# Patient Record
Sex: MALE | Race: WHITE | NOT HISPANIC OR LATINO | Employment: OTHER | URBAN - METROPOLITAN AREA
[De-identification: names, ages, dates, MRNs, and addresses within clinical notes are randomized per-mention and may not be internally consistent; named-entity substitution may affect disease eponyms.]

---

## 2017-02-06 ENCOUNTER — OFFICE VISIT (OUTPATIENT)
Dept: CARDIOLOGY | Facility: CLINIC | Age: 67
End: 2017-02-06

## 2017-02-06 VITALS
WEIGHT: 266 LBS | BODY MASS INDEX: 36.08 KG/M2 | DIASTOLIC BLOOD PRESSURE: 79 MMHG | SYSTOLIC BLOOD PRESSURE: 127 MMHG | HEART RATE: 80 BPM

## 2017-02-06 DIAGNOSIS — E66.09 OBESITY DUE TO EXCESS CALORIES, UNSPECIFIED OBESITY SEVERITY: Chronic | ICD-10-CM

## 2017-02-06 DIAGNOSIS — F41.9 ANXIETY: ICD-10-CM

## 2017-02-06 DIAGNOSIS — E78.49 OTHER HYPERLIPIDEMIA: ICD-10-CM

## 2017-02-06 DIAGNOSIS — I10 ESSENTIAL HYPERTENSION: Primary | ICD-10-CM

## 2017-02-06 DIAGNOSIS — E11.9 NON-INSULIN DEPENDENT TYPE 2 DIABETES MELLITUS (HCC): ICD-10-CM

## 2017-02-06 PROCEDURE — 99213 OFFICE O/P EST LOW 20 MIN: CPT | Performed by: INTERNAL MEDICINE

## 2017-02-06 PROCEDURE — 93000 ELECTROCARDIOGRAM COMPLETE: CPT | Performed by: INTERNAL MEDICINE

## 2017-02-06 NOTE — PROGRESS NOTES
Procedure   [facility]  Cardiology Progress Note    Patient Identification:  Name:Branden Diop  Age:66 y.o.  Sex: male  :  1950  MRN: [id number]           2017    Subjective:    Chief Complaint   Patient presents with   • Atrial Fibrillation       HPI       Mr. Diop is a 66-year-old white male with history of atrial fibrillation/flutter, no new recurrrance status post cardioversions, converted sinus rhythm, sleep apnea, former smoker, hyperlipidemia, who presents for cardiac clearance. He denies chest pain, pressure, tightness, heaviness or squeezing. No shortness of breath. No orthopnea or PND. No palpitations, dizziness or syncope. No edema. He lost 20 lbs since the last office visit.      Cholesterol management is followed by Dr. Lopez, on pravastatin. At some point, the near future he is thinking about stopping his statin medicine because his lipid profile has been good and I advised him not to stop it      Past echocardiogram with Doppler shows normal left ventricular size function.      ECG today in the office shows sinus rhythm. On propafenone,and diltiazem. Is off her Xarelto      He dosen't want to contiume long term anticoagulation and his knee surgeries went well.      He says he is feeling great with no angina or syncope    Review of Systems   Constitution: Negative for chills, fever and malaise/fatigue.   HENT: Negative for headaches.    Eyes: Negative for blurred vision and double vision. Left eye: glasses.   Cardiovascular: Negative for chest pain, irregular heartbeat, leg swelling and palpitations.   Respiratory: Negative for shortness of breath and snoring.    Skin: Positive for skin cancer (was just removed). Negative for color change.   Musculoskeletal: Negative for arthritis and joint pain.   Gastrointestinal: Negative for abdominal pain, nausea and vomiting.   Neurological: Negative for dizziness, light-headedness and numbness.   Psychiatric/Behavioral: Depression:  meds.       Past Medical History   Diagnosis Date   • Anxiety    • Atrial fibrillation    • Colon polyps    • COPD (chronic obstructive pulmonary disease)    • Depression    • Hammertoe    • Hyperlipidemia    • Non-insulin dependent type 2 diabetes mellitus    • Pneumonia 12/2013   • Right wrist pain 11/11/2015     and hand secondary to carpal tunnel and tendinitis   • Sinusitis    • Sleep apnea with use of continuous positive airway pressure (CPAP)    • Streptococcus pneumoniae        Past Surgical History   Procedure Laterality Date   • Foot surgery Left    • Hernia repair     • Neck surgery       growth on salivary gland   • Colonoscopy  2007   • Basal cell carcinoma excision     • Replacement total knee Right 2007     (he is going to have a LTKR next month)   • Finger/thumb arthroplasty     • Replacement total knee Left        Social History     Social History   • Marital status:      Spouse name: N/A   • Number of children: N/A   • Years of education: N/A     Occupational History   • Not on file.     Social History Main Topics   • Smoking status: Former Smoker     Quit date: 1/1/2014   • Smokeless tobacco: Not on file   • Alcohol use Not on file   • Drug use: No   • Sexual activity: Not on file     Other Topics Concern   • Not on file     Social History Narrative       Family History   Problem Relation Age of Onset   • Cancer Other    • Stroke Mother    • Alcohol abuse Father        Scheduled Meds:    Current Outpatient Prescriptions:   •  sertraline (ZOLOFT) 50 MG tablet, TAKE 1 TABLET BY MOUTH EVERY DAY, Disp: 30 tablet, Rfl: 2    Objective:  Visit Vitals   • /79   • Pulse 80   • Wt 266 lb (121 kg)   • BMI 36.08 kg/m2        Physical Exam  Physical Exam:    General: No acute distress.    Skin: Warm and dry, no diaphoresis noted   HEENT: No ptosis; external ear and nose normal; oral mucosa moist   Neck: Supple; no carotid bruits; no JVD, Trachea mid line   Heart: S1S2 regular rate and rhythm; no  murmurs; no gallop or rub appreciated, apex not displaced   Chest: Respirations regular, unlabored at rest, bilateral breath sounds have good air entry; no  wheezes auscultated.     Abdomen: Soft, non-tender, non-distended, positive bowel sounds  No hepatosplenomegaly   Extremities: Bilateral lower extremities have no pre-tibial pitting edema; Radials are palpable   Neurological: Alert and oriented x 3; no new motor deficits,           ECG 12 Lead  Date/Time: 2/6/2017 10:09 AM  Performed by: LAUREL MUSTAFA  Authorized by: LAUREL MUSTAFA   Comparison: compared with previous ECG   Similar to previous ECG  Rhythm: sinus rhythm  Rate: normal  QRS axis: normal             Comparison to previous ECG:  Similar to previous ecg     Assessment:  Problem List Items Addressed This Visit        Cardiovascular and Mediastinum    BP (high blood pressure) - Primary    Hyperlipidemia       Digestive    Obesity (Chronic)       Endocrine    Non-insulin dependent type 2 diabetes mellitus       Other    Anxiety          Plan:    Overall clinically stable with no angina. His BP has been stable. I will continue the current medical regimen. Weight loss is remphasized. I will see him back in 6 months.      02/06/2017  Marcial Mustafa MD, FACC

## 2017-02-15 ENCOUNTER — TELEPHONE (OUTPATIENT)
Dept: FAMILY MEDICINE CLINIC | Facility: CLINIC | Age: 67
End: 2017-02-15

## 2017-02-16 ENCOUNTER — OFFICE VISIT (OUTPATIENT)
Dept: FAMILY MEDICINE CLINIC | Facility: CLINIC | Age: 67
End: 2017-02-16

## 2017-02-16 VITALS
OXYGEN SATURATION: 96 % | HEIGHT: 72 IN | SYSTOLIC BLOOD PRESSURE: 118 MMHG | RESPIRATION RATE: 26 BRPM | TEMPERATURE: 97.7 F | DIASTOLIC BLOOD PRESSURE: 68 MMHG | WEIGHT: 271.2 LBS | BODY MASS INDEX: 36.73 KG/M2 | HEART RATE: 76 BPM

## 2017-02-16 DIAGNOSIS — J06.9 ACUTE URI: Primary | ICD-10-CM

## 2017-02-16 DIAGNOSIS — I48.91 ATRIAL FIBRILLATION, UNSPECIFIED TYPE (HCC): ICD-10-CM

## 2017-02-16 DIAGNOSIS — I10 ESSENTIAL HYPERTENSION: ICD-10-CM

## 2017-02-16 DIAGNOSIS — E78.5 HYPERLIPIDEMIA, UNSPECIFIED HYPERLIPIDEMIA TYPE: ICD-10-CM

## 2017-02-16 PROCEDURE — 99213 OFFICE O/P EST LOW 20 MIN: CPT | Performed by: INTERNAL MEDICINE

## 2017-02-16 RX ORDER — AMOXICILLIN AND CLAVULANATE POTASSIUM 875; 125 MG/1; MG/1
1 TABLET, FILM COATED ORAL 2 TIMES DAILY
Qty: 20 TABLET | Refills: 0 | Status: SHIPPED | OUTPATIENT
Start: 2017-02-16 | End: 2017-03-16

## 2017-02-16 RX ORDER — VALSARTAN AND HYDROCHLOROTHIAZIDE 160; 25 MG/1; MG/1
1 TABLET ORAL
COMMUNITY
End: 2017-04-26

## 2017-02-16 NOTE — PROGRESS NOTES
Subjective   Branden Diop is a 66 y.o. male.     History of Present Illness   Patient was seen for an upper respiratory tract symptoms.  He was given Augmentin and asked to follow-up in 4 days if not better.    Much of this encounter note is an electronic transcription/translation of spoken language to printed text.  The electronic translation of spoken language may permit erroneous, or at times, nonsensical words or phrases to be inadvertently transcribed.  Although I have reviewed the note for such errors, some may still exist.  The following portions of the patient's history were reviewed and updated as appropriate: allergies, current medications, past family history, past medical history, past social history, past surgical history and problem list.    Review of Systems   Constitutional: Negative for fatigue and fever.   HENT: Positive for congestion and sinus pressure. Negative for trouble swallowing.    Eyes: Negative for discharge and visual disturbance.   Respiratory: Negative for choking and shortness of breath.    Cardiovascular: Negative for chest pain and palpitations.   Gastrointestinal: Negative for abdominal pain and blood in stool.   Endocrine: Negative.    Genitourinary: Negative for genital sores and hematuria.   Musculoskeletal: Negative for gait problem and joint swelling.   Skin: Negative for color change, pallor, rash and wound.   Allergic/Immunologic: Positive for environmental allergies. Negative for immunocompromised state.   Neurological: Negative for facial asymmetry and speech difficulty.   Psychiatric/Behavioral: Negative for hallucinations and suicidal ideas.       Objective   Physical Exam   Constitutional: He is oriented to person, place, and time. He appears well-developed and well-nourished.   HENT:   Head: Normocephalic.   Bilateral maxillary tenderness   Eyes: Conjunctivae are normal. Pupils are equal, round, and reactive to light.   Neck: Normal range of motion. Neck supple.    Cardiovascular: Normal rate, regular rhythm and normal heart sounds.    Pulmonary/Chest: Effort normal and breath sounds normal.   Abdominal: Soft. Bowel sounds are normal.   Musculoskeletal: Normal range of motion.   Neurological: He is alert and oriented to person, place, and time.   Skin: Skin is warm and dry.   Psychiatric: He has a normal mood and affect. His behavior is normal. Judgment and thought content normal.   Nursing note and vitals reviewed.      Assessment/Plan   Problems Addressed this Visit        Cardiovascular and Mediastinum    A-fib    BP (high blood pressure)    Relevant Medications    valsartan-hydrochlorothiazide (DIOVAN-HCT) 160-25 MG per tablet    Hyperlipidemia      Other Visit Diagnoses     Acute URI    -  Primary    Relevant Medications    amoxicillin-clavulanate (AUGMENTIN) 875-125 MG per tablet

## 2017-03-16 ENCOUNTER — OFFICE VISIT (OUTPATIENT)
Dept: FAMILY MEDICINE CLINIC | Facility: CLINIC | Age: 67
End: 2017-03-16

## 2017-03-16 VITALS
OXYGEN SATURATION: 95 % | SYSTOLIC BLOOD PRESSURE: 138 MMHG | TEMPERATURE: 97.7 F | HEIGHT: 72 IN | DIASTOLIC BLOOD PRESSURE: 72 MMHG | HEART RATE: 70 BPM

## 2017-03-16 DIAGNOSIS — E78.2 MIXED HYPERLIPIDEMIA: ICD-10-CM

## 2017-03-16 DIAGNOSIS — J44.9 CHRONIC OBSTRUCTIVE PULMONARY DISEASE, UNSPECIFIED COPD TYPE (HCC): ICD-10-CM

## 2017-03-16 DIAGNOSIS — I10 ESSENTIAL HYPERTENSION: Primary | ICD-10-CM

## 2017-03-16 DIAGNOSIS — I48.20 CHRONIC ATRIAL FIBRILLATION (HCC): ICD-10-CM

## 2017-03-16 PROCEDURE — 99214 OFFICE O/P EST MOD 30 MIN: CPT | Performed by: INTERNAL MEDICINE

## 2017-03-16 NOTE — PROGRESS NOTES
Subjective   Branden Diop is a 66 y.o. male.     History of Present Illness   Patient being seen for blood pressure.  Been getting 130s to 120s over 80s at home.  He is being followed by cardiology for his weekly block and atrial flutter fib his COPD is been well-controlled last several months and he will follow-up in our office in 4 months.    Much of this encounter note is an electronic transcription/translation of spoken language to printed text.  The electronic translation of spoken language may permit erroneous, or at times, nonsensical words or phrases to be inadvertently transcribed.  Although I have reviewed the note for such errors, some may still exist.  The following portions of the patient's history were reviewed and updated as appropriate: allergies, current medications, past family history, past medical history, past social history, past surgical history and problem list.    Review of Systems   Constitutional: Negative for fatigue and fever.   HENT: Positive for congestion. Negative for trouble swallowing.    Eyes: Negative for discharge and visual disturbance.   Respiratory: Negative for choking and shortness of breath.    Cardiovascular: Negative for chest pain and palpitations.   Gastrointestinal: Negative for abdominal pain and blood in stool.   Endocrine: Negative.    Genitourinary: Negative for genital sores and hematuria.   Musculoskeletal: Negative for gait problem and joint swelling.   Skin: Negative for color change, pallor, rash and wound.   Allergic/Immunologic: Positive for environmental allergies. Negative for immunocompromised state.   Neurological: Negative for facial asymmetry and speech difficulty.   Psychiatric/Behavioral: Negative for hallucinations and suicidal ideas.       Objective   Physical Exam   Constitutional: He is oriented to person, place, and time. He appears well-developed and well-nourished.   HENT:   Head: Normocephalic.   Eyes: Conjunctivae are normal. Pupils are  equal, round, and reactive to light.   Neck: Normal range of motion. Neck supple.   Cardiovascular: Normal rate, regular rhythm and normal heart sounds.    Pulmonary/Chest: Effort normal and breath sounds normal.   Abdominal: Soft. Bowel sounds are normal.   Musculoskeletal: Normal range of motion.   Neurological: He is alert and oriented to person, place, and time.   Skin: Skin is warm and dry.   Psychiatric: He has a normal mood and affect. His behavior is normal. Judgment and thought content normal.   Nursing note and vitals reviewed.      Assessment/Plan   Problems Addressed this Visit        Cardiovascular and Mediastinum    A-fib    BP (high blood pressure) - Primary    Hyperlipidemia       Respiratory    COPD (chronic obstructive pulmonary disease)

## 2017-04-12 ENCOUNTER — LAB (OUTPATIENT)
Dept: FAMILY MEDICINE CLINIC | Facility: CLINIC | Age: 67
End: 2017-04-12

## 2017-04-12 DIAGNOSIS — E78.5 DYSLIPIDEMIA: ICD-10-CM

## 2017-04-12 DIAGNOSIS — E78.2 MIXED HYPERLIPIDEMIA: Primary | ICD-10-CM

## 2017-04-12 DIAGNOSIS — Z12.5 PROSTATE CANCER SCREENING: ICD-10-CM

## 2017-04-12 DIAGNOSIS — R79.89 LOW VITAMIN D LEVEL: ICD-10-CM

## 2017-04-12 LAB
25(OH)D3 SERPL-MCNC: 14.5 NG/ML (ref 30–100)
ALBUMIN SERPL-MCNC: 4.3 G/DL (ref 3.5–5.2)
ALBUMIN/GLOB SERPL: 1.1 G/DL
ALP SERPL-CCNC: 82 U/L (ref 39–117)
ALT SERPL W P-5'-P-CCNC: 20 U/L (ref 1–41)
ANION GAP SERPL CALCULATED.3IONS-SCNC: 19.9 MMOL/L
AST SERPL-CCNC: 19 U/L (ref 1–40)
BILIRUB SERPL-MCNC: 0.5 MG/DL (ref 0.1–1.2)
BUN BLD-MCNC: 15 MG/DL (ref 8–23)
BUN/CREAT SERPL: 17.9 (ref 7–25)
CALCIUM SPEC-SCNC: 9.8 MG/DL (ref 8.6–10.5)
CHLORIDE SERPL-SCNC: 100 MMOL/L (ref 98–107)
CHOLEST SERPL-MCNC: 285 MG/DL (ref 0–200)
CO2 SERPL-SCNC: 25 MMOL/L (ref 22–29)
CREAT BLD-MCNC: 0.84 MG/DL (ref 0.76–1.27)
ERYTHROCYTE [DISTWIDTH] IN BLOOD BY AUTOMATED COUNT: 14.7 % (ref 4.5–15)
GFR SERPL CREATININE-BSD FRML MDRD: 91 ML/MIN/1.73
GLOBULIN UR ELPH-MCNC: 3.8 GM/DL
GLUCOSE BLD-MCNC: 101 MG/DL (ref 65–99)
HCT VFR BLD AUTO: 45.7 % (ref 35–60)
HDLC SERPL-MCNC: 36 MG/DL (ref 40–60)
HGB BLD-MCNC: 15.6 G/DL (ref 13.5–18)
LDLC SERPL CALC-MCNC: 214 MG/DL (ref 0–100)
LDLC/HDLC SERPL: 5.94 {RATIO}
LYMPHOCYTES # BLD AUTO: 2 10*3/MM3 (ref 1.2–3.4)
LYMPHOCYTES NFR BLD AUTO: 26.2 % (ref 21–51)
MCH RBC QN AUTO: 29.5 PG (ref 26.1–33.1)
MCHC RBC AUTO-ENTMCNC: 34 G/DL (ref 33–37)
MCV RBC AUTO: 86.7 FL (ref 80–99)
MONOCYTES # BLD AUTO: 0.2 10*3/MM3 (ref 0.1–0.6)
MONOCYTES NFR BLD AUTO: 3.2 % (ref 2–9)
NEUTROPHILS # BLD AUTO: 5.4 10*3/MM3 (ref 1.4–6.5)
NEUTROPHILS NFR BLD AUTO: 70.6 % (ref 42–75)
PLATELET # BLD AUTO: 313 10*3/MM3 (ref 150–450)
PMV BLD AUTO: 7.2 FL (ref 7.1–10.5)
POTASSIUM BLD-SCNC: 4.9 MMOL/L (ref 3.5–5.2)
PROT SERPL-MCNC: 8.1 G/DL (ref 6–8.5)
RBC # BLD AUTO: 5.28 10*6/MM3 (ref 4–6)
SODIUM BLD-SCNC: 140 MMOL/L (ref 136–145)
TRIGL SERPL-MCNC: 176 MG/DL (ref 0–150)
VLDLC SERPL-MCNC: 35.2 MG/DL (ref 5–40)
WBC NRBC COR # BLD: 7.7 10*3/MM3 (ref 4.5–10)

## 2017-04-12 PROCEDURE — 82306 VITAMIN D 25 HYDROXY: CPT | Performed by: INTERNAL MEDICINE

## 2017-04-12 PROCEDURE — 85025 COMPLETE CBC W/AUTO DIFF WBC: CPT | Performed by: INTERNAL MEDICINE

## 2017-04-12 PROCEDURE — 80053 COMPREHEN METABOLIC PANEL: CPT | Performed by: INTERNAL MEDICINE

## 2017-04-12 PROCEDURE — 80061 LIPID PANEL: CPT | Performed by: INTERNAL MEDICINE

## 2017-04-12 RX ORDER — ATORVASTATIN CALCIUM 40 MG/1
40 TABLET, FILM COATED ORAL DAILY
Qty: 30 TABLET | Refills: 3 | Status: ON HOLD | OUTPATIENT
Start: 2017-04-12 | End: 2017-10-06

## 2017-04-12 RX ORDER — ERGOCALCIFEROL 1.25 MG/1
50000 CAPSULE ORAL WEEKLY
Qty: 30 CAPSULE | Refills: 3 | Status: SHIPPED | OUTPATIENT
Start: 2017-04-12 | End: 2017-10-17 | Stop reason: DRUGHIGH

## 2017-04-26 ENCOUNTER — OFFICE VISIT (OUTPATIENT)
Dept: FAMILY MEDICINE CLINIC | Facility: CLINIC | Age: 67
End: 2017-04-26

## 2017-04-26 VITALS
OXYGEN SATURATION: 93 % | WEIGHT: 271 LBS | TEMPERATURE: 98.1 F | DIASTOLIC BLOOD PRESSURE: 78 MMHG | SYSTOLIC BLOOD PRESSURE: 134 MMHG | BODY MASS INDEX: 36.7 KG/M2 | HEIGHT: 72 IN | RESPIRATION RATE: 18 BRPM | HEART RATE: 79 BPM

## 2017-04-26 DIAGNOSIS — J01.91 ACUTE RECURRENT SINUSITIS, UNSPECIFIED LOCATION: Primary | ICD-10-CM

## 2017-04-26 PROCEDURE — 99213 OFFICE O/P EST LOW 20 MIN: CPT | Performed by: FAMILY MEDICINE

## 2017-04-26 RX ORDER — AMOXICILLIN 500 MG/1
1000 CAPSULE ORAL 3 TIMES DAILY
Qty: 30 CAPSULE | Refills: 0 | Status: SHIPPED | OUTPATIENT
Start: 2017-04-26 | End: 2017-05-06

## 2017-04-26 NOTE — PROGRESS NOTES
SUBJECTIVE:  The patient is a 66-year-old white male who comes in with a 2-3 day history of sinus congestion pressure and sore throat.  Unaware of any fever.  No fever or chills.  He usually ends up with a sinus infection when the symptoms occur.     PAST MEDICAL HISTORY:  Reviewed.    REVIEW OF SYSTEMS:  Please see above; 14 point ROS otherwise negative.      OBJECTIVE: Vitals signs are reviewed and are stable.    HEENT: PERRLA.  Iams dull.  Throat mildly red.  Neck:  Supple.    Lungs:  Rhonchi are present in the right base  Heart:  Regular rate and rhythm.    Abdomen:   Soft, nontender.    Extremities:  No cyanosis, clubbing or edema.      ASSESSMENT:    Acute sinusitis    PLAN:  Amoxicillin 500 mg 3 times a day.  Tylenol as needed.  Follow-up in a couple days if no better.  Notify sooner if problems.    Much of this encounter note is an electronic transcription/translation of spoken language to printed text.  The electronic translation of spoken language may permit erroneous, or at times, nonsensical words or phrases to be inadvertently transcribed.  Although I have reviewed the note for such errors, some may still exist.

## 2017-07-12 ENCOUNTER — OFFICE VISIT (OUTPATIENT)
Dept: FAMILY MEDICINE CLINIC | Facility: CLINIC | Age: 67
End: 2017-07-12

## 2017-07-12 VITALS
BODY MASS INDEX: 36.68 KG/M2 | HEIGHT: 72 IN | DIASTOLIC BLOOD PRESSURE: 84 MMHG | WEIGHT: 270.8 LBS | HEART RATE: 84 BPM | TEMPERATURE: 97.8 F | SYSTOLIC BLOOD PRESSURE: 130 MMHG | OXYGEN SATURATION: 96 %

## 2017-07-12 DIAGNOSIS — I48.91 ATRIAL FIBRILLATION, UNSPECIFIED TYPE (HCC): ICD-10-CM

## 2017-07-12 DIAGNOSIS — E78.2 MIXED HYPERLIPIDEMIA: ICD-10-CM

## 2017-07-12 DIAGNOSIS — Z00.00 MEDICARE ANNUAL WELLNESS VISIT, INITIAL: Primary | ICD-10-CM

## 2017-07-12 DIAGNOSIS — I10 ESSENTIAL HYPERTENSION: ICD-10-CM

## 2017-07-12 PROCEDURE — G0438 PPPS, INITIAL VISIT: HCPCS | Performed by: INTERNAL MEDICINE

## 2017-07-12 PROCEDURE — 99214 OFFICE O/P EST MOD 30 MIN: CPT | Performed by: INTERNAL MEDICINE

## 2017-07-12 NOTE — PROGRESS NOTES
Subjective   Branden Diop is a 66 y.o. male.     History of Present Illness   Patient was seen for a Medicare wellness exam.  His atrial fibrillation is been very stable last several years.  His blood pressures been running 130s to 120s over 80s.  His lipid status been extremely stable with medication diet and exercise.  Patient will continue monitoring blood pressure return to our clinic in 6 months.    Much of this encounter note is an electronic transcription/translation of spoken language to printed text.  The electronic translation of spoken language may permit erroneous, or at times, nonsensical words or phrases to be inadvertently transcribed.  Although I have reviewed the note for such errors, some may still exist.  The following portions of the patient's history were reviewed and updated as appropriate: allergies, current medications, past family history, past medical history, past social history, past surgical history and problem list.    Review of Systems   Constitutional: Negative for fatigue and fever.   HENT: Positive for congestion. Negative for trouble swallowing.    Eyes: Negative for discharge and visual disturbance.   Respiratory: Negative for choking and shortness of breath.    Cardiovascular: Negative for chest pain and palpitations.   Gastrointestinal: Negative for abdominal pain and blood in stool.   Endocrine: Negative.    Genitourinary: Negative for genital sores and hematuria.   Musculoskeletal: Negative for gait problem and joint swelling.   Skin: Negative for color change, pallor, rash and wound.   Allergic/Immunologic: Positive for environmental allergies. Negative for immunocompromised state.   Neurological: Negative for facial asymmetry and speech difficulty.   Psychiatric/Behavioral: Negative for hallucinations and suicidal ideas.       Objective   Physical Exam   Constitutional: He is oriented to person, place, and time. He appears well-developed and well-nourished.   HENT:   Head:  Normocephalic.   Eyes: Conjunctivae are normal. Pupils are equal, round, and reactive to light.   Neck: Normal range of motion. Neck supple.   Cardiovascular: Normal rate and normal heart sounds.  Exam reveals no gallop and no friction rub.    No murmur heard.  Irregular irregular rhythm   Pulmonary/Chest: Effort normal and breath sounds normal. No respiratory distress. He has no wheezes. He has no rales. He exhibits no tenderness.   Abdominal: Soft. Bowel sounds are normal.   Musculoskeletal: Normal range of motion.   Neurological: He is alert and oriented to person, place, and time.   Skin: Skin is warm and dry.   Psychiatric: He has a normal mood and affect. His behavior is normal. Judgment and thought content normal.   Nursing note and vitals reviewed.      Assessment/Plan   Problems Addressed this Visit        Cardiovascular and Mediastinum    A-fib    BP (high blood pressure)    Hyperlipidemia      Other Visit Diagnoses     Medicare annual wellness visit, initial    -  Primary

## 2017-07-12 NOTE — PROGRESS NOTES
QUICK REFERENCE INFORMATION:  The ABCs of the Annual Wellness Visit    Initial Medicare Wellness Visit    HEALTH RISK ASSESSMENT    1950    Recent Hospitalizations:  No hospitalization(s) within the last year..        Current Medical Providers:  Patient Care Team:  Tino Lopez MD as PCP - General  Tino Lopez MD as PCP - Family Medicine  Tino Lopez MD as PCP - Claims Attributed        Smoking Status:  History   Smoking Status   • Former Smoker   • Types: Cigars   • Quit date: 1/1/2014   Smokeless Tobacco   • Never Used       Alcohol Consumption:  History   Alcohol Use No       Depression Screen:   PHQ-9 Depression Screening 7/12/2017   Little interest or pleasure in doing things 0   Feeling down, depressed, or hopeless 0   Trouble falling or staying asleep, or sleeping too much 0   Feeling tired or having little energy 0   Poor appetite or overeating 0   Feeling bad about yourself - or that you are a failure or have let yourself or your family down 0   Trouble concentrating on things, such as reading the newspaper or watching television 0   Moving or speaking so slowly that other people could have noticed. Or the opposite - being so fidgety or restless that you have been moving around a lot more than usual 0   Thoughts that you would be better off dead, or of hurting yourself in some way 0   PHQ-9 Total Score 0   If you checked off any problems, how difficult have these problems made it for you to do your work, take care of things at home, or get along with other people? Not difficult at all       Health Habits and Functional and Cognitive Screening:  Functional & Cognitive Status 7/12/2017   Do you have difficulty preparing food and eating? No   Do you have difficulty bathing yourself? No   Do you have difficulty getting dressed? No   Do you have difficulty using the toilet? No   Do you have difficulty moving around from place to place? No   In the past year have you fallen or experienced a  near fall? No   Do you need help using the phone?  No   Are you deaf or do you have serious difficulty hearing?  No   Do you need help with transportation? No   Do you need help shopping? No   Do you need help preparing meals?  No   Do you need help with housework?  No   Do you need help with laundry? No   Do you need help taking your medications? No   Do you need help managing money? No   Do you have difficulty concentrating, remembering or making decisions? No       Health Habits  Current Diet: Well Balanced Diet  Dental Exam: Up to date  Eye Exam: Up to date  Exercise (times per week): 6 times per week  Current Exercise Activities Include: Aerobics          Does the patient have evidence of cognitive impairment? Yes    Asiprin use counseling: Start ASA 81 mg daily       Recent Lab Results:    Visual Acuity:  No exam data present    Age-appropriate Screening Schedule:  Refer to the list below for future screening recommendations based on patient's age, sex and/or medical conditions. Orders for these recommended tests are listed in the plan section. The patient has been provided with a written plan.    Health Maintenance   Topic Date Due   • URINE MICROALBUMIN  04/05/2016   • HEMOGLOBIN A1C  04/12/2017   • INFLUENZA VACCINE  08/01/2017   • PROSTATE CANCER SCREENING  10/12/2017   • DIABETIC FOOT EXAM  03/16/2018   • DIABETIC EYE EXAM  03/16/2018   • LIPID PANEL  04/12/2018   • COLONOSCOPY  05/10/2023   • TDAP/TD VACCINES (2 - Td) 03/16/2027   • PNEUMOCOCCAL VACCINES (65+ LOW/MEDIUM RISK)  Completed   • ZOSTER VACCINE  Addressed        Subjective   History of Present Illness    Branden Diop is a 66 y.o. male who presents for an Annual Wellness Visit.    The following portions of the patient's history were reviewed and updated as appropriate: allergies, current medications, past family history, past medical history, past social history, past surgical history and problem list.    Outpatient Medications Prior to Visit  "  Medication Sig Dispense Refill   • atorvastatin (LIPITOR) 40 MG tablet Take 1 tablet by mouth Daily. 30 tablet 3   • sertraline (ZOLOFT) 50 MG tablet Take 1 tablet by mouth Daily. 90 tablet 1   • vitamin D (ERGOCALCIFEROL) 46459 UNITS capsule capsule Take 1 capsule by mouth 1 (One) Time Per Week. 30 capsule 3   • aspirin 81 MG tablet Take 1 tablet by mouth Daily. 30 tablet 3     No facility-administered medications prior to visit.        Patient Active Problem List   Diagnosis   • A-fib   • BP (high blood pressure)   • Atrioventricular block, Mobitz type 1, Wenckebach   • Apnea, sleep   • Obesity   • Streptococcus pneumoniae   • Sleep apnea with use of continuous positive airway pressure (CPAP)   • Sinusitis   • Right wrist pain   • Pneumonia   • Non-insulin dependent type 2 diabetes mellitus   • Hyperlipidemia   • Hammertoe   • Depression   • COPD (chronic obstructive pulmonary disease)   • Colon polyps   • Anxiety   • Pruritus   • Atrial flutter       Advance Care Planning:  has NO advance directive - not interested in additional information    Identification of Risk Factors:  Risk factors include: NA.    Review of Systems    Compared to one year ago, the patient feels his physical health is better.  Compared to one year ago, the patient feels his mental health is better.    Objective     Physical Exam    Vitals:    07/12/17 0818   BP: 130/84   BP Location: Left arm   Patient Position: Sitting   Cuff Size: Large Adult   Pulse: 84   Temp: 97.8 °F (36.6 °C)   TempSrc: Oral   SpO2: 96%   Weight: 270 lb 12.8 oz (123 kg)   Height: 72\" (182.9 cm)   PainSc: 0-No pain       Body mass index is 36.73 kg/(m^2).  Discussed the patient's BMI with him. The BMI is above average; BMI management plan is completed.    Assessment/Plan   Patient Self-Management and Personalized Health Advice  The patient has been provided with information about: diet and preventive services including:   · Advance directive.    Visit Diagnoses:  No " diagnosis found.    No orders of the defined types were placed in this encounter.      Outpatient Encounter Prescriptions as of 7/12/2017   Medication Sig Dispense Refill   • atorvastatin (LIPITOR) 40 MG tablet Take 1 tablet by mouth Daily. 30 tablet 3   • sertraline (ZOLOFT) 50 MG tablet Take 1 tablet by mouth Daily. 90 tablet 1   • vitamin D (ERGOCALCIFEROL) 60156 UNITS capsule capsule Take 1 capsule by mouth 1 (One) Time Per Week. 30 capsule 3   • [DISCONTINUED] aspirin 81 MG tablet Take 1 tablet by mouth Daily. 30 tablet 3     No facility-administered encounter medications on file as of 7/12/2017.        Reviewed use of high risk medication in the elderly: not applicable  Reviewed for potential of harmful drug interactions in the elderly: not applicable    Follow Up:  No Follow-up on file.     An After Visit Summary and PPPS with all of these plans were given to the patient.

## 2017-07-12 NOTE — PATIENT INSTRUCTIONS
Medicare Wellness  Personal Prevention Plan of Service     Date of Office Visit:  2017  Encounter Provider:  Tino Lopez MD  Place of Service:  [facility]  Patient Name: Branden Diop  :  1950    As part of the Medicare Wellness portion of your visit today, we are providing you with this personalized preventive plan of services (PPPS). This plan is based upon recommendations of the [place] Preventive Services Task Force (USPSTF) and the Advisory Committee on Immunization Practices (ACIP).    This lists the preventive care services that should be considered, and provides dates of when you are due. Items listed as completed are up-to-date and do not require any further intervention.    Health Maintenance   Topic Date Due   • AAA SCREEN (ONE-TIME)  2016   • HEPATITIS C SCREENING  2016   • MEDICARE ANNUAL WELLNESS  2016   • URINE MICROALBUMIN  2016   • HEMOGLOBIN A1C  2017   • INFLUENZA VACCINE  2017   • PROSTATE CANCER SCREENING  10/12/2017   • DIABETIC FOOT EXAM  2018   • DIABETIC EYE EXAM  2018   • LIPID PANEL  2018   • COLONOSCOPY  05/10/2023   • TDAP/TD VACCINES (2 - Td) 2027   • PNEUMOCOCCAL VACCINES (65+ LOW/MEDIUM RISK)  Completed   • ZOSTER VACCINE  Addressed       No orders of the defined types were placed in this encounter.      No Follow-up on file.

## 2017-07-21 ENCOUNTER — LAB (OUTPATIENT)
Dept: FAMILY MEDICINE CLINIC | Facility: CLINIC | Age: 67
End: 2017-07-21

## 2017-07-21 DIAGNOSIS — E55.9 VITAMIN D DEFICIENCY: Primary | ICD-10-CM

## 2017-07-21 DIAGNOSIS — E78.2 MIXED HYPERLIPIDEMIA: ICD-10-CM

## 2017-07-21 LAB
25(OH)D3 SERPL-MCNC: 23.8 NG/ML (ref 30–100)
ALBUMIN SERPL-MCNC: 4.3 G/DL (ref 3.5–5.2)
ALBUMIN/GLOB SERPL: 1.3 G/DL
ALP SERPL-CCNC: 102 U/L (ref 39–117)
ALT SERPL W P-5'-P-CCNC: 22 U/L (ref 1–41)
ANION GAP SERPL CALCULATED.3IONS-SCNC: 14.4 MMOL/L
AST SERPL-CCNC: 19 U/L (ref 1–40)
BILIRUB CONJ SERPL-MCNC: <0.2 MG/DL (ref 0–0.3)
BILIRUB SERPL-MCNC: 0.6 MG/DL (ref 0.1–1.2)
BUN BLD-MCNC: 12 MG/DL (ref 8–23)
BUN/CREAT SERPL: 14 (ref 7–25)
CALCIUM SPEC-SCNC: 9.9 MG/DL (ref 8.6–10.5)
CHLORIDE SERPL-SCNC: 100 MMOL/L (ref 98–107)
CHOLEST SERPL-MCNC: 157 MG/DL (ref 0–200)
CO2 SERPL-SCNC: 24.6 MMOL/L (ref 22–29)
CREAT BLD-MCNC: 0.86 MG/DL (ref 0.76–1.27)
GFR SERPL CREATININE-BSD FRML MDRD: 88 ML/MIN/1.73
GLOBULIN UR ELPH-MCNC: 3.3 GM/DL
GLUCOSE BLD-MCNC: 118 MG/DL (ref 65–99)
HDLC SERPL-MCNC: 37 MG/DL (ref 40–60)
LDLC SERPL CALC-MCNC: 99 MG/DL (ref 0–100)
LDLC/HDLC SERPL: 2.67 {RATIO}
POTASSIUM BLD-SCNC: 5.1 MMOL/L (ref 3.5–5.2)
PROT SERPL-MCNC: 7.6 G/DL (ref 6–8.5)
SODIUM BLD-SCNC: 139 MMOL/L (ref 136–145)
TRIGL SERPL-MCNC: 106 MG/DL (ref 0–150)
VLDLC SERPL-MCNC: 21.2 MG/DL (ref 5–40)

## 2017-07-21 PROCEDURE — 80061 LIPID PANEL: CPT | Performed by: INTERNAL MEDICINE

## 2017-07-21 PROCEDURE — 82248 BILIRUBIN DIRECT: CPT | Performed by: INTERNAL MEDICINE

## 2017-07-21 PROCEDURE — 36415 COLL VENOUS BLD VENIPUNCTURE: CPT | Performed by: INTERNAL MEDICINE

## 2017-07-21 PROCEDURE — 82306 VITAMIN D 25 HYDROXY: CPT | Performed by: INTERNAL MEDICINE

## 2017-07-21 PROCEDURE — 80053 COMPREHEN METABOLIC PANEL: CPT | Performed by: INTERNAL MEDICINE

## 2017-08-07 ENCOUNTER — OFFICE VISIT (OUTPATIENT)
Dept: CARDIOLOGY | Facility: CLINIC | Age: 67
End: 2017-08-07

## 2017-08-07 VITALS
DIASTOLIC BLOOD PRESSURE: 88 MMHG | SYSTOLIC BLOOD PRESSURE: 158 MMHG | WEIGHT: 275 LBS | HEART RATE: 80 BPM | BODY MASS INDEX: 37.3 KG/M2

## 2017-08-07 DIAGNOSIS — G47.33 OBSTRUCTIVE SLEEP APNEA SYNDROME: ICD-10-CM

## 2017-08-07 DIAGNOSIS — E11.9 NON-INSULIN DEPENDENT TYPE 2 DIABETES MELLITUS (HCC): ICD-10-CM

## 2017-08-07 DIAGNOSIS — E66.01 MORBID OBESITY DUE TO EXCESS CALORIES (HCC): Chronic | ICD-10-CM

## 2017-08-07 DIAGNOSIS — E78.2 MIXED HYPERLIPIDEMIA: ICD-10-CM

## 2017-08-07 DIAGNOSIS — J43.8 OTHER EMPHYSEMA (HCC): ICD-10-CM

## 2017-08-07 DIAGNOSIS — R94.31 ABNORMAL ELECTROCARDIOGRAM: ICD-10-CM

## 2017-08-07 DIAGNOSIS — I10 ESSENTIAL HYPERTENSION: Primary | ICD-10-CM

## 2017-08-07 PROCEDURE — 99214 OFFICE O/P EST MOD 30 MIN: CPT | Performed by: INTERNAL MEDICINE

## 2017-08-07 PROCEDURE — 93000 ELECTROCARDIOGRAM COMPLETE: CPT | Performed by: INTERNAL MEDICINE

## 2017-08-07 RX ORDER — FLUOROURACIL 50 MG/G
CREAM TOPICAL
Refills: 1 | Status: ON HOLD | COMMUNITY
Start: 2017-05-22 | End: 2017-10-06

## 2017-08-07 RX ORDER — VALSARTAN AND HYDROCHLOROTHIAZIDE 160; 25 MG/1; MG/1
1 TABLET ORAL DAILY
Qty: 30 TABLET | Refills: 3 | Status: SHIPPED | OUTPATIENT
Start: 2017-08-07 | End: 2018-06-22

## 2017-08-07 NOTE — PROGRESS NOTES
Procedure   [facility]  Cardiology Progress Note    Patient Identification:  Name:Branden Diop  Age:66 y.o.  Sex: male  :  1950  MRN: [id number]           2017    Subjective:    Chief Complaint   Patient presents with   • Atrial Fibrillation       HPI     Mr. Diop is a 66-year-old white male with history of atrial fibrillation/flutter when he had pneumona, no new recurrrance status post cardioversions, remains in sinus rhythm, sleep apnea, former smoker, hyperlipidemia. He denies chest pain, pressure, tightness, heaviness or squeezing. No shortness of breath. No orthopnea or PND. No palpitations, dizziness or syncope. No edema.  No heart palpitations      Cholesterol management is followed by Dr. Lopez, on pravastatin. At some point, the near future he is thinking about stopping his statin medicine because his lipid profile has been good and I advised him not to stop it      Past echocardiogram with Doppler shows normal left ventricular size function.      ECG today in the office shows sinus rhythm.  Is off her Xarelto and he chose not to continue long term anticoagulation as the atrialfib did not recur since his cardioversion. The first episode happened durng bad pneumonia. He is on aspirin and CPAP      He dosen't want to contiume long term anticoagulation and his knee surgeries went well.      He says he is feeling great with no angina or syncope    Review of Systems   Constitution: Negative for chills, fever and malaise/fatigue.   HENT: Negative for headaches.    Cardiovascular: Negative for chest pain, irregular heartbeat, leg swelling and palpitations.   Respiratory: Negative for shortness of breath and snoring.    Skin: Negative for color change.   Musculoskeletal: Negative for arthritis and joint pain.   Gastrointestinal: Negative for abdominal pain, nausea and vomiting.   Neurological: Negative for dizziness, light-headedness and numbness.       The following portions of the  patient's history were reviewed and updated as appropriate: allergies, current medications, past family history, past medical history, past social history,and problem list.    Past Medical History:   Diagnosis Date   • Anxiety    • Atrial fibrillation    • Colon polyps    • COPD (chronic obstructive pulmonary disease)    • Depression    • Hammertoe    • Hyperlipidemia    • Non-insulin dependent type 2 diabetes mellitus    • Pneumonia 12/2013   • Right wrist pain 11/11/2015    and hand secondary to carpal tunnel and tendinitis   • Sinusitis    • Sleep apnea with use of continuous positive airway pressure (CPAP)    • Streptococcus pneumoniae        Past Surgical History:   Procedure Laterality Date   • BASAL CELL CARCINOMA EXCISION     • COLONOSCOPY  2007   • FINGER/THUMB ARTHROPLASTY     • FOOT SURGERY Left    • HERNIA REPAIR     • NECK SURGERY      growth on salivary gland   • REPLACEMENT TOTAL KNEE Right 2007    (he is going to have a LTKR next month)   • REPLACEMENT TOTAL KNEE Left        Social History     Social History   • Marital status:      Spouse name: N/A   • Number of children: N/A   • Years of education: N/A     Occupational History   • Not on file.     Social History Main Topics   • Smoking status: Former Smoker     Types: Cigars     Quit date: 1/1/2014   • Smokeless tobacco: Never Used   • Alcohol use No   • Drug use: No   • Sexual activity: Not on file     Other Topics Concern   • Not on file     Social History Narrative       Family History   Problem Relation Age of Onset   • Cancer Other    • Stroke Mother    • Alcohol abuse Father        Scheduled Meds:    Current Outpatient Prescriptions:   •  atorvastatin (LIPITOR) 40 MG tablet, Take 1 tablet by mouth Daily., Disp: 30 tablet, Rfl: 3  •  fluorouracil (EFUDEX) 5 % cream, APPLY TO TOPS OF HANDS AND FOREARMS EVERY NIGHT FOR 30 DAYS. WASH OFF EVERY MORNING AND APPLY VASELINE. IF TOO IRRITATING MAY TAKE DAYS OFF, Disp: , Rfl: 1  •  vitamin D  (ERGOCALCIFEROL) 52795 UNITS capsule capsule, Take 1 capsule by mouth 1 (One) Time Per Week., Disp: 30 capsule, Rfl: 3    Objective:  /88  Pulse 80  Wt 275 lb (125 kg)  BMI 37.3 kg/m2     Physical Exam  Physical Exam:    General: No acute distress. obestiy   Skin: Warm and dry, no diaphoresis noted   HEENT: No ptosis; external ear and nose normal; oral mucosa moist   Neck: Supple; no carotid bruits; no JVD, Trachea mid line   Heart: S1S2 regular rate and rhythm; no murmurs; no gallop or rub appreciated, apex not displaced   Chest: Respirations regular, unlabored at rest, bilateral breath sounds have good air entry; no  wheezes auscultated.     Abdomen: Soft, non-tender, non-distended, positive bowel sounds  No hepatosplenomegaly   Extremities: Bilateral lower extremities have no pre-tibial pitting edema; Radials are palpable   Neurological: Alert and oriented x 3; no new motor deficits,           ECG 12 Lead  Date/Time: 8/7/2017 10:02 AM  Performed by: LAUREL PADRON  Authorized by: LAUREL PADRON   Rhythm: sinus rhythm  Rate: normal  QRS axis: normal             Comparison to previous ECG:  Similar to previous ecg     Assessment:  Problem List Items Addressed This Visit        Cardiovascular and Mediastinum    BP (high blood pressure) - Primary    Hyperlipidemia       Respiratory    COPD (chronic obstructive pulmonary disease)       Digestive    Obesity (Chronic)       Endocrine    RESOLVED: Non-insulin dependent type 2 diabetes mellitus       Other    Apnea, sleep          Plan:    Overall clinically he has been doing good with no angina. His ECG has been stable.  His BP is running mildly high and I will add Valsartan/HCTZ. I gave him a vascular screeening leaflet and I will obtain a plain treadmill. From now on I asked him to follow up with Dr Lopez. Weight loss is reemphasized      I not only counseled the patient today on the risk factor modification of significant factors noted in the  assessment and plan, and I also recommended that the patient reduce salt and saturated animal fat intake in diet, about the advantages of plant based diet, as well as to perform scheduled exercise on a regular basis.    08/07/2017  Marcial Mustafa MD, FACC

## 2017-10-06 ENCOUNTER — HOSPITAL ENCOUNTER (INPATIENT)
Facility: HOSPITAL | Age: 67
LOS: 1 days | Discharge: HOME | End: 2017-10-07
Attending: INTERNAL MEDICINE | Admitting: INTERNAL MEDICINE

## 2017-10-06 DIAGNOSIS — K81.0 ACUTE CHOLECYSTITIS: Primary | ICD-10-CM

## 2017-10-06 PROBLEM — K80.20 CALCULUS OF GALLBLADDER: Status: ACTIVE | Noted: 2017-10-06

## 2017-10-06 PROBLEM — R10.11 RIGHT UPPER QUADRANT PAIN: Status: ACTIVE | Noted: 2017-10-06

## 2017-10-06 LAB
ANION GAP SERPL CALCULATED.3IONS-SCNC: 13.5 MMOL/L
BASOPHILS # BLD AUTO: 0.02 10*3/MM3 (ref 0–0.2)
BASOPHILS NFR BLD AUTO: 0.2 % (ref 0–1.5)
BUN BLD-MCNC: 8 MG/DL (ref 8–23)
BUN/CREAT SERPL: 11.3 (ref 7–25)
CALCIUM SPEC-SCNC: 8.9 MG/DL (ref 8.6–10.5)
CHLORIDE SERPL-SCNC: 101 MMOL/L (ref 98–107)
CO2 SERPL-SCNC: 25.5 MMOL/L (ref 22–29)
CREAT BLD-MCNC: 0.71 MG/DL (ref 0.76–1.27)
D-LACTATE SERPL-SCNC: 1.1 MMOL/L (ref 0.5–2)
DEPRECATED RDW RBC AUTO: 49 FL (ref 37–54)
EOSINOPHIL # BLD AUTO: 0.12 10*3/MM3 (ref 0–0.7)
EOSINOPHIL NFR BLD AUTO: 1.2 % (ref 0.3–6.2)
ERYTHROCYTE [DISTWIDTH] IN BLOOD BY AUTOMATED COUNT: 14.6 % (ref 11.5–14.5)
GFR SERPL CREATININE-BSD FRML MDRD: 110 ML/MIN/1.73
GLUCOSE BLD-MCNC: 102 MG/DL (ref 65–99)
HCT VFR BLD AUTO: 43.8 % (ref 40.4–52.2)
HGB BLD-MCNC: 14.6 G/DL (ref 13.7–17.6)
IMM GRANULOCYTES # BLD: 0.04 10*3/MM3 (ref 0–0.03)
IMM GRANULOCYTES NFR BLD: 0.4 % (ref 0–0.5)
LYMPHOCYTES # BLD AUTO: 1.75 10*3/MM3 (ref 0.9–4.8)
LYMPHOCYTES NFR BLD AUTO: 18.2 % (ref 19.6–45.3)
MCH RBC QN AUTO: 30.5 PG (ref 27–32.7)
MCHC RBC AUTO-ENTMCNC: 33.3 G/DL (ref 32.6–36.4)
MCV RBC AUTO: 91.4 FL (ref 79.8–96.2)
MONOCYTES # BLD AUTO: 0.52 10*3/MM3 (ref 0.2–1.2)
MONOCYTES NFR BLD AUTO: 5.4 % (ref 5–12)
NEUTROPHILS # BLD AUTO: 7.17 10*3/MM3 (ref 1.9–8.1)
NEUTROPHILS NFR BLD AUTO: 74.6 % (ref 42.7–76)
PLATELET # BLD AUTO: 282 10*3/MM3 (ref 140–500)
PMV BLD AUTO: 9.4 FL (ref 6–12)
POTASSIUM BLD-SCNC: 3.8 MMOL/L (ref 3.5–5.2)
RBC # BLD AUTO: 4.79 10*6/MM3 (ref 4.6–6)
SODIUM BLD-SCNC: 140 MMOL/L (ref 136–145)
WBC NRBC COR # BLD: 9.62 10*3/MM3 (ref 4.5–10.7)

## 2017-10-06 PROCEDURE — 84484 ASSAY OF TROPONIN QUANT: CPT | Performed by: INTERNAL MEDICINE

## 2017-10-06 PROCEDURE — 80048 BASIC METABOLIC PNL TOTAL CA: CPT | Performed by: INTERNAL MEDICINE

## 2017-10-06 PROCEDURE — 93005 ELECTROCARDIOGRAM TRACING: CPT | Performed by: INTERNAL MEDICINE

## 2017-10-06 PROCEDURE — 25010000002 INFLUENZA VAC SUBUNIT QUAD 0.5 ML SUSPENSION PREFILLED SYRINGE: Performed by: INTERNAL MEDICINE

## 2017-10-06 PROCEDURE — 85025 COMPLETE CBC W/AUTO DIFF WBC: CPT | Performed by: INTERNAL MEDICINE

## 2017-10-06 PROCEDURE — 90661 CCIIV3 VAC ABX FR 0.5 ML IM: CPT | Performed by: INTERNAL MEDICINE

## 2017-10-06 PROCEDURE — 25010000002 PIPERACILLIN SOD-TAZOBACTAM PER 1 G: Performed by: INTERNAL MEDICINE

## 2017-10-06 PROCEDURE — 87040 BLOOD CULTURE FOR BACTERIA: CPT | Performed by: INTERNAL MEDICINE

## 2017-10-06 PROCEDURE — 83605 ASSAY OF LACTIC ACID: CPT | Performed by: INTERNAL MEDICINE

## 2017-10-06 PROCEDURE — G0008 ADMIN INFLUENZA VIRUS VAC: HCPCS | Performed by: INTERNAL MEDICINE

## 2017-10-06 RX ORDER — IPRATROPIUM BROMIDE AND ALBUTEROL SULFATE 2.5; .5 MG/3ML; MG/3ML
3 SOLUTION RESPIRATORY (INHALATION) EVERY 6 HOURS PRN
Status: DISCONTINUED | OUTPATIENT
Start: 2017-10-06 | End: 2017-10-07 | Stop reason: HOSPADM

## 2017-10-06 RX ORDER — SODIUM CHLORIDE 9 MG/ML
100 INJECTION, SOLUTION INTRAVENOUS CONTINUOUS
Status: DISCONTINUED | OUTPATIENT
Start: 2017-10-06 | End: 2017-10-07 | Stop reason: HOSPADM

## 2017-10-06 RX ORDER — ACETAMINOPHEN 325 MG/1
650 TABLET ORAL EVERY 4 HOURS PRN
Status: DISCONTINUED | OUTPATIENT
Start: 2017-10-06 | End: 2017-10-07 | Stop reason: HOSPADM

## 2017-10-06 RX ORDER — SODIUM CHLORIDE 0.9 % (FLUSH) 0.9 %
1-10 SYRINGE (ML) INJECTION AS NEEDED
Status: DISCONTINUED | OUTPATIENT
Start: 2017-10-06 | End: 2017-10-07 | Stop reason: HOSPADM

## 2017-10-06 RX ORDER — MORPHINE SULFATE 2 MG/ML
1 INJECTION, SOLUTION INTRAMUSCULAR; INTRAVENOUS
Status: DISCONTINUED | OUTPATIENT
Start: 2017-10-06 | End: 2017-10-06

## 2017-10-06 RX ORDER — CALCIUM CARBONATE 200(500)MG
2 TABLET,CHEWABLE ORAL 2 TIMES DAILY PRN
Status: DISCONTINUED | OUTPATIENT
Start: 2017-10-06 | End: 2017-10-07 | Stop reason: HOSPADM

## 2017-10-06 RX ORDER — ONDANSETRON 4 MG/1
4 TABLET, FILM COATED ORAL EVERY 6 HOURS PRN
Status: DISCONTINUED | OUTPATIENT
Start: 2017-10-06 | End: 2017-10-07 | Stop reason: HOSPADM

## 2017-10-06 RX ORDER — MORPHINE SULFATE 2 MG/ML
2 INJECTION, SOLUTION INTRAMUSCULAR; INTRAVENOUS
Status: DISCONTINUED | OUTPATIENT
Start: 2017-10-06 | End: 2017-10-07 | Stop reason: HOSPADM

## 2017-10-06 RX ORDER — ONDANSETRON 4 MG/1
4 TABLET, ORALLY DISINTEGRATING ORAL EVERY 6 HOURS PRN
Status: DISCONTINUED | OUTPATIENT
Start: 2017-10-06 | End: 2017-10-07 | Stop reason: HOSPADM

## 2017-10-06 RX ORDER — ONDANSETRON 2 MG/ML
4 INJECTION INTRAMUSCULAR; INTRAVENOUS EVERY 6 HOURS PRN
Status: DISCONTINUED | OUTPATIENT
Start: 2017-10-06 | End: 2017-10-07 | Stop reason: HOSPADM

## 2017-10-06 RX ORDER — DOCUSATE SODIUM 100 MG/1
100 CAPSULE, LIQUID FILLED ORAL 2 TIMES DAILY
Status: DISCONTINUED | OUTPATIENT
Start: 2017-10-06 | End: 2017-10-07 | Stop reason: HOSPADM

## 2017-10-06 RX ORDER — NALOXONE HCL 0.4 MG/ML
0.4 VIAL (ML) INJECTION
Status: DISCONTINUED | OUTPATIENT
Start: 2017-10-06 | End: 2017-10-06

## 2017-10-06 RX ORDER — OXYCODONE HYDROCHLORIDE AND ACETAMINOPHEN 5; 325 MG/1; MG/1
1 TABLET ORAL EVERY 4 HOURS PRN
Status: DISCONTINUED | OUTPATIENT
Start: 2017-10-06 | End: 2017-10-07 | Stop reason: HOSPADM

## 2017-10-06 RX ORDER — NALOXONE HCL 0.4 MG/ML
0.4 VIAL (ML) INJECTION
Status: DISCONTINUED | OUTPATIENT
Start: 2017-10-06 | End: 2017-10-07 | Stop reason: HOSPADM

## 2017-10-06 RX ADMIN — DOCUSATE SODIUM 100 MG: 100 CAPSULE, LIQUID FILLED ORAL at 21:39

## 2017-10-06 RX ADMIN — ACETAMINOPHEN 650 MG: 325 TABLET ORAL at 21:39

## 2017-10-06 RX ADMIN — TAZOBACTAM SODIUM AND PIPERACILLIN SODIUM 4.5 G: 500; 4 INJECTION, SOLUTION INTRAVENOUS at 21:32

## 2017-10-06 RX ADMIN — A/SINGAPORE/GP1908/2015 IVR-180 (H1N1) (AN A/MICHIGAN/45/2015-LIKE VIRUS), A/SINGAPORE/GP2050/2015 (H3N2) (AN A/HONG KONG/4801/2014 - LIKE VIRUS), B/UTAH/9/2014 (A B/PHUKET/3073/2013-LIKE VIRUS), B/HONG KONG/259/2010 (A B/BRISBANE/60/08-LIKE VIRUS) 0.5 ML: 15; 15; 15; 15 INJECTION, SUSPENSION INTRAMUSCULAR at 21:40

## 2017-10-06 RX ADMIN — SODIUM CHLORIDE 100 ML/HR: 9 INJECTION, SOLUTION INTRAVENOUS at 20:00

## 2017-10-07 ENCOUNTER — ANESTHESIA EVENT (OUTPATIENT)
Dept: PERIOP | Facility: HOSPITAL | Age: 67
End: 2017-10-07

## 2017-10-07 ENCOUNTER — ANESTHESIA (OUTPATIENT)
Dept: PERIOP | Facility: HOSPITAL | Age: 67
End: 2017-10-07

## 2017-10-07 VITALS
HEIGHT: 72 IN | TEMPERATURE: 98 F | BODY MASS INDEX: 37.38 KG/M2 | HEART RATE: 80 BPM | RESPIRATION RATE: 16 BRPM | DIASTOLIC BLOOD PRESSURE: 67 MMHG | OXYGEN SATURATION: 97 % | WEIGHT: 276 LBS | SYSTOLIC BLOOD PRESSURE: 134 MMHG

## 2017-10-07 PROBLEM — K81.0 ACUTE CHOLECYSTITIS: Status: RESOLVED | Noted: 2017-10-06 | Resolved: 2017-10-07

## 2017-10-07 PROBLEM — R10.11 RIGHT UPPER QUADRANT PAIN: Status: RESOLVED | Noted: 2017-10-06 | Resolved: 2017-10-07

## 2017-10-07 PROBLEM — K80.20 CALCULUS OF GALLBLADDER: Status: RESOLVED | Noted: 2017-10-06 | Resolved: 2017-10-07

## 2017-10-07 LAB
ALBUMIN SERPL-MCNC: 3.7 G/DL (ref 3.5–5.2)
ALBUMIN/GLOB SERPL: 1.2 G/DL
ALP SERPL-CCNC: 74 U/L (ref 39–117)
ALT SERPL W P-5'-P-CCNC: 18 U/L (ref 1–41)
ANION GAP SERPL CALCULATED.3IONS-SCNC: 12.9 MMOL/L
AST SERPL-CCNC: 18 U/L (ref 1–40)
BASOPHILS # BLD AUTO: 0.03 10*3/MM3 (ref 0–0.2)
BASOPHILS NFR BLD AUTO: 0.5 % (ref 0–1.5)
BILIRUB SERPL-MCNC: 0.6 MG/DL (ref 0.1–1.2)
BUN BLD-MCNC: 10 MG/DL (ref 8–23)
BUN/CREAT SERPL: 11.8 (ref 7–25)
CALCIUM SPEC-SCNC: 9.1 MG/DL (ref 8.6–10.5)
CHLORIDE SERPL-SCNC: 103 MMOL/L (ref 98–107)
CO2 SERPL-SCNC: 25.1 MMOL/L (ref 22–29)
CREAT BLD-MCNC: 0.85 MG/DL (ref 0.76–1.27)
D-LACTATE SERPL-SCNC: 1.3 MMOL/L (ref 0.5–2)
DEPRECATED RDW RBC AUTO: 50.8 FL (ref 37–54)
EOSINOPHIL # BLD AUTO: 0.15 10*3/MM3 (ref 0–0.7)
EOSINOPHIL NFR BLD AUTO: 2.4 % (ref 0.3–6.2)
ERYTHROCYTE [DISTWIDTH] IN BLOOD BY AUTOMATED COUNT: 15 % (ref 11.5–14.5)
GFR SERPL CREATININE-BSD FRML MDRD: 89 ML/MIN/1.73
GLOBULIN UR ELPH-MCNC: 3.2 GM/DL
GLUCOSE BLD-MCNC: 141 MG/DL (ref 65–99)
GLUCOSE BLDC GLUCOMTR-MCNC: 144 MG/DL (ref 70–130)
HCT VFR BLD AUTO: 41.1 % (ref 40.4–52.2)
HGB BLD-MCNC: 13.4 G/DL (ref 13.7–17.6)
IMM GRANULOCYTES # BLD: 0.03 10*3/MM3 (ref 0–0.03)
IMM GRANULOCYTES NFR BLD: 0.5 % (ref 0–0.5)
LYMPHOCYTES # BLD AUTO: 1.27 10*3/MM3 (ref 0.9–4.8)
LYMPHOCYTES NFR BLD AUTO: 20 % (ref 19.6–45.3)
MCH RBC QN AUTO: 29.8 PG (ref 27–32.7)
MCHC RBC AUTO-ENTMCNC: 32.6 G/DL (ref 32.6–36.4)
MCV RBC AUTO: 91.3 FL (ref 79.8–96.2)
MONOCYTES # BLD AUTO: 0.33 10*3/MM3 (ref 0.2–1.2)
MONOCYTES NFR BLD AUTO: 5.2 % (ref 5–12)
NEUTROPHILS # BLD AUTO: 4.54 10*3/MM3 (ref 1.9–8.1)
NEUTROPHILS NFR BLD AUTO: 71.4 % (ref 42.7–76)
NRBC BLD MANUAL-RTO: 0 /100 WBC (ref 0–0)
OVALOCYTES BLD QL SMEAR: NORMAL
PLAT MORPH BLD: NORMAL
PLATELET # BLD AUTO: 286 10*3/MM3 (ref 140–500)
PMV BLD AUTO: 9.7 FL (ref 6–12)
POTASSIUM BLD-SCNC: 4.7 MMOL/L (ref 3.5–5.2)
PROT SERPL-MCNC: 6.9 G/DL (ref 6–8.5)
RBC # BLD AUTO: 4.5 10*6/MM3 (ref 4.6–6)
SODIUM BLD-SCNC: 141 MMOL/L (ref 136–145)
TROPONIN T SERPL-MCNC: <0.01 NG/ML (ref 0–0.03)
WBC MORPH BLD: NORMAL
WBC NRBC COR # BLD: 6.35 10*3/MM3 (ref 4.5–10.7)

## 2017-10-07 PROCEDURE — 25010000002 PIPERACILLIN SOD-TAZOBACTAM PER 1 G: Performed by: SURGERY

## 2017-10-07 PROCEDURE — 80053 COMPREHEN METABOLIC PANEL: CPT | Performed by: SURGERY

## 2017-10-07 PROCEDURE — 88304 TISSUE EXAM BY PATHOLOGIST: CPT | Performed by: SURGERY

## 2017-10-07 PROCEDURE — 25010000002 ONDANSETRON PER 1 MG: Performed by: NURSE ANESTHETIST, CERTIFIED REGISTERED

## 2017-10-07 PROCEDURE — 25010000002 KETOROLAC TROMETHAMINE PER 15 MG: Performed by: NURSE ANESTHETIST, CERTIFIED REGISTERED

## 2017-10-07 PROCEDURE — 93010 ELECTROCARDIOGRAM REPORT: CPT | Performed by: INTERNAL MEDICINE

## 2017-10-07 PROCEDURE — 25010000002 METOCLOPRAMIDE PER 10 MG: Performed by: SURGERY

## 2017-10-07 PROCEDURE — 25010000002 SUCCINYLCHOLINE PER 20 MG: Performed by: NURSE ANESTHETIST, CERTIFIED REGISTERED

## 2017-10-07 PROCEDURE — 25010000002 PROPOFOL 10 MG/ML EMULSION: Performed by: NURSE ANESTHETIST, CERTIFIED REGISTERED

## 2017-10-07 PROCEDURE — 85007 BL SMEAR W/DIFF WBC COUNT: CPT | Performed by: INTERNAL MEDICINE

## 2017-10-07 PROCEDURE — 85025 COMPLETE CBC W/AUTO DIFF WBC: CPT | Performed by: INTERNAL MEDICINE

## 2017-10-07 PROCEDURE — 82962 GLUCOSE BLOOD TEST: CPT

## 2017-10-07 PROCEDURE — 25010000002 DEXAMETHASONE PER 1 MG: Performed by: NURSE ANESTHETIST, CERTIFIED REGISTERED

## 2017-10-07 PROCEDURE — 25010000002 FENTANYL CITRATE (PF) 100 MCG/2ML SOLUTION: Performed by: NURSE ANESTHETIST, CERTIFIED REGISTERED

## 2017-10-07 PROCEDURE — 25010000002 MIDAZOLAM PER 1 MG: Performed by: ANESTHESIOLOGY

## 2017-10-07 PROCEDURE — 0FT44ZZ RESECTION OF GALLBLADDER, PERCUTANEOUS ENDOSCOPIC APPROACH: ICD-10-PCS | Performed by: SURGERY

## 2017-10-07 PROCEDURE — 25010000002 FENTANYL CITRATE (PF) 100 MCG/2ML SOLUTION: Performed by: ANESTHESIOLOGY

## 2017-10-07 PROCEDURE — 25010000002 FENTANYL CITRATE (PF) 100 MCG/2ML SOLUTION

## 2017-10-07 RX ORDER — SODIUM CHLORIDE 9 MG/ML
INJECTION, SOLUTION INTRAVENOUS AS NEEDED
Status: DISCONTINUED | OUTPATIENT
Start: 2017-10-07 | End: 2017-10-07 | Stop reason: HOSPADM

## 2017-10-07 RX ORDER — FENTANYL CITRATE 50 UG/ML
50 INJECTION, SOLUTION INTRAMUSCULAR; INTRAVENOUS
Status: DISCONTINUED | OUTPATIENT
Start: 2017-10-07 | End: 2017-10-07 | Stop reason: HOSPADM

## 2017-10-07 RX ORDER — OXYCODONE HYDROCHLORIDE AND ACETAMINOPHEN 5; 325 MG/1; MG/1
1 TABLET ORAL EVERY 4 HOURS PRN
Status: DISCONTINUED | OUTPATIENT
Start: 2017-10-07 | End: 2017-10-07 | Stop reason: HOSPADM

## 2017-10-07 RX ORDER — PROMETHAZINE HYDROCHLORIDE 25 MG/ML
12.5 INJECTION, SOLUTION INTRAMUSCULAR; INTRAVENOUS ONCE AS NEEDED
Status: DISCONTINUED | OUTPATIENT
Start: 2017-10-07 | End: 2017-10-07 | Stop reason: HOSPADM

## 2017-10-07 RX ORDER — DIPHENHYDRAMINE HYDROCHLORIDE 50 MG/ML
12.5 INJECTION INTRAMUSCULAR; INTRAVENOUS
Status: DISCONTINUED | OUTPATIENT
Start: 2017-10-07 | End: 2017-10-07 | Stop reason: HOSPADM

## 2017-10-07 RX ORDER — MIDAZOLAM HYDROCHLORIDE 1 MG/ML
2 INJECTION INTRAMUSCULAR; INTRAVENOUS
Status: DISCONTINUED | OUTPATIENT
Start: 2017-10-07 | End: 2017-10-07 | Stop reason: HOSPADM

## 2017-10-07 RX ORDER — OXYCODONE HYDROCHLORIDE AND ACETAMINOPHEN 5; 325 MG/1; MG/1
2 TABLET ORAL EVERY 4 HOURS PRN
Status: DISCONTINUED | OUTPATIENT
Start: 2017-10-07 | End: 2017-10-07 | Stop reason: HOSPADM

## 2017-10-07 RX ORDER — LABETALOL HYDROCHLORIDE 5 MG/ML
5 INJECTION, SOLUTION INTRAVENOUS
Status: DISCONTINUED | OUTPATIENT
Start: 2017-10-07 | End: 2017-10-07 | Stop reason: HOSPADM

## 2017-10-07 RX ORDER — NALOXONE HCL 0.4 MG/ML
0.2 VIAL (ML) INJECTION AS NEEDED
Status: DISCONTINUED | OUTPATIENT
Start: 2017-10-07 | End: 2017-10-07 | Stop reason: HOSPADM

## 2017-10-07 RX ORDER — PROMETHAZINE HYDROCHLORIDE 25 MG/1
25 TABLET ORAL ONCE AS NEEDED
Status: DISCONTINUED | OUTPATIENT
Start: 2017-10-07 | End: 2017-10-07 | Stop reason: HOSPADM

## 2017-10-07 RX ORDER — SUCCINYLCHOLINE CHLORIDE 20 MG/ML
INJECTION INTRAMUSCULAR; INTRAVENOUS AS NEEDED
Status: DISCONTINUED | OUTPATIENT
Start: 2017-10-07 | End: 2017-10-07 | Stop reason: SURG

## 2017-10-07 RX ORDER — FAMOTIDINE 10 MG/ML
20 INJECTION, SOLUTION INTRAVENOUS ONCE
Status: COMPLETED | OUTPATIENT
Start: 2017-10-07 | End: 2017-10-07

## 2017-10-07 RX ORDER — AMOXICILLIN AND CLAVULANATE POTASSIUM 500; 125 MG/1; MG/1
1 TABLET, FILM COATED ORAL 2 TIMES DAILY
Qty: 18 TABLET | Refills: 0 | Status: SHIPPED | OUTPATIENT
Start: 2017-10-07 | End: 2017-10-13

## 2017-10-07 RX ORDER — KETOROLAC TROMETHAMINE 30 MG/ML
INJECTION, SOLUTION INTRAMUSCULAR; INTRAVENOUS AS NEEDED
Status: DISCONTINUED | OUTPATIENT
Start: 2017-10-07 | End: 2017-10-07 | Stop reason: SURG

## 2017-10-07 RX ORDER — OXYCODONE HYDROCHLORIDE AND ACETAMINOPHEN 5; 325 MG/1; MG/1
2 TABLET ORAL ONCE AS NEEDED
Status: DISCONTINUED | OUTPATIENT
Start: 2017-10-07 | End: 2017-10-07 | Stop reason: HOSPADM

## 2017-10-07 RX ORDER — HYDROCODONE BITARTRATE AND ACETAMINOPHEN 7.5; 325 MG/1; MG/1
1 TABLET ORAL ONCE AS NEEDED
Status: DISCONTINUED | OUTPATIENT
Start: 2017-10-07 | End: 2017-10-07 | Stop reason: HOSPADM

## 2017-10-07 RX ORDER — PROMETHAZINE HYDROCHLORIDE 25 MG/1
12.5 TABLET ORAL ONCE AS NEEDED
Status: DISCONTINUED | OUTPATIENT
Start: 2017-10-07 | End: 2017-10-07 | Stop reason: HOSPADM

## 2017-10-07 RX ORDER — EPHEDRINE SULFATE 50 MG/ML
5 INJECTION, SOLUTION INTRAVENOUS ONCE AS NEEDED
Status: DISCONTINUED | OUTPATIENT
Start: 2017-10-07 | End: 2017-10-07 | Stop reason: HOSPADM

## 2017-10-07 RX ORDER — BUPIVACAINE HYDROCHLORIDE AND EPINEPHRINE 2.5; 5 MG/ML; UG/ML
INJECTION, SOLUTION INFILTRATION; PERINEURAL AS NEEDED
Status: DISCONTINUED | OUTPATIENT
Start: 2017-10-07 | End: 2017-10-07 | Stop reason: HOSPADM

## 2017-10-07 RX ORDER — DEXAMETHASONE SODIUM PHOSPHATE 10 MG/ML
INJECTION INTRAMUSCULAR; INTRAVENOUS AS NEEDED
Status: DISCONTINUED | OUTPATIENT
Start: 2017-10-07 | End: 2017-10-07 | Stop reason: SURG

## 2017-10-07 RX ORDER — FENTANYL CITRATE 50 UG/ML
INJECTION, SOLUTION INTRAMUSCULAR; INTRAVENOUS AS NEEDED
Status: DISCONTINUED | OUTPATIENT
Start: 2017-10-07 | End: 2017-10-07 | Stop reason: SURG

## 2017-10-07 RX ORDER — ONDANSETRON 2 MG/ML
4 INJECTION INTRAMUSCULAR; INTRAVENOUS ONCE AS NEEDED
Status: DISCONTINUED | OUTPATIENT
Start: 2017-10-07 | End: 2017-10-07 | Stop reason: HOSPADM

## 2017-10-07 RX ORDER — SODIUM CHLORIDE, SODIUM LACTATE, POTASSIUM CHLORIDE, CALCIUM CHLORIDE 600; 310; 30; 20 MG/100ML; MG/100ML; MG/100ML; MG/100ML
9 INJECTION, SOLUTION INTRAVENOUS CONTINUOUS
Status: DISCONTINUED | OUTPATIENT
Start: 2017-10-07 | End: 2017-10-07 | Stop reason: HOSPADM

## 2017-10-07 RX ORDER — OXYCODONE AND ACETAMINOPHEN 7.5; 325 MG/1; MG/1
1 TABLET ORAL ONCE AS NEEDED
Status: DISCONTINUED | OUTPATIENT
Start: 2017-10-07 | End: 2017-10-07 | Stop reason: HOSPADM

## 2017-10-07 RX ORDER — MIDAZOLAM HYDROCHLORIDE 1 MG/ML
1 INJECTION INTRAMUSCULAR; INTRAVENOUS
Status: DISCONTINUED | OUTPATIENT
Start: 2017-10-07 | End: 2017-10-07 | Stop reason: HOSPADM

## 2017-10-07 RX ORDER — PROPOFOL 10 MG/ML
VIAL (ML) INTRAVENOUS AS NEEDED
Status: DISCONTINUED | OUTPATIENT
Start: 2017-10-07 | End: 2017-10-07 | Stop reason: SURG

## 2017-10-07 RX ORDER — FLUMAZENIL 0.1 MG/ML
0.2 INJECTION INTRAVENOUS AS NEEDED
Status: DISCONTINUED | OUTPATIENT
Start: 2017-10-07 | End: 2017-10-07 | Stop reason: HOSPADM

## 2017-10-07 RX ORDER — HYDRALAZINE HYDROCHLORIDE 20 MG/ML
5 INJECTION INTRAMUSCULAR; INTRAVENOUS
Status: DISCONTINUED | OUTPATIENT
Start: 2017-10-07 | End: 2017-10-07 | Stop reason: HOSPADM

## 2017-10-07 RX ORDER — FENTANYL CITRATE 50 UG/ML
INJECTION, SOLUTION INTRAMUSCULAR; INTRAVENOUS
Status: COMPLETED
Start: 2017-10-07 | End: 2017-10-07

## 2017-10-07 RX ORDER — ROCURONIUM BROMIDE 10 MG/ML
INJECTION, SOLUTION INTRAVENOUS AS NEEDED
Status: DISCONTINUED | OUTPATIENT
Start: 2017-10-07 | End: 2017-10-07 | Stop reason: SURG

## 2017-10-07 RX ORDER — ONDANSETRON 2 MG/ML
INJECTION INTRAMUSCULAR; INTRAVENOUS AS NEEDED
Status: DISCONTINUED | OUTPATIENT
Start: 2017-10-07 | End: 2017-10-07 | Stop reason: SURG

## 2017-10-07 RX ORDER — LIDOCAINE HYDROCHLORIDE 20 MG/ML
INJECTION, SOLUTION INFILTRATION; PERINEURAL AS NEEDED
Status: DISCONTINUED | OUTPATIENT
Start: 2017-10-07 | End: 2017-10-07 | Stop reason: SURG

## 2017-10-07 RX ORDER — PROMETHAZINE HYDROCHLORIDE 25 MG/1
25 SUPPOSITORY RECTAL ONCE AS NEEDED
Status: DISCONTINUED | OUTPATIENT
Start: 2017-10-07 | End: 2017-10-07 | Stop reason: HOSPADM

## 2017-10-07 RX ORDER — SODIUM CHLORIDE 0.9 % (FLUSH) 0.9 %
1-10 SYRINGE (ML) INJECTION AS NEEDED
Status: DISCONTINUED | OUTPATIENT
Start: 2017-10-07 | End: 2017-10-07 | Stop reason: HOSPADM

## 2017-10-07 RX ORDER — HYDROMORPHONE HYDROCHLORIDE 1 MG/ML
0.5 INJECTION, SOLUTION INTRAMUSCULAR; INTRAVENOUS; SUBCUTANEOUS
Status: DISCONTINUED | OUTPATIENT
Start: 2017-10-07 | End: 2017-10-07 | Stop reason: HOSPADM

## 2017-10-07 RX ORDER — OXYCODONE HYDROCHLORIDE AND ACETAMINOPHEN 5; 325 MG/1; MG/1
1-2 TABLET ORAL EVERY 4 HOURS PRN
Qty: 30 TABLET | Refills: 0 | Status: SHIPPED | OUTPATIENT
Start: 2017-10-07 | End: 2017-10-17

## 2017-10-07 RX ORDER — METOCLOPRAMIDE HYDROCHLORIDE 5 MG/ML
10 INJECTION INTRAMUSCULAR; INTRAVENOUS ONCE
Status: COMPLETED | OUTPATIENT
Start: 2017-10-07 | End: 2017-10-07

## 2017-10-07 RX ADMIN — FENTANYL CITRATE 100 MCG: 50 INJECTION INTRAMUSCULAR; INTRAVENOUS at 07:53

## 2017-10-07 RX ADMIN — TAZOBACTAM SODIUM AND PIPERACILLIN SODIUM 3.38 G: 375; 3 INJECTION, SOLUTION INTRAVENOUS at 10:17

## 2017-10-07 RX ADMIN — ONDANSETRON 4 MG: 2 INJECTION INTRAMUSCULAR; INTRAVENOUS at 07:48

## 2017-10-07 RX ADMIN — SUCCINYLCHOLINE CHLORIDE 120 MG: 20 INJECTION, SOLUTION INTRAMUSCULAR; INTRAVENOUS; PARENTERAL at 07:40

## 2017-10-07 RX ADMIN — SODIUM CHLORIDE 100 ML/HR: 9 INJECTION, SOLUTION INTRAVENOUS at 05:47

## 2017-10-07 RX ADMIN — PROPOFOL 200 MG: 10 INJECTION, EMULSION INTRAVENOUS at 07:40

## 2017-10-07 RX ADMIN — SUGAMMADEX 501 MG: 100 INJECTION, SOLUTION INTRAVENOUS at 08:13

## 2017-10-07 RX ADMIN — KETOROLAC TROMETHAMINE 30 MG: 30 INJECTION, SOLUTION INTRAMUSCULAR; INTRAVENOUS at 08:06

## 2017-10-07 RX ADMIN — OXYCODONE HYDROCHLORIDE AND ACETAMINOPHEN 2 TABLET: 5; 325 TABLET ORAL at 10:37

## 2017-10-07 RX ADMIN — ROCURONIUM BROMIDE 20 MG: 10 INJECTION INTRAVENOUS at 08:06

## 2017-10-07 RX ADMIN — TAZOBACTAM SODIUM AND PIPERACILLIN SODIUM 3.38 G: 375; 3 INJECTION, SOLUTION INTRAVENOUS at 01:44

## 2017-10-07 RX ADMIN — METOCLOPRAMIDE 10 MG: 5 INJECTION, SOLUTION INTRAMUSCULAR; INTRAVENOUS at 08:40

## 2017-10-07 RX ADMIN — MIDAZOLAM 2 MG: 1 INJECTION INTRAMUSCULAR; INTRAVENOUS at 07:17

## 2017-10-07 RX ADMIN — ROCURONIUM BROMIDE 20 MG: 10 INJECTION INTRAVENOUS at 07:45

## 2017-10-07 RX ADMIN — SODIUM CHLORIDE, POTASSIUM CHLORIDE, SODIUM LACTATE AND CALCIUM CHLORIDE 9 ML/HR: 600; 310; 30; 20 INJECTION, SOLUTION INTRAVENOUS at 07:17

## 2017-10-07 RX ADMIN — ROCURONIUM BROMIDE 10 MG: 10 INJECTION INTRAVENOUS at 07:40

## 2017-10-07 RX ADMIN — FENTANYL CITRATE 100 MCG: 50 INJECTION INTRAMUSCULAR; INTRAVENOUS at 07:40

## 2017-10-07 RX ADMIN — DEXAMETHASONE SODIUM PHOSPHATE 4 MG: 10 INJECTION INTRAMUSCULAR; INTRAVENOUS at 07:48

## 2017-10-07 RX ADMIN — FAMOTIDINE 20 MG: 10 INJECTION INTRAVENOUS at 07:17

## 2017-10-07 RX ADMIN — FENTANYL CITRATE 50 MCG: 50 INJECTION INTRAMUSCULAR; INTRAVENOUS at 08:44

## 2017-10-07 RX ADMIN — LIDOCAINE HYDROCHLORIDE 100 MG: 20 INJECTION, SOLUTION INFILTRATION; PERINEURAL at 07:40

## 2017-10-07 RX ADMIN — DOCUSATE SODIUM 100 MG: 100 CAPSULE, LIQUID FILLED ORAL at 10:37

## 2017-10-07 NOTE — ANESTHESIA PREPROCEDURE EVALUATION
Anesthesia Evaluation     Patient summary reviewed and Nursing notes reviewed   no history of anesthetic complications:  NPO Solid Status: > 8 hours  NPO Liquid Status: > 2 hours     Airway   Mallampati: II  TM distance: >3 FB  Neck ROM: full  Dental - normal exam     Pulmonary - normal exam   (+) COPD, sleep apnea on CPAP,   Cardiovascular - normal exam    (+) hypertension, dysrhythmias Atrial Fib, hyperlipidemia      Neuro/Psych  (+) psychiatric history,    GI/Hepatic/Renal/Endo    (+) obesity,  diabetes mellitus type 2,     Musculoskeletal (-) negative ROS    Abdominal    Substance History - negative use     OB/GYN          Other - negative ROS                                       Anesthesia Plan    ASA 4     general     Anesthetic plan and risks discussed with patient.

## 2017-10-07 NOTE — ANESTHESIA POSTPROCEDURE EVALUATION
"Patient: Branden Diop    Procedure Summary     Date Anesthesia Start Anesthesia Stop Room / Location    10/07/17 0733 0830  [facility] /  [facility]       Procedure Diagnosis Surgeon Provider    CHOLECYSTECTOMY LAPAROSCOPIC (N/A Abdomen) Acute cholecystitis  (Acute cholecystitis [K81.0]) MD Ke Hall MD          Anesthesia Type: general  Last vitals  BP   133/65 (10/07/17 0900)    Temp   36.7 °C (98 °F) (10/07/17 0910)    Pulse   75 (10/07/17 0900)   Resp   16 (10/07/17 0900)    SpO2   94 % (10/07/17 0900)      Post Anesthesia Care and Evaluation    Patient location during evaluation: bedside  Patient participation: complete - patient participated  Level of consciousness: awake  Pain score: 2  Pain management: adequate  Airway patency: patent  Anesthetic complications: No anesthetic complications  PONV Status: none  Cardiovascular status: acceptable  Respiratory status: acceptable  Hydration status: acceptable    Comments: /65  Pulse 75  Temp 36.7 °C (98 °F) (Oral)   Resp 16  Ht 72\" (182.9 cm)  Wt 276 lb (125 kg)  SpO2 94%  BMI 37.43 kg/m2        "

## 2017-10-07 NOTE — ANESTHESIA PROCEDURE NOTES
Airway  Urgency: elective    Airway not difficult    General Information and Staff    Patient location during procedure: OR  Anesthesiologist: MEETA MCDONNELL  CRNA: STEPHANY BENTLEY    Indications and Patient Condition  Indications for airway management: airway protection    Preoxygenated: yes  Mask difficulty assessment: 2 - vent by mask + OA or adjuvant +/- NMBA    Final Airway Details  Final airway type: endotracheal airway      Successful airway: ETT  Cuffed: yes   Successful intubation technique: direct laryngoscopy  Endotracheal tube insertion site: oral  Blade: [place]  Blade size: #2  ETT size: 7.5 mm  Cormack-Lehane Classification: grade I - full view of glottis  Placement verified by: chest auscultation and capnometry   Cuff volume (mL): 8  Number of attempts at approach: 1    Additional Comments  PreO2 with 100% O2;  FeO2 >85%;  sniff position;RSI;  Intubated with no difficultly after eyes taped; Appears atraumatic;  Lips and teeth intact as preop condition;  Airway secured. Connected to ventilator.

## 2017-10-09 ENCOUNTER — EPISODE CHANGES (OUTPATIENT)
Dept: CASE MANAGEMENT | Facility: OTHER | Age: 67
End: 2017-10-09

## 2017-10-09 PROBLEM — K76.0 FATTY LIVER: Status: ACTIVE | Noted: 2017-10-09

## 2017-10-09 LAB
CYTO UR: NORMAL
LAB AP CASE REPORT: NORMAL
Lab: NORMAL
PATH REPORT.FINAL DX SPEC: NORMAL
PATH REPORT.GROSS SPEC: NORMAL

## 2017-10-10 ENCOUNTER — PATIENT OUTREACH (OUTPATIENT)
Dept: CASE MANAGEMENT | Facility: OTHER | Age: 67
End: 2017-10-10

## 2017-10-11 LAB
BACTERIA SPEC AEROBE CULT: NORMAL
BACTERIA SPEC AEROBE CULT: NORMAL

## 2017-10-17 ENCOUNTER — OFFICE VISIT (OUTPATIENT)
Dept: FAMILY MEDICINE CLINIC | Facility: CLINIC | Age: 67
End: 2017-10-17

## 2017-10-17 VITALS
TEMPERATURE: 97.6 F | SYSTOLIC BLOOD PRESSURE: 100 MMHG | DIASTOLIC BLOOD PRESSURE: 68 MMHG | RESPIRATION RATE: 16 BRPM | HEART RATE: 79 BPM | OXYGEN SATURATION: 97 % | HEIGHT: 72 IN | WEIGHT: 278 LBS | BODY MASS INDEX: 37.65 KG/M2

## 2017-10-17 DIAGNOSIS — J44.9 CHRONIC OBSTRUCTIVE PULMONARY DISEASE, UNSPECIFIED COPD TYPE (HCC): ICD-10-CM

## 2017-10-17 DIAGNOSIS — J20.9 ACUTE BRONCHITIS, UNSPECIFIED ORGANISM: ICD-10-CM

## 2017-10-17 DIAGNOSIS — I10 ESSENTIAL HYPERTENSION: Primary | ICD-10-CM

## 2017-10-17 DIAGNOSIS — E78.2 MIXED HYPERLIPIDEMIA: ICD-10-CM

## 2017-10-17 PROCEDURE — 99214 OFFICE O/P EST MOD 30 MIN: CPT | Performed by: INTERNAL MEDICINE

## 2017-10-17 RX ORDER — DOXYCYCLINE HYCLATE 100 MG
100 TABLET ORAL 2 TIMES DAILY
Qty: 20 TABLET | Refills: 0 | Status: SHIPPED | OUTPATIENT
Start: 2017-10-17 | End: 2017-11-16

## 2017-10-17 RX ORDER — ERGOCALCIFEROL 1.25 MG/1
CAPSULE ORAL
Qty: 30 CAPSULE | Refills: 2 | Status: SHIPPED | OUTPATIENT
Start: 2017-10-17 | End: 2018-03-02 | Stop reason: SDUPTHER

## 2017-10-17 RX ORDER — IPRATROPIUM BROMIDE AND ALBUTEROL SULFATE 2.5; .5 MG/3ML; MG/3ML
3 SOLUTION RESPIRATORY (INHALATION) EVERY 4 HOURS PRN
Qty: 160 ML | Refills: 3
Start: 2017-10-17 | End: 2018-06-22

## 2017-10-17 RX ORDER — NEBULIZER
4 EACH MISCELLANEOUS 4 TIMES DAILY PRN
Qty: 1 EACH | Refills: 1 | Status: SHIPPED | OUTPATIENT
Start: 2017-10-17 | End: 2018-06-22

## 2017-11-09 ENCOUNTER — LAB (OUTPATIENT)
Dept: FAMILY MEDICINE CLINIC | Facility: CLINIC | Age: 67
End: 2017-11-09

## 2017-11-09 DIAGNOSIS — I10 ESSENTIAL HYPERTENSION: Primary | ICD-10-CM

## 2017-11-09 DIAGNOSIS — K76.0 FATTY LIVER: ICD-10-CM

## 2017-11-09 DIAGNOSIS — E55.9 VITAMIN D DEFICIENCY: ICD-10-CM

## 2017-11-09 DIAGNOSIS — E78.2 MIXED HYPERLIPIDEMIA: ICD-10-CM

## 2017-11-09 LAB
25(OH)D3 SERPL-MCNC: 27.4 NG/ML (ref 30–100)
ALBUMIN SERPL-MCNC: 4.3 G/DL (ref 3.5–5.2)
ALBUMIN/GLOB SERPL: 1.2 G/DL
ALP SERPL-CCNC: 95 U/L (ref 39–117)
ALT SERPL W P-5'-P-CCNC: 19 U/L (ref 1–41)
ANION GAP SERPL CALCULATED.3IONS-SCNC: 15 MMOL/L
AST SERPL-CCNC: 17 U/L (ref 1–40)
BILIRUB SERPL-MCNC: 0.6 MG/DL (ref 0.1–1.2)
BUN BLD-MCNC: 13 MG/DL (ref 8–23)
BUN/CREAT SERPL: 17.1 (ref 7–25)
CALCIUM SPEC-SCNC: 9.6 MG/DL (ref 8.6–10.5)
CHLORIDE SERPL-SCNC: 94 MMOL/L (ref 98–107)
CHOLEST SERPL-MCNC: 239 MG/DL (ref 0–200)
CO2 SERPL-SCNC: 24 MMOL/L (ref 22–29)
CREAT BLD-MCNC: 0.76 MG/DL (ref 0.76–1.27)
ERYTHROCYTE [DISTWIDTH] IN BLOOD BY AUTOMATED COUNT: 14.9 % (ref 4.5–15)
GFR SERPL CREATININE-BSD FRML MDRD: 102 ML/MIN/1.73
GLOBULIN UR ELPH-MCNC: 3.6 GM/DL
GLUCOSE BLD-MCNC: 114 MG/DL (ref 65–99)
HCT VFR BLD AUTO: 44.7 % (ref 35–60)
HDLC SERPL-MCNC: 33 MG/DL (ref 40–60)
HGB BLD-MCNC: 15 G/DL (ref 13.5–18)
LDLC SERPL CALC-MCNC: 164 MG/DL (ref 0–100)
LDLC/HDLC SERPL: 4.97 {RATIO}
LYMPHOCYTES # BLD AUTO: 2 10*3/MM3 (ref 1.2–3.4)
LYMPHOCYTES NFR BLD AUTO: 24.3 % (ref 21–51)
MCH RBC QN AUTO: 29.9 PG (ref 26.1–33.1)
MCHC RBC AUTO-ENTMCNC: 33.6 G/DL (ref 33–37)
MCV RBC AUTO: 88.7 FL (ref 80–99)
MONOCYTES # BLD AUTO: 0.2 10*3/MM3 (ref 0.1–0.6)
MONOCYTES NFR BLD AUTO: 2.2 % (ref 2–9)
NEUTROPHILS # BLD AUTO: 6 10*3/MM3 (ref 1.4–6.5)
NEUTROPHILS NFR BLD AUTO: 73.5 % (ref 42–75)
PLATELET # BLD AUTO: 308 10*3/MM3 (ref 150–450)
PMV BLD AUTO: 6.9 FL (ref 7.1–10.5)
POTASSIUM BLD-SCNC: 4.3 MMOL/L (ref 3.5–5.2)
PROT SERPL-MCNC: 7.9 G/DL (ref 6–8.5)
RBC # BLD AUTO: 5.04 10*6/MM3 (ref 4–6)
SODIUM BLD-SCNC: 133 MMOL/L (ref 136–145)
TRIGL SERPL-MCNC: 210 MG/DL (ref 0–150)
VLDLC SERPL-MCNC: 42 MG/DL (ref 5–40)
WBC NRBC COR # BLD: 8.2 10*3/MM3 (ref 4.5–10)

## 2017-11-09 PROCEDURE — 80061 LIPID PANEL: CPT | Performed by: INTERNAL MEDICINE

## 2017-11-09 PROCEDURE — 80053 COMPREHEN METABOLIC PANEL: CPT | Performed by: INTERNAL MEDICINE

## 2017-11-09 PROCEDURE — 36415 COLL VENOUS BLD VENIPUNCTURE: CPT | Performed by: INTERNAL MEDICINE

## 2017-11-09 PROCEDURE — 85025 COMPLETE CBC W/AUTO DIFF WBC: CPT | Performed by: INTERNAL MEDICINE

## 2017-11-09 PROCEDURE — 82306 VITAMIN D 25 HYDROXY: CPT | Performed by: INTERNAL MEDICINE

## 2017-11-13 ENCOUNTER — TELEPHONE (OUTPATIENT)
Dept: FAMILY MEDICINE CLINIC | Facility: CLINIC | Age: 67
End: 2017-11-13

## 2017-11-13 DIAGNOSIS — E78.2 MIXED HYPERLIPIDEMIA: Primary | ICD-10-CM

## 2017-11-13 RX ORDER — ATORVASTATIN CALCIUM 40 MG/1
40 TABLET, FILM COATED ORAL DAILY
Qty: 30 TABLET | Refills: 3 | Status: SHIPPED | OUTPATIENT
Start: 2017-11-13 | End: 2018-03-02 | Stop reason: SDUPTHER

## 2017-11-13 NOTE — TELEPHONE ENCOUNTER
----- Message from Tino Lopez MD sent at 11/13/2017  5:05 PM EST -----  LDLs 164 recommend statin low-cholesterol diet and exercise 30 minutes 3-5 times a week labs one 3 months vitamin D low at 26 recommend 5000 units daily    Pt's wife informed of lab results

## 2017-11-16 ENCOUNTER — OFFICE VISIT (OUTPATIENT)
Dept: FAMILY MEDICINE CLINIC | Facility: CLINIC | Age: 67
End: 2017-11-16

## 2017-11-16 VITALS
HEART RATE: 82 BPM | BODY MASS INDEX: 38.25 KG/M2 | TEMPERATURE: 97.5 F | OXYGEN SATURATION: 95 % | SYSTOLIC BLOOD PRESSURE: 120 MMHG | HEIGHT: 72 IN | DIASTOLIC BLOOD PRESSURE: 72 MMHG | WEIGHT: 282.4 LBS

## 2017-11-16 DIAGNOSIS — J44.9 CHRONIC OBSTRUCTIVE PULMONARY DISEASE, UNSPECIFIED COPD TYPE (HCC): ICD-10-CM

## 2017-11-16 DIAGNOSIS — K76.0 FATTY LIVER: ICD-10-CM

## 2017-11-16 DIAGNOSIS — E78.2 MIXED HYPERLIPIDEMIA: Primary | ICD-10-CM

## 2017-11-16 DIAGNOSIS — E55.9 VITAMIN D DEFICIENCY: ICD-10-CM

## 2017-11-16 PROCEDURE — 99214 OFFICE O/P EST MOD 30 MIN: CPT | Performed by: INTERNAL MEDICINE

## 2017-11-16 RX ORDER — EZETIMIBE 10 MG/1
10 TABLET ORAL DAILY
Qty: 30 TABLET | Refills: 3 | Status: SHIPPED | OUTPATIENT
Start: 2017-11-16 | End: 2018-03-02

## 2017-11-16 NOTE — PROGRESS NOTES
Subjective   Branden Diop is a 67 y.o. male.     History of Present Illness   Patient was in today for hyperlipidemia.  Triglycerides 210, , HDL 33, vitamin D 27, blood sugar 114.  Patient was given zetia and asked to restart his low-cholesterol diet with low impact exercise 30 minutes 3-4 times a week.  She will recheck his labs in 4 months and return to our clinic.  Patient's COPD has been stable over the last several months with his bronchodilators.  His fatty liver is being monitored with labs.    Much of this encounter note is an electronic transcription/translation of spoken language to printed text.  The electronic translation of spoken language may permit erroneous, or at times, nonsensical words or phrases to be inadvertently transcribed.  Although I have reviewed the note for such errors, some may still exist.  The following portions of the patient's history were reviewed and updated as appropriate: allergies, current medications, past family history, past medical history, past social history, past surgical history and problem list.    Review of Systems   Constitutional: Negative for fatigue and fever.   HENT: Positive for congestion. Negative for trouble swallowing.    Eyes: Negative for discharge and visual disturbance.   Respiratory: Negative for choking and shortness of breath.    Cardiovascular: Negative for chest pain and palpitations.   Gastrointestinal: Negative for abdominal pain and blood in stool.   Endocrine: Negative.    Genitourinary: Negative for genital sores and hematuria.   Musculoskeletal: Negative for gait problem and joint swelling.   Skin: Negative for color change, pallor, rash and wound.   Allergic/Immunologic: Positive for environmental allergies. Negative for immunocompromised state.   Neurological: Negative for facial asymmetry and speech difficulty.   Psychiatric/Behavioral: Negative for hallucinations and suicidal ideas.       Objective   Physical Exam   Constitutional: He  is oriented to person, place, and time. He appears well-developed and well-nourished.   HENT:   Head: Normocephalic.   Eyes: Conjunctivae are normal. Pupils are equal, round, and reactive to light.   Neck: Normal range of motion. Neck supple.   Cardiovascular: Normal rate, regular rhythm and normal heart sounds.    Pulmonary/Chest: Effort normal and breath sounds normal.   Abdominal: Soft. Bowel sounds are normal.   Musculoskeletal: Normal range of motion.   Neurological: He is alert and oriented to person, place, and time.   Skin: Skin is warm and dry.   Psychiatric: He has a normal mood and affect. His behavior is normal. Judgment and thought content normal.   Nursing note and vitals reviewed.      Assessment/Plan   Problems Addressed this Visit        Cardiovascular and Mediastinum    Hyperlipidemia - Primary    Relevant Medications    ezetimibe (ZETIA) 10 MG tablet    Other Relevant Orders    CBC & Differential    Comprehensive Metabolic Panel    Lipid Panel    Vitamin D 25 Hydroxy       Respiratory    COPD (chronic obstructive pulmonary disease)    Relevant Orders    CBC & Differential    Comprehensive Metabolic Panel    Lipid Panel    Vitamin D 25 Hydroxy       Digestive    Fatty liver    Relevant Orders    CBC & Differential    Comprehensive Metabolic Panel    Lipid Panel    Vitamin D 25 Hydroxy      Other Visit Diagnoses     Vitamin D deficiency         Relevant Orders    Vitamin D 25 Hydroxy

## 2017-11-16 NOTE — PATIENT INSTRUCTIONS
"DASH Eating Plan  DASH stands for \"Dietary Approaches to Stop Hypertension.\" The DASH eating plan is a healthy eating plan that has been shown to reduce high blood pressure (hypertension). Additional health benefits may include reducing the risk of type 2 diabetes mellitus, heart disease, and stroke. The DASH eating plan may also help with weight loss.  WHAT DO I NEED TO KNOW ABOUT THE DASH EATING PLAN?  For the DASH eating plan, you will follow these general guidelines:  · Choose foods with less than 150 milligrams of sodium per serving (as listed on the food label).  · Use salt-free seasonings or herbs instead of table salt or sea salt.  · Check with your health care provider or pharmacist before using salt substitutes.  · Eat lower-sodium products. These are often labeled as \"low-sodium\" or \"no salt added.\"  · Eat fresh foods. Avoid eating a lot of canned foods.  · Eat more vegetables, fruits, and low-fat dairy products.  · Choose whole grains. Look for the word \"whole\" as the first word in the ingredient list.  · Choose fish and skinless chicken or turkey more often than red meat. Limit fish, poultry, and meat to 6 oz (170 g) each day.  · Limit sweets, desserts, sugars, and sugary drinks.  · Choose heart-healthy fats.  · Eat more home-cooked food and less restaurant, buffet, and fast food.  · Limit fried foods.  · Do not yen foods. Cook foods using methods such as baking, boiling, grilling, and broiling instead.  · When eating at a restaurant, ask that your food be prepared with less salt, or no salt if possible.  WHAT FOODS CAN I EAT?  Seek help from a dietitian for individual calorie needs.  Grains  Whole grain or whole wheat bread. Brown rice. Whole grain or whole wheat pasta. Quinoa, bulgur, and whole grain cereals. Low-sodium cereals. Corn or whole wheat flour tortillas. Whole grain cornbread. Whole grain crackers. Low-sodium crackers.  Vegetables  Fresh or frozen vegetables (raw, steamed, roasted, or " grilled). Low-sodium or reduced-sodium tomato and vegetable juices. Low-sodium or reduced-sodium tomato sauce and paste. Low-sodium or reduced-sodium canned vegetables.   Fruits  All fresh, canned (in natural juice), or frozen fruits.  Meat and Other Protein Products  Ground beef (85% or leaner), grass-fed beef, or beef trimmed of fat. Skinless chicken or turkey. Ground chicken or turkey. Pork trimmed of fat. All fish and seafood. Eggs. Dried beans, peas, or lentils. Unsalted nuts and seeds. Unsalted canned beans.  Dairy  Low-fat dairy products, such as skim or 1% milk, 2% or reduced-fat cheeses, low-fat ricotta or cottage cheese, or plain low-fat yogurt. Low-sodium or reduced-sodium cheeses.  Fats and Oils  Tub margarines without trans fats. Light or reduced-fat mayonnaise and salad dressings (reduced sodium). Avocado. Safflower, olive, or canola oils. Natural peanut or almond butter.  Other  Unsalted popcorn and pretzels.  The items listed above may not be a complete list of recommended foods or beverages. Contact your dietitian for more options.  WHAT FOODS ARE NOT RECOMMENDED?  Grains  White bread. White pasta. White rice. Refined cornbread. Bagels and croissants. Crackers that contain trans fat.  Vegetables  Creamed or fried vegetables. Vegetables in a cheese sauce. Regular canned vegetables. Regular canned tomato sauce and paste. Regular tomato and vegetable juices.  Fruits  Canned fruit in light or heavy syrup. Fruit juice.  Meat and Other Protein Products  Fatty cuts of meat. Ribs, chicken wings, moffett, sausage, bologna, salami, chitterlings, fatback, hot dogs, bratwurst, and packaged luncheon meats. Salted nuts and seeds. Canned beans with salt.  Dairy  Whole or 2% milk, cream, half-and-half, and cream cheese. Whole-fat or sweetened yogurt. Full-fat cheeses or blue cheese. Nondairy creamers and whipped toppings. Processed cheese, cheese spreads, or cheese curds.  Condiments  Onion and garlic salt, seasoned  salt, table salt, and sea salt. Canned and packaged gravies. Worcestershire sauce. Tartar sauce. Barbecue sauce. Teriyaki sauce. Soy sauce, including reduced sodium. Steak sauce. Fish sauce. Oyster sauce. Cocktail sauce. Horseradish. Ketchup and mustard. Meat flavorings and tenderizers. Bouillon cubes. Hot sauce. Tabasco sauce. Marinades. Taco seasonings. Relishes.  Fats and Oils  Butter, stick margarine, lard, shortening, ghee, and moffett fat. Coconut, palm kernel, or palm oils. Regular salad dressings.  Other  Pickles and olives. Salted popcorn and pretzels.  The items listed above may not be a complete list of foods and beverages to avoid. Contact your dietitian for more information.  WHERE CAN I FIND MORE INFORMATION?  [employer], [employer], [employer] [place]: www.nhlbi.nih.gov/health/health-topics/topics/dash/     This information is not intended to replace advice given to you by your health care provider. Make sure you discuss any questions you have with your health care provider.     Document Released: 12/06/2012 Document Revised: 04/10/2017 Document Reviewed: 10/22/2014  Elsevier Interactive Patient Education ©2017 [employer]

## 2018-03-02 ENCOUNTER — OFFICE VISIT (OUTPATIENT)
Dept: FAMILY MEDICINE CLINIC | Facility: CLINIC | Age: 68
End: 2018-03-02

## 2018-03-02 VITALS
DIASTOLIC BLOOD PRESSURE: 76 MMHG | OXYGEN SATURATION: 96 % | HEART RATE: 76 BPM | RESPIRATION RATE: 17 BRPM | SYSTOLIC BLOOD PRESSURE: 110 MMHG | TEMPERATURE: 97.6 F | WEIGHT: 282 LBS | BODY MASS INDEX: 38.19 KG/M2 | HEIGHT: 72 IN

## 2018-03-02 DIAGNOSIS — J44.9 CHRONIC OBSTRUCTIVE PULMONARY DISEASE, UNSPECIFIED COPD TYPE (HCC): ICD-10-CM

## 2018-03-02 DIAGNOSIS — E78.2 MIXED HYPERLIPIDEMIA: Primary | ICD-10-CM

## 2018-03-02 DIAGNOSIS — I48.92 ATRIAL FLUTTER, UNSPECIFIED TYPE (HCC): ICD-10-CM

## 2018-03-02 PROCEDURE — G0009 ADMIN PNEUMOCOCCAL VACCINE: HCPCS | Performed by: INTERNAL MEDICINE

## 2018-03-02 PROCEDURE — 90732 PPSV23 VACC 2 YRS+ SUBQ/IM: CPT | Performed by: INTERNAL MEDICINE

## 2018-03-02 PROCEDURE — 99214 OFFICE O/P EST MOD 30 MIN: CPT | Performed by: INTERNAL MEDICINE

## 2018-03-02 RX ORDER — ERGOCALCIFEROL 1.25 MG/1
CAPSULE ORAL
Qty: 30 CAPSULE | Refills: 2 | Status: SHIPPED | OUTPATIENT
Start: 2018-03-02 | End: 2019-01-28 | Stop reason: SDUPTHER

## 2018-03-02 RX ORDER — ATORVASTATIN CALCIUM 40 MG/1
40 TABLET, FILM COATED ORAL DAILY
Qty: 30 TABLET | Refills: 3 | Status: SHIPPED | OUTPATIENT
Start: 2018-03-02 | End: 2018-07-08 | Stop reason: SDUPTHER

## 2018-03-02 NOTE — PROGRESS NOTES
Subjective   Branden Diop is a 67 y.o. male.     History of Present Illness   Patient was seen for hyperlipidemia.  Patient did have labs ordered and will follow-up 4 days after labs for results.  Patient does have a history of atrial flutter and is being referred to call her allergist for yearly checkup.  His COPD is been well-controlled with bronchodilators.  Patient will return to our clinic in 6 months.    Much of this encounter note is an electronic transcription/translation of spoken language to printed text.  The electronic translation of spoken language may permit erroneous, or at times, nonsensical words or phrases to be inadvertently transcribed.  Although I have reviewed the note for such errors, some may still exist.  The following portions of the patient's history were reviewed and updated as appropriate: allergies, current medications, past family history, past medical history, past social history, past surgical history and problem list.    Review of Systems   Constitutional: Negative for fatigue and fever.   HENT: Positive for congestion. Negative for trouble swallowing.    Eyes: Negative for discharge and visual disturbance.   Respiratory: Negative for choking, shortness of breath and wheezing.    Cardiovascular: Negative for chest pain and palpitations.   Gastrointestinal: Negative for abdominal pain and blood in stool.   Endocrine: Negative.    Genitourinary: Negative for genital sores and hematuria.   Musculoskeletal: Negative for gait problem and joint swelling.   Skin: Negative for color change, pallor, rash and wound.   Allergic/Immunologic: Positive for environmental allergies. Negative for immunocompromised state.   Neurological: Negative for facial asymmetry and speech difficulty.   Psychiatric/Behavioral: Negative for hallucinations and suicidal ideas.       Objective   Physical Exam   Constitutional: He is oriented to person, place, and time. He appears well-developed and well-nourished.    HENT:   Head: Normocephalic.   Eyes: Conjunctivae are normal. Pupils are equal, round, and reactive to light.   Neck: Normal range of motion. Neck supple.   Cardiovascular: Normal rate, regular rhythm and normal heart sounds.  Exam reveals no gallop and no friction rub.    No murmur heard.  Pulmonary/Chest: Effort normal and breath sounds normal. No respiratory distress. He has no wheezes. He has no rales. He exhibits no tenderness.   Abdominal: Soft. Bowel sounds are normal.   Musculoskeletal: Normal range of motion.   Neurological: He is alert and oriented to person, place, and time.   Skin: Skin is warm and dry.   Psychiatric: He has a normal mood and affect. His behavior is normal. Judgment and thought content normal.   Nursing note and vitals reviewed.      Assessment/Plan   Problems Addressed this Visit        Cardiovascular and Mediastinum    Hyperlipidemia - Primary    Relevant Medications    atorvastatin (LIPITOR) 40 MG tablet    Atrial flutter       Respiratory    COPD (chronic obstructive pulmonary disease)

## 2018-03-07 ENCOUNTER — TELEPHONE (OUTPATIENT)
Dept: FAMILY MEDICINE CLINIC | Facility: CLINIC | Age: 68
End: 2018-03-07

## 2018-03-07 NOTE — TELEPHONE ENCOUNTER
CALLING ABOUT HEPATITIS A AND WANTED TO KNOW IF DR LEARY THINKS HE SHOULD COME GET THE SHOTS FOR IT? PLEASE CALL AND ADVISE PATIENT.

## 2018-03-20 ENCOUNTER — EPISODE CHANGES (OUTPATIENT)
Dept: CASE MANAGEMENT | Facility: OTHER | Age: 68
End: 2018-03-20

## 2018-05-24 ENCOUNTER — OFFICE VISIT (OUTPATIENT)
Dept: FAMILY MEDICINE CLINIC | Facility: CLINIC | Age: 68
End: 2018-05-24

## 2018-05-24 ENCOUNTER — TELEPHONE (OUTPATIENT)
Dept: FAMILY MEDICINE CLINIC | Facility: CLINIC | Age: 68
End: 2018-05-24

## 2018-05-24 VITALS
WEIGHT: 281 LBS | HEART RATE: 78 BPM | SYSTOLIC BLOOD PRESSURE: 128 MMHG | DIASTOLIC BLOOD PRESSURE: 80 MMHG | TEMPERATURE: 98.5 F | BODY MASS INDEX: 38.06 KG/M2 | OXYGEN SATURATION: 96 % | HEIGHT: 72 IN | RESPIRATION RATE: 18 BRPM

## 2018-05-24 DIAGNOSIS — K40.20 BILATERAL INGUINAL HERNIA WITHOUT OBSTRUCTION OR GANGRENE, RECURRENCE NOT SPECIFIED: Primary | ICD-10-CM

## 2018-05-24 PROCEDURE — 99213 OFFICE O/P EST LOW 20 MIN: CPT | Performed by: INTERNAL MEDICINE

## 2018-05-24 RX ORDER — NEOMYCIN SULFATE, POLYMYXIN B SULFATE AND HYDROCORTISONE 10; 3.5; 1 MG/ML; MG/ML; [USP'U]/ML
3 SUSPENSION/ DROPS AURICULAR (OTIC) 4 TIMES DAILY
Qty: 10 ML | Refills: 2 | Status: SHIPPED | OUTPATIENT
Start: 2018-05-24 | End: 2018-06-22

## 2018-05-25 ENCOUNTER — TELEPHONE (OUTPATIENT)
Dept: FAMILY MEDICINE CLINIC | Facility: CLINIC | Age: 68
End: 2018-05-25

## 2018-05-25 NOTE — TELEPHONE ENCOUNTER
Changed to an cipro  Opth.0.3% 2 gtts effected ear bid for 7 days  eye gtt that can be used in ear for 2$ other wise

## 2018-05-25 NOTE — PROGRESS NOTES
Subjective   Branden Diop is a 67 y.o. male.     History of Present Illness   Patient was seen for inguinal hernia.  Patient being referred to surgeon for evaluation.    Dictated utilizing Dragon dictation. If there are questions or for further clarification, please contact me.   The following portions of the patient's history were reviewed and updated as appropriate: allergies, current medications, past family history, past medical history, past social history, past surgical history and problem list.    Review of Systems   Constitutional: Negative for fatigue and fever.   HENT: Positive for congestion. Negative for trouble swallowing.    Eyes: Negative for discharge and visual disturbance.   Respiratory: Negative for choking and shortness of breath.    Cardiovascular: Negative for chest pain and palpitations.   Gastrointestinal: Negative for abdominal pain and blood in stool.        Inguinal hernia   Endocrine: Negative.    Genitourinary: Negative for genital sores and hematuria.   Musculoskeletal: Negative for gait problem and joint swelling.   Skin: Negative for color change, pallor, rash and wound.   Allergic/Immunologic: Positive for environmental allergies. Negative for immunocompromised state.   Neurological: Negative for facial asymmetry and speech difficulty.   Psychiatric/Behavioral: Negative for hallucinations and suicidal ideas.       Objective   Physical Exam   Constitutional: He is oriented to person, place, and time. He appears well-developed and well-nourished.   HENT:   Head: Normocephalic.   Eyes: Conjunctivae are normal. Pupils are equal, round, and reactive to light.   Neck: Normal range of motion. Neck supple.   Cardiovascular: Normal rate, regular rhythm and normal heart sounds.    Pulmonary/Chest: Effort normal and breath sounds normal.   Abdominal: Soft. Bowel sounds are normal. A hernia is present.   Musculoskeletal: Normal range of motion.   Neurological: He is alert and oriented to person,  place, and time.   Skin: Skin is warm and dry.   Psychiatric: He has a normal mood and affect. His behavior is normal. Judgment and thought content normal.   Nursing note and vitals reviewed.      Assessment/Plan   Problems Addressed this Visit     None      Visit Diagnoses     Bilateral inguinal hernia without obstruction or gangrene, recurrence not specified    -  Primary    Relevant Orders    Ambulatory Referral to General Surgery

## 2018-05-25 NOTE — TELEPHONE ENCOUNTER
Called pharmacist asked to change to equal dose ([facility]) she did cost 33$ that's all they have FYI

## 2018-05-25 NOTE — TELEPHONE ENCOUNTER
THE DROPS THAT WAS CALLED IN YESTERDAY COST TOO MUCH. IS THERE SOMETHING CHEAPER THAT CAN BE CALLED IN

## 2018-06-11 ENCOUNTER — PREP FOR SURGERY (OUTPATIENT)
Dept: OTHER | Facility: HOSPITAL | Age: 68
End: 2018-06-11

## 2018-06-11 DIAGNOSIS — K40.91 RECURRENT INGUINAL HERNIA OF RIGHT SIDE WITHOUT OBSTRUCTION OR GANGRENE: Primary | ICD-10-CM

## 2018-06-11 RX ORDER — CEFAZOLIN SODIUM 2 G/100ML
2 INJECTION, SOLUTION INTRAVENOUS ONCE
Status: CANCELLED | OUTPATIENT
Start: 2018-06-27 | End: 2018-06-27

## 2018-06-13 PROBLEM — K40.91 RECURRENT INGUINAL HERNIA OF RIGHT SIDE WITHOUT OBSTRUCTION OR GANGRENE: Status: ACTIVE | Noted: 2018-06-13

## 2018-06-22 ENCOUNTER — APPOINTMENT (OUTPATIENT)
Dept: PREADMISSION TESTING | Facility: HOSPITAL | Age: 68
End: 2018-06-22

## 2018-06-22 VITALS
BODY MASS INDEX: 38.24 KG/M2 | TEMPERATURE: 97.9 F | OXYGEN SATURATION: 98 % | WEIGHT: 282.3 LBS | HEART RATE: 95 BPM | SYSTOLIC BLOOD PRESSURE: 174 MMHG | DIASTOLIC BLOOD PRESSURE: 93 MMHG | RESPIRATION RATE: 16 BRPM | HEIGHT: 72 IN

## 2018-06-22 LAB
ANION GAP SERPL CALCULATED.3IONS-SCNC: 14.8 MMOL/L
BUN BLD-MCNC: 10 MG/DL (ref 8–23)
BUN/CREAT SERPL: 12 (ref 7–25)
CALCIUM SPEC-SCNC: 9.6 MG/DL (ref 8.6–10.5)
CHLORIDE SERPL-SCNC: 96 MMOL/L (ref 98–107)
CO2 SERPL-SCNC: 25.2 MMOL/L (ref 22–29)
CREAT BLD-MCNC: 0.83 MG/DL (ref 0.76–1.27)
DEPRECATED RDW RBC AUTO: 49.9 FL (ref 37–54)
ERYTHROCYTE [DISTWIDTH] IN BLOOD BY AUTOMATED COUNT: 14.9 % (ref 11.5–14.5)
GFR SERPL CREATININE-BSD FRML MDRD: 92 ML/MIN/1.73
GLUCOSE BLD-MCNC: 160 MG/DL (ref 65–99)
HCT VFR BLD AUTO: 46.2 % (ref 40.4–52.2)
HGB BLD-MCNC: 15.1 G/DL (ref 13.7–17.6)
MCH RBC QN AUTO: 30.1 PG (ref 27–32.7)
MCHC RBC AUTO-ENTMCNC: 32.7 G/DL (ref 32.6–36.4)
MCV RBC AUTO: 92 FL (ref 79.8–96.2)
PLATELET # BLD AUTO: 274 10*3/MM3 (ref 140–500)
PMV BLD AUTO: 9.8 FL (ref 6–12)
POTASSIUM BLD-SCNC: 4.4 MMOL/L (ref 3.5–5.2)
RBC # BLD AUTO: 5.02 10*6/MM3 (ref 4.6–6)
SODIUM BLD-SCNC: 136 MMOL/L (ref 136–145)
WBC NRBC COR # BLD: 10.41 10*3/MM3 (ref 4.5–10.7)

## 2018-06-22 PROCEDURE — 36415 COLL VENOUS BLD VENIPUNCTURE: CPT

## 2018-06-22 PROCEDURE — 93010 ELECTROCARDIOGRAM REPORT: CPT | Performed by: INTERNAL MEDICINE

## 2018-06-22 PROCEDURE — 85027 COMPLETE CBC AUTOMATED: CPT | Performed by: SURGERY

## 2018-06-22 PROCEDURE — 80048 BASIC METABOLIC PNL TOTAL CA: CPT | Performed by: SURGERY

## 2018-06-22 PROCEDURE — 93005 ELECTROCARDIOGRAM TRACING: CPT

## 2018-06-22 NOTE — DISCHARGE INSTRUCTIONS
ARRIVAL TIME 05:30        General Instructions:  • Do not eat solid food after midnight the night before surgery.  • You may drink clear liquids day of surgery but must stop at least one hour before your hospital arrival time.  • It is beneficial for you to have a clear drink that contains carbohydrates the day of surgery.  We suggest a 12 to 20 ounce bottle of Gatorade or Powerade for non-diabetic patients or a 12 to 20 ounce bottle of G2 or Powerade Zero for diabetic patients. (Pediatric patients, are not advised to drink a 12 to 20 ounce carbohydrate drink)    Clear liquids are liquids you can see through.  Nothing red in color.     Plain water                               Sports drinks  Sodas                                   Gelatin (Jell-O)  Fruit juices without pulp such as white grape juice and apple juice  Popsicles that contain no fruit or yogurt  Tea or coffee (no cream or milk added)  Gatorade / Powerade  G2 / Powerade Zero      • Patients who avoid smoking, chewing tobacco and alcohol for 4 weeks prior to surgery have a reduced risk of post-operative complications.  Quit smoking as many days before surgery as you can.  • Do not smoke, use chewing tobacco or drink alcohol the day of surgery.   • If applicable bring your C-PAP/ BI-PAP machine.  • Bring any papers given to you in the doctor’s office.  • Wear clean comfortable clothes and socks.  • Do not wear contact lenses or make-up.  Bring a case for your glasses.   • Bring crutches or walker if applicable.  • Remove all piercings.  Leave jewelry and any other valuables at home.  • Hair extensions with metal clips must be removed prior to surgery.  • The Pre-Admission Testing nurse will instruct you to bring medications if unable to obtain an accurate list in Pre-Admission Testing.            Preventing a Surgical Site Infection:  • For 2 to 3 days before surgery, avoid shaving with a razor because the razor can irritate skin and make it easier to  develop an infection.    • Any areas of open skin can increase the risk of a post-operative wound infection by allowing bacteria to enter and travel throughout the body.  Notify your surgeon if you have any skin wounds / rashes even if it is not near the expected surgical site.  The area will need assessed to determine if surgery should be delayed until it is healed.  • The night prior to surgery sleep in a clean bed with clean clothing.  Do not allow pets to sleep with you.  • Shower on the morning of surgery using a fresh bar of anti-bacterial soap (such as Dial) and clean washcloth.  Dry with a clean towel and dress in clean clothing.  • Ask your surgeon if you will be receiving antibiotics prior to surgery.  • Make sure you, your family, and all healthcare providers clean their hands with soap and water or an alcohol based hand  before caring for you or your wound.    Day of surgery:  Upon arrival, a Pre-op nurse and Anesthesiologist will review your health history, obtain vital signs, and answer questions you may have.  The only belongings needed at this time will be your home medications and if applicable your C-PAP/BI-PAP machine.  If you are staying overnight your family can leave the rest of your belongings in the car and bring them to your room later.  A Pre-op nurse will start an IV and you may receive medication in preparation for surgery, including something to help you relax.  Your family will be able to see you in the Pre-op area.  While you are in surgery your family should notify the waiting room  if they leave the waiting room area and provide a contact phone number.    Please be aware that surgery does come with discomfort.  We want to make every effort to control your discomfort so please discuss any uncontrolled symptoms with your nurse.   Your doctor will most likely have prescribed pain medications.      If you are going home after surgery you will receive individualized  written care instructions before being discharged.  A responsible adult must drive you to and from the hospital on the day of your surgery and stay with you for 24 hours.    If you are staying overnight following surgery, you will be transported to your hospital room following the recovery period.  [facility] has all private rooms.    If you have any questions please call Pre-Admission Testing at [phone].  Deductibles and co-payments are collected on the day of service. Please be prepared to pay the required co-pay, deductible or deposit on the day of service as defined by your plan.

## 2018-06-26 ENCOUNTER — OFFICE VISIT (OUTPATIENT)
Dept: CARDIOLOGY | Facility: CLINIC | Age: 68
End: 2018-06-26

## 2018-06-26 VITALS
HEART RATE: 81 BPM | BODY MASS INDEX: 37.93 KG/M2 | HEIGHT: 72 IN | DIASTOLIC BLOOD PRESSURE: 100 MMHG | SYSTOLIC BLOOD PRESSURE: 164 MMHG | WEIGHT: 280 LBS

## 2018-06-26 DIAGNOSIS — I10 ESSENTIAL HYPERTENSION: ICD-10-CM

## 2018-06-26 DIAGNOSIS — I48.0 PAROXYSMAL ATRIAL FIBRILLATION (HCC): Primary | ICD-10-CM

## 2018-06-26 DIAGNOSIS — IMO0001 OBESITY, CLASS II, BMI 35.0-39.9, WITH COMORBIDITY (SEE ACTUAL BMI): ICD-10-CM

## 2018-06-26 DIAGNOSIS — G47.33 OBSTRUCTIVE SLEEP APNEA SYNDROME: ICD-10-CM

## 2018-06-26 DIAGNOSIS — E78.2 MIXED HYPERLIPIDEMIA: ICD-10-CM

## 2018-06-26 PROCEDURE — 99214 OFFICE O/P EST MOD 30 MIN: CPT | Performed by: INTERNAL MEDICINE

## 2018-06-26 PROCEDURE — 93000 ELECTROCARDIOGRAM COMPLETE: CPT | Performed by: INTERNAL MEDICINE

## 2018-06-26 NOTE — PROGRESS NOTES
Date of Office Visit: 18  Encounter Provider: Mart Ureña MD  Place of Service: [facility] CARDIOLOGY  Patient Name: Branden Diop  :1950  Referral Provider:Tino Lopez MD      Chief Complaint   Patient presents with   • Atrial Fibrillation     History of Present Illness  Mr Diop 66 yo gentleman with a history of paroxysmal atrial fibrillation, hyperlipidemia, COPD, and obstructive sleep apnea.  In  when he had pneumonia he had an episode of atrial fibrillation echocardiogram then showed normal left ventricular systolic function and no significant valvular disease he ended up having cardioversion back to sinus rhythm and no recurrence of that since then.  He now really comes into establish with cardiology.  He denies chest pain or pressure.  Denies any shortness of breath, orthopnea, or PND.  Denies any palpitations, near-syncope or syncope.  Denies any abrupt loss of vision, paralysis, paresthesia, or dysarthria.  No blood in the stool or black tarry stools.  Unfortunately he's not been taking an aspirin daily.  He's not exercising.  Any is planning on having inguinal hernia repaired laparoscopically tomorrow.          Past Medical History:   Diagnosis Date   • Abrasion     BILATERAL ARMS; INSTRUCTED PT TO INFORM DR NG PRIOR TO SURGERY   • Anxiety    • Arthritis    • Atrial fibrillation    • Colon polyps    • COPD (chronic obstructive pulmonary disease)    • Depression    • Hyperlipidemia    • Inguinal hernia, recurrent     RIGHT   • Sleep apnea with use of continuous positive airway pressure (CPAP)    • Streptococcus pneumoniae          Past Surgical History:   Procedure Laterality Date   • BASAL CELL CARCINOMA EXCISION     • CARDIOVERSION     • CHOLECYSTECTOMY N/A 10/7/2017    Procedure: CHOLECYSTECTOMY LAPAROSCOPIC;  Surgeon: Martir Trevino MD;  Location: [facility];  Service:    • COLONOSCOPY     • FINGER/THUMB ARTHROPLASTY     • FOOT SURGERY  Left    • HERNIA REPAIR     • NECK SURGERY      growth on salivary gland   • REPLACEMENT TOTAL KNEE Right 2007    (he is going to have a LTKR next month)   • REPLACEMENT TOTAL KNEE Left          Current Outpatient Prescriptions on File Prior to Visit   Medication Sig Dispense Refill   • atorvastatin (LIPITOR) 40 MG tablet Take 1 tablet by mouth Daily. 30 tablet 3   • Multiple Vitamin (MULTIVITAMINS PO) Take  by mouth.     • sertraline (ZOLOFT) 50 MG tablet Take 1 tablet by mouth Daily. 90 tablet 1   • vitamin D (ERGOCALCIFEROL) 83668 units capsule capsule 1 po 2x per week (Patient taking differently: 50,000 Units Every 7 (Seven) Days. 1 po 2x per week) 30 capsule 2     No current facility-administered medications on file prior to visit.          Social History     Social History   • Marital status:      Spouse name: N/A   • Number of children: 2   • Years of education: N/A     Occupational History   • retired      Social History Main Topics   • Smoking status: Current Some Day Smoker     Types: Cigars     Last attempt to quit: 1/1/2014   • Smokeless tobacco: Never Used      Comment: 1 OR 2 PER WEEK   • Alcohol use No      Comment: caffeine use   • Drug use: No   • Sexual activity: Not on file     Other Topics Concern   • Not on file     Social History Narrative   • No narrative on file       Family History   Problem Relation Age of Onset   • Cancer Other    • Stroke Mother    • Alcohol abuse Father    • Malig Hyperthermia Neg Hx          Review of Systems   Constitution: Negative for decreased appetite, diaphoresis, fever, weakness, malaise/fatigue, weight gain and weight loss.   HENT: Negative for congestion, hearing loss, nosebleeds and tinnitus.    Eyes: Negative for blurred vision, double vision, vision loss in left eye, vision loss in right eye and visual disturbance.   Cardiovascular:        As noted in HPI   Respiratory:        As noted HPI   Endocrine: Negative for cold intolerance and heat  "intolerance.   Hematologic/Lymphatic: Negative for bleeding problem. Does not bruise/bleed easily.   Skin: Negative for color change, flushing, itching and rash.   Musculoskeletal: Negative for arthritis, back pain, joint pain, joint swelling, muscle weakness and myalgias.   Gastrointestinal: Negative for bloating, abdominal pain, constipation, diarrhea, dysphagia, heartburn, hematemesis, hematochezia, melena, nausea and vomiting.   Genitourinary: Negative for bladder incontinence, dysuria, frequency, nocturia and urgency.   Neurological: Negative for dizziness, focal weakness, headaches, light-headedness, loss of balance, numbness, paresthesias and vertigo.   Psychiatric/Behavioral: Negative for depression, memory loss and substance abuse.       Procedures      ECG 12 Lead  Date/Time: 6/26/2018 9:21 AM  Performed by: ROWENA MAHER  Authorized by: ROWENA MAHER   Comparison: compared with previous ECG   Similar to previous ECG  Rhythm: sinus rhythm  Rate: normal  Conduction: 1st degree  QRS axis: normal                  Objective:    /100 (BP Location: Left arm)   Pulse 81   Ht 182.9 cm (72\")   Wt 127 kg (280 lb)   BMI 37.97 kg/m²        Physical Exam  Physical Exam   Constitutional: He is oriented to person, place, and time. He appears well-developed and well-nourished. No distress.   HENT:   Head: Normocephalic.   Eyes: Conjunctivae are normal. Pupils are equal, round, and reactive to light. No scleral icterus.   Neck: Normal carotid pulses, no hepatojugular reflux and no JVD present. Carotid bruit is not present. No tracheal deviation, no edema and no erythema present. No thyromegaly present.   Cardiovascular: Normal rate, regular rhythm, S1 normal, S2 normal, normal heart sounds and intact distal pulses.   No extrasystoles are present. PMI is not displaced.  Exam reveals no gallop, no distant heart sounds and no friction rub.    No murmur heard.  Pulses:       Carotid pulses are 2+ on the right " side, and 2+ on the left side.       Radial pulses are 2+ on the right side, and 2+ on the left side.        Femoral pulses are 2+ on the right side, and 2+ on the left side.       Dorsalis pedis pulses are 2+ on the right side, and 2+ on the left side.        Posterior tibial pulses are 2+ on the right side, and 2+ on the left side.   Pulmonary/Chest: Effort normal. No respiratory distress. He has no decreased breath sounds. He has no wheezes. He has rhonchi (scattered.). He has no rales. He exhibits no tenderness.   Abdominal: Soft. Bowel sounds are normal. He exhibits no distension and no mass. There is no hepatosplenomegaly. There is no tenderness. There is no rebound and no guarding.   Musculoskeletal: He exhibits no edema, tenderness or deformity.   Neurological: He is alert and oriented to person, place, and time.   Skin: Skin is warm and dry. No rash noted. He is not diaphoretic. No cyanosis or erythema. No pallor. Nails show no clubbing.   Psychiatric: He has a normal mood and affect. His speech is normal and behavior is normal. Judgment and thought content normal.           Assessment:   1.  68 yo gentleman with paroxysmal atrial fibrillation.  Initial episode around the time of pneumonia.  Atrial Fibrillation and Atrial Flutter  Assessment  • The patient has paroxysmal atrial fibrillation  • This is non-valvular in etiology  • The patient's CHADS2-VASc score is 2  • A [id number] score of 2 or more is considered a high risk for a thromboembolic event    Plan  • Attempt to maintain sinus rhythm  • Add aspirin for antithrombotic therapy    Subjective - Objective  • The patient underwent cardioversion       No recurrence he is to start taking 81 mg coated aspirin daily will see us again in follow-up in a year.    2.  Hypertension blood pressure is markedly elevated today he says typically it's not that high.  With asked him to check it twice a day at home for a week and call our office or his PCP with those  results.  3.  Hyperlipidemia despite being on atorvastatin his LDL is still 164.  This obviously not at goal based by old guidelines and not at goal based on the new guidelines would suggest increasing his atorvastatin to 80 mg daily.  4.  Sleep apnea on CPAP.   5.  Obesity.  Biomet Ascent index of 38 does need to diet exercise lose weight.  6.  Cardiovascular risk assessment.  His [employer] risk of stroke or heart attack in the next 10 years is 34% compared to normal age matched average of 12% therefore he is 300% increased risk.  We spent a long time discussing his need to get his blood pressure under control increase his cholesterol medication exercise the recommended 40 minutes 4 days a week of moderate activity and to diet and lose weight.         Plan:

## 2018-06-27 ENCOUNTER — HOSPITAL ENCOUNTER (OUTPATIENT)
Facility: HOSPITAL | Age: 68
Setting detail: HOSPITAL OUTPATIENT SURGERY
Discharge: HOME | End: 2018-06-27
Attending: SURGERY | Admitting: SURGERY

## 2018-06-27 ENCOUNTER — ANESTHESIA EVENT (OUTPATIENT)
Dept: PERIOP | Facility: HOSPITAL | Age: 68
End: 2018-06-27

## 2018-06-27 ENCOUNTER — ANESTHESIA (OUTPATIENT)
Dept: PERIOP | Facility: HOSPITAL | Age: 68
End: 2018-06-27

## 2018-06-27 VITALS
TEMPERATURE: 97.8 F | HEART RATE: 71 BPM | WEIGHT: 280 LBS | SYSTOLIC BLOOD PRESSURE: 140 MMHG | OXYGEN SATURATION: 92 % | RESPIRATION RATE: 20 BRPM | DIASTOLIC BLOOD PRESSURE: 80 MMHG | BODY MASS INDEX: 37.97 KG/M2

## 2018-06-27 DIAGNOSIS — K40.91 RECURRENT INGUINAL HERNIA OF RIGHT SIDE WITHOUT OBSTRUCTION OR GANGRENE: ICD-10-CM

## 2018-06-27 PROCEDURE — 25010000002 MIDAZOLAM PER 1 MG: Performed by: NURSE ANESTHETIST, CERTIFIED REGISTERED

## 2018-06-27 PROCEDURE — 25010000002 PROPOFOL 10 MG/ML EMULSION: Performed by: NURSE ANESTHETIST, CERTIFIED REGISTERED

## 2018-06-27 PROCEDURE — 25010000003 CEFAZOLIN IN DEXTROSE 2-4 GM/100ML-% SOLUTION: Performed by: SURGERY

## 2018-06-27 PROCEDURE — 25010000002 MIDAZOLAM PER 1 MG: Performed by: ANESTHESIOLOGY

## 2018-06-27 PROCEDURE — C1781 MESH (IMPLANTABLE): HCPCS | Performed by: SURGERY

## 2018-06-27 PROCEDURE — 25010000002 FENTANYL CITRATE (PF) 100 MCG/2ML SOLUTION: Performed by: NURSE ANESTHETIST, CERTIFIED REGISTERED

## 2018-06-27 PROCEDURE — 25010000002 ONDANSETRON PER 1 MG: Performed by: NURSE ANESTHETIST, CERTIFIED REGISTERED

## 2018-06-27 DEVICE — BARD MESH LARGE PRE-SHAPED WITH KEYHOLE
Type: IMPLANTABLE DEVICE | Site: GROIN | Status: FUNCTIONAL
Brand: BARD MESH PRE-SHAPED

## 2018-06-27 RX ORDER — MIDAZOLAM HYDROCHLORIDE 1 MG/ML
1 INJECTION INTRAMUSCULAR; INTRAVENOUS
Status: DISCONTINUED | OUTPATIENT
Start: 2018-06-27 | End: 2018-06-27 | Stop reason: HOSPADM

## 2018-06-27 RX ORDER — LABETALOL HYDROCHLORIDE 5 MG/ML
5 INJECTION, SOLUTION INTRAVENOUS
Status: DISCONTINUED | OUTPATIENT
Start: 2018-06-27 | End: 2018-06-27 | Stop reason: HOSPADM

## 2018-06-27 RX ORDER — PROMETHAZINE HYDROCHLORIDE 25 MG/1
12.5 TABLET ORAL ONCE AS NEEDED
Status: DISCONTINUED | OUTPATIENT
Start: 2018-06-27 | End: 2018-06-27 | Stop reason: HOSPADM

## 2018-06-27 RX ORDER — ONDANSETRON 2 MG/ML
INJECTION INTRAMUSCULAR; INTRAVENOUS AS NEEDED
Status: DISCONTINUED | OUTPATIENT
Start: 2018-06-27 | End: 2018-06-27 | Stop reason: SURG

## 2018-06-27 RX ORDER — FAMOTIDINE 10 MG/ML
20 INJECTION, SOLUTION INTRAVENOUS ONCE
Status: COMPLETED | OUTPATIENT
Start: 2018-06-27 | End: 2018-06-27

## 2018-06-27 RX ORDER — PROMETHAZINE HYDROCHLORIDE 25 MG/ML
12.5 INJECTION, SOLUTION INTRAMUSCULAR; INTRAVENOUS ONCE AS NEEDED
Status: DISCONTINUED | OUTPATIENT
Start: 2018-06-27 | End: 2018-06-27 | Stop reason: HOSPADM

## 2018-06-27 RX ORDER — FLUMAZENIL 0.1 MG/ML
0.2 INJECTION INTRAVENOUS AS NEEDED
Status: DISCONTINUED | OUTPATIENT
Start: 2018-06-27 | End: 2018-06-27 | Stop reason: HOSPADM

## 2018-06-27 RX ORDER — HYDROCODONE BITARTRATE AND ACETAMINOPHEN 7.5; 325 MG/1; MG/1
1 TABLET ORAL ONCE AS NEEDED
Status: DISCONTINUED | OUTPATIENT
Start: 2018-06-27 | End: 2018-06-27 | Stop reason: HOSPADM

## 2018-06-27 RX ORDER — PROMETHAZINE HYDROCHLORIDE 25 MG/1
25 TABLET ORAL ONCE AS NEEDED
Status: DISCONTINUED | OUTPATIENT
Start: 2018-06-27 | End: 2018-06-27 | Stop reason: HOSPADM

## 2018-06-27 RX ORDER — MIDAZOLAM HYDROCHLORIDE 1 MG/ML
INJECTION INTRAMUSCULAR; INTRAVENOUS AS NEEDED
Status: DISCONTINUED | OUTPATIENT
Start: 2018-06-27 | End: 2018-06-27 | Stop reason: SURG

## 2018-06-27 RX ORDER — OXYCODONE HYDROCHLORIDE AND ACETAMINOPHEN 5; 325 MG/1; MG/1
1-2 TABLET ORAL EVERY 4 HOURS PRN
Qty: 30 TABLET | Refills: 0 | Status: SHIPPED | OUTPATIENT
Start: 2018-06-27 | End: 2019-03-07

## 2018-06-27 RX ORDER — HYDRALAZINE HYDROCHLORIDE 20 MG/ML
5 INJECTION INTRAMUSCULAR; INTRAVENOUS
Status: DISCONTINUED | OUTPATIENT
Start: 2018-06-27 | End: 2018-06-27 | Stop reason: HOSPADM

## 2018-06-27 RX ORDER — SODIUM CHLORIDE, SODIUM LACTATE, POTASSIUM CHLORIDE, CALCIUM CHLORIDE 600; 310; 30; 20 MG/100ML; MG/100ML; MG/100ML; MG/100ML
9 INJECTION, SOLUTION INTRAVENOUS CONTINUOUS
Status: DISCONTINUED | OUTPATIENT
Start: 2018-06-27 | End: 2018-06-27 | Stop reason: HOSPADM

## 2018-06-27 RX ORDER — OXYCODONE HYDROCHLORIDE AND ACETAMINOPHEN 5; 325 MG/1; MG/1
2 TABLET ORAL ONCE AS NEEDED
Status: DISCONTINUED | OUTPATIENT
Start: 2018-06-27 | End: 2018-06-27 | Stop reason: HOSPADM

## 2018-06-27 RX ORDER — FENTANYL CITRATE 50 UG/ML
50 INJECTION, SOLUTION INTRAMUSCULAR; INTRAVENOUS
Status: DISCONTINUED | OUTPATIENT
Start: 2018-06-27 | End: 2018-06-27 | Stop reason: HOSPADM

## 2018-06-27 RX ORDER — HYDROMORPHONE HYDROCHLORIDE 1 MG/ML
0.5 INJECTION, SOLUTION INTRAMUSCULAR; INTRAVENOUS; SUBCUTANEOUS
Status: DISCONTINUED | OUTPATIENT
Start: 2018-06-27 | End: 2018-06-27 | Stop reason: HOSPADM

## 2018-06-27 RX ORDER — MAGNESIUM HYDROXIDE 1200 MG/15ML
LIQUID ORAL AS NEEDED
Status: DISCONTINUED | OUTPATIENT
Start: 2018-06-27 | End: 2018-06-27 | Stop reason: HOSPADM

## 2018-06-27 RX ORDER — OXYCODONE AND ACETAMINOPHEN 7.5; 325 MG/1; MG/1
1 TABLET ORAL ONCE AS NEEDED
Status: DISCONTINUED | OUTPATIENT
Start: 2018-06-27 | End: 2018-06-27 | Stop reason: HOSPADM

## 2018-06-27 RX ORDER — DIPHENHYDRAMINE HYDROCHLORIDE 50 MG/ML
12.5 INJECTION INTRAMUSCULAR; INTRAVENOUS
Status: DISCONTINUED | OUTPATIENT
Start: 2018-06-27 | End: 2018-06-27 | Stop reason: HOSPADM

## 2018-06-27 RX ORDER — MIDAZOLAM HYDROCHLORIDE 1 MG/ML
2 INJECTION INTRAMUSCULAR; INTRAVENOUS
Status: DISCONTINUED | OUTPATIENT
Start: 2018-06-27 | End: 2018-06-27 | Stop reason: HOSPADM

## 2018-06-27 RX ORDER — ONDANSETRON 2 MG/ML
4 INJECTION INTRAMUSCULAR; INTRAVENOUS ONCE AS NEEDED
Status: DISCONTINUED | OUTPATIENT
Start: 2018-06-27 | End: 2018-06-27 | Stop reason: HOSPADM

## 2018-06-27 RX ORDER — EPHEDRINE SULFATE 50 MG/ML
5 INJECTION, SOLUTION INTRAVENOUS ONCE AS NEEDED
Status: DISCONTINUED | OUTPATIENT
Start: 2018-06-27 | End: 2018-06-27 | Stop reason: HOSPADM

## 2018-06-27 RX ORDER — PROMETHAZINE HYDROCHLORIDE 25 MG/1
25 SUPPOSITORY RECTAL ONCE AS NEEDED
Status: DISCONTINUED | OUTPATIENT
Start: 2018-06-27 | End: 2018-06-27 | Stop reason: HOSPADM

## 2018-06-27 RX ORDER — PROPOFOL 10 MG/ML
VIAL (ML) INTRAVENOUS CONTINUOUS PRN
Status: DISCONTINUED | OUTPATIENT
Start: 2018-06-27 | End: 2018-06-27 | Stop reason: SURG

## 2018-06-27 RX ORDER — SODIUM CHLORIDE 0.9 % (FLUSH) 0.9 %
1-10 SYRINGE (ML) INJECTION AS NEEDED
Status: DISCONTINUED | OUTPATIENT
Start: 2018-06-27 | End: 2018-06-27 | Stop reason: HOSPADM

## 2018-06-27 RX ORDER — FENTANYL CITRATE 50 UG/ML
INJECTION, SOLUTION INTRAMUSCULAR; INTRAVENOUS AS NEEDED
Status: DISCONTINUED | OUTPATIENT
Start: 2018-06-27 | End: 2018-06-27 | Stop reason: SURG

## 2018-06-27 RX ORDER — BUPIVACAINE HYDROCHLORIDE AND EPINEPHRINE 2.5; 5 MG/ML; UG/ML
INJECTION, SOLUTION INFILTRATION; PERINEURAL AS NEEDED
Status: DISCONTINUED | OUTPATIENT
Start: 2018-06-27 | End: 2018-06-27 | Stop reason: HOSPADM

## 2018-06-27 RX ORDER — NALOXONE HCL 0.4 MG/ML
0.2 VIAL (ML) INJECTION AS NEEDED
Status: DISCONTINUED | OUTPATIENT
Start: 2018-06-27 | End: 2018-06-27 | Stop reason: HOSPADM

## 2018-06-27 RX ORDER — CEFAZOLIN SODIUM 2 G/100ML
2 INJECTION, SOLUTION INTRAVENOUS ONCE
Status: COMPLETED | OUTPATIENT
Start: 2018-06-27 | End: 2018-06-27

## 2018-06-27 RX ORDER — LIDOCAINE HYDROCHLORIDE 10 MG/ML
0.5 INJECTION, SOLUTION EPIDURAL; INFILTRATION; INTRACAUDAL; PERINEURAL ONCE AS NEEDED
Status: COMPLETED | OUTPATIENT
Start: 2018-06-27 | End: 2018-06-27

## 2018-06-27 RX ADMIN — CEFAZOLIN SODIUM 2 G: 2 INJECTION, SOLUTION INTRAVENOUS at 07:40

## 2018-06-27 RX ADMIN — OXYCODONE HYDROCHLORIDE AND ACETAMINOPHEN 2 TABLET: 5; 325 TABLET ORAL at 08:37

## 2018-06-27 RX ADMIN — MIDAZOLAM 1 MG: 1 INJECTION INTRAMUSCULAR; INTRAVENOUS at 07:15

## 2018-06-27 RX ADMIN — Medication 10 ML: at 07:15

## 2018-06-27 RX ADMIN — ONDANSETRON 4 MG: 2 INJECTION INTRAMUSCULAR; INTRAVENOUS at 08:04

## 2018-06-27 RX ADMIN — PROPOFOL 100 MCG/KG/MIN: 10 INJECTION, EMULSION INTRAVENOUS at 07:42

## 2018-06-27 RX ADMIN — MIDAZOLAM 1 MG: 1 INJECTION INTRAMUSCULAR; INTRAVENOUS at 07:42

## 2018-06-27 RX ADMIN — LIDOCAINE HYDROCHLORIDE 0.5 ML: 10 INJECTION, SOLUTION EPIDURAL; INFILTRATION; INTRACAUDAL; PERINEURAL at 07:15

## 2018-06-27 RX ADMIN — FAMOTIDINE 20 MG: 10 INJECTION, SOLUTION INTRAVENOUS at 07:15

## 2018-06-27 RX ADMIN — FENTANYL CITRATE 50 MCG: 50 INJECTION INTRAMUSCULAR; INTRAVENOUS at 07:42

## 2018-06-27 RX ADMIN — SODIUM CHLORIDE, POTASSIUM CHLORIDE, SODIUM LACTATE AND CALCIUM CHLORIDE: 600; 310; 30; 20 INJECTION, SOLUTION INTRAVENOUS at 07:42

## 2018-06-27 RX ADMIN — SODIUM CHLORIDE, POTASSIUM CHLORIDE, SODIUM LACTATE AND CALCIUM CHLORIDE 9 ML/HR: 600; 310; 30; 20 INJECTION, SOLUTION INTRAVENOUS at 07:15

## 2018-06-27 NOTE — ANESTHESIA PREPROCEDURE EVALUATION
Anesthesia Evaluation     Patient summary reviewed and Nursing notes reviewed                Airway   Mallampati: III  Dental      Pulmonary    (+) pneumonia , a smoker Former, COPD, sleep apnea on CPAP,   Cardiovascular     ECG reviewed  Rhythm: regular  Rate: normal    (+) hypertension, dysrhythmias Paroxysmal Atrial Fib, hyperlipidemia,       Neuro/Psych  (+) psychiatric history Anxiety and Depression,     GI/Hepatic/Renal/Endo    (+) morbid obesity,  liver disease,     Musculoskeletal     Abdominal    Substance History - negative use     OB/GYN negative ob/gyn ROS         Other   (+) arthritis                     Anesthesia Plan    ASA 3     MAC   (BMI  Multiple medical pathos increasing aureliano op risk  NSR  Class 1 airway by history)  intravenous induction   Anesthetic plan and risks discussed with patient.

## 2018-06-27 NOTE — ANESTHESIA POSTPROCEDURE EVALUATION
Patient: Branden Diop    Procedure Summary     Date:  06/27/18 Room / [facility]:   [facility]    Anesthesia Start:  0739 Anesthesia Stop:  0824    Procedure:  INGUINAL HERNIA REPAIR, OPEN, RECURRENT (Right Abdomen) Diagnosis:       Recurrent inguinal hernia of right side without obstruction or gangrene      (Recurrent inguinal hernia of right side without obstruction or gangrene [K40.91])    Surgeon:  Martir Trevino MD Provider:  Tucker Treviño MD    Anesthesia Type:  MAC ASA Status:  3          Anesthesia Type: MAC  Last vitals  BP   140/80 (06/27/18 0859)   Temp   36.6 °C (97.8 °F) (06/27/18 0859)   Pulse   71 (06/27/18 0859)   Resp   20 (06/27/18 0859)     SpO2   92 % (06/27/18 0859)     Post Anesthesia Care and Evaluation    Patient location during evaluation: bedside  Patient participation: complete - patient participated  Level of consciousness: awake  Pain score: 2  Pain management: adequate  Airway patency: patent  Anesthetic complications: No anesthetic complications    Cardiovascular status: acceptable  Respiratory status: acceptable  Hydration status: acceptable    Comments: /80 (BP Location: Right arm, Patient Position: Lying)   Pulse 71   Temp 36.6 °C (97.8 °F) (Temporal Artery )   Resp 20   Wt 127 kg (280 lb)   SpO2 92%   BMI 37.97 kg/m²

## 2018-07-09 RX ORDER — ATORVASTATIN CALCIUM 40 MG/1
TABLET, FILM COATED ORAL
Qty: 30 TABLET | Refills: 2 | Status: SHIPPED | OUTPATIENT
Start: 2018-07-09 | End: 2018-10-31 | Stop reason: SDUPTHER

## 2018-08-10 ENCOUNTER — TELEPHONE (OUTPATIENT)
Dept: FAMILY MEDICINE CLINIC | Facility: CLINIC | Age: 68
End: 2018-08-10

## 2018-08-10 DIAGNOSIS — M62.3 IMMOBILITY SYNDROME: Primary | ICD-10-CM

## 2018-08-10 NOTE — TELEPHONE ENCOUNTER
PATIENT WAS IN THE ER AND THEY WAS SUPPOSE TO GIVE PATIENT A RX FOR CRUTCHES AND DIDN'T. PATIENT NEEDS A RX FOR CRUTCHES

## 2018-08-15 ENCOUNTER — OFFICE VISIT (OUTPATIENT)
Dept: FAMILY MEDICINE CLINIC | Facility: CLINIC | Age: 68
End: 2018-08-15

## 2018-08-15 VITALS
HEART RATE: 100 BPM | TEMPERATURE: 98.6 F | DIASTOLIC BLOOD PRESSURE: 78 MMHG | SYSTOLIC BLOOD PRESSURE: 138 MMHG | OXYGEN SATURATION: 95 % | WEIGHT: 279 LBS | HEIGHT: 72 IN | BODY MASS INDEX: 37.79 KG/M2

## 2018-08-15 DIAGNOSIS — M54.5 LOW BACK PAIN, UNSPECIFIED BACK PAIN LATERALITY, UNSPECIFIED CHRONICITY, WITH SCIATICA PRESENCE UNSPECIFIED: Primary | ICD-10-CM

## 2018-08-15 DIAGNOSIS — M79.606 PAIN OF LOWER EXTREMITY, UNSPECIFIED LATERALITY: ICD-10-CM

## 2018-08-15 PROCEDURE — 99214 OFFICE O/P EST MOD 30 MIN: CPT | Performed by: INTERNAL MEDICINE

## 2018-08-15 PROCEDURE — 72100 X-RAY EXAM L-S SPINE 2/3 VWS: CPT | Performed by: INTERNAL MEDICINE

## 2018-08-15 RX ORDER — NABUMETONE 500 MG/1
500 TABLET, FILM COATED ORAL 2 TIMES DAILY PRN
Qty: 60 TABLET | Refills: 3 | Status: SHIPPED | OUTPATIENT
Start: 2018-08-15 | End: 2019-03-07

## 2018-08-15 RX ORDER — CYCLOBENZAPRINE HCL 10 MG
10 TABLET ORAL 3 TIMES DAILY PRN
Qty: 30 TABLET | Refills: 3
Start: 2018-08-15 | End: 2019-03-07

## 2018-08-15 RX ORDER — HYDROCODONE BITARTRATE AND ACETAMINOPHEN 5; 325 MG/1; MG/1
TABLET ORAL
COMMUNITY
Start: 2018-08-09 | End: 2018-09-07

## 2018-08-15 NOTE — PROGRESS NOTES
Subjective   Branden Diop is a 67 y.o. male.     History of Present Illness   Patient has severe left leg pain which began approximately 2 weeks ago.  Patient twisted his leg and causes severe pain to radiate from his back down his left leg.  The pain lasts several hours and is partially relieved with over-the-counter NSAIDs.  Patient's lumbar spine indicated severe osteophytes with narrowed disc spaces at multiple levels.  Patient was placed on any inflammatories and muscle relaxers and sent to physical therapy.  Patient will follow-up in 2 weeks.    X-Ray  Interpretation report in house X-rays that I personally viewed    Relevant Clinical Issues/Diagnoses/Indications:  Low back pain L-spine        Clinical Findings:  #1 severe osteophytes at multiple levels #2 narrowed disc spaces at multiple lumbar levels          Comparative Data:  No previous x-ray          Date of Previous X-ray:  Change on current X-ray    Dictated utilizing Dragon dictation. If there are questions or for further clarification, please contact me.     The following portions of the patient's history were reviewed and updated as appropriate: allergies, current medications, past family history, past medical history, past social history, past surgical history and problem list.    Review of Systems   Constitutional: Negative for fatigue and fever.   HENT: Positive for congestion. Negative for trouble swallowing.    Eyes: Negative for discharge and visual disturbance.   Respiratory: Negative for choking and shortness of breath.    Cardiovascular: Negative for chest pain and palpitations.   Gastrointestinal: Negative for abdominal pain and blood in stool.   Endocrine: Negative.    Genitourinary: Negative for genital sores and hematuria.   Musculoskeletal: Positive for back pain. Negative for gait problem and joint swelling.   Skin: Negative for color change, pallor, rash and wound.   Allergic/Immunologic: Positive for environmental allergies. Negative  for immunocompromised state.   Neurological: Negative for facial asymmetry and speech difficulty.   Psychiatric/Behavioral: Negative for hallucinations and suicidal ideas.       Objective   Physical Exam   Constitutional: He is oriented to person, place, and time. He appears well-developed and well-nourished.   HENT:   Head: Normocephalic.   Eyes: Pupils are equal, round, and reactive to light. Conjunctivae are normal.   Neck: Normal range of motion. Neck supple.   Cardiovascular: Normal rate, regular rhythm and normal heart sounds.    Pulmonary/Chest: Effort normal and breath sounds normal.   Abdominal: Soft. Bowel sounds are normal.   Musculoskeletal: He exhibits edema, tenderness and deformity.   Neurological: He is alert and oriented to person, place, and time.   Skin: Skin is warm and dry.   Psychiatric: He has a normal mood and affect. His behavior is normal. Judgment and thought content normal.   Nursing note and vitals reviewed.      Assessment/Plan   Problems Addressed this Visit     None      Visit Diagnoses     Low back pain, unspecified back pain laterality, unspecified chronicity, with sciatica presence unspecified    -  Primary    Relevant Orders    XR Spine Lumbar 2 or 3 View (In Office) (Completed)    Ambulatory Referral to Physical Therapy Evaluate and treat, Ortho (Completed)    Pain of lower extremity, unspecified laterality        Relevant Orders    XR Spine Lumbar 2 or 3 View (In Office) (Completed)

## 2018-08-21 ENCOUNTER — TELEPHONE (OUTPATIENT)
Dept: FAMILY MEDICINE CLINIC | Facility: CLINIC | Age: 68
End: 2018-08-21

## 2018-08-21 DIAGNOSIS — Z12.11 ENCOUNTER FOR SCREENING COLONOSCOPY: Primary | ICD-10-CM

## 2018-08-27 ENCOUNTER — TELEPHONE (OUTPATIENT)
Dept: FAMILY MEDICINE CLINIC | Facility: CLINIC | Age: 68
End: 2018-08-27

## 2018-08-27 NOTE — TELEPHONE ENCOUNTER
PT HAS AN APPOINTMENT FOR THIS Thursday WITH DR. LEARY, BUT IN THE MEAN TIME, CAN HE TAKE PEPTO BISMOL FOR HIS DIARRHEA? IF NOT, IS THERE ANYTHING HE CAN TAKE UNTIL HE IS SEEN FOR THE ISSUE?

## 2018-08-30 ENCOUNTER — LAB (OUTPATIENT)
Dept: FAMILY MEDICINE CLINIC | Facility: CLINIC | Age: 68
End: 2018-08-30

## 2018-08-30 DIAGNOSIS — Z12.5 SPECIAL SCREENING FOR MALIGNANT NEOPLASM OF PROSTATE: ICD-10-CM

## 2018-08-30 DIAGNOSIS — E55.9 VITAMIN D DEFICIENCY: ICD-10-CM

## 2018-08-30 DIAGNOSIS — E78.2 MIXED HYPERLIPIDEMIA: ICD-10-CM

## 2018-08-30 DIAGNOSIS — Z11.59 NEED FOR HEPATITIS C SCREENING TEST: Primary | ICD-10-CM

## 2018-08-30 DIAGNOSIS — J44.9 CHRONIC OBSTRUCTIVE PULMONARY DISEASE, UNSPECIFIED COPD TYPE (HCC): ICD-10-CM

## 2018-08-30 DIAGNOSIS — K76.0 FATTY LIVER: ICD-10-CM

## 2018-08-30 LAB
25(OH)D3 SERPL-MCNC: 29.9 NG/ML (ref 30–100)
ALBUMIN SERPL-MCNC: 4.3 G/DL (ref 3.5–5.2)
ALBUMIN/GLOB SERPL: 1.1 G/DL
ALP SERPL-CCNC: 118 U/L (ref 39–117)
ALT SERPL W P-5'-P-CCNC: 31 U/L (ref 1–41)
ANION GAP SERPL CALCULATED.3IONS-SCNC: 13.6 MMOL/L
AST SERPL-CCNC: 21 U/L (ref 1–40)
BILIRUB CONJ SERPL-MCNC: <0.2 MG/DL (ref 0–0.3)
BILIRUB SERPL-MCNC: 0.8 MG/DL (ref 0.1–1.2)
BUN BLD-MCNC: 11 MG/DL (ref 8–23)
BUN/CREAT SERPL: 16.4 (ref 7–25)
CALCIUM SPEC-SCNC: 9.8 MG/DL (ref 8.6–10.5)
CHLORIDE SERPL-SCNC: 98 MMOL/L (ref 98–107)
CHOLEST SERPL-MCNC: 171 MG/DL (ref 0–200)
CO2 SERPL-SCNC: 26.4 MMOL/L (ref 22–29)
CREAT BLD-MCNC: 0.67 MG/DL (ref 0.76–1.27)
ERYTHROCYTE [DISTWIDTH] IN BLOOD BY AUTOMATED COUNT: 13.8 % (ref 4.5–15)
GFR SERPL CREATININE-BSD FRML MDRD: 118 ML/MIN/1.73
GLOBULIN UR ELPH-MCNC: 3.8 GM/DL
GLUCOSE BLD-MCNC: 232 MG/DL (ref 65–99)
HCT VFR BLD AUTO: 47 % (ref 35–60)
HCV AB SER DONR QL: NORMAL
HDLC SERPL-MCNC: 40 MG/DL (ref 40–60)
HGB BLD-MCNC: 15.9 G/DL (ref 13.5–18)
LDLC SERPL CALC-MCNC: 93 MG/DL (ref 0–100)
LDLC/HDLC SERPL: 2.33 {RATIO}
LYMPHOCYTES # BLD AUTO: 1.7 10*3/MM3 (ref 1.2–3.4)
LYMPHOCYTES NFR BLD AUTO: 16.7 % (ref 21–51)
MCH RBC QN AUTO: 29.9 PG (ref 26.1–33.1)
MCHC RBC AUTO-ENTMCNC: 33.9 G/DL (ref 33–37)
MCV RBC AUTO: 88.3 FL (ref 80–99)
MONOCYTES # BLD AUTO: 0.3 10*3/MM3 (ref 0.1–0.6)
MONOCYTES NFR BLD AUTO: 3.5 % (ref 2–9)
NEUTROPHILS # BLD AUTO: 8 10*3/MM3 (ref 1.4–6.5)
NEUTROPHILS NFR BLD AUTO: 79.8 % (ref 42–75)
PLATELET # BLD AUTO: 326 10*3/MM3 (ref 150–450)
PMV BLD AUTO: 7.4 FL (ref 7.1–10.5)
POTASSIUM BLD-SCNC: 4.4 MMOL/L (ref 3.5–5.2)
PROT SERPL-MCNC: 8.1 G/DL (ref 6–8.5)
PSA SERPL-MCNC: 0.96 NG/ML (ref 0–4)
RBC # BLD AUTO: 5.32 10*6/MM3 (ref 4–6)
SODIUM BLD-SCNC: 138 MMOL/L (ref 136–145)
TRIGL SERPL-MCNC: 190 MG/DL (ref 0–150)
VLDLC SERPL-MCNC: 38 MG/DL (ref 5–40)
WBC NRBC COR # BLD: 10 10*3/MM3 (ref 4.5–10)

## 2018-08-30 PROCEDURE — 82306 VITAMIN D 25 HYDROXY: CPT | Performed by: INTERNAL MEDICINE

## 2018-08-30 PROCEDURE — 80061 LIPID PANEL: CPT | Performed by: INTERNAL MEDICINE

## 2018-08-30 PROCEDURE — 36415 COLL VENOUS BLD VENIPUNCTURE: CPT | Performed by: INTERNAL MEDICINE

## 2018-08-30 PROCEDURE — 86803 HEPATITIS C AB TEST: CPT | Performed by: INTERNAL MEDICINE

## 2018-08-30 PROCEDURE — G0103 PSA SCREENING: HCPCS | Performed by: INTERNAL MEDICINE

## 2018-08-30 PROCEDURE — 80053 COMPREHEN METABOLIC PANEL: CPT | Performed by: INTERNAL MEDICINE

## 2018-08-30 PROCEDURE — 85025 COMPLETE CBC W/AUTO DIFF WBC: CPT | Performed by: INTERNAL MEDICINE

## 2018-08-30 PROCEDURE — 82248 BILIRUBIN DIRECT: CPT | Performed by: INTERNAL MEDICINE

## 2018-09-07 ENCOUNTER — OFFICE VISIT (OUTPATIENT)
Dept: FAMILY MEDICINE CLINIC | Facility: CLINIC | Age: 68
End: 2018-09-07

## 2018-09-07 VITALS
RESPIRATION RATE: 16 BRPM | SYSTOLIC BLOOD PRESSURE: 132 MMHG | TEMPERATURE: 98 F | HEIGHT: 72 IN | WEIGHT: 275 LBS | OXYGEN SATURATION: 95 % | HEART RATE: 94 BPM | BODY MASS INDEX: 37.25 KG/M2 | DIASTOLIC BLOOD PRESSURE: 98 MMHG

## 2018-09-07 DIAGNOSIS — E78.2 MIXED HYPERLIPIDEMIA: ICD-10-CM

## 2018-09-07 DIAGNOSIS — Z00.00 MEDICARE ANNUAL WELLNESS VISIT, SUBSEQUENT: Primary | ICD-10-CM

## 2018-09-07 DIAGNOSIS — I48.20 CHRONIC ATRIAL FIBRILLATION (HCC): ICD-10-CM

## 2018-09-07 DIAGNOSIS — J44.9 CHRONIC OBSTRUCTIVE PULMONARY DISEASE, UNSPECIFIED COPD TYPE (HCC): ICD-10-CM

## 2018-09-07 PROCEDURE — 99214 OFFICE O/P EST MOD 30 MIN: CPT | Performed by: INTERNAL MEDICINE

## 2018-09-07 PROCEDURE — G0439 PPPS, SUBSEQ VISIT: HCPCS | Performed by: INTERNAL MEDICINE

## 2018-09-07 NOTE — PROGRESS NOTES
Subjective   Branden Diop is a 67 y.o. male.     History of Present Illness   She was seen for Medicare wellness exam.  His COPD is been stable over the past 6 months.  Lipid status is been controlled with his statin diet and exercise.  Chronic atrial filled is being followed by his cardiologist and is been stable.    Dictated utilizing Dragon dictation. If there are questions or for further clarification, please contact me.   The following portions of the patient's history were reviewed and updated as appropriate: allergies, current medications, past family history, past medical history, past social history, past surgical history and problem list.    Review of Systems   Constitutional: Negative for fatigue and fever.   HENT: Positive for congestion. Negative for trouble swallowing.    Eyes: Negative for discharge and visual disturbance.   Respiratory: Negative for choking and shortness of breath.    Cardiovascular: Negative for chest pain and palpitations.   Gastrointestinal: Negative for abdominal pain and blood in stool.   Endocrine: Negative.    Genitourinary: Negative for genital sores and hematuria.   Musculoskeletal: Negative for gait problem and joint swelling.   Skin: Negative for color change, pallor, rash and wound.   Allergic/Immunologic: Positive for environmental allergies. Negative for immunocompromised state.   Neurological: Negative for facial asymmetry and speech difficulty.   Psychiatric/Behavioral: Negative for hallucinations and suicidal ideas.       Objective   Physical Exam   Constitutional: He is oriented to person, place, and time. He appears well-developed and well-nourished.   HENT:   Head: Normocephalic.   Eyes: Pupils are equal, round, and reactive to light. Conjunctivae are normal.   Neck: Normal range of motion. Neck supple.   Cardiovascular: Normal rate, regular rhythm and normal heart sounds.    Pulmonary/Chest: Effort normal and breath sounds normal.   Abdominal: Soft. Bowel sounds  are normal.   Musculoskeletal: Normal range of motion.   Neurological: He is alert and oriented to person, place, and time.   Skin: Skin is warm and dry.   Psychiatric: He has a normal mood and affect. His behavior is normal. Judgment and thought content normal.   Nursing note and vitals reviewed.      Assessment/Plan   Problems Addressed this Visit        Cardiovascular and Mediastinum    A-fib (CMS/[facility])    Hyperlipidemia       Respiratory    COPD (chronic obstructive pulmonary disease) (CMS/[facility])      Other Visit Diagnoses     Medicare annual wellness visit, subsequent    -  Primary

## 2018-09-07 NOTE — PATIENT INSTRUCTIONS
Medicare Wellness  Personal Prevention Plan of Service     Date of Office Visit:  2018  Encounter Provider:  Tino Lopez MD  Place of Service:  [facility]  Patient Name: Branden Diop  :  1950    As part of the Medicare Wellness portion of your visit today, we are providing you with this personalized preventive plan of services (PPPS). This plan is based upon recommendations of the [place] Preventive Services Task Force (USPSTF) and the Advisory Committee on Immunization Practices (ACIP).    This lists the preventive care services that should be considered, and provides dates of when you are due. Items listed as completed are up-to-date and do not require any further intervention.    Health Maintenance   Topic Date Due   • URINE MICROALBUMIN  1950   • LUNG CANCER SCREENING  10/07/2003   • AAA SCREEN (ONE-TIME)  2016   • HEMOGLOBIN A1C  2017   • ZOSTER VACCINE (2 of 2) 2017   • DIABETIC FOOT EXAM  2018   • MEDICARE ANNUAL WELLNESS  2018   • INFLUENZA VACCINE  2018   • DIABETIC EYE EXAM  2019   • PROSTATE CANCER SCREENING  2019   • LIPID PANEL  2019   • COLONOSCOPY  05/10/2023   • TDAP/TD VACCINES (2 - Td) 2027   • HEPATITIS C SCREENING  Completed   • PNEUMOCOCCAL VACCINES (65+ LOW/MEDIUM RISK)  Completed       No orders of the defined types were placed in this encounter.      Return in about 6 months (around 3/7/2019), or if symptoms worsen or fail to improve, for Recheck.

## 2018-09-07 NOTE — PROGRESS NOTES
QUICK REFERENCE INFORMATION:  The ABCs of the Annual Wellness Visit    Subsequent Medicare Wellness Visit    HEALTH RISK ASSESSMENT    1950    Recent Hospitalizations:  No hospitalization(s) within the last year..        Current Medical Providers:  Patient Care Team:  Tino Lopez MD as PCP - General (Internal Medicine)  Tino Lopez MD as PCP - Family Medicine  Tino Lopez MD as PCP - Claims Attributed  Isabel Hicks RN as Care Coordinator (Population Health)  Mart Ureña MD as Consulting Physician (Cardiology)  Martir Trevino MD as Surgeon (General Surgery)        Smoking Status:  History   Smoking Status   • Former Smoker   • Packs/day: 1.00   • Years: 30.00   • Types: Cigarettes   • Quit date: 1/1/2014   Smokeless Tobacco   • Never Used     Comment: currently smokes 1-2 cigars a week       Alcohol Consumption:  History   Alcohol Use No     Comment: caffeine use       Depression Screen:   PHQ-2/PHQ-9 Depression Screening 9/7/2018   Little interest or pleasure in doing things 0   Feeling down, depressed, or hopeless 0   Trouble falling or staying asleep, or sleeping too much 0   Feeling tired or having little energy 0   Poor appetite or overeating 0   Feeling bad about yourself - or that you are a failure or have let yourself or your family down 0   Trouble concentrating on things, such as reading the newspaper or watching television 0   Moving or speaking so slowly that other people could have noticed. Or the opposite - being so fidgety or restless that you have been moving around a lot more than usual 0   Thoughts that you would be better off dead, or of hurting yourself in some way 0   Total Score 0   If you checked off any problems, how difficult have these problems made it for you to do your work, take care of things at home, or get along with other people? Not difficult at all       Health Habits and Functional and Cognitive Screening:  Functional & Cognitive Status 9/7/2018    Do you have difficulty preparing food and eating? No   Do you have difficulty bathing yourself, getting dressed or grooming yourself? No   Do you have difficulty using the toilet? No   Do you have difficulty moving around from place to place? No   Do you have trouble with steps or getting out of a bed or a chair? No   In the past year have you fallen or experienced a near fall? No   Current Diet Well Balanced Diet   Dental Exam Up to date   Eye Exam Up to date   Exercise (times per week) 3 times per week   Current Exercise Activities Include Cardiovasular Workout on Exercise Equipment   Do you need help using the phone?  No   Are you deaf or do you have serious difficulty hearing?  No   Do you need help with transportation? No   Do you need help shopping? No   Do you need help preparing meals?  No   Do you need help with housework?  No   Do you need help with laundry? No   Do you need help taking your medications? No   Do you need help managing money? No   Do you ever drive or ride in a car without wearing a seat belt? No   Have you felt unusual stress, anger or loneliness in the last month? No   Who do you live with? Spouse   If you need help, do you have trouble finding someone available to you? No   Have you been bothered in the last four weeks by sexual problems? No   Do you have difficulty concentrating, remembering or making decisions? No           Does the patient have evidence of cognitive impairment? Yes    Aspirin use counseling: Taking ASA appropriately as indicated      Recent Lab Results:  CMP:  Lab Results   Component Value Date    BUN 11 08/30/2018    CREATININE 0.67 (L) 08/30/2018    EGFRIFNONA 118 08/30/2018    BCR 16.4 08/30/2018     08/30/2018    K 4.4 08/30/2018    CO2 26.4 08/30/2018    CALCIUM 9.8 08/30/2018    ALBUMIN 4.30 08/30/2018    BILITOT 0.8 08/30/2018    ALKPHOS 118 (H) 08/30/2018    AST 21 08/30/2018    ALT 31 08/30/2018     Lipid Panel:  Lab Results   Component Value Date     CHOL 171 08/30/2018    TRIG 190 (H) 08/30/2018    HDL 40 08/30/2018    VLDL 38 08/30/2018    LDLHDL 2.33 08/30/2018     HbA1c:  Lab Results   Component Value Date    HGBA1C 5.66 (H) 10/12/2016       Visual Acuity:  No exam data present    Age-appropriate Screening Schedule:  Refer to the list below for future screening recommendations based on patient's age, sex and/or medical conditions. Orders for these recommended tests are listed in the plan section. The patient has been provided with a written plan.    Health Maintenance   Topic Date Due   • URINE MICROALBUMIN  1950   • HEMOGLOBIN A1C  04/12/2017   • ZOSTER VACCINE (2 of 2) 05/11/2017   • DIABETIC FOOT EXAM  03/16/2018   • INFLUENZA VACCINE  08/01/2018   • DIABETIC EYE EXAM  05/24/2019   • PROSTATE CANCER SCREENING  08/30/2019   • LIPID PANEL  08/30/2019   • COLONOSCOPY  05/10/2023   • TDAP/TD VACCINES (2 - Td) 03/16/2027   • PNEUMOCOCCAL VACCINES (65+ LOW/MEDIUM RISK)  Completed        Subjective   History of Present Illness    Branden Diop is a 67 y.o. male who presents for an Subsequent Wellness Visit.    The following portions of the patient's history were reviewed and updated as appropriate: allergies, current medications, past family history, past medical history, past social history, past surgical history and problem list.    Outpatient Medications Prior to Visit   Medication Sig Dispense Refill   • atorvastatin (LIPITOR) 40 MG tablet TAKE ONE TABLET BY MOUTH DAILY 30 tablet 2   • cyclobenzaprine (FLEXERIL) 10 MG tablet Take 1 tablet by mouth 3 (Three) Times a Day As Needed for Muscle Spasms. Do not drive 30 tablet 3   • Multiple Vitamin (MULTIVITAMINS PO) Take  by mouth.     • nabumetone (RELAFEN) 500 MG tablet Take 1 tablet by mouth 2 (Two) Times a Day As Needed for Moderate Pain . 60 tablet 3   • sertraline (ZOLOFT) 50 MG tablet Take 1 tablet by mouth Daily. 90 tablet 1   • vitamin D (ERGOCALCIFEROL) 08779 units capsule capsule 1 po 2x per week  "(Patient taking differently: 50,000 Units Every 7 (Seven) Days. 1 po 2x per week) 30 capsule 2   • oxyCODONE-acetaminophen (PERCOCET) 5-325 MG per tablet Take 1-2 tablets by mouth Every 4 (Four) Hours As Needed (1-2 tabs prn pain, Max 12 tabs/day) for up to 30 doses. 30 tablet 0   • HYDROcodone-acetaminophen (NORCO) 5-325 MG per tablet        No facility-administered medications prior to visit.        Patient Active Problem List   Diagnosis   • A-fib (CMS/[facility])   • BP (high blood pressure)   • Atrioventricular block, Mobitz type 1, Wenckebach   • Apnea, sleep   • Obesity   • Streptococcus pneumoniae   • Sleep apnea with use of continuous positive airway pressure (CPAP)   • Sinusitis   • Right wrist pain   • Pneumonia   • Hyperlipidemia   • Hammertoe   • Depression   • COPD (chronic obstructive pulmonary disease) (CMS/[facility])   • Colon polyps   • Anxiety   • Pruritus   • Atrial flutter (CMS/[facility])   • Cholelithiasis   • Fatty liver       Advance Care Planning:  has NO advance directive - not interested in additional information    Identification of Risk Factors:  Risk factors include: NA.    Review of Systems    Compared to one year ago, the patient feels his physical health is better.  Compared to one year ago, the patient feels his mental health is better.    Objective     Physical Exam    Vitals:    09/07/18 0813   BP: 132/98   BP Location: Left arm   Patient Position: Sitting   Cuff Size: Large Adult   Pulse: 94   Resp: 16   Temp: 98 °F (36.7 °C)   TempSrc: Oral   SpO2: 95%   Weight: 125 kg (275 lb)   Height: 182.9 cm (72.01\")   PainSc: 0-No pain       Patient's Body mass index is 37.29 kg/m². BMI is above normal parameters. Recommendations include: exercise counseling and nutrition counseling.      Assessment/Plan   Patient Self-Management and Personalized Health Advice  The patient has been provided with information about: diet and preventive services including:   · Advance directive.    Visit Diagnoses:    ICD-10-CM " ICD-9-CM   1. Chronic atrial fibrillation (CMS/[facility]) I48.2 427.31   2. Mixed hyperlipidemia E78.2 272.2   3. Chronic obstructive pulmonary disease, unspecified COPD type (CMS/[facility]) J44.9 496       No orders of the defined types were placed in this encounter.      Outpatient Encounter Prescriptions as of 9/7/2018   Medication Sig Dispense Refill   • atorvastatin (LIPITOR) 40 MG tablet TAKE ONE TABLET BY MOUTH DAILY 30 tablet 2   • cyclobenzaprine (FLEXERIL) 10 MG tablet Take 1 tablet by mouth 3 (Three) Times a Day As Needed for Muscle Spasms. Do not drive 30 tablet 3   • Multiple Vitamin (MULTIVITAMINS PO) Take  by mouth.     • nabumetone (RELAFEN) 500 MG tablet Take 1 tablet by mouth 2 (Two) Times a Day As Needed for Moderate Pain . 60 tablet 3   • sertraline (ZOLOFT) 50 MG tablet Take 1 tablet by mouth Daily. 90 tablet 1   • vitamin D (ERGOCALCIFEROL) 32293 units capsule capsule 1 po 2x per week (Patient taking differently: 50,000 Units Every 7 (Seven) Days. 1 po 2x per week) 30 capsule 2   • oxyCODONE-acetaminophen (PERCOCET) 5-325 MG per tablet Take 1-2 tablets by mouth Every 4 (Four) Hours As Needed (1-2 tabs prn pain, Max 12 tabs/day) for up to 30 doses. 30 tablet 0   • [DISCONTINUED] HYDROcodone-acetaminophen (NORCO) 5-325 MG per tablet        No facility-administered encounter medications on file as of 9/7/2018.        Reviewed use of high risk medication in the elderly: not applicable  Reviewed for potential of harmful drug interactions in the elderly: not applicable    Follow Up:  Return in about 6 months (around 3/7/2019), or if symptoms worsen or fail to improve, for Recheck.     An After Visit Summary and PPPS with all of these plans were given to the patient.

## 2018-09-21 ENCOUNTER — TELEPHONE (OUTPATIENT)
Dept: FAMILY MEDICINE CLINIC | Facility: CLINIC | Age: 68
End: 2018-09-21

## 2018-09-21 ENCOUNTER — PREP FOR SURGERY (OUTPATIENT)
Dept: OTHER | Facility: HOSPITAL | Age: 68
End: 2018-09-21

## 2018-09-21 DIAGNOSIS — Z86.010 HISTORY OF COLONIC POLYPS: Primary | ICD-10-CM

## 2018-09-24 ENCOUNTER — TELEPHONE (OUTPATIENT)
Dept: FAMILY MEDICINE CLINIC | Facility: CLINIC | Age: 68
End: 2018-09-24

## 2018-10-09 ENCOUNTER — EPISODE CHANGES (OUTPATIENT)
Dept: CASE MANAGEMENT | Facility: OTHER | Age: 68
End: 2018-10-09

## 2018-10-16 ENCOUNTER — TELEPHONE (OUTPATIENT)
Dept: FAMILY MEDICINE CLINIC | Facility: CLINIC | Age: 68
End: 2018-10-16

## 2018-10-16 DIAGNOSIS — Z12.11 COLON CANCER SCREENING: Primary | ICD-10-CM

## 2018-10-16 NOTE — TELEPHONE ENCOUNTER
PT'S WIFE STATES THAT DR. LEARY WANTED HIM TO HAVE A COLONOSCOPY DONE AND PUT THE REFERRAL IN FOR IT ALMOST TWO MONTHS AGO. SHE STATES THAT DR. BRAXTON'S OFFICE IS IS JUST NOW CALLING THEM AND THEY STILL AREN'T SURE HOW LONG IT WILL BE BEFORE HE HAS THE ACTUAL PROCEDURE DONE.  SHE WANTS TO KNOW IF THERE IS ANY WAY THAT DR. LEARY COULD REFER HIM TO ANOTHER GASTROENTEROLOGIST OFFICE THAT MAY BE ABLE TO GET HIM IN FOR THIS PROCEDURE SOONER?

## 2018-10-31 ENCOUNTER — OUTSIDE FACILITY SERVICE (OUTPATIENT)
Dept: GASTROENTEROLOGY | Facility: CLINIC | Age: 68
End: 2018-10-31

## 2018-10-31 PROCEDURE — 45385 COLONOSCOPY W/LESION REMOVAL: CPT | Performed by: INTERNAL MEDICINE

## 2018-10-31 PROCEDURE — 45381 COLONOSCOPY SUBMUCOUS NJX: CPT | Performed by: INTERNAL MEDICINE

## 2018-11-01 ENCOUNTER — TELEPHONE (OUTPATIENT)
Dept: FAMILY MEDICINE CLINIC | Facility: CLINIC | Age: 68
End: 2018-11-01

## 2018-11-01 RX ORDER — ATORVASTATIN CALCIUM 40 MG/1
TABLET, FILM COATED ORAL
Qty: 30 TABLET | Refills: 1 | Status: SHIPPED | OUTPATIENT
Start: 2018-11-01 | End: 2019-03-07

## 2018-11-05 ENCOUNTER — PREP FOR SURGERY (OUTPATIENT)
Dept: OTHER | Facility: HOSPITAL | Age: 68
End: 2018-11-05

## 2018-11-05 DIAGNOSIS — D12.6 ADENOMATOUS POLYP OF COLON, UNSPECIFIED PART OF COLON: Primary | ICD-10-CM

## 2018-11-09 ENCOUNTER — TELEPHONE (OUTPATIENT)
Dept: GASTROENTEROLOGY | Facility: CLINIC | Age: 68
End: 2018-11-09

## 2018-11-09 NOTE — TELEPHONE ENCOUNTER
Patient called, spoke to his wife Luba. Advised as per Dr. Lopez's note. She verb understanding and will relay message to the patient. She requests an early notification(schedule date notification) so she can close her shop to drive the patient to [place].

## 2018-11-09 NOTE — TELEPHONE ENCOUNTER
----- Message from Cj oLpez MD sent at 11/5/2018  4:53 PM EST -----  Sessile serrated  adenomas, no evidence of cancer, schedule colonoscopy in 6 months at [facility] case request is in

## 2018-12-18 NOTE — TELEPHONE ENCOUNTER
PATIENT IS TAKING atorvastatin (LIPITOR) 40 MG tablet AND IT MAKES HIM FEEL TERRIBLE AND DRAGGY. PATIENT HASN'T TAKEN THE MEDICATION IN 2 DAYS AND PATIENT FEEL GOOD.     SINCE LABS WAS GOOD DOES PATIENT NEED TO STILL TAKE THE MEDICATION AND IF SO CAN SOMETHING ELSE BE CALLED INTO PHARMACY    stable to discharge home

## 2019-01-28 RX ORDER — ERGOCALCIFEROL 1.25 MG/1
CAPSULE ORAL
Qty: 30 CAPSULE | Refills: 1 | Status: SHIPPED | OUTPATIENT
Start: 2019-01-28 | End: 2019-03-07

## 2019-03-07 ENCOUNTER — OFFICE VISIT (OUTPATIENT)
Dept: FAMILY MEDICINE CLINIC | Facility: CLINIC | Age: 69
End: 2019-03-07

## 2019-03-07 VITALS
OXYGEN SATURATION: 93 % | SYSTOLIC BLOOD PRESSURE: 132 MMHG | BODY MASS INDEX: 36.24 KG/M2 | HEIGHT: 72 IN | WEIGHT: 267.6 LBS | HEART RATE: 80 BPM | DIASTOLIC BLOOD PRESSURE: 76 MMHG | TEMPERATURE: 97.9 F

## 2019-03-07 DIAGNOSIS — Z12.5 ENCOUNTER FOR SCREENING FOR MALIGNANT NEOPLASM OF PROSTATE: ICD-10-CM

## 2019-03-07 DIAGNOSIS — J44.9 CHRONIC OBSTRUCTIVE PULMONARY DISEASE, UNSPECIFIED COPD TYPE (HCC): ICD-10-CM

## 2019-03-07 DIAGNOSIS — E78.2 MIXED HYPERLIPIDEMIA: Primary | ICD-10-CM

## 2019-03-07 DIAGNOSIS — E55.9 VITAMIN D DEFICIENCY: ICD-10-CM

## 2019-03-07 DIAGNOSIS — R79.9 ABNORMAL FINDING OF BLOOD CHEMISTRY: ICD-10-CM

## 2019-03-07 LAB
25(OH)D3 SERPL-MCNC: 24.5 NG/ML (ref 30–100)
ALBUMIN SERPL-MCNC: 4.4 G/DL (ref 3.5–5.2)
ALBUMIN/GLOB SERPL: 1.3 G/DL
ALP SERPL-CCNC: 83 U/L (ref 39–117)
ALT SERPL W P-5'-P-CCNC: 22 U/L (ref 1–41)
ANION GAP SERPL CALCULATED.3IONS-SCNC: 10.4 MMOL/L
AST SERPL-CCNC: 19 U/L (ref 1–40)
BILIRUB SERPL-MCNC: 0.5 MG/DL (ref 0.1–1.2)
BUN BLD-MCNC: 12 MG/DL (ref 8–23)
BUN/CREAT SERPL: 16 (ref 7–25)
CALCIUM SPEC-SCNC: 10.1 MG/DL (ref 8.6–10.5)
CHLORIDE SERPL-SCNC: 98 MMOL/L (ref 98–107)
CHOLEST SERPL-MCNC: 264 MG/DL (ref 0–200)
CO2 SERPL-SCNC: 27.6 MMOL/L (ref 22–29)
CREAT BLD-MCNC: 0.75 MG/DL (ref 0.76–1.27)
ERYTHROCYTE [DISTWIDTH] IN BLOOD BY AUTOMATED COUNT: 14.5 % (ref 12.3–15.4)
GFR SERPL CREATININE-BSD FRML MDRD: 103 ML/MIN/1.73
GLOBULIN UR ELPH-MCNC: 3.4 GM/DL
GLUCOSE BLD-MCNC: 178 MG/DL (ref 65–99)
HBA1C MFR BLD: 10.03 % (ref 4.8–5.6)
HCT VFR BLD AUTO: 48.1 % (ref 37.5–51)
HDLC SERPL-MCNC: 40 MG/DL (ref 40–60)
HGB BLD-MCNC: 15.6 G/DL (ref 13–17.7)
LDLC SERPL CALC-MCNC: 178 MG/DL (ref 0–100)
LDLC/HDLC SERPL: 4.46 {RATIO}
LYMPHOCYTES # BLD AUTO: 1.8 10*3/MM3 (ref 0.7–3.1)
LYMPHOCYTES NFR BLD AUTO: 23.7 % (ref 19.6–45.3)
MCH RBC QN AUTO: 28.6 PG (ref 26.6–33)
MCHC RBC AUTO-ENTMCNC: 32.3 G/DL (ref 31.5–35.7)
MCV RBC AUTO: 88.4 FL (ref 79–97)
MONOCYTES # BLD AUTO: 0.2 10*3/MM3 (ref 0.1–0.9)
MONOCYTES NFR BLD AUTO: 2.6 % (ref 5–12)
NEUTROPHILS # BLD AUTO: 5.5 10*3/MM3 (ref 1.4–7)
NEUTROPHILS NFR BLD AUTO: 73.7 % (ref 42.7–76)
PLATELET # BLD AUTO: 330 10*3/MM3 (ref 140–450)
PMV BLD AUTO: 7.4 FL (ref 6–12)
POTASSIUM BLD-SCNC: 5.2 MMOL/L (ref 3.5–5.2)
PROT SERPL-MCNC: 7.8 G/DL (ref 6–8.5)
RBC # BLD AUTO: 5.44 10*6/MM3 (ref 4.14–5.8)
SODIUM BLD-SCNC: 136 MMOL/L (ref 136–145)
TRIGL SERPL-MCNC: 229 MG/DL (ref 0–150)
VLDLC SERPL-MCNC: 45.8 MG/DL (ref 5–40)
WBC NRBC COR # BLD: 7.4 10*3/MM3 (ref 3.4–10.8)

## 2019-03-07 PROCEDURE — 80061 LIPID PANEL: CPT | Performed by: INTERNAL MEDICINE

## 2019-03-07 PROCEDURE — 85025 COMPLETE CBC W/AUTO DIFF WBC: CPT | Performed by: INTERNAL MEDICINE

## 2019-03-07 PROCEDURE — 99214 OFFICE O/P EST MOD 30 MIN: CPT | Performed by: INTERNAL MEDICINE

## 2019-03-07 PROCEDURE — 83036 HEMOGLOBIN GLYCOSYLATED A1C: CPT | Performed by: INTERNAL MEDICINE

## 2019-03-07 PROCEDURE — 82306 VITAMIN D 25 HYDROXY: CPT | Performed by: INTERNAL MEDICINE

## 2019-03-07 PROCEDURE — 80053 COMPREHEN METABOLIC PANEL: CPT | Performed by: INTERNAL MEDICINE

## 2019-03-07 PROCEDURE — 36415 COLL VENOUS BLD VENIPUNCTURE: CPT | Performed by: INTERNAL MEDICINE

## 2019-03-07 NOTE — PROGRESS NOTES
Subjective   Branden Diop is a 68 y.o. male.     History of Present Illness   Patient was seen for COPD.  Patient has been controlled over the past several months.  Patient's vitamin D level will be assessed to lab work.  Lipids have been controlled with diet and exercise we will follow-up on lab work in 4 days.    Dictated utilizing Dragon dictation. If there are questions or for further clarification, please contact me.  The following portions of the patient's history were reviewed and updated as appropriate: allergies, current medications, past family history, past medical history, past social history, past surgical history and problem list.    Review of Systems   Constitutional: Negative for fatigue and fever.   HENT: Positive for congestion. Negative for trouble swallowing.    Eyes: Negative for discharge and visual disturbance.   Respiratory: Negative for choking and shortness of breath.    Cardiovascular: Negative for chest pain and palpitations.   Gastrointestinal: Negative for abdominal pain and blood in stool.   Endocrine: Negative.    Genitourinary: Negative for genital sores and hematuria.   Musculoskeletal: Negative for gait problem and joint swelling.   Skin: Negative for color change, pallor, rash and wound.   Allergic/Immunologic: Positive for environmental allergies. Negative for immunocompromised state.   Neurological: Negative for facial asymmetry and speech difficulty.   Psychiatric/Behavioral: Negative for hallucinations and suicidal ideas.       Objective   Physical Exam   Constitutional: He is oriented to person, place, and time. He appears well-developed and well-nourished.   HENT:   Head: Normocephalic.   Eyes: Conjunctivae are normal. Pupils are equal, round, and reactive to light.   Neck: Normal range of motion. Neck supple.   Cardiovascular: Normal rate, regular rhythm and normal heart sounds.   Pulmonary/Chest: Effort normal and breath sounds normal.   Abdominal: Soft. Bowel sounds are  normal.   Musculoskeletal: Normal range of motion.   Neurological: He is alert and oriented to person, place, and time.   Skin: Skin is warm and dry.   Psychiatric: He has a normal mood and affect. His behavior is normal. Judgment and thought content normal.   Nursing note and vitals reviewed.      Assessment/Plan   Problems Addressed this Visit        Cardiovascular and Mediastinum    Hyperlipidemia - Primary    Relevant Orders    CBC Auto Differential (Completed)       Respiratory    COPD (chronic obstructive pulmonary disease) (CMS/HCC)    Relevant Orders    CBC Auto Differential (Completed)      Other Visit Diagnoses     Abnormal finding of blood chemistry         Relevant Orders    Hemoglobin A1c (Completed)    Encounter for screening for malignant neoplasm of prostate         Vitamin D deficiency         Relevant Orders    Vitamin D 25 Hydroxy (Completed)

## 2019-03-12 ENCOUNTER — LAB (OUTPATIENT)
Dept: FAMILY MEDICINE CLINIC | Facility: CLINIC | Age: 69
End: 2019-03-12

## 2019-03-12 PROCEDURE — 36415 COLL VENOUS BLD VENIPUNCTURE: CPT | Performed by: INTERNAL MEDICINE

## 2019-03-13 LAB — HSV1 IGG SER IA-ACNC: 12.5 INDEX (ref 0–0.9)

## 2019-03-15 RX ORDER — VALACYCLOVIR HYDROCHLORIDE 1 G/1
TABLET, FILM COATED ORAL
Qty: 21 TABLET | Refills: 1 | Status: SHIPPED | OUTPATIENT
Start: 2019-03-15 | End: 2020-03-19 | Stop reason: SDUPTHER

## 2019-04-25 ENCOUNTER — OFFICE VISIT (OUTPATIENT)
Dept: FAMILY MEDICINE CLINIC | Facility: CLINIC | Age: 69
End: 2019-04-25

## 2019-04-25 VITALS
HEIGHT: 72 IN | DIASTOLIC BLOOD PRESSURE: 78 MMHG | BODY MASS INDEX: 37.06 KG/M2 | SYSTOLIC BLOOD PRESSURE: 142 MMHG | OXYGEN SATURATION: 95 % | WEIGHT: 273.6 LBS | TEMPERATURE: 98.2 F | HEART RATE: 82 BPM

## 2019-04-25 DIAGNOSIS — E11.65 UNCONTROLLED TYPE 2 DIABETES MELLITUS WITH HYPERGLYCEMIA (HCC): Primary | ICD-10-CM

## 2019-04-25 DIAGNOSIS — E78.1 PURE HYPERGLYCERIDEMIA: ICD-10-CM

## 2019-04-25 DIAGNOSIS — I48.92 ATRIAL FLUTTER, UNSPECIFIED TYPE (HCC): ICD-10-CM

## 2019-04-25 PROCEDURE — 99214 OFFICE O/P EST MOD 30 MIN: CPT | Performed by: INTERNAL MEDICINE

## 2019-04-25 RX ORDER — ATORVASTATIN CALCIUM 40 MG/1
40 TABLET, FILM COATED ORAL DAILY
Qty: 90 TABLET | Refills: 1 | Status: SHIPPED | OUTPATIENT
Start: 2019-04-25 | End: 2019-11-09 | Stop reason: SDUPTHER

## 2019-04-25 RX ORDER — BLOOD-GLUCOSE METER
1 KIT MISCELLANEOUS AS NEEDED
Qty: 1 EACH | Refills: 1 | Status: SHIPPED | OUTPATIENT
Start: 2019-04-25 | End: 2020-05-29 | Stop reason: SDUPTHER

## 2019-04-25 NOTE — PATIENT INSTRUCTIONS
Diabetes Mellitus and Nutrition, Adult  When you have diabetes (diabetes mellitus), it is very important to have healthy eating habits because your blood sugar (glucose) levels are greatly affected by what you eat and drink. Eating healthy foods in the appropriate amounts, at about the same times every day, can help you:  · Control your blood glucose.  · Lower your risk of heart disease.  · Improve your blood pressure.  · Reach or maintain a healthy weight.    Every person with diabetes is different, and each person has different needs for a meal plan. Your health care provider may recommend that you work with a diet and nutrition specialist (dietitian) to make a meal plan that is best for you. Your meal plan may vary depending on factors such as:  · The calories you need.  · The medicines you take.  · Your weight.  · Your blood glucose, blood pressure, and cholesterol levels.  · Your activity level.  · Other health conditions you have, such as heart or kidney disease.    How do carbohydrates affect me?  Carbohydrates, also called carbs, affect your blood glucose level more than any other type of food. Eating carbs naturally raises the amount of glucose in your blood. Carb counting is a method for keeping track of how many carbs you eat. Counting carbs is important to keep your blood glucose at a healthy level, especially if you use insulin or take certain oral diabetes medicines.  It is important to know how many carbs you can safely have in each meal. This is different for every person. Your dietitian can help you calculate how many carbs you should have at each meal and for each snack.  Foods that contain carbs include:  · Bread, cereal, rice, pasta, and crackers.  · Potatoes and corn.  · Peas, beans, and lentils.  · Milk and yogurt.  · Fruit and juice.  · Desserts, such as cakes, cookies, ice cream, and candy.    How does alcohol affect me?  Alcohol can cause a sudden decrease in blood glucose (hypoglycemia),  "especially if you use insulin or take certain oral diabetes medicines. Hypoglycemia can be a life-threatening condition. Symptoms of hypoglycemia (sleepiness, dizziness, and confusion) are similar to symptoms of having too much alcohol.  If your health care provider says that alcohol is safe for you, follow these guidelines:  · Limit alcohol intake to no more than 1 drink per day for nonpregnant women and 2 drinks per day for men. One drink equals 12 oz of beer, 5 oz of wine, or 1½ oz of hard liquor.  · Do not drink on an empty stomach.  · Keep yourself hydrated with water, diet soda, or unsweetened iced tea.  · Keep in mind that regular soda, juice, and other mixers may contain a lot of sugar and must be counted as carbs.    What are tips for following this plan?  Reading food labels  · Start by checking the serving size on the \"Nutrition Facts\" label of packaged foods and drinks. The amount of calories, carbs, fats, and other nutrients listed on the label is based on one serving of the item. Many items contain more than one serving per package.  · Check the total grams (g) of carbs in one serving. You can calculate the number of servings of carbs in one serving by dividing the total carbs by 15. For example, if a food has 30 g of total carbs, it would be equal to 2 servings of carbs.  · Check the number of grams (g) of saturated and trans fats in one serving. Choose foods that have low or no amount of these fats.  · Check the number of milligrams (mg) of salt (sodium) in one serving. Most people should limit total sodium intake to less than 2,300 mg per day.  · Always check the nutrition information of foods labeled as \"low-fat\" or \"nonfat\". These foods may be higher in added sugar or refined carbs and should be avoided.  · Talk to your dietitian to identify your daily goals for nutrients listed on the label.  Shopping    · Avoid buying canned, premade, or processed foods. These foods tend to be high in fat, " sodium, and added sugar.  · Shop around the outside edge of the grocery store. This includes fresh fruits and vegetables, bulk grains, fresh meats, and fresh dairy.  Cooking  · Use low-heat cooking methods, such as baking, instead of high-heat cooking methods like deep frying.  · Cook using healthy oils, such as olive, canola, or sunflower oil.  · Avoid cooking with butter, cream, or high-fat meats.  Meal planning  · Eat meals and snacks regularly, preferably at the same times every day. Avoid going long periods of time without eating.  · Eat foods high in fiber, such as fresh fruits, vegetables, beans, and whole grains. Talk to your dietitian about how many servings of carbs you can eat at each meal.  · Eat 4-6 ounces (oz) of lean protein each day, such as lean meat, chicken, fish, eggs, or tofu. One oz of lean protein is equal to:  ? 1 oz of meat, chicken, or fish.  ? 1 egg.  ? ¼ cup of tofu.  · Eat some foods each day that contain healthy fats, such as avocado, nuts, seeds, and fish.  Lifestyle  · Check your blood glucose regularly.  · Exercise regularly as told by your health care provider. This may include:  ? 150 minutes of moderate-intensity or vigorous-intensity exercise each week. This could be brisk walking, biking, or water aerobics.  ? Stretching and doing strength exercises, such as yoga or weightlifting, at least 2 times a week.  · Take medicines as told by your health care provider.  · Do not use any products that contain nicotine or tobacco, such as cigarettes and e-cigarettes. If you need help quitting, ask your health care provider.  · Work with a counselor or diabetes educator to identify strategies to manage stress and any emotional and social challenges.  Questions to ask a health care provider  · Do I need to meet with a diabetes educator?  · Do I need to meet with a dietitian?  · What number can I call if I have questions?  · When are the best times to check my blood glucose?  Where to find  more information:  · American Diabetes Association: diabetes.org  · Academy of Nutrition and Dietetics: www.eatright.org  · National [place] of Diabetes and Digestive and Kidney Diseases (NIH): www.niddk.nih.gov  Summary  · A healthy meal plan will help you control your blood glucose and maintain a healthy lifestyle.  · Working with a diet and nutrition specialist (dietitian) can help you make a meal plan that is best for you.  · Keep in mind that carbohydrates (carbs) and alcohol have immediate effects on your blood glucose levels. It is important to count carbs and to use alcohol carefully.  This information is not intended to replace advice given to you by your health care provider. Make sure you discuss any questions you have with your health care provider.  Document Released: 09/14/2006 Document Revised: 07/18/2018 Document Reviewed: 01/22/2018  [employer] Patient Education © 2019 [employer]

## 2019-04-25 NOTE — PROGRESS NOTES
Subjective   Branden Diop is a 68 y.o. male.     History of Present Illness   Patient was seen for diabetes.  A1c was 10% patient was put on diet and exercise and asked to return in 1 month.  He was given a glucometer with Accu strips.  His LDL was also 170 was placed on a statin.  Patient's atrial flutter is being followed by his cardiologist is been stable the past several months.    Dictated utilizing Dragon dictation. If there are questions or for further clarification, please contact me.  The following portions of the patient's history were reviewed and updated as appropriate: allergies, current medications, past family history, past medical history, past social history, past surgical history and problem list.    Review of Systems   Constitutional: Negative for fatigue and fever.   HENT: Positive for congestion. Negative for trouble swallowing.    Eyes: Negative for discharge and visual disturbance.   Respiratory: Negative for choking and shortness of breath.    Cardiovascular: Negative for chest pain and palpitations.   Gastrointestinal: Negative for abdominal pain and blood in stool.   Endocrine: Negative.    Genitourinary: Negative for genital sores and hematuria.   Musculoskeletal: Negative for gait problem and joint swelling.   Skin: Negative for color change, pallor, rash and wound.   Allergic/Immunologic: Positive for environmental allergies. Negative for immunocompromised state.   Neurological: Negative for facial asymmetry and speech difficulty.   Psychiatric/Behavioral: Negative for hallucinations and suicidal ideas.       Objective   Physical Exam   Constitutional: He is oriented to person, place, and time. He appears well-developed and well-nourished.   HENT:   Head: Normocephalic.   Eyes: Conjunctivae are normal. Pupils are equal, round, and reactive to light.   Neck: Normal range of motion. Neck supple.   Cardiovascular: Normal rate and normal heart sounds.   Pulmonary/Chest: Effort normal and  breath sounds normal.   Abdominal: Soft. Bowel sounds are normal.   Musculoskeletal: Normal range of motion.   Neurological: He is alert and oriented to person, place, and time.   Skin: Skin is warm and dry.   Psychiatric: He has a normal mood and affect. His behavior is normal. Judgment and thought content normal.   Nursing note and vitals reviewed.      Assessment/Plan   Problems Addressed this Visit        Cardiovascular and Mediastinum    Hyperlipidemia    Relevant Medications    atorvastatin (LIPITOR) 40 MG tablet    Atrial flutter (CMS/HCC)       Endocrine    Uncontrolled type 2 diabetes mellitus with hyperglycemia (CMS/HCC) - Primary

## 2019-05-24 ENCOUNTER — OFFICE VISIT (OUTPATIENT)
Dept: FAMILY MEDICINE CLINIC | Facility: CLINIC | Age: 69
End: 2019-05-24

## 2019-05-24 VITALS
TEMPERATURE: 98.3 F | HEART RATE: 81 BPM | OXYGEN SATURATION: 96 % | DIASTOLIC BLOOD PRESSURE: 74 MMHG | SYSTOLIC BLOOD PRESSURE: 118 MMHG | HEIGHT: 72 IN | BODY MASS INDEX: 35.62 KG/M2 | WEIGHT: 263 LBS

## 2019-05-24 DIAGNOSIS — I10 ESSENTIAL HYPERTENSION: Primary | ICD-10-CM

## 2019-05-24 DIAGNOSIS — J42 CHRONIC BRONCHITIS, UNSPECIFIED CHRONIC BRONCHITIS TYPE (HCC): ICD-10-CM

## 2019-05-24 DIAGNOSIS — I48.0 PAROXYSMAL ATRIAL FIBRILLATION (HCC): ICD-10-CM

## 2019-05-24 DIAGNOSIS — E11.65 UNCONTROLLED TYPE 2 DIABETES MELLITUS WITH HYPERGLYCEMIA (HCC): ICD-10-CM

## 2019-05-24 PROCEDURE — 99214 OFFICE O/P EST MOD 30 MIN: CPT | Performed by: INTERNAL MEDICINE

## 2019-05-24 NOTE — PROGRESS NOTES
Subjective   Branden Diop is a 68 y.o. male.     History of Present Illness   Patient was seen for hypertension.  Blood pressure been running 130s over 80s.  Patient does have paroxysmal atrial fibrillation and is being seen by cardiology he does have chronic bronchitis controlled with his bronchodilators.  Patient's blood sugars also been running in the 130s.    Dictated utilizing Dragon dictation. If there are questions or for further clarification, please contact me.  The following portions of the patient's history were reviewed and updated as appropriate: allergies, current medications, past family history, past medical history, past social history, past surgical history and problem list.    Review of Systems   Constitutional: Negative for fatigue and fever.   HENT: Positive for congestion. Negative for trouble swallowing.    Eyes: Negative for discharge and visual disturbance.   Respiratory: Negative for choking and shortness of breath.    Cardiovascular: Negative for chest pain and palpitations.   Gastrointestinal: Negative for abdominal pain and blood in stool.   Endocrine: Negative.    Genitourinary: Negative for genital sores and hematuria.   Musculoskeletal: Negative for gait problem and joint swelling.   Skin: Negative for color change, pallor, rash and wound.   Allergic/Immunologic: Positive for environmental allergies. Negative for immunocompromised state.   Neurological: Negative for facial asymmetry and speech difficulty.   Psychiatric/Behavioral: Negative for hallucinations and suicidal ideas.       Objective   Physical Exam   Constitutional: He is oriented to person, place, and time. He appears well-developed and well-nourished.   HENT:   Head: Normocephalic.   Eyes: Conjunctivae are normal. Pupils are equal, round, and reactive to light.   Neck: Normal range of motion. Neck supple.   Cardiovascular: Normal rate, regular rhythm and normal heart sounds.   Pulmonary/Chest: Effort normal and breath  sounds normal.   Abdominal: Soft. Bowel sounds are normal.   Musculoskeletal: Normal range of motion.   Neurological: He is alert and oriented to person, place, and time.   Skin: Skin is warm and dry.   Psychiatric: He has a normal mood and affect. His behavior is normal. Judgment and thought content normal.   Nursing note and vitals reviewed.      Assessment/Plan   Problems Addressed this Visit        Cardiovascular and Mediastinum    A-fib (CMS/[facility])    Essential hypertension - Primary       Respiratory    COPD (chronic obstructive pulmonary disease) (CMS/[facility])       Endocrine    Uncontrolled type 2 diabetes mellitus with hyperglycemia (CMS/[facility])

## 2019-07-08 RX ORDER — ERGOCALCIFEROL 1.25 MG/1
CAPSULE ORAL
Qty: 30 CAPSULE | Refills: 0 | Status: SHIPPED | OUTPATIENT
Start: 2019-07-08 | End: 2019-07-11 | Stop reason: SDUPTHER

## 2019-07-10 ENCOUNTER — TELEPHONE (OUTPATIENT)
Dept: FAMILY MEDICINE CLINIC | Facility: CLINIC | Age: 69
End: 2019-07-10

## 2019-07-10 NOTE — TELEPHONE ENCOUNTER
PATIENT STATED THAT VITAMIN D WAS DENIED TO BE REFILLED AND SOMETHING ELSE WAS GOING TO BE CALLED INTO PHARMACY

## 2019-07-11 RX ORDER — ERGOCALCIFEROL 1.25 MG/1
CAPSULE ORAL
Qty: 30 CAPSULE | Refills: 2 | Status: SHIPPED | OUTPATIENT
Start: 2019-07-11 | End: 2019-11-27 | Stop reason: DRUGHIGH

## 2019-11-11 RX ORDER — ATORVASTATIN CALCIUM 40 MG/1
TABLET, FILM COATED ORAL
Qty: 90 TABLET | Refills: 0 | Status: SHIPPED | OUTPATIENT
Start: 2019-11-11 | End: 2020-02-10

## 2019-11-27 ENCOUNTER — OFFICE VISIT (OUTPATIENT)
Dept: FAMILY MEDICINE CLINIC | Facility: CLINIC | Age: 69
End: 2019-11-27

## 2019-11-27 VITALS
TEMPERATURE: 97.9 F | HEART RATE: 75 BPM | HEIGHT: 72 IN | WEIGHT: 263 LBS | SYSTOLIC BLOOD PRESSURE: 110 MMHG | DIASTOLIC BLOOD PRESSURE: 60 MMHG | RESPIRATION RATE: 15 BRPM | OXYGEN SATURATION: 98 % | BODY MASS INDEX: 35.62 KG/M2

## 2019-11-27 DIAGNOSIS — Z12.5 ENCOUNTER FOR SCREENING FOR MALIGNANT NEOPLASM OF PROSTATE: ICD-10-CM

## 2019-11-27 DIAGNOSIS — Z00.00 MEDICARE ANNUAL WELLNESS VISIT, SUBSEQUENT: Primary | ICD-10-CM

## 2019-11-27 DIAGNOSIS — E78.00 PURE HYPERCHOLESTEROLEMIA: ICD-10-CM

## 2019-11-27 DIAGNOSIS — J42 CHRONIC BRONCHITIS, UNSPECIFIED CHRONIC BRONCHITIS TYPE (HCC): ICD-10-CM

## 2019-11-27 DIAGNOSIS — I48.0 PAROXYSMAL ATRIAL FIBRILLATION (HCC): ICD-10-CM

## 2019-11-27 DIAGNOSIS — I10 ESSENTIAL HYPERTENSION: ICD-10-CM

## 2019-11-27 DIAGNOSIS — E55.9 VITAMIN D DEFICIENCY, UNSPECIFIED: ICD-10-CM

## 2019-11-27 LAB
25(OH)D3 SERPL-MCNC: 50.6 NG/ML (ref 30–100)
ALBUMIN SERPL-MCNC: 4.8 G/DL (ref 3.5–5.2)
ALBUMIN/GLOB SERPL: 1.3 G/DL
ALP SERPL-CCNC: 104 U/L (ref 39–117)
ALT SERPL W P-5'-P-CCNC: 21 U/L (ref 1–41)
ANION GAP SERPL CALCULATED.3IONS-SCNC: 12.2 MMOL/L (ref 5–15)
AST SERPL-CCNC: 23 U/L (ref 1–40)
BILIRUB SERPL-MCNC: 0.8 MG/DL (ref 0.2–1.2)
BUN BLD-MCNC: 15 MG/DL (ref 8–23)
BUN/CREAT SERPL: 19 (ref 7–25)
CALCIUM SPEC-SCNC: 10 MG/DL (ref 8.6–10.5)
CHLORIDE SERPL-SCNC: 98 MMOL/L (ref 98–107)
CHOLEST SERPL-MCNC: 168 MG/DL (ref 0–200)
CO2 SERPL-SCNC: 26.8 MMOL/L (ref 22–29)
CREAT BLD-MCNC: 0.79 MG/DL (ref 0.76–1.27)
DEPRECATED RDW RBC AUTO: 46.2 FL (ref 37–54)
ERYTHROCYTE [DISTWIDTH] IN BLOOD BY AUTOMATED COUNT: 14.5 % (ref 12.3–15.4)
GFR SERPL CREATININE-BSD FRML MDRD: 97 ML/MIN/1.73
GLOBULIN UR ELPH-MCNC: 3.6 GM/DL
GLUCOSE BLD-MCNC: 135 MG/DL (ref 65–99)
HCT VFR BLD AUTO: 45.9 % (ref 37.5–51)
HDLC SERPL-MCNC: 34 MG/DL (ref 40–60)
HGB BLD-MCNC: 15.8 G/DL (ref 13–17.7)
LDLC SERPL CALC-MCNC: 101 MG/DL (ref 0–100)
LDLC/HDLC SERPL: 2.97 {RATIO}
MCH RBC QN AUTO: 30.4 PG (ref 26.6–33)
MCHC RBC AUTO-ENTMCNC: 34.4 G/DL (ref 31.5–35.7)
MCV RBC AUTO: 88.3 FL (ref 79–97)
PLATELET # BLD AUTO: 325 10*3/MM3 (ref 140–450)
PMV BLD AUTO: 9.6 FL (ref 6–12)
POTASSIUM BLD-SCNC: 4.6 MMOL/L (ref 3.5–5.2)
PROT SERPL-MCNC: 8.4 G/DL (ref 6–8.5)
PSA SERPL-MCNC: 1.55 NG/ML (ref 0–4)
RBC # BLD AUTO: 5.2 10*6/MM3 (ref 4.14–5.8)
SODIUM BLD-SCNC: 137 MMOL/L (ref 136–145)
TRIGL SERPL-MCNC: 165 MG/DL (ref 0–150)
VLDLC SERPL-MCNC: 33 MG/DL (ref 5–40)
WBC NRBC COR # BLD: 7.85 10*3/MM3 (ref 3.4–10.8)

## 2019-11-27 PROCEDURE — 80053 COMPREHEN METABOLIC PANEL: CPT | Performed by: INTERNAL MEDICINE

## 2019-11-27 PROCEDURE — 99214 OFFICE O/P EST MOD 30 MIN: CPT | Performed by: INTERNAL MEDICINE

## 2019-11-27 PROCEDURE — G0103 PSA SCREENING: HCPCS | Performed by: INTERNAL MEDICINE

## 2019-11-27 PROCEDURE — 36415 COLL VENOUS BLD VENIPUNCTURE: CPT | Performed by: INTERNAL MEDICINE

## 2019-11-27 PROCEDURE — 85027 COMPLETE CBC AUTOMATED: CPT | Performed by: INTERNAL MEDICINE

## 2019-11-27 PROCEDURE — 80061 LIPID PANEL: CPT | Performed by: INTERNAL MEDICINE

## 2019-11-27 PROCEDURE — 82306 VITAMIN D 25 HYDROXY: CPT | Performed by: INTERNAL MEDICINE

## 2019-11-27 PROCEDURE — G0439 PPPS, SUBSEQ VISIT: HCPCS | Performed by: INTERNAL MEDICINE

## 2019-11-27 RX ORDER — GLUCOSAMINE HCL 500 MG
3000 TABLET ORAL DAILY
Qty: 30 TABLET | Refills: 3 | Status: SHIPPED | OUTPATIENT
Start: 2019-11-27 | End: 2021-03-30

## 2019-11-27 NOTE — PROGRESS NOTES
Subjective   Branden Diop is a 69 y.o. male.     History of Present Illness   Patient seen for a Medicare wellness exam.  Patient was seen for paroxysmal atrial fibrillation.  Patient was taken off anticoagulants because he was thought to be in normal sinus rhythm.  His chronic bronchitis is treated with bronchodilators.  Lipids are controlled with diet and exercise statin.  Patient does have labs today continue to monitor blood pressure and continue his activity with diet.    Dictated utilizing Dragon dictation. If there are questions or for further clarification, please contact me.  The following portions of the patient's history were reviewed and updated as appropriate: allergies, current medications, past family history, past medical history, past social history, past surgical history and problem list.    Review of Systems   Constitutional: Negative for fatigue and fever.   HENT: Positive for congestion. Negative for trouble swallowing.    Eyes: Negative for discharge and visual disturbance.   Respiratory: Negative for choking and shortness of breath.    Cardiovascular: Negative for chest pain and palpitations.   Gastrointestinal: Negative for abdominal pain and blood in stool.   Endocrine: Negative.    Genitourinary: Negative for genital sores and hematuria.   Musculoskeletal: Negative for gait problem and joint swelling.   Skin: Negative for color change, pallor, rash and wound.   Allergic/Immunologic: Positive for environmental allergies. Negative for immunocompromised state.   Neurological: Negative for facial asymmetry and speech difficulty.   Psychiatric/Behavioral: Negative for hallucinations and suicidal ideas.       Objective   Physical Exam   Constitutional: He is oriented to person, place, and time. He appears well-developed and well-nourished.   HENT:   Head: Normocephalic.   Eyes: Conjunctivae are normal. Pupils are equal, round, and reactive to light.   Neck: Normal range of motion. Neck supple.    Cardiovascular: Normal rate, regular rhythm and normal heart sounds.   Pulmonary/Chest: Effort normal and breath sounds normal.   Abdominal: Soft. Bowel sounds are normal.   Musculoskeletal: Normal range of motion.   Neurological: He is alert and oriented to person, place, and time.   Skin: Skin is warm and dry.   Psychiatric: He has a normal mood and affect. His behavior is normal. Judgment and thought content normal.   Nursing note and vitals reviewed.      Assessment/Plan #1 continue present diet and activity level 2 continue to monitor blood pressure #3 labs  Branden was seen today for diabetes, labs only and other.    Diagnoses and all orders for this visit:    Medicare annual wellness visit, subsequent    Paroxysmal atrial fibrillation (CMS/HCC)  -     CBC (No Diff)  -     Comprehensive Metabolic Panel  -     Lipid Panel  -     PSA Screen  -     Vitamin D 25 Hydroxy    Pure hypercholesterolemia  -     CBC (No Diff)  -     Comprehensive Metabolic Panel  -     Lipid Panel  -     PSA Screen  -     Vitamin D 25 Hydroxy    Chronic bronchitis, unspecified chronic bronchitis type (CMS/HCC)  -     CBC (No Diff)  -     Comprehensive Metabolic Panel  -     Lipid Panel  -     PSA Screen  -     Vitamin D 25 Hydroxy    Essential hypertension  -     CBC (No Diff)  -     Comprehensive Metabolic Panel  -     Lipid Panel  -     PSA Screen  -     Vitamin D 25 Hydroxy    Encounter for screening for malignant neoplasm of prostate   -     PSA Screen    Vitamin D deficiency, unspecified   -     Vitamin D 25 Hydroxy    Other orders  -     Cholecalciferol (VITAMIN D3) 75 MCG (3000 UT) tablet; Take 3,000 Units by mouth Daily.

## 2019-11-27 NOTE — PATIENT INSTRUCTIONS
Medicare Wellness  Personal Prevention Plan of Service     Date of Office Visit:  2019  Encounter Provider:  Tino Lopez MD  Place of Service:  [facility]  Patient Name: Branden Diop  :  1950    As part of the Medicare Wellness portion of your visit today, we are providing you with this personalized preventive plan of services (PPPS). This plan is based upon recommendations of the [place] Preventive Services Task Force (USPSTF) and the Advisory Committee on Immunization Practices (ACIP).    This lists the preventive care services that should be considered, and provides dates of when you are due. Items listed as completed are up-to-date and do not require any further intervention.    Health Maintenance   Topic Date Due   • URINE MICROALBUMIN  1950   • LUNG CANCER SCREENING  10/07/2003   • AAA SCREEN (ONE-TIME)  2016   • ZOSTER VACCINE (2 of 2) 2017   • DIABETIC FOOT EXAM  2018   • COLONOSCOPY  2019   • DIABETIC EYE EXAM  2019   • PROSTATE CANCER SCREENING  2019   • HEMOGLOBIN A1C  2019   • LIPID PANEL  2020   • MEDICARE ANNUAL WELLNESS  2020   • TDAP/TD VACCINES (2 - Td) 2027   • HEPATITIS C SCREENING  Completed   • INFLUENZA VACCINE  Completed   • PNEUMOCOCCAL VACCINES (65+ LOW/MEDIUM RISK)  Completed       No orders of the defined types were placed in this encounter.      No Follow-up on file.

## 2019-11-27 NOTE — PROGRESS NOTES
Subsequent Medicare Wellness Visit   The ABC's of the Annual Wellness Visit    Chief Complaint   Patient presents with   • Diabetes   • Labs Only     pt fasting   • Other     Needs referral to dermatology his retired       HPI:  Branden Diop, -1950, is a 69 y.o. male who presents for a Subsequent Medicare Wellness Visit.    Recent Hospitalizations:  No hospitalization(s) within the last year..    Current Medical Providers:  Patient Care Team:  Tino Lopez MD as PCP - General (Internal Medicine)  Tino Lopez MD as PCP - Family Medicine  Tino Lopez MD as PCP - Claims Attributed  Mart Ureña MD as Consulting Physician (Cardiology)  Martir Trevino MD as Surgeon (General Surgery)    Health Habits and Functional and Cognitive Screening and Depression Screening:  Functional & Cognitive Status 2019   Do you have difficulty preparing food and eating? No   Do you have difficulty bathing yourself, getting dressed or grooming yourself? No   Do you have difficulty using the toilet? No   Do you have difficulty moving around from place to place? No   Do you have trouble with steps or getting out of a bed or a chair? No   Current Diet Well Balanced Diet   Dental Exam Up to date   Eye Exam Up to date   Exercise (times per week) 7 times per week   Current Exercise Activities Include Aerobics   Do you need help using the phone?  No   Are you deaf or do you have serious difficulty hearing?  No   Do you need help with transportation? No   Do you need help shopping? No   Do you need help preparing meals?  No   Do you need help with housework?  No   Do you need help with laundry? No   Do you need help taking your medications? No   Do you need help managing money? No   Do you ever drive or ride in a car without wearing a seat belt? No   Have you felt unusual stress, anger or loneliness in the last month? No   Who do you live with? Spouse   If you need help, do you have trouble finding someone  available to you? No   Have you been bothered in the last four weeks by sexual problems? No   Do you have difficulty concentrating, remembering or making decisions? No       Compared to one year ago, the patient feels his physical health is the same and his mental health is the same.    Depression Screen:  PHQ-2/PHQ-9 Depression Screening 11/27/2019   Little interest or pleasure in doing things 0   Feeling down, depressed, or hopeless 0   Trouble falling or staying asleep, or sleeping too much 0   Feeling tired or having little energy 0   Poor appetite or overeating 0   Feeling bad about yourself - or that you are a failure or have let yourself or your family down 0   Trouble concentrating on things, such as reading the newspaper or watching television 0   Moving or speaking so slowly that other people could have noticed. Or the opposite - being so fidgety or restless that you have been moving around a lot more than usual 0   Thoughts that you would be better off dead, or of hurting yourself in some way 0   Total Score 0   If you checked off any problems, how difficult have these problems made it for you to do your work, take care of things at home, or get along with other people? Not difficult at all         Past Medical/Family/Social History:  The following portions of the patient's history were reviewed and updated as appropriate: allergies, current medications, past family history, past medical history, past social history, past surgical history and problem list.    No Known Allergies      Current Outpatient Medications:   •  atorvastatin (LIPITOR) 40 MG tablet, TAKE ONE TABLET BY MOUTH DAILY, Disp: 90 tablet, Rfl: 0  •  glucose blood test strip, Daily e11.9, Disp: 100 each, Rfl: 12  •  glucose monitor monitoring kit, 1 each As Needed (daily). e11.9, Disp: 1 each, Rfl: 1  •  Multiple Vitamin (MULTIVITAMINS PO), Take  by mouth., Disp: , Rfl:   •  sertraline (ZOLOFT) 50 MG tablet, TAKE ONE TABLET BY MOUTH DAILY,  Disp: 90 tablet, Rfl: 1  •  valACYclovir (VALTREX) 1000 MG tablet, 1 p.o. 3 times daily for 1 week, Disp: 21 tablet, Rfl: 1  •  vitamin D (ERGOCALCIFEROL) 37432 units capsule capsule, Take one capsule by mouth two times per week, Disp: 30 capsule, Rfl: 2    Aspirin use counseling: Taking ASA appropriately as indicated    Current medication list contains no high risk medications.  No harmful drug interactions have been identified.     Family History   Problem Relation Age of Onset   • Cancer Other    • Stroke Mother    • Alcohol abuse Father    • Malig Hyperthermia Neg Hx        Social History     Tobacco Use   • Smoking status: Former Smoker     Packs/day: 1.00     Years: 30.00     Pack years: 30.00     Types: Cigars     Last attempt to quit: 2014     Years since quittin.9   • Smokeless tobacco: Never Used   • Tobacco comment: currently smokes 1-2 cigars a week   Substance Use Topics   • Alcohol use: No     Comment: caffeine use       Past Surgical History:   Procedure Laterality Date   • BASAL CELL CARCINOMA EXCISION     • CARDIOVERSION     • CHOLECYSTECTOMY N/A 10/7/2017    Procedure: CHOLECYSTECTOMY LAPAROSCOPIC;  Surgeon: Martir Trevino MD;  Location: [facility] OR;  Service:    • COLONOSCOPY     • FINGER/THUMB ARTHROPLASTY     • FOOT SURGERY Left    • HERNIA REPAIR     • INGUINAL HERNIA REPAIR Right 2018    Procedure: INGUINAL HERNIA REPAIR, OPEN, RECURRENT;  Surgeon: Martir Trevino MD;  Location: [facility];  Service: General   • NECK SURGERY      growth on salivary gland   • REPLACEMENT TOTAL KNEE Right     (he is going to have a LTKR next month)   • REPLACEMENT TOTAL KNEE Left        Patient Active Problem List   Diagnosis   • A-fib (CMS/HCC)   • Atrioventricular block, Mobitz type 1, Wenckebach   • Apnea, sleep   • Obesity   • Streptococcus pneumoniae   • Sleep apnea with use of continuous positive airway pressure (CPAP)   • Sinusitis   • Right wrist pain   • Pneumonia   •  "Hyperlipidemia   • Hammertoe   • Depression   • COPD (chronic obstructive pulmonary disease) (CMS/HCC)   • Colon polyps   • Anxiety   • Pruritus   • Atrial flutter (CMS/HCC)   • Cholelithiasis   • Fatty liver   • Uncontrolled type 2 diabetes mellitus with hyperglycemia (CMS/HCC)   • Essential hypertension       Review of Systems    Objective     Vitals:    11/27/19 0837   BP: 110/60   BP Location: Left arm   Patient Position: Sitting   Cuff Size: Large Adult   Pulse: 75   Resp: 15   Temp: 97.9 °F (36.6 °C)   TempSrc: Oral   SpO2: 98%   Weight: 119 kg (263 lb)   Height: 182.9 cm (72.01\")   PainSc: 0-No pain       Patient's Body mass index is 35.66 kg/m². BMI is within normal parameters. No follow-up required..      No exam data present    The patient has no evidence of cognitve impairment.     Physical Exam    Recent Lab Results:     Lab Results   Component Value Date    CHOL 264 (H) 03/07/2019    TRIG 229 (H) 03/07/2019    HDL 40 03/07/2019    VLDL 45.8 (H) 03/07/2019    LDLHDL 4.46 03/07/2019       Assessment/Plan   Age-appropriate Screening Schedule:  Refer to the list below for future screening recommendations based on patient's age, sex and/or medical conditions.      Health Maintenance   Topic Date Due   • URINE MICROALBUMIN  1950   • ZOSTER VACCINE (2 of 2) 05/11/2017   • DIABETIC FOOT EXAM  03/16/2018   • COLONOSCOPY  04/30/2019   • DIABETIC EYE EXAM  05/24/2019   • PROSTATE CANCER SCREENING  08/30/2019   • HEMOGLOBIN A1C  09/07/2019   • LIPID PANEL  03/07/2020   • TDAP/TD VACCINES (2 - Td) 03/16/2027   • INFLUENZA VACCINE  Completed   • PNEUMOCOCCAL VACCINES (65+ LOW/MEDIUM RISK)  Completed       Medicare Risks and Personalized Health Plan:  Advance Directive Discussion      CMS-Preventive Services Quick Reference  Medicare Preventive Services Addressed:  NA    Advance Care Planning:  Patient does not have an advance directive - information provided to the patient today    Diagnoses and all orders for " this visit:    1. Paroxysmal atrial fibrillation (CMS/HCC) (Primary)    2. Pure hypercholesterolemia    3. Chronic bronchitis, unspecified chronic bronchitis type (CMS/HCC)    4. Essential hypertension        An After Visit Summary and PPPS with all of these plans were given to the patient.      Follow Up:  No Follow-up on file.

## 2019-12-09 ENCOUNTER — TELEPHONE (OUTPATIENT)
Dept: FAMILY MEDICINE CLINIC | Facility: CLINIC | Age: 69
End: 2019-12-09

## 2020-02-10 RX ORDER — ATORVASTATIN CALCIUM 40 MG/1
TABLET, FILM COATED ORAL
Qty: 90 TABLET | Refills: 3 | Status: SHIPPED | OUTPATIENT
Start: 2020-02-10 | End: 2020-08-12

## 2020-03-19 ENCOUNTER — TELEPHONE (OUTPATIENT)
Dept: FAMILY MEDICINE CLINIC | Facility: CLINIC | Age: 70
End: 2020-03-19

## 2020-03-19 RX ORDER — VALACYCLOVIR HYDROCHLORIDE 1 G/1
TABLET, FILM COATED ORAL
Qty: 21 TABLET | Refills: 1 | Status: SHIPPED | OUTPATIENT
Start: 2020-03-19 | End: 2020-12-23

## 2020-03-19 NOTE — TELEPHONE ENCOUNTER
PATIENT'S WIFE CALLD AND PATIENT HAS PLACES IN HIS GUMS, DR. LEARY HAD WROTE A PRESCRIPTION FOR HERPES, IT WAS A PURPLE LOOKING PEILL. HE IS OUT OF THE MEDIICATION, AND HE THREW THE BOTTLE AWAY SO SHE DOESN  T KNOW THE NAME OF IT. BUT THE PATIENT NEEDS A REFILL ON THE MEDICATION.     PATIENT CALLBACK [phone]

## 2020-05-21 ENCOUNTER — OFFICE VISIT (OUTPATIENT)
Dept: FAMILY MEDICINE CLINIC | Facility: CLINIC | Age: 70
End: 2020-05-21

## 2020-05-21 VITALS
DIASTOLIC BLOOD PRESSURE: 80 MMHG | BODY MASS INDEX: 34.27 KG/M2 | HEIGHT: 72 IN | OXYGEN SATURATION: 97 % | WEIGHT: 253 LBS | TEMPERATURE: 97 F | RESPIRATION RATE: 20 BRPM | SYSTOLIC BLOOD PRESSURE: 120 MMHG | HEART RATE: 88 BPM

## 2020-05-21 DIAGNOSIS — E11.65 UNCONTROLLED TYPE 2 DIABETES MELLITUS WITH HYPERGLYCEMIA (HCC): ICD-10-CM

## 2020-05-21 DIAGNOSIS — Z12.5 ENCOUNTER FOR SCREENING FOR MALIGNANT NEOPLASM OF PROSTATE: ICD-10-CM

## 2020-05-21 DIAGNOSIS — E55.9 VITAMIN D DEFICIENCY, UNSPECIFIED: ICD-10-CM

## 2020-05-21 DIAGNOSIS — I10 ESSENTIAL HYPERTENSION: Primary | ICD-10-CM

## 2020-05-21 DIAGNOSIS — E78.00 PURE HYPERCHOLESTEROLEMIA: ICD-10-CM

## 2020-05-21 DIAGNOSIS — I48.0 PAROXYSMAL ATRIAL FIBRILLATION (HCC): ICD-10-CM

## 2020-05-21 PROCEDURE — 99214 OFFICE O/P EST MOD 30 MIN: CPT | Performed by: INTERNAL MEDICINE

## 2020-05-21 RX ORDER — PENICILLIN V POTASSIUM 500 MG/1
500 TABLET ORAL 4 TIMES DAILY
COMMUNITY
End: 2020-12-23 | Stop reason: CLARIF

## 2020-05-21 NOTE — PROGRESS NOTES
Subjective   Branden Diop is a 69 y.o. male.     History of Present Illness   Patient was seen for hypertension.  Patient's blood pressures been running 120s over 80s.  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  Patient's cholesterol treated with diet exercise and a statin.  Triglycerides 165, HDL 34, .  Patient did have labs today and will follow-up in 4 days.  Patient's blood sugar has been running in the 120s at home.  Patient did have a hemoglobin A1c drawn today.  Patient has a history of paroxysmal atrial fibrillation.  Patient's had a recent Holter monitor and after cardiology obtained the   Results his Eliquis was stopped.  Patient will continue monitoring his blood pressure blood sugar at home.  He will continue present diet and activity levels.  Patient will follow-up in 6 months if labs are normal.    The following portions of the patient's history were reviewed and updated as appropriate: allergies, current medications, past family history, past medical history, past social history, past surgical history and problem list.    Review of Systems   Constitutional: Negative for fatigue and fever.   HENT: Positive for congestion. Negative for trouble swallowing.    Eyes: Negative for discharge and visual disturbance.   Respiratory: Negative for choking and shortness of breath.    Cardiovascular: Negative for chest pain and palpitations.   Gastrointestinal: Negative for abdominal pain and blood in stool.   Endocrine: Negative.    Genitourinary: Negative for genital sores and hematuria.   Musculoskeletal: Negative for gait problem and joint swelling.   Skin: Negative for color change, pallor, rash and wound.   Allergic/Immunologic: Positive for environmental allergies. Negative for immunocompromised state.   Neurological: Negative for facial asymmetry and speech difficulty.   Psychiatric/Behavioral: Negative for hallucinations and suicidal ideas.       Objective   Physical  Exam   Constitutional: He is oriented to person, place, and time. He appears well-developed and well-nourished.   HENT:   Head: Normocephalic and atraumatic.   Eyes: Pupils are equal, round, and reactive to light. Conjunctivae and EOM are normal.   Neck: Normal range of motion. Neck supple.   Cardiovascular: Normal rate, regular rhythm and normal heart sounds. Exam reveals no gallop and no friction rub.   No murmur heard.  Pulmonary/Chest: Effort normal and breath sounds normal. No stridor. No respiratory distress. He has no wheezes. He has no rales. He exhibits no tenderness.   Abdominal: Soft. Bowel sounds are normal.   Musculoskeletal: Normal range of motion.   Neurological: He is alert and oriented to person, place, and time.   Skin: Skin is warm and dry.   Psychiatric: He has a normal mood and affect. His behavior is normal. Judgment and thought content normal.   Nursing note and vitals reviewed.      Assessment/Plan #1 labs #2 continue to monitor blood pressure blood sugar at home #3 continue present diet and activity levels  Branden was seen today for diabetes, hypertension and shoulder pain.    Diagnoses and all orders for this visit:    Essential hypertension  -     CBC (No Diff); Future  -     Comprehensive Metabolic Panel  -     Lipid Panel  -     Hemoglobin A1c  -     Vitamin D 25 Hydroxy  -     Cancel: PSA Screen; Future    Paroxysmal atrial fibrillation (CMS/HCC)  -     CBC (No Diff); Future  -     Comprehensive Metabolic Panel  -     Lipid Panel  -     Hemoglobin A1c  -     Vitamin D 25 Hydroxy  -     Cancel: PSA Screen; Future    Pure hypercholesterolemia  -     CBC (No Diff); Future  -     Comprehensive Metabolic Panel  -     Lipid Panel  -     Hemoglobin A1c  -     Vitamin D 25 Hydroxy  -     Cancel: PSA Screen; Future    Vitamin D deficiency, unspecified   -     CBC (No Diff); Future  -     Comprehensive Metabolic Panel  -     Lipid Panel  -     Hemoglobin A1c  -     Vitamin D 25 Hydroxy  -      Cancel: PSA Screen; Future    Uncontrolled type 2 diabetes mellitus with hyperglycemia (CMS/HCC)  -     CBC (No Diff); Future  -     Comprehensive Metabolic Panel  -     Lipid Panel  -     Hemoglobin A1c  -     Vitamin D 25 Hydroxy  -     Cancel: PSA Screen; Future    Encounter for screening for malignant neoplasm of prostate   -     Cancel: PSA Screen; Future

## 2020-05-27 ENCOUNTER — TELEPHONE (OUTPATIENT)
Dept: FAMILY MEDICINE CLINIC | Facility: CLINIC | Age: 70
End: 2020-05-27

## 2020-05-27 NOTE — TELEPHONE ENCOUNTER
PATIENT'S WIFE, EZIO, CALLED AND STATED THAT PATIENT'S [employer]  [employer] BLOOD GLUCOSE MONITORING SYSTEM IS GIVING THEM PROBLEMS AND BELIEVES THAT IT'S GIVING INACCURATE READINGS. SHE WOULD LIKE TO DISCUSS OTHER OPTIONS TO MAKE SURE THAT PATIENT'S SUGAR IS GETTING AN ACCURATE READING. THEY ARE WANTING A SIMPLE MONITOR. PLEASE ADVISE.    PATIENT CALLBACK # [phone]

## 2020-05-28 ENCOUNTER — TELEPHONE (OUTPATIENT)
Dept: FAMILY MEDICINE CLINIC | Facility: CLINIC | Age: 70
End: 2020-05-28

## 2020-05-28 RX ORDER — LANCETS 28 GAUGE
EACH MISCELLANEOUS
Qty: 100 EACH | Refills: 12 | Status: SHIPPED | OUTPATIENT
Start: 2020-05-28

## 2020-05-28 RX ORDER — BLOOD-GLUCOSE METER
KIT MISCELLANEOUS
Qty: 1 EACH | Refills: 1 | Status: SHIPPED | OUTPATIENT
Start: 2020-05-28 | End: 2020-06-09 | Stop reason: SDUPTHER

## 2020-05-28 NOTE — TELEPHONE ENCOUNTER
Got a hold of wife she will check with insurance and call us back with meter covered he only checks daily E11.9 tried [employer] meter didn't work/like

## 2020-05-28 NOTE — TELEPHONE ENCOUNTER
[facility] called on behalf of patient and stated that patient is a medicare patient and the RX for glucose machine and test strips has to specify which brand     Please advise   [phone]    [facility] [place] [id number] - [place], [place] - [address] [place] [address] AT [facility] - [phone]  - [phone] FX

## 2020-05-29 RX ORDER — BLOOD-GLUCOSE METER
1 KIT MISCELLANEOUS 2 TIMES DAILY
Qty: 1 EACH | Refills: 1 | Status: SHIPPED | OUTPATIENT
Start: 2020-05-29 | End: 2022-01-05

## 2020-05-29 NOTE — TELEPHONE ENCOUNTER
Spoke to patient wife states abisai has accu chek meter that's what they want sent over. He checks twice daily    Patient Cancelling at  [facility]

## 2020-06-02 ENCOUNTER — TELEPHONE (OUTPATIENT)
Dept: FAMILY MEDICINE CLINIC | Facility: CLINIC | Age: 70
End: 2020-06-02

## 2020-06-02 RX ORDER — BLOOD-GLUCOSE METER
KIT MISCELLANEOUS
Qty: 1 EACH | Refills: 1 | Status: SHIPPED | OUTPATIENT
Start: 2020-06-02

## 2020-06-02 NOTE — TELEPHONE ENCOUNTER
PHARMACY CALLED AND STATED THAT THEY NEED THE PATIENT PRESCRIPTIONS TO BE RE SENT OVER FOR HER METER AND LANCETS. SHE STATED THAT THEY NEED FOR THE BRAND OF THE METER AND THE STRIPED TO BE SPECIFIED OR THEY CAN NOT FILL IT.     PLEASE ADVISED [facility] ON [address] [phone].

## 2020-06-09 ENCOUNTER — OFFICE VISIT (OUTPATIENT)
Dept: FAMILY MEDICINE CLINIC | Facility: CLINIC | Age: 70
End: 2020-06-09

## 2020-06-09 VITALS
WEIGHT: 251.8 LBS | OXYGEN SATURATION: 97 % | HEIGHT: 72 IN | DIASTOLIC BLOOD PRESSURE: 74 MMHG | BODY MASS INDEX: 34.1 KG/M2 | TEMPERATURE: 98.1 F | SYSTOLIC BLOOD PRESSURE: 138 MMHG | HEART RATE: 83 BPM

## 2020-06-09 DIAGNOSIS — I10 ESSENTIAL HYPERTENSION: ICD-10-CM

## 2020-06-09 DIAGNOSIS — E78.00 PURE HYPERCHOLESTEROLEMIA: ICD-10-CM

## 2020-06-09 DIAGNOSIS — E11.65 UNCONTROLLED TYPE 2 DIABETES MELLITUS WITH HYPERGLYCEMIA (HCC): Primary | ICD-10-CM

## 2020-06-09 PROCEDURE — 99214 OFFICE O/P EST MOD 30 MIN: CPT | Performed by: INTERNAL MEDICINE

## 2020-06-09 NOTE — PROGRESS NOTES
Subjective   Branden Diop is a 69 y.o. male.     History of Present Illness   Patient was seen for diabetes.  Blood sugars at home is been running from 180-130.  Patient is using diet and exercise to lower his blood sugar.  Patient was given Janumet /1000 and asked to take 1 daily.  Patient will monitor his blood sugar return to our clinic in 1 month.  Patient does have hyperlipidemia and is being treated with diet exercise and a statin.  Triglycerides 165, HDL 34, .  Patient will have labs drawn today.  Patient blood pressure is running 130s over 70s.  Patient will monitor his blood sugar and blood pressure return to our clinic in 1 month.  Patient was given a diabetic diet and asked low impact exercise 30 minutes 3 5 times a week.    Dictated utilizing Dragon dictation. If there are questions or for further clarification, please contact me.  The following portions of the patient's history were reviewed and updated as appropriate: allergies, current medications, past family history, past medical history, past social history, past surgical history and problem list.    Review of Systems   Constitutional: Negative for fatigue and fever.   HENT: Positive for congestion. Negative for trouble swallowing.    Eyes: Negative for discharge and visual disturbance.   Respiratory: Negative for choking and shortness of breath.    Cardiovascular: Negative for chest pain and palpitations.   Gastrointestinal: Negative for abdominal pain and blood in stool.   Endocrine: Negative.    Genitourinary: Negative for genital sores and hematuria.   Musculoskeletal: Negative for gait problem and joint swelling.   Skin: Negative for color change, pallor, rash and wound.   Allergic/Immunologic: Positive for environmental allergies. Negative for immunocompromised state.   Neurological: Negative for facial asymmetry and speech difficulty.   Psychiatric/Behavioral: Negative for hallucinations and suicidal ideas.       Objective    Physical Exam   Constitutional: He is oriented to person, place, and time. He appears well-developed and well-nourished.   HENT:   Head: Normocephalic.   Eyes: Pupils are equal, round, and reactive to light. Conjunctivae are normal.   Neck: Normal range of motion. Neck supple.   Cardiovascular: Normal rate, regular rhythm and normal heart sounds. Exam reveals no gallop and no friction rub.   No murmur heard.  Pulmonary/Chest: Effort normal and breath sounds normal. No stridor. No respiratory distress. He has no wheezes. He has no rales. He exhibits no tenderness.   Abdominal: Soft. Bowel sounds are normal.   Musculoskeletal: Normal range of motion.   Neurological: He is alert and oriented to person, place, and time.   Skin: Skin is warm and dry.   Psychiatric: He has a normal mood and affect. His behavior is normal. Judgment and thought content normal.   Nursing note and vitals reviewed.      Assessment/Plan #1 monitor blood pressure blood sugar #2 follow diabetic diet low impact exercise #3 return to clinic 1 month  Branden was seen today for sugar 2 high.    Diagnoses and all orders for this visit:    Uncontrolled type 2 diabetes mellitus with hyperglycemia (CMS/[facility])    Essential hypertension    Pure hypercholesterolemia    Other orders  -     SITagliptin-metFORMIN HCl ER (Janumet XR) 100-1000 MG tablet; Take 1 tablet by mouth Daily.

## 2020-06-16 ENCOUNTER — LAB (OUTPATIENT)
Dept: FAMILY MEDICINE CLINIC | Facility: CLINIC | Age: 70
End: 2020-06-16

## 2020-06-16 DIAGNOSIS — E55.9 VITAMIN D DEFICIENCY, UNSPECIFIED: ICD-10-CM

## 2020-06-16 DIAGNOSIS — E78.00 PURE HYPERCHOLESTEROLEMIA: ICD-10-CM

## 2020-06-16 DIAGNOSIS — I10 ESSENTIAL HYPERTENSION: ICD-10-CM

## 2020-06-16 DIAGNOSIS — I48.0 PAROXYSMAL ATRIAL FIBRILLATION (HCC): ICD-10-CM

## 2020-06-16 DIAGNOSIS — E11.65 UNCONTROLLED TYPE 2 DIABETES MELLITUS WITH HYPERGLYCEMIA (HCC): ICD-10-CM

## 2020-06-16 LAB
25(OH)D3 SERPL-MCNC: 36.4 NG/ML (ref 30–100)
ALBUMIN SERPL-MCNC: 4.7 G/DL (ref 3.5–5.2)
ALBUMIN/GLOB SERPL: 1.4 G/DL
ALP SERPL-CCNC: 91 U/L (ref 39–117)
ALT SERPL W P-5'-P-CCNC: 21 U/L (ref 1–41)
ANION GAP SERPL CALCULATED.3IONS-SCNC: 8.9 MMOL/L (ref 5–15)
AST SERPL-CCNC: 21 U/L (ref 1–40)
BILIRUB SERPL-MCNC: 0.7 MG/DL (ref 0.2–1.2)
BUN BLD-MCNC: 15 MG/DL (ref 8–23)
BUN/CREAT SERPL: 19.7 (ref 7–25)
CALCIUM SPEC-SCNC: 10.2 MG/DL (ref 8.6–10.5)
CHLORIDE SERPL-SCNC: 100 MMOL/L (ref 98–107)
CHOLEST SERPL-MCNC: 177 MG/DL (ref 0–200)
CO2 SERPL-SCNC: 26.1 MMOL/L (ref 22–29)
CREAT BLD-MCNC: 0.76 MG/DL (ref 0.76–1.27)
DEPRECATED RDW RBC AUTO: 43.7 FL (ref 37–54)
ERYTHROCYTE [DISTWIDTH] IN BLOOD BY AUTOMATED COUNT: 13.3 % (ref 12.3–15.4)
GFR SERPL CREATININE-BSD FRML MDRD: 101 ML/MIN/1.73
GLOBULIN UR ELPH-MCNC: 3.3 GM/DL
GLUCOSE BLD-MCNC: 173 MG/DL (ref 65–99)
HBA1C MFR BLD: 11.16 % (ref 4.8–5.6)
HCT VFR BLD AUTO: 44.8 % (ref 37.5–51)
HDLC SERPL-MCNC: 37 MG/DL (ref 40–60)
HGB BLD-MCNC: 14.9 G/DL (ref 13–17.7)
LDLC SERPL CALC-MCNC: 115 MG/DL (ref 0–100)
LDLC/HDLC SERPL: 3.1 {RATIO}
MCH RBC QN AUTO: 30.1 PG (ref 26.6–33)
MCHC RBC AUTO-ENTMCNC: 33.3 G/DL (ref 31.5–35.7)
MCV RBC AUTO: 90.5 FL (ref 79–97)
PLATELET # BLD AUTO: 284 10*3/MM3 (ref 140–450)
PMV BLD AUTO: 9.4 FL (ref 6–12)
POTASSIUM BLD-SCNC: 4.9 MMOL/L (ref 3.5–5.2)
PROT SERPL-MCNC: 8 G/DL (ref 6–8.5)
RBC # BLD AUTO: 4.95 10*6/MM3 (ref 4.14–5.8)
SODIUM BLD-SCNC: 135 MMOL/L (ref 136–145)
TRIGL SERPL-MCNC: 127 MG/DL (ref 0–150)
VLDLC SERPL-MCNC: 25.4 MG/DL (ref 5–40)
WBC NRBC COR # BLD: 7.23 10*3/MM3 (ref 3.4–10.8)

## 2020-06-16 PROCEDURE — 82306 VITAMIN D 25 HYDROXY: CPT | Performed by: INTERNAL MEDICINE

## 2020-06-16 PROCEDURE — 80053 COMPREHEN METABOLIC PANEL: CPT | Performed by: INTERNAL MEDICINE

## 2020-06-16 PROCEDURE — 80061 LIPID PANEL: CPT | Performed by: INTERNAL MEDICINE

## 2020-06-16 PROCEDURE — 83036 HEMOGLOBIN GLYCOSYLATED A1C: CPT | Performed by: INTERNAL MEDICINE

## 2020-06-16 PROCEDURE — 85027 COMPLETE CBC AUTOMATED: CPT | Performed by: INTERNAL MEDICINE

## 2020-06-16 PROCEDURE — 36415 COLL VENOUS BLD VENIPUNCTURE: CPT | Performed by: INTERNAL MEDICINE

## 2020-06-18 DIAGNOSIS — E11.65 UNCONTROLLED TYPE 2 DIABETES MELLITUS WITH HYPERGLYCEMIA (HCC): Primary | ICD-10-CM

## 2020-06-23 ENCOUNTER — OFFICE VISIT (OUTPATIENT)
Dept: FAMILY MEDICINE CLINIC | Facility: CLINIC | Age: 70
End: 2020-06-23

## 2020-06-23 VITALS
OXYGEN SATURATION: 97 % | DIASTOLIC BLOOD PRESSURE: 64 MMHG | BODY MASS INDEX: 34.54 KG/M2 | TEMPERATURE: 97.5 F | HEART RATE: 74 BPM | HEIGHT: 72 IN | RESPIRATION RATE: 18 BRPM | WEIGHT: 255 LBS | SYSTOLIC BLOOD PRESSURE: 112 MMHG

## 2020-06-23 DIAGNOSIS — I10 ESSENTIAL HYPERTENSION: ICD-10-CM

## 2020-06-23 DIAGNOSIS — E78.00 PURE HYPERCHOLESTEROLEMIA: ICD-10-CM

## 2020-06-23 DIAGNOSIS — E11.65 UNCONTROLLED TYPE 2 DIABETES MELLITUS WITH HYPERGLYCEMIA (HCC): Primary | ICD-10-CM

## 2020-06-23 PROCEDURE — 99214 OFFICE O/P EST MOD 30 MIN: CPT | Performed by: INTERNAL MEDICINE

## 2020-06-23 NOTE — PROGRESS NOTES
Subjective   Branden Diop is a 69 y.o. male.     History of Present Illness   Patient was seen for uncontrolled diabetes.  His latest hemoglobin A1c was 11%.  Patient blood sugar at home 0 in the 150s to 130s.  Patient is even changed his meter and getting the same numbers.  Patient was referred to endocrine but is felt he hemoglobin A1c may have been an error.  Patient does follow a diabetic diet and does exercise on a regular basis.  Patient's blood pressures been running 110s over 80s.  Patient will continue to monitor his blood sugar and blood pressure.  Lipids are being treated with diet exercise and statin.  Triglycerides 127, HDL 37, .    Dictated utilizing Dragon dictation. If there are questions or for further clarification, please contact me.  The following portions of the patient's history were reviewed and updated as appropriate: allergies, current medications, past family history, past medical history, past social history, past surgical history and problem list.    Review of Systems   Constitutional: Negative for fatigue and fever.   HENT: Positive for congestion. Negative for trouble swallowing.    Eyes: Negative for discharge and visual disturbance.   Respiratory: Negative for choking and shortness of breath.    Cardiovascular: Negative for chest pain and palpitations.   Gastrointestinal: Negative for abdominal pain and blood in stool.   Endocrine: Negative.    Genitourinary: Negative for genital sores and hematuria.   Musculoskeletal: Negative for gait problem and joint swelling.   Skin: Negative for color change, pallor, rash and wound.   Allergic/Immunologic: Positive for environmental allergies. Negative for immunocompromised state.   Neurological: Negative for facial asymmetry and speech difficulty.   Psychiatric/Behavioral: Negative for hallucinations and suicidal ideas.       Objective   Physical Exam   Constitutional: He is oriented to person, place, and time. He appears well-developed  and well-nourished.   HENT:   Head: Normocephalic and atraumatic.   Eyes: Pupils are equal, round, and reactive to light. Conjunctivae and EOM are normal.   Neck: Normal range of motion. Neck supple.   Cardiovascular: Normal rate, regular rhythm and normal heart sounds.   Pulmonary/Chest: Effort normal and breath sounds normal.   Abdominal: Soft. Bowel sounds are normal.   Musculoskeletal: Normal range of motion.   Neurological: He is alert and oriented to person, place, and time.   Skin: Skin is warm and dry.   Psychiatric: He has a normal mood and affect. His behavior is normal. Judgment and thought content normal.   Nursing note and vitals reviewed.      Assessment/Plan #1 continue present diet and activity levels #2 continue to monitor blood pressure blood sugar at home #3 refer to endocrine to evaluate his blood sugar.  Branden was seen today for diabetes.    Diagnoses and all orders for this visit:    Uncontrolled type 2 diabetes mellitus with hyperglycemia (CMS/[facility])    Pure hypercholesterolemia    Essential hypertension

## 2020-07-23 ENCOUNTER — OFFICE VISIT (OUTPATIENT)
Dept: FAMILY MEDICINE CLINIC | Facility: CLINIC | Age: 70
End: 2020-07-23

## 2020-07-23 VITALS
WEIGHT: 252.4 LBS | SYSTOLIC BLOOD PRESSURE: 128 MMHG | TEMPERATURE: 98.4 F | DIASTOLIC BLOOD PRESSURE: 70 MMHG | BODY MASS INDEX: 34.19 KG/M2 | HEIGHT: 72 IN | HEART RATE: 84 BPM | OXYGEN SATURATION: 97 %

## 2020-07-23 DIAGNOSIS — J44.9 CHRONIC OBSTRUCTIVE PULMONARY DISEASE, UNSPECIFIED COPD TYPE (HCC): ICD-10-CM

## 2020-07-23 DIAGNOSIS — E11.65 UNCONTROLLED TYPE 2 DIABETES MELLITUS WITH HYPERGLYCEMIA (HCC): Primary | ICD-10-CM

## 2020-07-23 DIAGNOSIS — I10 ESSENTIAL HYPERTENSION: ICD-10-CM

## 2020-07-23 PROCEDURE — 99214 OFFICE O/P EST MOD 30 MIN: CPT | Performed by: INTERNAL MEDICINE

## 2020-07-23 NOTE — PROGRESS NOTES
Subjective   Branden Diop is a 69 y.o. male.     History of Present Illness   Patient was seen today for diabetes.  Blood sugars were running in the 130s.  Patient's previous hemoglobin A1c was 11%.  Patient has been referred to endocrine but has not seen his doctor yet.  Patient has been taking stegujan 5/100.  Do not have samples of Janumet.  Patient is doing better on his second medication but will follow-up with endocrine.  Patient blood pressures been running 120 over 70s.  Patient COPD has been well controlled with his bronchodilators.  Patient will continue to monitor blood pressure blood sugar and follow-up with his endocrine as scheduled today.    Dictated utilizing Dragon dictation. If there are questions or for further clarification, please contact me.  The following portions of the patient's history were reviewed and updated as appropriate: allergies, current medications, past family history, past medical history, past social history, past surgical history and problem list.    Review of Systems   Constitutional: Negative for fatigue and fever.   HENT: Positive for congestion. Negative for trouble swallowing.    Eyes: Negative for discharge and visual disturbance.   Respiratory: Negative for choking and shortness of breath.    Cardiovascular: Negative for chest pain and palpitations.   Gastrointestinal: Negative for abdominal pain and blood in stool.   Endocrine: Negative.    Genitourinary: Negative for genital sores and hematuria.   Musculoskeletal: Negative for gait problem and joint swelling.   Skin: Negative for color change, pallor, rash and wound.   Allergic/Immunologic: Positive for environmental allergies. Negative for immunocompromised state.   Neurological: Negative for facial asymmetry and speech difficulty.   Psychiatric/Behavioral: Negative for hallucinations and suicidal ideas.       Objective   Physical Exam   Constitutional: He is oriented to person, place, and time. He appears  well-developed and well-nourished.   HENT:   Head: Normocephalic and atraumatic.   Eyes: Pupils are equal, round, and reactive to light. Conjunctivae and EOM are normal.   Neck: Normal range of motion. Neck supple.   Cardiovascular: Normal rate, regular rhythm and normal heart sounds. Exam reveals no gallop and no friction rub.   No murmur heard.  Pulmonary/Chest: Effort normal and breath sounds normal. No stridor. No respiratory distress. He has no wheezes. He has no rales. He exhibits no tenderness.   Abdominal: Soft. Bowel sounds are normal.   Musculoskeletal: Normal range of motion.   Neurological: He is alert and oriented to person, place, and time.   Skin: Skin is warm and dry.   Psychiatric: He has a normal mood and affect. His behavior is normal. Judgment and thought content normal.   Nursing note and vitals reviewed.      Assessment/Plan #1 continue to monitor blood pressure blood sugar #2 follow-up with endocrine #3 continue Steglugan at present time  Branden was seen today for diabetes.    Diagnoses and all orders for this visit:    Uncontrolled type 2 diabetes mellitus with hyperglycemia (CMS/HCC)    Chronic obstructive pulmonary disease, unspecified COPD type (CMS/[facility])    Essential hypertension

## 2020-08-10 ENCOUNTER — OFFICE VISIT (OUTPATIENT)
Dept: ENDOCRINOLOGY | Age: 70
End: 2020-08-10

## 2020-08-10 ENCOUNTER — TELEPHONE (OUTPATIENT)
Dept: ENDOCRINOLOGY | Age: 70
End: 2020-08-10

## 2020-08-10 VITALS
DIASTOLIC BLOOD PRESSURE: 80 MMHG | WEIGHT: 253.8 LBS | SYSTOLIC BLOOD PRESSURE: 138 MMHG | RESPIRATION RATE: 16 BRPM | HEIGHT: 72 IN | BODY MASS INDEX: 34.38 KG/M2

## 2020-08-10 DIAGNOSIS — N40.0 BENIGN PROSTATIC HYPERPLASIA WITHOUT LOWER URINARY TRACT SYMPTOMS: ICD-10-CM

## 2020-08-10 DIAGNOSIS — E11.65 UNCONTROLLED TYPE 2 DIABETES MELLITUS WITH HYPERGLYCEMIA (HCC): Primary | ICD-10-CM

## 2020-08-10 DIAGNOSIS — E66.09 CLASS 1 OBESITY DUE TO EXCESS CALORIES WITHOUT SERIOUS COMORBIDITY WITH BODY MASS INDEX (BMI) OF 34.0 TO 34.9 IN ADULT: Chronic | ICD-10-CM

## 2020-08-10 DIAGNOSIS — I10 ESSENTIAL HYPERTENSION: ICD-10-CM

## 2020-08-10 DIAGNOSIS — E78.00 PURE HYPERCHOLESTEROLEMIA: ICD-10-CM

## 2020-08-10 DIAGNOSIS — E55.9 VITAMIN D DEFICIENCY: ICD-10-CM

## 2020-08-10 DIAGNOSIS — K76.0 FATTY LIVER: ICD-10-CM

## 2020-08-10 PROCEDURE — 99204 OFFICE O/P NEW MOD 45 MIN: CPT | Performed by: INTERNAL MEDICINE

## 2020-08-10 RX ORDER — DAPAGLIFLOZIN AND METFORMIN HYDROCHLORIDE 5; 1000 MG/1; MG/1
2 TABLET, FILM COATED, EXTENDED RELEASE ORAL DAILY
Qty: 180 TABLET | Refills: 3 | Status: SHIPPED | OUTPATIENT
Start: 2020-08-10 | End: 2020-08-26

## 2020-08-10 RX ORDER — INSULIN GLARGINE AND LIXISENATIDE 100; 33 U/ML; UG/ML
60 INJECTION, SOLUTION SUBCUTANEOUS
Qty: 15 PEN | Refills: 3 | Status: SHIPPED | OUTPATIENT
Start: 2020-08-10 | End: 2021-06-14 | Stop reason: SDUPTHER

## 2020-08-10 NOTE — PROGRESS NOTES
"Subjective   Branden Diop is a 69 y.o. male seen as a new patient for Diabetes  He is checking BG once a day. He states that he has numbness/tingling in his hands but thinks it is related to arthritis. He denies any other problems or concerns.       History of Present Illness this is a 69-year-old gentleman who has been referred by Dr. Lopez for further evaluation and treatment follow-up yearly discovered and diagnosed type 2 diabetes.  He said on 6/16/2020 when he had a blood test he had a hemoglobin A1c done and that it was 11.16.  He has no prior knowledge or history of type 2 diabetes.  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  He has no significant or specific complaints.  His family history is negative for diabetes however he does not know much about these older siblings all of whom are passed on and he is the youngest child of the family.    The following portions of the patient's history were reviewed and updated as appropriate: allergies, current medications, past family history, past medical history, past social history, past surgical history and problem list.      /80   Resp 16   Ht 182.9 cm (72\")   Wt 115 kg (253 lb 12.8 oz)   BMI 34.42 kg/m²      No Known Allergies       Current Outpatient Medications:   •  atorvastatin (LIPITOR) 40 MG tablet, TAKE ONE TABLET BY MOUTH DAILY, Disp: 90 tablet, Rfl: 3  •  Cholecalciferol (VITAMIN D3) 75 MCG (3000 UT) tablet, Take 3,000 Units by mouth Daily., Disp: 30 tablet, Rfl: 3  •  Ertugliflozin-SITagliptin (Steglujan) 5-100 MG tablet, Take  by mouth Daily., Disp: , Rfl:   •  glucose blood test strip, Freestyle strips daily E 11.9, Disp: 100 each, Rfl: 12  •  glucose monitor monitoring kit, 1 each 2 (Two) Times a Day. e11.9, Disp: 1 each, Rfl: 1  •  glucose monitoring kit (FREESTYLE) " monitoring kit, Daily E 11.93 FreeStyle meter, Disp: 1 each, Rfl: 1  •  Lancets (FREESTYLE) lancets, Daily E 11.9, Disp: 100 each, Rfl: 12  •  Multiple Vitamin (MULTIVITAMINS PO), Take  by mouth., Disp: , Rfl:   •  penicillin v potassium (VEETID) 500 MG tablet, Take 500 mg by mouth 4 (Four) Times a Day. Prn dental infections prior to surgery June 1, Disp: , Rfl:   •  sertraline (ZOLOFT) 50 MG tablet, TAKE ONE TABLET BY MOUTH DAILY, Disp: 90 tablet, Rfl: 1  •  SITagliptin-metFORMIN HCl ER (Janumet XR) 100-1000 MG tablet, Take 1 tablet by mouth Daily., Disp: 30 tablet, Rfl: 3  •  valACYclovir (VALTREX) 1000 MG tablet, 1 p.o. 3 times daily for 1 week, Disp: 21 tablet, Rfl: 1     Review of Systems   Constitutional: Negative.    HENT: Negative.    Eyes: Negative.    Respiratory: Negative.    Cardiovascular: Negative.    Gastrointestinal: Negative.    Endocrine: Negative.    Genitourinary: Negative.    Musculoskeletal: Negative.    Skin: Negative.    Allergic/Immunologic: Negative.    Neurological: Negative.    Hematological: Negative.    Psychiatric/Behavioral: Negative.        Objective   Physical Exam   Constitutional: He is oriented to person, place, and time. He appears well-developed and well-nourished. No distress.   HENT:   Head: Normocephalic and atraumatic.   Right Ear: External ear normal.   Left Ear: External ear normal.   Nose: Nose normal.   Mouth/Throat: Oropharynx is clear and moist. No oropharyngeal exudate.   Eyes: Pupils are equal, round, and reactive to light. Conjunctivae and EOM are normal. Right eye exhibits no discharge. Left eye exhibits no discharge. No scleral icterus.   Neck: Normal range of motion. Neck supple. No JVD present. No tracheal deviation present. No thyromegaly present.   Cardiovascular: Normal rate, regular rhythm, normal heart sounds and intact distal pulses. Exam reveals no gallop and no friction rub.   No murmur heard.  Pulmonary/Chest: Effort normal and breath sounds normal. No  stridor. No respiratory distress. He has no wheezes. He has no rales. He exhibits no tenderness.   Abdominal: Soft. Bowel sounds are normal. He exhibits no distension and no mass. There is no tenderness. There is no rebound and no guarding. No hernia.   Musculoskeletal: Normal range of motion. He exhibits no edema, tenderness or deformity.   Lymphadenopathy:     He has no cervical adenopathy.   Neurological: He is alert and oriented to person, place, and time. He displays normal reflexes. No cranial nerve deficit or sensory deficit. He exhibits normal muscle tone. Coordination normal.   Skin: Skin is warm and dry. No rash noted. He is not diaphoretic. No erythema. No pallor.   Psychiatric: He has a normal mood and affect. His behavior is normal. Judgment and thought content normal.   Nursing note and vitals reviewed.           Assessment/Plan   Branden was seen today for diabetes.    Diagnoses and all orders for this visit:    Uncontrolled type 2 diabetes mellitus with hyperglycemia (CMS/[facility])  -     T3, Free  -     T4 & TSH ([facility])  -     T4, Free  -     Uric Acid  -     Vitamin D 25 Hydroxy  -     Comprehensive Metabolic Panel  -     TestT+TestF+SHBG  -     C-Peptide  -     Hemoglobin A1c  -     MicroAlbumin, Urine, Random - Urine, Clean Catch  -     PSA DIAGNOSTIC  -     CBC & Differential  -     NMR LipoProfile  -     T4 & TSH ([facility]); Future  -     TestT+TestF+SHBG; Future  -     Uric Acid; Future  -     Vitamin D 25 Hydroxy; Future  -     Comprehensive Metabolic Panel; Future  -     C-Peptide; Future  -     MicroAlbumin, Urine, Random - Urine, Clean Catch; Future  -     NMR LipoProfile; Future  -     Hemoglobin & Hematocrit, Blood; Future    Pure hypercholesterolemia  -     T3, Free  -     T4 & TSH ([facility])  -     T4, Free  -     Uric Acid  -     Vitamin D 25 Hydroxy  -     Comprehensive Metabolic Panel  -     TestT+TestF+SHBG  -     C-Peptide  -     Hemoglobin A1c  -     MicroAlbumin, Urine, Random - Urine,  Clean Catch  -     PSA DIAGNOSTIC  -     CBC & Differential  -     NMR LipoProfile  -     T4 & TSH ([facility]); Future  -     TestT+TestF+SHBG; Future  -     Uric Acid; Future  -     Vitamin D 25 Hydroxy; Future  -     Comprehensive Metabolic Panel; Future  -     C-Peptide; Future  -     MicroAlbumin, Urine, Random - Urine, Clean Catch; Future  -     NMR LipoProfile; Future  -     Hemoglobin & Hematocrit, Blood; Future    Essential hypertension  -     T3, Free  -     T4 & TSH ([facility])  -     T4, Free  -     Uric Acid  -     Vitamin D 25 Hydroxy  -     Comprehensive Metabolic Panel  -     TestT+TestF+SHBG  -     C-Peptide  -     Hemoglobin A1c  -     MicroAlbumin, Urine, Random - Urine, Clean Catch  -     PSA DIAGNOSTIC  -     CBC & Differential  -     NMR LipoProfile  -     T4 & TSH ([facility]); Future  -     TestT+TestF+SHBG; Future  -     Uric Acid; Future  -     Vitamin D 25 Hydroxy; Future  -     Comprehensive Metabolic Panel; Future  -     C-Peptide; Future  -     MicroAlbumin, Urine, Random - Urine, Clean Catch; Future  -     NMR LipoProfile; Future  -     Hemoglobin & Hematocrit, Blood; Future    Class 1 obesity due to excess calories without serious comorbidity with body mass index (BMI) of 34.0 to 34.9 in adult  -     T3, Free  -     T4 & TSH ([facility])  -     T4, Free  -     Uric Acid  -     Vitamin D 25 Hydroxy  -     Comprehensive Metabolic Panel  -     TestT+TestF+SHBG  -     C-Peptide  -     Hemoglobin A1c  -     MicroAlbumin, Urine, Random - Urine, Clean Catch  -     PSA DIAGNOSTIC  -     CBC & Differential  -     NMR LipoProfile  -     T4 & TSH ([facility]); Future  -     TestT+TestF+SHBG; Future  -     Uric Acid; Future  -     Vitamin D 25 Hydroxy; Future  -     Comprehensive Metabolic Panel; Future  -     C-Peptide; Future  -     MicroAlbumin, Urine, Random - Urine, Clean Catch; Future  -     NMR LipoProfile; Future  -     Hemoglobin & Hematocrit, Blood; Future    Fatty liver  -     T3, Free  -     T4 &  TSH ([facility])  -     T4, Free  -     Uric Acid  -     Vitamin D 25 Hydroxy  -     Comprehensive Metabolic Panel  -     TestT+TestF+SHBG  -     C-Peptide  -     Hemoglobin A1c  -     MicroAlbumin, Urine, Random - Urine, Clean Catch  -     PSA DIAGNOSTIC  -     CBC & Differential  -     NMR LipoProfile  -     T4 & TSH ([facility]); Future  -     TestT+TestF+SHBG; Future  -     Uric Acid; Future  -     Vitamin D 25 Hydroxy; Future  -     Comprehensive Metabolic Panel; Future  -     C-Peptide; Future  -     MicroAlbumin, Urine, Random - Urine, Clean Catch; Future  -     NMR LipoProfile; Future  -     Hemoglobin & Hematocrit, Blood; Future    Vitamin D deficiency  -     T3, Free  -     T4 & TSH ([facility])  -     T4, Free  -     Uric Acid  -     Vitamin D 25 Hydroxy  -     Comprehensive Metabolic Panel  -     TestT+TestF+SHBG  -     C-Peptide  -     Hemoglobin A1c  -     MicroAlbumin, Urine, Random - Urine, Clean Catch  -     PSA DIAGNOSTIC  -     CBC & Differential  -     NMR LipoProfile  -     T4 & TSH ([facility]); Future  -     TestT+TestF+SHBG; Future  -     Uric Acid; Future  -     Vitamin D 25 Hydroxy; Future  -     Comprehensive Metabolic Panel; Future  -     C-Peptide; Future  -     MicroAlbumin, Urine, Random - Urine, Clean Catch; Future  -     NMR LipoProfile; Future  -     Hemoglobin & Hematocrit, Blood; Future    Benign prostatic hyperplasia without lower urinary tract symptoms  -     T3, Free  -     T4 & TSH ([facility])  -     T4, Free  -     Uric Acid  -     Vitamin D 25 Hydroxy  -     Comprehensive Metabolic Panel  -     TestT+TestF+SHBG  -     C-Peptide  -     Hemoglobin A1c  -     MicroAlbumin, Urine, Random - Urine, Clean Catch  -     PSA DIAGNOSTIC  -     CBC & Differential  -     NMR LipoProfile  -     T4 & TSH ([facility]); Future  -     TestT+TestF+SHBG; Future  -     Uric Acid; Future  -     Vitamin D 25 Hydroxy; Future  -     Comprehensive Metabolic Panel; Future  -     C-Peptide; Future  -     MicroAlbumin,  Urine, Random - Urine, Clean Catch; Future  -     NMR LipoProfile; Future  -     Hemoglobin & Hematocrit, Blood; Future    Other orders  -     XIGDUO XR 5-1000 MG tablet; Take 2 tablets by mouth Daily.  -     SOLIQUA 100-33 UNT-MCG/ML solution pen-injector injection; Inject 60 Units under the skin into the appropriate area as directed Daily With Breakfast.          In summary I saw and examined this 69-year-old gentleman for above-mentioned problems.  I reviewed his laboratory evaluations of 11/27/2019 and 6/16/2020 and at this point will go ahead and order additional laboratory evaluation and once the results come back we will go ahead and call for any possible modification or new medications.  At this time also I am starting him on Xigduo XR 5/1000 mg 2 tablets every morning and Soliqua 20 units every morning and instructed him to increase the dose by 2 units every 3 days if fasting blood sugar is greater than 110.  We will see [place] office in 4 months or sooner if needed with laboratory evaluation prior to each office visit.

## 2020-08-10 NOTE — TELEPHONE ENCOUNTER
Patient wife called and left v/m  xigduo xr is going to be 560.00  Can not afford  He will not be getting it

## 2020-08-12 LAB
25(OH)D3+25(OH)D2 SERPL-MCNC: 35 NG/ML (ref 30–100)
ALBUMIN SERPL-MCNC: 4.8 G/DL (ref 3.5–5.2)
ALBUMIN/GLOB SERPL: 2.3 G/DL
ALP SERPL-CCNC: 82 U/L (ref 39–117)
ALT SERPL-CCNC: 20 U/L (ref 1–41)
AST SERPL-CCNC: 20 U/L (ref 1–40)
BASOPHILS # BLD AUTO: 0.06 10*3/MM3 (ref 0–0.2)
BASOPHILS NFR BLD AUTO: 0.6 % (ref 0–1.5)
BILIRUB SERPL-MCNC: 0.6 MG/DL (ref 0–1.2)
BUN SERPL-MCNC: 15 MG/DL (ref 8–23)
BUN/CREAT SERPL: 21.4 (ref 7–25)
C PEPTIDE SERPL-MCNC: 3.5 NG/ML (ref 1.1–4.4)
CALCIUM SERPL-MCNC: 9.4 MG/DL (ref 8.6–10.5)
CHLORIDE SERPL-SCNC: 102 MMOL/L (ref 98–107)
CHOLEST SERPL-MCNC: 206 MG/DL (ref 100–199)
CO2 SERPL-SCNC: 23.9 MMOL/L (ref 22–29)
CREAT SERPL-MCNC: 0.7 MG/DL (ref 0.76–1.27)
EOSINOPHIL # BLD AUTO: 0.32 10*3/MM3 (ref 0–0.4)
EOSINOPHIL NFR BLD AUTO: 3.2 % (ref 0.3–6.2)
ERYTHROCYTE [DISTWIDTH] IN BLOOD BY AUTOMATED COUNT: 14 % (ref 12.3–15.4)
GLOBULIN SER CALC-MCNC: 2.1 GM/DL
GLUCOSE SERPL-MCNC: 80 MG/DL (ref 65–99)
HBA1C MFR BLD: 7.1 % (ref 4.8–5.6)
HCT VFR BLD AUTO: 46.8 % (ref 37.5–51)
HDL SERPL-SCNC: 26.5 UMOL/L
HDLC SERPL-MCNC: 36 MG/DL
HGB BLD-MCNC: 15.2 G/DL (ref 13–17.7)
IMM GRANULOCYTES # BLD AUTO: 0.05 10*3/MM3 (ref 0–0.05)
IMM GRANULOCYTES NFR BLD AUTO: 0.5 % (ref 0–0.5)
LDL SERPL QN: 20.4 NM
LDL SERPL-SCNC: 1481 NMOL/L
LDL SMALL SERPL-SCNC: 862 NMOL/L
LDLC SERPL CALC-MCNC: 119 MG/DL (ref 0–99)
LYMPHOCYTES # BLD AUTO: 2.33 10*3/MM3 (ref 0.7–3.1)
LYMPHOCYTES NFR BLD AUTO: 23.6 % (ref 19.6–45.3)
MCH RBC QN AUTO: 30 PG (ref 26.6–33)
MCHC RBC AUTO-ENTMCNC: 32.5 G/DL (ref 31.5–35.7)
MCV RBC AUTO: 92.5 FL (ref 79–97)
MICROALBUMIN UR-MCNC: 93.7 UG/ML
MONOCYTES # BLD AUTO: 0.61 10*3/MM3 (ref 0.1–0.9)
MONOCYTES NFR BLD AUTO: 6.2 % (ref 5–12)
NEUTROPHILS # BLD AUTO: 6.49 10*3/MM3 (ref 1.7–7)
NEUTROPHILS NFR BLD AUTO: 65.9 % (ref 42.7–76)
NRBC BLD AUTO-RTO: 0 /100 WBC (ref 0–0.2)
PLATELET # BLD AUTO: 314 10*3/MM3 (ref 140–450)
POTASSIUM SERPL-SCNC: 4.7 MMOL/L (ref 3.5–5.2)
PROT SERPL-MCNC: 6.9 G/DL (ref 6–8.5)
PSA SERPL-MCNC: 0.78 NG/ML (ref 0–4)
RBC # BLD AUTO: 5.06 10*6/MM3 (ref 4.14–5.8)
SHBG SERPL-SCNC: 37.3 NMOL/L (ref 19.3–76.4)
SODIUM SERPL-SCNC: 139 MMOL/L (ref 136–145)
T3FREE SERPL-MCNC: 2.8 PG/ML (ref 2–4.4)
T4 FREE SERPL-MCNC: 1.19 NG/DL (ref 0.93–1.7)
T4 SERPL-MCNC: 7.34 MCG/DL (ref 4.5–11.7)
TESTOST FREE SERPL-MCNC: 10.8 PG/ML (ref 6.6–18.1)
TESTOST SERPL-MCNC: 277 NG/DL (ref 264–916)
TRIGL SERPL-MCNC: 255 MG/DL (ref 0–149)
TSH SERPL DL<=0.005 MIU/L-ACNC: 1.27 UIU/ML (ref 0.27–4.2)
URATE SERPL-MCNC: 2.8 MG/DL (ref 3.4–7)
WBC # BLD AUTO: 9.86 10*3/MM3 (ref 3.4–10.8)

## 2020-08-12 RX ORDER — ICOSAPENT ETHYL 1000 MG/1
2 CAPSULE ORAL 2 TIMES DAILY WITH MEALS
Qty: 360 CAPSULE | Refills: 3 | Status: SHIPPED | OUTPATIENT
Start: 2020-08-12 | End: 2021-03-30

## 2020-08-12 RX ORDER — ROSUVASTATIN CALCIUM 40 MG/1
40 TABLET, COATED ORAL DAILY
Qty: 90 TABLET | Refills: 3 | Status: SHIPPED | OUTPATIENT
Start: 2020-08-12 | End: 2021-08-09

## 2020-08-25 ENCOUNTER — TELEPHONE (OUTPATIENT)
Dept: ENDOCRINOLOGY | Age: 70
End: 2020-08-25

## 2020-08-25 NOTE — TELEPHONE ENCOUNTER
PT's wife called in regards to pt's Xigduo. She states it is not affordable for them and is wondering if there is an alternative he can try.

## 2020-08-26 ENCOUNTER — TELEPHONE (OUTPATIENT)
Dept: ENDOCRINOLOGY | Age: 70
End: 2020-08-26

## 2020-08-26 NOTE — TELEPHONE ENCOUNTER
Patient left a voice mail  xigduo will be 600 a month    Can not afford it   Is there an alternative that can be given  Please call patient at [phone]

## 2020-09-08 ENCOUNTER — CLINICAL SUPPORT (OUTPATIENT)
Dept: FAMILY MEDICINE CLINIC | Facility: CLINIC | Age: 70
End: 2020-09-08

## 2020-09-08 PROCEDURE — G0008 ADMIN INFLUENZA VIRUS VAC: HCPCS | Performed by: INTERNAL MEDICINE

## 2020-09-08 PROCEDURE — 90694 VACC AIIV4 NO PRSRV 0.5ML IM: CPT | Performed by: INTERNAL MEDICINE

## 2020-12-01 ENCOUNTER — RESULTS ENCOUNTER (OUTPATIENT)
Dept: ENDOCRINOLOGY | Age: 70
End: 2020-12-01

## 2020-12-01 DIAGNOSIS — I10 ESSENTIAL HYPERTENSION: ICD-10-CM

## 2020-12-01 DIAGNOSIS — E55.9 VITAMIN D DEFICIENCY: ICD-10-CM

## 2020-12-01 DIAGNOSIS — E11.65 UNCONTROLLED TYPE 2 DIABETES MELLITUS WITH HYPERGLYCEMIA (HCC): ICD-10-CM

## 2020-12-01 DIAGNOSIS — K76.0 FATTY LIVER: ICD-10-CM

## 2020-12-01 DIAGNOSIS — E78.00 PURE HYPERCHOLESTEROLEMIA: ICD-10-CM

## 2020-12-01 DIAGNOSIS — N40.0 BENIGN PROSTATIC HYPERPLASIA WITHOUT LOWER URINARY TRACT SYMPTOMS: ICD-10-CM

## 2020-12-01 DIAGNOSIS — E66.09 CLASS 1 OBESITY DUE TO EXCESS CALORIES WITHOUT SERIOUS COMORBIDITY WITH BODY MASS INDEX (BMI) OF 34.0 TO 34.9 IN ADULT: Chronic | ICD-10-CM

## 2020-12-10 ENCOUNTER — TELEPHONE (OUTPATIENT)
Dept: ENDOCRINOLOGY | Age: 70
End: 2020-12-10

## 2020-12-23 ENCOUNTER — OFFICE VISIT (OUTPATIENT)
Dept: FAMILY MEDICINE CLINIC | Facility: CLINIC | Age: 70
End: 2020-12-23

## 2020-12-23 VITALS
DIASTOLIC BLOOD PRESSURE: 72 MMHG | SYSTOLIC BLOOD PRESSURE: 118 MMHG | TEMPERATURE: 97.5 F | HEART RATE: 85 BPM | WEIGHT: 262.4 LBS | BODY MASS INDEX: 35.54 KG/M2 | OXYGEN SATURATION: 98 % | HEIGHT: 72 IN

## 2020-12-23 DIAGNOSIS — Z79.4 TYPE 2 DIABETES MELLITUS WITHOUT COMPLICATION, WITH LONG-TERM CURRENT USE OF INSULIN (HCC): ICD-10-CM

## 2020-12-23 DIAGNOSIS — Z12.5 PROSTATE CANCER SCREENING: ICD-10-CM

## 2020-12-23 DIAGNOSIS — J44.9 CHRONIC OBSTRUCTIVE PULMONARY DISEASE, UNSPECIFIED COPD TYPE (HCC): ICD-10-CM

## 2020-12-23 DIAGNOSIS — I10 ESSENTIAL HYPERTENSION: Primary | ICD-10-CM

## 2020-12-23 DIAGNOSIS — E11.9 TYPE 2 DIABETES MELLITUS WITHOUT COMPLICATION, WITH LONG-TERM CURRENT USE OF INSULIN (HCC): ICD-10-CM

## 2020-12-23 DIAGNOSIS — E78.00 PURE HYPERCHOLESTEROLEMIA: ICD-10-CM

## 2020-12-23 PROBLEM — E11.65 UNCONTROLLED TYPE 2 DIABETES MELLITUS WITH HYPERGLYCEMIA: Status: RESOLVED | Noted: 2019-04-25 | Resolved: 2020-12-23

## 2020-12-23 LAB
25(OH)D3 SERPL-MCNC: 40.9 NG/ML (ref 30–100)
ALBUMIN SERPL-MCNC: 4.7 G/DL (ref 3.5–5.2)
ALBUMIN/GLOB SERPL: 1.7 G/DL
ALP SERPL-CCNC: 81 U/L (ref 39–117)
ALT SERPL W P-5'-P-CCNC: 22 U/L (ref 1–41)
ANION GAP SERPL CALCULATED.3IONS-SCNC: 9.5 MMOL/L (ref 5–15)
AST SERPL-CCNC: 23 U/L (ref 1–40)
BILIRUB SERPL-MCNC: 0.7 MG/DL (ref 0–1.2)
BUN SERPL-MCNC: 10 MG/DL (ref 8–23)
BUN/CREAT SERPL: 14.9 (ref 7–25)
CALCIUM SPEC-SCNC: 9.8 MG/DL (ref 8.6–10.5)
CHLORIDE SERPL-SCNC: 101 MMOL/L (ref 98–107)
CHOLEST SERPL-MCNC: 182 MG/DL (ref 0–200)
CO2 SERPL-SCNC: 28.5 MMOL/L (ref 22–29)
CREAT SERPL-MCNC: 0.67 MG/DL (ref 0.76–1.27)
DEPRECATED RDW RBC AUTO: 47.5 FL (ref 37–54)
ERYTHROCYTE [DISTWIDTH] IN BLOOD BY AUTOMATED COUNT: 14.5 % (ref 12.3–15.4)
GFR SERPL CREATININE-BSD FRML MDRD: 117 ML/MIN/1.73
GLOBULIN UR ELPH-MCNC: 2.8 GM/DL
GLUCOSE SERPL-MCNC: 90 MG/DL (ref 65–99)
HBA1C MFR BLD: 6.63 % (ref 4.8–5.6)
HCT VFR BLD AUTO: 46.4 % (ref 37.5–51)
HDLC SERPL-MCNC: 46 MG/DL (ref 40–60)
HGB BLD-MCNC: 15.2 G/DL (ref 13–17.7)
LDLC SERPL CALC-MCNC: 107 MG/DL (ref 0–100)
LDLC/HDLC SERPL: 2.24 {RATIO}
MCH RBC QN AUTO: 29.6 PG (ref 26.6–33)
MCHC RBC AUTO-ENTMCNC: 32.8 G/DL (ref 31.5–35.7)
MCV RBC AUTO: 90.3 FL (ref 79–97)
PLATELET # BLD AUTO: 285 10*3/MM3 (ref 140–450)
PMV BLD AUTO: 9.5 FL (ref 6–12)
POTASSIUM SERPL-SCNC: 5.3 MMOL/L (ref 3.5–5.2)
PROT SERPL-MCNC: 7.5 G/DL (ref 6–8.5)
PSA SERPL-MCNC: 1.29 NG/ML (ref 0–4)
RBC # BLD AUTO: 5.14 10*6/MM3 (ref 4.14–5.8)
SODIUM SERPL-SCNC: 139 MMOL/L (ref 136–145)
TRIGL SERPL-MCNC: 164 MG/DL (ref 0–150)
VLDLC SERPL-MCNC: 29 MG/DL (ref 5–40)
WBC # BLD AUTO: 8.91 10*3/MM3 (ref 3.4–10.8)

## 2020-12-23 PROCEDURE — 80053 COMPREHEN METABOLIC PANEL: CPT | Performed by: INTERNAL MEDICINE

## 2020-12-23 PROCEDURE — 83036 HEMOGLOBIN GLYCOSYLATED A1C: CPT | Performed by: INTERNAL MEDICINE

## 2020-12-23 PROCEDURE — 85027 COMPLETE CBC AUTOMATED: CPT | Performed by: INTERNAL MEDICINE

## 2020-12-23 PROCEDURE — G0103 PSA SCREENING: HCPCS | Performed by: INTERNAL MEDICINE

## 2020-12-23 PROCEDURE — 80061 LIPID PANEL: CPT | Performed by: INTERNAL MEDICINE

## 2020-12-23 PROCEDURE — 36415 COLL VENOUS BLD VENIPUNCTURE: CPT | Performed by: INTERNAL MEDICINE

## 2020-12-23 PROCEDURE — 99214 OFFICE O/P EST MOD 30 MIN: CPT | Performed by: INTERNAL MEDICINE

## 2020-12-23 PROCEDURE — 82306 VITAMIN D 25 HYDROXY: CPT | Performed by: INTERNAL MEDICINE

## 2020-12-23 RX ORDER — AMOXICILLIN 500 MG/1
CAPSULE ORAL
COMMUNITY
Start: 2020-12-14 | End: 2021-01-18

## 2020-12-23 RX ORDER — SILDENAFIL 100 MG/1
100 TABLET, FILM COATED ORAL DAILY PRN
Qty: 9 TABLET | Refills: 5 | Status: SHIPPED | OUTPATIENT
Start: 2020-12-23 | End: 2021-03-30

## 2020-12-23 NOTE — PROGRESS NOTES
Subjective   Branden Diop is a 70 y.o. male.     Vitals:    12/23/20 0823   BP: 118/72   Pulse: 85   Temp: 97.5 °F (36.4 °C)   SpO2: 98%      Body mass index is 35.59 kg/m².     History of Present Illness   Patient was seen for hypertension. Pressures been running 118's over 70s. Patient will continue his monitoring of blood pressure at home. We will also continue his present diet and activity levels. Patient blood sugars been running in the 110s to 130s. Patient will continue present activities and monitoring blood sugar. Patient's lipids treated with diet and exercise respiratory 40 mg daily. Patient did have labs today and follow-up in 4 days. Patient return to clinic in 6 months.    Dictated utilizing Dragon dictation. If there are questions or for further clarification, please contact me.  The following portions of the patient's history were reviewed and updated as appropriate: allergies, current medications, past family history, past medical history, past social history, past surgical history and problem list.    Review of Systems   Constitutional: Negative for fatigue and fever.   HENT: Positive for congestion. Negative for trouble swallowing.    Eyes: Negative for discharge and visual disturbance.   Respiratory: Negative for choking and shortness of breath.    Cardiovascular: Negative for chest pain and palpitations.   Gastrointestinal: Negative for abdominal pain and blood in stool.   Endocrine: Negative.    Genitourinary: Negative for genital sores and hematuria.   Musculoskeletal: Negative for gait problem and joint swelling.   Skin: Negative for color change, pallor, rash and wound.   Allergic/Immunologic: Positive for environmental allergies. Negative for immunocompromised state.   Neurological: Negative for facial asymmetry and speech difficulty.   Psychiatric/Behavioral: Negative for hallucinations and suicidal ideas.       Objective   Physical Exam  Vitals signs and nursing note reviewed.    Constitutional:       Appearance: Normal appearance. He is well-developed.   HENT:      Head: Normocephalic and atraumatic.      Nose: Nose normal.      Mouth/Throat:      Mouth: Mucous membranes are moist.      Pharynx: Oropharynx is clear.   Eyes:      Extraocular Movements: Extraocular movements intact.      Conjunctiva/sclera: Conjunctivae normal.      Pupils: Pupils are equal, round, and reactive to light.   Neck:      Musculoskeletal: Normal range of motion and neck supple.   Cardiovascular:      Rate and Rhythm: Normal rate and regular rhythm.      Heart sounds: Normal heart sounds. No murmur. No friction rub. No gallop.    Pulmonary:      Effort: Pulmonary effort is normal. No respiratory distress.      Breath sounds: Normal breath sounds. No stridor. No wheezing, rhonchi or rales.   Chest:      Chest wall: No tenderness.   Abdominal:      General: Bowel sounds are normal.      Palpations: Abdomen is soft.   Musculoskeletal: Normal range of motion.   Skin:     General: Skin is warm and dry.   Neurological:      General: No focal deficit present.      Mental Status: He is alert and oriented to person, place, and time. Mental status is at baseline.   Psychiatric:         Mood and Affect: Mood normal.         Behavior: Behavior normal.         Thought Content: Thought content normal.         Judgment: Judgment normal.         Assessment/Plan One monitor blood pressure blood sugar #2 continue present diet and activity levels #3 labs  Problems Addressed this Visit     Cardiovascular and Mediastinum              Hyperlipidemia    Relevant Orders    CBC (No Diff)    Comprehensive Metabolic Panel    Lipid Panel    Vitamin D 25 Hydroxy    PSA Screen    Hemoglobin A1c    Essential hypertension - Primary    Relevant Orders    CBC (No Diff)    Comprehensive Metabolic Panel    Lipid Panel    Vitamin D 25 Hydroxy    PSA Screen    Hemoglobin A1c          Respiratory              COPD (chronic obstructive pulmonary  disease) (CMS/HCC)    Relevant Orders    CBC (No Diff)    Comprehensive Metabolic Panel    Lipid Panel    Vitamin D 25 Hydroxy    PSA Screen    Hemoglobin A1c          Endocrine              Type 2 diabetes mellitus, with long-term current use of insulin (CMS/[facility])            Other Visit Diagnoses     Prostate cancer screening        Relevant Orders    CBC (No Diff)    Comprehensive Metabolic Panel    Lipid Panel    Vitamin D 25 Hydroxy    PSA Screen    Hemoglobin A1c      Diagnoses     Diagnosis Codes Comments    Essential hypertension    -  Primary ICD-10-CM: I10  ICD-9-CM: 401.9     Prostate cancer screening     ICD-10-CM: Z12.5  ICD-9-CM: V76.44     Chronic obstructive pulmonary disease, unspecified COPD type (CMS/[facility])     ICD-10-CM: J44.9  ICD-9-CM: 496     Pure hypercholesterolemia     ICD-10-CM: E78.00  ICD-9-CM: 272.0     Type 2 diabetes mellitus without complication, with long-term current use of insulin (CMS/[facility])     ICD-10-CM: E11.9, Z79.4  ICD-9-CM: 250.00, V58.67

## 2020-12-28 ENCOUNTER — TELEPHONE (OUTPATIENT)
Dept: FAMILY MEDICINE CLINIC | Facility: CLINIC | Age: 70
End: 2020-12-28

## 2020-12-28 NOTE — TELEPHONE ENCOUNTER
PATIENTS WIFE IS CALLING SHE STATES SHE IS WANTING TO KNOW IF THEY CAN DROP OFF AND GET FILLED ASAP ABOUT EXCUSING PATIENT FROM DOING JURY DUTY. HE HAS FOUR DAYS TO GET THIS BACK TO THE COURT.        PLEASE ADVISE    CALLBACK NUMBER IS:  [phone]

## 2021-01-18 ENCOUNTER — OFFICE VISIT (OUTPATIENT)
Dept: ENDOCRINOLOGY | Age: 71
End: 2021-01-18

## 2021-01-18 VITALS
SYSTOLIC BLOOD PRESSURE: 136 MMHG | WEIGHT: 267.6 LBS | HEIGHT: 72 IN | RESPIRATION RATE: 16 BRPM | DIASTOLIC BLOOD PRESSURE: 86 MMHG | BODY MASS INDEX: 36.24 KG/M2

## 2021-01-18 DIAGNOSIS — E11.65 TYPE 2 DIABETES MELLITUS WITH HYPERGLYCEMIA, WITH LONG-TERM CURRENT USE OF INSULIN (HCC): Primary | ICD-10-CM

## 2021-01-18 DIAGNOSIS — Z79.4 TYPE 2 DIABETES MELLITUS WITH HYPERGLYCEMIA, WITH LONG-TERM CURRENT USE OF INSULIN (HCC): Primary | ICD-10-CM

## 2021-01-18 PROCEDURE — 99213 OFFICE O/P EST LOW 20 MIN: CPT | Performed by: NURSE PRACTITIONER

## 2021-01-18 NOTE — PROGRESS NOTES
"Chief Complaint  Diabetes (last eye exam a year ago; checking BG once a day), Vitamin D Deficiency, Hyperlipidemia, and Hypertension    Subjective          Branden Diop presents to [facility] for   History of Present Illness     Type 2 dm - Diagnosed in 2020   Today in clinic pt reports being on Soliqua 20 units  FBG -   Checks BG - daily  Dm retinopathy - denies ,Last eye exam - one year ago  Dm nephropathy - denies  Dm neuropathy - moderate,Dm neuropathy meds -   CAD - denies  CVA -denies  Episodes of hypoglycemia - occasionally in the 60s   Pt is physically active. weight has been stable.   Pt tries to follow DM diet for most part.   On Ace inb.    Getting dental implants currently     Objective   Vital Signs:   /86   Resp 16   Ht 182.9 cm (72\")   Wt 121 kg (267 lb 9.6 oz)   BMI 36.29 kg/m²     Physical Exam  Vitals signs reviewed.   Constitutional:       General: He is not in acute distress.  HENT:      Head: Normocephalic and atraumatic.   Neck:      Musculoskeletal: Normal range of motion and neck supple.   Cardiovascular:      Rate and Rhythm: Normal rate and regular rhythm.   Pulmonary:      Effort: Pulmonary effort is normal. No respiratory distress.   Musculoskeletal: Normal range of motion.         General: No signs of injury.   Feet:      Right foot:      Skin integrity: No blister, skin breakdown, callus or dry skin.      Toenail Condition: Right toenails are normal.      Left foot:      Skin integrity: No blister, skin breakdown, callus or dry skin.      Toenail Condition: Left toenails are normal.   Skin:     General: Skin is warm and dry.   Neurological:      Mental Status: He is alert and oriented to person, place, and time. Mental status is at baseline.   Psychiatric:         Mood and Affect: Mood normal.         Behavior: Behavior normal.         Thought Content: Thought content normal.         Judgment: Judgment normal.        Result Review :   The " following data was reviewed by: BAM Moody on 01/18/2021:  Common labs    Common Labsle 6/16/20 6/16/20 6/16/20 6/16/20 8/10/20 8/10/20 8/10/20 8/10/20 8/10/20 8/10/20 8/10/20 12/23/20 12/23/20 12/23/20 12/23/20 12/23/20    0803 0803 0803 0803 1400 1400 1400 1400 1400 1400 1400 0935 0935 0935 0935 0935   Glucose      80             BUN  15    15       10      Creatinine  0.76    0.70 (A)       0.67 (A)      eGFR Non African Am  101    111       117      eGFR  Am      135             Sodium  135 (A)    139       139      Potassium  4.9    4.7       5.3 (A)      Chloride  100    102       101      Calcium  10.2    9.4       9.8      Total Protein      6.9             Albumin  4.70    4.80       4.70      Total Bilirubin  0.7    0.6       0.7      Alkaline Phosphatase  91    82       81      AST (SGOT)  21    20       23      ALT (SGPT)  21    20       22      WBC 7.23         9.86  8.91       Hemoglobin 14.9         15.2  15.2       Hematocrit 44.8         46.8  46.4       Platelets 284         314  285       Total Cholesterol           206 (A)        Triglycerides   127        255 (A)   164 (A)     HDL Cholesterol   37 (A)           46     LDL Cholesterol    115 (A)           107 (A)     Hemoglobin A1C    11.16 (A)   7.10 (A)        6.63 (A)    Microalbumin, Urine        93.7           PSA         0.776       1.290   Uric Acid     2.8 (A)              (A) Abnormal value       Comments are available for some flowsheets but are not being displayed.                     Assessment and Plan    Problem List Items Addressed This Visit        Other    Type 2 diabetes mellitus, with long-term current use of insulin (CMS/[facility]) - Primary    Relevant Orders    Comprehensive Metabolic Panel    Hemoglobin A1c    Lipid Panel    Microalbumin / Creatinine Urine Ratio - Urine, Clean Catch          Follow Up   Return in about 6 months (around 7/18/2021).   Doing well  a1c near target  Discussed regular meals of 45-60g  of carbs to prevent low blood sugar, no skipping meals.  Off metformin for now.   Continue Soliqua  Healthy diet and exercise    Patient was given instructions and counseling regarding his condition or for health maintenance advice. Please see specific information pulled into the AVS if appropriate.     Barbara Ervin APRN

## 2021-01-25 ENCOUNTER — TELEPHONE (OUTPATIENT)
Dept: FAMILY MEDICINE CLINIC | Facility: CLINIC | Age: 71
End: 2021-01-25

## 2021-01-25 NOTE — TELEPHONE ENCOUNTER
PATIENTS WIFE CALLED IN AND STATED THAT THE PATIENT WENT TO THE APPOINTMENT FOR THE FOOT DOCTOR, THE DOCTOR THERE ONLY WANTED TO INJECT THE PATIENT WITH SHOTS. THE PATIENT IS NOT COMFORTABLE WITH THAT AND WOULD LIKE TO SEE SOMEONE ELSE.     HE ALSO NEEDS TO BE SEEN BY A PODIATRIST FOR HIS TOE NAILS. WOULD LIKE TO BE SEEN BY THEM MONTHLY IF POSSIBLE.     PLEASE ADVISE    CALL BACK [phone]

## 2021-01-26 NOTE — TELEPHONE ENCOUNTER
PT'S WIFE STATED THAT PT WENT TO [facility]OT AND [facility]KLE DOCTOR AT [facility] ON [address], [address] FROM [facility]. SAW A FEMALE DOCTOR., DONT KNOW THE NAME.    CALL BACK [phone]

## 2021-01-27 DIAGNOSIS — Z79.4 TYPE 2 DIABETES MELLITUS WITHOUT COMPLICATION, WITH LONG-TERM CURRENT USE OF INSULIN (HCC): Primary | ICD-10-CM

## 2021-01-27 DIAGNOSIS — E11.9 TYPE 2 DIABETES MELLITUS WITHOUT COMPLICATION, WITH LONG-TERM CURRENT USE OF INSULIN (HCC): Primary | ICD-10-CM

## 2021-01-28 NOTE — TELEPHONE ENCOUNTER
Spouse calling back in, in regards to another referral.    Please call back to advise @ [phone]

## 2021-03-24 ENCOUNTER — TRANSCRIBE ORDERS (OUTPATIENT)
Dept: PREADMISSION TESTING | Facility: HOSPITAL | Age: 71
End: 2021-03-24

## 2021-03-30 ENCOUNTER — APPOINTMENT (OUTPATIENT)
Dept: PREADMISSION TESTING | Facility: HOSPITAL | Age: 71
End: 2021-03-30

## 2021-03-30 ENCOUNTER — TELEPHONE (OUTPATIENT)
Dept: FAMILY MEDICINE CLINIC | Facility: CLINIC | Age: 71
End: 2021-03-30

## 2021-03-30 VITALS
RESPIRATION RATE: 16 BRPM | DIASTOLIC BLOOD PRESSURE: 86 MMHG | BODY MASS INDEX: 37.65 KG/M2 | SYSTOLIC BLOOD PRESSURE: 153 MMHG | HEART RATE: 78 BPM | HEIGHT: 72 IN | WEIGHT: 278 LBS | OXYGEN SATURATION: 94 % | TEMPERATURE: 97.3 F

## 2021-03-30 DIAGNOSIS — R94.31 ABNORMAL EKG: Primary | ICD-10-CM

## 2021-03-30 LAB
ANION GAP SERPL CALCULATED.3IONS-SCNC: 8.8 MMOL/L (ref 5–15)
BUN SERPL-MCNC: 10 MG/DL (ref 8–23)
BUN/CREAT SERPL: 14.5 (ref 7–25)
CALCIUM SPEC-SCNC: 9.5 MG/DL (ref 8.6–10.5)
CHLORIDE SERPL-SCNC: 103 MMOL/L (ref 98–107)
CO2 SERPL-SCNC: 27.2 MMOL/L (ref 22–29)
CREAT SERPL-MCNC: 0.69 MG/DL (ref 0.76–1.27)
DEPRECATED RDW RBC AUTO: 47.1 FL (ref 37–54)
ERYTHROCYTE [DISTWIDTH] IN BLOOD BY AUTOMATED COUNT: 14.3 % (ref 12.3–15.4)
GFR SERPL CREATININE-BSD FRML MDRD: 113 ML/MIN/1.73
GLUCOSE SERPL-MCNC: 79 MG/DL (ref 65–99)
HCT VFR BLD AUTO: 46.7 % (ref 37.5–51)
HGB BLD-MCNC: 15.5 G/DL (ref 13–17.7)
MCH RBC QN AUTO: 30.1 PG (ref 26.6–33)
MCHC RBC AUTO-ENTMCNC: 33.2 G/DL (ref 31.5–35.7)
MCV RBC AUTO: 90.7 FL (ref 79–97)
PLATELET # BLD AUTO: 285 10*3/MM3 (ref 140–450)
PMV BLD AUTO: 9.1 FL (ref 6–12)
POTASSIUM SERPL-SCNC: 4.7 MMOL/L (ref 3.5–5.2)
QT INTERVAL: 391 MS
RBC # BLD AUTO: 5.15 10*6/MM3 (ref 4.14–5.8)
SODIUM SERPL-SCNC: 139 MMOL/L (ref 136–145)
WBC # BLD AUTO: 8.51 10*3/MM3 (ref 3.4–10.8)

## 2021-03-30 PROCEDURE — 93010 ELECTROCARDIOGRAM REPORT: CPT | Performed by: INTERNAL MEDICINE

## 2021-03-30 PROCEDURE — 85027 COMPLETE CBC AUTOMATED: CPT

## 2021-03-30 PROCEDURE — 80048 BASIC METABOLIC PNL TOTAL CA: CPT

## 2021-03-30 PROCEDURE — 36415 COLL VENOUS BLD VENIPUNCTURE: CPT

## 2021-03-30 PROCEDURE — 93005 ELECTROCARDIOGRAM TRACING: CPT

## 2021-03-30 RX ORDER — CETIRIZINE HYDROCHLORIDE 10 MG/1
10 TABLET ORAL DAILY PRN
COMMUNITY
End: 2021-04-12

## 2021-03-30 NOTE — TELEPHONE ENCOUNTER
PATIENT'S WIFE CALLED STATING :       PATIENT IS HAVING SURGERY ON FEET ON THE 4TH       TESTS RUN TODAY-- HAD A LITTLE ELECTRO-CARDIOGRAM A BIT OFF       DOES NOT HAVE CARDIOLOGIST AND WANTED TO SEE IF YOU COULD REFER HIM TO ONE.     PLEASE CALL Luba Arnold () [phone] (H)

## 2021-04-02 ENCOUNTER — TELEPHONE (OUTPATIENT)
Dept: FAMILY MEDICINE CLINIC | Facility: CLINIC | Age: 71
End: 2021-04-02

## 2021-04-02 ENCOUNTER — LAB (OUTPATIENT)
Dept: LAB | Facility: HOSPITAL | Age: 71
End: 2021-04-02

## 2021-04-02 DIAGNOSIS — R94.31 ABNORMAL EKG: Primary | ICD-10-CM

## 2021-04-02 PROCEDURE — U0005 INFEC AGEN DETEC AMPLI PROBE: HCPCS

## 2021-04-02 PROCEDURE — C9803 HOPD COVID-19 SPEC COLLECT: HCPCS

## 2021-04-02 PROCEDURE — U0004 COV-19 TEST NON-CDC HGH THRU: HCPCS

## 2021-04-03 LAB — SARS-COV-2 ORF1AB RESP QL NAA+PROBE: NOT DETECTED

## 2021-04-12 ENCOUNTER — OFFICE VISIT (OUTPATIENT)
Dept: CARDIOLOGY | Facility: CLINIC | Age: 71
End: 2021-04-12

## 2021-04-12 VITALS
SYSTOLIC BLOOD PRESSURE: 130 MMHG | DIASTOLIC BLOOD PRESSURE: 84 MMHG | HEART RATE: 75 BPM | BODY MASS INDEX: 36.57 KG/M2 | HEIGHT: 72 IN | WEIGHT: 270 LBS

## 2021-04-12 DIAGNOSIS — I48.19 ATRIAL FIBRILLATION, PERSISTENT (HCC): ICD-10-CM

## 2021-04-12 DIAGNOSIS — R07.89 CHEST PAIN, ATYPICAL: ICD-10-CM

## 2021-04-12 DIAGNOSIS — I48.0 PAROXYSMAL ATRIAL FIBRILLATION (HCC): Primary | ICD-10-CM

## 2021-04-12 PROCEDURE — 93000 ELECTROCARDIOGRAM COMPLETE: CPT | Performed by: NURSE PRACTITIONER

## 2021-04-12 PROCEDURE — 99214 OFFICE O/P EST MOD 30 MIN: CPT | Performed by: NURSE PRACTITIONER

## 2021-04-12 NOTE — PROGRESS NOTES
"Date of Office Visit: 21  Encounter Provider: BAM Nieves  Place of Service: [facility] CARDIOLOGY  Patient Name: Branden Diop  :1950    Chief Complaint   Patient presents with   • Paroxysmal atrial fibrillation   • Sleep Apnea   • Surgical Clearance   :     HPI: Branden Diop is a 70 y.o. male  with history of hypertension, hyperlipidemia, paroxysmal atrial fibrillation, diabetes mellitus, COPD,obstructive sleep apnea, and obesity.     I will visit with him for the first time today and have reviewed his medical record.    He had an unremarkable echo in 2014.  He apparently had pneumonia at that time.  At that time he had persistent atrial fibrillation and had cardioversion 2014.    He presents in need of surgery clearance.  He was found to have recurrent atrial fibrillation on preop testing so his hammer toe surgery was canceled.  He denies chest pain tightness pressure, palpitation, near syncope or syncope.  He has intermittent shortness of breath with exertion which he relates to his weight.  He has had 2 dizzy spells in the last year.  He had one on Saturday which lasted just a few moments and resolved spontaneously.  He is accompanied by his wife today.  He reports compliance with CPAP.      No Known Allergies        Family and social history reviewed.     ROS  All other systems were reviewed and are negative          Objective:     Vitals:    21 0947   BP: 130/84   BP Location: Left arm   Patient Position: Sitting   Pulse: 75   Weight: 122 kg (270 lb)   Height: 182.9 cm (72\")     Body mass index is 36.62 kg/m².    PHYSICAL EXAM:  Constitutional:       General: Not in acute distress.     Appearance: Well-developed. Not diaphoretic.   HENT:      Head: Normocephalic.   Pulmonary:      Effort: Pulmonary effort is normal. No respiratory distress.      Breath sounds: Examination of the right-lower field reveals decreased breath sounds. Examination of the " left-lower field reveals decreased breath sounds. Decreased breath sounds present. No wheezing. No rhonchi. No rales.   Cardiovascular:      Normal rate. Irregularly irregular rhythm.   Pulses:     Radial: 2+ bilaterally.  Edema:     Peripheral edema absent.   Skin:     General: Skin is warm and dry. There is no cyanosis.      Findings: No rash.   Neurological:      Mental Status: Alert and oriented to person, place, and time.   Psychiatric:         Behavior: Behavior normal.         Thought Content: Thought content normal.         Judgment: Judgment normal.           ECG 12 Lead    Date/Time: 4/12/2021 10:01 AM  Performed by: Yola Davis APRN  Authorized by: Yola Davis APRN   Comparison: compared with previous ECG   Similar to previous ECG  Rhythm: atrial fibrillation  Rate: normal    Clinical impression: abnormal EKG              Current Outpatient Medications   Medication Sig Dispense Refill   • Cholecalciferol (Vitamin D3) 25 MCG (1000 UT) capsule Take 1,000 Units by mouth Daily.     • glucose blood test strip Freestyle strips daily E 11.9 100 each 12   • glucose monitor monitoring kit 1 each 2 (Two) Times a Day. e11.9 1 each 1   • glucose monitoring kit (FREESTYLE) monitoring kit Daily E 11.93 FreeStyle meter 1 each 1   • Lancets (FREESTYLE) lancets Daily E 11.9 100 each 12   • Multiple Vitamin (MULTIVITAMINS PO) Take 1 tablet by mouth Daily.     • rosuvastatin (CRESTOR) 40 MG tablet Take 1 tablet by mouth Daily. 90 tablet 3   • sertraline (ZOLOFT) 50 MG tablet TAKE ONE TABLET BY MOUTH DAILY (Patient taking differently: Take 50 mg by mouth Daily.) 90 tablet 1   • SOLIQUA 100-33 UNT-MCG/ML solution pen-injector injection Inject 60 Units under the skin into the appropriate area as directed Daily With Breakfast. (Patient taking differently: Inject 20 Units under the skin into the appropriate area as directed Daily With Breakfast.) 15 pen 3     No current facility-administered medications for this visit.      Assessment:       Diagnosis Plan   1. Paroxysmal atrial fibrillation (CMS/HCC)  ECG 12 Lead    Stress Test With Myocardial Perfusion One Day   2. Atrial fibrillation, persistent (CMS/HCC)  Adult Transthoracic Echo Complete W/ Cont if Necessary Per Protocol    Holter Monitor - 24 Hour   3. Chest pain, atypical  Stress Test With Myocardial Perfusion One Day        Orders Placed This Encounter   Procedures   • Stress Test With Myocardial Perfusion One Day     Standing Status:   Future     Standing Expiration Date:   4/12/2022     Order Specific Question:   What stress agent will be used?     Answer:   Regadenoson (Lexiscan)     Order Specific Question:   Difficulty walking criteria?     Answer:   AFib/VTach     Order Specific Question:   Reason for exam?     Answer:   Chest Pain     Order Specific Question:   Release to patient     Answer:   Immediate   • Holter Monitor - 24 Hour     Standing Status:   Future     Standing Expiration Date:   4/12/2022     Order Specific Question:   Reason for exam?     Answer:   Palpitations     Order Specific Question:   Reason for exam?     Answer:   AFib     Order Specific Question:   Release to patient     Answer:   Immediate   • ECG 12 Lead     This order was created via procedure documentation     Order Specific Question:   Release to patient     Answer:   Immediate   • Adult Transthoracic Echo Complete W/ Cont if Necessary Per Protocol     Standing Status:   Future     Standing Expiration Date:   4/12/2022     Order Specific Question:   Reason for exam?     Answer:   Dyspnea         Plan:       1.  70-year-old gentleman with history of atrial fibrillation status post cardioversion in 2014.  He is in persistent atrial flutter.  This is atypical.  His EKG from March 30 also showed new atrial fibrillation.  He had not had any EKG since 2018.  Going to start Xarelto 20 mg daily since I have samples of that.  He will return for an echo, perfusion stress test and 24-hour  Holter.  2.  Hyperlipidemia on atorvastatin 40 mg  3.  Diabetes mellitus type 2  4.  Obesity  5.  Hypertension blood pressure appears stable  6.  Obstructive sleep apnea reports compliance with CPAP  7.  COPD  8. First degree AV block  9. Surgery clearance- abnormal ECG-we will postpone clearance at this time for bilateral hammertoe surgery with Dr. Torrey Ge  10.  Need for dental implants-he is going to postpone his appointment later this week.      He will follow up in 4 weeks.          It has been a pleasure to participate in this patient's care.      Thank you,  BAM Nievse      **I used Dragon to dictate this note:**

## 2021-04-26 ENCOUNTER — OFFICE VISIT (OUTPATIENT)
Dept: FAMILY MEDICINE CLINIC | Facility: CLINIC | Age: 71
End: 2021-04-26

## 2021-04-26 VITALS
HEART RATE: 60 BPM | BODY MASS INDEX: 36.44 KG/M2 | WEIGHT: 269 LBS | SYSTOLIC BLOOD PRESSURE: 130 MMHG | TEMPERATURE: 97.3 F | RESPIRATION RATE: 18 BRPM | HEIGHT: 72 IN | DIASTOLIC BLOOD PRESSURE: 62 MMHG | OXYGEN SATURATION: 98 %

## 2021-04-26 DIAGNOSIS — H00.014 HORDEOLUM EXTERNUM OF LEFT UPPER EYELID: Primary | ICD-10-CM

## 2021-04-26 PROCEDURE — 99213 OFFICE O/P EST LOW 20 MIN: CPT | Performed by: NURSE PRACTITIONER

## 2021-04-26 NOTE — PROGRESS NOTES
"Chief Complaint  bump on left eyelid x 3 days    Subjective          Branden Diop presents to [facility]  History of Present Illness  C/o lesion on left eyelid, noticed about 3 days ago, he states he had lasix surgery 3 years ago, he does see eye doctor, [facility], denies pain, states he felt lesion, he denies vision changes, denies eye drainage, denies watery eyes. He did not try anything at home. Denies redness or irritation.         Objective   Vital Signs:   /62 (BP Location: Left arm, Patient Position: Sitting)   Pulse 60   Temp 97.3 °F (36.3 °C) (Infrared)   Resp 18   Ht 182.9 cm (72\")   Wt 122 kg (269 lb)   SpO2 98%   BMI 36.48 kg/m²     Physical Exam  Vitals and nursing note reviewed.   Constitutional:       Appearance: He is well-developed.   HENT:      Head: Normocephalic.   Eyes:      General:         Left eye: Hordeolum present.     Extraocular Movements: Extraocular movements intact.      Conjunctiva/sclera: Conjunctivae normal.      Right eye: Right conjunctiva is not injected.      Left eye: Left conjunctiva is not injected.      Pupils: Pupils are equal, round, and reactive to light.   Cardiovascular:      Rate and Rhythm: Normal rate and regular rhythm.      Heart sounds: Normal heart sounds.   Pulmonary:      Effort: Pulmonary effort is normal.      Breath sounds: Normal breath sounds.   Skin:     General: Skin is warm and dry.   Neurological:      Mental Status: He is alert and oriented to person, place, and time.   Psychiatric:         Behavior: Behavior normal.         Judgment: Judgment normal.        Result Review :                 Assessment and Plan    Diagnoses and all orders for this visit:    1. Hordeolum externum of left upper eyelid (Primary)        Follow Up   No follow-ups on file.  Patient was given instructions and counseling regarding his condition or for health maintenance advice. Please see specific information pulled into the AVS if " appropriate.     Apply warm compress to affected area as needed,   He will monitor area,   If symptoms persist 1-2 weeks call office or he will make f/u with eye doctor as he is established.   Patient agrees with plan of care and understands instructions. Call if worsening symptoms or any problems or concerns.

## 2021-04-26 NOTE — PATIENT INSTRUCTIONS
Apply warm compress to affected area as needed,   He will monitor area,   If symptoms persist 1-2 weeks call office or he will make f/u with eye doctor as he is established.   Patient agrees with plan of care and understands instructions. Call if worsening symptoms or any problems or concerns.

## 2021-04-28 ENCOUNTER — HOSPITAL ENCOUNTER (OUTPATIENT)
Dept: CARDIOLOGY | Facility: HOSPITAL | Age: 71
Discharge: HOME | End: 2021-04-28

## 2021-04-28 ENCOUNTER — TELEPHONE (OUTPATIENT)
Dept: CARDIOLOGY | Facility: CLINIC | Age: 71
End: 2021-04-28

## 2021-04-28 VITALS
HEIGHT: 72 IN | WEIGHT: 269 LBS | BODY MASS INDEX: 36.44 KG/M2 | DIASTOLIC BLOOD PRESSURE: 80 MMHG | SYSTOLIC BLOOD PRESSURE: 154 MMHG

## 2021-04-28 VITALS — BODY MASS INDEX: 37.65 KG/M2 | WEIGHT: 268.96 LBS | HEIGHT: 71 IN

## 2021-04-28 DIAGNOSIS — I48.19 ATRIAL FIBRILLATION, PERSISTENT (HCC): ICD-10-CM

## 2021-04-28 DIAGNOSIS — R07.89 CHEST PAIN, ATYPICAL: ICD-10-CM

## 2021-04-28 DIAGNOSIS — I48.0 PAROXYSMAL ATRIAL FIBRILLATION (HCC): ICD-10-CM

## 2021-04-28 LAB
BH CV NUCLEAR PRIOR STUDY: 3
BH CV REST NUCLEAR ISOTOPE DOSE: 51.5 MCI
BH CV STRESS BP STAGE 1: NORMAL
BH CV STRESS COMMENTS STAGE 1: NORMAL
BH CV STRESS DOSE REGADENOSON STAGE 1: 0.4
BH CV STRESS DURATION MIN STAGE 1: 0
BH CV STRESS DURATION SEC STAGE 1: 10
BH CV STRESS HR STAGE 1: 94
BH CV STRESS NUCLEAR ISOTOPE DOSE: 51.5 MCI
BH CV STRESS PROTOCOL 1: NORMAL
BH CV STRESS RECOVERY BP: NORMAL MMHG
BH CV STRESS RECOVERY HR: 77 BPM
BH CV STRESS STAGE 1: 1
LV EF NUC BP: 70 %
MAXIMAL PREDICTED HEART RATE: 148 BPM
PERCENT MAX PREDICTED HR: 63.51 %
STRESS BASELINE BP: NORMAL MMHG
STRESS BASELINE HR: 64 BPM
STRESS PERCENT HR: 75 %
STRESS POST EXERCISE DUR SEC: 10 SEC
STRESS POST PEAK BP: NORMAL MMHG
STRESS POST PEAK HR: 94 BPM
STRESS TARGET HR: 126 BPM

## 2021-04-28 PROCEDURE — 78492 MYOCRD IMG PET MLT RST&STRS: CPT

## 2021-04-28 PROCEDURE — 25010000002 REGADENOSON 0.4 MG/5ML SOLUTION: Performed by: NURSE PRACTITIONER

## 2021-04-28 PROCEDURE — 0 RUBIDIUM CHLORIDE: Performed by: NURSE PRACTITIONER

## 2021-04-28 PROCEDURE — 93306 TTE W/DOPPLER COMPLETE: CPT

## 2021-04-28 PROCEDURE — 93016 CV STRESS TEST SUPVJ ONLY: CPT | Performed by: INTERNAL MEDICINE

## 2021-04-28 PROCEDURE — 93306 TTE W/DOPPLER COMPLETE: CPT | Performed by: INTERNAL MEDICINE

## 2021-04-28 PROCEDURE — 93018 CV STRESS TEST I&R ONLY: CPT | Performed by: INTERNAL MEDICINE

## 2021-04-28 PROCEDURE — A9555 RB82 RUBIDIUM: HCPCS | Performed by: NURSE PRACTITIONER

## 2021-04-28 PROCEDURE — 78492 MYOCRD IMG PET MLT RST&STRS: CPT | Performed by: INTERNAL MEDICINE

## 2021-04-28 PROCEDURE — 93017 CV STRESS TEST TRACING ONLY: CPT

## 2021-04-28 RX ADMIN — REGADENOSON 0.4 MG: 0.08 INJECTION, SOLUTION INTRAVENOUS at 09:34

## 2021-04-28 NOTE — TELEPHONE ENCOUNTER
Spoke with patient's wife.  Reviewed echocardiogram and stress test results.  Some ischemia is noted but he is not having any chest pain tightness or pressure.  He is to continue statin and Xarelto. He states Xarleto made his feet feel better. He is wearing a 24-hour Holter currently and we will reassess next Friday in clinic.       May benefit from CT angiogram coronary. He is in need for dental implants and at this time not cleared for that.

## 2021-04-29 LAB
ASCENDING AORTA: 3.2 CM
BH CV ECHO MEAS - ACS: 2.4 CM
BH CV ECHO MEAS - AO MAX PG (FULL): 2.1 MMHG
BH CV ECHO MEAS - AO MAX PG: 6.3 MMHG
BH CV ECHO MEAS - AO MEAN PG (FULL): 2 MMHG
BH CV ECHO MEAS - AO MEAN PG: 4 MMHG
BH CV ECHO MEAS - AO ROOT AREA (BSA CORRECTED): 1.7
BH CV ECHO MEAS - AO ROOT AREA: 12.6 CM^2
BH CV ECHO MEAS - AO ROOT DIAM: 4 CM
BH CV ECHO MEAS - AO V2 MAX: 125 CM/SEC
BH CV ECHO MEAS - AO V2 MEAN: 89.3 CM/SEC
BH CV ECHO MEAS - AO V2 VTI: 27.5 CM
BH CV ECHO MEAS - ASC AORTA: 3.2 CM
BH CV ECHO MEAS - AVA(I,A): 2.3 CM^2
BH CV ECHO MEAS - AVA(I,D): 2.3 CM^2
BH CV ECHO MEAS - AVA(V,A): 2.6 CM^2
BH CV ECHO MEAS - AVA(V,D): 2.6 CM^2
BH CV ECHO MEAS - BSA(HAYCOCK): 2.5 M^2
BH CV ECHO MEAS - BSA: 2.4 M^2
BH CV ECHO MEAS - BZI_BMI: 36.5 KILOGRAMS/M^2
BH CV ECHO MEAS - BZI_METRIC_HEIGHT: 182.9 CM
BH CV ECHO MEAS - BZI_METRIC_WEIGHT: 122 KG
BH CV ECHO MEAS - EDV(MOD-SP2): 111 ML
BH CV ECHO MEAS - EDV(MOD-SP4): 77 ML
BH CV ECHO MEAS - EDV(TEICH): 74.2 ML
BH CV ECHO MEAS - EF(CUBED): 60.8 %
BH CV ECHO MEAS - EF(MOD-BP): 65 %
BH CV ECHO MEAS - EF(MOD-SP2): 66.7 %
BH CV ECHO MEAS - EF(MOD-SP4): 61 %
BH CV ECHO MEAS - EF(TEICH): 52.8 %
BH CV ECHO MEAS - ESV(MOD-SP2): 37 ML
BH CV ECHO MEAS - ESV(MOD-SP4): 30 ML
BH CV ECHO MEAS - ESV(TEICH): 35 ML
BH CV ECHO MEAS - FS: 26.8 %
BH CV ECHO MEAS - IVS/LVPW: 0.9
BH CV ECHO MEAS - IVSD: 0.9 CM
BH CV ECHO MEAS - LAT PEAK E' VEL: 13.6 CM/SEC
BH CV ECHO MEAS - LV DIASTOLIC VOL/BSA (35-75): 31.9 ML/M^2
BH CV ECHO MEAS - LV MASS(C)D: 123 GRAMS
BH CV ECHO MEAS - LV MASS(C)DI: 50.9 GRAMS/M^2
BH CV ECHO MEAS - LV MAX PG: 4.2 MMHG
BH CV ECHO MEAS - LV MEAN PG: 2 MMHG
BH CV ECHO MEAS - LV SYSTOLIC VOL/BSA (12-30): 12.4 ML/M^2
BH CV ECHO MEAS - LV V1 MAX: 102 CM/SEC
BH CV ECHO MEAS - LV V1 MEAN: 67 CM/SEC
BH CV ECHO MEAS - LV V1 VTI: 20 CM
BH CV ECHO MEAS - LVIDD: 4.1 CM
BH CV ECHO MEAS - LVIDS: 3 CM
BH CV ECHO MEAS - LVLD AP2: 8.5 CM
BH CV ECHO MEAS - LVLD AP4: 7.2 CM
BH CV ECHO MEAS - LVLS AP2: 7.5 CM
BH CV ECHO MEAS - LVLS AP4: 6.2 CM
BH CV ECHO MEAS - LVOT AREA (M): 3.1 CM^2
BH CV ECHO MEAS - LVOT AREA: 3.1 CM^2
BH CV ECHO MEAS - LVOT DIAM: 2 CM
BH CV ECHO MEAS - LVPWD: 1 CM
BH CV ECHO MEAS - MED PEAK E' VEL: 11 CM/SEC
BH CV ECHO MEAS - MR MAX PG: 43.8 MMHG
BH CV ECHO MEAS - MR MAX VEL: 331 CM/SEC
BH CV ECHO MEAS - MV DEC SLOPE: 602 CM/SEC^2
BH CV ECHO MEAS - MV DEC TIME: 0.12 SEC
BH CV ECHO MEAS - MV E MAX VEL: 110 CM/SEC
BH CV ECHO MEAS - MV MAX PG: 5.9 MMHG
BH CV ECHO MEAS - MV MEAN PG: 2 MMHG
BH CV ECHO MEAS - MV P1/2T MAX VEL: 119 CM/SEC
BH CV ECHO MEAS - MV P1/2T: 57.9 MSEC
BH CV ECHO MEAS - MV V2 MAX: 121 CM/SEC
BH CV ECHO MEAS - MV V2 MEAN: 68 CM/SEC
BH CV ECHO MEAS - MV V2 VTI: 20.3 CM
BH CV ECHO MEAS - MVA P1/2T LCG: 1.8 CM^2
BH CV ECHO MEAS - MVA(P1/2T): 3.8 CM^2
BH CV ECHO MEAS - MVA(VTI): 3.1 CM^2
BH CV ECHO MEAS - PA ACC TIME: 0.11 SEC
BH CV ECHO MEAS - PA MAX PG (FULL): 4.4 MMHG
BH CV ECHO MEAS - PA MAX PG: 5.5 MMHG
BH CV ECHO MEAS - PA PR(ACCEL): 30 MMHG
BH CV ECHO MEAS - PA V2 MAX: 117 CM/SEC
BH CV ECHO MEAS - PULM DIAS VEL: 43 CM/SEC
BH CV ECHO MEAS - PULM S/D: 0.79
BH CV ECHO MEAS - PULM SYS VEL: 34.1 CM/SEC
BH CV ECHO MEAS - PVA(V,A): 2 CM^2
BH CV ECHO MEAS - PVA(V,D): 2 CM^2
BH CV ECHO MEAS - QP/QS: 0.85
BH CV ECHO MEAS - RAP SYSTOLE: 3 MMHG
BH CV ECHO MEAS - RV MAX PG: 1.1 MMHG
BH CV ECHO MEAS - RV MEAN PG: 1 MMHG
BH CV ECHO MEAS - RV V1 MAX: 52.5 CM/SEC
BH CV ECHO MEAS - RV V1 MEAN: 35.7 CM/SEC
BH CV ECHO MEAS - RV V1 VTI: 11.8 CM
BH CV ECHO MEAS - RVOT AREA: 4.5 CM^2
BH CV ECHO MEAS - RVOT DIAM: 2.4 CM
BH CV ECHO MEAS - RVSP: 29.2 MMHG
BH CV ECHO MEAS - SI(AO): 143 ML/M^2
BH CV ECHO MEAS - SI(CUBED): 17.3 ML/M^2
BH CV ECHO MEAS - SI(LVOT): 26 ML/M^2
BH CV ECHO MEAS - SI(MOD-SP2): 30.6 ML/M^2
BH CV ECHO MEAS - SI(MOD-SP4): 19.5 ML/M^2
BH CV ECHO MEAS - SI(TEICH): 16.2 ML/M^2
BH CV ECHO MEAS - SV(AO): 345.6 ML
BH CV ECHO MEAS - SV(CUBED): 41.9 ML
BH CV ECHO MEAS - SV(LVOT): 62.8 ML
BH CV ECHO MEAS - SV(MOD-SP2): 74 ML
BH CV ECHO MEAS - SV(MOD-SP4): 47 ML
BH CV ECHO MEAS - SV(RVOT): 53.4 ML
BH CV ECHO MEAS - SV(TEICH): 39.2 ML
BH CV ECHO MEAS - TAPSE (>1.6): 2.2 CM
BH CV ECHO MEAS - TR MAX VEL: 256 CM/SEC
BH CV ECHO MEASUREMENTS AVERAGE E/E' RATIO: 8.94
BH CV XLRA - RV BASE: 4.2 CM
BH CV XLRA - RV LENGTH: 6.5 CM
BH CV XLRA - RV MID: 3.4 CM
BH CV XLRA - TDI S': 12.5 CM/SEC
LEFT ATRIUM VOLUME INDEX: 50 ML/M2
MAXIMAL PREDICTED HEART RATE: 148 BPM
SINUS: 3.1 CM
STJ: 2.8 CM
STRESS TARGET HR: 126 BPM

## 2021-05-07 ENCOUNTER — OFFICE VISIT (OUTPATIENT)
Dept: CARDIOLOGY | Facility: CLINIC | Age: 71
End: 2021-05-07

## 2021-05-07 VITALS — HEIGHT: 72 IN | BODY MASS INDEX: 36.16 KG/M2 | WEIGHT: 267 LBS

## 2021-05-07 DIAGNOSIS — I73.9 PVD (PERIPHERAL VASCULAR DISEASE) WITH CLAUDICATION (HCC): ICD-10-CM

## 2021-05-07 DIAGNOSIS — I48.19 ATRIAL FIBRILLATION, PERSISTENT (HCC): Primary | ICD-10-CM

## 2021-05-07 PROCEDURE — 99214 OFFICE O/P EST MOD 30 MIN: CPT | Performed by: INTERNAL MEDICINE

## 2021-05-07 NOTE — PROGRESS NOTES
[facility]      Patient Name: Branden Diop  :1950  Age: 70 y.o.  Encounter Provider:  Torrey Qiu Jr, MD    This patient has consented to a telehealth visit via telephone. The visit was scheduled as a telephone visit to comply with patient safety concerns in accordance with CDC recommendations.  All vitals recorded within this visit are reported by the patient.  I spent 30 minutes in total including but not limited to the 20 minutes spent in direct conversation with this patient.       Chief Complaint:   Chief Complaint   Patient presents with   • Atrial Fibrillation   • Follow-up         HPI  Branden Diop is a 70 y.o. male past medical history of paroxysmal atrial fibrillation, hypertension, hyperlipidemia, diabetes mellitus and sleep apnea who presents for follow-up of arrhythmia.  He was seen by BAM Harrington in clinic and noted to be in persistent atrial fibrillation versus atrial flutter.  Review of EKG tracings points towards coarse atrial fibrillation.  Holter monitor was performed showing persistent atrial fibrillation with controlled heart rate.  He had a stress study in anticipation of surgical clearance which showed a questionable area of small mild ischemia.  Echocardiogram showed normal left ventricular ejection fraction with no significant valvular heart disease.  Patient is very active with no limiting symptoms.  He specifically denies chest pain or shortness of air.  No orthopnea, PND or edema.  No palpitations, dizziness or syncope.  He notes that his lower extremity pain is much decreased since starting rivaroxaban.  Be is having no bleeding complications with anticoagulation.  He is an ex-smoker who quit many years ago.  The remainder social and family history reviewed are not pertinent to this clinic visit.      The following portions of the patient's history were reviewed and updated as appropriate: allergies, current medications, past family history, past  "medical history, past social history, past surgical history and problem list.      Review of Systems   Constitutional: Negative for chills and fever.   HENT: Negative for hoarse voice and sore throat.    Eyes: Negative for double vision and photophobia.   Cardiovascular: Negative for chest pain, leg swelling, near-syncope, orthopnea, palpitations, paroxysmal nocturnal dyspnea and syncope.   Respiratory: Negative for cough and wheezing.    Skin: Negative for poor wound healing and rash.   Musculoskeletal: Negative for arthritis and joint swelling.   Gastrointestinal: Negative for bloating, abdominal pain, hematemesis and hematochezia.   Neurological: Negative for dizziness and focal weakness.   Psychiatric/Behavioral: Negative for depression and suicidal ideas.       OBJECTIVE:   Vital Signs  There were no vitals filed for this visit.  Estimated body mass index is 36.21 kg/m² as calculated from the following:    Height as of this encounter: 182.9 cm (72\").    Weight as of this encounter: 121 kg (267 lb).    Vitals reviewed.         Procedures          ASSESSMENT:     Persistent atrial fibrillation  Abnormal stress study  Lower extremity pain  Diabetes  Hypertension  JUAN    PLAN OF CARE:     1. Persistent atrial fibrillation -continue rivaroxaban and add low-dose metoprolol.  Some of the tracings appear consistent with atypical atrial flutter however I feel that this coarse atrial fibrillation.  Will review with electrophysiology.  2. Abnormal stress study -questional area of distal inferior and apical ischemia which is small and mild.  Patient has no symptoms.  Will add beta-blocker.  Continue rosuvastatin.  If patient develops any symptoms we will consider cardiac catheterization versus CT coronary angiogram.  3. Leg pain -sounds like atypical claudication.  Check FRANCO given history of tobacco abuse.  4. JUAN -compliant with CPAP  5. Preoperative risk assessment -low risk for podiatric and dental surgery.  Initiate " beta-blocker.  No indication for further diagnostic testing as the patient remains asymptomatic with ability to perform greater than 4 METS.    Return to clinic 6 months             Discharge Medications          Accurate as of May 7, 2021  1:45 PM. If you have any questions, ask your nurse or doctor.            Changes to Medications      Instructions Start Date   Soliqua 100-33 UNT-MCG/ML solution pen-injector injection  Generic drug: Insulin Glargine-Lixisenatide  What changed: how much to take   60 Units, Subcutaneous, Daily With Breakfast         Continue These Medications      Instructions Start Date   freestyle lancets   Daily E 11.9      glucose blood test strip   Freestyle strips daily E 11.9      glucose monitor monitoring kit   1 each, Does not apply, 2 Times Daily, e11.9      glucose monitoring kit monitoring kit   Daily E 11.93 FreeStyle meter      MULTIVITAMINS PO   1 tablet, Oral, Daily      rivaroxaban 20 MG tablet  Commonly known as: Xarelto   20 mg, Oral, Daily      rosuvastatin 40 MG tablet  Commonly known as: CRESTOR   40 mg, Oral, Daily      sertraline 50 MG tablet  Commonly known as: ZOLOFT   TAKE ONE TABLET BY MOUTH DAILY      Vitamin D3 25 MCG (1000 UT) capsule   1,000 Units, Oral, Daily             Thank you for allowing me to participate in the care of your patient,      Sincerely,   Torrey Qiu MD  [facility]  05/07/21  13:45 EDT

## 2021-05-11 ENCOUNTER — TELEPHONE (OUTPATIENT)
Dept: CARDIOLOGY | Facility: CLINIC | Age: 71
End: 2021-05-11

## 2021-05-13 NOTE — TELEPHONE ENCOUNTER
Metoprolol can cause mood changed however new cardiac diagnosis also causes increased depression. At very low dose metoprolol I prefer we do not make any changes at this time.

## 2021-05-13 NOTE — TELEPHONE ENCOUNTER
Pt wife called wanting to know if the Metoprolol could interfere with her 's Zoloft medication.  She states he is becoming more depressed.  Pt has an echo scheduled this Monday(5/17).  He will also need a refill for his Xarelto.  Thanks/[facility]    # [phone]

## 2021-05-17 ENCOUNTER — HOSPITAL ENCOUNTER (OUTPATIENT)
Dept: CARDIOLOGY | Facility: HOSPITAL | Age: 71
Discharge: HOME | End: 2021-05-17
Admitting: INTERNAL MEDICINE

## 2021-05-17 DIAGNOSIS — I73.9 PVD (PERIPHERAL VASCULAR DISEASE) WITH CLAUDICATION (HCC): ICD-10-CM

## 2021-05-17 PROCEDURE — 93923 UPR/LXTR ART STDY 3+ LVLS: CPT | Performed by: INTERNAL MEDICINE

## 2021-05-17 PROCEDURE — 93923 UPR/LXTR ART STDY 3+ LVLS: CPT

## 2021-05-18 ENCOUNTER — TELEPHONE (OUTPATIENT)
Dept: CARDIOLOGY | Facility: CLINIC | Age: 71
End: 2021-05-18

## 2021-05-18 LAB
BH CV LOWER ARTERIAL LEFT ABI RATIO: 1.3
BH CV LOWER ARTERIAL LEFT CALF RATIO: 1.3
BH CV LOWER ARTERIAL LEFT GREAT TOE SYS MAX: 163 MMHG
BH CV LOWER ARTERIAL LEFT HIGH THIGH SYS MAX: 197 MMHG
BH CV LOWER ARTERIAL LEFT POPLITEAL SYS MAX: 179 MMHG
BH CV LOWER ARTERIAL LEFT POST TIBIAL SYS MAX: 180 MMHG
BH CV LOWER ARTERIAL LEFT TBI RATIO: 1.18
BH CV LOWER ARTERIAL RIGHT ABI RATIO: 1.3
BH CV LOWER ARTERIAL RIGHT CALF RATIO: 1.25
BH CV LOWER ARTERIAL RIGHT GREAT TOE SYS MAX: 164 MMHG
BH CV LOWER ARTERIAL RIGHT HIGH THIGH SYS MAX: 194 MMHG
BH CV LOWER ARTERIAL RIGHT POPLITEAL SYS MAX: 172 MMHG
BH CV LOWER ARTERIAL RIGHT POST TIBIAL SYS MAX: 180 MMHG
BH CV LOWER ARTERIAL RIGHT TBI RATIO: 1.19
MAXIMAL PREDICTED HEART RATE: 148 BPM
STRESS TARGET HR: 126 BPM
UPPER ARTERIAL LEFT ARM BRACHIAL SYS MAX: 138 MMHG
UPPER ARTERIAL RIGHT ARM BRACHIAL SYS MAX: 134 MMHG

## 2021-05-18 NOTE — TELEPHONE ENCOUNTER
S/w wife. FRANCO normal bilateral. Not currently having chest pain. Continue metoprolol. May hold Xarelto 20 mg 48 hours prior to surgery. Need to continue beta blocker during perioperative period including the morning or surgery.     [facility] patient. Needs follow up in 4-6 months.

## 2021-05-19 ENCOUNTER — TRANSCRIBE ORDERS (OUTPATIENT)
Dept: ADMINISTRATIVE | Facility: HOSPITAL | Age: 71
End: 2021-05-19

## 2021-05-19 DIAGNOSIS — J84.112 IPF (IDIOPATHIC PULMONARY FIBROSIS) (HCC): Primary | ICD-10-CM

## 2021-06-07 ENCOUNTER — TELEPHONE (OUTPATIENT)
Dept: CARDIOLOGY | Facility: CLINIC | Age: 71
End: 2021-06-07

## 2021-06-14 ENCOUNTER — HOSPITAL ENCOUNTER (OUTPATIENT)
Dept: CT IMAGING | Facility: HOSPITAL | Age: 71
Discharge: HOME | End: 2021-06-14
Admitting: INTERNAL MEDICINE

## 2021-06-14 DIAGNOSIS — J84.112 IPF (IDIOPATHIC PULMONARY FIBROSIS) (HCC): ICD-10-CM

## 2021-06-14 PROCEDURE — 71250 CT THORAX DX C-: CPT

## 2021-06-14 RX ORDER — INSULIN GLARGINE AND LIXISENATIDE 100; 33 U/ML; UG/ML
20 INJECTION, SOLUTION SUBCUTANEOUS
Qty: 15 PEN | Refills: 2 | Status: SHIPPED | OUTPATIENT
Start: 2021-06-14 | End: 2022-03-28

## 2021-06-14 NOTE — TELEPHONE ENCOUNTER
PHARMACY HAS SENT OVER REQUEST    SOLIQUA     SEND TO [facility] ON [address]     ONLY HAS ONE PEN LEFT

## 2021-06-21 ENCOUNTER — OFFICE VISIT (OUTPATIENT)
Dept: CARDIOLOGY | Facility: CLINIC | Age: 71
End: 2021-06-21

## 2021-06-21 VITALS
HEART RATE: 67 BPM | BODY MASS INDEX: 37.38 KG/M2 | DIASTOLIC BLOOD PRESSURE: 78 MMHG | HEIGHT: 72 IN | SYSTOLIC BLOOD PRESSURE: 150 MMHG | WEIGHT: 276 LBS

## 2021-06-21 DIAGNOSIS — I48.0 PAROXYSMAL ATRIAL FIBRILLATION (HCC): ICD-10-CM

## 2021-06-21 DIAGNOSIS — I48.19 ATRIAL FIBRILLATION, PERSISTENT (HCC): Primary | ICD-10-CM

## 2021-06-21 PROCEDURE — 99214 OFFICE O/P EST MOD 30 MIN: CPT | Performed by: NURSE PRACTITIONER

## 2021-06-21 PROCEDURE — 93000 ELECTROCARDIOGRAM COMPLETE: CPT | Performed by: NURSE PRACTITIONER

## 2021-06-21 NOTE — PROGRESS NOTES
"Date of Office Visit: 21  Encounter Provider: BAM Nieves  Place of Service: [facility]ARD[facility]  Patient Name: Branden Diop  :1950    Chief Complaint   Patient presents with   • Atrial Fibrillation   :     HPI: Branden Diop is a 70 y.o. male  with history of hypertension, hyperlipidemia, paroxysmal atrial fibrillation, diabetes mellitus, COPD,obstructive sleep apnea, and obesity.  He was previously followed by Dr. Mustafa.  He is now followed by Dr. Qiu.  I will visit with him in follow-up today and have reviewed his medical record    He had an unremarkable echo in 2014.  He apparently had pneumonia at that time.  At that time he had persistent atrial fibrillation and had cardioversion 2014.     He presents in need of surgery clearance.  He was found to have recurrent atrial fibrillation on preop testing so his hammer toe surgery was canceled.  He was started on Xarelto and metoprolol.  He had fatigue on metoprolol so it was decreased to 12.5 mg twice daily but he also did not tolerate that.  He now presents for 1 week follow-up since stopping metoprolol.  His blood pressure is being followed at home and his values are anywhere from 130-150/60-70.  His heart rate has been 50-60s and rarely 70.  On metoprolol his heart rate was dipping to 48.  He now reports increased energy off metoprolol.  He was shoveling rocks and has been doing some wood cutting.  He denies chest pain tightness pressure or palpitation.  He reports compliance with his CPAP.  He is accompanied by his wife today.              No Known Allergies        Family and social history reviewed.     ROS  All other systems were reviewed and are negative          Objective:     Vitals:    21 1421   BP: 150/78   BP Location: Left arm   Pulse: 67   Weight: 125 kg (276 lb)   Height: 182.9 cm (72\")     Body mass index is 37.43 kg/m².    PHYSICAL EXAM:  Constitutional:       General: Not in " acute distress.     Appearance: Well-developed. Not diaphoretic.   HENT:      Head: Normocephalic.   Pulmonary:      Effort: Pulmonary effort is normal. No respiratory distress.      Breath sounds: Normal breath sounds. No wheezing. No rhonchi. No rales.   Cardiovascular:      Normal rate. Irregularly irregular rhythm.   Pulses:     Radial: 2+ bilaterally.  Skin:     General: Skin is warm and dry. There is no cyanosis.      Findings: No rash.   Neurological:      Mental Status: Alert and oriented to person, place, and time.   Psychiatric:         Behavior: Behavior normal.         Thought Content: Thought content normal.         Judgment: Judgment normal.           ECG 12 Lead    Date/Time: 6/21/2021 3:09 PM  Performed by: Yola Davis APRN  Authorized by: Yola Davis APRN   Comparison: compared with previous ECG   Similar to previous ECG  Rhythm: atrial fibrillation  Rate: normal    Clinical impression: abnormal EKG              Current Outpatient Medications   Medication Sig Dispense Refill   • Cholecalciferol (Vitamin D3) 25 MCG (1000 UT) capsule Take 1,000 Units by mouth Daily.     • glucose blood test strip Freestyle strips daily E 11.9 100 each 12   • glucose monitor monitoring kit 1 each 2 (Two) Times a Day. e11.9 1 each 1   • glucose monitoring kit (FREESTYLE) monitoring kit Daily E 11.93 FreeStyle meter 1 each 1   • Lancets (FREESTYLE) lancets Daily E 11.9 100 each 12   • Multiple Vitamin (MULTIVITAMINS PO) Take 1 tablet by mouth Daily.     • rivaroxaban (Xarelto) 20 MG tablet Take 1 tablet by mouth Daily. 30 tablet 9   • rosuvastatin (CRESTOR) 40 MG tablet Take 1 tablet by mouth Daily. 90 tablet 3   • sertraline (ZOLOFT) 50 MG tablet Take 1 tablet by mouth Daily. 90 tablet 1   • Soliqua 100-33 UNT-MCG/ML solution pen-injector injection Inject 20 Units under the skin into the appropriate area as directed Daily With Breakfast. 15 pen 2     No current facility-administered medications for this visit.      Assessment:       Diagnosis Plan   1. Atrial fibrillation, persistent (CMS/HCC)     2. Paroxysmal atrial fibrillation (CMS/HCC)          Orders Placed This Encounter   Procedures   • ECG 12 Lead     This order was created via procedure documentation     Order Specific Question:   Release to patient     Answer:   Immediate         Plan:     1.  70-year-old gentleman with history of atrial fibrillation status post cardioversion in 2014.  He is in persistent atrial flutter.  This is atypical.  His EKG from March 2021  Showed recurrent atrial fibrillation.  He is now anticoagulated with Xarelto 20 mg daily.  He did not tolerate metoprolol tartrate 12.5 mg twice daily due to fatigue and bradycardia and he now feels better off of that so we will continue current medication.  His heart rate is currently fine without any AV mackenzie blockade.    2.  Hyperlipidemia on atorvastatin 40 mg  3.  Diabetes mellitus type 2  4.  Obesity  5.  Hypertension blood pressure appears stable  6.  Obstructive sleep apnea reports compliance with CPAP  7.  COPD  8. First degree AV block  9.  Need for hammertoe surgery with Dr. Torrey Ge-he is cleared to proceed and may hold Xarelto 2 to 3 days prior to that      Follow-up in 2 and half months as scheduled            It has been a pleasure to participate in this patient's care.      Thank you,  BAM Nieves      **I used Dragon to dictate this note:**

## 2021-08-09 RX ORDER — ROSUVASTATIN CALCIUM 40 MG/1
TABLET, COATED ORAL
Qty: 30 TABLET | Refills: 1 | Status: SHIPPED | OUTPATIENT
Start: 2021-08-09 | End: 2021-10-11 | Stop reason: SDUPTHER

## 2021-08-10 LAB
ALBUMIN SERPL-MCNC: 4.2 G/DL (ref 3.5–5.2)
ALBUMIN/CREAT UR: 2005 MG/G CREAT (ref 0–29)
ALBUMIN/GLOB SERPL: 1.6 G/DL
ALP SERPL-CCNC: 116 U/L (ref 39–117)
ALT SERPL-CCNC: 23 U/L (ref 1–41)
AST SERPL-CCNC: 24 U/L (ref 1–40)
BILIRUB SERPL-MCNC: 1.2 MG/DL (ref 0–1.2)
BUN SERPL-MCNC: 11 MG/DL (ref 8–23)
BUN/CREAT SERPL: 13.9 (ref 7–25)
CALCIUM SERPL-MCNC: 9.7 MG/DL (ref 8.6–10.5)
CHLORIDE SERPL-SCNC: 103 MMOL/L (ref 98–107)
CHOLEST SERPL-MCNC: 164 MG/DL (ref 0–200)
CO2 SERPL-SCNC: 27.8 MMOL/L (ref 22–29)
CREAT SERPL-MCNC: 0.79 MG/DL (ref 0.76–1.27)
CREAT UR-MCNC: 103.9 MG/DL
GLOBULIN SER CALC-MCNC: 2.7 GM/DL
GLUCOSE SERPL-MCNC: 121 MG/DL (ref 65–99)
HBA1C MFR BLD: 6.9 % (ref 4.8–5.6)
HDLC SERPL-MCNC: 37 MG/DL (ref 40–60)
IMP & REVIEW OF LAB RESULTS: NORMAL
LDLC SERPL CALC-MCNC: 107 MG/DL (ref 0–100)
MICROALBUMIN UR-MCNC: 2083 UG/ML
POTASSIUM SERPL-SCNC: 5.5 MMOL/L (ref 3.5–5.2)
PROT SERPL-MCNC: 6.9 G/DL (ref 6–8.5)
SODIUM SERPL-SCNC: 140 MMOL/L (ref 136–145)
TRIGL SERPL-MCNC: 110 MG/DL (ref 0–150)
VLDLC SERPL CALC-MCNC: 20 MG/DL (ref 5–40)

## 2021-08-20 ENCOUNTER — OFFICE VISIT (OUTPATIENT)
Dept: FAMILY MEDICINE CLINIC | Facility: CLINIC | Age: 71
End: 2021-08-20

## 2021-08-20 VITALS
SYSTOLIC BLOOD PRESSURE: 144 MMHG | WEIGHT: 278.2 LBS | TEMPERATURE: 98 F | OXYGEN SATURATION: 94 % | BODY MASS INDEX: 37.68 KG/M2 | HEIGHT: 72 IN | HEART RATE: 94 BPM | DIASTOLIC BLOOD PRESSURE: 80 MMHG

## 2021-08-20 DIAGNOSIS — Z00.00 MEDICARE ANNUAL WELLNESS VISIT, SUBSEQUENT: Primary | ICD-10-CM

## 2021-08-20 DIAGNOSIS — E11.9 TYPE 2 DIABETES MELLITUS WITHOUT COMPLICATION, WITH LONG-TERM CURRENT USE OF INSULIN (HCC): ICD-10-CM

## 2021-08-20 DIAGNOSIS — E55.9 VITAMIN D DEFICIENCY, UNSPECIFIED: ICD-10-CM

## 2021-08-20 DIAGNOSIS — Z79.4 TYPE 2 DIABETES MELLITUS WITHOUT COMPLICATION, WITH LONG-TERM CURRENT USE OF INSULIN (HCC): ICD-10-CM

## 2021-08-20 DIAGNOSIS — I10 ESSENTIAL HYPERTENSION: ICD-10-CM

## 2021-08-20 DIAGNOSIS — I48.0 PAROXYSMAL ATRIAL FIBRILLATION (HCC): ICD-10-CM

## 2021-08-20 LAB
25(OH)D3 SERPL-MCNC: 33.4 NG/ML (ref 30–100)
ALBUMIN SERPL-MCNC: 4.2 G/DL (ref 3.5–5.2)
ALBUMIN/GLOB SERPL: 1.1 G/DL
ALP SERPL-CCNC: 103 U/L (ref 39–117)
ALT SERPL W P-5'-P-CCNC: 23 U/L (ref 1–41)
ANION GAP SERPL CALCULATED.3IONS-SCNC: 11.6 MMOL/L (ref 5–15)
AST SERPL-CCNC: 25 U/L (ref 1–40)
BILIRUB SERPL-MCNC: 0.8 MG/DL (ref 0–1.2)
BUN SERPL-MCNC: 13 MG/DL (ref 8–23)
BUN/CREAT SERPL: 16.9 (ref 7–25)
CALCIUM SPEC-SCNC: 10.3 MG/DL (ref 8.6–10.5)
CHLORIDE SERPL-SCNC: 101 MMOL/L (ref 98–107)
CO2 SERPL-SCNC: 28.4 MMOL/L (ref 22–29)
CREAT SERPL-MCNC: 0.77 MG/DL (ref 0.76–1.27)
DEPRECATED RDW RBC AUTO: 48.6 FL (ref 37–54)
ERYTHROCYTE [DISTWIDTH] IN BLOOD BY AUTOMATED COUNT: 15 % (ref 12.3–15.4)
GFR SERPL CREATININE-BSD FRML MDRD: 99 ML/MIN/1.73
GLOBULIN UR ELPH-MCNC: 3.9 GM/DL
GLUCOSE SERPL-MCNC: 83 MG/DL (ref 65–99)
HBA1C MFR BLD: 6.4 % (ref 4.8–5.6)
HCT VFR BLD AUTO: 47.8 % (ref 37.5–51)
HGB BLD-MCNC: 15.5 G/DL (ref 13–17.7)
MCH RBC QN AUTO: 28.8 PG (ref 26.6–33)
MCHC RBC AUTO-ENTMCNC: 32.4 G/DL (ref 31.5–35.7)
MCV RBC AUTO: 88.7 FL (ref 79–97)
PLATELET # BLD AUTO: 346 10*3/MM3 (ref 140–450)
PMV BLD AUTO: 9.2 FL (ref 6–12)
POTASSIUM SERPL-SCNC: 5.2 MMOL/L (ref 3.5–5.2)
PROT SERPL-MCNC: 8.1 G/DL (ref 6–8.5)
RBC # BLD AUTO: 5.39 10*6/MM3 (ref 4.14–5.8)
SODIUM SERPL-SCNC: 141 MMOL/L (ref 136–145)
T-UPTAKE NFR SERPL: 1.11 TBI (ref 0.8–1.3)
T4 SERPL-MCNC: 7.76 MCG/DL (ref 4.5–11.7)
TSH SERPL DL<=0.05 MIU/L-ACNC: 1.82 UIU/ML (ref 0.27–4.2)
WBC # BLD AUTO: 7.92 10*3/MM3 (ref 3.4–10.8)

## 2021-08-20 PROCEDURE — 82306 VITAMIN D 25 HYDROXY: CPT | Performed by: INTERNAL MEDICINE

## 2021-08-20 PROCEDURE — 83036 HEMOGLOBIN GLYCOSYLATED A1C: CPT | Performed by: INTERNAL MEDICINE

## 2021-08-20 PROCEDURE — G0439 PPPS, SUBSEQ VISIT: HCPCS | Performed by: INTERNAL MEDICINE

## 2021-08-20 PROCEDURE — 84443 ASSAY THYROID STIM HORMONE: CPT | Performed by: INTERNAL MEDICINE

## 2021-08-20 PROCEDURE — 84479 ASSAY OF THYROID (T3 OR T4): CPT | Performed by: INTERNAL MEDICINE

## 2021-08-20 PROCEDURE — 99214 OFFICE O/P EST MOD 30 MIN: CPT | Performed by: INTERNAL MEDICINE

## 2021-08-20 PROCEDURE — 80053 COMPREHEN METABOLIC PANEL: CPT | Performed by: INTERNAL MEDICINE

## 2021-08-20 PROCEDURE — 84436 ASSAY OF TOTAL THYROXINE: CPT | Performed by: INTERNAL MEDICINE

## 2021-08-20 PROCEDURE — 85027 COMPLETE CBC AUTOMATED: CPT | Performed by: INTERNAL MEDICINE

## 2021-08-20 PROCEDURE — 36415 COLL VENOUS BLD VENIPUNCTURE: CPT | Performed by: INTERNAL MEDICINE

## 2021-08-20 RX ORDER — AMOXICILLIN 500 MG/1
CAPSULE ORAL
COMMUNITY
Start: 2021-08-15 | End: 2021-08-20

## 2021-08-20 RX ORDER — LISINOPRIL 40 MG/1
40 TABLET ORAL DAILY
Qty: 90 TABLET | Refills: 1 | Status: SHIPPED | OUTPATIENT
Start: 2021-08-20 | End: 2022-01-05

## 2021-08-20 NOTE — PROGRESS NOTES
The ABCs of the Annual Wellness Visit  Subsequent Medicare Wellness Visit    Chief Complaint   Patient presents with   • pain ear/neck  but  gone now   • Diabetes   • Medicare Wellness-subsequent   • Weight Loss       Subjective   History of Present Illness:  Branden Diop is a 70 y.o. male who presents for a Subsequent Medicare Wellness Visit.  Patient was seen for a Medicare wellness exam.  Patient was seen for hypertension.  Pressures been running 140-130 over 80s.  Patient will occasionally get systolics in the 160-150.  Patient is also been recently diagnosed with atrial fibrillation.  Patient has a prior history of it but was converted to normal sinus rhythm.  Patient has reverted back to atrial fib.  Patient was placed on metoprolol 25 mg twice daily and lisinopril was increased to 40 mg daily.  Patient had restarted rivaroxaban 20 mg daily.  Patient is following up with his cardiologist and 1 week.  Patient's blood sugar has been running in the 120s.  Patient will continue his present diet and activity levels.  Patient is having labs and will follow up in 1 month with blood pressure and blood sugars.    Dictated utilizing Dragon dictation. If there are questions or for further clarification, please contact me.  HEALTH RISK ASSESSMENT    Recent Hospitalizations:  No hospitalization(s) within the last year.    Current Medical Providers:  Patient Care Team:  Tino Lopez MD as PCP - General (Internal Medicine)  Tino Lopez MD as PCP - Family Medicine  Mart Ureña MD as Consulting Physician (Cardiology)  Martir Trevino MD as Surgeon (General Surgery)    Smoking Status:  Social History     Tobacco Use   Smoking Status Former Smoker   • Packs/day: 1.00   • Years: 30.00   • Pack years: 30.00   • Types: Cigars   • Quit date: 2014   • Years since quittin.6   Smokeless Tobacco Never Used   Tobacco Comment    caffeine use- decaf coffee       Alcohol Consumption:  Social History     Substance  and Sexual Activity   Alcohol Use Never       Depression Screen:   PHQ-2/PHQ-9 Depression Screening 8/20/2021   Little interest or pleasure in doing things 0   Feeling down, depressed, or hopeless 0   Trouble falling or staying asleep, or sleeping too much 0   Feeling tired or having little energy 0   Poor appetite or overeating 0   Feeling bad about yourself - or that you are a failure or have let yourself or your family down 0   Trouble concentrating on things, such as reading the newspaper or watching television 0   Moving or speaking so slowly that other people could have noticed. Or the opposite - being so fidgety or restless that you have been moving around a lot more than usual 0   Thoughts that you would be better off dead, or of hurting yourself in some way 0   Total Score 0   If you checked off any problems, how difficult have these problems made it for you to do your work, take care of things at home, or get along with other people? Not difficult at all       Fall Risk Screen:  DANN Fall Risk Assessment was completed, and patient is at LOW risk for falls.Assessment completed on:4/26/2021    Health Habits and Functional and Cognitive Screening:  Functional & Cognitive Status 8/20/2021   Do you have difficulty preparing food and eating? No   Do you have difficulty bathing yourself, getting dressed or grooming yourself? No   Do you have difficulty using the toilet? No   Do you have difficulty moving around from place to place? No   Do you have trouble with steps or getting out of a bed or a chair? No   Current Diet Unhealthy Diet   Dental Exam Up to date   Eye Exam Up to date   Exercise (times per week) Other   Current Exercise Activities Include -   Do you need help using the phone?  No   Are you deaf or do you have serious difficulty hearing?  No   Do you need help with transportation? No   Do you need help shopping? No   Do you need help preparing meals?  No   Do you need help with housework?  No   Do  you need help with laundry? No   Do you need help taking your medications? No   Do you need help managing money? No   Do you ever drive or ride in a car without wearing a seat belt? No   Have you felt unusual stress, anger or loneliness in the last month? No   Who do you live with? Spouse   If you need help, do you have trouble finding someone available to you? No   Have you been bothered in the last four weeks by sexual problems? No   Do you have difficulty concentrating, remembering or making decisions? No         Does the patient have evidence of cognitive impairment? No    Asprin use counseling:Does not need ASA (and currently is not on it)    Age-appropriate Screening Schedule:  Refer to the list below for future screening recommendations based on patient's age, sex and/or medical conditions. Orders for these recommended tests are listed in the plan section. The patient has been provided with a written plan.    Health Maintenance   Topic Date Due   • ZOSTER VACCINE (2 of 2) 05/11/2017   • DIABETIC EYE EXAM  05/24/2019   • INFLUENZA VACCINE  10/01/2021   • PROSTATE CANCER SCREENING  12/23/2021   • DIABETIC FOOT EXAM  01/18/2022   • HEMOGLOBIN A1C  02/20/2022   • LIPID PANEL  08/09/2022   • URINE MICROALBUMIN  08/09/2022   • TDAP/TD VACCINES (2 - Td or Tdap) 03/16/2027          The following portions of the patient's history were reviewed and updated as appropriate: past family history, past medical history, past social history, past surgical history and problem list.    Outpatient Medications Prior to Visit   Medication Sig Dispense Refill   • Cholecalciferol (Vitamin D3) 25 MCG (1000 UT) capsule Take 1,000 Units by mouth Daily.     • glucose blood test strip Freestyle strips daily E 11.9 100 each 12   • glucose monitor monitoring kit 1 each 2 (Two) Times a Day. e11.9 1 each 1   • glucose monitoring kit (FREESTYLE) monitoring kit Daily E 11.93 FreeStyle meter 1 each 1   • Lancets (FREESTYLE) lancets Daily E 11.9  100 each 12   • Multiple Vitamin (MULTIVITAMINS PO) Take 1 tablet by mouth Daily.     • rivaroxaban (Xarelto) 20 MG tablet Take 1 tablet by mouth Daily. 30 tablet 9   • rosuvastatin (CRESTOR) 40 MG tablet TAKE ONE TABLET BY MOUTH DAILY 30 tablet 1   • sertraline (ZOLOFT) 50 MG tablet Take 1 tablet by mouth Daily. 90 tablet 1   • Soliqua 100-33 UNT-MCG/ML solution pen-injector injection Inject 20 Units under the skin into the appropriate area as directed Daily With Breakfast. 15 pen 2   • amoxicillin (AMOXIL) 500 MG capsule        No facility-administered medications prior to visit.       Patient Active Problem List   Diagnosis   • A-fib (CMS/[facility])   • Atrioventricular block, Mobitz type 1, Wenckebach   • Apnea, sleep   • Obesity   • Streptococcus pneumoniae   • Sleep apnea with use of continuous positive airway pressure (CPAP)   • Sinusitis   • Right wrist pain   • Pneumonia   • Type 2 diabetes mellitus, with long-term current use of insulin (CMS/[facility])   • Hyperlipidemia   • Hammertoe   • Depression   • COPD (chronic obstructive pulmonary disease) (CMS/[facility])   • Colon polyps   • Anxiety   • Pruritus   • Atrial flutter (CMS/[facility])   • Cholelithiasis   • Fatty liver   • Essential hypertension   • Vitamin D deficiency       Advanced Care Planning:  ACP discussion was held with the patient during this visit. Patient does not have an advance directive, declines further assistance.    Review of Systems   Constitutional: Negative for fatigue and fever.   HENT: Positive for congestion. Negative for trouble swallowing.    Eyes: Negative for discharge and visual disturbance.   Respiratory: Negative for choking and shortness of breath.    Cardiovascular: Negative for chest pain and palpitations.   Gastrointestinal: Negative for abdominal pain and blood in stool.   Endocrine: Negative.    Genitourinary: Negative for genital sores and hematuria.   Musculoskeletal: Negative for gait problem and joint swelling.   Skin: Negative for  "color change, pallor, rash and wound.   Allergic/Immunologic: Positive for environmental allergies. Negative for immunocompromised state.   Neurological: Negative for facial asymmetry and speech difficulty.   Psychiatric/Behavioral: Negative for hallucinations and suicidal ideas.       Compared to one year ago, the patient feels his physical health is the same.  Compared to one year ago, the patient feels his mental health is the same.    Reviewed chart for potential of high risk medication in the elderly: yes  Reviewed chart for potential of harmful drug interactions in the elderly:yes    Objective         Vitals:    08/20/21 0927   BP: 144/80   Pulse: 94   Temp: 98 °F (36.7 °C)   SpO2: 94%   Weight: 126 kg (278 lb 3.2 oz)   Height: 182.9 cm (72\")       Body mass index is 37.73 kg/m².  Discussed the patient's BMI with him. The BMI is above average; BMI management plan is completed.    Physical Exam  Vitals and nursing note reviewed.   Constitutional:       Appearance: Normal appearance. He is well-developed.   HENT:      Head: Normocephalic and atraumatic.      Nose: Nose normal.      Mouth/Throat:      Mouth: Mucous membranes are moist.      Pharynx: Oropharynx is clear.   Eyes:      Extraocular Movements: Extraocular movements intact.      Conjunctiva/sclera: Conjunctivae normal.      Pupils: Pupils are equal, round, and reactive to light.   Cardiovascular:      Rate and Rhythm: Normal rate. Rhythm irregular.      Heart sounds: Normal heart sounds. No murmur heard.   No friction rub. No gallop.    Pulmonary:      Effort: Pulmonary effort is normal. No respiratory distress.      Breath sounds: Normal breath sounds. No stridor. No wheezing, rhonchi or rales.   Chest:      Chest wall: No tenderness.   Abdominal:      General: Bowel sounds are normal.      Palpations: Abdomen is soft.   Musculoskeletal:         General: Normal range of motion.      Cervical back: Normal range of motion and neck supple.   Skin:     " General: Skin is warm and dry.   Neurological:      General: No focal deficit present.      Mental Status: He is alert and oriented to person, place, and time. Mental status is at baseline.   Psychiatric:         Mood and Affect: Mood normal.         Behavior: Behavior normal.         Thought Content: Thought content normal.         Judgment: Judgment normal.         Lab Results   Component Value Date     (H) 08/09/2021    CHLPL 164 08/09/2021    TRIG 110 08/09/2021    HDL 37 (L) 08/09/2021     (H) 08/09/2021    VLDL 20 08/09/2021    HGBA1C 6.40 (H) 08/20/2021        Assessment/Plan #1 add metoprolol 25 mg twice daily with lisinopril 40 mg daily #2 monitor blood pressure and blood sugar #3 labs #4 follow-up in 1 month #5 restart rivaroxaban 20 mg daily #6 follow-up cardiologist 1 week  Medicare Risks and Personalized Health Plan  CMS Preventative Services Quick Reference  Advance Directive Discussion  Dementia/Memory   Depression/Dysphoria  Hearing Problem    The above risks/problems have been discussed with the patient.  Pertinent information has been shared with the patient in the After Visit Summary.  Follow up plans and orders are seen below in the Assessment/Plan Section.    Diagnoses and all orders for this visit:    1. Medicare annual wellness visit, subsequent (Primary)    2. Paroxysmal atrial fibrillation (CMS/[facility])  -     CBC (No Diff); Future  -     Comprehensive Metabolic Panel  -     Hemoglobin A1c  -     Vitamin D 25 Hydroxy  -     Thyroid Panel With TSH  -     CBC (No Diff)    3. Essential hypertension  -     CBC (No Diff); Future  -     Comprehensive Metabolic Panel  -     Hemoglobin A1c  -     Vitamin D 25 Hydroxy  -     Thyroid Panel With TSH  -     CBC (No Diff)    4. Type 2 diabetes mellitus without complication, with long-term current use of insulin (CMS/[facility])  -     CBC (No Diff); Future  -     Comprehensive Metabolic Panel  -     Hemoglobin A1c  -     Vitamin D 25 Hydroxy  -      Thyroid Panel With TSH  -     CBC (No Diff)    5. Vitamin D deficiency, unspecified   -     Vitamin D 25 Hydroxy    Other orders  -     lisinopril (PRINIVIL,ZESTRIL) 40 MG tablet; Take 1 tablet by mouth Daily.  Dispense: 90 tablet; Refill: 1  -     metoprolol tartrate (LOPRESSOR) 25 MG tablet; Take 1 tablet by mouth 2 (Two) Times a Day.  Dispense: 60 tablet; Refill: 3      Follow Up:  Return in about 4 weeks (around 9/17/2021), or if symptoms worsen or fail to improve, for Recheck.     An After Visit Summary and PPPS were given to the patient.

## 2021-08-20 NOTE — PATIENT INSTRUCTIONS
Medicare Wellness  Personal Prevention Plan of Service     Date of Office Visit:  2021  Encounter Provider:  Tino Lopez MD  Place of Service:  [facility]  Patient Name: Branden Diop  :  1950    As part of the Medicare Wellness portion of your visit today, we are providing you with this personalized preventive plan of services (PPPS). This plan is based upon recommendations of the [place] Preventive Services Task Force (USPSTF) and the Advisory Committee on Immunization Practices (ACIP).    This lists the preventive care services that should be considered, and provides dates of when you are due. Items listed as completed are up-to-date and do not require any further intervention.    Health Maintenance   Topic Date Due   • ZOSTER VACCINE (2 of 2) 2017   • DIABETIC EYE EXAM  2019   • COLORECTAL CANCER SCREENING  06/10/2020   • INFLUENZA VACCINE  10/01/2021   • PROSTATE CANCER SCREENING  2021   • DIABETIC FOOT EXAM  2022   • HEMOGLOBIN A1C  2022   • LUNG CANCER SCREENING  2022   • LIPID PANEL  2022   • URINE MICROALBUMIN  2022   • ANNUAL WELLNESS VISIT  2022   • TDAP/TD VACCINES (2 - Td or Tdap) 2027   • HEPATITIS C SCREENING  Completed   • COVID-19 Vaccine  Completed   • Pneumococcal Vaccine 65+  Completed   • AAA SCREEN (ONE-TIME)  Completed       No orders of the defined types were placed in this encounter.      No follow-ups on file.

## 2021-08-23 ENCOUNTER — OFFICE VISIT (OUTPATIENT)
Dept: ENDOCRINOLOGY | Age: 71
End: 2021-08-23

## 2021-08-23 ENCOUNTER — TELEPHONE (OUTPATIENT)
Dept: FAMILY MEDICINE CLINIC | Facility: CLINIC | Age: 71
End: 2021-08-23

## 2021-08-23 VITALS
HEART RATE: 64 BPM | DIASTOLIC BLOOD PRESSURE: 78 MMHG | BODY MASS INDEX: 38.75 KG/M2 | OXYGEN SATURATION: 93 % | WEIGHT: 276.8 LBS | HEIGHT: 71 IN | SYSTOLIC BLOOD PRESSURE: 122 MMHG

## 2021-08-23 DIAGNOSIS — Z79.4 TYPE 2 DIABETES MELLITUS WITH HYPERGLYCEMIA, WITH LONG-TERM CURRENT USE OF INSULIN (HCC): Primary | ICD-10-CM

## 2021-08-23 DIAGNOSIS — E11.65 TYPE 2 DIABETES MELLITUS WITH HYPERGLYCEMIA, WITH LONG-TERM CURRENT USE OF INSULIN (HCC): Primary | ICD-10-CM

## 2021-08-23 DIAGNOSIS — E66.09 CLASS 1 OBESITY DUE TO EXCESS CALORIES WITHOUT SERIOUS COMORBIDITY WITH BODY MASS INDEX (BMI) OF 34.0 TO 34.9 IN ADULT: ICD-10-CM

## 2021-08-23 DIAGNOSIS — E78.00 PURE HYPERCHOLESTEROLEMIA: ICD-10-CM

## 2021-08-23 PROCEDURE — 99214 OFFICE O/P EST MOD 30 MIN: CPT | Performed by: INTERNAL MEDICINE

## 2021-08-23 NOTE — TELEPHONE ENCOUNTER
EZIO PATIENT'S WIFE IS CALLING TO REPORT THE PATIENT'S BLOOD PRESSURE READINGS AS ADVISED    AUG 21ST   8:20AM /75  PULSE 63 OXYGEN 96  1:30PM /59  PULSE 61 OXYGEN 94  5:44PM /66  PULSE 57 OXYGEN 94    AUG 22ND  4:43AM 127/76   PULSE 58 OXYGEN 94  10:31AM 133/76 PULSE 64 OXYGEN 95  5:00PM  127/76  PULSE 65 OXYGEN 95    AUG 23RD   6:15AM 114/66 PULSE 69 OXYGEN 94   8:45AM 122/78 PULSE 64 OXYGEN 93

## 2021-08-23 NOTE — PROGRESS NOTES
"Chief Complaint  Chief Complaint   Patient presents with   • Diabetes   FOLLOW UP/ TYPE 2 DM    Subjective          History of Present Illness    Branden Diop 70 y.o. presents with Type 2 dm as a F/u patient.    Type 2 dm - Diagnosed about 2 years ago.   Today in clinic pt reports being on soliqua 20 units in the am.   Avg bg - 100 - 150 mg/dl.   Checks BG - once a day  Sensor - x  Dm retinopathy - x ,Last eye exam - due for exam  Dm nephropathy - x, postive urine microalbuminuria. +ve.  Dm neuropathy - yes ,Dm neuropathy meds - n/a  CAD - no hx, afib   CVA - x  Episodes of hypoglycemia - none in the last few week.   Pt is physically active. weight has been stable.   Pt tries to follow DM diet for most part.   On Ace inb.    Patient used to see Dr. Oquendo in the past, transferred the care to me today.        Reviewed primary care physician's/consulting physician documentation and lab results         I have reviewed the patient's allergies, medicines, past medical hx, family hx and social hx in detail.    Objective   Vital Signs:   /78   Pulse 64   Ht 180.3 cm (71\")   Wt 126 kg (276 lb 12.8 oz)   SpO2 93%   BMI 38.61 kg/m²   Physical Exam   General appearance - no distress  Eyes- anicteric sclera  Ear nose and throat-external ears and nose normal.    Respiratory-normal chest on inspection.  No respiratory distress noted.  Skin-no rashes.  Neuro-alert and oriented x3            Result Review :   The following data was reviewed by: Holly Hoffman MD on 08/23/2021:  Office Visit on 08/20/2021   Component Date Value Ref Range Status   • Glucose 08/20/2021 83  65 - 99 mg/dL Final   • BUN 08/20/2021 13  8 - 23 mg/dL Final   • Creatinine 08/20/2021 0.77  0.76 - 1.27 mg/dL Final   • Sodium 08/20/2021 141  136 - 145 mmol/L Final   • Potassium 08/20/2021 5.2  3.5 - 5.2 mmol/L Final   • Chloride 08/20/2021 101  98 - 107 mmol/L Final   • CO2 08/20/2021 28.4  22.0 - 29.0 mmol/L Final   • Calcium 08/20/2021 10.3  8.6 - " 10.5 mg/dL Final   • Total Protein 08/20/2021 8.1  6.0 - 8.5 g/dL Final   • Albumin 08/20/2021 4.20  3.50 - 5.20 g/dL Final   • ALT (SGPT) 08/20/2021 23  1 - 41 U/L Final   • AST (SGOT) 08/20/2021 25  1 - 40 U/L Final   • Alkaline Phosphatase 08/20/2021 103  39 - 117 U/L Final   • Total Bilirubin 08/20/2021 0.8  0.0 - 1.2 mg/dL Final   • eGFR Non African Amer 08/20/2021 99  >60 mL/min/1.73 Final   • Globulin 08/20/2021 3.9  gm/dL Final   • A/G Ratio 08/20/2021 1.1  g/dL Final   • BUN/Creatinine Ratio 08/20/2021 16.9  7.0 - 25.0 Final   • Anion Gap 08/20/2021 11.6  5.0 - 15.0 mmol/L Final   • Hemoglobin A1C 08/20/2021 6.40* 4.80 - 5.60 % Final   • 25 Hydroxy, Vitamin D 08/20/2021 33.4  30.0 - 100.0 ng/ml Final   • TSH 08/20/2021 1.820  0.270 - 4.200 uIU/mL Final   • T Uptake 08/20/2021 1.11  0.80 - 1.30 TBI Final   • T4, Total 08/20/2021 7.76  4.50 - 11.70 mcg/dL Final    T4 results may be falsely increased if patient taking Biotin.   • WBC 08/20/2021 7.92  3.40 - 10.80 10*3/mm3 Final   • RBC 08/20/2021 5.39  4.14 - 5.80 10*6/mm3 Final   • Hemoglobin 08/20/2021 15.5  13.0 - 17.7 g/dL Final   • Hematocrit 08/20/2021 47.8  37.5 - 51.0 % Final   • MCV 08/20/2021 88.7  79.0 - 97.0 fL Final   • MCH 08/20/2021 28.8  26.6 - 33.0 pg Final   • MCHC 08/20/2021 32.4  31.5 - 35.7 g/dL Final   • RDW 08/20/2021 15.0  12.3 - 15.4 % Final   • RDW-SD 08/20/2021 48.6  37.0 - 54.0 fl Final   • MPV 08/20/2021 9.2  6.0 - 12.0 fL Final   • Platelets 08/20/2021 346  140 - 450 10*3/mm3 Final     Data reviewed: PCP and  documentation       Results Review:    I reviewed the patient's new clinical results.     Assessment and Plan    Problem List Items Addressed This Visit        Other    Obesity (Chronic)    Type 2 diabetes mellitus, with long-term current use of insulin (CMS/[facility]) - Primary    Hyperlipidemia        Type 2 diabetes mellitus-uncontrolled, with hyperglycemia  Continue Soliqua 20 units daily.  Emphasized on diet control  "and exercise regimen.    Hyperlipidemia  Continue Crestor 40 mg oral daily.    Patient was seen by Barbara nurse practitioner prior to me.  All the above problems are new for me  Interpreted the blood work-up/imaging results performed by the primary care/consulting physician -    Refills sent to pharmacy    Follow Up     Patient was given instructions and counseling regarding her condition or for health maintenance advice. Please see specific information pulled into the AVS if appropriate.       Thank you for asking me to see your patient, Branden Diop in consultation.         Holly Hoffman MD  08/23/21      EMR Dragon / transcription disclaimer:     \"Dictated utilizing Dragon dictation\".         "

## 2021-08-24 NOTE — TELEPHONE ENCOUNTER
Continue present treatment and monitoring of blood pressure.  Recommend once or twice a week.  Follow-up in 1 month and bring all medications

## 2021-10-11 RX ORDER — ROSUVASTATIN CALCIUM 40 MG/1
40 TABLET, COATED ORAL DAILY
Qty: 30 TABLET | Refills: 1 | Status: SHIPPED | OUTPATIENT
Start: 2021-10-11 | End: 2021-11-18

## 2021-10-14 ENCOUNTER — OFFICE VISIT (OUTPATIENT)
Dept: FAMILY MEDICINE CLINIC | Facility: CLINIC | Age: 71
End: 2021-10-14

## 2021-10-14 VITALS
DIASTOLIC BLOOD PRESSURE: 80 MMHG | SYSTOLIC BLOOD PRESSURE: 130 MMHG | WEIGHT: 285.6 LBS | TEMPERATURE: 98 F | OXYGEN SATURATION: 94 % | HEIGHT: 71 IN | HEART RATE: 72 BPM | BODY MASS INDEX: 39.98 KG/M2

## 2021-10-14 DIAGNOSIS — I10 ESSENTIAL HYPERTENSION: Primary | ICD-10-CM

## 2021-10-14 DIAGNOSIS — J32.9 SINUSITIS, UNSPECIFIED CHRONICITY, UNSPECIFIED LOCATION: ICD-10-CM

## 2021-10-14 DIAGNOSIS — E78.00 PURE HYPERCHOLESTEROLEMIA: ICD-10-CM

## 2021-10-14 DIAGNOSIS — I48.20 CHRONIC ATRIAL FIBRILLATION (HCC): ICD-10-CM

## 2021-10-14 PROBLEM — J43.9 EMPHYSEMA, UNSPECIFIED: Status: ACTIVE | Noted: 2021-10-14

## 2021-10-14 PROBLEM — J47.1 BRONCHIECTASIS WITH ACUTE EXACERBATION (HCC): Status: ACTIVE | Noted: 2021-10-14

## 2021-10-14 PROCEDURE — 90662 IIV NO PRSV INCREASED AG IM: CPT | Performed by: INTERNAL MEDICINE

## 2021-10-14 PROCEDURE — G0008 ADMIN INFLUENZA VIRUS VAC: HCPCS | Performed by: INTERNAL MEDICINE

## 2021-10-14 PROCEDURE — 99214 OFFICE O/P EST MOD 30 MIN: CPT | Performed by: INTERNAL MEDICINE

## 2021-10-14 RX ORDER — AMOXICILLIN 875 MG/1
875 TABLET, COATED ORAL EVERY 12 HOURS SCHEDULED
Qty: 20 TABLET | Refills: 0 | Status: ON HOLD | OUTPATIENT
Start: 2021-10-14 | End: 2021-11-15

## 2021-10-14 NOTE — PROGRESS NOTES
Subjective   Branden Diop is a 71 y.o. male.     Vitals:    10/14/21 0840   BP: 130/80   Pulse: 72   Temp: 98 °F (36.7 °C)   SpO2: 94%      Body mass index is 39.83 kg/m².     History of Present Illness   Patient was seen for hypertension.  Patient's blood pressures been running 130s over 80s.  Patient will continue lisinopril 40 mg daily, Toprol tartrate 25 mg twice daily.  Patient will continue present treatment and monitoring of blood pressure.  Patient does have chronic atrial fibrillation.  Patient is using metoprolol tartrate for rate control, rivaroxaban 20 mg daily for clots.  Patient does see a cardiologist on a regular basis.  Patient will continue present treatment.  Patient's lipids treated with diet exercise and rosuvastatin 40 mg daily.  Triglycerides 110, HDL 37, .  Patient will continue present diet and activity levels.  Patient came in with signs of acute sinusitis.  Patient was placed on Amoxil and will follow up in 4 days if not better.    Dictated utilizing Dragon dictation. If there are questions or for further clarification, please contact me.  The following portions of the patient's history were reviewed and updated as appropriate: allergies, current medications, past family history, past medical history, past social history, past surgical history and problem list.    Review of Systems   Constitutional: Negative for fatigue and fever.   HENT: Positive for congestion and sinus pressure. Negative for trouble swallowing.    Eyes: Negative for discharge and visual disturbance.   Respiratory: Negative for choking and shortness of breath.    Cardiovascular: Negative for chest pain and palpitations.   Gastrointestinal: Negative for abdominal pain and blood in stool.   Endocrine: Negative.    Genitourinary: Negative for genital sores and hematuria.   Musculoskeletal: Negative for gait problem and joint swelling.   Skin: Negative for color change, pallor, rash and wound.   Allergic/Immunologic:  Positive for environmental allergies. Negative for immunocompromised state.   Neurological: Negative for facial asymmetry and speech difficulty.   Psychiatric/Behavioral: Negative for hallucinations and suicidal ideas.       Objective   Physical Exam  Vitals and nursing note reviewed.   Constitutional:       Appearance: Normal appearance. He is well-developed.   HENT:      Head: Normocephalic and atraumatic.      Comments: Maxillary tenderness     Nose: Nose normal.      Mouth/Throat:      Mouth: Mucous membranes are moist.      Pharynx: Oropharynx is clear.   Eyes:      Extraocular Movements: Extraocular movements intact.      Conjunctiva/sclera: Conjunctivae normal.      Pupils: Pupils are equal, round, and reactive to light.   Cardiovascular:      Rate and Rhythm: Normal rate and regular rhythm.      Heart sounds: Normal heart sounds. No murmur heard.  No friction rub. No gallop.    Pulmonary:      Effort: Pulmonary effort is normal. No respiratory distress.      Breath sounds: Normal breath sounds. No stridor. No wheezing, rhonchi or rales.   Chest:      Chest wall: No tenderness.   Abdominal:      General: Bowel sounds are normal.      Palpations: Abdomen is soft.   Musculoskeletal:         General: Normal range of motion.      Cervical back: Normal range of motion and neck supple.   Skin:     General: Skin is warm and dry.   Neurological:      General: No focal deficit present.      Mental Status: He is alert and oriented to person, place, and time. Mental status is at baseline.   Psychiatric:         Mood and Affect: Mood normal.         Behavior: Behavior normal.         Thought Content: Thought content normal.         Judgment: Judgment normal.         Assessment/Plan #1 continue monitor blood pressure #2 continue treatment for lipids #3 Amoxil 875 p.o. twice daily  Problems Addressed this Visit        Cardiac and Vasculature    A-fib (HCC)    Hyperlipidemia    Essential hypertension - Primary       ENT     Sinusitis      Diagnoses     Diagnosis Codes Comments    Essential hypertension    -  Primary ICD-10-CM: I10  ICD-9-CM: 401.9     Chronic atrial fibrillation (HCC)     ICD-10-CM: I48.20  ICD-9-CM: 427.31     Pure hypercholesterolemia     ICD-10-CM: E78.00  ICD-9-CM: 272.0     Sinusitis, unspecified chronicity, unspecified location     ICD-10-CM: J32.9  ICD-9-CM: 473.9

## 2021-10-15 ENCOUNTER — TRANSCRIBE ORDERS (OUTPATIENT)
Dept: ADMINISTRATIVE | Facility: HOSPITAL | Age: 71
End: 2021-10-15

## 2021-10-15 DIAGNOSIS — J84.112 IDIOPATHIC PULMONARY FIBROSIS (HCC): Primary | ICD-10-CM

## 2021-10-18 ENCOUNTER — TELEPHONE (OUTPATIENT)
Dept: FAMILY MEDICINE CLINIC | Facility: CLINIC | Age: 71
End: 2021-10-18

## 2021-10-18 NOTE — TELEPHONE ENCOUNTER
Caller: Luba Diop    Relationship to patient: Emergency Contact    Best call back number: [phone]    Patient is needing: WIFE CALLED STATING THAT PATIENT WAS GIVEN XARELTO SAMPLES. WIFE WOULD LIKE A CALLBACK TO CONFIRM DOSAGE    PLEASE CALL BACK AND ADVISE

## 2021-10-18 NOTE — TELEPHONE ENCOUNTER
You gave patient samples of 2.5 and told him to take 2 of those then get script for 20 mg a day. Was he supposed to get 2.5 samples and take two?

## 2021-10-23 ENCOUNTER — HOSPITAL ENCOUNTER (OUTPATIENT)
Dept: CT IMAGING | Facility: HOSPITAL | Age: 71
Discharge: HOME | End: 2021-10-23
Admitting: INTERNAL MEDICINE

## 2021-10-23 DIAGNOSIS — J84.112 IDIOPATHIC PULMONARY FIBROSIS (HCC): ICD-10-CM

## 2021-10-23 PROCEDURE — 71250 CT THORAX DX C-: CPT

## 2021-10-25 ENCOUNTER — OFFICE VISIT (OUTPATIENT)
Dept: CARDIOLOGY | Facility: CLINIC | Age: 71
End: 2021-10-25

## 2021-10-25 VITALS
DIASTOLIC BLOOD PRESSURE: 80 MMHG | HEIGHT: 71 IN | SYSTOLIC BLOOD PRESSURE: 120 MMHG | BODY MASS INDEX: 40.18 KG/M2 | HEART RATE: 50 BPM | WEIGHT: 287 LBS

## 2021-10-25 DIAGNOSIS — I48.19 ATRIAL FIBRILLATION, PERSISTENT (HCC): Primary | ICD-10-CM

## 2021-10-25 PROCEDURE — 99214 OFFICE O/P EST MOD 30 MIN: CPT | Performed by: INTERNAL MEDICINE

## 2021-10-25 NOTE — PROGRESS NOTES
[facility]      Patient Name: Branden Diop  :1950  Age: 71 y.o.  Encounter Provider:  Torrey Qiu Jr, MD      Chief Complaint:   Chief Complaint   Patient presents with   • Atrial Fibrillation         Atrial Fibrillation  Symptoms are negative for chest pain, dizziness, palpitations and syncope. Past medical history includes atrial fibrillation.     Branden Diop is a 71 y.o. male past medical history of paroxysmal atrial fibrillation, hypertension, hyperlipidemia, diabetes mellitus and sleep apnea who presents for follow-up of arrhythmia.  He was seen by BAM Harrington in clinic and noted to be in persistent atrial fibrillation versus atrial flutter.  Review of EKG tracings points towards coarse atrial fibrillation.  Holter monitor was performed showing persistent atrial fibrillation with controlled heart rate.  He had a stress study in anticipation of surgical clearance which showed a questionable area of small mild ischemia.  Echocardiogram showed normal left ventricular ejection fraction with no significant valvular heart disease.  Patient is very active with no limiting symptoms.  He specifically denies chest pain or shortness of air.  No orthopnea, PND or edema.  No palpitations, dizziness or syncope.  He notes that his lower extremity pain is much decreased since starting rivaroxaban.  Be is having no bleeding complications with anticoagulation.  He is an ex-smoker who quit many years ago.      He has been experiencing worsening shortness of air with exercise. He is having difficulty doing anything more than mild to moderate activity. He was getting a foul smell with his CPAP machine and had to stop using it. It is one of the machines that are on recall. He was told that it will take 1 to 2 years before he will have a replacement machine. He is going to discuss this with Dr. Camp. He had recent repeat high-resolution CT chest. Images reviewed by me show coronary artery  "calcification. Doxepin well with no bleeding complications. No palpitations, dizziness or syncope. Social and family history reviewed are not pertinent to this clinic visit.    The following portions of the patient's history were reviewed and updated as appropriate: allergies, current medications, past family history, past medical history, past social history, past surgical history and problem list.      Review of Systems   Constitutional: Negative for chills and fever.   HENT: Negative for hoarse voice and sore throat.    Eyes: Negative for double vision and photophobia.   Cardiovascular: Positive for dyspnea on exertion. Negative for chest pain, leg swelling, near-syncope, orthopnea, palpitations, paroxysmal nocturnal dyspnea and syncope.   Respiratory: Negative for cough and wheezing.    Skin: Negative for poor wound healing and rash.   Musculoskeletal: Negative for arthritis and joint swelling.   Gastrointestinal: Negative for bloating, abdominal pain, hematemesis and hematochezia.   Neurological: Negative for dizziness and focal weakness.   Psychiatric/Behavioral: Negative for depression and suicidal ideas.       OBJECTIVE:   Vital Signs  Vitals:    10/25/21 1140   BP: 120/80   Pulse: 50     Estimated body mass index is 40.03 kg/m² as calculated from the following:    Height as of this encounter: 180.3 cm (71\").    Weight as of this encounter: 130 kg (287 lb).    Vitals reviewed.   Constitutional:       Appearance: Healthy appearance. Not in distress.   Neck:      Vascular: No JVR. JVD normal.   Pulmonary:      Effort: Pulmonary effort is normal.      Breath sounds: Normal breath sounds. No wheezing. No rhonchi. No rales.   Chest:      Chest wall: Not tender to palpatation.   Cardiovascular:      PMI at left midclavicular line. Normal rate. Irregularly irregular rhythm. Normal S1. Normal S2.      Murmurs: There is no murmur.      No gallop. No click. No rub.   Pulses:     Intact distal pulses.   Edema:     " Peripheral edema absent.   Abdominal:      General: Bowel sounds are normal.      Palpations: Abdomen is soft.      Tenderness: There is no abdominal tenderness.   Musculoskeletal: Normal range of motion.         General: No tenderness. Skin:     General: Skin is warm and dry.   Neurological:      General: No focal deficit present.      Mental Status: Alert and oriented to person, place and time.         Procedures          ASSESSMENT:     Persistent atrial fibrillation  Abnormal stress study  Lower extremity pain  Diabetes  Hypertension  JUAN    PLAN OF CARE:     1. Persistent atrial fibrillation -continue rivaroxaban and low-dose metoprolol. Rate controlled A. fib. Continue same.  2. Abnormal stress study -questional area of distal inferior and apical ischemia which is small and mild. Worsening dyspnea on exertion. Coronary calcifications noted on CT. If no clear pulmonary cause for symptoms we will consider cardiac catheterization. Will discuss the case with Dr. Camp.  3. Leg pain -normal ABIs.  4. JUAN -defer to pulmonary    Return to clinic 6 months             Discharge Medications          Accurate as of October 25, 2021 11:43 AM. If you have any questions, ask your nurse or doctor.            Changes to Medications      Instructions Start Date   metoprolol tartrate 25 MG tablet  Commonly known as: LOPRESSOR  What changed: additional instructions   25 mg, Oral, 2 Times Daily         Continue These Medications      Instructions Start Date   amoxicillin 875 MG tablet  Commonly known as: AMOXIL   875 mg, Oral, Every 12 Hours Scheduled      freestyle lancets   Daily E 11.9      glucose blood test strip   Freestyle strips daily E 11.9      glucose monitor monitoring kit   1 each, Does not apply, 2 Times Daily, e11.9      glucose monitoring kit monitoring kit   Daily E 11.93 FreeStyle meter      lisinopril 40 MG tablet  Commonly known as: PRINIVIL,ZESTRIL   40 mg, Oral, Daily      MULTIVITAMINS PO   1 tablet, Oral,  Daily      rivaroxaban 20 MG tablet  Commonly known as: Xarelto   20 mg, Oral, Daily      rosuvastatin 40 MG tablet  Commonly known as: CRESTOR   40 mg, Oral, Daily      sertraline 50 MG tablet  Commonly known as: ZOLOFT   50 mg, Oral, Daily      Soliqua 100-33 UNT-MCG/ML solution pen-injector injection  Generic drug: Insulin Glargine-Lixisenatide   20 Units, Subcutaneous, Daily With Breakfast      Vitamin D3 25 MCG (1000 UT) capsule   1,000 Units, Oral, Daily             Thank you for allowing me to participate in the care of your patient,      Sincerely,   Torrey Qiu MD  [facility]  10/25/21  11:43 EDT

## 2021-11-05 ENCOUNTER — TELEPHONE (OUTPATIENT)
Dept: CARDIOLOGY | Facility: CLINIC | Age: 71
End: 2021-11-05

## 2021-11-05 DIAGNOSIS — R07.89 CHEST PAIN, ATYPICAL: Primary | ICD-10-CM

## 2021-11-05 NOTE — TELEPHONE ENCOUNTER
Spoke to patient about discussion I had with Dr. Camp.  No overt pulmonary cause for symptoms.  Given previous abnormal stress study will plan for cardiac catheterization and work-up of atypical anginal equivalent.

## 2021-11-08 ENCOUNTER — TRANSCRIBE ORDERS (OUTPATIENT)
Dept: ADMINISTRATIVE | Facility: HOSPITAL | Age: 71
End: 2021-11-08

## 2021-11-08 DIAGNOSIS — J84.10 PULMONARY FIBROSIS (HCC): Primary | ICD-10-CM

## 2021-11-11 ENCOUNTER — TRANSCRIBE ORDERS (OUTPATIENT)
Dept: CARDIOLOGY | Facility: CLINIC | Age: 71
End: 2021-11-11

## 2021-11-11 DIAGNOSIS — Z13.6 SCREENING FOR CARDIOVASCULAR CONDITION: Primary | ICD-10-CM

## 2021-11-11 DIAGNOSIS — Z01.818 OTHER SPECIFIED PRE-OPERATIVE EXAMINATION: ICD-10-CM

## 2021-11-11 DIAGNOSIS — Z01.810 PREPROCEDURAL CARDIOVASCULAR EXAMINATION: ICD-10-CM

## 2021-11-11 PROBLEM — R07.89 CHEST PAIN, ATYPICAL: Status: ACTIVE | Noted: 2021-11-11

## 2021-11-12 ENCOUNTER — LAB (OUTPATIENT)
Dept: LAB | Facility: HOSPITAL | Age: 71
End: 2021-11-12

## 2021-11-12 DIAGNOSIS — Z01.810 PREPROCEDURAL CARDIOVASCULAR EXAMINATION: ICD-10-CM

## 2021-11-12 DIAGNOSIS — Z01.818 OTHER SPECIFIED PRE-OPERATIVE EXAMINATION: ICD-10-CM

## 2021-11-12 DIAGNOSIS — Z13.6 SCREENING FOR CARDIOVASCULAR CONDITION: ICD-10-CM

## 2021-11-12 LAB
ANION GAP SERPL CALCULATED.3IONS-SCNC: 8 MMOL/L (ref 5–15)
BASOPHILS # BLD AUTO: 0.05 10*3/MM3 (ref 0–0.2)
BASOPHILS NFR BLD AUTO: 0.6 % (ref 0–1.5)
BUN SERPL-MCNC: 13 MG/DL (ref 8–23)
BUN/CREAT SERPL: 15.7 (ref 7–25)
CALCIUM SPEC-SCNC: 9.6 MG/DL (ref 8.6–10.5)
CHLORIDE SERPL-SCNC: 104 MMOL/L (ref 98–107)
CO2 SERPL-SCNC: 28 MMOL/L (ref 22–29)
CREAT SERPL-MCNC: 0.83 MG/DL (ref 0.76–1.27)
DEPRECATED RDW RBC AUTO: 48.9 FL (ref 37–54)
EOSINOPHIL # BLD AUTO: 0.26 10*3/MM3 (ref 0–0.4)
EOSINOPHIL NFR BLD AUTO: 3.3 % (ref 0.3–6.2)
ERYTHROCYTE [DISTWIDTH] IN BLOOD BY AUTOMATED COUNT: 14.9 % (ref 12.3–15.4)
GFR SERPL CREATININE-BSD FRML MDRD: 91 ML/MIN/1.73
GLUCOSE SERPL-MCNC: 131 MG/DL (ref 65–99)
HCT VFR BLD AUTO: 47.8 % (ref 37.5–51)
HGB BLD-MCNC: 15.4 G/DL (ref 13–17.7)
IMM GRANULOCYTES # BLD AUTO: 0.03 10*3/MM3 (ref 0–0.05)
IMM GRANULOCYTES NFR BLD AUTO: 0.4 % (ref 0–0.5)
LYMPHOCYTES # BLD AUTO: 2.02 10*3/MM3 (ref 0.7–3.1)
LYMPHOCYTES NFR BLD AUTO: 25.6 % (ref 19.6–45.3)
MCH RBC QN AUTO: 29.4 PG (ref 26.6–33)
MCHC RBC AUTO-ENTMCNC: 32.2 G/DL (ref 31.5–35.7)
MCV RBC AUTO: 91.4 FL (ref 79–97)
MONOCYTES # BLD AUTO: 0.51 10*3/MM3 (ref 0.1–0.9)
MONOCYTES NFR BLD AUTO: 6.5 % (ref 5–12)
NEUTROPHILS NFR BLD AUTO: 5.02 10*3/MM3 (ref 1.7–7)
NEUTROPHILS NFR BLD AUTO: 63.6 % (ref 42.7–76)
NRBC BLD AUTO-RTO: 0 /100 WBC (ref 0–0.2)
PLATELET # BLD AUTO: 297 10*3/MM3 (ref 140–450)
PMV BLD AUTO: 8.9 FL (ref 6–12)
POTASSIUM SERPL-SCNC: 5 MMOL/L (ref 3.5–5.2)
RBC # BLD AUTO: 5.23 10*6/MM3 (ref 4.14–5.8)
SARS-COV-2 ORF1AB RESP QL NAA+PROBE: NOT DETECTED
SODIUM SERPL-SCNC: 140 MMOL/L (ref 136–145)
WBC # BLD AUTO: 7.89 10*3/MM3 (ref 3.4–10.8)

## 2021-11-12 PROCEDURE — U0005 INFEC AGEN DETEC AMPLI PROBE: HCPCS

## 2021-11-12 PROCEDURE — 80048 BASIC METABOLIC PNL TOTAL CA: CPT

## 2021-11-12 PROCEDURE — U0004 COV-19 TEST NON-CDC HGH THRU: HCPCS

## 2021-11-12 PROCEDURE — 36415 COLL VENOUS BLD VENIPUNCTURE: CPT

## 2021-11-12 PROCEDURE — C9803 HOPD COVID-19 SPEC COLLECT: HCPCS

## 2021-11-12 PROCEDURE — 85025 COMPLETE CBC W/AUTO DIFF WBC: CPT

## 2021-11-15 ENCOUNTER — HOSPITAL ENCOUNTER (OUTPATIENT)
Facility: HOSPITAL | Age: 71
Setting detail: HOSPITAL OUTPATIENT SURGERY
Discharge: HOME | End: 2021-11-15
Attending: INTERNAL MEDICINE | Admitting: INTERNAL MEDICINE

## 2021-11-15 VITALS
SYSTOLIC BLOOD PRESSURE: 133 MMHG | HEIGHT: 71 IN | OXYGEN SATURATION: 95 % | DIASTOLIC BLOOD PRESSURE: 82 MMHG | TEMPERATURE: 96.8 F | RESPIRATION RATE: 18 BRPM | WEIGHT: 284 LBS | BODY MASS INDEX: 39.76 KG/M2 | HEART RATE: 65 BPM

## 2021-11-15 DIAGNOSIS — R07.89 CHEST PAIN, ATYPICAL: ICD-10-CM

## 2021-11-15 LAB
GLUCOSE BLDC GLUCOMTR-MCNC: 130 MG/DL (ref 70–130)
GLUCOSE BLDC GLUCOMTR-MCNC: 130 MG/DL (ref 70–130)

## 2021-11-15 PROCEDURE — 25010000002 HEPARIN (PORCINE) PER 1000 UNITS: Performed by: INTERNAL MEDICINE

## 2021-11-15 PROCEDURE — 93458 L HRT ARTERY/VENTRICLE ANGIO: CPT | Performed by: INTERNAL MEDICINE

## 2021-11-15 PROCEDURE — C1894 INTRO/SHEATH, NON-LASER: HCPCS | Performed by: INTERNAL MEDICINE

## 2021-11-15 PROCEDURE — 25010000002 FENTANYL CITRATE (PF) 50 MCG/ML SOLUTION: Performed by: INTERNAL MEDICINE

## 2021-11-15 PROCEDURE — 25010000002 MIDAZOLAM PER 1 MG: Performed by: INTERNAL MEDICINE

## 2021-11-15 PROCEDURE — 0 IOPAMIDOL PER 1 ML: Performed by: INTERNAL MEDICINE

## 2021-11-15 PROCEDURE — C1769 GUIDE WIRE: HCPCS | Performed by: INTERNAL MEDICINE

## 2021-11-15 PROCEDURE — 82962 GLUCOSE BLOOD TEST: CPT

## 2021-11-15 RX ORDER — MIDAZOLAM HYDROCHLORIDE 1 MG/ML
INJECTION INTRAMUSCULAR; INTRAVENOUS AS NEEDED
Status: DISCONTINUED | OUTPATIENT
Start: 2021-11-15 | End: 2021-11-15 | Stop reason: HOSPADM

## 2021-11-15 RX ORDER — SODIUM CHLORIDE 0.9 % (FLUSH) 0.9 %
10 SYRINGE (ML) INJECTION AS NEEDED
Status: DISCONTINUED | OUTPATIENT
Start: 2021-11-15 | End: 2021-11-15 | Stop reason: HOSPADM

## 2021-11-15 RX ORDER — SODIUM CHLORIDE 9 MG/ML
75 INJECTION, SOLUTION INTRAVENOUS CONTINUOUS
Status: DISCONTINUED | OUTPATIENT
Start: 2021-11-15 | End: 2021-11-15 | Stop reason: HOSPADM

## 2021-11-15 RX ORDER — ACETAMINOPHEN 325 MG/1
650 TABLET ORAL EVERY 4 HOURS PRN
Status: DISCONTINUED | OUTPATIENT
Start: 2021-11-15 | End: 2021-11-15 | Stop reason: HOSPADM

## 2021-11-15 RX ORDER — LIDOCAINE HYDROCHLORIDE 20 MG/ML
INJECTION, SOLUTION INFILTRATION; PERINEURAL AS NEEDED
Status: DISCONTINUED | OUTPATIENT
Start: 2021-11-15 | End: 2021-11-15 | Stop reason: HOSPADM

## 2021-11-15 RX ORDER — SODIUM CHLORIDE 0.9 % (FLUSH) 0.9 %
10 SYRINGE (ML) INJECTION EVERY 12 HOURS SCHEDULED
Status: DISCONTINUED | OUTPATIENT
Start: 2021-11-15 | End: 2021-11-15 | Stop reason: HOSPADM

## 2021-11-15 RX ORDER — FENTANYL CITRATE 50 UG/ML
INJECTION, SOLUTION INTRAMUSCULAR; INTRAVENOUS AS NEEDED
Status: DISCONTINUED | OUTPATIENT
Start: 2021-11-15 | End: 2021-11-15 | Stop reason: HOSPADM

## 2021-11-15 RX ORDER — SODIUM CHLORIDE 0.9 % (FLUSH) 0.9 %
3 SYRINGE (ML) INJECTION EVERY 12 HOURS SCHEDULED
Status: DISCONTINUED | OUTPATIENT
Start: 2021-11-15 | End: 2021-11-15 | Stop reason: HOSPADM

## 2021-11-15 RX ADMIN — SODIUM CHLORIDE 75 ML/HR: 9 INJECTION, SOLUTION INTRAVENOUS at 06:56

## 2021-11-15 NOTE — DISCHARGE INSTRUCTIONS
[facility]  [address] [place] [address]    Coronary Angiogram (Radial/Ulnar Approach) After Care    Refer to this sheet in the next few weeks. These instructions provide you with information on caring for yourself after your procedure. Your caregiver may also give you more specific instructions. Your treatment has been planned according to current medical practices, but problems sometimes occur. Call your caregiver if you have any problems or questions after your procedure.    Home Care Instructions:  · You may shower the day after the procedure. Remove the bandage (dressing) and gently wash the site with plain soap and water. Gently pat the site dry. You may apply a band aid daily for 2 days if desired.    · Do not apply powder or lotion to the site.  · Do not submerge the affected site in water for 3 to 5 days or until the site is completely healed.   · Do not lift, push or pull anything over 5 pounds for 5 days after your procedure or as directed by your physician.  As a reference, a gallon of milk weighs 8 pounds.   · Inspect the site at least twice daily. You may notice some bruising at the site and it may be tender for 1 to 2 weeks.     · Increase your fluid intake for the next 2 days.    · Keep arm elevated for 24 hours. For the remainder of the day, keep your arm in “Pledge of Allegiance” position when up and about.     · You may drive 24 hours after the procedure unless otherwise instructed by your caregiver.  · Do not operate machinery or power tools for 24 hours.  · A responsible adult should be with you for the first 24 hours after you arrive home. Do not make any important legal decisions or sign legal papers for 24 hours.  Do not drink alcohol for 24 hours.    · Metformin or any medications containing Metformin should not be taken for 48 hours after your procedure.      Call Your Doctor if:   · You have unusual pain at the radial/ulnar (wrist) site.  · You have redness, warmth,  swelling, or pain at the radial/ulnar (wrist) site.  · You have drainage (other than a small amount of blood on the dressing).  · `You have chills or a fever > 101.  · Your arm becomes pale or dark, cool, tingly, or numb.  · You develop chest pain, shortness of breath, feel faint or pass out.    · You have heavy bleeding from the site, hold pressure on the site for 20 minutes.  If the bleeding stops, apply a fresh bandage and call your cardiologist.  However, if you        continue to have bleeding, call 911 and continue to apply pressure to the site.   · You have any symptoms of a stroke.  Remember BE FAST  · B-balance. Sudden trouble walking or loss of balance.  · E-eyes.  Sudden changes in how you see or a sudden onset of a very bad headache.   · F-face. Sudden weakness or loss of feeling of the face or facial droop on one side.   · A-arms Sudden weakness or numbness in one arm.  One arm drifts down if they are both held out in front of you. This happens suddenly and usually on one side of the body.   · S-speech.  Sudden trouble speaking, slurred speech or trouble understanding what are saying.   · T-time  Time to call emergency services.  Write down the symptoms and the time they started.

## 2021-11-16 ENCOUNTER — TELEPHONE (OUTPATIENT)
Dept: CARDIOLOGY | Facility: CLINIC | Age: 71
End: 2021-11-16

## 2021-11-16 ENCOUNTER — TELEPHONE (OUTPATIENT)
Dept: FAMILY MEDICINE CLINIC | Facility: CLINIC | Age: 71
End: 2021-11-16

## 2021-11-16 NOTE — TELEPHONE ENCOUNTER
Caller: Luba Diop    Relationship to patient: Emergency Contact    Best call back number: [phone]      Patient is needing: PTS WIFE IS CALLING OFFICE TO ASK AND SEE IF PT SHOULD STILL BE TAKING BLOOD PRESSURE MEDICATION DUE TO PT CURRENTLY NOT TAKING ANY BUT THERE BEING A BP MEDICATION ON HIS MED LIST

## 2021-11-18 RX ORDER — ROSUVASTATIN CALCIUM 40 MG/1
TABLET, COATED ORAL
Qty: 90 TABLET | Refills: 0 | Status: SHIPPED | OUTPATIENT
Start: 2021-11-18 | End: 2021-11-19

## 2021-11-19 RX ORDER — ROSUVASTATIN CALCIUM 40 MG/1
TABLET, COATED ORAL
Qty: 90 TABLET | Refills: 0 | Status: SHIPPED | OUTPATIENT
Start: 2021-11-19 | End: 2022-05-17

## 2021-11-22 ENCOUNTER — TELEPHONE (OUTPATIENT)
Dept: FAMILY MEDICINE CLINIC | Facility: CLINIC | Age: 71
End: 2021-11-22

## 2021-11-22 NOTE — TELEPHONE ENCOUNTER
Caller: EZIO WONG    Relationship to patient: WIFE    Best call back number: [phone]     Patient is needing: PATIENT'S WIFE IS CALLING TO REQUEST SAMPLES OF THE FOLLOWING MEDICATIONS.     rivaroxaban (Xarelto) 20 MG tablet        Soliqua 100-33 UNT-MCG/ML solution pen-injector injection         PLEASE ADVISE.

## 2021-12-08 ENCOUNTER — TELEPHONE (OUTPATIENT)
Dept: FAMILY MEDICINE CLINIC | Facility: CLINIC | Age: 71
End: 2021-12-08

## 2021-12-08 NOTE — TELEPHONE ENCOUNTER
Caller: Luba Diop    Relationship to patient: Emergency Contact    Best call back number:[phone] (H)    Patient is needing: PATIENT WIFE CALLING IN REGARDS TO WANTING TO KNOW IF DR LEARY WOULD WRITE A PRESCRIPTION FOR A WHEELCHAIR FOR PATIENT DUE TO WEIGHT    WIFE IS ALSO REQUESTING PATIENT BE CHECKED FOR HIS THYROID DUE TO WEIGHT    PLEASE ADVISE

## 2021-12-10 DIAGNOSIS — Z79.4 TYPE 2 DIABETES MELLITUS WITH HYPERGLYCEMIA, WITH LONG-TERM CURRENT USE OF INSULIN (HCC): Primary | ICD-10-CM

## 2021-12-10 DIAGNOSIS — E11.65 TYPE 2 DIABETES MELLITUS WITH HYPERGLYCEMIA, WITH LONG-TERM CURRENT USE OF INSULIN (HCC): Primary | ICD-10-CM

## 2021-12-14 ENCOUNTER — OFFICE VISIT (OUTPATIENT)
Dept: FAMILY MEDICINE CLINIC | Facility: CLINIC | Age: 71
End: 2021-12-14

## 2021-12-14 VITALS
WEIGHT: 294.2 LBS | RESPIRATION RATE: 16 BRPM | DIASTOLIC BLOOD PRESSURE: 80 MMHG | SYSTOLIC BLOOD PRESSURE: 120 MMHG | HEIGHT: 71 IN | TEMPERATURE: 97.8 F | BODY MASS INDEX: 41.19 KG/M2 | OXYGEN SATURATION: 96 % | HEART RATE: 61 BPM

## 2021-12-14 DIAGNOSIS — Z79.4 TYPE 2 DIABETES MELLITUS WITH HYPEROSMOLARITY WITHOUT COMA, WITH LONG-TERM CURRENT USE OF INSULIN (HCC): ICD-10-CM

## 2021-12-14 DIAGNOSIS — I48.0 PAROXYSMAL ATRIAL FIBRILLATION (HCC): ICD-10-CM

## 2021-12-14 DIAGNOSIS — I10 ESSENTIAL HYPERTENSION: ICD-10-CM

## 2021-12-14 DIAGNOSIS — E11.00 TYPE 2 DIABETES MELLITUS WITH HYPEROSMOLARITY WITHOUT COMA, WITH LONG-TERM CURRENT USE OF INSULIN (HCC): ICD-10-CM

## 2021-12-14 DIAGNOSIS — J44.9 CHRONIC OBSTRUCTIVE PULMONARY DISEASE, UNSPECIFIED COPD TYPE (HCC): ICD-10-CM

## 2021-12-14 DIAGNOSIS — R06.02 SOB (SHORTNESS OF BREATH): Primary | ICD-10-CM

## 2021-12-14 LAB
ANION GAP SERPL CALCULATED.3IONS-SCNC: 9.8 MMOL/L (ref 5–15)
BUN SERPL-MCNC: 12 MG/DL (ref 8–23)
BUN/CREAT SERPL: 14.6 (ref 7–25)
CALCIUM SPEC-SCNC: 9.9 MG/DL (ref 8.6–10.5)
CHLORIDE SERPL-SCNC: 105 MMOL/L (ref 98–107)
CO2 SERPL-SCNC: 28.2 MMOL/L (ref 22–29)
CREAT SERPL-MCNC: 0.82 MG/DL (ref 0.76–1.27)
GFR SERPL CREATININE-BSD FRML MDRD: 92 ML/MIN/1.73
GLUCOSE SERPL-MCNC: 102 MG/DL (ref 65–99)
NT-PROBNP SERPL-MCNC: 994.1 PG/ML (ref 0–900)
POTASSIUM SERPL-SCNC: 5.4 MMOL/L (ref 3.5–5.2)
SODIUM SERPL-SCNC: 143 MMOL/L (ref 136–145)

## 2021-12-14 PROCEDURE — 99214 OFFICE O/P EST MOD 30 MIN: CPT | Performed by: INTERNAL MEDICINE

## 2021-12-14 PROCEDURE — 80048 BASIC METABOLIC PNL TOTAL CA: CPT | Performed by: INTERNAL MEDICINE

## 2021-12-14 PROCEDURE — 36415 COLL VENOUS BLD VENIPUNCTURE: CPT | Performed by: INTERNAL MEDICINE

## 2021-12-14 PROCEDURE — 83880 ASSAY OF NATRIURETIC PEPTIDE: CPT | Performed by: INTERNAL MEDICINE

## 2021-12-14 RX ORDER — FUROSEMIDE 20 MG/1
20 TABLET ORAL DAILY
Qty: 30 TABLET | Refills: 1 | Status: SHIPPED | OUTPATIENT
Start: 2021-12-14 | End: 2021-12-17

## 2021-12-14 NOTE — PROGRESS NOTES
Subjective   Branden Diop is a 71 y.o. male.     Vitals:    12/14/21 1412   BP: 120/80   Pulse: 61   Resp: 16   Temp: 97.8 °F (36.6 °C)   SpO2: 96%      Body mass index is 41.05 kg/m².     History of Present Illness   Patient was seen for shortness of breath.  Patient is short of breath after walking several yards.  Patient is seeing his pulmonary and cardiologist both of them said his lung and heart was working normally at the present time.  Patient does have atrial fibrillation and severe emphysema.  Patient is using metoprolol tartrate 25 mg twice daily to control rate rivaroxaban 20 mg for clot control.  Patient will have a BMP and BNP today.  Patient has gained 10 pounds in 1 month.  Patient was placed on Lasix 20 mg daily and will follow up in 4 days.  Patient's blood sugar has been normal and is seeing a endocrinologist.  Patient's blood pressures been running 120s over 80s.  Patient will continue with Toprol tartrate 25 mg twice daily, lisinopril 40 mg daily.    Dictated utilizing Dragon dictation. If there are questions or for further clarification, please contact me.  The following portions of the patient's history were reviewed and updated as appropriate: allergies, current medications, past family history, past medical history, past social history, past surgical history and problem list.    Review of Systems   Constitutional: Negative for fatigue and fever.   HENT: Positive for congestion. Negative for trouble swallowing.    Eyes: Negative for discharge and visual disturbance.   Respiratory: Positive for shortness of breath. Negative for choking.    Cardiovascular: Negative for chest pain and palpitations.   Gastrointestinal: Negative for abdominal pain and blood in stool.   Endocrine: Negative.    Genitourinary: Negative for genital sores and hematuria.   Musculoskeletal: Negative for gait problem and joint swelling.   Skin: Negative for color change, pallor, rash and wound.   Allergic/Immunologic:  Positive for environmental allergies. Negative for immunocompromised state.   Neurological: Negative for facial asymmetry and speech difficulty.   Psychiatric/Behavioral: Negative for hallucinations and suicidal ideas.       Objective   Physical Exam  Vitals and nursing note reviewed.   Constitutional:       Appearance: Normal appearance. He is well-developed.   HENT:      Head: Normocephalic and atraumatic.      Nose: Nose normal.      Mouth/Throat:      Mouth: Mucous membranes are moist.      Pharynx: Oropharynx is clear.   Eyes:      Extraocular Movements: Extraocular movements intact.      Conjunctiva/sclera: Conjunctivae normal.      Pupils: Pupils are equal, round, and reactive to light.   Cardiovascular:      Rate and Rhythm: Normal rate and regular rhythm.      Heart sounds: Normal heart sounds. No murmur heard.  No friction rub. No gallop.    Pulmonary:      Effort: Pulmonary effort is normal. No respiratory distress.      Breath sounds: Normal breath sounds. No stridor. No wheezing, rhonchi or rales.   Chest:      Chest wall: No tenderness.   Abdominal:      General: Bowel sounds are normal.      Palpations: Abdomen is soft.   Musculoskeletal:         General: Normal range of motion.      Cervical back: Normal range of motion and neck supple.   Skin:     General: Skin is warm and dry.   Neurological:      General: No focal deficit present.      Mental Status: He is alert and oriented to person, place, and time. Mental status is at baseline.   Psychiatric:         Mood and Affect: Mood normal.         Behavior: Behavior normal.         Thought Content: Thought content normal.         Judgment: Judgment normal.         Assessment/Plan #1 Lasix 20 mg a day #2 weight and blood pressure check daily #3 follow-up in 4 days #4 BMP with BNP  Problems Addressed this Visit        Cardiac and Vasculature    A-fib (HCC)    Essential hypertension    Relevant Medications    furosemide (Lasix) 20 MG tablet    Other  Relevant Orders    Basic Metabolic Panel    proBNP       Endocrine and Metabolic    Type 2 diabetes mellitus, with long-term current use of insulin (HCC)    Relevant Orders    Basic Metabolic Panel    proBNP    Ambulatory Referral to Nutrition Services       Pulmonary and Pneumonias    COPD (chronic obstructive pulmonary disease) (HCC)    Relevant Orders    Basic Metabolic Panel    proBNP      Other Visit Diagnoses     SOB (shortness of breath)    -  Primary    Relevant Orders    Basic Metabolic Panel    proBNP      Diagnoses     Diagnosis Codes Comments    SOB (shortness of breath)    -  Primary ICD-10-CM: R06.02  ICD-9-CM: 786.05     Chronic obstructive pulmonary disease, unspecified COPD type (HCC)     ICD-10-CM: J44.9  ICD-9-CM: 496     Essential hypertension     ICD-10-CM: I10  ICD-9-CM: 401.9     Type 2 diabetes mellitus with hyperosmolarity without coma, with long-term current use of insulin (HCC)     ICD-10-CM: E11.00, Z79.4  ICD-9-CM: 250.20, V58.67     Paroxysmal atrial fibrillation (HCC)     ICD-10-CM: I48.0  ICD-9-CM: 427.31

## 2021-12-17 ENCOUNTER — TELEPHONE (OUTPATIENT)
Dept: FAMILY MEDICINE CLINIC | Facility: CLINIC | Age: 71
End: 2021-12-17

## 2021-12-17 ENCOUNTER — OFFICE VISIT (OUTPATIENT)
Dept: FAMILY MEDICINE CLINIC | Facility: CLINIC | Age: 71
End: 2021-12-17

## 2021-12-17 VITALS
HEART RATE: 54 BPM | HEIGHT: 71 IN | TEMPERATURE: 97.8 F | SYSTOLIC BLOOD PRESSURE: 120 MMHG | DIASTOLIC BLOOD PRESSURE: 74 MMHG | OXYGEN SATURATION: 97 % | BODY MASS INDEX: 40.65 KG/M2 | WEIGHT: 290.4 LBS

## 2021-12-17 DIAGNOSIS — E78.00 PURE HYPERCHOLESTEROLEMIA: ICD-10-CM

## 2021-12-17 DIAGNOSIS — J44.9 CHRONIC OBSTRUCTIVE PULMONARY DISEASE, UNSPECIFIED COPD TYPE (HCC): ICD-10-CM

## 2021-12-17 DIAGNOSIS — Z79.4 TYPE 2 DIABETES MELLITUS WITH DIABETIC NEPHROPATHY, WITH LONG-TERM CURRENT USE OF INSULIN (HCC): ICD-10-CM

## 2021-12-17 DIAGNOSIS — E11.21 TYPE 2 DIABETES MELLITUS WITH DIABETIC NEPHROPATHY, WITH LONG-TERM CURRENT USE OF INSULIN (HCC): ICD-10-CM

## 2021-12-17 DIAGNOSIS — R06.02 SHORT OF BREATH ON EXERTION: Primary | ICD-10-CM

## 2021-12-17 PROCEDURE — 99213 OFFICE O/P EST LOW 20 MIN: CPT | Performed by: INTERNAL MEDICINE

## 2021-12-17 RX ORDER — POTASSIUM CHLORIDE 750 MG/1
TABLET, FILM COATED, EXTENDED RELEASE ORAL
Qty: 30 TABLET | Refills: 3 | Status: SHIPPED | OUTPATIENT
Start: 2021-12-17 | End: 2022-03-18

## 2021-12-17 NOTE — PROGRESS NOTES
Subjective   Branden Diop is a 71 y.o. male.     Vitals:    12/17/21 0824   BP: 120/74   Pulse: 54   Temp: 97.8 °F (36.6 °C)   SpO2: 97%      Body mass index is 40.5 kg/m².     History of Present Illness   Patient was seen for shortness of breath. Patient was given Lasix 20 mg and lost 4 pounds over 3 days. Patient states he is not breathing any better. Patient does believe it is from inactivity and deconditioning. Patient was taken off Lasix and asked him to do low-impact exercise. Patient does have COPD and uses bronchodilators as needed. Patient's lipids have been treated with diet exercise and rosuvastatin 40 mg daily. Patient's blood sugar has been running 130s at home. Patient will monitor weights blood pressure and blood sugar follow-up in 3 months. Patient will use Lasix a rapid weight gain of 1 to 2 pounds over 1 to 2 days. Patient will also take potassium with his Lasix.    Dictated utilizing Dragon dictation. If there are questions or for further clarification, please contact me.  The following portions of the patient's history were reviewed and updated as appropriate: allergies, current medications, past family history, past medical history, past social history, past surgical history and problem list.    Review of Systems   Constitutional: Negative for fatigue and fever.   HENT: Positive for congestion. Negative for trouble swallowing.    Eyes: Negative for discharge and visual disturbance.   Respiratory: Negative for choking and shortness of breath.    Cardiovascular: Negative for chest pain and palpitations.   Gastrointestinal: Negative for abdominal pain and blood in stool.   Endocrine: Negative.    Genitourinary: Negative for genital sores and hematuria.   Musculoskeletal: Negative for gait problem and joint swelling.   Skin: Negative for color change, pallor, rash and wound.   Allergic/Immunologic: Positive for environmental allergies. Negative for immunocompromised state.   Neurological: Negative for  facial asymmetry and speech difficulty.   Psychiatric/Behavioral: Negative for hallucinations and suicidal ideas.       Objective   Physical Exam  Vitals and nursing note reviewed.   Constitutional:       Appearance: Normal appearance. He is well-developed.   HENT:      Head: Normocephalic and atraumatic.      Nose: Nose normal.      Mouth/Throat:      Mouth: Mucous membranes are moist.      Pharynx: Oropharynx is clear.   Eyes:      Extraocular Movements: Extraocular movements intact.      Conjunctiva/sclera: Conjunctivae normal.      Pupils: Pupils are equal, round, and reactive to light.   Cardiovascular:      Rate and Rhythm: Normal rate and regular rhythm.      Heart sounds: Normal heart sounds. No murmur heard.  No friction rub. No gallop.    Pulmonary:      Effort: Pulmonary effort is normal. No respiratory distress.      Breath sounds: Normal breath sounds. No stridor. No wheezing, rhonchi or rales.   Chest:      Chest wall: No tenderness.   Abdominal:      General: Bowel sounds are normal.      Palpations: Abdomen is soft.   Musculoskeletal:         General: Normal range of motion.      Cervical back: Normal range of motion and neck supple.   Skin:     General: Skin is warm and dry.   Neurological:      General: No focal deficit present.      Mental Status: He is alert and oriented to person, place, and time. Mental status is at baseline.   Psychiatric:         Mood and Affect: Mood normal.         Behavior: Behavior normal.         Thought Content: Thought content normal.         Judgment: Judgment normal.         Assessment/Plan #1 Lasix 20 mg p.o. daily with 10 mEq potassium for weight gain of 1 to 2 pounds over 1 to 2 days. #2 monitor blood pressure weight and blood sugar #3 follow-up in 3 months  Problems Addressed this Visit        Cardiac and Vasculature    Hyperlipidemia       Endocrine and Metabolic    Type 2 diabetes mellitus, with long-term current use of insulin (HCC)       Pulmonary and  Pneumonias    COPD (chronic obstructive pulmonary disease) ([facility])      Other Visit Diagnoses     Short of breath on exertion    -  Primary      Diagnoses     Diagnosis Codes Comments    Short of breath on exertion    -  Primary ICD-10-CM: R06.02  ICD-9-CM: 786.05     Chronic obstructive pulmonary disease, unspecified COPD type ([facility])     ICD-10-CM: J44.9  ICD-9-CM: 496     Pure hypercholesterolemia     ICD-10-CM: E78.00  ICD-9-CM: 272.0     Type 2 diabetes mellitus with diabetic nephropathy, with long-term current use of insulin ([facility])     ICD-10-CM: E11.21, Z79.4  ICD-9-CM: 250.40, 583.81, V58.67

## 2021-12-17 NOTE — TELEPHONE ENCOUNTER
Caller: Luba Diop    Relationship: Emergency Contact    Best call back number:[phone]    Who are you requesting to speak with (clinical staff, provider,  specific staff member): DR. LEARY     What was the call regarding: PATIENT HAS A PROCEDURE ON Monday AND THEY NEED TO KNOW IF Patient STOPS TAKING HIS XARELTO ON Saturday IF THAT WOULD BE ENOUGH TIME FOR THE PROCEDURE TO BE DONE.     Do you require a callback: YES

## 2021-12-20 ENCOUNTER — TELEPHONE (OUTPATIENT)
Dept: CARDIOLOGY | Facility: CLINIC | Age: 71
End: 2021-12-20

## 2022-01-04 ENCOUNTER — TELEPHONE (OUTPATIENT)
Dept: CARDIOLOGY | Facility: CLINIC | Age: 72
End: 2022-01-04

## 2022-01-05 ENCOUNTER — OFFICE VISIT (OUTPATIENT)
Dept: FAMILY MEDICINE CLINIC | Facility: CLINIC | Age: 72
End: 2022-01-05

## 2022-01-05 VITALS
OXYGEN SATURATION: 93 % | DIASTOLIC BLOOD PRESSURE: 78 MMHG | SYSTOLIC BLOOD PRESSURE: 124 MMHG | TEMPERATURE: 98.2 F | HEART RATE: 56 BPM | BODY MASS INDEX: 40.26 KG/M2 | WEIGHT: 287.6 LBS | HEIGHT: 71 IN

## 2022-01-05 DIAGNOSIS — Z79.4 TYPE 2 DIABETES MELLITUS WITH DIABETIC NEPHROPATHY, WITH LONG-TERM CURRENT USE OF INSULIN: ICD-10-CM

## 2022-01-05 DIAGNOSIS — I10 ESSENTIAL HYPERTENSION: Primary | ICD-10-CM

## 2022-01-05 DIAGNOSIS — E78.00 PURE HYPERCHOLESTEROLEMIA: ICD-10-CM

## 2022-01-05 DIAGNOSIS — E11.21 TYPE 2 DIABETES MELLITUS WITH DIABETIC NEPHROPATHY, WITH LONG-TERM CURRENT USE OF INSULIN: ICD-10-CM

## 2022-01-05 PROCEDURE — 99213 OFFICE O/P EST LOW 20 MIN: CPT | Performed by: INTERNAL MEDICINE

## 2022-01-05 RX ORDER — CLOTRIMAZOLE AND BETAMETHASONE DIPROPIONATE 10; .64 MG/G; MG/G
1 CREAM TOPICAL 2 TIMES DAILY
Qty: 15 G | Refills: 3 | Status: SHIPPED | OUTPATIENT
Start: 2022-01-05 | End: 2022-08-22

## 2022-01-05 NOTE — PROGRESS NOTES
Subjective   Branden Diop is a 71 y.o. male.     Vitals:    01/05/22 1623   BP: 124/78   Pulse: 56   Temp: 98.2 °F (36.8 °C)   SpO2: 93%      Body mass index is 40.11 kg/m².     History of Present Illness   Patient was seen for hypertension.  Blood pressures been running 120s over 70s.  Patient will continue with Toprol tartrate 25 mg twice daily.  Patient's blood sugar has been running 130s.  Patient will continue present diet and activity levels.  Patient's lipids treated with diet exercise and rosuvastatin 40 mg daily.  Patient will continue present treatment and follow-up in 4 months.    Dictated utilizing Dragon dictation. If there are questions or for further clarification, please contact me.  The following portions of the patient's history were reviewed and updated as appropriate: allergies, current medications, past family history, past medical history, past social history, past surgical history and problem list.    Review of Systems   Constitutional: Negative for fatigue and fever.   HENT: Positive for congestion. Negative for trouble swallowing.    Eyes: Negative for discharge and visual disturbance.   Respiratory: Negative for choking and shortness of breath.    Cardiovascular: Negative for chest pain and palpitations.   Gastrointestinal: Negative for abdominal pain and blood in stool.   Endocrine: Negative.    Genitourinary: Negative for genital sores and hematuria.   Musculoskeletal: Negative for gait problem and joint swelling.   Skin: Negative for color change, pallor, rash and wound.   Allergic/Immunologic: Positive for environmental allergies. Negative for immunocompromised state.   Neurological: Negative for facial asymmetry and speech difficulty.   Psychiatric/Behavioral: Negative for hallucinations and suicidal ideas.       Objective   Physical Exam  Vitals and nursing note reviewed.   Constitutional:       Appearance: Normal appearance. He is well-developed.   HENT:      Head: Normocephalic and  atraumatic.      Nose: Nose normal.      Mouth/Throat:      Mouth: Mucous membranes are moist.      Pharynx: Oropharynx is clear.   Eyes:      Extraocular Movements: Extraocular movements intact.      Conjunctiva/sclera: Conjunctivae normal.      Pupils: Pupils are equal, round, and reactive to light.   Cardiovascular:      Rate and Rhythm: Normal rate and regular rhythm.      Heart sounds: Normal heart sounds. No murmur heard.  No friction rub. No gallop.    Pulmonary:      Effort: Pulmonary effort is normal. No respiratory distress.      Breath sounds: Normal breath sounds. No stridor. No wheezing, rhonchi or rales.   Chest:      Chest wall: No tenderness.   Abdominal:      General: Bowel sounds are normal.      Palpations: Abdomen is soft.   Musculoskeletal:         General: Normal range of motion.      Cervical back: Normal range of motion and neck supple.   Skin:     General: Skin is warm and dry.   Neurological:      General: No focal deficit present.      Mental Status: He is alert and oriented to person, place, and time. Mental status is at baseline.   Psychiatric:         Mood and Affect: Mood normal.         Behavior: Behavior normal.         Thought Content: Thought content normal.         Judgment: Judgment normal.         Assessment/Plan #1 continue monitor blood pressure 2 continue present diet and activity levels  Problems Addressed this Visit        Cardiac and Vasculature    Hyperlipidemia    Essential hypertension - Primary       Endocrine and Metabolic    Type 2 diabetes mellitus, with long-term current use of insulin (HCC)      Diagnoses     Diagnosis Codes Comments    Essential hypertension    -  Primary ICD-10-CM: I10  ICD-9-CM: 401.9     Type 2 diabetes mellitus with diabetic nephropathy, with long-term current use of insulin (HCC)     ICD-10-CM: E11.21, Z79.4  ICD-9-CM: 250.40, 583.81, V58.67     Pure hypercholesterolemia     ICD-10-CM: E78.00  ICD-9-CM: 272.0

## 2022-02-09 RX ORDER — AMOXICILLIN 875 MG/1
TABLET, COATED ORAL
Qty: 20 TABLET | Refills: 0 | Status: SHIPPED | OUTPATIENT
Start: 2022-02-09 | End: 2022-02-14

## 2022-02-11 ENCOUNTER — DOCUMENTATION (OUTPATIENT)
Dept: ENDOCRINOLOGY | Age: 72
End: 2022-02-11

## 2022-02-11 NOTE — PROGRESS NOTES
Benefits Investigation Summary    Prescription: Renewal     Dispensing pharmacy: [facility]    Copay amount: SOLIQUA 2/21    PLAN: [facility]  BIN: [id number]  PCN: [id number]  RX GROUP: [id number]    Prior Auth and Med Assistance notes:

## 2022-02-14 ENCOUNTER — SPECIALTY PHARMACY (OUTPATIENT)
Dept: ENDOCRINOLOGY | Age: 72
End: 2022-02-14

## 2022-02-14 ENCOUNTER — OFFICE VISIT (OUTPATIENT)
Dept: ENDOCRINOLOGY | Age: 72
End: 2022-02-14

## 2022-02-14 VITALS
WEIGHT: 295.2 LBS | SYSTOLIC BLOOD PRESSURE: 124 MMHG | BODY MASS INDEX: 41.33 KG/M2 | HEIGHT: 71 IN | DIASTOLIC BLOOD PRESSURE: 70 MMHG | OXYGEN SATURATION: 97 % | HEART RATE: 53 BPM

## 2022-02-14 DIAGNOSIS — R80.9 PROTEINURIA, UNSPECIFIED TYPE: ICD-10-CM

## 2022-02-14 DIAGNOSIS — Z79.4 TYPE 2 DIABETES MELLITUS WITH HYPERGLYCEMIA, WITH LONG-TERM CURRENT USE OF INSULIN: Primary | ICD-10-CM

## 2022-02-14 DIAGNOSIS — E11.65 TYPE 2 DIABETES MELLITUS WITH HYPERGLYCEMIA, WITH LONG-TERM CURRENT USE OF INSULIN: Primary | ICD-10-CM

## 2022-02-14 PROCEDURE — 99214 OFFICE O/P EST MOD 30 MIN: CPT | Performed by: NURSE PRACTITIONER

## 2022-02-14 NOTE — PROGRESS NOTES
Specialty Pharmacy Patient Management Program  Endocrinology Introduction to Services Outreach     Branden Diop is a 71 y.o. male with Type 2 Diabetes seen by an Endocrinology provider. This was an initial visit to introduce Endocrinology Patient Management Program and Specialty Pharmacy services offered by [facility], as the patient is taking a specialty medication.  Allergies, PMH, and medications were reviewed during this visit.      Relevant Past Medical History and Comorbidities  Past Medical History:   Diagnosis Date   • Anxiety    • Arthritis    • Bunion of right foot    • Colon polyps    • COPD (chronic obstructive pulmonary disease) (HCC)     MILD. NO MEDS FOR   • Depression    • History of atrial fibrillation     WITH CARDIO VERSION. NO PROBLEMS   • History of skin cancer     BCC REMOVED FROM BACK   • Hyperlipidemia    • Inguinal hernia, recurrent     RIGHT   • Left foot pain    • Sleep apnea with use of continuous positive airway pressure (CPAP)     CPAP   • Streptococcus pneumoniae     ABOUT 6-7 YR AGO.    • Type 2 diabetes mellitus (HCC)      Social History     Socioeconomic History   • Marital status:    • Number of children: 2   Tobacco Use   • Smoking status: Former Smoker     Packs/day: 1.00     Years: 30.00     Pack years: 30.00     Types: Cigars     Quit date: 2014     Years since quittin.1   • Smokeless tobacco: Never Used   Substance and Sexual Activity   • Alcohol use: Never     Comment: caffeine use- decaf coffee   • Drug use: Never   • Sexual activity: Defer            Allergies  Patient has no known allergies.         Current Medication List    •  Cholecalciferol (Vitamin D3) 25 MCG (1000 UT) capsule, 2 po qday, Disp: 60 capsule, Rfl: 4  •  clotrimazole-betamethasone (Lotrisone) 1-0.05 % cream, Apply 1 application topically to the appropriate area as directed 2 (Two) Times a Day., Disp: 15 g, Rfl: 3  •  glucose blood test strip, Freestyle strips daily E 11.9,  Disp: 100 each, Rfl: 12  •  glucose monitoring kit (FREESTYLE) monitoring kit, Daily E 11.93 FreeStyle meter, Disp: 1 each, Rfl: 1  •  Lancets (FREESTYLE) lancets, Daily E 11.9, Disp: 100 each, Rfl: 12  •  metoprolol tartrate (LOPRESSOR) 25 MG tablet, TAKE ONE TABLET BY MOUTH TWICE A DAY, Disp: 180 tablet, Rfl: 1  •  Multiple Vitamin (MULTIVITAMINS PO), Take 1 tablet by mouth Daily., Disp: , Rfl:   •  potassium chloride 10 MEQ CR tablet, 1 po qday, Disp: 30 tablet, Rfl: 3  •  rivaroxaban (Xarelto) 20 MG tablet, Take 1 tablet by mouth Daily., Disp: 30 tablet, Rfl: 9  •  rosuvastatin (CRESTOR) 40 MG tablet, TAKE ONE TABLET BY MOUTH DAILY, Disp: 90 tablet, Rfl: 0  •  sertraline (ZOLOFT) 50 MG tablet, TAKE ONE TABLET BY MOUTH DAILY, Disp: 90 tablet, Rfl: 1  •  Soliqua 100-33 UNT-MCG/ML solution pen-injector injection, Inject 20 Units under the skin into the appropriate area as directed Daily With Breakfast., Disp: 15 pen, Rfl: 2       Recommended Medications Assessment  • Aspirin - Contraindicated (on rivaroxaban for AF)  • Statin - Currently Taking  • ACEi/ARB - Indicated, not currently taking    Initial Education Provided for Specialty Medication  The patient has been provided with the following education and any applicable administration techniques (i.e. self-injection) have been demonstrated for the therapies indicated. All questions and concerns have been addressed prior to the patient receiving the medication, and the patient has verbalized understanding of the education and any materials provided.  Additional patient education shall be provided and documented upon request by the patient, provider or payer.      SOLIQUA® (Insulin Glargine & Lixisenatide)  Medication Expectations   Why am I taking this medication? You are taking Soliqua, along with diet and exercise, to lower blood sugar because you have type 2 diabetes. Diabetes is not curable but with proper medication and treatment, we can keep your blood sugar  within your personalized target range. This medication may also help you lose some weight, and it helps reduce the risk of death from heart attack, and stroke in adults with type 2 diabetes and known heart disease.   What should I expect while on this medication? You should expect to see your blood sugar and A1c decrease over time and you may also lose some weight.   How does the medication work? Soliqua combines 2 diabetes medicines, insulin glargine (long-acting insulin) and lixisenatide (non-insulin), that work together in one injection.  Soliqua works with your body's own ability to lower blood sugar and A1c and helps your body release more insulin in response to your blood sugar rising.  This medication can also slow down food from leaving your stomach, making you feel lim for longer.   How long will I be on this medication for? The amount of time you will be on this medication will be determined by your doctor based on blood sugar and A1c control. You will most likely be on this medication or another diabetes medication throughout your lifetime. Do not abruptly stop this medication without talking to your doctor first.    How do I take this medication? Take as directed on your prescription label. Soliqua is injected under the skin (subcutaneously) of your stomach, thigh, or upper arm. This medication should be taken once daily within one hour before your first meal of the day. Use a different injection site in the same body region each day.     Do not take less than 15 units or more than 60 units of Soliqua each day.    Injection directions:  • For each new pen, take it out of the refrigerator at least 1 hour before you inject  • Pull off the pen cap and wipe the rubber seal with alcohol swab  • Attach a new needle for each injection; do not reuse needles (needles are sold separately)  • Prime each new needle before injection with 2 units of medication by turning the dose selector to the 2-oliva and pressing  the button to inject the liquid into the air  • After priming the needle, turn the dose selector to your prescribed dose  • Push the needle into your chosen injection site and press the injection button in and hold until the dial has returned to 0 and for an additional 10 seconds  • Gently remove the pen from your skin and replace the outer needle cap, remove the needle from the pen and discard the needle (detailed below in “Medication Storage / Handling”)   What are some possible side effects? You may notice you don't feel as hungry, especially when you first start using Soliqua.  The most common side effects are low blood sugar (hypoglycemia), nausea, diarrhea, headache, congested/runny nose, upper respiratory infection, or injection-site itching/redness.      What happens if I miss a dose? If you miss a dose of Soliqua, skip the missed dose and take your prescribed dose the next day.  Never administer 2 doses of the same day or increase the dose to make up for a missed dose.     Medication Safety   What are things I should warn my doctor immediately about? Tell your doctor if you have or have had problems with your kidneys, liver, gallbladder, or pancreas.  • Stop using Soliqua and get medical help right away if you have severe pain in your stomach area that will not go away as this could be a sign of pancreatitis (inflammation of your pancreas).    Talk to your doctor if you have problem digesting food or slowed emptying of your stomach (gastroparesis).      Notify your doctor if you experience hypoglycemia which may include feeling dizzy, drowsy, weak, sweaty, hungry, jittery, confused, anxious, hungry, etc.    Taking pioglitazone (Actos) with Soliqua may cause heart failure in some people.  If you experience shortness of breath, swelling in your ankles/feet, or weight gain, let your doctor know.     Talk to your doctor if you are pregnant, planning to become pregnant, or breastfeeding.     Get medical help  right away if you notice any signs/symptoms of an allergic reaction (rash, hives, difficulty breathing, etc.).   What are things that I should be cautious of? Be cautious of any side effects from this medication. Talk to your doctor if any new ones develop or aren't getting better.   What are some medications that can interact with this one? Taking Soliqua with other medications that also lower your blood sugar such as insulin and glipizide/glimepiride/glyburide may increase the risk of low blood sugar.  • Your doctor may reduce the dose of these medications when you start Soliqua to minimize low blood sugars.     It should not be taken with other long-acting insulin or medicines called GLP-1 receptor agonists, because these work the same way as Soliqua.      Some medications (beta-clockers (metoprolol, carvedilol, etc.) and clonidine) may minimize the signs and symptoms of hypoglycemia; it is important to check your blood sugar before administering Soliqua.    Because Soliqua slows stomach emptying, it can affect the way some medicines work.    Always tell your doctor or pharmacist immediately if you start taking any new medications, including over-the-counter medications, vitamins, and herbal supplements.      Medication Storage/Handling   How should I handle this medication? Keep this medication out of reach of pets/children and keep the pen capped when not in use.  Do not share your medicine pens with others.   How does this medication need to be stored? Prior to use, store Soliqua in the refrigerator [2°C to 8°C (36°F to 46°F)] in its original packaging; do not freeze and do not use if Soliqua has been frozen.      After you use your pen, do not put it back in the refrigerator.  Keep it at room temperature [below 25°C (77°F)].  Do not store with the needle attached and protect the pen from excessive heat and sunlight.     How should I dispose of this medication? Used needles should discarded after each use (for  single use only). Place your used needles in an approved sharps container after use.  If you do not have a sharps container, you may use a household container made of heavy-duty plastic with a tight-fitting lid that is leak resistant (e.g., heavy-duty plastic laundry detergent bottle).      Throw the Soliqua pen away 28 days after the first use.    If your doctor decides to stop this medication, take to your local police station for proper disposal. Some pharmacies also have take-back bins for medication drop-off.     Resources/Support   How can I remind myself to take this medication? You can download reminder apps to help you manage your refills. You may also set an alarm on your phone to remind you.    Is financial support available?  [employer] can provide co-pay cards if you have commercial insurance or patient assistance if you have Medicare or no insurance.    Which vaccines are recommended for me? Talk to your doctor about these vaccines:  • Flu   • Coronavirus (COVID-19)   • Pneumococcal (pneumonia)   • Tdap   • Hepatitis B   • Zoster (shingles)        Reassessment Plan & Follow-Up  1. Medication Therapy Changes: None for endocrine (pending labs), but PCP recently stopped lisinopril 40 mg (unsure why?); patient was started on 3 days of furosemide for fluid overload, but still taking KCl?  2. Additional Plans, Therapy Recommendations, or Therapy Problems to Be Addressed: patient is going to follow-up with Dr. Lopez regarding KCl now that he isn't taking furosemide (K = 5.4 on 12/14/21); patient will notify office if Soliqua is more than $35 / month   3. Patient declined filling their specialty medication(s) at [facility] and/or enrollment in the Endocrine Disorders Patient Management Program at this time.  His wife and daughter help manage his medications as well.      Sweta Alvarenga, Estelle, BCCP, BCPS   2/14/2022  08:55 EST

## 2022-02-14 NOTE — PROGRESS NOTES
"Chief Complaint  Diabetes    Subjective          Branden Diop presents to [facility] ENDOCRINOLOGY  History of Present Illness     I have reviewed PMH, allergies and medications UTD at this visit     Type 2 dm   Diagnosed about 2 years ago.   Today in clinic pt reports being on soliqua 20 units in the am.   Avg bg - <130  Checks BG - once a day  Dm retinopathy - x, Last eye exam - going next week  Dm nephropathy - + proteinuria   Dm neuropathy - yes, see podiatry, Dm neuropathy meds - n/a  CAD - no, +afib   CVA - no   Episodes of hypoglycemia - no  Pt is not very physically active. Gaining weight with inability to exercise r/t afib  Pt tries to follow DM diet for most part but eating more whole foods   On  statin     Objective   Vital Signs:   /70   Pulse 53   Ht 180.3 cm (70.98\")   Wt 134 kg (295 lb 3.2 oz)   SpO2 97%   BMI 41.19 kg/m²        Physical Exam  Vitals reviewed.   Constitutional:       General: He is not in acute distress.  HENT:      Head: Normocephalic and atraumatic.   Cardiovascular:      Rate and Rhythm: Normal rate and regular rhythm.   Pulmonary:      Effort: Pulmonary effort is normal. No respiratory distress.   Musculoskeletal:         General: No signs of injury. Normal range of motion.      Cervical back: Normal range of motion and neck supple.   Skin:     General: Skin is warm and dry.   Neurological:      Mental Status: He is alert and oriented to person, place, and time. Mental status is at baseline.   Psychiatric:         Mood and Affect: Mood normal.         Behavior: Behavior normal.         Thought Content: Thought content normal.         Judgment: Judgment normal.            Result Review :   The following data was reviewed by: BAM Moody on 02/14/2022:  Common labs    Common Labsle 8/20/21 8/20/21 8/20/21 11/12/21 11/12/21 12/14/21    1035 1035 1035 0732 0732    Glucose   83  131 (A) 102 (A)   BUN   13  13 12   Creatinine   0.77  0.83 0.82   eGFR " Non  Am   99  91 92   Sodium   141  140 143   Potassium   5.2  5.0 5.4 (A)   Chloride   101  104 105   Calcium   10.3  9.6 9.9   Albumin   4.20      Total Bilirubin   0.8      Alkaline Phosphatase   103      AST (SGOT)   25      ALT (SGPT)   23      WBC 7.92   7.89     Hemoglobin 15.5   15.4     Hematocrit 47.8   47.8     Platelets 346   297     Hemoglobin A1C  6.40 (A)       (A) Abnormal value                      Assessment and Plan    Diagnoses and all orders for this visit:    1. Type 2 diabetes mellitus with hyperglycemia, with long-term current use of insulin (HCC) (Primary)  -     Hemoglobin A1c  -     Comprehensive Metabolic Panel  -     Lipid Panel  -     Microalbumin / Creatinine Urine Ratio - Urine, Clean Catch    2. Proteinuria, unspecified type  -     Microalbumin / Creatinine Urine Ratio - Urine, Clean Catch        Follow Up   No follow-ups on file.     Repeat labs today  Continue Soliqua and will adjust dosing to goal A1c less than 7%  Will consider nephrology if proteinuria is persistent, not currently on ACE or ARB  Continue statin  Upcoming diabetic eye exam    Patient was given instructions and counseling regarding his condition or for health maintenance advice. Please see specific information pulled into the AVS if appropriate.     Barbara Ervin, APRN

## 2022-02-15 NOTE — PROGRESS NOTES
Please place referral for nephrology re: proteinuria    Please call patient, A1c worse  Increase Soliqua to 24 units daily

## 2022-02-16 DIAGNOSIS — R80.9 PROTEINURIA, UNSPECIFIED TYPE: Primary | ICD-10-CM

## 2022-02-21 LAB
ALBUMIN SERPL-MCNC: 4.4 G/DL (ref 3.7–4.7)
ALBUMIN/CREAT UR: 3192 MG/G CREAT (ref 0–29)
ALBUMIN/GLOB SERPL: 1.6 {RATIO} (ref 1.2–2.2)
ALP SERPL-CCNC: 106 IU/L (ref 44–121)
ALT SERPL-CCNC: 23 IU/L (ref 0–44)
AST SERPL-CCNC: 27 IU/L (ref 0–40)
BILIRUB SERPL-MCNC: 1.1 MG/DL (ref 0–1.2)
BUN SERPL-MCNC: 9 MG/DL (ref 8–27)
BUN/CREAT SERPL: 9 (ref 10–24)
CALCIUM SERPL-MCNC: 9.6 MG/DL (ref 8.6–10.2)
CHLORIDE SERPL-SCNC: 101 MMOL/L (ref 96–106)
CHOLEST SERPL-MCNC: 168 MG/DL (ref 100–199)
CO2 SERPL-SCNC: 24 MMOL/L (ref 20–29)
CREAT SERPL-MCNC: 0.95 MG/DL (ref 0.76–1.27)
CREAT UR-MCNC: 46.5 MG/DL
GLOBULIN SER CALC-MCNC: 2.8 G/DL (ref 1.5–4.5)
GLUCOSE SERPL-MCNC: 156 MG/DL (ref 65–99)
HBA1C MFR BLD: 7.7 % (ref 4.8–5.6)
HDLC SERPL-MCNC: 36 MG/DL
IMP & REVIEW OF LAB RESULTS: NORMAL
LDLC SERPL CALC-MCNC: 107 MG/DL (ref 0–99)
MICROALBUMIN UR-MCNC: 1484.3 UG/ML
POTASSIUM SERPL-SCNC: 5.1 MMOL/L (ref 3.5–5.2)
PROT SERPL-MCNC: 7.2 G/DL (ref 6–8.5)
SODIUM SERPL-SCNC: 142 MMOL/L (ref 134–144)
TRIGL SERPL-MCNC: 137 MG/DL (ref 0–149)
VLDLC SERPL CALC-MCNC: 25 MG/DL (ref 5–40)

## 2022-02-23 ENCOUNTER — TELEPHONE (OUTPATIENT)
Dept: FAMILY MEDICINE CLINIC | Facility: CLINIC | Age: 72
End: 2022-02-23

## 2022-02-23 NOTE — TELEPHONE ENCOUNTER
Caller: Luba Diop    Relationship to patient: Emergency Contact    Best call back number: [phone]    Patient is needing: PATIENT HAS ONE OF THE C-PAP MACHINES THAT WAS RECALLED. PATIENT CONTACTED A  CONCERNING THE MACHINE. THE  SENT A LETTER TO DR LEARY REQUESTING MEDICAL RECORDS BUT HAS NOT RECEIVED ANYTHING BACK.   [facility] TIME LINE TO FILE CLAIMS EXPIRES MARCH 1, 2022.    PLEASE CALL WIFE ASAP

## 2022-03-18 ENCOUNTER — OFFICE VISIT (OUTPATIENT)
Dept: FAMILY MEDICINE CLINIC | Facility: CLINIC | Age: 72
End: 2022-03-18

## 2022-03-18 VITALS
HEIGHT: 71 IN | SYSTOLIC BLOOD PRESSURE: 118 MMHG | TEMPERATURE: 97.7 F | WEIGHT: 286.8 LBS | HEART RATE: 61 BPM | BODY MASS INDEX: 40.15 KG/M2 | DIASTOLIC BLOOD PRESSURE: 74 MMHG | OXYGEN SATURATION: 95 %

## 2022-03-18 DIAGNOSIS — Z79.4 TYPE 2 DIABETES MELLITUS WITH DIABETIC NEPHROPATHY, WITH LONG-TERM CURRENT USE OF INSULIN: ICD-10-CM

## 2022-03-18 DIAGNOSIS — I10 ESSENTIAL HYPERTENSION: ICD-10-CM

## 2022-03-18 DIAGNOSIS — E55.9 VITAMIN D DEFICIENCY, UNSPECIFIED: ICD-10-CM

## 2022-03-18 DIAGNOSIS — Z12.5 PROSTATE CANCER SCREENING: ICD-10-CM

## 2022-03-18 DIAGNOSIS — E78.00 PURE HYPERCHOLESTEROLEMIA: ICD-10-CM

## 2022-03-18 DIAGNOSIS — E11.21 TYPE 2 DIABETES MELLITUS WITH DIABETIC NEPHROPATHY, WITH LONG-TERM CURRENT USE OF INSULIN: ICD-10-CM

## 2022-03-18 DIAGNOSIS — I48.0 PAROXYSMAL ATRIAL FIBRILLATION: Primary | ICD-10-CM

## 2022-03-18 DIAGNOSIS — Z12.11 COLON CANCER SCREENING: ICD-10-CM

## 2022-03-18 LAB
25(OH)D3 SERPL-MCNC: 32.1 NG/ML (ref 30–100)
ALBUMIN SERPL-MCNC: 4.9 G/DL (ref 3.5–5.2)
ALBUMIN/GLOB SERPL: 2 G/DL
ALP SERPL-CCNC: 114 U/L (ref 39–117)
ALT SERPL W P-5'-P-CCNC: 31 U/L (ref 1–41)
ANION GAP SERPL CALCULATED.3IONS-SCNC: 11 MMOL/L (ref 5–15)
AST SERPL-CCNC: 28 U/L (ref 1–40)
BILIRUB SERPL-MCNC: 1.2 MG/DL (ref 0–1.2)
BUN SERPL-MCNC: 12 MG/DL (ref 8–23)
BUN/CREAT SERPL: 13.5 (ref 7–25)
CALCIUM SPEC-SCNC: 9.4 MG/DL (ref 8.6–10.5)
CHLORIDE SERPL-SCNC: 102 MMOL/L (ref 98–107)
CHOLEST SERPL-MCNC: 141 MG/DL (ref 0–200)
CO2 SERPL-SCNC: 28 MMOL/L (ref 22–29)
CREAT SERPL-MCNC: 0.89 MG/DL (ref 0.76–1.27)
DEPRECATED RDW RBC AUTO: 47.1 FL (ref 37–54)
EGFRCR SERPLBLD CKD-EPI 2021: 90.5 ML/MIN/1.73
ERYTHROCYTE [DISTWIDTH] IN BLOOD BY AUTOMATED COUNT: 14.7 % (ref 12.3–15.4)
GLOBULIN UR ELPH-MCNC: 2.4 GM/DL
GLUCOSE SERPL-MCNC: 101 MG/DL (ref 65–99)
HBA1C MFR BLD: 7.7 % (ref 4.8–5.6)
HCT VFR BLD AUTO: 45.2 % (ref 37.5–51)
HDLC SERPL-MCNC: 34 MG/DL (ref 40–60)
HGB BLD-MCNC: 14.8 G/DL (ref 13–17.7)
LDLC SERPL CALC-MCNC: 85 MG/DL (ref 0–100)
LDLC/HDLC SERPL: 2.44 {RATIO}
MCH RBC QN AUTO: 28.7 PG (ref 26.6–33)
MCHC RBC AUTO-ENTMCNC: 32.7 G/DL (ref 31.5–35.7)
MCV RBC AUTO: 87.8 FL (ref 79–97)
PLATELET # BLD AUTO: 320 10*3/MM3 (ref 140–450)
PMV BLD AUTO: 9.8 FL (ref 6–12)
POTASSIUM SERPL-SCNC: 4.7 MMOL/L (ref 3.5–5.2)
PROT SERPL-MCNC: 7.3 G/DL (ref 6–8.5)
PSA SERPL-MCNC: 0.62 NG/ML (ref 0–4)
RBC # BLD AUTO: 5.15 10*6/MM3 (ref 4.14–5.8)
SODIUM SERPL-SCNC: 141 MMOL/L (ref 136–145)
TRIGL SERPL-MCNC: 121 MG/DL (ref 0–150)
VLDLC SERPL-MCNC: 22 MG/DL (ref 5–40)
WBC NRBC COR # BLD: 8.48 10*3/MM3 (ref 3.4–10.8)

## 2022-03-18 PROCEDURE — 82306 VITAMIN D 25 HYDROXY: CPT | Performed by: INTERNAL MEDICINE

## 2022-03-18 PROCEDURE — G0103 PSA SCREENING: HCPCS | Performed by: INTERNAL MEDICINE

## 2022-03-18 PROCEDURE — 80053 COMPREHEN METABOLIC PANEL: CPT | Performed by: INTERNAL MEDICINE

## 2022-03-18 PROCEDURE — 80061 LIPID PANEL: CPT | Performed by: INTERNAL MEDICINE

## 2022-03-18 PROCEDURE — 85027 COMPLETE CBC AUTOMATED: CPT | Performed by: INTERNAL MEDICINE

## 2022-03-18 PROCEDURE — 36415 COLL VENOUS BLD VENIPUNCTURE: CPT | Performed by: INTERNAL MEDICINE

## 2022-03-18 PROCEDURE — 99214 OFFICE O/P EST MOD 30 MIN: CPT | Performed by: INTERNAL MEDICINE

## 2022-03-18 PROCEDURE — 83036 HEMOGLOBIN GLYCOSYLATED A1C: CPT | Performed by: INTERNAL MEDICINE

## 2022-03-18 RX ORDER — FUROSEMIDE 40 MG/1
40 TABLET ORAL 2 TIMES DAILY
COMMUNITY
Start: 2022-03-01 | End: 2023-01-23

## 2022-03-18 NOTE — PROGRESS NOTES
Subjective   Branden Diop is a 71 y.o. male.     Vitals:    03/18/22 0901   BP: 118/74   Pulse: 61   Temp: 97.7 °F (36.5 °C)   SpO2: 95%      Body mass index is 40 kg/m².     History of Present Illness   Patient was seen for atrial fibrillation.  Patient has atrial fibrillation has been taking Xarelto 20 mg daily.  Clot prevention.  Patient also use metoprolol tartrate 25 mg twice daily for rate control.  Patient's blood pressures been running 118 over 70s.  Patient is continue monitoring at home.  Patient's lipids are being treated with diet exercise and rosuvastatin 40 mg daily.  Triglycerides 137, HDL 36, .  Patient will continue present treatment.  Patient does have a vitamin D3 deficiency and is supplement with over-the-counter D3.  Patient is using 2000 units daily.  Vit D level was 33 7 months ago.  Patient is having labs today.  Patient will follow up.  Patient was scheduled for colonoscopy.    Dictated utilizing Dragon dictation. If there are questions or for further clarification, please contact me.  The following portions of the patient's history were reviewed and updated as appropriate: allergies, current medications, past family history, past medical history, past social history, past surgical history and problem list.    Review of Systems   Constitutional: Negative for fatigue and fever.   HENT: Positive for congestion. Negative for trouble swallowing.    Eyes: Negative for discharge and visual disturbance.   Respiratory: Negative for choking and shortness of breath.    Cardiovascular: Negative for chest pain and palpitations.   Gastrointestinal: Negative for abdominal pain and blood in stool.   Endocrine: Negative.    Genitourinary: Negative for genital sores and hematuria.   Musculoskeletal: Negative for gait problem and joint swelling.   Skin: Negative for color change, pallor, rash and wound.   Allergic/Immunologic: Positive for environmental allergies. Negative for immunocompromised state.    Neurological: Negative for facial asymmetry and speech difficulty.   Psychiatric/Behavioral: Negative for hallucinations and suicidal ideas.       Objective   Physical Exam  Vitals and nursing note reviewed.   Constitutional:       Appearance: Normal appearance. He is well-developed.   HENT:      Head: Normocephalic and atraumatic.      Nose: Nose normal.      Mouth/Throat:      Mouth: Mucous membranes are moist.      Pharynx: Oropharynx is clear.   Eyes:      Extraocular Movements: Extraocular movements intact.      Conjunctiva/sclera: Conjunctivae normal.      Pupils: Pupils are equal, round, and reactive to light.   Cardiovascular:      Rate and Rhythm: Normal rate and regular rhythm.      Heart sounds: Normal heart sounds. No murmur heard.    No friction rub. No gallop.   Pulmonary:      Effort: Pulmonary effort is normal. No respiratory distress.      Breath sounds: Normal breath sounds. No stridor. No wheezing, rhonchi or rales.   Chest:      Chest wall: No tenderness.   Abdominal:      General: Bowel sounds are normal.      Palpations: Abdomen is soft.   Musculoskeletal:         General: Normal range of motion.      Cervical back: Normal range of motion and neck supple.   Skin:     General: Skin is warm and dry.   Neurological:      General: No focal deficit present.      Mental Status: He is alert and oriented to person, place, and time. Mental status is at baseline.   Psychiatric:         Mood and Affect: Mood normal.         Behavior: Behavior normal.         Thought Content: Thought content normal.         Judgment: Judgment normal.         Assessment/Plan #1 continue monitoring blood pressure blood sugar #2 continue present diet and activity level 3 labs  Problems Addressed this Visit        Cardiac and Vasculature    A-fib (HCC) - Primary    Relevant Orders    CBC (No Diff)    Comprehensive Metabolic Panel    Lipid Panel    Hemoglobin A1c    Vitamin D 25 Hydroxy    PSA Screen    Hyperlipidemia     Relevant Orders    CBC (No Diff)    Comprehensive Metabolic Panel    Lipid Panel    Hemoglobin A1c    Vitamin D 25 Hydroxy    PSA Screen    Essential hypertension    Relevant Medications    furosemide (LASIX) 40 MG tablet    Other Relevant Orders    CBC (No Diff)    Comprehensive Metabolic Panel    Lipid Panel    Hemoglobin A1c    Vitamin D 25 Hydroxy    PSA Screen       Endocrine and Metabolic    Type 2 diabetes mellitus, with long-term current use of insulin (HCC)    Relevant Orders    CBC (No Diff)    Comprehensive Metabolic Panel    Lipid Panel    Hemoglobin A1c    Vitamin D 25 Hydroxy    PSA Screen      Other Visit Diagnoses     Prostate cancer screening        Relevant Orders    CBC (No Diff)    Comprehensive Metabolic Panel    Lipid Panel    Hemoglobin A1c    Vitamin D 25 Hydroxy    PSA Screen    Vitamin D deficiency, unspecified         Relevant Orders    Vitamin D 25 Hydroxy    Colon cancer screening        Relevant Orders    Ambulatory Referral to Gastroenterology      Diagnoses     Diagnosis Codes Comments    Paroxysmal atrial fibrillation (HCC)    -  Primary ICD-10-CM: I48.0  ICD-9-CM: 427.31     Essential hypertension     ICD-10-CM: I10  ICD-9-CM: 401.9     Pure hypercholesterolemia     ICD-10-CM: E78.00  ICD-9-CM: 272.0     Type 2 diabetes mellitus with diabetic nephropathy, with long-term current use of insulin (HCC)     ICD-10-CM: E11.21, Z79.4  ICD-9-CM: 250.40, 583.81, V58.67     Prostate cancer screening     ICD-10-CM: Z12.5  ICD-9-CM: V76.44     Vitamin D deficiency, unspecified      ICD-10-CM: E55.9  ICD-9-CM: 268.9     Colon cancer screening     ICD-10-CM: Z12.11  ICD-9-CM: V76.51

## 2022-03-23 ENCOUNTER — PRE-PROCEDURE SCREENING (OUTPATIENT)
Dept: GASTROENTEROLOGY | Facility: CLINIC | Age: 72
End: 2022-03-23

## 2022-03-24 ENCOUNTER — TELEPHONE (OUTPATIENT)
Dept: FAMILY MEDICINE CLINIC | Facility: CLINIC | Age: 72
End: 2022-03-24

## 2022-03-24 NOTE — TELEPHONE ENCOUNTER
Caller: Luba Diop    Relationship: Emergency Contact    Best call back number: [phone]       What test was performed: LABS    When was the test performed: 03/18/22    Where was the test performed: OFFICE

## 2022-03-28 RX ORDER — INSULIN GLARGINE AND LIXISENATIDE 100; 33 U/ML; UG/ML
INJECTION, SOLUTION SUBCUTANEOUS
Qty: 60 ML | Refills: 1 | Status: SHIPPED | OUTPATIENT
Start: 2022-03-28

## 2022-04-07 ENCOUNTER — TELEPHONE (OUTPATIENT)
Dept: FAMILY MEDICINE CLINIC | Facility: CLINIC | Age: 72
End: 2022-04-07

## 2022-04-07 NOTE — TELEPHONE ENCOUNTER
Yes it is the same you can get in the office.  If you have Medicare you may want to get it at the pharmacy though.

## 2022-04-07 NOTE — TELEPHONE ENCOUNTER
PATIENT'S WIFE IS CALLING TO SEE IF DR LEARY WOULD RECOMMEND THE 2ND BOOSTER. AND IF SO WHERE CAN THEY GO TO GET THIS. PLEASE ADVISE: [phone]   Postcard sent to schedule annual exam

## 2022-04-11 ENCOUNTER — IMMUNIZATION (OUTPATIENT)
Dept: FAMILY MEDICINE CLINIC | Facility: CLINIC | Age: 72
End: 2022-04-11

## 2022-04-11 PROCEDURE — 91305 COVID-19 (PFIZER) 12+ YRS: CPT | Performed by: INTERNAL MEDICINE

## 2022-04-11 PROCEDURE — 0054A COVID-19 (PFIZER) 12+ YRS: CPT | Performed by: INTERNAL MEDICINE

## 2022-04-26 RX ORDER — RIVAROXABAN 20 MG/1
TABLET, FILM COATED ORAL
Qty: 30 TABLET | Refills: 5 | Status: SHIPPED | OUTPATIENT
Start: 2022-04-26 | End: 2022-10-21

## 2022-04-27 ENCOUNTER — TELEPHONE (OUTPATIENT)
Dept: FAMILY MEDICINE CLINIC | Facility: CLINIC | Age: 72
End: 2022-04-27

## 2022-04-27 NOTE — TELEPHONE ENCOUNTER
.  Caller: Branden Diop    Relationship: Self    Best call back number: [phone]    What is the best time to reach you: ANY    Who are you requesting to speak with (clinical staff, provider,  specific staff member): DR. LEARY    What was the call regarding: PATIENT CALLING IN BECAUSE HIS PRESCRIPTION FOR Xarelto 20 MG tablet WAS $140 FOR A 30 DAY SUPPLY AND WAS WONDERING IF THERE WERE ANY SAMPLES IN THE OFFICE TO HOLD HIM OVER UNTIL HE FINDS A CHEAPER OPTION.     PATIENT HAS 3 LEFT OF HIS CURRENT PRESCRIPTION.     PATIENT STATED HE WOULD STOP TAKING THEM BEFORE HE PAID THAT MUCH FOR THEM SINCE THEY USED TO COST $40.     Do you require a callback: YES

## 2022-04-28 NOTE — TELEPHONE ENCOUNTER
Pharmacist said something about being a generic for Xarelto 20 mg they want to know if its as good and if so can they try it?

## 2022-04-28 NOTE — TELEPHONE ENCOUNTER
There can be a 20% shift in the dosage of the 20 mg tablet.  If it is cheaper and he wants that it is okay.

## 2022-05-03 ENCOUNTER — PREP FOR SURGERY (OUTPATIENT)
Dept: OTHER | Facility: HOSPITAL | Age: 72
End: 2022-05-03

## 2022-05-03 DIAGNOSIS — K63.5 COLON POLYPS: Primary | ICD-10-CM

## 2022-05-06 ENCOUNTER — TELEPHONE (OUTPATIENT)
Dept: GASTROENTEROLOGY | Facility: CLINIC | Age: 72
End: 2022-05-06

## 2022-05-06 NOTE — TELEPHONE ENCOUNTER
ROSA hardy for colonoscopy on 08/30/2022  arrive at  8am  . Mailed Prep instructions to Mailing address on-file. ----[facility]      Advised PT  that  will call with final arrival time  24 hrs before procedure. If they do not get a phone call, arrival time will stay the same as given on instructions

## 2022-05-12 ENCOUNTER — TELEPHONE (OUTPATIENT)
Dept: FAMILY MEDICINE CLINIC | Facility: CLINIC | Age: 72
End: 2022-05-12

## 2022-05-17 RX ORDER — ROSUVASTATIN CALCIUM 40 MG/1
TABLET, COATED ORAL
Qty: 90 TABLET | Refills: 0 | Status: SHIPPED | OUTPATIENT
Start: 2022-05-17 | End: 2022-08-12

## 2022-05-20 ENCOUNTER — TELEPHONE (OUTPATIENT)
Dept: GASTROENTEROLOGY | Facility: CLINIC | Age: 72
End: 2022-05-20

## 2022-05-20 NOTE — TELEPHONE ENCOUNTER
----- Message from Kendal Waite CMA sent at 5/3/2022 12:40 PM EDT -----  Regarding: FW: OA colon    ----- Message -----  From: Cj Lopez MD  Sent: 5/3/2022  12:36 PM EDT  To: Kendal Waite CMA, Renee Satish, #  Subject: OA colon                                         OA colon ok, split dose  suprep or miralax prep  Hold Xarelto day of procedure he can take it the morning of day before procedure

## 2022-05-20 NOTE — TELEPHONE ENCOUNTER
This is approved. Thanks.         BAM Nieves  [facility]   [address]  [place] [address]  Ph: [phone]  Fax: [phone]

## 2022-06-01 ENCOUNTER — TELEPHONE (OUTPATIENT)
Dept: FAMILY MEDICINE CLINIC | Facility: CLINIC | Age: 72
End: 2022-06-01

## 2022-06-01 NOTE — TELEPHONE ENCOUNTER
Caller: Ezio Diop    Relationship: Emergency Contact    Best call back number: [phone]*    What orders are you requesting (i.e. lab or imaging): ORDER FOR RECLINER TO SLEEP IN.    In what timeframe would the patient need to come in: ASAP    Where will you receive your services: [facility]    Additional notes: MADELYN'S WIFE STATES THAT THE PATIENT CANNOT LAY FLAT IN A BED WHILE USING THE CPAP. EZIO STATES THEY HAVE TRIED PILLOWS AND A WEDGE AND STILL DIDN'T WORK, AND THE PATIENT HAS BEEN SLEEPING IN A RECLINER AT HOME THAT DOES NOT RECLINE. EZIO STATES THAT SHE WENT TO [facility] AND THEY HAVE A RECLINER THAT IS FOR PATIENT'S TO SLEEP WHILE USING A CPAP. JESSICAS TOLD EZIO THAT THEY WOULD NEED AN ORDER FROM DR. LEARY FOR THE RECLINER STATING THAT THE PATIENT CANNOT LAY IN A BED WHILE USING A CPAP MACHINE. EZIO STATES SHE IS GOING TO CONTACT MEDICARE AND SEE IF THEY WILL COVER SOME OF THE COST OF THE RECLINER.    EZIO IS REQUESTING THE ORDER TO BE SENT TO [facility].     [facility] TELEPHONE NUMBER: [phone]    EZIO IS REQUESTING A COURTESY CALL WHEN THE ORDER HAS BEEN SENT TO [facility].

## 2022-06-02 DIAGNOSIS — R06.2 WHEEZING: Primary | ICD-10-CM

## 2022-07-01 ENCOUNTER — TELEPHONE (OUTPATIENT)
Dept: FAMILY MEDICINE CLINIC | Facility: CLINIC | Age: 72
End: 2022-07-01

## 2022-07-01 NOTE — TELEPHONE ENCOUNTER
Caller: Luba Diop    Relationship: Emergency Contact    Best call back number: [phone]      What is the best time to reach you: PATIENT    Who are you requesting to speak with (clinical staff, provider,  specific staff member): CLINICAL STAFF       What was the call regarding: THE PATIENT IS HAVING A COLONOSCOPY ON 8/30/22. PATIENT WAS TOLD HE NEEDS TO BE OFF OF THE MEDICATION Xarelto 20 MG tablet FOR 5 DAYS PRIOR TO THE COLONOSCOPY. PATIENT HAS BEEN TOLD HE CAN NOT STOP TAKING THIS MEDICATION SO THE PATIENT IS CONFUSED ON WHAT HE NEEDS TO DO AND WOULD LIKE TO TALK TO SOMEONE ABOUT THIS.     Do you require a callback: YES

## 2022-07-05 ENCOUNTER — TELEPHONE (OUTPATIENT)
Dept: GASTROENTEROLOGY | Facility: CLINIC | Age: 72
End: 2022-07-05

## 2022-07-05 ENCOUNTER — TELEPHONE (OUTPATIENT)
Dept: OTHER | Facility: OTHER | Age: 72
End: 2022-07-05

## 2022-07-05 NOTE — TELEPHONE ENCOUNTER
5/20/22: per Dr. Lopez: Hold Xarelto day of procedure he can take it the morning of day before procedure

## 2022-07-05 NOTE — TELEPHONE ENCOUNTER
----- Message from Alejandra Schwartz sent at 7/1/2022  3:39 PM EDT -----  Contact: [phone]  PATIENTS WIFE CALLED AND DOES NOT WANT HIM TO BE OFF PLAVIX AND IS CONCERNED SHE WANTS YOU TO CALL HER BACK. SHE IS GONG TO CALL THE CARDIOLOGIST.

## 2022-07-05 NOTE — TELEPHONE ENCOUNTER
Patient's spouse called. Advised on 5/20/22 Dr. Lopez wrote a note to have the patient hold his Xarelto on the day of the procedure.   She verb understanding.

## 2022-07-21 NOTE — TELEPHONE ENCOUNTER
----- Message from Tino Lopez MD sent at 2/15/2017 10:10 AM EST -----  Contact: [phone]  Work in tomorrow  ----- Message -----     From: Tegan Toledo MA     Sent: 2/15/2017   9:35 AM       To: Tino Lopez MD        ----- Message -----     From: Luci Rodrigues     Sent: 2/15/2017   9:32 AM       To: Katherine Salazar MA    PT IS WANTING TO SEE IF HE CAN BE WORKED IN HE FEELING REALLY BAD COUGH,FEVER     Given 945 thurs told ok get here early  
No

## 2022-08-12 RX ORDER — ROSUVASTATIN CALCIUM 40 MG/1
TABLET, COATED ORAL
Qty: 90 TABLET | Refills: 0 | Status: SHIPPED | OUTPATIENT
Start: 2022-08-12 | End: 2022-11-11

## 2022-08-22 ENCOUNTER — OFFICE VISIT (OUTPATIENT)
Dept: ENDOCRINOLOGY | Age: 72
End: 2022-08-22

## 2022-08-22 ENCOUNTER — TELEPHONE (OUTPATIENT)
Dept: GASTROENTEROLOGY | Facility: CLINIC | Age: 72
End: 2022-08-22

## 2022-08-22 VITALS
WEIGHT: 279.4 LBS | HEART RATE: 52 BPM | OXYGEN SATURATION: 95 % | SYSTOLIC BLOOD PRESSURE: 124 MMHG | BODY MASS INDEX: 39.11 KG/M2 | DIASTOLIC BLOOD PRESSURE: 78 MMHG | TEMPERATURE: 97.1 F | HEIGHT: 71 IN

## 2022-08-22 DIAGNOSIS — E78.5 HYPERLIPIDEMIA ASSOCIATED WITH TYPE 2 DIABETES MELLITUS: ICD-10-CM

## 2022-08-22 DIAGNOSIS — E66.09 CLASS 1 OBESITY DUE TO EXCESS CALORIES WITHOUT SERIOUS COMORBIDITY WITH BODY MASS INDEX (BMI) OF 34.0 TO 34.9 IN ADULT: ICD-10-CM

## 2022-08-22 DIAGNOSIS — E11.69 HYPERLIPIDEMIA ASSOCIATED WITH TYPE 2 DIABETES MELLITUS: ICD-10-CM

## 2022-08-22 DIAGNOSIS — Z79.4 TYPE 2 DIABETES MELLITUS WITH HYPERGLYCEMIA, WITH LONG-TERM CURRENT USE OF INSULIN: Primary | ICD-10-CM

## 2022-08-22 DIAGNOSIS — E11.65 TYPE 2 DIABETES MELLITUS WITH HYPERGLYCEMIA, WITH LONG-TERM CURRENT USE OF INSULIN: Primary | ICD-10-CM

## 2022-08-22 PROCEDURE — 99214 OFFICE O/P EST MOD 30 MIN: CPT | Performed by: INTERNAL MEDICINE

## 2022-08-22 NOTE — PROGRESS NOTES
"Chief Complaint  Chief Complaint   Patient presents with   • Diabetes       Subjective          History of Present Illness    Branden Diop 71 y.o. presents with Type 2 dm as a F/u patient.      Type 2 dm - Diagnosed about 2 years ago.   Today in clinic pt reports being on soliqua 24 units in the morning.   FBG - 90 - 130  Pre meals - x  Checks BG - once a day  Sensor - x  Dm retinopathy - x ,Last eye exam - uptodate with exam.   Dm nephropathy - positive for protein in urine.   Dm neuropathy - yes , sees podiatry ,Dm neuropathy meds - x  CAD -x  CVA -x  Episodes of hypoglycemia - x  Pt is physically active. weight has been stable.   Pt tries to follow DM diet for most part.   On Ace inb.      Reviewed primary care physician's/consulting physician documentation and lab results         I have reviewed the patient's allergies, medicines, past medical hx, family hx and social hx in detail.    Objective   Vital Signs:   /78   Pulse 52   Temp 97.1 °F (36.2 °C)   Ht 180.3 cm (70.98\")   Wt 127 kg (279 lb 6.4 oz)   SpO2 95%   BMI 38.99 kg/m²   Physical Exam   General appearance - no distress  Eyes- anicteric sclera  Ear nose and throat-external ears and nose normal.    Respiratory-normal chest on inspection.  No respiratory distress noted.  Skin-no rashes.  Neuro-alert and oriented x3            Result Review :   The following data was reviewed by: Holly Hoffman MD on 08/22/2022:  Office Visit on 03/18/2022   Component Date Value Ref Range Status   • Glucose 03/18/2022 101 (A) 65 - 99 mg/dL Final   • BUN 03/18/2022 12  8 - 23 mg/dL Final   • Creatinine 03/18/2022 0.89  0.76 - 1.27 mg/dL Final   • Sodium 03/18/2022 141  136 - 145 mmol/L Final   • Potassium 03/18/2022 4.7  3.5 - 5.2 mmol/L Final   • Chloride 03/18/2022 102  98 - 107 mmol/L Final   • CO2 03/18/2022 28.0  22.0 - 29.0 mmol/L Final   • Calcium 03/18/2022 9.4  8.6 - 10.5 mg/dL Final   • Total Protein 03/18/2022 7.3  6.0 - 8.5 g/dL Final   • Albumin " 03/18/2022 4.90  3.50 - 5.20 g/dL Final   • ALT (SGPT) 03/18/2022 31  1 - 41 U/L Final   • AST (SGOT) 03/18/2022 28  1 - 40 U/L Final   • Alkaline Phosphatase 03/18/2022 114  39 - 117 U/L Final   • Total Bilirubin 03/18/2022 1.2  0.0 - 1.2 mg/dL Final   • Globulin 03/18/2022 2.4  gm/dL Final   • A/G Ratio 03/18/2022 2.0  g/dL Final   • BUN/Creatinine Ratio 03/18/2022 13.5  7.0 - 25.0 Final   • Anion Gap 03/18/2022 11.0  5.0 - 15.0 mmol/L Final   • eGFR 03/18/2022 90.5  >60.0 mL/min/1.73 Final    National Kidney Foundation and American Society of Nephrology (ASN) Task Force recommended calculation based on the Chronic Kidney Disease Epidemiology Collaboration (CKD-EPI) equation refit without adjustment for race.   • Total Cholesterol 03/18/2022 141  0 - 200 mg/dL Final   • Triglycerides 03/18/2022 121  0 - 150 mg/dL Final   • HDL Cholesterol 03/18/2022 34 (A) 40 - 60 mg/dL Final   • LDL Cholesterol  03/18/2022 85  0 - 100 mg/dL Final   • VLDL Cholesterol 03/18/2022 22  5 - 40 mg/dL Final   • LDL/HDL Ratio 03/18/2022 2.44   Final   • Hemoglobin A1C 03/18/2022 7.70 (A) 4.80 - 5.60 % Final   • 25 Hydroxy, Vitamin D 03/18/2022 32.1  30.0 - 100.0 ng/ml Final   • WBC 03/18/2022 8.48  3.40 - 10.80 10*3/mm3 Final   • RBC 03/18/2022 5.15  4.14 - 5.80 10*6/mm3 Final   • Hemoglobin 03/18/2022 14.8  13.0 - 17.7 g/dL Final   • Hematocrit 03/18/2022 45.2  37.5 - 51.0 % Final   • MCV 03/18/2022 87.8  79.0 - 97.0 fL Final   • MCH 03/18/2022 28.7  26.6 - 33.0 pg Final   • MCHC 03/18/2022 32.7  31.5 - 35.7 g/dL Final   • RDW 03/18/2022 14.7  12.3 - 15.4 % Final   • RDW-SD 03/18/2022 47.1  37.0 - 54.0 fl Final   • MPV 03/18/2022 9.8  6.0 - 12.0 fL Final   • Platelets 03/18/2022 320  140 - 450 10*3/mm3 Final   • PSA 03/18/2022 0.617  0.000 - 4.000 ng/mL Final     Data reviewed: PCP notes       Results Review:    I reviewed the patient's new clinical results.     Assessment and Plan    Problem List Items Addressed This Visit        Other  "   Obesity (Chronic)    Relevant Orders    Basic Metabolic Panel    Hemoglobin A1c    Lipid Panel    TSH    T4, Free    Type 2 diabetes mellitus, with long-term current use of insulin (HCC) - Primary    Relevant Orders    Basic Metabolic Panel    Hemoglobin A1c    Lipid Panel    TSH    T4, Free      Other Visit Diagnoses     Hyperlipidemia associated with type 2 diabetes mellitus (HCC)        Relevant Orders    Basic Metabolic Panel    Hemoglobin A1c    Lipid Panel    TSH    T4, Free        Type 2 dm - uncontrolled with hyperglycemia  Check Hab1c   Adjust the dose of soliqua based on the BW results.     Hyperlipidemia -   Continue crestor.       Interpreted the blood work-up/imaging results performed by the primary care/consulting physician -    Refills sent to pharmacy    Follow Up     Patient was given instructions and counseling regarding her condition or for health maintenance advice. Please see specific information pulled into the AVS if appropriate.       Thank you for asking me to see your patient, Branden Diop in consultation.         Holly Hoffman MD  08/22/22      EMR Dragon / transcription disclaimer:     \"Dictated utilizing Dragon dictation\".         "

## 2022-08-23 LAB
BUN SERPL-MCNC: 25 MG/DL (ref 8–27)
BUN/CREAT SERPL: 25 (ref 10–24)
CALCIUM SERPL-MCNC: 9.5 MG/DL (ref 8.6–10.2)
CHLORIDE SERPL-SCNC: 102 MMOL/L (ref 96–106)
CHOLEST SERPL-MCNC: 124 MG/DL (ref 100–199)
CO2 SERPL-SCNC: 25 MMOL/L (ref 20–29)
CREAT SERPL-MCNC: 0.99 MG/DL (ref 0.76–1.27)
EGFRCR-CYS SERPLBLD CKD-EPI 2021: 80 ML/MIN/1.73
GLUCOSE SERPL-MCNC: 119 MG/DL (ref 65–99)
HBA1C MFR BLD: 7.5 % (ref 4.8–5.6)
HDLC SERPL-MCNC: 27 MG/DL
IMP & REVIEW OF LAB RESULTS: NORMAL
LDLC SERPL CALC-MCNC: 69 MG/DL (ref 0–99)
POTASSIUM SERPL-SCNC: 4.3 MMOL/L (ref 3.5–5.2)
SODIUM SERPL-SCNC: 144 MMOL/L (ref 134–144)
T4 FREE SERPL-MCNC: 1.02 NG/DL (ref 0.82–1.77)
TRIGL SERPL-MCNC: 161 MG/DL (ref 0–149)
TSH SERPL DL<=0.005 MIU/L-ACNC: 2.84 UIU/ML (ref 0.45–4.5)
VLDLC SERPL CALC-MCNC: 28 MG/DL (ref 5–40)

## 2022-08-30 ENCOUNTER — ANESTHESIA (OUTPATIENT)
Dept: GASTROENTEROLOGY | Facility: HOSPITAL | Age: 72
End: 2022-08-30

## 2022-08-30 ENCOUNTER — HOSPITAL ENCOUNTER (OUTPATIENT)
Facility: HOSPITAL | Age: 72
Setting detail: HOSPITAL OUTPATIENT SURGERY
Discharge: HOME | End: 2022-08-30
Attending: INTERNAL MEDICINE | Admitting: INTERNAL MEDICINE

## 2022-08-30 ENCOUNTER — ANESTHESIA EVENT (OUTPATIENT)
Dept: GASTROENTEROLOGY | Facility: HOSPITAL | Age: 72
End: 2022-08-30

## 2022-08-30 VITALS
TEMPERATURE: 97.2 F | BODY MASS INDEX: 37.37 KG/M2 | HEIGHT: 73 IN | WEIGHT: 282 LBS | SYSTOLIC BLOOD PRESSURE: 112 MMHG | OXYGEN SATURATION: 93 % | RESPIRATION RATE: 16 BRPM | HEART RATE: 53 BPM | DIASTOLIC BLOOD PRESSURE: 53 MMHG

## 2022-08-30 DIAGNOSIS — K63.5 COLON POLYPS: ICD-10-CM

## 2022-08-30 PROBLEM — Z83.71 FHX: COLONIC POLYPS: Status: ACTIVE | Noted: 2022-08-30

## 2022-08-30 PROBLEM — Z83.719 FHX: COLONIC POLYPS: Status: ACTIVE | Noted: 2022-08-30

## 2022-08-30 PROBLEM — K57.90 DIVERTICULOSIS: Status: ACTIVE | Noted: 2022-08-30

## 2022-08-30 LAB — GLUCOSE BLDC GLUCOMTR-MCNC: 116 MG/DL (ref 70–130)

## 2022-08-30 PROCEDURE — 82962 GLUCOSE BLOOD TEST: CPT

## 2022-08-30 PROCEDURE — 88305 TISSUE EXAM BY PATHOLOGIST: CPT | Performed by: INTERNAL MEDICINE

## 2022-08-30 PROCEDURE — S0260 H&P FOR SURGERY: HCPCS | Performed by: INTERNAL MEDICINE

## 2022-08-30 PROCEDURE — 25010000002 PROPOFOL 10 MG/ML EMULSION: Performed by: NURSE ANESTHETIST, CERTIFIED REGISTERED

## 2022-08-30 PROCEDURE — 25010000002 PHENYLEPHRINE 10 MG/ML SOLUTION: Performed by: NURSE ANESTHETIST, CERTIFIED REGISTERED

## 2022-08-30 PROCEDURE — 45385 COLONOSCOPY W/LESION REMOVAL: CPT | Performed by: INTERNAL MEDICINE

## 2022-08-30 RX ORDER — PROPOFOL 10 MG/ML
VIAL (ML) INTRAVENOUS AS NEEDED
Status: DISCONTINUED | OUTPATIENT
Start: 2022-08-30 | End: 2022-08-30 | Stop reason: SURG

## 2022-08-30 RX ORDER — PROPOFOL 10 MG/ML
VIAL (ML) INTRAVENOUS CONTINUOUS PRN
Status: DISCONTINUED | OUTPATIENT
Start: 2022-08-30 | End: 2022-08-30 | Stop reason: SURG

## 2022-08-30 RX ORDER — SODIUM CHLORIDE, SODIUM LACTATE, POTASSIUM CHLORIDE, CALCIUM CHLORIDE 600; 310; 30; 20 MG/100ML; MG/100ML; MG/100ML; MG/100ML
30 INJECTION, SOLUTION INTRAVENOUS CONTINUOUS PRN
Status: DISCONTINUED | OUTPATIENT
Start: 2022-08-30 | End: 2022-08-30 | Stop reason: HOSPADM

## 2022-08-30 RX ORDER — PHENYLEPHRINE HYDROCHLORIDE 10 MG/ML
INJECTION INTRAVENOUS AS NEEDED
Status: DISCONTINUED | OUTPATIENT
Start: 2022-08-30 | End: 2022-08-30 | Stop reason: SURG

## 2022-08-30 RX ORDER — LIDOCAINE HYDROCHLORIDE 20 MG/ML
INJECTION, SOLUTION INFILTRATION; PERINEURAL AS NEEDED
Status: DISCONTINUED | OUTPATIENT
Start: 2022-08-30 | End: 2022-08-30 | Stop reason: SURG

## 2022-08-30 RX ORDER — GLYCOPYRROLATE 0.2 MG/ML
INJECTION INTRAMUSCULAR; INTRAVENOUS AS NEEDED
Status: DISCONTINUED | OUTPATIENT
Start: 2022-08-30 | End: 2022-08-30 | Stop reason: SURG

## 2022-08-30 RX ADMIN — Medication 200 MCG/KG/MIN: at 09:13

## 2022-08-30 RX ADMIN — GLYCOPYRROLATE 0.2 MG: 0.2 INJECTION INTRAMUSCULAR; INTRAVENOUS at 09:08

## 2022-08-30 RX ADMIN — PROPOFOL 100 MG: 10 INJECTION, EMULSION INTRAVENOUS at 09:13

## 2022-08-30 RX ADMIN — LIDOCAINE HYDROCHLORIDE 60 MG: 20 INJECTION, SOLUTION INFILTRATION; PERINEURAL at 09:13

## 2022-08-30 RX ADMIN — SODIUM CHLORIDE, POTASSIUM CHLORIDE, SODIUM LACTATE AND CALCIUM CHLORIDE 30 ML/HR: 600; 310; 30; 20 INJECTION, SOLUTION INTRAVENOUS at 08:08

## 2022-08-30 RX ADMIN — PHENYLEPHRINE HYDROCHLORIDE 100 MCG: 10 INJECTION INTRAVENOUS at 09:26

## 2022-08-30 NOTE — ANESTHESIA PREPROCEDURE EVALUATION
Anesthesia Evaluation     Patient summary reviewed and Nursing notes reviewed                Airway   Mallampati: I  TM distance: >3 FB  Neck ROM: full  No difficulty expected  Dental - normal exam     Pulmonary - normal exam   (+) pneumonia , a smoker Former, COPD, sleep apnea,   Cardiovascular - normal exam    (+) hypertension, dysrhythmias Atrial Fib, hyperlipidemia,       Neuro/Psych  (+) psychiatric history,    GI/Hepatic/Renal/Endo    (+) obesity, morbid obesity,  liver disease, diabetes mellitus,     Musculoskeletal     Abdominal  - normal exam    Bowel sounds: normal.   Substance History - negative use     OB/GYN negative ob/gyn ROS         Other   arthritis,                      Anesthesia Plan    ASA 3     MAC       Anesthetic plan, risks, benefits, and alternatives have been provided, discussed and informed consent has been obtained with: patient.        CODE STATUS:

## 2022-08-30 NOTE — ANESTHESIA POSTPROCEDURE EVALUATION
Patient: Branden Diop    Procedure Summary     Date: 08/30/22 Room / Location:  [facility]    Anesthesia Start: 0907 Anesthesia Stop: 0940    Procedure: COLONOSCOPY TO CECUM AND TI AND COLD SNARE POLYPECTOMY (N/A ) Diagnosis:       Colon polyps      (Colon polyps [K63.5])    Surgeons: Cj Lopez MD Provider: Samson Baptiste MD    Anesthesia Type: MAC ASA Status: 3          Anesthesia Type: MAC    Vitals  Vitals Value Taken Time   /53 08/30/22 0956   Temp     Pulse 53 08/30/22 0956   Resp 16 08/30/22 0956   SpO2 93 % 08/30/22 0956           Post Anesthesia Care and Evaluation    Patient location during evaluation: bedside  Patient participation: complete - patient participated  Level of consciousness: awake  Pain management: adequate    Airway patency: patent  Anesthetic complications: No anesthetic complications  PONV Status: none  Cardiovascular status: acceptable  Respiratory status: acceptable  Hydration status: acceptable  Post Neuraxial Block status: Motor and sensory function returned to baseline

## 2022-08-31 LAB
LAB AP CASE REPORT: NORMAL
PATH REPORT.FINAL DX SPEC: NORMAL
PATH REPORT.GROSS SPEC: NORMAL

## 2022-09-09 ENCOUNTER — OFFICE VISIT (OUTPATIENT)
Dept: FAMILY MEDICINE CLINIC | Facility: CLINIC | Age: 72
End: 2022-09-09

## 2022-09-09 ENCOUNTER — TELEPHONE (OUTPATIENT)
Dept: FAMILY MEDICINE CLINIC | Facility: CLINIC | Age: 72
End: 2022-09-09

## 2022-09-09 VITALS
DIASTOLIC BLOOD PRESSURE: 60 MMHG | BODY MASS INDEX: 37.37 KG/M2 | HEART RATE: 60 BPM | WEIGHT: 282 LBS | HEIGHT: 73 IN | TEMPERATURE: 97.5 F | OXYGEN SATURATION: 90 % | SYSTOLIC BLOOD PRESSURE: 116 MMHG | RESPIRATION RATE: 20 BRPM

## 2022-09-09 DIAGNOSIS — L03.115 CELLULITIS OF RIGHT LOWER EXTREMITY: Primary | ICD-10-CM

## 2022-09-09 DIAGNOSIS — I10 ESSENTIAL HYPERTENSION: ICD-10-CM

## 2022-09-09 PROCEDURE — 99213 OFFICE O/P EST LOW 20 MIN: CPT | Performed by: INTERNAL MEDICINE

## 2022-09-09 RX ORDER — AMOXICILLIN 875 MG/1
875 TABLET, COATED ORAL 2 TIMES DAILY
Qty: 20 TABLET | Refills: 0 | Status: SHIPPED | OUTPATIENT
Start: 2022-09-09 | End: 2022-11-02

## 2022-09-09 NOTE — TELEPHONE ENCOUNTER
PT'S WIFE STATES PT WAS SUPPOSE TO COME BACK ON 9/21 TO GET MORE LABS DONE, SAID THE LABS ARE MORE THAN WHAT HE USUALLY HAS DONE BUT SHE DOESN'T KNOW WHAT LABS THEY ARE.    PT WAS SEEN TODAY BUT NO LAB ORDERS WERE PLACED AND NO OTHER [facility] PROVIDER HAS ANY ACTIVE LAB ORDERS FOR HIM EITHER.    PT REQUESTING CALL BACK TO DISCUSS WHAT PT IS SUPPOSE TO DO

## 2022-09-09 NOTE — PROGRESS NOTES
"Chief Complaint  right leg swollen/red (Hit leg 1 week or so ago)    Subjective        Brandne Diop presents to [facility]  History of Present Illness patient was seen for cellulitis.  Patient has some abrasions on his leg and had erythema and some swelling.  Patient was placed on Amoxil 875 twice daily with Bactroban ointment.  Patient will apply it twice daily and follow-up if not better.  Patient's blood pressures been running 110s over 80s.  Patient will continue metoprolol tartrate 25 mg twice daily.  Patient will continue monitoring blood pressure at home.    Dictated utilizing Dragon dictation. If there are questions or for further clarification, please contact me.    Objective   Vital Signs:  Blood Pressure 116/60 (BP Location: Left arm, Patient Position: Sitting)   Pulse 60   Temperature 97.5 °F (36.4 °C) (Infrared)   Respiration 20   Height 185.4 cm (73\")   Weight 128 kg (282 lb)   Oxygen Saturation 90%   Body Mass Index 37.21 kg/m²   Estimated body mass index is 37.21 kg/m² as calculated from the following:    Height as of this encounter: 185.4 cm (73\").    Weight as of this encounter: 128 kg (282 lb).          Physical Exam  Vitals and nursing note reviewed.   Constitutional:       Appearance: Normal appearance. He is well-developed.   HENT:      Head: Normocephalic and atraumatic.      Nose: Nose normal.      Mouth/Throat:      Mouth: Mucous membranes are moist.      Pharynx: Oropharynx is clear.   Eyes:      Extraocular Movements: Extraocular movements intact.      Conjunctiva/sclera: Conjunctivae normal.      Pupils: Pupils are equal, round, and reactive to light.   Cardiovascular:      Rate and Rhythm: Normal rate and regular rhythm.      Heart sounds: Normal heart sounds. No murmur heard.    No friction rub. No gallop.   Pulmonary:      Effort: Pulmonary effort is normal. No respiratory distress.      Breath sounds: Normal breath sounds. No stridor. No wheezing, " rhonchi or rales.   Chest:      Chest wall: No tenderness.   Abdominal:      General: Bowel sounds are normal.      Palpations: Abdomen is soft.   Musculoskeletal:         General: Normal range of motion.      Cervical back: Normal range of motion and neck supple.      Comments: Abrasion right lower leg   Skin:     General: Skin is warm and dry.   Neurological:      General: No focal deficit present.      Mental Status: He is alert and oriented to person, place, and time. Mental status is at baseline.   Psychiatric:         Mood and Affect: Mood normal.         Behavior: Behavior normal.         Thought Content: Thought content normal.         Judgment: Judgment normal.        Result Review :                Assessment and Plan  #1 Amoxil 875 p.o. twice daily, Bactroban ointment twice daily #2 continue monitoring blood pressure  Diagnoses and all orders for this visit:    1. Cellulitis of right lower extremity (Primary)    2. Essential hypertension    Other orders  -     amoxicillin (AMOXIL) 875 MG tablet; Take 1 tablet by mouth 2 (Two) Times a Day.  Dispense: 20 tablet; Refill: 0  -     mupirocin (BACTROBAN) 2 % ointment; Apply 1 application topically to the appropriate area as directed 3 (Three) Times a Day.  Dispense: 22 g; Refill: 3             Follow Up   Return in about 6 months (around 3/9/2023), or if symptoms worsen or fail to improve, for Recheck.  Patient was given instructions and counseling regarding his condition or for health maintenance advice. Please see specific information pulled into the AVS if appropriate.

## 2022-09-12 ENCOUNTER — TELEPHONE (OUTPATIENT)
Dept: GASTROENTEROLOGY | Facility: CLINIC | Age: 72
End: 2022-09-12

## 2022-09-12 ENCOUNTER — TELEPHONE (OUTPATIENT)
Dept: ENDOCRINOLOGY | Age: 72
End: 2022-09-12

## 2022-09-12 NOTE — TELEPHONE ENCOUNTER
Caller: Luba Diop    Relationship: Emergency Contact    Best call back number: [phone]     Caller requesting test results: YES     What test was performed: BIOPSY COLONOSCOPY     When was the test performed: 08/30/22     Where was the test performed: [facility]     Additional notes: PATIENT CALLING TO HAVE TEST RESULTS PROVIDED.

## 2022-09-12 NOTE — TELEPHONE ENCOUNTER
Called pts number and wife's cell phone #.     Called pt and left vm to call back.      Colonoscopy placed in recall for 8/30/2025.

## 2022-09-12 NOTE — TELEPHONE ENCOUNTER
Called pt and spoke with wife, (ok per PRICILA) and advised of Dr. Lopez's note.  She  verbalized understanding and will relay msg to pt.

## 2022-09-12 NOTE — TELEPHONE ENCOUNTER
----- Message from Cj Lopez MD sent at 9/1/2022  2:00 PM EDT -----  Polyp benign  Colonoscopy recall 3 years

## 2022-09-27 ENCOUNTER — CLINICAL SUPPORT (OUTPATIENT)
Dept: FAMILY MEDICINE CLINIC | Facility: CLINIC | Age: 72
End: 2022-09-27

## 2022-09-27 PROCEDURE — 0124A PR ADM SARSCOV2 30MCG/0.3ML BST: CPT | Performed by: INTERNAL MEDICINE

## 2022-09-27 PROCEDURE — 91312 COVID-19 (PFIZER) BIVALENT BOOSTER 12+YRS: CPT | Performed by: INTERNAL MEDICINE

## 2022-10-21 RX ORDER — RIVAROXABAN 20 MG/1
TABLET, FILM COATED ORAL
Qty: 30 TABLET | Refills: 5 | Status: SHIPPED | OUTPATIENT
Start: 2022-10-21

## 2022-10-24 ENCOUNTER — HOSPITAL ENCOUNTER (OUTPATIENT)
Dept: CT IMAGING | Facility: HOSPITAL | Age: 72
Discharge: HOME | End: 2022-10-24
Admitting: INTERNAL MEDICINE

## 2022-10-24 DIAGNOSIS — J84.10 PULMONARY FIBROSIS: ICD-10-CM

## 2022-10-24 PROCEDURE — 71250 CT THORAX DX C-: CPT

## 2022-10-26 ENCOUNTER — TELEPHONE (OUTPATIENT)
Dept: FAMILY MEDICINE CLINIC | Facility: CLINIC | Age: 72
End: 2022-10-26

## 2022-10-26 DIAGNOSIS — R19.7 DIARRHEA, UNSPECIFIED TYPE: Primary | ICD-10-CM

## 2022-10-26 RX ORDER — DIPHENOXYLATE HYDROCHLORIDE AND ATROPINE SULFATE 2.5; .025 MG/1; MG/1
1 TABLET ORAL 4 TIMES DAILY PRN
Qty: 10 TABLET | Refills: 0 | Status: SHIPPED | OUTPATIENT
Start: 2022-10-26 | End: 2023-01-23

## 2022-10-26 NOTE — TELEPHONE ENCOUNTER
Caller: Luba Diop    Relationship: Emergency Contact    Best call back number: [phone]    What medication are you requesting: N\A    What are your current symptoms: DIARRHEA     How long have you been experiencing symptoms: SINCE 10/24/22    Have you had these symptoms before:    [] Yes  [x] No    Have you been treated for these symptoms before:   [] Yes  [x] No    If a prescription is needed, what is your preferred pharmacy and phone number: [facility] [id number] - [address] - [phone] [facility] [phone]      Additional notes:    PATIENT HAS BEEN HAVING DIARRHEA SINCE 10/24/22 AND WOULD LIKE TO KNOW IF THERE IS A MEDICATION THAT COULD BE CALLED IN. THE PATIENT HAS TRIED IMODIUM BUT IT HAS NOT HELPED.    PLEASE CALL AND ADVISE

## 2022-10-27 NOTE — TELEPHONE ENCOUNTER
PATIENTS WIFE IS CALLING IN SHE IS JUST WANTING TO LET DOCTOR KNOW THAT HE HAS NOT HAD A BOWEL MOVEMENT YET TODAY SO SHE WILL BRING IN AS SOON AS HE DOES.

## 2022-10-31 ENCOUNTER — TELEPHONE (OUTPATIENT)
Dept: FAMILY MEDICINE CLINIC | Facility: CLINIC | Age: 72
End: 2022-10-31

## 2022-10-31 ENCOUNTER — TRANSCRIBE ORDERS (OUTPATIENT)
Dept: ADMINISTRATIVE | Facility: HOSPITAL | Age: 72
End: 2022-10-31

## 2022-10-31 DIAGNOSIS — J84.9 ILD (INTERSTITIAL LUNG DISEASE): Primary | ICD-10-CM

## 2022-11-02 ENCOUNTER — OFFICE VISIT (OUTPATIENT)
Dept: FAMILY MEDICINE CLINIC | Facility: CLINIC | Age: 72
End: 2022-11-02

## 2022-11-02 VITALS
HEIGHT: 73 IN | SYSTOLIC BLOOD PRESSURE: 112 MMHG | TEMPERATURE: 98.2 F | BODY MASS INDEX: 37.43 KG/M2 | OXYGEN SATURATION: 97 % | WEIGHT: 282.4 LBS | HEART RATE: 50 BPM | DIASTOLIC BLOOD PRESSURE: 80 MMHG

## 2022-11-02 DIAGNOSIS — Z00.00 MEDICARE ANNUAL WELLNESS VISIT, SUBSEQUENT: Primary | ICD-10-CM

## 2022-11-02 DIAGNOSIS — H65.05 RECURRENT ACUTE SEROUS OTITIS MEDIA OF LEFT EAR: ICD-10-CM

## 2022-11-02 PROCEDURE — G0439 PPPS, SUBSEQ VISIT: HCPCS | Performed by: INTERNAL MEDICINE

## 2022-11-02 PROCEDURE — 1159F MED LIST DOCD IN RCRD: CPT | Performed by: INTERNAL MEDICINE

## 2022-11-02 PROCEDURE — 1126F AMNT PAIN NOTED NONE PRSNT: CPT | Performed by: INTERNAL MEDICINE

## 2022-11-02 PROCEDURE — 99213 OFFICE O/P EST LOW 20 MIN: CPT | Performed by: INTERNAL MEDICINE

## 2022-11-02 PROCEDURE — 1170F FXNL STATUS ASSESSED: CPT | Performed by: INTERNAL MEDICINE

## 2022-11-02 RX ORDER — AMOXICILLIN 875 MG/1
875 TABLET, COATED ORAL EVERY 12 HOURS SCHEDULED
Qty: 20 TABLET | Refills: 0 | Status: SHIPPED | OUTPATIENT
Start: 2022-11-02 | End: 2023-01-23

## 2022-11-02 NOTE — PROGRESS NOTES
QUICK REFERENCE INFORMATION:  The ABCs of the Annual Wellness Visit    Subsequent Medicare Wellness Visit patient was seen for Medicare wellness exam.  Patient did have an otitis media and was put on penicillin.    HEALTH RISK ASSESSMENT    1950    Recent Hospitalizations:  No hospitalization(s) within the last year..        Current Medical Providers:  Patient Care Team:  Tino Lopez MD as PCP - General (Internal Medicine)  Tino Lopez MD as PCP - Family Medicine  Mart Ureña MD as Consulting Physician (Cardiology)  Martir Trevino MD as Surgeon (General Surgery)        Smoking Status:  Social History     Tobacco Use   Smoking Status Former   • Packs/day: 1.00   • Years: 30.00   • Pack years: 30.00   • Types: Cigars, Cigarettes   • Quit date: 2014   • Years since quittin.8   Smokeless Tobacco Never       Alcohol Consumption:  Social History     Substance and Sexual Activity   Alcohol Use Never    Comment: caffeine use- decaf coffee       Depression Screen:   PHQ-2/PHQ-9 Depression Screening 2022   Retired PHQ-9 Total Score -   Retired Total Score -   Little Interest or Pleasure in Doing Things 0-->not at all   Feeling Down, Depressed or Hopeless 0-->not at all   Trouble Falling or Staying Asleep, or Sleeping Too Much -   Feeling Tired or Having Little Energy -   Poor Appetite or Overeating -   Feeling Bad about Yourself - or that You are a Failure or Have Let Yourself or Your Family Down -   Trouble Concentrating on Things, Such as Reading the Newspaper or Watching Television -   Moving or Speaking So Slowly that Other People Could Have Noticed? Or the Opposite - Being So Fidgety -   Thoughts that You Would be Better Off Dead or of Hurting Yourself in Some Way -   PHQ-9: Brief Depression Severity Measure Score 0   If You Checked Off Any Problems, How Difficult Have These Problems Made It For You to Do Your Work, Take Care of Things at Home, or Get Along with Other People? -        Health Habits and Functional and Cognitive Screening:  Functional & Cognitive Status 11/2/2022   Do you have difficulty preparing food and eating? No   Do you have difficulty bathing yourself, getting dressed or grooming yourself? No   Do you have difficulty using the toilet? No   Do you have difficulty moving around from place to place? No   Do you have trouble with steps or getting out of a bed or a chair? No   Current Diet Well Balanced Diet   Dental Exam Up to date   Eye Exam Up to date   Exercise (times per week) 4 times per week   Current Exercises Include Aerobics   Current Exercise Activities Include -   Do you need help using the phone?  No   Are you deaf or do you have serious difficulty hearing?  No   Do you need help with transportation? No   Do you need help shopping? No   Do you need help preparing meals?  No   Do you need help with housework?  No   Do you need help with laundry? No   Do you need help taking your medications? No   Do you need help managing money? No   Do you ever drive or ride in a car without wearing a seat belt? No   Have you felt unusual stress, anger or loneliness in the last month? No   Who do you live with? Spouse   If you need help, do you have trouble finding someone available to you? No   Have you been bothered in the last four weeks by sexual problems? No   Do you have difficulty concentrating, remembering or making decisions? No           Does the patient have evidence of cognitive impairment? No    Aspirin use counseling: Does not need ASA (and currently is not on it)      Recent Lab Results:  CMP:  Lab Results   Component Value Date    BUN 25 08/22/2022    CREATININE 0.99 08/22/2022    EGFRIFNONA 79 02/14/2022    EGFRIFAFRI 91 02/14/2022    BCR 25 (H) 08/22/2022     08/22/2022    K 4.3 08/22/2022    CO2 25 08/22/2022    CALCIUM 9.5 08/22/2022    PROTENTOTREF 7.2 02/14/2022    ALBUMIN 4.90 03/18/2022    LABGLOBREF 2.8 02/14/2022    LABIL2 1.6 02/14/2022     BILITOT 1.2 03/18/2022    ALKPHOS 114 03/18/2022    AST 28 03/18/2022    ALT 31 03/18/2022     Lipid Panel:  Lab Results   Component Value Date    CHOL 141 03/18/2022    TRIG 161 (H) 08/22/2022    HDL 27 (L) 08/22/2022    VLDL 28 08/22/2022    LDLHDL 2.44 03/18/2022     HbA1c:  Lab Results   Component Value Date    HGBA1C 7.5 (H) 08/22/2022       Visual Acuity:  No results found.    Age-appropriate Screening Schedule:  Refer to the list below for future screening recommendations based on patient's age, sex and/or medical conditions. Orders for these recommended tests are listed in the plan section. The patient has been provided with a written plan.    Health Maintenance   Topic Date Due   • ZOSTER VACCINE (2 of 2) 05/11/2017   • DIABETIC EYE EXAM  05/24/2019   • URINE MICROALBUMIN  02/14/2023   • HEMOGLOBIN A1C  02/22/2023   • PROSTATE CANCER SCREENING  03/18/2023   • DIABETIC FOOT EXAM  06/14/2023   • LIPID PANEL  08/22/2023   • TDAP/TD VACCINES (2 - Td or Tdap) 03/16/2027   • INFLUENZA VACCINE  Completed        Subjective   History of Present Illness    Branden Diop is a 72 y.o. male who presents for an Subsequent Wellness Visit.    The following portions of the patient's history were reviewed and updated as appropriate: allergies, current medications, past family history, past medical history, past social history, past surgical history and problem list.    Outpatient Medications Prior to Visit   Medication Sig Dispense Refill   • diphenoxylate-atropine (Lomotil) 2.5-0.025 MG per tablet Take 1 tablet by mouth 4 (Four) Times a Day As Needed for Diarrhea. 10 tablet 0   • furosemide (LASIX) 40 MG tablet Take 40 mg by mouth 2 (Two) Times a Day.     • glucose blood test strip Freestyle strips daily E 11.9 100 each 12   • glucose monitoring kit (FREESTYLE) monitoring kit Daily E 11.93 FreeStyle meter 1 each 1   • Lancets (FREESTYLE) lancets Daily E 11.9 100 each 12   • metoprolol tartrate (LOPRESSOR) 25 MG tablet TAKE ONE  TABLET BY MOUTH TWICE A  tablet 1   • Multiple Vitamin (MULTIVITAMINS PO) Take 1 tablet by mouth Daily.     • rosuvastatin (CRESTOR) 40 MG tablet TAKE ONE TABLET BY MOUTH DAILY 90 tablet 0   • sertraline (ZOLOFT) 50 MG tablet TAKE ONE TABLET BY MOUTH DAILY 90 tablet 1   • Soliqua 100-33 UNT-MCG/ML solution pen-injector injection INJECT 20 UNITS UNDER THE SKIN DAILY WITH BREAKFAST. EVERY 3 DAYS IF FASTING BLOOD GLUCOSE IS GREATER THAN 110, INCREASE BY 2 UNITS ALL THE WAY UP TO 60 UNITS EVERY MORNING. (Patient taking differently: 24 units) 60 mL 1   • Xarelto 20 MG tablet TAKE ONE TABLET BY MOUTH DAILY 30 tablet 5   • amoxicillin (AMOXIL) 875 MG tablet Take 1 tablet by mouth 2 (Two) Times a Day. 20 tablet 0   • mupirocin (BACTROBAN) 2 % ointment Apply 1 application topically to the appropriate area as directed 3 (Three) Times a Day. 22 g 3     No facility-administered medications prior to visit.       Patient Active Problem List   Diagnosis   • A-fib ([facility])   • Atrioventricular block, Mobitz type 1, Wenckebach   • Apnea, sleep   • Obesity   • Streptococcus pneumoniae   • Sleep apnea with use of continuous positive airway pressure (CPAP)   • Sinusitis   • Right wrist pain   • Pneumonia   • Type 2 diabetes mellitus, with long-term current use of insulin ([facility])   • Hyperlipidemia   • Hammertoe   • Depression   • COPD (chronic obstructive pulmonary disease) ([facility])   • Colon polyps   • Anxiety   • Pruritus   • Cholelithiasis   • Fatty liver   • Essential hypertension   • Vitamin D deficiency   • Emphysema, unspecified ([facility])   • Bronchiectasis with acute exacerbation ([facility])   • Chest pain, atypical   • FHx: colonic polyps   • Diverticulosis       Advance Care Planning:  ACP discussion was held with the patient during this visit. Patient does not have an advance directive, declines further assistance.    Identification of Risk Factors:  Risk factors include: Advance Directive Discussion.    Review of Systems    Constitutional: Negative for fatigue and fever.   HENT: Positive for congestion and ear pain. Negative for trouble swallowing.    Eyes: Negative for discharge and visual disturbance.   Respiratory: Negative for choking and shortness of breath.    Cardiovascular: Negative for chest pain and palpitations.   Gastrointestinal: Negative for abdominal pain and blood in stool.   Endocrine: Negative.    Genitourinary: Negative for genital sores and hematuria.   Musculoskeletal: Negative for gait problem and joint swelling.   Skin: Negative for color change, pallor, rash and wound.   Allergic/Immunologic: Positive for environmental allergies. Negative for immunocompromised state.   Neurological: Negative for facial asymmetry and speech difficulty.   Psychiatric/Behavioral: Negative for hallucinations and suicidal ideas.       Compared to one year ago, the patient feels his physical health is better.  Compared to one year ago, the patient feels his mental health is the same.    Objective     Physical Exam  Vitals and nursing note reviewed.   Constitutional:       Appearance: Normal appearance. He is well-developed.   HENT:      Head: Normocephalic and atraumatic.      Ears:      Comments: Erythematous left TM     Nose: Nose normal.      Mouth/Throat:      Mouth: Mucous membranes are moist.      Pharynx: Oropharynx is clear.   Eyes:      Extraocular Movements: Extraocular movements intact.      Conjunctiva/sclera: Conjunctivae normal.      Pupils: Pupils are equal, round, and reactive to light.   Cardiovascular:      Rate and Rhythm: Normal rate and regular rhythm.      Heart sounds: Normal heart sounds. No murmur heard.    No friction rub. No gallop.   Pulmonary:      Effort: Pulmonary effort is normal. No respiratory distress.      Breath sounds: Normal breath sounds. No stridor. No wheezing, rhonchi or rales.   Chest:      Chest wall: No tenderness.   Abdominal:      General: Bowel sounds are normal.      Palpations:  "Abdomen is soft.   Musculoskeletal:         General: Normal range of motion.      Cervical back: Normal range of motion and neck supple.   Skin:     General: Skin is warm and dry.   Neurological:      General: No focal deficit present.      Mental Status: He is alert and oriented to person, place, and time. Mental status is at baseline.   Psychiatric:         Mood and Affect: Mood normal.         Behavior: Behavior normal.         Thought Content: Thought content normal.         Judgment: Judgment normal.         Vitals:    11/02/22 0846   BP: 112/80   BP Location: Left arm   Patient Position: Sitting   Cuff Size: Large Adult   Pulse: 50   Temp: 98.2 °F (36.8 °C)   TempSrc: Infrared   SpO2: 97%   Weight: 128 kg (282 lb 6.4 oz)   Height: 185.4 cm (73\")   PainSc: 0-No pain       Class 2 Severe Obesity (BMI >=35 and <=39.9). Obesity-related health conditions include the following: obstructive sleep apnea, hypertension, diabetes mellitus and dyslipidemias. Obesity is improving with lifestyle modifications. BMI is is above average; BMI management plan is completed. We discussed portion control and increasing exercise.      Assessment & Plan Wellness exam per 2 Amoxil 875 p.o. twice daily  Patient Self-Management and Personalized Health Advice  The patient has been provided with information about: NA and preventive services including:   · NA.    Visit Diagnoses:    ICD-10-CM ICD-9-CM   1. Medicare annual wellness visit, subsequent  Z00.00 V70.0   2. Recurrent acute serous otitis media of left ear  H65.05 381.01       No orders of the defined types were placed in this encounter.      Outpatient Encounter Medications as of 11/2/2022   Medication Sig Dispense Refill   • diphenoxylate-atropine (Lomotil) 2.5-0.025 MG per tablet Take 1 tablet by mouth 4 (Four) Times a Day As Needed for Diarrhea. 10 tablet 0   • furosemide (LASIX) 40 MG tablet Take 40 mg by mouth 2 (Two) Times a Day.     • glucose blood test strip Freestyle " strips daily E 11.9 100 each 12   • glucose monitoring kit (FREESTYLE) monitoring kit Daily E 11.93 FreeStyle meter 1 each 1   • Lancets (FREESTYLE) lancets Daily E 11.9 100 each 12   • metoprolol tartrate (LOPRESSOR) 25 MG tablet TAKE ONE TABLET BY MOUTH TWICE A  tablet 1   • Multiple Vitamin (MULTIVITAMINS PO) Take 1 tablet by mouth Daily.     • rosuvastatin (CRESTOR) 40 MG tablet TAKE ONE TABLET BY MOUTH DAILY 90 tablet 0   • sertraline (ZOLOFT) 50 MG tablet TAKE ONE TABLET BY MOUTH DAILY 90 tablet 1   • Soliqua 100-33 UNT-MCG/ML solution pen-injector injection INJECT 20 UNITS UNDER THE SKIN DAILY WITH BREAKFAST. EVERY 3 DAYS IF FASTING BLOOD GLUCOSE IS GREATER THAN 110, INCREASE BY 2 UNITS ALL THE WAY UP TO 60 UNITS EVERY MORNING. (Patient taking differently: 24 units) 60 mL 1   • Xarelto 20 MG tablet TAKE ONE TABLET BY MOUTH DAILY 30 tablet 5   • amoxicillin (AMOXIL) 875 MG tablet Take 1 tablet by mouth Every 12 (Twelve) Hours. 20 tablet 0   • [DISCONTINUED] amoxicillin (AMOXIL) 875 MG tablet Take 1 tablet by mouth 2 (Two) Times a Day. 20 tablet 0   • [DISCONTINUED] mupirocin (BACTROBAN) 2 % ointment Apply 1 application topically to the appropriate area as directed 3 (Three) Times a Day. 22 g 3     No facility-administered encounter medications on file as of 11/2/2022.       Reviewed use of high risk medication in the elderly: not applicable  Reviewed for potential of harmful drug interactions in the elderly: not applicable    Follow Up:  Return in about 6 months (around 5/2/2023), or if symptoms worsen or fail to improve, for Recheck.     An After Visit Summary and PPPS with all of these plans were given to the patient.

## 2022-11-11 RX ORDER — ROSUVASTATIN CALCIUM 40 MG/1
TABLET, COATED ORAL
Qty: 90 TABLET | Refills: 0 | Status: SHIPPED | OUTPATIENT
Start: 2022-11-11 | End: 2023-02-03

## 2022-11-11 NOTE — TELEPHONE ENCOUNTER
Rx Refill Note  Requested Prescriptions     Pending Prescriptions Disp Refills    rosuvastatin (CRESTOR) 40 MG tablet [Pharmacy Med Name: ROSUVASTATIN CALCIUM 40 MG TAB] 90 tablet 0     Sig: TAKE ONE TABLET BY MOUTH DAILY      Last office visit with prescribing clinician: 8/22/2022      Next office visit with prescribing clinician: 4/3/2023            Hetal Porter  11/11/22, 13:52 EST

## 2022-11-27 RX ORDER — BENZONATATE 100 MG/1
CAPSULE ORAL
Qty: 30 CAPSULE | Refills: 3 | Status: SHIPPED | OUTPATIENT
Start: 2022-11-27 | End: 2023-01-23

## 2022-11-27 NOTE — H&P
11/27/2022 1:56 PM    Patient's wife called and said he had a temperature of 100.8.  Patient was coughing and did not have any cough medication.  Patient was advised to use Advil or Tylenol for temperature control and take Tessalon Perles 100 mg 2 p.o. twice daily as needed cough.  Patient was also strongly advised to get COVID and flu testing.

## 2022-12-19 RX ORDER — VALACYCLOVIR HYDROCHLORIDE 1 G/1
TABLET, FILM COATED ORAL
Qty: 21 TABLET | Refills: 4 | Status: SHIPPED | OUTPATIENT
Start: 2022-12-19 | End: 2023-01-23

## 2023-01-23 ENCOUNTER — OFFICE VISIT (OUTPATIENT)
Dept: ENDOCRINOLOGY | Age: 73
End: 2023-01-23
Payer: MEDICARE

## 2023-01-23 VITALS
DIASTOLIC BLOOD PRESSURE: 60 MMHG | HEIGHT: 71 IN | SYSTOLIC BLOOD PRESSURE: 110 MMHG | BODY MASS INDEX: 39.73 KG/M2 | WEIGHT: 283.8 LBS | TEMPERATURE: 97.3 F | HEART RATE: 66 BPM | OXYGEN SATURATION: 94 %

## 2023-01-23 DIAGNOSIS — E78.5 HYPERLIPIDEMIA ASSOCIATED WITH TYPE 2 DIABETES MELLITUS: ICD-10-CM

## 2023-01-23 DIAGNOSIS — Z79.4 TYPE 2 DIABETES MELLITUS WITH HYPERGLYCEMIA, WITH LONG-TERM CURRENT USE OF INSULIN: Primary | ICD-10-CM

## 2023-01-23 DIAGNOSIS — E11.65 TYPE 2 DIABETES MELLITUS WITH HYPERGLYCEMIA, WITH LONG-TERM CURRENT USE OF INSULIN: Primary | ICD-10-CM

## 2023-01-23 DIAGNOSIS — E11.69 HYPERLIPIDEMIA ASSOCIATED WITH TYPE 2 DIABETES MELLITUS: ICD-10-CM

## 2023-01-23 PROCEDURE — 99214 OFFICE O/P EST MOD 30 MIN: CPT | Performed by: NURSE PRACTITIONER

## 2023-01-23 RX ORDER — PREDNISOLONE ACETATE 10 MG/ML
SUSPENSION/ DROPS OPHTHALMIC
COMMUNITY
Start: 2023-01-05 | End: 2023-03-03

## 2023-01-23 NOTE — PROGRESS NOTES
"Chief Complaint  Diabetes (Type 2: Pt doesn't have meter, checks bs once a week, is up to date on eye exam, no hx of retinopathy, moderate neuropathy in feet. )    Subjective        Branden Diop presents to [facility]  History of Present Illness     Lab Results   Component Value Date    HGBA1C 7.5 (H) 08/22/2022     Type 2 dm    Diagnosed about 2-3 years ago.   Today in clinic pt reports being on soliqua 24 units in the morning.   Last eye exam - Jan 2023  Dm nephropathy - (+) proteinuria, no ace-I/ arb at this time   Dm neuropathy - yes, following with harp[facility] and [facility], wears diabetic shoes    CAD - follows with cardiology and pulmonology   CVA - denies   Episodes of hypoglycemia -  denies   Joining a new gym  On statin     Objective   Vital Signs:  /60   Pulse 66   Temp 97.3 °F (36.3 °C) (Temporal)   Ht 180.3 cm (70.98\")   Wt 129 kg (283 lb 12.8 oz)   SpO2 94%   BMI 39.60 kg/m²   Estimated body mass index is 39.6 kg/m² as calculated from the following:    Height as of this encounter: 180.3 cm (70.98\").    Weight as of this encounter: 129 kg (283 lb 12.8 oz).             Physical Exam  Vitals reviewed.   Constitutional:       General: He is not in acute distress.  HENT:      Head: Normocephalic and atraumatic.   Cardiovascular:      Rate and Rhythm: Normal rate. Rhythm irregular.      Comments: Known irregularity   Pulmonary:      Effort: Pulmonary effort is normal. No respiratory distress.   Musculoskeletal:         General: No signs of injury. Normal range of motion.      Cervical back: Normal range of motion and neck supple.      Right lower leg: No edema.      Left lower leg: No edema.   Skin:     General: Skin is warm and dry.   Neurological:      Mental Status: He is alert and oriented to person, place, and time. Mental status is at baseline.   Psychiatric:         Mood and Affect: Mood normal.         Behavior: Behavior normal.         Thought Content: Thought " content normal.         Judgment: Judgment normal.        Result Review :  The following data was reviewed by: BAM Moody on 01/23/2023:  Common labs    Common Labs 2/14/22 2/14/22 2/14/22 2/14/22 3/18/22 3/18/22 3/18/22 3/18/22 3/18/22 8/22/22 8/22/22 8/22/22    0907 0907 0907 0907 1012 1012 1012 1012 1012 0915 0915 0915   Glucose  156 (A)    101 (A)    119 (A)     BUN  9    12    25     Creatinine  0.95    0.89    0.99     eGFR Non African Am  79 (C)             eGFR  Am  91 (C)             Sodium  142    141    144     Potassium  5.1    4.7    4.3     Chloride  101    102    102     Calcium  9.6    9.4    9.5     Total Protein  7.2             Albumin  4.4    4.90         Total Bilirubin  1.1    1.2         Alkaline Phosphatase  106    114         AST (SGOT)  27    28         ALT (SGPT)  23    31         WBC     8.48          Hemoglobin     14.8          Hematocrit     45.2          Platelets     320          Total Cholesterol       141        Total Cholesterol   168         124   Triglycerides   137    121     161 (A)   HDL Cholesterol   36 (A) (C)    34 (A)     27 (A)   LDL Cholesterol    107 (A) (C)    85     69   Hemoglobin A1C 7.7 (A)        7.70 (A)  7.5 (A)    Microalbumin, Urine    1,484.3           PSA        0.617       (A) Abnormal value (C) Corrected value       Comments are available for some flowsheets but are not being displayed.                        Assessment and Plan   Diagnoses and all orders for this visit:    1. Type 2 diabetes mellitus with hyperglycemia, with long-term current use of insulin (HCC) (Primary)  -     Hemoglobin A1c  -     Basic Metabolic Panel  -     Microalbumin / Creatinine Urine Ratio - Urine, Clean Catch    2. Hyperlipidemia associated with type 2 diabetes mellitus (HCC)             Follow Up   No follow-ups on file.     Consider nephrology eval   Labs today  Goal a1c near 7%  Continue statin     Patient was given instructions and counseling regarding  his condition or for health maintenance advice. Please see specific information pulled into the AVS if appropriate.     BAM Moody

## 2023-01-24 LAB
ALBUMIN/CREAT UR: 59 MG/G CREAT (ref 0–29)
BUN SERPL-MCNC: 26 MG/DL (ref 8–23)
BUN/CREAT SERPL: 25.5 (ref 7–25)
CALCIUM SERPL-MCNC: 9.5 MG/DL (ref 8.6–10.5)
CHLORIDE SERPL-SCNC: 102 MMOL/L (ref 98–107)
CO2 SERPL-SCNC: 28.9 MMOL/L (ref 22–29)
CREAT SERPL-MCNC: 1.02 MG/DL (ref 0.76–1.27)
CREAT UR-MCNC: 108.7 MG/DL
EGFRCR SERPLBLD CKD-EPI 2021: 77.1 ML/MIN/1.73
GLUCOSE SERPL-MCNC: 148 MG/DL (ref 65–99)
HBA1C MFR BLD: 8.4 % (ref 4.8–5.6)
MICROALBUMIN UR-MCNC: 64 UG/ML
POTASSIUM SERPL-SCNC: 4.8 MMOL/L (ref 3.5–5.2)
SODIUM SERPL-SCNC: 144 MMOL/L (ref 136–145)

## 2023-02-03 RX ORDER — ROSUVASTATIN CALCIUM 40 MG/1
TABLET, COATED ORAL
Qty: 90 TABLET | Refills: 1 | Status: SHIPPED | OUTPATIENT
Start: 2023-02-03

## 2023-03-03 ENCOUNTER — OFFICE VISIT (OUTPATIENT)
Dept: FAMILY MEDICINE CLINIC | Facility: CLINIC | Age: 73
End: 2023-03-03
Payer: MEDICARE

## 2023-03-03 VITALS
BODY MASS INDEX: 40.01 KG/M2 | HEIGHT: 71 IN | DIASTOLIC BLOOD PRESSURE: 72 MMHG | WEIGHT: 285.8 LBS | SYSTOLIC BLOOD PRESSURE: 110 MMHG | TEMPERATURE: 97.1 F | OXYGEN SATURATION: 98 % | HEART RATE: 78 BPM

## 2023-03-03 DIAGNOSIS — E78.00 PURE HYPERCHOLESTEROLEMIA: ICD-10-CM

## 2023-03-03 DIAGNOSIS — K57.90 DIVERTICULOSIS: ICD-10-CM

## 2023-03-03 DIAGNOSIS — I10 ESSENTIAL HYPERTENSION: Primary | ICD-10-CM

## 2023-03-03 PROCEDURE — 99214 OFFICE O/P EST MOD 30 MIN: CPT | Performed by: INTERNAL MEDICINE

## 2023-03-03 RX ORDER — CLOTRIMAZOLE AND BETAMETHASONE DIPROPIONATE 10; .64 MG/G; MG/G
1 CREAM TOPICAL 2 TIMES DAILY
Qty: 15 G | Refills: 1 | Status: SHIPPED | OUTPATIENT
Start: 2023-03-03

## 2023-03-03 NOTE — PROGRESS NOTES
"Chief Complaint  lt foot swelling f/u podiatrist    Subjective        Branden Diop presents to [facility]  History of Present Illness patient's blood pressures been running 110s over 70s.  We will continue metoprolol tartrate 25 mg twice daily.  Patient has seen a podiatrist and is working on his feet.  Patient will continue seeing them.  Patient does have a history of diverticulosis and has had no GI bleeding abdominal pain over the past several months.  Patient's lipids treated with diet exercise and rosuvastatin 40 mg daily.  Triglycerides 161, HDL 27, LDL 69.  Patient will continue present treatment.    Dictated utilizing Dragon dictation. If there are questions or for further clarification, please contact me.    Objective   Vital Signs:  Blood Pressure 110/72   Pulse 78   Temperature 97.1 °F (36.2 °C)   Height 180.3 cm (71\")   Weight 130 kg (285 lb 12.8 oz)   Oxygen Saturation 98%   Body Mass Index 39.86 kg/m²   Estimated body mass index is 39.86 kg/m² as calculated from the following:    Height as of this encounter: 180.3 cm (71\").    Weight as of this encounter: 130 kg (285 lb 12.8 oz).             Physical Exam  Vitals and nursing note reviewed.   Constitutional:       Appearance: Normal appearance. He is well-developed.   HENT:      Head: Normocephalic and atraumatic.      Nose: Nose normal.      Mouth/Throat:      Mouth: Mucous membranes are moist.      Pharynx: Oropharynx is clear.   Eyes:      Extraocular Movements: Extraocular movements intact.      Conjunctiva/sclera: Conjunctivae normal.      Pupils: Pupils are equal, round, and reactive to light.   Cardiovascular:      Rate and Rhythm: Normal rate and regular rhythm.      Heart sounds: Normal heart sounds. No murmur heard.    No friction rub. No gallop.   Pulmonary:      Effort: Pulmonary effort is normal. No respiratory distress.      Breath sounds: Normal breath sounds. No stridor. No wheezing, rhonchi or rales. "   Chest:      Chest wall: No tenderness.   Abdominal:      General: Bowel sounds are normal.      Palpations: Abdomen is soft.   Musculoskeletal:         General: Normal range of motion.      Cervical back: Normal range of motion and neck supple.   Skin:     General: Skin is warm and dry.   Neurological:      General: No focal deficit present.      Mental Status: He is alert and oriented to person, place, and time. Mental status is at baseline.   Psychiatric:         Mood and Affect: Mood normal.         Behavior: Behavior normal.         Thought Content: Thought content normal.         Judgment: Judgment normal.        Result Review :                   Assessment and Plan  1 continue monitoring blood sugar blood pressure 2 continue with podiatrist #3 follow-up in 6-month  Diagnoses and all orders for this visit:    1. Essential hypertension (Primary)    2. Diverticulosis    3. Pure hypercholesterolemia    Other orders  -     clotrimazole-betamethasone (Lotrisone) 1-0.05 % cream; Apply 1 application topically to the appropriate area as directed 2 (Two) Times a Day. Do exceed 1 week  Dispense: 15 g; Refill: 1             Follow Up   Return in about 6 months (around 9/3/2023), or if symptoms worsen or fail to improve, for Recheck.  Patient was given instructions and counseling regarding his condition or for health maintenance advice. Please see specific information pulled into the AVS if appropriate.

## 2023-03-09 RX ORDER — AMOXICILLIN 875 MG/1
TABLET, COATED ORAL
Qty: 20 TABLET | Refills: 0 | Status: SHIPPED | OUTPATIENT
Start: 2023-03-09

## 2023-04-07 RX ORDER — INSULIN GLARGINE AND LIXISENATIDE 100; 33 U/ML; UG/ML
24 INJECTION, SOLUTION SUBCUTANEOUS EVERY MORNING
Qty: 21.6 ML | Refills: 1 | Status: SHIPPED | OUTPATIENT
Start: 2023-04-07 | End: 2023-07-06

## 2023-04-10 ENCOUNTER — HOSPITAL ENCOUNTER (OUTPATIENT)
Dept: CT IMAGING | Facility: HOSPITAL | Age: 73
Discharge: HOME | End: 2023-04-10
Admitting: INTERNAL MEDICINE
Payer: MEDICARE

## 2023-04-10 DIAGNOSIS — J84.9 ILD (INTERSTITIAL LUNG DISEASE): ICD-10-CM

## 2023-04-10 PROCEDURE — 71250 CT THORAX DX C-: CPT

## 2023-04-17 RX ORDER — RIVAROXABAN 20 MG/1
TABLET, FILM COATED ORAL
Qty: 30 TABLET | Refills: 9 | Status: SHIPPED | OUTPATIENT
Start: 2023-04-17

## 2023-05-26 ENCOUNTER — TELEPHONE (OUTPATIENT)
Dept: GASTROENTEROLOGY | Facility: CLINIC | Age: 73
End: 2023-05-26

## 2023-05-26 NOTE — TELEPHONE ENCOUNTER
Caller: Luba Diop    Relationship to patient: Emergency Contact    Best call back number: [phone]    Patient is needing: PATIENT'S WIFE STATES THAT PAPERWORK FOR A COLONOSCOPY WAS SENT IN THE MAIL FOR THE PATIENT TO FILL OUT. PATIENT HAD A COLONOSCOPY 8/30/22 AND PER RECALL NEXT ONE IS NOT DUE UNTIL 8/30/25, PLEASE CALL BACK TO ADVISE.

## 2023-07-20 ENCOUNTER — TELEPHONE (OUTPATIENT)
Dept: ENDOCRINOLOGY | Age: 73
End: 2023-07-20

## 2023-07-20 NOTE — TELEPHONE ENCOUNTER
Caller: Luba Diop    Relationship to patient: Emergency Contact    Best call back number: [phone]    Patient is needing: PATIENT WENT TO KIDNEY DOCTOR TUESDAY 7/18 AND  WANTS TO PUT HIM ON  MOUNJARO . PATIENT ALSO WENT TO PCP   WHO WANTED TO TAKE PATIENT OFF OF FOLIQUA  BECAUSE [employer] CANNOT LOSE WEIGHT . DR LEARY NOW HAS HIM ON FARXIGA 10 MGS WNATED TO ADVISE OF THESE CHANGES. WOULD LIKE A CALL BACK ASAP .  CAN LEAVE VOICEMAIL

## 2023-08-25 ENCOUNTER — TRANSCRIBE ORDERS (OUTPATIENT)
Dept: ADMINISTRATIVE | Facility: HOSPITAL | Age: 73
End: 2023-08-25
Payer: MEDICARE

## 2023-08-25 DIAGNOSIS — J84.9 ILD (INTERSTITIAL LUNG DISEASE): Primary | ICD-10-CM

## 2024-02-06 NOTE — TELEPHONE ENCOUNTER
Caller: DiopLuba    Relationship: Emergency Contact    Best call back number: [phone]    Who are you requesting to speak with (clinical staff, provider,  specific staff member): CLINICAL STAFF    What was the call regarding: PTS WIFE STATES SHE HAS ASKED FOR PREP INSTRUCTIONS AND HAS NOT REC'D YET. PLEASE FORWARD    Do you require a callback: YES        
Called pt and spoke with wife, advised will leave a copy of the bowel prep at the  for her to .  She states she will  today.   
Ambulatory

## 2024-02-08 RX ORDER — RIVAROXABAN 20 MG/1
TABLET, FILM COATED ORAL
Qty: 30 TABLET | Refills: 9 | Status: SHIPPED | OUTPATIENT
Start: 2024-02-08

## 2024-04-02 ENCOUNTER — HOSPITAL ENCOUNTER (OUTPATIENT)
Dept: CT IMAGING | Facility: HOSPITAL | Age: 74
Discharge: HOME | End: 2024-04-02
Admitting: INTERNAL MEDICINE
Payer: MEDICARE

## 2024-04-02 DIAGNOSIS — J84.9 ILD (INTERSTITIAL LUNG DISEASE): ICD-10-CM

## 2024-04-02 PROCEDURE — 71250 CT THORAX DX C-: CPT

## 2024-04-09 ENCOUNTER — TRANSCRIBE ORDERS (OUTPATIENT)
Dept: ADMINISTRATIVE | Facility: HOSPITAL | Age: 74
End: 2024-04-09
Payer: MEDICARE

## 2024-04-09 DIAGNOSIS — R91.1 LUNG NODULE: Primary | ICD-10-CM

## 2024-04-22 ENCOUNTER — HOSPITAL ENCOUNTER (OUTPATIENT)
Dept: PET IMAGING | Facility: HOSPITAL | Age: 74
Discharge: HOME | End: 2024-04-22
Payer: MEDICARE

## 2024-04-22 DIAGNOSIS — R91.1 LUNG NODULE: ICD-10-CM

## 2024-04-22 LAB — GLUCOSE BLDC GLUCOMTR-MCNC: 123 MG/DL (ref 70–130)

## 2024-04-22 PROCEDURE — 0 FLUDEOXYGLUCOSE F18 SOLUTION: Performed by: INTERNAL MEDICINE

## 2024-04-22 PROCEDURE — 78815 PET IMAGE W/CT SKULL-THIGH: CPT

## 2024-04-22 PROCEDURE — A9552 F18 FDG: HCPCS | Performed by: INTERNAL MEDICINE

## 2024-04-22 PROCEDURE — 82948 REAGENT STRIP/BLOOD GLUCOSE: CPT

## 2024-04-22 RX ADMIN — FLUDEOXYGLUCOSE F 18 1 DOSE: 200 INJECTION, SOLUTION INTRAVENOUS at 06:50

## 2024-04-30 ENCOUNTER — TRANSCRIBE ORDERS (OUTPATIENT)
Dept: ADMINISTRATIVE | Facility: HOSPITAL | Age: 74
End: 2024-04-30
Payer: MEDICARE

## 2024-04-30 DIAGNOSIS — R91.1 PULMONARY NODULE: Primary | ICD-10-CM

## 2024-05-03 ENCOUNTER — HOSPITAL ENCOUNTER (OUTPATIENT)
Dept: CT IMAGING | Facility: HOSPITAL | Age: 74
Discharge: HOME | End: 2024-05-03
Payer: MEDICARE

## 2024-05-03 DIAGNOSIS — R91.1 PULMONARY NODULE: ICD-10-CM

## 2024-05-03 PROCEDURE — 71250 CT THORAX DX C-: CPT

## 2024-05-06 ENCOUNTER — ANESTHESIA (OUTPATIENT)
Dept: GASTROENTEROLOGY | Facility: HOSPITAL | Age: 74
End: 2024-05-06
Payer: MEDICARE

## 2024-05-06 ENCOUNTER — HOSPITAL ENCOUNTER (OUTPATIENT)
Facility: HOSPITAL | Age: 74
Setting detail: HOSPITAL OUTPATIENT SURGERY
Discharge: HOME | End: 2024-05-06
Attending: HOSPITALIST | Admitting: HOSPITALIST
Payer: MEDICARE

## 2024-05-06 ENCOUNTER — APPOINTMENT (OUTPATIENT)
Dept: GENERAL RADIOLOGY | Facility: HOSPITAL | Age: 74
End: 2024-05-06
Payer: MEDICARE

## 2024-05-06 ENCOUNTER — ANESTHESIA EVENT (OUTPATIENT)
Dept: GASTROENTEROLOGY | Facility: HOSPITAL | Age: 74
End: 2024-05-06
Payer: MEDICARE

## 2024-05-06 VITALS
OXYGEN SATURATION: 94 % | BODY MASS INDEX: 38.5 KG/M2 | HEART RATE: 57 BPM | HEIGHT: 71 IN | RESPIRATION RATE: 16 BRPM | SYSTOLIC BLOOD PRESSURE: 126 MMHG | WEIGHT: 275 LBS | DIASTOLIC BLOOD PRESSURE: 73 MMHG

## 2024-05-06 DIAGNOSIS — R91.8 PULMONARY NODULES: ICD-10-CM

## 2024-05-06 LAB — GLUCOSE BLDC GLUCOMTR-MCNC: 146 MG/DL (ref 70–130)

## 2024-05-06 PROCEDURE — 25810000003 LACTATED RINGERS PER 1000 ML: Performed by: HOSPITALIST

## 2024-05-06 PROCEDURE — 25010000002 SUCCINYLCHOLINE PER 20 MG: Performed by: NURSE ANESTHETIST, CERTIFIED REGISTERED

## 2024-05-06 PROCEDURE — 87206 SMEAR FLUORESCENT/ACID STAI: CPT | Performed by: HOSPITALIST

## 2024-05-06 PROCEDURE — 25010000002 SUGAMMADEX 200 MG/2ML SOLUTION: Performed by: NURSE ANESTHETIST, CERTIFIED REGISTERED

## 2024-05-06 PROCEDURE — 25010000002 PROPOFOL 1000 MG/100ML EMULSION: Performed by: NURSE ANESTHETIST, CERTIFIED REGISTERED

## 2024-05-06 PROCEDURE — 87071 CULTURE AEROBIC QUANT OTHER: CPT | Performed by: HOSPITALIST

## 2024-05-06 PROCEDURE — 88342 IMHCHEM/IMCYTCHM 1ST ANTB: CPT | Performed by: HOSPITALIST

## 2024-05-06 PROCEDURE — 25010000002 GLYCOPYRROLATE 1 MG/5ML SOLUTION: Performed by: NURSE ANESTHETIST, CERTIFIED REGISTERED

## 2024-05-06 PROCEDURE — 25010000002 DEXAMETHASONE SODIUM PHOSPHATE 20 MG/5ML SOLUTION: Performed by: NURSE ANESTHETIST, CERTIFIED REGISTERED

## 2024-05-06 PROCEDURE — 88341 IMHCHEM/IMCYTCHM EA ADD ANTB: CPT | Performed by: HOSPITALIST

## 2024-05-06 PROCEDURE — 25010000002 PHENYLEPHRINE 10 MG/ML SOLUTION: Performed by: NURSE ANESTHETIST, CERTIFIED REGISTERED

## 2024-05-06 PROCEDURE — 88305 TISSUE EXAM BY PATHOLOGIST: CPT | Performed by: HOSPITALIST

## 2024-05-06 PROCEDURE — 87116 MYCOBACTERIA CULTURE: CPT | Performed by: HOSPITALIST

## 2024-05-06 PROCEDURE — 88333 PATH CONSLTJ SURG CYTO XM 1: CPT | Performed by: HOSPITALIST

## 2024-05-06 PROCEDURE — 87205 SMEAR GRAM STAIN: CPT | Performed by: HOSPITALIST

## 2024-05-06 PROCEDURE — 71045 X-RAY EXAM CHEST 1 VIEW: CPT

## 2024-05-06 PROCEDURE — 25010000002 ONDANSETRON PER 1 MG: Performed by: NURSE ANESTHETIST, CERTIFIED REGISTERED

## 2024-05-06 PROCEDURE — 87070 CULTURE OTHR SPECIMN AEROBIC: CPT | Performed by: HOSPITALIST

## 2024-05-06 PROCEDURE — 87102 FUNGUS ISOLATION CULTURE: CPT | Performed by: HOSPITALIST

## 2024-05-06 PROCEDURE — 88173 CYTOPATH EVAL FNA REPORT: CPT | Performed by: HOSPITALIST

## 2024-05-06 PROCEDURE — 82948 REAGENT STRIP/BLOOD GLUCOSE: CPT

## 2024-05-06 PROCEDURE — 76000 FLUOROSCOPY <1 HR PHYS/QHP: CPT

## 2024-05-06 PROCEDURE — 25010000002 FENTANYL CITRATE (PF) 50 MCG/ML SOLUTION: Performed by: NURSE ANESTHETIST, CERTIFIED REGISTERED

## 2024-05-06 RX ORDER — IPRATROPIUM BROMIDE AND ALBUTEROL SULFATE 2.5; .5 MG/3ML; MG/3ML
3 SOLUTION RESPIRATORY (INHALATION) ONCE AS NEEDED
Status: DISCONTINUED | OUTPATIENT
Start: 2024-05-06 | End: 2024-05-06 | Stop reason: HOSPADM

## 2024-05-06 RX ORDER — PROPOFOL 10 MG/ML
INJECTION, EMULSION INTRAVENOUS AS NEEDED
Status: DISCONTINUED | OUTPATIENT
Start: 2024-05-06 | End: 2024-05-06 | Stop reason: SURG

## 2024-05-06 RX ORDER — LABETALOL HYDROCHLORIDE 5 MG/ML
5 INJECTION, SOLUTION INTRAVENOUS
Status: DISCONTINUED | OUTPATIENT
Start: 2024-05-06 | End: 2024-05-06 | Stop reason: HOSPADM

## 2024-05-06 RX ORDER — DROPERIDOL 2.5 MG/ML
0.62 INJECTION, SOLUTION INTRAMUSCULAR; INTRAVENOUS
Status: DISCONTINUED | OUTPATIENT
Start: 2024-05-06 | End: 2024-05-06 | Stop reason: HOSPADM

## 2024-05-06 RX ORDER — HYDROMORPHONE HYDROCHLORIDE 2 MG/ML
0.5 INJECTION, SOLUTION INTRAMUSCULAR; INTRAVENOUS; SUBCUTANEOUS
Status: DISCONTINUED | OUTPATIENT
Start: 2024-05-06 | End: 2024-05-06 | Stop reason: HOSPADM

## 2024-05-06 RX ORDER — TORSEMIDE 20 MG/1
20 TABLET ORAL DAILY
COMMUNITY

## 2024-05-06 RX ORDER — ROCURONIUM BROMIDE 10 MG/ML
INJECTION, SOLUTION INTRAVENOUS AS NEEDED
Status: DISCONTINUED | OUTPATIENT
Start: 2024-05-06 | End: 2024-05-06 | Stop reason: SURG

## 2024-05-06 RX ORDER — SODIUM CHLORIDE, SODIUM LACTATE, POTASSIUM CHLORIDE, CALCIUM CHLORIDE 600; 310; 30; 20 MG/100ML; MG/100ML; MG/100ML; MG/100ML
1000 INJECTION, SOLUTION INTRAVENOUS CONTINUOUS
Status: DISCONTINUED | OUTPATIENT
Start: 2024-05-06 | End: 2024-05-06 | Stop reason: HOSPADM

## 2024-05-06 RX ORDER — HYDRALAZINE HYDROCHLORIDE 20 MG/ML
5 INJECTION INTRAMUSCULAR; INTRAVENOUS
Status: DISCONTINUED | OUTPATIENT
Start: 2024-05-06 | End: 2024-05-06 | Stop reason: HOSPADM

## 2024-05-06 RX ORDER — PROMETHAZINE HYDROCHLORIDE 25 MG/1
25 SUPPOSITORY RECTAL ONCE AS NEEDED
Status: DISCONTINUED | OUTPATIENT
Start: 2024-05-06 | End: 2024-05-06 | Stop reason: HOSPADM

## 2024-05-06 RX ORDER — DIPHENHYDRAMINE HYDROCHLORIDE 50 MG/ML
12.5 INJECTION INTRAMUSCULAR; INTRAVENOUS
Status: DISCONTINUED | OUTPATIENT
Start: 2024-05-06 | End: 2024-05-06 | Stop reason: HOSPADM

## 2024-05-06 RX ORDER — FENTANYL CITRATE 50 UG/ML
50 INJECTION, SOLUTION INTRAMUSCULAR; INTRAVENOUS
Status: DISCONTINUED | OUTPATIENT
Start: 2024-05-06 | End: 2024-05-06 | Stop reason: HOSPADM

## 2024-05-06 RX ORDER — INSULIN GLARGINE 300 U/ML
20 INJECTION, SOLUTION SUBCUTANEOUS DAILY
COMMUNITY

## 2024-05-06 RX ORDER — FLUMAZENIL 0.1 MG/ML
0.2 INJECTION INTRAVENOUS AS NEEDED
Status: DISCONTINUED | OUTPATIENT
Start: 2024-05-06 | End: 2024-05-06 | Stop reason: HOSPADM

## 2024-05-06 RX ORDER — GLYCOPYRROLATE 0.2 MG/ML
INJECTION INTRAMUSCULAR; INTRAVENOUS AS NEEDED
Status: DISCONTINUED | OUTPATIENT
Start: 2024-05-06 | End: 2024-05-06 | Stop reason: SURG

## 2024-05-06 RX ORDER — PHENYLEPHRINE HYDROCHLORIDE 10 MG/ML
INJECTION INTRAVENOUS AS NEEDED
Status: DISCONTINUED | OUTPATIENT
Start: 2024-05-06 | End: 2024-05-06 | Stop reason: SURG

## 2024-05-06 RX ORDER — SUCCINYLCHOLINE CHLORIDE 20 MG/ML
INJECTION INTRAMUSCULAR; INTRAVENOUS AS NEEDED
Status: DISCONTINUED | OUTPATIENT
Start: 2024-05-06 | End: 2024-05-06 | Stop reason: SURG

## 2024-05-06 RX ORDER — DEXAMETHASONE SODIUM PHOSPHATE 4 MG/ML
INJECTION, SOLUTION INTRA-ARTICULAR; INTRALESIONAL; INTRAMUSCULAR; INTRAVENOUS; SOFT TISSUE AS NEEDED
Status: DISCONTINUED | OUTPATIENT
Start: 2024-05-06 | End: 2024-05-06 | Stop reason: SURG

## 2024-05-06 RX ORDER — FENTANYL CITRATE 50 UG/ML
INJECTION, SOLUTION INTRAMUSCULAR; INTRAVENOUS AS NEEDED
Status: DISCONTINUED | OUTPATIENT
Start: 2024-05-06 | End: 2024-05-06 | Stop reason: SURG

## 2024-05-06 RX ORDER — PROMETHAZINE HYDROCHLORIDE 25 MG/1
25 TABLET ORAL ONCE AS NEEDED
Status: DISCONTINUED | OUTPATIENT
Start: 2024-05-06 | End: 2024-05-06 | Stop reason: HOSPADM

## 2024-05-06 RX ORDER — EPHEDRINE SULFATE 50 MG/ML
5 INJECTION, SOLUTION INTRAVENOUS ONCE AS NEEDED
Status: DISCONTINUED | OUTPATIENT
Start: 2024-05-06 | End: 2024-05-06 | Stop reason: HOSPADM

## 2024-05-06 RX ORDER — OXYCODONE AND ACETAMINOPHEN 7.5; 325 MG/1; MG/1
1 TABLET ORAL EVERY 4 HOURS PRN
Status: DISCONTINUED | OUTPATIENT
Start: 2024-05-06 | End: 2024-05-06 | Stop reason: HOSPADM

## 2024-05-06 RX ORDER — NALOXONE HCL 0.4 MG/ML
0.2 VIAL (ML) INJECTION AS NEEDED
Status: DISCONTINUED | OUTPATIENT
Start: 2024-05-06 | End: 2024-05-06 | Stop reason: HOSPADM

## 2024-05-06 RX ORDER — EPHEDRINE SULFATE 50 MG/ML
INJECTION, SOLUTION INTRAVENOUS AS NEEDED
Status: DISCONTINUED | OUTPATIENT
Start: 2024-05-06 | End: 2024-05-06 | Stop reason: SURG

## 2024-05-06 RX ORDER — ONDANSETRON 2 MG/ML
4 INJECTION INTRAMUSCULAR; INTRAVENOUS ONCE AS NEEDED
Status: COMPLETED | OUTPATIENT
Start: 2024-05-06 | End: 2024-05-06

## 2024-05-06 RX ORDER — HYDROCODONE BITARTRATE AND ACETAMINOPHEN 5; 325 MG/1; MG/1
1 TABLET ORAL ONCE AS NEEDED
Status: DISCONTINUED | OUTPATIENT
Start: 2024-05-06 | End: 2024-05-06 | Stop reason: HOSPADM

## 2024-05-06 RX ADMIN — DEXAMETHASONE SODIUM PHOSPHATE 4 MG: 4 INJECTION, SOLUTION INTRAMUSCULAR; INTRAVENOUS at 10:15

## 2024-05-06 RX ADMIN — SUCCINYLCHOLINE CHLORIDE 140 MG: 20 INJECTION, SOLUTION INTRAMUSCULAR; INTRAVENOUS; PARENTERAL at 10:15

## 2024-05-06 RX ADMIN — PROPOFOL 150 MCG/KG/MIN: 10 INJECTION, EMULSION INTRAVENOUS at 10:17

## 2024-05-06 RX ADMIN — FENTANYL CITRATE 50 MCG: 50 INJECTION, SOLUTION INTRAMUSCULAR; INTRAVENOUS at 10:16

## 2024-05-06 RX ADMIN — EPHEDRINE SULFATE 5 MG: 50 INJECTION INTRAVENOUS at 11:40

## 2024-05-06 RX ADMIN — SODIUM CHLORIDE, POTASSIUM CHLORIDE, SODIUM LACTATE AND CALCIUM CHLORIDE: 600; 310; 30; 20 INJECTION, SOLUTION INTRAVENOUS at 10:54

## 2024-05-06 RX ADMIN — ROCURONIUM BROMIDE 40 MG: 10 INJECTION, SOLUTION INTRAVENOUS at 10:25

## 2024-05-06 RX ADMIN — PROPOFOL 250 MG: 10 INJECTION, EMULSION INTRAVENOUS at 10:15

## 2024-05-06 RX ADMIN — ONDANSETRON 4 MG: 2 INJECTION INTRAMUSCULAR; INTRAVENOUS at 11:46

## 2024-05-06 RX ADMIN — PHENYLEPHRINE HYDROCHLORIDE 100 MCG: 10 INJECTION INTRAVENOUS at 10:58

## 2024-05-06 RX ADMIN — ROCURONIUM BROMIDE 10 MG: 10 INJECTION, SOLUTION INTRAVENOUS at 10:15

## 2024-05-06 RX ADMIN — EPHEDRINE SULFATE 10 MG: 50 INJECTION INTRAVENOUS at 11:07

## 2024-05-06 RX ADMIN — SUGAMMADEX 400 MG: 100 INJECTION, SOLUTION INTRAVENOUS at 11:46

## 2024-05-06 RX ADMIN — PHENYLEPHRINE HYDROCHLORIDE 100 MCG: 10 INJECTION INTRAVENOUS at 11:40

## 2024-05-06 RX ADMIN — GLYCOPYRROLATE 0.1 MG: 0.2 INJECTION, SOLUTION INTRAMUSCULAR; INTRAVENOUS at 10:21

## 2024-05-06 RX ADMIN — PHENYLEPHRINE HYDROCHLORIDE 100 MCG: 10 INJECTION INTRAVENOUS at 10:34

## 2024-05-06 RX ADMIN — ROCURONIUM BROMIDE 10 MG: 10 INJECTION, SOLUTION INTRAVENOUS at 11:04

## 2024-05-06 RX ADMIN — SODIUM CHLORIDE, POTASSIUM CHLORIDE, SODIUM LACTATE AND CALCIUM CHLORIDE 1000 ML: 600; 310; 30; 20 INJECTION, SOLUTION INTRAVENOUS at 09:25

## 2024-05-07 LAB
CYTO UR: NORMAL
DX PRELIMINARY: NORMAL
LAB AP CASE REPORT: NORMAL
LAB AP DIAGNOSIS COMMENT: NORMAL
LAB AP NON-GYN SPECIMEN ADEQUACY: NORMAL
PATH REPORT.FINAL DX SPEC: NORMAL
PATH REPORT.GROSS SPEC: NORMAL

## 2024-05-08 LAB
BACTERIA SPEC AEROBE CULT: NO GROWTH
BACTERIA SPEC AEROBE CULT: NO GROWTH
BACTERIA SPEC RESP CULT: NORMAL
GRAM STN SPEC: NORMAL

## 2024-05-13 LAB
LAB AP CASE REPORT: NORMAL
LAB AP DIAGNOSIS COMMENT: NORMAL
LAB AP SPECIAL STAINS: NORMAL
PATH REPORT.ADDENDUM SPEC: NORMAL
PATH REPORT.FINAL DX SPEC: NORMAL
PATH REPORT.GROSS SPEC: NORMAL

## 2024-05-14 LAB — FUNGUS WND CULT: ABNORMAL

## 2024-05-16 ENCOUNTER — TELEPHONE (OUTPATIENT)
Dept: SURGERY | Facility: CLINIC | Age: 74
End: 2024-05-16
Payer: MEDICARE

## 2024-05-16 ENCOUNTER — PATIENT OUTREACH (OUTPATIENT)
Dept: OTHER | Facility: HOSPITAL | Age: 74
End: 2024-05-16
Payer: MEDICARE

## 2024-05-16 ENCOUNTER — OFFICE VISIT (OUTPATIENT)
Dept: SURGERY | Facility: CLINIC | Age: 74
End: 2024-05-16
Payer: MEDICARE

## 2024-05-16 VITALS
SYSTOLIC BLOOD PRESSURE: 170 MMHG | OXYGEN SATURATION: 97 % | DIASTOLIC BLOOD PRESSURE: 60 MMHG | HEART RATE: 68 BPM | BODY MASS INDEX: 37.41 KG/M2 | HEIGHT: 71 IN | WEIGHT: 267.2 LBS

## 2024-05-16 DIAGNOSIS — I48.0 PAROXYSMAL ATRIAL FIBRILLATION: ICD-10-CM

## 2024-05-16 DIAGNOSIS — C34.2 MALIGNANT NEOPLASM OF MIDDLE LOBE OF RIGHT LUNG: ICD-10-CM

## 2024-05-16 DIAGNOSIS — C34.2 MALIGNANT NEOPLASM OF MIDDLE LOBE OF RIGHT LUNG: Primary | ICD-10-CM

## 2024-05-16 DIAGNOSIS — I48.0 PAROXYSMAL ATRIAL FIBRILLATION: Primary | ICD-10-CM

## 2024-05-16 DIAGNOSIS — C34.90 PRIMARY MALIGNANT NEOPLASM OF LUNG WITH METASTASIS TO BRAIN: ICD-10-CM

## 2024-05-16 DIAGNOSIS — C79.31 PRIMARY MALIGNANT NEOPLASM OF LUNG WITH METASTASIS TO BRAIN: ICD-10-CM

## 2024-05-16 NOTE — PROGRESS NOTES
Referral received from Dr. Figueroa.  I accompanied patient to Dr. Figueroa's office visit today. Introduced myself and explained navigational services.      The patient was referred to  for further discussion of his biopsy proven RML squamous cell carcinoma (performed by  5/6). An additional RML lesion was biopsy was suspicious of squamous cell carcinoma. His 4/22/24 PET scan revealed these two areas of concern as well as hypermetabolic right hilar and subcarinal lymphadenopathy.  Dr. Figueroa discussed treatment options including surgery, radiation and chemotherapy (if deemed appropriate).  The patient reported he had recent PFTs at [facility].  's office will request these.  Dr. Figueroa ordered an echocardiogram to assess the patient's cardiac function as well as a MRI of the brain to exclude the possibility of cancer metastasized to the brain.  Dr. Figueroa will contact the patient after these tests are completed to discuss next steps.    The patient has a good understanding of his pathology and potential treatment options.  He was able to teach back the next steps in his care.    The patient is alert and oriented. He ambulates without assistive devices, denies falls and is independent with ADLs. The patient lives with his wife in [place], [place]. He is a retired .    The patient is a former 1 pack a day smoker since high school.  He quit about 8 years ago.     The patient has Medicare and AARP.   He denies any current BHE claim issues. We discussed financial navigation, cost estimates and possible financial assistance if needed in the future.     The patient denies transportation, financial or other resource needs at this time.     The patient has been eating well and denies weight loss.  He is taking Mounjaro.     The patient does not have an ACP on file; he declined additional ACP information.    The patient states he is coping well with his cancer diagnosis.He reports an adequate support system.  We  discussed OSW and Behavioral oncology services.  Patient expressed gratitude for my support and denied any additional needs at this time. We also discussed [employer] and [facility]. The patient will let me know if he would like referrals sent to either organization in the future.    We discussed integrative therapies and other services at [facility].  He received a navigation folder with the following information at his appointment today: [facility], Cancer and Restorative Exercise - CARE, [facility] exercise program, Living with a Diagnosis of Lung Cancer, Lung Cancer [facility], [facility], [facility], Reiki Therapy, [employer] [facility], Cancer Nutrition, Smoking Cessation and Survivorship Clinic.      I will call in a few weeks; provided my name and number and encouraged patient to call if any needs arise.

## 2024-05-16 NOTE — PROGRESS NOTES
THORACIC SURGERY CLINIC CONSULT NOTE    REASON FOR CONSULT: Right middle lobe squamous cell carcinoma    REFERRING PROVIDER: Tino Lopez MD     Subjective   HISTORY OF PRESENTING ILLNESS:   Branden Diop is a 73 y.o. male has significant medical problems as mentioned below.     He had shortness of breath and high-resolution CT scan was performed on 6/14/2021.  He has small noncalcified lung nodules measuring up to 7 mm.  Repeat CT scan of the chest on 4/10/2023 reported stable pulmonary nodule and presence of emphysema.  CT scan of the chest in 12 months was recommended which was performed on 4/2/2024 and reported 1.8 cm and 1.1 cm right middle lobe solid nodules.  PET/CT on 4/23/2024 reported mildly hypermetabolic right middle lobe 1.9 cm and 1.1 cm solid nodule. The findings were concerning for malignant disease.  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  He had ION robotic navigational bronchoscopy of the right middle lobe nodule.  One of the nodules had fragment of squamous cell carcinoma and the other nodule had highly dysplastic squamous epithelium suggesting at least squamous cell carcinoma in situ.  Level 11 L, 7, 4R, 11 R were negative for metastatic disease.  MRI of the brain was negative for metastatic disease.  He was referred to thoracic surgery for further evaluation.    He can occasionally walk 1 block without dyspnea. He denies any history of myocardial infarction, cerebrovascular accident, hepatic or renal issues. He had pneumonia in 2017 resulting in a 17-day intensive care unit stay. His respiratory function has not returned to its previous state. A recent pulmonary function test was conducted on 05/10/2024. He is under the care of a cardiologist and is currently on anticoagulant therapy. He reports occasional pedal edema and has been informed that his arteries are harder on one side.    He used to smoke 1 pack a day; however, he quit about 8 years  ago. He started smoking when he was in high school.    Past Medical History:   Diagnosis Date    Anxiety     Arthritis     Atrial fibrillation     CURRENTLY    Bunion of right foot     Colon polyps     COPD (chronic obstructive pulmonary disease)     MILD. NO MEDS FOR    Depression     History of atrial fibrillation     WITH CARDIO VERSION. NO PROBLEMS    History of skin cancer     BCC REMOVED FROM BACK    [place] (hard of hearing)     Hyperlipidemia     Inguinal hernia, recurrent     RIGHT    Left foot pain     Lung nodules     Rash     IN GROIN TREATING FOR YEAST INFECTION BY PCP    Redness of skin     LOWER LEGS BILATERAL    Scab     BILATERAL LEGS HEALING    Sleep apnea with use of continuous positive airway pressure (CPAP)     CPAP    Streptococcus pneumoniae     ABOUT 6-7 YR AGO.     Type 2 diabetes mellitus        Past Surgical History:   Procedure Laterality Date    BASAL CELL CARCINOMA EXCISION      BRONCHOSCOPY WITH ION ROBOTIC ASSIST N/A 5/6/2024    Procedure: BRONCHOSCOPY WITH ION ROBOT WITH FNA, BIOPSIES, BAL AND ENDOBRONCHIAL ULTRASOUND WITH FNA;  Surgeon: Yony Coles MD;  Location: [facility] ENDOSCOPY;  Service: Robotics - Pulmonary;  Laterality: N/A;  PRE: PULMONARY NODULES  POST: SAME    CARDIAC CATHETERIZATION N/A 11/15/2021    Procedure: Coronary angiography;  Surgeon: Alfredo Jennings MD;  Location: [facility] INVASIVE LOCATION;  Service: Cardiovascular;  Laterality: N/A;    CARDIAC CATHETERIZATION N/A 11/15/2021    Procedure: Left heart cath;  Surgeon: Alfredo Jennings MD;  Location: [facility] CATH INVASIVE LOCATION;  Service: Cardiovascular;  Laterality: N/A;    CARDIAC CATHETERIZATION N/A 11/15/2021    Procedure: Left ventriculography;  Surgeon: Alfredo Jennings MD;  Location: [facility] INVASIVE LOCATION;  Service: Cardiovascular;  Laterality: N/A;    CARDIAC CATHETERIZATION N/A 11/15/2021    Procedure: Aortic root aortogram;  Surgeon: Alfredo Jennings MD;  Location: [facility]  CATH INVASIVE LOCATION;  Service: Cardiovascular;  Laterality: N/A;    CARDIOVERSION      CHOLECYSTECTOMY N/A 10/07/2017    Procedure: CHOLECYSTECTOMY LAPAROSCOPIC;  Surgeon: Martir Trevino MD;  Location: [facility] MAIN OR;  Service:     COLONOSCOPY  2007    COLONOSCOPY N/A 8/30/2022    Procedure: COLONOSCOPY TO CECUM AND TI AND COLD SNARE POLYPECTOMY;  Surgeon: Cj Lopez MD;  Location: [facility] ENDOSCOPY;  Service: Gastroenterology;  Laterality: N/A;  Pre: History of colon polyps  Post: POLYP, PREVIOUS TATTOO    FOOT SURGERY Left     TOES ARE PINNED. ALL BUT GREAT TOE    HAND SURGERY      THUMB    HERNIA REPAIR      INGUINAL HERNIA REPAIR Right 06/27/2018    Procedure: INGUINAL HERNIA REPAIR, OPEN, RECURRENT;  Surgeon: Martir Trevino MD;  Location: [facility] OR [facility];  Service: General    LASIK Bilateral     NECK SURGERY      growth on salivary gland    REPLACEMENT TOTAL KNEE Right 2007    (he is going to have a LTKR next month)    REPLACEMENT TOTAL KNEE Left        Family History   Problem Relation Age of Onset    Cancer Other     Stroke Mother     Alcohol abuse Father     Malig Hyperthermia Neg Hx        Social History     Socioeconomic History    Marital status:     Number of children: 2   Tobacco Use    Smoking status: Former     Current packs/day: 0.00     Average packs/day: 1 pack/day for 30.0 years (30.0 ttl pk-yrs)     Types: Cigars, Cigarettes     Start date: 1/1/1984     Quit date: 1/1/2014     Years since quitting: 10.4    Smokeless tobacco: Never   Vaping Use    Vaping status: Never Used   Substance and Sexual Activity    Alcohol use: Never     Comment: caffeine use- decaf coffee    Drug use: Never    Sexual activity: Defer         Current Outpatient Medications:     amoxicillin (AMOXIL) 875 MG tablet, TAKE ONE TABLET BY MOUTH EVERY 12 HOURS UNTIL ALL ARE TAKEN (Patient taking differently: PT TOOK LAST DOSE 6/4/2024), Disp: 20 tablet, Rfl: 0    clotrimazole-betamethasone (Lotrisone) 1-0.05 %  "cream, Apply 1 application topically to the appropriate area as directed 2 (Two) Times a Day. Do exceed 1 week, Disp: 15 g, Rfl: 1    glucose blood test strip, Freestyle strips daily E 11.9, Disp: 100 each, Rfl: 12    glucose monitoring kit (FREESTYLE) monitoring kit, Daily E 11.93 FreeStyle meter, Disp: 1 each, Rfl: 1    Insulin Glargine, 1 Unit Dial, (Toujeo SoloStar) 300 UNIT/ML solution pen-injector injection, Inject 22 Units under the skin into the appropriate area as directed Daily., Disp: , Rfl:     Lancets (FREESTYLE) lancets, Daily E 11.9, Disp: 100 each, Rfl: 12    Multiple Vitamin (MULTIVITAMINS PO), Take 1 tablet by mouth Daily. HOLD PRIOR TO SURG, Disp: , Rfl:     rosuvastatin (CRESTOR) 40 MG tablet, TAKE ONE TABLET BY MOUTH DAILY, Disp: 90 tablet, Rfl: 1    sertraline (ZOLOFT) 50 MG tablet, TAKE ONE TABLET BY MOUTH DAILY (Patient taking differently: Take 1 tablet by mouth Daily.), Disp: 90 tablet, Rfl: 1    Tirzepatide (Mounjaro) 2.5 MG/0.5ML solution pen-injector pen, Inject 0.5 mL under the skin into the appropriate area as directed 1 (One) Time Per Week. LAST DOSE 6/3/2024 INSTRUCTED PT TO HOLD FOR SURGERY, Disp: , Rfl:     torsemide (DEMADEX) 20 MG tablet, Take 1 tablet by mouth Daily., Disp: , Rfl:     Xarelto 20 MG tablet, TAKE ONE TABLET BY MOUTH DAILY (Patient taking differently: PT IS TAKING LAST DOSE 6/8/2024 PER CARDIOLOGY), Disp: 30 tablet, Rfl: 9    fexofenadine (ALLEGRA) 180 MG tablet, Take 1 tablet by mouth Daily., Disp: , Rfl:      No Known Allergies          Objective    OBJECTIVE:     VITAL SIGNS:  /60 (BP Location: Left arm, Patient Position: Sitting, Cuff Size: Adult)   Pulse 68   Ht 180.3 cm (70.98\")   Wt 121 kg (267 lb 3.2 oz)   SpO2 97%   BMI 37.28 kg/m²     PHYSICAL EXAM:  Normal appearance.   Head is normocephalic.   Nose appears normal.   No obvious deformity of the mouth and throat.  Conjunctivae normal.   Heart rate and rhythm is normal.  Pulmonary effort is " normal.   Moving all 4 extremities.  Extremities warm.  No focal deficit present.   He is alert and oriented to person, place, and time.     LAB RESULTS:  I have reviewed all the available laboratory results in the chart.    RESULTS REVIEW:  I have reviewed the patient's all relevant laboratory and imaging findings.           ASSESSMENT & PLAN:  Branden Diop is a 73 y.o. male with significant medical conditions as mentioned above.    The patient's PET-CT scan does not definitively indicate the presence of malignancy; however, it is plausible that the patient's cancer has metastasized to the lung or lymph nodes. An echocardiogram will be performed to assess the patient's cardiac function. An MRI of the brain will be ordered to exclude the possibility of cancer metastasized to the brain. The report of the pulmonary function test from Dr. Camp's office will be obtained. Should the patient's lymph nodes return positive for cancer, chemotherapy will be discussed. If no cancer is detected in the lymph nodes, surgical intervention will be deemed necessary. If the patient's cardiac or pulmonary functions are suboptimal, radiation therapy will be considered.    I discussed the patients findings and my recommendations with the patient. The patient was given adequate time to ask questions and all questions were answered to patient satisfaction. Thank you for this consult and allowing us to participate in the care of your patient.      David Figueroa MD   Thoracic Surgeon  [facility] [place]        Dictated utilizing Dragon dictation    I spent 60 minutes caring for Branden on this date of service. This time includes time spent by me in the following activities:reviewing tests, obtaining and/or reviewing a separately obtained history, performing a medically appropriate examination and/or evaluation , counseling and educating the patient/family/caregiver, ordering medications, tests, or procedures, referring and  communicating with other health care professionals , documenting information in the medical record, independently interpreting results and communicating that information with the patient/family/caregiver, and care coordination and more than half the time was spent in direct face to face evaluation and decision making.     Transcribed from ambient dictation for David Figueroa MD by Jenn Crisostomo.  05/16/24   11:52 EDT    Patient or patient representative verbalized consent to the visit recording.  I have personally performed the services described in this document as transcribed by the above individual, and it is both accurate and complete.

## 2024-05-16 NOTE — TELEPHONE ENCOUNTER
I spoke with patient wife today regarding a referral from Dr Coles that we have not received yet. I also reached out to [facility] today to speak with someone regarding referral and was unable to speak with someone. Upon further investigation I noticed pt has already had recent Ion Bronch with biopsies. I then sent information directly to dr Figueroa for review. Pt wife was agreeable and satisfied     8:47  5/16/2024

## 2024-05-17 ENCOUNTER — TELEPHONE (OUTPATIENT)
Dept: SURGERY | Facility: CLINIC | Age: 74
End: 2024-05-17
Payer: MEDICARE

## 2024-05-23 ENCOUNTER — HOSPITAL ENCOUNTER (OUTPATIENT)
Dept: RESPIRATORY THERAPY | Facility: HOSPITAL | Age: 74
Discharge: HOME | End: 2024-05-23
Payer: MEDICARE

## 2024-05-23 DIAGNOSIS — C34.2 MALIGNANT NEOPLASM OF MIDDLE LOBE OF RIGHT LUNG: ICD-10-CM

## 2024-05-23 LAB
BDY SITE: NORMAL
HGB BLDA-MCNC: 14.4 G/DL (ref 12–18)
Lab: NORMAL

## 2024-05-23 PROCEDURE — 94729 DIFFUSING CAPACITY: CPT

## 2024-05-23 PROCEDURE — 94060 EVALUATION OF WHEEZING: CPT

## 2024-05-23 PROCEDURE — 82820 HEMOGLOBIN-OXYGEN AFFINITY: CPT | Performed by: SURGERY

## 2024-05-23 PROCEDURE — 94726 PLETHYSMOGRAPHY LUNG VOLUMES: CPT

## 2024-05-23 PROCEDURE — 94640 AIRWAY INHALATION TREATMENT: CPT

## 2024-05-23 RX ORDER — ALBUTEROL SULFATE 2.5 MG/3ML
2.5 SOLUTION RESPIRATORY (INHALATION) ONCE
Status: COMPLETED | OUTPATIENT
Start: 2024-05-23 | End: 2024-05-23

## 2024-05-23 RX ADMIN — ALBUTEROL SULFATE 2.5 MG: 2.5 SOLUTION RESPIRATORY (INHALATION) at 15:24

## 2024-05-26 ENCOUNTER — HOSPITAL ENCOUNTER (OUTPATIENT)
Dept: MRI IMAGING | Facility: HOSPITAL | Age: 74
Discharge: HOME | End: 2024-05-26
Admitting: SURGERY
Payer: MEDICARE

## 2024-05-26 DIAGNOSIS — C34.2 MALIGNANT NEOPLASM OF MIDDLE LOBE OF RIGHT LUNG: ICD-10-CM

## 2024-05-26 PROCEDURE — 0 GADOBENATE DIMEGLUMINE 529 MG/ML SOLUTION: Performed by: SURGERY

## 2024-05-26 PROCEDURE — 70553 MRI BRAIN STEM W/O & W/DYE: CPT

## 2024-05-26 PROCEDURE — A9577 INJ MULTIHANCE: HCPCS | Performed by: SURGERY

## 2024-05-26 RX ADMIN — GADOBENATE DIMEGLUMINE 20 ML: 529 INJECTION, SOLUTION INTRAVENOUS at 15:50

## 2024-05-28 ENCOUNTER — HOSPITAL ENCOUNTER (OUTPATIENT)
Dept: CARDIOLOGY | Facility: HOSPITAL | Age: 74
Discharge: HOME | End: 2024-05-28
Admitting: SURGERY
Payer: MEDICARE

## 2024-05-28 VITALS
WEIGHT: 267 LBS | BODY MASS INDEX: 38.22 KG/M2 | SYSTOLIC BLOOD PRESSURE: 151 MMHG | HEIGHT: 70 IN | DIASTOLIC BLOOD PRESSURE: 80 MMHG

## 2024-05-28 DIAGNOSIS — I48.0 PAROXYSMAL ATRIAL FIBRILLATION: ICD-10-CM

## 2024-05-28 LAB
BH CV ECHO MEAS - ACS: 2 CM
BH CV ECHO MEAS - AO MAX PG: 6.5 MMHG
BH CV ECHO MEAS - AO MEAN PG: 3.6 MMHG
BH CV ECHO MEAS - AO ROOT DIAM: 3.7 CM
BH CV ECHO MEAS - AO V2 MAX: 127 CM/SEC
BH CV ECHO MEAS - AO V2 VTI: 28.6 CM
BH CV ECHO MEAS - AVA(I,D): 2.06 CM2
BH CV ECHO MEAS - EDV(CUBED): 75.1 ML
BH CV ECHO MEAS - EDV(MOD-SP2): 100 ML
BH CV ECHO MEAS - EDV(MOD-SP4): 98 ML
BH CV ECHO MEAS - EF(MOD-BP): 62.6 %
BH CV ECHO MEAS - EF(MOD-SP2): 63 %
BH CV ECHO MEAS - EF(MOD-SP4): 63.3 %
BH CV ECHO MEAS - ESV(CUBED): 22.8 ML
BH CV ECHO MEAS - ESV(MOD-SP2): 37 ML
BH CV ECHO MEAS - ESV(MOD-SP4): 36 ML
BH CV ECHO MEAS - FS: 32.7 %
BH CV ECHO MEAS - IVS/LVPW: 0.87 CM
BH CV ECHO MEAS - IVSD: 0.8 CM
BH CV ECHO MEAS - LAT PEAK E' VEL: 16.3 CM/SEC
BH CV ECHO MEAS - LV DIASTOLIC VOL/BSA (35-75): 41.5 CM2
BH CV ECHO MEAS - LV MASS(C)D: 112.8 GRAMS
BH CV ECHO MEAS - LV MAX PG: 3.2 MMHG
BH CV ECHO MEAS - LV MEAN PG: 1.63 MMHG
BH CV ECHO MEAS - LV SYSTOLIC VOL/BSA (12-30): 15.3 CM2
BH CV ECHO MEAS - LV V1 MAX: 89.3 CM/SEC
BH CV ECHO MEAS - LV V1 VTI: 18.7 CM
BH CV ECHO MEAS - LVIDD: 4.2 CM
BH CV ECHO MEAS - LVIDS: 2.8 CM
BH CV ECHO MEAS - LVOT AREA: 3.2 CM2
BH CV ECHO MEAS - LVOT DIAM: 2.01 CM
BH CV ECHO MEAS - LVPWD: 0.92 CM
BH CV ECHO MEAS - MED PEAK E' VEL: 10.3 CM/SEC
BH CV ECHO MEAS - MV DEC SLOPE: 463.7 CM/SEC2
BH CV ECHO MEAS - MV DEC TIME: 0.18 SEC
BH CV ECHO MEAS - MV E MAX VEL: 104 CM/SEC
BH CV ECHO MEAS - MV MAX PG: 6 MMHG
BH CV ECHO MEAS - MV MEAN PG: 1.14 MMHG
BH CV ECHO MEAS - MV P1/2T: 74.6 MSEC
BH CV ECHO MEAS - MV V2 VTI: 33.1 CM
BH CV ECHO MEAS - MVA(P1/2T): 2.9 CM2
BH CV ECHO MEAS - MVA(VTI): 1.78 CM2
BH CV ECHO MEAS - PA ACC TIME: 0.17 SEC
BH CV ECHO MEAS - PA V2 MAX: 195 CM/SEC
BH CV ECHO MEAS - RAP SYSTOLE: 3 MMHG
BH CV ECHO MEAS - RV MAX PG: 3.2 MMHG
BH CV ECHO MEAS - RV V1 MAX: 88.8 CM/SEC
BH CV ECHO MEAS - RV V1 VTI: 20.8 CM
BH CV ECHO MEAS - RVSP: 39 MMHG
BH CV ECHO MEAS - SV(LVOT): 59 ML
BH CV ECHO MEAS - SV(MOD-SP2): 63 ML
BH CV ECHO MEAS - SV(MOD-SP4): 62 ML
BH CV ECHO MEAS - SVI(LVOT): 25 ML/M2
BH CV ECHO MEAS - SVI(MOD-SP2): 26.7 ML/M2
BH CV ECHO MEAS - SVI(MOD-SP4): 26.3 ML/M2
BH CV ECHO MEAS - TAPSE (>1.6): 2.24 CM
BH CV ECHO MEAS - TR MAX PG: 36 MMHG
BH CV ECHO MEAS - TR MAX VEL: 300.1 CM/SEC
BH CV ECHO MEASUREMENTS AVERAGE E/E' RATIO: 7.82
BH CV XLRA - RV BASE: 4.4 CM
BH CV XLRA - RV LENGTH: 7.7 CM
BH CV XLRA - RV MID: 3.8 CM
BH CV XLRA - TDI S': 10.9 CM/SEC
FUNGUS WND CULT: ABNORMAL
LEFT ATRIUM VOLUME INDEX: 39.2 ML/M2
SINUS: 3.3 CM
STJ: 3.4 CM

## 2024-05-28 PROCEDURE — 93306 TTE W/DOPPLER COMPLETE: CPT | Performed by: INTERNAL MEDICINE

## 2024-05-28 PROCEDURE — 93306 TTE W/DOPPLER COMPLETE: CPT

## 2024-05-29 ENCOUNTER — TELEPHONE (OUTPATIENT)
Dept: SURGERY | Facility: CLINIC | Age: 74
End: 2024-05-29
Payer: MEDICARE

## 2024-05-29 NOTE — TELEPHONE ENCOUNTER
I left message for pt to confirm appointment on 5/30/2024 and imaging complet. I left name, medical group, and call [facility] number to confirm, cancel or reschedul appointment.    Db 5/29/2024

## 2024-05-30 ENCOUNTER — PREP FOR SURGERY (OUTPATIENT)
Dept: OTHER | Facility: HOSPITAL | Age: 74
End: 2024-05-30
Payer: MEDICARE

## 2024-05-30 ENCOUNTER — OFFICE VISIT (OUTPATIENT)
Dept: SURGERY | Facility: CLINIC | Age: 74
End: 2024-05-30
Payer: MEDICARE

## 2024-05-30 ENCOUNTER — PATIENT OUTREACH (OUTPATIENT)
Dept: OTHER | Facility: HOSPITAL | Age: 74
End: 2024-05-30
Payer: MEDICARE

## 2024-05-30 VITALS
OXYGEN SATURATION: 97 % | DIASTOLIC BLOOD PRESSURE: 70 MMHG | BODY MASS INDEX: 39.77 KG/M2 | SYSTOLIC BLOOD PRESSURE: 120 MMHG | WEIGHT: 277.8 LBS | HEIGHT: 70 IN | HEART RATE: 68 BPM

## 2024-05-30 DIAGNOSIS — C34.2 MALIGNANT NEOPLASM OF MIDDLE LOBE OF RIGHT LUNG: Primary | ICD-10-CM

## 2024-05-30 DIAGNOSIS — R94.5 ABNORMAL RESULTS OF LIVER FUNCTION STUDIES: ICD-10-CM

## 2024-05-30 RX ORDER — SODIUM CHLORIDE 0.9 % (FLUSH) 0.9 %
10 SYRINGE (ML) INJECTION EVERY 12 HOURS SCHEDULED
OUTPATIENT
Start: 2024-05-30

## 2024-05-30 RX ORDER — SODIUM CHLORIDE 0.9 % (FLUSH) 0.9 %
10 SYRINGE (ML) INJECTION AS NEEDED
OUTPATIENT
Start: 2024-05-30

## 2024-05-30 RX ORDER — SODIUM CHLORIDE 9 MG/ML
40 INJECTION, SOLUTION INTRAVENOUS AS NEEDED
OUTPATIENT
Start: 2024-05-30

## 2024-05-30 NOTE — H&P (VIEW-ONLY)
THORACIC SURGERY CLINIC FOLLOW UP NOTE    REASON FOR CONSULT: Right middle lobe squamous cell carcinoma    REFERRING PROVIDER: Tino Lopez MD     Subjective   HISTORY OF PRESENTING ILLNESS:   Branden Diop is a 73 y.o. male has significant medical problems as mentioned below.     He had shortness of breath and high-resolution CT scan was performed on 6/14/2021.  He has small noncalcified lung nodules measuring up to 7 mm.  Repeat CT scan of the chest on 4/10/2023 reported stable pulmonary nodule and presence of emphysema.  CT scan of the chest in 12 months was recommended which was performed on 4/2/2024 and reported 1.8 cm and 1.1 cm right middle lobe solid nodules.  PET/CT on 4/23/2024 reported mildly hypermetabolic right middle lobe 1.9 cm and 1.1 cm solid nodule. The findings were concerning for malignant disease.  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  He had ION robotic navigational bronchoscopy of the right middle lobe nodule.  One of the nodules had fragment of squamous cell carcinoma and the other nodule had highly dysplastic squamous epithelium suggesting at least squamous cell carcinoma in situ.  Level 11 L, 7, 4R, 11 R were negative for metastatic disease.  MRI of the brain was negative for metastatic disease.  He was referred to thoracic surgery for further evaluation.    He can occasionally walk 1 block without dyspnea. He denies any history of myocardial infarction, cerebrovascular accident, hepatic or renal issues. He had pneumonia in 2017 resulting in a 17-day intensive care unit stay. His respiratory function has not returned to its previous state. A recent pulmonary function test was conducted on 05/10/2024. He is under the care of a cardiologist and is currently on anticoagulant therapy. He reports occasional pedal edema and has been informed that his arteries are harder on one side.      Past Medical History:   Diagnosis Date    Anxiety      Arthritis     Atrial fibrillation     CURRENTLY    Bunion of right foot     Colon polyps     COPD (chronic obstructive pulmonary disease)     MILD. NO MEDS FOR    Depression     History of atrial fibrillation     WITH CARDIO VERSION. NO PROBLEMS    History of skin cancer     BCC REMOVED FROM BACK    [place] (hard of hearing)     Hyperlipidemia     Inguinal hernia, recurrent     RIGHT    Left foot pain     Lung nodules     Rash     IN GROIN TREATING FOR YEAST INFECTION BY PCP    Redness of skin     LOWER LEGS BILATERAL    Scab     BILATERAL LEGS HEALING    Sleep apnea with use of continuous positive airway pressure (CPAP)     CPAP    Streptococcus pneumoniae     ABOUT 6-7 YR AGO.     Type 2 diabetes mellitus        Past Surgical History:   Procedure Laterality Date    BASAL CELL CARCINOMA EXCISION      BRONCHOSCOPY WITH ION ROBOTIC ASSIST N/A 5/6/2024    Procedure: BRONCHOSCOPY WITH ION ROBOT WITH FNA, BIOPSIES, BAL AND ENDOBRONCHIAL ULTRASOUND WITH FNA;  Surgeon: Yony Coles MD;  Location: [facility] ENDOSCOPY;  Service: Robotics - Pulmonary;  Laterality: N/A;  PRE: PULMONARY NODULES  POST: SAME    CARDIAC CATHETERIZATION N/A 11/15/2021    Procedure: Coronary angiography;  Surgeon: Alfredo Jennings MD;  Location: [facility] CATH INVASIVE LOCATION;  Service: Cardiovascular;  Laterality: N/A;    CARDIAC CATHETERIZATION N/A 11/15/2021    Procedure: Left heart cath;  Surgeon: Alfredo Jennings MD;  [facility];  Service: Cardiovascular;  Laterality: N/A;    CARDIAC CATHETERIZATION N/A 11/15/2021    Procedure: Left ventriculography;  Surgeon: Alfredo Jennings MD;  [facility];  Service: Cardiovascular;  Laterality: N/A;    CARDIAC CATHETERIZATION N/A 11/15/2021    Procedure: Aortic root aortogram;  Surgeon: Alfredo Jennings MD;  Location: [facility] CATH INVASIVE LOCATION;  Service: Cardiovascular;  Laterality: N/A;    CARDIOVERSION      CHOLECYSTECTOMY N/A  10/07/2017    Procedure: CHOLECYSTECTOMY LAPAROSCOPIC;  Surgeon: Martir Trevino MD;  Location: [facility] MAIN OR;  Service:     COLONOSCOPY  2007    COLONOSCOPY N/A 8/30/2022    Procedure: COLONOSCOPY TO CECUM AND TI AND COLD SNARE POLYPECTOMY;  Surgeon: Cj Lopez MD;  Location: [facility] ENDOSCOPY;  Service: Gastroenterology;  Laterality: N/A;  Pre: History of colon polyps  Post: POLYP, PREVIOUS TATTOO    FOOT SURGERY Left     TOES ARE PINNED. ALL BUT GREAT TOE    HAND SURGERY      THUMB    HERNIA REPAIR      INGUINAL HERNIA REPAIR Right 06/27/2018    Procedure: INGUINAL HERNIA REPAIR, OPEN, RECURRENT;  Surgeon: Martir Trevino MD;  Location: [facility] OR OSC;  Service: General    LASIK Bilateral     NECK SURGERY      growth on salivary gland    REPLACEMENT TOTAL KNEE Right 2007    (he is going to have a LTKR next month)    REPLACEMENT TOTAL KNEE Left        Family History   Problem Relation Age of Onset    Cancer Other     Stroke Mother     Alcohol abuse Father     Malig Hyperthermia Neg Hx        Social History     Socioeconomic History    Marital status:     Number of children: 2   Tobacco Use    Smoking status: Former     Current packs/day: 0.00     Average packs/day: 1 pack/day for 30.0 years (30.0 ttl pk-yrs)     Types: Cigars, Cigarettes     Start date: 1/1/1984     Quit date: 1/1/2014     Years since quitting: 10.4    Smokeless tobacco: Never   Vaping Use    Vaping status: Never Used   Substance and Sexual Activity    Alcohol use: Never     Comment: caffeine use- decaf coffee    Drug use: Never    Sexual activity: Defer       No current facility-administered medications for this visit.  No current outpatient medications on file.    Facility-Administered Medications Ordered in Other Visits:     ceFAZolin 3000 mg IVPB in 100 mL NS (MBP), 3,000 mg, Intravenous, Once, David Figueroa MD    fentaNYL citrate (PF) (SUBLIMAZE) injection 50 mcg, 50 mcg, Intravenous, Once PRN, Nery Vazquez MD    lactated  "ringers infusion, 9 mL/hr, Intravenous, Continuous, Nery Vazquez MD, Last Rate: 9 mL/hr at 06/12/24 0925, 9 mL/hr at 06/12/24 0925    lactated ringers infusion, 9 mL/hr, Intravenous, Continuous, Nery Vazquez MD, Last Rate: 9 mL/hr at 06/12/24 1049, 9 mL/hr at 06/12/24 1049    lidocaine (XYLOCAINE) 1 % injection 0.5 mL, 0.5 mL, Intradermal, Once PRN, Nery Vazquez MD    midazolam (VERSED) injection 0.5 mg, 0.5 mg, Intravenous, Q5 Min PRN, Nery Vazquez MD    sodium chloride 0.9 % flush 10 mL, 10 mL, Intravenous, Q12H, David Figueroa MD    sodium chloride 0.9 % flush 10 mL, 10 mL, Intravenous, PRN, David Figueroa MD    sodium chloride 0.9 % flush 3 mL, 3 mL, Intravenous, Q12H, Nery Vazquez MD    sodium chloride 0.9 % flush 3-10 mL, 3-10 mL, Intravenous, PRN, Nery Vazquez MD    sodium chloride 0.9 % infusion 40 mL, 40 mL, Intravenous, Henry SALAZAR Nabeel, MD     No Known Allergies          Objective    OBJECTIVE:     VITAL SIGNS:  /70 (BP Location: Left arm, Patient Position: Sitting, Cuff Size: Adult)   Pulse 68   Ht 177.8 cm (70\")   Wt 126 kg (277 lb 12.8 oz)   SpO2 97%   BMI 39.86 kg/m²     PHYSICAL EXAM:  Normal appearance.   Head is normocephalic.   Nose appears normal.   No obvious deformity of the mouth and throat.  Conjunctivae normal.   Heart rate and rhythm is normal.  Pulmonary effort is normal.   Moving all 4 extremities.  Extremities warm.  No focal deficit present.   He is alert and oriented to person, place, and time.     LAB RESULTS:  I have reviewed all the available laboratory results in the chart.    RESULTS REVIEW:  I have reviewed the patient's all relevant laboratory and imaging findings.           ASSESSMENT & PLAN:  Branden Diop is a 73 y.o. male with significant medical conditions as mentioned above.    Diagnosis: Right middle lobe squamous cell carcinoma  Staging: Undetermined    His echocardiogram and lung tests yielded normal " results. However, there are two distinct spots on his right lung, both in the middle lobe. These spots were biopsied and found to be cancerous. The potential risks and benefits of the surgery were thoroughly discussed with him. The final pathology results will guide the decision on whether further treatment is required.    I discussed the patients findings and my recommendations with the patient. The patient was given adequate time to ask questions and all questions were answered to patient satisfaction.      Jenn Crisostomo  Thoracic Surgeon  [facility] [place]        Dictated utilizing Dragon dictation    I spent 40 minutes caring for Branden on this date of service. This time includes time spent by me in the following activities: preparing for the visit, reviewing tests, obtaining and/or reviewing a separately obtained history, performing a medically appropriate examination and/or evaluation , counseling and educating the patient/family/caregiver, ordering medications, tests, or procedures, referring and communicating with other health care professionals , documenting information in the medical record, independently interpreting results and communicating that information with the patient/family/caregiver, and care coordination and more than half the time was spent in direct face to face evaluation and decision making.      Transcribed from ambient dictation for David Figueroa MD by Sandra Vasquez.  05/30/24   12:05 EDT    Patient or patient representative verbalized consent to the visit recording.  I have personally performed the services described in this document as transcribed by the above individual, and it is both accurate and complete.

## 2024-05-30 NOTE — PROGRESS NOTES
Reviewed chart.  PFTs 5/24, MRI brain 5/26, echo 5/28.    I accompanied patient to Dr. Figueroa's follow up appointment today.  Dr. Figueroa reviewed the patient's recent test results and discussed treatment options including RML lobectomy.  The patient was instructed that he needed to be off his Xarelto for at least 72 hours prior to surgery.  The patient/wife will consult the patient's cardiologist regarding this and contact Dr. Figueroa with any questions/concerns.  Dr. Figueroa answered all questions. The patient agreed to proceed with surgery; Dr. Figueroa's office will call to schedule surgery.    We discussed what to expect post surgery.  The patient has a good understanding of his treatment plan and was able to teach back his plan of care.    I will attempt to attend the patient's post-op appt in dr. Figueroa's office or call within a few days of this appt.  Encouraged patient/wife to call as needed.

## 2024-05-30 NOTE — PROGRESS NOTES
THORACIC SURGERY CLINIC FOLLOW UP NOTE    REASON FOR CONSULT: Right middle lobe squamous cell carcinoma    REFERRING PROVIDER: Tino Lopez MD     Subjective   HISTORY OF PRESENTING ILLNESS:   Branden Diop is a 73 y.o. male has significant medical problems as mentioned below.     He had shortness of breath and high-resolution CT scan was performed on 6/14/2021.  He has small noncalcified lung nodules measuring up to 7 mm.  Repeat CT scan of the chest on 4/10/2023 reported stable pulmonary nodule and presence of emphysema.  CT scan of the chest in 12 months was recommended which was performed on 4/2/2024 and reported 1.8 cm and 1.1 cm right middle lobe solid nodules.  PET/CT on 4/23/2024 reported mildly hypermetabolic right middle lobe 1.9 cm and 1.1 cm solid nodule. The findings were concerning for malignant disease.  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  He had ION robotic navigational bronchoscopy of the right middle lobe nodule.  One of the nodules had fragment of squamous cell carcinoma and the other nodule had highly dysplastic squamous epithelium suggesting at least squamous cell carcinoma in situ.  Level 11 L, 7, 4R, 11 R were negative for metastatic disease.  MRI of the brain was negative for metastatic disease.  He was referred to thoracic surgery for further evaluation.    He can occasionally walk 1 block without dyspnea. He denies any history of myocardial infarction, cerebrovascular accident, hepatic or renal issues. He had pneumonia in 2017 resulting in a 17-day intensive care unit stay. His respiratory function has not returned to its previous state. A recent pulmonary function test was conducted on 05/10/2024. He is under the care of a cardiologist and is currently on anticoagulant therapy. He reports occasional pedal edema and has been informed that his arteries are harder on one side.      Past Medical History:   Diagnosis Date    Anxiety      Arthritis     Atrial fibrillation     CURRENTLY    Bunion of right foot     Colon polyps     COPD (chronic obstructive pulmonary disease)     MILD. NO MEDS FOR    Depression     History of atrial fibrillation     WITH CARDIO VERSION. NO PROBLEMS    History of skin cancer     BCC REMOVED FROM BACK    [place] (hard of hearing)     Hyperlipidemia     Inguinal hernia, recurrent     RIGHT    Left foot pain     Lung nodules     Rash     IN GROIN TREATING FOR YEAST INFECTION BY PCP    Redness of skin     LOWER LEGS BILATERAL    Scab     BILATERAL LEGS HEALING    Sleep apnea with use of continuous positive airway pressure (CPAP)     CPAP    Streptococcus pneumoniae     ABOUT 6-7 YR AGO.     Type 2 diabetes mellitus        Past Surgical History:   Procedure Laterality Date    BASAL CELL CARCINOMA EXCISION      BRONCHOSCOPY WITH ION ROBOTIC ASSIST N/A 5/6/2024    Procedure: BRONCHOSCOPY WITH ION ROBOT WITH FNA, BIOPSIES, BAL AND ENDOBRONCHIAL ULTRASOUND WITH FNA;  Surgeon: Yony Coles MD;  Location: [facility] ENDOSCOPY;  Service: Robotics - Pulmonary;  Laterality: N/A;  PRE: PULMONARY NODULES  POST: SAME    CARDIAC CATHETERIZATION N/A 11/15/2021    Procedure: Coronary angiography;  Surgeon: Alfredo Jennings MD;  Location: [facility] CATH INVASIVE LOCATION;  Service: Cardiovascular;  Laterality: N/A;    CARDIAC CATHETERIZATION N/A 11/15/2021    Procedure: Left heart cath;  Surgeon: Alfredo Jennings MD;  [facility];  Service: Cardiovascular;  Laterality: N/A;    CARDIAC CATHETERIZATION N/A 11/15/2021    Procedure: Left ventriculography;  Surgeon: Alfredo Jennings MD;  [facility];  Service: Cardiovascular;  Laterality: N/A;    CARDIAC CATHETERIZATION N/A 11/15/2021    Procedure: Aortic root aortogram;  Surgeon: Alfredo Jennings MD;  Location: [facility] CATH INVASIVE LOCATION;  Service: Cardiovascular;  Laterality: N/A;    CARDIOVERSION      CHOLECYSTECTOMY N/A  10/07/2017    Procedure: CHOLECYSTECTOMY LAPAROSCOPIC;  Surgeon: Martir Trevino MD;  Location: [facility] MAIN OR;  Service:     COLONOSCOPY  2007    COLONOSCOPY N/A 8/30/2022    Procedure: COLONOSCOPY TO CECUM AND TI AND COLD SNARE POLYPECTOMY;  Surgeon: Cj Lopez MD;  Location: [facility] ENDOSCOPY;  Service: Gastroenterology;  Laterality: N/A;  Pre: History of colon polyps  Post: POLYP, PREVIOUS TATTOO    FOOT SURGERY Left     TOES ARE PINNED. ALL BUT GREAT TOE    HAND SURGERY      THUMB    HERNIA REPAIR      INGUINAL HERNIA REPAIR Right 06/27/2018    Procedure: INGUINAL HERNIA REPAIR, OPEN, RECURRENT;  Surgeon: Martir Trevino MD;  Location: [facility] OR OSC;  Service: General    LASIK Bilateral     NECK SURGERY      growth on salivary gland    REPLACEMENT TOTAL KNEE Right 2007    (he is going to have a LTKR next month)    REPLACEMENT TOTAL KNEE Left        Family History   Problem Relation Age of Onset    Cancer Other     Stroke Mother     Alcohol abuse Father     Malig Hyperthermia Neg Hx        Social History     Socioeconomic History    Marital status:     Number of children: 2   Tobacco Use    Smoking status: Former     Current packs/day: 0.00     Average packs/day: 1 pack/day for 30.0 years (30.0 ttl pk-yrs)     Types: Cigars, Cigarettes     Start date: 1/1/1984     Quit date: 1/1/2014     Years since quitting: 10.4    Smokeless tobacco: Never   Vaping Use    Vaping status: Never Used   Substance and Sexual Activity    Alcohol use: Never     Comment: caffeine use- decaf coffee    Drug use: Never    Sexual activity: Defer       No current facility-administered medications for this visit.  No current outpatient medications on file.    Facility-Administered Medications Ordered in Other Visits:     ceFAZolin 3000 mg IVPB in 100 mL NS (MBP), 3,000 mg, Intravenous, Once, David Figueroa MD    fentaNYL citrate (PF) (SUBLIMAZE) injection 50 mcg, 50 mcg, Intravenous, Once PRN, Nery Vazquez MD    lactated  "ringers infusion, 9 mL/hr, Intravenous, Continuous, Nery Vazquez MD, Last Rate: 9 mL/hr at 06/12/24 0925, 9 mL/hr at 06/12/24 0925    lactated ringers infusion, 9 mL/hr, Intravenous, Continuous, Nery Vazquez MD, Last Rate: 9 mL/hr at 06/12/24 1049, 9 mL/hr at 06/12/24 1049    lidocaine (XYLOCAINE) 1 % injection 0.5 mL, 0.5 mL, Intradermal, Once PRN, Nery Vazquez MD    midazolam (VERSED) injection 0.5 mg, 0.5 mg, Intravenous, Q5 Min PRN, Nery Vazquez MD    sodium chloride 0.9 % flush 10 mL, 10 mL, Intravenous, Q12H, David Figueroa MD    sodium chloride 0.9 % flush 10 mL, 10 mL, Intravenous, PRN, David Figueroa MD    sodium chloride 0.9 % flush 3 mL, 3 mL, Intravenous, Q12H, Nery Vazquez MD    sodium chloride 0.9 % flush 3-10 mL, 3-10 mL, Intravenous, PRN, Nery Vazquez MD    sodium chloride 0.9 % infusion 40 mL, 40 mL, Intravenous, Henry SALAZAR Nabeel, MD     No Known Allergies          Objective    OBJECTIVE:     VITAL SIGNS:  /70 (BP Location: Left arm, Patient Position: Sitting, Cuff Size: Adult)   Pulse 68   Ht 177.8 cm (70\")   Wt 126 kg (277 lb 12.8 oz)   SpO2 97%   BMI 39.86 kg/m²     PHYSICAL EXAM:  Normal appearance.   Head is normocephalic.   Nose appears normal.   No obvious deformity of the mouth and throat.  Conjunctivae normal.   Heart rate and rhythm is normal.  Pulmonary effort is normal.   Moving all 4 extremities.  Extremities warm.  No focal deficit present.   He is alert and oriented to person, place, and time.     LAB RESULTS:  I have reviewed all the available laboratory results in the chart.    RESULTS REVIEW:  I have reviewed the patient's all relevant laboratory and imaging findings.           ASSESSMENT & PLAN:  Branden Diop is a 73 y.o. male with significant medical conditions as mentioned above.    Diagnosis: Right middle lobe squamous cell carcinoma  Staging: Undetermined    His echocardiogram and lung tests yielded normal " results. However, there are two distinct spots on his right lung, both in the middle lobe. These spots were biopsied and found to be cancerous. The potential risks and benefits of the surgery were thoroughly discussed with him. The final pathology results will guide the decision on whether further treatment is required.    I discussed the patients findings and my recommendations with the patient. The patient was given adequate time to ask questions and all questions were answered to patient satisfaction.      Jenn Crisostomo  Thoracic Surgeon  [facility] [place]        Dictated utilizing Dragon dictation    I spent 40 minutes caring for Branden on this date of service. This time includes time spent by me in the following activities: preparing for the visit, reviewing tests, obtaining and/or reviewing a separately obtained history, performing a medically appropriate examination and/or evaluation , counseling and educating the patient/family/caregiver, ordering medications, tests, or procedures, referring and communicating with other health care professionals , documenting information in the medical record, independently interpreting results and communicating that information with the patient/family/caregiver, and care coordination and more than half the time was spent in direct face to face evaluation and decision making.      Transcribed from ambient dictation for David Figueroa MD by Sandra Vasquez.  05/30/24   12:05 EDT    Patient or patient representative verbalized consent to the visit recording.  I have personally performed the services described in this document as transcribed by the above individual, and it is both accurate and complete.

## 2024-05-31 PROBLEM — C34.2 MALIGNANT NEOPLASM OF MIDDLE LOBE OF RIGHT LUNG: Status: ACTIVE | Noted: 2024-05-30

## 2024-06-03 LAB
FUNGUS WND CULT: NORMAL
FUNGUS WND CULT: NORMAL

## 2024-06-05 ENCOUNTER — TELEPHONE (OUTPATIENT)
Dept: SURGERY | Facility: CLINIC | Age: 74
End: 2024-06-05
Payer: MEDICARE

## 2024-06-05 ENCOUNTER — PRE-ADMISSION TESTING (OUTPATIENT)
Dept: PREADMISSION TESTING | Facility: HOSPITAL | Age: 74
End: 2024-06-05
Payer: MEDICARE

## 2024-06-05 VITALS
HEIGHT: 70 IN | DIASTOLIC BLOOD PRESSURE: 73 MMHG | BODY MASS INDEX: 39.78 KG/M2 | WEIGHT: 277.9 LBS | RESPIRATION RATE: 20 BRPM | TEMPERATURE: 97.8 F | SYSTOLIC BLOOD PRESSURE: 128 MMHG | HEART RATE: 61 BPM | OXYGEN SATURATION: 94 %

## 2024-06-05 DIAGNOSIS — R94.5 ABNORMAL RESULTS OF LIVER FUNCTION STUDIES: ICD-10-CM

## 2024-06-05 DIAGNOSIS — C34.2 MALIGNANT NEOPLASM OF MIDDLE LOBE OF RIGHT LUNG: ICD-10-CM

## 2024-06-05 LAB
ABO GROUP BLD: NORMAL
ALBUMIN SERPL-MCNC: 4.1 G/DL (ref 3.5–5.2)
ALBUMIN/GLOB SERPL: 1.3 G/DL
ALP SERPL-CCNC: 110 U/L (ref 39–117)
ALT SERPL W P-5'-P-CCNC: 20 U/L (ref 1–41)
ANION GAP SERPL CALCULATED.3IONS-SCNC: 11.8 MMOL/L (ref 5–15)
AST SERPL-CCNC: 27 U/L (ref 1–40)
BILIRUB SERPL-MCNC: 1.4 MG/DL (ref 0–1.2)
BLD GP AB SCN SERPL QL: NEGATIVE
BUN SERPL-MCNC: 14 MG/DL (ref 8–23)
BUN/CREAT SERPL: 17.1 (ref 7–25)
CALCIUM SPEC-SCNC: 9.2 MG/DL (ref 8.6–10.5)
CHLORIDE SERPL-SCNC: 101 MMOL/L (ref 98–107)
CO2 SERPL-SCNC: 28.2 MMOL/L (ref 22–29)
CREAT SERPL-MCNC: 0.82 MG/DL (ref 0.76–1.27)
DEPRECATED RDW RBC AUTO: 47.3 FL (ref 37–54)
EGFRCR SERPLBLD CKD-EPI 2021: 91.6 ML/MIN/1.73
ERYTHROCYTE [DISTWIDTH] IN BLOOD BY AUTOMATED COUNT: 14.4 % (ref 12.3–15.4)
GLOBULIN UR ELPH-MCNC: 3.2 GM/DL
GLUCOSE SERPL-MCNC: 155 MG/DL (ref 65–99)
HCT VFR BLD AUTO: 42.5 % (ref 37.5–51)
HGB BLD-MCNC: 14 G/DL (ref 13–17.7)
INR PPP: 2.31 (ref 0.9–1.1)
MCH RBC QN AUTO: 30 PG (ref 26.6–33)
MCHC RBC AUTO-ENTMCNC: 32.9 G/DL (ref 31.5–35.7)
MCV RBC AUTO: 91.2 FL (ref 79–97)
PLATELET # BLD AUTO: 233 10*3/MM3 (ref 140–450)
PMV BLD AUTO: 9.3 FL (ref 6–12)
POTASSIUM SERPL-SCNC: 4.2 MMOL/L (ref 3.5–5.2)
PROT SERPL-MCNC: 7.3 G/DL (ref 6–8.5)
PROTHROMBIN TIME: 25.6 SECONDS (ref 11.7–14.2)
QT INTERVAL: 424 MS
QTC INTERVAL: 413 MS
RBC # BLD AUTO: 4.66 10*6/MM3 (ref 4.14–5.8)
RH BLD: NEGATIVE
SODIUM SERPL-SCNC: 141 MMOL/L (ref 136–145)
T&S EXPIRATION DATE: NORMAL
WBC NRBC COR # BLD AUTO: 6.52 10*3/MM3 (ref 3.4–10.8)

## 2024-06-05 PROCEDURE — 86920 COMPATIBILITY TEST SPIN: CPT

## 2024-06-05 PROCEDURE — 85027 COMPLETE CBC AUTOMATED: CPT

## 2024-06-05 PROCEDURE — 93005 ELECTROCARDIOGRAM TRACING: CPT

## 2024-06-05 PROCEDURE — 86901 BLOOD TYPING SEROLOGIC RH(D): CPT

## 2024-06-05 PROCEDURE — 86900 BLOOD TYPING SEROLOGIC ABO: CPT

## 2024-06-05 PROCEDURE — 86850 RBC ANTIBODY SCREEN: CPT

## 2024-06-05 PROCEDURE — 85610 PROTHROMBIN TIME: CPT

## 2024-06-05 PROCEDURE — 80053 COMPREHEN METABOLIC PANEL: CPT

## 2024-06-05 PROCEDURE — 93010 ELECTROCARDIOGRAM REPORT: CPT | Performed by: INTERNAL MEDICINE

## 2024-06-05 PROCEDURE — 36415 COLL VENOUS BLD VENIPUNCTURE: CPT

## 2024-06-05 RX ORDER — FEXOFENADINE HCL 180 MG/1
180 TABLET ORAL DAILY
COMMUNITY

## 2024-06-05 NOTE — TELEPHONE ENCOUNTER
I responded to patient concern by giving her a call and notifying her that it is okay for her to take the antibiotics previously prescribed to pt by the dentist. Pt was agreeable and understood.    DB 12:33  6/5/2024    Caller: DiopLuba ochoa    Relationship: Emergency Contact    Best call back number: [phone]    What is the best time to reach you: ANY    Who are you requesting to speak with (clinical staff, provider,  specific staff member): CLINICAL    Do you know the name of the person who called: PTS WIFE    What was the call regarding: PTS WIFE CALLED AND STATES THAT THE PT HAS A SPOT ON HIS GUMS THAT MIGHT BE INFECTION AND THEY ARE WANTING TO KNOW IF WE CAN PRESCRIBE HIM SOMETHING FOR IT AS HE IS SCHEDULED TO HAVE SURGERY 6.12.24. WIFE STATES HE DOES HAVE AN ANTIBIOTIC AT HOME LEFT OVER FROM ANOTHER PRESCRIPTION. PLEASE GIVE THEM A CALL AND DISCUSS THIS FURTHER. THANK YOU    Is it okay if the provider responds through MyChart: NO

## 2024-06-05 NOTE — TELEPHONE ENCOUNTER
Caller: Luba Diop    Relationship: Emergency Contact    Best call back number: [phone]    What is the best time to reach you: ANY    Who are you requesting to speak with (clinical staff, provider,  specific staff member): CLINICAL    Do you know the name of the person who called: PTS WIFE    What was the call regarding: PTS WIFE CALLED AND STATES THAT THE PT HAS A SPOT ON HIS GUMS THAT MIGHT BE INFECTION AND THEY ARE WANTING TO KNOW IF WE CAN PRESCRIBE HIM SOMETHING FOR IT AS HE IS SCHEDULED TO HAVE SURGERY 6.12.24. WIFE STATES HE DOES HAVE AN ANTIBIOTIC AT HOME LEFT OVER FROM ANOTHER PRESCRIPTION. PLEASE GIVE THEM A CALL AND DISCUSS THIS FURTHER. THANK YOU    Is it okay if the provider responds through [email or web address]: NO

## 2024-06-05 NOTE — DISCHARGE INSTRUCTIONS
Take the following medications the morning of surgery:      NONE    If you are on prescription narcotic pain medication to control your pain you may also take that medication the morning of surgery.    General Instructions:  Do not eat solid food after midnight the night before surgery.  You may drink clear liquids day of surgery but must stop at least one hour before your hospital arrival time.  It is beneficial for you to have a clear drink that contains carbohydrates the day of surgery.  We suggest a 12 to 20 ounce bottle of Gatorade or Powerade for non-diabetic patients or a 12 to 20 ounce bottle of G2 or Powerade Zero for diabetic patients. (Pediatric patients, are not advised to drink a 12 to 20 ounce carbohydrate drink)    Clear liquids are liquids you can see through.  Nothing red in color.     Plain water                               Sports drinks  Sodas                                   Gelatin (Jell-O)  Fruit juices without pulp such as white grape juice and apple juice  Popsicles that contain no fruit or yogurt  Tea or coffee (no cream or milk added)  Gatorade / Powerade  G2 / Powerade Zero    Infants may have breast milk up to four hours before surgery.  Infants drinking formula may drink formula up to six hours before surgery.   Patients who avoid smoking, chewing tobacco and alcohol for 4 weeks prior to surgery have a reduced risk of post-operative complications.  Quit smoking as many days before surgery as you can.  Do not smoke, use chewing tobacco or drink alcohol the day of surgery.   If applicable bring your C-PAP/ BI-PAP machine in with you to preop day of surgery.  Bring any papers given to you in the doctor’s office.  Wear clean comfortable clothes.  Do not wear contact lenses, false eyelashes or make-up.  Bring a case for your glasses.   Bring crutches or walker if applicable.  Remove all piercings.  Leave jewelry and any other valuables at home.  Hair extensions with metal clips must be  removed prior to surgery.  The Pre-Admission Testing nurse will instruct you to bring medications if unable to obtain an accurate list in Pre-Admission Testing.        If you were given a blood bank ID arm band remember to bring it with you the day of surgery.    Preventing a Surgical Site Infection:  For 2 to 3 days before surgery, avoid shaving with a razor because the razor can irritate skin and make it easier to develop an infection.    Any areas of open skin can increase the risk of a post-operative wound infection by allowing bacteria to enter and travel throughout the body.  Notify your surgeon if you have any skin wounds / rashes even if it is not near the expected surgical site.  The area will need assessed to determine if surgery should be delayed until it is healed.  The night prior to surgery shower using a fresh bar of anti-bacterial soap (such as Dial) and clean washcloth.  Sleep in a clean bed with clean clothing.  Do not allow pets to sleep with you.  Shower on the morning of surgery using a fresh bar of anti-bacterial soap (such as Dial) and clean washcloth.  Dry with a clean towel and dress in clean clothing.  Ask your surgeon if you will be receiving antibiotics prior to surgery.  Make sure you, your family, and all healthcare providers clean their hands with soap and water or an alcohol based hand  before caring for you or your wound.    Day of surgery:  Your arrival time is approximately two hours before your scheduled surgery time.  Upon arrival, a Pre-op nurse and Anesthesiologist will review your health history, obtain vital signs, and answer questions you may have.  The only belongings needed at this time will be a list of your home medications and if applicable your C-PAP/BI-PAP machine.  A Pre-op nurse will start an IV and you may receive medication in preparation for surgery, including something to help you relax.     Please be aware that surgery does come with discomfort.  We  want to make every effort to control your discomfort so please discuss any uncontrolled symptoms with your nurse.   Your doctor will most likely have prescribed pain medications.      If you are going home after surgery you will receive individualized written care instructions before being discharged.  A responsible adult must drive you to and from the hospital on the day of your surgery and ideally stay with you through the night.   .  Discharge prescriptions can be filled by the hospital pharmacy during regular pharmacy hours.  If you are having surgery late in the day/evening your prescription may be e-prescribed to your pharmacy.  Please verify your pharmacy hours or chose a 24 hour pharmacy to avoid not having access to your prescription because your pharmacy has closed for the day.    If you are staying overnight following surgery, you will be transported to your hospital room following the recovery period.  [facility] has all private rooms.    If you have any questions please call Pre-Admission Testing at [phone].  Deductibles and co-payments are collected on the day of service. Please be prepared to pay the required co-pay, deductible or deposit on the day of service as defined by your plan.    Call your surgeon immediately if you experience any of the following symptoms:  Sore Throat  Shortness of Breath or difficulty breathing  Cough  Chills  Body soreness or muscle pain  Headache  Fever  New loss of taste or smell  Do not arrive for your surgery ill.  Your procedure will need to be rescheduled to another time.  You will need to call your physician before the day of surgery to avoid any unnecessary exposure to hospital staff as well as other patients.      CHLORHEXIDINE CLOTH INSTRUCTIONS  The morning of surgery follow these instructions using the Chlorhexidine cloths you've been given.  These steps reduce bacteria on the body.  Do not use the cloths near your eyes, ears mouth,  genitalia or on open wounds.  Throw the cloths away after use but do not try to flush them down a toilet.      Open and remove one cloth at a time from the package.    Leave the cloth unfolded and begin the bathing.  Massage the skin with the cloths using gentle pressure to remove bacteria.  Do not scrub harshly.   Follow the steps below with one 2% CHG cloth per area (6 total cloths).  One cloth for neck, shoulders and chest.  One cloth for both arms, hands, fingers and underarms (do underarms last).  One cloth for the abdomen followed by groin.  One cloth for right leg and foot including between the toes.  One cloth for left leg and foot including between the toes.  The last cloth is to be used for the back of the neck, back and buttocks.    Allow the CHG to air dry 3 minutes on the skin which will give it time to work and decrease the chance of irritation.  The skin may feel sticky until it is dry.  Do not rinse with water or any other liquid or you will lose the beneficial effects of the CHG.  If mild skin irritation occurs, do rinse the skin to remove the CHG.  Report this to the nurse at time of admission.  Do not apply lotions, creams, ointments, deodorants or perfumes after using the clothes. Dress in clean clothes before coming to the hospital.

## 2024-06-10 LAB — FUNGUS WND CULT: ABNORMAL

## 2024-06-11 ENCOUNTER — TELEPHONE (OUTPATIENT)
Dept: SURGERY | Facility: CLINIC | Age: 74
End: 2024-06-11
Payer: MEDICARE

## 2024-06-11 NOTE — TELEPHONE ENCOUNTER
АННА FROM [facility] CALLED AND CHANGED THE ARRIVAL TIME TO 9:30 FOR SURGERY FOR TOMORROW, 6/12/24.  I CALLED THE PATIENT AND GAVE THEM THE NEW ARRIVAL TIME.

## 2024-06-12 ENCOUNTER — ANESTHESIA (OUTPATIENT)
Dept: PERIOP | Facility: HOSPITAL | Age: 74
End: 2024-06-12
Payer: MEDICARE

## 2024-06-12 ENCOUNTER — ANESTHESIA EVENT (OUTPATIENT)
Dept: PERIOP | Facility: HOSPITAL | Age: 74
End: 2024-06-12
Payer: MEDICARE

## 2024-06-12 ENCOUNTER — APPOINTMENT (OUTPATIENT)
Dept: GENERAL RADIOLOGY | Facility: HOSPITAL | Age: 74
DRG: 165 | End: 2024-06-12
Payer: MEDICARE

## 2024-06-12 ENCOUNTER — HOSPITAL ENCOUNTER (INPATIENT)
Facility: HOSPITAL | Age: 74
LOS: 4 days | Discharge: HOME | DRG: 165 | End: 2024-06-16
Attending: SURGERY | Admitting: SURGERY
Payer: MEDICARE

## 2024-06-12 DIAGNOSIS — C34.2 MALIGNANT NEOPLASM OF MIDDLE LOBE OF RIGHT LUNG: ICD-10-CM

## 2024-06-12 DIAGNOSIS — C34.2 SQUAMOUS CELL CARCINOMA OF BRONCHUS IN RIGHT MIDDLE LOBE: Primary | ICD-10-CM

## 2024-06-12 LAB
ANION GAP SERPL CALCULATED.3IONS-SCNC: 12 MMOL/L (ref 5–15)
BH BB BLOOD EXPIRATION DATE: NORMAL
BH BB BLOOD EXPIRATION DATE: NORMAL
BH BB BLOOD TYPE BARCODE: 5100
BH BB BLOOD TYPE BARCODE: 5100
BH BB DISPENSE STATUS: NORMAL
BH BB DISPENSE STATUS: NORMAL
BH BB PRODUCT CODE: NORMAL
BH BB PRODUCT CODE: NORMAL
BH BB UNIT NUMBER: NORMAL
BH BB UNIT NUMBER: NORMAL
BUN SERPL-MCNC: 15 MG/DL (ref 8–23)
BUN/CREAT SERPL: 19.2 (ref 7–25)
CALCIUM SPEC-SCNC: 8.5 MG/DL (ref 8.6–10.5)
CHLORIDE SERPL-SCNC: 105 MMOL/L (ref 98–107)
CO2 SERPL-SCNC: 23 MMOL/L (ref 22–29)
CREAT SERPL-MCNC: 0.78 MG/DL (ref 0.76–1.27)
CROSSMATCH INTERPRETATION: NORMAL
CROSSMATCH INTERPRETATION: NORMAL
DEPRECATED RDW RBC AUTO: 49.4 FL (ref 37–54)
EGFRCR SERPLBLD CKD-EPI 2021: 93 ML/MIN/1.73
ERYTHROCYTE [DISTWIDTH] IN BLOOD BY AUTOMATED COUNT: 14.7 % (ref 12.3–15.4)
GLUCOSE BLDC GLUCOMTR-MCNC: 172 MG/DL (ref 70–130)
GLUCOSE BLDC GLUCOMTR-MCNC: 223 MG/DL (ref 70–130)
GLUCOSE BLDC GLUCOMTR-MCNC: 271 MG/DL (ref 70–130)
GLUCOSE SERPL-MCNC: 227 MG/DL (ref 65–99)
HCT VFR BLD AUTO: 43.3 % (ref 37.5–51)
HGB BLD-MCNC: 14.3 G/DL (ref 13–17.7)
MAGNESIUM SERPL-MCNC: 2 MG/DL (ref 1.6–2.4)
MCH RBC QN AUTO: 30.6 PG (ref 26.6–33)
MCHC RBC AUTO-ENTMCNC: 33 G/DL (ref 31.5–35.7)
MCV RBC AUTO: 92.7 FL (ref 79–97)
PLATELET # BLD AUTO: 257 10*3/MM3 (ref 140–450)
PMV BLD AUTO: 8.6 FL (ref 6–12)
POTASSIUM SERPL-SCNC: 3.9 MMOL/L (ref 3.5–5.2)
RBC # BLD AUTO: 4.67 10*6/MM3 (ref 4.14–5.8)
SODIUM SERPL-SCNC: 140 MMOL/L (ref 136–145)
UNIT  ABO: NORMAL
UNIT  ABO: NORMAL
UNIT  RH: NORMAL
UNIT  RH: NORMAL
WBC NRBC COR # BLD AUTO: 12.34 10*3/MM3 (ref 3.4–10.8)

## 2024-06-12 PROCEDURE — 25810000003 LACTATED RINGERS PER 1000 ML: Performed by: STUDENT IN AN ORGANIZED HEALTH CARE EDUCATION/TRAINING PROGRAM

## 2024-06-12 PROCEDURE — 25010000002 HYDROMORPHONE PER 4 MG: Performed by: NURSE ANESTHETIST, CERTIFIED REGISTERED

## 2024-06-12 PROCEDURE — 25010000002 MIDAZOLAM PER 1 MG: Performed by: STUDENT IN AN ORGANIZED HEALTH CARE EDUCATION/TRAINING PROGRAM

## 2024-06-12 PROCEDURE — 94640 AIRWAY INHALATION TREATMENT: CPT

## 2024-06-12 PROCEDURE — 25010000002 BUPIVACAINE (PF) 0.25 % SOLUTION 10 ML VIAL: Performed by: SURGERY

## 2024-06-12 PROCEDURE — 85027 COMPLETE CBC AUTOMATED: CPT | Performed by: SURGERY

## 2024-06-12 PROCEDURE — 25010000002 BUPIVACAINE 0.25 % SOLUTION: Performed by: SURGERY

## 2024-06-12 PROCEDURE — 94799 UNLISTED PULMONARY SVC/PX: CPT

## 2024-06-12 PROCEDURE — 25010000002 FENTANYL CITRATE (PF) 50 MCG/ML SOLUTION: Performed by: NURSE ANESTHETIST, CERTIFIED REGISTERED

## 2024-06-12 PROCEDURE — 32663 THORACOSCOPY W/LOBECTOMY: CPT | Performed by: SURGERY

## 2024-06-12 PROCEDURE — 25010000002 DEXAMETHASONE SODIUM PHOSPHATE 20 MG/5ML SOLUTION: Performed by: NURSE ANESTHETIST, CERTIFIED REGISTERED

## 2024-06-12 PROCEDURE — 86901 BLOOD TYPING SEROLOGIC RH(D): CPT

## 2024-06-12 PROCEDURE — 88309 TISSUE EXAM BY PATHOLOGIST: CPT | Performed by: SURGERY

## 2024-06-12 PROCEDURE — C1729 CATH, DRAINAGE: HCPCS | Performed by: SURGERY

## 2024-06-12 PROCEDURE — 88305 TISSUE EXAM BY PATHOLOGIST: CPT | Performed by: SURGERY

## 2024-06-12 PROCEDURE — 83735 ASSAY OF MAGNESIUM: CPT | Performed by: SURGERY

## 2024-06-12 PROCEDURE — 25010000002 HYDROMORPHONE PER 4 MG: Performed by: SURGERY

## 2024-06-12 PROCEDURE — 25010000002 MAGNESIUM SULFATE PER 500 MG OF MAGNESIUM: Performed by: NURSE ANESTHETIST, CERTIFIED REGISTERED

## 2024-06-12 PROCEDURE — 25010000002 CEFAZOLIN PER 500 MG: Performed by: NURSE ANESTHETIST, CERTIFIED REGISTERED

## 2024-06-12 PROCEDURE — 80048 BASIC METABOLIC PNL TOTAL CA: CPT | Performed by: SURGERY

## 2024-06-12 PROCEDURE — C9290 INJ, BUPIVACAINE LIPOSOME: HCPCS | Performed by: STUDENT IN AN ORGANIZED HEALTH CARE EDUCATION/TRAINING PROGRAM

## 2024-06-12 PROCEDURE — 25010000002 ALBUMIN HUMAN 5% PER 50 ML: Performed by: NURSE ANESTHETIST, CERTIFIED REGISTERED

## 2024-06-12 PROCEDURE — 25010000002 GLYCOPYRROLATE 0.2 MG/ML SOLUTION: Performed by: NURSE ANESTHETIST, CERTIFIED REGISTERED

## 2024-06-12 PROCEDURE — 25810000003 LACTATED RINGERS PER 1000 ML: Performed by: SURGERY

## 2024-06-12 PROCEDURE — 94664 DEMO&/EVAL PT USE INHALER: CPT

## 2024-06-12 PROCEDURE — 8E0W4CZ ROBOTIC ASSISTED PROCEDURE OF TRUNK REGION, PERCUTANEOUS ENDOSCOPIC APPROACH: ICD-10-PCS | Performed by: SURGERY

## 2024-06-12 PROCEDURE — 25010000002 ONDANSETRON PER 1 MG: Performed by: NURSE ANESTHETIST, CERTIFIED REGISTERED

## 2024-06-12 PROCEDURE — 25810000003 SODIUM CHLORIDE 0.9 % SOLUTION 250 ML FLEX CONT: Performed by: NURSE ANESTHETIST, CERTIFIED REGISTERED

## 2024-06-12 PROCEDURE — 0 BUPIVACAINE LIPOSOME 1.3 % SUSPENSION: Performed by: STUDENT IN AN ORGANIZED HEALTH CARE EDUCATION/TRAINING PROGRAM

## 2024-06-12 PROCEDURE — 88313 SPECIAL STAINS GROUP 2: CPT | Performed by: SURGERY

## 2024-06-12 PROCEDURE — 63710000001 INSULIN LISPRO (HUMAN) PER 5 UNITS: Performed by: SURGERY

## 2024-06-12 PROCEDURE — 82948 REAGENT STRIP/BLOOD GLUCOSE: CPT

## 2024-06-12 PROCEDURE — 25010000002 INDOCYANINE GREEN 25 MG RECONSTITUTED SOLUTION: Performed by: NURSE ANESTHETIST, CERTIFIED REGISTERED

## 2024-06-12 PROCEDURE — 25010000002 CEFAZOLIN 3 G RECONSTITUTED SOLUTION 1 EACH VIAL: Performed by: SURGERY

## 2024-06-12 PROCEDURE — 07B74ZZ EXCISION OF THORAX LYMPHATIC, PERCUTANEOUS ENDOSCOPIC APPROACH: ICD-10-PCS | Performed by: SURGERY

## 2024-06-12 PROCEDURE — 0BJ08ZZ INSPECTION OF TRACHEOBRONCHIAL TREE, VIA NATURAL OR ARTIFICIAL OPENING ENDOSCOPIC: ICD-10-PCS | Performed by: SURGERY

## 2024-06-12 PROCEDURE — 25010000002 BUPIVACAINE (PF) 0.5 % SOLUTION: Performed by: STUDENT IN AN ORGANIZED HEALTH CARE EDUCATION/TRAINING PROGRAM

## 2024-06-12 PROCEDURE — 88341 IMHCHEM/IMCYTCHM EA ADD ANTB: CPT | Performed by: SURGERY

## 2024-06-12 PROCEDURE — 0BTD4ZZ RESECTION OF RIGHT MIDDLE LUNG LOBE, PERCUTANEOUS ENDOSCOPIC APPROACH: ICD-10-PCS | Performed by: SURGERY

## 2024-06-12 PROCEDURE — 25010000002 PHENYLEPHRINE 10 MG/ML SOLUTION 5 ML VIAL: Performed by: NURSE ANESTHETIST, CERTIFIED REGISTERED

## 2024-06-12 PROCEDURE — C9290 INJ, BUPIVACAINE LIPOSOME: HCPCS | Performed by: SURGERY

## 2024-06-12 PROCEDURE — 0 BUPIVACAINE LIPOSOME 1.3 % SUSPENSION 10 ML VIAL: Performed by: SURGERY

## 2024-06-12 PROCEDURE — 25010000002 PROPOFOL 10 MG/ML EMULSION: Performed by: NURSE ANESTHETIST, CERTIFIED REGISTERED

## 2024-06-12 PROCEDURE — 71045 X-RAY EXAM CHEST 1 VIEW: CPT

## 2024-06-12 PROCEDURE — 86900 BLOOD TYPING SEROLOGIC ABO: CPT

## 2024-06-12 PROCEDURE — 25810000003 SODIUM CHLORIDE PER 500 ML: Performed by: SURGERY

## 2024-06-12 PROCEDURE — 88342 IMHCHEM/IMCYTCHM 1ST ANTB: CPT | Performed by: SURGERY

## 2024-06-12 PROCEDURE — P9041 ALBUMIN (HUMAN),5%, 50ML: HCPCS | Performed by: NURSE ANESTHETIST, CERTIFIED REGISTERED

## 2024-06-12 DEVICE — ABSORBABLE HEMOSTAT (OXIDIZED REGENERATED CELLULOSE)
Type: IMPLANTABLE DEVICE | Site: THORACIC | Status: FUNCTIONAL
Brand: SURGICEL

## 2024-06-12 DEVICE — ARISTA AH ABSORBABLE HEMOSTATIC PARTICLES
Type: IMPLANTABLE DEVICE | Site: CHEST | Status: FUNCTIONAL
Brand: ARISTA™ AH

## 2024-06-12 DEVICE — SUREFORM 45 RELOAD WHITE
Type: IMPLANTABLE DEVICE | Site: LUNG | Status: FUNCTIONAL
Brand: SUREFORM

## 2024-06-12 DEVICE — SUREFORM 45 RELOAD BLUE
Type: IMPLANTABLE DEVICE | Site: LUNG | Status: FUNCTIONAL
Brand: SUREFORM

## 2024-06-12 DEVICE — SUREFORM 45 RELOAD GREEN
Type: IMPLANTABLE DEVICE | Site: LUNG | Status: FUNCTIONAL
Brand: SUREFORM

## 2024-06-12 RX ORDER — ONDANSETRON 4 MG/1
4 TABLET, ORALLY DISINTEGRATING ORAL EVERY 6 HOURS PRN
Status: DISCONTINUED | OUTPATIENT
Start: 2024-06-12 | End: 2024-06-16 | Stop reason: HOSPADM

## 2024-06-12 RX ORDER — FAMOTIDINE 10 MG/ML
20 INJECTION, SOLUTION INTRAVENOUS ONCE
Status: COMPLETED | OUTPATIENT
Start: 2024-06-12 | End: 2024-06-12

## 2024-06-12 RX ORDER — TORSEMIDE 20 MG/1
20 TABLET ORAL DAILY
Status: DISCONTINUED | OUTPATIENT
Start: 2024-06-13 | End: 2024-06-16 | Stop reason: HOSPADM

## 2024-06-12 RX ORDER — GABAPENTIN 300 MG/1
300 CAPSULE ORAL EVERY 8 HOURS SCHEDULED
Status: DISCONTINUED | OUTPATIENT
Start: 2024-06-12 | End: 2024-06-16 | Stop reason: HOSPADM

## 2024-06-12 RX ORDER — PHENYLEPHRINE HCL IN 0.9% NACL 1 MG/10 ML
SYRINGE (ML) INTRAVENOUS AS NEEDED
Status: DISCONTINUED | OUTPATIENT
Start: 2024-06-12 | End: 2024-06-12 | Stop reason: SURG

## 2024-06-12 RX ORDER — IPRATROPIUM BROMIDE AND ALBUTEROL SULFATE 2.5; .5 MG/3ML; MG/3ML
3 SOLUTION RESPIRATORY (INHALATION) ONCE AS NEEDED
Status: COMPLETED | OUTPATIENT
Start: 2024-06-12 | End: 2024-06-12

## 2024-06-12 RX ORDER — MAGNESIUM SULFATE HEPTAHYDRATE 500 MG/ML
INJECTION, SOLUTION INTRAMUSCULAR; INTRAVENOUS AS NEEDED
Status: DISCONTINUED | OUTPATIENT
Start: 2024-06-12 | End: 2024-06-12 | Stop reason: SURG

## 2024-06-12 RX ORDER — IBUPROFEN 600 MG/1
1 TABLET ORAL
Status: DISCONTINUED | OUTPATIENT
Start: 2024-06-12 | End: 2024-06-16 | Stop reason: HOSPADM

## 2024-06-12 RX ORDER — DEXAMETHASONE SODIUM PHOSPHATE 4 MG/ML
INJECTION, SOLUTION INTRA-ARTICULAR; INTRALESIONAL; INTRAMUSCULAR; INTRAVENOUS; SOFT TISSUE AS NEEDED
Status: DISCONTINUED | OUTPATIENT
Start: 2024-06-12 | End: 2024-06-12 | Stop reason: SURG

## 2024-06-12 RX ORDER — DIPHENHYDRAMINE HYDROCHLORIDE 50 MG/ML
25 INJECTION INTRAMUSCULAR; INTRAVENOUS EVERY 4 HOURS PRN
Status: DISCONTINUED | OUTPATIENT
Start: 2024-06-12 | End: 2024-06-12 | Stop reason: HOSPADM

## 2024-06-12 RX ORDER — ACETYLCYSTEINE 200 MG/ML
3 SOLUTION ORAL; RESPIRATORY (INHALATION)
Status: COMPLETED | OUTPATIENT
Start: 2024-06-12 | End: 2024-06-15

## 2024-06-12 RX ORDER — HYDROMORPHONE HYDROCHLORIDE 1 MG/ML
0.25 INJECTION, SOLUTION INTRAMUSCULAR; INTRAVENOUS; SUBCUTANEOUS
Status: DISCONTINUED | OUTPATIENT
Start: 2024-06-12 | End: 2024-06-12 | Stop reason: HOSPADM

## 2024-06-12 RX ORDER — INSULIN LISPRO 100 [IU]/ML
3-14 INJECTION, SOLUTION INTRAVENOUS; SUBCUTANEOUS
Status: DISCONTINUED | OUTPATIENT
Start: 2024-06-12 | End: 2024-06-16 | Stop reason: HOSPADM

## 2024-06-12 RX ORDER — FLUMAZENIL 0.1 MG/ML
0.2 INJECTION INTRAVENOUS AS NEEDED
Status: DISCONTINUED | OUTPATIENT
Start: 2024-06-12 | End: 2024-06-12 | Stop reason: HOSPADM

## 2024-06-12 RX ORDER — ONDANSETRON 2 MG/ML
INJECTION INTRAMUSCULAR; INTRAVENOUS AS NEEDED
Status: DISCONTINUED | OUTPATIENT
Start: 2024-06-12 | End: 2024-06-12 | Stop reason: SURG

## 2024-06-12 RX ORDER — NALOXONE HCL 0.4 MG/ML
0.4 VIAL (ML) INJECTION AS NEEDED
Status: DISCONTINUED | OUTPATIENT
Start: 2024-06-12 | End: 2024-06-12 | Stop reason: HOSPADM

## 2024-06-12 RX ORDER — SODIUM CHLORIDE 9 MG/ML
INJECTION, SOLUTION INTRAVENOUS AS NEEDED
Status: DISCONTINUED | OUTPATIENT
Start: 2024-06-12 | End: 2024-06-12 | Stop reason: HOSPADM

## 2024-06-12 RX ORDER — METOCLOPRAMIDE HYDROCHLORIDE 5 MG/ML
10 INJECTION INTRAMUSCULAR; INTRAVENOUS ONCE AS NEEDED
Status: DISCONTINUED | OUTPATIENT
Start: 2024-06-12 | End: 2024-06-12 | Stop reason: HOSPADM

## 2024-06-12 RX ORDER — MIDAZOLAM HYDROCHLORIDE 1 MG/ML
0.5 INJECTION INTRAMUSCULAR; INTRAVENOUS
Status: DISCONTINUED | OUTPATIENT
Start: 2024-06-12 | End: 2024-06-12 | Stop reason: HOSPADM

## 2024-06-12 RX ORDER — OXYCODONE HYDROCHLORIDE 5 MG/1
5 TABLET ORAL ONCE AS NEEDED
Status: COMPLETED | OUTPATIENT
Start: 2024-06-12 | End: 2024-06-12

## 2024-06-12 RX ORDER — HYDROMORPHONE HYDROCHLORIDE 1 MG/ML
0.5 INJECTION, SOLUTION INTRAMUSCULAR; INTRAVENOUS; SUBCUTANEOUS ONCE
Status: DISCONTINUED | OUTPATIENT
Start: 2024-06-12 | End: 2024-06-12 | Stop reason: HOSPADM

## 2024-06-12 RX ORDER — DOCUSATE SODIUM 100 MG/1
100 CAPSULE, LIQUID FILLED ORAL 2 TIMES DAILY
Status: DISCONTINUED | OUTPATIENT
Start: 2024-06-12 | End: 2024-06-16 | Stop reason: HOSPADM

## 2024-06-12 RX ORDER — KETAMINE HCL IN NACL, ISO-OSM 100MG/10ML
SYRINGE (ML) INJECTION AS NEEDED
Status: DISCONTINUED | OUTPATIENT
Start: 2024-06-12 | End: 2024-06-12 | Stop reason: SURG

## 2024-06-12 RX ORDER — CETIRIZINE HYDROCHLORIDE 10 MG/1
10 TABLET ORAL DAILY
Status: DISCONTINUED | OUTPATIENT
Start: 2024-06-13 | End: 2024-06-16 | Stop reason: HOSPADM

## 2024-06-12 RX ORDER — FENTANYL CITRATE 50 UG/ML
50 INJECTION, SOLUTION INTRAMUSCULAR; INTRAVENOUS ONCE AS NEEDED
Status: DISCONTINUED | OUTPATIENT
Start: 2024-06-12 | End: 2024-06-12 | Stop reason: HOSPADM

## 2024-06-12 RX ORDER — FENTANYL CITRATE 50 UG/ML
INJECTION, SOLUTION INTRAMUSCULAR; INTRAVENOUS AS NEEDED
Status: DISCONTINUED | OUTPATIENT
Start: 2024-06-12 | End: 2024-06-12 | Stop reason: SURG

## 2024-06-12 RX ORDER — PROPOFOL 10 MG/ML
VIAL (ML) INTRAVENOUS AS NEEDED
Status: DISCONTINUED | OUTPATIENT
Start: 2024-06-12 | End: 2024-06-12 | Stop reason: SURG

## 2024-06-12 RX ORDER — NALOXONE HCL 0.4 MG/ML
0.4 VIAL (ML) INJECTION
Status: DISCONTINUED | OUTPATIENT
Start: 2024-06-12 | End: 2024-06-12 | Stop reason: HOSPADM

## 2024-06-12 RX ORDER — LABETALOL HYDROCHLORIDE 5 MG/ML
5 INJECTION, SOLUTION INTRAVENOUS
Status: DISCONTINUED | OUTPATIENT
Start: 2024-06-12 | End: 2024-06-12 | Stop reason: HOSPADM

## 2024-06-12 RX ORDER — DEXTROSE MONOHYDRATE 25 G/50ML
25 INJECTION, SOLUTION INTRAVENOUS
Status: DISCONTINUED | OUTPATIENT
Start: 2024-06-12 | End: 2024-06-16 | Stop reason: HOSPADM

## 2024-06-12 RX ORDER — HYDRALAZINE HYDROCHLORIDE 20 MG/ML
5 INJECTION INTRAMUSCULAR; INTRAVENOUS
Status: DISCONTINUED | OUTPATIENT
Start: 2024-06-12 | End: 2024-06-12 | Stop reason: HOSPADM

## 2024-06-12 RX ORDER — SODIUM CHLORIDE, SODIUM LACTATE, POTASSIUM CHLORIDE, CALCIUM CHLORIDE 600; 310; 30; 20 MG/100ML; MG/100ML; MG/100ML; MG/100ML
40 INJECTION, SOLUTION INTRAVENOUS CONTINUOUS
Status: DISCONTINUED | OUTPATIENT
Start: 2024-06-12 | End: 2024-06-13

## 2024-06-12 RX ORDER — MIDAZOLAM HYDROCHLORIDE 1 MG/ML
INJECTION INTRAMUSCULAR; INTRAVENOUS
Status: COMPLETED | OUTPATIENT
Start: 2024-06-12 | End: 2024-06-12

## 2024-06-12 RX ORDER — SODIUM CHLORIDE, SODIUM LACTATE, POTASSIUM CHLORIDE, CALCIUM CHLORIDE 600; 310; 30; 20 MG/100ML; MG/100ML; MG/100ML; MG/100ML
9 INJECTION, SOLUTION INTRAVENOUS CONTINUOUS
Status: DISCONTINUED | OUTPATIENT
Start: 2024-06-12 | End: 2024-06-12

## 2024-06-12 RX ORDER — BUPIVACAINE HYDROCHLORIDE 5 MG/ML
INJECTION, SOLUTION EPIDURAL; INTRACAUDAL
Status: COMPLETED | OUTPATIENT
Start: 2024-06-12 | End: 2024-06-12

## 2024-06-12 RX ORDER — KETAMINE HCL IN NACL, ISO-OSM 100MG/10ML
10 SYRINGE (ML) INJECTION
Status: DISCONTINUED | OUTPATIENT
Start: 2024-06-12 | End: 2024-06-12 | Stop reason: HOSPADM

## 2024-06-12 RX ORDER — ALBUTEROL SULFATE 2.5 MG/3ML
2.5 SOLUTION RESPIRATORY (INHALATION)
Status: COMPLETED | OUTPATIENT
Start: 2024-06-12 | End: 2024-06-15

## 2024-06-12 RX ORDER — ENOXAPARIN SODIUM 100 MG/ML
40 INJECTION SUBCUTANEOUS DAILY
Status: DISCONTINUED | OUTPATIENT
Start: 2024-06-13 | End: 2024-06-16 | Stop reason: HOSPADM

## 2024-06-12 RX ORDER — HYDROMORPHONE HYDROCHLORIDE 1 MG/ML
0.5 INJECTION, SOLUTION INTRAMUSCULAR; INTRAVENOUS; SUBCUTANEOUS
Status: DISCONTINUED | OUTPATIENT
Start: 2024-06-12 | End: 2024-06-16 | Stop reason: HOSPADM

## 2024-06-12 RX ORDER — ROCURONIUM BROMIDE 10 MG/ML
INJECTION, SOLUTION INTRAVENOUS AS NEEDED
Status: DISCONTINUED | OUTPATIENT
Start: 2024-06-12 | End: 2024-06-12 | Stop reason: SURG

## 2024-06-12 RX ORDER — DROPERIDOL 2.5 MG/ML
0.62 INJECTION, SOLUTION INTRAMUSCULAR; INTRAVENOUS
Status: DISCONTINUED | OUTPATIENT
Start: 2024-06-12 | End: 2024-06-12 | Stop reason: HOSPADM

## 2024-06-12 RX ORDER — CEFAZOLIN SODIUM 500 MG/2.2ML
INJECTION, POWDER, FOR SOLUTION INTRAMUSCULAR; INTRAVENOUS AS NEEDED
Status: DISCONTINUED | OUTPATIENT
Start: 2024-06-12 | End: 2024-06-12 | Stop reason: SURG

## 2024-06-12 RX ORDER — MAGNESIUM HYDROXIDE 1200 MG/15ML
LIQUID ORAL AS NEEDED
Status: DISCONTINUED | OUTPATIENT
Start: 2024-06-12 | End: 2024-06-12 | Stop reason: HOSPADM

## 2024-06-12 RX ORDER — INDOCYANINE GREEN AND WATER 25 MG
KIT INJECTION AS NEEDED
Status: DISCONTINUED | OUTPATIENT
Start: 2024-06-12 | End: 2024-06-12 | Stop reason: SURG

## 2024-06-12 RX ORDER — BUPIVACAINE HYDROCHLORIDE 2.5 MG/ML
INJECTION, SOLUTION INFILTRATION; PERINEURAL AS NEEDED
Status: DISCONTINUED | OUTPATIENT
Start: 2024-06-12 | End: 2024-06-12 | Stop reason: HOSPADM

## 2024-06-12 RX ORDER — ACETAMINOPHEN 500 MG
1000 TABLET ORAL 3 TIMES DAILY
Status: DISCONTINUED | OUTPATIENT
Start: 2024-06-12 | End: 2024-06-16 | Stop reason: HOSPADM

## 2024-06-12 RX ORDER — GLYCOPYRROLATE 0.2 MG/ML
INJECTION INTRAMUSCULAR; INTRAVENOUS AS NEEDED
Status: DISCONTINUED | OUTPATIENT
Start: 2024-06-12 | End: 2024-06-12 | Stop reason: SURG

## 2024-06-12 RX ORDER — NITROGLYCERIN 0.4 MG/1
0.4 TABLET SUBLINGUAL
Status: DISCONTINUED | OUTPATIENT
Start: 2024-06-12 | End: 2024-06-16 | Stop reason: HOSPADM

## 2024-06-12 RX ORDER — SODIUM CHLORIDE 0.9 % (FLUSH) 0.9 %
10 SYRINGE (ML) INJECTION EVERY 12 HOURS SCHEDULED
Status: DISCONTINUED | OUTPATIENT
Start: 2024-06-12 | End: 2024-06-12 | Stop reason: HOSPADM

## 2024-06-12 RX ORDER — SODIUM CHLORIDE 0.9 % (FLUSH) 0.9 %
3 SYRINGE (ML) INJECTION EVERY 12 HOURS SCHEDULED
Status: DISCONTINUED | OUTPATIENT
Start: 2024-06-12 | End: 2024-06-12 | Stop reason: HOSPADM

## 2024-06-12 RX ORDER — ONDANSETRON 2 MG/ML
4 INJECTION INTRAMUSCULAR; INTRAVENOUS ONCE AS NEEDED
Status: COMPLETED | OUTPATIENT
Start: 2024-06-12 | End: 2024-06-12

## 2024-06-12 RX ORDER — OXYCODONE HYDROCHLORIDE 5 MG/1
5 TABLET ORAL EVERY 4 HOURS PRN
Status: DISCONTINUED | OUTPATIENT
Start: 2024-06-12 | End: 2024-06-16 | Stop reason: HOSPADM

## 2024-06-12 RX ORDER — LIDOCAINE HYDROCHLORIDE 10 MG/ML
0.5 INJECTION, SOLUTION INFILTRATION; PERINEURAL ONCE AS NEEDED
Status: DISCONTINUED | OUTPATIENT
Start: 2024-06-12 | End: 2024-06-12 | Stop reason: HOSPADM

## 2024-06-12 RX ORDER — EPHEDRINE SULFATE 50 MG/ML
INJECTION, SOLUTION INTRAVENOUS AS NEEDED
Status: DISCONTINUED | OUTPATIENT
Start: 2024-06-12 | End: 2024-06-12 | Stop reason: SURG

## 2024-06-12 RX ORDER — SODIUM CHLORIDE 0.9 % (FLUSH) 0.9 %
3-10 SYRINGE (ML) INJECTION AS NEEDED
Status: DISCONTINUED | OUTPATIENT
Start: 2024-06-12 | End: 2024-06-12 | Stop reason: HOSPADM

## 2024-06-12 RX ORDER — SODIUM CHLORIDE 9 MG/ML
40 INJECTION, SOLUTION INTRAVENOUS AS NEEDED
Status: DISCONTINUED | OUTPATIENT
Start: 2024-06-12 | End: 2024-06-12 | Stop reason: HOSPADM

## 2024-06-12 RX ORDER — NICOTINE POLACRILEX 4 MG
15 LOZENGE BUCCAL
Status: DISCONTINUED | OUTPATIENT
Start: 2024-06-12 | End: 2024-06-16 | Stop reason: HOSPADM

## 2024-06-12 RX ORDER — ALBUMIN, HUMAN INJ 5% 5 %
SOLUTION INTRAVENOUS CONTINUOUS PRN
Status: DISCONTINUED | OUTPATIENT
Start: 2024-06-12 | End: 2024-06-12 | Stop reason: SURG

## 2024-06-12 RX ORDER — DIAZEPAM 2 MG/1
2 TABLET ORAL EVERY 6 HOURS PRN
Status: DISCONTINUED | OUTPATIENT
Start: 2024-06-12 | End: 2024-06-16 | Stop reason: HOSPADM

## 2024-06-12 RX ORDER — POLYETHYLENE GLYCOL 3350 17 G/17G
17 POWDER, FOR SOLUTION ORAL DAILY
Status: DISCONTINUED | OUTPATIENT
Start: 2024-06-13 | End: 2024-06-16 | Stop reason: HOSPADM

## 2024-06-12 RX ORDER — KETOROLAC TROMETHAMINE 30 MG/ML
15 INJECTION, SOLUTION INTRAMUSCULAR; INTRAVENOUS EVERY 8 HOURS
Status: DISCONTINUED | OUTPATIENT
Start: 2024-06-12 | End: 2024-06-12

## 2024-06-12 RX ORDER — NITROGLYCERIN 0.4 MG/1
0.4 TABLET SUBLINGUAL
Status: DISCONTINUED | OUTPATIENT
Start: 2024-06-12 | End: 2024-06-12 | Stop reason: SDUPTHER

## 2024-06-12 RX ORDER — ONDANSETRON 2 MG/ML
4 INJECTION INTRAMUSCULAR; INTRAVENOUS EVERY 6 HOURS PRN
Status: DISCONTINUED | OUTPATIENT
Start: 2024-06-12 | End: 2024-06-16 | Stop reason: HOSPADM

## 2024-06-12 RX ORDER — SODIUM CHLORIDE 0.9 % (FLUSH) 0.9 %
10 SYRINGE (ML) INJECTION AS NEEDED
Status: DISCONTINUED | OUTPATIENT
Start: 2024-06-12 | End: 2024-06-12 | Stop reason: HOSPADM

## 2024-06-12 RX ORDER — DIPHENHYDRAMINE HCL 25 MG
25 CAPSULE ORAL EVERY 4 HOURS PRN
Status: DISCONTINUED | OUTPATIENT
Start: 2024-06-12 | End: 2024-06-12 | Stop reason: HOSPADM

## 2024-06-12 RX ORDER — LIDOCAINE HYDROCHLORIDE 20 MG/ML
INJECTION, SOLUTION INFILTRATION; PERINEURAL AS NEEDED
Status: DISCONTINUED | OUTPATIENT
Start: 2024-06-12 | End: 2024-06-12 | Stop reason: SURG

## 2024-06-12 RX ADMIN — BUPIVACAINE 10 ML: 13.3 INJECTION, SUSPENSION, LIPOSOMAL INFILTRATION at 10:20

## 2024-06-12 RX ADMIN — EPHEDRINE SULFATE 10 MG: 50 INJECTION INTRAVENOUS at 15:00

## 2024-06-12 RX ADMIN — Medication 10 MG: at 13:45

## 2024-06-12 RX ADMIN — IPRATROPIUM BROMIDE AND ALBUTEROL SULFATE 3 ML: .5; 3 SOLUTION RESPIRATORY (INHALATION) at 18:53

## 2024-06-12 RX ADMIN — CEFAZOLIN 3 G: 225 INJECTION, POWDER, FOR SOLUTION INTRAMUSCULAR; INTRAVENOUS at 16:06

## 2024-06-12 RX ADMIN — ACETYLCYSTEINE 3 ML: 200 SOLUTION ORAL; RESPIRATORY (INHALATION) at 22:00

## 2024-06-12 RX ADMIN — ROCURONIUM BROMIDE 20 MG: 10 INJECTION, SOLUTION INTRAVENOUS at 13:10

## 2024-06-12 RX ADMIN — ALBUMIN (HUMAN): 12.5 INJECTION, SOLUTION INTRAVENOUS at 13:53

## 2024-06-12 RX ADMIN — ACETAMINOPHEN 1000 MG: 500 TABLET ORAL at 21:04

## 2024-06-12 RX ADMIN — SODIUM CHLORIDE, POTASSIUM CHLORIDE, SODIUM LACTATE AND CALCIUM CHLORIDE: 600; 310; 30; 20 INJECTION, SOLUTION INTRAVENOUS at 15:45

## 2024-06-12 RX ADMIN — ROCURONIUM BROMIDE 20 MG: 10 INJECTION, SOLUTION INTRAVENOUS at 14:31

## 2024-06-12 RX ADMIN — GLYCOPYRROLATE 0.2 MG: 0.2 INJECTION INTRAMUSCULAR; INTRAVENOUS at 13:19

## 2024-06-12 RX ADMIN — ROCURONIUM BROMIDE 20 MG: 10 INJECTION, SOLUTION INTRAVENOUS at 15:15

## 2024-06-12 RX ADMIN — HYDROMORPHONE HYDROCHLORIDE 0.25 MG: 1 INJECTION, SOLUTION INTRAMUSCULAR; INTRAVENOUS; SUBCUTANEOUS at 18:30

## 2024-06-12 RX ADMIN — EPHEDRINE SULFATE 10 MG: 50 INJECTION INTRAVENOUS at 12:42

## 2024-06-12 RX ADMIN — ROCURONIUM BROMIDE 20 MG: 10 INJECTION, SOLUTION INTRAVENOUS at 16:45

## 2024-06-12 RX ADMIN — Medication 10 MG: at 17:59

## 2024-06-12 RX ADMIN — OXYCODONE HYDROCHLORIDE 5 MG: 5 TABLET ORAL at 18:47

## 2024-06-12 RX ADMIN — SODIUM CHLORIDE, POTASSIUM CHLORIDE, SODIUM LACTATE AND CALCIUM CHLORIDE 9 ML/HR: 600; 310; 30; 20 INJECTION, SOLUTION INTRAVENOUS at 10:49

## 2024-06-12 RX ADMIN — Medication 100 MCG: at 16:27

## 2024-06-12 RX ADMIN — Medication 100 MCG: at 14:48

## 2024-06-12 RX ADMIN — ONDANSETRON 4 MG: 2 INJECTION INTRAMUSCULAR; INTRAVENOUS at 18:50

## 2024-06-12 RX ADMIN — SODIUM CHLORIDE, POTASSIUM CHLORIDE, SODIUM LACTATE AND CALCIUM CHLORIDE 9 ML/HR: 600; 310; 30; 20 INJECTION, SOLUTION INTRAVENOUS at 09:20

## 2024-06-12 RX ADMIN — INSULIN LISPRO 8 UNITS: 100 INJECTION, SOLUTION INTRAVENOUS; SUBCUTANEOUS at 22:47

## 2024-06-12 RX ADMIN — DOCUSATE SODIUM 100 MG: 100 CAPSULE, LIQUID FILLED ORAL at 21:04

## 2024-06-12 RX ADMIN — BUPIVACAINE HYDROCHLORIDE 20 ML: 5 INJECTION, SOLUTION EPIDURAL; INTRACAUDAL; PERINEURAL at 10:20

## 2024-06-12 RX ADMIN — Medication 10 MG: at 14:45

## 2024-06-12 RX ADMIN — MAGNESIUM SULFATE HEPTAHYDRATE 1 G: 500 INJECTION, SOLUTION INTRAMUSCULAR; INTRAVENOUS at 12:50

## 2024-06-12 RX ADMIN — FENTANYL CITRATE 50 MCG: 50 INJECTION, SOLUTION INTRAMUSCULAR; INTRAVENOUS at 15:44

## 2024-06-12 RX ADMIN — ROCURONIUM BROMIDE 50 MG: 10 INJECTION, SOLUTION INTRAVENOUS at 12:29

## 2024-06-12 RX ADMIN — ROCURONIUM BROMIDE 10 MG: 10 INJECTION, SOLUTION INTRAVENOUS at 13:50

## 2024-06-12 RX ADMIN — INDOCYANINE GREEN AND WATER 12.5 MG: KIT at 16:26

## 2024-06-12 RX ADMIN — Medication 100 MCG: at 12:42

## 2024-06-12 RX ADMIN — MIDAZOLAM 1 MG: 1 INJECTION INTRAMUSCULAR; INTRAVENOUS at 10:02

## 2024-06-12 RX ADMIN — Medication 30 MG: at 12:50

## 2024-06-12 RX ADMIN — INDOCYANINE GREEN AND WATER 12.5 MG: KIT at 16:49

## 2024-06-12 RX ADMIN — SODIUM CHLORIDE 3000 MG: 900 INJECTION INTRAVENOUS at 12:10

## 2024-06-12 RX ADMIN — HYDROMORPHONE HYDROCHLORIDE 0.5 MG: 1 INJECTION, SOLUTION INTRAMUSCULAR; INTRAVENOUS; SUBCUTANEOUS at 21:04

## 2024-06-12 RX ADMIN — Medication 10 MG: at 18:35

## 2024-06-12 RX ADMIN — ALBUTEROL SULFATE 2.5 MG: 2.5 SOLUTION RESPIRATORY (INHALATION) at 22:00

## 2024-06-12 RX ADMIN — SODIUM CHLORIDE, POTASSIUM CHLORIDE, SODIUM LACTATE AND CALCIUM CHLORIDE 9 ML/HR: 600; 310; 30; 20 INJECTION, SOLUTION INTRAVENOUS at 09:25

## 2024-06-12 RX ADMIN — ROCURONIUM BROMIDE 10 MG: 10 INJECTION, SOLUTION INTRAVENOUS at 16:05

## 2024-06-12 RX ADMIN — EPHEDRINE SULFATE 10 MG: 50 INJECTION INTRAVENOUS at 13:19

## 2024-06-12 RX ADMIN — ONDANSETRON 4 MG: 2 INJECTION INTRAMUSCULAR; INTRAVENOUS at 17:00

## 2024-06-12 RX ADMIN — GABAPENTIN 300 MG: 300 CAPSULE ORAL at 21:04

## 2024-06-12 RX ADMIN — Medication 10 MG: at 18:12

## 2024-06-12 RX ADMIN — MAGNESIUM SULFATE HEPTAHYDRATE 1 G: 500 INJECTION, SOLUTION INTRAMUSCULAR; INTRAVENOUS at 13:13

## 2024-06-12 RX ADMIN — FENTANYL CITRATE 50 MCG: 50 INJECTION, SOLUTION INTRAMUSCULAR; INTRAVENOUS at 13:10

## 2024-06-12 RX ADMIN — PROPOFOL 50 MG: 10 INJECTION, EMULSION INTRAVENOUS at 12:45

## 2024-06-12 RX ADMIN — SODIUM CHLORIDE, POTASSIUM CHLORIDE, SODIUM LACTATE AND CALCIUM CHLORIDE 40 ML/HR: 600; 310; 30; 20 INJECTION, SOLUTION INTRAVENOUS at 21:03

## 2024-06-12 RX ADMIN — PROPOFOL 150 MG: 10 INJECTION, EMULSION INTRAVENOUS at 12:29

## 2024-06-12 RX ADMIN — DEXAMETHASONE SODIUM PHOSPHATE 6 MG: 4 INJECTION, SOLUTION INTRAMUSCULAR; INTRAVENOUS at 12:45

## 2024-06-12 RX ADMIN — PROPOFOL 10 MG: 10 INJECTION, EMULSION INTRAVENOUS at 13:45

## 2024-06-12 RX ADMIN — Medication 100 MCG: at 13:34

## 2024-06-12 RX ADMIN — PHENYLEPHRINE HYDROCHLORIDE 0.5 MCG/KG/MIN: 10 INJECTION, SOLUTION INTRAVENOUS at 13:53

## 2024-06-12 RX ADMIN — PROPOFOL 10 MG: 10 INJECTION, EMULSION INTRAVENOUS at 14:45

## 2024-06-12 RX ADMIN — HYDROMORPHONE HYDROCHLORIDE 0.25 MG: 1 INJECTION, SOLUTION INTRAMUSCULAR; INTRAVENOUS; SUBCUTANEOUS at 17:59

## 2024-06-12 RX ADMIN — FAMOTIDINE 20 MG: 10 INJECTION INTRAVENOUS at 10:41

## 2024-06-12 RX ADMIN — LIDOCAINE HYDROCHLORIDE 100 MG: 20 INJECTION, SOLUTION INFILTRATION; PERINEURAL at 12:29

## 2024-06-12 RX ADMIN — Medication 100 MCG: at 13:53

## 2024-06-12 NOTE — ANESTHESIA PROCEDURE NOTES
Airway  Urgency: elective    Date/Time: 6/12/2024 12:30 PM  Airway not difficult    General Information and Staff    Patient location during procedure: OR  Anesthesiologist: Nery Vazquez MD  CRNA/CAA: Martir Martinez CRNA    Indications and Patient Condition  Indications for airway management: airway protection    Preoxygenated: yes  MILS maintained throughout  Mask difficulty assessment: 2 - vent by mask + OA or adjuvant +/- NMBA    Final Airway Details  Final airway type: endotracheal airway      Successful airway: EBT - double lumen left  Cuffed: yes   Successful intubation technique: direct laryngoscopy  Endotracheal tube insertion site: oral  Blade: Valery  Blade size: 4  EBT DL size (fr): 39  Cormack-Lehane Classification: grade I - full view of glottis  Placement verified by: chest auscultation, bronchoscopy and capnometry   Measured from: gums  ETT/EBT to gums (cm): 29  Number of attempts at approach: 1  Assessment: lips, teeth, and gum same as pre-op and atraumatic intubation    Additional Comments  Bronchoscopy verification of placement ROBERT

## 2024-06-12 NOTE — ANESTHESIA PREPROCEDURE EVALUATION
Anesthesia Evaluation     Patient summary reviewed and Nursing notes reviewed   no history of anesthetic complications:   NPO Solid Status: > 8 hours  NPO Liquid Status: > 2 hours           Airway   Mallampati: II  TM distance: >3 FB  Neck ROM: full  Comment: Prior Gr 2a view with nash #3  Dental    (+) implants        Pulmonary - normal exam   (+) a smoker Former, lung cancer, COPD (Brochiectasis),sleep apnea on CPAP  Cardiovascular     ECG reviewed  Rhythm: regular    (+) hypertension, valvular problems/murmurs (PFO suspected on Echo), dysrhythmias (s/p CV, Atrioventricular block, Mobitz type 1, Wenckebach) Paroxysmal Atrial Fib, hyperlipidemia    ROS comment: Echo 05/2024: ·  Left ventricular systolic function is normal. Calculated left ventricular EF = 62.6%  ·  Left ventricular diastolic function was indeterminate in the setting of possible A-fib.  ·  RV borderline dilated with normal function.  ·  No significant valvular abnormalities.  ·  Estimated right ventricular systolic pressure from tricuspid regurgitation is mildly elevated (35-45 mmHg). Calculated right ventricular systolic pressure from tricuspid regurgitation is 39 mmHg.  ·  Mild-moderate left to right interatrial shunt noted on color Doppler, PFO suspected.  Bubble study not performed.  ·  Biatrial enlargement, normal IVC size.      Neuro/Psych  (+) psychiatric history Anxiety and Depression    ROS Comment: [place]  GI/Hepatic/Renal/Endo    (+) morbid obesity, liver disease fatty liver disease, diabetes mellitus type 2 using insulin    Musculoskeletal     Abdominal    Substance History - negative use     OB/GYN negative ob/gyn ROS         Other   arthritis,                   Anesthesia Plan    ASA 3     general and [facility]     (Plan for PNB for post-op pain management    I have reviewed the patient's history and chart with the patient, including all pertinent laboratory results and imaging. I have explained the risks of anesthesia including but not  "limited to dental or airway injury, nausea, and cardio-pulmonary complications, such as aspiration, MI, stroke, or death. Patient has agreed to proceed.     Risks of peripheral nerve block were discussed with patient including but not limited to: inadequate block, bleeding, infection, nerve injury, PTX.    VITALS:  /89 (BP Location: Left arm, Patient Position: Lying)   Pulse 55   Temp 36.6 °C (97.9 °F) (Oral)   Resp 18   Ht 180.3 cm (71\")   SpO2 98%   BMI 38.76 kg/m²   )  intravenous induction     Anesthetic plan, risks, benefits, and alternatives have been provided, discussed and informed consent has been obtained with: patient.  Pre-procedure education provided    CODE STATUS:         "

## 2024-06-12 NOTE — ANESTHESIA PROCEDURE NOTES
Peripheral Block      Patient reassessed immediately prior to procedure    Patient location during procedure: pre-op  Start time: 6/12/2024 10:10 AM  Stop time: 6/12/2024 10:20 AM  Reason for block: at surgeon's request and post-op pain management  Performed by  Anesthesiologist: Nery Vazquez MD  Preanesthetic Checklist  Completed: patient identified, IV checked, site marked, risks and benefits discussed, surgical consent, monitors and equipment checked, pre-op evaluation and timeout performed  Prep:  Pt Position: sitting  Sterile barriers:gloves, gown and washed/disinfected hands  Prep: ChloraPrep  Patient monitoring: blood pressure monitoring, continuous pulse oximetry and EKG  Procedure    Sedation: yes  Performed under: local infiltration  Guidance:ultrasound guided    ULTRASOUND INTERPRETATION.  Using ultrasound guidance a 21 G gauge needle was placed in close proximity to the nerve, at which point, under ultrasound guidance anesthetic was injected in the area of the nerve and spread of the anesthesia was seen on ultrasound in close proximity thereto.  There were no abnormalities seen on ultrasound; a digital image was taken; and the patient tolerated the procedure with no complications. Images:still images obtained, printed/placed on chart    Laterality:right  Block Type:erector spinae block  Injection Technique:single-shot  Needle Type:echogenic      Medications Used: bupivacaine liposome (EXPAREL) 1.3 % injection - Infiltration   10 mL - 6/12/2024 10:20:00 AM  bupivacaine (PF) (MARCAINE) 0.5 % injection - Injection   20 mL - 6/12/2024 10:20:00 AM      Post Assessment  Injection Assessment: negative aspiration for heme and incremental injection  Complications:no  Additional Notes  Ultrasound guidance used for safe guidance of needle placement and incremental injection of local anesthetic.    Diagnosis: Acute post operative pain of the chest wall.   Performed by: Nery Vazquez MD

## 2024-06-12 NOTE — ANESTHESIA PROCEDURE NOTES
Arterial Line      Patient reassessed immediately prior to procedure    Patient location during procedure: pre-op  Start time: 6/12/2024 10:02 AM  Stop Time:6/12/2024 10:08 AM       Line placed for hemodynamic monitoring and ABGs/Labs/ISTAT.  Performed By   Anesthesiologist: Nery Vazquez MD   Preanesthetic Checklist  Completed: patient identified, IV checked, site marked, risks and benefits discussed, surgical consent, monitors and equipment checked, pre-op evaluation and timeout performed  Arterial Line Prep    Sterile Tech: cap and mask  Prep: ChloraPrep  Patient monitoring: EKG, continuous pulse oximetry and blood pressure monitoring  Arterial Line Procedure   Laterality:left  Location:  radial artery  Catheter size: 20 G   Guidance: ultrasound guided  PROCEDURE NOTE/ULTRASOUND INTERPRETATION.  Using ultrasound guidance the potential vascular sites for insertion of the catheter were visualized to determine the patency of the vessel to be used for vascular access.  After selecting the appropriate site for insertion, the needle was visualized under ultrasound being inserted into the radial artery, followed by ultrasound confirmation of wire and catheter placement. There were no abnormalities seen on ultrasound; an image was taken; and the patient tolerated the procedure with no complications.   Number of attempts: 1  Successful placement: yes Images: still images not obtained  Post Assessment   Dressing Type: wrist guard applied, secured with tape and occlusive dressing applied.   Complications no  Circ/Move/Sens Assessment: normal and unchanged.   Patient Tolerance: patient tolerated the procedure well with no apparent complications

## 2024-06-13 ENCOUNTER — APPOINTMENT (OUTPATIENT)
Dept: GENERAL RADIOLOGY | Facility: HOSPITAL | Age: 74
DRG: 165 | End: 2024-06-13
Payer: MEDICARE

## 2024-06-13 LAB
ANION GAP SERPL CALCULATED.3IONS-SCNC: 10 MMOL/L (ref 5–15)
BASOPHILS # BLD AUTO: 0.01 10*3/MM3 (ref 0–0.2)
BASOPHILS NFR BLD AUTO: 0.1 % (ref 0–1.5)
BUN SERPL-MCNC: 21 MG/DL (ref 8–23)
BUN/CREAT SERPL: 19.4 (ref 7–25)
CALCIUM SPEC-SCNC: 8.1 MG/DL (ref 8.6–10.5)
CHLORIDE SERPL-SCNC: 101 MMOL/L (ref 98–107)
CO2 SERPL-SCNC: 24 MMOL/L (ref 22–29)
CREAT SERPL-MCNC: 1.08 MG/DL (ref 0.76–1.27)
DEPRECATED RDW RBC AUTO: 48.5 FL (ref 37–54)
EGFRCR SERPLBLD CKD-EPI 2021: 71.6 ML/MIN/1.73
EOSINOPHIL # BLD AUTO: 0 10*3/MM3 (ref 0–0.4)
EOSINOPHIL NFR BLD AUTO: 0 % (ref 0.3–6.2)
ERYTHROCYTE [DISTWIDTH] IN BLOOD BY AUTOMATED COUNT: 13.9 % (ref 12.3–15.4)
GLUCOSE BLDC GLUCOMTR-MCNC: 202 MG/DL (ref 70–130)
GLUCOSE BLDC GLUCOMTR-MCNC: 241 MG/DL (ref 70–130)
GLUCOSE BLDC GLUCOMTR-MCNC: 254 MG/DL (ref 70–130)
GLUCOSE BLDC GLUCOMTR-MCNC: 260 MG/DL (ref 70–130)
GLUCOSE BLDC GLUCOMTR-MCNC: 277 MG/DL (ref 70–130)
GLUCOSE SERPL-MCNC: 284 MG/DL (ref 65–99)
HCT VFR BLD AUTO: 40.8 % (ref 37.5–51)
HGB BLD-MCNC: 13.1 G/DL (ref 13–17.7)
IMM GRANULOCYTES # BLD AUTO: 0.05 10*3/MM3 (ref 0–0.05)
IMM GRANULOCYTES NFR BLD AUTO: 0.5 % (ref 0–0.5)
LYMPHOCYTES # BLD AUTO: 0.65 10*3/MM3 (ref 0.7–3.1)
LYMPHOCYTES NFR BLD AUTO: 6.7 % (ref 19.6–45.3)
MCH RBC QN AUTO: 30.2 PG (ref 26.6–33)
MCHC RBC AUTO-ENTMCNC: 32.1 G/DL (ref 31.5–35.7)
MCV RBC AUTO: 94 FL (ref 79–97)
MONOCYTES # BLD AUTO: 0.63 10*3/MM3 (ref 0.1–0.9)
MONOCYTES NFR BLD AUTO: 6.5 % (ref 5–12)
NEUTROPHILS NFR BLD AUTO: 8.39 10*3/MM3 (ref 1.7–7)
NEUTROPHILS NFR BLD AUTO: 86.2 % (ref 42.7–76)
NRBC BLD AUTO-RTO: 0 /100 WBC (ref 0–0.2)
PLATELET # BLD AUTO: 233 10*3/MM3 (ref 140–450)
PMV BLD AUTO: 9.2 FL (ref 6–12)
POTASSIUM SERPL-SCNC: 4.6 MMOL/L (ref 3.5–5.2)
RBC # BLD AUTO: 4.34 10*6/MM3 (ref 4.14–5.8)
SODIUM SERPL-SCNC: 135 MMOL/L (ref 136–145)
WBC NRBC COR # BLD AUTO: 9.73 10*3/MM3 (ref 3.4–10.8)

## 2024-06-13 PROCEDURE — 63710000001 INSULIN LISPRO (HUMAN) PER 5 UNITS: Performed by: SURGERY

## 2024-06-13 PROCEDURE — 80048 BASIC METABOLIC PNL TOTAL CA: CPT | Performed by: SURGERY

## 2024-06-13 PROCEDURE — 99024 POSTOP FOLLOW-UP VISIT: CPT

## 2024-06-13 PROCEDURE — 25010000002 ENOXAPARIN PER 10 MG: Performed by: SURGERY

## 2024-06-13 PROCEDURE — 94799 UNLISTED PULMONARY SVC/PX: CPT

## 2024-06-13 PROCEDURE — 97166 OT EVAL MOD COMPLEX 45 MIN: CPT

## 2024-06-13 PROCEDURE — 71045 X-RAY EXAM CHEST 1 VIEW: CPT

## 2024-06-13 PROCEDURE — 94664 DEMO&/EVAL PT USE INHALER: CPT

## 2024-06-13 PROCEDURE — 32674 THORACOSCOPY LYMPH NODE EXC: CPT | Performed by: SURGERY

## 2024-06-13 PROCEDURE — 82948 REAGENT STRIP/BLOOD GLUCOSE: CPT

## 2024-06-13 PROCEDURE — 97535 SELF CARE MNGMENT TRAINING: CPT

## 2024-06-13 PROCEDURE — 85025 COMPLETE CBC W/AUTO DIFF WBC: CPT | Performed by: SURGERY

## 2024-06-13 PROCEDURE — 94761 N-INVAS EAR/PLS OXIMETRY MLT: CPT

## 2024-06-13 PROCEDURE — 97530 THERAPEUTIC ACTIVITIES: CPT

## 2024-06-13 PROCEDURE — 97162 PT EVAL MOD COMPLEX 30 MIN: CPT

## 2024-06-13 RX ADMIN — ALBUTEROL SULFATE 2.5 MG: 2.5 SOLUTION RESPIRATORY (INHALATION) at 23:31

## 2024-06-13 RX ADMIN — OXYCODONE HYDROCHLORIDE 5 MG: 5 TABLET ORAL at 21:31

## 2024-06-13 RX ADMIN — DOCUSATE SODIUM 100 MG: 100 CAPSULE, LIQUID FILLED ORAL at 08:23

## 2024-06-13 RX ADMIN — ACETYLCYSTEINE 3 ML: 200 SOLUTION ORAL; RESPIRATORY (INHALATION) at 15:27

## 2024-06-13 RX ADMIN — INSULIN LISPRO 8 UNITS: 100 INJECTION, SOLUTION INTRAVENOUS; SUBCUTANEOUS at 11:29

## 2024-06-13 RX ADMIN — ACETYLCYSTEINE 3 ML: 200 SOLUTION ORAL; RESPIRATORY (INHALATION) at 07:00

## 2024-06-13 RX ADMIN — ACETYLCYSTEINE 3 ML: 200 SOLUTION ORAL; RESPIRATORY (INHALATION) at 23:31

## 2024-06-13 RX ADMIN — INSULIN LISPRO 8 UNITS: 100 INJECTION, SOLUTION INTRAVENOUS; SUBCUTANEOUS at 21:16

## 2024-06-13 RX ADMIN — OXYCODONE HYDROCHLORIDE 5 MG: 5 TABLET ORAL at 05:48

## 2024-06-13 RX ADMIN — POLYETHYLENE GLYCOL 3350 17 G: 17 POWDER, FOR SOLUTION ORAL at 08:22

## 2024-06-13 RX ADMIN — ACETAMINOPHEN 1000 MG: 500 TABLET ORAL at 08:23

## 2024-06-13 RX ADMIN — INSULIN LISPRO 8 UNITS: 100 INJECTION, SOLUTION INTRAVENOUS; SUBCUTANEOUS at 08:23

## 2024-06-13 RX ADMIN — ACETAMINOPHEN 1000 MG: 500 TABLET ORAL at 16:25

## 2024-06-13 RX ADMIN — DOCUSATE SODIUM 100 MG: 100 CAPSULE, LIQUID FILLED ORAL at 21:16

## 2024-06-13 RX ADMIN — ALBUTEROL SULFATE 2.5 MG: 2.5 SOLUTION RESPIRATORY (INHALATION) at 07:00

## 2024-06-13 RX ADMIN — ENOXAPARIN SODIUM 40 MG: 100 INJECTION SUBCUTANEOUS at 08:23

## 2024-06-13 RX ADMIN — CETIRIZINE HYDROCHLORIDE 10 MG: 10 TABLET, FILM COATED ORAL at 08:23

## 2024-06-13 RX ADMIN — SERTRALINE 50 MG: 50 TABLET, FILM COATED ORAL at 08:23

## 2024-06-13 RX ADMIN — ALBUTEROL SULFATE 2.5 MG: 2.5 SOLUTION RESPIRATORY (INHALATION) at 15:25

## 2024-06-13 RX ADMIN — OXYCODONE HYDROCHLORIDE 5 MG: 5 TABLET ORAL at 10:19

## 2024-06-13 RX ADMIN — GABAPENTIN 300 MG: 300 CAPSULE ORAL at 05:48

## 2024-06-13 RX ADMIN — ACETAMINOPHEN 1000 MG: 500 TABLET ORAL at 21:16

## 2024-06-13 RX ADMIN — INSULIN LISPRO 5 UNITS: 100 INJECTION, SOLUTION INTRAVENOUS; SUBCUTANEOUS at 17:02

## 2024-06-13 RX ADMIN — OXYCODONE HYDROCHLORIDE 5 MG: 5 TABLET ORAL at 17:02

## 2024-06-13 RX ADMIN — GABAPENTIN 300 MG: 300 CAPSULE ORAL at 14:06

## 2024-06-13 RX ADMIN — GABAPENTIN 300 MG: 300 CAPSULE ORAL at 21:16

## 2024-06-13 NOTE — PROGRESS NOTES
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  No shortness of breath, cough or chest pain.    Diet:  Adequate intake.  No nausea or vomiting.    Activity level: Returning to normal.    Pain:  He complains of pain that is mild.  Pain is well controlled.        Objective:  General Appearance:  Comfortable and in no acute distress.    Vital signs: (most recent): Blood pressure 134/56, pulse 62, temperature 98 °F (36.7 °C), temperature source Oral, resp. rate 20, height 180.3 cm (71\"), SpO2 96%.    Lungs:  Normal effort and normal respiratory rate.  He is not in respiratory distress.    Heart: Normal rate.  Regular rhythm.    Chest: Symmetric chest wall expansion. Chest wall tenderness (Incisional, right chest tube.) present.    Abdomen: Abdomen is soft and non-distended.    Neurological: Patient is alert and oriented to person, place and time.    Skin:  Warm and dry.                Chest tube:   Site: Right, Clean, Dry, Intact, and Securement device intact  Suction: waterseal  Air Leak: negative  24 Hour Total: 370ml     Results Review:     I reviewed the patient's new clinical results.  I reviewed the patient's new imaging results and agree with the interpretation.  Discussed with patient, nurse, and surgeon    Assessment & Plan   Continues to do well post-operatively.  A.m. chest x-ray reviewed which demonstrates right-sided chest tube in good position.  Lungs well-expanded bilaterally.  His pain is well-controlled.  Continue analgesic medications.  Continue chest tube to waterseal and trend output.  Repeat a.m. chest x-ray.  Encourage pulmonary hygiene.  Increase activity as able.  Final surgical pathology remains pending.    BAM Rodgers  [facility]  06/13/24  17:28 EDT    Patient was seen and assessed while wearing personal protective equipment including facemask, " protective eyewear and gloves.  Hand hygiene performed prior to entering the room and upon exiting with doffing of gloves.

## 2024-06-13 NOTE — DISCHARGE PLACEMENT REQUEST
"Branden Wong (73 y.o. Male)       Date of Birth   1950    Social Security Number       Address   [address] [place] [address]    Home Phone   [phone]    MRN   [id number]       [facility]   Patient Refused    Marital Status                               Admission Date   6/12/24    Admission Type   Elective    Admitting Provider   David Figueroa MD    Attending Provider   David Figueroa MD    Department, Room/Bed   [address], E560/1       Discharge Date       Discharge Disposition       Discharge Destination                                 Attending Provider: David Figueroa MD    Allergies: No Known Allergies    Isolation: None   Infection: None   Code Status: CPR    Ht: 180.3 cm (71\")   Wt: 126 kg (277 lb 14.4 oz)    Admission Cmt: None   Principal Problem: Malignant neoplasm of middle lobe of right lung [C34.2]                   Active Insurance as of 6/12/2024       Primary Coverage       Payor Plan Insurance Group Employer/Plan Group    MEDICARE MEDICARE A & B        Payor Plan Address Payor Plan Phone Number Payor Plan Fax Number Effective Dates    [address] [phone]  10/1/2013 - None Entered    [facility] [id number]         Subscriber Name Subscriber Birth Date Member ID       BRANDEN WONG 1950 [id number]               Secondary Coverage       Payor Plan Insurance Group Employer/Plan Group    [employer]P AAR HEALTH CARE OPTIONS        Payor Plan Address Payor Plan Phone Number Payor Plan Fax Number Effective Dates    [facility] [phone]  1/1/2016 - None Entered    [address]       [facility] [address]         Subscriber Name Subscriber Birth Date Member ID       BRANDEN WONG 1950 [id number]                     Emergency Contacts        (Rel.) Home Phone Work Phone Mobile Phone    Luba Wong (Spouse) -- -- [phone]              Emergency Contact Information       Name Relation Home Work Mobile    Luba Wong Spouse   " [phone]

## 2024-06-13 NOTE — CASE MANAGEMENT/SOCIAL WORK
Discharge Planning Assessment  [facility]     Patient Name: Branden Diop  MRN: [id number]  Today's Date: 6/13/2024    Admit Date: 6/12/2024    Plan: Home   Discharge Needs Assessment       Row Name 06/13/24 1540       Living Environment    People in Home spouse    Current Living Arrangements home    Potentially Unsafe Housing Conditions none    Primary Care Provided by self    Provides Primary Care For no one    Family Caregiver if Needed spouse    Family Caregiver Names Luba [phone]    Quality of Family Relationships supportive    Able to Return to Prior Arrangements yes       Resource/Environmental Concerns    Resource/Environmental Concerns none    Transportation Concerns none       Transition Planning    Patient/Family Anticipates Transition to home with family    Patient/Family Anticipated Services at Transition durable medical equipment    Transportation Anticipated family or friend will provide       Discharge Needs Assessment    Equipment Currently Used at Home cane, straight;cpap;walker, standard;bp cuff;glucometer    Concerns to be Addressed no discharge needs identified    Anticipated Changes Related to Illness none    Equipment Needed After Discharge oxygen                   Discharge Plan       Row Name 06/13/24 0527       Plan    Plan Home    Patient/Family in Agreement with Plan yes    Plan Comments Met with pt and wife in room. Introduced self, explained  role, verified face sheet. Plan is for him to return home with his wife. He is currently on oxygen and unable to wean. Pt stated that he would like to use [place] if he needs to go home on oxygen. He will need a 6 minute walk at discharge. He has a walker, cane, CPAP, BP cuff, and glucometer at home. He denies any needs at this time. His wife will transport him home. CCP to follow. AMOS Ramos RN                  Continued Care and Services - Admitted Since 6/12/2024    No active coordination exists for this encounter.       Selected  Continued Care - Episodes Includes continued care and service providers with selected services from the active episodes listed below      [facility] Episode start date: 2/11/2022   There are no active outsourced providers for this episode.                    Demographic Summary       Row Name 06/13/24 1548       General Information    Admission Type inpatient    Arrived From home    Referral Source admission list    Reason for Consult discharge planning    Preferred Language English                   Functional Status    No documentation.                  Psychosocial    No documentation.                  Abuse/Neglect    No documentation.                  Legal    No documentation.                  Substance Abuse    No documentation.                  Patient Forms    No documentation.                     Lyle Campbell RN

## 2024-06-13 NOTE — PLAN OF CARE
Problem: Adult Inpatient Plan of Care  Goal: Plan of Care Review  Outcome: Ongoing, Progressing  Flowsheets (Taken 6/13/2024 0233)  Progress: no change  Plan of Care Reviewed With: patient  Outcome Evaluation: pt from PACU, vs stable, adq urine output, medicated for co pain see mar, chest tube -20 no airleak noted. wife at bedside,   Goal Outcome Evaluation:  Plan of Care Reviewed With: patient        Progress: no change  Outcome Evaluation: pt from PACU, vs stable, adq urine output, medicated for co pain see mar, chest tube -20 no airleak noted. wife at bedside,

## 2024-06-13 NOTE — PLAN OF CARE
Goal Outcome Evaluation:  Plan of Care Reviewed With: patient        Progress: improving  Outcome Evaluation: Stable with chronic afib, 4L NC 96% at rest drops to 83% upon ambulation, Assist x 1, AOx4, CT set to waterseal, PRN medication is managing pain, call light within reach, bed alarm set

## 2024-06-13 NOTE — THERAPY EVALUATION
Patient Name: Branden Diop  : 1950    MRN: [id number]                              Today's Date: 2024       Admit Date: 2024    Visit Dx:     ICD-10-CM ICD-9-CM   1. Malignant neoplasm of middle lobe of right lung  C34.2 162.4     Patient Active Problem List   Diagnosis    A-fib    Atrioventricular block, Mobitz type 1, Wenckebach    Apnea, sleep    Obesity    Streptococcus pneumoniae    Sleep apnea with use of continuous positive airway pressure (CPAP)    Sinusitis    Right wrist pain    Pneumonia    Type 2 diabetes mellitus, with long-term current use of insulin    Hyperlipidemia    Hammertoe    Depression    COPD (chronic obstructive pulmonary disease)    Colon polyps    Anxiety    Pruritus    Cholelithiasis    Fatty liver    Essential hypertension    Vitamin D deficiency    Emphysema, unspecified    Bronchiectasis with acute exacerbation    Chest pain, atypical    FHx: colonic polyps    Diverticulosis    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 NO MEDS FOR    Depression     History of atrial fibrillation     WITH CARDIO VERSION. NO PROBLEMS    History of skin cancer     BCC REMOVED FROM BACK    [place] (hard of hearing)     Hyperlipidemia     Inguinal hernia, recurrent     RIGHT    Left foot pain     Lung nodules     Rash     IN GROIN TREATING FOR YEAST INFECTION BY PCP    Redness of skin     LOWER LEGS BILATERAL    Scab     BILATERAL LEGS HEALING    Sleep apnea with use of continuous positive airway pressure (CPAP)     CPAP    Streptococcus pneumoniae     ABOUT 6-7 YR AGO.     Type 2 diabetes mellitus      Past Surgical History:   Procedure Laterality Date    BASAL CELL CARCINOMA EXCISION      BRONCHOSCOPY WITH ION ROBOTIC ASSIST  N/A 5/6/2024    Procedure: BRONCHOSCOPY WITH ION ROBOT WITH FNA, BIOPSIES, BAL AND ENDOBRONCHIAL ULTRASOUND WITH FNA;  Surgeon: Yony Coles MD;  Location: [facility] ENDOSCOPY;  Service: Robotics - Pulmonary;  Laterality: N/A;  PRE: PULMONARY NODULES  POST: SAME    CARDIAC CATHETERIZATION N/A 11/15/2021    Procedure: Coronary angiography;  Surgeon: Alfredo Jennings MD;  Location: [facility] CATH INVASIVE LOCATION;  Service: Cardiovascular;  Laterality: N/A;    CARDIAC CATHETERIZATION N/A 11/15/2021    Procedure: Left heart cath;  Surgeon: Alfredo Jennings MD;  Location: [facility] CATH INVASIVE LOCATION;  Service: Cardiovascular;  Laterality: N/A;    CARDIAC CATHETERIZATION N/A 11/15/2021    Procedure: Left ventriculography;  Surgeon: Alfredo Jennings MD;  Location: [facility] CATH INVASIVE LOCATION;  Service: Cardiovascular;  Laterality: N/A;    CARDIAC CATHETERIZATION N/A 11/15/2021    Procedure: Aortic root aortogram;  Surgeon: Alfredo Jennings MD;  Location: [facility] CATH INVASIVE LOCATION;  Service: Cardiovascular;  Laterality: N/A;    CARDIOVERSION      CHOLECYSTECTOMY N/A 10/07/2017    Procedure: CHOLECYSTECTOMY LAPAROSCOPIC;  Surgeon: Martir Trevino MD;  Location: [facility] MAIN OR;  Service:     COLONOSCOPY  2007    COLONOSCOPY N/A 8/30/2022    Procedure: COLONOSCOPY TO CECUM AND TI AND COLD SNARE POLYPECTOMY;  Surgeon: Cj Lopez MD;  Location: [facility] ENDOSCOPY;  Service: Gastroenterology;  Laterality: N/A;  Pre: History of colon polyps  Post: POLYP, PREVIOUS TATTOO    FOOT SURGERY Left     TOES ARE PINNED. ALL BUT GREAT TOE    HAND SURGERY      THUMB    HERNIA REPAIR      INGUINAL HERNIA REPAIR Right 06/27/2018    Procedure: INGUINAL HERNIA REPAIR, OPEN, RECURRENT;  Surgeon: Martir Trevino MD;  Location: [facility] OR [facility];  Service: General    LASIK Bilateral     NECK SURGERY      growth on salivary gland    REPLACEMENT TOTAL KNEE Right 2007    (he is going to have a LTKR next month)    REPLACEMENT  TOTAL KNEE Left       General Information       Row Name 06/13/24 1120          Physical Therapy Time and Intention    Document Type evaluation  Pt. is s/p Right V.A.T. with Middle Lobectomy  -MS     Mode of Treatment physical therapy;individual therapy  -MS       Row Name 06/13/24 1120          General Information    Patient Profile Reviewed yes  -MS     Prior Level of Function independent:  -MS     Existing Precautions/Restrictions fall   Exit alarm; Chest tube x 1 (to waterseal)  -MS     Barriers to Rehab none identified  -MS       Row Name 06/13/24 1120          Cognition    Orientation Status (Cognition) oriented x 3  -MS       Row Name 06/13/24 1120          Safety Issues, Functional Mobility    Comment, Safety Issues/Impairments (Mobility) Gait belt used for safety.  -MS               User Key  (r) = Recorded By, (t) = Taken By, (c) = Cosigned By      Initials Name Provider Type    Kartik Mendiola, PT Physical Therapist                   Mobility       Row Name 06/13/24 1120          Bed Mobility    Bed Mobility supine-sit;sit-supine  -MS     Supine-Sit [place] (Bed Mobility) contact guard  -MS       Row Name 06/13/24 1120          Sit-Stand Transfer    Sit-Stand [place] (Transfers) contact guard;2 person assist  -MS       Row Name 06/13/24 1120          Gait/Stairs (Locomotion)    [place] Level (Gait) contact guard;2 person assist  -MS     Distance in Feet (Gait) 20  -MS     Deviations/Abnormal Patterns (Gait) base of support, wide  -MS     Comment, (Gait/Stairs) Limited in gait distance due to c/o dizziness with upright mobility.  -MS               User Key  (r) = Recorded By, (t) = Taken By, (c) = Cosigned By      Initials Name Provider Type    Kartik Mendiola, PT Physical Therapist                   Obj/Interventions       Row Name 06/13/24 1121          Range of Motion Comprehensive    Comment, General Range of Motion BUE/LE (WFL's)  -MS       Row Name 06/13/24 1121           Strength Comprehensive (MMT)    Comment, General Manual Muscle Testing (MMT) Assessment BUE/LE (3+/5)  -MS       Row Name 06/13/24 1121          Motor Skills    Therapeutic Exercise --  BLE ther. ex. program x 5 reps completed (LAQ's, Hip Flexion)  -MS               User Key  (r) = Recorded By, (t) = Taken By, (c) = Cosigned By      Initials Name Provider Type    Kartik Mendiola, PT Physical Therapist                   Goals/Plan       Row Name 06/13/24 1123          Bed Mobility Goal 1 (PT)    Activity/Assistive Device (Bed Mobility Goal 1, PT) bed mobility activities, all  -MS     [place] Level/Cues Needed (Bed Mobility Goal 1, PT) standby assist  -MS     Time Frame (Bed Mobility Goal 1, PT) long term goal (LTG);1 week  -MS       Row Name 06/13/24 1123          Transfer Goal 1 (PT)    Activity/Assistive Device (Transfer Goal 1, PT) transfers, all  -MS     [place] Level/Cues Needed (Transfer Goal 1, PT) standby assist  -MS     Time Frame (Transfer Goal 1, PT) 1 week;long term goal (LTG)  -MS       Row Name 06/13/24 1123          Gait Training Goal 1 (PT)    Activity/Assistive Device (Gait Training Goal 1, PT) gait (walking locomotion)  -MS     [place] Level (Gait Training Goal 1, PT) standby assist  -MS     Distance (Gait Training Goal 1, PT) 150 feet  -MS     Time Frame (Gait Training Goal 1, PT) long term goal (LTG);1 week  -MS       Row Name 06/13/24 1123          Therapy Assessment/Plan (PT)    Planned Therapy Interventions (PT) balance training;bed mobility training;gait training;home exercise program;patient/family education;postural re-education;transfer training;strengthening  -MS               User Key  (r) = Recorded By, (t) = Taken By, (c) = Cosigned By      Initials Name Provider Type    Kartik Mendiola, PT Physical Therapist                   Clinical Impression       Row Name 06/13/24 1122          Pain    Pretreatment Pain Rating 3/10  -MS     Posttreatment Pain Rating 3/10   -MS     Pain Location - Side/Orientation Right  -MS     Pain Location - chest  -MS       Row Name 06/13/24 1122          Plan of Care Review    Plan of Care Reviewed With patient;family  -MS       Row Name 06/13/24 1122          Therapy Assessment/Plan (PT)    Rehab Potential (PT) good, to achieve stated therapy goals  -MS     Criteria for Skilled Interventions Met (PT) skilled treatment is necessary  -MS     Therapy Frequency (PT) 6 times/wk  -MS       Row Name 06/13/24 1122          Positioning and Restraints    Pre-Treatment Position in bed  -MS     Post Treatment Position chair  -MS     In Chair notified nsg;reclined;sitting;call light within reach;encouraged to call for assist;exit alarm on;with family/caregiver  All lines/tubes intact.  -MS               User Key  (r) = Recorded By, (t) = Taken By, (c) = Cosigned By      Initials Name Provider Type    MS Mcneil Kartik SOL, PT Physical Therapist                   Outcome Measures       Row Name 06/13/24 1124 06/13/24 0823       How much help from another person do you currently need...    Turning from your back to your side while in flat bed without using bedrails? 3  -MS 3  -GG    Moving from lying on back to sitting on the side of a flat bed without bedrails? 3  -MS 2  -GG    Moving to and from a bed to a chair (including a wheelchair)? 2  -MS 2  -GG    Standing up from a chair using your arms (e.g., wheelchair, bedside chair)? 2  -MS 2  -GG    Climbing 3-5 steps with a railing? 2  -MS 2  -GG    To walk in hospital room? 2  -MS 2  -GG    AM-PAC 6 Clicks Score (PT) 14  -MS 13  -GG    Highest Level of Mobility Goal 4 --> Transfer to chair/commode  -MS 4 --> Transfer to chair/commode  -GG      Row Name 06/13/24 0400          How much help from another person do you currently need...    Turning from your back to your side while in flat bed without using bedrails? 3  -MERRY     Moving from lying on back to sitting on the side of a flat bed without bedrails? 2  -MERRY      Moving to and from a bed to a chair (including a wheelchair)? 2  -MERRY     Standing up from a chair using your arms (e.g., wheelchair, bedside chair)? 2  -MERRY     Climbing 3-5 steps with a railing? 2  -MERRY     To walk in hospital room? 2  -MERRY     AM-PAC 6 Clicks Score (PT) 13  -MERRY     Highest Level of Mobility Goal 4 --> Transfer to chair/commode  -MERRY       Row Name 06/13/24 1124          Functional Assessment    Outcome Measure Options AM-PAC 6 Clicks Basic Mobility (PT)  -MS               User Key  (r) = Recorded By, (t) = Taken By, (c) = Cosigned By      Initials Name Provider Type    MERRY Laura Beyer, RN Registered Nurse    Kartik Mendiola, PT Physical Therapist    Winsome Singh RN Registered Nurse                                 Physical Therapy Education       Title: PT OT SLP Therapies (Done)       Topic: Physical Therapy (Done)       Point: Mobility training (Done)       Learning Progress Summary             Patient Acceptance, E,D, VU,NR by MS at 6/13/2024 1124                         Point: Home exercise program (Done)       Learning Progress Summary             Patient Acceptance, E,D, VU,NR by MS at 6/13/2024 1124                         Point: Body mechanics (Done)       Learning Progress Summary             Patient Acceptance, E,D, VU,NR by MS at 6/13/2024 1124                         Point: Precautions (Done)       Learning Progress Summary             Patient Acceptance, E,D, VU,NR by MS at 6/13/2024 1124                                         User Key       Initials Effective Dates Name Provider Type Discipline    MS 06/16/21 -  Kartik Mcneil, PT Physical Therapist PT                  PT Recommendation and Plan  Planned Therapy Interventions (PT): balance training, bed mobility training, gait training, home exercise program, patient/family education, postural re-education, transfer training, strengthening  Plan of Care Reviewed With: patient  Outcome Evaluation: Pt. is a 73 year  old Male who is s/p Right VAT with Middle Lobectomy.  Pt. reports that prior to admission he was independent with functional mobility and used no A.D. during ambulation.  Pt. currently presents with decreased strength, decreased balance, and decreased tolerance to functional activity. This AM, pt. able to ambulate 20 feet, CGA x 2, with no use of A.D.  Pt. requires CGA x 1 for bed mobility and CGA x 2 for sit <-> stand transfers. BLE ther. ex. program x 5 reps completed for general strengthening.  Limited in upright mobility this date due to c/o dizziness.  Pt. will benefit from skilled inpt. P.T. to address his functional deficits and to assist pt. in regaining his maximum level of independence with functional mobility.     Time Calculation:         PT Charges       Row Name 06/13/24 1130             Time Calculation    Start Time 0935  -MS      Stop Time 0953  -MS      Time Calculation (min) 18 min  -MS      PT Received On 06/13/24  -MS      PT - Next Appointment 06/14/24  -MS      PT Goal Re-Cert Due Date 06/20/24  -MS         Time Calculation- PT    Total Timed Code Minutes- PT 17 minute(s)  -MS                User Key  (r) = Recorded By, (t) = Taken By, (c) = Cosigned By      Initials Name Provider Type    Kartik Mendiola, PT Physical Therapist                  Therapy Charges for Today       Code Description Service Date Service Provider Modifiers Qty    67920222504  PT EVAL MOD COMPLEXITY 2 6/13/2024 Kartik Mcneil, PT GP 1    [id number]  PT THERAPEUTIC ACT EA 15 MIN 6/13/2024 Kartik Mcneil, PT GP 1    [id number]  PT THER SUPP EA 15 MIN 6/13/2024 Kartik Mcneil, PT GP 1            PT G-Codes  Outcome Measure Options: AM-PAC 6 Clicks Basic Mobility (PT)  AM-PAC 6 Clicks Score (PT): 14  PT Discharge Summary  Anticipated Discharge Disposition (PT): home with assist, home with home health, skilled nursing facility (Pending pt. progress)    Kartik Mcneil, SUMAYA  6/13/2024

## 2024-06-13 NOTE — PLAN OF CARE
Goal Outcome Evaluation:  Plan of Care Reviewed With: patient           Outcome Evaluation: Pt. is a 73 year old Male who is s/p Right VAT with Middle Lobectomy.  Pt. reports that prior to admission he was independent with functional mobility and used no A.D. during ambulation.  Pt. currently presents with decreased strength, decreased balance, and decreased tolerance to functional activity. This AM, pt. able to ambulate 20 feet, CGA x 2, with no use of A.D.  Pt. requires CGA x 1 for bed mobility and CGA x 2 for sit <-> stand transfers. BLE ther. ex. program x 5 reps completed for general strengthening.  Limited in upright mobility this date due to c/o dizziness.  Pt. will benefit from skilled inpt. P.T. to address his functional deficits and to assist pt. in regaining his maximum level of independence with functional mobility.      Anticipated Discharge Disposition (PT): home with assist, home with home health, skilled nursing facility (Pending pt. progress)

## 2024-06-13 NOTE — PLAN OF CARE
Goal Outcome Evaluation:  Plan of Care Reviewed With: patient, spouse        Progress: no change  Outcome Evaluation: 73 year old Male presented to [facility] and is s/p Right VAT with Middle Lobectomy. Prior to admission he was independent with ADLS and mobility no AD use. Pt. currently presents with decreased strength, balance, and tolerance to functional activity. Pt. was able to perform STS CGA and func mob in room w CGA no AD use. Pt. was 97% with 4L NC, 83% post mobility and 93% after rest break. Pt. will benefit from skilled OT services to address deficits mentioned above and improve functional independence. OT rec dc home with       Anticipated Discharge Disposition (OT): home with home health

## 2024-06-13 NOTE — THERAPY EVALUATION
Patient Name: Branden Diop  : 1950    MRN: [id number]                              Today's Date: 2024       Admit Date: 2024    Visit Dx:     ICD-10-CM ICD-9-CM   1. Malignant neoplasm of middle lobe of right lung  C34.2 162.4     Patient Active Problem List   Diagnosis    A-fib    Atrioventricular block, Mobitz type 1, Wenckebach    Apnea, sleep    Obesity    Streptococcus pneumoniae    Sleep apnea with use of continuous positive airway pressure (CPAP)    Sinusitis    Right wrist pain    Pneumonia    Type 2 diabetes mellitus, with long-term current use of insulin    Hyperlipidemia    [place]    Depression    COPD (chronic obstructive pulmonary disease)    Colon polyps    Anxiety    Pruritus    Cholelithiasis    Fatty liver    Essential hypertension    Vitamin D deficiency    Emphysema, unspecified    Bronchiectasis with acute exacerbation    Chest pain, atypical    FHx: colonic polyps    Diverticulosis    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 NO MEDS FOR    Depression     History of atrial fibrillation     WITH CARDIO VERSION. NO PROBLEMS    History of skin cancer     BCC REMOVED FROM BACK    [place] (hard of hearing)     Hyperlipidemia     Inguinal hernia, recurrent     RIGHT    Left foot pain     Lung nodules     Rash     IN GROIN TREATING FOR YEAST INFECTION BY PCP    Redness of skin     LOWER LEGS BILATERAL    Scab     BILATERAL LEGS HEALING    Sleep apnea with use of continuous positive airway pressure (CPAP)     CPAP    Streptococcus pneumoniae     ABOUT 6-7 YR AGO.     Type 2 diabetes mellitus      Past Surgical History:   Procedure Laterality Date    BASAL CELL CARCINOMA EXCISION      BRONCHOSCOPY WITH ION ROBOTIC ASSIST  N/A 5/6/2024    Procedure: BRONCHOSCOPY WITH ION ROBOT WITH FNA, BIOPSIES, BAL AND ENDOBRONCHIAL ULTRASOUND WITH FNA;  Surgeon: Yony Coles MD;  Location: [facility] ENDOSCOPY;  Service: Robotics - Pulmonary;  Laterality: N/A;  PRE: PULMONARY NODULES  POST: SAME    CARDIAC CATHETERIZATION N/A 11/15/2021    Procedure: Coronary angiography;  Surgeon: Alfredo Jennings MD;  Location: [facility] CATH INVASIVE LOCATION;  Service: Cardiovascular;  Laterality: N/A;    CARDIAC CATHETERIZATION N/A 11/15/2021    Procedure: Left heart cath;  Surgeon: Alfredo Jennings MD;  Location: [facility] CATH INVASIVE LOCATION;  Service: Cardiovascular;  Laterality: N/A;    CARDIAC CATHETERIZATION N/A 11/15/2021    Procedure: Left ventriculography;  Surgeon: Alfredo Jennings MD;  Location: [facility] CATH INVASIVE LOCATION;  Service: Cardiovascular;  Laterality: N/A;    CARDIAC CATHETERIZATION N/A 11/15/2021    Procedure: Aortic root aortogram;  Surgeon: Alfredo Jennings MD;  Location: [facility] CATH INVASIVE LOCATION;  Service: Cardiovascular;  Laterality: N/A;    CARDIOVERSION      CHOLECYSTECTOMY N/A 10/07/2017    Procedure: CHOLECYSTECTOMY LAPAROSCOPIC;  Surgeon: Martir Trevino MD;  Location: [facility] MAIN OR;  Service:     COLONOSCOPY  2007    COLONOSCOPY N/A 8/30/2022    Procedure: COLONOSCOPY TO CECUM AND TI AND COLD SNARE POLYPECTOMY;  Surgeon: Cj Lopez MD;  Location: [facility] ENDOSCOPY;  Service: Gastroenterology;  Laterality: N/A;  Pre: History of colon polyps  Post: POLYP, PREVIOUS TATTOO    FOOT SURGERY Left     TOES ARE PINNED. ALL BUT GREAT TOE    HAND SURGERY      THUMB    HERNIA REPAIR      INGUINAL HERNIA REPAIR Right 06/27/2018    Procedure: INGUINAL HERNIA REPAIR, OPEN, RECURRENT;  Surgeon: Martir Trevino MD;  Location: [facility] OR OSC;  Service: General    LASIK Bilateral     NECK SURGERY      growth on salivary gland    REPLACEMENT TOTAL KNEE Right 2007    (he is going to have a LTKR next month)    REPLACEMENT  TOTAL KNEE Left       General Information       Row Name 06/13/24 1212          OT Time and Intention    Document Type evaluation  -AG     Mode of Treatment individual therapy;occupational therapy  -AG       Row Name 06/13/24 1212          General Information    Patient Profile Reviewed yes  -AG     Prior Level of Function independent:;ADL's;driving  -AG     Existing Precautions/Restrictions fall  Chest tube x 1 (to waterseal)  -AG     Barriers to Rehab none identified  -AG       Row Name 06/13/24 1212          Living Environment    People in Home spouse  -AG       Row Name 06/13/24 1212          Home Main Entrance    Number of Stairs, Main Entrance none  -AG       Row Name 06/13/24 1212          Cognition    Orientation Status (Cognition) oriented x 3  -AG       Row Name 06/13/24 1212          Safety Issues, Functional Mobility    Safety Issues Affecting Function (Mobility) safety precaution awareness;insight into deficits/self-awareness  -AG     Impairments Affecting Function (Mobility) strength;pain;endurance/activity tolerance;balance  -AG               User Key  (r) = Recorded By, (t) = Taken By, (c) = Cosigned By      Initials Name Provider Type    AG Nick Hardy, SHEILA Occupational Therapist                     Mobility/ADL's       Row Name 06/13/24 1214          Bed Mobility    Comment, (Bed Mobility) UIC at start and end of session  -AG       [facility] Name 06/13/24 1214          Transfers    Transfers sit-stand transfer  -AG       [facility] Name 06/13/24 1214          Sit-Stand Transfer    Sit-Stand [place] (Transfers) contact guard;1 person assist  -AG     Comment, (Sit-Stand Transfer) no AD  -AG       [facility] Name 06/13/24 1214          Functional Mobility    Functional Mobility- Comment short distance in room w no AD  -AG       [facility] Name 06/13/24 1214          Activities of Daily Living    BADL Assessment/Intervention lower body dressing;grooming;toileting;upper body dressing  -AG       Row Name 06/13/24 1214           Lower Body Dressing Assessment/Training    [place] Level (Lower Body Dressing) lower body dressing skills;don;socks;minimum assist (75% patient effort)  -AG       [facility] Name 06/13/24 1214          Grooming Assessment/Training    [place] Level (Grooming) grooming skills;hair care, combing/brushing;wash face, hands;set up  -AG     Position (Grooming) supported sitting  -AG       Row Name 06/13/24 1214          Toileting Assessment/Training    [place] Level (Toileting) minimum assist (75% patient effort)  -AG     Comment, (Toileting) urinal use - encouraged to use toilet w staff assist  -AG       Row Name 06/13/24 1214          Upper Body Dressing Assessment/Training    [place] Level (Upper Body Dressing) upper body dressing skills;don;front opening garment;contact guard assist  -AG     Position (Upper Body Dressing) supported sitting  -AG               User Key  (r) = Recorded By, (t) = Taken By, (c) = Cosigned By      Initials Name Provider Type    AG Nick Hardy OT Occupational Therapist                   Obj/Interventions       Row Name 06/13/24 1215          Sensory Assessment (Somatosensory)    Sensory Assessment (Somatosensory) sensation intact  -AG       Row Name 06/13/24 1215          Vision Assessment/Intervention    Visual Impairment/Limitations WFL  -AG       Row Name 06/13/24 1215          Range of Motion Comprehensive    General Range of Motion no range of motion deficits identified  -AG       Row Name 06/13/24 1215          Strength Comprehensive (MMT)    Comment, General Manual Muscle Testing (MMT) Assessment generalized weakness  -AG       Row Name 06/13/24 1215          Motor Skills    Motor Skills functional endurance  -AG     Functional Endurance fair -  -AG       Row Name 06/13/24 1215          Balance    Balance Assessment sitting static balance;sitting dynamic balance;sit to stand dynamic balance;standing static balance;standing dynamic balance  -AG     Static  Sitting Balance standby assist  -AG     Dynamic Sitting Balance standby assist  -AG     Position, Sitting Balance sitting in chair  -AG     Static Standing Balance contact guard  -AG     Dynamic Standing Balance contact guard  -AG     Position/Device Used, Standing Balance unsupported  -AG     Balance Interventions sitting;standing;occupation based/functional task  -AG     Comment, Balance no LOB  -AG               User Key  (r) = Recorded By, (t) = Taken By, (c) = Cosigned By      Initials Name Provider Type    AG Nick Hardy, OT Occupational Therapist                   Goals/Plan       Row Name 06/13/24 1219          Bed Mobility Goal 1 (OT)    Activity/Assistive Device (Bed Mobility Goal 1, OT) bed mobility activities, all  -AG     [place] Level/Cues Needed (Bed Mobility Goal 1, OT) modified independence  -AG     Time Frame (Bed Mobility Goal 1, OT) short term goal (STG);2 weeks  -AG     Progress/Outcomes (Bed Mobility Goal 1, OT) goal ongoing  -AG       [facility] Name 06/13/24 1219          Transfer Goal 1 (OT)    Activity/Assistive Device (Transfer Goal 1, OT) transfers, all  -AG     [place] Level/Cues Needed (Transfer Goal 1, OT) modified independence  -AG     Time Frame (Transfer Goal 1, OT) short term goal (STG);2 weeks  -AG       [facility] Name 06/13/24 1219          Dressing Goal 1 (OT)    Activity/Device (Dressing Goal 1, OT) dressing skills, all  -AG     [place]/Cues Needed (Dressing Goal 1, OT) modified independence  -AG     Time Frame (Dressing Goal 1, OT) short term goal (STG);2 weeks  -AG     Progress/Outcome (Dressing Goal 1, OT) goal ongoing  -AG       Row Name 06/13/24 1219          Toileting Goal 1 (OT)    Activity/Device (Toileting Goal 1, OT) toileting skills, all  -AG     [place] Level/Cues Needed (Toileting Goal 1, OT) modified independence  -AG     Time Frame (Toileting Goal 1, OT) short term goal (STG);2 weeks  -AG     Progress/Outcome (Toileting Goal 1, OT) goal ongoing  -AG        Row Name 06/13/24 1219          Grooming Goal 1 (OT)    Activity/Device (Grooming Goal 1, OT) grooming skills, all  -AG     [place] (Grooming Goal 1, OT) modified independence  -AG     Time Frame (Grooming Goal 1, OT) short term goal (STG);2 weeks  -AG     Progress/Outcome (Grooming Goal 1, OT) goal ongoing  -AG       Row Name 06/13/24 1219          Therapy Assessment/Plan (OT)    Planned Therapy Interventions (OT) activity tolerance training;functional balance retraining;occupation/activity based interventions;ROM/therapeutic exercise;IADL retraining;adaptive equipment training;BADL retraining;neuromuscular control/coordination retraining;patient/caregiver education/training;transfer/mobility retraining;strengthening exercise  -AG               User Key  (r) = Recorded By, (t) = Taken By, (c) = Cosigned By      Initials Name Provider Type    AG Nick Hardy, SHEILA Occupational Therapist                   Clinical Impression       Row Name 06/13/24 1216          Pain Assessment    Pretreatment Pain Rating 3/10  -AG     Posttreatment Pain Rating 3/10  -AG     Pain Location - Side/Orientation Right  -AG     Pain Location - chest  -AG     Pain Intervention(s) Repositioned;Rest  -AG       Row Name 06/13/24 1216          Plan of Care Review    Plan of Care Reviewed With patient;spouse  -AG     Progress no change  -AG     Outcome Evaluation 73 year old Male presented to [facility] and is s/p Right VAT with Middle Lobectomy. Prior to admission he was independent with ADLS and mobility no AD use. Pt. currently presents with decreased strength, balance, and tolerance to functional activity. Pt. was able to perform STS CGA and func mob in room w CGA no AD use. Pt. was 97% with 4L NC, 83% post mobility and 93% after rest break. Pt. will benefit from skilled OT services to address deficits mentioned above and improve functional independence. OT rec dc home with   -AG       Row Name 06/13/24 1216          Therapy  Assessment/Plan (OT)    Rehab Potential (OT) good, to achieve stated therapy goals  -AG     Criteria for Skilled Therapeutic Interventions Met (OT) yes;skilled treatment is necessary;meets criteria  -AG     Therapy Frequency (OT) 5 times/wk  -AG       Row Name 06/13/24 1216          Therapy Plan Review/Discharge Plan (OT)    Anticipated Discharge Disposition (OT) home with home health  -AG       Row Name 06/13/24 1216          Positioning and Restraints    Pre-Treatment Position sitting in chair/recliner  -AG     Post Treatment Position chair  -AG     In Chair notified nsg;reclined;call light within reach;encouraged to call for assist;exit alarm on;with family/caregiver  -AG               User Key  (r) = Recorded By, (t) = Taken By, (c) = Cosigned By      Initials Name Provider Type    AG Nick Hardy, SHEILA Occupational Therapist                   Outcome Measures       Row Name 06/13/24 1220          How much help from another is currently needed...    Putting on and taking off regular lower body clothing? 2  -AG     Bathing (including washing, rinsing, and drying) 2  -AG     Toileting (which includes using toilet bed pan or urinal) 2  -AG     Putting on and taking off regular upper body clothing 3  -AG     Taking care of personal grooming (such as brushing teeth) 3  -AG     Eating meals 4  -AG     AM-PAC 6 Clicks Score (OT) 16  -AG       Row Name 06/13/24 1124 06/13/24 0823       How much help from another person do you currently need...    Turning from your back to your side while in flat bed without using bedrails? 3  -MS 3  -GG    Moving from lying on back to sitting on the side of a flat bed without bedrails? 3  -MS 2  -GG    Moving to and from a bed to a chair (including a wheelchair)? 2  -MS 2  -GG    Standing up from a chair using your arms (e.g., wheelchair, bedside chair)? 2  -MS 2  -GG    Climbing 3-5 steps with a railing? 2  -MS 2  -GG    To walk in hospital room? 2  -MS 2  -GG    AM-PAC 6 Clicks  Score (PT) 14  -MS 13  -GG    Highest Level of Mobility Goal 4 --> Transfer to chair/commode  -MS 4 --> Transfer to chair/commode  -GG      Row Name 06/13/24 0400          How much help from another person do you currently need...    Turning from your back to your side while in flat bed without using bedrails? 3  -MERRY     Moving from lying on back to sitting on the side of a flat bed without bedrails? 2  -MERRY     Moving to and from a bed to a chair (including a wheelchair)? 2  -MERRY     Standing up from a chair using your arms (e.g., wheelchair, bedside chair)? 2  -MERRY     Climbing 3-5 steps with a railing? 2  -MERRY     To walk in hospital room? 2  -MERRY     AM-PAC 6 Clicks Score (PT) 13  -MERRY     Highest Level of Mobility Goal 4 --> Transfer to chair/commode  -MERRY       Row Name 06/13/24 1220 06/13/24 1124       Functional Assessment    Outcome Measure Options AM-PAC 6 Clicks Daily Activity (OT)  -AG AM-PAC 6 Clicks Basic Mobility (PT)  -MS              User Key  (r) = Recorded By, (t) = Taken By, (c) = Cosigned By      Initials Name Provider Type    Laura Harding RN Registered Nurse    Kartik Mendiola, PT Physical Therapist    Winsome Singh RN Registered Nurse    Nick Beth OT Occupational Therapist                    Occupational Therapy Education       Title: PT OT SLP Therapies (Done)       Topic: Occupational Therapy (Done)       Point: ADL training (Done)       Description:   Instruct learner(s) on proper safety adaptation and remediation techniques during self care or transfers.   Instruct in proper use of assistive devices.                  Learning Progress Summary             Patient Acceptance, E,TB, VU by  at 6/13/2024 1220   Significant Other Acceptance, E,TB, VU by  at 6/13/2024 1220                         Point: Home exercise program (Done)       Description:   Instruct learner(s) on appropriate technique for monitoring, assisting and/or progressing therapeutic  exercises/activities.                  Learning Progress Summary             Patient Acceptance, E,TB, VU by AG at 6/13/2024 1220   Significant Other Acceptance, E,TB, VU by AG at 6/13/2024 1220                         Point: Precautions (Done)       Description:   Instruct learner(s) on prescribed precautions during self-care and functional transfers.                  Learning Progress Summary             Patient Acceptance, E,TB, VU by AG at 6/13/2024 1220   Significant Other Acceptance, E,TB, VU by  at 6/13/2024 1220                         Point: Body mechanics (Done)       Description:   Instruct learner(s) on proper positioning and spine alignment during self-care, functional mobility activities and/or exercises.                  Learning Progress Summary             Patient Acceptance, E,TB, VU by  at 6/13/2024 1220   Significant Other Acceptance, E,TB, VU by  at 6/13/2024 1220                                         User Key       Initials Effective Dates Name Provider Type Discipline     01/23/24 -  Nick Hardy OT Occupational Therapist OT                  OT Recommendation and Plan  Planned Therapy Interventions (OT): activity tolerance training, functional balance retraining, occupation/activity based interventions, ROM/therapeutic exercise, IADL retraining, adaptive equipment training, BADL retraining, neuromuscular control/coordination retraining, patient/caregiver education/training, transfer/mobility retraining, strengthening exercise  Therapy Frequency (OT): 5 times/wk  Plan of Care Review  Plan of Care Reviewed With: patient, spouse  Progress: no change  Outcome Evaluation: 73 year old Male presented to [facility] and is s/p Right VAT with Middle Lobectomy. Prior to admission he was independent with ADLS and mobility no AD use. Pt. currently presents with decreased strength, balance, and tolerance to functional activity. Pt. was able to perform STS CGA and func mob in room w CGA no AD use.  Pt. was 97% with 4L NC, 83% post mobility and 93% after rest break. Pt. will benefit from skilled OT services to address deficits mentioned above and improve functional independence. OT rec dc home with      Time Calculation:   Evaluation Complexity (OT)  Review Occupational Profile/Medical/Therapy History Complexity: expanded/moderate complexity  Assessment, Occupational Performance/Identification of Deficit Complexity: 3-5 performance deficits  Clinical Decision Making Complexity (OT): detailed assessment/moderate complexity  Overall Complexity of Evaluation (OT): moderate complexity     Time Calculation- OT       Row Name 06/13/24 1220             Time Calculation- OT    OT Start Time 0950  -AG      OT Stop Time 1010  -AG      OT Time Calculation (min) 20 min  -AG      Total Timed Code Minutes- OT 15 minute(s)  -AG      OT Received On 06/13/24  -AG      OT - Next Appointment 06/14/24  -AG      OT Goal Re-Cert Due Date 06/27/24  -AG         Timed Charges    27240 - OT Self Care/Mgmt Minutes 15  -AG         Untimed Charges    OT Eval/Re-eval Minutes 5  -AG         Total Minutes    Timed Charges Total Minutes 15  -AG      Untimed Charges Total Minutes 5  -AG       Total Minutes 20  -AG                User Key  (r) = Recorded By, (t) = Taken By, (c) = Cosigned By      Initials Name Provider Type    AG Nick Hardy OT Occupational Therapist                  Therapy Charges for Today       Code Description Service Date Service Provider Modifiers Qty    [id number]  OT SELF CARE/MGMT/TRAIN EA 15 MIN 6/13/2024 Nick Hardy OT GO 1    [id number] HC OT EVAL MOD COMPLEXITY 2 6/13/2024 Nick Hardy OT GO 1                 Nick Hardy OT  6/13/2024

## 2024-06-13 NOTE — ANESTHESIA POSTPROCEDURE EVALUATION
Patient: Branden Diop    Procedure Summary       Date: 06/12/24 Room / Location: [facility] OR [address] [place] [address] MAIN OR    Anesthesia Start: 1217 Anesthesia Stop: 1757    Procedure: BRONCHOSCOPY, RIGHT VIDEO ASSISTED THORACOSCOPY WITH RIGHT MIDDLE LOBECTOMY WITH DAVINCI ROBOT, MEDIASTINAL LYMPH NODE DISSECTION, INTERCOSTAL NERVE BLOCK (Right: Chest) Diagnosis:       Malignant neoplasm of middle lobe of right lung      (Malignant neoplasm of middle lobe of right lung [C34.2])    Surgeons: David Figueroa MD Provider: Nery Vazquez MD    Anesthesia Type: general, [place] ASA Status: 3            Anesthesia Type: general, [place]    Vitals  Vitals Value Taken Time   BP 95/61 06/12/24 1932   Temp 36.9 °C (98.4 °F) 06/12/24 1805   Pulse 85 06/12/24 1942   Resp 22 06/12/24 1853   SpO2 95 % 06/12/24 1942   Vitals shown include unfiled device data.        Post Anesthesia Care and Evaluation    Patient location during evaluation: bedside  Patient participation: complete - patient participated  Level of consciousness: awake  Pain management: adequate    Airway patency: patent  Anesthetic complications: No anesthetic complications    Cardiovascular status: acceptable  Respiratory status: acceptable  Hydration status: acceptable

## 2024-06-14 ENCOUNTER — APPOINTMENT (OUTPATIENT)
Dept: GENERAL RADIOLOGY | Facility: HOSPITAL | Age: 74
DRG: 165 | End: 2024-06-14
Payer: MEDICARE

## 2024-06-14 LAB
GLUCOSE BLDC GLUCOMTR-MCNC: 242 MG/DL (ref 70–130)
GLUCOSE BLDC GLUCOMTR-MCNC: 254 MG/DL (ref 70–130)
GLUCOSE BLDC GLUCOMTR-MCNC: 274 MG/DL (ref 70–130)
GLUCOSE BLDC GLUCOMTR-MCNC: 291 MG/DL (ref 70–130)

## 2024-06-14 PROCEDURE — 94799 UNLISTED PULMONARY SVC/PX: CPT

## 2024-06-14 PROCEDURE — 94762 N-INVAS EAR/PLS OXIMTRY CONT: CPT

## 2024-06-14 PROCEDURE — 82948 REAGENT STRIP/BLOOD GLUCOSE: CPT

## 2024-06-14 PROCEDURE — 94761 N-INVAS EAR/PLS OXIMETRY MLT: CPT

## 2024-06-14 PROCEDURE — 71045 X-RAY EXAM CHEST 1 VIEW: CPT

## 2024-06-14 PROCEDURE — 63710000001 INSULIN LISPRO (HUMAN) PER 5 UNITS: Performed by: SURGERY

## 2024-06-14 PROCEDURE — 25010000002 ENOXAPARIN PER 10 MG: Performed by: SURGERY

## 2024-06-14 PROCEDURE — 25010000002 FUROSEMIDE PER 20 MG: Performed by: NURSE PRACTITIONER

## 2024-06-14 PROCEDURE — 63710000001 INSULIN GLARGINE PER 5 UNITS

## 2024-06-14 PROCEDURE — 94760 N-INVAS EAR/PLS OXIMETRY 1: CPT

## 2024-06-14 PROCEDURE — 94618 PULMONARY STRESS TESTING: CPT

## 2024-06-14 PROCEDURE — 97530 THERAPEUTIC ACTIVITIES: CPT

## 2024-06-14 PROCEDURE — 94664 DEMO&/EVAL PT USE INHALER: CPT

## 2024-06-14 PROCEDURE — 99024 POSTOP FOLLOW-UP VISIT: CPT

## 2024-06-14 RX ORDER — GUAIFENESIN 600 MG/1
1200 TABLET, EXTENDED RELEASE ORAL EVERY 12 HOURS SCHEDULED
Qty: 56 TABLET | Refills: 0 | Status: SHIPPED | OUTPATIENT
Start: 2024-06-14 | End: 2024-06-28

## 2024-06-14 RX ORDER — GABAPENTIN 300 MG/1
300 CAPSULE ORAL EVERY 8 HOURS SCHEDULED
Qty: 90 CAPSULE | Refills: 0 | Status: SHIPPED | OUTPATIENT
Start: 2024-06-14 | End: 2024-07-16

## 2024-06-14 RX ORDER — FUROSEMIDE 10 MG/ML
20 INJECTION INTRAMUSCULAR; INTRAVENOUS ONCE
Status: COMPLETED | OUTPATIENT
Start: 2024-06-14 | End: 2024-06-14

## 2024-06-14 RX ORDER — ACETAMINOPHEN 500 MG
1000 TABLET ORAL 3 TIMES DAILY
Qty: 84 TABLET | Refills: 0 | Status: SHIPPED | OUTPATIENT
Start: 2024-06-14 | End: 2024-06-28

## 2024-06-14 RX ORDER — GUAIFENESIN 600 MG/1
1200 TABLET, EXTENDED RELEASE ORAL EVERY 12 HOURS SCHEDULED
Status: DISCONTINUED | OUTPATIENT
Start: 2024-06-14 | End: 2024-06-16 | Stop reason: HOSPADM

## 2024-06-14 RX ORDER — OXYCODONE HYDROCHLORIDE 5 MG/1
5 TABLET ORAL EVERY 4 HOURS PRN
Qty: 18 TABLET | Refills: 0 | Status: ON HOLD | OUTPATIENT
Start: 2024-06-14 | End: 2024-06-21

## 2024-06-14 RX ADMIN — ACETYLCYSTEINE 3 ML: 200 SOLUTION ORAL; RESPIRATORY (INHALATION) at 07:12

## 2024-06-14 RX ADMIN — GABAPENTIN 300 MG: 300 CAPSULE ORAL at 14:25

## 2024-06-14 RX ADMIN — INSULIN LISPRO 5 UNITS: 100 INJECTION, SOLUTION INTRAVENOUS; SUBCUTANEOUS at 12:35

## 2024-06-14 RX ADMIN — GUAIFENESIN 1200 MG: 600 TABLET, EXTENDED RELEASE ORAL at 10:02

## 2024-06-14 RX ADMIN — CETIRIZINE HYDROCHLORIDE 10 MG: 10 TABLET, FILM COATED ORAL at 09:46

## 2024-06-14 RX ADMIN — ACETAMINOPHEN 1000 MG: 500 TABLET ORAL at 17:05

## 2024-06-14 RX ADMIN — GABAPENTIN 300 MG: 300 CAPSULE ORAL at 21:01

## 2024-06-14 RX ADMIN — DOCUSATE SODIUM 100 MG: 100 CAPSULE, LIQUID FILLED ORAL at 21:01

## 2024-06-14 RX ADMIN — ACETYLCYSTEINE 3 ML: 200 SOLUTION ORAL; RESPIRATORY (INHALATION) at 14:55

## 2024-06-14 RX ADMIN — ALBUTEROL SULFATE 2.5 MG: 2.5 SOLUTION RESPIRATORY (INHALATION) at 21:29

## 2024-06-14 RX ADMIN — ENOXAPARIN SODIUM 40 MG: 100 INJECTION SUBCUTANEOUS at 08:01

## 2024-06-14 RX ADMIN — POLYETHYLENE GLYCOL 3350 17 G: 17 POWDER, FOR SOLUTION ORAL at 08:01

## 2024-06-14 RX ADMIN — OXYCODONE HYDROCHLORIDE 5 MG: 5 TABLET ORAL at 03:50

## 2024-06-14 RX ADMIN — INSULIN LISPRO 8 UNITS: 100 INJECTION, SOLUTION INTRAVENOUS; SUBCUTANEOUS at 08:01

## 2024-06-14 RX ADMIN — OXYCODONE HYDROCHLORIDE 5 MG: 5 TABLET ORAL at 14:25

## 2024-06-14 RX ADMIN — GABAPENTIN 300 MG: 300 CAPSULE ORAL at 05:42

## 2024-06-14 RX ADMIN — OXYCODONE HYDROCHLORIDE 5 MG: 5 TABLET ORAL at 08:01

## 2024-06-14 RX ADMIN — TORSEMIDE 20 MG: 20 TABLET ORAL at 08:01

## 2024-06-14 RX ADMIN — ACETAMINOPHEN 1000 MG: 500 TABLET ORAL at 08:01

## 2024-06-14 RX ADMIN — FUROSEMIDE 20 MG: 10 INJECTION, SOLUTION INTRAMUSCULAR; INTRAVENOUS at 10:01

## 2024-06-14 RX ADMIN — DOCUSATE SODIUM 100 MG: 100 CAPSULE, LIQUID FILLED ORAL at 08:01

## 2024-06-14 RX ADMIN — ACETYLCYSTEINE 3 ML: 200 SOLUTION ORAL; RESPIRATORY (INHALATION) at 21:30

## 2024-06-14 RX ADMIN — ACETAMINOPHEN 1000 MG: 500 TABLET ORAL at 21:01

## 2024-06-14 RX ADMIN — INSULIN LISPRO 8 UNITS: 100 INJECTION, SOLUTION INTRAVENOUS; SUBCUTANEOUS at 17:05

## 2024-06-14 RX ADMIN — GUAIFENESIN 1200 MG: 600 TABLET, EXTENDED RELEASE ORAL at 21:01

## 2024-06-14 RX ADMIN — ALBUTEROL SULFATE 2.5 MG: 2.5 SOLUTION RESPIRATORY (INHALATION) at 14:53

## 2024-06-14 RX ADMIN — OXYCODONE HYDROCHLORIDE 5 MG: 5 TABLET ORAL at 21:01

## 2024-06-14 RX ADMIN — ALBUTEROL SULFATE 2.5 MG: 2.5 SOLUTION RESPIRATORY (INHALATION) at 07:10

## 2024-06-14 RX ADMIN — SERTRALINE 50 MG: 50 TABLET, FILM COATED ORAL at 08:01

## 2024-06-14 RX ADMIN — INSULIN GLARGINE 15 UNITS: 100 INJECTION, SOLUTION SUBCUTANEOUS at 17:05

## 2024-06-14 RX ADMIN — INSULIN LISPRO 8 UNITS: 100 INJECTION, SOLUTION INTRAVENOUS; SUBCUTANEOUS at 21:01

## 2024-06-14 NOTE — PLAN OF CARE
Problem: Adult Inpatient Plan of Care  Goal: Plan of Care Review  Outcome: Ongoing, Progressing  Flowsheets (Taken 6/14/2024 1452)  Progress: no change  Plan of Care Reviewed With: patient  Outcome Evaluation: pt remains aaox4. x1 dose of lasix given this shift. chest tube to water seal, intermittent air leak. ambulation and pulmonary hygiene encouraged. prn medication provided for c/o pain   Goal Outcome Evaluation:  Plan of Care Reviewed With: patient        Progress: no change  Outcome Evaluation: pt remains aaox4. x1 dose of lasix given this shift. chest tube to water seal, intermittent air leak. ambulation and pulmonary hygiene encouraged. prn medication provided for c/o pain       Chest tube dressing changed x3 this shift d/t drainage

## 2024-06-14 NOTE — DISCHARGE INSTRUCTIONS
Post-op VAT / Thoracotomy Discharge Instructions  !!! PLEASE GO TO THE [facility] FOR A CHEST X-RAY PRIOR TO YOUR APPOINTMENT!!!!    1. Activity:  · Climb stairs as tolerated  · May drive car after 2 weeks or as directed by surgeon  · Walk at least 3-4 times a day  · Limit lifting for first 6 weeks. No lifting, pushing, or pulling greater than 20 pounds for at least 2 weeks  · Continue to use your incentive spirometer 10x/hour    2. Nutrition:  · Resume previous diet  · Eat well balanced meals  · Avoid constipation by eating fresh fruits, vegetables, whole grain foods and increasing fluid intake.    3. Incision (wound) Care:  · Remove dressing after 48 hours, then leave open to air  · If continued drainage, change dressing every 24 hours and as needed with dry gauze  · May shower after discharge.  · Wash around incision area with soap and water daily. May also cleanse with hydrogen peroxide and/or betadine  · No lotions or creams on incision area. It is normal to have some drainage from your chest tube site. Please keep the area clean and dry.    4. When to call for Medical Advice: [phone]  · Fever greater than 101 degrees  · Unusual or severe pain  · Difficulty breathing  · Incision changes (redness, swelling, or increased purulent drainage)  · Any questions or problems    5. Medication Instructions:  · Take Pain medications as directed to stay comfortable     -Typically Tylenol, Gabapentin, Celebrex (anti-inflamatory), oxycodone as needed. See discharge teaching sheet  · No driving or drinking alcohol when taking prescribed pain meds  · Laxative of choice if needed for constipation.    6. Follow- up appointment:  · We will arrange a follow up appointment in about 2 weeks   Please go to the [facility] for a chest x-ray prior to your appointment

## 2024-06-14 NOTE — PROGRESS NOTES
"  POST-OPERATIVE NOTE     Chief Complaint: Right middle lobe squamous cell carcinoma  S/P: Right middle lobectomy  POD # 2    Subjective:  Symptoms:  Improved.  No shortness of breath, cough or chest pain.    Diet:  Adequate intake.  No nausea or vomiting.    Activity level: Returning to normal.    Pain:  He complains of pain that is mild.  Pain is well controlled.        Objective:  General Appearance:  Comfortable and in no acute distress.    Vital signs: (most recent): Blood pressure 144/73, pulse 67, temperature 97.9 °F (36.6 °C), temperature source Oral, resp. rate 18, height 180.3 cm (71\"), SpO2 92%.    Lungs:  Normal effort and normal respiratory rate.  He is not in respiratory distress.    Heart: Normal rate.  Regular rhythm.    Chest: Symmetric chest wall expansion. Chest wall tenderness (Incisional, right chest tube.) present.    Abdomen: Abdomen is soft and non-distended.    Neurological: Patient is alert and oriented to person, place and time.    Skin:  Warm and dry.                Chest tube:   Site: Right, Clean, Dry, Intact, and Securement device intact  Suction: waterseal  Air Leak: negative  24 Hour Total: 370ml     Results Review:     I reviewed the patient's new clinical results.  I reviewed the patient's new imaging results and agree with the interpretation.  Discussed with patient, nurse, and surgeon    Assessment & Plan   He continues to do well postoperatively.  A.m. chest x-ray reviewed which demonstrates stability.  Chest tube remained to waterseal overnight and has drained a small to moderate amount of thin serosanguineous fluid.  Will continue chest tube to waterseal.  Give one-time dose of IV Lasix.  He is continue to require supplemental oxygen and will likely be discharged on oxygen.  Obtain walking oximetry along with overnight oximetry.  Encourage pulmonary hygiene, I-S.  Increase activity including scheduled walks around the nurses station.  Final surgical pathology remains " pending.    BAM Rodgers  [facility]  06/14/24  13:54 EDT    Patient was seen and assessed while wearing personal protective equipment including facemask, protective eyewear and gloves.  Hand hygiene performed prior to entering the room and upon exiting with doffing of gloves.

## 2024-06-14 NOTE — PLAN OF CARE
Problem: Adult Inpatient Plan of Care  Goal: Plan of Care Review  Outcome: Ongoing, Progressing  Flowsheets  Taken 6/14/2024 0428 by Laura Beyer, RN  Plan of Care Reviewed With: patient  Outcome Evaluation: medicated for co pain per mar, conts w o2 at 4l nc, chest tube remains to waterseal no airleak noted. resting on and off w eyes closed.  Taken 6/13/2024 1743 by Winsome Hua RN  Progress: improving   Goal Outcome Evaluation:  Plan of Care Reviewed With: patient        Progress: no change  Outcome Evaluation: medicated for co pain per mar, conts w o2 at 4l nc, chest tube remains to waterseal no airleak noted. resting on and off w eyes closed.

## 2024-06-14 NOTE — THERAPY TREATMENT NOTE
Patient Name: Branden Diop  : 1950    MRN: [id number]                              Today's Date: 2024       Admit Date: 2024    Visit Dx:     ICD-10-CM ICD-9-CM   1. 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  C34.2 162.4   2. Malignant neoplasm of middle lobe of right lung  C34.2 162.4     Patient Active Problem List   Diagnosis    A-fib    Atrioventricular block, Mobitz type 1, Wenckebach    Apnea, sleep    Obesity    Streptococcus pneumoniae    Sleep apnea with use of continuous positive airway pressure (CPAP)    Sinusitis    Right wrist pain    Pneumonia    Type 2 diabetes mellitus, with long-term current use of insulin    Hyperlipidemia    Hammertoe    Depression    COPD (chronic obstructive pulmonary disease)    Colon polyps    Anxiety    Pruritus    Cholelithiasis    Fatty liver    Essential hypertension    Vitamin D deficiency    Emphysema, unspecified    Bronchiectasis with acute exacerbation    Chest pain, atypical    FHx: colonic polyps    Diverticulosis    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 NO MEDS FOR    Depression     History of atrial fibrillation     WITH CARDIO VERSION. NO PROBLEMS    History of skin cancer     BCC REMOVED FROM BACK    [place] (hard of hearing)     Hyperlipidemia     Inguinal hernia, recurrent     RIGHT    Left foot pain     Lung nodules     Rash     IN GROIN TREATING FOR YEAST INFECTION BY PCP    Redness of skin     LOWER LEGS BILATERAL    Scab     BILATERAL LEGS HEALING    Sleep apnea with use of continuous positive airway pressure (CPAP)     CPAP    Streptococcus pneumoniae     ABOUT 6-7 YR AGO.     Type 2 diabetes mellitus      Past Surgical History:   Procedure Laterality Date     BASAL CELL CARCINOMA EXCISION      BRONCHOSCOPY WITH ION ROBOTIC ASSIST N/A 5/6/2024    Procedure: BRONCHOSCOPY WITH ION ROBOT WITH FNA, BIOPSIES, BAL AND ENDOBRONCHIAL ULTRASOUND WITH FNA;  Surgeon: Yony Coles MD;  Location: [facility] ENDOSCOPY;  Service: Robotics - Pulmonary;  Laterality: N/A;  PRE: PULMONARY NODULES  POST: SAME    CARDIAC CATHETERIZATION N/A 11/15/2021    Procedure: Coronary angiography;  Surgeon: Alfredo Jennings MD;  Location: [facility] CATH INVASIVE LOCATION;  Service: Cardiovascular;  Laterality: N/A;    CARDIAC CATHETERIZATION N/A 11/15/2021    Procedure: Left heart cath;  Surgeon: Alfredo Jennings MD;  Location: [facility] CATH INVASIVE LOCATION;  Service: Cardiovascular;  Laterality: N/A;    CARDIAC CATHETERIZATION N/A 11/15/2021    Procedure: Left ventriculography;  Surgeon: Alfredo Jennings MD;  Location: [facility] CATH INVASIVE LOCATION;  Service: Cardiovascular;  Laterality: N/A;    CARDIAC CATHETERIZATION N/A 11/15/2021    Procedure: Aortic root aortogram;  Surgeon: Alfredo Jennings MD;  Location: [facility] CATH INVASIVE LOCATION;  Service: Cardiovascular;  Laterality: N/A;    CARDIOVERSION      CHOLECYSTECTOMY N/A 10/07/2017    Procedure: CHOLECYSTECTOMY LAPAROSCOPIC;  Surgeon: Martir Trevino MD;  Location: [facility] MAIN OR;  Service:     COLONOSCOPY  2007    COLONOSCOPY N/A 8/30/2022    Procedure: COLONOSCOPY TO CECUM AND TI AND COLD SNARE POLYPECTOMY;  Surgeon: Cj Lopez MD;  Location: [facility] ENDOSCOPY;  Service: Gastroenterology;  Laterality: N/A;  Pre: History of colon polyps  Post: POLYP, PREVIOUS TATTOO    FOOT SURGERY Left     TOES ARE PINNED. ALL BUT GREAT TOE    HAND SURGERY      THUMB    HERNIA REPAIR      INGUINAL HERNIA REPAIR Right 06/27/2018    Procedure: INGUINAL HERNIA REPAIR, OPEN, RECURRENT;  Surgeon: Martir Trevino MD;  Location: [facility] OR OSC;  Service: General    LASIK Bilateral     LOBECTOMY Right 6/12/2024    Procedure: BRONCHOSCOPY, RIGHT  VIDEO ASSISTED THORACOSCOPY WITH RIGHT MIDDLE LOBECTOMY WITH [employer] ROBOT, MEDIASTINAL LYMPH NODE DISSECTION, INTERCOSTAL NERVE BLOCK;  Surgeon: David Figueroa MD;  Location: [facility];  Service: Robotics - [employer];  Laterality: Right;    NECK SURGERY      growth on salivary gland    REPLACEMENT TOTAL KNEE Right 2007    (he is going to have a LTKR next month)    REPLACEMENT TOTAL KNEE Left       General Information       Row Name 06/14/24 1445          Physical Therapy Time and Intention    Document Type therapy note (daily note)  -MS     Mode of Treatment physical therapy;individual therapy  -MS       Row Name 06/14/24 1445          General Information    Patient Profile Reviewed yes  -MS     Existing Precautions/Restrictions oxygen therapy device and L/min;fall   Exit alarm  -MS     Barriers to Rehab none identified  -MS       Row Name 06/14/24 1445          Cognition    Orientation Status (Cognition) oriented x 3  -MS       Row Name 06/14/24 1445          Safety Issues, Functional Mobility    Comment, Safety Issues/Impairments (Mobility) Gait belt used for safety.  -MS               User Key  (r) = Recorded By, (t) = Taken By, (c) = Cosigned By      Initials Name Provider Type    Kartik Mendiola, PT Physical Therapist                   Mobility       Row Name 06/14/24 1445          Bed Mobility    Comment, (Bed Mobility) Up in chair this PM.  -MS       Row Name 06/14/24 1445          Sit-Stand Transfer    Sit-Stand [place] (Transfers) contact guard  -MS       Row Name 06/14/24 1445          Gait/Stairs (Locomotion)    [place] Level (Gait) contact guard  -MS     Distance in Feet (Gait) 300  -MS     Deviations/Abnormal Patterns (Gait) gus decreased  -MS     Comment, (Gait/Stairs) Mild unsteadiness with ambulation but no overt losses of balance noted.  -MS               User Key  (r) = Recorded By, (t) = Taken By, (c) = Cosigned By      Initials Name Provider Type    Kartik Mendiola, PT  Physical Therapist                   Obj/Interventions    No documentation.                  Goals/Plan    No documentation.                  Clinical Impression       Row Name 06/14/24 1446          Pain    Pretreatment Pain Rating 2/10  -MS     Posttreatment Pain Rating 2/10  -MS     Pain Location - Side/Orientation Left  -MS     Pre/Posttreatment Pain Comment Left Sided chest tube insertion site  -MS       Row Name 06/14/24 1446          Positioning and Restraints    Pre-Treatment Position sitting in chair/recliner  -MS     Post Treatment Position chair  -MS     In Chair notified nsg;reclined;sitting;call light within reach;encouraged to call for assist;exit alarm on;with family/caregiver  All lines/tubes intact.  -MS               User Key  (r) = Recorded By, (t) = Taken By, (c) = Cosigned By      Initials Name Provider Type    Kartik Mendiola, PT Physical Therapist                   Outcome Measures       Row Name 06/14/24 1446 06/14/24 0800       How much help from another person do you currently need...    Turning from your back to your side while in flat bed without using bedrails? 3  -MS 3  -HP    Moving from lying on back to sitting on the side of a flat bed without bedrails? 3  -MS 3  -HP    Moving to and from a bed to a chair (including a wheelchair)? 3  -MS 3  -HP    Standing up from a chair using your arms (e.g., wheelchair, bedside chair)? 3  -MS 3  -HP    Climbing 3-5 steps with a railing? 3  -MS 3  -HP    To walk in hospital room? 3  -MS 3  -HP    AM-PAC 6 Clicks Score (PT) 18  -MS 18  -HP    Highest Level of Mobility Goal 6 --> Walk 10 steps or more  -MS 6 --> Walk 10 steps or more  -HP      Row Name 06/14/24 0400          How much help from another person do you currently need...    Turning from your back to your side while in flat bed without using bedrails? 3  -MERRY     Moving from lying on back to sitting on the side of a flat bed without bedrails? 3  -MERRY     Moving to and from a bed to a  chair (including a wheelchair)? 2  -MERRY     Standing up from a chair using your arms (e.g., wheelchair, bedside chair)? 2  -MERRY     Climbing 3-5 steps with a railing? 2  -MERRY     To walk in hospital room? 2  -MERRY     AM-PAC 6 Clicks Score (PT) 14  -MERRY     Highest Level of Mobility Goal 4 --> Transfer to chair/commode  -MERRY       Row Name 06/14/24 1446          Functional Assessment    Outcome Measure Options AM-PAC 6 Clicks Basic Mobility (PT)  -MS               User Key  (r) = Recorded By, (t) = Taken By, (c) = Cosigned By      Initials Name Provider Type    MERRY Laura Beyer RN Registered Nurse    Kartik Mendiola, PT Physical Therapist    Edith Reyes RN Registered Nurse                                 Physical Therapy Education       Title: PT OT SLP Therapies (Done)       Topic: Physical Therapy (Done)       Point: Mobility training (Done)       Learning Progress Summary             Patient Acceptance, E,D, VU,NR by MS at 6/14/2024 1447    Acceptance, E,D, VU,NR by MS at 6/13/2024 1124                         Point: Home exercise program (Done)       Learning Progress Summary             Patient Acceptance, E,D, VU,NR by MS at 6/13/2024 1124                         Point: Body mechanics (Done)       Learning Progress Summary             Patient Acceptance, E,D, VU,NR by MS at 6/14/2024 1447    Acceptance, E,D, VU,NR by MS at 6/13/2024 1124                         Point: Precautions (Done)       Learning Progress Summary             Patient Acceptance, E,D, VU,NR by MS at 6/14/2024 1447    Acceptance, E,D, VU,NR by MS at 6/13/2024 1124                                         User Key       Initials Effective Dates Name Provider Type Discipline    MS 06/16/21 -  Kartik Mcneil, PT Physical Therapist PT                  PT Recommendation and Plan  Planned Therapy Interventions (PT): balance training, bed mobility training, gait training, home exercise program, patient/family education, postural  re-education, transfer training, strengthening  Plan of Care Reviewed With: patient  Outcome Evaluation: Upon entering room, pt. sitting up in chair, awake/alert, and agreeable to work with P.T. this date.  This PM, pt. able to ambulate 300 feet, CGA x 1, with no use of A.D. Pt. requires CGA x 1 for sit <-> stand transfers.  Pt. presents with mild unsteadiness during ambulation but exhibits no overt losses of balance.  Encouraged pt. to continue ambulation with nursing staff throughout the day as well. Will continue to progress functional mobility as tolerated.     Time Calculation:         PT Charges       Row Name 06/14/24 1450             Time Calculation    Start Time 1340  -MS      Stop Time 1355  -MS      Time Calculation (min) 15 min  -MS      PT Received On 06/14/24  -MS      PT - Next Appointment 06/15/24  -MS         Time Calculation- PT    Total Timed Code Minutes- PT 14 minute(s)  -MS                User Key  (r) = Recorded By, (t) = Taken By, (c) = Cosigned By      Initials Name Provider Type    Kartik Mendiola, PT Physical Therapist                  Therapy Charges for Today       Code Description Service Date Service Provider Modifiers Qty    42968765676 HC PT EVAL MOD COMPLEXITY 2 6/13/2024 Kartik Mcneil, PT GP 1    40955262831 HC PT THERAPEUTIC ACT EA 15 MIN 6/13/2024 Kartik Mcneil, PT GP 1    44058397081 HC PT THER SUPP EA 15 MIN 6/13/2024 Kartik Mcneil, PT GP 1    29391047141 HC PT THERAPEUTIC ACT EA 15 MIN 6/14/2024 Kartik Mcneil, PT GP 1            PT G-Codes  Outcome Measure Options: AM-PAC 6 Clicks Basic Mobility (PT)  AM-PAC 6 Clicks Score (PT): 18  AM-PAC 6 Clicks Score (OT): 16  PT Discharge Summary  Anticipated Discharge Disposition (PT): home with assist, home with home health, skilled nursing facility (Pending pt. progress)    Kartik Mcneil, SUMAYA  6/14/2024

## 2024-06-14 NOTE — PROGRESS NOTES
Exercise Oximetry    Patient Name:Branden Diop   MRN: [id number]   Date: 06/14/24             ROOM AIR BASELINE   SpO2% 85   Heart Rate 71   Blood Pressure      EXERCISE ON ROOM AIR SpO2% EXERCISE ON O2 @ 1-8 LPM SpO2%   1 MINUTE 85 1 MINUTE 1LPM 86   2 MINUTES  2 MINUTES 2LPM 86   3 MINUTES  3 MINUTES 3LPM 85   4 MINUTES  4 MINUTES 6LPM 84   5 MINUTES  5 MINUTES 8LPM 92   6 MINUTES  6 MINUTES 8LPM 95              Distance Walked  2 laps around nurses station Distance Walked: 2 laps around nurses station   Dyspnea (Brook Scale)  2 Dyspnea (Brook Scale) 2   Fatigue (Brook Scale)  2 Fatigue (Brook Scale) 2   SpO2% Post Exercise  95 SpO2% Post Exercise 95   HR Post Exercise  88 HR Post Exercise 88   Time to Recovery  6 minutes Time to Recovery 6 minutes      Comments: Entered patient room, he was on 3L. Removed oxygen to check his room air on baseline. Patient oxygen saturation dropped to 85%. 1L of oxygen was applied. At 1L patient oxygen saturation was 86-87%. Oxygen was titrated to 2L, at 2L his oxygen saturation was still 86-87%. Oxygen was then increased to 3L. At 3L patient oxygen improved to 89-91%. We started walking on 3L, while walking on 3L patient oxygen dropped to 85%. We took a break in the [place] and increased oxygen to 6L. After patient oxygen recovered we continued walking. While walking on 6L his oxygen was 92% but dropped to 84%. Oxygen was then increased to 8L where patient maintained saturations between 92-95%. After two laps patient went back into room and sat in chair. Patient recovered and was placed back on 3L. Although his oxygen had to be increased patient stated he did not feel short of breath.

## 2024-06-14 NOTE — PLAN OF CARE
Goal Outcome Evaluation:  Plan of Care Reviewed With: patient           Outcome Evaluation: Upon entering room, pt. sitting up in chair, awake/alert, and agreeable to work with P.T. this date.  This PM, pt. able to ambulate 300 feet, CGA x 1, with no use of A.D. Pt. requires CGA x 1 for sit <-> stand transfers.  Pt. presents with mild unsteadiness during ambulation but exhibits no overt losses of balance.  Encouraged pt. to continue ambulation with nursing staff throughout the day as well. Will continue to progress functional mobility as tolerated.      Anticipated Discharge Disposition (PT): home with assist, home with home health, skilled nursing facility (Pending pt. progress)

## 2024-06-15 ENCOUNTER — APPOINTMENT (OUTPATIENT)
Dept: GENERAL RADIOLOGY | Facility: HOSPITAL | Age: 74
DRG: 165 | End: 2024-06-15
Payer: MEDICARE

## 2024-06-15 LAB
GLUCOSE BLDC GLUCOMTR-MCNC: 186 MG/DL (ref 70–130)
GLUCOSE BLDC GLUCOMTR-MCNC: 204 MG/DL (ref 70–130)
GLUCOSE BLDC GLUCOMTR-MCNC: 241 MG/DL (ref 70–130)
GLUCOSE BLDC GLUCOMTR-MCNC: 301 MG/DL (ref 70–130)

## 2024-06-15 PROCEDURE — 94799 UNLISTED PULMONARY SVC/PX: CPT

## 2024-06-15 PROCEDURE — 63710000001 INSULIN LISPRO (HUMAN) PER 5 UNITS: Performed by: SURGERY

## 2024-06-15 PROCEDURE — 71045 X-RAY EXAM CHEST 1 VIEW: CPT

## 2024-06-15 PROCEDURE — 82948 REAGENT STRIP/BLOOD GLUCOSE: CPT

## 2024-06-15 PROCEDURE — 25010000002 ENOXAPARIN PER 10 MG: Performed by: SURGERY

## 2024-06-15 PROCEDURE — 99024 POSTOP FOLLOW-UP VISIT: CPT | Performed by: THORACIC SURGERY (CARDIOTHORACIC VASCULAR SURGERY)

## 2024-06-15 PROCEDURE — 94760 N-INVAS EAR/PLS OXIMETRY 1: CPT

## 2024-06-15 PROCEDURE — 94761 N-INVAS EAR/PLS OXIMETRY MLT: CPT

## 2024-06-15 PROCEDURE — 63710000001 INSULIN GLARGINE PER 5 UNITS

## 2024-06-15 RX ORDER — AMOXICILLIN 250 MG
2 CAPSULE ORAL 2 TIMES DAILY PRN
Status: DISCONTINUED | OUTPATIENT
Start: 2024-06-15 | End: 2024-06-16 | Stop reason: HOSPADM

## 2024-06-15 RX ORDER — POLYETHYLENE GLYCOL 3350 17 G/17G
17 POWDER, FOR SOLUTION ORAL DAILY PRN
Status: DISCONTINUED | OUTPATIENT
Start: 2024-06-15 | End: 2024-06-16 | Stop reason: HOSPADM

## 2024-06-15 RX ORDER — BISACODYL 5 MG/1
5 TABLET, DELAYED RELEASE ORAL DAILY PRN
Status: DISCONTINUED | OUTPATIENT
Start: 2024-06-15 | End: 2024-06-16 | Stop reason: HOSPADM

## 2024-06-15 RX ORDER — BISACODYL 10 MG
10 SUPPOSITORY, RECTAL RECTAL DAILY PRN
Status: DISCONTINUED | OUTPATIENT
Start: 2024-06-15 | End: 2024-06-16 | Stop reason: HOSPADM

## 2024-06-15 RX ADMIN — INSULIN LISPRO 5 UNITS: 100 INJECTION, SOLUTION INTRAVENOUS; SUBCUTANEOUS at 11:45

## 2024-06-15 RX ADMIN — ACETAMINOPHEN 1000 MG: 500 TABLET ORAL at 15:49

## 2024-06-15 RX ADMIN — INSULIN GLARGINE 15 UNITS: 100 INJECTION, SOLUTION SUBCUTANEOUS at 08:38

## 2024-06-15 RX ADMIN — ENOXAPARIN SODIUM 40 MG: 100 INJECTION SUBCUTANEOUS at 08:38

## 2024-06-15 RX ADMIN — ACETAMINOPHEN 1000 MG: 500 TABLET ORAL at 21:14

## 2024-06-15 RX ADMIN — BISACODYL 5 MG: 5 TABLET, COATED ORAL at 12:48

## 2024-06-15 RX ADMIN — GUAIFENESIN 1200 MG: 600 TABLET, EXTENDED RELEASE ORAL at 08:38

## 2024-06-15 RX ADMIN — SERTRALINE 50 MG: 50 TABLET, FILM COATED ORAL at 08:38

## 2024-06-15 RX ADMIN — GABAPENTIN 300 MG: 300 CAPSULE ORAL at 21:14

## 2024-06-15 RX ADMIN — ALBUTEROL SULFATE 2.5 MG: 2.5 SOLUTION RESPIRATORY (INHALATION) at 06:52

## 2024-06-15 RX ADMIN — CETIRIZINE HYDROCHLORIDE 10 MG: 10 TABLET, FILM COATED ORAL at 08:38

## 2024-06-15 RX ADMIN — TORSEMIDE 20 MG: 20 TABLET ORAL at 08:38

## 2024-06-15 RX ADMIN — GABAPENTIN 300 MG: 300 CAPSULE ORAL at 13:12

## 2024-06-15 RX ADMIN — GABAPENTIN 300 MG: 300 CAPSULE ORAL at 05:48

## 2024-06-15 RX ADMIN — INSULIN LISPRO 3 UNITS: 100 INJECTION, SOLUTION INTRAVENOUS; SUBCUTANEOUS at 17:40

## 2024-06-15 RX ADMIN — SENNOSIDES AND DOCUSATE SODIUM 2 TABLET: 50; 8.6 TABLET ORAL at 12:48

## 2024-06-15 RX ADMIN — INSULIN LISPRO 10 UNITS: 100 INJECTION, SOLUTION INTRAVENOUS; SUBCUTANEOUS at 08:37

## 2024-06-15 RX ADMIN — ACETYLCYSTEINE 3 ML: 200 SOLUTION ORAL; RESPIRATORY (INHALATION) at 06:49

## 2024-06-15 RX ADMIN — ACETAMINOPHEN 1000 MG: 500 TABLET ORAL at 08:37

## 2024-06-15 RX ADMIN — DOCUSATE SODIUM 100 MG: 100 CAPSULE, LIQUID FILLED ORAL at 08:38

## 2024-06-15 RX ADMIN — POLYETHYLENE GLYCOL 3350 17 G: 17 POWDER, FOR SOLUTION ORAL at 08:37

## 2024-06-15 RX ADMIN — GUAIFENESIN 1200 MG: 600 TABLET, EXTENDED RELEASE ORAL at 21:14

## 2024-06-15 RX ADMIN — INSULIN LISPRO 5 UNITS: 100 INJECTION, SOLUTION INTRAVENOUS; SUBCUTANEOUS at 21:14

## 2024-06-15 RX ADMIN — DOCUSATE SODIUM 100 MG: 100 CAPSULE, LIQUID FILLED ORAL at 21:14

## 2024-06-15 NOTE — PLAN OF CARE
Goal Outcome Evaluation:   VSS throughout shift. Chest tube removed by MD. Plan to discharge tomorrow. Tolerating PO intake, no c/o pain or nausea. Call light in reach,  wife at bedside.

## 2024-06-15 NOTE — PROGRESS NOTES
"  POST-OPERATIVE NOTE     Chief Complaint: Right middle lobe squamous cell carcinoma  S/P: Right middle lobectomy  POD # 3    Subjective:  Symptoms:  Improved.  No shortness of breath, cough or chest pain.    Diet:  Adequate intake.  No nausea or vomiting.    Activity level: Returning to normal.    Pain:  He complains of pain that is mild.  Pain is well controlled.        Objective:  General Appearance:  Comfortable and in no acute distress.    Vital signs: (most recent): Blood pressure 169/86, pulse 69, temperature 97.8 °F (36.6 °C), temperature source Oral, resp. rate 18, height 180.3 cm (71\"), SpO2 92%.    Lungs:  Normal effort and normal respiratory rate.  He is not in respiratory distress.    Heart: Normal rate.  Regular rhythm.    Chest: Symmetric chest wall expansion. Chest wall tenderness (Incisional, right chest tube.) present.    Abdomen: Abdomen is soft and non-distended.    Neurological: Patient is alert and oriented to person, place and time.    Skin:  Warm and dry.                Chest tube:   Site: Right, Clean, Dry, Intact, and Securement device intact  Suction: waterseal  Air Leak: negative  24 Hour Total: 170ml     Results Review:     I reviewed the patient's new clinical results.  I reviewed the patient's new imaging results and agree with the interpretation.  Discussed with patient, nurse, and surgeon    Assessment & Plan   Continues to do well post-operatively.  A.m. chest x-ray reviewed which demonstrates right-sided chest tube in good position.  Lungs well-expanded bilaterally.  His pain is well-controlled.  Continue analgesic medications.  His output is decreased and we will plan to discontinue his chest tube today at bedside.  Repeat a.m. chest x-ray.  Encourage pulmonary hygiene.  Increase activity as able.  Final surgical pathology remains pending.  Likely home tomorrow.    Nini Felipe MD  [facility]  06/15/24  12:47 EDT    Patient was seen and assessed while wearing " personal protective equipment including facemask, protective eyewear and gloves.  Hand hygiene performed prior to entering the room and upon exiting with doffing of gloves.

## 2024-06-15 NOTE — PLAN OF CARE
Goal Outcome Evaluation:      VSS. A/o x4. Chest tube to water seal no air leak witnessed, fluctuation present. Decreased chest tube output. Drainage from chest tube site. Failed overnight oximetry study, on 3L nc. Wife at bedside. Possible d/c home today.     Progress: improving

## 2024-06-16 ENCOUNTER — READMISSION MANAGEMENT (OUTPATIENT)
Dept: CALL CENTER | Facility: HOSPITAL | Age: 74
End: 2024-06-16
Payer: MEDICARE

## 2024-06-16 ENCOUNTER — APPOINTMENT (OUTPATIENT)
Dept: GENERAL RADIOLOGY | Facility: HOSPITAL | Age: 74
DRG: 165 | End: 2024-06-16
Payer: MEDICARE

## 2024-06-16 VITALS
HEIGHT: 71 IN | OXYGEN SATURATION: 95 % | SYSTOLIC BLOOD PRESSURE: 138 MMHG | DIASTOLIC BLOOD PRESSURE: 70 MMHG | TEMPERATURE: 97.9 F | RESPIRATION RATE: 17 BRPM | BODY MASS INDEX: 38.76 KG/M2 | HEART RATE: 70 BPM

## 2024-06-16 LAB
GLUCOSE BLDC GLUCOMTR-MCNC: 192 MG/DL (ref 70–130)
GLUCOSE BLDC GLUCOMTR-MCNC: 197 MG/DL (ref 70–130)

## 2024-06-16 PROCEDURE — 82948 REAGENT STRIP/BLOOD GLUCOSE: CPT

## 2024-06-16 PROCEDURE — 63710000001 INSULIN LISPRO (HUMAN) PER 5 UNITS: Performed by: SURGERY

## 2024-06-16 PROCEDURE — 25010000002 ENOXAPARIN PER 10 MG: Performed by: SURGERY

## 2024-06-16 PROCEDURE — 71045 X-RAY EXAM CHEST 1 VIEW: CPT

## 2024-06-16 PROCEDURE — 63710000001 INSULIN GLARGINE PER 5 UNITS

## 2024-06-16 PROCEDURE — 94618 PULMONARY STRESS TESTING: CPT

## 2024-06-16 RX ADMIN — INSULIN GLARGINE 15 UNITS: 100 INJECTION, SOLUTION SUBCUTANEOUS at 08:00

## 2024-06-16 RX ADMIN — SERTRALINE 50 MG: 50 TABLET, FILM COATED ORAL at 10:15

## 2024-06-16 RX ADMIN — ACETAMINOPHEN 1000 MG: 500 TABLET ORAL at 08:01

## 2024-06-16 RX ADMIN — BISACODYL 5 MG: 5 TABLET, COATED ORAL at 07:59

## 2024-06-16 RX ADMIN — CETIRIZINE HYDROCHLORIDE 10 MG: 10 TABLET, FILM COATED ORAL at 08:01

## 2024-06-16 RX ADMIN — INSULIN LISPRO 3 UNITS: 100 INJECTION, SOLUTION INTRAVENOUS; SUBCUTANEOUS at 07:58

## 2024-06-16 RX ADMIN — GUAIFENESIN 1200 MG: 600 TABLET, EXTENDED RELEASE ORAL at 08:01

## 2024-06-16 RX ADMIN — TORSEMIDE 20 MG: 20 TABLET ORAL at 08:01

## 2024-06-16 RX ADMIN — DOCUSATE SODIUM 100 MG: 100 CAPSULE, LIQUID FILLED ORAL at 08:01

## 2024-06-16 RX ADMIN — GABAPENTIN 300 MG: 300 CAPSULE ORAL at 05:58

## 2024-06-16 RX ADMIN — INSULIN LISPRO 3 UNITS: 100 INJECTION, SOLUTION INTRAVENOUS; SUBCUTANEOUS at 11:37

## 2024-06-16 RX ADMIN — POLYETHYLENE GLYCOL 3350 17 G: 17 POWDER, FOR SOLUTION ORAL at 08:01

## 2024-06-16 RX ADMIN — ENOXAPARIN SODIUM 40 MG: 100 INJECTION SUBCUTANEOUS at 08:01

## 2024-06-16 RX ADMIN — GABAPENTIN 300 MG: 300 CAPSULE ORAL at 14:13

## 2024-06-16 RX ADMIN — SENNOSIDES AND DOCUSATE SODIUM 2 TABLET: 50; 8.6 TABLET ORAL at 07:59

## 2024-06-16 NOTE — OUTREACH NOTE
Prep Survey      Flowsheet Row Responses   [facility] patient discharged from? [facility]   Is LACE score < 7 ? No   Eligibility Readm Mgmt   Discharge diagnosis BRONCHOSCOPY, RIGHT VIDEO ASSISTED THORACOSCOPY WITH RIGHT MIDDLE LOBECTOMY WITH [employer] ROBOT, MEDIASTINAL LYMPH NODE DISSECTION, INTERCOSTAL NERVE BLOCK   Does the patient have one of the following disease processes/diagnoses(primary or secondary)? Cardiothoracic surgery   Does the patient have Home health ordered? No   Is there a DME ordered? Yes   What DME was ordered? home oxygen from [facility]   Prep survey completed? Yes            Connie MORALES - Registered Nurse

## 2024-06-16 NOTE — PROGRESS NOTES
Exercise Oximetry    Patient Name:Branden Diop   MRN: [id number]   Date: 06/16/24             ROOM AIR BASELINE   SpO2% 87   Heart Rate 70   Blood Pressure      EXERCISE ON ROOM AIR SpO2% EXERCISE ON O2 @ 1-3 LPM SpO2%   1 MINUTE 87 1 MINUTE                       1 LPM 85   2 MINUTES  2 MINUTES                     2 LPM 88   3 MINUTES  3 MINUTES                     2 LPM 87   4 MINUTES  4 MINUTES                     3 LPM 90   5 MINUTES  5 MINUTES                     3 LPM 91   6 MINUTES  6 MINUTES                     3 LPM 94              Distance Walked  2 laps around nurses station Distance Walked 2 laps around nurses station   Dyspnea (Brook Scale)  2 Dyspnea (Brook Scale)   Fatigue (Brook Scale)  2 Fatigue (Brook Scale)   SpO2% Post Exercise  95 SpO2% Post Exercise   HR Post Exercise  87 HR Post Exercise   Time to Recovery  5 minutes Time to Recovery     Comments: Upon arrival patient was sitting in recliner on 3 LPM; oxygen saturation 95%. Oxygen was removed to check oxygen level on room air, on room air patient oxygen level dropped to 87%. Placed patient on 1L of oxygen and waited for patient to recover before starting our walk. While walking on 1L patient oxygen dropped to 87%. Oxygen was titrated to 2L, while walking on 2L patient oxygen level fluctuated between 86-89%. Oxygen was then titrated to 3L, while walking on 3L patient oxygen level ranged between 90-94%. 2 laps was walked around nurses station, patient stated he felt fine and did not feel short of breath. Patient returned to recliner and was placed on 3L where his oxygen was 95%.

## 2024-06-16 NOTE — PLAN OF CARE
Goal Outcome Evaluation:   Patient being discharged home with all belongings. Home O2 has been set up and [facility] delivering tank for transport home. Scripts filled and delivered by  retail pharmacy. Discharge instructions and medication list printed and provided to patient and wife.

## 2024-06-16 NOTE — CASE MANAGEMENT/SOCIAL WORK
Continued Stay Note  [facility]     Patient Name: Branden Diop  MRN: [id number]  Today's Date: 6/16/2024    Admit Date: 6/12/2024    Plan: Home w/ home oxygen from [facility]   Discharge Plan       Row Name 06/16/24 1514       Plan    Plan Home w/ home oxygen from [facility]    Plan Comments CCP notified that pt is discharging and needs home oxygen set up. [facility] notified and oxygen delivered to pts room and pt instructed to call [facility] when they get home for inhome oxygen set up.                   Discharge Codes    No documentation.                 Expected Discharge Date and Time       Expected Discharge Date Expected Discharge Time    Jun 16, 2024               Catherine Brewer RN

## 2024-06-16 NOTE — DISCHARGE SUMMARY
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 male who presented with non-small cell lung cancer of the right middle lobe    Hospital Course  Mr. Diop was taken to the OR on 6/12/2024 for a right middle lobectomy.  He tolerated the procedure well.  He did well postoperatively.  His chest tube was removed on postoperative day #3 and he was discharged home on postoperative day #4.  His walking oximetry demonstrated desaturation to 85% and he will require 3 L of continuous oxygen upon discharge.    Procedures Performed  Procedure(s):  BRONCHOSCOPY, RIGHT VIDEO ASSISTED THORACOSCOPY WITH RIGHT MIDDLE LOBECTOMY WITH DAVINCI ROBOT, MEDIASTINAL LYMPH NODE DISSECTION, INTERCOSTAL NERVE BLOCK       Consults:   Consults       No orders found from 5/14/2024 to 6/13/2024.            Pertinent Test Results:     Imaging Results (Last 24 Hours)       Procedure Component Value Units Date/Time    XR Chest 1 View [[id number]] Collected: 06/16/24 0748     Updated: 06/16/24 0755    Narrative:      XR CHEST 1 VW-     HISTORY: Male who is 73 years-old, postoperative evaluation     TECHNIQUE: Frontal view of the chest     COMPARISON: 6/15/2024     FINDINGS: Previous right chest tube is no longer seen. The heart size is  borderline. Aorta appears tortuous, calcified. Prominence of vascular  and interstitial markings is apparent. Bilateral pulmonary opacities  appear similar to prior exam.  No  large pleural effusion. No pneumothorax. Otherwise stable.       Impression:      Chest tube removed. Otherwise, no significant change.     This report was finalized on 6/16/2024 7:52 AM by Dr. Arsh Jorgensen M.D on Workstation: [id number]               Lab Results (last 24 hours)       Procedure Component Value Units Date/Time    POC Glucose Once [[id number]]  (Abnormal) Collected: 06/16/24 1131    Specimen: Blood Updated: 06/16/24 1133     Glucose 197 mg/dL     POC Glucose Once [[id number]]  (Abnormal) Collected: 06/16/24 0601    Specimen: Blood Updated: 06/16/24 0603     Glucose 192 mg/dL     POC Glucose Once [[id number]]  (Abnormal) Collected: 06/15/24 2031    Specimen: Blood Updated: 06/15/24 2035     Glucose 204 mg/dL     POC Glucose Once [[id number]]  (Abnormal) Collected: 06/15/24 1607    Specimen: Blood Updated: 06/15/24 1609     Glucose 186 mg/dL               Condition on Discharge: Stable    Vital Signs  Temp:  [97.5 °F (36.4 °C)-97.9 °F (36.6 °C)] 97.9 °F (36.6 °C)  Heart Rate:  [61-72] 70  Resp:  [17-18] 17  BP: (138-147)/(70-81) 138/70    Physical Exam:    General Appearance:  Alert, cooperative, in no acute distress   Head:  Normocephalic, without obvious abnormality, atraumatic   Eyes:  Lids and lashes normal, conjunctivae and sclerae normal, no icterus, no pallor, corneas clear, PERRLA   Ears:  Ears appear intact with no abnormalities noted   Throat:  No oral lesions, no thrush, oral mucosa moist   Neck:  No adenopathy, supple, trachea midline, no thyromegaly, no carotid bruit, no JVD   Back:  No kyphosis present, no scoliosis present, no skin lesions, erythema or scars, no tenderness to percussion or palpation, range of motion normal   Lungs:  Clear to auscultation, respirations regular, even and unlabored    Heart:  Regular rhythm and normal rate, normal S1 and S2, no murmur, no gallop, no rub, no click   Chest Wall:  No abnormalities observed   Abdomen:  Normal bowel sounds, no masses, no  organomegaly, soft non-tender, non-distended, no guarding, no rebound tenderness   Rectal:  Deferred   Extremities:  Moves all extremities well, no edema, no cyanosis, no redness   Pulses:  Pulses palpable and equal bilaterally   Skin:  No bleeding, bruising or rash   Lymph nodes:  No palpable adenopathy   Neurologic:  Cranial nerves 2 - 12 grossly intact, sensation intact, DTR present and equal bilaterally                                                                               Discharge Disposition  Home today    Discharge Medications     Discharge Medications        New Medications        Instructions Start Date   acetaminophen 500 MG tablet  Commonly known as: TYLENOL   1,000 mg, Oral, 3 Times Daily      gabapentin 300 MG capsule  Commonly known as: NEURONTIN   300 mg, Oral, Every 8 Hours Scheduled      guaiFENesin 600 MG 12 hr tablet  Commonly known as: MUCINEX   1,200 mg, Oral, Every 12 Hours Scheduled      oxyCODONE 5 MG immediate release tablet  Commonly known as: ROXICODONE   5 mg, Oral, Every 4 Hours PRN             Changes to Medications        Instructions Start Date   amoxicillin 875 MG tablet  Commonly known as: AMOXIL  What changed: See the new instructions.   TAKE ONE TABLET BY MOUTH EVERY 12 HOURS UNTIL ALL ARE TAKEN      Xarelto 20 MG tablet  Generic drug: rivaroxaban  What changed:   how much to take  how to take this  when to take this  additional instructions   TAKE ONE TABLET BY MOUTH DAILY             Continue These Medications        Instructions Start Date   clotrimazole-betamethasone 1-0.05 % cream  Commonly known as: Lotrisone   1 application , Topical, 2 Times Daily, Do exceed 1 week      fexofenadine 180 MG tablet  Commonly known as: ALLEGRA   180 mg, Oral, Daily      freestyle lancets   Daily E 11.9      glucose blood test strip   Freestyle strips daily E 11.9      glucose monitoring kit monitoring kit   Daily E 11.93 FreeStyle meter      Mounjaro 2.5 MG/0.5ML solution pen-injector  pen  Generic drug: Tirzepatide   2.5 mg, Subcutaneous, Weekly, LAST DOSE 6/3/2024 INSTRUCTED PT TO HOLD FOR SURGERY      MULTIVITAMINS PO   1 tablet, Oral, Daily, HOLD PRIOR TO SURG      rosuvastatin 40 MG tablet  Commonly known as: CRESTOR   TAKE ONE TABLET BY MOUTH DAILY      sertraline 50 MG tablet  Commonly known as: ZOLOFT   TAKE ONE TABLET BY MOUTH DAILY      torsemide 20 MG tablet  Commonly known as: DEMADEX   20 mg, Oral, Daily      Toujeo SoloStar 300 UNIT/ML solution pen-injector injection  Generic drug: Insulin Glargine (1 Unit Dial)   22 Units, Subcutaneous, Daily               Discharge Instructions:  No heavy lifting, pushing, pulling greater than 10 pounds.  No driving up until 2 weeks after surgery and no longer taking narcotics.  Resume home diet as tolerated.  Continue incentive spirometer at least 4 times per day.  Remove dressing from post chest tube site after 48 hours, may shower and clean surgical sites with antibacterial soap or hydrogen peroxide, and apply gauze dressing or band-aid as needed for any drainage.  No dressing needed once no longer draining.          Follow-up Appointments  Future Appointments   Date Time Provider Department Center   6/27/2024 11:30 AM David Figueroa MD [facility]     Additional Instructions for the Follow-ups that You Need to Schedule       XR Chest 2 View    Jun 28, 2024 (Approximate)      Exam reason: POST-OP   Release to patient: Routine Release                Test Results Pending at Discharge  Pending Labs       Order Current Status    Tissue Pathology Exam In process            For any questions regarding patient's stay, please refer to patient's chart.    Nini Felipe MD  [facility]  06/16/24  15:33 EDT

## 2024-06-16 NOTE — PLAN OF CARE
Goal Outcome Evaluation:      VSS. A/o x4. R site leaking from old chest tube site, dressing changed prn. Wife at bedside. Plan for d/c home today.      Progress: improving

## 2024-06-17 LAB
LAB AP CASE REPORT: NORMAL
LAB AP DIAGNOSIS COMMENT: NORMAL
LAB AP SYNOPTIC CHECKLIST: NORMAL
MYCOBACTERIUM SPEC CULT: NORMAL
NIGHT BLUE STAIN TISS: NORMAL
PATH REPORT.FINAL DX SPEC: NORMAL
PATH REPORT.GROSS SPEC: NORMAL

## 2024-06-17 NOTE — OP NOTE
OPERATIVE NOTE     Date of procedure: 6/12/2024     Patient name: Branden Diop  MRN: [id number]    Pre OP diagnosis:  Patient Active Problem List   Diagnosis    A-fib    Atrioventricular block, Mobitz type 1, Wenckebach    Apnea, sleep    Obesity    Streptococcus pneumoniae    Sleep apnea with use of continuous positive airway pressure (CPAP)    Sinusitis    Right wrist pain    Pneumonia    Type 2 diabetes mellitus, with long-term current use of insulin    Hyperlipidemia    Hammertoe    Depression    COPD (chronic obstructive pulmonary disease)    Colon polyps    Anxiety    Pruritus    Cholelithiasis    Fatty liver    Essential hypertension    Vitamin D deficiency    Emphysema, unspecified    Bronchiectasis with acute exacerbation    Chest pain, atypical    FHx: colonic polyps    Diverticulosis    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    Malignant neoplasm of middle lobe of right lung       Post OP diagnosis:  Patient Active Problem List   Diagnosis    A-fib    Atrioventricular block, Mobitz type 1, Wenckebach    Apnea, sleep    Obesity    Streptococcus pneumoniae    Sleep apnea with use of continuous positive airway pressure (CPAP)    Sinusitis    Right wrist pain    Pneumonia    Type 2 diabetes mellitus, with long-term current use of insulin    Hyperlipidemia    Hammertoe    Depression    COPD (chronic obstructive pulmonary disease)    Colon polyps    Anxiety    Pruritus    Cholelithiasis    Fatty liver    Essential hypertension    Vitamin D deficiency    Emphysema, unspecified    Bronchiectasis with acute exacerbation    Chest pain, atypical    FHx: colonic polyps    Diverticulosis    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   Robot assisted Thoracoscopic Right Middle Lobectomy.   Systematic Mediastinal Lymph node dissection.   Intercostal Nerve Block.     Synoptic  portion:  Intent: curative  Surgical procedure performed:  Resection of at least one lobe or bilobectomy - Lobectomy WITH mediastinal lymph node dissection  Mediastinal lymph node stations resected:  2R Upper Paratracheal (right), 4R Lower Paratracheal (right), 7 Subcarinal, 8 Paraesophageal (below cherie), and 9 Pulmonary ligament  Hilar lymph node stations resected:  10 Hilar, 11 Interlobar, and 12 Lobar    Indications:   Branden Diop is a 73-year-old male has significant medical problems as mentioned below.      He had shortness of breath and high-resolution CT scan was performed on 6/14/2021.  He has small noncalcified lung nodules measuring up to 7 mm.  Repeat CT scan of the chest on 4/10/2023 reported stable pulmonary nodule and presence of emphysema.  CT scan of the chest in 12 months was recommended which was performed on 4/2/2024 and reported 1.8 cm and 1.1 cm right middle lobe solid nodules.  PET/CT on 4/23/2024 reported mildly hypermetabolic right middle lobe 1.9 cm and 1.1 cm solid nodule. The findings were concerning for malignant disease.  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  He had ION robotic navigational bronchoscopy of the right middle lobe nodule.  One of the nodules had fragment of squamous cell carcinoma and the other nodule had highly dysplastic squamous epithelium suggesting at least squamous cell carcinoma in situ.  Level 11 L, 7, 4R, 11 R were negative for metastatic disease.  MRI of the brain was negative for metastatic disease.  He was referred to thoracic surgery for further evaluation.     At the time of initial consultation, he could occasionally walk 1 block without dyspnea. He denied any history of myocardial infarction, cerebrovascular accident, hepatic or renal issues. He had pneumonia in 2017 resulting in a 17-day intensive care unit stay. His respiratory function has not returned to its previous state. A recent pulmonary function test  was conducted on 05/10/2024. He is under the care of a cardiologist and is currently on anticoagulant therapy. He reported occasional pedal edema.    His echocardiogram and lung tests yielded normal results. However, there are two distinct spots on his right lung, both in the middle lobe. These spots were biopsied and found to be cancerous. The potential risks and benefits of the surgery were thoroughly discussed with him. The final pathology results will guide the decision on whether further treatment is required.     After discussing the risks and benefits of the procedure, the patient provided informed consent for a Flexible Bronchoscopy, Robotic assisted Right Upper Lobectomy, and mediastinal lymph node dissection.    Findings:  No evidence of pleural implants or effusion.   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  There were 2 additional right middle lobe arterial branches noted.  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  There was small air leak at the end of procedure.    Surgeon: David Figueroa MD     Assistants: Colleen Dolan RNFA was responsible for performing the following activities: Retraction, Suction, Irrigation, Suturing, Closing, Placing Dressing, and Held/Positioned Camera and their skilled assistance was necessary for the success of this case.    Anesthesia: General endotracheal - Double lumen tube administered by Anesthesiologist: Merna Teran MD; Nery Vazquez MD  CRNA: Martir Martinez CRNA; Luis Angel Arnold CRNA    ASA Class: 3    Procedure Details   On 6/17/2024, the patient was brought to the operating room and placed supine on the operating table.  Following an uneventful induction of general anesthesia, she was intubated with a double-lumen endotracheal tube without incident.  Appropriate placement was confirmed with the pediatric bronchoscope.  A radial arterial line and additional peripheral IVs were placed by the Anesthesia team. Gonzalez catheter  was inserted.  Pneumatic compression devices placed to the lower extremities.  Patient was repositioned in the left lateral decubitus position.  The table was jackknifed. All bony prominences were well padded.  The placement of endotracheal tube was confirmed again after positioning with the pediatric bronchoscope. The patient received intravenous antibiotic prophylaxis. The right chest was prepped and draped in usual sterile fashion.  Prior to beginning the operation, a time-out was conducted with all members of the surgical team present.      Following left lung isolation, I began by making an 8 mm transverse incision in the seventh intercostal space in the right mid chest.  The pleural space was entered without incident using blunt tonsil dissection.  A trocar was inserted. After confirming safe pleural entry, carbon dioxide insufflation to 8 mm Hg was utilized.  Next, I placed my additional robotic ports.  After infiltrating the pleura and skin with local anesthetic, I placed an 8 mm port 4 cm anterior to the spinous process, approximately in the sixth and seventh intercostal space.  A 12 mm port was placed midway between the arm 2 and arm 4 ports.  The 12 mm port was placed anteriorly in the confluence of the diaphragm at the sixth intercostal space.  A 12 mm assistant Air Seal® trocar was placed at the confluence of the diaphragm triangulated between the arm 1 and arm 2 ports.  The robot was docked in a standard fashion.  All instruments were inserted under direct vision. The chest was examined.  There was no pleural effusion or obvious pleural disease.      I began by taking down the inferior pulmonary ligament. Level 9R lymph node was identified and sent for histopathology.  Level 8R lymph node was undefined and sent for histopathology.  I retracted the lung anteriorly to expose the posterior hilum.  I continued the pleural dissection posteriorly up to the azygous vein.  I encountered 10 R lymph node which I  sent for histopathology.  The level 7 lymph nodes were removed and sent for histopathology.  11 R superior sump lymph node was removed and sent for histopathology.  I retract the lung inferiorly and continued the pleural dissection superiorly.  An 11 R lymph node between the right upper lobe bronchus and pulmonary artery branches to the upper lobe was removed.  I dissected all the fat pad in the triangular area between SVC, azygos vein and trachea.  This fat pad contained level 2R and 4R multiple lymph nodes and were sent for histopathology.  I then retracted lung posteriorly and continued the anterior hilar dissection by taking down the pleura.  I identified the superior pulmonary vein with branches to right upper lobe and middle lobe.  The right middle lobe branches of superior pulmonary vein was dissected circumferentially. I identified the right middle lobe arterial branches, dissected circumferentially and transected using vascular load stapler.  The right middle lobe venous tributaries were dissected circumferentially and transected using vascular load stapler.  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  They were dissected circumferentially and transected using vascular load stapler.  ICG dye was injected and viability of the right upper lobe was confirmed.  The right middle lobe bronchus was dissected circumferentially and transected using blue load stapler.  Additional 11 R and 12 R lymph nodes were sent for histopathology.    Intercostal nerve block from 4th-10th ribs was performed using 0.25% Marcaine. Using an Endo Catch bag that was introduced through the anteriormost port, I retrieved the specimen.  The specimen was inspected the back table.  The tumor was palpable in the right middle lobe and close of the from resection margin.  The robot was undocked and I irrigated the chest cavity with 0.9% normal  saline and suction till it was clear. Hemostasis was achieved. A 24 Fr chest tube was introduced through the anterior and inferior most port and directed posterior to the hilum and towards the apex of the pleural space. The remaining lung was inflated under direct visualization. The middle lobe inflated without torsion and no plication was indicated. The chest tube was secured to the skin using # 2 Ethibond suture. The port sites were closed in layers.  The fascia of the 12 mm port sites was closed using 0-Vicryl suture.  The subcutaneous tissues were closed using 2-0 Vicryl suture.  Skin incisions were closed using 4-0 Monocryl subcuticular sutures and skin glue were applied.      The sponge, needle, and instrument counts were correct at the end of the operation.  The patient was awakened from anesthesia, was extubated without incident, and was transported to the Post Anesthesia Care Unit in stable condition.    Estimated Blood Loss:  200ml           Drains: 24 Fr chest tube on - 20 suction                 Specimens:   ID Type Source Tests Collected by Time   A (Not marked as sent) : LEVEL 8R LYMPH NODE Tissue Lymph Node TISSUE PATHOLOGY EXAM David Figueroa MD 6/12/2024 1309   B (Not marked as sent) : LEVEL 9R LYMPH NODE Tissue Lymph Node TISSUE PATHOLOGY EXAM David Figueroa MD 6/12/2024 1309   C (Not marked as sent) : LEVEL 11R #1 LYMPH NODE Tissue Lymph Node TISSUE PATHOLOGY EXAM David Figueroa MD 6/12/2024 1319   D (Not marked as sent) : LEVEL 7 LYMPH NODE Tissue Lymph Node TISSUE PATHOLOGY EXAM David Figueroa MD 6/12/2024 1320   E (Not marked as sent) : LEVEL 10R #1 LYMPH NODE Tissue Lymph Node TISSUE PATHOLOGY EXAM David Figueroa MD 6/12/2024 1320   F (Not marked as sent) : LEVEL 10R #2 LYMPH NODE Tissue Lymph Node TISSUE PATHOLOGY EXAM David Figueroa MD 6/12/2024 1325   G (Not marked as sent) : LEVEL 2R/4R PACKET LYMPH NODES Tissue Lymph Node TISSUE PATHOLOGY EXAM David Figueroa MD 6/12/2024 1333   H (Not marked as sent) :  LEVEL 10R #3 LYMPH NODE Tissue Lymph Node TISSUE PATHOLOGY EXAM David Figueroa MD 6/12/2024 1405   I (Not marked as sent) : LEVEL 11R #2 LYMPH NODE Tissue Lymph Node TISSUE PATHOLOGY EXAM David Figueroa MD 6/12/2024 1409   J (Not marked as sent) : LEVEL 12R #1 LYMPH NODE Tissue Lymph Node TISSUE PATHOLOGY EXAM David Figueroa MD 6/12/2024 1659   K (Not marked as sent) : RIGHT MIDDLE LOBECTOMY Tissue Lung, Right Middle Lobe TISSUE PATHOLOGY EXAM David Figueroa MD 6/12/2024 1714              Implants:   Implant Name Type Inv. Item Serial No.  Lot No. LRB No. Used Action   HEMOST ABS SURGICEL ORIG 2X14IN STRL - [id number] Implant HEMOST ABS SURGICEL ORIG 2X14IN STRL  [employer]  [employer] J [employer] J [id number] Right 2 Implanted   RELOAD STPLR SUREFORM 45 DAVINCI/X/XI 6ROW 2.5 WHT 1P/U - [id number] Implant RELOAD STPLR SUREFORM 45 DAVINCI/X/XI 6ROW 2.5 WHT 1P/U  INTUITIVE SURGICAL [id number] Right 5 Implanted   RELOAD STPLR SUREFORM 45 DAVINCI/X/XI 6ROW 3.5 TALIA 1P/U - [id number] Implant RELOAD STPLR SUREFORM 45 DAVINCI/X/XI 6ROW 3.5 TALIA 1P/U  INTUITIVE SURGICAL [id number] Right 3 Implanted   RELOAD STPLR SUREFORM 45 DAVINCI/X/XI 6ROW 3.5 TALIA 1P/U - [id number] Implant RELOAD STPLR SUREFORM 45 DAVINCI/X/XI 6ROW 3.5 TALIA 1P/U  INTUITIVE SURGICAL [id number] Right 7 Implanted   RELOAD STPLR SUREFORM 45 DAVINCI/X/XI 6ROW 3.5 TALIA 1P/U - [id number] Implant RELOAD STPLR SUREFORM 45 DAVINCI/X/XI 6ROW 3.5 TALIA 1P/U  INTUITIVE SURGICAL [id number] Right 8 Implanted   RELOAD STPLR SUREFORM 45 DAVINCI/X/XI 6ROW 4.3 GRN 1P/U - [id number] Implant RELOAD STPLR SUREFORM 45 DAVINCI/X/XI 6ROW 4.3 GRN 1P/U  INTUITIVE SURGICAL [id number] Right 1 Implanted   SEAL HEMO SURG KEL/AH ABS/PWDR 3GM - [id number] Implant SEAL HEMO SURG KEL/AH ABS/PWDR 3GM  [employer] Connected [id number] Right 1 Implanted              Complications: None           Disposition: PACU - hemodynamically stable.           Condition: Stable     David Figueroa,  MD   Thoracic Surgeon  [facility]

## 2024-06-19 ENCOUNTER — NURSE TRIAGE (OUTPATIENT)
Dept: CALL CENTER | Facility: HOSPITAL | Age: 74
End: 2024-06-19
Payer: MEDICARE

## 2024-06-19 NOTE — TELEPHONE ENCOUNTER
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 A call was placed to the surgeon for the wife.,he will return the call.   Reason for Disposition   Dressing soaked with blood or body fluid (e.g., drainage)    Additional Information   Negative: [1] Major abdominal surgical incision AND [2] wound gaping open AND [3] visible internal organs   Negative: Sounds like a life-threatening emergency to the triager   Negative: [1] Bleeding from incision AND [2] won't stop after 10 minutes of direct pressure   Negative: [1] Widespread rash AND [2] bright red, sunburn-like   Negative: Severe pain in the incision   Negative: [1] Incision gaping open AND [2] < 48 hours since wound re-opened   Negative: [1] Incision gaping open AND [2] length of opening > 2 inches (5 cm)   Negative: Patient sounds very sick or weak to the triager   Negative: Sounds like a serious complication to the triager   Negative: Fever > 100.4 F (38.0 C)   Negative: [1] Incision looks infected (spreading redness, pain) AND [2] fever > 99.5 F (37.5 C)   Negative: [1] Incision looks infected (spreading redness, pain) AND [2] large red area (> 2 in. or 5 cm)   Negative: [1] Incision looks infected (spreading redness, pain) AND [2] face wound   Negative: [1] Red streak runs from the incision AND [2] longer than 1 inch (2.5 cm)   Negative: [1] Pus or bad-smelling fluid draining from incision AND [2] no fever   Negative: [1] Post-op pain AND [2] not controlled with pain medications    Answer Assessment - Initial Assessment Questions  1. SYMPTOM: \"What's the main symptom you're concerned about?\" (e.g., redness, pain, drainage)      Clear drainage from Chest tube site.   2. ONSET: \"When did start?\"      Yesterday   3. SURGERY: \"What surgery was performed?\"      Malignant neoplasm of right lobe   4. DATE of SURGERY: \"When was surgery " "performed?\"       06/12/24  5. INCISION SITE: \"Where is the incision located?\"       Right chest by lung   6. REDNESS: \"Is there any redness at the incision site?\" If yes, ask: \"How wide across is the redness?\" (Inches, centimeters)       none  7. PAIN: \"Is there any pain?\" If so, ask: \"How bad is it?\"  (Scale 1-10; or mild, moderate, severe)      Some   8. BLEEDING: \"Is there any bleeding?\" If so, ask: \"How much?\" and \"Where?\"      none  9. DRAINAGE: \"Is there any drainage from the incision site?\" If yes, ask: \"What color and how much?\" (e.g., red, cloudy, pus; drops, teaspoon)      Clear quite a lot.   10. FEVER: \"Do you have a fever?\" If so, ask: \"What is your temperature, how was it measured, and when did it start?\"        no  11. OTHER SYMPTOMS: \"Do you have any other symptoms?\" (e.g., shaking chills, weakness, rash elsewhere on body)        no    Protocols used: Post-Op Incision Symptoms-ADULT-AH    "

## 2024-06-20 ENCOUNTER — HOSPITAL ENCOUNTER (OUTPATIENT)
Facility: HOSPITAL | Age: 74
Setting detail: OBSERVATION
LOS: 1 days | Discharge: HOME | End: 2024-06-21
Attending: STUDENT IN AN ORGANIZED HEALTH CARE EDUCATION/TRAINING PROGRAM | Admitting: HOSPITALIST
Payer: MEDICARE

## 2024-06-20 ENCOUNTER — READMISSION MANAGEMENT (OUTPATIENT)
Dept: CALL CENTER | Facility: HOSPITAL | Age: 74
End: 2024-06-20
Payer: MEDICARE

## 2024-06-20 ENCOUNTER — TELEPHONE (OUTPATIENT)
Dept: SURGERY | Facility: CLINIC | Age: 74
End: 2024-06-20
Payer: MEDICARE

## 2024-06-20 ENCOUNTER — APPOINTMENT (OUTPATIENT)
Dept: CT IMAGING | Facility: HOSPITAL | Age: 74
End: 2024-06-20
Payer: MEDICARE

## 2024-06-20 DIAGNOSIS — C34.2 SQUAMOUS CELL CARCINOMA OF BRONCHUS IN RIGHT MIDDLE LOBE: ICD-10-CM

## 2024-06-20 PROBLEM — T88.8XXA FLUID COLLECTION AT SURGICAL SITE, INITIAL ENCOUNTER: Status: ACTIVE | Noted: 2024-06-20

## 2024-06-20 LAB
GLUCOSE BLDC GLUCOMTR-MCNC: 156 MG/DL (ref 70–130)
GLUCOSE BLDC GLUCOMTR-MCNC: 246 MG/DL (ref 70–130)
GLUCOSE BLDC GLUCOMTR-MCNC: 290 MG/DL (ref 70–130)

## 2024-06-20 PROCEDURE — 82948 REAGENT STRIP/BLOOD GLUCOSE: CPT

## 2024-06-20 PROCEDURE — 63710000001 INSULIN GLARGINE PER 5 UNITS: Performed by: STUDENT IN AN ORGANIZED HEALTH CARE EDUCATION/TRAINING PROGRAM

## 2024-06-20 PROCEDURE — 63710000001 INSULIN LISPRO (HUMAN) PER 5 UNITS

## 2024-06-20 PROCEDURE — 71250 CT THORAX DX C-: CPT

## 2024-06-20 RX ORDER — SODIUM CHLORIDE 9 MG/ML
40 INJECTION, SOLUTION INTRAVENOUS AS NEEDED
Status: DISCONTINUED | OUTPATIENT
Start: 2024-06-20 | End: 2024-06-21 | Stop reason: HOSPADM

## 2024-06-20 RX ORDER — AMOXICILLIN 250 MG
2 CAPSULE ORAL 2 TIMES DAILY PRN
Status: DISCONTINUED | OUTPATIENT
Start: 2024-06-20 | End: 2024-06-21 | Stop reason: HOSPADM

## 2024-06-20 RX ORDER — SODIUM CHLORIDE 0.9 % (FLUSH) 0.9 %
10 SYRINGE (ML) INJECTION AS NEEDED
Status: DISCONTINUED | OUTPATIENT
Start: 2024-06-20 | End: 2024-06-21 | Stop reason: HOSPADM

## 2024-06-20 RX ORDER — INSULIN LISPRO 100 [IU]/ML
2-7 INJECTION, SOLUTION INTRAVENOUS; SUBCUTANEOUS
Status: DISCONTINUED | OUTPATIENT
Start: 2024-06-21 | End: 2024-06-20

## 2024-06-20 RX ORDER — POLYETHYLENE GLYCOL 3350 17 G/17G
17 POWDER, FOR SOLUTION ORAL DAILY PRN
Status: DISCONTINUED | OUTPATIENT
Start: 2024-06-20 | End: 2024-06-21 | Stop reason: HOSPADM

## 2024-06-20 RX ORDER — OXYCODONE HYDROCHLORIDE 5 MG/1
5 TABLET ORAL EVERY 4 HOURS PRN
Status: DISCONTINUED | OUTPATIENT
Start: 2024-06-20 | End: 2024-06-21 | Stop reason: HOSPADM

## 2024-06-20 RX ORDER — ROSUVASTATIN CALCIUM 40 MG/1
40 TABLET, COATED ORAL NIGHTLY
Status: DISCONTINUED | OUTPATIENT
Start: 2024-06-20 | End: 2024-06-21 | Stop reason: HOSPADM

## 2024-06-20 RX ORDER — INSULIN LISPRO 100 [IU]/ML
2-7 INJECTION, SOLUTION INTRAVENOUS; SUBCUTANEOUS
Status: DISCONTINUED | OUTPATIENT
Start: 2024-06-20 | End: 2024-06-21 | Stop reason: HOSPADM

## 2024-06-20 RX ORDER — GABAPENTIN 300 MG/1
300 CAPSULE ORAL EVERY 8 HOURS SCHEDULED
Status: DISCONTINUED | OUTPATIENT
Start: 2024-06-20 | End: 2024-06-21 | Stop reason: HOSPADM

## 2024-06-20 RX ORDER — DEXTROSE MONOHYDRATE 25 G/50ML
25 INJECTION, SOLUTION INTRAVENOUS
Status: DISCONTINUED | OUTPATIENT
Start: 2024-06-20 | End: 2024-06-21 | Stop reason: HOSPADM

## 2024-06-20 RX ORDER — NICOTINE POLACRILEX 4 MG
15 LOZENGE BUCCAL
Status: DISCONTINUED | OUTPATIENT
Start: 2024-06-20 | End: 2024-06-21 | Stop reason: HOSPADM

## 2024-06-20 RX ORDER — TORSEMIDE 20 MG/1
20 TABLET ORAL DAILY
Status: DISCONTINUED | OUTPATIENT
Start: 2024-06-20 | End: 2024-06-21 | Stop reason: HOSPADM

## 2024-06-20 RX ORDER — BISACODYL 5 MG/1
5 TABLET, DELAYED RELEASE ORAL DAILY PRN
Status: DISCONTINUED | OUTPATIENT
Start: 2024-06-20 | End: 2024-06-21 | Stop reason: HOSPADM

## 2024-06-20 RX ORDER — BISACODYL 10 MG
10 SUPPOSITORY, RECTAL RECTAL DAILY PRN
Status: DISCONTINUED | OUTPATIENT
Start: 2024-06-20 | End: 2024-06-21 | Stop reason: HOSPADM

## 2024-06-20 RX ORDER — IBUPROFEN 600 MG/1
1 TABLET ORAL
Status: DISCONTINUED | OUTPATIENT
Start: 2024-06-20 | End: 2024-06-21 | Stop reason: HOSPADM

## 2024-06-20 RX ORDER — SODIUM CHLORIDE 0.9 % (FLUSH) 0.9 %
10 SYRINGE (ML) INJECTION EVERY 12 HOURS SCHEDULED
Status: DISCONTINUED | OUTPATIENT
Start: 2024-06-20 | End: 2024-06-21 | Stop reason: HOSPADM

## 2024-06-20 RX ADMIN — GABAPENTIN 300 MG: 300 CAPSULE ORAL at 21:30

## 2024-06-20 RX ADMIN — INSULIN GLARGINE 20 UNITS: 100 INJECTION, SOLUTION SUBCUTANEOUS at 18:32

## 2024-06-20 RX ADMIN — Medication 10 ML: at 20:00

## 2024-06-20 RX ADMIN — ROSUVASTATIN CALCIUM 40 MG: 40 TABLET, FILM COATED ORAL at 21:30

## 2024-06-20 RX ADMIN — INSULIN LISPRO 4 UNITS: 100 INJECTION, SOLUTION INTRAVENOUS; SUBCUTANEOUS at 22:42

## 2024-06-20 RX ADMIN — OXYCODONE HYDROCHLORIDE 5 MG: 5 TABLET ORAL at 21:30

## 2024-06-20 NOTE — TELEPHONE ENCOUNTER
I addressed pt concerns by returning telephone call. Pt wife stated that Dr Figueroa is aware of ED encounter at [facility] and was contacted by their provider. Pt wife stated that Dr Figueroa is supposed to be working towards direct admit procedures for pt. I will concur with Dr Figueroa and request assistance from NP with direct admit the patient wife was agreeable and satisfied.    DB 11:12  6/20/2024    Caller: Ezio Wong    Relationship: Emergency Contact    Best call back number: [phone]     What is the best time to reach you: ANY     Who are you requesting to speak with (clinical staff, provider,  specific staff member): CLINICAL     What was the call regarding: PATIENTS WIFE EZIO WONG TOOK PATIENT TO [facility] ER ON 6/20/24 DUE DRAINAGE WHERE THE TUBE WAS PLACED.   EZIO STATES THAT THEY ARE WAITING ON CT AND BLOOD WORK RESULTS.       Is it okay if the provider responds through MyChart:  PATIENTS WIFE EZIO WOULD LIKE A CALL BACK.

## 2024-06-20 NOTE — H&P
Patient Name:  Branden Diop  YOB: 1950  MRN:  [id number]  Admit Date:  6/20/2024  Patient Care Team:  Tino Lopez MD as PCP - General (Internal Medicine)  Tino Lopez MD as PCP - Family Medicine  Mart Ureña MD as Consulting Physician (Cardiology)  Martir Trevino MD as Surgeon (General Surgery)  Dione Carter RN as Nurse Navigator      Subjective   History Present Illness     No chief complaint on file.      This is a 73-year-old male with a history of non-small cell lung cancer of the right middle lobe who presented to [facility] for further management.  He underwent a right middle lobectomy on 6/12/2024 tolerated the procedure well.  He had a chest tube in place after the procedure that was removed on postoperative day 3 and he was discharged home the following day.  He presents once again to the hospital with increased yellow drainage. He has been afebrile.   He went to an outlying facility where he underwent a CT chest that revealed a small residual pneumothorax, stranding in the right anterior lateral chest wall without an organized fluid collection, and masslike area in the subcarinal and right perihilar region with mediastinal/hilar lymph nodes, which were noted to be increased in size and number.      Personal History     Past Medical History:   Diagnosis Date    Anxiety     Arthritis     Atrial fibrillation     CURRENTLY    Bunion of right foot     Colon polyps     COPD (chronic obstructive pulmonary disease)     MILD. NO MEDS FOR    Depression     History of atrial fibrillation     WITH CARDIO VERSION. NO PROBLEMS    History of skin cancer     BCC REMOVED FROM [place]    [place])     Hyperlipidemia     Inguinal hernia, recurrent     RIGHT    Left foot pain     Lung nodules     Rash     IN GROIN TREATING FOR YEAST INFECTION BY PCP    Redness of skin     LOWER LEGS BILATERAL    Scab     BILATERAL LEGS HEALING    Sleep apnea with use of continuous  positive airway pressure (CPAP)     CPAP    Streptococcus pneumoniae     ABOUT 6-7 YR AGO.     Type 2 diabetes mellitus      Past Surgical History:   Procedure Laterality Date    BASAL CELL CARCINOMA EXCISION      BRONCHOSCOPY WITH ION ROBOTIC ASSIST N/A 5/6/2024    Procedure: BRONCHOSCOPY WITH ION ROBOT WITH FNA, BIOPSIES, BAL AND ENDOBRONCHIAL ULTRASOUND WITH FNA;  Surgeon: Yony Coles MD;  Location: [facility] ENDOSCOPY;  Service: Robotics - Pulmonary;  Laterality: N/A;  PRE: PULMONARY NODULES  POST: SAME    CARDIAC CATHETERIZATION N/A 11/15/2021    Procedure: Coronary angiography;  Surgeon: Alfredo Jennings MD;  Location: [facility] CATH INVASIVE LOCATION;  Service: Cardiovascular;  Laterality: N/A;    CARDIAC CATHETERIZATION N/A 11/15/2021    Procedure: Left heart cath;  Surgeon: Alfredo Jennings MD;  Location: [facility] CATH INVASIVE LOCATION;  Service: Cardiovascular;  Laterality: N/A;    CARDIAC CATHETERIZATION N/A 11/15/2021    Procedure: Left ventriculography;  Surgeon: Alfredo Jennings MD;  Location: [facility] CATH INVASIVE LOCATION;  Service: Cardiovascular;  Laterality: N/A;    CARDIAC CATHETERIZATION N/A 11/15/2021    Procedure: Aortic root aortogram;  Surgeon: Alfredo Jennings MD;  Location: [facility] CATH INVASIVE LOCATION;  Service: Cardiovascular;  Laterality: N/A;    CARDIOVERSION      CHOLECYSTECTOMY N/A 10/07/2017    Procedure: CHOLECYSTECTOMY LAPAROSCOPIC;  Surgeon: Martir Trevino MD;  Location: [facility] MAIN OR;  Service:     COLONOSCOPY  2007    COLONOSCOPY N/A 8/30/2022    Procedure: COLONOSCOPY TO CECUM AND TI AND COLD SNARE POLYPECTOMY;  Surgeon: Cj Lopez MD;  Location: [facility] ENDOSCOPY;  Service: Gastroenterology;  Laterality: N/A;  Pre: History of colon polyps  Post: POLYP, PREVIOUS TATTOO    FOOT SURGERY Left     TOES ARE PINNED. ALL BUT GREAT TOE    HAND SURGERY      THUMB    HERNIA REPAIR      INGUINAL HERNIA REPAIR Right 06/27/2018    Procedure: INGUINAL HERNIA REPAIR,  OPEN, RECURRENT;  Surgeon: Martir Trevino MD;  Location:  [facility];  Service: General    LASIK Bilateral     LOBECTOMY Right 6/12/2024    Procedure: BRONCHOSCOPY, RIGHT VIDEO ASSISTED THORACOSCOPY WITH RIGHT MIDDLE LOBECTOMY WITH DAVINCI ROBOT, MEDIASTINAL LYMPH NODE DISSECTION, INTERCOSTAL NERVE BLOCK;  Surgeon: David Figueroa MD;  Location: [facility] MAIN OR;  Service: Robotics - DaVinci;  Laterality: Right;    NECK SURGERY      growth on salivary gland    REPLACEMENT TOTAL KNEE Right 2007    (he is going to have a LTKR next month)    REPLACEMENT TOTAL KNEE Left      Family History   Problem Relation Age of Onset    Cancer Other     Stroke Mother     Alcohol abuse Father     Malig Hyperthermia Neg Hx      Social History     Tobacco Use    Smoking status: Former     Current packs/day: 0.00     Average packs/day: 1 pack/day for 30.0 years (30.0 ttl pk-yrs)     Types: Cigars, Cigarettes     Start date: 1/1/1984     Quit date: 1/1/2014     Years since quitting: 10.4    Smokeless tobacco: Never   Vaping Use    Vaping status: Never Used   Substance Use Topics    Alcohol use: Never     Comment: caffeine use- decaf coffee    Drug use: Never     No current facility-administered medications on file prior to encounter.     Current Outpatient Medications on File Prior to Encounter   Medication Sig Dispense Refill    acetaminophen (TYLENOL) 500 MG tablet Take 2 tablets by mouth 3 (Three) Times a Day for 14 days. 84 tablet 0    amoxicillin (AMOXIL) 875 MG tablet TAKE ONE TABLET BY MOUTH EVERY 12 HOURS UNTIL ALL ARE TAKEN (Patient taking differently: PT TOOK LAST DOSE 6/4/2024) 20 tablet 0    clotrimazole-betamethasone (Lotrisone) 1-0.05 % cream Apply 1 application topically to the appropriate area as directed 2 (Two) Times a Day. Do exceed 1 week 15 g 1    fexofenadine (ALLEGRA) 180 MG tablet Take 1 tablet by mouth Daily.      gabapentin (NEURONTIN) 300 MG capsule Take 1 capsule by mouth Every 8 (Eight) Hours for 30 days. 90  capsule 0    glucose blood test strip Freestyle strips daily E 11.9 100 each 12    glucose monitoring kit (FREESTYLE) monitoring kit Daily E 11.93 FreeStyle meter 1 each 1    guaiFENesin (MUCINEX) 600 MG 12 hr tablet Take 2 tablets by mouth Every 12 (Twelve) Hours for 14 days. 56 tablet 0    Insulin Glargine, 1 Unit Dial, (Toujeo SoloStar) 300 UNIT/ML solution pen-injector injection Inject 22 Units under the skin into the appropriate area as directed Daily.      Lancets (FREESTYLE) lancets Daily E 11.9 100 each 12    Multiple Vitamin (MULTIVITAMINS PO) Take 1 tablet by mouth Daily. HOLD PRIOR TO SURG      [] oxyCODONE (ROXICODONE) 5 MG immediate release tablet Take 1 tablet by mouth Every 4 (Four) Hours As Needed for Moderate Pain for up to 3 days. 18 tablet 0    rosuvastatin (CRESTOR) 40 MG tablet TAKE ONE TABLET BY MOUTH DAILY 90 tablet 1    sertraline (ZOLOFT) 50 MG tablet TAKE ONE TABLET BY MOUTH DAILY (Patient taking differently: Take 1 tablet by mouth Daily.) 90 tablet 1    Tirzepatide (Mounjaro) 2.5 MG/0.5ML solution pen-injector pen Inject 0.5 mL under the skin into the appropriate area as directed 1 (One) Time Per Week. LAST DOSE 6/3/2024 INSTRUCTED PT TO HOLD FOR SURGERY      torsemide (DEMADEX) 20 MG tablet Take 1 tablet by mouth Daily.      Xarelto 20 MG tablet TAKE ONE TABLET BY MOUTH DAILY (Patient taking differently: PT IS TAKING LAST DOSE 2024 PER CARDIOLOGY) 30 tablet 9     No Known Allergies    Objective    Objective     Vital Signs  Temp:  [97.6 °F (36.4 °C)] 97.6 °F (36.4 °C)  Heart Rate:  [57-64] 60  BP: (117-133)/(55-66) 117/55  SpO2:  [94 %-95 %] 94 %  on  Flow (L/min):  [3] 3;   Device (Oxygen Therapy): room air  There is no height or weight on file to calculate BMI.    Physical Exam  Constitutional:       Appearance: Normal appearance.   HENT:      Head: Normocephalic and atraumatic.   Cardiovascular:      Rate and Rhythm: Normal rate and regular rhythm.   Pulmonary:      Effort:  Pulmonary effort is normal.      Breath sounds: Normal breath sounds.   Abdominal:      General: There is no distension.      Palpations: Abdomen is soft.      Tenderness: There is no abdominal tenderness.   Musculoskeletal:      Right lower leg: No edema.      Left lower leg: No edema.      Comments: Right lateral chest wall has a bandage that is soaked through.   Neurological:      General: No focal deficit present.      Mental Status: He is alert and oriented to person, place, and time.         Results Review:  I reviewed the patient's new clinical results.  I reviewed the patient's new imaging results and agree with the interpretation.  I reviewed the patient's other test results and agree with the interpretation  I personally viewed and interpreted the patient's EKG/Telemetry data  Discussed with ED provider.    Lab Results (last 24 hours)       Procedure Component Value Units Date/Time    LACTIC ACID, PLASMA [[id number]] Collected: 06/20/24 0815     Updated: 06/20/24 1400    COMPREHENSIVE METABOLIC PANEL [[id number]]  (Abnormal) Collected: 06/20/24 0815     Updated: 06/20/24 1400    AUTODIFF [[id number]]  (Abnormal) Collected: 06/20/24 0815    Specimen: Blood Updated: 06/20/24 1400     Neutrophil % 77.8 %      Lymphocyte % 12.7 %      Monocyte % 4.9 %      Eosinophil % 3.5 %      Basophil % 1.1 %      Neutrophils Absolute 6.4 x10(3)/ul      Lymphocytes Absolute 1.0 x10(3)/ul      Monocytes Absolute 0.4 x10(3)/ul      Eosinophils Absolute 0.3 x10(3)/ul      Basophils Absolute 0.1 x10(3)/ul     Narrative:       Ordered by Discern Expert.    CBC AND DIFFERENTIAL [[id number]]  (Abnormal) Collected: 06/20/24 0815     Updated: 06/20/24 1400    POC Glucose Once [[id number]]  (Abnormal) Collected: 06/20/24 1412    Specimen: Blood Updated: 06/20/24 1413     Glucose 156 mg/dL             Results for orders placed or performed during the hospital encounter of 10/06/17   Blood Culture - Blood,    Specimen: Arm, Right; Blood    Result Value Ref Range    Blood Culture No growth at 5 days        Imaging Results (Last 24 Hours)       ** No results found for the last 24 hours. **            Results for orders placed during the hospital encounter of 05/28/24    Adult Transthoracic Echo Complete w/ Color, Spectral and Contrast if necessary per protocol    Interpretation Summary    Left ventricular systolic function is normal. Calculated left ventricular EF = 62.6%    Left ventricular diastolic function was indeterminate in the setting of possible A-fib.    RV borderline dilated with normal function.    No significant valvular abnormalities.    Estimated right ventricular systolic pressure from tricuspid regurgitation is mildly elevated (35-45 mmHg). Calculated right ventricular systolic pressure from tricuspid regurgitation is 39 mmHg.    Mild-moderate left to right interatrial shunt noted on color Doppler, PFO suspected.  Bubble study not performed.    Biatrial enlargement, normal IVC size.      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       Squamous cell carcinoma of the bronchus in the right middle lobe  Status post right middle lobectomy  Now presents with increasing drainage from the right lateral chest wall  ED chest at outlying facility revealed localized edema versus inflammatory changes.  Consult thoracic surgery    T2DM  Insulin     Atrial fibrillation  Hold off on xarelto for now       I discussed the patient's findings  and my recommendations with patient.    VTE Prophylaxis - SCDs.  Code Status - Full code.       Juancho Diallo MD  [facility]  06/20/24  16:13 EDT

## 2024-06-20 NOTE — TELEPHONE ENCOUNTER
Caller: Ezio Wong    Relationship: Emergency Contact    Best call back number: [phone]     What is the best time to reach you: ANY     Who are you requesting to speak with (clinical staff, provider,  specific staff member): CLINICAL     What was the call regarding: PATIENTS WIFE EZIO WONG [facility] DUE DRAINAGE WHERE THE TUBE WAS PLACED.   EZIO STATES THAT THEY ARE WAITING ON CT AND BLOOD WORK RESULTS.       Is it okay if the provider responds through MyChart:  PATIENTS WIFE EZIO WOULD LIKE A CALL BACK.

## 2024-06-20 NOTE — PROGRESS NOTES
Consult received & chart reviewed. Patient has been transferred from [facility] and is known to our service after undergoing right middle lobectomy on 06/12/24 by Dr. Figueroa. Patient with drainage from incision.  Plan for CT chest this evening. Continue supportive care. Formal consult to follow.     BAM Rodgers

## 2024-06-20 NOTE — PLAN OF CARE
Goal Outcome Evaluation: Pt remains in CCU in telemetry status on 3L nasal canula. Pt is A&O X4. CT surgery consulted; CT chest (ordered and completed this shift

## 2024-06-21 ENCOUNTER — READMISSION MANAGEMENT (OUTPATIENT)
Dept: CALL CENTER | Facility: HOSPITAL | Age: 74
End: 2024-06-21
Payer: MEDICARE

## 2024-06-21 VITALS
RESPIRATION RATE: 18 BRPM | WEIGHT: 276.24 LBS | HEART RATE: 68 BPM | BODY MASS INDEX: 38.67 KG/M2 | TEMPERATURE: 97.4 F | HEIGHT: 71 IN | OXYGEN SATURATION: 95 % | DIASTOLIC BLOOD PRESSURE: 71 MMHG | SYSTOLIC BLOOD PRESSURE: 105 MMHG

## 2024-06-21 LAB
ALBUMIN SERPL-MCNC: 3.5 G/DL (ref 3.5–5.2)
ALBUMIN/GLOB SERPL: 1.2 G/DL
ALP SERPL-CCNC: 147 U/L (ref 39–117)
ALT SERPL W P-5'-P-CCNC: 34 U/L (ref 1–41)
ANION GAP SERPL CALCULATED.3IONS-SCNC: 6 MMOL/L (ref 5–15)
AST SERPL-CCNC: 34 U/L (ref 1–40)
BILIRUB SERPL-MCNC: 0.8 MG/DL (ref 0–1.2)
BUN SERPL-MCNC: 12 MG/DL (ref 8–23)
BUN/CREAT SERPL: 16.7 (ref 7–25)
CALCIUM SPEC-SCNC: 8.7 MG/DL (ref 8.6–10.5)
CHLORIDE SERPL-SCNC: 101 MMOL/L (ref 98–107)
CO2 SERPL-SCNC: 29 MMOL/L (ref 22–29)
CREAT SERPL-MCNC: 0.72 MG/DL (ref 0.76–1.27)
DEPRECATED RDW RBC AUTO: 46.9 FL (ref 37–54)
EGFRCR SERPLBLD CKD-EPI 2021: 95.3 ML/MIN/1.73
ERYTHROCYTE [DISTWIDTH] IN BLOOD BY AUTOMATED COUNT: 14 % (ref 12.3–15.4)
GLOBULIN UR ELPH-MCNC: 3 GM/DL
GLUCOSE BLDC GLUCOMTR-MCNC: 193 MG/DL (ref 70–130)
GLUCOSE BLDC GLUCOMTR-MCNC: 201 MG/DL (ref 70–130)
GLUCOSE BLDC GLUCOMTR-MCNC: 300 MG/DL (ref 70–130)
GLUCOSE SERPL-MCNC: 182 MG/DL (ref 65–99)
HBA1C MFR BLD: 8 % (ref 4.8–5.6)
HCT VFR BLD AUTO: 37.5 % (ref 37.5–51)
HGB BLD-MCNC: 12.4 G/DL (ref 13–17.7)
MAGNESIUM SERPL-MCNC: 1.9 MG/DL (ref 1.6–2.4)
MCH RBC QN AUTO: 30.3 PG (ref 26.6–33)
MCHC RBC AUTO-ENTMCNC: 33.1 G/DL (ref 31.5–35.7)
MCV RBC AUTO: 91.7 FL (ref 79–97)
PHOSPHATE SERPL-MCNC: 3.1 MG/DL (ref 2.5–4.5)
PLATELET # BLD AUTO: 300 10*3/MM3 (ref 140–450)
PMV BLD AUTO: 8.9 FL (ref 6–12)
POTASSIUM SERPL-SCNC: 4.3 MMOL/L (ref 3.5–5.2)
PROT SERPL-MCNC: 6.5 G/DL (ref 6–8.5)
RBC # BLD AUTO: 4.09 10*6/MM3 (ref 4.14–5.8)
SODIUM SERPL-SCNC: 136 MMOL/L (ref 136–145)
WBC NRBC COR # BLD AUTO: 7.17 10*3/MM3 (ref 3.4–10.8)

## 2024-06-21 PROCEDURE — 63710000001 INSULIN LISPRO (HUMAN) PER 5 UNITS

## 2024-06-21 PROCEDURE — A9270 NON-COVERED ITEM OR SERVICE: HCPCS | Performed by: STUDENT IN AN ORGANIZED HEALTH CARE EDUCATION/TRAINING PROGRAM

## 2024-06-21 PROCEDURE — 63710000001 GUAIFENESIN 600 MG TABLET SUSTAINED-RELEASE 12 HOUR: Performed by: STUDENT IN AN ORGANIZED HEALTH CARE EDUCATION/TRAINING PROGRAM

## 2024-06-21 PROCEDURE — 82948 REAGENT STRIP/BLOOD GLUCOSE: CPT

## 2024-06-21 PROCEDURE — 84100 ASSAY OF PHOSPHORUS: CPT | Performed by: STUDENT IN AN ORGANIZED HEALTH CARE EDUCATION/TRAINING PROGRAM

## 2024-06-21 PROCEDURE — 83735 ASSAY OF MAGNESIUM: CPT | Performed by: STUDENT IN AN ORGANIZED HEALTH CARE EDUCATION/TRAINING PROGRAM

## 2024-06-21 PROCEDURE — 83036 HEMOGLOBIN GLYCOSYLATED A1C: CPT

## 2024-06-21 PROCEDURE — G0378 HOSPITAL OBSERVATION PER HR: HCPCS

## 2024-06-21 PROCEDURE — 80053 COMPREHEN METABOLIC PANEL: CPT | Performed by: STUDENT IN AN ORGANIZED HEALTH CARE EDUCATION/TRAINING PROGRAM

## 2024-06-21 PROCEDURE — 85027 COMPLETE CBC AUTOMATED: CPT | Performed by: STUDENT IN AN ORGANIZED HEALTH CARE EDUCATION/TRAINING PROGRAM

## 2024-06-21 PROCEDURE — A9270 NON-COVERED ITEM OR SERVICE: HCPCS

## 2024-06-21 PROCEDURE — 99024 POSTOP FOLLOW-UP VISIT: CPT

## 2024-06-21 PROCEDURE — 63710000001 INSULIN GLARGINE PER 5 UNITS: Performed by: STUDENT IN AN ORGANIZED HEALTH CARE EDUCATION/TRAINING PROGRAM

## 2024-06-21 RX ORDER — GUAIFENESIN 600 MG/1
600 TABLET, EXTENDED RELEASE ORAL EVERY 12 HOURS SCHEDULED
Status: DISCONTINUED | OUTPATIENT
Start: 2024-06-21 | End: 2024-06-21 | Stop reason: HOSPADM

## 2024-06-21 RX ORDER — OXYCODONE HYDROCHLORIDE 5 MG/1
5 TABLET ORAL EVERY 4 HOURS PRN
Qty: 10 TABLET | Refills: 0 | Status: SHIPPED | OUTPATIENT
Start: 2024-06-21 | End: 2024-06-24

## 2024-06-21 RX ORDER — CYCLOBENZAPRINE HCL 10 MG
5 TABLET ORAL ONCE
Status: DISCONTINUED | OUTPATIENT
Start: 2024-06-21 | End: 2024-06-21 | Stop reason: HOSPADM

## 2024-06-21 RX ORDER — LIDOCAINE 4 G/G
1 PATCH TOPICAL
Status: DISCONTINUED | OUTPATIENT
Start: 2024-06-21 | End: 2024-06-21 | Stop reason: HOSPADM

## 2024-06-21 RX ORDER — IPRATROPIUM BROMIDE AND ALBUTEROL SULFATE 2.5; .5 MG/3ML; MG/3ML
3 SOLUTION RESPIRATORY (INHALATION) EVERY 6 HOURS PRN
Status: DISCONTINUED | OUTPATIENT
Start: 2024-06-21 | End: 2024-06-21 | Stop reason: HOSPADM

## 2024-06-21 RX ADMIN — SERTRALINE 50 MG: 50 TABLET, FILM COATED ORAL at 08:16

## 2024-06-21 RX ADMIN — GABAPENTIN 300 MG: 300 CAPSULE ORAL at 08:13

## 2024-06-21 RX ADMIN — GUAIFENESIN 600 MG: 600 TABLET, EXTENDED RELEASE ORAL at 11:03

## 2024-06-21 RX ADMIN — INSULIN LISPRO 5 UNITS: 100 INJECTION, SOLUTION INTRAVENOUS; SUBCUTANEOUS at 11:04

## 2024-06-21 RX ADMIN — INSULIN LISPRO 3 UNITS: 100 INJECTION, SOLUTION INTRAVENOUS; SUBCUTANEOUS at 08:14

## 2024-06-21 RX ADMIN — Medication 10 ML: at 08:16

## 2024-06-21 RX ADMIN — TORSEMIDE 20 MG: 20 TABLET ORAL at 08:16

## 2024-06-21 RX ADMIN — INSULIN GLARGINE 20 UNITS: 100 INJECTION, SOLUTION SUBCUTANEOUS at 08:13

## 2024-06-21 NOTE — DISCHARGE SUMMARY
Patient Name: Branden Diop  : 1950  MRN: [id number]    Date of Admission: 2024  Date of Discharge:  2024  Primary Care Physician: Tino Lopez MD      Chief Complaint:   No chief complaint on file.      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        Hospital Course       This is a 73-year-old male with a history of non-small cell lung cancer of the right middle lobe who presented to [facility] for further management.  He underwent a right middle lobectomy on 2024 tolerated the procedure well.  He had a chest tube in place after the procedure that was removed on postoperative day 3 and he was discharged home the following day.  He presents once again to the hospital with increased yellow drainage. He has been afebrile.   He went to an outlying facility where he underwent a CT chest that revealed a small residual pneumothorax, stranding in the right anterior lateral chest wall without an organized fluid collection, and masslike area in the subcarinal and right perihilar region with mediastinal/hilar lymph nodes, which were noted to be increased in size and number.  He was seen by cardiothoracic surgery and was deemed to be stable for discharge home.  He will follow-up with cardiothoracic surgery on 2024 at which time a follow-up chest x-ray will be performed.    Physical Exam:  Temp:  [97.4 °F (36.3 °C)-98.7 °F (37.1 °C)] 97.4 °F  (36.3 °C)  Heart Rate:  [57-73] 68  Resp:  [16-21] 18  BP: (104-133)/() 105/71  Body mass index is 38.53 kg/m².  Physical Exam  Constitutional:       General: He is not in acute distress.  HENT:      Head: Normocephalic and atraumatic.   Cardiovascular:      Rate and Rhythm: Normal rate and regular rhythm.   Pulmonary:      Effort: Pulmonary effort is normal. No respiratory distress.   Abdominal:      General: There is no distension.      Palpations: Abdomen is soft.      Tenderness: There is no abdominal tenderness.   Skin:     General: Skin is warm and dry.   Neurological:      General: No focal deficit present.      Mental Status: He is alert and oriented to person, place, and time.         Consultants     Consult Orders (all) (From admission, onward)       Start     Ordered    06/20/24 1618  Inpatient Thoracic Surgery Consult  Once        Specialty:  Thoracic Surgery  Provider:  Nini Felipe MD    06/20/24 1617                  Procedures     Imaging Results (All)       Procedure Component Value Units Date/Time    CT Chest Without Contrast Diagnostic [[id number]] Collected: 06/20/24 1916     Updated: 06/20/24 1929    Narrative:      CT CHEST WO CONTRAST DIAGNOSTIC-     Radiation dose reduction techniques were utilized, including automated  exposure control and exposure modulation based on body size.     Clinical: Postop lung surgery, right middle lobectomy for squamous cell  carcinoma     COMPARISON examination 5/3/2024     FINDINGS: Cardiac size within normal limits, there is coronary artery  calcification. Atherosclerotic calcification of a normal diameter aorta.  The esophagus is satisfactory in course and caliber. Some of the  mediastinal lymph nodes remain stable, others have demonstrated interval  enlargement. Reference right paratracheal lymph node is currently 11 mm  short axis, previously 5 mm.     There has been no interval change in the appearance of the left lung.  Trace amount of pleural  "fluid on the right. Small pneumothorax. Patchy  interstitial infiltrate is demonstrated within the right upper lobe.  There is been no interval change in the appearance of the right lower  lobe. There are areas of bronchial wall thickening seen within the right  upper lobe and right lower lobe as before. Linear area of atelectasis  along the suture line. No suspicious pulmonary lesion is demonstrated.     Limited images through the upper abdomen are unremarkable. No osseous  abnormality seen. The remainder is unremarkable.           This report was finalized on 6/20/2024 7:26 PM by Dr. Jamar Segovia M.D  on Workstation: [id number]               Pertinent Labs     Results from last 7 days   Lab Units 06/21/24  0719   WBC 10*3/mm3 7.17   HEMOGLOBIN g/dL 12.4*   PLATELETS 10*3/mm3 300     Results from last 7 days   Lab Units 06/21/24  0719   SODIUM mmol/L 136   POTASSIUM mmol/L 4.3   CHLORIDE mmol/L 101   CO2 mmol/L 29.0   BUN mg/dL 12   CREATININE mg/dL 0.72*   GLUCOSE mg/dL 182*   Estimated Creatinine Clearance: 119.4 mL/min (A) (by C-G formula based on SCr of 0.72 mg/dL (L)).  Results from last 7 days   Lab Units 06/21/24  0719   ALBUMIN g/dL 3.5   BILIRUBIN mg/dL 0.8   ALK PHOS U/L 147*   AST (SGOT) U/L 34   ALT (SGPT) U/L 34     Results from last 7 days   Lab Units 06/21/24  0719   CALCIUM mg/dL 8.7   ALBUMIN g/dL 3.5   MAGNESIUM mg/dL 1.9   PHOSPHORUS mg/dL 3.1               Invalid input(s): \"LDLCALC\"        Test Results Pending at Discharge       Discharge Details        Discharge Medications        Changes to Medications        Instructions Start Date   Xarelto 20 MG tablet  Generic drug: rivaroxaban  What changed:   how much to take  how to take this  when to take this  additional instructions   TAKE ONE TABLET BY MOUTH DAILY             Continue These Medications        Instructions Start Date   acetaminophen 500 MG tablet  Commonly known as: TYLENOL   1,000 mg, Oral, 3 Times Daily    "   clotrimazole-betamethasone 1-0.05 % cream  Commonly known as: Lotrisone   1 application , Topical, 2 Times Daily, Do exceed 1 week      fexofenadine 180 MG tablet  Commonly known as: ALLEGRA   180 mg, Oral, Daily      freestyle lancets   Daily E 11.9      gabapentin 300 MG capsule  Commonly known as: NEURONTIN   300 mg, Oral, Every 8 Hours Scheduled      glucose blood test strip   Freestyle strips daily E 11.9      glucose monitoring kit monitoring kit   Daily E 11.93 FreeStyle meter      guaiFENesin 600 MG 12 hr tablet  Commonly known as: MUCINEX   1,200 mg, Oral, Every 12 Hours Scheduled      Mounjaro 2.5 MG/0.5ML solution pen-injector pen  Generic drug: Tirzepatide   2.5 mg, Subcutaneous, Weekly, LAST DOSE 6/3/2024 INSTRUCTED PT TO HOLD FOR SURGERY      MULTIVITAMINS PO   1 tablet, Oral, Daily, HOLD PRIOR TO SURG      oxyCODONE 5 MG immediate release tablet  Commonly known as: ROXICODONE   5 mg, Oral, Every 4 Hours PRN      rosuvastatin 40 MG tablet  Commonly known as: CRESTOR   TAKE ONE TABLET BY MOUTH DAILY      sertraline 50 MG tablet  Commonly known as: ZOLOFT   TAKE ONE TABLET BY MOUTH DAILY      torsemide 20 MG tablet  Commonly known as: DEMADEX   20 mg, Oral, Daily      Toujeo SoloStar 300 UNIT/ML solution pen-injector injection  Generic drug: Insulin Glargine (1 Unit Dial)   22 Units, Subcutaneous, Daily               No Known Allergies      Discharge Disposition:  Home or Self Care    Discharge Diet:  Diet Order   Procedures    Diet: Regular/House; Fluid Consistency: Thin (IDDSI 0)       Discharge Activity:       CODE STATUS:    Code Status and Medical Interventions:   Ordered at: 06/20/24 2133     Level Of Support Discussed With:    Patient     Code Status (Patient has no pulse and is not breathing):    CPR (Attempt to Resuscitate)     Medical Interventions (Patient has pulse or is breathing):    Full Support       Future Appointments   Date Time Provider Department Center   6/27/2024 11:30 AM Henry  MD David [facility]      Follow-up Information       Tino Lopez MD .    Specialty: Internal Medicine  Contact information:  [address] [place] [address]  [phone]                             Time Spent on Discharge:  Greater than 30 minutes      Juancho Diallo MD  [facility]  06/21/24  10:44 EDT

## 2024-06-21 NOTE — PLAN OF CARE
Problem: Adult Inpatient Plan of Care  Goal: Plan of Care Review  Outcome: Ongoing, Progressing  Flowsheets (Taken 6/21/2024 0477)  Progress: improving  Plan of Care Reviewed With: patient   Goal Outcome Evaluation:  Plan of Care Reviewed With: patient        Progress: improving

## 2024-06-21 NOTE — OUTREACH NOTE
CT Surgery Week 1 Survey      Flowsheet Row Responses   [facility] patient discharged from? [place]   Does the patient have one of the following disease processes/diagnoses(primary or secondary)? Cardiothoracic surgery   Week 1 attempt successful? No   Unsuccessful attempts Attempt 1   Revoke Readmitted              EZIO MARTIN - Registered Nurse

## 2024-06-21 NOTE — CONSULTS
Inpatient Thoracic Surgery Consult  Consult performed by: Hansel James APRN  Consult ordered by: Juancho Diallo MD          Patient Care Team:  Tino Lopez MD as PCP - General (Internal Medicine)  Tino Lopez MD as PCP - Family Medicine  Mart Ureña MD as Consulting Physician (Cardiology)  Martir Trevino MD as Surgeon (General Surgery)  Dione Carter, RN as Nurse Navigator    No chief complaint on file.    Subjective     History of Present Illness  Mr. Diop is a very pleasant 73-year-old gentleman with a history of non-small cell lung cancer of the right middle lobe.  He underwent right middle lobectomy on 6/12/2024 by Dr. David Figueroa.  Patient was subsequently discharged from his hospitalization on 6/16/2024.  He has been transferred from [facility] where he presented with complaints of drainage from his prior chest tube site.  The wife is at bedside who reports a moderate amount of clear yellow drainage.  He has since been transferred to [facility] for further evaluation.     Patient denies any significant pain.  He reports a moderate amount of clear yellow fluid draining from his prior chest tube site.  The drainage has improved with only a scant amount.  He denies any dyspnea.  He denies any fevers, chills, night sweats.  Overall he is without complaints and reports to be doing well following his surgery.    Review of Systems   Constitutional:  Negative for activity change, appetite change, chills and fatigue.   HENT:  Negative for congestion and sore throat.    Respiratory:  Negative for shortness of breath and wheezing.    Cardiovascular:  Negative for chest pain and leg swelling.   Gastrointestinal:  Negative for abdominal distention, nausea and vomiting.   Genitourinary:  Negative for dysuria.   Musculoskeletal:  Negative for back pain.   Skin:  Negative for color change and rash.   Neurological:  Negative for seizures and speech  difficulty.   Psychiatric/Behavioral:  Negative for confusion.         Past Medical History:   Diagnosis Date    Anxiety     Arthritis     Atrial fibrillation     CURRENTLY    Bunion of right foot     Colon polyps     COPD (chronic obstructive pulmonary disease)     MILD. NO MEDS FOR    Depression     History of atrial fibrillation     WITH CARDIO VERSION. NO PROBLEMS    History of skin cancer     BCC REMOVED FROM BACK    [place] ([place] of [place])     Hyperlipidemia     Inguinal hernia, recurrent     RIGHT    Left foot pain     Lung nodules     Rash     IN GROIN TREATING FOR YEAST INFECTION BY PCP    Redness of skin     LOWER LEGS BILATERAL    Scab     BILATERAL LEGS HEALING    Sleep apnea with use of continuous positive airway pressure (CPAP)     CPAP    Streptococcus pneumoniae     ABOUT 6-7 YR AGO.     Type 2 diabetes mellitus      Past Surgical History:   Procedure Laterality Date    BASAL CELL CARCINOMA EXCISION      BRONCHOSCOPY WITH ION ROBOTIC ASSIST N/A 5/6/2024    Procedure: BRONCHOSCOPY WITH ION ROBOT WITH FNA, BIOPSIES, BAL AND ENDOBRONCHIAL ULTRASOUND WITH FNA;  Surgeon: Yony Coles MD;  Location: [facility] ENDOSCOPY;  Service: Robotics - Pulmonary;  Laterality: N/A;  PRE: PULMONARY NODULES  POST: SAME    CARDIAC CATHETERIZATION N/A 11/15/2021    Procedure: Coronary angiography;  Surgeon: Alfredo Jennings MD;  Location: [facility] CATH INVASIVE LOCATION;  Service: Cardiovascular;  Laterality: N/A;    CARDIAC CATHETERIZATION N/A 11/15/2021    Procedure: Left heart cath;  Surgeon: Alfredo Jennings MD;  Location: [facility] CATH INVASIVE LOCATION;  Service: Cardiovascular;  Laterality: N/A;    CARDIAC CATHETERIZATION N/A 11/15/2021    Procedure: Left ventriculography;  Surgeon: Alfredo Jennings MD;  Location: [facility] CATH INVASIVE LOCATION;  Service: Cardiovascular;  Laterality: N/A;    CARDIAC CATHETERIZATION N/A 11/15/2021    Procedure: Aortic root aortogram;  Surgeon: Alfredo Jennings MD;   Location: [facility] CATH INVASIVE LOCATION;  Service: Cardiovascular;  Laterality: N/A;    CARDIOVERSION      CHOLECYSTECTOMY N/A 10/07/2017    Procedure: CHOLECYSTECTOMY LAPAROSCOPIC;  Surgeon: Martir Trevino MD;  Location: [facility] MAIN OR;  Service:     COLONOSCOPY  2007    COLONOSCOPY N/A 8/30/2022    Procedure: COLONOSCOPY TO CECUM AND TI AND COLD SNARE POLYPECTOMY;  Surgeon: Cj Lopez MD;  Location: [facility] ENDOSCOPY;  Service: Gastroenterology;  Laterality: N/A;  Pre: History of colon polyps  Post: POLYP, PREVIOUS TATTOO    FOOT SURGERY Left     TOES ARE PINNED. ALL BUT GREAT TOE    HAND SURGERY      THUMB    HERNIA REPAIR      INGUINAL HERNIA REPAIR Right 06/27/2018    Procedure: INGUINAL HERNIA REPAIR, OPEN, RECURRENT;  Surgeon: Martir Trevino MD;  Location: [facility] OR OSC;  Service: General    LASIK Bilateral     LOBECTOMY Right 6/12/2024    Procedure: BRONCHOSCOPY, RIGHT VIDEO ASSISTED THORACOSCOPY WITH RIGHT MIDDLE LOBECTOMY WITH DAVINCI ROBOT, MEDIASTINAL LYMPH NODE DISSECTION, INTERCOSTAL NERVE BLOCK;  Surgeon: David Figueroa MD;  Location: [facility] MAIN OR;  Service: Robotics - DaVinci;  Laterality: Right;    NECK SURGERY      growth on salivary gland    REPLACEMENT TOTAL KNEE Right 2007    (he is going to have a LTKR next month)    REPLACEMENT TOTAL KNEE Left      Family History   Problem Relation Age of Onset    Cancer Other     Stroke Mother     Alcohol abuse Father     Malig Hyperthermia Neg Hx      Social History     Socioeconomic History    Marital status:     Number of children: 2   Tobacco Use    Smoking status: Former     Current packs/day: 0.00     Average packs/day: 1 pack/day for 30.0 years (30.0 ttl pk-yrs)     Types: Cigars, Cigarettes     Start date: 1/1/1984     Quit date: 1/1/2014     Years since quitting: 10.4    Smokeless tobacco: Never   Vaping Use    Vaping status: Never Used   Substance and Sexual Activity    Alcohol use: Never     Comment: caffeine use- decaf coffee    Drug  use: Never    Sexual activity: Defer     No medications prior to admission.     No Known Allergies    Objective      Vital Signs  Temp:  [97.4 °F (36.3 °C)-98.7 °F (37.1 °C)] 97.4 °F (36.3 °C)  Heart Rate:  [57-73] 68  Resp:  [16-21] 18  BP: (104-133)/() 105/71    Intake & Output (last day)         06/20 0701  06/21 0700 06/21 0701  06/22 0700    P.O. 600     I.V. (mL/kg) 20 (0.2)     Total Intake(mL/kg) 620 (5)     Urine (mL/kg/hr) 625     Emesis/NG output 0     Stool 0     Total Output 625     Net -5           Urine Unmeasured Occurrence 0 x     Stool Unmeasured Occurrence 0 x     Emesis Unmeasured Occurrence 0 x             Physical Exam  Constitutional:       General: He is not in acute distress.     Appearance: He is not ill-appearing or toxic-appearing.   HENT:      Head: Normocephalic.      Mouth/Throat:      Mouth: Mucous membranes are moist.      Pharynx: Oropharynx is clear.   Eyes:      Pupils: Pupils are equal, round, and reactive to light.   Cardiovascular:      Rate and Rhythm: Normal rate and regular rhythm.   Pulmonary:      Effort: No respiratory distress.   Chest:      Chest wall: No tenderness.   Abdominal:      General: There is no distension.      Palpations: Abdomen is soft.      Tenderness: There is no abdominal tenderness.   Musculoskeletal:         General: No swelling or tenderness.   Skin:     General: Skin is warm and dry.      Capillary Refill: Capillary refill takes less than 2 seconds.      Comments: Surgical incisions clean, dry, intact and well-approximated.  Prior chest tube insertion site clean, dry, and intact a scant amount of serous drainage.  Scattered abrasions throughout.  No redness, purulence, or significant erythema.  Incisions scabbed.    Neurological:      General: No focal deficit present.      Mental Status: He is alert and oriented to person, place, and time.   Psychiatric:         Mood and Affect: Mood normal.         Results Review:    I reviewed the patient's  new clinical results.  I reviewed the patient's new imaging results and agree with the interpretation.  Discussed with Patient, nurse, and Dr. Figueroa     Imaging Results (Last 24 Hours)       Procedure Component Value Units Date/Time    CT Chest Without Contrast Diagnostic [[id number]] Collected: 06/20/24 1916     Updated: 06/20/24 1929    Narrative:      CT CHEST WO CONTRAST DIAGNOSTIC-     Radiation dose reduction techniques were utilized, including automated  exposure control and exposure modulation based on body size.     Clinical: Postop lung surgery, right middle lobectomy for squamous cell  carcinoma     COMPARISON examination 5/3/2024     FINDINGS: Cardiac size within normal limits, there is coronary artery  calcification. Atherosclerotic calcification of a normal diameter aorta.  The esophagus is satisfactory in course and caliber. Some of the  mediastinal lymph nodes remain stable, others have demonstrated interval  enlargement. Reference right paratracheal lymph node is currently 11 mm  short axis, previously 5 mm.     There has been no interval change in the appearance of the left lung.  Trace amount of pleural fluid on the right. Small pneumothorax. Patchy  interstitial infiltrate is demonstrated within the right upper lobe.  There is been no interval change in the appearance of the right lower  lobe. There are areas of bronchial wall thickening seen within the right  upper lobe and right lower lobe as before. Linear area of atelectasis  along the suture line. No suspicious pulmonary lesion is demonstrated.     Limited images through the upper abdomen are unremarkable. No osseous  abnormality seen. The remainder is unremarkable.           This report was finalized on 6/20/2024 7:26 PM by Dr. Jamar Segovia M.D  on Workstation: [id number]               Lab Results:  Lab Results (last 24 hours)       Procedure Component Value Units Date/Time    POC Glucose Once [[id number]]  (Abnormal) Collected: 06/21/24  1038    Specimen: Blood Updated: 06/21/24 1054     Glucose 300 mg/dL     Comprehensive Metabolic Panel [[id number]]  (Abnormal) Collected: 06/21/24 0719    Specimen: Blood Updated: 06/21/24 0801     Glucose 182 mg/dL      BUN 12 mg/dL      Creatinine 0.72 mg/dL      Sodium 136 mmol/L      Potassium 4.3 mmol/L      Chloride 101 mmol/L      CO2 29.0 mmol/L      Calcium 8.7 mg/dL      Total Protein 6.5 g/dL      Albumin 3.5 g/dL      ALT (SGPT) 34 U/L      AST (SGOT) 34 U/L      Alkaline Phosphatase 147 U/L      Total Bilirubin 0.8 mg/dL      Globulin 3.0 gm/dL      A/G Ratio 1.2 g/dL      BUN/Creatinine Ratio 16.7     Anion Gap 6.0 mmol/L      eGFR 95.3 mL/min/1.73     Narrative:      GFR Normal >60  Chronic Kidney Disease <60  Kidney Failure <15    The GFR formula is only valid for adults with stable renal function between ages 18 and 70.    Magnesium [[id number]]  (Normal) Collected: 06/21/24 0719    Specimen: Blood Updated: 06/21/24 0801     Magnesium 1.9 mg/dL     Phosphorus [[id number]]  (Normal) Collected: 06/21/24 0719    Specimen: Blood Updated: 06/21/24 0801     Phosphorus 3.1 mg/dL     Hemoglobin A1c [[id number]]  (Abnormal) Collected: 06/21/24 0719    Specimen: Blood Updated: 06/21/24 0756     Hemoglobin A1C 8.00 %     Narrative:      Hemoglobin A1C Ranges:    Increased Risk for Diabetes  5.7% to 6.4%  Diabetes                     >= 6.5%  Diabetic Goal                < 7.0%    CBC (No Diff) [[id number]]  (Abnormal) Collected: 06/21/24 0719    Specimen: Blood Updated: 06/21/24 0745     WBC 7.17 10*3/mm3      RBC 4.09 10*6/mm3      Hemoglobin 12.4 g/dL      Hematocrit 37.5 %      MCV 91.7 fL      MCH 30.3 pg      MCHC 33.1 g/dL      RDW 14.0 %      RDW-SD 46.9 fl      MPV 8.9 fL      Platelets 300 10*3/mm3     POC Glucose Once [[id number]]  (Abnormal) Collected: 06/21/24 0647    Specimen: Blood Updated: 06/21/24 0707     Glucose 201 mg/dL     POC Glucose Once [[id number]]  (Abnormal) Collected: 06/21/24 0614     Specimen: Blood Updated: 06/21/24 0616     Glucose 193 mg/dL     POC Glucose Once [[id number]]  (Abnormal) Collected: 06/20/24 2230    Specimen: Blood Updated: 06/20/24 2232     Glucose 290 mg/dL     POC Glucose Once [[id number]]  (Abnormal) Collected: 06/20/24 2035    Specimen: Blood Updated: 06/20/24 2037     Glucose 246 mg/dL     POC Glucose Once [[id number]]  (Abnormal) Collected: 06/20/24 1412    Specimen: Blood Updated: 06/20/24 1413     Glucose 156 mg/dL                 Assessment & Plan       Fluid collection at surgical site, initial encounter    A-fib    Type 2 diabetes mellitus, with long-term current use of insulin    Hyperlipidemia    COPD (chronic obstructive pulmonary disease)    Essential hypertension    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  It no significant volume effusion.  Very small pneumothorax.  Lungs well-expanded bilaterally.  Unremarkable imaged upper abdomen.  He appears to be healing well following his surgery.  His incisions appear to be healing nicely.  Serosanguineous to serous drainage is not uncommon following chest surgery.  This was discussed at length with the patient and his wife at bedside.  We discussed incision care and ensuring his sites are cleansed with hydrogen peroxide and to clean drainage with gauze.  May apply a small piece of gauze and tape sparingly to absorb any drainage.      No objections for discharge from thoracic surgery standpoint.  He is scheduled to follow-up with us in the office on 6/27/2024.  Follow-up chest x-ray to be performed at this time. Final surgical pathology T3 N1 invasive squamous cell carcinoma.     I discussed the patients findings and our recommendations with patient, family, nursing staff, and consulting  provider    Thank you for this consult and allowing us to participate in the care of your patient.  We will follow along with you during this hospitalization.     BAM Rodgers  [facility]  06/21/24  13:44 EDT

## 2024-06-21 NOTE — CASE MANAGEMENT/SOCIAL WORK
Discharge Planning Assessment  [facility]     Patient Name: Branden Diop  MRN: [id number]  Today's Date: 6/21/2024    Admit Date: 6/20/2024    Plan: Home  Pt denies needs   Discharge Needs Assessment       Row Name 06/21/24 1225       Living Environment    People in Home spouse    Current Living Arrangements home    Potentially Unsafe Housing Conditions none    In the past 12 months has the electric, gas, oil, or water company threatened to shut off services in your home? No    Primary Care Provided by self    Provides Primary Care For no one    Family Caregiver if Needed spouse    Quality of Family Relationships supportive    Able to Return to Prior Arrangements yes       Resource/Environmental Concerns    Transportation Concerns none       Transportation Needs    In the past 12 months, has lack of transportation kept you from medical appointments or from getting medications? no    In the past 12 months, has lack of transportation kept you from meetings, work, or from getting things needed for daily living? No       Food Insecurity    Within the past 12 months, you worried that your food would run out before you got the money to buy more. Never true    Within the past 12 months, the food you bought just didn't last and you didn't have money to get more. Never true       Transition Planning    Patient/Family Anticipates Transition to home    Transportation Anticipated family or friend will provide       Discharge Needs Assessment    Equipment Currently Used at Home cpap;oxygen    Concerns to be Addressed discharge planning    Anticipated Changes Related to Illness none    Equipment Needed After Discharge none                   Discharge Plan       Row Name 06/21/24 1226       Plan    Plan Home  Pt denies needs    Plan Comments CCP spoke to patient at bedside to discuss discharge plan.  Face sheet verified.  Pt uses [facility] on [facility] to obtain his medications.  Pt does not use any assistive equipment  to ambulate  He has new oxygen from [employer].  He is IADL's.  He lives in a two story house with his wife  He does not use the stairs.  He has no hhistory of HH or rehab stays  Plan is home  No needs                  Continued Care and Services - Admitted Since 6/20/2024    No active coordination exists for this encounter.       Selected Continued Care - Episodes Includes continued care and service providers with selected services from the active episodes listed below      [facility] Episode start date: 2/11/2022   There are no active outsourced providers for this episode.                 Expected Discharge Date and Time       Expected Discharge Date Expected Discharge Time    Jun 21, 2024            Demographic Summary    No documentation.                  Functional Status    No documentation.                  Psychosocial    No documentation.                  Abuse/Neglect    No documentation.                  Legal    No documentation.                  Substance Abuse    No documentation.                  Patient Forms    No documentation.                     Lily Cooper RN

## 2024-06-22 NOTE — CASE MANAGEMENT/SOCIAL WORK
Case Management Discharge Note      Final Note: home         Selected Continued Care - Discharged on 6/21/2024 Admission date: 6/20/2024 - Discharge disposition: Home or Self Care      Destination    No services have been selected for the patient.                Durable Medical Equipment    No services have been selected for the patient.                Dialysis/Infusion    No services have been selected for the patient.                Home Medical Care    No services have been selected for the patient.                Therapy    No services have been selected for the patient.                Community Resources    No services have been selected for the patient.                Community & DME    No services have been selected for the patient.                    Selected Continued Care - Episodes Includes continued care and service providers with selected services from the active episodes listed below      [facility] Episode start date: 2/11/2022   There are no active outsourced providers for this episode.                      Final Discharge Disposition Code: 01 - home or self-care

## 2024-06-22 NOTE — OUTREACH NOTE
Prep Survey      Flowsheet Row Responses   [facility] patient discharged from? [facility]   Is LACE score < 7 ? No   Eligibility Readm Mgmt   Discharge diagnosis Fluid collection oat surgical site   Does the patient have one of the following disease processes/diagnoses(primary or secondary)? Other   Does the patient have Home health ordered? No   Is there a DME ordered? No   Prep survey completed? Yes            DELGADO MARTINEZ - Registered Nurse

## 2024-06-25 ENCOUNTER — READMISSION MANAGEMENT (OUTPATIENT)
Dept: CALL CENTER | Facility: HOSPITAL | Age: 74
End: 2024-06-25
Payer: MEDICARE

## 2024-06-25 NOTE — OUTREACH NOTE
Medical Week 1 Survey      Flowsheet Row Responses   [facility] patient discharged from? [facility]   Does the patient have one of the following disease processes/diagnoses(primary or secondary)? Other   Week 1 attempt successful? Yes   Call start time 1410   Call end time 1413   Discharge diagnosis Fluid collection oat surgical site   Meds reviewed with patient/caregiver? Yes   Is the patient taking all medications as directed (includes completed medication regime)? Yes   Does the patient have a primary care provider?  Yes   Does the patient have an appointment with their PCP within 7 days of discharge? Greater than 7 days   Has the patient kept scheduled appointments due by today? N/A   Has home health visited the patient within 72 hours of discharge? N/A   What DME was ordered? home oxygen from [facility]   Has all DME been delivered? Yes   Psychosocial issues? No   Did the patient receive a copy of their discharge instructions? Yes   Nursing interventions Reviewed instructions with patient   What is the patient's perception of their health status since discharge? Improving   Is the patient/caregiver able to teach back signs and symptoms related to disease process for when to call PCP? Yes   Is the patient/caregiver able to teach back signs and symptoms related to disease process for when to call 911? Yes   Is the patient/caregiver able to teach back the hierarchy of who to call/visit for symptoms/problems? PCP, Specialist, Home health nurse, Urgent Care, ED, 911 Yes   If the patient is a current smoker, are they able to teach back resources for cessation? Not a smoker   Week 1 call completed? Yes   Call end time 1413            Africa GARRIDO - Felisa Nurse

## 2024-06-27 ENCOUNTER — OFFICE VISIT (OUTPATIENT)
Dept: SURGERY | Facility: CLINIC | Age: 74
End: 2024-06-27
Payer: MEDICARE

## 2024-06-27 ENCOUNTER — PATIENT OUTREACH (OUTPATIENT)
Dept: OTHER | Facility: HOSPITAL | Age: 74
End: 2024-06-27
Payer: MEDICARE

## 2024-06-27 ENCOUNTER — HOSPITAL ENCOUNTER (OUTPATIENT)
Dept: GENERAL RADIOLOGY | Facility: HOSPITAL | Age: 74
Discharge: HOME | End: 2024-06-27
Admitting: NURSE PRACTITIONER
Payer: MEDICARE

## 2024-06-27 VITALS
HEIGHT: 71 IN | BODY MASS INDEX: 38.54 KG/M2 | HEART RATE: 67 BPM | DIASTOLIC BLOOD PRESSURE: 74 MMHG | SYSTOLIC BLOOD PRESSURE: 112 MMHG | OXYGEN SATURATION: 92 %

## 2024-06-27 DIAGNOSIS — C34.2 SQUAMOUS CELL CARCINOMA OF BRONCHUS IN RIGHT MIDDLE LOBE: Primary | ICD-10-CM

## 2024-06-27 DIAGNOSIS — C34.2 MALIGNANT NEOPLASM OF MIDDLE LOBE OF RIGHT LUNG: Primary | ICD-10-CM

## 2024-06-27 DIAGNOSIS — C34.2 SQUAMOUS CELL CARCINOMA OF BRONCHUS IN RIGHT MIDDLE LOBE: ICD-10-CM

## 2024-06-27 PROCEDURE — 71046 X-RAY EXAM CHEST 2 VIEWS: CPT

## 2024-06-27 NOTE — PROGRESS NOTES
"Reviewed chart. Patient IP 6/2-6/2/24, s/p RM lobectomy. Dc'd home on 3L O2. IP 6/20-6/21 for drainage at his previous chest tube site.    I accompanied patient to his post-op appt with Dr. Figueroa today. Dr. 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 Frequent lymphovascular space invasion and focal perineural invasion was identified. Level 10R, 11R and 12R were all positive for metastatic carcinoma. Patient is being referred to medical oncology. The patient continued to have drainage from his previous chest tube site; Dr. Figueroa placed two sutures in office today.  The patient will be scheduled for a port placement.  Dr. Figueroa's office will call to schedule.    The patient continues to use supplemental oxygen as prescribed. He using a wheelchair today.  The patient reports adequate pain control with oral medication. He has lower extremity edema.  The patient states his \"water pill\" dosage was recently increased. Dr. Figueroa encouraged him to contact his PCP regarding this.    The patient denies any questions/concerns or ongoing resource needs.  I will continue to follow; encouraged patient/wife to call as needed.    Referral placed to medical oncology. Messaged Jayesh @ [facility] office regarding referral.   "

## 2024-06-28 ENCOUNTER — TELEPHONE (OUTPATIENT)
Dept: SURGERY | Facility: CLINIC | Age: 74
End: 2024-06-28
Payer: MEDICARE

## 2024-06-28 ENCOUNTER — TELEPHONE (OUTPATIENT)
Dept: SURGERY | Facility: CLINIC | Age: 74
End: 2024-06-28

## 2024-06-28 DIAGNOSIS — C34.2 MALIGNANT NEOPLASM OF MIDDLE LOBE OF RIGHT LUNG: Primary | ICD-10-CM

## 2024-06-28 RX ORDER — OXYCODONE HYDROCHLORIDE 5 MG/1
5 TABLET ORAL EVERY 8 HOURS PRN
Qty: 15 TABLET | Refills: 0 | Status: SHIPPED | OUTPATIENT
Start: 2024-06-28 | End: 2024-07-03

## 2024-06-28 NOTE — TELEPHONE ENCOUNTER
Caller: Luba Diop    Relationship: Emergency Contact    Best call back number: [phone]     Requested Prescriptions: OXYCODONE 5MG    Last office visit with prescribing clinician: 6/27/2024   Last telemedicine visit with prescribing clinician: Visit date not found   Next office visit with prescribing clinician: 9/26/2024     Additional details provided by patient: PATIENT STATES THAT HE SPOKE WITH DR. HARMON ABOUT REFILLING THIS PRESCRIPTION. PATIENT HAS ONE DAY OF PRESCRIPTION LEFT.     Does the patient have less than a 3 day supply:  [x] Yes  [] No    Would you like a call back once the refill request has been completed: [x] Yes [] No    If the office needs to give you a call back, can they leave a voicemail: [x] Yes [] No    Alejandra Benjamin Rep   06/28/24 10:31 EDT

## 2024-06-28 NOTE — TELEPHONE ENCOUNTER
PT'S WIFE CALLED TO REFILL RX FOR OXYCODONE, INFORMED PT WE DID NOT PRESCRIBE IT AND GAVE HER THE NUMBER OF THE PRESCRIBER, PT STATED UNDERSTANDING AND WAS AGREEABLE

## 2024-06-28 NOTE — TELEPHONE ENCOUNTER
Called and spoke to Luba about patients refill. Informed her I would send a message to Dr Figueroa. Message sent. Asked if they would like a call back on this request. Will call back.

## 2024-07-01 ENCOUNTER — PREP FOR SURGERY (OUTPATIENT)
Dept: OTHER | Facility: HOSPITAL | Age: 74
End: 2024-07-01
Payer: MEDICARE

## 2024-07-01 DIAGNOSIS — C34.2 MALIGNANT NEOPLASM OF MIDDLE LOBE OF RIGHT LUNG: Primary | ICD-10-CM

## 2024-07-03 ENCOUNTER — READMISSION MANAGEMENT (OUTPATIENT)
Dept: CALL CENTER | Facility: HOSPITAL | Age: 74
End: 2024-07-03
Payer: MEDICARE

## 2024-07-03 NOTE — OUTREACH NOTE
Medical Week 2 Survey      Flowsheet Row Responses   [facility] patient discharged from? [facility]   Does the patient have one of the following disease processes/diagnoses(primary or secondary)? Other   Week 2 attempt successful? Yes   Call start time 1710   Discharge diagnosis Fluid collection oat surgical site   Call end time 1717   Is patient permission given to speak with other caregiver? Yes   List who call center can speak with wife   Person spoke with today (if not patient) and relationship wife   Meds reviewed with patient/caregiver? Yes   Does the patient have all medications ordered at discharge? N/A   Has the patient kept scheduled appointments due by today? Yes   DME comments Pt continues to wear home O2 @ 3L   Comments Per pt's wife the pt is doing well, he is using the IS and flutter valve frequently during the day. Pt is active and tolerating po intake. Pt has no s/sx of infection at incision sites per wife. Pt's wife denies pt having fever or SOA at this time.   What is the patient's perception of their health status since discharge? Improving   Is the patient/caregiver able to teach back signs and symptoms related to disease process for when to call PCP? Yes   Is the patient/caregiver able to teach back signs and symptoms related to disease process for when to call 911? Yes   Week 2 Call Completed? Yes   Revoked No further contact(revokes)-requires comment   Call end time 1717            Lis TINEO - Registered Nurse

## 2024-07-05 ENCOUNTER — ANESTHESIA (OUTPATIENT)
Dept: PERIOP | Facility: HOSPITAL | Age: 74
End: 2024-07-05
Payer: MEDICARE

## 2024-07-05 ENCOUNTER — TELEPHONE (OUTPATIENT)
Dept: SURGERY | Facility: CLINIC | Age: 74
End: 2024-07-05

## 2024-07-05 ENCOUNTER — HOSPITAL ENCOUNTER (OUTPATIENT)
Facility: HOSPITAL | Age: 74
Setting detail: HOSPITAL OUTPATIENT SURGERY
Discharge: HOME | End: 2024-07-05
Attending: SURGERY | Admitting: SURGERY
Payer: MEDICARE

## 2024-07-05 ENCOUNTER — ANESTHESIA EVENT (OUTPATIENT)
Dept: PERIOP | Facility: HOSPITAL | Age: 74
End: 2024-07-05
Payer: MEDICARE

## 2024-07-05 ENCOUNTER — APPOINTMENT (OUTPATIENT)
Dept: GENERAL RADIOLOGY | Facility: HOSPITAL | Age: 74
End: 2024-07-05
Payer: MEDICARE

## 2024-07-05 VITALS
HEART RATE: 56 BPM | RESPIRATION RATE: 20 BRPM | DIASTOLIC BLOOD PRESSURE: 60 MMHG | OXYGEN SATURATION: 96 % | TEMPERATURE: 97.7 F | SYSTOLIC BLOOD PRESSURE: 130 MMHG

## 2024-07-05 DIAGNOSIS — C34.2 MALIGNANT NEOPLASM OF MIDDLE LOBE OF RIGHT LUNG: ICD-10-CM

## 2024-07-05 DIAGNOSIS — C34.2 MALIGNANT NEOPLASM OF MIDDLE LOBE OF RIGHT LUNG: Primary | ICD-10-CM

## 2024-07-05 LAB
GLUCOSE BLDC GLUCOMTR-MCNC: 149 MG/DL (ref 70–130)
GLUCOSE BLDC GLUCOMTR-MCNC: 150 MG/DL (ref 70–130)

## 2024-07-05 PROCEDURE — 77001 FLUOROGUIDE FOR VEIN DEVICE: CPT | Performed by: SURGERY

## 2024-07-05 PROCEDURE — 76937 US GUIDE VASCULAR ACCESS: CPT | Performed by: SURGERY

## 2024-07-05 PROCEDURE — C1788 PORT, INDWELLING, IMP: HCPCS | Performed by: SURGERY

## 2024-07-05 PROCEDURE — 36561 INSERT TUNNELED CV CATH: CPT | Performed by: SURGERY

## 2024-07-05 PROCEDURE — 25010000002 PROPOFOL 200 MG/20ML EMULSION: Performed by: STUDENT IN AN ORGANIZED HEALTH CARE EDUCATION/TRAINING PROGRAM

## 2024-07-05 PROCEDURE — 25010000002 BUPIVACAINE (PF) 0.25 % SOLUTION 30 ML VIAL: Performed by: SURGERY

## 2024-07-05 PROCEDURE — 25010000002 CEFAZOLIN 3 G RECONSTITUTED SOLUTION 1 EACH VIAL: Performed by: SURGERY

## 2024-07-05 PROCEDURE — 76000 FLUOROSCOPY <1 HR PHYS/QHP: CPT

## 2024-07-05 PROCEDURE — 82948 REAGENT STRIP/BLOOD GLUCOSE: CPT

## 2024-07-05 PROCEDURE — 25010000002 HEPARIN (PORCINE) PER 1000 UNITS: Performed by: SURGERY

## 2024-07-05 PROCEDURE — 25810000003 LACTATED RINGERS PER 1000 ML: Performed by: ANESTHESIOLOGY

## 2024-07-05 DEVICE — POWERPORT CLEARVUE ISP IMPLANTABLE PORT WITH ATTACHABLE 8F POLYURETHANE OPEN-ENDED SINGLE-LUMEN VENOUS CATHETER PROCEDURAL KIT
Type: IMPLANTABLE DEVICE | Site: CHEST | Status: FUNCTIONAL
Brand: POWERPORT CLEARVUE

## 2024-07-05 RX ORDER — FLUMAZENIL 0.1 MG/ML
0.2 INJECTION INTRAVENOUS AS NEEDED
Status: DISCONTINUED | OUTPATIENT
Start: 2024-07-05 | End: 2024-07-05 | Stop reason: HOSPADM

## 2024-07-05 RX ORDER — OXYCODONE HYDROCHLORIDE 5 MG/1
5 TABLET ORAL EVERY 8 HOURS PRN
Qty: 21 TABLET | Refills: 0 | Status: SHIPPED | OUTPATIENT
Start: 2024-07-05 | End: 2024-07-12

## 2024-07-05 RX ORDER — EPHEDRINE SULFATE 50 MG/ML
5 INJECTION, SOLUTION INTRAVENOUS ONCE AS NEEDED
Status: DISCONTINUED | OUTPATIENT
Start: 2024-07-05 | End: 2024-07-05 | Stop reason: HOSPADM

## 2024-07-05 RX ORDER — PROMETHAZINE HYDROCHLORIDE 25 MG/1
25 SUPPOSITORY RECTAL ONCE AS NEEDED
Status: DISCONTINUED | OUTPATIENT
Start: 2024-07-05 | End: 2024-07-05 | Stop reason: HOSPADM

## 2024-07-05 RX ORDER — SUCCINYLCHOLINE/SOD CL,ISO/PF 200MG/10ML
SYRINGE (ML) INTRAVENOUS AS NEEDED
Status: DISCONTINUED | OUTPATIENT
Start: 2024-07-05 | End: 2024-07-05 | Stop reason: SURG

## 2024-07-05 RX ORDER — FENTANYL CITRATE 50 UG/ML
50 INJECTION, SOLUTION INTRAMUSCULAR; INTRAVENOUS ONCE AS NEEDED
Status: DISCONTINUED | OUTPATIENT
Start: 2024-07-05 | End: 2024-07-05 | Stop reason: HOSPADM

## 2024-07-05 RX ORDER — NALOXONE HCL 0.4 MG/ML
0.2 VIAL (ML) INJECTION AS NEEDED
Status: DISCONTINUED | OUTPATIENT
Start: 2024-07-05 | End: 2024-07-05 | Stop reason: HOSPADM

## 2024-07-05 RX ORDER — MIDAZOLAM HYDROCHLORIDE 1 MG/ML
0.5 INJECTION INTRAMUSCULAR; INTRAVENOUS
Status: DISCONTINUED | OUTPATIENT
Start: 2024-07-05 | End: 2024-07-05 | Stop reason: HOSPADM

## 2024-07-05 RX ORDER — FAMOTIDINE 10 MG/ML
20 INJECTION, SOLUTION INTRAVENOUS ONCE
Status: COMPLETED | OUTPATIENT
Start: 2024-07-05 | End: 2024-07-05

## 2024-07-05 RX ORDER — DROPERIDOL 2.5 MG/ML
0.62 INJECTION, SOLUTION INTRAMUSCULAR; INTRAVENOUS
Status: DISCONTINUED | OUTPATIENT
Start: 2024-07-05 | End: 2024-07-05 | Stop reason: HOSPADM

## 2024-07-05 RX ORDER — FENTANYL CITRATE 50 UG/ML
25 INJECTION, SOLUTION INTRAMUSCULAR; INTRAVENOUS
Status: DISCONTINUED | OUTPATIENT
Start: 2024-07-05 | End: 2024-07-05 | Stop reason: HOSPADM

## 2024-07-05 RX ORDER — DIPHENHYDRAMINE HYDROCHLORIDE 50 MG/ML
12.5 INJECTION INTRAMUSCULAR; INTRAVENOUS
Status: DISCONTINUED | OUTPATIENT
Start: 2024-07-05 | End: 2024-07-05 | Stop reason: HOSPADM

## 2024-07-05 RX ORDER — LIDOCAINE HYDROCHLORIDE 20 MG/ML
INJECTION, SOLUTION INFILTRATION; PERINEURAL AS NEEDED
Status: DISCONTINUED | OUTPATIENT
Start: 2024-07-05 | End: 2024-07-05 | Stop reason: SURG

## 2024-07-05 RX ORDER — IPRATROPIUM BROMIDE AND ALBUTEROL SULFATE 2.5; .5 MG/3ML; MG/3ML
3 SOLUTION RESPIRATORY (INHALATION) ONCE AS NEEDED
Status: DISCONTINUED | OUTPATIENT
Start: 2024-07-05 | End: 2024-07-05 | Stop reason: HOSPADM

## 2024-07-05 RX ORDER — SODIUM CHLORIDE 0.9 % (FLUSH) 0.9 %
3-10 SYRINGE (ML) INJECTION AS NEEDED
Status: DISCONTINUED | OUTPATIENT
Start: 2024-07-05 | End: 2024-07-05 | Stop reason: HOSPADM

## 2024-07-05 RX ORDER — LABETALOL HYDROCHLORIDE 5 MG/ML
5 INJECTION, SOLUTION INTRAVENOUS
Status: DISCONTINUED | OUTPATIENT
Start: 2024-07-05 | End: 2024-07-05 | Stop reason: HOSPADM

## 2024-07-05 RX ORDER — LIDOCAINE HYDROCHLORIDE 10 MG/ML
0.5 INJECTION, SOLUTION INFILTRATION; PERINEURAL ONCE AS NEEDED
Status: DISCONTINUED | OUTPATIENT
Start: 2024-07-05 | End: 2024-07-05 | Stop reason: HOSPADM

## 2024-07-05 RX ORDER — PROPOFOL 10 MG/ML
INJECTION, EMULSION INTRAVENOUS AS NEEDED
Status: DISCONTINUED | OUTPATIENT
Start: 2024-07-05 | End: 2024-07-05 | Stop reason: SURG

## 2024-07-05 RX ORDER — HYDRALAZINE HYDROCHLORIDE 20 MG/ML
5 INJECTION INTRAMUSCULAR; INTRAVENOUS
Status: DISCONTINUED | OUTPATIENT
Start: 2024-07-05 | End: 2024-07-05 | Stop reason: HOSPADM

## 2024-07-05 RX ORDER — HYDROMORPHONE HYDROCHLORIDE 1 MG/ML
0.25 INJECTION, SOLUTION INTRAMUSCULAR; INTRAVENOUS; SUBCUTANEOUS
Status: DISCONTINUED | OUTPATIENT
Start: 2024-07-05 | End: 2024-07-05 | Stop reason: HOSPADM

## 2024-07-05 RX ORDER — ONDANSETRON 2 MG/ML
4 INJECTION INTRAMUSCULAR; INTRAVENOUS ONCE AS NEEDED
Status: DISCONTINUED | OUTPATIENT
Start: 2024-07-05 | End: 2024-07-05 | Stop reason: HOSPADM

## 2024-07-05 RX ORDER — PROMETHAZINE HYDROCHLORIDE 25 MG/1
25 TABLET ORAL ONCE AS NEEDED
Status: DISCONTINUED | OUTPATIENT
Start: 2024-07-05 | End: 2024-07-05 | Stop reason: HOSPADM

## 2024-07-05 RX ORDER — SODIUM CHLORIDE 0.9 % (FLUSH) 0.9 %
3 SYRINGE (ML) INJECTION EVERY 12 HOURS SCHEDULED
Status: DISCONTINUED | OUTPATIENT
Start: 2024-07-05 | End: 2024-07-05 | Stop reason: HOSPADM

## 2024-07-05 RX ORDER — HYDROCODONE BITARTRATE AND ACETAMINOPHEN 7.5; 325 MG/1; MG/1
1 TABLET ORAL EVERY 4 HOURS PRN
Status: DISCONTINUED | OUTPATIENT
Start: 2024-07-05 | End: 2024-07-05 | Stop reason: HOSPADM

## 2024-07-05 RX ORDER — SODIUM CHLORIDE, SODIUM LACTATE, POTASSIUM CHLORIDE, CALCIUM CHLORIDE 600; 310; 30; 20 MG/100ML; MG/100ML; MG/100ML; MG/100ML
9 INJECTION, SOLUTION INTRAVENOUS CONTINUOUS
Status: DISCONTINUED | OUTPATIENT
Start: 2024-07-05 | End: 2024-07-05 | Stop reason: HOSPADM

## 2024-07-05 RX ORDER — MAGNESIUM HYDROXIDE 1200 MG/15ML
LIQUID ORAL AS NEEDED
Status: DISCONTINUED | OUTPATIENT
Start: 2024-07-05 | End: 2024-07-05 | Stop reason: HOSPADM

## 2024-07-05 RX ORDER — HYDROCODONE BITARTRATE AND ACETAMINOPHEN 5; 325 MG/1; MG/1
1 TABLET ORAL ONCE AS NEEDED
Status: DISCONTINUED | OUTPATIENT
Start: 2024-07-05 | End: 2024-07-05 | Stop reason: HOSPADM

## 2024-07-05 RX ADMIN — LIDOCAINE HYDROCHLORIDE 100 MG: 20 INJECTION, SOLUTION INFILTRATION; PERINEURAL at 07:34

## 2024-07-05 RX ADMIN — HYDROCODONE BITARTRATE AND ACETAMINOPHEN 1 TABLET: 7.5; 325 TABLET ORAL at 08:46

## 2024-07-05 RX ADMIN — LIDOCAINE HYDROCHLORIDE 100 MG: 20 INJECTION, SOLUTION INFILTRATION; PERINEURAL at 08:01

## 2024-07-05 RX ADMIN — PROPOFOL 180 MG: 10 INJECTION, EMULSION INTRAVENOUS at 07:34

## 2024-07-05 RX ADMIN — SODIUM CHLORIDE, POTASSIUM CHLORIDE, SODIUM LACTATE AND CALCIUM CHLORIDE 9 ML/HR: 600; 310; 30; 20 INJECTION, SOLUTION INTRAVENOUS at 06:48

## 2024-07-05 RX ADMIN — Medication 200 MG: at 07:34

## 2024-07-05 RX ADMIN — SODIUM CHLORIDE 3000 MG: 900 INJECTION INTRAVENOUS at 07:22

## 2024-07-05 RX ADMIN — FAMOTIDINE 20 MG: 10 INJECTION INTRAVENOUS at 06:48

## 2024-07-05 NOTE — TELEPHONE ENCOUNTER
I responded to pt concerns by sending a message to Dr Figueroa regarding this refill that pt stated she should call the office for.    DB 3:13  7/5/2024    Caller: JadonLuba    Relationship: Emergency Contact    Best call back number:     [phone]       Requested Prescriptions:   Requested Prescriptions      No prescriptions requested or ordered in this encounter      OXYCODONE 5MG SLOW REALEASE TAB    Pharmacy where request should be sent:      [facility] [id number] [address] - [phone]  - [phone]  [phone]     Last office visit with prescribing clinician: 6/27/2024   Last telemedicine visit with prescribing clinician: Visit date not found   Next office visit with prescribing clinician: 9/26/2024     Additional details provided by patient:     Does the patient have less than a 3 day supply:  [x] Yes  [] No    Would you like a call back once the refill request has been completed: [x] Yes [] No    If the office needs to give you a call back, can they leave a voicemail: [x] Yes [] No    Priscilla Mead MA   07/05/24 15:12 EDT

## 2024-07-05 NOTE — ADDENDUM NOTE
Addendum  created 07/05/24 1503 by Merna Teran MD    Attestation recorded in Intraprocedure, Intraprocedure Attestations filed

## 2024-07-05 NOTE — ANESTHESIA POSTPROCEDURE EVALUATION
Patient: Branden Diop    Procedure Summary       Date: 07/05/24 Room / Location: [facility] OR [address] [place] [address] MAIN OR    Anesthesia Start: 0727 Anesthesia Stop: 0817    Procedure: INSERTION VENOUS ACCESS DEVICE (Right) Diagnosis:       Malignant neoplasm of middle lobe of right lung      (Malignant neoplasm of middle lobe of right lung [C34.2])    Surgeons: David Figueroa MD Provider: Merna Teran MD    Anesthesia Type: general ASA Status: 3            Anesthesia Type: general    Vitals  Vitals Value Taken Time   /74 07/05/24 0845   Temp 36.5 °C (97.7 °F) 07/05/24 0813   Pulse 58 07/05/24 0853   Resp 20 07/05/24 0845   SpO2 99 % 07/05/24 0853   Vitals shown include unfiled device data.        Post Anesthesia Care and Evaluation    Patient location during evaluation: bedside  Patient participation: complete - patient participated  Level of consciousness: awake and alert  Pain management: adequate    Airway patency: patent  Anesthetic complications: No anesthetic complications  PONV Status: controlled  Cardiovascular status: acceptable and hemodynamically stable  Respiratory status: acceptable, spontaneous ventilation and nonlabored ventilation  Hydration status: acceptable    Comments: /60 (BP Location: Left arm, Patient Position: Lying)   Pulse 56   Temp 36.5 °C (97.7 °F) (Oral)   Resp 20   SpO2 96%

## 2024-07-05 NOTE — H&P (VIEW-ONLY)
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      He had shortness of breath and high-resolution CT scan was performed on 6/14/2021.  He has small noncalcified lung nodules measuring up to 7 mm.  Repeat CT scan of the chest on 4/10/2023 reported stable pulmonary nodule and presence of emphysema.  CT scan of the chest in 12 months was recommended which was performed on 4/2/2024 and reported 1.8 cm and 1.1 cm right middle lobe solid nodules.  PET/CT on 4/23/2024 reported mildly hypermetabolic right middle lobe 1.9 cm and 1.1 cm solid nodule. The findings were concerning for malignant disease.  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  He had ION robotic navigational bronchoscopy of the right middle lobe nodule.  One of the nodules had fragment of squamous cell carcinoma and the other nodule had highly dysplastic squamous epithelium suggesting at least squamous cell carcinoma in situ.  Level 11 L, 7, 4R, 11 R were negative for metastatic disease.  MRI of the brain was negative for metastatic disease.  He was referred to thoracic surgery for further evaluation.     At the time of initial consultation, he could occasionally walk 1 block without dyspnea. He denied any history of myocardial infarction, cerebrovascular accident, hepatic or renal issues. He had pneumonia in 2017 resulting in a 17-day intensive care unit stay. His respiratory function has not returned to its previous state. A recent pulmonary function test was conducted on 05/10/2024. He is under the care of a cardiologist and is currently on anticoagulant therapy. He reported occasional pedal edema. His echocardiogram and lung tests  yielded normal results.     On 6/12/2024, he underwent robot-assisted thoracoscopic right middle lobectomy, systematic mediastinal lymph node dissection.  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  There were 2 additional branches to the right middle lobe noted.  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  He tolerated the procedure well.  There were no intraoperative or immediate postoperative complications.  He was discharged home in a stable condition on supplemental oxygen.  The final pathology revealed poorly differentiated, invasive squamous cell carcinoma, nonkeratinizing.  He had separate metastases in the same lobe.  All resection margins were negative for carcinoma.  I was able to retrieve 9 lymph nodes and the level 10 R, 11 R and 12 are were positive for malignancy.    He came to clinic for follow-up visit.  He was in good spirits.  His pain was well-controlled.  He reported occasional shortness of breath.  He is still requiring intermittent supplemental oxygen.    Past Medical History:   Diagnosis Date    Anxiety     Arthritis     Atrial fibrillation     CURRENTLY    Bunion of right foot     Colon polyps     COPD (chronic obstructive pulmonary disease)     MILD. NO MEDS FOR    Depression     History of atrial fibrillation     WITH CARDIO VERSION. NO PROBLEMS    History of skin cancer     BCC REMOVED FROM BACK    [place] (hard of hearing)     Hyperlipidemia     Inguinal hernia, recurrent     RIGHT    Left foot pain     Lung nodules     Rash     IN GROIN TREATING FOR YEAST INFECTION BY PCP    Redness of skin     LOWER LEGS BILATERAL    Scab     BILATERAL LEGS HEALING    Sleep apnea with use of continuous positive airway pressure (CPAP)     CPAP    Streptococcus pneumoniae     ABOUT 6-7 YR AGO.     Type 2 diabetes mellitus        Past Surgical History:   Procedure Laterality Date    BASAL CELL CARCINOMA EXCISION      BRONCHOSCOPY WITH ION ROBOTIC ASSIST N/A 5/6/2024     Procedure: BRONCHOSCOPY WITH ION ROBOT WITH FNA, BIOPSIES, BAL AND ENDOBRONCHIAL ULTRASOUND WITH FNA;  Surgeon: Yony Coles MD;  Location: [facility] ENDOSCOPY;  Service: Robotics - Pulmonary;  Laterality: N/A;  PRE: PULMONARY NODULES  POST: SAME    CARDIAC CATHETERIZATION N/A 11/15/2021    Procedure: Coronary angiography;  Surgeon: Alfredo Jennings MD;  Location: [facility] CATH INVASIVE LOCATION;  Service: Cardiovascular;  Laterality: N/A;    CARDIAC CATHETERIZATION N/A 11/15/2021    Procedure: Left heart cath;  Surgeon: Alfredo Jennings MD;  Location: [facility] CATH INVASIVE LOCATION;  Service: Cardiovascular;  Laterality: N/A;    CARDIAC CATHETERIZATION N/A 11/15/2021    Procedure: Left ventriculography;  Surgeon: Alfredo Jennings MD;  Location: [facility] CATH INVASIVE LOCATION;  Service: Cardiovascular;  Laterality: N/A;    CARDIAC CATHETERIZATION N/A 11/15/2021    Procedure: Aortic root aortogram;  Surgeon: Alfredo Jennings MD;  Location: [facility] CATH INVASIVE LOCATION;  Service: Cardiovascular;  Laterality: N/A;    CARDIOVERSION      CHOLECYSTECTOMY N/A 10/07/2017    Procedure: CHOLECYSTECTOMY LAPAROSCOPIC;  Surgeon: Martir Trevino MD;  Location: [facility] MAIN OR;  Service:     COLONOSCOPY  2007    COLONOSCOPY N/A 8/30/2022    Procedure: COLONOSCOPY TO CECUM AND TI AND COLD SNARE POLYPECTOMY;  Surgeon: Cj Lopez MD;  Location: [facility] ENDOSCOPY;  Service: Gastroenterology;  Laterality: N/A;  Pre: History of colon polyps  Post: POLYP, PREVIOUS TATTOO    FOOT SURGERY Left     TOES ARE PINNED. ALL BUT GREAT TOE    HAND SURGERY      THUMB    HERNIA REPAIR      INGUINAL HERNIA REPAIR Right 06/27/2018    Procedure: INGUINAL HERNIA REPAIR, OPEN, RECURRENT;  Surgeon: Matrir Trevino MD;  Location: [facility] OR OSC;  Service: General    LASIK Bilateral     LOBECTOMY Right 6/12/2024    Procedure: BRONCHOSCOPY, RIGHT VIDEO ASSISTED THORACOSCOPY WITH RIGHT MIDDLE LOBECTOMY WITH DAVINCI ROBOT, MEDIASTINAL LYMPH  NODE DISSECTION, INTERCOSTAL NERVE BLOCK;  Surgeon: David Figueroa MD;  Location: [facility] MAIN OR;  Service: Robotics - DaVinci;  Laterality: Right;    NECK SURGERY      growth on salivary gland    REPLACEMENT TOTAL KNEE Right 2007    (he is going to have a LTKR next month)    REPLACEMENT TOTAL KNEE Left        Family History   Problem Relation Age of Onset    Cancer Other     Stroke Mother     Alcohol abuse Father     Malig Hyperthermia Neg Hx        Social History     Socioeconomic History    Marital status:     Number of children: 2   Tobacco Use    Smoking status: Former     Current packs/day: 0.00     Average packs/day: 1 pack/day for 30.0 years (30.0 ttl pk-yrs)     Types: Cigars, Cigarettes     Start date: 1/1/1984     Quit date: 1/1/2014     Years since quitting: 10.5    Smokeless tobacco: Never   Vaping Use    Vaping status: Never Used   Substance and Sexual Activity    Alcohol use: Never     Comment: caffeine use- decaf coffee    Drug use: Never    Sexual activity: Defer         Current Outpatient Medications:     clotrimazole-betamethasone (Lotrisone) 1-0.05 % cream, Apply 1 application topically to the appropriate area as directed 2 (Two) Times a Day. Do exceed 1 week, Disp: 15 g, Rfl: 1    fexofenadine (ALLEGRA) 180 MG tablet, Take 1 tablet by mouth Daily., Disp: , Rfl:     gabapentin (NEURONTIN) 300 MG capsule, Take 1 capsule by mouth Every 8 (Eight) Hours for 30 days., Disp: 90 capsule, Rfl: 0    glucose blood test strip, Freestyle strips daily E 11.9, Disp: 100 each, Rfl: 12    glucose monitoring kit (FREESTYLE) monitoring kit, Daily E 11.93 FreeStyle meter, Disp: 1 each, Rfl: 1    Insulin Glargine, 1 Unit Dial, (Toujeo SoloStar) 300 UNIT/ML solution pen-injector injection, Inject 22 Units under the skin into the appropriate area as directed Daily., Disp: , Rfl:     Lancets (FREESTYLE) lancets, Daily E 11.9, Disp: 100 each, Rfl: 12    Multiple Vitamin (MULTIVITAMINS PO), Take 1 tablet by mouth  "Daily. HOLD PRIOR TO SURG, Disp: , Rfl:     rosuvastatin (CRESTOR) 40 MG tablet, TAKE ONE TABLET BY MOUTH DAILY, Disp: 90 tablet, Rfl: 1    sertraline (ZOLOFT) 50 MG tablet, TAKE ONE TABLET BY MOUTH DAILY (Patient taking differently: Take 1 tablet by mouth Daily.), Disp: 90 tablet, Rfl: 1    Tirzepatide (Mounjaro) 2.5 MG/0.5ML solution pen-injector pen, Inject 0.5 mL under the skin into the appropriate area as directed 1 (One) Time Per Week. LAST DOSE 6/3/2024 INSTRUCTED PT TO HOLD FOR SURGERY, Disp: , Rfl:     torsemide (DEMADEX) 20 MG tablet, Take 1 tablet by mouth Daily., Disp: , Rfl:     Xarelto 20 MG tablet, TAKE ONE TABLET BY MOUTH DAILY (Patient taking differently: PT IS TAKING LAST DOSE 6/8/2024 PER CARDIOLOGY), Disp: 30 tablet, Rfl: 9     No Known Allergies          Objective    OBJECTIVE:     VITAL SIGNS:  /74 (BP Location: Left arm, Patient Position: Sitting, Cuff Size: Adult)   Pulse 67   Ht 180.3 cm (70.98\")   SpO2 92%   BMI 38.54 kg/m²     PHYSICAL EXAM:  Normal appearance.   Head is normocephalic.   Nose appears normal.   No obvious deformity of the mouth and throat.  Conjunctivae normal.   Heart rate and rhythm is normal.  Pulmonary effort is normal.   Moving all 4 extremities.  Extremities warm.  No focal deficit present.   He is alert and oriented to person, place, and time.     LAB RESULTS:  I have reviewed all the available laboratory results in the chart.    RESULTS REVIEW:  I have reviewed the patient's all relevant laboratory and imaging findings.           ASSESSMENT & PLAN:  Branden Diop is a 73 y.o. male with significant medical conditions as mentioned above.    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  He has recovered well from surgery.  He will require adjuvant chemotherapy.  He will need Mediport for systemic treatment.    I " discussed the patients findings and my recommendations with the patient. The patient was given adequate time to ask questions and all questions were answered to patient satisfaction.      David Figueroa MD  Thoracic Surgeon  [facility] [place]        Dictated utilizing Dragon dictation    I spent 40 minutes caring for Branden on this date of service. This time includes time spent by me in the following activities: preparing for the visit, reviewing tests, obtaining and/or reviewing a separately obtained history, performing a medically appropriate examination and/or evaluation , counseling and educating the patient/family/caregiver, ordering medications, tests, or procedures, referring and communicating with other health care professionals , documenting information in the medical record, independently interpreting results and communicating that information with the patient/family/caregiver, and care coordination and more than half the time was spent in direct face to face evaluation and decision making.

## 2024-07-05 NOTE — DISCHARGE INSTRUCTIONS
Discharge Instructions    1. Activity:  Climb stairs as tolerated  May drive car tomorrow.  Walk at least 3-4 times a day    2. Nutrition:  Resume previous diet  Eat well balanced meals    3. Incision (wound) Care:  May shower after discharge.  Wash around incision area with soap and water daily.  No lotions or creams on incision area.  Take temperature daily for the first week after discharge.     4. When to call for Medical Advise:  Fever greater than 101 degrees  Unusual or severe pain  Difficulty breathing  Incision changes (redness, swelling, or increased drainage)  Any questions or problems    5. Medication Instructions:  Take Pain medications as directed to stay comfortable.   Laxative of choice if needed for constipation.    6. Medication and dosages:  See discharge medication instruction form   Resume DENISSE on 7/7/2024

## 2024-07-05 NOTE — OP NOTE
OPERATIVE NOTE     Date of procedure: 7/5/2024     Patient name: Branden Diop  MRN: [id number]    Pre OP diagnosis:  Patient Active Problem List   Diagnosis    A-fib    Atrioventricular block, Mobitz type 1, Wenckebach    Apnea, sleep    Obesity    Streptococcus pneumoniae    Sleep apnea with use of continuous positive airway pressure (CPAP)    Sinusitis    Right wrist pain    Pneumonia    Type 2 diabetes mellitus, with long-term current use of insulin    Hyperlipidemia    Hammertoe    Depression    COPD (chronic obstructive pulmonary disease)    Colon polyps    Anxiety    Pruritus    Cholelithiasis    Fatty liver    Essential hypertension    Vitamin D deficiency    Emphysema, unspecified    Bronchiectasis with acute exacerbation    Chest pain, atypical    FHx: colonic polyps    Diverticulosis    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    Malignant neoplasm of middle lobe of right lung    Fluid collection at surgical site, initial encounter       Post OP diagnosis:  Patient Active Problem List   Diagnosis    A-fib    Atrioventricular block, Mobitz type 1, Wenckebach    Apnea, sleep    Obesity    Streptococcus pneumoniae    Sleep apnea with use of continuous positive airway pressure (CPAP)    Sinusitis    Right wrist pain    Pneumonia    Type 2 diabetes mellitus, with long-term current use of insulin    Hyperlipidemia    Hammertoe    Depression    COPD (chronic obstructive pulmonary disease)    Colon polyps    Anxiety    Pruritus    Cholelithiasis    Fatty liver    Essential hypertension    Vitamin D deficiency    Emphysema, unspecified    Bronchiectasis with acute exacerbation    Chest pain, atypical    FHx: colonic polyps    Diverticulosis    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    Malignant neoplasm of middle lobe of right lung    Fluid collection at surgical site, initial encounter       Procedure performed:   Ultrasound-guided cannulation of Right internal jugular vein.  8 Fr  implantable vascular access device placement.  Interpretation of fluoroscopy    Indications:   Branden Diop is a 73-year-old male has significant medical problems as mentioned below.      He had shortness of breath and high-resolution CT scan was performed on 6/14/2021.  He has small noncalcified lung nodules measuring up to 7 mm.  Repeat CT scan of the chest on 4/10/2023 reported stable pulmonary nodule and presence of emphysema.  CT scan of the chest in 12 months was recommended which was performed on 4/2/2024 and reported 1.8 cm and 1.1 cm right middle lobe solid nodules.  PET/CT on 4/23/2024 reported mildly hypermetabolic right middle lobe 1.9 cm and 1.1 cm solid nodule. The findings were concerning for malignant disease.  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  He had ION robotic navigational bronchoscopy of the right middle lobe nodule.  One of the nodules had fragment of squamous cell carcinoma and the other nodule had highly dysplastic squamous epithelium suggesting at least squamous cell carcinoma in situ.  Level 11 L, 7, 4R, 11 R were negative for metastatic disease.  MRI of the brain was negative for metastatic disease.  He was referred to thoracic surgery for further evaluation.     At the time of initial consultation, he could occasionally walk 1 block without dyspnea. He denied any history of myocardial infarction, cerebrovascular accident, hepatic or renal issues. He had pneumonia in 2017 resulting in a 17-day intensive care unit stay. His respiratory function has not returned to its previous state. A recent pulmonary function test was conducted on 05/10/2024. He is under the care of a cardiologist and is currently on anticoagulant therapy. He reported occasional pedal edema. His echocardiogram and lung tests yielded normal results.      On 6/12/2024, he underwent robot-assisted thoracoscopic right middle lobectomy, systematic mediastinal lymph node  dissection.  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  There were 2 additional branches to the right middle lobe noted.  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  He tolerated the procedure well.  There were no intraoperative or immediate postoperative complications.  He was discharged home in a stable condition on supplemental oxygen.  The final pathology revealed poorly differentiated, invasive squamous cell carcinoma, nonkeratinizing.  He had separate metastases in the same lobe.  All resection margins were negative for carcinoma.  I was able to retrieve 9 lymph nodes and the level 10 R, 11 R and 12 are were positive for malignancy.    He had ipsilateral lobar metastases and lymph node involvement. He recovered well from surgery. He will require adjuvant chemotherapy. He needed Mediport for systemic treatment.     I explained to the patient the risks involved with Mediport placement. The overall complication rate has been reported up to 7 to 12% in literature. There is a risk of infection related to the port venous system (1.6 to 27%), pneumothorax (1.5 to 6%), catheter related thrombosis (5 to 18%), mechanical complication related to catheter (4%) such as malpositioning of the catheter, catheter migration and fracture, hematoma of the chest wall (8%) and upper extremity venous thrombosis (1.8%).  I also explained to the patient that there is less than 1% risk of death related to any invasive procedure that may require general anesthesia. The patient understood the risk and signed the consent for the above procedure.     Surgeon: David Figueroa MD     Assistants: No qualified assistant available for this procedure.    Anesthesia: General endotracheal anesthesia    ASA Class: 3    Procedure Details   On 7/5/2024, the patient was brought to the operating room and placed in the supine position on the operating room table.  The procedure was performed under monitored  anesthesia care with sedation managed by anesthesiologist. Pneumatic compression devices were placed on the lower extremities. The patient received intravenous antibiotic prophylaxis prior to incision.  The anterior chest and neck were prepped and draped in the usual sterile fashion. Prior to beginning the operation, a time-out was conducted with all members of surgical team present. The patient was identified as Branden Diop, the procedure and the correct site were verified.      Using ultrasound guidance, the right internal jugular vein was identified and cannulated with an 18-gauge needle. Guidewire was passed.  0.25% Marcaine was injected into the incision site.  An approximately 3 cm incision was made 2 fingerbreadths below the clavicle.  The dissection was carried down to the fascia and a subcutaneous pocket was constructed using blunt dissection to big enough for the PowerPort. The catheter and port were connected and flushed.  The port was secured to the underlying fascia with three 2-0 silk sutures. A small puncture was made at the internal jugular vein puncture site. A tunneler was used to connect the infraclavicular port site to the internal jugular puncture site.  The placement of the guidewire was confirmed with fluoroscopy. The introducer trocar with the dilator was inserted over the wire via Seldinger technique.  The catheter tip was cut at the level of the carinal bifurcation with fluoroscopy guidance. The catheter was then placed into the trocar and the outer sheath of the trocar was removed.  The positioning was confirmed with fluoroscopy and the catheter tip was approximately at the SVC/ right atrial junction. I was able to draw venous blood and flush without any resistance. The port was flushed with heparinized saline and the incisions were closed in multiple layers with Vicryl and Monocryl in a standard fashion.  Dermabond was applied as dressing.     The sponge, needle, and instrument counts were  correct at the end of the operation.  The patient was awakened from anesthesia and was transported to the Post Anesthesia Care Unit in stable condition.    Findings:  Normal anatomic finding.  The tip of the catheter parked at the junction of the superior vena cava and right atrium.    Estimated Blood Loss:  minimal           Drains: None                 Specimens: None           Implants: 8 Fr implantable vascular access device placement.           Complications: None           Disposition: PACU - hemodynamically stable.           Condition: Stable     David Figueroa MD   Thoracic Surgeon  [facility]

## 2024-07-05 NOTE — ANESTHESIA PROCEDURE NOTES
Airway  Urgency: elective    Date/Time: 7/5/2024 7:35 AM  Airway not difficult    General Information and Staff    Patient location during procedure: OR  Anesthesiologist: Merna Teran MD  CRNA/CAA: Luis Angel Arnold CRNA    Indications and Patient Condition  Indications for airway management: airway protection    Preoxygenated: yes  MILS not maintained throughout  Mask difficulty assessment: 0 - not attempted (RSI)    Final Airway Details  Final airway type: endotracheal airway      Successful airway: ETT  Cuffed: yes   Successful intubation technique: direct laryngoscopy  Facilitating devices/methods: cricoid pressure  Endotracheal tube insertion site: oral  Blade: CMAC  Blade size: D  ETT size (mm): 7.5  Cormack-Lehane Classification: grade IIa - partial view of glottis  Placement verified by: capnometry   Cuff volume (mL): 10  Measured from: lips  ETT/EBT  to lips (cm): 22  Number of attempts at approach: 1  Assessment: lips, teeth, and gum same as pre-op and atraumatic intubation

## 2024-07-05 NOTE — TELEPHONE ENCOUNTER
Caller: Luba Diop    Relationship: Emergency Contact    Best call back number:     [phone]       Requested Prescriptions:   Requested Prescriptions      No prescriptions requested or ordered in this encounter      OXYCODONE 5MG SLOW REALEASE TAB    Pharmacy where request should be sent:      [facility] [id number] [address] - [phone]  - [phone]     Last office visit with prescribing clinician: 6/27/2024   Last telemedicine visit with prescribing clinician: Visit date not found   Next office visit with prescribing clinician: 9/26/2024     Additional details provided by patient:     Does the patient have less than a 3 day supply:  [x] Yes  [] No    Would you like a call back once the refill request has been completed: [x] Yes [] No    If the office needs to give you a call back, can they leave a voicemail: [x] Yes [] No    Alejandra Vieyra   07/05/24 12:31 EDT

## 2024-07-05 NOTE — PROGRESS NOTES
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      He had shortness of breath and high-resolution CT scan was performed on 6/14/2021.  He has small noncalcified lung nodules measuring up to 7 mm.  Repeat CT scan of the chest on 4/10/2023 reported stable pulmonary nodule and presence of emphysema.  CT scan of the chest in 12 months was recommended which was performed on 4/2/2024 and reported 1.8 cm and 1.1 cm right middle lobe solid nodules.  PET/CT on 4/23/2024 reported mildly hypermetabolic right middle lobe 1.9 cm and 1.1 cm solid nodule. The findings were concerning for malignant disease.  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  He had ION robotic navigational bronchoscopy of the right middle lobe nodule.  One of the nodules had fragment of squamous cell carcinoma and the other nodule had highly dysplastic squamous epithelium suggesting at least squamous cell carcinoma in situ.  Level 11 L, 7, 4R, 11 R were negative for metastatic disease.  MRI of the brain was negative for metastatic disease.  He was referred to thoracic surgery for further evaluation.     At the time of initial consultation, he could occasionally walk 1 block without dyspnea. He denied any history of myocardial infarction, cerebrovascular accident, hepatic or renal issues. He had pneumonia in 2017 resulting in a 17-day intensive care unit stay. His respiratory function has not returned to its previous state. A recent pulmonary function test was conducted on 05/10/2024. He is under the care of a cardiologist and is currently on anticoagulant therapy. He reported occasional pedal edema. His echocardiogram and lung tests  yielded normal results.     On 6/12/2024, he underwent robot-assisted thoracoscopic right middle lobectomy, systematic mediastinal lymph node dissection.  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  There were 2 additional branches to the right middle lobe noted.  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  He tolerated the procedure well.  There were no intraoperative or immediate postoperative complications.  He was discharged home in a stable condition on supplemental oxygen.  The final pathology revealed poorly differentiated, invasive squamous cell carcinoma, nonkeratinizing.  He had separate metastases in the same lobe.  All resection margins were negative for carcinoma.  I was able to retrieve 9 lymph nodes and the level 10 R, 11 R and 12 are were positive for malignancy.    He came to clinic for follow-up visit.  He was in good spirits.  His pain was well-controlled.  He reported occasional shortness of breath.  He is still requiring intermittent supplemental oxygen.    Past Medical History:   Diagnosis Date    Anxiety     Arthritis     Atrial fibrillation     CURRENTLY    Bunion of right foot     Colon polyps     COPD (chronic obstructive pulmonary disease)     MILD. NO MEDS FOR    Depression     History of atrial fibrillation     WITH CARDIO VERSION. NO PROBLEMS    History of skin cancer     BCC REMOVED FROM BACK    [place] ([place])     Hyperlipidemia     Inguinal hernia, recurrent     RIGHT    Left foot pain     Lung nodules     Rash     IN GROIN TREATING FOR YEAST INFECTION BY PCP    Redness of skin     LOWER LEGS BILATERAL    Scab     BILATERAL LEGS HEALING    Sleep apnea with use of continuous positive airway pressure (CPAP)     CPAP    Streptococcus pneumoniae     ABOUT 6-7 YR AGO.     Type 2 diabetes mellitus        Past Surgical History:   Procedure Laterality Date    BASAL CELL CARCINOMA EXCISION      BRONCHOSCOPY WITH ION ROBOTIC ASSIST N/A 5/6/2024     Procedure: BRONCHOSCOPY WITH ION ROBOT WITH FNA, BIOPSIES, BAL AND ENDOBRONCHIAL ULTRASOUND WITH FNA;  Surgeon: Yony Coles MD;  Location: [facility] ENDOSCOPY;  Service: Robotics - Pulmonary;  Laterality: N/A;  PRE: PULMONARY NODULES  POST: SAME    CARDIAC CATHETERIZATION N/A 11/15/2021    Procedure: Coronary angiography;  Surgeon: Alfredo Jennings MD;  Location: [facility] CATH INVASIVE LOCATION;  Service: Cardiovascular;  Laterality: N/A;    CARDIAC CATHETERIZATION N/A 11/15/2021    Procedure: Left heart cath;  Surgeon: Alfredo Jennings MD;  Location: [facility] CATH INVASIVE LOCATION;  Service: Cardiovascular;  Laterality: N/A;    CARDIAC CATHETERIZATION N/A 11/15/2021    Procedure: Left ventriculography;  Surgeon: Alfredo Jennings MD;  Location: [facility] CATH INVASIVE LOCATION;  Service: Cardiovascular;  Laterality: N/A;    CARDIAC CATHETERIZATION N/A 11/15/2021    Procedure: Aortic root aortogram;  Surgeon: Alfredo Jennings MD;  Location: [facility] CATH INVASIVE LOCATION;  Service: Cardiovascular;  Laterality: N/A;    CARDIOVERSION      CHOLECYSTECTOMY N/A 10/07/2017    Procedure: CHOLECYSTECTOMY LAPAROSCOPIC;  Surgeon: Martir Trevino MD;  Location: [facility] MAIN OR;  Service:     COLONOSCOPY  2007    COLONOSCOPY N/A 8/30/2022    Procedure: COLONOSCOPY TO CECUM AND TI AND COLD SNARE POLYPECTOMY;  Surgeon: Cj Lopez MD;  Location: [facility] ENDOSCOPY;  Service: Gastroenterology;  Laterality: N/A;  Pre: History of colon polyps  Post: POLYP, PREVIOUS TATTOO    FOOT SURGERY Left     TOES ARE PINNED. ALL BUT GREAT TOE    HAND SURGERY      THUMB    HERNIA REPAIR      INGUINAL HERNIA REPAIR Right 06/27/2018    Procedure: INGUINAL HERNIA REPAIR, OPEN, RECURRENT;  Surgeon: Martir Trevino MD;  Location: [facility] OR [facility]SC;  Service: General    LASIK Bilateral     LOBECTOMY Right 6/12/2024    Procedure: BRONCHOSCOPY, RIGHT VIDEO ASSISTED THORACOSCOPY WITH RIGHT MIDDLE LOBECTOMY WITH DAVINCI ROBOT, MEDIASTINAL LYMPH  NODE DISSECTION, INTERCOSTAL NERVE BLOCK;  Surgeon: David Figueroa MD;  Location: [facility] MAIN OR;  Service: Robotics - DaVinci;  Laterality: Right;    NECK SURGERY      growth on salivary gland    REPLACEMENT TOTAL KNEE Right 2007    (he is going to have a LTKR next month)    REPLACEMENT TOTAL KNEE Left        Family History   Problem Relation Age of Onset    Cancer Other     Stroke Mother     Alcohol abuse Father     Malig Hyperthermia Neg Hx        Social History     Socioeconomic History    Marital status:     Number of children: 2   Tobacco Use    Smoking status: Former     Current packs/day: 0.00     Average packs/day: 1 pack/day for 30.0 years (30.0 ttl pk-yrs)     Types: Cigars, Cigarettes     Start date: 1/1/1984     Quit date: 1/1/2014     Years since quitting: 10.5    Smokeless tobacco: Never   Vaping Use    Vaping status: Never Used   Substance and Sexual Activity    Alcohol use: Never     Comment: caffeine use- decaf coffee    Drug use: Never    Sexual activity: Defer         Current Outpatient Medications:     clotrimazole-betamethasone (Lotrisone) 1-0.05 % cream, Apply 1 application topically to the appropriate area as directed 2 (Two) Times a Day. Do exceed 1 week, Disp: 15 g, Rfl: 1    fexofenadine (ALLEGRA) 180 MG tablet, Take 1 tablet by mouth Daily., Disp: , Rfl:     gabapentin (NEURONTIN) 300 MG capsule, Take 1 capsule by mouth Every 8 (Eight) Hours for 30 days., Disp: 90 capsule, Rfl: 0    glucose blood test strip, Freestyle strips daily E 11.9, Disp: 100 each, Rfl: 12    glucose monitoring kit (FREESTYLE) monitoring kit, Daily E 11.93 FreeStyle meter, Disp: 1 each, Rfl: 1    Insulin Glargine, 1 Unit Dial, (Toujeo SoloStar) 300 UNIT/ML solution pen-injector injection, Inject 22 Units under the skin into the appropriate area as directed Daily., Disp: , Rfl:     Lancets (FREESTYLE) lancets, Daily E 11.9, Disp: 100 each, Rfl: 12    Multiple Vitamin (MULTIVITAMINS PO), Take 1 tablet by mouth  "Daily. HOLD PRIOR TO SURG, Disp: , Rfl:     rosuvastatin (CRESTOR) 40 MG tablet, TAKE ONE TABLET BY MOUTH DAILY, Disp: 90 tablet, Rfl: 1    sertraline (ZOLOFT) 50 MG tablet, TAKE ONE TABLET BY MOUTH DAILY (Patient taking differently: Take 1 tablet by mouth Daily.), Disp: 90 tablet, Rfl: 1    Tirzepatide (Mounjaro) 2.5 MG/0.5ML solution pen-injector pen, Inject 0.5 mL under the skin into the appropriate area as directed 1 (One) Time Per Week. LAST DOSE 6/3/2024 INSTRUCTED PT TO HOLD FOR SURGERY, Disp: , Rfl:     torsemide (DEMADEX) 20 MG tablet, Take 1 tablet by mouth Daily., Disp: , Rfl:     Xarelto 20 MG tablet, TAKE ONE TABLET BY MOUTH DAILY (Patient taking differently: PT IS TAKING LAST DOSE 6/8/2024 PER CARDIOLOGY), Disp: 30 tablet, Rfl: 9     No Known Allergies          Objective    OBJECTIVE:     VITAL SIGNS:  /74 (BP Location: Left arm, Patient Position: Sitting, Cuff Size: Adult)   Pulse 67   Ht 180.3 cm (70.98\")   SpO2 92%   BMI 38.54 kg/m²     PHYSICAL EXAM:  Normal appearance.   Head is normocephalic.   Nose appears normal.   No obvious deformity of the mouth and throat.  Conjunctivae normal.   Heart rate and rhythm is normal.  Pulmonary effort is normal.   Moving all 4 extremities.  Extremities warm.  No focal deficit present.   He is alert and oriented to person, place, and time.     LAB RESULTS:  I have reviewed all the available laboratory results in the chart.    RESULTS REVIEW:  I have reviewed the patient's all relevant laboratory and imaging findings.           ASSESSMENT & PLAN:  Branden Diop is a 73 y.o. male with significant medical conditions as mentioned above.    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  He has recovered well from surgery.  He will require adjuvant chemotherapy.  He will need Mediport for systemic treatment.    I " discussed the patients findings and my recommendations with the patient. The patient was given adequate time to ask questions and all questions were answered to patient satisfaction.      David Figueroa MD  Thoracic Surgeon  [facility] [place]        Dictated utilizing Dragon dictation    I spent 40 minutes caring for Branden on this date of service. This time includes time spent by me in the following activities: preparing for the visit, reviewing tests, obtaining and/or reviewing a separately obtained history, performing a medically appropriate examination and/or evaluation , counseling and educating the patient/family/caregiver, ordering medications, tests, or procedures, referring and communicating with other health care professionals , documenting information in the medical record, independently interpreting results and communicating that information with the patient/family/caregiver, and care coordination and more than half the time was spent in direct face to face evaluation and decision making.

## 2024-07-05 NOTE — ANESTHESIA PREPROCEDURE EVALUATION
Anesthesia Evaluation     NPO Solid Status: > 8 hours  NPO Liquid Status: > 2 hours           Airway   Mallampati: III  TM distance: >3 FB  Neck ROM: full  No difficulty expected  Dental      Comment: Upper and lower bridges on implants    Pulmonary    (+) pneumonia , lung cancer, COPD,sleep apnea  Cardiovascular     (+) hypertension, hyperlipidemia      Neuro/Psych  (+) psychiatric history Depression  GI/Hepatic/Renal/Endo    (+) obesity, morbid obesity, liver disease fatty liver disease, diabetes mellitus    Musculoskeletal     Abdominal    Substance History      OB/GYN          Other   arthritis,   history of cancer                Anesthesia Plan    ASA 3     general   Rapid sequence  (Brendanro 4 days ago according to wife)  intravenous induction     Anesthetic plan, risks, benefits, and alternatives have been provided, discussed and informed consent has been obtained with: patient and spouse/significant other.    CODE STATUS:

## 2024-07-08 ENCOUNTER — TELEPHONE (OUTPATIENT)
Dept: SURGERY | Facility: CLINIC | Age: 74
End: 2024-07-08
Payer: MEDICARE

## 2024-07-10 ENCOUNTER — TELEPHONE (OUTPATIENT)
Dept: ONCOLOGY | Facility: CLINIC | Age: 74
End: 2024-07-10

## 2024-07-10 NOTE — TELEPHONE ENCOUNTER
Caller: DiopLuba    Relationship to patient: Emergency Contact    Best call back number: [phone]    Chief complaint: NEEDING TO RESCHEDULE CANCELLED APPOINTMENT     Type of visit: LAB AND NEW PT ONCOLOGY DR FLOR     Requested date: ASAP      If rescheduling, when is the original appointment: 07/05     ADDITIONAL NOTE: HUB UNABLE TO RESCHEDULE IN TIME FRAME.

## 2024-07-11 ENCOUNTER — TELEPHONE (OUTPATIENT)
Dept: SURGERY | Facility: CLINIC | Age: 74
End: 2024-07-11
Payer: MEDICARE

## 2024-07-11 NOTE — TELEPHONE ENCOUNTER
I spoke with Mrs Diop today regarding a [employer] message for the nurse. Through assessment questions over the phone I was able to determine that the redness surrounding the site was normal for a chest tube removal. Mrs Diop also stated the site was not warm to touch, and the patient has no fever. Mrs Diop has stated that she will continue to cleanse area with peroxide, use anti-biotic ointment and monitor the incision site for changes.    DB 8:01  7/11/2024   Patient is asking for new referral for Hematology to go to Dr. Rex Mowat. Fax number [phone]. Patient said this also has to go through his insurance through the [employer] portal like the last one. I advised patient I would send this to Luana DIAMOND so she can get this approved and then fax referral.

## 2024-07-12 ENCOUNTER — CONSULT (OUTPATIENT)
Dept: ONCOLOGY | Facility: CLINIC | Age: 74
End: 2024-07-12
Payer: MEDICARE

## 2024-07-12 ENCOUNTER — LAB (OUTPATIENT)
Dept: LAB | Facility: HOSPITAL | Age: 74
End: 2024-07-12
Payer: MEDICARE

## 2024-07-12 VITALS
BODY MASS INDEX: 39.06 KG/M2 | WEIGHT: 279 LBS | OXYGEN SATURATION: 94 % | RESPIRATION RATE: 20 BRPM | HEART RATE: 65 BPM | DIASTOLIC BLOOD PRESSURE: 80 MMHG | SYSTOLIC BLOOD PRESSURE: 148 MMHG | TEMPERATURE: 98.3 F | HEIGHT: 71 IN

## 2024-07-12 DIAGNOSIS — C34.2 SQUAMOUS CELL CARCINOMA OF BRONCHUS IN RIGHT MIDDLE LOBE: Primary | ICD-10-CM

## 2024-07-12 DIAGNOSIS — C34.2 MALIGNANT NEOPLASM OF MIDDLE LOBE OF RIGHT LUNG: ICD-10-CM

## 2024-07-12 DIAGNOSIS — Z79.899 ENCOUNTER FOR LONG-TERM (CURRENT) USE OF HIGH-RISK MEDICATION: ICD-10-CM

## 2024-07-12 DIAGNOSIS — R79.89 ABNORMAL CBC: Primary | ICD-10-CM

## 2024-07-12 PROBLEM — Z45.2 FITTING AND ADJUSTMENT OF VASCULAR CATHETER: Status: ACTIVE | Noted: 2024-07-12

## 2024-07-12 LAB
BASOPHILS # BLD AUTO: 0.05 10*3/MM3 (ref 0–0.2)
BASOPHILS NFR BLD AUTO: 0.6 % (ref 0–1.5)
DEPRECATED RDW RBC AUTO: 47.5 FL (ref 37–54)
EOSINOPHIL # BLD AUTO: 0.58 10*3/MM3 (ref 0–0.4)
EOSINOPHIL NFR BLD AUTO: 6.6 % (ref 0.3–6.2)
ERYTHROCYTE [DISTWIDTH] IN BLOOD BY AUTOMATED COUNT: 14.4 % (ref 12.3–15.4)
HCT VFR BLD AUTO: 38.8 % (ref 37.5–51)
HGB BLD-MCNC: 13 G/DL (ref 13–17.7)
IMM GRANULOCYTES # BLD AUTO: 0.09 10*3/MM3 (ref 0–0.05)
IMM GRANULOCYTES NFR BLD AUTO: 1 % (ref 0–0.5)
LYMPHOCYTES # BLD AUTO: 1.7 10*3/MM3 (ref 0.7–3.1)
LYMPHOCYTES NFR BLD AUTO: 19.4 % (ref 19.6–45.3)
MCH RBC QN AUTO: 30.3 PG (ref 26.6–33)
MCHC RBC AUTO-ENTMCNC: 33.5 G/DL (ref 31.5–35.7)
MCV RBC AUTO: 90.4 FL (ref 79–97)
MONOCYTES # BLD AUTO: 0.71 10*3/MM3 (ref 0.1–0.9)
MONOCYTES NFR BLD AUTO: 8.1 % (ref 5–12)
NEUTROPHILS NFR BLD AUTO: 5.65 10*3/MM3 (ref 1.7–7)
NEUTROPHILS NFR BLD AUTO: 64.3 % (ref 42.7–76)
NRBC BLD AUTO-RTO: 0.2 /100 WBC (ref 0–0.2)
PLATELET # BLD AUTO: 204 10*3/MM3 (ref 140–450)
PMV BLD AUTO: 9.6 FL (ref 6–12)
RBC # BLD AUTO: 4.29 10*6/MM3 (ref 4.14–5.8)
WBC NRBC COR # BLD AUTO: 8.78 10*3/MM3 (ref 3.4–10.8)

## 2024-07-12 PROCEDURE — 36415 COLL VENOUS BLD VENIPUNCTURE: CPT

## 2024-07-12 PROCEDURE — 85025 COMPLETE CBC W/AUTO DIFF WBC: CPT

## 2024-07-12 RX ORDER — HEPARIN SODIUM (PORCINE) LOCK FLUSH IV SOLN 100 UNIT/ML 100 UNIT/ML
500 SOLUTION INTRAVENOUS AS NEEDED
OUTPATIENT
Start: 2024-07-24

## 2024-07-12 RX ORDER — SODIUM CHLORIDE 0.9 % (FLUSH) 0.9 %
10 SYRINGE (ML) INJECTION AS NEEDED
OUTPATIENT
Start: 2024-07-24

## 2024-07-12 NOTE — PROGRESS NOTES
.     REASON FOR CONSULTATION:     Provide an opinion on any further workup or treatment of stage IIIa lung cancer.                             REQUESTING PHYSICIAN: David Figueroa MD    RECORDS OBTAINED:  Records of the patient's history including those obtained from the referring provider were reviewed and summarized in detail.    HISTORY OF PRESENT ILLNESS:  The patient is a 73 y.o. year old male who is here for initial evaluation with the above-mentioned history.  History of Present Illness  The patient presents for oncology follow-up. He is accompanied by his wife.    He underwent right middle lobe lobectomy for squamous cell lung cancer by Dr. Figueroa on 2024 and is recovering well from the surgery. However, there is an open wound at the site of chest tube on the right side of the chest which now has a few stitches, which is still leaking. He was advised to apply Band-Aids.  He reports no fever sweating or chills.      Patient has been on oxygen supplementation since surgery, currently 2 L/min.  Patient says occasionally at home he does not need oxygen.  Dr. Figueroa has suggested gradually reducing his oxygen use.     His appetite remains normal.    Patient reports some family health issues.  His son-in-law recently  of HLH just last week.  His brother-in-law has stage IV liver cancer.       This patient has a history of emphysema, followed by pulmonologist Dr. Carpio and sought.  His high-resolution chest CT scan on 2021 reported 7 mm nodule in the right middle lobe.  There is also index subcarinal lymph node 1.1 cm.     Patient subsequently had serial CT scan examination.    On 4/10/2023 chest high-resolution CT scan reported stable examination with the pulmonary nodules measuring up to 9-10 mm in the right middle lobe and a sub-6 mm in the right upper lobe.  The largest subcarinal lymph node measures  2.1 x  1.2 cm.     However chest high resolution CT scan 2024 reported disease progression, with  right middle lobe pulm nodule 1.8 cm from margin at 9 mm.  There is also a new right middle lobe 1.1 cm nodule.  Stable right upper lobe 7 mm nodule.  Also a new right hilar lymphadenopathy up to 2 cm.  The 1.5 cm subcarinal lymph node is stable.  No pleural effusion.    Patient subsequently had PET scan examination on 4/22/2024 requested by Dr. Camp.  This reported SUV 3.1 corresponding to the 19 mm right middle lobe nodule.  The 1.1 cm right middle nodule was photopenic.  The right hilar lymph node with SUV 5.6.  The 1.5 cm subcarinal lymph node with maximum SUV 7.    Patient subsequently seen by Dr. Figueroa who performed ION bronchoscopic biopsy on 5/7/2024 with pathology evaluation reporting squamous cell carcinoma involving one of the right middle lobe lesion, and the other pulmonary nodule is at least squamous cell carcinoma in situ.     Dr. Figueroa performed robot-assisted thoracoscopic right middle lobe lobectomy and mediastinal lymph node dissection.  Pathology evaluation reported poorly differentiated squamous cell carcinoma, clear margin, however with lymph nodes involved 3 lymph nodes in the right 10 R, 11 R and 12 R lymph node station.  There was also frequent lymphovascular invasion and focal perineural invasion.    Patient subsequently had a portacatheter placement on 7/5/2024.      Past Medical History:   Diagnosis Date    Anxiety     Arthritis     Atrial fibrillation     CURRENTLY    Bunion of right foot     Colon polyps     COPD (chronic obstructive pulmonary disease)     MILD. NO MEDS FOR    Depression     History of atrial fibrillation     WITH CARDIOVERSION. NO PROBLEMS    History of skin cancer     BCC REMOVED FROM BACK    [place] (hard of hearing)     Hyperlipidemia     Inguinal hernia, recurrent     RIGHT    Left foot pain     Lung nodules     Rash     IN GROIN TREATING FOR YEAST INFECTION BY PCP    Redness of skin     LOWER LEGS BILATERAL    Scab     BILATERAL LEGS HEALING    Sleep apnea with use of  continuous positive airway pressure (CPAP)     CPAP    Streptococcus pneumoniae     ABOUT 6-7 YR AGO.     Type 2 diabetes mellitus      Past Surgical History:   Procedure Laterality Date    BASAL CELL CARCINOMA EXCISION      BRONCHOSCOPY WITH ION ROBOTIC ASSIST N/A 5/6/2024    Procedure: BRONCHOSCOPY WITH ION ROBOT WITH FNA, BIOPSIES, BAL AND ENDOBRONCHIAL ULTRASOUND WITH FNA;  Surgeon: Yony Coles MD;  Location: [facility] ENDOSCOPY;  Service: Robotics - Pulmonary;  Laterality: N/A;  PRE: PULMONARY NODULES  POST: SAME    CARDIAC CATHETERIZATION N/A 11/15/2021    Procedure: Coronary angiography;  Surgeon: Alfredo Jennings MD;  Location: [facility] CATH INVASIVE LOCATION;  Service: Cardiovascular;  Laterality: N/A;    CARDIAC CATHETERIZATION N/A 11/15/2021    Procedure: Left heart cath;  Surgeon: Alfredo Jennings MD;  Location: [facility] CATH INVASIVE LOCATION;  Service: Cardiovascular;  Laterality: N/A;    CARDIAC CATHETERIZATION N/A 11/15/2021    Procedure: Left ventriculography;  Surgeon: Alfredo Jennings MD;  Location: [facility] CATH INVASIVE LOCATION;  Service: Cardiovascular;  Laterality: N/A;    CARDIAC CATHETERIZATION N/A 11/15/2021    Procedure: Aortic root aortogram;  Surgeon: Alfredo Jennings MD;  Location: [facility] CATH INVASIVE LOCATION;  Service: Cardiovascular;  Laterality: N/A;    CARDIOVERSION      CHOLECYSTECTOMY N/A 10/07/2017    Procedure: CHOLECYSTECTOMY LAPAROSCOPIC;  Surgeon: Martir Trevino MD;  Location: [facility] MAIN OR;  Service:     COLONOSCOPY  2007    COLONOSCOPY N/A 8/30/2022    Procedure: COLONOSCOPY TO CECUM AND TI AND COLD SNARE POLYPECTOMY;  Surgeon: Cj Lopez MD;  Location: [facility] ENDOSCOPY;  Service: Gastroenterology;  Laterality: N/A;  Pre: History of colon polyps  Post: POLYP, PREVIOUS TATTOO    FOOT SURGERY Left     TOES ARE PINNED. ALL BUT GREAT TOE    HAND SURGERY      THUMB    HERNIA REPAIR      INGUINAL HERNIA REPAIR Right 06/27/2018    Procedure: INGUINAL  HERNIA REPAIR, OPEN, RECURRENT;  Surgeon: Martir Trevino MD;  Location:  [facility];  Service: General    LASIK Bilateral     LOBECTOMY Right 6/12/2024    Procedure: BRONCHOSCOPY, RIGHT VIDEO ASSISTED THORACOSCOPY WITH RIGHT MIDDLE LOBECTOMY WITH DAVINCI ROBOT, MEDIASTINAL LYMPH NODE DISSECTION, INTERCOSTAL NERVE BLOCK;  Surgeon: David Figueroa MD;  Location: [facility] MAIN OR;  Service: Robotics - DaVinci;  Laterality: Right;    NECK SURGERY      growth on salivary gland    REPLACEMENT TOTAL KNEE Right 2007    (he is going to have a LTKR next month)    REPLACEMENT TOTAL KNEE Left     VENOUS ACCESS DEVICE (PORT) INSERTION Right 7/5/2024    Procedure: INSERTION VENOUS ACCESS DEVICE;  Surgeon: David Figueroa MD;  Location: [facility] MAIN OR;  Service: Thoracic;  Laterality: Right;       MEDICATIONS    Current Outpatient Medications:     clotrimazole-betamethasone (Lotrisone) 1-0.05 % cream, Apply 1 application topically to the appropriate area as directed 2 (Two) Times a Day. Do exceed 1 week, Disp: 15 g, Rfl: 1    fexofenadine (ALLEGRA) 180 MG tablet, Take 1 tablet by mouth Daily., Disp: , Rfl:     gabapentin (NEURONTIN) 300 MG capsule, Take 1 capsule by mouth Every 8 (Eight) Hours for 30 days., Disp: 90 capsule, Rfl: 0    glucose blood test strip, Freestyle strips daily E 11.9, Disp: 100 each, Rfl: 12    glucose monitoring kit (FREESTYLE) monitoring kit, Daily E 11.93 FreeStyle meter, Disp: 1 each, Rfl: 1    Insulin Glargine, 1 Unit Dial, (Toujeo SoloStar) 300 UNIT/ML solution pen-injector injection, Inject 22 Units under the skin into the appropriate area as directed Daily., Disp: , Rfl:     Lancets (FREESTYLE) lancets, Daily E 11.9, Disp: 100 each, Rfl: 12    Multiple Vitamin (MULTIVITAMINS PO), Take 1 tablet by mouth Daily. HOLD PRIOR TO SURG, Disp: , Rfl:     oxyCODONE (Roxicodone) 5 MG immediate release tablet, Take 1 tablet by mouth Every 8 (Eight) Hours As Needed for Severe Pain for up to 7 days., Disp: 21 tablet,  Rfl: 0    rosuvastatin (CRESTOR) 40 MG tablet, TAKE ONE TABLET BY MOUTH DAILY, Disp: 90 tablet, Rfl: 1    sertraline (ZOLOFT) 50 MG tablet, TAKE ONE TABLET BY MOUTH DAILY (Patient taking differently: Take 1 tablet by mouth Daily.), Disp: 90 tablet, Rfl: 1    Tirzepatide (Mounjaro) 2.5 MG/0.5ML solution pen-injector pen, Inject 0.5 mL under the skin into the appropriate area as directed 1 (One) Time Per Week. LAST DOSE 6/3/2024 INSTRUCTED PT TO HOLD FOR SURGERY, Disp: , Rfl:     torsemide (DEMADEX) 20 MG tablet, Take 1 tablet by mouth Daily., Disp: , Rfl:     Xarelto 20 MG tablet, TAKE ONE TABLET BY MOUTH DAILY (Patient taking differently: PT IS TAKING LAST DOSE 6/8/2024 PER CARDIOLOGY), Disp: 30 tablet, Rfl: 9    ALLERGIES:   No Known Allergies    SOCIAL HISTORY:       Social History     Socioeconomic History    Marital status:      Spouse name: Luba    Number of children: 2   Tobacco Use    Smoking status: Former     Current packs/day: 0.00     Average packs/day: 1 pack/day for 30.0 years (30.0 ttl pk-yrs)     Types: Cigars, Cigarettes     Start date: 1/1/1984     Quit date: 1/1/2014     Years since quitting: 10.5    Smokeless tobacco: Never   Vaping Use    Vaping status: Never Used   Substance and Sexual Activity    Alcohol use: Never     Comment: caffeine use- decaf coffee    Drug use: Never    Sexual activity: Defer         FAMILY HISTORY:  Family History   Problem Relation Age of Onset    Cancer Other     Stroke Mother     Alcohol abuse Father     Malig Hyperthermia Neg Hx        REVIEW OF SYSTEMS:  Review of Systems   Constitutional:  Positive for activity change and fatigue. Negative for appetite change, chills, diaphoresis and fever.   HENT:  Negative for nosebleeds and trouble swallowing.    Eyes:  Negative for visual disturbance.   Respiratory:  Positive for shortness of breath. Negative for cough and wheezing.    Cardiovascular:  Negative for chest pain and leg swelling.   Gastrointestinal:   "Negative for abdominal pain and blood in stool.   Genitourinary:  Negative for frequency and hematuria.   Musculoskeletal:  Negative for joint swelling.   Skin:  Positive for wound.   Allergic/Immunologic: Negative for immunocompromised state.   Neurological:  Negative for weakness and numbness.   Hematological:  Negative for adenopathy. Does not bruise/bleed easily.   Psychiatric/Behavioral:  Negative for confusion.               Vitals:    07/12/24 1517   BP: 148/80   Pulse: 65   Resp: 20   Temp: 98.3 °F (36.8 °C)   TempSrc: Temporal   SpO2: 94%   Weight: 127 kg (279 lb)   Height: 180.3 cm (70.98\")         7/12/2024     2:54 PM   Current Status   ECOG score 0      PHYSICAL EXAM:      CONSTITUTIONAL:  Vital signs reviewed.  Well-developed well-nourished male.  Patient uses oxygen by nasal cannula.  No distress, looks comfortable.  EYES:  Conjunctivae and lids unremarkable.   EARS,NOSE,MOUTH,THROAT:  Ears and nose appear unremarkable.  Lips appear unremarkable.  RESPIRATORY:  Normal respiratory effort.  Lungs clear to auscultation bilaterally.  CARDIOVASCULAR:  Normal S1, S2.  No murmurs rubs or gallops.  No significant lower extremity edema.  GASTROINTESTINAL: Abdomen appears unremarkable.  Nontender.  No hepatomegaly.  No splenomegaly.  LYMPHATIC:  No cervical, supraclavicular lymphadenopathy.  MUSCULOSKELETAL: No cyanosis or clubbing.  SKIN:  Warm.  No rashes.  Right side chest has dressing in place.   PSYCHIATRIC:  Normal judgment and insight.  Normal mood and affect.      RECENT LABS:  Lab Results   Component Value Date    WBC 8.78 07/12/2024    HGB 13.0 07/12/2024    HCT 38.8 07/12/2024    MCV 90.4 07/12/2024     07/12/2024     Lab Results   Component Value Date    NEUTROABS 5.65 07/12/2024     Lab Results   Component Value Date    GLUCOSE 182 (H) 06/21/2024    BUN 12 06/21/2024    CREATININE 0.72 (L) 06/21/2024    EGFRRESULT 77.1 01/23/2023    EGFR 95.3 06/21/2024    BCR 16.7 06/21/2024    K 4.3 " 06/21/2024    CO2 29.0 06/21/2024    CALCIUM 8.7 06/21/2024    PROTENTOTREF 7.2 02/14/2022    ALBUMIN 3.5 06/21/2024    BILITOT 0.8 06/21/2024    AST 34 06/21/2024    ALT 34 06/21/2024       IMAGING:  Narrative & Impression   F-18 FDG PET SKULL BASE TO MID THIGH WITH PET CT FUSION.     HISTORY: New and enlarging right middle lobe pulmonary nodules. Initial  treatment strategy.     TECHNIQUE: Radiation dose reduction techniques were utilized, including  automated exposure control and exposure modulation based on body size.   Blood glucose level at time of injection was 123 mg/dL. 6.7 mCi of F-18  FDG were injected and PET was performed from skull base to mid thigh. CT  was obtained for localization and attenuation correction. Time at  injection 650. PET start time 829.     Comparison: CT chest 4/2/2024 and 4/10/2023.     FINDINGS:     Head/neck: No suspicious uptake.     Chest: Respiratory motion partially limits evaluation of the lungs.     Right middle lobe 1.9 cm solid nodule with misregistered max SUV of 3.1  (series 4/image 159). Immediately proximal right middle lobe 1.1 cm  solid nodule has uptake indistinguishable from background lung.     Subcentimeter right upper lobe solid nodule too small for accurate PET  characterization without overt hypermetabolism.     Right hilar and subcarinal lymphadenopathy is hypermetabolic. Reference  right hilar lymph node with max SUV of 5.6 (series 4/image 152).  Reference 1.5 cm subcarinal lymph node with max SUV of 7 (series 4/image  144). No hypermetabolic left hilar lymph nodes.     Abdomen/pelvis: No suspicious uptake.     Bones: No suspicious uptake.        IMPRESSION:  1. Mildly hypermetabolic right middle lobe 1.9 cm solid nodule and  immediately proximal right middle lobe 1.1 cm solid nodule with uptake  indistinguishable from background lung remain concerning for primary  pulmonary malignancy.  2. Hypermetabolic right hilar and subcarinal lymphadenopathy  remain  concerning for mackenzie metastatic disease.  3. No FDG PET/CT evidence of more distant metastatic disease.         Assessment & Plan   Assessment & Plan  1. Poorly differentiated squamous cell lung cancer with intralobular metastatic disease, stage IIIa ([id number]).  Tumor was poorly differentiated. This patient has stage 3a disease.   I discussed with the patient today on 7/12/2024 that he will need adjuvant chemotherapy and concurrent adjuvant radiation therapy, and likely will need consolidative immunotherapy. I recommended using weekly carboplatin plus Taxol, in conjunction with radiotherapy for 6.5 weeks treatment. In reality, he probably will only receive 5 or 6 weekly chemotherapy instead of the 7 doses due to tolerance issues.   Nevertheless, I will present his case at the next chest conference to finalize the treatment plan.     2.  Emphysema.    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  He was instructed by his surgeon Dr. Figueroa to taper off gradually.      PLAN:   I will refer patient to radiation oncology service for evaluation of concurrent chemoradiation therapy.   I will present his case at the multimodality conference this coming Thursday, 07/18/2024.   I will also arrange the patient to have chemotherapy teaching.   The patient already has a port-a-catheter placed by Dr. Figueroa.   I planned to start the patient on concurrent chemotherapy on 07/24/2024, however, that could be adjusted pending his evaluation and the readiness of radiation oncology service.   I will also prescribe antiemetics to his pharmacy once chemotherapy regimen is finalized.   Patient will see APRN on day of week #1 chemotherapy.  Labs per protocol.  I will see patient on day of week #2 chemotherapy.      I spent 75 minutes caring for Branden on this date of service. This time includes time spent by me in the following activities: preparing for the visit, reviewing tests, obtaining and/or  reviewing a separately obtained history, performing a medically appropriate examination and/or evaluation, counseling and educating the patient/family/caregiver, ordering medications, tests, or procedures, referring and communicating with other health care professionals, documenting information in the medical record, independently interpreting results and communicating that information with the patient/family/caregiver and care coordination     Discussed with the patient and his wife about management plan.  They voiced understanding and agreeable.      JEREMY GATES M.D., Ph.D.        CC:  MD John Paul Govea MD Sasser, Robert L, MD

## 2024-07-16 ENCOUNTER — OFFICE VISIT (OUTPATIENT)
Dept: ONCOLOGY | Facility: CLINIC | Age: 74
End: 2024-07-16
Payer: MEDICARE

## 2024-07-16 ENCOUNTER — PATIENT OUTREACH (OUTPATIENT)
Dept: OTHER | Facility: HOSPITAL | Age: 74
End: 2024-07-16
Payer: MEDICARE

## 2024-07-16 VITALS
BODY MASS INDEX: 39.07 KG/M2 | HEIGHT: 71 IN | TEMPERATURE: 97.8 F | HEART RATE: 70 BPM | WEIGHT: 279.1 LBS | SYSTOLIC BLOOD PRESSURE: 153 MMHG | RESPIRATION RATE: 18 BRPM | DIASTOLIC BLOOD PRESSURE: 75 MMHG | OXYGEN SATURATION: 94 %

## 2024-07-16 DIAGNOSIS — C34.2 MALIGNANT NEOPLASM OF MIDDLE LOBE OF RIGHT LUNG: ICD-10-CM

## 2024-07-16 DIAGNOSIS — C34.2 SQUAMOUS CELL CARCINOMA OF BRONCHUS IN RIGHT MIDDLE LOBE: Primary | ICD-10-CM

## 2024-07-16 DIAGNOSIS — Z79.899 ENCOUNTER FOR LONG-TERM (CURRENT) USE OF HIGH-RISK MEDICATION: ICD-10-CM

## 2024-07-16 PROCEDURE — 1125F AMNT PAIN NOTED PAIN PRSNT: CPT | Performed by: NURSE PRACTITIONER

## 2024-07-16 PROCEDURE — 1159F MED LIST DOCD IN RCRD: CPT | Performed by: NURSE PRACTITIONER

## 2024-07-16 PROCEDURE — 1160F RVW MEDS BY RX/DR IN RCRD: CPT | Performed by: NURSE PRACTITIONER

## 2024-07-16 PROCEDURE — 3078F DIAST BP <80 MM HG: CPT | Performed by: NURSE PRACTITIONER

## 2024-07-16 PROCEDURE — 3077F SYST BP >= 140 MM HG: CPT | Performed by: NURSE PRACTITIONER

## 2024-07-16 PROCEDURE — 99215 OFFICE O/P EST HI 40 MIN: CPT | Performed by: NURSE PRACTITIONER

## 2024-07-16 RX ORDER — ONDANSETRON HYDROCHLORIDE 8 MG/1
8 TABLET, FILM COATED ORAL EVERY 8 HOURS PRN
Qty: 30 TABLET | Refills: 5 | Status: SHIPPED | OUTPATIENT
Start: 2024-07-16

## 2024-07-16 RX ORDER — DEXAMETHASONE 4 MG/1
TABLET ORAL
Qty: 4 TABLET | Refills: 0 | Status: SHIPPED | OUTPATIENT
Start: 2024-07-16

## 2024-07-16 NOTE — PROGRESS NOTES
TREATMENT  PREPARATION    Branden Diop  [id number]  1950    Chief Complaint: Treatment preparation and needs assessment    History of present illness:  Branden Diop is a 73 y.o. year old male who is here today for treatment preparation and needs assessment.  The patient has been diagnosed with   Encounter Diagnoses   Name Primary?    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     Past Medical History, Past Surgical History, Social History, Family History have been reviewed and are without significant changes except as mentioned.    Physical Exam:    Vitals:    07/16/24 1327   BP: 153/75   Pulse: 70   Resp: 18   Temp: 97.8 °F (36.6 °C)   SpO2: 94%     Vitals:    07/16/24 1327   PainSc:   2        ECOG score: 0             Physical Exam  HENT:      Head: Normocephalic and atraumatic.   Eyes:      Extraocular Movements: Extraocular movements intact.      Conjunctiva/sclera: Conjunctivae normal.   Pulmonary:      Effort: Pulmonary effort is normal. No respiratory distress.   Chest:      Comments: Right lateral chest suture in place mild serosanguineous drainage  Neurological:      General: No focal deficit present.      Mental Status: He is alert and oriented  to person, place, and time.   Psychiatric:         Mood and Affect: Mood normal.         Behavior: Behavior normal.           NEEDS ASSESSMENTS    Genetics  The patient's new diagnosis and family history have been reviewed for genetic counseling needs. The patient will not be referred..     Psychosocial and Barriers to care  The patient has completed a PHQ-9 Depression Screening and the Distress Thermometer (DT) today.  PHQ-9 results show PHQ-2 Total Score: 0 PHQ-9 Total Score: PHQ-9 Total Score: 0     The patient scored their distress today as Distress Level: 0-No distress on a scale of 0-10 with 0 being no distress and 10 being extreme distress. Problems marked by the patient as being an issue for them within the last week include Physical concerns  Pain: Yes .      Results were reviewed along with psychosocial resources offered by our cancer center.  Our Supportive Oncology team will be flagged for a score of 4 or above, and a same day call will be made for a score of 9 or 10.  A mental health referral is offered at that time. Patients who score less than 4 have been educated on our support services and can be referred to our  upon request.  The patient will not be referred to our .       Nutrition  The patient has completed the malnutrition screening today. They scored Malnutrition Screening Tool  Have you recently lost weight without trying?  If yes, how much weight have you lost?: 0--> No  Have you been eating poorly because of a decreased appetite?: 0--> No  MST score: 0   with a score of 0-1 meaning not at risk in a score of 2 or greater meaning at risk.  Patients with a score of 3 or higher will be referred to our oncology dietitian for support. Patients beginning at risk treatment regimens or who have dietary concerns will also be referred to our oncology dietitian. The patient will not be referred.    Functional Assessment  Persons who are age 70 or greater will be screened for  qualification of a comprehensive geriatric assessment by our survivorship nurse practitioner.  Older adults with cancer face unique challenges. These may include an increased risk of drug reactions, financial burdens, and caregiver stress. The patient scored G8 Questionnaire  Has food intake declined over the past 3 months due to loss of appetite, digestive problems, chewing or swallowing difficulties?: No decrease in food intake  Weight loss during the last 3 months: No weight loss  Mobility: Goes out  Neuropsychological Problems: No psychological problems  Body Mass Index (BMI (weight in kg) / (height in m2)): BMI 23 and > 23  Takes more than 3 medications a day: Yes, takes more than 3 prescription drugs per day  In comparison with other people of the same age, how does the patient consider his/her health status?: Better  Age: < 80  Total Score: 16 . Patients scoring 14 or lower will referred for an older adult functional assessment with the survivorship advanced practice registered nurse to ensure all needed support is provided as patients plan for their treatments. The patient will not be referred.    Intravenous Access Assessment  The patient and I discussed planned intravenous chemo/biotherapy as well as other IV treatments that are often needed throughout the course of treatment. These may include, but are not limited to blood transfusions, antibiotics, and IV hydration. Discussed that depending on selected treatment and vein assessment, patient may require venous access device (VAD) which could include but not limited to a Mediport or PICC line. Risks and benefits of VADs reviewed. The patient will be treated via Port.    Reproductive/Sexual Activity   People should avoid becoming pregnant and should not get a partner pregnant while undergoing chemo/biotherapy.  People of childbearing age should use effective contraception during active therapy. The best recommendation for all people is to use a barrier  "method for a minimum of 1 week after the last infusion of chemo/biotherapy to prevent your partner being exposed to byproducts from treatment medications in bodily fluids. Effective contraception should be discussed with your oncology team to make sure it is safe to take based on your diagnosis. Possible options include oral contraceptives, barrier methods. Chemo/biotherapy can change your ability to reproduce children in the future.  There are options for fertility preservation. NOT APPLICABLE    Advanced Care Planning  Advance Care Planning   The patient and I discussed advanced care planning, \"Conversations that Matter\".   This service is offered for development of advance directives with a certified ACP facilitator.  The patient does have an up-to-date advanced directive. This document is not on file with our office. The patient is not interested in an appointment with one of our facilitators to create or update their advanced directives.    Have you reviewed your Advance Directive and is it valid for this stay?: Not applicable          Smoking cessation  Tobacco Use: Medium Risk (7/16/2024)    Patient History     Smoking Tobacco Use: Former     Smokeless Tobacco Use: Never     Passive Exposure: Not on file       Patient and I discussed their tobacco use history. Referral will not be made for smoking cessation.      Palliative Care  When appropriate, the patient and I discussed the availability palliative care services and when appropriate Hospice care. Palliative care is not the same as Hospice care which was explained to the patient.NOT APPLICABLE.    Survivorship   When appropriate, we discussed that we will refer the patient to survivorship clinic to discuss next steps following completion of planned treatment.  Reviewed this visit will include assessment of your physical, psychological, functional, and spiritual needs as a survivor and the need at attend this visit when scheduled.    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  The patient was given explanation of treatment premed side effects including office policy that prohibits patients to drive if sedating medications are administered, MD explanation given regarding benefits, side effects, toxicities and goals of treatment.  The patient received a Chemotherapy/Biotherapy Plan Summary including diagnosis and explanation of specific treatment plan.    SIDE EFFECTS:  Common side effects were discussed with the patient and/or significant other.  Discussion included where applicable hair loss/discoloration, anemia/fatigue, infection/chills/fever, appetite, bleeding risk/precautions, constipation, diarrhea, mouth sores, taste alteration, loss of appetite, nausea/vomiting, peripheral neuropathy, skin/nail changes, rash, muscle aches/weakness, photosensitivity, weight gain/loss, hearing loss, dizziness, menopausal symptoms, menstrual irregularity, sterility, high blood pressure, heart damage, liver damage, lung damage, kidney damage, DVT/PE risk, fluid retention, pleural/pericardial effusion, somnolence, electrolyte/LFT imbalance, vein exercises and/or the possible need for vascular access/port placement.  The patient was advised that although uncommon, leakage of an infused medication from the vein or venous access device may lead to skin breakdown and/or other tissue damage.  The patient was advised that he/she may have pain, bleeding, and/or bruising from the insertion of a needle in  their vein or venous access device (port).  The patient was further advised that, in spite of proper technique, infection with redness and irritation may rarely occur at the site where the needle was inserted.  The patient was advised that if complications occur, additional medical treatment is available.  Finally, where applicable we have reviewed rare but potential immune mediated side effects including shortness of breath, cough, chest pain (pneumonitis), abdominal pain, diarrhea (colitis), thyroiditis (hypothyroid or hyperthyroid), hepatitis and liver dysfunction, nephritis and renal dysfunction.    Discussion also included side effects specific to drugs in the treatment plan, specifically:    Treatment Plans       Name Type Plan Dates Plan Provider         Active    OP LUNG PACLitaxel / CARBOplatin AUC=2 (Weekly) + XRT  ONCOLOGY TREATMENT  7/23/2024 - Present Duy Villasenor MD PhD                      Questions answered and additional information discussed on topics including:  Anemia, Thrombocytopenia, Neutropenia, Nutrition and appetite changes, Diarrhea, Nausea & vomiting, Mouth sores, Alopecia, Nervous system changes, Pain, Skin & nail changes, Organ toxicities, and Hand/Foot Cooling       Assessment and Plan:    Diagnoses and all orders for this visit:    1. 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  Dispense: 30 tablet; Refill: 5  -     dexAMETHasone (DECADRON) 4 MG tablet; Take 2 tablets in the morning and 2 tablets at night the day before chemotherapy. Take with food.  Dispense: 4 tablet; Refill: 0    2. Encounter for long-term (current) use of high-risk medication  -     ondansetron (ZOFRAN) 8 MG tablet; Take 1 tablet by mouth Every 8 (Eight) Hours As Needed for Nausea or Vomiting.  Dispense: 30 tablet; Refill: 5  -     dexAMETHasone (DECADRON) 4 MG tablet;  Take 2 tablets in the morning and 2 tablets at night the day before chemotherapy. Take with food.  Dispense: 4 tablet; Refill: 0    3. Malignant neoplasm of middle lobe of right lung  -     ondansetron (ZOFRAN) 8 MG tablet; Take 1 tablet by mouth Every 8 (Eight) Hours As Needed for Nausea or Vomiting.  Dispense: 30 tablet; Refill: 5  -     dexAMETHasone (DECADRON) 4 MG tablet; Take 2 tablets in the morning and 2 tablets at night the day before chemotherapy. Take with food.  Dispense: 4 tablet; Refill: 0      No orders of the defined types were placed in this encounter.        The patient and I have reviewed their diagnosis and scheduled treatment plan. Needs assessment was completed where applicable including genetics, psychosocial needs, barriers to care, VAD evaluation, advanced care planning, survivorship, and palliative care services where indicated. Referrals have been ordered as appropriate based upon evaluation today and patient desires.   Chemo/biotherapy teaching was completed today and consent obtained. See separate documentation for further details.  Adequate time was given to answer questions.  Patient made aware of their care team members and contact information if they have questions or problems throughout the treatment course.  Discussion held and written information provided describing frequency of office visits and ongoing monitoring throughout the treatment plan.     Reviewed with patient any prescribed medication sent to pharmacy.  Education provided regarding proper storage, safe handling, and proper disposal of unused medication.  Proper handling of body fluids and waste discussed and written information provided.  If appropriate, patient had pretreatment labs drawn today.    Learning assessment completed at initial patient encounter. See separate flowsheet. Chemo/biotherapy education comprehension assessed at today's visit.    I spent 58 minutes caring for Branden on this date of service. This time  includes time spent by me in the following activities: preparing for the visit, reviewing tests, obtaining and/or reviewing a separately obtained history, performing a medically appropriate examination and/or evaluation, counseling and educating the patient/family/caregiver, ordering medications, tests, or procedures, referring and communicating with other health care professionals, and documenting information in the medical record.     Lupe Sanchez, APRN   07/16/24

## 2024-07-16 NOTE — PROGRESS NOTES
Reviewed chart. 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 IP 6/2-6/2/24, s/p RM lobectomy. Dc'd home on 3L O2. IP 6/20-6/21 for drainage at his previous chest tube site. Met with Dr. Villasenor 7/12. Plan adjuvant chemotherapy and concurrent adjuvant radiation therapy, and likely will need consolidative immunotherapy. I recommended using weekly carboplatin plus Taxol, in conjunction with radiotherapy for 6.5 weeks treatment. Meets with Dr. Mendiola tomorrow; chemo education later today.     Called patient's wife. We discussed the patient's visit with Dr. Villasenor and what to expect at his chemo education appt today and [facility] consult tomorrow.  The patient sees nephrologist next week and has additional dental work at [facility] 7/29.  Encouraged wife to let oncology know about his dental work since he will be starting chemotherapy soon.    The patient's wife inquired about f/u visit with Dr. Figueroa to remove stitch that he placed at his last appt at his previous chest tube site. His wife sttaes the site is no longer draining. I will message Dr. Figueroa about this.     I will continue to follow; encouraged patient/wife to call as needed    Message to Dr. Figueroa

## 2024-07-17 ENCOUNTER — CONSULT (OUTPATIENT)
Dept: RADIATION ONCOLOGY | Facility: HOSPITAL | Age: 74
End: 2024-07-17
Payer: MEDICARE

## 2024-07-17 ENCOUNTER — TELEPHONE (OUTPATIENT)
Dept: SURGERY | Facility: CLINIC | Age: 74
End: 2024-07-17
Payer: MEDICARE

## 2024-07-17 VITALS
WEIGHT: 279 LBS | BODY MASS INDEX: 38.93 KG/M2 | SYSTOLIC BLOOD PRESSURE: 146 MMHG | HEART RATE: 83 BPM | DIASTOLIC BLOOD PRESSURE: 84 MMHG | OXYGEN SATURATION: 98 %

## 2024-07-17 DIAGNOSIS — C34.2 SQUAMOUS CELL CARCINOMA OF BRONCHUS IN RIGHT MIDDLE LOBE: Primary | ICD-10-CM

## 2024-07-17 DIAGNOSIS — C34.2 MALIGNANT NEOPLASM OF MIDDLE LOBE OF RIGHT LUNG: ICD-10-CM

## 2024-07-17 PROCEDURE — G0463 HOSPITAL OUTPT CLINIC VISIT: HCPCS | Performed by: RADIOLOGY

## 2024-07-17 NOTE — PROGRESS NOTES
Cancer Staging     CC: pT3N1 squamous cell carcinoma of the right lung                             Dear Duy Villasenor MD PhD    I had the pleasure of seeing Branden Diop  [facility] .   The patient is a 73 y.o.  male with newly diagnosed non small cell lung cancer.     He had a CT scan 4/2/2024 which reported disease progression, with right middle lobe pulm nodule 1.8 cm from margin at 9 mm. There is also a new right middle lobe 1.1 cm nodule. Stable right upper lobe 7 mm nodule. Also a new right hilar lymphadenopathy up to 2 cm. The 1.5 cm subcarinal lymph node is stable. No pleural effusion.    He had a PET scan on 4/22/24 which showed mildly hypermetabolic right middle lobe 1.9 cm solid nodule and immediately proximal right middle lobe 1.1 cm solid nodule with uptake indistinguishable from background lung remain concerning for primary pulmonary malignancy.  Hypermetabolic right hilar and subcarinal lymphadenopathy remainconcerning for mackenzie metastatic disease    He underwent an ION bronchoscopic biopsy with Dr. Figueroa on 5/7/2024 with pathology evaluation reporting squamous cell carcinoma involving one of the right middle lobe lesion, and the other pulmonary nodule is at least squamous cell carcinoma in situ.     He underwent right middle lobe lobectomy for squamous cell lung cancer by Dr. Figueroa on 6/12/2024 with pathology revealing poorly differentiated squamous cell carcinoma with intralobar metastases, primary mass 3cm, intrapulmonary mets 1cm, frequent lvsi and focal pni.  Thesubpleural tumor nodule does not extend to involve the visceral pleural surface.  Parenchyma, bronchial and vascular margins negative for malignancy.   Lymph node states 10R 11R and 12 R were positive for metastatic carcinoma.  His pathologic stage was pT3N1.      Patient has been on oxygen supplementation since surgery, currently 2 L/min.   He is referred today for evaluation for evaluation for radiation.       Review of Systems   Constitutional:  Positive for activity change, appetite change and fatigue.   HENT: Negative.     Respiratory:  Positive for cough and shortness of breath.    Cardiovascular: Negative.    Gastrointestinal: Negative.    Musculoskeletal:  Positive for arthralgias.   Psychiatric/Behavioral: Negative.         Past Medical History:   Diagnosis Date    Anxiety     Arthritis     Atrial fibrillation     CURRENTLY    Bunion of right foot     Colon polyps     COPD (chronic obstructive pulmonary disease)     MILD. NO MEDS FOR    Depression     History of atrial fibrillation     WITH CARDIOVERSION. NO PROBLEMS    History of skin cancer     BCC REMOVED FROM BACK    [place] (hard of hearing)     Hyperlipidemia     Inguinal hernia, recurrent     RIGHT    Left foot pain     Lung nodules     Rash     IN GROIN TREATING FOR YEAST INFECTION BY PCP    Redness of skin     LOWER LEGS BILATERAL    Scab     BILATERAL LEGS HEALING    Sleep apnea with use of continuous positive airway pressure (CPAP)     CPAP    Streptococcus pneumoniae     ABOUT 6-7 YR AGO.     Type 2 diabetes mellitus          Past Surgical History:   Procedure Laterality Date    BASAL CELL CARCINOMA EXCISION      BRONCHOSCOPY WITH ION ROBOTIC ASSIST N/A 5/6/2024    Procedure: BRONCHOSCOPY WITH ION ROBOT WITH FNA, BIOPSIES, BAL AND ENDOBRONCHIAL ULTRASOUND WITH FNA;  Surgeon: Yony Coles MD;  Location: [facility] ENDOSCOPY;  Service: Robotics - Pulmonary;  Laterality: N/A;  PRE: PULMONARY NODULES  POST: SAME    CARDIAC CATHETERIZATION N/A 11/15/2021    Procedure: Coronary angiography;  Surgeon: Alfredo Jennings MD;  Location: [facility] CATH INVASIVE LOCATION;  Service: Cardiovascular;  Laterality: N/A;    CARDIAC CATHETERIZATION N/A 11/15/2021    Procedure: Left heart cath;  Surgeon: Alfredo Jennings MD;  Location: [facility] CATH INVASIVE LOCATION;  Service: Cardiovascular;  Laterality: N/A;    CARDIAC CATHETERIZATION N/A 11/15/2021    Procedure:  Left ventriculography;  Surgeon: Alfredo Jennings MD;  Location: [facility] CATH INVASIVE LOCATION;  Service: Cardiovascular;  Laterality: N/A;    CARDIAC CATHETERIZATION N/A 11/15/2021    Procedure: Aortic root aortogram;  Surgeon: Alfredo Jennings MD;  Location: [facility] CATH INVASIVE LOCATION;  Service: Cardiovascular;  Laterality: N/A;    CARDIOVERSION      CHOLECYSTECTOMY N/A 10/07/2017    Procedure: CHOLECYSTECTOMY LAPAROSCOPIC;  Surgeon: Martir Trevino MD;  Location: [facility] MAIN OR;  Service:     COLONOSCOPY  2007    COLONOSCOPY N/A 8/30/2022    Procedure: COLONOSCOPY TO CECUM AND TI AND COLD SNARE POLYPECTOMY;  Surgeon: Cj Lopez MD;  Location: [facility] ENDOSCOPY;  Service: Gastroenterology;  Laterality: N/A;  Pre: History of colon polyps  Post: POLYP, PREVIOUS TATTOO    FOOT SURGERY Left     TOES ARE PINNED. ALL BUT GREAT TOE    HAND SURGERY      THUMB    HERNIA REPAIR      INGUINAL HERNIA REPAIR Right 06/27/2018    Procedure: INGUINAL HERNIA REPAIR, OPEN, RECURRENT;  Surgeon: Martir Trevino MD;  Location: [facility] OR [facility];  Service: General    LASIK Bilateral     LOBECTOMY Right 6/12/2024    Procedure: BRONCHOSCOPY, RIGHT VIDEO ASSISTED THORACOSCOPY WITH RIGHT MIDDLE LOBECTOMY WITH DAVINCI ROBOT, MEDIASTINAL LYMPH NODE DISSECTION, INTERCOSTAL NERVE BLOCK;  Surgeon: David Figueroa MD;  Location: [facility] MAIN OR;  Service: Robotics - DaVinci;  Laterality: Right;    NECK SURGERY      growth on salivary gland    REPLACEMENT TOTAL KNEE Right 2007    (he is going to have a LTKR next month)    REPLACEMENT TOTAL KNEE Left     VENOUS ACCESS DEVICE (PORT) INSERTION Right 7/5/2024    Procedure: INSERTION VENOUS ACCESS DEVICE;  Surgeon: David Figueroa MD;  Location: [facility] MAIN OR;  Service: Thoracic;  Laterality: Right;         Social History     Socioeconomic History    Marital status:      Spouse name: Luba    Number of children: 2   Tobacco Use    Smoking status: Former     Current packs/day: 0.00      Average packs/day: 1 pack/day for 30.0 years (30.0 ttl pk-yrs)     Types: Cigars, Cigarettes     Start date: 1/1/1984     Quit date: 1/1/2014     Years since quitting: 10.5    Smokeless tobacco: Never   Vaping Use    Vaping status: Never Used   Substance and Sexual Activity    Alcohol use: Never     Comment: caffeine use- decaf coffee    Drug use: Never    Sexual activity: Defer         Family History   Problem Relation Age of Onset    Cancer Other     Stroke Mother     Alcohol abuse Father     Malig Hyperthermia Neg Hx           Objective    Physical Exam  Constitutional:       Appearance: He is obese.   Eyes:      Extraocular Movements: Extraocular movements intact.   Pulmonary:      Breath sounds: Wheezing present.   Abdominal:      General: There is distension.   Musculoskeletal:      Cervical back: Normal range of motion.   Neurological:      General: No focal deficit present.      Mental Status: He is alert.   Psychiatric:         Mood and Affect: Mood normal.         Current Outpatient Medications on File Prior to Visit   Medication Sig Dispense Refill    clotrimazole-betamethasone (Lotrisone) 1-0.05 % cream Apply 1 application topically to the appropriate area as directed 2 (Two) Times a Day. Do exceed 1 week 15 g 1    dexAMETHasone (DECADRON) 4 MG tablet Take 2 tablets in the morning and 2 tablets at night the day before chemotherapy. Take with food. 4 tablet 0    fexofenadine (ALLEGRA) 180 MG tablet Take 1 tablet by mouth Daily.      gabapentin (NEURONTIN) 300 MG capsule Take 1 capsule by mouth Every 8 (Eight) Hours for 30 days. 90 capsule 0    glucose blood test strip Freestyle strips daily E 11.9 100 each 12    glucose monitoring kit (FREESTYLE) monitoring kit Daily E 11.93 FreeStyle meter 1 each 1    Insulin Glargine, 1 Unit Dial, (Toujeo SoloStar) 300 UNIT/ML solution pen-injector injection Inject 22 Units under the skin into the appropriate area as directed Daily.      Lancets (FREESTYLE) lancets  Daily E 11.9 100 each 12    Multiple Vitamin (MULTIVITAMINS PO) Take 1 tablet by mouth Daily. HOLD PRIOR TO SURG      ondansetron (ZOFRAN) 8 MG tablet Take 1 tablet by mouth Every 8 (Eight) Hours As Needed for Nausea or Vomiting. 30 tablet 5    rosuvastatin (CRESTOR) 40 MG tablet TAKE ONE TABLET BY MOUTH DAILY 90 tablet 1    sertraline (ZOLOFT) 50 MG tablet TAKE ONE TABLET BY MOUTH DAILY (Patient taking differently: Take 1 tablet by mouth Daily.) 90 tablet 1    Tirzepatide (Mounjaro) 2.5 MG/0.5ML solution pen-injector pen Inject 0.5 mL under the skin into the appropriate area as directed 1 (One) Time Per Week. LAST DOSE 6/3/2024 INSTRUCTED PT TO HOLD FOR SURGERY      torsemide (DEMADEX) 20 MG tablet Take 1 tablet by mouth Daily.      Xarelto 20 MG tablet TAKE ONE TABLET BY MOUTH DAILY (Patient taking differently: PT IS TAKING LAST DOSE 6/8/2024 PER CARDIOLOGY) 30 tablet 9     No current facility-administered medications on file prior to visit.       ALLERGIES:  No Known Allergies    /84   Pulse 83   Wt 127 kg (279 lb)   SpO2 98%   BMI 38.93 kg/m²          7/16/2024     1:29 PM   Current Status   ECOG score 0         Assessment & Plan     Branden Diop is a 73 y.o. male with [id number] squamous cell carcinoma of the right lung s/p right lobectomy and lymph node dissection with metastatic disease in lymph node station 10R 11R 12 R.   I am not convinced he needs adjuvant radiation for his N1 disease and negative margins.  I am very concerned about his pulmonary function.  He is still on 2-3 L oxygen and has audible wheezing.  I would like to discuss his case at the multidisciplinary lung conference on July 18 and review his previous PET and get a consensus recommendation.      I with him today the risks, benefits and rationale of radiation therapy to the lung.  I discussed both the acute and long term side effects to include but not limited to the following:    Acute:  skin erythema, fatigue, lowered blood counts,  pneumonitis resulting in shortness of breath, cough or pain wtih inspiration, esophagitiis resulting in pain or difficulty with swallowing    Late:  permanent skin changes, late pneumonitis or pulmonary fibrosis resulting in permanent shortness of breath, chronic cough or oxygen dependency, esophageal stricture, late cardiac damage and the remote risk of second malignancies.    Branden Diop  voiced understanding.  I have scheduled him to return next week after I present his case at the lung conference on July 18.    I personally spent greater than 60 minutes today assessing, managing, discussing and documenting my visit with the patient. That time includes review of records, imaging and pathology reports, obtaining my own history, performing a medically appropriate evaluation, counseling and educating the patient, discussing goals, logistics, alternatives and risks of my recommendations, surveillance options and potential outcomes. It also includes the time documenting the clinical information in the EMR and communicating my recommendations to the other involved physicians.               Thank you very much for allowing me to participate in the care of this very pleasant patient.    Sincerely,      Yashira Mendiola MD

## 2024-07-17 NOTE — TELEPHONE ENCOUNTER
I left  message with pt wfe stating that if Dr Asher was unable to assist with bleeding and pt cough t give us a call back at [phone].    DB 2:22  7/17/2024

## 2024-07-17 NOTE — TELEPHONE ENCOUNTER
OK FOR HUB TO RELAY:    Spoke with Alyce (Medical Assistant) if patient's wife calls back to Dr. Figueroa's office in regards to patient's coughing and fearful of infection. They are advised to go to ER if Dr. Mendiola was unable to address concern for further evaluation.

## 2024-07-18 ENCOUNTER — PATIENT OUTREACH (OUTPATIENT)
Dept: OTHER | Facility: HOSPITAL | Age: 74
End: 2024-07-18
Payer: MEDICARE

## 2024-07-18 DIAGNOSIS — C34.2 MALIGNANT NEOPLASM OF MIDDLE LOBE, BRONCHUS OR LUNG: Primary | ICD-10-CM

## 2024-07-18 DIAGNOSIS — C34.2 MALIGNANT NEOPLASM OF MIDDLE LOBE OF RIGHT LUNG: ICD-10-CM

## 2024-07-18 DIAGNOSIS — C34.2 SQUAMOUS CELL CARCINOMA OF BRONCHUS IN RIGHT MIDDLE LOBE: Primary | ICD-10-CM

## 2024-07-18 NOTE — PROGRESS NOTES
Spoke with Dr. Figueroa. We will schedule patient in lung clinic for reassessment and stitch removal     Called patient's wife, preferred contact. They can't come in today; scheduled appt in lung clinic next Thursday 7/25 @ 145.  Patient/wife verbalized understanding

## 2024-07-22 ENCOUNTER — OFFICE VISIT (OUTPATIENT)
Dept: RADIATION ONCOLOGY | Facility: HOSPITAL | Age: 74
End: 2024-07-22
Payer: MEDICARE

## 2024-07-22 VITALS — DIASTOLIC BLOOD PRESSURE: 75 MMHG | OXYGEN SATURATION: 90 % | HEART RATE: 75 BPM | SYSTOLIC BLOOD PRESSURE: 128 MMHG

## 2024-07-22 DIAGNOSIS — C34.2 MALIGNANT NEOPLASM OF MIDDLE LOBE OF RIGHT LUNG: Primary | ICD-10-CM

## 2024-07-22 LAB
LAB AP CASE REPORT: NORMAL
LAB AP DIAGNOSIS COMMENT: NORMAL
LAB AP SYNOPTIC CHECKLIST: NORMAL
PATH REPORT.ADDENDUM SPEC: NORMAL
PATH REPORT.FINAL DX SPEC: NORMAL
PATH REPORT.GROSS SPEC: NORMAL

## 2024-07-22 PROCEDURE — G0463 HOSPITAL OUTPT CLINIC VISIT: HCPCS | Performed by: RADIOLOGY

## 2024-07-23 ENCOUNTER — HOSPITAL ENCOUNTER (EMERGENCY)
Facility: HOSPITAL | Age: 74
Discharge: HOME | End: 2024-07-23
Attending: EMERGENCY MEDICINE
Payer: MEDICARE

## 2024-07-23 ENCOUNTER — INFUSION (OUTPATIENT)
Dept: ONCOLOGY | Facility: HOSPITAL | Age: 74
End: 2024-07-23
Payer: MEDICARE

## 2024-07-23 ENCOUNTER — TELEPHONE (OUTPATIENT)
Dept: SURGERY | Facility: CLINIC | Age: 74
End: 2024-07-23
Payer: MEDICARE

## 2024-07-23 VITALS
HEIGHT: 71 IN | HEART RATE: 59 BPM | BODY MASS INDEX: 38.36 KG/M2 | SYSTOLIC BLOOD PRESSURE: 116 MMHG | TEMPERATURE: 97.8 F | RESPIRATION RATE: 20 BRPM | WEIGHT: 274 LBS | DIASTOLIC BLOOD PRESSURE: 64 MMHG | OXYGEN SATURATION: 98 %

## 2024-07-23 DIAGNOSIS — C34.2 MALIGNANT NEOPLASM OF MIDDLE LOBE OF RIGHT LUNG: ICD-10-CM

## 2024-07-23 DIAGNOSIS — Z79.01 ANTICOAGULATED: ICD-10-CM

## 2024-07-23 DIAGNOSIS — S00.412A EAR CANAL ABRASION, LEFT, INITIAL ENCOUNTER: Primary | ICD-10-CM

## 2024-07-23 DIAGNOSIS — C34.2 SQUAMOUS CELL CARCINOMA OF BRONCHUS IN RIGHT MIDDLE LOBE: ICD-10-CM

## 2024-07-23 DIAGNOSIS — Z45.2 FITTING AND ADJUSTMENT OF VASCULAR CATHETER: Primary | ICD-10-CM

## 2024-07-23 PROCEDURE — 96523 IRRIG DRUG DELIVERY DEVICE: CPT

## 2024-07-23 PROCEDURE — 99283 EMERGENCY DEPT VISIT LOW MDM: CPT

## 2024-07-23 PROCEDURE — 36591 DRAW BLOOD OFF VENOUS DEVICE: CPT

## 2024-07-23 PROCEDURE — 25010000002 HEPARIN LOCK FLUSH PER 10 UNITS: Performed by: INTERNAL MEDICINE

## 2024-07-23 RX ORDER — HEPARIN SODIUM (PORCINE) LOCK FLUSH IV SOLN 100 UNIT/ML 100 UNIT/ML
500 SOLUTION INTRAVENOUS AS NEEDED
Status: DISCONTINUED | OUTPATIENT
Start: 2024-07-23 | End: 2024-07-23 | Stop reason: HOSPADM

## 2024-07-23 RX ORDER — CIPROFLOXACIN AND DEXAMETHASONE 3; 1 MG/ML; MG/ML
4 SUSPENSION/ DROPS AURICULAR (OTIC) 2 TIMES DAILY
Qty: 7.5 ML | Refills: 0 | Status: SHIPPED | OUTPATIENT
Start: 2024-07-23 | End: 2024-07-30

## 2024-07-23 RX ORDER — HEPARIN SODIUM (PORCINE) LOCK FLUSH IV SOLN 100 UNIT/ML 100 UNIT/ML
500 SOLUTION INTRAVENOUS AS NEEDED
OUTPATIENT
Start: 2024-07-23

## 2024-07-23 RX ORDER — SODIUM CHLORIDE 0.9 % (FLUSH) 0.9 %
10 SYRINGE (ML) INJECTION AS NEEDED
OUTPATIENT
Start: 2024-07-23

## 2024-07-23 RX ORDER — SODIUM CHLORIDE 0.9 % (FLUSH) 0.9 %
10 SYRINGE (ML) INJECTION AS NEEDED
Status: DISCONTINUED | OUTPATIENT
Start: 2024-07-23 | End: 2024-07-23 | Stop reason: HOSPADM

## 2024-07-23 RX ADMIN — Medication 10 ML: at 07:57

## 2024-07-23 RX ADMIN — Medication 500 UNITS: at 07:59

## 2024-07-23 NOTE — ED PROVIDER NOTES
EMERGENCY DEPARTMENT ENCOUNTER    Room Number:  09/09  PCP: Tino Lopez MD  Historian: Patient, spouse    I initially evaluated the patient at 8:40 AM    HPI:  Chief Complaint: Bleeding from left ear  A complete HPI/ROS/PMH/PSH/SH/FH are unobtainable due to: Nothing  Context: Branden Diop is a 73 y.o. male with a medical history of atrial fibrillation who presents to the ED c/o acute bleeding from his left ear since yesterday evening.  Patient was scratching the inside of his ear with his left finger when he noticed some blood coming from his left ear.  Bleeding has been on and off since then.  He denies ear pain, hearing loss, or fever.  He is on Xarelto for A-fib.            PAST MEDICAL HISTORY  Active Ambulatory Problems     Diagnosis Date Noted    A-fib 01/29/2016    Atrioventricular block, Mobitz type 1, Wenckebach 01/29/2016    Apnea, sleep 01/29/2016    Obesity 01/29/2016    Streptococcus pneumoniae     Sleep apnea with use of continuous positive airway pressure (CPAP)     Sinusitis     Right wrist pain 11/11/2015    Pneumonia 12/01/2013    Type 2 diabetes mellitus, with long-term current use of insulin     Hyperlipidemia     Hammertoe     Depression     COPD (chronic obstructive pulmonary disease)     Colon polyps     Anxiety     Pruritus 04/05/2016    Cholelithiasis 10/06/2017    Fatty liver 10/09/2017    Essential hypertension 05/24/2019    Vitamin D deficiency 08/10/2020    Emphysema, unspecified 10/14/2021    Bronchiectasis with acute exacerbation 10/14/2021    Chest pain, atypical 11/11/2021    FHx: colonic polyps 08/30/2022    Diverticulosis 08/30/2022    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 05/30/2024    Malignant neoplasm of middle lobe of right lung 05/30/2024    Fluid collection at surgical site, initial encounter 06/20/2024    Encounter for long-term (current) use of high-risk medication 07/12/2024    Fitting and adjustment of vascular catheter 07/12/2024     Resolved  Ambulatory Problems     Diagnosis Date Noted    BP (high blood pressure) 01/29/2016    Atrial fibrillation     Atrial flutter 08/25/2016    Acute cholecystitis 10/06/2017    Right upper quadrant pain 10/06/2017    Recurrent inguinal hernia of right side without obstruction or gangrene 06/13/2018    Uncontrolled type 2 diabetes mellitus with hyperglycemia 04/25/2019     Past Medical History:   Diagnosis Date    Arthritis     Bunion of right foot     History of atrial fibrillation     History of skin cancer     [place] (hard of hearing)     Inguinal hernia, recurrent     Left foot pain     Lung nodules     Rash     Redness of skin     Scab     Type 2 diabetes mellitus          PAST SURGICAL HISTORY  Past Surgical History:   Procedure Laterality Date    BASAL CELL CARCINOMA EXCISION      BRONCHOSCOPY WITH ION ROBOTIC ASSIST N/A 5/6/2024    Procedure: BRONCHOSCOPY WITH ION ROBOT WITH FNA, BIOPSIES, BAL AND ENDOBRONCHIAL ULTRASOUND WITH FNA;  Surgeon: Yony Coles MD;  Location: [facility] ENDOSCOPY;  Service: Robotics - Pulmonary;  Laterality: N/A;  PRE: PULMONARY NODULES  POST: SAME    CARDIAC CATHETERIZATION N/A 11/15/2021    Procedure: Coronary angiography;  Surgeon: Alfredo Jennings MD;  Location: [facility] CATH INVASIVE LOCATION;  Service: Cardiovascular;  Laterality: N/A;    CARDIAC CATHETERIZATION N/A 11/15/2021    Procedure: Left heart cath;  Surgeon: Alfredo Jennings MD;  Location: [facility] CATH INVASIVE LOCATION;  Service: Cardiovascular;  Laterality: N/A;    CARDIAC CATHETERIZATION N/A 11/15/2021    Procedure: Left ventriculography;  Surgeon: Alfredo Jennings MD;  Location: [facility] CATH INVASIVE LOCATION;  Service: Cardiovascular;  Laterality: N/A;    CARDIAC CATHETERIZATION N/A 11/15/2021    Procedure: Aortic root aortogram;  Surgeon: Alfredo Jennings MD;  Location: [facility] CATH INVASIVE LOCATION;  Service: Cardiovascular;  Laterality: N/A;    CARDIOVERSION      CHOLECYSTECTOMY N/A 10/07/2017     Procedure: CHOLECYSTECTOMY LAPAROSCOPIC;  Surgeon: Martir Trevino MD;  Location: [facility] MAIN OR;  Service:     COLONOSCOPY  2007    COLONOSCOPY N/A 8/30/2022    Procedure: COLONOSCOPY TO CECUM AND TI AND COLD SNARE POLYPECTOMY;  Surgeon: Cj Lopez MD;  Location: [facility] ENDOSCOPY;  Service: Gastroenterology;  Laterality: N/A;  Pre: History of colon polyps  Post: POLYP, PREVIOUS TATTOO    FOOT SURGERY Left     TOES ARE PINNED. ALL BUT GREAT TOE    HAND SURGERY      THUMB    HERNIA REPAIR      INGUINAL HERNIA REPAIR Right 06/27/2018    Procedure: INGUINAL HERNIA REPAIR, OPEN, RECURRENT;  Surgeon: Martir Trevino MD;  Location: [facility] OR OSC;  Service: General    LASIK Bilateral     LOBECTOMY Right 6/12/2024    Procedure: BRONCHOSCOPY, RIGHT VIDEO ASSISTED THORACOSCOPY WITH RIGHT MIDDLE LOBECTOMY WITH DAVINCI ROBOT, MEDIASTINAL LYMPH NODE DISSECTION, INTERCOSTAL NERVE BLOCK;  Surgeon: David Figueroa MD;  Location: [facility] MAIN OR;  Service: Robotics - DaVinci;  Laterality: Right;    NECK SURGERY      growth on salivary gland    REPLACEMENT TOTAL KNEE Right 2007    (he is going to have a LTKR next month)    REPLACEMENT TOTAL KNEE Left     VENOUS ACCESS DEVICE (PORT) INSERTION Right 7/5/2024    Procedure: INSERTION VENOUS ACCESS DEVICE;  Surgeon: David Figueroa MD;  Location: [facility] MAIN OR;  Service: Thoracic;  Laterality: Right;         FAMILY HISTORY  Family History   Problem Relation Age of Onset    Cancer Other     Stroke Mother     Alcohol abuse Father     Malig Hyperthermia Neg Hx          SOCIAL HISTORY  Social History     Socioeconomic History    Marital status:      Spouse name: Luba    Number of children: 2   Tobacco Use    Smoking status: Former     Current packs/day: 0.00     Average packs/day: 1 pack/day for 30.0 years (30.0 ttl pk-yrs)     Types: Cigars, Cigarettes     Start date: 1/1/1984     Quit date: 1/1/2014     Years since quitting: 10.5    Smokeless tobacco: Never   Vaping Use    Vaping  status: Never Used   Substance and Sexual Activity    Alcohol use: Never     Comment: caffeine use- decaf coffee    Drug use: Never    Sexual activity: Defer         ALLERGIES  Patient has no known allergies.    REVIEW OF SYSTEMS  Review of Systems  Included in HPI  All systems reviewed and negative except for those discussed in HPI.      PHYSICAL EXAM  ED Triage Vitals   Temp Heart Rate Resp BP SpO2   07/23/24 0817 07/23/24 0811 07/23/24 0811 07/23/24 0817 07/23/24 0811   97.8 °F (36.6 °C) 93 20 134/78 92 %      Temp src Heart Rate Source Patient Position BP Location FiO2 (%)   07/23/24 0817 07/23/24 0811 07/23/24 0817 07/23/24 0817 --   Oral Monitor Lying Left arm        Physical Exam      GENERAL: Awake, alert, oriented x 3.  Well-developed, nourished male.  Resting comfortably in no acute distress  HENT: NCAT, nares patent, there is some dried blood in the left external auditory canal.  Left TM is normal  EYES: no scleral icterus  CV: regular rhythm, normal rate  RESPIRATORY: normal effort, clear to auscultation bilaterally  ABDOMEN: soft  MUSCULOSKELETAL: Extremities are nontender   NEURO: Speech is normal.  No facial droop.  PSYCH:  calm, cooperative  SKIN: warm, dry    Vital signs and nursing notes reviewed.          LAB RESULTS  No results found for this or any previous visit (from the past 24 hour(s)).    Ordered the above labs and reviewed the results.        RADIOLOGY  No Radiology Exams Resulted Within Past 24 Hours    Ordered the above noted radiological studies. Reviewed by me in PACS.            PROCEDURES  Procedures        OUTPATIENT MEDICATION MANAGEMENT:  No current Epic-ordered facility-administered medications on file.     Current Outpatient Medications Ordered in Epic   Medication Sig Dispense Refill    Xarelto 20 MG tablet TAKE ONE TABLET BY MOUTH DAILY (Patient taking differently: PT IS TAKING LAST DOSE 6/8/2024 PER CARDIOLOGY) 30 tablet 9    ciprofloxacin-dexAMETHasone (CIPRODEX) 0.3-0.1 %  otic suspension Administer 4 drops into the left ear 2 (Two) Times a Day for 7 days. 7.5 mL 0    clotrimazole-betamethasone (Lotrisone) 1-0.05 % cream Apply 1 application topically to the appropriate area as directed 2 (Two) Times a Day. Do exceed 1 week 15 g 1    dexAMETHasone (DECADRON) 4 MG tablet Take 2 tablets in the morning and 2 tablets at night the day before chemotherapy. Take with food. 4 tablet 0    fexofenadine (ALLEGRA) 180 MG tablet Take 1 tablet by mouth Daily.      gabapentin (NEURONTIN) 300 MG capsule Take 1 capsule by mouth Every 8 (Eight) Hours for 30 days. 90 capsule 0    glucose blood test strip Freestyle strips daily E 11.9 100 each 12    glucose monitoring kit (FREESTYLE) monitoring kit Daily E 11.93 FreeStyle meter 1 each 1    Insulin Glargine, 1 Unit Dial, (Toujeo SoloStar) 300 UNIT/ML solution pen-injector injection Inject 22 Units under the skin into the appropriate area as directed Daily.      Lancets (FREESTYLE) lancets Daily E 11.9 100 each 12    Multiple Vitamin (MULTIVITAMINS PO) Take 1 tablet by mouth Daily. HOLD PRIOR TO SURG      ondansetron (ZOFRAN) 8 MG tablet Take 1 tablet by mouth Every 8 (Eight) Hours As Needed for Nausea or Vomiting. 30 tablet 5    rosuvastatin (CRESTOR) 40 MG tablet TAKE ONE TABLET BY MOUTH DAILY 90 tablet 1    sertraline (ZOLOFT) 50 MG tablet TAKE ONE TABLET BY MOUTH DAILY (Patient taking differently: Take 1 tablet by mouth Daily.) 90 tablet 1    Tirzepatide (Mounjaro) 2.5 MG/0.5ML solution pen-injector pen Inject 0.5 mL under the skin into the appropriate area as directed 1 (One) Time Per Week. LAST DOSE 6/3/2024 INSTRUCTED PT TO HOLD FOR SURGERY      torsemide (DEMADEX) 20 MG tablet Take 1 tablet by mouth Daily.             MEDICATIONS GIVEN IN ER  Medications - No data to display                MEDICAL DECISION MAKING, PROGRESS, and CONSULTS    All labs have been independently reviewed by me.  All radiology studies have been reviewed by me and I have  also reviewed the radiology report.   EKG's independently viewed and interpreted by me.  Discussion below represents my analysis of pertinent findings related to patient's condition, differential diagnosis, treatment plan and final disposition.      Additional sources:    - Discussed/ obtained information from independent historians: Wife at bedside    - External (non-ED) record review:     -Prescription drug monitoring program review:     N/A    - Chronic or social conditions impacting patient care (Social Determinants of Health): None          Orders placed during this visit:  No orders of the defined types were placed in this encounter.        Additional orders considered but not ordered:          Differential diagnosis includes, but is not limited to:    Ear wound/abrasion/laceration      Independent interpretation of labs, radiology studies, and discussions with consultants:  ED Course as of 07/23/24 1026   Tue Jul 23, 2024   0848 Dried blood was gently removed with a 4 x 4.  There appears to be a small abrasion on the posterior aspect of the left EAC.  There is no active bleeding.  Patient will be discharged with prescription for eardrops. [WH]      ED Course User Index  [WH] Antwan Sadler MD         COMPLEXITY OF CARE        DIAGNOSIS  Final diagnoses:   Ear canal abrasion, left, initial encounter   Anticoagulated         DISPOSITION  DISCHARGE    Patient discharged in stable condition.    Reviewed implications of results, diagnosis, meds, responsibility to follow up, warning signs and symptoms of possible worsening, potential complications and reasons to return to ER, including persistent bleeding or other concerning.    Patient/Family voiced understanding of above instructions.    Discussed plan for discharge, as there is no emergent indication for admission. Patient referred to primary care provider for BP management due to today's BP. Pt/family is agreeable and understands need for follow up and  repeat testing.  Pt is aware that discharge does not mean that nothing is wrong but it indicates no emergency is present that requires admission and they must continue care with follow-up as given below or physician of their choice.     FOLLOW-UP  Philip Marino MD  [address]  [place] [id number] [phone]    Schedule an appointment as soon as possible for a visit   If symptoms persist         Medication List        New Prescriptions      ciprofloxacin-dexAMETHasone 0.3-0.1 % otic suspension  Commonly known as: CIPRODEX  Administer 4 drops into the left ear 2 (Two) Times a Day for 7 days.            Changed      Xarelto 20 MG tablet  Generic drug: rivaroxaban  TAKE ONE TABLET BY MOUTH DAILY  What changed:   how much to take  how to take this  when to take this  additional instructions               Where to Get Your Medications        These medications were sent to [id number] - [place], [place] - [address] [place] [address] AT [facility] - [phone]  - [phone] [place] [id number]      Phone: [phone]   ciprofloxacin-dexAMETHasone 0.3-0.1 % otic suspension                   Latest Documented Vital Signs:  AS OF 10:26 EDT VITALS:    BP - 116/64  HR - 59  TEMP - 97.8 °F (36.6 °C) (Oral)  O2 SATS - 98%            --    Please note that portions of this were completed with a voice recognition program.       Note Disclaimer: At [facility], we believe that sharing information builds trust and better relationships. You are receiving this note because you are receiving care at [facility] or recently visited. It is possible you will see health information before a provider has talked with you about it. This kind of information can be easy to misunderstand. To help you fully understand what it means for your health, we urge you to discuss this note with your provider.             Antwan Sadler MD  07/23/24 [id number]

## 2024-07-23 NOTE — DISCHARGE INSTRUCTIONS
Use ear drops as prescribed.  You may gently flush your left ear with warm water once daily.  Return to the emergency department for persistent bleeding or other concern.  Follow-up with Dr. Marino (ear nose and throat specialist) for worsening symptoms.

## 2024-07-23 NOTE — ED NOTES
Pt stated last night he believes he scratched/cut the inside of his L ear with his fingernail because shortly after pt noted drainage (blood) from the L ear. Pt stated small amounts of blood draining from the ear since last night. Pt denies hear changes, denies ringing in the ear, denies balance issues. Pt stated tender to touch inside the ear. Pt is on blood thinner due to a-fib.     Pt has no other complaints at this time. Wife at bedside.

## 2024-07-25 ENCOUNTER — OFFICE VISIT (OUTPATIENT)
Dept: OTHER | Facility: HOSPITAL | Age: 74
End: 2024-07-25
Payer: MEDICARE

## 2024-07-25 ENCOUNTER — PATIENT OUTREACH (OUTPATIENT)
Dept: OTHER | Facility: HOSPITAL | Age: 74
End: 2024-07-25
Payer: MEDICARE

## 2024-07-25 VITALS
BODY MASS INDEX: 38.82 KG/M2 | SYSTOLIC BLOOD PRESSURE: 122 MMHG | WEIGHT: 277.3 LBS | HEIGHT: 71 IN | DIASTOLIC BLOOD PRESSURE: 65 MMHG | HEART RATE: 56 BPM | OXYGEN SATURATION: 97 %

## 2024-07-25 DIAGNOSIS — C34.2 SQUAMOUS CELL CARCINOMA OF BRONCHUS IN RIGHT MIDDLE LOBE: Primary | ICD-10-CM

## 2024-07-25 PROCEDURE — 99024 POSTOP FOLLOW-UP VISIT: CPT | Performed by: SURGERY

## 2024-07-25 PROCEDURE — G0463 HOSPITAL OUTPT CLINIC VISIT: HCPCS | Performed by: SURGERY

## 2024-07-25 PROCEDURE — 3074F SYST BP LT 130 MM HG: CPT | Performed by: SURGERY

## 2024-07-25 PROCEDURE — 3078F DIAST BP <80 MM HG: CPT | Performed by: SURGERY

## 2024-07-25 RX ORDER — OXYCODONE HYDROCHLORIDE 5 MG/1
5 TABLET ORAL EVERY 12 HOURS PRN
Qty: 10 TABLET | Refills: 0 | Status: SHIPPED | OUTPATIENT
Start: 2024-07-25 | End: 2024-07-30

## 2024-07-25 NOTE — PROGRESS NOTES
I accompanied patient and wife to his lung clinic appt with Dr. Figueroa. Dr. Figueroa removed the patient's suture.  His port site looked good.    The patient had chemotherapy teaching. He is scheduled to start treatment next Wednesday.      The patient denies any questions/concerns or ongoing resource needs. I will continue to follow; encouraged patient to call as needed.

## 2024-07-30 LAB
DNA RANGE(S) EXAMINED NAR: NORMAL
GENE DIS ANL INTERP-IMP: NORMAL
GENE DIS ASSESSED: NORMAL
REASON FOR STUDY: NORMAL
TEMPUS BLOOD TUMOR MUTATIONAL BURDEN NOTE: NORMAL
TEMPUS GENOMIC NOTE: NORMAL
TEMPUS LCA: NORMAL
TEMPUS MSI NOTE: NORMAL
TEMPUS PORTAL: NORMAL
TEMPUS TREATMENT IMPLICATIONS NOTE: NORMAL
TEMPUS VUS NOTE: NORMAL

## 2024-07-31 ENCOUNTER — NUTRITION (OUTPATIENT)
Dept: OTHER | Facility: HOSPITAL | Age: 74
End: 2024-07-31
Payer: MEDICARE

## 2024-07-31 ENCOUNTER — INFUSION (OUTPATIENT)
Dept: ONCOLOGY | Facility: HOSPITAL | Age: 74
End: 2024-07-31
Payer: MEDICARE

## 2024-07-31 ENCOUNTER — OFFICE VISIT (OUTPATIENT)
Dept: ONCOLOGY | Facility: CLINIC | Age: 74
End: 2024-07-31
Payer: MEDICARE

## 2024-07-31 VITALS — DIASTOLIC BLOOD PRESSURE: 66 MMHG | SYSTOLIC BLOOD PRESSURE: 132 MMHG | HEART RATE: 63 BPM

## 2024-07-31 VITALS
SYSTOLIC BLOOD PRESSURE: 130 MMHG | WEIGHT: 275 LBS | DIASTOLIC BLOOD PRESSURE: 80 MMHG | HEART RATE: 54 BPM | RESPIRATION RATE: 18 BRPM | HEIGHT: 71 IN | OXYGEN SATURATION: 98 % | TEMPERATURE: 98.3 F | BODY MASS INDEX: 38.5 KG/M2

## 2024-07-31 DIAGNOSIS — C34.2 MALIGNANT NEOPLASM OF MIDDLE LOBE OF RIGHT LUNG: ICD-10-CM

## 2024-07-31 DIAGNOSIS — C34.2 SQUAMOUS CELL CARCINOMA OF BRONCHUS IN RIGHT MIDDLE LOBE: ICD-10-CM

## 2024-07-31 DIAGNOSIS — Z79.899 ENCOUNTER FOR LONG-TERM (CURRENT) USE OF HIGH-RISK MEDICATION: Primary | ICD-10-CM

## 2024-07-31 DIAGNOSIS — Z45.2 FITTING AND ADJUSTMENT OF VASCULAR CATHETER: ICD-10-CM

## 2024-07-31 DIAGNOSIS — Z79.899 ENCOUNTER FOR LONG-TERM (CURRENT) USE OF HIGH-RISK MEDICATION: ICD-10-CM

## 2024-07-31 LAB
ALBUMIN SERPL-MCNC: 4.1 G/DL (ref 3.5–5.2)
ALBUMIN/GLOB SERPL: 1.3 G/DL
ALP SERPL-CCNC: 120 U/L (ref 39–117)
ALT SERPL W P-5'-P-CCNC: 18 U/L (ref 1–41)
ANION GAP SERPL CALCULATED.3IONS-SCNC: 12 MMOL/L (ref 5–15)
AST SERPL-CCNC: 25 U/L (ref 1–40)
BASOPHILS # BLD AUTO: 0.01 10*3/MM3 (ref 0–0.2)
BASOPHILS NFR BLD AUTO: 0.1 % (ref 0–1.5)
BILIRUB SERPL-MCNC: 0.8 MG/DL (ref 0–1.2)
BUN SERPL-MCNC: 20 MG/DL (ref 8–23)
BUN/CREAT SERPL: 29 (ref 7–25)
CALCIUM SPEC-SCNC: 9.6 MG/DL (ref 8.6–10.5)
CHLORIDE SERPL-SCNC: 100 MMOL/L (ref 98–107)
CO2 SERPL-SCNC: 27 MMOL/L (ref 22–29)
CREAT SERPL-MCNC: 0.69 MG/DL (ref 0.76–1.27)
DEPRECATED RDW RBC AUTO: 47 FL (ref 37–54)
DNA RANGE(S) EXAMINED NAR: NORMAL
EGFRCR SERPLBLD CKD-EPI 2021: 96.5 ML/MIN/1.73
EOSINOPHIL # BLD AUTO: 0 10*3/MM3 (ref 0–0.4)
EOSINOPHIL NFR BLD AUTO: 0 % (ref 0.3–6.2)
ERYTHROCYTE [DISTWIDTH] IN BLOOD BY AUTOMATED COUNT: 14.3 % (ref 12.3–15.4)
GENE DIS ANL INTERP-IMP: POSITIVE
GENE DIS ASSESSED: NORMAL
GENE MUT TESTED BLD/T: 6.3 M/MB
GLOBULIN UR ELPH-MCNC: 3.2 GM/DL
GLUCOSE SERPL-MCNC: 293 MG/DL (ref 65–99)
HCT VFR BLD AUTO: 37.6 % (ref 37.5–51)
HGB BLD-MCNC: 12.5 G/DL (ref 13–17.7)
IMM GRANULOCYTES # BLD AUTO: 0.04 10*3/MM3 (ref 0–0.05)
IMM GRANULOCYTES NFR BLD AUTO: 0.4 % (ref 0–0.5)
LYMPHOCYTES # BLD AUTO: 0.95 10*3/MM3 (ref 0.7–3.1)
LYMPHOCYTES NFR BLD AUTO: 8.9 % (ref 19.6–45.3)
MCH RBC QN AUTO: 30 PG (ref 26.6–33)
MCHC RBC AUTO-ENTMCNC: 33.2 G/DL (ref 31.5–35.7)
MCV RBC AUTO: 90.2 FL (ref 79–97)
MONOCYTES # BLD AUTO: 0.22 10*3/MM3 (ref 0.1–0.9)
MONOCYTES NFR BLD AUTO: 2.1 % (ref 5–12)
MSI CA SPEC-IMP: NORMAL
NEUTROPHILS NFR BLD AUTO: 88.5 % (ref 42.7–76)
NEUTROPHILS NFR BLD AUTO: 9.48 10*3/MM3 (ref 1.7–7)
NRBC BLD AUTO-RTO: 0 /100 WBC (ref 0–0.2)
PLATELET # BLD AUTO: 283 10*3/MM3 (ref 140–450)
PMV BLD AUTO: 8.8 FL (ref 6–12)
POTASSIUM SERPL-SCNC: 4.1 MMOL/L (ref 3.5–5.2)
PROT SERPL-MCNC: 7.3 G/DL (ref 6–8.5)
RBC # BLD AUTO: 4.17 10*6/MM3 (ref 4.14–5.8)
REASON FOR STUDY: NORMAL
SODIUM SERPL-SCNC: 139 MMOL/L (ref 136–145)
TEMPUS GERMLINE NOTE: NORMAL
TEMPUS PERTINENTNEGATIVES: NORMAL
TEMPUS PORTAL: NORMAL
TEMPUS THERAPY1: NORMAL
TEMPUS THERAPYCOUNT: 1
TEMPUS TRIALCOUNT: 3
TEMPUS TRIALMATCHES1: NORMAL
TEMPUS TRIALMATCHES2: NORMAL
TEMPUS TRIALMATCHES3: NORMAL
TEMPUS XR RESULT 1: NORMAL
WBC NRBC COR # BLD AUTO: 10.7 10*3/MM3 (ref 3.4–10.8)

## 2024-07-31 PROCEDURE — 96413 CHEMO IV INFUSION 1 HR: CPT

## 2024-07-31 PROCEDURE — 96375 TX/PRO/DX INJ NEW DRUG ADDON: CPT

## 2024-07-31 PROCEDURE — 25010000002 PACLITAXEL PER 1 MG: Performed by: INTERNAL MEDICINE

## 2024-07-31 PROCEDURE — 80053 COMPREHEN METABOLIC PANEL: CPT

## 2024-07-31 PROCEDURE — 25810000003 SODIUM CHLORIDE 0.9 % SOLUTION: Performed by: INTERNAL MEDICINE

## 2024-07-31 PROCEDURE — 3075F SYST BP GE 130 - 139MM HG: CPT | Performed by: INTERNAL MEDICINE

## 2024-07-31 PROCEDURE — 25010000002 PALONOSETRON PER 25 MCG: Performed by: INTERNAL MEDICINE

## 2024-07-31 PROCEDURE — 1125F AMNT PAIN NOTED PAIN PRSNT: CPT | Performed by: INTERNAL MEDICINE

## 2024-07-31 PROCEDURE — 25810000003 SODIUM CHLORIDE 0.9 % SOLUTION 250 ML FLEX CONT: Performed by: INTERNAL MEDICINE

## 2024-07-31 PROCEDURE — 25010000002 CARBOPLATIN PER 50 MG: Performed by: INTERNAL MEDICINE

## 2024-07-31 PROCEDURE — 25010000002 HEPARIN LOCK FLUSH PER 10 UNITS: Performed by: INTERNAL MEDICINE

## 2024-07-31 PROCEDURE — 99215 OFFICE O/P EST HI 40 MIN: CPT | Performed by: INTERNAL MEDICINE

## 2024-07-31 PROCEDURE — A9270 NON-COVERED ITEM OR SERVICE: HCPCS | Performed by: INTERNAL MEDICINE

## 2024-07-31 PROCEDURE — 96367 TX/PROPH/DG ADDL SEQ IV INF: CPT

## 2024-07-31 PROCEDURE — 25010000002 DIPHENHYDRAMINE PER 50 MG: Performed by: INTERNAL MEDICINE

## 2024-07-31 PROCEDURE — 85025 COMPLETE CBC W/AUTO DIFF WBC: CPT

## 2024-07-31 PROCEDURE — 25810000003 SODIUM CHLORIDE 0.9 % SOLUTION 500 ML FLEX CONT: Performed by: INTERNAL MEDICINE

## 2024-07-31 PROCEDURE — 3079F DIAST BP 80-89 MM HG: CPT | Performed by: INTERNAL MEDICINE

## 2024-07-31 PROCEDURE — 96417 CHEMO IV INFUS EACH ADDL SEQ: CPT

## 2024-07-31 PROCEDURE — 96415 CHEMO IV INFUSION ADDL HR: CPT

## 2024-07-31 PROCEDURE — 25010000002 FOSAPREPITANT PER 1 MG: Performed by: INTERNAL MEDICINE

## 2024-07-31 PROCEDURE — 25010000002 DEXAMETHASONE SODIUM PHOSPHATE 100 MG/10ML SOLUTION: Performed by: INTERNAL MEDICINE

## 2024-07-31 PROCEDURE — 63710000001 INSULIN REGULAR HUMAN PER 5 UNITS: Performed by: INTERNAL MEDICINE

## 2024-07-31 RX ORDER — FAMOTIDINE 10 MG/ML
20 INJECTION, SOLUTION INTRAVENOUS ONCE
Status: CANCELLED | OUTPATIENT
Start: 2024-07-31

## 2024-07-31 RX ORDER — HEPARIN SODIUM (PORCINE) LOCK FLUSH IV SOLN 100 UNIT/ML 100 UNIT/ML
500 SOLUTION INTRAVENOUS AS NEEDED
OUTPATIENT
Start: 2024-07-31

## 2024-07-31 RX ORDER — FAMOTIDINE 10 MG/ML
20 INJECTION, SOLUTION INTRAVENOUS AS NEEDED
Status: CANCELLED | OUTPATIENT
Start: 2024-07-31

## 2024-07-31 RX ORDER — FAMOTIDINE 10 MG/ML
20 INJECTION, SOLUTION INTRAVENOUS ONCE
Status: COMPLETED | OUTPATIENT
Start: 2024-07-31 | End: 2024-07-31

## 2024-07-31 RX ORDER — SODIUM CHLORIDE 0.9 % (FLUSH) 0.9 %
10 SYRINGE (ML) INJECTION AS NEEDED
OUTPATIENT
Start: 2024-07-31

## 2024-07-31 RX ORDER — SODIUM CHLORIDE 9 MG/ML
20 INJECTION, SOLUTION INTRAVENOUS ONCE
Status: COMPLETED | OUTPATIENT
Start: 2024-07-31 | End: 2024-07-31

## 2024-07-31 RX ORDER — PALONOSETRON 0.05 MG/ML
0.25 INJECTION, SOLUTION INTRAVENOUS ONCE
Status: COMPLETED | OUTPATIENT
Start: 2024-07-31 | End: 2024-07-31

## 2024-07-31 RX ORDER — HEPARIN SODIUM (PORCINE) LOCK FLUSH IV SOLN 100 UNIT/ML 100 UNIT/ML
500 SOLUTION INTRAVENOUS AS NEEDED
Status: DISCONTINUED | OUTPATIENT
Start: 2024-07-31 | End: 2024-07-31 | Stop reason: HOSPADM

## 2024-07-31 RX ORDER — SODIUM CHLORIDE 0.9 % (FLUSH) 0.9 %
10 SYRINGE (ML) INJECTION AS NEEDED
Status: DISCONTINUED | OUTPATIENT
Start: 2024-07-31 | End: 2024-07-31 | Stop reason: HOSPADM

## 2024-07-31 RX ORDER — PALONOSETRON 0.05 MG/ML
0.25 INJECTION, SOLUTION INTRAVENOUS ONCE
Status: CANCELLED | OUTPATIENT
Start: 2024-07-31

## 2024-07-31 RX ORDER — DIPHENHYDRAMINE HYDROCHLORIDE 50 MG/ML
50 INJECTION INTRAMUSCULAR; INTRAVENOUS AS NEEDED
Status: CANCELLED | OUTPATIENT
Start: 2024-07-31

## 2024-07-31 RX ORDER — SODIUM CHLORIDE 9 MG/ML
20 INJECTION, SOLUTION INTRAVENOUS ONCE
Status: CANCELLED | OUTPATIENT
Start: 2024-07-31

## 2024-07-31 RX ADMIN — FAMOTIDINE 20 MG: 10 INJECTION INTRAVENOUS at 10:37

## 2024-07-31 RX ADMIN — SODIUM CHLORIDE 20 ML/HR: 9 INJECTION, SOLUTION INTRAVENOUS at 09:58

## 2024-07-31 RX ADMIN — PALONOSETRON HYDROCHLORIDE 0.25 MG: 0.25 INJECTION INTRAVENOUS at 11:04

## 2024-07-31 RX ADMIN — DEXAMETHASONE SODIUM PHOSPHATE 20 MG: 10 INJECTION, SOLUTION INTRAMUSCULAR; INTRAVENOUS at 11:08

## 2024-07-31 RX ADMIN — HUMAN INSULIN 8 UNITS: 100 INJECTION, SOLUTION SUBCUTANEOUS at 10:43

## 2024-07-31 RX ADMIN — Medication 500 UNITS: at 15:38

## 2024-07-31 RX ADMIN — Medication 10 ML: at 15:38

## 2024-07-31 RX ADMIN — CARBOPLATIN 750 MG: 10 INJECTION INTRAVENOUS at 15:00

## 2024-07-31 RX ADMIN — DIPHENHYDRAMINE HYDROCHLORIDE 50 MG: 50 INJECTION, SOLUTION INTRAMUSCULAR; INTRAVENOUS at 10:40

## 2024-07-31 RX ADMIN — FOSAPREPITANT 100 ML: 150 INJECTION, POWDER, LYOPHILIZED, FOR SOLUTION INTRAVENOUS at 09:58

## 2024-07-31 RX ADMIN — PACLITAXEL 485 MG: 6 INJECTION, SOLUTION INTRAVENOUS at 11:49

## 2024-07-31 NOTE — PROGRESS NOTES
"OUTPATIENT ONCOLOGY NUTRITION ASSESSMENT    Patient Name: Branden Diop  YOB: 1950  MRN: [id number]  Assessment Date: 7/31/2024    COMMENTS: Met patient and his wife today in the infusion area. Begins Carbotaxol today. No issue with appetite.   Explained RD role and the importance of adequate nutrition and hydration during treatment.    Provided the [employer] booklet \"Nutrition during Cancer Treatment\" as a resource as well as RD contact info for any questions. Will follow throughout treatment course and be available as needed.       Reason for Assessment New assessment, Education      Diagnosis/Problem   Stage IIIa lung cancer   Treatment Plan Chemotherapy Carboplatin Taxol   Frequency    Goal of cancer treatment Curative   Comments:        Encounter Information        Nutrition/Diet History:  No issues   Oral Nutrition Supplements:    Factors/Symptoms Affecting Intake: No factors at this time   Comments:      Medical/Surgical History Past Medical History:   Diagnosis Date    Anxiety     Arthritis     Atrial fibrillation     CURRENTLY    Bunion of right foot     Colon polyps     COPD (chronic obstructive pulmonary disease)     MILD. NO MEDS FOR    Depression     History of atrial fibrillation     WITH CARDIOVERSION. NO PROBLEMS    History of skin cancer     BCC REMOVED FROM BACK    [place] (hard of hearing)     Hyperlipidemia     Inguinal hernia, recurrent     RIGHT    Left foot pain     Lung nodules     Rash     IN GROIN TREATING FOR YEAST INFECTION BY PCP    Redness of skin     LOWER LEGS BILATERAL    Scab     BILATERAL LEGS HEALING    Sleep apnea with use of continuous positive airway pressure (CPAP)     CPAP    Streptococcus pneumoniae     ABOUT 6-7 YR AGO.     Type 2 diabetes mellitus        Past Surgical History:   Procedure Laterality Date    BASAL CELL CARCINOMA EXCISION      BRONCHOSCOPY WITH ION ROBOTIC ASSIST N/A 5/6/2024    Procedure: BRONCHOSCOPY WITH ION ROBOT WITH FNA, BIOPSIES, " BAL AND ENDOBRONCHIAL ULTRASOUND WITH FNA;  Surgeon: Yony Coles MD;  Location: [facility] ENDOSCOPY;  Service: Robotics - Pulmonary;  Laterality: N/A;  PRE: PULMONARY NODULES  POST: SAME    CARDIAC CATHETERIZATION N/A 11/15/2021    Procedure: Coronary angiography;  Surgeon: Alfredo Jennings MD;  Location: [facility] CATH INVASIVE LOCATION;  Service: Cardiovascular;  Laterality: N/A;    CARDIAC CATHETERIZATION N/A 11/15/2021    Procedure: Left heart cath;  Surgeon: Alfredo Jennings MD;  Location: [facility] CATH INVASIVE LOCATION;  Service: Cardiovascular;  Laterality: N/A;    CARDIAC CATHETERIZATION N/A 11/15/2021    Procedure: Left ventriculography;  Surgeon: Alfredo Jennings MD;  Location: [facility] CATH INVASIVE LOCATION;  Service: Cardiovascular;  Laterality: N/A;    CARDIAC CATHETERIZATION N/A 11/15/2021    Procedure: Aortic root aortogram;  Surgeon: Alfredo Jennings MD;  Location: [facility] CATH INVASIVE LOCATION;  Service: Cardiovascular;  Laterality: N/A;    CARDIOVERSION      CHOLECYSTECTOMY N/A 10/07/2017    Procedure: CHOLECYSTECTOMY LAPAROSCOPIC;  Surgeon: Martir Trevino MD;  Location: [facility] MAIN OR;  Service:     COLONOSCOPY  2007    COLONOSCOPY N/A 8/30/2022    Procedure: COLONOSCOPY TO CECUM AND TI AND COLD SNARE POLYPECTOMY;  Surgeon: Cj Lopez MD;  Location: [facility] ENDOSCOPY;  Service: Gastroenterology;  Laterality: N/A;  Pre: History of colon polyps  Post: POLYP, PREVIOUS TATTOO    FOOT SURGERY Left     TOES ARE PINNED. ALL BUT GREAT TOE    HAND SURGERY      THUMB    HERNIA REPAIR      INGUINAL HERNIA REPAIR Right 06/27/2018    Procedure: INGUINAL HERNIA REPAIR, OPEN, RECURRENT;  Surgeon: Martir Trevino MD;  Location: [facility] OR [facility];  Service: General    LASIK Bilateral     LOBECTOMY Right 6/12/2024    Procedure: BRONCHOSCOPY, RIGHT VIDEO ASSISTED THORACOSCOPY WITH RIGHT MIDDLE LOBECTOMY WITH DAVINCI ROBOT, MEDIASTINAL LYMPH NODE DISSECTION, INTERCOSTAL NERVE BLOCK;  Surgeon: Henry  "MD David;  Location: [facility] OR;  Service: Robotics - DaVinci;  Laterality: Right;    NECK SURGERY      growth on salivary gland    REPLACEMENT TOTAL KNEE Right 2007    (he is going to have a LTKR next month)    REPLACEMENT TOTAL KNEE Left     VENOUS ACCESS DEVICE (PORT) INSERTION Right 7/5/2024    Procedure: INSERTION VENOUS ACCESS DEVICE;  Surgeon: David Figueroa MD;  Location: [facility] OR;  Service: Thoracic;  Laterality: Right;            Anthropometrics        Current Height Ht Readings from Last 1 Encounters:   07/31/24 180.3 cm (70.98\")      Current Weight Wt Readings from Last 1 Encounters:   07/31/24 125 kg (275 lb)      BMI  38.4   Weight History Wt Readings from Last 30 Encounters:   07/31/24 0832 125 kg (275 lb)   07/25/24 1328 126 kg (277 lb 4.8 oz)   07/23/24 0821 124 kg (274 lb)   07/17/24 1432 127 kg (279 lb)   07/16/24 1327 127 kg (279 lb 1.6 oz)   07/12/24 1517 127 kg (279 lb)   06/21/24 0500 125 kg (276 lb 3.8 oz)   06/20/24 2100 124 kg (274 lb)   06/05/24 0744 126 kg (277 lb 14.4 oz)   05/30/24 1107 126 kg (277 lb 12.8 oz)   05/28/24 0711 121 kg (267 lb)   05/16/24 1049 121 kg (267 lb 3.2 oz)   05/06/24 0931 125 kg (275 lb)   03/03/23 0857 130 kg (285 lb 12.8 oz)   01/23/23 0754 129 kg (283 lb 12.8 oz)   11/28/22 0821 122 kg (270 lb)   11/02/22 0846 128 kg (282 lb 6.4 oz)   09/09/22 1450 128 kg (282 lb)   08/30/22 0745 128 kg (282 lb)   08/22/22 0840 127 kg (279 lb 6.4 oz)   03/18/22 0901 130 kg (286 lb 12.8 oz)   02/14/22 0817 134 kg (295 lb 3.2 oz)   01/05/22 1623 130 kg (287 lb 9.6 oz)   12/17/21 0824 132 kg (290 lb 6.4 oz)   12/14/21 1412 133 kg (294 lb 3.2 oz)   11/15/21 0649 129 kg (284 lb)   10/25/21 1140 130 kg (287 lb)   10/14/21 0840 130 kg (285 lb 9.6 oz)   08/23/21 0837 126 kg (276 lb 12.8 oz)   08/20/21 0927 126 kg (278 lb 3.2 oz)   06/21/21 1421 125 kg (276 lb)          Medications           Current medications: Insulin Glargine (1 Unit Dial), Multiple Vitamin, Tirzepatide, " clotrimazole-betamethasone, dexAMETHasone, fexofenadine, freestyle, gabapentin, glucose blood, glucose monitoring kit, ondansetron, rivaroxaban, rosuvastatin, sertraline, and torsemide                 Tests/Procedures        Tests/Procedures Chemotherapy     Labs       Pertinent Labs    Results from last 7 days   Lab Units 07/31/24  0816   SODIUM mmol/L 139   POTASSIUM mmol/L 4.1   CHLORIDE mmol/L 100   CO2 mmol/L 27.0   BUN mg/dL 20   CREATININE mg/dL 0.69*   CALCIUM mg/dL 9.6   BILIRUBIN mg/dL 0.8   ALK PHOS U/L 120*   ALT (SGPT) U/L 18   AST (SGOT) U/L 25   GLUCOSE mg/dL 293*     Results from last 7 days   Lab Units 07/31/24  0816   HEMOGLOBIN g/dL 12.5*   HEMATOCRIT % 37.6   WBC 10*3/mm3 10.70   ALBUMIN g/dL 4.1     Results from last 7 days   Lab Units 07/31/24  0816   PLATELETS 10*3/mm3 283     SARS-CoV-2, MAL   Date Value Ref Range Status   12/05/2022 Detected (A) Not Detected Final     Comment:     Patients who have a positive COVID-19 test result may now have  treatment options. Treatment options are available for patients  with mild to moderate symptoms and for hospitalized patients.  Visit our website at [email or web address] for  resources and information.  This nucleic acid amplification test was developed and its performance  characteristics determined by [employer]. Nucleic acid  amplification tests include RT-PCR and TMA. This test has not been  FDA cleared or approved. This test has been authorized by FDA under  an Emergency Use Authorization (EUA). This test is only authorized  for the duration of time the declaration that circumstances exist  justifying the authorization of the emergency use of in vitro  diagnostic tests for detection of SARS-CoV-2 virus and/or diagnosis  of COVID-19 infection under section 564(b)(1) of the Act, 21 U.S.C.  360bbb-3(b) (1), unless the authorization is terminated or revoked  sooner.  When diagnostic testing is negative, the possibility of a  false  negative result should be considered in the context of a patient's  recent exposures and the presence of clinical signs and symptoms  consistent with COVID-19. An individual without symptoms of COVID-19  and who is not shedding SARS-CoV-2 virus would expect to have a  negative (not detected) result in this assay.       Lab Results   Component Value Date    HGBA1C 8.00 (H) 06/21/2024          Physical Findings        Physical Appearance alert     Edema  not assessed   Gastrointestinal None   Tubes/Drains implantable port   Oral/Mouth Cavity WNL   Skin        Estimated/Assessed Needs        Energy Requirements    Height for Calculation      Weight for Calculation 75 kg IBW   Method for Estimation  25 kcal/kg   EST Needs (kcal/day) 1875 kcals/d       Protein Requirements    Weight for Calculation 125 kg   EST Protein Needs (g/kg) 0.8 gm/kg   EST Daily Needs (g/day) 100 g/d       Fluid Requirements     Method for Estimation 1 mL/kcal    Estimated Needs (mL/day) 1875 ml/d           PES STATEMENT / NUTRITION DIAGNOSIS      Nutrition Dx Problem Problem:    NutritionDiagnosisProblem: Nutrition Appropriate for Condition at this Time and Knowledge Deficit    Etiology:  Medical diagnosis: Lung cancer  Factors affecting nutrition: Reported/Observed By    Signs/Symptoms:  Information Deficit    Comment:      NUTRITION INTERVENTION / PLAN OF CARE      Intervention Goal(s) Maintain nutrition status and Provide information         RD Intervention/Action Follow Tx progress         Recommendations:       PO Diet Continue same      Supplements       Snacks       Other    --      Monitor/Evaluation Follow up as needed   Education Education provided   --    Electronically signed by:  Brandi Reynolds RD, LD  07/31/24 14:39 EDT

## 2024-07-31 NOTE — PROGRESS NOTES
.     REASON FOR FOLLOW-UP:     *. Poorly differentiated squamous cell lung cancer with intralobular metastatic disease, stage IIIa ([id number]).       HISTORY OF PRESENT ILLNESS:  The patient is a 73 y.o. year old male who is here for initial evaluation with the above-mentioned history.  History of Present Illness  The patient presents today on 7/31/2024 for re-evaluation to start chemotherapy.    His case was presented at the multimodality conference 7/18/2024.  Because of negative margin and N1 disease, as well as marginal pulmonary function, the consensus was for patient to have adjuvant chemotherapy alone without radiation.    On 7/18/2024 peripheral blood was sent for Tempus xF liquid biopsy with inconclusive results, no reportable treatment options found.    His lung cancer sample from 6/12/2024 was sent for Tempus xT DNA and RNA study.  It reported stable MSI.  Tumor mutation burden 6.3 m/MB.  Negative for EGFR, KRAS, BRAF, ALK, ROS1, RET, MET, HER2.    The patient recently consulted her nephrologist, during which blood work was conducted. The results indicated a slight presence of protein in urine. He denies experiencing any dyspnea.    Today patient reports no nausea no vomiting.  No chest pain.  Has chronic fatigue.      Results  Laboratory Studies 7/31/2024  Hemoglobin 12.5, platelets 283,000 WBC 10,700 including ANC 9480.  Creatinine 0.69, glucose 293, alk phosphatase 120 otherwise normal liver function panel, normal electrolytes.    Lab study on 7/18/2024 at her nephrologist office Positive serum monoclonal IgA kappa. IgA was slightly elevated at 597 with normal serum IgG 830 and IgM 95. Kappa chain is 50.4 and lambda 23.3 with a kappa lambda ratio of 2.16.    Past Medical History:   Diagnosis Date    Anxiety     Arthritis     Atrial fibrillation     CURRENTLY    Bunion of right foot     Colon polyps     COPD (chronic obstructive pulmonary disease)     MILD. NO MEDS FOR    Depression     History of  atrial fibrillation     WITH CARDIOVERSION. NO PROBLEMS    History of skin cancer     BCC REMOVED FROM BACK    [place] (hard of hearing)     Hyperlipidemia     Inguinal hernia, recurrent     RIGHT    Left foot pain     Lung nodules     Rash     IN GROIN TREATING FOR YEAST INFECTION BY PCP    Redness of skin     LOWER LEGS BILATERAL    Scab     BILATERAL LEGS HEALING    Sleep apnea with use of continuous positive airway pressure (CPAP)     CPAP    Streptococcus pneumoniae     ABOUT 6-7 YR AGO.     Type 2 diabetes mellitus      Past Surgical History:   Procedure Laterality Date    BASAL CELL CARCINOMA EXCISION      BRONCHOSCOPY WITH ION ROBOTIC ASSIST N/A 5/6/2024    Procedure: BRONCHOSCOPY WITH ION ROBOT WITH FNA, BIOPSIES, BAL AND ENDOBRONCHIAL ULTRASOUND WITH FNA;  Surgeon: Yony Coles MD;  Location: [facility] ENDOSCOPY;  Service: Robotics - Pulmonary;  Laterality: N/A;  PRE: PULMONARY NODULES  POST: SAME    CARDIAC CATHETERIZATION N/A 11/15/2021    Procedure: Coronary angiography;  Surgeon: Alfredo Jennings MD;  Location: [facility] CATH INVASIVE LOCATION;  Service: Cardiovascular;  Laterality: N/A;    CARDIAC CATHETERIZATION N/A 11/15/2021    Procedure: Left heart cath;  Surgeon: Alfredo Jennings MD;  Location: [facility] CATH INVASIVE LOCATION;  Service: Cardiovascular;  Laterality: N/A;    CARDIAC CATHETERIZATION N/A 11/15/2021    Procedure: Left ventriculography;  Surgeon: Alfredo Jennings MD;  Location: [facility] CATH INVASIVE LOCATION;  Service: Cardiovascular;  Laterality: N/A;    CARDIAC CATHETERIZATION N/A 11/15/2021    Procedure: Aortic root aortogram;  Surgeon: Alfredo Jennings MD;  Location: [facility] CATH INVASIVE LOCATION;  Service: Cardiovascular;  Laterality: N/A;    CARDIOVERSION      CHOLECYSTECTOMY N/A 10/07/2017    Procedure: CHOLECYSTECTOMY LAPAROSCOPIC;  Surgeon: Martir Trevino MD;  Location: [facility] MAIN OR;  Service:     COLONOSCOPY  2007    COLONOSCOPY N/A 8/30/2022    Procedure:  COLONOSCOPY TO CECUM AND TI AND COLD SNARE POLYPECTOMY;  Surgeon: Cj Lopez MD;  Location: [facility] ENDOSCOPY;  Service: Gastroenterology;  Laterality: N/A;  Pre: History of colon polyps  Post: POLYP, PREVIOUS TATTOO    FOOT SURGERY Left     TOES ARE PINNED. ALL BUT GREAT TOE    HAND SURGERY      THUMB    HERNIA REPAIR      INGUINAL HERNIA REPAIR Right 06/27/2018    Procedure: INGUINAL HERNIA REPAIR, OPEN, RECURRENT;  Surgeon: Martir Trevino MD;  Location: [facility] OR OSC;  Service: General    LASIK Bilateral     LOBECTOMY Right 6/12/2024    Procedure: BRONCHOSCOPY, RIGHT VIDEO ASSISTED THORACOSCOPY WITH RIGHT MIDDLE LOBECTOMY WITH DAVINCI ROBOT, MEDIASTINAL LYMPH NODE DISSECTION, INTERCOSTAL NERVE BLOCK;  Surgeon: David Figueroa MD;  Location: [facility] OR;  Service: Robotics - DaVinci;  Laterality: Right;    NECK SURGERY      growth on salivary gland    REPLACEMENT TOTAL KNEE Right 2007    (he is going to have a LTKR next month)    REPLACEMENT TOTAL KNEE Left     VENOUS ACCESS DEVICE (PORT) INSERTION Right 7/5/2024    Procedure: INSERTION VENOUS ACCESS DEVICE;  Surgeon: David Figueroa MD;  Location: [facility] OR;  Service: Thoracic;  Laterality: Right;       ONCOLOGY HISTORY:  The patient is a 73 years old male, who presents for oncology evaluation 7/12/2024. He is accompanied by his wife.    He underwent right middle lobe lobectomy for squamous cell lung cancer by Dr. Figueroa on 6/12/2024 and is recovering well from the surgery. However, there is an open wound at the site of chest tube on the right side of the chest which now has a few stitches, which is still leaking. He was advised to apply Band-Aids.  He reports no fever sweating or chills.      Patient has been on oxygen supplementation since surgery, currently 2 L/min.  Patient says occasionally at home he does not need oxygen.  Dr. Figueroa has suggested gradually reducing his oxygen use.     His appetite remains normal.    Patient reports some family health  issues.  His son-in-law recently  of HLH just last week.  His brother-in-law has stage IV liver cancer.     This patient has a history of emphysema, followed by pulmonologist Dr. Camp.  His high-resolution chest CT scan on 2021 reported 7 mm nodule in the right middle lobe.  There is also index subcarinal lymph node 1.1 cm.     Patient subsequently had serial CT scan examination.    On 4/10/2023 chest high-resolution CT scan reported stable examination with the pulmonary nodules measuring up to 9-10 mm in the right middle lobe and a sub-6 mm in the right upper lobe.  The largest subcarinal lymph node measures  2.1 x  1.2 cm.     However chest high resolution CT scan 2024 reported disease progression, with right middle lobe pulm nodule 1.8 cm from margin at 9 mm.  There is also a new right middle lobe 1.1 cm nodule.  Stable right upper lobe 7 mm nodule.  Also a new right hilar lymphadenopathy up to 2 cm.  The 1.5 cm subcarinal lymph node is stable.  No pleural effusion.    Patient subsequently had PET scan examination on 2024 requested by Dr. Camp.  This reported SUV 3.1 corresponding to the 19 mm right middle lobe nodule.  The 1.1 cm right middle nodule was photopenic.  The right hilar lymph node with SUV 5.6.  The 1.5 cm subcarinal lymph node with maximum SUV 7.    Patient subsequently seen by Dr. Figueroa who performed ION bronchoscopic biopsy on 2024 with pathology evaluation reporting squamous cell carcinoma involving one of the right middle lobe lesion, and the other pulmonary nodule is at least squamous cell carcinoma in situ.  PD-L1 study TPS was 0%.       Dr. Figueroa performed robot-assisted thoracoscopic right middle lobe lobectomy and mediastinal lymph node dissection on 2024.  Pathology evaluation reported poorly differentiated squamous cell carcinoma, clear margin, however with lymph nodes involved 3 lymph nodes in the right 10 R, 11 R and 12 R lymph node station.  There was also  frequent lymphovascular invasion and focal perineural invasion.    Patient subsequently had a portacatheter placement on 7/5/2024.            MEDICATIONS    Current Outpatient Medications:     dexAMETHasone (DECADRON) 4 MG tablet, Take 2 tablets in the morning and 2 tablets at night the day before chemotherapy. Take with food., Disp: 4 tablet, Rfl: 0    fexofenadine (ALLEGRA) 180 MG tablet, Take 1 tablet by mouth Daily., Disp: , Rfl:     glucose blood test strip, Freestyle strips daily E 11.9, Disp: 100 each, Rfl: 12    glucose monitoring kit (FREESTYLE) monitoring kit, Daily E 11.93 FreeStyle meter, Disp: 1 each, Rfl: 1    Insulin Glargine, 1 Unit Dial, (Toujeo SoloStar) 300 UNIT/ML solution pen-injector injection, Inject 22 Units under the skin into the appropriate area as directed Daily., Disp: , Rfl:     Lancets (FREESTYLE) lancets, Daily E 11.9, Disp: 100 each, Rfl: 12    Multiple Vitamin (MULTIVITAMINS PO), Take 1 tablet by mouth Daily. HOLD PRIOR TO SURG, Disp: , Rfl:     ondansetron (ZOFRAN) 8 MG tablet, Take 1 tablet by mouth Every 8 (Eight) Hours As Needed for Nausea or Vomiting., Disp: 30 tablet, Rfl: 5    rosuvastatin (CRESTOR) 40 MG tablet, TAKE ONE TABLET BY MOUTH DAILY, Disp: 90 tablet, Rfl: 1    sertraline (ZOLOFT) 50 MG tablet, TAKE ONE TABLET BY MOUTH DAILY (Patient taking differently: Take 1 tablet by mouth Daily.), Disp: 90 tablet, Rfl: 1    Tirzepatide (Mounjaro) 2.5 MG/0.5ML solution pen-injector pen, Inject 0.5 mL under the skin into the appropriate area as directed 1 (One) Time Per Week. LAST DOSE 6/3/2024 INSTRUCTED PT TO HOLD FOR SURGERY, Disp: , Rfl:     torsemide (DEMADEX) 20 MG tablet, Take 1 tablet by mouth Daily., Disp: , Rfl:     clotrimazole-betamethasone (Lotrisone) 1-0.05 % cream, Apply 1 application topically to the appropriate area as directed 2 (Two) Times a Day. Do exceed 1 week (Patient not taking: Reported on 7/31/2024), Disp: 15 g, Rfl: 1    gabapentin (NEURONTIN) 300 MG  capsule, Take 1 capsule by mouth Every 8 (Eight) Hours for 30 days., Disp: 90 capsule, Rfl: 0    Xarelto 20 MG tablet, TAKE ONE TABLET BY MOUTH DAILY (Patient not taking: Reported on 7/31/2024), Disp: 30 tablet, Rfl: 9    ALLERGIES:   No Known Allergies    SOCIAL HISTORY:       Social History     Socioeconomic History    Marital status:      Spouse name: Luba    Number of children: 2   Tobacco Use    Smoking status: Former     Current packs/day: 0.00     Average packs/day: 1 pack/day for 30.0 years (30.0 ttl pk-yrs)     Types: Cigars, Cigarettes     Start date: 1/1/1984     Quit date: 1/1/2014     Years since quitting: 10.5    Smokeless tobacco: Never   Vaping Use    Vaping status: Never Used   Substance and Sexual Activity    Alcohol use: Never     Comment: caffeine use- decaf coffee    Drug use: Never    Sexual activity: Defer         FAMILY HISTORY:  Family History   Problem Relation Age of Onset    Cancer Other     Stroke Mother     Alcohol abuse Father     Malig Hyperthermia Neg Hx        REVIEW OF SYSTEMS:  Review of Systems   Constitutional:  Positive for activity change and fatigue. Negative for appetite change, chills, diaphoresis and fever.   HENT:  Negative for nosebleeds and trouble swallowing.    Eyes:  Negative for visual disturbance.   Respiratory:  Positive for shortness of breath. Negative for cough and wheezing.    Cardiovascular:  Negative for chest pain and leg swelling.   Gastrointestinal:  Negative for abdominal pain and blood in stool.   Genitourinary:  Negative for frequency and hematuria.   Musculoskeletal:  Negative for joint swelling.   Skin:  Positive for wound.   Allergic/Immunologic: Negative for immunocompromised state.   Neurological:  Negative for weakness and numbness.   Hematological:  Negative for adenopathy. Does not bruise/bleed easily.   Psychiatric/Behavioral:  Negative for confusion.               Vitals:    07/31/24 0832   BP: 130/80   Pulse: 54   Resp: 18   Temp:  "98.3 °F (36.8 °C)   TempSrc: Oral   SpO2: 98%   Weight: 125 kg (275 lb)   Height: 180.3 cm (70.98\")   PainSc: 0-No pain         7/31/2024     8:33 AM   Current Status   ECOG score 0     Physical Exam    GENERAL:  Well-developed, well-nourished male in no acute distress.  Patient uses oxygen by nasal cannula.  SKIN:  Warm, dry without rashes, purpura or petechiae.  Portacatheter benign no evidence for infection.  HEENT:  Normocephalic.   LYMPHATICS:  No cervical, supraclavicular or axillary adenopathy.  CHEST: Normal respiratory effort.  Decreased breathing sounds bilaterally no wheezing or rails.    CARDIAC:  Regular rate and rhythm. Normal S1,S2.  ABDOMEN:  Soft, no tender.  Bowel sounds normal.  EXTREMITIES:  No lower extremity edema.        RECENT LABS:  Lab Results   Component Value Date    WBC 10.70 07/31/2024    HGB 12.5 (L) 07/31/2024    HCT 37.6 07/31/2024    MCV 90.2 07/31/2024     07/31/2024     Lab Results   Component Value Date    NEUTROABS 9.48 (H) 07/31/2024     Lab Results   Component Value Date    GLUCOSE 293 (H) 07/31/2024    BUN 20 07/31/2024    CREATININE 0.69 (L) 07/31/2024    EGFRRESULT 77.1 01/23/2023    EGFR 96.5 07/31/2024    BCR 29.0 (H) 07/31/2024    K 4.1 07/31/2024    CO2 27.0 07/31/2024    CALCIUM 9.6 07/31/2024    PROTENTOTREF 7.2 02/14/2022    ALBUMIN 4.1 07/31/2024    BILITOT 0.8 07/31/2024    AST 25 07/31/2024    ALT 18 07/31/2024       Assessment & Plan   Assessment & Plan  1. Poorly differentiated squamous cell lung cancer with intralobular metastatic disease, stage IIIa ([id number]).  Tumor was poorly differentiated. This patient has stage 3a disease.   Patient had a iron bronchoscopic biopsy 5/6/2024.  Right middle lobe reported fragments of squamous cell carcinoma.  PD-L1 study reported TPS 0%.  I discussed with the patient on 7/12/2024 that he will need adjuvant chemotherapy and concurrent adjuvant radiation therapy, and likely will need consolidative immunotherapy. I " recommended using weekly carboplatin plus Taxol, in conjunction with radiotherapy for 6.5 weeks treatment. In reality, he probably will only receive 5 or 6 weekly chemotherapy instead of the 7 doses due to tolerance issues.   We will present his case at the chest conference on 7/18/2024, due to poor pulmonary function, negative surgical margins and N1 disease, the consensus is for patient to have adjuvant chemotherapy without concurrent radiation therapy.  Also recommended genetic study.   On 7/18/2024 peripheral blood was sent for Tempus xF liquid biopsy with inconclusive results, no reportable treatment options found.   His lung cancer sample from 6/12/2024 was sent for Tempus xT DNA and RNA study.  It reported stable MSI.  Tumor mutation burden 6.3 m/MB.  Negative for EGFR, KRAS, BRAF, ALK, ROS1, RET, MET, HER2.  Tumor sample was positive for BRIP1 mutation, TP53 mutation and also ARID1B mutation, all of those LOF.   Today on 7/31/2024 will start the patient on adjuvant chemotherapy.  Because his overall health condition, performance status ECOG 2, his age, he would not tolerate cisplatin based chemotherapy.  So we recommended using carboplatin in conjunction with Taxol every 3 weeks chemotherapy for 4 cycles.  Unfortunately was discharged, patient would not be a candidate for immunotherapy as part of adjuvant therapy.    2.  Emphysema.    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  He was instructed by his surgeon Dr. Figueroa to taper off gradually.    3.  Monoclonal gammopathy of unknown significance.  IgA kappa.  This was discovered at the his nephrologist office.  Laboratory study on 4/13/2022 reported serum monoclonal IgA kappa with elevated IgA 604 mg/dL, normal IgG 861 and IgM 84.  Free kappa chain was 38.4, lambda 21.3, kappa/lambda ratio 1.8.  Lab study on 7/19/2024 at her nephrologist office Positive serum monoclonal IgA kappa. IgA was slightly elevated at 597 with normal  serum IgG 830 and IgM 95. Kappa chain is 50.4 and lambda 23.3 with a kappa lambda ratio of 2.16.  Continue to observe.          PLAN:   Proceed ahead with cycle #1 adjuvant chemotherapy with carboplatin AUC 5 and Taxol 200 mg/m².  This will be repeated every 3 weeks.  There is no actionable mutation for using TKI as part of adjuvant chemotherapy.   Also not candidate for immunotherapy in the adjuvant setting based on the initial ION biopsy sample.  Nevertheless we will test that his lobectomy tumor sample from 6/12/2024.  Patient will see APRN in 1 week for toxicity check, will check CBC BMP magnesium level.  Monitor labs CBC BMP magnesium in 2 weeks.  I will see patient in 3 weeks for reassessment with laboratory studies CBC CMP magnesium prior to cycle #2 adjuvant chemotherapy.        I spent 40 minutes caring for Branden on this date of service. This time includes time spent by me in the following activities: preparing for the visit, reviewing tests, obtaining and/or reviewing a separately obtained history, performing a medically appropriate examination and/or evaluation, counseling and educating the patient/family/caregiver, ordering medications, tests, or procedures, referring and communicating with other health care professionals, documenting information in the medical record, independently interpreting results and communicating that information with the patient/family/caregiver and care coordination     Discussed with the patient and his wife about management plan.  They voiced understanding and agreeable.      JEREMY GATES M.D., Ph.D.        CC:  MD John Paul Govea MD Sasser, Robert L, MD

## 2024-08-01 NOTE — PROGRESS NOTES
Subjective     David Figueroa MD     Cancer Staging   CC: pT3N1 squamous cell carcinoma of the right lung                             Dear Duy Villasenor MD PhD     I had the pleasure of seeing Branden [facility]op  [facility] .   The patient is a 73 y.o.  male with newly diagnosed non small cell lung cancer.      He had a CT scan 4/2/2024 which reported disease progression, with right middle lobe pulm nodule 1.8 cm from margin at 9 mm. There is also a new right middle lobe 1.1 cm nodule. Stable right upper lobe 7 mm nodule. Also a new right hilar lymphadenopathy up to 2 cm. The 1.5 cm subcarinal lymph node is stable. No pleural effusion.     He had a PET scan on 4/22/24 which showed mildly hypermetabolic right middle lobe 1.9 cm solid nodule and immediately proximal right middle lobe 1.1 cm solid nodule with uptake indistinguishable from background lung remain concerning for primary pulmonary malignancy.  Hypermetabolic right hilar and subcarinal lymphadenopathy remainconcerning for mackenzie metastatic disease     He underwent an ION bronchoscopic biopsy with Dr. Figueroa on 5/7/2024 with pathology evaluation reporting squamous cell carcinoma involving one of the right middle lobe lesion, and the other pulmonary nodule is at least squamous cell carcinoma in situ.      He underwent right middle lobe lobectomy for squamous cell lung cancer by Dr. Figueroa on 6/12/2024 with pathology revealing poorly differentiated squamous cell carcinoma with intralobar metastases, primary mass 3cm, intrapulmonary mets 1cm, frequent lvsi and focal pni.  Thesubpleural tumor nodule does not extend to involve the visceral pleural surface.  Parenchyma, bronchial and vascular margins negative for malignancy.   Lymph node states 10R 11R and 12 R were positive for metastatic carcinoma.  His pathologic stage was pT3N1.      Patient has been on oxygen supplementation since surgery, currently 2 L/min.    Interval hx 7/22/24   He returns today for  re-evaluation.  We discussed his case at the multidiscplinary lung conference on July 18 and he is here today to finalize the treatment plan based on those recommendations.  He is still on 2-3 L oxygen.      Review of Systems   Constitutional: Negative.    Respiratory:  Positive for cough, shortness of breath and wheezing.    Cardiovascular: Negative.    Musculoskeletal:  Positive for arthralgias.   Psychiatric/Behavioral: Negative.         Past Medical History:   Diagnosis Date   • Anxiety    • Arthritis    • Atrial fibrillation     CURRENTLY   • Bunion of right foot    • Colon polyps    • COPD (chronic obstructive pulmonary disease)     MILD. NO MEDS FOR   • Depression    • History of atrial fibrillation     WITH CARDIOVERSION. NO PROBLEMS   • History of skin cancer     BCC REMOVED FROM BACK   • [place] (hard of hearing)    • Hyperlipidemia    • Inguinal hernia, recurrent     RIGHT   • Left foot pain    • Lung nodules    • Rash     IN GROIN TREATING FOR YEAST INFECTION BY PCP   • Redness of skin     LOWER LEGS BILATERAL   • Scab     BILATERAL LEGS HEALING   • Sleep apnea with use of continuous positive airway pressure (CPAP)     CPAP   • Streptococcus pneumoniae     ABOUT 6-7 YR AGO.    • Type 2 diabetes mellitus          Past Surgical History:   Procedure Laterality Date   • BASAL CELL CARCINOMA EXCISION     • BRONCHOSCOPY WITH ION ROBOTIC ASSIST N/A 5/6/2024    Procedure: BRONCHOSCOPY WITH ION ROBOT WITH FNA, BIOPSIES, BAL AND ENDOBRONCHIAL ULTRASOUND WITH FNA;  Surgeon: Yony Coles MD;  Location: [facility] ENDOSCOPY;  Service: Robotics - Pulmonary;  Laterality: N/A;  PRE: PULMONARY NODULES  POST: SAME   • CARDIAC CATHETERIZATION N/A 11/15/2021    Procedure: Coronary angiography;  Surgeon: Alfredo Jennings MD;  Location: [facility] CATH INVASIVE LOCATION;  Service: Cardiovascular;  Laterality: N/A;   • CARDIAC CATHETERIZATION N/A 11/15/2021    Procedure: Left heart cath;  Surgeon: Alfredo Jennings MD;   Location: [facility] CATH INVASIVE LOCATION;  Service: Cardiovascular;  Laterality: N/A;   • CARDIAC CATHETERIZATION N/A 11/15/2021    Procedure: Left ventriculography;  Surgeon: Alfredo Jennings MD;  Location: [facility] CATH INVASIVE LOCATION;  Service: Cardiovascular;  Laterality: N/A;   • CARDIAC CATHETERIZATION N/A 11/15/2021    Procedure: Aortic root aortogram;  Surgeon: Alfredo Jennings MD;  Location: [facility] CATH INVASIVE LOCATION;  Service: Cardiovascular;  Laterality: N/A;   • CARDIOVERSION     • CHOLECYSTECTOMY N/A 10/07/2017    Procedure: CHOLECYSTECTOMY LAPAROSCOPIC;  Surgeon: Martir Trevino MD;  Location: [facility] MAIN OR;  Service:    • COLONOSCOPY  2007   • COLONOSCOPY N/A 8/30/2022    Procedure: COLONOSCOPY TO CECUM AND TI AND COLD SNARE POLYPECTOMY;  Surgeon: Cj Lopez MD;  Location: [facility] ENDOSCOPY;  Service: Gastroenterology;  Laterality: N/A;  Pre: History of colon polyps  Post: POLYP, PREVIOUS TATTOO   • FOOT SURGERY Left     TOES ARE PINNED. ALL BUT GREAT TOE   • HAND SURGERY      THUMB   • HERNIA REPAIR     • INGUINAL HERNIA REPAIR Right 06/27/2018    Procedure: INGUINAL HERNIA REPAIR, OPEN, RECURRENT;  Surgeon: Martir Trevino MD;  Location: [facility] OR OSC;  Service: General   • LASIK Bilateral    • LOBECTOMY Right 6/12/2024    Procedure: BRONCHOSCOPY, RIGHT VIDEO ASSISTED THORACOSCOPY WITH RIGHT MIDDLE LOBECTOMY WITH DAVINCI ROBOT, MEDIASTINAL LYMPH NODE DISSECTION, INTERCOSTAL NERVE BLOCK;  Surgeon: David Figueroa MD;  Location: [facility] MAIN OR;  Service: Robotics - DaVinci;  Laterality: Right;   • NECK SURGERY      growth on salivary gland   • REPLACEMENT TOTAL KNEE Right 2007    (he is going to have a LTKR next month)   • REPLACEMENT TOTAL KNEE Left    • VENOUS ACCESS DEVICE (PORT) INSERTION Right 7/5/2024    Procedure: INSERTION VENOUS ACCESS DEVICE;  Surgeon: David Figueroa MD;  Location: [facility] MAIN OR;  Service: Thoracic;  Laterality: Right;         Social History     Socioeconomic  History   • Marital status:      Spouse name: Luba   • Number of children: 2   Tobacco Use   • Smoking status: Former     Current packs/day: 0.00     Average packs/day: 1 pack/day for 30.0 years (30.0 ttl pk-yrs)     Types: Cigars, Cigarettes     Start date: 1/1/1984     Quit date: 1/1/2014     Years since quitting: 10.5   • Smokeless tobacco: Never   Vaping Use   • Vaping status: Never Used   Substance and Sexual Activity   • Alcohol use: Never     Comment: caffeine use- decaf coffee   • Drug use: Never   • Sexual activity: Defer         Family History   Problem Relation Age of Onset   • Cancer Other    • Stroke Mother    • Alcohol abuse Father    • Malig Hyperthermia Neg Hx           Objective    Physical Exam  Constitutional:       Appearance: Normal appearance.   Pulmonary:      Effort: Pulmonary effort is normal.      Breath sounds: Wheezing present.   Neurological:      Mental Status: He is alert.   Psychiatric:         Mood and Affect: Mood normal.         Current Outpatient Medications on File Prior to Visit   Medication Sig Dispense Refill   • clotrimazole-betamethasone (Lotrisone) 1-0.05 % cream Apply 1 application topically to the appropriate area as directed 2 (Two) Times a Day. Do exceed 1 week (Patient not taking: Reported on 7/31/2024) 15 g 1   • dexAMETHasone (DECADRON) 4 MG tablet Take 2 tablets in the morning and 2 tablets at night the day before chemotherapy. Take with food. 4 tablet 0   • fexofenadine (ALLEGRA) 180 MG tablet Take 1 tablet by mouth Daily.     • gabapentin (NEURONTIN) 300 MG capsule Take 1 capsule by mouth Every 8 (Eight) Hours for 30 days. 90 capsule 0   • glucose blood test strip Freestyle strips daily E 11.9 100 each 12   • glucose monitoring kit (FREESTYLE) monitoring kit Daily E 11.93 FreeStyle meter 1 each 1   • Insulin Glargine, 1 Unit Dial, (Toujeo SoloStar) 300 UNIT/ML solution pen-injector injection Inject 22 Units under the skin into the appropriate area as  directed Daily.     • Lancets (FREESTYLE) lancets Daily E 11.9 100 each 12   • Multiple Vitamin (MULTIVITAMINS PO) Take 1 tablet by mouth Daily. HOLD PRIOR TO SURG     • ondansetron (ZOFRAN) 8 MG tablet Take 1 tablet by mouth Every 8 (Eight) Hours As Needed for Nausea or Vomiting. 30 tablet 5   • rosuvastatin (CRESTOR) 40 MG tablet TAKE ONE TABLET BY MOUTH DAILY 90 tablet 1   • sertraline (ZOLOFT) 50 MG tablet TAKE ONE TABLET BY MOUTH DAILY (Patient taking differently: Take 1 tablet by mouth Daily.) 90 tablet 1   • Tirzepatide (Mounjaro) 2.5 MG/0.5ML solution pen-injector pen Inject 0.5 mL under the skin into the appropriate area as directed 1 (One) Time Per Week. LAST DOSE 6/3/2024 INSTRUCTED PT TO HOLD FOR SURGERY     • torsemide (DEMADEX) 20 MG tablet Take 1 tablet by mouth Daily.     • Xarelto 20 MG tablet TAKE ONE TABLET BY MOUTH DAILY (Patient not taking: Reported on 7/31/2024) 30 tablet 9     No current facility-administered medications on file prior to visit.       ALLERGIES:  No Known Allergies    /75   Pulse 75   SpO2 90%          7/31/2024     8:33 AM   Current Status   ECOG score 0         Assessment & Plan   Branden Diop is a 73 y.o. male with [id number] squamous cell carcinoma of the right lung s/p right lobectomy and lymph node dissection with metastatic disease in lymph node station 10R 11R 12 R.   I discussed his case at the multidisciplinary lung conference on July 18.  Given the fact that he has negative margins and N1 disease as well as marginal pulmonary function, the consensus recommendation was to proceed with adjuvant chemotherapy alone and no radiation.  He and his wife voiced understanding and were given an opportunity to ask questions which were answered to their satisfaction.  I have not scheduled a follow up with me but I asked him to call with any questions or concerns in the future.    I personally spent greater than 25 minutes today assessing, managing, discussing and documenting  my visit with the patient. That time includes review of records, imaging and pathology reports, obtaining my own history, performing a medically appropriate evaluation, counseling and educating the patient, discussing goals, logistics, alternatives and risks of my recommendations, surveillance options and potential outcomes. It also includes the time documenting the clinical information in the EMR and communicating my recommendations to the other involved physicians.                 Thank you very much for allowing me to participate in the care of this very pleasant patient.    Sincerely,      Yashira Mendiola MD

## 2024-08-05 ENCOUNTER — TELEPHONE (OUTPATIENT)
Dept: ONCOLOGY | Facility: CLINIC | Age: 74
End: 2024-08-05
Payer: MEDICARE

## 2024-08-05 NOTE — TELEPHONE ENCOUNTER
Called patient, said that he is having aches and pains in arms and legs, rates them 5 out 10. Wanted to know if this is normal after last carbo/taxol on Wednesday 7/31. Per NP Laura, these aches can be normal and wants patient to continue taking tylenol to help with the pain and that she would assess him at his F/U tomorrow. Pt v/u

## 2024-08-05 NOTE — TELEPHONE ENCOUNTER
Provider: Hamilton  Caller: patient  Relationship to Patient: self  Call Back Phone Number: [phone]  Reason for Call: Pt having pain all over body after chemo.

## 2024-08-06 ENCOUNTER — OFFICE VISIT (OUTPATIENT)
Dept: ONCOLOGY | Facility: CLINIC | Age: 74
End: 2024-08-06
Payer: MEDICARE

## 2024-08-06 ENCOUNTER — LAB (OUTPATIENT)
Dept: LAB | Facility: HOSPITAL | Age: 74
End: 2024-08-06
Payer: MEDICARE

## 2024-08-06 ENCOUNTER — TELEPHONE (OUTPATIENT)
Dept: ONCOLOGY | Facility: CLINIC | Age: 74
End: 2024-08-06

## 2024-08-06 VITALS
OXYGEN SATURATION: 92 % | SYSTOLIC BLOOD PRESSURE: 128 MMHG | TEMPERATURE: 97.6 F | DIASTOLIC BLOOD PRESSURE: 73 MMHG | BODY MASS INDEX: 38.47 KG/M2 | WEIGHT: 274.8 LBS | HEART RATE: 61 BPM | RESPIRATION RATE: 16 BRPM | HEIGHT: 71 IN

## 2024-08-06 DIAGNOSIS — Z79.899 ENCOUNTER FOR LONG-TERM (CURRENT) USE OF HIGH-RISK MEDICATION: ICD-10-CM

## 2024-08-06 DIAGNOSIS — C34.2 SQUAMOUS CELL CARCINOMA OF BRONCHUS IN RIGHT MIDDLE LOBE: Primary | ICD-10-CM

## 2024-08-06 DIAGNOSIS — C34.2 SQUAMOUS CELL CARCINOMA OF BRONCHUS IN RIGHT MIDDLE LOBE: ICD-10-CM

## 2024-08-06 DIAGNOSIS — C34.2 MALIGNANT NEOPLASM OF MIDDLE LOBE OF RIGHT LUNG: ICD-10-CM

## 2024-08-06 LAB
ANION GAP SERPL CALCULATED.3IONS-SCNC: 8.1 MMOL/L (ref 5–15)
BASOPHILS # BLD AUTO: 0.02 10*3/MM3 (ref 0–0.2)
BASOPHILS NFR BLD AUTO: 0.6 % (ref 0–1.5)
BUN SERPL-MCNC: 17 MG/DL (ref 8–23)
BUN/CREAT SERPL: 25 (ref 7–25)
CALCIUM SPEC-SCNC: 9.6 MG/DL (ref 8.6–10.5)
CHLORIDE SERPL-SCNC: 100 MMOL/L (ref 98–107)
CO2 SERPL-SCNC: 32.9 MMOL/L (ref 22–29)
CREAT SERPL-MCNC: 0.68 MG/DL (ref 0.76–1.27)
DEPRECATED RDW RBC AUTO: 47.8 FL (ref 37–54)
EGFRCR SERPLBLD CKD-EPI 2021: 96.9 ML/MIN/1.73
EOSINOPHIL # BLD AUTO: 0.28 10*3/MM3 (ref 0–0.4)
EOSINOPHIL NFR BLD AUTO: 8.7 % (ref 0.3–6.2)
ERYTHROCYTE [DISTWIDTH] IN BLOOD BY AUTOMATED COUNT: 14.1 % (ref 12.3–15.4)
GLUCOSE SERPL-MCNC: 179 MG/DL (ref 65–99)
HCT VFR BLD AUTO: 37.2 % (ref 37.5–51)
HGB BLD-MCNC: 12 G/DL (ref 13–17.7)
IMM GRANULOCYTES # BLD AUTO: 0.02 10*3/MM3 (ref 0–0.05)
IMM GRANULOCYTES NFR BLD AUTO: 0.6 % (ref 0–0.5)
LYMPHOCYTES # BLD AUTO: 1.01 10*3/MM3 (ref 0.7–3.1)
LYMPHOCYTES NFR BLD AUTO: 31.3 % (ref 19.6–45.3)
MAGNESIUM SERPL-MCNC: 1.8 MG/DL (ref 1.6–2.4)
MCH RBC QN AUTO: 29.6 PG (ref 26.6–33)
MCHC RBC AUTO-ENTMCNC: 32.3 G/DL (ref 31.5–35.7)
MCV RBC AUTO: 91.6 FL (ref 79–97)
MONOCYTES # BLD AUTO: 0.07 10*3/MM3 (ref 0.1–0.9)
MONOCYTES NFR BLD AUTO: 2.2 % (ref 5–12)
NEUTROPHILS NFR BLD AUTO: 1.83 10*3/MM3 (ref 1.7–7)
NEUTROPHILS NFR BLD AUTO: 56.6 % (ref 42.7–76)
NRBC BLD AUTO-RTO: 0 /100 WBC (ref 0–0.2)
PLATELET # BLD AUTO: 173 10*3/MM3 (ref 140–450)
PMV BLD AUTO: 9.3 FL (ref 6–12)
POTASSIUM SERPL-SCNC: 4.5 MMOL/L (ref 3.5–5.2)
RBC # BLD AUTO: 4.06 10*6/MM3 (ref 4.14–5.8)
SODIUM SERPL-SCNC: 141 MMOL/L (ref 136–145)
WBC NRBC COR # BLD AUTO: 3.23 10*3/MM3 (ref 3.4–10.8)

## 2024-08-06 PROCEDURE — 3078F DIAST BP <80 MM HG: CPT | Performed by: NURSE PRACTITIONER

## 2024-08-06 PROCEDURE — 85025 COMPLETE CBC W/AUTO DIFF WBC: CPT

## 2024-08-06 PROCEDURE — 80048 BASIC METABOLIC PNL TOTAL CA: CPT

## 2024-08-06 PROCEDURE — 1125F AMNT PAIN NOTED PAIN PRSNT: CPT | Performed by: NURSE PRACTITIONER

## 2024-08-06 PROCEDURE — 99214 OFFICE O/P EST MOD 30 MIN: CPT | Performed by: NURSE PRACTITIONER

## 2024-08-06 PROCEDURE — 83735 ASSAY OF MAGNESIUM: CPT

## 2024-08-06 PROCEDURE — 36415 COLL VENOUS BLD VENIPUNCTURE: CPT

## 2024-08-06 PROCEDURE — 3074F SYST BP LT 130 MM HG: CPT | Performed by: NURSE PRACTITIONER

## 2024-08-06 RX ORDER — GABAPENTIN 300 MG/1
300 CAPSULE ORAL EVERY 8 HOURS SCHEDULED
Qty: 90 CAPSULE | Refills: 0 | Status: SHIPPED | OUTPATIENT
Start: 2024-08-06

## 2024-08-06 NOTE — PROGRESS NOTES
.     REASON FOR CONSULTATION:     Provide an opinion on any further workup or treatment of stage IIIa lung cancer.                             REQUESTING PHYSICIAN: No ref. provider found    RECORDS OBTAINED:  Records of the patient's history including those obtained from the referring provider were reviewed and summarized in detail.    HISTORY OF PRESENT ILLNESS:  The patient is a 73 y.o. year old male with the above-mentioned history who is seen today for toxicity check.  He received his first cycle of carboplatin/Taxol on 7/31/2024.  He comes in today reporting that he has had some generalized pain.  He describes it as sharp shooting pain in his lower extremities, especially his legs and ankles, as well as his upper extremities and at times in his back.      He is also describing just some generalized arthralgias.  He has been taking Tylenol to help with this which does provide him with some relief.  He denies issues with vomiting or diarrhea.  He has had some looser bowel movements, but this was typically dietary related.  He had a good appetite.  Patient did go to the ER because of issues with bleeding from his left ear as well as redness of his left eye.  Patient states in the ER they felt that he had scratched his ear.  Patient plans to follow-up with his primary care provider, Dr. Lopez, and will likely be referred to ENT.    He has had some mouth irritation, but he believes that is due to recent dental work.  He reports he is breathing well.  Denies issues with increased shortness of breath or cough.    Patient does have a prescription for gabapentin.  He takes anywhere from 0-2 times daily.    The area on his right lateral chest from previous chest tube placement has healed up, and has had no further drainage.    Past Medical History:   Diagnosis Date    Anxiety     Arthritis     Atrial fibrillation     CURRENTLY    Bunion of right foot     Colon polyps     COPD (chronic obstructive pulmonary  disease)     MILD. NO MEDS FOR    Depression     History of atrial fibrillation     WITH CARDIOVERSION. NO PROBLEMS    History of skin cancer     BCC REMOVED FROM BACK    [place] (hard of hearing)     Hyperlipidemia     Inguinal hernia, recurrent     RIGHT    Left foot pain     Lung nodules     Rash     IN GROIN TREATING FOR YEAST INFECTION BY PCP    Redness of skin     LOWER LEGS BILATERAL    Scab     BILATERAL LEGS HEALING    Sleep apnea with use of continuous positive airway pressure (CPAP)     CPAP    Streptococcus pneumoniae     ABOUT 6-7 YR AGO.     Type 2 diabetes mellitus      Past Surgical History:   Procedure Laterality Date    BASAL CELL CARCINOMA EXCISION      BRONCHOSCOPY WITH ION ROBOTIC ASSIST N/A 5/6/2024    Procedure: BRONCHOSCOPY WITH ION ROBOT WITH FNA, BIOPSIES, BAL AND ENDOBRONCHIAL ULTRASOUND WITH FNA;  Surgeon: Yoyn Coles MD;  Location: [facility] ENDOSCOPY;  Service: Robotics - Pulmonary;  Laterality: N/A;  PRE: PULMONARY NODULES  POST: SAME    CARDIAC CATHETERIZATION N/A 11/15/2021    Procedure: Coronary angiography;  Surgeon: Alfredo Jennings MD;  Location: [facility] CATH INVASIVE LOCATION;  Service: Cardiovascular;  Laterality: N/A;    CARDIAC CATHETERIZATION N/A 11/15/2021    Procedure: Left heart cath;  Surgeon: Alfredo Jennings MD;  Location: [facility] CATH INVASIVE LOCATION;  Service: Cardiovascular;  Laterality: N/A;    CARDIAC CATHETERIZATION N/A 11/15/2021    Procedure: Left ventriculography;  Surgeon: Alfredo Jennings MD;  Location: [facility] CATH INVASIVE LOCATION;  Service: Cardiovascular;  Laterality: N/A;    CARDIAC CATHETERIZATION N/A 11/15/2021    Procedure: Aortic root aortogram;  Surgeon: Alfredo Jennings MD;  Location: [facility] CATH INVASIVE LOCATION;  Service: Cardiovascular;  Laterality: N/A;    CARDIOVERSION      CHOLECYSTECTOMY N/A 10/07/2017    Procedure: CHOLECYSTECTOMY LAPAROSCOPIC;  Surgeon: Martir Trevino MD;  Location: [facility] OR;  Service:      COLONOSCOPY  2007    COLONOSCOPY N/A 8/30/2022    Procedure: COLONOSCOPY TO CECUM AND TI AND COLD SNARE POLYPECTOMY;  Surgeon: Cj Lopez MD;  Location: [facility] ENDOSCOPY;  Service: Gastroenterology;  Laterality: N/A;  Pre: History of colon polyps  Post: POLYP, PREVIOUS TATTOO    FOOT SURGERY Left     TOES ARE PINNED. ALL BUT GREAT TOE    HAND SURGERY      THUMB    HERNIA REPAIR      INGUINAL HERNIA REPAIR Right 06/27/2018    Procedure: INGUINAL HERNIA REPAIR, OPEN, RECURRENT;  Surgeon: Martir Trevino MD;  Location: [facility] OR OSC;  Service: General    LASIK Bilateral     LOBECTOMY Right 6/12/2024    Procedure: BRONCHOSCOPY, RIGHT VIDEO ASSISTED THORACOSCOPY WITH RIGHT MIDDLE LOBECTOMY WITH DAVINCI ROBOT, MEDIASTINAL LYMPH NODE DISSECTION, INTERCOSTAL NERVE BLOCK;  Surgeon: David Figueroa MD;  Location: [facility] MAIN OR;  Service: Robotics - DaVinci;  Laterality: Right;    NECK SURGERY      growth on salivary gland    REPLACEMENT TOTAL KNEE Right 2007    (he is going to have a LTKR next month)    REPLACEMENT TOTAL KNEE Left     VENOUS ACCESS DEVICE (PORT) INSERTION Right 7/5/2024    Procedure: INSERTION VENOUS ACCESS DEVICE;  Surgeon: David Figueroa MD;  Location: [facility] MAIN OR;  Service: Thoracic;  Laterality: Right;       MEDICATIONS    Current Outpatient Medications:     dexAMETHasone (DECADRON) 4 MG tablet, Take 2 tablets in the morning and 2 tablets at night the day before chemotherapy. Take with food., Disp: 4 tablet, Rfl: 0    fexofenadine (ALLEGRA) 180 MG tablet, Take 1 tablet by mouth Daily., Disp: , Rfl:     glucose blood test strip, Freestyle strips daily E 11.9, Disp: 100 each, Rfl: 12    glucose monitoring kit (FREESTYLE) monitoring kit, Daily E 11.93 FreeStyle meter, Disp: 1 each, Rfl: 1    Insulin Glargine, 1 Unit Dial, (Toujeo SoloStar) 300 UNIT/ML solution pen-injector injection, Inject 22 Units under the skin into the appropriate area as directed Daily., Disp: , Rfl:     Lancets (FREESTYLE)  lancets, Daily E 11.9, Disp: 100 each, Rfl: 12    Multiple Vitamin (MULTIVITAMINS PO), Take 1 tablet by mouth Daily. HOLD PRIOR TO SURG, Disp: , Rfl:     ondansetron (ZOFRAN) 8 MG tablet, Take 1 tablet by mouth Every 8 (Eight) Hours As Needed for Nausea or Vomiting., Disp: 30 tablet, Rfl: 5    rosuvastatin (CRESTOR) 40 MG tablet, TAKE ONE TABLET BY MOUTH DAILY, Disp: 90 tablet, Rfl: 1    sertraline (ZOLOFT) 50 MG tablet, TAKE ONE TABLET BY MOUTH DAILY (Patient taking differently: Take 1 tablet by mouth Daily.), Disp: 90 tablet, Rfl: 1    Tirzepatide (Mounjaro) 2.5 MG/0.5ML solution pen-injector pen, Inject 0.5 mL under the skin into the appropriate area as directed 1 (One) Time Per Week. LAST DOSE 6/3/2024 INSTRUCTED PT TO HOLD FOR SURGERY, Disp: , Rfl:     torsemide (DEMADEX) 20 MG tablet, Take 1 tablet by mouth Daily., Disp: , Rfl:     Xarelto 20 MG tablet, TAKE ONE TABLET BY MOUTH DAILY, Disp: 30 tablet, Rfl: 9    clotrimazole-betamethasone (Lotrisone) 1-0.05 % cream, Apply 1 application topically to the appropriate area as directed 2 (Two) Times a Day. Do exceed 1 week (Patient not taking: Reported on 7/31/2024), Disp: 15 g, Rfl: 1    gabapentin (NEURONTIN) 300 MG capsule, Take 1 capsule by mouth Every 8 (Eight) Hours for 30 days., Disp: 90 capsule, Rfl: 0    ALLERGIES:   No Known Allergies    SOCIAL HISTORY:       Social History     Socioeconomic History    Marital status:      Spouse name: Luba    Number of children: 2   Tobacco Use    Smoking status: Former     Current packs/day: 0.00     Average packs/day: 1 pack/day for 30.0 years (30.0 ttl pk-yrs)     Types: Cigars, Cigarettes     Start date: 1/1/1984     Quit date: 1/1/2014     Years since quitting: 10.6    Smokeless tobacco: Never   Vaping Use    Vaping status: Never Used   Substance and Sexual Activity    Alcohol use: Never     Comment: caffeine use- decaf coffee    Drug use: Never    Sexual activity: Defer         FAMILY HISTORY:  Family History  "  Problem Relation Age of Onset    Cancer Other     Stroke Mother     Alcohol abuse Father     Malig Hyperthermia Neg Hx        REVIEW OF SYSTEMS:  Review of Systems   Constitutional:  Positive for activity change and fatigue. Negative for appetite change, chills, diaphoresis and fever.   HENT:  Negative for nosebleeds and trouble swallowing.    Eyes:  Negative for visual disturbance.   Respiratory:  Positive for shortness of breath. Negative for cough and wheezing.    Cardiovascular:  Negative for chest pain and leg swelling.   Gastrointestinal:  Negative for abdominal pain and blood in stool.   Genitourinary:  Negative for frequency and hematuria.   Musculoskeletal:  Negative for joint swelling.   Skin:  Positive for wound.   Allergic/Immunologic: Negative for immunocompromised state.   Neurological:  Negative for weakness and numbness.   Hematological:  Negative for adenopathy. Does not bruise/bleed easily.   Psychiatric/Behavioral:  Negative for confusion.    ROS as per HPI           Vitals:    08/06/24 1110   BP: 128/73   Pulse: 61   Resp: 16   Temp: 97.6 °F (36.4 °C)   TempSrc: Oral   SpO2: 92%   Weight: 125 kg (274 lb 12.8 oz)   Height: 180.3 cm (70.98\")   PainSc:   5   PainLoc: Foot  Comment: Lower legs and feet         8/6/2024    11:10 AM   Current Status   ECOG score 0      PHYSICAL EXAM:      CONSTITUTIONAL:  Vital signs reviewed.  Well-developed well-nourished male.  Patient uses oxygen by nasal cannula.  No distress, looks comfortable.  EYES:  Conjunctivae and lids unremarkable.   EARS,NOSE,MOUTH,THROAT:  Ears and nose appear unremarkable.  Lips appear unremarkable.  RESPIRATORY:  Normal respiratory effort.  Lungs clear to auscultation bilaterally.  Right lateral chest wall incisions are well-healed.  No drainage.  CARDIOVASCULAR:  Normal S1, S2.  No murmurs rubs or gallops.  No significant lower extremity edema.  GASTROINTESTINAL: Abdomen appears unremarkable.  Nontender.  No hepatomegaly.  No " splenomegaly.  LYMPHATIC:  No cervical, supraclavicular lymphadenopathy.  MUSCULOSKELETAL: No cyanosis or clubbing.  SKIN:  Warm.  No rashes.    PSYCHIATRIC:  Normal judgment and insight.  Normal mood and affect.      RECENT LABS:  Lab Results   Component Value Date    WBC 3.23 (L) 08/06/2024    HGB 12.0 (L) 08/06/2024    HCT 37.2 (L) 08/06/2024    MCV 91.6 08/06/2024     08/06/2024     Lab Results   Component Value Date    NEUTROABS 1.83 08/06/2024     Lab Results   Component Value Date    GLUCOSE 179 (H) 08/06/2024    BUN 17 08/06/2024    CREATININE 0.68 (L) 08/06/2024    EGFRRESULT 77.1 01/23/2023    EGFR 96.9 08/06/2024    BCR 25.0 08/06/2024    K 4.5 08/06/2024    CO2 32.9 (H) 08/06/2024    CALCIUM 9.6 08/06/2024    PROTENTOTREF 7.2 02/14/2022    ALBUMIN 4.1 07/31/2024    BILITOT 0.8 07/31/2024    AST 25 07/31/2024    ALT 18 07/31/2024       IMAGING:  Narrative & Impression   F-18 FDG PET SKULL BASE TO MID THIGH WITH PET CT FUSION.     HISTORY: New and enlarging right middle lobe pulmonary nodules. Initial  treatment strategy.     TECHNIQUE: Radiation dose reduction techniques were utilized, including  automated exposure control and exposure modulation based on body size.   Blood glucose level at time of injection was 123 mg/dL. 6.7 mCi of F-18  FDG were injected and PET was performed from skull base to mid thigh. CT  was obtained for localization and attenuation correction. Time at  injection 650. PET start time 829.     Comparison: CT chest 4/2/2024 and 4/10/2023.     FINDINGS:     Head/neck: No suspicious uptake.     Chest: Respiratory motion partially limits evaluation of the lungs.     Right middle lobe 1.9 cm solid nodule with misregistered max SUV of 3.1  (series 4/image 159). Immediately proximal right middle lobe 1.1 cm  solid nodule has uptake indistinguishable from background lung.     Subcentimeter right upper lobe solid nodule too small for accurate PET  characterization without overt  hypermetabolism.     Right hilar and subcarinal lymphadenopathy is hypermetabolic. Reference  right hilar lymph node with max SUV of 5.6 (series 4/image 152).  Reference 1.5 cm subcarinal lymph node with max SUV of 7 (series 4/image  144). No hypermetabolic left hilar lymph nodes.     Abdomen/pelvis: No suspicious uptake.     Bones: No suspicious uptake.        IMPRESSION:  1. Mildly hypermetabolic right middle lobe 1.9 cm solid nodule and  immediately proximal right middle lobe 1.1 cm solid nodule with uptake  indistinguishable from background lung remain concerning for primary  pulmonary malignancy.  2. Hypermetabolic right hilar and subcarinal lymphadenopathy remain  concerning for mackenzie metastatic disease.  3. No FDG PET/CT evidence of more distant metastatic disease.         Assessment & Plan   Assessment & Plan  1. Poorly differentiated squamous cell lung cancer with intralobular metastatic disease, stage IIIa ([id number]).  Tumor was poorly differentiated. This patient has stage 3a disease.   This patient has a history of emphysema, followed by pulmonologist Dr. Camp.  His high-resolution chest CT scan on 6/14/2021 reported 7 mm nodule in the right middle lobe.  There is also index subcarinal lymph node 1.1 cm.  Patient subsequently had serial CT scan examination.  4/10/2023 chest high-resolution CT scan reported stable examination with the pulmonary nodules measuring up to 9-10 mm in the right middle lobe and a sub-6 mm in the right upper lobe.  The largest subcarinal lymph node measures  2.1 x  1.2 cm.   However chest high resolution CT scan 4/2/2024 reported disease progression, with right middle lobe pulm nodule 1.8 cm from margin at 9 mm.  There is also a new right middle lobe 1.1 cm nodule.  Stable right upper lobe 7 mm nodule.  Also a new right hilar lymphadenopathy up to 2 cm.  The 1.5 cm subcarinal lymph node is stable.  No pleural effusion.  Patient subsequently had PET scan examination on 4/22/2024  requested by Dr. Camp.  This reported SUV 3.1 corresponding to the 19 mm right middle lobe nodule.  The 1.1 cm right middle nodule was photopenic.  The right hilar lymph node with SUV 5.6.  The 1.5 cm subcarinal lymph node with maximum SUV 7.  Dr. Figueroa who performed ION bronchoscopic biopsy on 5/7/2024 with pathology evaluation reporting squamous cell carcinoma involving one of the right middle lobe lesion, and the other pulmonary nodule is at least squamous cell carcinoma in situ.  Dr. Figueroa performed robot-assisted thoracoscopic right middle lobe lobectomy and mediastinal lymph node dissection.  Pathology evaluation reported poorly differentiated squamous cell carcinoma, clear margin, however with lymph nodes involved 3 lymph nodes in the right 10 R, 11 R and 12 R lymph node station.  There was also frequent lymphovascular invasion and focal perineural invasion.  Patient subsequently had a portacatheter placement on 7/5/2024.    7/12/2024 that he will need adjuvant chemotherapy and concurrent adjuvant radiation therapy, and likely will need consolidative immunotherapy. I recommended using weekly carboplatin plus Taxol, in conjunction with radiotherapy for 6.5 weeks treatment. In reality, he probably will only receive 5 or 6 weekly chemotherapy instead of the 7 doses due to tolerance issues.   Nevertheless, I will present his case at the next chest conference to finalize the treatment plan.   Case discussed at the multidisciplinary lung conference on July 18.  Given the fact that the patient had negative margins and N1 disease as well as marginal pulmonary function, consensus recommendation was to proceed with adjuvant chemotherapy alone, and no radiation.  Patient proceeded with cycle 1 carboplatin/Taxol on 7/31/2024, plans to repeat every 3 weeks    2.  Emphysema.    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  He was instructed by his surgeon Dr. Figueroa to taper off gradually.    3.   Pain:  Branden Diop reports a pain score of 5.  Given his pain assessment as noted, treatment options were discussed and the following options were decided upon as a follow-up plan to address the patient's pain: prescription for non-opiod analgesics.  Patient reporting after his first cycle of carbo/taxol  sharp shooting pains especially in his lower extremities.  He had been prescribed gabapentin postoperatively and is only taking this sporadically.  Discussed with Dr. Villasenor, recommend patient take gabapentin 300 mg 3 times daily.  I discussed with his life that he can initially take it twice a day and then add a third dose if needed.  A new prescription will be sent in today.      PLAN:   Continue gabapentin 300 mg 3 times daily.  Return in 1 week for CBC with RN review.  Return in 2 weeks for follow-up with Dr. Villasenor with repeat labs reassessment and consideration of cycle 2 carbo/Taxol.    Call/ return sooner should the patient develop any new concerns or problems.  Patient continues on home oxygen.        Patient is on a high risk medication requiring close monitoring for toxicity.        CC:  MD John Paul Govea MD Sasser, Robert L, MD

## 2024-08-06 NOTE — TELEPHONE ENCOUNTER
Caller: Ezio Diop    Relationship: Emergency Contact    Best call back number: [phone]    What is the best time to reach you: ASAP    Who are you requesting to speak with (clinical staff, provider,  specific staff member): CLINICAL     Do you know the name of the person who called: EZIO    What was the call regarding: PATIENT HAD A MISSED CALL FROM THE CBC OFFICE.     CALL TO ADVISE

## 2024-08-08 ENCOUNTER — TELEPHONE (OUTPATIENT)
Dept: ONCOLOGY | Facility: CLINIC | Age: 74
End: 2024-08-08
Payer: MEDICARE

## 2024-08-08 DIAGNOSIS — C34.2 SQUAMOUS CELL CARCINOMA OF BRONCHUS IN RIGHT MIDDLE LOBE: ICD-10-CM

## 2024-08-08 DIAGNOSIS — Z79.899 ENCOUNTER FOR LONG-TERM (CURRENT) USE OF HIGH-RISK MEDICATION: ICD-10-CM

## 2024-08-08 DIAGNOSIS — C34.2 MALIGNANT NEOPLASM OF MIDDLE LOBE OF RIGHT LUNG: ICD-10-CM

## 2024-08-08 RX ORDER — DEXAMETHASONE 4 MG/1
TABLET ORAL
Qty: 4 TABLET | Refills: 0 | Status: SHIPPED | OUTPATIENT
Start: 2024-08-08

## 2024-08-08 NOTE — TELEPHONE ENCOUNTER
Provider: Hamilton  Caller: patient  Relationship to Patient: self  Call Back Phone Number: [phone]  Reason for Call: Should pt continue to take certain medication before chemo ? Did not know the name of it.

## 2024-08-08 NOTE — TELEPHONE ENCOUNTER
Call patient, they wanted to know if they needed to take dexamethasone before this cycle of chemo or was it for just the first cycle.  Per Dr. Villasenor's nurse Queta, patient is to take 2 dexamethasone the night before and the morning of chemotherapy.  Told patient to let us know if they need a refill.  Pt v/u

## 2024-08-13 NOTE — PROGRESS NOTES
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      He had shortness of breath and high-resolution CT scan was performed on 6/14/2021.  He has small noncalcified lung nodules measuring up to 7 mm.  Repeat CT scan of the chest on 4/10/2023 reported stable pulmonary nodule and presence of emphysema.  CT scan of the chest in 12 months was recommended which was performed on 4/2/2024 and reported 1.8 cm and 1.1 cm right middle lobe solid nodules.  PET/CT on 4/23/2024 reported mildly hypermetabolic right middle lobe 1.9 cm and 1.1 cm solid nodule. The findings were concerning for malignant disease.  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  He had ION robotic navigational bronchoscopy of the right middle lobe nodule.  One of the nodules had fragment of squamous cell carcinoma and the other nodule had highly dysplastic squamous epithelium suggesting at least squamous cell carcinoma in situ.  Level 11 L, 7, 4R, 11 R were negative for metastatic disease.  MRI of the brain was negative for metastatic disease.  He was referred to thoracic surgery for further evaluation.     At the time of initial consultation, he could occasionally walk 1 block without dyspnea. He denied any history of myocardial infarction, cerebrovascular accident, hepatic or renal issues. He had pneumonia in 2017 resulting in a 17-day intensive care unit stay. His respiratory function has not returned to its previous state. A recent pulmonary function test was conducted on 05/10/2024. He is under the care of a cardiologist and is currently on anticoagulant therapy. He reported occasional pedal edema. His echocardiogram and lung tests  yielded normal results.      On 6/12/2024, he underwent robot-assisted thoracoscopic right middle lobectomy, systematic mediastinal lymph node dissection.  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  There were 2 additional branches to the right middle lobe noted.  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  He tolerated the procedure well.  There were no intraoperative or immediate postoperative complications.  He was discharged home in a stable condition on supplemental oxygen.  The final pathology revealed poorly differentiated, invasive squamous cell carcinoma, nonkeratinizing.  He had separate metastases in the same lobe.  All resection margins were negative for carcinoma.  I was able to retrieve 9 lymph nodes and the level 10 R, 11 R and 12 are were positive for malignancy.     He had ipsilateral lobar metastases and lymph node involvement. He recovered well from surgery. He will require adjuvant chemotherapy. He needed Mediport for systemic treatment which was placed on 7/5/2024.    Past Medical History:   Diagnosis Date    Anxiety     Arthritis     Atrial fibrillation     CURRENTLY    Bunion of right foot     Colon polyps     COPD (chronic obstructive pulmonary disease)     MILD. NO MEDS FOR    Depression     History of atrial fibrillation     WITH CARDIOVERSION. NO PROBLEMS    History of skin cancer     BCC REMOVED FROM BACK    [place] (hard of hearing)     Hyperlipidemia     Inguinal hernia, recurrent     RIGHT    Left foot pain     Lung nodules     Rash     IN GROIN TREATING FOR YEAST INFECTION BY PCP    Redness of skin     LOWER LEGS BILATERAL    Scab     BILATERAL LEGS HEALING    Sleep apnea with use of continuous positive airway pressure (CPAP)     CPAP    Streptococcus pneumoniae     ABOUT 6-7 YR AGO.     Type 2 diabetes mellitus        Past Surgical History:   Procedure Laterality Date    BASAL CELL CARCINOMA EXCISION      BRONCHOSCOPY WITH ION ROBOTIC ASSIST N/A  5/6/2024    Procedure: BRONCHOSCOPY WITH ION ROBOT WITH FNA, BIOPSIES, BAL AND ENDOBRONCHIAL ULTRASOUND WITH FNA;  Surgeon: Yony Coles MD;  Location: [facility] ENDOSCOPY;  Service: Robotics - Pulmonary;  Laterality: N/A;  PRE: PULMONARY NODULES  POST: SAME    CARDIAC CATHETERIZATION N/A 11/15/2021    Procedure: Coronary angiography;  Surgeon: Alfredo Jennings MD;  Location: [facility] CATH INVASIVE LOCATION;  Service: Cardiovascular;  Laterality: N/A;    CARDIAC CATHETERIZATION N/A 11/15/2021    Procedure: Left heart cath;  Surgeon: Alfredo Jennings MD;  Location: [facility] CATH INVASIVE LOCATION;  Service: Cardiovascular;  Laterality: N/A;    CARDIAC CATHETERIZATION N/A 11/15/2021    Procedure: Left ventriculography;  Surgeon: Alfredo Jennings MD;  Location: [facility] CATH INVASIVE LOCATION;  Service: Cardiovascular;  Laterality: N/A;    CARDIAC CATHETERIZATION N/A 11/15/2021    Procedure: Aortic root aortogram;  Surgeon: Alfredo Jennings MD;  Location: [facility] CATH INVASIVE LOCATION;  Service: Cardiovascular;  Laterality: N/A;    CARDIOVERSION      CHOLECYSTECTOMY N/A 10/07/2017    Procedure: CHOLECYSTECTOMY LAPAROSCOPIC;  Surgeon: Martir Trevino MD;  Location: [facility] MAIN OR;  Service:     COLONOSCOPY  2007    COLONOSCOPY N/A 8/30/2022    Procedure: COLONOSCOPY TO CECUM AND TI AND COLD SNARE POLYPECTOMY;  Surgeon: Cj Lopez MD;  Location: [facility] ENDOSCOPY;  Service: Gastroenterology;  Laterality: N/A;  Pre: History of colon polyps  Post: POLYP, PREVIOUS TATTOO    FOOT SURGERY Left     TOES ARE PINNED. ALL BUT GREAT TOE    HAND SURGERY      THUMB    HERNIA REPAIR      INGUINAL HERNIA REPAIR Right 06/27/2018    Procedure: INGUINAL HERNIA REPAIR, OPEN, RECURRENT;  Surgeon: Martir Trevino MD;  Location: [facility] OR OSC;  Service: General    LASIK Bilateral     LOBECTOMY Right 6/12/2024    Procedure: BRONCHOSCOPY, RIGHT VIDEO ASSISTED THORACOSCOPY WITH RIGHT MIDDLE LOBECTOMY WITH DAVINCI ROBOT,  MEDIASTINAL LYMPH NODE DISSECTION, INTERCOSTAL NERVE BLOCK;  Surgeon: David Figueroa MD;  Location: [facility] MAIN OR;  Service: Robotics - DaVinci;  Laterality: Right;    NECK SURGERY      growth on salivary gland    REPLACEMENT TOTAL KNEE Right 2007    (he is going to have a LTKR next month)    REPLACEMENT TOTAL KNEE Left     VENOUS ACCESS DEVICE (PORT) INSERTION Right 7/5/2024    Procedure: INSERTION VENOUS ACCESS DEVICE;  Surgeon: David Figeuroa MD;  Location: [facility] MAIN OR;  Service: Thoracic;  Laterality: Right;       Family History   Problem Relation Age of Onset    Cancer Other     Stroke Mother     Alcohol abuse Father     Malig Hyperthermia Neg Hx        Social History     Socioeconomic History    Marital status:      Spouse name: Luba    Number of children: 2   Tobacco Use    Smoking status: Former     Current packs/day: 0.00     Average packs/day: 1 pack/day for 30.0 years (30.0 ttl pk-yrs)     Types: Cigars, Cigarettes     Start date: 1/1/1984     Quit date: 1/1/2014     Years since quitting: 10.6    Smokeless tobacco: Never   Vaping Use    Vaping status: Never Used   Substance and Sexual Activity    Alcohol use: Never     Comment: caffeine use- decaf coffee    Drug use: Never    Sexual activity: Defer         Current Outpatient Medications:     clotrimazole-betamethasone (Lotrisone) 1-0.05 % cream, Apply 1 application topically to the appropriate area as directed 2 (Two) Times a Day. Do exceed 1 week (Patient not taking: Reported on 7/31/2024), Disp: 15 g, Rfl: 1    fexofenadine (ALLEGRA) 180 MG tablet, Take 1 tablet by mouth Daily., Disp: , Rfl:     glucose blood test strip, Freestyle strips daily E 11.9, Disp: 100 each, Rfl: 12    glucose monitoring kit (FREESTYLE) monitoring kit, Daily E 11.93 FreeStyle meter, Disp: 1 each, Rfl: 1    Insulin Glargine, 1 Unit Dial, (Toujeo SoloStar) 300 UNIT/ML solution pen-injector injection, Inject 22 Units under the skin into the appropriate area as directed  "Daily., Disp: , Rfl:     Lancets (FREESTYLE) lancets, Daily E 11.9, Disp: 100 each, Rfl: 12    Multiple Vitamin (MULTIVITAMINS PO), Take 1 tablet by mouth Daily. HOLD PRIOR TO SURG, Disp: , Rfl:     ondansetron (ZOFRAN) 8 MG tablet, Take 1 tablet by mouth Every 8 (Eight) Hours As Needed for Nausea or Vomiting., Disp: 30 tablet, Rfl: 5    rosuvastatin (CRESTOR) 40 MG tablet, TAKE ONE TABLET BY MOUTH DAILY, Disp: 90 tablet, Rfl: 1    sertraline (ZOLOFT) 50 MG tablet, TAKE ONE TABLET BY MOUTH DAILY (Patient taking differently: Take 1 tablet by mouth Daily.), Disp: 90 tablet, Rfl: 1    Tirzepatide (Mounjaro) 2.5 MG/0.5ML solution pen-injector pen, Inject 0.5 mL under the skin into the appropriate area as directed 1 (One) Time Per Week. LAST DOSE 6/3/2024 INSTRUCTED PT TO HOLD FOR SURGERY, Disp: , Rfl:     torsemide (DEMADEX) 20 MG tablet, Take 1 tablet by mouth Daily., Disp: , Rfl:     Xarelto 20 MG tablet, TAKE ONE TABLET BY MOUTH DAILY, Disp: 30 tablet, Rfl: 9    dexAMETHasone (DECADRON) 4 MG tablet, TAKE 2 TABLETS BY MOUTH EVERY MORNING AND TAKE 2 TABLETS BY MOUTH AT NIGHT THE DAY BEFORE CHEMOTHERAPY. TAKE WITH FOOD., Disp: 4 tablet, Rfl: 0    gabapentin (NEURONTIN) 300 MG capsule, Take 1 capsule by mouth Every 8 (Eight) Hours., Disp: 90 capsule, Rfl: 0     No Known Allergies          Objective    OBJECTIVE:     VITAL SIGNS:  /65   Pulse 56   Ht 180.3 cm (70.98\")   Wt 126 kg (277 lb 4.8 oz)   SpO2 97%   BMI 38.69 kg/m²     PHYSICAL EXAM:  Normal appearance.   Head is normocephalic.   Nose appears normal.   No obvious deformity of the mouth and throat.  Conjunctivae normal.   Heart rate and rhythm is normal.  Pulmonary effort is normal.   Moving all 4 extremities.  Extremities warm.  No focal deficit present.   He is alert and oriented to person, place, and time.     LAB RESULTS:  I have reviewed all the available laboratory results in the chart.    RESULTS REVIEW:  I have reviewed the patient's all " relevant laboratory and imaging findings.           ASSESSMENT & PLAN:  Branden Diop is a 73 y.o. male with significant medical conditions as mentioned above.    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  He had stitch at the chest tube insertion site which was removed.  He was given a refill for oxycodone for incisional pain.    I discussed the patients findings and my recommendations with the patient. The patient was given adequate time to ask questions and all questions were answered to patient satisfaction.      David Figueroa MD  Thoracic Surgeon  [facility] [place]        Dictated utilizing Dragon dictation    I spent 25 minutes caring for Branden on this date of service. This time includes time spent by me in the following activities: preparing for the visit, reviewing tests, obtaining and/or reviewing a separately obtained history, performing a medically appropriate examination and/or evaluation , counseling and educating the patient/family/caregiver, ordering medications, tests, or procedures, referring and communicating with other health care professionals , documenting information in the medical record, independently interpreting results and communicating that information with the patient/family/caregiver, and care coordination and more than half the time was spent in direct face to face evaluation and decision making.

## 2024-08-14 ENCOUNTER — LAB (OUTPATIENT)
Dept: LAB | Facility: HOSPITAL | Age: 74
End: 2024-08-14
Payer: MEDICARE

## 2024-08-14 ENCOUNTER — CLINICAL SUPPORT (OUTPATIENT)
Dept: ONCOLOGY | Facility: HOSPITAL | Age: 74
End: 2024-08-14
Payer: MEDICARE

## 2024-08-14 DIAGNOSIS — C34.2 SQUAMOUS CELL CARCINOMA OF BRONCHUS IN RIGHT MIDDLE LOBE: ICD-10-CM

## 2024-08-14 DIAGNOSIS — Z79.899 ENCOUNTER FOR LONG-TERM (CURRENT) USE OF HIGH-RISK MEDICATION: ICD-10-CM

## 2024-08-14 DIAGNOSIS — C34.2 MALIGNANT NEOPLASM OF MIDDLE LOBE OF RIGHT LUNG: ICD-10-CM

## 2024-08-14 LAB
ANION GAP SERPL CALCULATED.3IONS-SCNC: 9.6 MMOL/L (ref 5–15)
BASOPHILS # BLD AUTO: 0.04 10*3/MM3 (ref 0–0.2)
BASOPHILS NFR BLD AUTO: 1.2 % (ref 0–1.5)
BUN SERPL-MCNC: 12 MG/DL (ref 8–23)
BUN/CREAT SERPL: 16.9 (ref 7–25)
CALCIUM SPEC-SCNC: 9.1 MG/DL (ref 8.6–10.5)
CHLORIDE SERPL-SCNC: 102 MMOL/L (ref 98–107)
CO2 SERPL-SCNC: 29.4 MMOL/L (ref 22–29)
CREAT SERPL-MCNC: 0.71 MG/DL (ref 0.76–1.27)
DEPRECATED RDW RBC AUTO: 49.3 FL (ref 37–54)
EGFRCR SERPLBLD CKD-EPI 2021: 95.7 ML/MIN/1.73
EOSINOPHIL # BLD AUTO: 0.18 10*3/MM3 (ref 0–0.4)
EOSINOPHIL NFR BLD AUTO: 5.3 % (ref 0.3–6.2)
ERYTHROCYTE [DISTWIDTH] IN BLOOD BY AUTOMATED COUNT: 14.6 % (ref 12.3–15.4)
GLUCOSE SERPL-MCNC: 199 MG/DL (ref 65–99)
HCT VFR BLD AUTO: 34.8 % (ref 37.5–51)
HGB BLD-MCNC: 10.9 G/DL (ref 13–17.7)
IMM GRANULOCYTES # BLD AUTO: 0.06 10*3/MM3 (ref 0–0.05)
IMM GRANULOCYTES NFR BLD AUTO: 1.8 % (ref 0–0.5)
LYMPHOCYTES # BLD AUTO: 1.19 10*3/MM3 (ref 0.7–3.1)
LYMPHOCYTES NFR BLD AUTO: 34.9 % (ref 19.6–45.3)
MAGNESIUM SERPL-MCNC: 1.7 MG/DL (ref 1.6–2.4)
MCH RBC QN AUTO: 29.1 PG (ref 26.6–33)
MCHC RBC AUTO-ENTMCNC: 31.3 G/DL (ref 31.5–35.7)
MCV RBC AUTO: 92.8 FL (ref 79–97)
MONOCYTES # BLD AUTO: 0.48 10*3/MM3 (ref 0.1–0.9)
MONOCYTES NFR BLD AUTO: 14.1 % (ref 5–12)
NEUTROPHILS NFR BLD AUTO: 1.46 10*3/MM3 (ref 1.7–7)
NEUTROPHILS NFR BLD AUTO: 42.7 % (ref 42.7–76)
NRBC BLD AUTO-RTO: 0 /100 WBC (ref 0–0.2)
PLATELET # BLD AUTO: 198 10*3/MM3 (ref 140–450)
PMV BLD AUTO: 9.1 FL (ref 6–12)
POTASSIUM SERPL-SCNC: 4.1 MMOL/L (ref 3.5–5.2)
RBC # BLD AUTO: 3.75 10*6/MM3 (ref 4.14–5.8)
SODIUM SERPL-SCNC: 141 MMOL/L (ref 136–145)
WBC NRBC COR # BLD AUTO: 3.41 10*3/MM3 (ref 3.4–10.8)

## 2024-08-14 PROCEDURE — 36415 COLL VENOUS BLD VENIPUNCTURE: CPT

## 2024-08-14 PROCEDURE — 80048 BASIC METABOLIC PNL TOTAL CA: CPT

## 2024-08-14 PROCEDURE — 83735 ASSAY OF MAGNESIUM: CPT

## 2024-08-14 PROCEDURE — 85025 COMPLETE CBC W/AUTO DIFF WBC: CPT

## 2024-08-14 NOTE — PROGRESS NOTES
Patient is here for lab review with RN.  CBC BMP and Mg+ reviewed with Dr Villasenor, all counts are stable for this patient at this time. Patient has no complaints. Copy of labs given to patient and follow up appointment reviewed. Patient is instructed to call the office with any concerns or new symptoms prior to next visit. Patient verbalized understanding and discharged in stable condition.     Lab Results   Component Value Date    WBC 3.41 08/14/2024    HGB 10.9 (L) 08/14/2024    HCT 34.8 (L) 08/14/2024    MCV 92.8 08/14/2024     08/14/2024      Lab Results   Component Value Date    GLUCOSE 199 (H) 08/14/2024    CALCIUM 9.1 08/14/2024     08/14/2024    K 4.1 08/14/2024    CO2 29.4 (H) 08/14/2024     08/14/2024    BUN 12 08/14/2024    CREATININE 0.71 (L) 08/14/2024    EGFRRESULT 77.1 01/23/2023    EGFR 95.7 08/14/2024    BCR 16.9 08/14/2024    ANIONGAP 9.6 08/14/2024        Mg+                                                              1.7                                    08/14/2024

## 2024-08-19 ENCOUNTER — TELEPHONE (OUTPATIENT)
Dept: ONCOLOGY | Facility: CLINIC | Age: 74
End: 2024-08-19
Payer: MEDICARE

## 2024-08-19 NOTE — TELEPHONE ENCOUNTER
Caller: Branden Diop    Relationship: Self    Best call back number: [phone]     What is the best time to reach you: ANYTIME    Who are you requesting to speak with (clinical staff, provider,  specific staff member): CLINICAL    What was the call regarding: PT WAS ADVISED TO CALL WITH ANY CHANGES. THE PT HAS STARTED LOSING HIS HAIR, STARTING LAST WEEK. THE PT'S HAIR STARTED COMING OUT IN BIG CHUNKS. PT DID HAVE HIS HAIR SHAVED ON THURSDAY TO GET RID OF THE REST.    ALSO WITH THE HUMIDICTY THE PT HAS BEEN HAVING SOME ISSUES WITH BREATHING. THE PT HAS BEEN ON IS OXYGEN THOSE DAYS AND HIS OXYGETN HAS STAYED AROUND [address]. THE PT IS DOING WELL TODAY SINCE IT IS NOT SO HUMID.    PT GOES TO DR KRISTA CARRILLO ON HIS PRIVATES DUE TO NOT HAVING A CIRCUMCISION AS A CHILD AND IS GOING TO HAVE IT REMOVED ON 09/03/24. THEY ARE WANTING TO KNOW IF THIS IS GOING TO CAUSE ANY ISSUES WITH CHEMO? DR CARRILLO DID NOT THINK IT WOULD CAUSE ANY ISSUES, BUT THEY WOULD LIKE TO MAKE SURE WITH DR GATES.     Is it okay if the provider responds through [employer]: YES, BUT WOULD PREFER A CALL BACK.

## 2024-08-21 ENCOUNTER — INFUSION (OUTPATIENT)
Dept: ONCOLOGY | Facility: HOSPITAL | Age: 74
End: 2024-08-21
Payer: MEDICARE

## 2024-08-21 ENCOUNTER — OFFICE VISIT (OUTPATIENT)
Dept: ONCOLOGY | Facility: CLINIC | Age: 74
End: 2024-08-21
Payer: MEDICARE

## 2024-08-21 VITALS
WEIGHT: 279.7 LBS | SYSTOLIC BLOOD PRESSURE: 131 MMHG | TEMPERATURE: 97.8 F | HEIGHT: 71 IN | RESPIRATION RATE: 18 BRPM | DIASTOLIC BLOOD PRESSURE: 65 MMHG | BODY MASS INDEX: 39.16 KG/M2 | HEART RATE: 52 BPM | OXYGEN SATURATION: 93 %

## 2024-08-21 DIAGNOSIS — C34.2 MALIGNANT NEOPLASM OF MIDDLE LOBE OF RIGHT LUNG: ICD-10-CM

## 2024-08-21 DIAGNOSIS — C34.2 SQUAMOUS CELL CARCINOMA OF BRONCHUS IN RIGHT MIDDLE LOBE: ICD-10-CM

## 2024-08-21 DIAGNOSIS — Z79.899 ENCOUNTER FOR LONG-TERM (CURRENT) USE OF HIGH-RISK MEDICATION: Primary | ICD-10-CM

## 2024-08-21 DIAGNOSIS — Z79.899 ENCOUNTER FOR LONG-TERM (CURRENT) USE OF HIGH-RISK MEDICATION: ICD-10-CM

## 2024-08-21 DIAGNOSIS — C34.2 SQUAMOUS CELL CARCINOMA OF BRONCHUS IN RIGHT MIDDLE LOBE: Primary | ICD-10-CM

## 2024-08-21 LAB
ALBUMIN SERPL-MCNC: 3.9 G/DL (ref 3.5–5.2)
ALBUMIN/GLOB SERPL: 1.1 G/DL
ALP SERPL-CCNC: 136 U/L (ref 39–117)
ALT SERPL W P-5'-P-CCNC: 21 U/L (ref 1–41)
ANION GAP SERPL CALCULATED.3IONS-SCNC: 14.4 MMOL/L (ref 5–15)
AST SERPL-CCNC: 22 U/L (ref 1–40)
BASOPHILS # BLD AUTO: 0.01 10*3/MM3 (ref 0–0.2)
BASOPHILS NFR BLD AUTO: 0.1 % (ref 0–1.5)
BILIRUB SERPL-MCNC: 0.6 MG/DL (ref 0–1.2)
BUN SERPL-MCNC: 20 MG/DL (ref 8–23)
BUN/CREAT SERPL: 30.8 (ref 7–25)
CALCIUM SPEC-SCNC: 9.5 MG/DL (ref 8.6–10.5)
CHLORIDE SERPL-SCNC: 96 MMOL/L (ref 98–107)
CO2 SERPL-SCNC: 24.6 MMOL/L (ref 22–29)
CREAT SERPL-MCNC: 0.65 MG/DL (ref 0.76–1.27)
DEPRECATED RDW RBC AUTO: 48 FL (ref 37–54)
EGFRCR SERPLBLD CKD-EPI 2021: 98.3 ML/MIN/1.73
EOSINOPHIL # BLD AUTO: 0 10*3/MM3 (ref 0–0.4)
EOSINOPHIL NFR BLD AUTO: 0 % (ref 0.3–6.2)
ERYTHROCYTE [DISTWIDTH] IN BLOOD BY AUTOMATED COUNT: 15 % (ref 12.3–15.4)
GLOBULIN UR ELPH-MCNC: 3.5 GM/DL
GLUCOSE SERPL-MCNC: 461 MG/DL (ref 65–99)
HCT VFR BLD AUTO: 35.2 % (ref 37.5–51)
HGB BLD-MCNC: 11.6 G/DL (ref 13–17.7)
IMM GRANULOCYTES # BLD AUTO: 0.12 10*3/MM3 (ref 0–0.05)
IMM GRANULOCYTES NFR BLD AUTO: 1 % (ref 0–0.5)
LYMPHOCYTES # BLD AUTO: 0.97 10*3/MM3 (ref 0.7–3.1)
LYMPHOCYTES NFR BLD AUTO: 8.5 % (ref 19.6–45.3)
MAGNESIUM SERPL-MCNC: 1.9 MG/DL (ref 1.6–2.4)
MCH RBC QN AUTO: 30 PG (ref 26.6–33)
MCHC RBC AUTO-ENTMCNC: 33 G/DL (ref 31.5–35.7)
MCV RBC AUTO: 91 FL (ref 79–97)
MONOCYTES # BLD AUTO: 0.23 10*3/MM3 (ref 0.1–0.9)
MONOCYTES NFR BLD AUTO: 2 % (ref 5–12)
NEUTROPHILS NFR BLD AUTO: 10.12 10*3/MM3 (ref 1.7–7)
NEUTROPHILS NFR BLD AUTO: 88.4 % (ref 42.7–76)
NRBC BLD AUTO-RTO: 0 /100 WBC (ref 0–0.2)
PLATELET # BLD AUTO: 323 10*3/MM3 (ref 140–450)
PMV BLD AUTO: 8.7 FL (ref 6–12)
POTASSIUM SERPL-SCNC: 4.6 MMOL/L (ref 3.5–5.2)
PROT SERPL-MCNC: 7.4 G/DL (ref 6–8.5)
RBC # BLD AUTO: 3.87 10*6/MM3 (ref 4.14–5.8)
SODIUM SERPL-SCNC: 135 MMOL/L (ref 136–145)
WBC NRBC COR # BLD AUTO: 11.45 10*3/MM3 (ref 3.4–10.8)

## 2024-08-21 PROCEDURE — 96417 CHEMO IV INFUS EACH ADDL SEQ: CPT

## 2024-08-21 PROCEDURE — 96367 TX/PROPH/DG ADDL SEQ IV INF: CPT

## 2024-08-21 PROCEDURE — 25810000003 SODIUM CHLORIDE 0.9 % SOLUTION 250 ML FLEX CONT: Performed by: INTERNAL MEDICINE

## 2024-08-21 PROCEDURE — 3075F SYST BP GE 130 - 139MM HG: CPT | Performed by: INTERNAL MEDICINE

## 2024-08-21 PROCEDURE — 25010000002 DIPHENHYDRAMINE PER 50 MG: Performed by: INTERNAL MEDICINE

## 2024-08-21 PROCEDURE — 85025 COMPLETE CBC W/AUTO DIFF WBC: CPT

## 2024-08-21 PROCEDURE — 83735 ASSAY OF MAGNESIUM: CPT

## 2024-08-21 PROCEDURE — 63710000001 INSULIN REGULAR HUMAN PER 5 UNITS: Performed by: INTERNAL MEDICINE

## 2024-08-21 PROCEDURE — 25010000002 PALONOSETRON PER 25 MCG: Performed by: INTERNAL MEDICINE

## 2024-08-21 PROCEDURE — 99215 OFFICE O/P EST HI 40 MIN: CPT | Performed by: INTERNAL MEDICINE

## 2024-08-21 PROCEDURE — 25010000002 PACLITAXEL PER 1 MG: Performed by: INTERNAL MEDICINE

## 2024-08-21 PROCEDURE — A9270 NON-COVERED ITEM OR SERVICE: HCPCS | Performed by: INTERNAL MEDICINE

## 2024-08-21 PROCEDURE — 25010000002 CARBOPLATIN PER 50 MG: Performed by: INTERNAL MEDICINE

## 2024-08-21 PROCEDURE — 96372 THER/PROPH/DIAG INJ SC/IM: CPT

## 2024-08-21 PROCEDURE — 80053 COMPREHEN METABOLIC PANEL: CPT

## 2024-08-21 PROCEDURE — 25810000003 SODIUM CHLORIDE 0.9 % SOLUTION 500 ML FLEX CONT: Performed by: INTERNAL MEDICINE

## 2024-08-21 PROCEDURE — 1126F AMNT PAIN NOTED NONE PRSNT: CPT | Performed by: INTERNAL MEDICINE

## 2024-08-21 PROCEDURE — 96415 CHEMO IV INFUSION ADDL HR: CPT

## 2024-08-21 PROCEDURE — 3078F DIAST BP <80 MM HG: CPT | Performed by: INTERNAL MEDICINE

## 2024-08-21 PROCEDURE — 25010000002 DEXAMETHASONE SODIUM PHOSPHATE 100 MG/10ML SOLUTION 10 ML VIAL: Performed by: INTERNAL MEDICINE

## 2024-08-21 PROCEDURE — 96413 CHEMO IV INFUSION 1 HR: CPT

## 2024-08-21 PROCEDURE — 96375 TX/PRO/DX INJ NEW DRUG ADDON: CPT

## 2024-08-21 PROCEDURE — 25010000002 FOSAPREPITANT PER 1 MG: Performed by: INTERNAL MEDICINE

## 2024-08-21 PROCEDURE — 25810000003 SODIUM CHLORIDE 0.9 % SOLUTION: Performed by: INTERNAL MEDICINE

## 2024-08-21 RX ORDER — FAMOTIDINE 10 MG/ML
20 INJECTION, SOLUTION INTRAVENOUS ONCE
Status: COMPLETED | OUTPATIENT
Start: 2024-08-21 | End: 2024-08-21

## 2024-08-21 RX ORDER — DIPHENHYDRAMINE HYDROCHLORIDE 50 MG/ML
50 INJECTION INTRAMUSCULAR; INTRAVENOUS AS NEEDED
Status: CANCELLED | OUTPATIENT
Start: 2024-08-21

## 2024-08-21 RX ORDER — SODIUM CHLORIDE 9 MG/ML
20 INJECTION, SOLUTION INTRAVENOUS ONCE
Status: COMPLETED | OUTPATIENT
Start: 2024-08-21 | End: 2024-08-21

## 2024-08-21 RX ORDER — PALONOSETRON 0.05 MG/ML
0.25 INJECTION, SOLUTION INTRAVENOUS ONCE
Status: CANCELLED | OUTPATIENT
Start: 2024-08-21

## 2024-08-21 RX ORDER — FAMOTIDINE 10 MG/ML
20 INJECTION, SOLUTION INTRAVENOUS ONCE
Status: CANCELLED | OUTPATIENT
Start: 2024-08-21

## 2024-08-21 RX ORDER — SODIUM CHLORIDE 9 MG/ML
20 INJECTION, SOLUTION INTRAVENOUS ONCE
Status: CANCELLED | OUTPATIENT
Start: 2024-08-21

## 2024-08-21 RX ORDER — PALONOSETRON 0.05 MG/ML
0.25 INJECTION, SOLUTION INTRAVENOUS ONCE
Status: COMPLETED | OUTPATIENT
Start: 2024-08-21 | End: 2024-08-21

## 2024-08-21 RX ORDER — FAMOTIDINE 10 MG/ML
20 INJECTION, SOLUTION INTRAVENOUS AS NEEDED
Status: CANCELLED | OUTPATIENT
Start: 2024-08-21

## 2024-08-21 RX ADMIN — PALONOSETRON HYDROCHLORIDE 0.25 MG: 0.25 INJECTION INTRAVENOUS at 11:22

## 2024-08-21 RX ADMIN — DIPHENHYDRAMINE HYDROCHLORIDE 50 MG: 50 INJECTION, SOLUTION INTRAMUSCULAR; INTRAVENOUS at 11:41

## 2024-08-21 RX ADMIN — SODIUM CHLORIDE 20 ML/HR: 9 INJECTION, SOLUTION INTRAVENOUS at 11:21

## 2024-08-21 RX ADMIN — FAMOTIDINE 20 MG: 10 INJECTION INTRAVENOUS at 11:21

## 2024-08-21 RX ADMIN — CARBOPLATIN 750 MG: 10 INJECTION INTRAVENOUS at 15:36

## 2024-08-21 RX ADMIN — PACLITAXEL 485 MG: 6 INJECTION, SOLUTION INTRAVENOUS at 12:31

## 2024-08-21 RX ADMIN — HUMAN INSULIN 12 UNITS: 100 INJECTION, SOLUTION SUBCUTANEOUS at 11:23

## 2024-08-21 RX ADMIN — DEXAMETHASONE SODIUM PHOSPHATE 20 MG: 10 INJECTION, SOLUTION INTRAMUSCULAR; INTRAVENOUS at 11:23

## 2024-08-21 RX ADMIN — FOSAPREPITANT 100 ML: 150 INJECTION, POWDER, LYOPHILIZED, FOR SOLUTION INTRAVENOUS at 11:58

## 2024-08-21 NOTE — NURSING NOTE
Lab Results   Component Value Date    GLUCOSE 461 (C) 08/21/2024    BUN 20 08/21/2024    CREATININE 0.65 (L) 08/21/2024     (L) 08/21/2024    K 4.6 08/21/2024    CL 96 (L) 08/21/2024    CALCIUM 9.5 08/21/2024    PROTEINTOT 7.4 08/21/2024    ALBUMIN 3.9 08/21/2024    ALT 21 08/21/2024    AST 22 08/21/2024    ALKPHOS 136 (H) 08/21/2024    BILITOT 0.6 08/21/2024    GLOB 3.5 08/21/2024    AGRATIO 1.1 08/21/2024    BCR 30.8 (H) 08/21/2024    ANIONGAP 14.4 08/21/2024    EGFR 98.3 08/21/2024    Blood glucose lab reviewed with Dr. Villasenor. Per MD, give 12 units Regular Insulin. Okay to treat with Taxol/Carboplatin today per MD. Pt monitors blood sugars at home with glucometer. Blood sugars have been elevated with steroids. Pt to call PCP to have them manage insulin per MD. Pt v/u.

## 2024-08-23 ENCOUNTER — PATIENT OUTREACH (OUTPATIENT)
Dept: OTHER | Facility: HOSPITAL | Age: 74
End: 2024-08-23
Payer: MEDICARE

## 2024-08-23 NOTE — PROGRESS NOTES
Reviewed chart. Patient with poorly differentiated squamous cell lung cancer with intralobular metastatic disease, stage IIIa ([id number]). Currently receiving adjuvant chemotherapy with carboplatin AUC 5 and Taxol     Called patient, s/w his wife. He has had 2 cycles of chemotherapy so far (most recent on Wednesday); they plan 2 more cycles (9/11, 10/2) then plan immunotherapy. The patient is taking Gabapentin three times a day and denies N/V/D/C.  The patient's blood sugar has been elevated; he has been on insulin. His wife is recording his blood sugars; the patient meets with his PCP next week.  He has been eating well and denies weight loss.     The patient is scheduled for circumcision 9/3.  He has pre-registration today.    The patient/wife denies any questions/concerns or ongoing resource needs. I will continue to follow; encouraged patient/wife to call as needed.

## 2024-08-26 DIAGNOSIS — C34.2 SQUAMOUS CELL CARCINOMA OF BRONCHUS IN RIGHT MIDDLE LOBE: ICD-10-CM

## 2024-08-26 DIAGNOSIS — Z79.899 ENCOUNTER FOR LONG-TERM (CURRENT) USE OF HIGH-RISK MEDICATION: ICD-10-CM

## 2024-08-26 DIAGNOSIS — C34.2 MALIGNANT NEOPLASM OF MIDDLE LOBE OF RIGHT LUNG: ICD-10-CM

## 2024-08-26 RX ORDER — DEXAMETHASONE 4 MG/1
TABLET ORAL
Qty: 4 TABLET | Refills: 1 | Status: SHIPPED | OUTPATIENT
Start: 2024-08-26

## 2024-08-26 NOTE — TELEPHONE ENCOUNTER
Caller: Luba Diop    Relationship: Emergency Contact    Best call back number: [phone]     Requested Prescriptions:   Requested Prescriptions     Pending Prescriptions Disp Refills    dexAMETHasone (DECADRON) 4 MG tablet 4 tablet 0     Sig: TAKE 2 TABLETS BY MOUTH EVERY MORNING AND TAKE 2 TABLETS BY MOUTH AT NIGHT THE DAY BEFORE CHEMOTHERAPY. TAKE WITH FOOD.        Pharmacy where request should be sent: [facility] [id number] [address] [phone] [facility] [phone] FX     Last office visit with prescribing clinician: 8/21/2024   Last telemedicine visit with prescribing clinician: Visit date not found   Next office visit with prescribing clinician: 10/2/2024     Additional details provided by patient: PT NEEDS A REFILL TO ACCOMMODATE HIS LAST 2 CHEMO TREATMENTS.     Does the patient have less than a 3 day supply:  [x] Yes  [] No    Would you like a call back once the refill request has been completed: [] Yes [x] No    If the office needs to give you a call back, can they leave a voicemail: [] Yes [x] No

## 2024-08-27 LAB
LAB AP CASE REPORT: NORMAL
LAB AP DIAGNOSIS COMMENT: NORMAL
LAB AP SYNOPTIC CHECKLIST: NORMAL
Lab: NORMAL
PATH REPORT.ADDENDUM SPEC: NORMAL
PATH REPORT.FINAL DX SPEC: NORMAL
PATH REPORT.GROSS SPEC: NORMAL

## 2024-09-11 ENCOUNTER — INFUSION (OUTPATIENT)
Dept: ONCOLOGY | Facility: HOSPITAL | Age: 74
End: 2024-09-11
Payer: MEDICARE

## 2024-09-11 ENCOUNTER — OFFICE VISIT (OUTPATIENT)
Dept: ONCOLOGY | Facility: CLINIC | Age: 74
End: 2024-09-11
Payer: MEDICARE

## 2024-09-11 VITALS
HEART RATE: 63 BPM | OXYGEN SATURATION: 94 % | HEIGHT: 71 IN | DIASTOLIC BLOOD PRESSURE: 74 MMHG | RESPIRATION RATE: 18 BRPM | BODY MASS INDEX: 38.78 KG/M2 | WEIGHT: 277 LBS | SYSTOLIC BLOOD PRESSURE: 138 MMHG

## 2024-09-11 DIAGNOSIS — E11.9 TYPE 2 DIABETES MELLITUS WITHOUT COMPLICATION, WITH LONG-TERM CURRENT USE OF INSULIN: ICD-10-CM

## 2024-09-11 DIAGNOSIS — C34.2 MALIGNANT NEOPLASM OF MIDDLE LOBE OF RIGHT LUNG: Primary | ICD-10-CM

## 2024-09-11 DIAGNOSIS — Z79.4 TYPE 2 DIABETES MELLITUS WITHOUT COMPLICATION, WITH LONG-TERM CURRENT USE OF INSULIN: ICD-10-CM

## 2024-09-11 DIAGNOSIS — Z79.899 ENCOUNTER FOR LONG-TERM (CURRENT) USE OF HIGH-RISK MEDICATION: Primary | ICD-10-CM

## 2024-09-11 DIAGNOSIS — C34.2 SQUAMOUS CELL CARCINOMA OF BRONCHUS IN RIGHT MIDDLE LOBE: ICD-10-CM

## 2024-09-11 DIAGNOSIS — Z45.2 FITTING AND ADJUSTMENT OF VASCULAR CATHETER: ICD-10-CM

## 2024-09-11 DIAGNOSIS — Z79.899 ENCOUNTER FOR LONG-TERM (CURRENT) USE OF HIGH-RISK MEDICATION: ICD-10-CM

## 2024-09-11 DIAGNOSIS — Z79.899 HIGH RISK MEDICATION USE: ICD-10-CM

## 2024-09-11 DIAGNOSIS — C34.2 MALIGNANT NEOPLASM OF MIDDLE LOBE OF RIGHT LUNG: ICD-10-CM

## 2024-09-11 LAB
ALBUMIN SERPL-MCNC: 4 G/DL (ref 3.5–5.2)
ALBUMIN/GLOB SERPL: 1.2 G/DL
ALP SERPL-CCNC: 127 U/L (ref 39–117)
ALT SERPL W P-5'-P-CCNC: 17 U/L (ref 1–41)
ANION GAP SERPL CALCULATED.3IONS-SCNC: 13.1 MMOL/L (ref 5–15)
AST SERPL-CCNC: 22 U/L (ref 1–40)
BASOPHILS # BLD AUTO: 0.02 10*3/MM3 (ref 0–0.2)
BASOPHILS NFR BLD AUTO: 0.2 % (ref 0–1.5)
BILIRUB SERPL-MCNC: 0.7 MG/DL (ref 0–1.2)
BUN SERPL-MCNC: 17 MG/DL (ref 8–23)
BUN/CREAT SERPL: 27.4 (ref 7–25)
CALCIUM SPEC-SCNC: 9.6 MG/DL (ref 8.6–10.5)
CHLORIDE SERPL-SCNC: 100 MMOL/L (ref 98–107)
CO2 SERPL-SCNC: 25.9 MMOL/L (ref 22–29)
CREAT SERPL-MCNC: 0.62 MG/DL (ref 0.76–1.27)
DEPRECATED RDW RBC AUTO: 53.7 FL (ref 37–54)
EGFRCR SERPLBLD CKD-EPI 2021: 99.7 ML/MIN/1.73
EOSINOPHIL # BLD AUTO: 0 10*3/MM3 (ref 0–0.4)
EOSINOPHIL NFR BLD AUTO: 0 % (ref 0.3–6.2)
ERYTHROCYTE [DISTWIDTH] IN BLOOD BY AUTOMATED COUNT: 16.8 % (ref 12.3–15.4)
GLOBULIN UR ELPH-MCNC: 3.3 GM/DL
GLUCOSE SERPL-MCNC: 371 MG/DL (ref 65–99)
HCT VFR BLD AUTO: 34.8 % (ref 37.5–51)
HGB BLD-MCNC: 11.6 G/DL (ref 13–17.7)
IMM GRANULOCYTES # BLD AUTO: 0.09 10*3/MM3 (ref 0–0.05)
IMM GRANULOCYTES NFR BLD AUTO: 0.9 % (ref 0–0.5)
LYMPHOCYTES # BLD AUTO: 0.92 10*3/MM3 (ref 0.7–3.1)
LYMPHOCYTES NFR BLD AUTO: 9.6 % (ref 19.6–45.3)
MAGNESIUM SERPL-MCNC: 1.7 MG/DL (ref 1.6–2.4)
MCH RBC QN AUTO: 30 PG (ref 26.6–33)
MCHC RBC AUTO-ENTMCNC: 33.3 G/DL (ref 31.5–35.7)
MCV RBC AUTO: 89.9 FL (ref 79–97)
MONOCYTES # BLD AUTO: 0.39 10*3/MM3 (ref 0.1–0.9)
MONOCYTES NFR BLD AUTO: 4.1 % (ref 5–12)
NEUTROPHILS NFR BLD AUTO: 8.19 10*3/MM3 (ref 1.7–7)
NEUTROPHILS NFR BLD AUTO: 85.2 % (ref 42.7–76)
NRBC BLD AUTO-RTO: 0 /100 WBC (ref 0–0.2)
PLATELET # BLD AUTO: 293 10*3/MM3 (ref 140–450)
PMV BLD AUTO: 9.1 FL (ref 6–12)
POTASSIUM SERPL-SCNC: 4.3 MMOL/L (ref 3.5–5.2)
PROT SERPL-MCNC: 7.3 G/DL (ref 6–8.5)
RBC # BLD AUTO: 3.87 10*6/MM3 (ref 4.14–5.8)
SODIUM SERPL-SCNC: 139 MMOL/L (ref 136–145)
WBC NRBC COR # BLD AUTO: 9.61 10*3/MM3 (ref 3.4–10.8)

## 2024-09-11 PROCEDURE — 99214 OFFICE O/P EST MOD 30 MIN: CPT | Performed by: NURSE PRACTITIONER

## 2024-09-11 PROCEDURE — 3075F SYST BP GE 130 - 139MM HG: CPT | Performed by: NURSE PRACTITIONER

## 2024-09-11 PROCEDURE — 83735 ASSAY OF MAGNESIUM: CPT

## 2024-09-11 PROCEDURE — 25010000002 FOSAPREPITANT PER 1 MG: Performed by: NURSE PRACTITIONER

## 2024-09-11 PROCEDURE — 25810000003 SODIUM CHLORIDE 0.9 % SOLUTION 250 ML FLEX CONT: Performed by: NURSE PRACTITIONER

## 2024-09-11 PROCEDURE — 25010000002 PALONOSETRON PER 25 MCG: Performed by: NURSE PRACTITIONER

## 2024-09-11 PROCEDURE — 25010000002 PACLITAXEL PER 1 MG: Performed by: NURSE PRACTITIONER

## 2024-09-11 PROCEDURE — 3078F DIAST BP <80 MM HG: CPT | Performed by: NURSE PRACTITIONER

## 2024-09-11 PROCEDURE — 25810000003 SODIUM CHLORIDE 0.9 % SOLUTION: Performed by: NURSE PRACTITIONER

## 2024-09-11 PROCEDURE — 25010000002 HEPARIN LOCK FLUSH PER 10 UNITS: Performed by: INTERNAL MEDICINE

## 2024-09-11 PROCEDURE — 96367 TX/PROPH/DG ADDL SEQ IV INF: CPT

## 2024-09-11 PROCEDURE — 25810000003 SODIUM CHLORIDE 0.9 % SOLUTION 500 ML FLEX CONT: Performed by: NURSE PRACTITIONER

## 2024-09-11 PROCEDURE — 96375 TX/PRO/DX INJ NEW DRUG ADDON: CPT

## 2024-09-11 PROCEDURE — 96413 CHEMO IV INFUSION 1 HR: CPT

## 2024-09-11 PROCEDURE — 25010000002 DIPHENHYDRAMINE PER 50 MG: Performed by: NURSE PRACTITIONER

## 2024-09-11 PROCEDURE — 96415 CHEMO IV INFUSION ADDL HR: CPT

## 2024-09-11 PROCEDURE — 1126F AMNT PAIN NOTED NONE PRSNT: CPT | Performed by: NURSE PRACTITIONER

## 2024-09-11 PROCEDURE — 25010000002 CARBOPLATIN PER 50 MG: Performed by: NURSE PRACTITIONER

## 2024-09-11 PROCEDURE — 85025 COMPLETE CBC W/AUTO DIFF WBC: CPT

## 2024-09-11 PROCEDURE — 80053 COMPREHEN METABOLIC PANEL: CPT

## 2024-09-11 PROCEDURE — 25010000002 DEXAMETHASONE SODIUM PHOSPHATE 100 MG/10ML SOLUTION: Performed by: NURSE PRACTITIONER

## 2024-09-11 PROCEDURE — 96417 CHEMO IV INFUS EACH ADDL SEQ: CPT

## 2024-09-11 RX ORDER — HEPARIN SODIUM (PORCINE) LOCK FLUSH IV SOLN 100 UNIT/ML 100 UNIT/ML
500 SOLUTION INTRAVENOUS AS NEEDED
Status: DISCONTINUED | OUTPATIENT
Start: 2024-09-11 | End: 2024-09-11 | Stop reason: HOSPADM

## 2024-09-11 RX ORDER — SODIUM CHLORIDE 0.9 % (FLUSH) 0.9 %
10 SYRINGE (ML) INJECTION AS NEEDED
Status: DISCONTINUED | OUTPATIENT
Start: 2024-09-11 | End: 2024-09-11 | Stop reason: HOSPADM

## 2024-09-11 RX ORDER — HEPARIN SODIUM (PORCINE) LOCK FLUSH IV SOLN 100 UNIT/ML 100 UNIT/ML
500 SOLUTION INTRAVENOUS AS NEEDED
OUTPATIENT
Start: 2024-09-11

## 2024-09-11 RX ORDER — FAMOTIDINE 10 MG/ML
20 INJECTION, SOLUTION INTRAVENOUS AS NEEDED
Status: CANCELLED | OUTPATIENT
Start: 2024-09-11

## 2024-09-11 RX ORDER — SODIUM CHLORIDE 9 MG/ML
20 INJECTION, SOLUTION INTRAVENOUS ONCE
Status: CANCELLED | OUTPATIENT
Start: 2024-09-11

## 2024-09-11 RX ORDER — SODIUM CHLORIDE 0.9 % (FLUSH) 0.9 %
10 SYRINGE (ML) INJECTION AS NEEDED
OUTPATIENT
Start: 2024-09-11

## 2024-09-11 RX ORDER — PALONOSETRON 0.05 MG/ML
0.25 INJECTION, SOLUTION INTRAVENOUS ONCE
Status: CANCELLED | OUTPATIENT
Start: 2024-09-11

## 2024-09-11 RX ORDER — PALONOSETRON 0.05 MG/ML
0.25 INJECTION, SOLUTION INTRAVENOUS ONCE
Status: COMPLETED | OUTPATIENT
Start: 2024-09-11 | End: 2024-09-11

## 2024-09-11 RX ORDER — FAMOTIDINE 10 MG/ML
20 INJECTION, SOLUTION INTRAVENOUS ONCE
Status: CANCELLED | OUTPATIENT
Start: 2024-09-11

## 2024-09-11 RX ORDER — FAMOTIDINE 10 MG/ML
20 INJECTION, SOLUTION INTRAVENOUS ONCE
Status: COMPLETED | OUTPATIENT
Start: 2024-09-11 | End: 2024-09-11

## 2024-09-11 RX ORDER — SODIUM CHLORIDE 9 MG/ML
20 INJECTION, SOLUTION INTRAVENOUS ONCE
Status: COMPLETED | OUTPATIENT
Start: 2024-09-11 | End: 2024-09-11

## 2024-09-11 RX ORDER — DIPHENHYDRAMINE HYDROCHLORIDE 50 MG/ML
50 INJECTION INTRAMUSCULAR; INTRAVENOUS AS NEEDED
Status: CANCELLED | OUTPATIENT
Start: 2024-09-11

## 2024-09-11 RX ADMIN — FAMOTIDINE 20 MG: 10 INJECTION INTRAVENOUS at 11:01

## 2024-09-11 RX ADMIN — PACLITAXEL 485 MG: 6 INJECTION, SOLUTION INTRAVENOUS at 12:08

## 2024-09-11 RX ADMIN — Medication 10 ML: at 15:46

## 2024-09-11 RX ADMIN — CARBOPLATIN 750 MG: 10 INJECTION INTRAVENOUS at 15:12

## 2024-09-11 RX ADMIN — DEXAMETHASONE SODIUM PHOSPHATE 12 MG: 10 INJECTION, SOLUTION INTRAMUSCULAR; INTRAVENOUS at 11:04

## 2024-09-11 RX ADMIN — PALONOSETRON HYDROCHLORIDE 0.25 MG: 0.25 INJECTION INTRAVENOUS at 11:01

## 2024-09-11 RX ADMIN — Medication 500 UNITS: at 15:46

## 2024-09-11 RX ADMIN — DIPHENHYDRAMINE HYDROCHLORIDE 50 MG: 50 INJECTION, SOLUTION INTRAMUSCULAR; INTRAVENOUS at 11:18

## 2024-09-11 RX ADMIN — FOSAPREPITANT 100 ML: 150 INJECTION, POWDER, LYOPHILIZED, FOR SOLUTION INTRAVENOUS at 11:34

## 2024-09-11 RX ADMIN — SODIUM CHLORIDE 20 ML/HR: 9 INJECTION, SOLUTION INTRAVENOUS at 11:01

## 2024-09-11 NOTE — PROGRESS NOTES
REASON FOR FOLLOW-UP:     *. Poorly differentiated squamous cell lung cancer with intralobular metastatic disease, stage IIIa ([id number]).   Patient was started on carboplatin AUC 5, and the Taxol 200 mg/m² on 7/31/2024.      HISTORY OF PRESENT ILLNESS:  The patient is a 73 y.o. year old male who is here for initial evaluation with the above-mentioned history.    Patient is seen back today in follow-up due for cycle 3 carboplatin/Taxol.  He is tolerating therapy extremely well.  He does have underlying neuropathy that thankfully is stable on current treatment.  He continues on gabapentin    It is noted that his blood sugar is too high, felt to be exacerbated by steroids.  Patient has actually been taking dexamethasone the day prior to and day of Taxol with each cycle even though typically this is just done with cycle 1.  We discussed discontinuing dexamethasone going forward.  We will also adjust care plan dexamethasone premedication and see how he does.  He does continue on sliding scale insulin prior to meals and bedtime.    He and his wife deny other concerns at this time    Past Medical History:   Diagnosis Date    Anxiety     Arthritis     Atrial fibrillation     CURRENTLY    Bunion of right foot     Colon polyps     COPD (chronic obstructive pulmonary disease)     MILD. NO MEDS FOR    Depression     History of atrial fibrillation     WITH CARDIOVERSION. NO PROBLEMS    History of skin cancer     BCC REMOVED FROM BACK    [place] (hard of hearing)     Hyperlipidemia     Inguinal hernia, recurrent     RIGHT    Left foot pain     Lung nodules     Rash     IN GROIN TREATING FOR YEAST INFECTION BY PCP    Redness of skin     LOWER LEGS BILATERAL    Scab     BILATERAL LEGS HEALING    Sleep apnea with use of continuous positive airway pressure (CPAP)     CPAP    Streptococcus pneumoniae     ABOUT 6-7 YR AGO.     Type 2 diabetes mellitus      Past Surgical History:   Procedure Laterality Date    BASAL CELL CARCINOMA  EXCISION      BRONCHOSCOPY WITH ION ROBOTIC ASSIST N/A 5/6/2024    Procedure: BRONCHOSCOPY WITH ION ROBOT WITH FNA, BIOPSIES, BAL AND ENDOBRONCHIAL ULTRASOUND WITH FNA;  Surgeon: Yony Coles MD;  Location: [facility] ENDOSCOPY;  Service: Robotics - Pulmonary;  Laterality: N/A;  PRE: PULMONARY NODULES  POST: SAME    CARDIAC CATHETERIZATION N/A 11/15/2021    Procedure: Coronary angiography;  Surgeon: Alfredo Jennings MD;  Location: [facility] CATH INVASIVE LOCATION;  Service: Cardiovascular;  Laterality: N/A;    CARDIAC CATHETERIZATION N/A 11/15/2021    Procedure: Left heart cath;  Surgeon: Alfredo Jennings MD;  Location: [facility] CATH INVASIVE LOCATION;  Service: Cardiovascular;  Laterality: N/A;    CARDIAC CATHETERIZATION N/A 11/15/2021    Procedure: Left ventriculography;  Surgeon: Alfredo Jennings MD;  Location: [facility] CATH INVASIVE LOCATION;  Service: Cardiovascular;  Laterality: N/A;    CARDIAC CATHETERIZATION N/A 11/15/2021    Procedure: Aortic root aortogram;  Surgeon: Alfredo Jennings MD;  Location: [facility] CATH INVASIVE LOCATION;  Service: Cardiovascular;  Laterality: N/A;    CARDIOVERSION      CHOLECYSTECTOMY N/A 10/07/2017    Procedure: CHOLECYSTECTOMY LAPAROSCOPIC;  Surgeon: Martir Trevino MD;  Location: [facility] MAIN OR;  Service:     COLONOSCOPY  2007    COLONOSCOPY N/A 8/30/2022    Procedure: COLONOSCOPY TO CECUM AND TI AND COLD SNARE POLYPECTOMY;  Surgeon: Cj Lopez MD;  Location: [facility] ENDOSCOPY;  Service: Gastroenterology;  Laterality: N/A;  Pre: History of colon polyps  Post: POLYP, PREVIOUS TATTOO    FOOT SURGERY Left     TOES ARE PINNED. ALL BUT GREAT TOE    HAND SURGERY      THUMB    HERNIA REPAIR      INGUINAL HERNIA REPAIR Right 06/27/2018    Procedure: INGUINAL HERNIA REPAIR, OPEN, RECURRENT;  Surgeon: Martir Trevino MD;  Location: [facility] OR [facility];  Service: General    LASIK Bilateral     LOBECTOMY Right 6/12/2024    Procedure: BRONCHOSCOPY, RIGHT VIDEO ASSISTED THORACOSCOPY  WITH RIGHT MIDDLE LOBECTOMY WITH DAVINCI ROBOT, MEDIASTINAL LYMPH NODE DISSECTION, INTERCOSTAL NERVE BLOCK;  Surgeon: David Figueroa MD;  Location: [facility];  Service: Robotics - DaVinci;  Laterality: Right;    NECK SURGERY      growth on salivary gland    REPLACEMENT TOTAL KNEE Right     (he is going to have a LTKR next month)    REPLACEMENT TOTAL KNEE Left     VENOUS ACCESS DEVICE (PORT) INSERTION Right 2024    Procedure: INSERTION VENOUS ACCESS DEVICE;  Surgeon: David Figueroa MD;  Location: [facility] OR;  Service: Thoracic;  Laterality: Right;       ONCOLOGY HISTORY:  The patient is a 73 years old male, who presents for oncology evaluation 2024. He is accompanied by his wife.    He underwent right middle lobe lobectomy for squamous cell lung cancer by Dr. Figueroa on 2024 and is recovering well from the surgery. However, there is an open wound at the site of chest tube on the right side of the chest which now has a few stitches, which is still leaking. He was advised to apply Band-Aids.  He reports no fever sweating or chills.      Patient has been on oxygen supplementation since surgery, currently 2 L/min.  Patient says occasionally at home he does not need oxygen.  Dr. Figueroa has suggested gradually reducing his oxygen use.     His appetite remains normal.    Patient reports some family health issues.  His son-in-law recently  of HLH just last week.  His brother-in-law has stage IV liver cancer.     This patient has a history of emphysema, followed by pulmonologist Dr. Camp.  His high-resolution chest CT scan on 2021 reported 7 mm nodule in the right middle lobe.  There is also index subcarinal lymph node 1.1 cm.     Patient subsequently had serial CT scan examination.    On 4/10/2023 chest high-resolution CT scan reported stable examination with the pulmonary nodules measuring up to 9-10 mm in the right middle lobe and a sub-6 mm in the right upper lobe.  The largest subcarinal lymph  node measures  2.1 x  1.2 cm.     However chest high resolution CT scan 4/2/2024 reported disease progression, with right middle lobe pulm nodule 1.8 cm from margin at 9 mm.  There is also a new right middle lobe 1.1 cm nodule.  Stable right upper lobe 7 mm nodule.  Also a new right hilar lymphadenopathy up to 2 cm.  The 1.5 cm subcarinal lymph node is stable.  No pleural effusion.    Patient subsequently had PET scan examination on 4/22/2024 requested by Dr. Camp.  This reported SUV 3.1 corresponding to the 19 mm right middle lobe nodule.  The 1.1 cm right middle nodule was photopenic.  The right hilar lymph node with SUV 5.6.  The 1.5 cm subcarinal lymph node with maximum SUV 7.    Patient subsequently seen by Dr. Figueroa who performed ION bronchoscopic biopsy on 5/7/2024 with pathology evaluation reporting squamous cell carcinoma involving one of the right middle lobe lesion, and the other pulmonary nodule is at least squamous cell carcinoma in situ.  PD-L1 study TPS was 0%.       Dr. Figueroa performed robot-assisted thoracoscopic right middle lobe lobectomy and mediastinal lymph node dissection on 6/12/2024.  Pathology evaluation reported poorly differentiated squamous cell carcinoma, clear margin, however with lymph nodes involved 3 lymph nodes in the right 10 R, 11 R and 12 R lymph node station.  There was also frequent lymphovascular invasion and focal perineural invasion.    Patient subsequently had a portacatheter placement on 7/5/2024.      The patient presents today on 7/31/2024 for re-evaluation to start chemotherapy.    His case was presented at the multimodality conference 7/18/2024.  Because of negative margin and N1 disease, as well as marginal pulmonary function, the consensus was for patient to have adjuvant chemotherapy alone without radiation.    On 7/18/2024 peripheral blood was sent for [employer] xF liquid biopsy with inconclusive results, no reportable treatment options found.    His lung cancer sample  from 6/12/2024 was sent for [employer] xT DNA and RNA study.  It reported stable MSI.  Tumor mutation burden 6.3 m/MB.  Negative for EGFR, KRAS, BRAF, ALK, ROS1, RET, MET, HER2.    The patient recently consulted her nephrologist, during which blood work was conducted. The results indicated a slight presence of protein in urine. He denies experiencing any dyspnea.    Today patient reports no nausea no vomiting.  No chest pain.  Has chronic fatigue.          MEDICATIONS    Current Outpatient Medications:     amoxicillin-clavulanate (AUGMENTIN) 875-125 MG per tablet, Take 1 tablet by mouth 2 (Two) Times a Day., Disp: , Rfl:     clotrimazole-betamethasone (Lotrisone) 1-0.05 % cream, Apply 1 application topically to the appropriate area as directed 2 (Two) Times a Day. Do exceed 1 week, Disp: 15 g, Rfl: 1    fexofenadine (ALLEGRA) 180 MG tablet, Take 1 tablet by mouth Daily., Disp: , Rfl:     gabapentin (NEURONTIN) 300 MG capsule, Take 1 capsule by mouth Every 8 (Eight) Hours., Disp: 90 capsule, Rfl: 0    glucose blood test strip, Freestyle strips daily E 11.9, Disp: 100 each, Rfl: 12    glucose monitoring kit (FREESTYLE) monitoring kit, Daily E 11.93 FreeStyle meter, Disp: 1 each, Rfl: 1    Insulin Glargine, 1 Unit Dial, (Toujeo SoloStar) 300 UNIT/ML solution pen-injector injection, Inject 22 Units under the skin into the appropriate area as directed Daily., Disp: , Rfl:     Lancets (FREESTYLE) lancets, Daily E 11.9, Disp: 100 each, Rfl: 12    Multiple Vitamin (MULTIVITAMINS PO), Take 1 tablet by mouth Daily. HOLD PRIOR TO SURG, Disp: , Rfl:     rosuvastatin (CRESTOR) 40 MG tablet, TAKE ONE TABLET BY MOUTH DAILY, Disp: 90 tablet, Rfl: 1    sertraline (ZOLOFT) 50 MG tablet, TAKE ONE TABLET BY MOUTH DAILY (Patient taking differently: Take 1 tablet by mouth Daily.), Disp: 90 tablet, Rfl: 1    Tirzepatide (Mounjaro) 2.5 MG/0.5ML solution pen-injector pen, Inject 0.5 mL under the skin into the appropriate area as directed 1  (One) Time Per Week. LAST DOSE 6/3/2024 INSTRUCTED PT TO HOLD FOR SURGERY, Disp: , Rfl:     torsemide (DEMADEX) 20 MG tablet, Take 1 tablet by mouth Daily., Disp: , Rfl:     Xarelto 20 MG tablet, TAKE ONE TABLET BY MOUTH DAILY, Disp: 30 tablet, Rfl: 9    ondansetron (ZOFRAN) 8 MG tablet, Take 1 tablet by mouth Every 8 (Eight) Hours As Needed for Nausea or Vomiting. (Patient not taking: Reported on 9/11/2024), Disp: 30 tablet, Rfl: 5  No current facility-administered medications for this visit.    Facility-Administered Medications Ordered in Other Visits:     CARBOplatin (PARAPLATIN) 750 mg in sodium chloride 0.9 % 325 mL chemo IVPB, 750 mg, Intravenous, Once, Rosita Galvan APRN    PACLitaxel (TAXOL) 485 mg in sodium chloride 0.9 % 580.8 mL chemo IVPB, 200 mg/m2 (Treatment Plan Recorded), Intravenous, Once, Rosita Galvan APRN, Last Rate: 200 mL/hr at 09/11/24 1208, 485 mg at 09/11/24 1208    ALLERGIES:   No Known Allergies    SOCIAL HISTORY:       Social History     Socioeconomic History    Marital status:      Spouse name: Luba    Number of children: 2   Tobacco Use    Smoking status: Former     Current packs/day: 0.00     Average packs/day: 1 pack/day for 30.0 years (30.0 ttl pk-yrs)     Types: Cigars, Cigarettes     Start date: 1/1/1984     Quit date: 1/1/2014     Years since quitting: 10.7    Smokeless tobacco: Never   Vaping Use    Vaping status: Never Used   Substance and Sexual Activity    Alcohol use: Never     Comment: caffeine use- decaf coffee    Drug use: Never    Sexual activity: Defer         FAMILY HISTORY:  Family History   Problem Relation Age of Onset    Cancer Other     Stroke Mother     Alcohol abuse Father     Malig Hyperthermia Neg Hx        REVIEW OF SYSTEMS:  Review of Systems   Constitutional:  Positive for activity change and fatigue. Negative for appetite change, chills, diaphoresis and fever.   HENT:  Negative for nosebleeds and trouble swallowing.    Eyes:   "Negative for visual disturbance.   Respiratory:  Positive for shortness of breath. Negative for cough and wheezing.    Cardiovascular:  Negative for chest pain and leg swelling.   Gastrointestinal:  Negative for abdominal pain and blood in stool.   Genitourinary:  Negative for frequency and hematuria.   Musculoskeletal:  Negative for joint swelling.   Allergic/Immunologic: Negative for immunocompromised state.   Neurological:  Negative for weakness and numbness.   Hematological:  Negative for adenopathy. Does not bruise/bleed easily.   Psychiatric/Behavioral:  Negative for confusion.               Vitals:    09/11/24 0940   BP: 138/74   Pulse: 63   Resp: 18   SpO2: 94%   Weight: 126 kg (277 lb)   Height: 180.3 cm (70.98\")   PainSc: 0-No pain         9/11/2024     9:41 AM   Current Status   ECOG score 3     GENERAL:  Well-developed, well-nourished male in no acute distress.  Patient uses oxygen by nasal cannula.  SKIN:  Warm, dry without rashes, purpura or petechiae.  Portacatheter benign no evidence for infection.  HEENT:  Normocephalic.   LYMPHATICS:  No cervical, supraclavicular or axillary adenopathy.  CHEST: Normal respiratory effort.  Decreased breathing sounds bilaterally no wheezing or rails.    CARDIAC:  Regular rate and rhythm. Normal S1,S2.  ABDOMEN:  Soft, no tender.  Bowel sounds normal.  EXTREMITIES:  No lower extremity edema.        RECENT LABS:  Results from last 7 days   Lab Units 09/11/24  0918   WBC 10*3/mm3 9.61   NEUTROS ABS 10*3/mm3 8.19*   HEMOGLOBIN g/dL 11.6*   HEMATOCRIT % 34.8*   PLATELETS 10*3/mm3 293     Results from last 7 days   Lab Units 09/11/24  0918   SODIUM mmol/L 139   POTASSIUM mmol/L 4.3   CHLORIDE mmol/L 100   CO2 mmol/L 25.9   BUN mg/dL 17   CREATININE mg/dL 0.62*   CALCIUM mg/dL 9.6   ALBUMIN g/dL 4.0   BILIRUBIN mg/dL 0.7   ALK PHOS U/L 127*   ALT (SGPT) U/L 17   AST (SGOT) U/L 22   GLUCOSE mg/dL 371*   MAGNESIUM mg/dL 1.7               Assessment & Plan     1. Poorly " differentiated squamous cell lung cancer with intralobular metastatic disease, stage IIIa ([id number]).  Tumor was poorly differentiated. This patient has stage 3a disease.   Patient had Ion bronchoscopic biopsy 5/6/2024.  Right middle lobe reported fragments of squamous cell carcinoma.  PD-L1 study reported TPS 0%.  I discussed with the patient on 7/12/2024 that he will need adjuvant chemotherapy and concurrent adjuvant radiation therapy, and likely will need consolidative immunotherapy. I recommended using weekly carboplatin plus Taxol, in conjunction with radiotherapy for 6.5 weeks treatment. In reality, he probably will only receive 5 or 6 weekly chemotherapy instead of the 7 doses due to tolerance issues.   We will present his case at the chest conference on 7/18/2024, due to poor pulmonary function, negative surgical margins and N1 disease, the consensus is for patient to have adjuvant chemotherapy without concurrent radiation therapy.  Also recommended genetic study.   On 7/18/2024 peripheral blood was sent for [employer] xF liquid biopsy with inconclusive results, no reportable treatment options found.   His lung cancer sample from 6/12/2024 was sent for [employer] xT DNA and RNA study.  It reported stable MSI.  Tumor mutation burden 6.3 m/MB.  Negative for EGFR, KRAS, BRAF, ALK, ROS1, RET, MET, HER2.  Tumor sample was positive for BRIP1 mutation, TP53 mutation and also ARID1B mutation, all of those LOF.   On 7/31/2024 will start the patient on adjuvant chemotherapy.  Because his overall health condition, performance status ECOG 2, his age, he would not tolerate cisplatin based chemotherapy.  So we recommended using carboplatin in conjunction with Taxol every 3 weeks chemotherapy for 4 cycles.  Unfortunately was discharged, patient would not be a candidate for immunotherapy as part of adjuvant therapy.  8/21/2024 patient presents for evaluation prior to cycle #2 adjuvant chemotherapy.  He is currently undergoing  chemotherapy with a total of four cycles planned; this is his second cycle. The biopsy from his lung revealed a PD-L1 negative result. Given his overall health condition, he is not physically capable of tolerating the most toxic chemotherapy, cisplatin, and is therefore being treated with carboplatin. A request has been made for a larger tissue sample from his surgery to be retested for PD-L1. If the result is positive, immunotherapy could be considered for prolonged treatment, which has shown better results in preventing cancer recurrence. This decision will be made after the completion of his chemotherapy, to determin whether maintenance or consolidative immunotherapy is necessary. He continues on oxygen supplementation at 3 L/min.  9/11/2024 due today for cycle 3 carboplatin/Taxol.  He is overall tolerating this well with stable neuropathy.  We will reduce his steroids as further detailed below due to exacerbation of his underlying DM type II.    2.  Emphysema.    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  He was instructed by his surgeon Dr. Figueroa to taper off gradually.    3.  Monoclonal gammopathy of unknown significance.  IgA kappa.  This was discovered at the his nephrologist office.  Laboratory study on 4/13/2022 reported serum monoclonal IgA kappa with elevated IgA 604 mg/dL, normal IgG 861 and IgM 84.  Free kappa chain was 38.4, lambda 21.3, kappa/lambda ratio 1.8.  Lab study on 7/19/2024 at her nephrologist office Positive serum monoclonal IgA kappa. IgA was slightly elevated at 597 with normal serum IgG 830 and IgM 95. Kappa chain is 50.4 and lambda 23.3 with a kappa lambda ratio of 2.16.  Continue to observe.      4.  DM type II  Patient is on sliding scale insulin prior to meals and bedtime along with weekly Mounjaro  Blood sugars are being exacerbated by oral dexamethasone.  He is also receiving IV dexamethasone.  At this point he is doing well with Taxol we will  discontinue oral dexamethasone and adjust IV premedication.      PLAN:   Continue oral dexamethasone which patient was taking prior to and the day of Taxol (discussed this is typically only done with cycle 1 and not necessary going forward).  Proceed with cycle #3 adjuvant chemotherapy with carboplatin AUC 5 and Taxol 200 mg/m².    Reduce dexamethasone premedication to 12 mg IV.  Monitor blood sugars and continued tolerance to chemotherapy.  Could consider further reduction of dexamethasone in the future.  There are no actionable mutations for using TKI as part of adjuvant chemotherapy.   Also not candidate for immunotherapy in the adjuvant setting based on the initial ION biopsy sample.  Nevertheless we did request testing from his lobectomy tumor sample from 6/12/2024, pending.  Management of diabetes with the primary care physician.  Patient continues on insulin 4 times daily and Mounjaro weekly.  Continue anticoagulation with Xarelto 20 mg daily.  Continue gabapentin 300 mg every 8 hours which is controlling his neuropathy with neuropathy notably stable on chemotherapy.  Return in 3 weeks for follow-up with Dr. Villasenor and cycle 4 carboplatin/Taxol.    This patient is on high risk drug therapy requiring intensive monitoring for toxicity.        Rosita Galvan, APRN  09/11/24      CC:  MD John Paul Gvoea MD Sasser, Tino HORTON MD

## 2024-09-20 DIAGNOSIS — C34.2 SQUAMOUS CELL CARCINOMA OF BRONCHUS IN RIGHT MIDDLE LOBE: ICD-10-CM

## 2024-09-20 RX ORDER — GABAPENTIN 300 MG/1
300 CAPSULE ORAL EVERY 8 HOURS SCHEDULED
Qty: 90 CAPSULE | Refills: 0 | Status: SHIPPED | OUTPATIENT
Start: 2024-09-20

## 2024-09-26 ENCOUNTER — OFFICE VISIT (OUTPATIENT)
Dept: SURGERY | Facility: CLINIC | Age: 74
End: 2024-09-26
Payer: MEDICARE

## 2024-09-26 ENCOUNTER — TELEPHONE (OUTPATIENT)
Dept: ONCOLOGY | Facility: CLINIC | Age: 74
End: 2024-09-26
Payer: MEDICARE

## 2024-09-26 VITALS
OXYGEN SATURATION: 97 % | HEART RATE: 69 BPM | BODY MASS INDEX: 38.79 KG/M2 | WEIGHT: 278 LBS | DIASTOLIC BLOOD PRESSURE: 48 MMHG | SYSTOLIC BLOOD PRESSURE: 114 MMHG

## 2024-09-26 DIAGNOSIS — C34.2 MALIGNANT NEOPLASM OF MIDDLE LOBE OF RIGHT LUNG: Primary | ICD-10-CM

## 2024-09-26 RX ORDER — GUAIFENESIN AND DEXTROMETHORPHAN HYDROBROMIDE 1200; 60 MG/1; MG/1
1200 TABLET, EXTENDED RELEASE ORAL
COMMUNITY

## 2024-09-26 RX ORDER — TIZANIDINE HYDROCHLORIDE 4 MG/1
4 CAPSULE, GELATIN COATED ORAL 3 TIMES DAILY
COMMUNITY
Start: 2024-08-27

## 2024-10-01 ENCOUNTER — TELEPHONE (OUTPATIENT)
Dept: SURGERY | Facility: CLINIC | Age: 74
End: 2024-10-01
Payer: MEDICARE

## 2024-10-01 NOTE — TELEPHONE ENCOUNTER
Caller: DiopLuba ochoa    Relationship: Emergency Contact    Best call back number: [phone]    What orders are you requesting (i.e. lab or imaging): CT CHEST    In what timeframe would the patient need to come in: ASAP    Where will you receive your lab/imaging services: [facility]

## 2024-10-02 ENCOUNTER — OFFICE VISIT (OUTPATIENT)
Dept: ONCOLOGY | Facility: CLINIC | Age: 74
End: 2024-10-02
Payer: MEDICARE

## 2024-10-02 ENCOUNTER — INFUSION (OUTPATIENT)
Dept: ONCOLOGY | Facility: HOSPITAL | Age: 74
End: 2024-10-02
Payer: MEDICARE

## 2024-10-02 ENCOUNTER — PATIENT OUTREACH (OUTPATIENT)
Dept: OTHER | Facility: HOSPITAL | Age: 74
End: 2024-10-02
Payer: MEDICARE

## 2024-10-02 VITALS
OXYGEN SATURATION: 91 % | HEIGHT: 71 IN | BODY MASS INDEX: 38.22 KG/M2 | HEART RATE: 60 BPM | DIASTOLIC BLOOD PRESSURE: 72 MMHG | SYSTOLIC BLOOD PRESSURE: 127 MMHG | TEMPERATURE: 97.7 F | WEIGHT: 273 LBS

## 2024-10-02 DIAGNOSIS — D64.81 ANEMIA ASSOCIATED WITH CHEMOTHERAPY: ICD-10-CM

## 2024-10-02 DIAGNOSIS — Z79.4 TYPE 2 DIABETES MELLITUS WITH DIABETIC NEPHROPATHY, WITH LONG-TERM CURRENT USE OF INSULIN: ICD-10-CM

## 2024-10-02 DIAGNOSIS — Z79.899 ENCOUNTER FOR LONG-TERM (CURRENT) USE OF HIGH-RISK MEDICATION: ICD-10-CM

## 2024-10-02 DIAGNOSIS — C34.2 MALIGNANT NEOPLASM OF MIDDLE LOBE OF RIGHT LUNG: ICD-10-CM

## 2024-10-02 DIAGNOSIS — C34.2 SQUAMOUS CELL CARCINOMA OF BRONCHUS IN RIGHT MIDDLE LOBE: ICD-10-CM

## 2024-10-02 DIAGNOSIS — C34.2 MALIGNANT NEOPLASM OF MIDDLE LOBE OF RIGHT LUNG: Primary | ICD-10-CM

## 2024-10-02 DIAGNOSIS — E11.21 TYPE 2 DIABETES MELLITUS WITH DIABETIC NEPHROPATHY, WITH LONG-TERM CURRENT USE OF INSULIN: ICD-10-CM

## 2024-10-02 DIAGNOSIS — C34.2 SQUAMOUS CELL CARCINOMA OF BRONCHUS IN RIGHT MIDDLE LOBE: Primary | ICD-10-CM

## 2024-10-02 DIAGNOSIS — T45.1X5A ANEMIA ASSOCIATED WITH CHEMOTHERAPY: ICD-10-CM

## 2024-10-02 DIAGNOSIS — J44.9 CHRONIC OBSTRUCTIVE PULMONARY DISEASE, UNSPECIFIED COPD TYPE: ICD-10-CM

## 2024-10-02 DIAGNOSIS — Z79.899 ENCOUNTER FOR LONG-TERM (CURRENT) USE OF HIGH-RISK MEDICATION: Primary | ICD-10-CM

## 2024-10-02 LAB
ALBUMIN SERPL-MCNC: 4 G/DL (ref 3.5–5.2)
ALBUMIN/GLOB SERPL: 1.3 G/DL
ALP SERPL-CCNC: 133 U/L (ref 39–117)
ALT SERPL W P-5'-P-CCNC: 23 U/L (ref 1–41)
ANION GAP SERPL CALCULATED.3IONS-SCNC: 13.1 MMOL/L (ref 5–15)
AST SERPL-CCNC: 34 U/L (ref 1–40)
BASOPHILS # BLD AUTO: 0.06 10*3/MM3 (ref 0–0.2)
BASOPHILS NFR BLD AUTO: 1.1 % (ref 0–1.5)
BILIRUB SERPL-MCNC: 0.8 MG/DL (ref 0–1.2)
BUN SERPL-MCNC: 13 MG/DL (ref 8–23)
BUN/CREAT SERPL: 18.8 (ref 7–25)
CALCIUM SPEC-SCNC: 8.9 MG/DL (ref 8.6–10.5)
CHLORIDE SERPL-SCNC: 100 MMOL/L (ref 98–107)
CO2 SERPL-SCNC: 28.9 MMOL/L (ref 22–29)
CREAT SERPL-MCNC: 0.69 MG/DL (ref 0.76–1.27)
DEPRECATED RDW RBC AUTO: 58.9 FL (ref 37–54)
EGFRCR SERPLBLD CKD-EPI 2021: 96.5 ML/MIN/1.73
EOSINOPHIL # BLD AUTO: 0.15 10*3/MM3 (ref 0–0.4)
EOSINOPHIL NFR BLD AUTO: 2.6 % (ref 0.3–6.2)
ERYTHROCYTE [DISTWIDTH] IN BLOOD BY AUTOMATED COUNT: 17.6 % (ref 12.3–15.4)
GLOBULIN UR ELPH-MCNC: 3.1 GM/DL
GLUCOSE SERPL-MCNC: 179 MG/DL (ref 65–99)
HCT VFR BLD AUTO: 36 % (ref 37.5–51)
HGB BLD-MCNC: 11.7 G/DL (ref 13–17.7)
IMM GRANULOCYTES # BLD AUTO: 0.09 10*3/MM3 (ref 0–0.05)
IMM GRANULOCYTES NFR BLD AUTO: 1.6 % (ref 0–0.5)
LYMPHOCYTES # BLD AUTO: 1.81 10*3/MM3 (ref 0.7–3.1)
LYMPHOCYTES NFR BLD AUTO: 31.8 % (ref 19.6–45.3)
MAGNESIUM SERPL-MCNC: 1.5 MG/DL (ref 1.6–2.4)
MCH RBC QN AUTO: 29.8 PG (ref 26.6–33)
MCHC RBC AUTO-ENTMCNC: 32.5 G/DL (ref 31.5–35.7)
MCV RBC AUTO: 91.8 FL (ref 79–97)
MONOCYTES # BLD AUTO: 0.58 10*3/MM3 (ref 0.1–0.9)
MONOCYTES NFR BLD AUTO: 10.2 % (ref 5–12)
NEUTROPHILS NFR BLD AUTO: 3.01 10*3/MM3 (ref 1.7–7)
NEUTROPHILS NFR BLD AUTO: 52.7 % (ref 42.7–76)
NRBC BLD AUTO-RTO: 0 /100 WBC (ref 0–0.2)
PLATELET # BLD AUTO: 240 10*3/MM3 (ref 140–450)
PMV BLD AUTO: 9.1 FL (ref 6–12)
POTASSIUM SERPL-SCNC: 3.6 MMOL/L (ref 3.5–5.2)
PROT SERPL-MCNC: 7.1 G/DL (ref 6–8.5)
RBC # BLD AUTO: 3.92 10*6/MM3 (ref 4.14–5.8)
SODIUM SERPL-SCNC: 142 MMOL/L (ref 136–145)
WBC NRBC COR # BLD AUTO: 5.7 10*3/MM3 (ref 3.4–10.8)

## 2024-10-02 PROCEDURE — 25810000003 SODIUM CHLORIDE 0.9 % SOLUTION 500 ML FLEX CONT: Performed by: INTERNAL MEDICINE

## 2024-10-02 PROCEDURE — 25810000003 SODIUM CHLORIDE 0.9 % SOLUTION: Performed by: INTERNAL MEDICINE

## 2024-10-02 PROCEDURE — 96375 TX/PRO/DX INJ NEW DRUG ADDON: CPT

## 2024-10-02 PROCEDURE — 96413 CHEMO IV INFUSION 1 HR: CPT

## 2024-10-02 PROCEDURE — 96367 TX/PROPH/DG ADDL SEQ IV INF: CPT

## 2024-10-02 PROCEDURE — 96415 CHEMO IV INFUSION ADDL HR: CPT

## 2024-10-02 PROCEDURE — 96417 CHEMO IV INFUS EACH ADDL SEQ: CPT

## 2024-10-02 PROCEDURE — 25010000002 PALONOSETRON PER 25 MCG: Performed by: INTERNAL MEDICINE

## 2024-10-02 PROCEDURE — 25010000002 DIPHENHYDRAMINE PER 50 MG: Performed by: INTERNAL MEDICINE

## 2024-10-02 PROCEDURE — 85025 COMPLETE CBC W/AUTO DIFF WBC: CPT

## 2024-10-02 PROCEDURE — 25010000002 FOSAPREPITANT PER 1 MG: Performed by: INTERNAL MEDICINE

## 2024-10-02 PROCEDURE — 25010000002 MAGNESIUM SULFATE 2 GM/50ML SOLUTION: Performed by: INTERNAL MEDICINE

## 2024-10-02 PROCEDURE — 25010000002 PACLITAXEL PER 1 MG: Performed by: INTERNAL MEDICINE

## 2024-10-02 PROCEDURE — 25010000002 DEXAMETHASONE SODIUM PHOSPHATE 100 MG/10ML SOLUTION: Performed by: INTERNAL MEDICINE

## 2024-10-02 PROCEDURE — 80053 COMPREHEN METABOLIC PANEL: CPT

## 2024-10-02 PROCEDURE — 83735 ASSAY OF MAGNESIUM: CPT

## 2024-10-02 PROCEDURE — 25810000003 SODIUM CHLORIDE 0.9 % SOLUTION 250 ML FLEX CONT: Performed by: INTERNAL MEDICINE

## 2024-10-02 PROCEDURE — 25010000002 CARBOPLATIN PER 50 MG: Performed by: INTERNAL MEDICINE

## 2024-10-02 RX ORDER — FAMOTIDINE 10 MG/ML
20 INJECTION, SOLUTION INTRAVENOUS AS NEEDED
Status: CANCELLED | OUTPATIENT
Start: 2024-10-02

## 2024-10-02 RX ORDER — SODIUM CHLORIDE 9 MG/ML
20 INJECTION, SOLUTION INTRAVENOUS ONCE
Status: COMPLETED | OUTPATIENT
Start: 2024-10-02 | End: 2024-10-02

## 2024-10-02 RX ORDER — FAMOTIDINE 10 MG/ML
20 INJECTION, SOLUTION INTRAVENOUS ONCE
Status: COMPLETED | OUTPATIENT
Start: 2024-10-02 | End: 2024-10-02

## 2024-10-02 RX ORDER — SODIUM CHLORIDE 9 MG/ML
20 INJECTION, SOLUTION INTRAVENOUS ONCE
Status: CANCELLED | OUTPATIENT
Start: 2024-10-02

## 2024-10-02 RX ORDER — DIPHENHYDRAMINE HYDROCHLORIDE 50 MG/ML
50 INJECTION INTRAMUSCULAR; INTRAVENOUS AS NEEDED
Status: CANCELLED | OUTPATIENT
Start: 2024-10-02

## 2024-10-02 RX ORDER — FAMOTIDINE 10 MG/ML
20 INJECTION, SOLUTION INTRAVENOUS ONCE
Status: CANCELLED | OUTPATIENT
Start: 2024-10-02

## 2024-10-02 RX ORDER — MAGNESIUM SULFATE HEPTAHYDRATE 40 MG/ML
2 INJECTION, SOLUTION INTRAVENOUS ONCE
Status: COMPLETED | OUTPATIENT
Start: 2024-10-02 | End: 2024-10-02

## 2024-10-02 RX ORDER — PALONOSETRON 0.05 MG/ML
0.25 INJECTION, SOLUTION INTRAVENOUS ONCE
Status: COMPLETED | OUTPATIENT
Start: 2024-10-02 | End: 2024-10-02

## 2024-10-02 RX ORDER — PALONOSETRON 0.05 MG/ML
0.25 INJECTION, SOLUTION INTRAVENOUS ONCE
Status: CANCELLED | OUTPATIENT
Start: 2024-10-02

## 2024-10-02 RX ORDER — MAGNESIUM OXIDE 400 MG/1
400 TABLET ORAL 2 TIMES DAILY
Qty: 60 TABLET | Refills: 1 | Status: SHIPPED | OUTPATIENT
Start: 2024-10-02

## 2024-10-02 RX ADMIN — PALONOSETRON HYDROCHLORIDE 0.25 MG: 0.25 INJECTION INTRAVENOUS at 11:35

## 2024-10-02 RX ADMIN — FAMOTIDINE 20 MG: 10 INJECTION INTRAVENOUS at 11:35

## 2024-10-02 RX ADMIN — CARBOPLATIN 750 MG: 10 INJECTION INTRAVENOUS at 15:51

## 2024-10-02 RX ADMIN — DEXAMETHASONE SODIUM PHOSPHATE 12 MG: 10 INJECTION, SOLUTION INTRAMUSCULAR; INTRAVENOUS at 11:34

## 2024-10-02 RX ADMIN — DIPHENHYDRAMINE HYDROCHLORIDE 50 MG: 50 INJECTION, SOLUTION INTRAMUSCULAR; INTRAVENOUS at 11:52

## 2024-10-02 RX ADMIN — SODIUM CHLORIDE 20 ML/HR: 9 INJECTION, SOLUTION INTRAVENOUS at 11:35

## 2024-10-02 RX ADMIN — FOSAPREPITANT 100 ML: 150 INJECTION, POWDER, LYOPHILIZED, FOR SOLUTION INTRAVENOUS at 12:07

## 2024-10-02 RX ADMIN — PACLITAXEL 365 MG: 6 INJECTION, SOLUTION INTRAVENOUS at 12:40

## 2024-10-02 RX ADMIN — MAGNESIUM SULFATE HEPTAHYDRATE 2 G: 40 INJECTION, SOLUTION INTRAVENOUS at 10:20

## 2024-10-02 NOTE — PROGRESS NOTES
Reviewed chart.    Met patient and wife in infusion today. He is receiving his 4th/final dose of carbo/Taxol. The patient reports peripheral neuropathy (taxol dose was reduced), fair loss and intermittent constipation, which is managed with oral medications. The patient reports taste alterations although he continues to eat and denies weight loss.     Dr. Villasenor met patient in infusion and stated that he has no targeted mutations. He will present his case at tumor board next week and possibly proceed with immunotherapy.    We discussed upcoming appts.     The patient denies any questions/concerns or ongoing resource needs. I will continue to follow; encouraged patient to call as needed.

## 2024-10-02 NOTE — PROGRESS NOTES
REASON FOR FOLLOW-UP:     *. Poorly differentiated squamous cell lung cancer with intralobular metastatic disease, stage IIIa ([id number]).  Status post right middle lobectomy on 6/12/2024.  Patient was started on adjuvant chemotherapy carboplatin AUC 5, and Taxol 200 mg/m² on 7/31/2024.      HISTORY OF PRESENT ILLNESS:  The patient is a 73 y.o. year old male who is here for evaluation with the above-mentioned history.    History of Present Illness  The patient is here today, 10/02/2024, for an evaluation before his fourth cycle of adjuvant chemotherapy with carboplatin and Taxol. He is joined by his wife..    He reports no new health issues. His oxygen supply is set at 3 liters, but he does not experience shortness of breath and can manage without it.    He had a follow-up with Dr. Figueroa last week and is scheduled for a follow-up on 10/31/2024, with a CT scan of the chest will be performed.    He experiences tingling in his hands and feet, which has affected his ability to write checks. This symptom started after the start of his chemotherapy. He is currently taking gabapentin, which seems to alleviate the tingling sensation.    Results  Laboratory Studies  WBC 5700, neutrophils 3010, hemoglobin 11.7, platelets 240,000. Baseline creatinine 0.69, magnesium 1.5, potassium low normal 3.6, calcium low normal 8.9, sodium normal 142, chloride normal, alkaline phosphatase 133, otherwise unremarkable liver function panel.      Past Medical History:   Diagnosis Date    Anxiety     Arthritis     Atrial fibrillation     CURRENTLY    Bunion of right foot     Colon polyps     COPD (chronic obstructive pulmonary disease)     MILD. NO MEDS FOR    Depression     History of atrial fibrillation     WITH CARDIOVERSION. NO PROBLEMS    History of skin cancer     BCC REMOVED FROM BACK    [place] (hard of hearing)     Hyperlipidemia     Inguinal hernia, recurrent     RIGHT    Left foot pain     Lung nodules     Rash     IN GROIN  TREATING FOR YEAST INFECTION BY PCP    Redness of skin     LOWER LEGS BILATERAL    Scab     BILATERAL LEGS HEALING    Sleep apnea with use of continuous positive airway pressure (CPAP)     CPAP    Streptococcus pneumoniae     ABOUT 6-7 YR AGO.     Type 2 diabetes mellitus      Past Surgical History:   Procedure Laterality Date    BASAL CELL CARCINOMA EXCISION      BRONCHOSCOPY WITH ION ROBOTIC ASSIST N/A 5/6/2024    Procedure: BRONCHOSCOPY WITH ION ROBOT WITH FNA, BIOPSIES, BAL AND ENDOBRONCHIAL ULTRASOUND WITH FNA;  Surgeon: Yony Coles MD;  Location: [facility] ENDOSCOPY;  Service: Robotics - Pulmonary;  Laterality: N/A;  PRE: PULMONARY NODULES  POST: SAME    CARDIAC CATHETERIZATION N/A 11/15/2021    Procedure: Coronary angiography;  Surgeon: Alfredo Jennings MD;  Location: [facility] CATH INVASIVE LOCATION;  Service: Cardiovascular;  Laterality: N/A;    CARDIAC CATHETERIZATION N/A 11/15/2021    Procedure: Left heart cath;  Surgeon: Alfredo Jennings MD;  Location: [facility] CATH INVASIVE LOCATION;  Service: Cardiovascular;  Laterality: N/A;    CARDIAC CATHETERIZATION N/A 11/15/2021    Procedure: Left ventriculography;  Surgeon: Alfredo Jennings MD;  Location: [facility] CATH INVASIVE LOCATION;  Service: Cardiovascular;  Laterality: N/A;    CARDIAC CATHETERIZATION N/A 11/15/2021    Procedure: Aortic root aortogram;  Surgeon: Alfredo Jennings MD;  Location: [facility] CATH INVASIVE LOCATION;  Service: Cardiovascular;  Laterality: N/A;    CARDIOVERSION      CHOLECYSTECTOMY N/A 10/07/2017    Procedure: CHOLECYSTECTOMY LAPAROSCOPIC;  Surgeon: Martir Trevino MD;  Location: [facility] MAIN OR;  Service:     COLONOSCOPY  2007    COLONOSCOPY N/A 8/30/2022    Procedure: COLONOSCOPY TO CECUM AND TI AND COLD SNARE POLYPECTOMY;  Surgeon: Cj Lopez MD;  Location: [facility] ENDOSCOPY;  Service: Gastroenterology;  Laterality: N/A;  Pre: History of colon polyps  Post: POLYP, PREVIOUS TATTOO    FOOT SURGERY Left     TOES ARE  PINNED. ALL BUT GREAT TOE    HAND SURGERY      THUMB    HERNIA REPAIR      INGUINAL HERNIA REPAIR Right 2018    Procedure: INGUINAL HERNIA REPAIR, OPEN, RECURRENT;  Surgeon: Martir Trevino MD;  Location: [facility];  Service: General    LASIK Bilateral     LOBECTOMY Right 2024    Procedure: BRONCHOSCOPY, RIGHT VIDEO ASSISTED THORACOSCOPY WITH RIGHT MIDDLE LOBECTOMY WITH DAVINCI ROBOT, MEDIASTINAL LYMPH NODE DISSECTION, INTERCOSTAL NERVE BLOCK;  Surgeon: David Figueroa MD;  Location: [facility];  Service: Robotics - DaVinci;  Laterality: Right;    NECK SURGERY      growth on salivary gland    REPLACEMENT TOTAL KNEE Right     (he is going to have a LTKR next month)    REPLACEMENT TOTAL KNEE Left     VENOUS ACCESS DEVICE (PORT) INSERTION Right 2024    Procedure: INSERTION VENOUS ACCESS DEVICE;  Surgeon: David Figueroa MD;  Location: [facility];  Service: Thoracic;  Laterality: Right;       ONCOLOGY HISTORY:  The patient is a 73 years old male, who presents for oncology evaluation 2024. He is accompanied by his wife.    He underwent right middle lobe lobectomy for squamous cell lung cancer by Dr. Figueroa on 2024 and is recovering well from the surgery. However, there is an open wound at the site of chest tube on the right side of the chest which now has a few stitches, which is still leaking. He was advised to apply Band-Aids.  He reports no fever sweating or chills.      Patient has been on oxygen supplementation since surgery, currently 2 L/min.  Patient says occasionally at home he does not need oxygen.  Dr. Figueroa has suggested gradually reducing his oxygen use.     His appetite remains normal.    Patient reports some family health issues.  His son-in-law recently  of HLH just last week.  His brother-in-law has stage IV liver cancer.     This patient has a history of emphysema, followed by pulmonologist Dr. Camp.  His high-resolution chest CT scan on 2021 reported 7 mm nodule  in the right middle lobe.  There is also index subcarinal lymph node 1.1 cm.     Patient subsequently had serial CT scan examination.    On 4/10/2023 chest high-resolution CT scan reported stable examination with the pulmonary nodules measuring up to 9-10 mm in the right middle lobe and a sub-6 mm in the right upper lobe.  The largest subcarinal lymph node measures  2.1 x  1.2 cm.     However chest high resolution CT scan 4/2/2024 reported disease progression, with right middle lobe pulm nodule 1.8 cm from margin at 9 mm.  There is also a new right middle lobe 1.1 cm nodule.  Stable right upper lobe 7 mm nodule.  Also a new right hilar lymphadenopathy up to 2 cm.  The 1.5 cm subcarinal lymph node is stable.  No pleural effusion.    Patient subsequently had PET scan examination on 4/22/2024 requested by Dr. Camp.  This reported SUV 3.1 corresponding to the 19 mm right middle lobe nodule.  The 1.1 cm right middle nodule was photopenic.  The right hilar lymph node with SUV 5.6.  The 1.5 cm subcarinal lymph node with maximum SUV 7.    Patient subsequently seen by Dr. Figueroa who performed ION bronchoscopic biopsy on 5/7/2024 with pathology evaluation reporting squamous cell carcinoma involving one of the right middle lobe lesion, and the other pulmonary nodule is at least squamous cell carcinoma in situ.  PD-L1 study TPS was 0%.       Dr. Figueroa performed robot-assisted thoracoscopic right middle lobe lobectomy and mediastinal lymph node dissection on 6/12/2024.  Pathology evaluation reported poorly differentiated squamous cell carcinoma, clear margin, however with lymph nodes involved 3 lymph nodes in the right 10 R, 11 R and 12 R lymph node station.  There was also frequent lymphovascular invasion and focal perineural invasion.    Patient subsequently had a portacatheter placement on 7/5/2024.      The patient presents today on 7/31/2024 for re-evaluation to start chemotherapy.    His case was presented at the multimodality  conference 7/18/2024.  Because of negative margin and N1 disease, as well as marginal pulmonary function, the consensus was for patient to have adjuvant chemotherapy alone without radiation.    On 7/18/2024 peripheral blood was sent for [employer] xF liquid biopsy with inconclusive results, no reportable treatment options found.    His lung cancer sample from 6/12/2024 was sent for [employer] xT DNA and RNA study.  It reported stable MSI.  Tumor mutation burden 6.3 m/MB.  Negative for EGFR, KRAS, BRAF, ALK, ROS1, RET, MET, HER2.    The patient recently consulted her nephrologist, during which blood work was conducted. The results indicated a slight presence of protein in urine. He denies experiencing any dyspnea.    Today patient reports no nausea no vomiting.  No chest pain.  Has chronic fatigue.          MEDICATIONS    Current Outpatient Medications:     amoxicillin-clavulanate (AUGMENTIN) 875-125 MG per tablet, Take 1 tablet by mouth 2 (Two) Times a Day., Disp: , Rfl:     Dextromethorphan-guaiFENesin (Mucinex DM Maximum Strength)  MG tablet sustained-release 12 hour, Take 1,200 mg by mouth., Disp: , Rfl:     fexofenadine (ALLEGRA) 180 MG tablet, Take 1 tablet by mouth Daily., Disp: , Rfl:     gabapentin (NEURONTIN) 300 MG capsule, Take 1 capsule by mouth Every 8 (Eight) Hours., Disp: 90 capsule, Rfl: 0    glucose blood test strip, Freestyle strips daily E 11.9, Disp: 100 each, Rfl: 12    glucose monitoring kit (FREESTYLE) monitoring kit, Daily E 11.93 FreeStyle meter, Disp: 1 each, Rfl: 1    Insulin Glargine, 1 Unit Dial, (Toujeo SoloStar) 300 UNIT/ML solution pen-injector injection, Inject 22 Units under the skin into the appropriate area as directed Daily., Disp: , Rfl:     Lancets (FREESTYLE) lancets, Daily E 11.9, Disp: 100 each, Rfl: 12    Multiple Vitamin (MULTIVITAMINS PO), Take 1 tablet by mouth Daily. HOLD PRIOR TO SURG, Disp: , Rfl:     ondansetron (ZOFRAN) 8 MG tablet, Take 1 tablet by mouth Every 8  (Eight) Hours As Needed for Nausea or Vomiting., Disp: 30 tablet, Rfl: 5    rosuvastatin (CRESTOR) 40 MG tablet, TAKE ONE TABLET BY MOUTH DAILY, Disp: 90 tablet, Rfl: 1    sertraline (ZOLOFT) 50 MG tablet, TAKE ONE TABLET BY MOUTH DAILY (Patient taking differently: Take 1 tablet by mouth Daily.), Disp: 90 tablet, Rfl: 1    Tirzepatide (Mounjaro) 2.5 MG/0.5ML solution pen-injector pen, Inject 0.5 mL under the skin into the appropriate area as directed 1 (One) Time Per Week. LAST DOSE 6/3/2024 INSTRUCTED PT TO HOLD FOR SURGERY, Disp: , Rfl:     TiZANidine (ZANAFLEX) 4 MG capsule, Take 1 capsule by mouth 3 (Three) Times a Day., Disp: , Rfl:     torsemide (DEMADEX) 20 MG tablet, Take 1 tablet by mouth Daily., Disp: , Rfl:     Xarelto 20 MG tablet, TAKE ONE TABLET BY MOUTH DAILY, Disp: 30 tablet, Rfl: 9    clotrimazole-betamethasone (Lotrisone) 1-0.05 % cream, Apply 1 application topically to the appropriate area as directed 2 (Two) Times a Day. Do exceed 1 week (Patient not taking: Reported on 9/26/2024), Disp: 15 g, Rfl: 1    ALLERGIES:   No Known Allergies    SOCIAL HISTORY:       Social History     Socioeconomic History    Marital status:      Spouse name: Luba    Number of children: 2   Tobacco Use    Smoking status: Former     Current packs/day: 0.00     Average packs/day: 1 pack/day for 30.0 years (30.0 ttl pk-yrs)     Types: Cigars, Cigarettes     Start date: 1/1/1984     Quit date: 1/1/2014     Years since quitting: 10.7    Smokeless tobacco: Never   Vaping Use    Vaping status: Never Used   Substance and Sexual Activity    Alcohol use: Never     Comment: caffeine use- decaf coffee    Drug use: Never    Sexual activity: Defer         FAMILY HISTORY:  Family History   Problem Relation Age of Onset    Cancer Other     Stroke Mother     Alcohol abuse Father     Malig Hyperthermia Neg Hx        REVIEW OF SYSTEMS:  Review of Systems   Constitutional:  Positive for activity change and fatigue. Negative for  "appetite change, chills, diaphoresis and fever.   HENT:  Negative for nosebleeds and trouble swallowing.    Eyes:  Negative for visual disturbance.   Respiratory:  Positive for shortness of breath. Negative for cough and wheezing.    Cardiovascular:  Negative for chest pain and leg swelling.   Gastrointestinal:  Negative for abdominal pain and blood in stool.   Genitourinary:  Negative for frequency and hematuria.   Musculoskeletal:  Negative for joint swelling.   Allergic/Immunologic: Negative for immunocompromised state.   Neurological:  Negative for weakness and numbness.   Hematological:  Negative for adenopathy. Does not bruise/bleed easily.   Psychiatric/Behavioral:  Negative for confusion.               Vitals:    10/02/24 0921   BP: 127/72   Pulse: 60   Temp: 97.7 °F (36.5 °C)   TempSrc: Oral   SpO2: 91%   Weight: 124 kg (273 lb)   Height: 180.3 cm (70.98\")   PainSc: 0-No pain         10/2/2024     9:24 AM   Current Status   ECOG score 0     Physical Exam  GENERAL:  Well-developed, well-nourished gentleman in no acute distress.    SKIN:  Warm, dry without rashes, purpura or petechiae.  HEENT:  Normocephalic.   LYMPHATICS:  No cervical, supraclavicular or axillary adenopathy.  CHEST: Normal respiratory effort.  Lungs clear to auscultation. Good airflow.  CARDIAC:  Regular rate and rhythm. Normal S1,S2.  ABDOMEN:  Soft, no tender.  Bowel sounds normal.  EXTREMITIES:  No lower extremity edema.          RECENT LABS:  Results from last 7 days   Lab Units 10/02/24  0839   WBC 10*3/mm3 5.70   NEUTROS ABS 10*3/mm3 3.01   HEMOGLOBIN g/dL 11.7*   HEMATOCRIT % 36.0*   PLATELETS 10*3/mm3 240     Results from last 7 days   Lab Units 10/02/24  0839   SODIUM mmol/L 142   POTASSIUM mmol/L 3.6   CHLORIDE mmol/L 100   CO2 mmol/L 28.9   BUN mg/dL 13   CREATININE mg/dL 0.69*   CALCIUM mg/dL 8.9   ALBUMIN g/dL 4.0   BILIRUBIN mg/dL 0.8   ALK PHOS U/L 133*   ALT (SGPT) U/L 23   AST (SGOT) U/L 34   GLUCOSE mg/dL 179*   MAGNESIUM " mg/dL 1.5*             Assessment & Plan     Assessment & Plan      1. Poorly differentiated squamous cell lung cancer with intralobular metastatic disease, stage IIIa ([id number]).  Tumor was poorly differentiated. This patient has stage 3a disease.   Patient had Ion bronchoscopic biopsy 5/6/2024.  Right middle lobe reported fragments of squamous cell carcinoma.  PD-L1 study reported TPS 0%.  I discussed with the patient on 7/12/2024 that he will need adjuvant chemotherapy and concurrent adjuvant radiation therapy, and likely will need consolidative immunotherapy. I recommended using weekly carboplatin plus Taxol, in conjunction with radiotherapy for 6.5 weeks treatment. In reality, he probably will only receive 5 or 6 weekly chemotherapy instead of the 7 doses due to tolerance issues.   We will present his case at the chest conference on 7/18/2024, due to poor pulmonary function, negative surgical margins and N1 disease, the consensus is for patient to have adjuvant chemotherapy without concurrent radiation therapy.  Also recommended genetic study.   On 7/18/2024 peripheral blood was sent for [employer] liquid biopsy with inconclusive results, no reportable treatment options found.   His lung cancer sample from 6/12/2024 was sent for [employer] xT DNA and RNA study.  It reported stable MSI.  Tumor mutation burden 6.3 m/MB.  Negative for EGFR, KRAS, BRAF, ALK, ROS1, RET, MET, HER2.  Tumor sample was positive for BRIP1 mutation, TP53 mutation and also ARID1B mutation, all of those LOF.   On 7/31/2024 will start the patient on adjuvant chemotherapy.  Because his overall health condition, performance status ECOG 2, his age, he would not tolerate cisplatin based chemotherapy.  So we recommended using carboplatin in conjunction with Taxol every 3 weeks chemotherapy for 4 cycles.  Unfortunately patient would not be a candidate for immunotherapy as part of adjuvant therapy.  8/21/2024 patient presents for evaluation prior to  cycle #2 adjuvant chemotherapy.  He is currently undergoing chemotherapy with a total of four cycles planned; this is his second cycle. The biopsy from his lung revealed a PD-L1 negative result. Given his overall health condition, he is not physically capable of tolerating the most toxic chemotherapy, cisplatin, and is therefore being treated with carboplatin. A request has been made for a larger tissue sample from his surgery to be retested for PD-L1. If the result is positive, immunotherapy could be considered for prolonged treatment, which has shown better results in preventing cancer recurrence. This decision will be made after the completion of his chemotherapy, to determin whether maintenance or consolidative immunotherapy is necessary. He continues on oxygen supplementation at 3 L/min.  PD-L1 study from the lobectomy sample reported positive for PD-L1 with a TPS tumor proportion score 5%.  9/11/2024 due today for cycle 3 carboplatin/Taxol.  He is overall tolerating this well with stable neuropathy.  We will reduce his steroids as further detailed below due to exacerbation of his underlying DM type II.  Today 10/2/2024 he presented for evaluation prior to his cycle #4 adjuvant chemotherapy with carboplatin plus Taxol. Laboratory studies today reported normal WBC 5700, neutrophils 3010, hemoglobin 11.7, and platelets 240,000. Chemistry lab reported baseline creatinine 0.69, normal electrolytes except magnesium 1.5, and marginally elevated alkaline phosphatase 133. Given the presence of neuropathy, there is a concern that this could develop into a chronic issue. A reduction in the dosage of Taxol by 25 percent is recommended.    2.  Emphysema.    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  He was instructed by his surgeon Dr. Figueroa to taper off gradually.    3.  Monoclonal gammopathy of unknown significance.  IgA kappa.  This was discovered at the his nephrologist office.   Laboratory study on 4/13/2022 reported serum monoclonal IgA kappa with elevated IgA 604 mg/dL, normal IgG 861 and IgM 84.  Free kappa chain was 38.4, lambda 21.3, kappa/lambda ratio 1.8.  Lab study on 7/19/2024 at her nephrologist office Positive serum monoclonal IgA kappa. IgA was slightly elevated at 597 with normal serum IgG 830 and IgM 95. Kappa chain is 50.4 and lambda 23.3 with a kappa lambda ratio of 2.16.  Continue to observe.      4.  DM type II  Patient is on sliding scale insulin prior to meals and bedtime along with weekly Mounjaro  Blood sugars are being exacerbated by oral dexamethasone.  He is also receiving IV dexamethasone.  At this point he is doing well with Taxol we will discontinue oral dexamethasone and adjust IV premedication.      5.  Peripheral neuropathy due to chemotherapy.   He reports tingling in the hands and feet, which interferes with functionality, such as writing checks. This neuropathy started since beginning chemotherapy. He is currently taking gabapentin, which he reports is helping. The neuropathy is likely caused by Taxol.    6. Shortness of Breath.  He is currently using 3 L of oxygen. He reports no increased shortness of breath and can go without the oxygen at times. He will continue with the current oxygen therapy.      7.  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  Continue to monitor.    PLAN:   Proceed with cycle #4 adjuvant chemotherapy with carboplatin AUC 5 and a 25% dose reduction of Taxol at 150 mg/m².   Reduce dexamethasone premedication to 12 mg IV.  Monitor blood sugars and continued tolerance to chemotherapy.    There are no actionable mutations for using TKI as part of adjuvant chemotherapy.   His lobectomy sample was tested positive for PD-L1 with a TPS 3%.  I will present his case at the multimodality lung conference for further evaluation, he will be a candidate for adjuvant immunotherapy with checkpoint inhibitor.  I will see patient in 5 weeks for reevaluation, we  will check CBC CMP and magnesium.  And to started Keytruda and the possible IV magnesium.  Management of diabetes with the primary care physician.  Patient continues on insulin 4 times daily and Mounjaro weekly.  Continue anticoagulation with Xarelto 20 mg daily.  Continue gabapentin 300 mg every 8 hours which is controlling his neuropathy with neuropathy notably stable on chemotherapy.    I spent 40 minutes caring for  on this date of service. This time includes time spent by me in the following activities: preparing for the visit, reviewing tests, obtaining and/or reviewing a separately obtained history, performing a medically appropriate examination and/or evaluation, counseling and educating the patient/family/caregiver, ordering medications, tests, or procedures, referring and communicating with other health care professionals, documenting information in the medical record, independently interpreting results and communicating that information with the patient/family/caregiver and care coordination         This patient is on high risk drug therapy requiring intensive monitoring for toxicity.        Duy Villasenor MD PhD  10/02/24      CC:  MD John Paul Govea MD Sasser, Robert L, MD

## 2024-10-03 NOTE — PROGRESS NOTES
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 male who has significant medical problems as mentioned in the medical chart.     History of Present Illness  He had shortness of breath and high-resolution CT scan was performed on 6/14/2021.  He has small noncalcified lung nodules measuring up to 7 mm.  Repeat CT scan of the chest on 4/10/2023 reported stable pulmonary nodule and presence of emphysema.  CT scan of the chest in 12 months was recommended which was performed on 4/2/2024 and reported 1.8 cm and 1.1 cm right middle lobe solid nodules.  PET/CT on 4/23/2024 reported mildly hypermetabolic right middle lobe 1.9 cm and 1.1 cm solid nodule. The findings were concerning for malignant disease.  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  He had ION robotic navigational bronchoscopy of the right middle lobe nodule.  One of the nodules had fragment of squamous cell carcinoma and the other nodule had highly dysplastic squamous epithelium suggesting at least squamous cell carcinoma in situ.  Level 11 L, 7, 4R, 11 R were negative for metastatic disease.  MRI of the brain was negative for metastatic disease.  He was referred to thoracic surgery for further evaluation.     At the time of initial consultation, he could occasionally walk 1 block without dyspnea. He denied any history of myocardial infarction, cerebrovascular accident, hepatic or renal issues. He had pneumonia in 2017 resulting in a 17-day intensive care unit stay. His respiratory function has not returned to its previous state. A recent pulmonary function test was conducted on 05/10/2024. He is under the care of a cardiologist and is currently on anticoagulant therapy. He reported occasional pedal  edema. His echocardiogram and lung tests yielded normal results.      On 6/12/2024, he underwent robot-assisted thoracoscopic right middle lobectomy, systematic mediastinal lymph node dissection.  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  There were 2 additional branches to the right middle lobe noted.  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  He tolerated the procedure well.  There were no intraoperative or immediate postoperative complications.  He was discharged home in a stable condition on supplemental oxygen.  The final pathology revealed poorly differentiated, invasive squamous cell carcinoma, nonkeratinizing.  He had separate metastases in the same lobe.  All resection margins were negative for carcinoma.  I was able to retrieve 9 lymph nodes and the level 10 R, 11 R and 12 are were positive for malignancy.     He had ipsilateral lobar metastases and lymph node involvement. He recovered well from surgery. He will require adjuvant chemotherapy. He needed Mediport for systemic treatment which was placed on 7/5/2024.    He came to clinic for follow-up visit.  He reported experiencing mild shortness of breath during physical activities.  He reported undergoing chemotherapy, with one more session remaining, and will be receiving immunotherapy.  He reported using 3 liters of oxygen at home, but could manage without it for 6 to 7 hours when he is active.  He reported no weight loss and maintains a good appetite.    Past Medical History:   Diagnosis Date    Anxiety     Arthritis     Atrial fibrillation     CURRENTLY    Bunion of right foot     Colon polyps     COPD (chronic obstructive pulmonary disease)     MILD. NO MEDS FOR    Depression     History of atrial fibrillation     WITH CARDIOVERSION. NO PROBLEMS    History of skin cancer     BCC REMOVED FROM BACK    [place] (hard of hearing)     Hyperlipidemia     Inguinal hernia, recurrent     RIGHT    Left foot pain     Lung  nodules     Rash     IN GROIN TREATING FOR YEAST INFECTION BY PCP    Redness of skin     LOWER LEGS BILATERAL    Scab     BILATERAL LEGS HEALING    Sleep apnea with use of continuous positive airway pressure (CPAP)     CPAP    Streptococcus pneumoniae     ABOUT 6-7 YR AGO.     Type 2 diabetes mellitus        Past Surgical History:   Procedure Laterality Date    BASAL CELL CARCINOMA EXCISION      BRONCHOSCOPY WITH ION ROBOTIC ASSIST N/A 5/6/2024    Procedure: BRONCHOSCOPY WITH ION ROBOT WITH FNA, BIOPSIES, BAL AND ENDOBRONCHIAL ULTRASOUND WITH FNA;  Surgeon: Yony Coles MD;  Location: [facility] ENDOSCOPY;  Service: Robotics - Pulmonary;  Laterality: N/A;  PRE: PULMONARY NODULES  POST: SAME    CARDIAC CATHETERIZATION N/A 11/15/2021    Procedure: Coronary angiography;  Surgeon: Alfredo Jennings MD;  Location: [facility] CATH INVASIVE LOCATION;  Service: Cardiovascular;  Laterality: N/A;    CARDIAC CATHETERIZATION N/A 11/15/2021    Procedure: Left heart cath;  Surgeon: Alfredo Jennings MD;  Location: [facility] CATH INVASIVE LOCATION;  Service: Cardiovascular;  Laterality: N/A;    CARDIAC CATHETERIZATION N/A 11/15/2021    Procedure: Left ventriculography;  Surgeon: Alfredo Jennings MD;  Location: [facility] CATH INVASIVE LOCATION;  Service: Cardiovascular;  Laterality: N/A;    CARDIAC CATHETERIZATION N/A 11/15/2021    Procedure: Aortic root aortogram;  Surgeon: Alfredo Jennings MD;  Location: [facility] CATH INVASIVE LOCATION;  Service: Cardiovascular;  Laterality: N/A;    CARDIOVERSION      CHOLECYSTECTOMY N/A 10/07/2017    Procedure: CHOLECYSTECTOMY LAPAROSCOPIC;  Surgeon: Martir Trevino MD;  Location: [facility] MAIN OR;  Service:     COLONOSCOPY  2007    COLONOSCOPY N/A 8/30/2022    Procedure: COLONOSCOPY TO CECUM AND TI AND COLD SNARE POLYPECTOMY;  Surgeon: Cj Lopez MD;  Location: [facility] ENDOSCOPY;  Service: Gastroenterology;  Laterality: N/A;  Pre: History of colon polyps  Post: POLYP, PREVIOUS TATTOO     FOOT SURGERY Left     TOES ARE PINNED. ALL BUT GREAT TOE    HAND SURGERY      THUMB    HERNIA REPAIR      INGUINAL HERNIA REPAIR Right 06/27/2018    Procedure: INGUINAL HERNIA REPAIR, OPEN, RECURRENT;  Surgeon: Martir Trevino MD;  Location: [facility];  Service: General    LASIK Bilateral     LOBECTOMY Right 6/12/2024    Procedure: BRONCHOSCOPY, RIGHT VIDEO ASSISTED THORACOSCOPY WITH RIGHT MIDDLE LOBECTOMY WITH DAVINCI ROBOT, MEDIASTINAL LYMPH NODE DISSECTION, INTERCOSTAL NERVE BLOCK;  Surgeon: David Figueroa MD;  Location: [facility] OR;  Service: Robotics - DaVinci;  Laterality: Right;    NECK SURGERY      growth on salivary gland    REPLACEMENT TOTAL KNEE Right 2007    (he is going to have a LTKR next month)    REPLACEMENT TOTAL KNEE Left     VENOUS ACCESS DEVICE (PORT) INSERTION Right 7/5/2024    Procedure: INSERTION VENOUS ACCESS DEVICE;  Surgeon: David Figueroa MD;  Location: [facility] MAIN OR;  Service: Thoracic;  Laterality: Right;       Family History   Problem Relation Age of Onset    Cancer Other     Stroke Mother     Alcohol abuse Father     Malig Hyperthermia Neg Hx        Social History     Socioeconomic History    Marital status:      Spouse name: Luba    Number of children: 2   Tobacco Use    Smoking status: Former     Current packs/day: 0.00     Average packs/day: 1 pack/day for 30.0 years (30.0 ttl pk-yrs)     Types: Cigars, Cigarettes     Start date: 1/1/1984     Quit date: 1/1/2014     Years since quitting: 10.7    Smokeless tobacco: Never   Vaping Use    Vaping status: Never Used   Substance and Sexual Activity    Alcohol use: Never     Comment: caffeine use- decaf coffee    Drug use: Never    Sexual activity: Defer         Current Outpatient Medications:     amoxicillin-clavulanate (AUGMENTIN) 875-125 MG per tablet, Take 1 tablet by mouth 2 (Two) Times a Day., Disp: , Rfl:     fexofenadine (ALLEGRA) 180 MG tablet, Take 1 tablet by mouth Daily., Disp: , Rfl:     gabapentin (NEURONTIN) 300  MG capsule, Take 1 capsule by mouth Every 8 (Eight) Hours., Disp: 90 capsule, Rfl: 0    glucose blood test strip, Freestyle strips daily E 11.9, Disp: 100 each, Rfl: 12    glucose monitoring kit (FREESTYLE) monitoring kit, Daily E 11.93 FreeStyle meter, Disp: 1 each, Rfl: 1    Insulin Glargine, 1 Unit Dial, (Toujeo SoloStar) 300 UNIT/ML solution pen-injector injection, Inject 22 Units under the skin into the appropriate area as directed Daily., Disp: , Rfl:     Lancets (FREESTYLE) lancets, Daily E 11.9, Disp: 100 each, Rfl: 12    Multiple Vitamin (MULTIVITAMINS PO), Take 1 tablet by mouth Daily. HOLD PRIOR TO SURG, Disp: , Rfl:     ondansetron (ZOFRAN) 8 MG tablet, Take 1 tablet by mouth Every 8 (Eight) Hours As Needed for Nausea or Vomiting., Disp: 30 tablet, Rfl: 5    rosuvastatin (CRESTOR) 40 MG tablet, TAKE ONE TABLET BY MOUTH DAILY, Disp: 90 tablet, Rfl: 1    sertraline (ZOLOFT) 50 MG tablet, TAKE ONE TABLET BY MOUTH DAILY (Patient taking differently: Take 1 tablet by mouth Daily.), Disp: 90 tablet, Rfl: 1    Tirzepatide (Mounjaro) 2.5 MG/0.5ML solution pen-injector pen, Inject 0.5 mL under the skin into the appropriate area as directed 1 (One) Time Per Week. LAST DOSE 6/3/2024 INSTRUCTED PT TO HOLD FOR SURGERY, Disp: , Rfl:     TiZANidine (ZANAFLEX) 4 MG capsule, Take 1 capsule by mouth 3 (Three) Times a Day., Disp: , Rfl:     torsemide (DEMADEX) 20 MG tablet, Take 1 tablet by mouth Daily., Disp: , Rfl:     Xarelto 20 MG tablet, TAKE ONE TABLET BY MOUTH DAILY, Disp: 30 tablet, Rfl: 9    clotrimazole-betamethasone (Lotrisone) 1-0.05 % cream, Apply 1 application topically to the appropriate area as directed 2 (Two) Times a Day. Do exceed 1 week (Patient not taking: Reported on 9/26/2024), Disp: 15 g, Rfl: 1    Dextromethorphan-guaiFENesin (Mucinex DM Maximum Strength)  MG tablet sustained-release 12 hour, Take 1,200 mg by mouth., Disp: , Rfl:     magnesium oxide (MAG-OX) 400 MG tablet, Take 1 tablet by  mouth 2 (Two) Times a Day., Disp: 60 tablet, Rfl: 1  No current facility-administered medications for this visit.     No Known Allergies          Objective    OBJECTIVE:     VITAL SIGNS:  /48 (BP Location: Left arm, Patient Position: Sitting, Cuff Size: Adult)   Pulse 69   Wt 126 kg (278 lb)   SpO2 97%   BMI 38.79 kg/m²     PHYSICAL EXAM:  Normal appearance.   Head is normocephalic.   Nose appears normal.   No obvious deformity of the mouth and throat.  Conjunctivae normal.   Heart rate and rhythm is normal.  Pulmonary effort is normal.   Moving all 4 extremities.  Extremities warm.  No focal deficit present.   He is alert and oriented to person, place, and time.     LAB RESULTS:  I have reviewed all the available laboratory results in the chart.    RESULTS REVIEW:  I have reviewed the patient's all relevant laboratory and imaging findings.     Results      ASSESSMENT & PLAN:  Branden Diop is a 73 y.o. male with significant medical conditions as mentioned above presented to my clinic.    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 Given his weight, shortness of breath is likely to persist for the next few months. A CT scan of the chest will be scheduled in a month to assess the situation.  He is currently on 3 L of oxygen at home and will need it for at least the next few months. The incision site on his back was bleeding, likely due to skin irritation or scratching, and it did not look infected.    Follow-up  Return in 1 month for follow up.    I discussed the patients findings and my recommendations with the patient. The patient was given adequate time to ask questions and all questions were answered to patient satisfaction. Thank you for this consult and allowing us to participate in the care of your patient.      David Figueroa MD  Thoracic  Surgeon  [facility] [place]        Dictated utilizing Dragon dictation    I spent 40 minutes caring for Branden on this date of service. This time includes time spent by me in the following activities:preparing for the visit, reviewing tests, obtaining and/or reviewing a separately obtained history, performing a medically appropriate examination and/or evaluation , counseling and educating the patient/family/caregiver, ordering medications, tests, or procedures, referring and communicating with other health care professionals , documenting information in the medical record, independently interpreting results and communicating that information with the patient/family/caregiver, and care coordination and more than half the time was spent in direct face to face evaluation and decision making.     Patient or patient representative verbalized consent for the use of Ambient Listening during the visit with  David Figueroa MD for chart documentation. 10/3/2024  07:53 EDT

## 2024-10-10 ENCOUNTER — TELEPHONE (OUTPATIENT)
Dept: ONCOLOGY | Facility: CLINIC | Age: 74
End: 2024-10-10
Payer: MEDICARE

## 2024-10-10 PROBLEM — T45.1X5A ANEMIA ASSOCIATED WITH CHEMOTHERAPY: Status: ACTIVE | Noted: 2024-10-10

## 2024-10-10 PROBLEM — D64.81 ANEMIA ASSOCIATED WITH CHEMOTHERAPY: Status: ACTIVE | Noted: 2024-10-10

## 2024-10-25 ENCOUNTER — HOSPITAL ENCOUNTER (OUTPATIENT)
Dept: CT IMAGING | Facility: HOSPITAL | Age: 74
Discharge: HOME | End: 2024-10-25
Payer: MEDICARE

## 2024-10-25 DIAGNOSIS — C34.2 MALIGNANT NEOPLASM OF MIDDLE LOBE OF RIGHT LUNG: ICD-10-CM

## 2024-10-25 PROCEDURE — 71250 CT THORAX DX C-: CPT

## 2024-10-28 ENCOUNTER — OFFICE VISIT (OUTPATIENT)
Dept: ONCOLOGY | Facility: CLINIC | Age: 74
End: 2024-10-28
Payer: MEDICARE

## 2024-10-28 VITALS
RESPIRATION RATE: 20 BRPM | TEMPERATURE: 98 F | DIASTOLIC BLOOD PRESSURE: 79 MMHG | OXYGEN SATURATION: 95 % | WEIGHT: 277.3 LBS | SYSTOLIC BLOOD PRESSURE: 129 MMHG | BODY MASS INDEX: 38.82 KG/M2 | HEART RATE: 69 BPM | HEIGHT: 71 IN

## 2024-10-28 DIAGNOSIS — C34.2 SQUAMOUS CELL CARCINOMA OF BRONCHUS IN RIGHT MIDDLE LOBE: ICD-10-CM

## 2024-10-28 DIAGNOSIS — C34.2 MALIGNANT NEOPLASM OF MIDDLE LOBE OF RIGHT LUNG: Primary | ICD-10-CM

## 2024-10-28 PROCEDURE — 3074F SYST BP LT 130 MM HG: CPT | Performed by: NURSE PRACTITIONER

## 2024-10-28 PROCEDURE — 99215 OFFICE O/P EST HI 40 MIN: CPT | Performed by: NURSE PRACTITIONER

## 2024-10-28 PROCEDURE — 3078F DIAST BP <80 MM HG: CPT | Performed by: NURSE PRACTITIONER

## 2024-10-28 PROCEDURE — 1125F AMNT PAIN NOTED PAIN PRSNT: CPT | Performed by: NURSE PRACTITIONER

## 2024-10-28 RX ORDER — GABAPENTIN 300 MG/1
300 CAPSULE ORAL EVERY 8 HOURS SCHEDULED
Qty: 90 CAPSULE | Refills: 0 | Status: SHIPPED | OUTPATIENT
Start: 2024-10-28

## 2024-10-28 NOTE — PROGRESS NOTES
TREATMENT  PREPARATION    Branden Diop  [id number]  1950    Chief Complaint: Treatment preparation and needs assessment    History of present illness:  Branden Diop is a 74 y.o. year old male who is here today for treatment preparation and needs assessment.  The patient has been diagnosed with   Encounter Diagnosis   Name Primary?    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     7/12/2024 Cancer Staged    Staging form: Lung, AJCC 8th Edition  - Clinical stage from 7/12/2024: Stage IIIA (cT3, cN1, cM0) - Signed by Duy Villasenor MD PhD on 8/19/2024 7/31/2024 - 7/31/2024 Chemotherapy    OP LUNG PACLitaxel / CARBOplatin AUC=2 () + XRT     7/31/2024 - 10/2/2024 Chemotherapy    OP LUNG PACLitaxel / CARBOplatin AUC=6 (Q21D x 2 cycles)      11/6/2024 -  Chemotherapy    OP LUNG Pembrolizumab 200 mg     Malignant neoplasm of middle lobe of right lung   5/30/2024 Initial Diagnosis    Malignant neoplasm of middle lobe of right lung     7/31/2024 - 7/31/2024 Chemotherapy    OP LUNG PACLitaxel / CARBOplatin AUC=2 () + XRT     7/31/2024 - 10/2/2024 Chemotherapy    OP LUNG PACLitaxel / CARBOplatin AUC=6 (Q21D x 2 cycles)      11/6/2024 -  Chemotherapy    OP LUNG Pembrolizumab 200 mg         The current medication list and allergy list were reviewed and reconciled.     Past Medical History, Past Surgical History, Social History, Family History have been reviewed and are without significant changes except as mentioned.    Physical Exam:    Vitals:    10/28/24 1455   BP: 129/79   Pulse: 69   Resp: 20   Temp: 98 °F (36.7 °C)   SpO2: 95%     Vitals:    10/28/24 1455   PainSc:   5   PainLoc: Hand        ECOG score: 0             Physical Exam  HENT:      Head: Normocephalic and  atraumatic.   Eyes:      Extraocular Movements: Extraocular movements intact.      Conjunctiva/sclera: Conjunctivae normal.   Pulmonary:      Effort: Pulmonary effort is normal. No respiratory distress.      Comments: Wearing nasal cannual  Neurological:      General: No focal deficit present.      Mental Status: He is alert and oriented to person, place, and time.   Psychiatric:         Mood and Affect: Mood normal.         Behavior: Behavior normal.           NEEDS ASSESSMENTS    Genetics  The patient's new diagnosis and family history have been reviewed for genetic counseling needs. The patient will not be referred..     Psychosocial and Barriers to care  The patient has completed a PHQ-9 Depression Screening and the Distress Thermometer (DT) today.  PHQ-9 results show PHQ-2 Total Score:   PHQ-9 Total Score:        The patient scored their distress today as   on a scale of 0-10 with 0 being no distress and 10 being extreme distress. Problems marked by the patient as being an issue for them within the last week include   .      Results were reviewed along with psychosocial resources offered by our cancer center.  Our Supportive Oncology team will be flagged for a score of 4 or above, and a same day call will be made for a score of 9 or 10.  A mental health referral is offered at that time. Patients who score less than 4 have been educated on our support services and can be referred to our  upon request.  The patient will not be referred to our .       Nutrition  The patient has completed the malnutrition screening today. They scored Malnutrition Screening Tool  Have you recently lost weight without trying?  If yes, how much weight have you lost?: 0--> No   with a score of 0-1 meaning not at risk in a score of 2 or greater meaning at risk.  Patients with a score of 3 or higher will be referred to our oncology dietitian for support. Patients beginning at risk treatment regimens or who have  dietary concerns will also be referred to our oncology dietitian. The patient will not be referred.    Functional Assessment  Persons who are age 70 or greater will be screened for qualification of a comprehensive geriatric assessment by our survivorship nurse practitioner.  Older adults with cancer face unique challenges. These may include an increased risk of drug reactions, financial burdens, and caregiver stress. The patient scored   . Patients scoring 14 or lower will referred for an older adult functional assessment with the survivorship advanced practice registered nurse to ensure all needed support is provided as patients plan for their treatments. The patient will not be referred.    Intravenous Access Assessment  The patient and I discussed planned intravenous chemo/biotherapy as well as other IV treatments that are often needed throughout the course of treatment. These may include, but are not limited to blood transfusions, antibiotics, and IV hydration. Discussed that depending on selected treatment and vein assessment, patient may require venous access device (VAD) which could include but not limited to a Mediport or PICC line. Risks and benefits of VADs reviewed. The patient will be treated via Port.    Reproductive/Sexual Activity   People should avoid becoming pregnant and should not get a partner pregnant while undergoing chemo/biotherapy.  People of childbearing age should use effective contraception during active therapy. The best recommendation for all people is to use a barrier method for a minimum of 1 week after the last infusion of chemo/biotherapy to prevent your partner being exposed to byproducts from treatment medications in bodily fluids. Effective contraception should be discussed with your oncology team to make sure it is safe to take based on your diagnosis. Possible options include oral contraceptives, barrier methods. Chemo/biotherapy can change your ability to reproduce children in  "the future.  There are options for fertility preservation. NOT APPLICABLE    Advanced Care Planning  Advance Care Planning   The patient and I discussed advanced care planning, \"Conversations that Matter\".   This service is offered for development of advance directives with a certified ACP facilitator.  The patient does have an up-to-date advanced directive. This document is on file with our office. The patient is not interested in an appointment with one of our facilitators to create or update their advanced directives.               Smoking cessation  Tobacco Use: Medium Risk (10/28/2024)    Patient History     Smoking Tobacco Use: Former     Smokeless Tobacco Use: Never     Passive Exposure: Not on file       Patient and I discussed their tobacco use history. Referral will not be made for smoking cessation.      Palliative Care  When appropriate, the patient and I discussed the availability palliative care services and when appropriate Hospice care. Palliative care is not the same as Hospice care which was explained to the patient.NOT APPLICABLE.    Survivorship   When appropriate, we discussed that we will refer the patient to survivorship clinic to discuss next steps following completion of planned treatment.  Reviewed this visit will include assessment of your physical, psychological, functional, and spiritual needs as a survivor and the need at attend this visit when scheduled.    TREATMENT EDUCATION    Today I met with the patient to discuss the chemo/biotherapy regimen recommended for treatment of Malignant neoplasm of middle lobe of right lung  .  The patient was given explanation of treatment premed side effects including office policy that prohibits patients to drive if sedating medications are administered, MD explanation given regarding benefits, side effects, toxicities and goals of treatment.  The patient received a Chemotherapy/Biotherapy Plan Summary including diagnosis and explanation of specific " treatment plan.    SIDE EFFECTS:  Common side effects were discussed with the patient and/or significant other.  Discussion included where applicable hair loss/discoloration, anemia/fatigue, infection/chills/fever, appetite, bleeding risk/precautions, constipation, diarrhea, mouth sores, taste alteration, loss of appetite, nausea/vomiting, peripheral neuropathy, skin/nail changes, rash, muscle aches/weakness, photosensitivity, weight gain/loss, hearing loss, dizziness, menopausal symptoms, menstrual irregularity, sterility, high blood pressure, heart damage, liver damage, lung damage, kidney damage, DVT/PE risk, fluid retention, pleural/pericardial effusion, somnolence, electrolyte/LFT imbalance, vein exercises and/or the possible need for vascular access/port placement.  The patient was advised that although uncommon, leakage of an infused medication from the vein or venous access device may lead to skin breakdown and/or other tissue damage.  The patient was advised that he/she may have pain, bleeding, and/or bruising from the insertion of a needle in their vein or venous access device (port).  The patient was further advised that, in spite of proper technique, infection with redness and irritation may rarely occur at the site where the needle was inserted.  The patient was advised that if complications occur, additional medical treatment is available.  Finally, where applicable we have reviewed rare but potential immune mediated side effects including shortness of breath, cough, chest pain (pneumonitis), abdominal pain, diarrhea (colitis), thyroiditis (hypothyroid or hyperthyroid), hepatitis and liver dysfunction, nephritis and renal dysfunction.    Discussion also included side effects specific to drugs in the treatment plan, specifically:    Treatment Plans       Name Type Plan Dates Plan Provider         Active    OP LUNG Pembrolizumab 200 mg ONCOLOGY TREATMENT 11/5/2024 - Present Duy Villasenor MD PhD                       Questions answered and additional information discussed on topics including:  Anemia, Thrombocytopenia, Neutropenia, Nutrition and appetite changes, Constipation, Diarrhea, Nausea & vomiting, Alopecia, Intimate activity, Nervous system changes, Pain, Skin & nail changes, and Organ toxicities       Assessment and Plan:    Diagnoses and all orders for this visit:    1. Malignant neoplasm of middle lobe of right lung (Primary)      No orders of the defined types were placed in this encounter.        The patient and I have reviewed their diagnosis and scheduled treatment plan. Needs assessment was completed where applicable including genetics, psychosocial needs, barriers to care, VAD evaluation, advanced care planning, survivorship, and palliative care services where indicated. Referrals have been ordered as appropriate based upon evaluation today and patient desires.   Chemo/biotherapy teaching was completed today and consent obtained. See separate documentation for further details.  Adequate time was given to answer questions.  Patient made aware of their care team members and contact information if they have questions or problems throughout the treatment course.  Discussion held and written information provided describing frequency of office visits and ongoing monitoring throughout the treatment plan.     Reviewed with patient any prescribed medication sent to pharmacy.  Education provided regarding proper storage, safe handling, and proper disposal of unused medication.  Proper handling of body fluids and waste discussed and written information provided.  If appropriate, patient had pretreatment labs drawn today.    Learning assessment completed at initial patient encounter. See separate flowsheet. Chemo/biotherapy education comprehension assessed at today's visit.    I spent 40 minutes caring for Branden on this date of service. This time includes time spent by me in the following activities: preparing  for the visit, reviewing tests, obtaining and/or reviewing a separately obtained history, performing a medically appropriate examination and/or evaluation, counseling and educating the patient/family/caregiver, ordering medications, tests, or procedures, documenting information in the medical record, and care coordination.     Dione Shaikh, APRN   10/28/24

## 2024-10-28 NOTE — PROGRESS NOTES
[facility] Hematology/Oncology Treatment Plan Summary    Name: Branden Diop  [facility]# [id number]  MD: Dr. Villasenor    Diagnosis:     ICD-10-CM ICD-9-CM   1. Malignant neoplasm of middle lobe of right lung  C34.2 162.4     Stage: IIIa      Goal of treatment: adjuvant    Treatment Medication(s):   Pembrolizumab (Keytruda)    Frequency: Every 3 weeks    Number of cycles: To be determined.    Starting on: 11/6/2024 8:45am    Items for home use: Imodium AD (for diarrhea), Tylenol (for fever and/or pain), and Thermometer      Completing Provider: BAM Salgado             Date/time: 10/28/2024      Please note: You will be seen by a provider frequently with your treatment plan. This plan may change depending on many factors, if so, this will be discussed with you by your physician.  Last update 03/2022.     never used

## 2024-10-29 DIAGNOSIS — C34.2 SQUAMOUS CELL CARCINOMA OF BRONCHUS IN RIGHT MIDDLE LOBE: ICD-10-CM

## 2024-10-29 RX ORDER — GABAPENTIN 300 MG/1
300 CAPSULE ORAL EVERY 8 HOURS
Qty: 90 CAPSULE | OUTPATIENT
Start: 2024-10-29

## 2024-10-31 ENCOUNTER — OFFICE VISIT (OUTPATIENT)
Dept: SURGERY | Facility: CLINIC | Age: 74
End: 2024-10-31
Payer: MEDICARE

## 2024-10-31 VITALS
BODY MASS INDEX: 38.93 KG/M2 | OXYGEN SATURATION: 94 % | WEIGHT: 279 LBS | DIASTOLIC BLOOD PRESSURE: 70 MMHG | HEART RATE: 72 BPM | RESPIRATION RATE: 16 BRPM | SYSTOLIC BLOOD PRESSURE: 123 MMHG

## 2024-10-31 DIAGNOSIS — C34.2 MALIGNANT NEOPLASM OF MIDDLE LOBE OF RIGHT LUNG: Primary | ICD-10-CM

## 2024-10-31 PROCEDURE — 99214 OFFICE O/P EST MOD 30 MIN: CPT | Performed by: SURGERY

## 2024-10-31 PROCEDURE — 3074F SYST BP LT 130 MM HG: CPT | Performed by: SURGERY

## 2024-10-31 PROCEDURE — 3078F DIAST BP <80 MM HG: CPT | Performed by: SURGERY

## 2024-11-06 ENCOUNTER — INFUSION (OUTPATIENT)
Dept: ONCOLOGY | Facility: HOSPITAL | Age: 74
End: 2024-11-06
Payer: MEDICARE

## 2024-11-06 ENCOUNTER — OFFICE VISIT (OUTPATIENT)
Dept: ONCOLOGY | Facility: CLINIC | Age: 74
End: 2024-11-06
Payer: MEDICARE

## 2024-11-06 VITALS
RESPIRATION RATE: 16 BRPM | SYSTOLIC BLOOD PRESSURE: 113 MMHG | WEIGHT: 282.4 LBS | TEMPERATURE: 98.1 F | DIASTOLIC BLOOD PRESSURE: 66 MMHG | OXYGEN SATURATION: 95 % | BODY MASS INDEX: 39.53 KG/M2 | HEIGHT: 71 IN | HEART RATE: 61 BPM

## 2024-11-06 DIAGNOSIS — C34.2 SQUAMOUS CELL CARCINOMA OF BRONCHUS IN RIGHT MIDDLE LOBE: ICD-10-CM

## 2024-11-06 DIAGNOSIS — Z79.899 ENCOUNTER FOR LONG-TERM (CURRENT) USE OF HIGH-RISK MEDICATION: ICD-10-CM

## 2024-11-06 DIAGNOSIS — C34.2 MALIGNANT NEOPLASM OF MIDDLE LOBE OF RIGHT LUNG: ICD-10-CM

## 2024-11-06 DIAGNOSIS — C34.2 MALIGNANT NEOPLASM OF MIDDLE LOBE OF RIGHT LUNG: Primary | ICD-10-CM

## 2024-11-06 DIAGNOSIS — T45.1X5A ANEMIA ASSOCIATED WITH CHEMOTHERAPY: ICD-10-CM

## 2024-11-06 DIAGNOSIS — Z45.2 FITTING AND ADJUSTMENT OF VASCULAR CATHETER: ICD-10-CM

## 2024-11-06 DIAGNOSIS — C34.2 SQUAMOUS CELL CARCINOMA OF BRONCHUS IN RIGHT MIDDLE LOBE: Primary | ICD-10-CM

## 2024-11-06 DIAGNOSIS — T45.1X5A PERIPHERAL NEUROPATHY DUE TO CHEMOTHERAPY: ICD-10-CM

## 2024-11-06 DIAGNOSIS — G62.0 PERIPHERAL NEUROPATHY DUE TO CHEMOTHERAPY: ICD-10-CM

## 2024-11-06 DIAGNOSIS — D64.81 ANEMIA ASSOCIATED WITH CHEMOTHERAPY: ICD-10-CM

## 2024-11-06 LAB
ALBUMIN SERPL-MCNC: 3.8 G/DL (ref 3.5–5.2)
ALBUMIN/GLOB SERPL: 1.1 G/DL
ALP SERPL-CCNC: 112 U/L (ref 39–117)
ALT SERPL W P-5'-P-CCNC: 12 U/L (ref 1–41)
ANION GAP SERPL CALCULATED.3IONS-SCNC: 14.3 MMOL/L (ref 5–15)
AST SERPL-CCNC: 25 U/L (ref 1–40)
BASOPHILS # BLD AUTO: 0.04 10*3/MM3 (ref 0–0.2)
BASOPHILS NFR BLD AUTO: 0.7 % (ref 0–1.5)
BILIRUB SERPL-MCNC: 0.7 MG/DL (ref 0–1.2)
BUN SERPL-MCNC: 12 MG/DL (ref 8–23)
BUN/CREAT SERPL: 18.5 (ref 7–25)
CALCIUM SPEC-SCNC: 9 MG/DL (ref 8.6–10.5)
CHLORIDE SERPL-SCNC: 99 MMOL/L (ref 98–107)
CO2 SERPL-SCNC: 25.7 MMOL/L (ref 22–29)
CREAT SERPL-MCNC: 0.65 MG/DL (ref 0.76–1.27)
DEPRECATED RDW RBC AUTO: 58.7 FL (ref 37–54)
EGFRCR SERPLBLD CKD-EPI 2021: 97.7 ML/MIN/1.73
EOSINOPHIL # BLD AUTO: 0.29 10*3/MM3 (ref 0–0.4)
EOSINOPHIL NFR BLD AUTO: 5.1 % (ref 0.3–6.2)
ERYTHROCYTE [DISTWIDTH] IN BLOOD BY AUTOMATED COUNT: 17 % (ref 12.3–15.4)
FERRITIN SERPL-MCNC: 135 NG/ML (ref 30–400)
FOLATE SERPL-MCNC: 13.9 NG/ML (ref 4.78–24.2)
GLOBULIN UR ELPH-MCNC: 3.5 GM/DL
GLUCOSE SERPL-MCNC: 181 MG/DL (ref 65–99)
HCT VFR BLD AUTO: 34.6 % (ref 37.5–51)
HGB BLD-MCNC: 11 G/DL (ref 13–17.7)
IMM GRANULOCYTES # BLD AUTO: 0.03 10*3/MM3 (ref 0–0.05)
IMM GRANULOCYTES NFR BLD AUTO: 0.5 % (ref 0–0.5)
IRON 24H UR-MRATE: 68 MCG/DL (ref 59–158)
IRON SATN MFR SERPL: 16 % (ref 20–50)
LYMPHOCYTES # BLD AUTO: 1.39 10*3/MM3 (ref 0.7–3.1)
LYMPHOCYTES NFR BLD AUTO: 24.2 % (ref 19.6–45.3)
MAGNESIUM SERPL-MCNC: 1.7 MG/DL (ref 1.6–2.4)
MCH RBC QN AUTO: 30.1 PG (ref 26.6–33)
MCHC RBC AUTO-ENTMCNC: 31.8 G/DL (ref 31.5–35.7)
MCV RBC AUTO: 94.8 FL (ref 79–97)
MONOCYTES # BLD AUTO: 0.5 10*3/MM3 (ref 0.1–0.9)
MONOCYTES NFR BLD AUTO: 8.7 % (ref 5–12)
NEUTROPHILS NFR BLD AUTO: 3.49 10*3/MM3 (ref 1.7–7)
NEUTROPHILS NFR BLD AUTO: 60.8 % (ref 42.7–76)
NRBC BLD AUTO-RTO: 0 /100 WBC (ref 0–0.2)
PLATELET # BLD AUTO: 215 10*3/MM3 (ref 140–450)
PMV BLD AUTO: 9.2 FL (ref 6–12)
POTASSIUM SERPL-SCNC: 4.1 MMOL/L (ref 3.5–5.2)
PROT SERPL-MCNC: 7.3 G/DL (ref 6–8.5)
RBC # BLD AUTO: 3.65 10*6/MM3 (ref 4.14–5.8)
SODIUM SERPL-SCNC: 139 MMOL/L (ref 136–145)
T4 FREE SERPL-MCNC: 1.06 NG/DL (ref 0.92–1.68)
TIBC SERPL-MCNC: 428 MCG/DL (ref 298–536)
TRANSFERRIN SERPL-MCNC: 287 MG/DL (ref 200–360)
TSH SERPL DL<=0.05 MIU/L-ACNC: 1.84 UIU/ML (ref 0.27–4.2)
VIT B12 BLD-MCNC: 525 PG/ML (ref 211–946)
WBC NRBC COR # BLD AUTO: 5.74 10*3/MM3 (ref 3.4–10.8)

## 2024-11-06 PROCEDURE — 83735 ASSAY OF MAGNESIUM: CPT

## 2024-11-06 PROCEDURE — 82728 ASSAY OF FERRITIN: CPT | Performed by: INTERNAL MEDICINE

## 2024-11-06 PROCEDURE — 25010000002 HEPARIN LOCK FLUSH PER 10 UNITS: Performed by: INTERNAL MEDICINE

## 2024-11-06 PROCEDURE — 25010000002 PEMBROLIZUMAB 100 MG/4ML SOLUTION 4 ML VIAL: Performed by: INTERNAL MEDICINE

## 2024-11-06 PROCEDURE — 83540 ASSAY OF IRON: CPT | Performed by: INTERNAL MEDICINE

## 2024-11-06 PROCEDURE — 80053 COMPREHEN METABOLIC PANEL: CPT

## 2024-11-06 PROCEDURE — 82607 VITAMIN B-12: CPT | Performed by: INTERNAL MEDICINE

## 2024-11-06 PROCEDURE — 85025 COMPLETE CBC W/AUTO DIFF WBC: CPT

## 2024-11-06 PROCEDURE — 84439 ASSAY OF FREE THYROXINE: CPT | Performed by: INTERNAL MEDICINE

## 2024-11-06 PROCEDURE — 96413 CHEMO IV INFUSION 1 HR: CPT

## 2024-11-06 PROCEDURE — 36415 COLL VENOUS BLD VENIPUNCTURE: CPT | Performed by: INTERNAL MEDICINE

## 2024-11-06 PROCEDURE — 84466 ASSAY OF TRANSFERRIN: CPT | Performed by: INTERNAL MEDICINE

## 2024-11-06 PROCEDURE — 84443 ASSAY THYROID STIM HORMONE: CPT | Performed by: INTERNAL MEDICINE

## 2024-11-06 PROCEDURE — 82746 ASSAY OF FOLIC ACID SERUM: CPT | Performed by: INTERNAL MEDICINE

## 2024-11-06 RX ORDER — SODIUM CHLORIDE 9 MG/ML
20 INJECTION, SOLUTION INTRAVENOUS ONCE
Status: CANCELLED | OUTPATIENT
Start: 2024-11-06

## 2024-11-06 RX ORDER — FOLIC ACID 1 MG/1
1 TABLET ORAL DAILY
Qty: 90 TABLET | Refills: 3 | Status: SHIPPED | OUTPATIENT
Start: 2024-11-06

## 2024-11-06 RX ORDER — HEPARIN SODIUM (PORCINE) LOCK FLUSH IV SOLN 100 UNIT/ML 100 UNIT/ML
500 SOLUTION INTRAVENOUS AS NEEDED
Status: DISCONTINUED | OUTPATIENT
Start: 2024-11-06 | End: 2024-11-06 | Stop reason: HOSPADM

## 2024-11-06 RX ORDER — LANOLIN ALCOHOL/MO/W.PET/CERES
50 CREAM (GRAM) TOPICAL DAILY
Qty: 90 TABLET | Refills: 3 | Status: SHIPPED | OUTPATIENT
Start: 2024-11-06

## 2024-11-06 RX ORDER — HEPARIN SODIUM (PORCINE) LOCK FLUSH IV SOLN 100 UNIT/ML 100 UNIT/ML
500 SOLUTION INTRAVENOUS AS NEEDED
OUTPATIENT
Start: 2024-11-06

## 2024-11-06 RX ORDER — SODIUM CHLORIDE 0.9 % (FLUSH) 0.9 %
10 SYRINGE (ML) INJECTION AS NEEDED
Status: DISCONTINUED | OUTPATIENT
Start: 2024-11-06 | End: 2024-11-06 | Stop reason: HOSPADM

## 2024-11-06 RX ORDER — SODIUM CHLORIDE 0.9 % (FLUSH) 0.9 %
10 SYRINGE (ML) INJECTION AS NEEDED
OUTPATIENT
Start: 2024-11-06

## 2024-11-06 RX ORDER — LANOLIN ALCOHOL/MO/W.PET/CERES
1000 CREAM (GRAM) TOPICAL DAILY
Qty: 90 TABLET | Refills: 3 | Status: SHIPPED | OUTPATIENT
Start: 2024-11-06

## 2024-11-06 RX ADMIN — Medication 500 UNITS: at 10:31

## 2024-11-06 RX ADMIN — SODIUM CHLORIDE 200 MG: 900 INJECTION, SOLUTION INTRAVENOUS at 10:04

## 2024-11-06 RX ADMIN — Medication 10 ML: at 10:31

## 2024-11-06 NOTE — PROGRESS NOTES
REASON FOR FOLLOW-UP:     *. Poorly differentiated squamous cell lung cancer with intralobular metastatic disease, stage IIIa ([id number]).  Status post right middle lobectomy on 6/12/2024.  Patient was started on adjuvant chemotherapy carboplatin AUC 5, and Taxol 200 mg/m² on 7/31/2024.      HISTORY OF PRESENT ILLNESS:  The patient is a 74 y.o. year old male who is here for evaluation with the above-mentioned history.    History of Present Illness  The patient presented today on 11/06/2024 for evaluation prior to starting adjuvant immunotherapy with pembrolizumab.  Patient is accompanied by his wife.    Patient reports at baseline condition.  Denies fever sweating chills.  No chest pain no dyspnea no cough hemoptysis.  Denies bleeding or bruising.  No lightheadedness or fainting.    Results  Laboratory Studies 11/6/2024  Creatinine 0.65. Glucose 181 otherwise unremarkable CMP. Hemoglobin 11.0. Platelets 215,000. WBC 5740. Neutrophils 3490.      Past Medical History:   Diagnosis Date    Anxiety     Arthritis     Atrial fibrillation     CURRENTLY    Bunion of right foot     Colon polyps     COPD (chronic obstructive pulmonary disease)     MILD. NO MEDS FOR    Depression     History of atrial fibrillation     WITH CARDIOVERSION. NO PROBLEMS    History of skin cancer     BCC REMOVED FROM BACK    [place] (hard of hearing)     Hyperlipidemia     Inguinal hernia, recurrent     RIGHT    Left foot pain     Lung nodules     Rash     IN GROIN TREATING FOR YEAST INFECTION BY PCP    Redness of skin     LOWER LEGS BILATERAL    Scab     BILATERAL LEGS HEALING    Sleep apnea with use of continuous positive airway pressure (CPAP)     CPAP    Streptococcus pneumoniae     ABOUT 6-7 YR AGO.     Type 2 diabetes mellitus      Past Surgical History:   Procedure Laterality Date    BASAL CELL CARCINOMA EXCISION      BRONCHOSCOPY WITH ION ROBOTIC ASSIST N/A 5/6/2024    Procedure: BRONCHOSCOPY WITH ION ROBOT WITH FNA, BIOPSIES, BAL AND  ENDOBRONCHIAL ULTRASOUND WITH FNA;  Surgeon: Yony Coles MD;  Location: [facility] ENDOSCOPY;  Service: Robotics - Pulmonary;  Laterality: N/A;  PRE: PULMONARY NODULES  POST: SAME    CARDIAC CATHETERIZATION N/A 11/15/2021    Procedure: Coronary angiography;  Surgeon: Alfredo Jennings MD;  Location: [facility] CATH INVASIVE LOCATION;  Service: Cardiovascular;  Laterality: N/A;    CARDIAC CATHETERIZATION N/A 11/15/2021    Procedure: Left heart cath;  Surgeon: Alfredo Jennings MD;  Location: [facility] CATH INVASIVE LOCATION;  Service: Cardiovascular;  Laterality: N/A;    CARDIAC CATHETERIZATION N/A 11/15/2021    Procedure: Left ventriculography;  Surgeon: Alfredo Jennings MD;  Location: [facility] CATH INVASIVE LOCATION;  Service: Cardiovascular;  Laterality: N/A;    CARDIAC CATHETERIZATION N/A 11/15/2021    Procedure: Aortic root aortogram;  Surgeon: Alfredo Jennings MD;  Location: [facility] CATH INVASIVE LOCATION;  Service: Cardiovascular;  Laterality: N/A;    CARDIOVERSION      CHOLECYSTECTOMY N/A 10/07/2017    Procedure: CHOLECYSTECTOMY LAPAROSCOPIC;  Surgeon: Martir Trevino MD;  Location: [facility] MAIN OR;  Service:     COLONOSCOPY  2007    COLONOSCOPY N/A 8/30/2022    Procedure: COLONOSCOPY TO CECUM AND TI AND COLD SNARE POLYPECTOMY;  Surgeon: Cj Lopez MD;  Location: [facility] ENDOSCOPY;  Service: Gastroenterology;  Laterality: N/A;  Pre: History of colon polyps  Post: POLYP, PREVIOUS TATTOO    FOOT SURGERY Left     TOES ARE PINNED. ALL BUT GREAT TOE    HAND SURGERY      THUMB    HERNIA REPAIR      INGUINAL HERNIA REPAIR Right 06/27/2018    Procedure: INGUINAL HERNIA REPAIR, OPEN, RECURRENT;  Surgeon: Martir Trevino MD;  Location: [facility] OR OSC;  Service: General    LASIK Bilateral     LOBECTOMY Right 6/12/2024    Procedure: BRONCHOSCOPY, RIGHT VIDEO ASSISTED THORACOSCOPY WITH RIGHT MIDDLE LOBECTOMY WITH DAVINCI ROBOT, MEDIASTINAL LYMPH NODE DISSECTION, INTERCOSTAL NERVE BLOCK;  Surgeon: David Figueroa,  MD;  Location: [facility] OR;  Service: Robotics - DaVinci;  Laterality: Right;    NECK SURGERY      growth on salivary gland    REPLACEMENT TOTAL KNEE Right     (he is going to have a LTKR next month)    REPLACEMENT TOTAL KNEE Left     VENOUS ACCESS DEVICE (PORT) INSERTION Right 2024    Procedure: INSERTION VENOUS ACCESS DEVICE;  Surgeon: David Figueroa MD;  Location: [facility];  Service: Thoracic;  Laterality: Right;       ONCOLOGY HISTORY:  The patient is a 73 years old male, who presents for oncology evaluation 2024. He is accompanied by his wife.    He underwent right middle lobe lobectomy for squamous cell lung cancer by Dr. Figueroa on 2024 and is recovering well from the surgery. However, there is an open wound at the site of chest tube on the right side of the chest which now has a few stitches, which is still leaking. He was advised to apply Band-Aids.  He reports no fever sweating or chills.      Patient has been on oxygen supplementation since surgery, currently 2 L/min.  Patient says occasionally at home he does not need oxygen.  Dr. Figueroa has suggested gradually reducing his oxygen use.     His appetite remains normal.    Patient reports some family health issues.  His son-in-law recently  of HLH just last week.  His brother-in-law has stage IV liver cancer.     This patient has a history of emphysema, followed by pulmonologist Dr. Camp.  His high-resolution chest CT scan on 2021 reported 7 mm nodule in the right middle lobe.  There is also index subcarinal lymph node 1.1 cm.     Patient subsequently had serial CT scan examination.    On 4/10/2023 chest high-resolution CT scan reported stable examination with the pulmonary nodules measuring up to 9-10 mm in the right middle lobe and a sub-6 mm in the right upper lobe.  The largest subcarinal lymph node measures  2.1 x  1.2 cm.     However chest high resolution CT scan 2024 reported disease progression, with right middle lobe  pulm nodule 1.8 cm from margin at 9 mm.  There is also a new right middle lobe 1.1 cm nodule.  Stable right upper lobe 7 mm nodule.  Also a new right hilar lymphadenopathy up to 2 cm.  The 1.5 cm subcarinal lymph node is stable.  No pleural effusion.    Patient subsequently had PET scan examination on 4/22/2024 requested by Dr. Camp.  This reported SUV 3.1 corresponding to the 19 mm right middle lobe nodule.  The 1.1 cm right middle nodule was photopenic.  The right hilar lymph node with SUV 5.6.  The 1.5 cm subcarinal lymph node with maximum SUV 7.    Patient subsequently seen by Dr. Figueroa who performed ION bronchoscopic biopsy on 5/7/2024 with pathology evaluation reporting squamous cell carcinoma involving one of the right middle lobe lesion, and the other pulmonary nodule is at least squamous cell carcinoma in situ.  PD-L1 study TPS was 0%.       Dr. Figueroa performed robot-assisted thoracoscopic right middle lobe lobectomy and mediastinal lymph node dissection on 6/12/2024.  Pathology evaluation reported poorly differentiated squamous cell carcinoma, clear margin, however with lymph nodes involved 3 lymph nodes in the right 10 R, 11 R and 12 R lymph node station.  There was also frequent lymphovascular invasion and focal perineural invasion.    Patient subsequently had a portacatheter placement on 7/5/2024.      The patient presents today on 7/31/2024 for re-evaluation to start chemotherapy.    His case was presented at the multimodality conference 7/18/2024.  Because of negative margin and N1 disease, as well as marginal pulmonary function, the consensus was for patient to have adjuvant chemotherapy alone without radiation.    On 7/18/2024 peripheral blood was sent for Tempus xF liquid biopsy with inconclusive results, no reportable treatment options found.    His lung cancer sample from 6/12/2024 was sent for Tempus xT DNA and RNA study.  It reported stable MSI.  Tumor mutation burden 6.3 m/MB.  Negative for EGFR,  KRAS, BRAF, ALK, ROS1, RET, MET, HER2.    The patient recently consulted her nephrologist, during which blood work was conducted. The results indicated a slight presence of protein in urine. He denies experiencing any dyspnea.        MEDICATIONS    Current Outpatient Medications:     clotrimazole-betamethasone (Lotrisone) 1-0.05 % cream, Apply 1 application topically to the appropriate area as directed 2 (Two) Times a Day. Do exceed 1 week, Disp: 15 g, Rfl: 1    Dextromethorphan-guaiFENesin (Mucinex DM Maximum Strength)  MG tablet sustained-release 12 hour, Take 1,200 mg by mouth., Disp: , Rfl:     fexofenadine (ALLEGRA) 180 MG tablet, Take 1 tablet by mouth Daily., Disp: , Rfl:     gabapentin (NEURONTIN) 300 MG capsule, Take 1 capsule by mouth Every 8 (Eight) Hours., Disp: 90 capsule, Rfl: 0    glucose blood test strip, Freestyle strips daily E 11.9, Disp: 100 each, Rfl: 12    glucose monitoring kit (FREESTYLE) monitoring kit, Daily E 11.93 FreeStyle meter, Disp: 1 each, Rfl: 1    Insulin Glargine, 1 Unit Dial, (Toujeo SoloStar) 300 UNIT/ML solution pen-injector injection, Inject 22 Units under the skin into the appropriate area as directed Daily., Disp: , Rfl:     Lancets (FREESTYLE) lancets, Daily E 11.9, Disp: 100 each, Rfl: 12    magnesium oxide (MAG-OX) 400 MG tablet, Take 1 tablet by mouth 2 (Two) Times a Day., Disp: 60 tablet, Rfl: 1    Multiple Vitamin (MULTIVITAMINS PO), Take 1 tablet by mouth Daily. HOLD PRIOR TO SURG, Disp: , Rfl:     ondansetron (ZOFRAN) 8 MG tablet, Take 1 tablet by mouth Every 8 (Eight) Hours As Needed for Nausea or Vomiting., Disp: 30 tablet, Rfl: 5    rosuvastatin (CRESTOR) 40 MG tablet, TAKE ONE TABLET BY MOUTH DAILY, Disp: 90 tablet, Rfl: 1    sertraline (ZOLOFT) 50 MG tablet, TAKE ONE TABLET BY MOUTH DAILY (Patient taking differently: Take 1 tablet by mouth Daily.), Disp: 90 tablet, Rfl: 1    Tirzepatide (Mounjaro) 2.5 MG/0.5ML solution pen-injector pen, Inject 0.5 mL  under the skin into the appropriate area as directed 1 (One) Time Per Week. LAST DOSE 6/3/2024 INSTRUCTED PT TO HOLD FOR SURGERY, Disp: , Rfl:     TiZANidine (ZANAFLEX) 4 MG capsule, Take 1 capsule by mouth 3 (Three) Times a Day., Disp: , Rfl:     torsemide (DEMADEX) 20 MG tablet, Take 1 tablet by mouth Daily., Disp: , Rfl:     Xarelto 20 MG tablet, TAKE ONE TABLET BY MOUTH DAILY, Disp: 30 tablet, Rfl: 9    amoxicillin-clavulanate (AUGMENTIN) 875-125 MG per tablet, Take 1 tablet by mouth 2 (Two) Times a Day. (Patient not taking: Reported on 11/6/2024), Disp: , Rfl:     ALLERGIES:   No Known Allergies    SOCIAL HISTORY:       Social History     Socioeconomic History    Marital status:      Spouse name: Luba    Number of children: 2   Tobacco Use    Smoking status: Former     Current packs/day: 0.00     Average packs/day: 1 pack/day for 30.0 years (30.0 ttl pk-yrs)     Types: Cigars, Cigarettes     Start date: 1/1/1984     Quit date: 1/1/2014     Years since quitting: 10.8    Smokeless tobacco: Never   Vaping Use    Vaping status: Never Used   Substance and Sexual Activity    Alcohol use: Never     Comment: caffeine use- decaf coffee    Drug use: Never    Sexual activity: Defer         FAMILY HISTORY:  Family History   Problem Relation Age of Onset    Cancer Other     Stroke Mother     Alcohol abuse Father     Malig Hyperthermia Neg Hx        REVIEW OF SYSTEMS:  Review of Systems   Constitutional:  Positive for activity change and fatigue. Negative for appetite change, chills, diaphoresis and fever.   HENT:  Negative for nosebleeds and trouble swallowing.    Eyes:  Negative for visual disturbance.   Respiratory:  Positive for shortness of breath. Negative for cough and wheezing.    Cardiovascular:  Negative for chest pain and leg swelling.   Gastrointestinal:  Negative for abdominal pain and blood in stool.   Genitourinary:  Negative for frequency and hematuria.   Musculoskeletal:  Negative for joint swelling.  "  Allergic/Immunologic: Negative for immunocompromised state.   Neurological:  Negative for weakness and numbness.   Hematological:  Negative for adenopathy. Does not bruise/bleed easily.   Psychiatric/Behavioral:  Negative for confusion.               Vitals:    11/06/24 0911   BP: 113/66   Pulse: 61   Resp: 16   Temp: 98.1 °F (36.7 °C)   TempSrc: Oral   SpO2: 95%   Weight: 128 kg (282 lb 6.4 oz)   Height: 180.3 cm (70.98\")   PainSc:   6   PainLoc: Hand  Comment: Hands and feet         11/6/2024     9:12 AM   Current Status   ECOG score 0     Physical Exam    GENERAL:  Well-developed, well-nourished elderly male BMI 39.4 in no acute distress.    SKIN:  Warm, dry without rashes, purpura or petechiae.  HEENT:  Normocephalic.   LYMPHATICS:  No cervical, supraclavicular or axillary adenopathy.  CHEST: Normal respiratory effort.  Lungs clear to auscultation. Good airflow.  CARDIAC:  Regular rate and rhythm. Normal S1,S2.  ABDOMEN:  Soft, no tender.  Bowel sounds normal.  EXTREMITIES:  No lower extremity edema.        RECENT LABS:  Results from last 7 days   Lab Units 11/06/24  0812   WBC 10*3/mm3 5.74   NEUTROS ABS 10*3/mm3 3.49   HEMOGLOBIN g/dL 11.0*   HEMATOCRIT % 34.6*   PLATELETS 10*3/mm3 215     Results from last 7 days   Lab Units 11/06/24  0812   SODIUM mmol/L 139   POTASSIUM mmol/L 4.1   CHLORIDE mmol/L 99   CO2 mmol/L 25.7   BUN mg/dL 12   CREATININE mg/dL 0.65*   CALCIUM mg/dL 9.0   ALBUMIN g/dL 3.8   BILIRUBIN mg/dL 0.7   ALK PHOS U/L 112   ALT (SGPT) U/L 12   AST (SGOT) U/L 25   GLUCOSE mg/dL 181*   MAGNESIUM mg/dL 1.7         No results found for: \"IRON\", \"LABIRON\", \"TRANSFERRIN\", \"TIBC\", \"FERRITIN\"  No results found for: \"FOLATE\"  No results found for: \"ANLQZSJG17\"      Assessment & Plan     Assessment & Plan      1. Poorly differentiated squamous cell lung cancer with intralobular metastatic disease, stage IIIa ([id number]).  Tumor was poorly differentiated. This patient has stage 3a disease.   Patient " had Ion bronchoscopic biopsy 5/6/2024.  Right middle lobe reported fragments of squamous cell carcinoma.  PD-L1 study reported TPS 0%.  I discussed with the patient on 7/12/2024 that he will need adjuvant chemotherapy and concurrent adjuvant radiation therapy, and likely will need consolidative immunotherapy. I recommended using weekly carboplatin plus Taxol, in conjunction with radiotherapy for 6.5 weeks treatment. In reality, he probably will only receive 5 or 6 weekly chemotherapy instead of the 7 doses due to tolerance issues.   We will present his case at the chest conference on 7/18/2024, due to poor pulmonary function, negative surgical margins and N1 disease, the consensus is for patient to have adjuvant chemotherapy without concurrent radiation therapy.  Also recommended genetic study.   On 7/18/2024 peripheral blood was sent for Tempus xF liquid biopsy with inconclusive results, no reportable treatment options found.   His lung cancer sample from 6/12/2024 was sent for Tempus xT DNA and RNA study.  It reported stable MSI.  Tumor mutation burden 6.3 m/MB.  Negative for EGFR, KRAS, BRAF, ALK, ROS1, RET, MET, HER2.  Tumor sample was positive for BRIP1 mutation, TP53 mutation and also ARID1B mutation, all of those LOF.   On 7/31/2024 will start the patient on adjuvant chemotherapy.  Because his overall health condition, performance status ECOG 2, his age, he would not tolerate cisplatin based chemotherapy.  So we recommended using carboplatin in conjunction with Taxol every 3 weeks chemotherapy for 4 cycles.  Unfortunately patient would not be a candidate for immunotherapy as part of adjuvant therapy.  8/21/2024 patient presents for evaluation prior to cycle #2 adjuvant chemotherapy.  He is currently undergoing chemotherapy with a total of four cycles planned; this is his second cycle. The biopsy from his lung revealed a PD-L1 negative result. Given his overall health condition, he is not physically capable of  tolerating the most toxic chemotherapy, cisplatin, and is therefore being treated with carboplatin. A request has been made for a larger tissue sample from his surgery to be retested for PD-L1. If the result is positive, immunotherapy could be considered for prolonged treatment, which has shown better results in preventing cancer recurrence. This decision will be made after the completion of his chemotherapy, to determin whether maintenance or consolidative immunotherapy is necessary. He continues on oxygen supplementation at 3 L/min.  PD-L1 study from the lobectomy sample reported positive for PD-L1 with a TPS tumor proportion score 5%.  9/11/2024 due today for cycle 3 carboplatin/Taxol.  He is overall tolerating this well with stable neuropathy.  We will reduce his steroids as further detailed below due to exacerbation of his underlying DM type II.  Today 10/2/2024 he presented for evaluation prior to his cycle #4 adjuvant chemotherapy with carboplatin plus Taxol. Laboratory studies today reported normal WBC 5700, neutrophils 3010, hemoglobin 11.7, and platelets 240,000. Chemistry lab reported baseline creatinine 0.69, normal electrolytes except magnesium 1.5, and marginally elevated alkaline phosphatase 133. Given the presence of neuropathy, there is a concern that this could develop into a chronic issue. A reduction in the dosage of Taxol by 25% is recommended.   Today on 11/6/2024 Cycle 1 adjuvant immunotherapy with pembrolizumab will be initiated and repeated every 3 weeks.  This will be total for 12 months.    2.  Emphysema.    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  He was instructed by his surgeon Dr. Figueroa to taper off gradually.  Today on 11/6/2024 patient reports that he is currently on oxygen supplementation at 3 L/min and reports no cough or hemoptysis.    3.  Monoclonal gammopathy of unknown significance.  IgA kappa.  This was discovered at the his nephrologist  office.  Laboratory study on 4/13/2022 reported serum monoclonal IgA kappa with elevated IgA 604 mg/dL, normal IgG 861 and IgM 84.  Free kappa chain was 38.4, lambda 21.3, kappa/lambda ratio 1.8.  Lab study on 7/19/2024 at her nephrologist office Positive serum monoclonal IgA kappa. IgA was slightly elevated at 597 with normal serum IgG 830 and IgM 95. Kappa chain is 50.4 and lambda 23.3 with a kappa lambda ratio of 2.16.  Continue to observe.      4.  DM type II  Patient is on sliding scale insulin prior to meals and bedtime along with weekly Mounjaro  Blood sugars are being exacerbated by oral dexamethasone.  He is also receiving IV dexamethasone.  At this point he is doing well with Taxol we will discontinue oral dexamethasone and adjust IV premedication.  11/6/2024 laboratory study conducted today reported a glucose level of 181. Diabetes management will be continued.    5.  Peripheral neuropathy due to chemotherapy.   He reports tingling in the hands and feet, which interferes with functionality, such as writing checks. This neuropathy started since beginning chemotherapy. He is currently taking gabapentin, which he reports is helping. The neuropathy is likely caused by Taxol.  Today on 11/6/2024 patient reports that he continues to experience peripheral neuropathy, particularly in his feet, characterized by numbness and tingling. Despite being on gabapentin 300 mg three times a day, he reports no apparent effect. Oral vitamin B12 at 1000 mcg daily, folic acid 1 mg daily, and vitamin B6 at 50 mg daily have been recommended to alleviate peripheral neuropathy symptoms.    6. 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  Last chemotherapy finished on 10/2/2024.  Hemoglobin was 11.7.  Laboratory studies conducted today on 11/6/2024 reported persistent anemia with hemoglobin level of 11.0 and MCV of 94.8. An iron study with ferritin, iron profile, along with B12 and folate, has been recommended for further  assessment.      PLAN:   Proceed with cycle #1 adjuvant immunotherapy with pembrolizumab.  This to be repeated every 3 weeks.  Obtain laboratory study today for further evaluation of anemia, ferritin iron profile B12 and folate level.  Oral vitamin B12 at 1000 mcg daily, folic acid 1 mg daily, and vitamin B6 at 50 mg daily have been recommended to alleviate peripheral neuropathy symptoms.  I E scribed prescriptions to his pharmacy.  I will see patient in 3 weeks for reevaluation assess toxicity from immunotherapy, with laboratory studies CBC CMP and do for cycle 2 pembrolizumab.  Management of diabetes with the primary care physician.  Patient continues on insulin 4 times daily and Mounjaro weekly.  Continue anticoagulation with Xarelto 20 mg daily.  Continue gabapentin 300 mg every 8 hours for peripheral neuropathy caused by chemotherapy.    I spent 44 minutes caring for Branden on this date of service. This time includes time spent by me in the following activities: preparing for the visit, reviewing tests, obtaining and/or reviewing a separately obtained history, performing a medically appropriate examination and/or evaluation, counseling and educating the patient/family/caregiver, ordering medications, tests, or procedures, referring and communicating with other health care professionals, documenting information in the medical record, independently interpreting results and communicating that information with the patient/family/caregiver and care coordination       This patient is on high risk drug therapy requiring intensive monitoring for toxicity.        Duy Villasenor MD PhD  11/06/24      CC:  MD John Paul Govea MD Sasser, Robert L, MD

## 2024-11-11 ENCOUNTER — PATIENT OUTREACH (OUTPATIENT)
Dept: OTHER | Facility: HOSPITAL | Age: 74
End: 2024-11-11
Payer: MEDICARE

## 2024-11-11 NOTE — PROGRESS NOTES
Reviewed chart. Patient with poorly differentiated squamous cell lung cancer with intralobular metastatic disease, stage IIIa ([id number]).  Status post right middle lobectomy on 6/12/2024. Patient was started on adjuvant chemotherapy carboplatin AUC 5, and Taxol 200 mg/m² on 7/31/2024, rec'd 4th/final cycle 10/2. Rec'd cycle #1 adjuvant immunotherapy with Pembrolizumab 11/6. This to be repeated every 3 weeks.     Called patient. Left message that I was just touching base to see how he was doing with treatment. Wanted to know if he had any questions/concerns or resource/supportive care needs; requested cb

## 2024-11-12 ENCOUNTER — PATIENT OUTREACH (OUTPATIENT)
Dept: OTHER | Facility: HOSPITAL | Age: 74
End: 2024-11-12
Payer: MEDICARE

## 2024-11-12 ENCOUNTER — TELEPHONE (OUTPATIENT)
Dept: ONCOLOGY | Facility: CLINIC | Age: 74
End: 2024-11-12
Payer: MEDICARE

## 2024-11-12 NOTE — TELEPHONE ENCOUNTER
Provider: Dr. Villasenor  Caller: Luba Diop  Relationship to Patient: Spouse  Call Back Phone Number: [phone]  Reason for Call: Pt needs advice on medication. Spouse stated pt will not take medication with water. Pt has a rash and is  constipated.

## 2024-11-12 NOTE — PROGRESS NOTES
Call from patient's wife. She stated that the patient is constipated. He takes Dulcolax, which helps him have a BM then he develops constipation again. We discussed increasing his fiber and water in his diet if tolerated. She bought Metamucil although he hasn't started it. The patient's wife stated he doesn't like to drink water.  The patient's wife also stated that he had a rash prior to receiving his first immunotherapy, which is now worse.  The patient's wife will contact Dr. Villasenor or his nurse Queta regarding these issues.      We discussed his upcoming appts including a nephrology visit today.    I will continue to follow

## 2024-11-12 NOTE — TELEPHONE ENCOUNTER
"Patients wife called me back. She stated the patient was constipated. The patient has not had a BM since yesterday morning and he feels constipated. I asked if he had been taking otc stool softeners and he had been. I asked him if he was taking 2 tabs BID and he stated he was only taking 2 every morning. I asked if they had miralax and they did. I explained he could add miralax in juice and drink that to also help. She then mentioned a rash that was only where he had been using cerve which was newly added by them for \"rough skin.\" I explained that if the rash was only in these areas that we would believe that it was r/t the cerve. I told her to try aquaphor for thr rough skin and that he could try benadryl 25mg tonight at bedtime for the inflammation. She wrote this down and read it back and v/u.   "

## 2024-11-13 ENCOUNTER — TELEPHONE (OUTPATIENT)
Dept: ONCOLOGY | Facility: CLINIC | Age: 74
End: 2024-11-13
Payer: MEDICARE

## 2024-11-13 DIAGNOSIS — Z79.899 ENCOUNTER FOR LONG-TERM (CURRENT) USE OF HIGH-RISK MEDICATION: Primary | ICD-10-CM

## 2024-11-13 DIAGNOSIS — C34.2 SQUAMOUS CELL CARCINOMA OF BRONCHUS IN RIGHT MIDDLE LOBE: ICD-10-CM

## 2024-11-13 DIAGNOSIS — C34.2 MALIGNANT NEOPLASM OF MIDDLE LOBE OF RIGHT LUNG: ICD-10-CM

## 2024-11-13 NOTE — TELEPHONE ENCOUNTER
"Patient's wife states patient has redness and soreness in his abdominal folds. Patient's wife states he is a large man and has in her words \"fat fold\".  Advised patient to clean and dry areas daily, use a antifungal powder or cream and if necessary can fold a wash cloth to place in the area to keep dry.    Patient's wife v/u  "

## 2024-11-13 NOTE — TELEPHONE ENCOUNTER
Provider: Dr. Villasenor  Caller: Luba Diop  Relationship to Patient: Spouse  Call Back Phone Number: [phone]  Reason for Call: Pt 's spouse called. She stated pt has a spot under his belly. Not sure if it's infected. Please call to advise

## 2024-11-14 ENCOUNTER — TELEPHONE (OUTPATIENT)
Dept: ONCOLOGY | Facility: CLINIC | Age: 74
End: 2024-11-14
Payer: MEDICARE

## 2024-11-14 NOTE — TELEPHONE ENCOUNTER
Provider: [place]  Caller: Luba  Relationship to Patient: wife  Call Back Phone Number: [phone]  Reason for Call: patient has still not gone to the bathroom

## 2024-11-15 ENCOUNTER — TELEPHONE (OUTPATIENT)
Dept: ONCOLOGY | Facility: CLINIC | Age: 74
End: 2024-11-15
Payer: MEDICARE

## 2024-11-15 ENCOUNTER — TRANSCRIBE ORDERS (OUTPATIENT)
Dept: ADMINISTRATIVE | Facility: HOSPITAL | Age: 74
End: 2024-11-15
Payer: MEDICARE

## 2024-11-15 DIAGNOSIS — R60.9 EDEMA, UNSPECIFIED TYPE: ICD-10-CM

## 2024-11-15 DIAGNOSIS — E78.5 DYSLIPIDEMIA: ICD-10-CM

## 2024-11-15 DIAGNOSIS — R80.9 PROTEINURIA, UNSPECIFIED TYPE: Primary | ICD-10-CM

## 2024-11-15 DIAGNOSIS — E55.9 VITAMIN D DEFICIENCY, UNSPECIFIED: ICD-10-CM

## 2024-11-15 DIAGNOSIS — I10 BENIGN HYPERTENSION: ICD-10-CM

## 2024-11-15 NOTE — TELEPHONE ENCOUNTER
Caller: Luba Diop - WIFE    Relationship: Emergency Contact    Best call back number: [phone]    What is the best time to reach you: ASAP    Who are you requesting to speak with (clinical staff, provider,  specific staff member): CLINICAL    What was the call regarding: PT WENT TO [facility] THIS MORNING, THEY DID AN X-RAY & STARTED PT HAS A BONE TUMOR ON HIS LEFT WRIST.  [facility] WANT MORE TESTING?    Is it okay if the provider responds through MyChart: NO

## 2024-11-16 NOTE — PROGRESS NOTES
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 male who has significant medical problems as mentioned in the medical chart.     History of Present Illness  He had shortness of breath and high-resolution CT scan was performed on 6/14/2021.  He has small noncalcified lung nodules measuring up to 7 mm.  Repeat CT scan of the chest on 4/10/2023 reported stable pulmonary nodule and presence of emphysema.  CT scan of the chest in 12 months was recommended which was performed on 4/2/2024 and reported 1.8 cm and 1.1 cm right middle lobe solid nodules.  PET/CT on 4/23/2024 reported mildly hypermetabolic right middle lobe 1.9 cm and 1.1 cm solid nodule. The findings were concerning for malignant disease.  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  He had ION robotic navigational bronchoscopy of the right middle lobe nodule.  One of the nodules had fragment of squamous cell carcinoma and the other nodule had highly dysplastic squamous epithelium suggesting at least squamous cell carcinoma in situ.  Level 11 L, 7, 4R, 11 R were negative for metastatic disease.  MRI of the brain was negative for metastatic disease.  He was referred to thoracic surgery for further evaluation.     At the time of initial consultation, he could occasionally walk 1 block without dyspnea. He denied any history of myocardial infarction, cerebrovascular accident, hepatic or renal issues. He had pneumonia in 2017 resulting in a 17-day intensive care unit stay. His respiratory function has not returned to its previous state. A recent pulmonary function test was conducted on 05/10/2024. He is under the care of a cardiologist and is currently on anticoagulant therapy. He reported occasional pedal  edema. His echocardiogram and lung tests yielded normal results.      On 6/12/2024, he underwent robot-assisted thoracoscopic right middle lobectomy, systematic mediastinal lymph node dissection.  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  There were 2 additional branches to the right middle lobe noted.  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  He tolerated the procedure well.  There were no intraoperative or immediate postoperative complications.  He was discharged home in a stable condition on supplemental oxygen.  The final pathology revealed poorly differentiated, invasive squamous cell carcinoma, nonkeratinizing.  He had separate metastases in the same lobe.  All resection margins were negative for carcinoma.  I was able to retrieve 9 lymph nodes and the level 10 R, 11 R and 12 are were positive for malignancy.     He had ipsilateral lobar metastases and lymph node involvement. He recovered well from surgery. He required adjuvant chemotherapy. He needed Mediport for systemic treatment which was placed on 7/5/2024.     He came to clinic for follow-up visit.  He reported experiencing mild shortness of breath during physical activities.  He reported undergoing chemotherapy, with one more session remaining, and will be receiving immunotherapy.  He reported using 3 liters of oxygen at home, but could manage without it for 6 to 7 hours when he is active.  He reported no weight loss and maintains a good appetite.     CT scan of the chest on 10/25/2024 reported new nodular infiltrate in the left lower lobe favoring infectious versus inflammatory condition.  There is small right pleural effusion which is slightly increased in size.    He reports that his oxygen therapy is beneficial, particularly when walking long distances or climbing stairs. He has a healthy appetite and recently underwent a CT scan. His last chemotherapy session was completed, and he is scheduled to start  immunotherapy on 11/06/2024. He also mentions a sore spot on his back that was previously draining but is now dry.    He experiences numbness in his feet, which he considers his most significant issue. Despite being prescribed magnesium, he has not noticed any improvement in his symptoms. He has an upcoming appointment with Dr. Villasenor on 11/06/2024.    Past Medical History:   Diagnosis Date    Anxiety     Arthritis     Atrial fibrillation     CURRENTLY    Bunion of right foot     Colon polyps     COPD (chronic obstructive pulmonary disease)     MILD. NO MEDS FOR    Depression     History of atrial fibrillation     WITH CARDIOVERSION. NO PROBLEMS    History of skin cancer     BCC REMOVED FROM BACK    [place] (hard of hearing)     Hyperlipidemia     Inguinal hernia, recurrent     RIGHT    Left foot pain     Lung nodules     Rash     IN GROIN TREATING FOR YEAST INFECTION BY PCP    Redness of skin     LOWER LEGS BILATERAL    Scab     BILATERAL LEGS HEALING    Sleep apnea with use of continuous positive airway pressure (CPAP)     CPAP    Streptococcus pneumoniae     ABOUT 6-7 YR AGO.     Type 2 diabetes mellitus        Past Surgical History:   Procedure Laterality Date    BASAL CELL CARCINOMA EXCISION      BRONCHOSCOPY WITH ION ROBOTIC ASSIST N/A 5/6/2024    Procedure: BRONCHOSCOPY WITH ION ROBOT WITH FNA, BIOPSIES, BAL AND ENDOBRONCHIAL ULTRASOUND WITH FNA;  Surgeon: Yony Coles MD;  Location: [facility] ENDOSCOPY;  Service: Robotics - Pulmonary;  Laterality: N/A;  PRE: PULMONARY NODULES  POST: SAME    CARDIAC CATHETERIZATION N/A 11/15/2021    Procedure: Coronary angiography;  Surgeon: Alfredo Jennings MD;  Location: [facility] CATH INVASIVE LOCATION;  Service: Cardiovascular;  Laterality: N/A;    CARDIAC CATHETERIZATION N/A 11/15/2021    Procedure: Left heart cath;  Surgeon: Alfredo Jennings MD;  Location: [facility] CATH INVASIVE LOCATION;  Service: Cardiovascular;  Laterality: N/A;    CARDIAC CATHETERIZATION N/A  11/15/2021    Procedure: Left ventriculography;  Surgeon: Alfredo Jennings MD;  Location: [facility] CATH INVASIVE LOCATION;  Service: Cardiovascular;  Laterality: N/A;    CARDIAC CATHETERIZATION N/A 11/15/2021    Procedure: Aortic root aortogram;  Surgeon: Alfredo Jennings MD;  [facility]ASIVE [facility];  Service: Cardiovascular;  Laterality: N/A;    CARDIOVERSION      CHOLECYSTECTOMY N/A 10/07/2017    Procedure: CHOLECYSTECTOMY LAPAROSCOPIC;  Surgeon: Martir Trevino MD;  Location: [facility] MAIN OR;  Service:     COLONOSCOPY  2007    COLONOSCOPY N/A 8/30/2022    Procedure: COLONOSCOPY TO CECUM AND TI AND COLD SNARE POLYPECTOMY;  Surgeon: Cj Lopez MD;  Location: [facility] ENDOSCOPY;  Service: Gastroenterology;  Laterality: N/A;  Pre: History of colon polyps  Post: POLYP, PREVIOUS TATTOO    FOOT SURGERY Left     TOES ARE PINNED. ALL BUT GREAT TOE    HAND SURGERY      THUMB    HERNIA REPAIR      INGUINAL HERNIA REPAIR Right 06/27/2018    Procedure: INGUINAL HERNIA REPAIR, OPEN, RECURRENT;  Surgeon: Martir Trevino MD;  Location: [facility] OR OSC;  Service: General    LASIK Bilateral     LOBECTOMY Right 6/12/2024    Procedure: BRONCHOSCOPY, RIGHT VIDEO ASSISTED THORACOSCOPY WITH RIGHT MIDDLE LOBECTOMY WITH DAVINCI ROBOT, MEDIASTINAL LYMPH NODE DISSECTION, INTERCOSTAL NERVE BLOCK;  Surgeon: David Figueroa MD;  Location: [facility] MAIN OR;  Service: Robotics - DaVinci;  Laterality: Right;    NECK SURGERY      growth on salivary gland    REPLACEMENT TOTAL KNEE Right 2007    (he is going to have a LTKR next month)    REPLACEMENT TOTAL KNEE Left     VENOUS ACCESS DEVICE (PORT) INSERTION Right 7/5/2024    Procedure: INSERTION VENOUS ACCESS DEVICE;  Surgeon: David Figueroa MD;  Location: [facility] MAIN OR;  Service: Thoracic;  Laterality: Right;       Family History   Problem Relation Age of Onset    Cancer Other     Stroke Mother     Alcohol abuse Father     Malig Hyperthermia Neg Hx        Social History      Socioeconomic History    Marital status:      Spouse name: Luba    Number of children: 2   Tobacco Use    Smoking status: Former     Current packs/day: 0.00     Average packs/day: 1 pack/day for 30.0 years (30.0 ttl pk-yrs)     Types: Cigars, Cigarettes     Start date: 1/1/1984     Quit date: 1/1/2014     Years since quitting: 10.8    Smokeless tobacco: Never   Vaping Use    Vaping status: Never Used   Substance and Sexual Activity    Alcohol use: Never     Comment: caffeine use- decaf coffee    Drug use: Never    Sexual activity: Defer         Current Outpatient Medications:     clotrimazole-betamethasone (Lotrisone) 1-0.05 % cream, Apply 1 application topically to the appropriate area as directed 2 (Two) Times a Day. Do exceed 1 week, Disp: 15 g, Rfl: 1    Dextromethorphan-guaiFENesin (Mucinex DM Maximum Strength)  MG tablet sustained-release 12 hour, Take 1,200 mg by mouth., Disp: , Rfl:     fexofenadine (ALLEGRA) 180 MG tablet, Take 1 tablet by mouth Daily., Disp: , Rfl:     gabapentin (NEURONTIN) 300 MG capsule, Take 1 capsule by mouth Every 8 (Eight) Hours., Disp: 90 capsule, Rfl: 0    glucose blood test strip, Freestyle strips daily E 11.9, Disp: 100 each, Rfl: 12    glucose monitoring kit (FREESTYLE) monitoring kit, Daily E 11.93 FreeStyle meter, Disp: 1 each, Rfl: 1    Insulin Glargine, 1 Unit Dial, (Toujeo SoloStar) 300 UNIT/ML solution pen-injector injection, Inject 22 Units under the skin into the appropriate area as directed Daily., Disp: , Rfl:     Lancets (FREESTYLE) lancets, Daily E 11.9, Disp: 100 each, Rfl: 12    magnesium oxide (MAG-OX) 400 MG tablet, Take 1 tablet by mouth 2 (Two) Times a Day., Disp: 60 tablet, Rfl: 1    Multiple Vitamin (MULTIVITAMINS PO), Take 1 tablet by mouth Daily. HOLD PRIOR TO SURG, Disp: , Rfl:     ondansetron (ZOFRAN) 8 MG tablet, Take 1 tablet by mouth Every 8 (Eight) Hours As Needed for Nausea or Vomiting., Disp: 30 tablet, Rfl: 5    rosuvastatin  (CRESTOR) 40 MG tablet, TAKE ONE TABLET BY MOUTH DAILY, Disp: 90 tablet, Rfl: 1    sertraline (ZOLOFT) 50 MG tablet, TAKE ONE TABLET BY MOUTH DAILY (Patient taking differently: Take 1 tablet by mouth Daily.), Disp: 90 tablet, Rfl: 1    Tirzepatide (Mounjaro) 2.5 MG/0.5ML solution pen-injector pen, Inject 0.5 mL under the skin into the appropriate area as directed 1 (One) Time Per Week. LAST DOSE 6/3/2024 INSTRUCTED PT TO HOLD FOR SURGERY, Disp: , Rfl:     TiZANidine (ZANAFLEX) 4 MG capsule, Take 1 capsule by mouth 3 (Three) Times a Day., Disp: , Rfl:     torsemide (DEMADEX) 20 MG tablet, Take 1 tablet by mouth Daily., Disp: , Rfl:     Xarelto 20 MG tablet, TAKE ONE TABLET BY MOUTH DAILY, Disp: 30 tablet, Rfl: 9    folic acid (FOLVITE) 1 MG tablet, Take 1 tablet by mouth Daily., Disp: 90 tablet, Rfl: 3    vitamin B-12 (CYANOCOBALAMIN) 1000 MCG tablet, Take 1 tablet by mouth Daily., Disp: 90 tablet, Rfl: 3    vitamin B-6 (PYRIDOXINE) 50 MG tablet, Take 1 tablet by mouth Daily., Disp: 90 tablet, Rfl: 3     No Known Allergies          Objective    OBJECTIVE:     VITAL SIGNS:  /70 (BP Location: Left arm, Patient Position: Sitting, Cuff Size: Adult)   Pulse 72   Resp 16   Wt 127 kg (279 lb)   SpO2 94%   BMI 38.93 kg/m²     PHYSICAL EXAM:  Normal appearance.   Head is normocephalic.   Nose appears normal.   No obvious deformity of the mouth and throat.  Conjunctivae normal.   Heart rate and rhythm is normal.  Pulmonary effort is normal.   Moving all 4 extremities.  Extremities warm.  No focal deficit present.   Alert and oriented to person, place, and time.     RESULTS REVIEW:  I have reviewed the patient's all relevant laboratory and imaging findings.     Assessment & Plan    ASSESSMENT & PLAN:  Branden Diop is a 74 y.o. male with significant medical conditions as mentioned above presented to my clinic.    Assessment & Plan  1.  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 A small area was noted, likely due to infection or inflammation. The tenderness on his back appears to be a small area of healing tissue with some fluid underneath, but it does not seem to be infected. He was advised to cover the incision with gauze if it opens up. Should the incision become red and warm, an antibiotic may be necessary. A follow-up CT scan will be scheduled in 3 months.    2. Neuropathy.  He was advised to discuss his neuropathy with Dr. Villasenor during his upcoming appointment on the sixth. He is currently on magnesium, but he reports no noticeable difference. Gabapentin was mentioned as a potential treatment option for neuropathic pain.    Follow-up  Return in 3 months for follow up.    I discussed the patients findings and my recommendations with the patient/family/caregiver. The patient/family/caregiver was given adequate time to ask questions and all questions were answered to patient satisfaction. Thank you for this consult and allowing us to participate in the care of your patient.      David Figueroa MD  Thoracic Surgeon  [facility]        Dictated utilizing Dragon dictation    I spent 30 minutes caring for Branden on this date of service. This time includes time spent by me in the following activities:preparing for the visit, reviewing tests, obtaining and/or reviewing a separately obtained history, performing a medically appropriate examination and/or evaluation, counseling and educating the patient/family/caregiver, ordering medications, tests, or procedures, referring and communicating with other health care professionals , documenting information in the medical record, independently interpreting results and communicating that information with the patient/family/caregiver, and care coordination and more than half the time was spent in direct face to  face evaluation and decision making.     Patient or patient representative verbalized consent for the use of Ambient Listening during the visit with  David Figueroa MD for chart documentation. 11/16/2024  08:57 EST

## 2024-11-18 ENCOUNTER — TELEPHONE (OUTPATIENT)
Dept: ONCOLOGY | Facility: CLINIC | Age: 74
End: 2024-11-18
Payer: MEDICARE

## 2024-11-18 ENCOUNTER — TRANSCRIBE ORDERS (OUTPATIENT)
Dept: ADMINISTRATIVE | Facility: HOSPITAL | Age: 74
End: 2024-11-18
Payer: MEDICARE

## 2024-11-18 ENCOUNTER — HOSPITAL ENCOUNTER (OUTPATIENT)
Dept: MRI IMAGING | Facility: HOSPITAL | Age: 74
Discharge: HOME | End: 2024-11-18
Admitting: INTERNAL MEDICINE
Payer: MEDICARE

## 2024-11-18 DIAGNOSIS — M25.432 PAIN AND SWELLING OF LEFT WRIST: ICD-10-CM

## 2024-11-18 DIAGNOSIS — R93.89 ABNORMAL FINDING ON IMAGING: ICD-10-CM

## 2024-11-18 DIAGNOSIS — C34.2 MALIGNANT NEOPLASM OF MIDDLE LOBE OF RIGHT LUNG: ICD-10-CM

## 2024-11-18 DIAGNOSIS — M25.532 PAIN AND SWELLING OF LEFT WRIST: ICD-10-CM

## 2024-11-18 DIAGNOSIS — C34.2 SQUAMOUS CELL CARCINOMA OF BRONCHUS IN RIGHT MIDDLE LOBE: ICD-10-CM

## 2024-11-18 DIAGNOSIS — C34.2 SQUAMOUS CELL CARCINOMA OF BRONCHUS IN RIGHT MIDDLE LOBE: Primary | ICD-10-CM

## 2024-11-18 DIAGNOSIS — Z45.2 FITTING AND ADJUSTMENT OF VASCULAR CATHETER: ICD-10-CM

## 2024-11-18 DIAGNOSIS — R80.9 PROTEINURIA, UNSPECIFIED TYPE: Primary | ICD-10-CM

## 2024-11-18 PROCEDURE — A9577 INJ MULTIHANCE: HCPCS | Performed by: INTERNAL MEDICINE

## 2024-11-18 PROCEDURE — 25510000002 GADOBENATE DIMEGLUMINE 529 MG/ML SOLUTION: Performed by: INTERNAL MEDICINE

## 2024-11-18 PROCEDURE — 73220 MRI UPPR EXTREMITY W/O&W/DYE: CPT

## 2024-11-18 RX ADMIN — GADOBENATE DIMEGLUMINE 20 ML: 529 INJECTION, SOLUTION INTRAVENOUS at 12:27

## 2024-11-18 NOTE — TELEPHONE ENCOUNTER
Patient's wife called stating patient went to  Immediate care on Friday for knee and Left hand pain.   Per X-ray imaging done at : IMPRESSION:  Osteoarthritis of the left wrist.  Intramedullary central expansile tumor involving the proximal phalanx of the left thumb.  Differential includes enchondroma, giant cell tumor, fibrous dysplasia, myeloma, or metastatic disease.  Correlate clinically     Per Dr Villasenor ordered placed for a MRI Left Hand to evaluate hand.  Message sent to appointment desk to scheduling.    Patient's wife v/u

## 2024-11-19 ENCOUNTER — TELEPHONE (OUTPATIENT)
Dept: ONCOLOGY | Facility: CLINIC | Age: 74
End: 2024-11-19
Payer: MEDICARE

## 2024-11-19 DIAGNOSIS — M25.532 PAIN AND SWELLING OF LEFT WRIST: ICD-10-CM

## 2024-11-19 DIAGNOSIS — C34.2 SQUAMOUS CELL CARCINOMA OF BRONCHUS IN RIGHT MIDDLE LOBE: Primary | ICD-10-CM

## 2024-11-19 DIAGNOSIS — R93.89 ABNORMAL FINDING ON IMAGING: ICD-10-CM

## 2024-11-19 DIAGNOSIS — M25.432 PAIN AND SWELLING OF LEFT WRIST: ICD-10-CM

## 2024-11-19 NOTE — TELEPHONE ENCOUNTER
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 X-ray at the [facility] showed a abnormal area of concern. Dr Villasenor ordered a MRI Left hand and the results showed Large expansile bone lesion involving the majority of the thumb proximal phalanx with thin peripheral and septal enhancement and linear internal regions of low signal that could represent calcification. The mass is nonspecific but could represent an enchondroma or low-grade chondrosarcoma.    Dr Villasenor spoke with Radiology and they agreed patient needs to be referred to a Hand Surgeon. Patient will be referred to [facility] at the [address] [place] [address] location.    Patient called and informed of the referral. In speaking with patient he stated that he had a accident with a saw approximately 10 years ago in which the left thumb was detached and [facility] reattached the thumb and he has had a \"knot\" in the area of the reattachment since then.     Patient v/u  "

## 2024-11-21 ENCOUNTER — TELEPHONE (OUTPATIENT)
Dept: ONCOLOGY | Facility: CLINIC | Age: 74
End: 2024-11-21
Payer: MEDICARE

## 2024-11-21 DIAGNOSIS — C34.2 SQUAMOUS CELL CARCINOMA OF BRONCHUS IN RIGHT MIDDLE LOBE: ICD-10-CM

## 2024-11-21 RX ORDER — GABAPENTIN 300 MG/1
600 CAPSULE ORAL EVERY 8 HOURS SCHEDULED
Qty: 180 CAPSULE | Refills: 2 | Status: SHIPPED | OUTPATIENT
Start: 2024-11-21 | End: 2024-11-21 | Stop reason: SDUPTHER

## 2024-11-21 RX ORDER — GABAPENTIN 300 MG/1
600 CAPSULE ORAL EVERY 8 HOURS SCHEDULED
Qty: 180 CAPSULE | Refills: 2 | Status: SHIPPED | OUTPATIENT
Start: 2024-11-21

## 2024-11-21 RX ORDER — GABAPENTIN 300 MG/1
600 CAPSULE ORAL EVERY 8 HOURS SCHEDULED
Qty: 180 CAPSULE | Refills: 2 | Status: SHIPPED | OUTPATIENT
Start: 2024-11-21 | End: 2024-11-21

## 2024-11-21 NOTE — TELEPHONE ENCOUNTER
Caller: Luba Diop    Relationship: Emergency Contact    Best call back number: [phone]    What is the best time to reach you: ANYTIME    Who are you requesting to speak with (clinical staff, provider,  specific staff member):   DR GATES / CLINICAL    What was the call regarding: PT SEEN HIS FOOT DOCTOR ON 11[place]19 [place] [facility] [place] [facility] [place] AND THEY HAVE ADVISED PT TO CONTACT DR GATES TO SEE IF HIS GABAPENTIN CAN BE INCREASED DUE TO HIS PAIN    [facility] [place] [facility] [place] [phone]

## 2024-11-21 NOTE — TELEPHONE ENCOUNTER
Per call from patient's wife Patient SEEN HIS FOOT DOCTOR ON 11-19 AT [facility] AND THEY HAVE ADVISED PT TO CONTACT DR GATES TO SEE IF HIS GABAPENTIN CAN BE INCREASED DUE TO HIS PAIN       Per Dr Gates patient to increase Gabapentin from 300 mg three times a day to 600 mg three times a day. New prescription sent to [facility].    Patient's wife V/U

## 2024-11-22 RX ORDER — HYDROCORTISONE 25 MG/G
1 CREAM TOPICAL 2 TIMES DAILY
Qty: 453.6 G | Refills: 2 | Status: SHIPPED | OUTPATIENT
Start: 2024-11-22

## 2024-11-23 NOTE — PROGRESS NOTES
After-hours call: Patient reporting worsening generalized skin rash involving trunk and extremities.  He just completed steroid taper yesterday by his report.  He has been using Aquaphor with no improvement.  Question immunotherapy induced skin rash.  Prescription sent for hydrocortisone 2.5% topical twice daily.  Patient instructed to call back if rash worsens further and we will need to consider systemic steroids.

## 2024-11-24 ENCOUNTER — HOSPITAL ENCOUNTER (EMERGENCY)
Facility: HOSPITAL | Age: 74
Discharge: HOME | End: 2024-11-24
Attending: EMERGENCY MEDICINE | Admitting: EMERGENCY MEDICINE
Payer: MEDICARE

## 2024-11-24 ENCOUNTER — APPOINTMENT (OUTPATIENT)
Dept: GENERAL RADIOLOGY | Facility: HOSPITAL | Age: 74
End: 2024-11-24
Payer: MEDICARE

## 2024-11-24 VITALS
TEMPERATURE: 97.1 F | DIASTOLIC BLOOD PRESSURE: 75 MMHG | OXYGEN SATURATION: 93 % | SYSTOLIC BLOOD PRESSURE: 135 MMHG | WEIGHT: 282.19 LBS | RESPIRATION RATE: 22 BRPM | HEIGHT: 71 IN | HEART RATE: 67 BPM | BODY MASS INDEX: 39.51 KG/M2

## 2024-11-24 DIAGNOSIS — R21 GENERALIZED RASH: ICD-10-CM

## 2024-11-24 DIAGNOSIS — M25.511 ACUTE PAIN OF RIGHT SHOULDER: ICD-10-CM

## 2024-11-24 DIAGNOSIS — M25.532 ACUTE PAIN OF LEFT WRIST: Primary | ICD-10-CM

## 2024-11-24 LAB
ANION GAP SERPL CALCULATED.3IONS-SCNC: 13.8 MMOL/L (ref 5–15)
BASOPHILS # BLD AUTO: 0.03 10*3/MM3 (ref 0–0.2)
BASOPHILS NFR BLD AUTO: 0.3 % (ref 0–1.5)
BUN SERPL-MCNC: 19 MG/DL (ref 8–23)
BUN/CREAT SERPL: 19.8 (ref 7–25)
CALCIUM SPEC-SCNC: 9.2 MG/DL (ref 8.6–10.5)
CHLORIDE SERPL-SCNC: 95 MMOL/L (ref 98–107)
CO2 SERPL-SCNC: 27.2 MMOL/L (ref 22–29)
CREAT SERPL-MCNC: 0.96 MG/DL (ref 0.76–1.27)
CRP SERPL-MCNC: 11.62 MG/DL (ref 0–0.5)
DEPRECATED RDW RBC AUTO: 57.3 FL (ref 37–54)
EGFRCR SERPLBLD CKD-EPI 2021: 81.9 ML/MIN/1.73
EOSINOPHIL # BLD AUTO: 0.39 10*3/MM3 (ref 0–0.4)
EOSINOPHIL NFR BLD AUTO: 4.3 % (ref 0.3–6.2)
ERYTHROCYTE [DISTWIDTH] IN BLOOD BY AUTOMATED COUNT: 16.2 % (ref 12.3–15.4)
ERYTHROCYTE [SEDIMENTATION RATE] IN BLOOD: 58 MM/HR (ref 0–20)
GLUCOSE SERPL-MCNC: 205 MG/DL (ref 65–99)
HCT VFR BLD AUTO: 38.3 % (ref 37.5–51)
HGB BLD-MCNC: 11.9 G/DL (ref 13–17.7)
IMM GRANULOCYTES # BLD AUTO: 0.05 10*3/MM3 (ref 0–0.05)
IMM GRANULOCYTES NFR BLD AUTO: 0.6 % (ref 0–0.5)
LYMPHOCYTES # BLD AUTO: 1.07 10*3/MM3 (ref 0.7–3.1)
LYMPHOCYTES NFR BLD AUTO: 11.9 % (ref 19.6–45.3)
MCH RBC QN AUTO: 29.5 PG (ref 26.6–33)
MCHC RBC AUTO-ENTMCNC: 31.1 G/DL (ref 31.5–35.7)
MCV RBC AUTO: 94.8 FL (ref 79–97)
MONOCYTES # BLD AUTO: 0.62 10*3/MM3 (ref 0.1–0.9)
MONOCYTES NFR BLD AUTO: 6.9 % (ref 5–12)
NEUTROPHILS NFR BLD AUTO: 6.82 10*3/MM3 (ref 1.7–7)
NEUTROPHILS NFR BLD AUTO: 76 % (ref 42.7–76)
NRBC BLD AUTO-RTO: 0 /100 WBC (ref 0–0.2)
PLATELET # BLD AUTO: 244 10*3/MM3 (ref 140–450)
PMV BLD AUTO: 8.9 FL (ref 6–12)
POTASSIUM SERPL-SCNC: 3.6 MMOL/L (ref 3.5–5.2)
RBC # BLD AUTO: 4.04 10*6/MM3 (ref 4.14–5.8)
SODIUM SERPL-SCNC: 136 MMOL/L (ref 136–145)
WBC NRBC COR # BLD AUTO: 8.98 10*3/MM3 (ref 3.4–10.8)

## 2024-11-24 PROCEDURE — 99283 EMERGENCY DEPT VISIT LOW MDM: CPT

## 2024-11-24 PROCEDURE — 85652 RBC SED RATE AUTOMATED: CPT | Performed by: EMERGENCY MEDICINE

## 2024-11-24 PROCEDURE — 86140 C-REACTIVE PROTEIN: CPT | Performed by: EMERGENCY MEDICINE

## 2024-11-24 PROCEDURE — 73110 X-RAY EXAM OF WRIST: CPT

## 2024-11-24 PROCEDURE — 73030 X-RAY EXAM OF SHOULDER: CPT

## 2024-11-24 PROCEDURE — 80048 BASIC METABOLIC PNL TOTAL CA: CPT | Performed by: EMERGENCY MEDICINE

## 2024-11-24 PROCEDURE — 85025 COMPLETE CBC W/AUTO DIFF WBC: CPT | Performed by: EMERGENCY MEDICINE

## 2024-11-24 RX ORDER — HYDROCODONE BITARTRATE AND ACETAMINOPHEN 5; 325 MG/1; MG/1
1 TABLET ORAL EVERY 6 HOURS PRN
Qty: 12 TABLET | Refills: 0 | Status: SHIPPED | OUTPATIENT
Start: 2024-11-24

## 2024-11-24 RX ORDER — HYDROCODONE BITARTRATE AND ACETAMINOPHEN 7.5; 325 MG/1; MG/1
1 TABLET ORAL ONCE
Status: COMPLETED | OUTPATIENT
Start: 2024-11-24 | End: 2024-11-24

## 2024-11-24 RX ADMIN — HYDROCODONE BITARTRATE AND ACETAMINOPHEN 1 TABLET: 7.5; 325 TABLET ORAL at 13:03

## 2024-11-24 NOTE — ED PROVIDER NOTES
EMERGENCY DEPARTMENT ENCOUNTER    Room Number:  29/29  PCP: Tino Lopez MD  Historian: Patient, Spouse    I initially evaluated the patient at 10:26 AM    HPI:  Chief Complaint: Left wrist pain, right shoulder pain, rash  A complete HPI/ROS/PMH/PSH/SH/FH are unobtainable due to: Patient is a poor historian  Context: Branden Diop is a 74 y.o. male with a medical history of hyperlipidemia, arthritis, atrial fibrillation, type 2 diabetes, chronic respiratory failure, COPD, lung cancer who presents to the ED c/o acute left wrist pain, right shoulder pain, and generalized rash.  Symptoms began about 1.5 weeks ago.  Patient denies any recent injury.  He complains of pain in his left wrist and right shoulder.  Pain is worse with movement.  He also complains of a generalized rash on his torso, arms, and legs.  Rash is mildly pruritic.  He was recently on Augmentin but is unsure if the rash began before or after he started taking this.  Denies any other new recent exposures.  Denies fever, chills, sore throat, trouble swallowing, increased shortness of breath, chest pain, or abdominal pain.  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  Most recent treatment was 11/6.  He is on 3 L of oxygen at all times.  He takes Xarelto.            PAST MEDICAL HISTORY  Active Ambulatory Problems     Diagnosis Date Noted    A-fib 01/29/2016    Atrioventricular block, Mobitz type 1, Wenckebach 01/29/2016    Apnea, sleep 01/29/2016    Obesity 01/29/2016    Streptococcus pneumoniae     Sleep apnea with use of continuous positive airway pressure (CPAP)     Sinusitis     Right wrist pain 11/11/2015    Pneumonia 12/01/2013    Type 2 diabetes mellitus, with long-term current use of insulin     Hyperlipidemia     Hammertoe     Depression     COPD (chronic obstructive pulmonary disease)     Colon polyps     Anxiety     Pruritus 04/05/2016    Cholelithiasis 10/06/2017     Fatty liver 10/09/2017    Essential hypertension 05/24/2019    Vitamin D deficiency 08/10/2020    Emphysema, unspecified 10/14/2021    Bronchiectasis with acute exacerbation 10/14/2021    Chest pain, atypical 11/11/2021    FHx: colonic polyps 08/30/2022    Diverticulosis 08/30/2022    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 05/30/2024    Malignant neoplasm of middle lobe of right lung 05/30/2024    Fluid collection at surgical site, initial encounter 06/20/2024    Encounter for long-term (current) use of high-risk medication 07/12/2024    Fitting and adjustment of vascular catheter 07/12/2024    Anemia associated with chemotherapy 10/10/2024    Peripheral neuropathy due to chemotherapy 11/06/2024     Resolved Ambulatory Problems     Diagnosis Date Noted    BP (high blood pressure) 01/29/2016    Atrial fibrillation     Atrial flutter 08/25/2016    Acute cholecystitis 10/06/2017    Right upper quadrant pain 10/06/2017    Recurrent inguinal hernia of right side without obstruction or gangrene 06/13/2018    Uncontrolled type 2 diabetes mellitus with hyperglycemia 04/25/2019     Past Medical History:   Diagnosis Date    Arthritis     Bunion of right foot     History of atrial fibrillation     History of skin cancer     [place] (hard of hearing)     Inguinal hernia, recurrent     Left foot pain     Lung nodules     Rash     Redness of skin     Scab     Type 2 diabetes mellitus          PAST SURGICAL HISTORY  Past Surgical History:   Procedure Laterality Date    BASAL CELL CARCINOMA EXCISION      BRONCHOSCOPY WITH ION ROBOTIC ASSIST N/A 5/6/2024    Procedure: BRONCHOSCOPY WITH ION ROBOT WITH FNA, BIOPSIES, BAL AND ENDOBRONCHIAL ULTRASOUND WITH FNA;  Surgeon: Yony Coles MD;  Location: [facility] ENDOSCOPY;  Service: Robotics - Pulmonary;  Laterality: N/A;  PRE: PULMONARY NODULES  POST: SAME    CARDIAC CATHETERIZATION N/A 11/15/2021    Procedure: Coronary angiography;  Surgeon: Alfredo Jennings MD;   Location: [facility] CATH INVASIVE LOCATION;  Service: Cardiovascular;  Laterality: N/A;    CARDIAC CATHETERIZATION N/A 11/15/2021    Procedure: Left heart cath;  Surgeon: Alfredo Jennings MD;  Location: [facility] CATH INVASIVE LOCATION;  Service: Cardiovascular;  Laterality: N/A;    CARDIAC CATHETERIZATION N/A 11/15/2021    Procedure: Left ventriculography;  Surgeon: Alfredo Jennings MD;  Location: [facility] CATH INVASIVE LOCATION;  Service: Cardiovascular;  Laterality: N/A;    CARDIAC CATHETERIZATION N/A 11/15/2021    Procedure: Aortic root aortogram;  Surgeon: Alfredo Jennings MD;  Location: [facility] CATH INVASIVE LOCATION;  Service: Cardiovascular;  Laterality: N/A;    CARDIOVERSION      CHOLECYSTECTOMY N/A 10/07/2017    Procedure: CHOLECYSTECTOMY LAPAROSCOPIC;  Surgeon: Martir Trevino MD;  Location: [facility] MAIN OR;  Service:     COLONOSCOPY  2007    COLONOSCOPY N/A 8/30/2022    Procedure: COLONOSCOPY TO CECUM AND TI AND COLD SNARE POLYPECTOMY;  Surgeon: Cj Lopez MD;  Location: [facility] ENDOSCOPY;  Service: Gastroenterology;  Laterality: N/A;  Pre: History of colon polyps  Post: POLYP, PREVIOUS TATTOO    FOOT SURGERY Left     TOES ARE PINNED. ALL BUT GREAT TOE    HAND SURGERY      THUMB    HERNIA REPAIR      INGUINAL HERNIA REPAIR Right 06/27/2018    Procedure: INGUINAL HERNIA REPAIR, OPEN, RECURRENT;  Surgeon: Martir Trevino MD;  Location: [facility] OR OSC;  Service: General    LASIK Bilateral     LOBECTOMY Right 6/12/2024    Procedure: BRONCHOSCOPY, RIGHT VIDEO ASSISTED THORACOSCOPY WITH RIGHT MIDDLE LOBECTOMY WITH DAVINCI ROBOT, MEDIASTINAL LYMPH NODE DISSECTION, INTERCOSTAL NERVE BLOCK;  Surgeon: David Figueroa MD;  Location: [facility] MAIN OR;  Service: Robotics - DaVinci;  Laterality: Right;    NECK SURGERY      growth on salivary gland    REPLACEMENT TOTAL KNEE Right 2007    (he is going to have a LTKR next month)    REPLACEMENT TOTAL KNEE Left     VENOUS ACCESS DEVICE (PORT) INSERTION Right 7/5/2024     Procedure: INSERTION VENOUS ACCESS DEVICE;  Surgeon: David Figueroa MD;  Location: [facility] OR;  Service: Thoracic;  Laterality: Right;         FAMILY HISTORY  Family History   Problem Relation Age of Onset    Cancer Other     Stroke Mother     Alcohol abuse Father     Malig Hyperthermia Neg Hx          SOCIAL HISTORY  Social History     Socioeconomic History    Marital status:      Spouse name: Luba    Number of children: 2   Tobacco Use    Smoking status: Former     Current packs/day: 0.00     Average packs/day: 1 pack/day for 30.0 years (30.0 ttl pk-yrs)     Types: Cigars, Cigarettes     Start date: 1/1/1984     Quit date: 1/1/2014     Years since quitting: 10.9    Smokeless tobacco: Never   Vaping Use    Vaping status: Never Used   Substance and Sexual Activity    Alcohol use: Never     Comment: caffeine use- decaf coffee    Drug use: Never    Sexual activity: Defer         ALLERGIES  Patient has no known allergies.    REVIEW OF SYSTEMS  Review of Systems  Included in HPI  All systems reviewed and negative except for those discussed in HPI.      PHYSICAL EXAM  ED Triage Vitals   Temp Heart Rate Resp BP SpO2   11/24/24 1011 11/24/24 1011 11/24/24 1023 11/24/24 1020 11/24/24 1011   97.1 °F (36.2 °C) 75 22 97/74 91 %      Temp src Heart Rate Source Patient Position BP Location FiO2 (%)   -- -- -- -- --              Physical Exam      GENERAL: Awake, alert, oriented x 3.  Chronically ill but nontoxic-appearing elderly male.  No acute distress  HENT: NCAT, nares patent, no facial swelling, swallowing secretions without difficulty  EYES: no scleral icterus  CV: regular rhythm, normal rate  RESPIRATORY: normal effort, clear to auscultation bilaterally  ABDOMEN: soft, obese, nontender  MUSCULOSKELETAL: Tenderness over the dorsal aspect of the left wrist.  There is minimal left wrist swelling.  There is no overlying erythema/warmth.  There is diffuse tenderness of the right shoulder.  There is painful range of  motion of the right shoulder.  Remainder of his extremities are nontender  NEURO: Speech is normal.  No facial droop.  PSYCH:  calm, cooperative  SKIN: There is a diffuse maculopapular rash on the torso and extremities    Vital signs and nursing notes reviewed.          LAB RESULTS  Recent Results (from the past 24 hours)   Basic Metabolic Panel    Collection Time: 11/24/24 10:45 AM    Specimen: Blood   Result Value Ref Range    Glucose 205 (H) 65 - 99 mg/dL    BUN 19 8 - 23 mg/dL    Creatinine 0.96 0.76 - 1.27 mg/dL    Sodium 136 136 - 145 mmol/L    Potassium 3.6 3.5 - 5.2 mmol/L    Chloride 95 (L) 98 - 107 mmol/L    CO2 27.2 22.0 - 29.0 mmol/L    Calcium 9.2 8.6 - 10.5 mg/dL    BUN/Creatinine Ratio 19.8 7.0 - 25.0    Anion Gap 13.8 5.0 - 15.0 mmol/L    eGFR 81.9 >60.0 mL/min/1.73   Sedimentation Rate    Collection Time: 11/24/24 10:45 AM    Specimen: Blood   Result Value Ref Range    Sed Rate 58 (H) 0 - 20 mm/hr   C-reactive Protein    Collection Time: 11/24/24 10:45 AM    Specimen: Blood   Result Value Ref Range    C-Reactive Protein 11.62 (H) 0.00 - 0.50 mg/dL   CBC Auto Differential    Collection Time: 11/24/24 10:45 AM    Specimen: Blood   Result Value Ref Range    WBC 8.98 3.40 - 10.80 10*3/mm3    RBC 4.04 (L) 4.14 - 5.80 10*6/mm3    Hemoglobin 11.9 (L) 13.0 - 17.7 g/dL    Hematocrit 38.3 37.5 - 51.0 %    MCV 94.8 79.0 - 97.0 fL    MCH 29.5 26.6 - 33.0 pg    MCHC 31.1 (L) 31.5 - 35.7 g/dL    RDW 16.2 (H) 12.3 - 15.4 %    RDW-SD 57.3 (H) 37.0 - 54.0 fl    MPV 8.9 6.0 - 12.0 fL    Platelets 244 140 - 450 10*3/mm3    Neutrophil % 76.0 42.7 - 76.0 %    Lymphocyte % 11.9 (L) 19.6 - 45.3 %    Monocyte % 6.9 5.0 - 12.0 %    Eosinophil % 4.3 0.3 - 6.2 %    Basophil % 0.3 0.0 - 1.5 %    Immature Grans % 0.6 (H) 0.0 - 0.5 %    Neutrophils, Absolute 6.82 1.70 - 7.00 10*3/mm3    Lymphocytes, Absolute 1.07 0.70 - 3.10 10*3/mm3    Monocytes, Absolute 0.62 0.10 - 0.90 10*3/mm3    Eosinophils, Absolute 0.39 0.00 - 0.40  10*3/mm3    Basophils, Absolute 0.03 0.00 - 0.20 10*3/mm3    Immature Grans, Absolute 0.05 0.00 - 0.05 10*3/mm3    nRBC 0.0 0.0 - 0.2 /100 WBC       Ordered the above labs and reviewed the results.        RADIOLOGY  XR Wrist 3+ View Left    Result Date: 11/24/2024  XR WRIST 3+ VW LEFT-11/24/2024  HISTORY: Left wrist pain.  There is degenerative arthritis of the first carpometacarpal joint and also in the lateral intercarpal joint. There is some chondrocalcinosis of the triangular fibrocartilage complex.  No fractures or dislocations are seen.  There is partial visualization of expansile lesion involving the proximal phalanx of the left thumb. This is better seen on the outside radiographs from 11/15/2024 and could possibly represent enchondroma. Please see additional dictation for the outside radiographs from 11/15/2024.      1. Degenerative changes of the left wrist. 2. No acute bony abnormality is seen. 3. Partial visualization of an expansile lesion involving the proximal phalanx of the left thumb. Please see additional dictation for the outside radiographs from 11/15/2024 which included this lesion in the field-of-view.       XR Shoulder 2+ View Right    Result Date: 11/24/2024  XR SHOULDER 2+ VW RIGHT-11/24/2024  HISTORY: Right shoulder pain.  There is minimal right AC joint arthropathy. No fractures or other acute bony abnormalities are seen. Mediport catheter is seen in the right hemithorax.      1. Minimal right AC joint arthropathy. 2. No acute bony abnormality is seen.   This report was finalized on 11/24/2024 12:20 PM by Dr. David Ruiz M.D on Workstation: [id number]       Ordered the above noted radiological studies. Reviewed by me in PACS.            PROCEDURES  Procedures        OUTPATIENT MEDICATION MANAGEMENT:  No current Epic-ordered facility-administered medications on file.     Current Outpatient Medications Ordered in Epic   Medication Sig Dispense Refill    clotrimazole-betamethasone  (Lotrisone) 1-0.05 % cream Apply 1 application topically to the appropriate area as directed 2 (Two) Times a Day. Do exceed 1 week 15 g 1    Dextromethorphan-guaiFENesin (Mucinex DM Maximum Strength)  MG tablet sustained-release 12 hour Take 1,200 mg by mouth.      fexofenadine (ALLEGRA) 180 MG tablet Take 1 tablet by mouth Daily.      folic acid (FOLVITE) 1 MG tablet Take 1 tablet by mouth Daily. 90 tablet 3    gabapentin (NEURONTIN) 300 MG capsule Take 2 capsules by mouth Every 8 (Eight) Hours. 180 capsule 2    glucose blood test strip Freestyle strips daily E 11.9 100 each 12    glucose monitoring kit (FREESTYLE) monitoring kit Daily E 11.93 FreeStyle meter 1 each 1    HYDROcodone-acetaminophen (NORCO) 5-325 MG per tablet Take 1 tablet by mouth Every 6 (Six) Hours As Needed for Moderate Pain. 12 tablet 0    hydrocortisone 2.5 % cream Apply 1 Application topically to the appropriate area as directed 2 (Two) Times a Day. 453.6 g 2    Insulin Glargine, 1 Unit Dial, (Toujeo SoloStar) 300 UNIT/ML solution pen-injector injection Inject 22 Units under the skin into the appropriate area as directed Daily.      Lancets (FREESTYLE) lancets Daily E 11.9 100 each 12    magnesium oxide (MAG-OX) 400 MG tablet Take 1 tablet by mouth 2 (Two) Times a Day. 60 tablet 1    Multiple Vitamin (MULTIVITAMINS PO) Take 1 tablet by mouth Daily. HOLD PRIOR TO SURG      ondansetron (ZOFRAN) 8 MG tablet Take 1 tablet by mouth Every 8 (Eight) Hours As Needed for Nausea or Vomiting. 30 tablet 5    rosuvastatin (CRESTOR) 40 MG tablet TAKE ONE TABLET BY MOUTH DAILY 90 tablet 1    sertraline (ZOLOFT) 50 MG tablet TAKE ONE TABLET BY MOUTH DAILY (Patient taking differently: Take 1 tablet by mouth Daily.) 90 tablet 1    Tirzepatide (Mounjaro) 2.5 MG/0.5ML solution pen-injector pen Inject 0.5 mL under the skin into the appropriate area as directed 1 (One) Time Per Week. LAST DOSE 6/3/2024 INSTRUCTED PT TO HOLD FOR SURGERY      TiZANidine  (ZANAFLEX) 4 MG capsule Take 1 capsule by mouth 3 (Three) Times a Day.      torsemide (DEMADEX) 20 MG tablet Take 1 tablet by mouth Daily.      vitamin B-12 (CYANOCOBALAMIN) 1000 MCG tablet Take 1 tablet by mouth Daily. 90 tablet 3    vitamin B-6 (PYRIDOXINE) 50 MG tablet Take 1 tablet by mouth Daily. 90 tablet 3    Xarelto 20 MG tablet TAKE ONE TABLET BY MOUTH DAILY 30 tablet 9           MEDICATIONS GIVEN IN ER  Medications   HYDROcodone-acetaminophen (NORCO) 7.5-325 MG per tablet 1 tablet (1 tablet Oral Given 11/24/24 1303)                   MEDICAL DECISION MAKING, PROGRESS, and CONSULTS    All labs have been independently reviewed by me.  All radiology studies have been reviewed by me and I have also reviewed the radiology report.   EKG's independently viewed and interpreted by me.  Discussion below represents my analysis of pertinent findings related to patient's condition, differential diagnosis, treatment plan and final disposition.      Additional sources:    - Discussed/ obtained information from independent historians: Spouse at bedside    - External (non-ED) record review: Patient was seen in urgent care on 11/15/2024 for right knee pain and left wrist pain.  Right knee x-rays showed a total knee arthroplasty without acute findings.  Left wrist x-rays showed some joint space narrowing and degenerative changes.  There was an intramedullary expansile lesion in the proximal phalanx of the left thumb.  Prescribed a Medrol Dosepak.  He was referred to orthopedic surgery.    -Prescription drug monitoring program review: CHACHO reviewed by Antwan Sadler MD KASPER query complete and reviewed. Treatment plan to include limited course of prescribed controlled substance. Risks including addiction, benefits, and alternatives presented to patient.    - Chronic or social conditions impacting patient care (Social Determinants of Health): None          Orders placed during this visit:  Orders Placed This  Encounter   Procedures    XR Wrist 3+ View Left    XR Shoulder 2+ View Right    Basic Metabolic Panel    Sedimentation Rate    C-reactive Protein    CBC Auto Differential    CBC & Differential         Additional orders considered but not ordered:          Differential diagnosis includes, but is not limited to:    Arthritis, septic joint, fracture      Independent interpretation of labs, radiology studies, and discussions with consultants:  ED Course as of 11/24/24 1523   Sun Nov 24, 2024   1025 BP: 97/74 [WH]   1025 Temp: 97.1 °F (36.2 °C) [WH]   1025 Heart Rate: 75 [WH]   1025 Resp: 22 [WH]   1025 SpO2: 91 % [WH]   1025 Flow (L/min) (Oxygen Therapy): 3 [WH]   1117 C-Reactive Protein(!): 11.62 [WH]   1117 Sed Rate(!): 58 [WH]   1117 WBC: 8.98 [WH]   1117 Hemoglobin(!): 11.9 [WH]   1117 Glucose(!): 205 [WH]   1117 BUN: 19 [WH]   1117 Creatinine: 0.96 [WH]   1225 Wrist x-rays personally interpreted by me.  My personal interpretation is: There are degenerative changes.  No fracture.    Per the radiologist, left wrist x-rays show degenerative changes but no acute bony abnormalities.  Right shoulder x-rays show minimal right AC joint arthropathy but no acute findings. [WH]   1258 Test results and diagnosis discussed with the patient and his wife.  He has been taking tramadol without relief.  I will give him a prescription for short course of Percocet.  I also recommended he take Benadryl regularly for his rash.  He has an appointment with his oncologist later this week.  I will also give him follow-up information for Dr. Davis (he has an appointment with [facility] but not until January) [WH]   1313 MDM: Patient presented to the ED complaining of left wrist pain, right shoulder pain, and generalized rash 1.5 weeks.  He was recently started on Augmentin but is unsure if the rash started before or after he started taking it.  He has a history of lung cancer and is currently being treated with immunotherapy.  Last  treatment was about 2.5 weeks ago.  Sed rate and CRP are elevated.  White blood cell count is normal.  X-ray showed minimal degenerative changes.  On exam, there is no overlying warmth or erythema.  I have low suspicion of septic joint.  He had a generalized rash but did not have any facial swelling or trouble breathing.  Rash could be due to recent antibiotics or possibly his immunotherapy.  Has appointment with his oncologist this week.  He will be discharged with a short course of pain medication.  I recommended he take Benadryl regularly.  He will be referred to orthopedics for follow-up as well. [WH]      ED Course User Index  [WH] Antwan Sadler MD         COMPLEXITY OF CARE        DIAGNOSIS  Final diagnoses:   Acute pain of left wrist   Acute pain of right shoulder   Generalized rash         DISPOSITION  DISCHARGE    Patient discharged in stable condition.    Reviewed implications of results, diagnosis, meds, responsibility to follow up, warning signs and symptoms of possible worsening, potential complications and reasons to return to ER, including worsening/persistent symptoms, fever, shortness of breath, or other concern.    Patient/Family voiced understanding of above instructions.    Discussed plan for discharge, as there is no emergent indication for admission. Patient referred to primary care provider for BP management due to today's BP. Pt/family is agreeable and understands need for follow up and repeat testing.  Pt is aware that discharge does not mean that nothing is wrong but it indicates no emergency is present that requires admission and they must continue care with follow-up as given below or physician of their choice.     FOLLOW-UP  Tino Lopez MD  [address] [facility] [facility] [address]  [facility] [address] [phone]    Schedule an appointment as soon as possible for a visit       Duy Villasenor MD PhD  [address] [facility] [address]  [facility] [id number] [phone]    Go in 1 week  As  scheduled    Tushar Davis II, MD  [address]  [address]  [facility] [id number] [phone]    Schedule an appointment as soon as possible for a visit   Left wrist and right shoulder pain         Medication List        New Prescriptions      HYDROcodone-acetaminophen 5-325 MG per tablet  Commonly known as: NORCO  Take 1 tablet by mouth Every 6 (Six) Hours As Needed for Moderate Pain.               Where to Get Your Medications        These medications were sent to [facility] [id number] - [place], [place] - [address] [place] [address] AT [facility] - [phone]  - [phone] [place] [id number]      Phone: [phone]   HYDROcodone-acetaminophen 5-325 MG per tablet                   Latest Documented Vital Signs:  AS OF 15:23 EST VITALS:    BP - 135/75  HR - 67  TEMP - 97.1 °F (36.2 °C)  O2 SATS - 93%    CHACHO reviewed by Antwan Sadler MD       --    Please note that portions of this were completed with a voice recognition program.       Note Disclaimer: At [facility], we believe that sharing information builds trust and better relationships. You are receiving this note because you are receiving care at [facility] or recently visited. It is possible you will see health information before a provider has talked with you about it. This kind of information can be easy to misunderstand. To help you fully understand what it means for your health, we urge you to discuss this note with your provider.             Antwan Sadler MD  11/24/24 1758

## 2024-11-24 NOTE — ED NOTES
Patient arrives via pv from home for complaints of a rash all over x1 week. Patient has been taking methylprednisolone and hydrocortisone cream. Patient states he has swelling in the left hand and joint pain. Patient is on immunotherapy. Alert and oriented x4.     Patient wears 3L NC.

## 2024-11-24 NOTE — DISCHARGE INSTRUCTIONS
Take pain medication as prescribed.  Do not take tramadol/Ultram while taking Norco/hydrocodone.  Take Benadryl or Claritin regularly for your rash.  Follow-up with Dr. Villasenor later this week as scheduled.  Return to the emergency department for worsening symptoms, shortness of breath, fever, or other concern.

## 2024-11-25 ENCOUNTER — TELEPHONE (OUTPATIENT)
Dept: ONCOLOGY | Facility: CLINIC | Age: 74
End: 2024-11-25
Payer: MEDICARE

## 2024-11-25 NOTE — TELEPHONE ENCOUNTER
Provider: [place]  Caller: Luba  Relationship to Patient: wife  Call Back Phone Number: [phone]  Reason for Call: patient still has a terrible rash

## 2024-11-25 NOTE — TELEPHONE ENCOUNTER
Called Luba, said that his rash that he had a few weeks ago had spread and that she took him to the hospital and that they prescribed him 2.5 % hydrocortisone cream and wanted to make sure that it was ok to take while on Keytruda. I Told them it was fine, that Dr. Reid prescribed the medication for him and would have reviewed the patient's chart.

## 2024-11-27 ENCOUNTER — INFUSION (OUTPATIENT)
Dept: ONCOLOGY | Facility: HOSPITAL | Age: 74
End: 2024-11-27
Payer: MEDICARE

## 2024-11-27 ENCOUNTER — TELEPHONE (OUTPATIENT)
Dept: ONCOLOGY | Facility: CLINIC | Age: 74
End: 2024-11-27

## 2024-11-27 ENCOUNTER — PRIOR AUTHORIZATION (OUTPATIENT)
Dept: ONCOLOGY | Facility: HOSPITAL | Age: 74
End: 2024-11-27
Payer: MEDICARE

## 2024-11-27 ENCOUNTER — OFFICE VISIT (OUTPATIENT)
Dept: ONCOLOGY | Facility: CLINIC | Age: 74
End: 2024-11-27
Payer: MEDICARE

## 2024-11-27 VITALS
SYSTOLIC BLOOD PRESSURE: 132 MMHG | HEART RATE: 93 BPM | TEMPERATURE: 98.1 F | HEIGHT: 71 IN | RESPIRATION RATE: 16 BRPM | WEIGHT: 277.5 LBS | BODY MASS INDEX: 38.85 KG/M2 | DIASTOLIC BLOOD PRESSURE: 73 MMHG | OXYGEN SATURATION: 90 %

## 2024-11-27 DIAGNOSIS — Z45.2 FITTING AND ADJUSTMENT OF VASCULAR CATHETER: Primary | ICD-10-CM

## 2024-11-27 DIAGNOSIS — T45.1X5A ANEMIA ASSOCIATED WITH CHEMOTHERAPY: ICD-10-CM

## 2024-11-27 DIAGNOSIS — C34.2 MALIGNANT NEOPLASM OF MIDDLE LOBE OF RIGHT LUNG: ICD-10-CM

## 2024-11-27 DIAGNOSIS — D64.81 ANEMIA ASSOCIATED WITH CHEMOTHERAPY: ICD-10-CM

## 2024-11-27 DIAGNOSIS — C34.2 SQUAMOUS CELL CARCINOMA OF BRONCHUS IN RIGHT MIDDLE LOBE: ICD-10-CM

## 2024-11-27 DIAGNOSIS — M89.8X9 BONE MASS: ICD-10-CM

## 2024-11-27 DIAGNOSIS — R21 RASH IN ADULT: ICD-10-CM

## 2024-11-27 DIAGNOSIS — Z79.899 ENCOUNTER FOR LONG-TERM (CURRENT) USE OF HIGH-RISK MEDICATION: ICD-10-CM

## 2024-11-27 DIAGNOSIS — Z79.899 ENCOUNTER FOR LONG-TERM (CURRENT) USE OF HIGH-RISK MEDICATION: Primary | ICD-10-CM

## 2024-11-27 LAB
ALBUMIN SERPL-MCNC: 3.7 G/DL (ref 3.5–5.2)
ALBUMIN/GLOB SERPL: 1 G/DL
ALP SERPL-CCNC: 155 U/L (ref 39–117)
ALT SERPL W P-5'-P-CCNC: 26 U/L (ref 1–41)
ANION GAP SERPL CALCULATED.3IONS-SCNC: 12.6 MMOL/L (ref 5–15)
AST SERPL-CCNC: 34 U/L (ref 1–40)
BASOPHILS # BLD AUTO: 0.04 10*3/MM3 (ref 0–0.2)
BASOPHILS NFR BLD AUTO: 0.4 % (ref 0–1.5)
BILIRUB SERPL-MCNC: 1.3 MG/DL (ref 0–1.2)
BUN SERPL-MCNC: 13 MG/DL (ref 8–23)
BUN/CREAT SERPL: 16.9 (ref 7–25)
CALCIUM SPEC-SCNC: 9 MG/DL (ref 8.6–10.5)
CHLORIDE SERPL-SCNC: 92 MMOL/L (ref 98–107)
CO2 SERPL-SCNC: 30.4 MMOL/L (ref 22–29)
CREAT SERPL-MCNC: 0.77 MG/DL (ref 0.76–1.27)
DEPRECATED RDW RBC AUTO: 53.1 FL (ref 37–54)
EGFRCR SERPLBLD CKD-EPI 2021: 92.8 ML/MIN/1.73
EOSINOPHIL # BLD AUTO: 0.33 10*3/MM3 (ref 0–0.4)
EOSINOPHIL NFR BLD AUTO: 3.3 % (ref 0.3–6.2)
ERYTHROCYTE [DISTWIDTH] IN BLOOD BY AUTOMATED COUNT: 15.8 % (ref 12.3–15.4)
GLOBULIN UR ELPH-MCNC: 3.6 GM/DL
GLUCOSE SERPL-MCNC: 241 MG/DL (ref 65–99)
HCT VFR BLD AUTO: 33.5 % (ref 37.5–51)
HGB BLD-MCNC: 10.8 G/DL (ref 13–17.7)
IMM GRANULOCYTES # BLD AUTO: 0.05 10*3/MM3 (ref 0–0.05)
IMM GRANULOCYTES NFR BLD AUTO: 0.5 % (ref 0–0.5)
LYMPHOCYTES # BLD AUTO: 1.18 10*3/MM3 (ref 0.7–3.1)
LYMPHOCYTES NFR BLD AUTO: 11.8 % (ref 19.6–45.3)
MCH RBC QN AUTO: 29.6 PG (ref 26.6–33)
MCHC RBC AUTO-ENTMCNC: 32.2 G/DL (ref 31.5–35.7)
MCV RBC AUTO: 91.8 FL (ref 79–97)
MONOCYTES # BLD AUTO: 0.67 10*3/MM3 (ref 0.1–0.9)
MONOCYTES NFR BLD AUTO: 6.7 % (ref 5–12)
NEUTROPHILS NFR BLD AUTO: 7.75 10*3/MM3 (ref 1.7–7)
NEUTROPHILS NFR BLD AUTO: 77.3 % (ref 42.7–76)
NRBC BLD AUTO-RTO: 0 /100 WBC (ref 0–0.2)
PLATELET # BLD AUTO: 250 10*3/MM3 (ref 140–450)
PMV BLD AUTO: 8.9 FL (ref 6–12)
POTASSIUM SERPL-SCNC: 3.6 MMOL/L (ref 3.5–5.2)
PROT SERPL-MCNC: 7.3 G/DL (ref 6–8.5)
RBC # BLD AUTO: 3.65 10*6/MM3 (ref 4.14–5.8)
SODIUM SERPL-SCNC: 135 MMOL/L (ref 136–145)
WBC NRBC COR # BLD AUTO: 10.02 10*3/MM3 (ref 3.4–10.8)

## 2024-11-27 PROCEDURE — 80053 COMPREHEN METABOLIC PANEL: CPT

## 2024-11-27 PROCEDURE — 36591 DRAW BLOOD OFF VENOUS DEVICE: CPT

## 2024-11-27 PROCEDURE — 25010000002 HEPARIN LOCK FLUSH PER 10 UNITS: Performed by: INTERNAL MEDICINE

## 2024-11-27 PROCEDURE — 85025 COMPLETE CBC W/AUTO DIFF WBC: CPT

## 2024-11-27 RX ORDER — HYDROXYZINE HYDROCHLORIDE 25 MG/1
25 TABLET, FILM COATED ORAL 3 TIMES DAILY PRN
Qty: 90 TABLET | Refills: 0 | Status: SHIPPED | OUTPATIENT
Start: 2024-11-27 | End: 2024-11-29

## 2024-11-27 RX ORDER — HEPARIN SODIUM (PORCINE) LOCK FLUSH IV SOLN 100 UNIT/ML 100 UNIT/ML
500 SOLUTION INTRAVENOUS AS NEEDED
Status: DISCONTINUED | OUTPATIENT
Start: 2024-11-27 | End: 2024-11-27 | Stop reason: HOSPADM

## 2024-11-27 RX ORDER — HEPARIN SODIUM (PORCINE) LOCK FLUSH IV SOLN 100 UNIT/ML 100 UNIT/ML
500 SOLUTION INTRAVENOUS AS NEEDED
OUTPATIENT
Start: 2024-11-27

## 2024-11-27 RX ORDER — PREDNISONE 20 MG/1
20 TABLET ORAL DAILY
Qty: 30 TABLET | Refills: 0 | Status: SHIPPED | OUTPATIENT
Start: 2024-11-27 | End: 2024-12-01 | Stop reason: HOSPADM

## 2024-11-27 RX ORDER — SODIUM CHLORIDE 0.9 % (FLUSH) 0.9 %
10 SYRINGE (ML) INJECTION AS NEEDED
OUTPATIENT
Start: 2024-11-27

## 2024-11-27 RX ORDER — HYDROCORTISONE 25 MG/G
1 CREAM TOPICAL 4 TIMES DAILY
Qty: 453.6 G | Refills: 0 | Status: SHIPPED | OUTPATIENT
Start: 2024-11-27 | End: 2024-11-29

## 2024-11-27 RX ORDER — MICONAZOLE NITRATE 20 MG/G
1 CREAM TOPICAL 2 TIMES DAILY
Qty: 113 G | Refills: 2 | Status: SHIPPED | OUTPATIENT
Start: 2024-11-27 | End: 2024-11-29

## 2024-11-27 RX ADMIN — Medication 500 UNITS: at 09:25

## 2024-11-27 NOTE — TELEPHONE ENCOUNTER
Caller: Luba Diop    Relationship: Emergency Contact    Best call back number: [phone]    What is the best time to reach you: ANYTIME TODAY     Who are you requesting to speak with (clinical staff, provider,  specific staff member): CLINICAL       What was the call regarding:     DR GATES HAD CALLED IN  NEW SCRIPTS TODAY FOR A STEROID , A CREAM AND A THIRD SCRIPT     AND HAD GOTTEN A TEXT FROM [facility] AND THEY ARE NEEDING THEM TO CALL OR FAX , ARE NEEDING MORE INFORMATION FROM DR GATES BEFORE THEY ARE ABLE TO APPROVE  THE SCRIPTS     [facility] STATED THEY HAD ALSO SENT SOMETHING PERTAINING TO THIS AS WELL WANTED TO MAKE SURE DR GATES GOT THIS

## 2024-11-27 NOTE — PROGRESS NOTES
REASON FOR FOLLOW-UP:     *. Poorly differentiated squamous cell lung cancer with intralobular metastatic disease, stage IIIa ([id number]).  Status post right middle lobectomy on 6/12/2024.  Patient was started on adjuvant chemotherapy carboplatin AUC 5, and Taxol 200 mg/m² on 7/31/2024.      HISTORY OF PRESENT ILLNESS:  The patient is a 74 y.o. year old male who is here for evaluation with the above-mentioned history.    History of Present Illness  The patient is here for an evaluation prior to his second cycle adjuvant immunotherapy, pembrolizumab. He is accompanied by his wife.    He recently had an abnormal x-ray examination of his left hand at the [facility] which is suspicious for a tumor.  Patient informed us about the abnormalities.  So we arranged an an MRI of his left hand to be conducted on 11/18/2024, which revealed a large expansile bone lesion involving the entirety of the thumb proximal phalanx, measuring 2.5 x 2.0 cm and 3.5 cm in length. There was no associated pathologic fracture. He was referred to hand surgeon at the [facility]. He has an appointment with a hand surgeon in 01/2025.     The patient reports that he has had issues with his left thumb since an accident in the 1960s where he cut off his finger with chainsaw while cutting down tree.  This was attached back by surgeon from the [facility].  About 10 years ago, he injured his thumb while cutting plywood. He has noticed a growth on his thumb since then, which has remained the same size. He does not experience any pain in his thumb. Dr. Espinal informed him that he had a bone tumor and recommended surgery, but he declined.    On 11/24/2024, he visited the ER due to diffuse pruritic skin rashes. He developed a skin rash on his arms 5 days ago after using a new bottle of lotion. The rash also appeared 5 days after his first dose of immunotherapy. He also has rashes on his legs and back, but  not on his face as he did not apply the lotion there. He has been using hydrocortisone cream 2.5 percent twice daily, prescribed by Dr. Reid, but it has not been effective. He experiences itching only on his arms. He has been feeling slightly short of breath. He was given a 6-day course of steroids in the ER, which helped with his wrist, knee, and shoulder pain. However, the rash returned after he finished the steroids.    He has diabetes and his blood sugar levels have been elevated since starting the steroids, ranging from 195 to 240. His usual blood sugar level is around 130 to 140. He uses two types of insulin at home, one of which is short-acting.    Since increasing his gabapentin dosage to 600 mg every 8 hours, his peripheral neuropathy has improved significantly. However, his wife reports that there have been issues with the pharmacy refilling his medication on time.    Results    Imaging  MRI of the left hand reported a large expansile bone lesion involving the entirety of the thumb proximal phalanx measuring 2.5 x 2.0 cm and 3.5 cm in length. No associated pathologic fracture.      Past Medical History:   Diagnosis Date    Anxiety     Arthritis     Atrial fibrillation     CURRENTLY    Bunion of right foot     Colon polyps     COPD (chronic obstructive pulmonary disease)     MILD. NO MEDS FOR    Depression     History of atrial fibrillation     WITH CARDIOVERSION. NO PROBLEMS    History of skin cancer     BCC REMOVED FROM BACK    [place] (hard of hearing)     Hyperlipidemia     Inguinal hernia, recurrent     RIGHT    Left foot pain     Lung nodules     Rash     IN GROIN TREATING FOR YEAST INFECTION BY PCP    Redness of skin     LOWER LEGS BILATERAL    Scab     BILATERAL LEGS HEALING    Sleep apnea with use of continuous positive airway pressure (CPAP)     CPAP    Streptococcus pneumoniae     ABOUT 6-7 YR AGO.     Type 2 diabetes mellitus      Past Surgical History:   Procedure Laterality Date    BASAL CELL  CARCINOMA EXCISION      BRONCHOSCOPY WITH ION ROBOTIC ASSIST N/A 5/6/2024    Procedure: BRONCHOSCOPY WITH ION ROBOT WITH FNA, BIOPSIES, BAL AND ENDOBRONCHIAL ULTRASOUND WITH FNA;  Surgeon: Yony Coles MD;  Location: [facility] ENDOSCOPY;  Service: Robotics - Pulmonary;  Laterality: N/A;  PRE: PULMONARY NODULES  POST: SAME    CARDIAC CATHETERIZATION N/A 11/15/2021    Procedure: Coronary angiography;  Surgeon: Aflredo Jennings MD;  Location: [facility] CATH INVASIVE LOCATION;  Service: Cardiovascular;  Laterality: N/A;    CARDIAC CATHETERIZATION N/A 11/15/2021    Procedure: Left heart cath;  Surgeon: Alfredo Jennings MD;  Location: [facility] CATH INVASIVE LOCATION;  Service: Cardiovascular;  Laterality: N/A;    CARDIAC CATHETERIZATION N/A 11/15/2021    Procedure: Left ventriculography;  Surgeon: Alfredo Jennings MD;  Location: [facility] CATH INVASIVE LOCATION;  Service: Cardiovascular;  Laterality: N/A;    CARDIAC CATHETERIZATION N/A 11/15/2021    Procedure: Aortic root aortogram;  Surgeon: Alfredo Jennings MD;  Location: [facility] CATH INVASIVE LOCATION;  Service: Cardiovascular;  Laterality: N/A;    CARDIOVERSION      CHOLECYSTECTOMY N/A 10/07/2017    Procedure: CHOLECYSTECTOMY LAPAROSCOPIC;  Surgeon: Martir Trevino MD;  Location: [facility] MAIN OR;  Service:     COLONOSCOPY  2007    COLONOSCOPY N/A 8/30/2022    Procedure: COLONOSCOPY TO CECUM AND TI AND COLD SNARE POLYPECTOMY;  Surgeon: Cj Lopez MD;  Location: [facility] ENDOSCOPY;  Service: Gastroenterology;  Laterality: N/A;  Pre: History of colon polyps  Post: POLYP, PREVIOUS TATTOO    FOOT SURGERY Left     TOES ARE PINNED. ALL BUT GREAT TOE    HAND SURGERY      THUMB    HERNIA REPAIR      INGUINAL HERNIA REPAIR Right 06/27/2018    Procedure: INGUINAL HERNIA REPAIR, OPEN, RECURRENT;  Surgeon: Martir Trevino MD;  Location: [facility] OR OSC;  Service: General    LASIK Bilateral     LOBECTOMY Right 6/12/2024    Procedure: BRONCHOSCOPY, RIGHT VIDEO ASSISTED  THORACOSCOPY WITH RIGHT MIDDLE LOBECTOMY WITH DAVINCI ROBOT, MEDIASTINAL LYMPH NODE DISSECTION, INTERCOSTAL NERVE BLOCK;  Surgeon: David Figueroa MD;  Location: [facility];  Service: Robotics - DaVinci;  Laterality: Right;    NECK SURGERY      growth on salivary gland    REPLACEMENT TOTAL KNEE Right     (he is going to have a LTKR next month)    REPLACEMENT TOTAL KNEE Left     VENOUS ACCESS DEVICE (PORT) INSERTION Right 2024    Procedure: INSERTION VENOUS ACCESS DEVICE;  Surgeon: David Figueroa MD;  Location: [facility];  Service: Thoracic;  Laterality: Right;       ONCOLOGY HISTORY:  The patient is a 73 years old male, who presents for oncology evaluation 2024. He is accompanied by his wife.    He underwent right middle lobe lobectomy for squamous cell lung cancer by Dr. Figueroa on 2024 and is recovering well from the surgery. However, there is an open wound at the site of chest tube on the right side of the chest which now has a few stitches, which is still leaking. He was advised to apply Band-Aids.  He reports no fever sweating or chills.      Patient has been on oxygen supplementation since surgery, currently 2 L/min.  Patient says occasionally at home he does not need oxygen.  Dr. Figueroa has suggested gradually reducing his oxygen use.     His appetite remains normal.    Patient reports some family health issues.  His son-in-law recently  of HLH just last week.  His brother-in-law has stage IV liver cancer.     This patient has a history of emphysema, followed by pulmonologist Dr. Camp.  His high-resolution chest CT scan on 2021 reported 7 mm nodule in the right middle lobe.  There is also index subcarinal lymph node 1.1 cm.     Patient subsequently had serial CT scan examination.    On 4/10/2023 chest high-resolution CT scan reported stable examination with the pulmonary nodules measuring up to 9-10 mm in the right middle lobe and a sub-6 mm in the right upper lobe.  The largest  subcarinal lymph node measures  2.1 x  1.2 cm.     However chest high resolution CT scan 4/2/2024 reported disease progression, with right middle lobe pulm nodule 1.8 cm from margin at 9 mm.  There is also a new right middle lobe 1.1 cm nodule.  Stable right upper lobe 7 mm nodule.  Also a new right hilar lymphadenopathy up to 2 cm.  The 1.5 cm subcarinal lymph node is stable.  No pleural effusion.    Patient subsequently had PET scan examination on 4/22/2024 requested by Dr. Camp.  This reported SUV 3.1 corresponding to the 19 mm right middle lobe nodule.  The 1.1 cm right middle nodule was photopenic.  The right hilar lymph node with SUV 5.6.  The 1.5 cm subcarinal lymph node with maximum SUV 7.    Patient subsequently seen by Dr. Figueroa who performed ION bronchoscopic biopsy on 5/7/2024 with pathology evaluation reporting squamous cell carcinoma involving one of the right middle lobe lesion, and the other pulmonary nodule is at least squamous cell carcinoma in situ.  PD-L1 study TPS was 0%.       Dr. Figueroa performed robot-assisted thoracoscopic right middle lobe lobectomy and mediastinal lymph node dissection on 6/12/2024.  Pathology evaluation reported poorly differentiated squamous cell carcinoma, clear margin, however with lymph nodes involved 3 lymph nodes in the right 10 R, 11 R and 12 R lymph node station.  There was also frequent lymphovascular invasion and focal perineural invasion.    Patient subsequently had a portacatheter placement on 7/5/2024.      The patient presents today on 7/31/2024 for re-evaluation to start chemotherapy.    His case was presented at the multimodality conference 7/18/2024.  Because of negative margin and N1 disease, as well as marginal pulmonary function, the consensus was for patient to have adjuvant chemotherapy alone without radiation.    On 7/18/2024 peripheral blood was sent for Tempus xF liquid biopsy with inconclusive results, no reportable treatment options found.    His  lung cancer sample from 6/12/2024 was sent for [employer] DNA and RNA study.  It reported stable MSI.  Tumor mutation burden 6.3 m/MB.  Negative for EGFR, KRAS, BRAF, ALK, ROS1, RET, MET, HER2.    The patient recently consulted her nephrologist, during which blood work was conducted. The results indicated a slight presence of protein in urine. He denies experiencing any dyspnea.        MEDICATIONS    Current Outpatient Medications:     clotrimazole-betamethasone (Lotrisone) 1-0.05 % cream, Apply 1 application topically to the appropriate area as directed 2 (Two) Times a Day. Do exceed 1 week, Disp: 15 g, Rfl: 1    fexofenadine (ALLEGRA) 180 MG tablet, Take 1 tablet by mouth Daily., Disp: , Rfl:     folic acid (FOLVITE) 1 MG tablet, Take 1 tablet by mouth Daily., Disp: 90 tablet, Rfl: 3    gabapentin (NEURONTIN) 300 MG capsule, Take 2 capsules by mouth Every 8 (Eight) Hours., Disp: 180 capsule, Rfl: 2    glucose blood test strip, Freestyle strips daily E 11.9, Disp: 100 each, Rfl: 12    glucose monitoring kit (FREESTYLE) monitoring kit, Daily E 11.93 FreeStyle meter, Disp: 1 each, Rfl: 1    HYDROcodone-acetaminophen (NORCO) 5-325 MG per tablet, Take 1 tablet by mouth Every 6 (Six) Hours As Needed for Moderate Pain., Disp: 12 tablet, Rfl: 0    hydrocortisone 2.5 % cream, Apply 1 Application topically to the appropriate area as directed 2 (Two) Times a Day., Disp: 453.6 g, Rfl: 2    Insulin Glargine, 1 Unit Dial, (Toujeo SoloStar) 300 UNIT/ML solution pen-injector injection, Inject 22 Units under the skin into the appropriate area as directed Daily., Disp: , Rfl:     Lancets (FREESTYLE) lancets, Daily E 11.9, Disp: 100 each, Rfl: 12    magnesium oxide (MAG-OX) 400 MG tablet, Take 1 tablet by mouth 2 (Two) Times a Day., Disp: 60 tablet, Rfl: 1    Multiple Vitamin (MULTIVITAMINS PO), Take 1 tablet by mouth Daily. HOLD PRIOR TO SURG, Disp: , Rfl:     ondansetron (ZOFRAN) 8 MG tablet, Take 1 tablet by mouth Every 8 (Eight)  Hours As Needed for Nausea or Vomiting., Disp: 30 tablet, Rfl: 5    rosuvastatin (CRESTOR) 40 MG tablet, TAKE ONE TABLET BY MOUTH DAILY, Disp: 90 tablet, Rfl: 1    sertraline (ZOLOFT) 50 MG tablet, TAKE ONE TABLET BY MOUTH DAILY (Patient taking differently: Take 1 tablet by mouth Daily.), Disp: 90 tablet, Rfl: 1    Tirzepatide (Mounjaro) 2.5 MG/0.5ML solution pen-injector pen, Inject 0.5 mL under the skin into the appropriate area as directed 1 (One) Time Per Week. LAST DOSE 6/3/2024 INSTRUCTED PT TO HOLD FOR SURGERY, Disp: , Rfl:     TiZANidine (ZANAFLEX) 4 MG capsule, Take 1 capsule by mouth 3 (Three) Times a Day., Disp: , Rfl:     torsemide (DEMADEX) 20 MG tablet, Take 1 tablet by mouth Daily., Disp: , Rfl:     vitamin B-12 (CYANOCOBALAMIN) 1000 MCG tablet, Take 1 tablet by mouth Daily., Disp: 90 tablet, Rfl: 3    vitamin B-6 (PYRIDOXINE) 50 MG tablet, Take 1 tablet by mouth Daily., Disp: 90 tablet, Rfl: 3    Xarelto 20 MG tablet, TAKE ONE TABLET BY MOUTH DAILY, Disp: 30 tablet, Rfl: 9    Dextromethorphan-guaiFENesin (Mucinex DM Maximum Strength)  MG tablet sustained-release 12 hour, Take 1,200 mg by mouth., Disp: , Rfl:     ALLERGIES:   No Known Allergies    SOCIAL HISTORY:       Social History     Socioeconomic History    Marital status:      Spouse name: Luba    Number of children: 2   Tobacco Use    Smoking status: Former     Current packs/day: 0.00     Average packs/day: 1 pack/day for 30.0 years (30.0 ttl pk-yrs)     Types: Cigars, Cigarettes     Start date: 1/1/1984     Quit date: 1/1/2014     Years since quitting: 10.9    Smokeless tobacco: Never   Vaping Use    Vaping status: Never Used   Substance and Sexual Activity    Alcohol use: Never     Comment: caffeine use- decaf coffee    Drug use: Never    Sexual activity: Defer         FAMILY HISTORY:  Family History   Problem Relation Age of Onset    Cancer Other     Stroke Mother     Alcohol abuse Father     Malig Hyperthermia Neg Hx   "      REVIEW OF SYSTEMS:  Review of Systems   Constitutional:  Positive for activity change and fatigue. Negative for appetite change, chills, diaphoresis and fever.   HENT:  Negative for nosebleeds and trouble swallowing.    Eyes:  Negative for visual disturbance.   Respiratory:  Positive for shortness of breath. Negative for cough and wheezing.    Gastrointestinal:  Negative for abdominal pain and blood in stool.   Genitourinary:  Negative for frequency and hematuria.   Musculoskeletal:  Negative for joint swelling.   Skin:  Positive for rash.   Allergic/Immunologic: Negative for immunocompromised state.   Neurological:  Negative for weakness and numbness.   Hematological:  Negative for adenopathy. Does not bruise/bleed easily.   Psychiatric/Behavioral:  Negative for confusion.               Vitals:    11/27/24 0821   BP: 132/73   Pulse: 93   Resp: 16   Temp: 98.1 °F (36.7 °C)   TempSrc: Oral   SpO2: 90%   Weight: 126 kg (277 lb 8 oz)   Height: 180.3 cm (70.98\")   PainSc: 0-No pain         11/27/2024     8:23 AM   Current Status   ECOG score 0     Physical Exam    GENERAL:  Well-developed, well-nourished elderly male no acute distress.    SKIN:  Warm, dry without rashes, purpura or petechiae.  HEENT:  Normocephalic.   LYMPHATICS:  No cervical, supraclavicular or axillary adenopathy.  CHEST: Normal respiratory effort.  Lungs clear to auscultation. Good airflow.  CARDIAC:  Regular rate and rhythm. Normal S1,S2.  ABDOMEN:  Soft, no tender.  Bowel sounds normal.  EXTREMITIES:  No lower extremity edema.        RECENT LABS:  Results from last 7 days   Lab Units 11/27/24  0806 11/24/24  1045   WBC 10*3/mm3 10.02 8.98   NEUTROS ABS 10*3/mm3 7.75* 6.82   HEMOGLOBIN g/dL 10.8* 11.9*   HEMATOCRIT % 33.5* 38.3   PLATELETS 10*3/mm3 250 244     Results from last 7 days   Lab Units 11/27/24  0807 11/24/24  1045   SODIUM mmol/L 135* 136   POTASSIUM mmol/L 3.6 3.6   CHLORIDE mmol/L 92* 95*   CO2 mmol/L 30.4* 27.2   BUN mg/dL 13 19 "   CREATININE mg/dL 0.77 0.96   CALCIUM mg/dL 9.0 9.2   ALBUMIN g/dL 3.7  --    BILIRUBIN mg/dL 1.3*  --    ALK PHOS U/L 155*  --    ALT (SGPT) U/L 26  --    AST (SGOT) U/L 34  --    GLUCOSE mg/dL 241* 205*         Lab Results   Component Value Date    IRON 68 11/06/2024    LABIRON 16 (L) 11/06/2024    TRANSFERRIN 287 11/06/2024    TIBC 428 11/06/2024    FERRITIN 135.00 11/06/2024     Lab Results   Component Value Date    FOLATE 13.90 11/06/2024     Lab Results   Component Value Date    [id number] 525 11/06/2024         Assessment & Plan     Assessment & Plan  1. Lung cancer.  He developed diffuse pruritic skin rashes on his trunk, upper extremities, and lower extremities about 5 days after starting immunotherapy. He was given a Medrol dose pack in the ER, which improved his symptoms, but the pruritic skin rashes recurred when the steroids were tapered off. Dr. Reid prescribed 2.5% hydrocortisone cream, but due to the large body area involved, he uses the cream quickly, and the pharmacy will not refill it. Oral prednisone 20 mg daily for 7 days, then tapering to 10 mg daily, has been recommended. A larger quantity of hydrocortisone cream has been prescribed. Hydrochlorothiazide 25 mg every 8 hours as needed for pruritus has also been prescribed. Immunotherapy with pembrolizumab is canceled today. He will start prednisone 20 mg daily for 7 days and then taper to 10 mg daily. Hydrocortisone 25 mg every 8 hours as needed for pruritic skin lesions has been prescribed. He will continue using hydrocortisone cream 2%, with a 453 g jar prescribed. Miconazole cream 2% has been prescribed. Gabapentin 600 mg three times a day will be continued. Oral vitamin B12, folic acid, and B6 for peripheral neuropathy will be continued. He will return in 2 weeks for reevaluation to assess his response to the treatment and discuss whether to resume pembrolizumab at that time. Xarelto 20 mg daily for anticoagulation will be continued. He is  scheduled to see a hand surgeon on 02/08/2025 for evaluation of the expansile lesion in the left thumb.          1. Poorly differentiated squamous cell lung cancer with intralobular metastatic disease, stage IIIa ([id number]).  Tumor was poorly differentiated. This patient has stage 3a disease.   Patient had Ion bronchoscopic biopsy 5/6/2024.  Right middle lobe reported fragments of squamous cell carcinoma.  PD-L1 study reported TPS 0%.  I discussed with the patient on 7/12/2024 that he will need adjuvant chemotherapy and concurrent adjuvant radiation therapy, and likely will need consolidative immunotherapy. I recommended using weekly carboplatin plus Taxol, in conjunction with radiotherapy for 6.5 weeks treatment. In reality, he probably will only receive 5 or 6 weekly chemotherapy instead of the 7 doses due to tolerance issues.   We will present his case at the chest conference on 7/18/2024, due to poor pulmonary function, negative surgical margins and N1 disease, the consensus is for patient to have adjuvant chemotherapy without concurrent radiation therapy.  Also recommended genetic study.   On 7/18/2024 peripheral blood was sent for [employer] xF liquid biopsy with inconclusive results, no reportable treatment options found.   His lung cancer sample from 6/12/2024 was sent for [employer] xT DNA and RNA study.  It reported stable MSI.  Tumor mutation burden 6.3 m/MB.  Negative for EGFR, KRAS, BRAF, ALK, ROS1, RET, MET, HER2.  Tumor sample was positive for BRIP1 mutation, TP53 mutation and also ARID1B mutation, all of those LOF.   On 7/31/2024 will start the patient on adjuvant chemotherapy.  Because his overall health condition, performance status ECOG 2, his age, he would not tolerate cisplatin based chemotherapy.  So we recommended using carboplatin in conjunction with Taxol every 3 weeks chemotherapy for 4 cycles.  Unfortunately patient would not be a candidate for immunotherapy as part of adjuvant  therapy.  8/21/2024 patient presents for evaluation prior to cycle #2 adjuvant chemotherapy.  He is currently undergoing chemotherapy with a total of four cycles planned; this is his second cycle. The biopsy from his lung revealed a PD-L1 negative result. Given his overall health condition, he is not physically capable of tolerating the most toxic chemotherapy, cisplatin, and is therefore being treated with carboplatin. A request has been made for a larger tissue sample from his surgery to be retested for PD-L1. If the result is positive, immunotherapy could be considered for prolonged treatment, which has shown better results in preventing cancer recurrence. This decision will be made after the completion of his chemotherapy, to determin whether maintenance or consolidative immunotherapy is necessary. He continues on oxygen supplementation at 3 L/min.  PD-L1 study from the lobectomy sample reported positive for PD-L1 with a TPS tumor proportion score 5%.  9/11/2024 due today for cycle 3 carboplatin/Taxol.  He is overall tolerating this well with stable neuropathy.  We will reduce his steroids as further detailed below due to exacerbation of his underlying DM type II.  Today 10/2/2024 he presented for evaluation prior to his cycle #4 adjuvant chemotherapy with carboplatin plus Taxol. Laboratory studies today reported normal WBC 5700, neutrophils 3010, hemoglobin 11.7, and platelets 240,000. Chemistry lab reported baseline creatinine 0.69, normal electrolytes except magnesium 1.5, and marginally elevated alkaline phosphatase 133. Given the presence of neuropathy, there is a concern that this could develop into a chronic issue. A reduction in the dosage of Taxol by 25% is recommended.   Today on 11/6/2024 Cycle 1 adjuvant immunotherapy with pembrolizumab will be initiated and repeated every 3 weeks.  This will be total for 12 months.  Today on 11/27/2024 patient developed diffuse pruritic skin rashes on his trunk,  upper extremities, and lower extremities about 5 days after starting immunotherapy. He was given a Medrol dose pack in the ER, which improved his symptoms, but the pruritic skin rashes recurred when the steroids were tapered off. Dr. Reid prescribed 2.5% hydrocortisone cream, but due to the large body area involved, he uses the cream quickly, and the pharmacy will not refill it.  I recommended to start oral prednisone 20 mg daily for 7 days, then tapering to 10 mg daily, has been recommended. A larger quantity of hydrocortisone cream has been prescribed. Hydrochlorothiazide 25 mg every 8 hours as needed for pruritus has also been prescribed. Immunotherapy with pembrolizumab is canceled today. He will start prednisone 20 mg daily for 7 days and then taper to 10 mg daily.  Hydroxyzine is not 25 mg every 8 hours as needed for pruritic skin lesions has been prescribed. He will continue using hydrocortisone cream 2%, with a 453 g jar prescribed. Miconazole cream 2% has been prescribed. Gabapentin 600 mg three times a day will be continued. Oral vitamin B12, folic acid, and B6 for peripheral neuropathy will be continued. He will return in 2 weeks for reevaluation to assess his response to the treatment and discuss whether to resume pembrolizumab at that time. Xarelto 20 mg daily for anticoagulation will be continued. He is scheduled to see a hand surgeon on 02/08/2025 for evaluation of the expansile lesion in the left thumb.      2.  Emphysema.    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  He was instructed by his surgeon Dr. Figueroa to taper off gradually.  on 11/6/2024 patient reports that he is currently on oxygen supplementation at 3 L/min and reports no cough or hemoptysis.  11/27/2024 condition.    3.  Monoclonal gammopathy of unknown significance.  IgA kappa.  This was discovered at the his nephrologist office.  Laboratory study on 4/13/2022 reported serum monoclonal IgA kappa with  elevated IgA 604 mg/dL, normal IgG 861 and IgM 84.  Free kappa chain was 38.4, lambda 21.3, kappa/lambda ratio 1.8.  Lab study on 7/19/2024 at her nephrologist office Positive serum monoclonal IgA kappa. IgA was slightly elevated at 597 with normal serum IgG 830 and IgM 95. Kappa chain is 50.4 and lambda 23.3 with a kappa lambda ratio of 2.16.  Continue to observe.      4.  DM type II  Patient is on sliding scale insulin prior to meals and bedtime along with weekly Mounjaro  Blood sugars are being exacerbated by oral dexamethasone.  He is also receiving IV dexamethasone.  At this point he is doing well with Taxol we will discontinue oral dexamethasone and adjust IV premedication.  11/6/2024 laboratory study conducted today reported a glucose level of 181. Diabetes management will be continued.    5.  Peripheral neuropathy due to chemotherapy.   He reports tingling in the hands and feet, which interferes with functionality, such as writing checks. This neuropathy started since beginning chemotherapy. He is currently taking gabapentin, which he reports is helping. The neuropathy is likely caused by Taxol.  On 11/6/2024 patient reports that he continues to experience peripheral neuropathy, particularly in his feet, characterized by numbness and tingling. Despite being on gabapentin 300 mg three times a day, he reports no apparent effect. Oral vitamin B12 at 1000 mcg daily, folic acid 1 mg daily, and vitamin B6 at 50 mg daily have been recommended to alleviate peripheral neuropathy symptoms.    6. 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  Last chemotherapy finished on 10/2/2024.  Hemoglobin was 11.7.  Laboratory studies conducted today on 11/6/2024 reported persistent anemia with hemoglobin level of 11.0 and MCV of 94.8. An iron study with ferritin, iron profile, along with B12 and folate, has been recommended for further assessment.  11/27/2024 hemoglobin 10.8 MCV 91.8.      PLAN:   Cancel second cycle of adjuvant  pembrolizumab treatment today.    Start oral prednisone 20 mg daily for 7 days, then tapering to 10 mg daily, has been recommended.   Continue use of topical hydrocortisone cream.  A larger quantity of hydrocortisone cream has been prescribed, 1 oz.  We will see patient in 2 weeks for reevaluation with laboratory studies CBC CMP, and possible pembrolizumab again.  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  6.  Oral vitamin B12 at 1000 mcg daily, folic acid 1 mg daily, and vitamin B6 at 50 mg daily have been recommended to alleviate peripheral neuropathy symptoms.  I E scribed prescriptions to his pharmacy.  7.   I will see patient in 3 weeks for reevaluation assess toxicity from immunotherapy, with laboratory studies CBC CMP and do for cycle 2 pembrolizumab.  8.  Management of diabetes with the primary care physician.  Patient continues on insulin 4 times daily and Mounjaro weekly.  Continue anticoagulation with Xarelto 20 mg daily.  Continue gabapentin 300 mg every 8 hours for peripheral neuropathy caused by chemotherapy.    I spent 58 minutes caring for Branden on this date of service. This time includes time spent by me in the following activities: preparing for the visit, reviewing tests, obtaining and/or reviewing a separately obtained history, performing a medically appropriate examination and/or evaluation, counseling and educating the patient/family/caregiver, ordering medications, tests, or procedures, referring and communicating with other health care professionals, documenting information in the medical record, independently interpreting results and communicating that information with the patient/family/caregiver and care coordination       This patient is on high risk drug therapy requiring intensive monitoring for toxicity.        Duy Villasenor MD  PhD  11/27/24      CC:  MD John Paul Govea MD Sasser, Robert L, MD

## 2024-11-27 NOTE — TELEPHONE ENCOUNTER
Patient's wife call and informed [employer] needed a clarification of Prednisone instructions and [facility] needs a PA.    Patient's wife v/u

## 2024-11-27 NOTE — TELEPHONE ENCOUNTER
Outcome  Approved today by [employer] 2017  Approved. This drug has been approved under the Member's Medicare Part D benefit. Approved quantity: 90 units per 30 day(s). You may fill up to a 90 day supply except for those on Specialty Tier 5, which can be filled up to a 30 day supply. Please call the pharmacy to process the prescription claim.  Authorization Expiration Date: 12/31/2099  Drug  hydrOXYzine HCl 25MG tablets  ePA cloud logo  Form  [employer] Electronic Prior Authorization Request Form (2017 NCP[employer])  Original Claim Info  [id number] IF LEVEL OF CARE CHANGE CALL HELP [place] prefers [id number] - CETIRIZINE HCL, [id number] - CYPROHEPTADINE HCL

## 2024-11-29 ENCOUNTER — APPOINTMENT (OUTPATIENT)
Dept: CT IMAGING | Facility: HOSPITAL | Age: 74
DRG: 189 | End: 2024-11-29
Payer: MEDICARE

## 2024-11-29 ENCOUNTER — APPOINTMENT (OUTPATIENT)
Dept: GENERAL RADIOLOGY | Facility: HOSPITAL | Age: 74
DRG: 189 | End: 2024-11-29
Payer: MEDICARE

## 2024-11-29 ENCOUNTER — HOSPITAL ENCOUNTER (INPATIENT)
Facility: HOSPITAL | Age: 74
LOS: 2 days | Discharge: HOME | DRG: 189 | End: 2024-12-01
Attending: EMERGENCY MEDICINE | Admitting: HOSPITALIST
Payer: MEDICARE

## 2024-11-29 DIAGNOSIS — J90 PLEURAL EFFUSION ON RIGHT: ICD-10-CM

## 2024-11-29 DIAGNOSIS — J81.0 ACUTE PULMONARY EDEMA: ICD-10-CM

## 2024-11-29 DIAGNOSIS — J96.01 ACUTE RESPIRATORY FAILURE WITH HYPOXIA: Primary | ICD-10-CM

## 2024-11-29 PROBLEM — J96.00 ACUTE RESPIRATORY FAILURE: Status: ACTIVE | Noted: 2024-11-29

## 2024-11-29 LAB
ALBUMIN SERPL-MCNC: 3.8 G/DL (ref 3.5–5.2)
ALBUMIN/GLOB SERPL: 1 G/DL
ALP SERPL-CCNC: 172 U/L (ref 39–117)
ALT SERPL W P-5'-P-CCNC: 35 U/L (ref 1–41)
ANION GAP SERPL CALCULATED.3IONS-SCNC: 13.6 MMOL/L (ref 5–15)
AST SERPL-CCNC: 32 U/L (ref 1–40)
B PARAPERT DNA SPEC QL NAA+PROBE: NOT DETECTED
B PERT DNA SPEC QL NAA+PROBE: NOT DETECTED
BASOPHILS # BLD AUTO: 0.02 10*3/MM3 (ref 0–0.2)
BASOPHILS NFR BLD AUTO: 0.2 % (ref 0–1.5)
BILIRUB SERPL-MCNC: 0.9 MG/DL (ref 0–1.2)
BUN SERPL-MCNC: 14 MG/DL (ref 8–23)
BUN/CREAT SERPL: 16.7 (ref 7–25)
C PNEUM DNA NPH QL NAA+NON-PROBE: NOT DETECTED
CALCIUM SPEC-SCNC: 9.5 MG/DL (ref 8.6–10.5)
CHLORIDE SERPL-SCNC: 96 MMOL/L (ref 98–107)
CO2 SERPL-SCNC: 29.4 MMOL/L (ref 22–29)
CREAT SERPL-MCNC: 0.84 MG/DL (ref 0.76–1.27)
DEPRECATED RDW RBC AUTO: 53 FL (ref 37–54)
EGFRCR SERPLBLD CKD-EPI 2021: 90.4 ML/MIN/1.73
EOSINOPHIL # BLD AUTO: 0.14 10*3/MM3 (ref 0–0.4)
EOSINOPHIL NFR BLD AUTO: 1.3 % (ref 0.3–6.2)
ERYTHROCYTE [DISTWIDTH] IN BLOOD BY AUTOMATED COUNT: 15.5 % (ref 12.3–15.4)
FLUAV SUBTYP SPEC NAA+PROBE: NOT DETECTED
FLUBV RNA ISLT QL NAA+PROBE: NOT DETECTED
GLOBULIN UR ELPH-MCNC: 3.7 GM/DL
GLUCOSE BLDC GLUCOMTR-MCNC: 211 MG/DL (ref 70–130)
GLUCOSE BLDC GLUCOMTR-MCNC: 381 MG/DL (ref 70–130)
GLUCOSE SERPL-MCNC: 291 MG/DL (ref 65–99)
HADV DNA SPEC NAA+PROBE: NOT DETECTED
HCOV 229E RNA SPEC QL NAA+PROBE: NOT DETECTED
HCOV HKU1 RNA SPEC QL NAA+PROBE: NOT DETECTED
HCOV NL63 RNA SPEC QL NAA+PROBE: NOT DETECTED
HCOV OC43 RNA SPEC QL NAA+PROBE: NOT DETECTED
HCT VFR BLD AUTO: 34.1 % (ref 37.5–51)
HGB BLD-MCNC: 11.1 G/DL (ref 13–17.7)
HMPV RNA NPH QL NAA+NON-PROBE: NOT DETECTED
HOLD SPECIMEN: NORMAL
HOLD SPECIMEN: NORMAL
HPIV1 RNA ISLT QL NAA+PROBE: NOT DETECTED
HPIV2 RNA SPEC QL NAA+PROBE: NOT DETECTED
HPIV3 RNA NPH QL NAA+PROBE: NOT DETECTED
HPIV4 P GENE NPH QL NAA+PROBE: NOT DETECTED
IMM GRANULOCYTES # BLD AUTO: 0.06 10*3/MM3 (ref 0–0.05)
IMM GRANULOCYTES NFR BLD AUTO: 0.6 % (ref 0–0.5)
LYMPHOCYTES # BLD AUTO: 0.72 10*3/MM3 (ref 0.7–3.1)
LYMPHOCYTES NFR BLD AUTO: 6.9 % (ref 19.6–45.3)
M PNEUMO IGG SER IA-ACNC: NOT DETECTED
MCH RBC QN AUTO: 30.2 PG (ref 26.6–33)
MCHC RBC AUTO-ENTMCNC: 32.6 G/DL (ref 31.5–35.7)
MCV RBC AUTO: 92.9 FL (ref 79–97)
MONOCYTES # BLD AUTO: 0.44 10*3/MM3 (ref 0.1–0.9)
MONOCYTES NFR BLD AUTO: 4.2 % (ref 5–12)
NEUTROPHILS NFR BLD AUTO: 86.8 % (ref 42.7–76)
NEUTROPHILS NFR BLD AUTO: 9.02 10*3/MM3 (ref 1.7–7)
NRBC BLD AUTO-RTO: 0 /100 WBC (ref 0–0.2)
NT-PROBNP SERPL-MCNC: 1254 PG/ML (ref 0–1800)
PLATELET # BLD AUTO: 293 10*3/MM3 (ref 140–450)
PMV BLD AUTO: 9 FL (ref 6–12)
POTASSIUM SERPL-SCNC: 3.9 MMOL/L (ref 3.5–5.2)
PROCALCITONIN SERPL-MCNC: 0.06 NG/ML (ref 0–0.25)
PROT SERPL-MCNC: 7.5 G/DL (ref 6–8.5)
QT INTERVAL: 396 MS
QTC INTERVAL: 457 MS
RBC # BLD AUTO: 3.67 10*6/MM3 (ref 4.14–5.8)
RHINOVIRUS RNA SPEC NAA+PROBE: NOT DETECTED
RSV RNA NPH QL NAA+NON-PROBE: NOT DETECTED
SARS-COV-2 RNA NPH QL NAA+NON-PROBE: NOT DETECTED
SODIUM SERPL-SCNC: 139 MMOL/L (ref 136–145)
TROPONIN T SERPL HS-MCNC: 21 NG/L
WBC NRBC COR # BLD AUTO: 10.4 10*3/MM3 (ref 3.4–10.8)
WHOLE BLOOD HOLD COAG: NORMAL
WHOLE BLOOD HOLD SPECIMEN: NORMAL

## 2024-11-29 PROCEDURE — 25010000002 FUROSEMIDE PER 20 MG: Performed by: EMERGENCY MEDICINE

## 2024-11-29 PROCEDURE — 36415 COLL VENOUS BLD VENIPUNCTURE: CPT | Performed by: EMERGENCY MEDICINE

## 2024-11-29 PROCEDURE — 99291 CRITICAL CARE FIRST HOUR: CPT

## 2024-11-29 PROCEDURE — 25010000002 CEFTRIAXONE PER 250 MG: Performed by: STUDENT IN AN ORGANIZED HEALTH CARE EDUCATION/TRAINING PROGRAM

## 2024-11-29 PROCEDURE — 71045 X-RAY EXAM CHEST 1 VIEW: CPT

## 2024-11-29 PROCEDURE — 71260 CT THORAX DX C+: CPT

## 2024-11-29 PROCEDURE — 25010000002 METHYLPREDNISOLONE PER 40 MG: Performed by: STUDENT IN AN ORGANIZED HEALTH CARE EDUCATION/TRAINING PROGRAM

## 2024-11-29 PROCEDURE — 83880 ASSAY OF NATRIURETIC PEPTIDE: CPT | Performed by: EMERGENCY MEDICINE

## 2024-11-29 PROCEDURE — 87040 BLOOD CULTURE FOR BACTERIA: CPT | Performed by: STUDENT IN AN ORGANIZED HEALTH CARE EDUCATION/TRAINING PROGRAM

## 2024-11-29 PROCEDURE — 82948 REAGENT STRIP/BLOOD GLUCOSE: CPT

## 2024-11-29 PROCEDURE — 63710000001 INSULIN LISPRO (HUMAN) PER 5 UNITS: Performed by: HOSPITALIST

## 2024-11-29 PROCEDURE — 84484 ASSAY OF TROPONIN QUANT: CPT | Performed by: EMERGENCY MEDICINE

## 2024-11-29 PROCEDURE — 83605 ASSAY OF LACTIC ACID: CPT | Performed by: STUDENT IN AN ORGANIZED HEALTH CARE EDUCATION/TRAINING PROGRAM

## 2024-11-29 PROCEDURE — 85025 COMPLETE CBC W/AUTO DIFF WBC: CPT | Performed by: EMERGENCY MEDICINE

## 2024-11-29 PROCEDURE — 84145 PROCALCITONIN (PCT): CPT | Performed by: HOSPITALIST

## 2024-11-29 PROCEDURE — 63710000001 INSULIN GLARGINE PER 5 UNITS: Performed by: HOSPITALIST

## 2024-11-29 PROCEDURE — 80053 COMPREHEN METABOLIC PANEL: CPT | Performed by: EMERGENCY MEDICINE

## 2024-11-29 PROCEDURE — 93005 ELECTROCARDIOGRAM TRACING: CPT | Performed by: EMERGENCY MEDICINE

## 2024-11-29 PROCEDURE — 0202U NFCT DS 22 TRGT SARS-COV-2: CPT | Performed by: EMERGENCY MEDICINE

## 2024-11-29 PROCEDURE — 93010 ELECTROCARDIOGRAM REPORT: CPT | Performed by: INTERNAL MEDICINE

## 2024-11-29 PROCEDURE — 25510000001 IOPAMIDOL 61 % SOLUTION: Performed by: HOSPITALIST

## 2024-11-29 RX ORDER — GABAPENTIN 300 MG/1
600 CAPSULE ORAL EVERY 8 HOURS SCHEDULED
Status: DISCONTINUED | OUTPATIENT
Start: 2024-11-29 | End: 2024-12-01 | Stop reason: HOSPADM

## 2024-11-29 RX ORDER — SODIUM CHLORIDE 0.9 % (FLUSH) 0.9 %
10 SYRINGE (ML) INJECTION AS NEEDED
Status: DISCONTINUED | OUTPATIENT
Start: 2024-11-29 | End: 2024-12-01 | Stop reason: HOSPADM

## 2024-11-29 RX ORDER — AZITHROMYCIN 250 MG/1
500 TABLET, FILM COATED ORAL
Status: DISCONTINUED | OUTPATIENT
Start: 2024-11-30 | End: 2024-12-01

## 2024-11-29 RX ORDER — FERROUS SULFATE 325(65) MG
162.5 TABLET ORAL
Status: DISCONTINUED | OUTPATIENT
Start: 2024-11-30 | End: 2024-12-01 | Stop reason: HOSPADM

## 2024-11-29 RX ORDER — ROSUVASTATIN CALCIUM 40 MG/1
40 TABLET, COATED ORAL DAILY
Status: DISCONTINUED | OUTPATIENT
Start: 2024-11-30 | End: 2024-12-01 | Stop reason: HOSPADM

## 2024-11-29 RX ORDER — METHYLPREDNISOLONE SODIUM SUCCINATE 40 MG/ML
40 INJECTION, POWDER, LYOPHILIZED, FOR SOLUTION INTRAMUSCULAR; INTRAVENOUS EVERY 8 HOURS
Status: DISCONTINUED | OUTPATIENT
Start: 2024-11-29 | End: 2024-12-01

## 2024-11-29 RX ORDER — INSULIN LISPRO 100 [IU]/ML
INJECTION, SOLUTION INTRAVENOUS; SUBCUTANEOUS AS NEEDED
Status: ON HOLD | COMMUNITY
End: 2024-12-01

## 2024-11-29 RX ORDER — IOPAMIDOL 612 MG/ML
100 INJECTION, SOLUTION INTRAVASCULAR
Status: COMPLETED | OUTPATIENT
Start: 2024-11-29 | End: 2024-11-29

## 2024-11-29 RX ORDER — BISACODYL 5 MG/1
5 TABLET, DELAYED RELEASE ORAL DAILY PRN
Status: DISCONTINUED | OUTPATIENT
Start: 2024-11-29 | End: 2024-12-01 | Stop reason: HOSPADM

## 2024-11-29 RX ORDER — IBUPROFEN 600 MG/1
1 TABLET ORAL
Status: DISCONTINUED | OUTPATIENT
Start: 2024-11-29 | End: 2024-12-01 | Stop reason: HOSPADM

## 2024-11-29 RX ORDER — NITROGLYCERIN 0.4 MG/1
0.4 TABLET SUBLINGUAL
Status: DISCONTINUED | OUTPATIENT
Start: 2024-11-29 | End: 2024-12-01 | Stop reason: HOSPADM

## 2024-11-29 RX ORDER — FUROSEMIDE 10 MG/ML
80 INJECTION INTRAMUSCULAR; INTRAVENOUS ONCE
Status: COMPLETED | OUTPATIENT
Start: 2024-11-29 | End: 2024-11-29

## 2024-11-29 RX ORDER — SODIUM CHLORIDE 0.9 % (FLUSH) 0.9 %
10 SYRINGE (ML) INJECTION EVERY 12 HOURS SCHEDULED
Status: DISCONTINUED | OUTPATIENT
Start: 2024-11-29 | End: 2024-12-01 | Stop reason: HOSPADM

## 2024-11-29 RX ORDER — ACETAMINOPHEN 160 MG/5ML
650 SOLUTION ORAL EVERY 4 HOURS PRN
Status: DISCONTINUED | OUTPATIENT
Start: 2024-11-29 | End: 2024-12-01 | Stop reason: HOSPADM

## 2024-11-29 RX ORDER — FERROUS SULFATE 325(65) MG
0.5 TABLET ORAL
COMMUNITY

## 2024-11-29 RX ORDER — HYDROCODONE BITARTRATE AND ACETAMINOPHEN 5; 325 MG/1; MG/1
1 TABLET ORAL EVERY 6 HOURS PRN
Status: DISCONTINUED | OUTPATIENT
Start: 2024-11-29 | End: 2024-12-01 | Stop reason: HOSPADM

## 2024-11-29 RX ORDER — ONDANSETRON 2 MG/ML
4 INJECTION INTRAMUSCULAR; INTRAVENOUS EVERY 6 HOURS PRN
Status: DISCONTINUED | OUTPATIENT
Start: 2024-11-29 | End: 2024-12-01 | Stop reason: HOSPADM

## 2024-11-29 RX ORDER — AMOXICILLIN 250 MG
2 CAPSULE ORAL 2 TIMES DAILY PRN
Status: DISCONTINUED | OUTPATIENT
Start: 2024-11-29 | End: 2024-12-01 | Stop reason: HOSPADM

## 2024-11-29 RX ORDER — INSULIN LISPRO 100 [IU]/ML
2-9 INJECTION, SOLUTION INTRAVENOUS; SUBCUTANEOUS
Status: DISCONTINUED | OUTPATIENT
Start: 2024-11-29 | End: 2024-12-01

## 2024-11-29 RX ORDER — NICOTINE POLACRILEX 4 MG
15 LOZENGE BUCCAL
Status: DISCONTINUED | OUTPATIENT
Start: 2024-11-29 | End: 2024-12-01 | Stop reason: HOSPADM

## 2024-11-29 RX ORDER — POLYETHYLENE GLYCOL 3350 17 G/17G
17 POWDER, FOR SOLUTION ORAL DAILY PRN
Status: DISCONTINUED | OUTPATIENT
Start: 2024-11-29 | End: 2024-12-01 | Stop reason: HOSPADM

## 2024-11-29 RX ORDER — DEXTROSE MONOHYDRATE 25 G/50ML
25 INJECTION, SOLUTION INTRAVENOUS
Status: DISCONTINUED | OUTPATIENT
Start: 2024-11-29 | End: 2024-12-01 | Stop reason: HOSPADM

## 2024-11-29 RX ORDER — CETIRIZINE HYDROCHLORIDE 10 MG/1
10 TABLET ORAL DAILY
Status: DISCONTINUED | OUTPATIENT
Start: 2024-11-30 | End: 2024-12-01 | Stop reason: HOSPADM

## 2024-11-29 RX ORDER — ONDANSETRON 4 MG/1
4 TABLET, ORALLY DISINTEGRATING ORAL EVERY 6 HOURS PRN
Status: DISCONTINUED | OUTPATIENT
Start: 2024-11-29 | End: 2024-12-01 | Stop reason: HOSPADM

## 2024-11-29 RX ORDER — ACETAMINOPHEN 325 MG/1
650 TABLET ORAL EVERY 4 HOURS PRN
Status: DISCONTINUED | OUTPATIENT
Start: 2024-11-29 | End: 2024-12-01 | Stop reason: HOSPADM

## 2024-11-29 RX ORDER — FOLIC ACID 1 MG/1
1 TABLET ORAL DAILY
Status: DISCONTINUED | OUTPATIENT
Start: 2024-11-29 | End: 2024-12-01 | Stop reason: HOSPADM

## 2024-11-29 RX ORDER — BISACODYL 10 MG
10 SUPPOSITORY, RECTAL RECTAL DAILY PRN
Status: DISCONTINUED | OUTPATIENT
Start: 2024-11-29 | End: 2024-12-01 | Stop reason: HOSPADM

## 2024-11-29 RX ORDER — SODIUM CHLORIDE 9 MG/ML
40 INJECTION, SOLUTION INTRAVENOUS AS NEEDED
Status: DISCONTINUED | OUTPATIENT
Start: 2024-11-29 | End: 2024-12-01 | Stop reason: HOSPADM

## 2024-11-29 RX ORDER — ACETAMINOPHEN 650 MG/1
650 SUPPOSITORY RECTAL EVERY 4 HOURS PRN
Status: DISCONTINUED | OUTPATIENT
Start: 2024-11-29 | End: 2024-12-01 | Stop reason: HOSPADM

## 2024-11-29 RX ORDER — TORSEMIDE 20 MG/1
20 TABLET ORAL 2 TIMES DAILY
Status: DISCONTINUED | OUTPATIENT
Start: 2024-11-29 | End: 2024-12-01 | Stop reason: HOSPADM

## 2024-11-29 RX ORDER — DIPHENOXYLATE HYDROCHLORIDE AND ATROPINE SULFATE 2.5; .025 MG/1; MG/1
1 TABLET ORAL DAILY
Status: DISCONTINUED | OUTPATIENT
Start: 2024-11-30 | End: 2024-12-01 | Stop reason: HOSPADM

## 2024-11-29 RX ADMIN — METHYLPREDNISOLONE SODIUM SUCCINATE 40 MG: 40 INJECTION, POWDER, FOR SOLUTION INTRAMUSCULAR; INTRAVENOUS at 23:54

## 2024-11-29 RX ADMIN — INSULIN LISPRO 4 UNITS: 100 INJECTION, SOLUTION INTRAVENOUS; SUBCUTANEOUS at 22:27

## 2024-11-29 RX ADMIN — FUROSEMIDE 80 MG: 10 INJECTION, SOLUTION INTRAMUSCULAR; INTRAVENOUS at 11:53

## 2024-11-29 RX ADMIN — INSULIN GLARGINE 20 UNITS: 100 INJECTION, SOLUTION SUBCUTANEOUS at 16:41

## 2024-11-29 RX ADMIN — Medication 10 ML: at 22:27

## 2024-11-29 RX ADMIN — CEFTRIAXONE 2000 MG: 2 INJECTION, POWDER, FOR SOLUTION INTRAMUSCULAR; INTRAVENOUS at 23:54

## 2024-11-29 RX ADMIN — INSULIN LISPRO 8 UNITS: 100 INJECTION, SOLUTION INTRAVENOUS; SUBCUTANEOUS at 16:40

## 2024-11-29 RX ADMIN — FOLIC ACID 1 MG: 1 TABLET ORAL at 16:40

## 2024-11-29 RX ADMIN — GABAPENTIN 600 MG: 300 CAPSULE ORAL at 22:27

## 2024-11-29 RX ADMIN — IOPAMIDOL 75 ML: 612 INJECTION, SOLUTION INTRAVENOUS at 19:22

## 2024-11-29 NOTE — ED TRIAGE NOTES
Pt reports coughing up blood that started 2 days ago, increased SOA, on oxygen, had lung surgery on 6/12

## 2024-11-29 NOTE — PLAN OF CARE
Goal Outcome Evaluation:              Outcome Evaluation: pt admitted to 4N after c/o of coughing up blood. pt has not coughed up any blood since admitted to the floor, but does state he had a small amount down in ER. pt states he has issues with SOA w/ exertion. Usually on 3L NC at home, needing 5L NC here and maintaining 95%. pt denies any current SOA. Denies any pain or nausea. wife assisted pt on cleaning up. Pt expresses no needs at this time. call light within reach.

## 2024-11-29 NOTE — ED PROVIDER NOTES
EMERGENCY DEPARTMENT ENCOUNTER    Room Number:  28/28  PCP: Tino Lopez MD  Historian: Patient      HPI:  Chief Complaint: Shortness of breath  A complete HPI/ROS/PMH/PSH/SH/FH are unobtainable due to: None  Context: Branden Diop is a 74 y.o. male who presents to the ED c/o sudden onset of significant shortness of breath that began yesterday and has been steadily worsening throughout the evening in the morning today.  He does report a history of COPD as well as lung cancer who recently underwent lung surgery due to his cancer.  He states that he is on 3 L of oxygen by nasal cannula at baseline and has been having to increase it throughout the day today  He does report a productive cough with sputum.  He states that his symptoms are currently moderate to severe in intensity and have been worsening.  He denies chest pain, back pain, fever/chills, nausea/vomiting, or abdominal pain.  He is on Xarelto daily due to history of atrial fibrillation.            PAST MEDICAL HISTORY  Active Ambulatory Problems     Diagnosis Date Noted    A-fib 01/29/2016    Atrioventricular block, Mobitz type 1, Wenckebach 01/29/2016    Apnea, sleep 01/29/2016    Obesity 01/29/2016    Streptococcus pneumoniae     Sleep apnea with use of continuous positive airway pressure (CPAP)     Sinusitis     Right wrist pain 11/11/2015    Pneumonia 12/01/2013    Type 2 diabetes mellitus, with long-term current use of insulin     Hyperlipidemia     Hammertoe     Depression     COPD (chronic obstructive pulmonary disease)     Colon polyps     Anxiety     Pruritus 04/05/2016    Cholelithiasis 10/06/2017    Fatty liver 10/09/2017    Essential hypertension 05/24/2019    Vitamin D deficiency 08/10/2020    Emphysema, unspecified 10/14/2021    Bronchiectasis with acute exacerbation 10/14/2021    Chest pain, atypical 11/11/2021    FHx: colonic polyps 08/30/2022    Diverticulosis 08/30/2022    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  05/30/2024    Malignant neoplasm of middle lobe of right lung 05/30/2024    Fluid collection at surgical site, initial encounter 06/20/2024    Encounter for long-term (current) use of high-risk medication 07/12/2024    Fitting and adjustment of vascular catheter 07/12/2024    Anemia associated with chemotherapy 10/10/2024    Peripheral neuropathy due to chemotherapy 11/06/2024     Resolved Ambulatory Problems     Diagnosis Date Noted    BP (high blood pressure) 01/29/2016    Atrial fibrillation     Atrial flutter 08/25/2016    Acute cholecystitis 10/06/2017    Right upper quadrant pain 10/06/2017    Recurrent inguinal hernia of right side without obstruction or gangrene 06/13/2018    Uncontrolled type 2 diabetes mellitus with hyperglycemia 04/25/2019     Past Medical History:   Diagnosis Date    Arthritis     Bunion of right foot     History of atrial fibrillation     History of skin cancer     Wainwright (hard of hearing)     Inguinal hernia, recurrent     Left foot pain     Lung nodules     Rash     Redness of skin     Scab     Type 2 diabetes mellitus          PAST SURGICAL HISTORY  Past Surgical History:   Procedure Laterality Date    BASAL CELL CARCINOMA EXCISION      BRONCHOSCOPY WITH ION ROBOTIC ASSIST N/A 5/6/2024    Procedure: BRONCHOSCOPY WITH ION ROBOT WITH FNA, BIOPSIES, BAL AND ENDOBRONCHIAL ULTRASOUND WITH FNA;  Surgeon: Yony Coles MD;  Location: [facility] ENDOSCOPY;  Service: Robotics - Pulmonary;  Laterality: N/A;  PRE: PULMONARY NODULES  POST: SAME    CARDIAC CATHETERIZATION N/A 11/15/2021    Procedure: Coronary angiography;  Surgeon: Alfredo Jennings MD;  Location: [facility] CATH INVASIVE LOCATION;  Service: Cardiovascular;  Laterality: N/A;    CARDIAC CATHETERIZATION N/A 11/15/2021    Procedure: Left heart cath;  Surgeon: Alfredo Jennings MD;  Location: [facility] CATH INVASIVE LOCATION;  Service: Cardiovascular;  Laterality: N/A;    CARDIAC CATHETERIZATION N/A 11/15/2021    Procedure: Left  ventriculography;  Surgeon: Alfredo Jennings MD;  Location: [facility] CATH INVASIVE LOCATION;  Service: Cardiovascular;  Laterality: N/A;    CARDIAC CATHETERIZATION N/A 11/15/2021    Procedure: Aortic root aortogram;  Surgeon: Alfredo Jennings MD;  Location: [facility] CATH INVASIVE LOCATION;  Service: Cardiovascular;  Laterality: N/A;    CARDIOVERSION      CHOLECYSTECTOMY N/A 10/07/2017    Procedure: CHOLECYSTECTOMY LAPAROSCOPIC;  Surgeon: Martir Trevino MD;  Location: [facility] MAIN OR;  Service:     COLONOSCOPY  2007    COLONOSCOPY N/A 8/30/2022    Procedure: COLONOSCOPY TO CECUM AND TI AND COLD SNARE POLYPECTOMY;  Surgeon: Cj Lopez MD;  Location: [facility] ENDOSCOPY;  Service: Gastroenterology;  Laterality: N/A;  Pre: History of colon polyps  Post: POLYP, PREVIOUS TATTOO    FOOT SURGERY Left     TOES ARE PINNED. ALL BUT GREAT TOE    HAND SURGERY      THUMB    HERNIA REPAIR      INGUINAL HERNIA REPAIR Right 06/27/2018    Procedure: INGUINAL HERNIA REPAIR, OPEN, RECURRENT;  Surgeon: Martir Trevino MD;  Location: [facility] OR OSC;  Service: General    LASIK Bilateral     LOBECTOMY Right 6/12/2024    Procedure: BRONCHOSCOPY, RIGHT VIDEO ASSISTED THORACOSCOPY WITH RIGHT MIDDLE LOBECTOMY WITH DAVINCI ROBOT, MEDIASTINAL LYMPH NODE DISSECTION, INTERCOSTAL NERVE BLOCK;  Surgeon: David Figueroa MD;  Location: [facility] MAIN OR;  Service: Robotics - DaVinci;  Laterality: Right;    NECK SURGERY      growth on salivary gland    REPLACEMENT TOTAL KNEE Right 2007    (he is going to have a LTKR next month)    REPLACEMENT TOTAL KNEE Left     VENOUS ACCESS DEVICE (PORT) INSERTION Right 7/5/2024    Procedure: INSERTION VENOUS ACCESS DEVICE;  Surgeon: David Figueroa MD;  Location: [facility] MAIN OR;  Service: Thoracic;  Laterality: Right;         FAMILY HISTORY  Family History   Problem Relation Age of Onset    Cancer Other     Stroke Mother     Alcohol abuse Father     Malig Hyperthermia Neg Hx          SOCIAL HISTORY  Social History      Socioeconomic History    Marital status:      Spouse name: Luba    Number of children: 2   Tobacco Use    Smoking status: Former     Current packs/day: 0.00     Average packs/day: 1 pack/day for 30.0 years (30.0 ttl pk-yrs)     Types: Cigars, Cigarettes     Start date: 1/1/1984     Quit date: 1/1/2014     Years since quitting: 10.9    Smokeless tobacco: Never   Vaping Use    Vaping status: Never Used   Substance and Sexual Activity    Alcohol use: Never     Comment: caffeine use- decaf coffee    Drug use: Never    Sexual activity: Defer         ALLERGIES  Patient has no known allergies.        REVIEW OF SYSTEMS  Review of Systems   Constitutional:  Negative for activity change, appetite change and fever.   HENT:  Negative for congestion and sore throat.    Eyes: Negative.    Respiratory:  Positive for cough and shortness of breath.    Cardiovascular:  Negative for chest pain and leg swelling.   Gastrointestinal:  Negative for abdominal pain, diarrhea and vomiting.   Endocrine: Negative.    Genitourinary:  Negative for decreased urine volume and dysuria.   Musculoskeletal:  Negative for neck pain.   Skin:  Negative for rash and wound.   Allergic/Immunologic: Negative.    Neurological:  Negative for weakness, numbness and headaches.   Hematological: Negative.    Psychiatric/Behavioral: Negative.     All other systems reviewed and are negative.         PHYSICAL EXAM  ED Triage Vitals [11/29/24 0941]   Temp Heart Rate Resp BP SpO2   -- 87 28 -- (!) 85 %      Temp src Heart Rate Source Patient Position BP Location FiO2 (%)   -- -- -- -- --       Physical Exam  Constitutional:       General: He is not in acute distress.     Appearance: Normal appearance. He is not ill-appearing or toxic-appearing.   HENT:      Head: Normocephalic and atraumatic.   Eyes:      Extraocular Movements: Extraocular movements intact.      Pupils: Pupils are equal, round, and reactive to light.   Cardiovascular:      Rate and Rhythm:  Normal rate and regular rhythm.      Heart sounds: No murmur heard.     No friction rub. No gallop.   Pulmonary:      Effort: Tachypnea and respiratory distress present.      Breath sounds: Rales present.      Comments: Diffuse rales throughout  Abdominal:      General: Abdomen is flat. There is no distension.      Palpations: Abdomen is soft.      Tenderness: There is no abdominal tenderness.   Musculoskeletal:         General: No swelling or tenderness. Normal range of motion.      Cervical back: Normal range of motion and neck supple.   Skin:     General: Skin is warm and dry.   Neurological:      General: No focal deficit present.      Mental Status: He is alert and oriented to person, place, and time.      Sensory: No sensory deficit.      Motor: No weakness.   Psychiatric:         Mood and Affect: Mood normal.         Behavior: Behavior normal.           Vital signs and nursing notes reviewed.          LAB RESULTS  Recent Results (from the past 24 hours)   Comprehensive Metabolic Panel    Collection Time: 11/29/24  9:51 AM    Specimen: Arm, Right; Blood   Result Value Ref Range    Glucose 291 (H) 65 - 99 mg/dL    BUN 14 8 - 23 mg/dL    Creatinine 0.84 0.76 - 1.27 mg/dL    Sodium 139 136 - 145 mmol/L    Potassium 3.9 3.5 - 5.2 mmol/L    Chloride 96 (L) 98 - 107 mmol/L    CO2 29.4 (H) 22.0 - 29.0 mmol/L    Calcium 9.5 8.6 - 10.5 mg/dL    Total Protein 7.5 6.0 - 8.5 g/dL    Albumin 3.8 3.5 - 5.2 g/dL    ALT (SGPT) 35 1 - 41 U/L    AST (SGOT) 32 1 - 40 U/L    Alkaline Phosphatase 172 (H) 39 - 117 U/L    Total Bilirubin 0.9 0.0 - 1.2 mg/dL    Globulin 3.7 gm/dL    A/G Ratio 1.0 g/dL    BUN/Creatinine Ratio 16.7 7.0 - 25.0    Anion Gap 13.6 5.0 - 15.0 mmol/L    eGFR 90.4 >60.0 mL/min/1.73   BNP    Collection Time: 11/29/24  9:51 AM    Specimen: Arm, Right; Blood   Result Value Ref Range    proBNP 1,254.0 0.0 - 1,800.0 pg/mL   Single High Sensitivity Troponin T    Collection Time: 11/29/24  9:51 AM    Specimen: Arm,  Right; Blood   Result Value Ref Range    HS Troponin T 21 <22 ng/L   Green Top (Gel)    Collection Time: 11/29/24  9:51 AM   Result Value Ref Range    Extra Tube Hold for add-ons.    Lavender Top    Collection Time: 11/29/24  9:51 AM   Result Value Ref Range    Extra Tube hold for add-on    Gold Top - SST    Collection Time: 11/29/24  9:51 AM   Result Value Ref Range    Extra Tube Hold for add-ons.    Light Blue Top    Collection Time: 11/29/24  9:51 AM   Result Value Ref Range    Extra Tube Hold for add-ons.    CBC Auto Differential    Collection Time: 11/29/24  9:51 AM    Specimen: Arm, Right; Blood   Result Value Ref Range    WBC 10.40 3.40 - 10.80 10*3/mm3    RBC 3.67 (L) 4.14 - 5.80 10*6/mm3    Hemoglobin 11.1 (L) 13.0 - 17.7 g/dL    Hematocrit 34.1 (L) 37.5 - 51.0 %    MCV 92.9 79.0 - 97.0 fL    MCH 30.2 26.6 - 33.0 pg    MCHC 32.6 31.5 - 35.7 g/dL    RDW 15.5 (H) 12.3 - 15.4 %    RDW-SD 53.0 37.0 - 54.0 fl    MPV 9.0 6.0 - 12.0 fL    Platelets 293 140 - 450 10*3/mm3    Neutrophil % 86.8 (H) 42.7 - 76.0 %    Lymphocyte % 6.9 (L) 19.6 - 45.3 %    Monocyte % 4.2 (L) 5.0 - 12.0 %    Eosinophil % 1.3 0.3 - 6.2 %    Basophil % 0.2 0.0 - 1.5 %    Immature Grans % 0.6 (H) 0.0 - 0.5 %    Neutrophils, Absolute 9.02 (H) 1.70 - 7.00 10*3/mm3    Lymphocytes, Absolute 0.72 0.70 - 3.10 10*3/mm3    Monocytes, Absolute 0.44 0.10 - 0.90 10*3/mm3    Eosinophils, Absolute 0.14 0.00 - 0.40 10*3/mm3    Basophils, Absolute 0.02 0.00 - 0.20 10*3/mm3    Immature Grans, Absolute 0.06 (H) 0.00 - 0.05 10*3/mm3    nRBC 0.0 0.0 - 0.2 /100 WBC   ECG 12 Lead ED Triage Standing Order; SOA    Collection Time: 11/29/24  9:52 AM   Result Value Ref Range    QT Interval 396 ms    QTC Interval 457 ms   Respiratory Panel PCR w/COVID-19(SARS-CoV-2) [facility]O/AUBREE/EVAN/PAD/COR/FRANCISCO In-House, NP Swab in UTM/VTM, 2 HR TAT - Swab, Nasopharynx    Collection Time: 11/29/24 10:07 AM    Specimen: Nasopharynx; Swab   Result Value Ref Range    ADENOVIRUS, PCR Not  Detected Not Detected    Coronavirus 229E Not Detected Not Detected    Coronavirus HKU1 Not Detected Not Detected    Coronavirus NL63 Not Detected Not Detected    Coronavirus OC43 Not Detected Not Detected    COVID19 Not Detected Not Detected - Ref. Range    Human Metapneumovirus Not Detected Not Detected    Human Rhinovirus/Enterovirus Not Detected Not Detected    Influenza A PCR Not Detected Not Detected    Influenza B PCR Not Detected Not Detected    Parainfluenza Virus 1 Not Detected Not Detected    Parainfluenza Virus 2 Not Detected Not Detected    Parainfluenza Virus 3 Not Detected Not Detected    Parainfluenza Virus 4 Not Detected Not Detected    RSV, PCR Not Detected Not Detected    Bordetella pertussis pcr Not Detected Not Detected    Bordetella parapertussis PCR Not Detected Not Detected    Chlamydophila pneumoniae PCR Not Detected Not Detected    Mycoplasma pneumo by PCR Not Detected Not Detected       Ordered the above labs and reviewed the results.        RADIOLOGY  XR Chest 1 View    Result Date: 11/29/2024  XR CHEST 1 VW-  INDICATION: Shortness of breath  COMPARISON: CT chest 10/25/2024 and radiographs dating back to 2/16/2016      Dilated and indistinct central pulmonary vasculature with prominent pulmonary interstitial markings bilaterally, most suspicious for pulmonary edema. Moderate right-sided pleural effusion. Recommend imaging follow-up to ensure clearance. No pneumothorax. Normal size cardiomediastinal silhouette. Right IJ port with tip overlying the caudal SVC. No focal osseous abnormality.  This report was finalized on 11/29/2024 10:38 AM by Dr. Kartik Humphrey M.D on Workstation: [id number]       Ordered the above noted radiological studies. Reviewed by me in PACS.            PROCEDURES  Critical Care    Performed by: Edson Paris MD  Authorized by: Edson Paris MD    Critical care provider statement:     Critical care time (minutes):  33    Critical care time was  exclusive of:  Separately billable procedures and treating other patients    Critical care was necessary to treat or prevent imminent or life-threatening deterioration of the following conditions:  Respiratory failure    Critical care was time spent personally by me on the following activities:  Blood draw for specimens, development of treatment plan with patient or surrogate, discussions with consultants, evaluation of patient's response to treatment, examination of patient, obtaining history from patient or surrogate, ordering and performing treatments and interventions, ordering and review of laboratory studies, ordering and review of radiographic studies, re-evaluation of patient's condition and review of old charts    Care discussed with: admitting provider        EKG independently interpreted by myself as follows:    EKG          EKG time: 0952  Rhythm/Rate: atrial fibrillation, 80  P waves and NH: absent  QRS, axis: nml, nml   ST and T waves: nml     Interpreted Contemporaneously by me, independently viewed  unchanged compared to prior 6/5/24            MEDICATIONS GIVEN IN ER  Medications   sodium chloride 0.9 % flush 10 mL (has no administration in time range)   furosemide (LASIX) injection 80 mg (80 mg Intravenous Given 11/29/24 1153)                   MEDICAL DECISION MAKING, PROGRESS, and CONSULTS    All labs have been independently reviewed by me.  All radiology studies have been reviewed by me and I have also reviewed the radiology report.   EKG's independently viewed and interpreted by me.  Discussion below represents my analysis of pertinent findings related to patient's condition, differential diagnosis, treatment plan and final disposition.      Additional sources:  - Discussed/ obtained information from independent historians: History obtained from the patient as well as the patient's family at bedside.    - External (non-ED) record review: Medical records review, the patient was last seen and  evaluated in the outpatient office of his oncologist on 11/27/2024 and follow-up evaluation for his right lung carcinoma.    - Chronic or social conditions impacting care: Current lung cancer    - Shared decision making: Patient decision based on shared conversations have between myself, the patient and family at bedside, as well as Dr. Art with [facility].      Orders placed during this visit:  Orders Placed This Encounter   Procedures    Critical Care    Respiratory Panel PCR w/COVID-19(SARS-CoV-2) [facility]/AUBREE/EVAN/PAD/[facility]CO In-House, NP Swab in UTM/VTM, 2 HR TAT - Swab, Nasopharynx    XR Chest 1 View    [place] Draw    Comprehensive Metabolic Panel    BNP    Single High Sensitivity Troponin T    CBC Auto Differential    NPO Diet NPO Type: Strict NPO    Undress & Gown    Continuous Pulse Oximetry    Vital Signs    [facility] (on-call MD unless specified) Details    Oxygen Therapy- Nasal Cannula; Titrate 1-6 LPM Per SpO2; 90 - 95%    ECG 12 Lead ED Triage Standing Order; SOA    Insert Peripheral IV    Inpatient Admission    CBC & Differential    Green Top (Gel)    Lavender Top    Gold Top - SST    Light Blue Top           Differential diagnosis includes but is not limited to:    Acute hypoxemic respiratory failure, COPD with acute exacerbation, COVID-19, acute bronchitis, pneumonia, pulmonary embolism, or pulmonary edema      Independent interpretation of labs, radiology studies, and discussions with consultants:    Chest x-ray independently interpreted by myself with my interpretation showing cardiomegaly with increased interstitial edema.  No pneumothorax seen.      ED Course as of 11/29/24 1213   Fri Nov 29, 2024   0953 The patient is ill-appearing and in mild to moderate respiratory distress.  He is currently on 6 L of oxygen by nasal cannula with oxygen saturations at 92%.  We will maintain an oxygen saturation above 90% as we begin the workup. [BM]   1133 The patient remains on 6 L oxygen by nasal cannula with oxygen  saturations at 95%.  He actually appears as though he is breathing somewhat better at the moment and he states his symptoms have slightly improved.  His family reports that he has not taken his diuretic for the last couple of days leading up to today.  I informed him that he does have significant volume overload seen on chest x-ray.  We will treat with IV Lasix and admit him to the hospital for further management and treatment.  They agree with that plan and all questions answered. [BM]   1212 Patient's presentation, workup, as well as diagnosis and treatment plan was discussed at length with Dr. Art of [facility].  He agrees to admit the patient to the hospital today for further management and treatment. [BM]      ED Course User Index  [BM] Edson Paris MD         DIAGNOSIS  Final diagnoses:   Acute respiratory failure with hypoxia   Acute pulmonary edema   Pleural effusion on right         DISPOSITION  ADMISSION    Discussed treatment plan and reason for admission with pt/family and admitting physician.  Pt/family voiced understanding of the plan for admission for further testing/treatment as needed.               Latest Documented Vital Signs:  As of 12:13 EST  BP- 131/69 HR- 74 Temp- 99 °F (37.2 °C) (Tympanic) O2 sat- 93%              --    Please note that portions of this were completed with a voice recognition program.       Note Disclaimer: At [facility], we believe that sharing information builds trust and better relationships. You are receiving this note because you are receiving care at [facility] or recently visited. It is possible you will see health information before a provider has talked with you about it. This kind of information can be easy to misunderstand. To help you fully understand what it means for your health, we urge you to discuss this note with your provid     Edson Paris MD  11/29/24 1213

## 2024-11-29 NOTE — H&P
"    Name: Branden Diop ADMIT: 2024   : 1950  PCP: Tino Lopez MD    MRN: [id number] LOS: 0 days   AGE/SEX: 74 y.o. male  ROOM: N435/1     Chief Complaint   Patient presents with    Coughing Up Blood    Shortness of Breath       Subjective   Patient is a 74 y.o. male who presents to [facility] with the above chief complaint.  He denies any history of heart failure and his atrial fibrillation is followed by Dr. Ureña in the outpatient setting.  He notes that he over the last 48 hours has missed his torsemide dosing.  He supposed to take this twice daily.  Per the patient he takes this to \"keep the water off\" but is unable to tell me why the water is there.  He denies any issues on his kidneys but does follow with a nephrologist.  He has been increasingly short of breath the last 48 hours.  Today his shortness of breath was significant and presented to the emergency room for further evaluation.  He is also noted some hemoptysis over the last 48 hours as well.  He normally skips his torsemide on days he has doctors appointments or the days he needs to travel somewhere as this makes doctors visits and car rides somewhat difficult.  He does have a history of lung cancer and underwent lobectomy in .  He been on chemo and immunotherapy in the outpatient setting but this was stopped last week after he developed a rash.  In the emergency room chest x-ray revealed pulmonary edema but BNP and troponin were within normal limits.  He was admitted with volume overload for further evaluation and stabilization.    History of Present Illness    Past Medical History:   Diagnosis Date    Anxiety     Arthritis     Atrial fibrillation     CURRENTLY    Bunion of right foot     Colon polyps     COPD (chronic obstructive pulmonary disease)     MILD. NO MEDS FOR    Depression     History of atrial fibrillation     WITH CARDIOVERSION. NO PROBLEMS    History of skin cancer     BCC REMOVED FROM BACK    " [place] (hard of hearing)     Hyperlipidemia     Inguinal hernia, recurrent     RIGHT    Left foot pain     Lung nodules     Rash     IN GROIN TREATING FOR YEAST INFECTION BY PCP    Redness of skin     LOWER LEGS BILATERAL    Scab     BILATERAL LEGS HEALING    Sleep apnea with use of continuous positive airway pressure (CPAP)     CPAP    Streptococcus pneumoniae     ABOUT 6-7 YR AGO.     Type 2 diabetes mellitus      Past Surgical History:   Procedure Laterality Date    BASAL CELL CARCINOMA EXCISION      BRONCHOSCOPY WITH ION ROBOTIC ASSIST N/A 5/6/2024    Procedure: BRONCHOSCOPY WITH ION ROBOT WITH FNA, BIOPSIES, BAL AND ENDOBRONCHIAL ULTRASOUND WITH FNA;  Surgeon: Yony Coles MD;  Location: [facility] ENDOSCOPY;  Service: Robotics - Pulmonary;  Laterality: N/A;  PRE: PULMONARY NODULES  POST: SAME    CARDIAC CATHETERIZATION N/A 11/15/2021    Procedure: Coronary angiography;  Surgeon: Alfredo Jennings MD;  Location: [facility] CATH INVASIVE LOCATION;  Service: Cardiovascular;  Laterality: N/A;    CARDIAC CATHETERIZATION N/A 11/15/2021    Procedure: Left heart cath;  Surgeon: Alfredo Jennings MD;  Location: [facility] CATH INVASIVE LOCATION;  Service: Cardiovascular;  Laterality: N/A;    CARDIAC CATHETERIZATION N/A 11/15/2021    Procedure: Left ventriculography;  Surgeon: Alfredo Jennings MD;  Location: [facility] CATH INVASIVE LOCATION;  Service: Cardiovascular;  Laterality: N/A;    CARDIAC CATHETERIZATION N/A 11/15/2021    Procedure: Aortic root aortogram;  Surgeon: Alfredo Jennings MD;  Location: [facility] CATH INVASIVE LOCATION;  Service: Cardiovascular;  Laterality: N/A;    CARDIOVERSION      CHOLECYSTECTOMY N/A 10/07/2017    Procedure: CHOLECYSTECTOMY LAPAROSCOPIC;  Surgeon: Martir Trevino MD;  Location: [facility] MAIN OR;  Service:     COLONOSCOPY  2007    COLONOSCOPY N/A 8/30/2022    Procedure: COLONOSCOPY TO CECUM AND TI AND COLD SNARE POLYPECTOMY;  Surgeon: Cj Lopez MD;  Location: [facility] ENDOSCOPY;   Service: Gastroenterology;  Laterality: N/A;  Pre: History of colon polyps  Post: POLYP, PREVIOUS TATTOO    FOOT SURGERY Left     TOES ARE PINNED. ALL BUT GREAT TOE    HAND SURGERY      THUMB    HERNIA REPAIR      INGUINAL HERNIA REPAIR Right 06/27/2018    Procedure: INGUINAL HERNIA REPAIR, OPEN, RECURRENT;  Surgeon: Martir Trevino MD;  Location:  [facility];  Service: General    LASIK Bilateral     LOBECTOMY Right 6/12/2024    Procedure: BRONCHOSCOPY, RIGHT VIDEO ASSISTED THORACOSCOPY WITH RIGHT MIDDLE LOBECTOMY WITH DAVINCI ROBOT, MEDIASTINAL LYMPH NODE DISSECTION, INTERCOSTAL NERVE BLOCK;  Surgeon: David Figueroa MD;  Location: [facility] OR;  Service: Robotics - DaVinci;  Laterality: Right;    NECK SURGERY      growth on salivary gland    REPLACEMENT TOTAL KNEE Right 2007    (he is going to have a LTKR next month)    REPLACEMENT TOTAL KNEE Left     VENOUS ACCESS DEVICE (PORT) INSERTION Right 7/5/2024    Procedure: INSERTION VENOUS ACCESS DEVICE;  Surgeon: David Figueroa MD;  Location: [facility] MAIN OR;  Service: Thoracic;  Laterality: Right;     Family History   Problem Relation Age of Onset    Cancer Other     Stroke Mother     Alcohol abuse Father     Malig Hyperthermia Neg Hx      Social History     Tobacco Use    Smoking status: Former     Current packs/day: 0.00     Average packs/day: 1 pack/day for 30.0 years (30.0 ttl pk-yrs)     Types: Cigars, Cigarettes     Start date: 1/1/1984     Quit date: 1/1/2014     Years since quitting: 10.9    Smokeless tobacco: Never   Vaping Use    Vaping status: Never Used   Substance Use Topics    Alcohol use: Never     Comment: caffeine use- decaf coffee    Drug use: Never     Medications Prior to Admission   Medication Sig Dispense Refill Last Dose/Taking    ferrous sulfate 325 (65 FE) MG tablet Take 0.5 tablets by mouth Daily With Breakfast.   11/28/2024    fexofenadine (ALLEGRA) 180 MG tablet Take 1 tablet by mouth Daily.   11/29/2024    folic acid (FOLVITE) 1 MG tablet  Take 1 tablet by mouth Daily. 90 tablet 3 11/28/2024    gabapentin (NEURONTIN) 300 MG capsule Take 2 capsules by mouth Every 8 (Eight) Hours. 180 capsule 2 11/28/2024    HYDROcodone-acetaminophen (NORCO) 5-325 MG per tablet Take 1 tablet by mouth Every 6 (Six) Hours As Needed for Moderate Pain. 12 tablet 0 Past Week    Insulin Glargine, 1 Unit Dial, (Toujeo SoloStar) 300 UNIT/ML solution pen-injector injection Inject 24 Units under the skin into the appropriate area as directed Daily.   11/28/2024    Insulin Lispro (humaLOG) 100 UNIT/ML injection Inject  under the skin into the appropriate area as directed As Needed for High Blood Sugar. Per sliding scale   11/28/2024    Multiple Vitamin (MULTIVITAMINS PO) Take 1 tablet by mouth Daily.   11/29/2024    predniSONE (DELTASONE) 20 MG tablet Take 1 tablet by mouth Daily. Take 20 mg daily for 7 days, then 10 mg daily (Patient taking differently: Take  by mouth See Admin Instructions. Take 20 mg daily for 7 days, then 10 mg daily) 30 tablet 0 11/29/2024    rosuvastatin (CRESTOR) 40 MG tablet TAKE ONE TABLET BY MOUTH DAILY (Patient taking differently: Take 1 tablet by mouth Daily.) 90 tablet 1 11/29/2024    sertraline (ZOLOFT) 50 MG tablet TAKE ONE TABLET BY MOUTH DAILY (Patient taking differently: Take 1 tablet by mouth Daily.) 90 tablet 1 11/29/2024    Tirzepatide (Mounjaro) 2.5 MG/0.5ML solution pen-injector pen Inject 2.5 mg under the skin into the appropriate area as directed 1 (One) Time Per Week. Mondays   Past Week    torsemide (DEMADEX) 20 MG tablet Take 1 tablet by mouth 2 (Two) Times a Day.   11/29/2024    vitamin B-12 (CYANOCOBALAMIN) 1000 MCG tablet Take 1 tablet by mouth Daily. 90 tablet 3 11/28/2024    vitamin B-6 (PYRIDOXINE) 50 MG tablet Take 1 tablet by mouth Daily. 90 tablet 3 11/28/2024    Xarelto 20 MG tablet TAKE ONE TABLET BY MOUTH DAILY (Patient taking differently: Take 1 tablet by mouth Daily With Dinner.) 30 tablet 9 11/29/2024     Allergies:   "Patient has no known allergies.    Review of Systems     Objective    Vital Signs  Temp:  [97.7 °F (36.5 °C)-99 °F (37.2 °C)] 97.7 °F (36.5 °C)  Heart Rate:  [72-92] 86  Resp:  [24-28] 24  BP: (115-144)/() 144/88  SpO2:  [85 %-95 %] 94 %  on  Flow (L/min) (Oxygen Therapy):  [4-5] 5;   Device (Oxygen Therapy): nasal cannula  Body mass index is 37.84 kg/m².    Physical Exam  Vitals and nursing note reviewed.   Constitutional:       Appearance: He is obese. He is ill-appearing.   Cardiovascular:      Rate and Rhythm: Normal rate and regular rhythm.   Pulmonary:      Effort: Pulmonary effort is normal. No respiratory distress.   Abdominal:      General: Bowel sounds are normal. There is no distension.      Palpations: Abdomen is soft.   Neurological:      General: No focal deficit present.      Mental Status: He is alert. Mental status is at baseline.         Results Review:   I reviewed the patient's new clinical results.  Results from last 7 days   Lab Units 11/29/24  0951 11/27/24  0806 11/24/24  1045   WBC 10*3/mm3 10.40 10.02 8.98   HEMOGLOBIN g/dL 11.1* 10.8* 11.9*   PLATELETS 10*3/mm3 293 250 244     Results from last 7 days   Lab Units 11/29/24  0951 11/27/24  0807 11/24/24  1045   SODIUM mmol/L 139 135* 136   POTASSIUM mmol/L 3.9 3.6 3.6   CHLORIDE mmol/L 96* 92* 95*   CO2 mmol/L 29.4* 30.4* 27.2   BUN mg/dL 14 13 19   CREATININE mg/dL 0.84 0.77 0.96   GLUCOSE mg/dL 291* 241* 205*   ALBUMIN g/dL 3.8 3.7  --    BILIRUBIN mg/dL 0.9 1.3*  --    ALK PHOS U/L 172* 155*  --    AST (SGOT) U/L 32 34  --    ALT (SGPT) U/L 35 26  --    Estimated Creatinine Clearance: 99.9 mL/min (by C-G formula based on SCr of 0.84 mg/dL).  Results from last 7 days   Lab Units 11/29/24  0951   HSTROP T ng/L 21   PROBNP pg/mL 1,254.0         Invalid input(s): \"LDLCALC\"    XR Chest 1 View   Final Result   Dilated and indistinct central pulmonary vasculature with   prominent pulmonary interstitial markings bilaterally, most suspicious "   for pulmonary edema. Moderate right-sided pleural effusion. Recommend   imaging follow-up to ensure clearance. No pneumothorax. Normal size   cardiomediastinal silhouette. Right IJ port with tip overlying the   caudal SVC. No focal osseous abnormality.       This report was finalized on 11/29/2024 10:38 AM by Dr. 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  He is never had coronary artery disease and is followed by Dr. Ureña in the outpatient setting.  His last echocardiogram here was done in May prior to his surgery and diastolic function at that time was indeterminate.  He had a normal EF.  -Chest x-ray concerning for volume overload but BNP within normal limits which argues against heart failure.  -He also missed a couple days of diuretic.  He was responded well to IV diuretics in the emergency room.  -He has been on Keytruda and does have the dermatitis associated with it and I wonder if it is possible he is developed some pulmonary complications from it as well.  Will get a CT scan of the chest.  Will also repeat his echocardiogram and ask pulmonary and cardiology to weigh in.  -He is not wheezing right now no evidence of acute  exacerbation of COPD.  Continue current management  -He and his wife are frustrated regarding him missing his 3 PM dose of gabapentin.  I did try to explain that things were little busy and I wanted to see him before I ordered his medications.  His gabapentin has been reordered.  His other home medications have been addressed.  -He does have type 2 diabetes and is on insulin at home.  I have reordered his basal insulin and sliding scale coverage.  -Xarelto covers for pharmacologic DVT prophylaxis compliant with medications little concern for PE at this time  -Full code      I discussed the patients findings and my recommendations with patient, family, and nursing staff.          Yoan Art MD  [facility]  11/29/24  16:24 EST

## 2024-11-29 NOTE — PROGRESS NOTES
Clinical Pharmacy Services: Medication History    Branden Diop is a 74 y.o. male presenting to [facility] for   Chief Complaint   Patient presents with    Coughing Up Blood    Shortness of Breath       He  has a past medical history of Anxiety, Arthritis, Atrial fibrillation, Bunion of right foot, Colon polyps, COPD (chronic obstructive pulmonary disease), Depression, History of atrial fibrillation, History of skin cancer, [place] (hard of hearing), Hyperlipidemia, Inguinal hernia, recurrent, Left foot pain, Lung nodules, Rash, Redness of skin, Scab, Sleep apnea with use of continuous positive airway pressure (CPAP), Streptococcus pneumoniae, and Type 2 diabetes mellitus.    Allergies as of 11/29/2024    (No Known Allergies)       Medication information was obtained from: Patient   Pharmacy and Phone Number:     Prior to Admission Medications       Prescriptions Last Dose Informant Patient Reported? Taking?    ferrous sulfate 325 (65 FE) MG tablet 11/28/2024 Spouse/Significant Other Yes Yes    Take 0.5 tablets by mouth Daily With Breakfast.    fexofenadine (ALLEGRA) 180 MG tablet 11/29/2024 Self Yes Yes    Take 1 tablet by mouth Daily.    folic acid (FOLVITE) 1 MG tablet 11/28/2024 Self No Yes    Take 1 tablet by mouth Daily.    gabapentin (NEURONTIN) 300 MG capsule 11/28/2024 Pharmacy No Yes    Take 2 capsules by mouth Every 8 (Eight) Hours.    HYDROcodone-acetaminophen (NORCO) 5-325 MG per tablet Past Week Pharmacy, Self No Yes    Take 1 tablet by mouth Every 6 (Six) Hours As Needed for Moderate Pain.    Insulin Glargine, 1 Unit Dial, (Toujeo SoloStar) 300 UNIT/ML solution pen-injector injection 11/28/2024 Spouse/Significant Other, Pharmacy Yes Yes    Inject 24 Units under the skin into the appropriate area as directed Daily.    Insulin Lispro (humaLOG) 100 UNIT/ML injection 11/28/2024 Spouse/Significant Other Yes Yes    Inject  under the skin into the appropriate area as directed As Needed for High Blood  Sugar. Per sliding scale    Multiple Vitamin (MULTIVITAMINS PO) 11/29/2024 Self Yes Yes    Take 1 tablet by mouth Daily.    predniSONE (DELTASONE) 20 MG tablet 11/29/2024 Pharmacy No Yes    Take 1 tablet by mouth Daily. Take 20 mg daily for 7 days, then 10 mg daily    Patient taking differently:  Take  by mouth See Admin Instructions. Take 20 mg daily for 7 days, then 10 mg daily    rosuvastatin (CRESTOR) 40 MG tablet 11/29/2024 Pharmacy, Self No Yes    TAKE ONE TABLET BY MOUTH DAILY    Patient taking differently:  Take 1 tablet by mouth Daily.    sertraline (ZOLOFT) 50 MG tablet 11/29/2024 Pharmacy, Self No Yes    TAKE ONE TABLET BY MOUTH DAILY    Patient taking differently:  Take 1 tablet by mouth Daily.    Tirzepatide (Mounjaro) 2.5 MG/0.5ML solution pen-injector pen Past Week Spouse/Significant Other, Pharmacy Yes Yes    Inject 2.5 mg under the skin into the appropriate area as directed 1 (One) Time Per Week. Mondays    torsemide (DEMADEX) 20 MG tablet 11/29/2024 Self, Pharmacy Yes Yes    Take 1 tablet by mouth 2 (Two) Times a Day.    vitamin B-12 (CYANOCOBALAMIN) 1000 MCG tablet 11/28/2024 Self No Yes    Take 1 tablet by mouth Daily.    vitamin B-6 (PYRIDOXINE) 50 MG tablet 11/28/2024 Self No Yes    Take 1 tablet by mouth Daily.    Xarelto 20 MG tablet 11/29/2024 Pharmacy, Self No Yes    TAKE ONE TABLET BY MOUTH DAILY    Patient taking differently:  Take 1 tablet by mouth Daily With Dinner.              Medication notes:     This medication list is complete to the best of my knowledge as of 11/29/2024    Please call if questions.    Kevin Marquez  Medication History Technician  [phone]    11/29/2024 12:04 EST

## 2024-11-29 NOTE — ED NOTES
Nursing report ED to floor  Branden Diop  74 y.o.  male    HPI :  HPI  Stated Reason for Visit: coughing up blood  History Obtained From: patient, family    Chief Complaint  Chief Complaint   Patient presents with    Coughing Up Blood    Shortness of Breath       Admitting doctor:   Yoan Art MD    Admitting diagnosis:   The primary encounter diagnosis was Acute respiratory failure with hypoxia. Diagnoses of Acute pulmonary edema and Pleural effusion on right were also pertinent to this visit.    Code status:   Current Code Status       Date Active Code Status Order ID Comments User Context       Prior            Allergies:   Patient has no known allergies.    Isolation:   Enhanced Droplet/Contact     Intake and Output    Intake/Output Summary (Last 24 hours) at 11/29/2024 1228  Last data filed at 11/29/2024 1227  Gross per 24 hour   Intake --   Output 550 ml   Net -550 ml       Weight:       11/29/24  1010   Weight: 123 kg (270 lb 1 oz)       Most recent vitals:   Vitals:    11/29/24 1131 11/29/24 1153 11/29/24 1201 11/29/24 1216   BP: 142/96 131/69 133/66 (!) 144/106   Pulse: 84 74 79 82   Resp:       Temp:       TempSrc:       SpO2: 93%  94% 93%   Weight:           Active LDAs/IV Access:   Lines, Drains & Airways       Active LDAs       Name Placement date Placement time Site Days    Peripheral IV 11/29/24 1010 Anterior;Right;Upper Arm 11/29/24  1010  Arm  less than 1    Single Lumen Implantable Port Right Chest --  --  Chest  --                    Labs (abnormal labs have a star):   Labs Reviewed   COMPREHENSIVE METABOLIC PANEL - Abnormal; Notable for the following components:       Result Value    Glucose 291 (*)     Chloride 96 (*)     CO2 29.4 (*)     Alkaline Phosphatase 172 (*)     All other components within normal limits    Narrative:     GFR Normal >60  Chronic Kidney Disease <60  Kidney Failure <15    The GFR formula is only valid for adults with stable renal function between ages 18 and 70.    CBC WITH AUTO DIFFERENTIAL - Abnormal; Notable for the following components:    RBC 3.67 (*)     Hemoglobin 11.1 (*)     Hematocrit 34.1 (*)     RDW 15.5 (*)     Neutrophil % 86.8 (*)     Lymphocyte % 6.9 (*)     Monocyte % 4.2 (*)     Immature Grans % 0.6 (*)     Neutrophils, Absolute 9.02 (*)     Immature Grans, Absolute 0.06 (*)     All other components within normal limits   RESPIRATORY PANEL PCR W/ COVID-19 (SARS-COV-2), NP SWAB IN UTM/VTP, 2 HR TAT - Normal    Narrative:     In the setting of a positive respiratory panel with a viral infection PLUS a negative procalcitonin without other underlying concern for bacterial infection, consider observing off antibiotics or discontinuation of antibiotics and continue supportive care. If the respiratory panel is positive for atypical bacterial infection (Bordetella pertussis, Chlamydophila pneumoniae, or Mycoplasma pneumoniae), consider antibiotic de-escalation to target atypical bacterial infection.   BNP (IN-HOUSE) - Normal    Narrative:     This assay is used as an aid in the diagnosis of individuals suspected of having heart failure. It can be used as an aid in the diagnosis of acute decompensated heart failure (ADHF) in patients presenting with signs and symptoms of ADHF to the emergency department (ED). In addition, NT-proBNP of <300 pg/mL indicates ADHF is not likely.    Age Range Result Interpretation  NT-proBNP Concentration (pg/mL:      <50             Positive            >450                   Gray                 300-450                    Negative             <300    50-75           Positive            >900                  Gray                300-900                  Negative            <300      >75             Positive            >1800                  Gray                300-1800                  Negative            <300   SINGLE HS TROPONIN T - Normal    Narrative:     High Sensitive Troponin T Reference Range:  <14.0 ng/L- Negative Female for  AMI  <22.0 ng/L- Negative Male for AMI  >=14 - Abnormal Female indicating possible myocardial injury.  >=22 - Abnormal Male indicating possible myocardial injury.   Clinicians would have to utilize clinical acumen, EKG, Troponin, and serial changes to determine if it is an Acute Myocardial Infarction or myocardial injury due to an underlying chronic condition.        RAINBOW DRAW    Narrative:     The following orders were created for panel order [place] Draw.  Procedure                               Abnormality         Status                     ---------                               -----------         ------                     Green Top (Gel)[[id number]]                                  Final result               Lavender Top[[id number]]                                     Final result               Gold Top - SST[[id number]]                                   Final result               Light Blue Top[[id number]]                                   Final result                 Please view results for these tests on the individual orders.   CBC AND DIFFERENTIAL    Narrative:     The following orders were created for panel order CBC & Differential.  Procedure                               Abnormality         Status                     ---------                               -----------         ------                     CBC Auto Differential[[id number]]        Abnormal            Final result                 Please view results for these tests on the individual orders.   GREEN TOP   LAVENDER TOP   GOLD TOP - SST   LIGHT BLUE TOP       EKG:   ECG 12 Lead ED Triage Standing Order; SOA   Preliminary Result   HEART RATE=80  bpm   RR Uxvgabsp=670  ms   MS Interval=  ms   P Horizontal Axis=  deg   P Front Axis=  deg   QRSD Interval=88  ms   QT Lzvhuetz=812  ms   IWpC=464  ms   QRS Axis=47  deg   T Wave Axis=107  deg   - ABNORMAL ECG -   Atrial fibrillation   Ventricular premature complex   Low voltage, extremity leads    Abnormal T, consider ischemia, lateral leads   Minimal ST elevation, lateral leads   Date and Time of Study:2024-11-29 09:52:38          Meds given in ED:   Medications   sodium chloride 0.9 % flush 10 mL (has no administration in time range)   furosemide (LASIX) injection 80 mg (80 mg Intravenous Given 11/29/24 1153)       Imaging results:  XR Chest 1 View    Result Date: 11/29/2024  Dilated and indistinct central pulmonary vasculature with prominent pulmonary interstitial markings bilaterally, most suspicious for pulmonary edema. Moderate right-sided pleural effusion. Recommend imaging follow-up to ensure clearance. No pneumothorax. Normal size cardiomediastinal silhouette. Right IJ port with tip overlying the caudal SVC. No focal osseous abnormality.  This report was finalized on 11/29/2024 10:38 AM by Dr. Kartik Humphrey M.D on Workstation: [id number]       Ambulatory status:   - assist x 1    Social issues:   Social History     Socioeconomic History    Marital status:      Spouse name: Luba    Number of children: 2   Tobacco Use    Smoking status: Former     Current packs/day: 0.00     Average packs/day: 1 pack/day for 30.0 years (30.0 ttl pk-yrs)     Types: Cigars, Cigarettes     Start date: 1/1/1984     Quit date: 1/1/2014     Years since quitting: 10.9    Smokeless tobacco: Never   Vaping Use    Vaping status: Never Used   Substance and Sexual Activity    Alcohol use: Never     Comment: caffeine use- decaf coffee    Drug use: Never    Sexual activity: Defer       Peripheral Neurovascular  Peripheral Neurovascular (Adult)  Peripheral Neurovascular WDL: WDL    Neuro Cognitive  Neuro Cognitive (Adult)  Cognitive/Neuro/Behavioral WDL: WDL, arousability, level of consciousness, mood/behavior, orientation, all, speech, motor response  Level of Consciousness: Alert  Arousal Level: opens eyes spontaneously  Orientation: oriented x 4, person, place, time, situation  Speech: clear, spontaneous,  logical  Mood/Behavior: cooperative, calm, behavior appropriate to situation  Additional Documentation: Marty Coma Scale (Group)  Motor Response  Motor Response: general motor response  General Motor Response: purposeful motor response  Marty Coma Scale  Best Eye Response: 4-->(E4) spontaneous  Best Motor Response: 6-->(M6) obeys commands  Best Verbal Response: 5-->(V5) oriented  [place] Coma Scale Score: 15    Learning  Learning Assessment  Learning Readiness and Ability: no barriers identified  Education Provided  Person Taught: patient, spouse  Teaching Method: verbal instruction  Teaching Focus: symptom/problem overview, medical device/equipment use, self-management, risk factors/triggers, assistive device, diagnostic test, medication actions and side effects    Respiratory  Respiratory  Airway WDL: WDL  Respiratory WDL  Respiratory WDL: .WDL except, all  Rhythm/Pattern, Respiratory: (S) shortness of breath, labored    Abdominal Pain  Safety Interventions  Safety Precautions/Falls Reduction: family at bedside    Pain Assessments  Pain (Adult)  (0-10) Pain Rating: Rest: 0    NIH Stroke Scale       Xin Bergman RN  11/29/24 12:28 EST

## 2024-11-29 NOTE — CASE MANAGEMENT/SOCIAL WORK
Discharge Planning Assessment  [facility]     Patient Name: Branden Diop  MRN: [id number]  Today's Date: 11/29/2024    Admit Date: 11/29/2024    Plan: Home, family to transport   Discharge Needs Assessment       Row Name 11/29/24 1629       Living Environment    People in Home spouse    Name(s) of People in Home Luba Diop [phone]    Current Living Arrangements home    Potentially Unsafe Housing Conditions none    In the past 12 months has the electric, gas, oil, or water company threatened to shut off services in your home? No    Primary Care Provided by self    Provides Primary Care For no one    Family Caregiver if Needed spouse    Family Caregiver Names Luba Diop    Quality of Family Relationships helpful;involved    Able to Return to Prior Arrangements yes       Resource/Environmental Concerns    Resource/Environmental Concerns none    Transportation Concerns none       Transportation Needs    In the past 12 months, has lack of transportation kept you from medical appointments or from getting medications? no    In the past 12 months, has lack of transportation kept you from meetings, work, or from getting things needed for daily living? No       Food Insecurity    Within the past 12 months, you worried that your food would run out before you got the money to buy more. Never true    Within the past 12 months, the food you bought just didn't last and you didn't have money to get more. Never true       Transition Planning    Patient/Family Anticipates Transition to home;home with family    Patient/Family Anticipated Services at Transition none    Transportation Anticipated family or friend will provide       Discharge Needs Assessment    Readmission Within the Last 30 Days no previous admission in last 30 days    Equipment Currently Used at Home cpap;oxygen  [facility] is o2 provider    Concerns to be Addressed no discharge needs identified;denies needs/concerns at this time    Do you want help finding or  keeping work or a job? I do not need or want help    Do you want help with school or training? For example, starting or completing job training or getting a high school diploma, GED or equivalent No    Anticipated Changes Related to Illness none    Equipment Needed After Discharge none                   Discharge Plan       Row Name 11/29/24 1265       Plan    Plan Home, family to transport    Patient/Family in Agreement with Plan yes    Plan Comments Met with pt at bedside. Permission obtained to speak to pt in front of spouse. Introduced self and explained role of . Face sheet verified, PCP is Tino Lopez. Pt denies any difficulty paying for medications, and he obtains his medications from [facility]. Pt lives with his spouse, Luba, and stated that she could assist with any care needs that may arise. Pt is independent in ADL's and he uses a cane as needed. He wears o2 at home, 2l.[facility] SMITH is his provider. He has had [facility] in the past, and he has never been to SNF/Rehab and he does not anticipate requiring those services upon discharge. Upon discharge, pt stated that his family would transport home. Explained that CCP would follow to assess for discharge needs.                  Continued Care and Services - Admitted Since 11/29/2024    No active coordination exists for this encounter.       Selected Continued Care - Episodes Includes continued care and service providers with selected services from the active episodes listed below      [facility] Episode start date: 2/11/2022   There are no active outsourced providers for this episode.                    Demographic Summary    No documentation.                  Functional Status    No documentation.                  Psychosocial    No documentation.                  Abuse/Neglect    No documentation.                  Legal    No documentation.                  Substance Abuse    No documentation.                  Patient Forms    No  documentation.                     Ofe Wynne RN

## 2024-11-30 ENCOUNTER — APPOINTMENT (OUTPATIENT)
Dept: ULTRASOUND IMAGING | Facility: HOSPITAL | Age: 74
DRG: 189 | End: 2024-11-30
Payer: MEDICARE

## 2024-11-30 ENCOUNTER — APPOINTMENT (OUTPATIENT)
Dept: GENERAL RADIOLOGY | Facility: HOSPITAL | Age: 74
DRG: 189 | End: 2024-11-30
Payer: MEDICARE

## 2024-11-30 PROBLEM — R21 RASH IN ADULT: Status: ACTIVE | Noted: 2024-11-30

## 2024-11-30 PROBLEM — M89.8X9 BONE MASS: Status: ACTIVE | Noted: 2024-11-30

## 2024-11-30 PROBLEM — R07.89 CHEST PAIN, ATYPICAL: Status: RESOLVED | Noted: 2021-11-11 | Resolved: 2024-11-30

## 2024-11-30 LAB
ALBUMIN FLD-MCNC: 2.6 G/DL
ANION GAP SERPL CALCULATED.3IONS-SCNC: 14 MMOL/L (ref 5–15)
APPEARANCE FLD: ABNORMAL
BUN SERPL-MCNC: 16 MG/DL (ref 8–23)
BUN/CREAT SERPL: 20.8 (ref 7–25)
CALCIUM SPEC-SCNC: 9.1 MG/DL (ref 8.6–10.5)
CHLORIDE SERPL-SCNC: 96 MMOL/L (ref 98–107)
CHOLESTEROL FLUID: 45 MG/DL
CO2 SERPL-SCNC: 27 MMOL/L (ref 22–29)
COLOR FLD: ABNORMAL
CREAT SERPL-MCNC: 0.77 MG/DL (ref 0.76–1.27)
D-LACTATE SERPL-SCNC: 1.1 MMOL/L (ref 0.5–2)
DEPRECATED RDW RBC AUTO: 53.7 FL (ref 37–54)
EGFRCR SERPLBLD CKD-EPI 2021: 92.8 ML/MIN/1.73
EOSINOPHIL NFR FLD MANUAL: 1 %
ERYTHROCYTE [DISTWIDTH] IN BLOOD BY AUTOMATED COUNT: 15.8 % (ref 12.3–15.4)
GLUCOSE BLDC GLUCOMTR-MCNC: 305 MG/DL (ref 70–130)
GLUCOSE BLDC GLUCOMTR-MCNC: 362 MG/DL (ref 70–130)
GLUCOSE BLDC GLUCOMTR-MCNC: 362 MG/DL (ref 70–130)
GLUCOSE BLDC GLUCOMTR-MCNC: 363 MG/DL (ref 70–130)
GLUCOSE BLDC GLUCOMTR-MCNC: 417 MG/DL (ref 70–130)
GLUCOSE FLD-MCNC: 268 MG/DL
GLUCOSE SERPL-MCNC: 240 MG/DL (ref 65–99)
HCT VFR BLD AUTO: 34.5 % (ref 37.5–51)
HGB BLD-MCNC: 11.1 G/DL (ref 13–17.7)
LDH FLD-CCNC: 274 U/L
LDH SERPL-CCNC: 230 U/L (ref 135–225)
LYMPHOCYTES NFR FLD MANUAL: 81 %
MAGNESIUM SERPL-MCNC: 1.6 MG/DL (ref 1.6–2.4)
MCH RBC QN AUTO: 30 PG (ref 26.6–33)
MCHC RBC AUTO-ENTMCNC: 32.2 G/DL (ref 31.5–35.7)
MCV RBC AUTO: 93.2 FL (ref 79–97)
METHOD: ABNORMAL
MONOCYTES NFR FLD: 9 %
MONOS+MACROS NFR FLD: 6 %
NEUTROPHILS NFR FLD MANUAL: 3 %
NUC CELL # FLD: 8776 /MM3
PH FLD: 7.51 [PH]
PHOSPHATE SERPL-MCNC: 4.2 MG/DL (ref 2.5–4.5)
PLATELET # BLD AUTO: 338 10*3/MM3 (ref 140–450)
PMV BLD AUTO: 9 FL (ref 6–12)
POTASSIUM SERPL-SCNC: 3.9 MMOL/L (ref 3.5–5.2)
PROT FLD-MCNC: 4.5 G/DL
QT INTERVAL: 365 MS
QTC INTERVAL: 432 MS
RBC # BLD AUTO: 3.7 10*6/MM3 (ref 4.14–5.8)
RBC # FLD AUTO: ABNORMAL /MM3
SODIUM SERPL-SCNC: 137 MMOL/L (ref 136–145)
TRIGL FLD-MCNC: 21 MG/DL
WBC NRBC COR # BLD AUTO: 9.46 10*3/MM3 (ref 3.4–10.8)

## 2024-11-30 PROCEDURE — 88305 TISSUE EXAM BY PATHOLOGIST: CPT | Performed by: STUDENT IN AN ORGANIZED HEALTH CARE EDUCATION/TRAINING PROGRAM

## 2024-11-30 PROCEDURE — 25010000002 ENOXAPARIN PER 10 MG: Performed by: NURSE PRACTITIONER

## 2024-11-30 PROCEDURE — 87075 CULTR BACTERIA EXCEPT BLOOD: CPT | Performed by: STUDENT IN AN ORGANIZED HEALTH CARE EDUCATION/TRAINING PROGRAM

## 2024-11-30 PROCEDURE — 0W993ZZ DRAINAGE OF RIGHT PLEURAL CAVITY, PERCUTANEOUS APPROACH: ICD-10-PCS | Performed by: RADIOLOGY

## 2024-11-30 PROCEDURE — 97165 OT EVAL LOW COMPLEX 30 MIN: CPT

## 2024-11-30 PROCEDURE — 63710000001 INSULIN GLARGINE PER 5 UNITS: Performed by: HOSPITALIST

## 2024-11-30 PROCEDURE — 94799 UNLISTED PULMONARY SVC/PX: CPT

## 2024-11-30 PROCEDURE — 87015 SPECIMEN INFECT AGNT CONCNTJ: CPT | Performed by: STUDENT IN AN ORGANIZED HEALTH CARE EDUCATION/TRAINING PROGRAM

## 2024-11-30 PROCEDURE — 82042 OTHER SOURCE ALBUMIN QUAN EA: CPT | Performed by: STUDENT IN AN ORGANIZED HEALTH CARE EDUCATION/TRAINING PROGRAM

## 2024-11-30 PROCEDURE — 84478 ASSAY OF TRIGLYCERIDES: CPT | Performed by: STUDENT IN AN ORGANIZED HEALTH CARE EDUCATION/TRAINING PROGRAM

## 2024-11-30 PROCEDURE — 25010000002 LIDOCAINE 1 % SOLUTION: Performed by: RADIOLOGY

## 2024-11-30 PROCEDURE — 84157 ASSAY OF PROTEIN OTHER: CPT | Performed by: STUDENT IN AN ORGANIZED HEALTH CARE EDUCATION/TRAINING PROGRAM

## 2024-11-30 PROCEDURE — 87102 FUNGUS ISOLATION CULTURE: CPT | Performed by: STUDENT IN AN ORGANIZED HEALTH CARE EDUCATION/TRAINING PROGRAM

## 2024-11-30 PROCEDURE — 63710000001 INSULIN LISPRO (HUMAN) PER 5 UNITS: Performed by: HOSPITALIST

## 2024-11-30 PROCEDURE — 88112 CYTOPATH CELL ENHANCE TECH: CPT | Performed by: STUDENT IN AN ORGANIZED HEALTH CARE EDUCATION/TRAINING PROGRAM

## 2024-11-30 PROCEDURE — 93010 ELECTROCARDIOGRAM REPORT: CPT | Performed by: INTERNAL MEDICINE

## 2024-11-30 PROCEDURE — 84100 ASSAY OF PHOSPHORUS: CPT | Performed by: HOSPITALIST

## 2024-11-30 PROCEDURE — 82948 REAGENT STRIP/BLOOD GLUCOSE: CPT

## 2024-11-30 PROCEDURE — 76942 ECHO GUIDE FOR BIOPSY: CPT

## 2024-11-30 PROCEDURE — 93005 ELECTROCARDIOGRAM TRACING: CPT | Performed by: NURSE PRACTITIONER

## 2024-11-30 PROCEDURE — 84311 SPECTROPHOTOMETRY: CPT | Performed by: STUDENT IN AN ORGANIZED HEALTH CARE EDUCATION/TRAINING PROGRAM

## 2024-11-30 PROCEDURE — 80048 BASIC METABOLIC PNL TOTAL CA: CPT | Performed by: HOSPITALIST

## 2024-11-30 PROCEDURE — 83735 ASSAY OF MAGNESIUM: CPT | Performed by: HOSPITALIST

## 2024-11-30 PROCEDURE — 83986 ASSAY PH BODY FLUID NOS: CPT | Performed by: STUDENT IN AN ORGANIZED HEALTH CARE EDUCATION/TRAINING PROGRAM

## 2024-11-30 PROCEDURE — 85027 COMPLETE CBC AUTOMATED: CPT | Performed by: HOSPITALIST

## 2024-11-30 PROCEDURE — 87205 SMEAR GRAM STAIN: CPT | Performed by: STUDENT IN AN ORGANIZED HEALTH CARE EDUCATION/TRAINING PROGRAM

## 2024-11-30 PROCEDURE — 71045 X-RAY EXAM CHEST 1 VIEW: CPT

## 2024-11-30 PROCEDURE — 83615 LACTATE (LD) (LDH) ENZYME: CPT | Performed by: STUDENT IN AN ORGANIZED HEALTH CARE EDUCATION/TRAINING PROGRAM

## 2024-11-30 PROCEDURE — 89051 BODY FLUID CELL COUNT: CPT | Performed by: STUDENT IN AN ORGANIZED HEALTH CARE EDUCATION/TRAINING PROGRAM

## 2024-11-30 PROCEDURE — 25010000002 METHYLPREDNISOLONE PER 40 MG: Performed by: STUDENT IN AN ORGANIZED HEALTH CARE EDUCATION/TRAINING PROGRAM

## 2024-11-30 PROCEDURE — 87116 MYCOBACTERIA CULTURE: CPT | Performed by: STUDENT IN AN ORGANIZED HEALTH CARE EDUCATION/TRAINING PROGRAM

## 2024-11-30 PROCEDURE — 99221 1ST HOSP IP/OBS SF/LOW 40: CPT | Performed by: NURSE PRACTITIONER

## 2024-11-30 PROCEDURE — 87070 CULTURE OTHR SPECIMN AEROBIC: CPT | Performed by: STUDENT IN AN ORGANIZED HEALTH CARE EDUCATION/TRAINING PROGRAM

## 2024-11-30 PROCEDURE — 97161 PT EVAL LOW COMPLEX 20 MIN: CPT

## 2024-11-30 PROCEDURE — 87206 SMEAR FLUORESCENT/ACID STAI: CPT | Performed by: STUDENT IN AN ORGANIZED HEALTH CARE EDUCATION/TRAINING PROGRAM

## 2024-11-30 PROCEDURE — 82945 GLUCOSE OTHER FLUID: CPT | Performed by: STUDENT IN AN ORGANIZED HEALTH CARE EDUCATION/TRAINING PROGRAM

## 2024-11-30 RX ORDER — ENOXAPARIN SODIUM 150 MG/ML
1 INJECTION SUBCUTANEOUS 2 TIMES DAILY
Status: DISCONTINUED | OUTPATIENT
Start: 2024-11-30 | End: 2024-12-01 | Stop reason: HOSPADM

## 2024-11-30 RX ORDER — LIDOCAINE HYDROCHLORIDE 10 MG/ML
20 INJECTION, SOLUTION INFILTRATION; PERINEURAL ONCE
Status: COMPLETED | OUTPATIENT
Start: 2024-11-30 | End: 2024-11-30

## 2024-11-30 RX ORDER — INSULIN LISPRO 100 [IU]/ML
5 INJECTION, SOLUTION INTRAVENOUS; SUBCUTANEOUS
Status: DISCONTINUED | OUTPATIENT
Start: 2024-11-30 | End: 2024-12-01 | Stop reason: HOSPADM

## 2024-11-30 RX ADMIN — ROSUVASTATIN CALCIUM 40 MG: 40 TABLET, FILM COATED ORAL at 10:12

## 2024-11-30 RX ADMIN — INSULIN LISPRO 7 UNITS: 100 INJECTION, SOLUTION INTRAVENOUS; SUBCUTANEOUS at 08:12

## 2024-11-30 RX ADMIN — CETIRIZINE HYDROCHLORIDE 10 MG: 10 TABLET, FILM COATED ORAL at 10:12

## 2024-11-30 RX ADMIN — FERROUS SULFATE TAB 325 MG (65 MG ELEMENTAL FE) 162.5 MG: 325 (65 FE) TAB at 10:12

## 2024-11-30 RX ADMIN — ENOXAPARIN SODIUM 120 MG: 150 INJECTION SUBCUTANEOUS at 21:52

## 2024-11-30 RX ADMIN — Medication 10 ML: at 10:22

## 2024-11-30 RX ADMIN — GABAPENTIN 600 MG: 300 CAPSULE ORAL at 21:52

## 2024-11-30 RX ADMIN — ENOXAPARIN SODIUM 120 MG: 150 INJECTION SUBCUTANEOUS at 14:31

## 2024-11-30 RX ADMIN — Medication 10 ML: at 21:52

## 2024-11-30 RX ADMIN — INSULIN LISPRO 5 UNITS: 100 INJECTION, SOLUTION INTRAVENOUS; SUBCUTANEOUS at 11:54

## 2024-11-30 RX ADMIN — METHYLPREDNISOLONE SODIUM SUCCINATE 40 MG: 40 INJECTION, POWDER, FOR SOLUTION INTRAMUSCULAR; INTRAVENOUS at 14:46

## 2024-11-30 RX ADMIN — SERTRALINE 50 MG: 50 TABLET, FILM COATED ORAL at 10:12

## 2024-11-30 RX ADMIN — FOLIC ACID 1 MG: 1 TABLET ORAL at 10:13

## 2024-11-30 RX ADMIN — INSULIN LISPRO 8 UNITS: 100 INJECTION, SOLUTION INTRAVENOUS; SUBCUTANEOUS at 11:54

## 2024-11-30 RX ADMIN — Medication 1 TABLET: at 10:12

## 2024-11-30 RX ADMIN — METHYLPREDNISOLONE SODIUM SUCCINATE 40 MG: 40 INJECTION, POWDER, FOR SOLUTION INTRAMUSCULAR; INTRAVENOUS at 08:12

## 2024-11-30 RX ADMIN — AZITHROMYCIN DIHYDRATE 500 MG: 250 TABLET ORAL at 10:12

## 2024-11-30 RX ADMIN — LIDOCAINE HYDROCHLORIDE 20 ML: 10 INJECTION, SOLUTION INFILTRATION; PERINEURAL at 11:01

## 2024-11-30 RX ADMIN — INSULIN GLARGINE 20 UNITS: 100 INJECTION, SOLUTION SUBCUTANEOUS at 08:12

## 2024-11-30 RX ADMIN — INSULIN LISPRO 8 UNITS: 100 INJECTION, SOLUTION INTRAVENOUS; SUBCUTANEOUS at 21:52

## 2024-11-30 RX ADMIN — INSULIN LISPRO 8 UNITS: 100 INJECTION, SOLUTION INTRAVENOUS; SUBCUTANEOUS at 17:33

## 2024-11-30 RX ADMIN — GABAPENTIN 600 MG: 300 CAPSULE ORAL at 14:31

## 2024-11-30 RX ADMIN — INSULIN LISPRO 5 UNITS: 100 INJECTION, SOLUTION INTRAVENOUS; SUBCUTANEOUS at 17:34

## 2024-11-30 NOTE — PROGRESS NOTES
[facility]    [phone]   LOS: 1 day     Name: Branden Diop  Age/Sex: 74 y.o. male  :  1950        PCP: Tino Lopez MD  Chief Complaint   Patient presents with    Coughing Up Blood    Shortness of Breath      Subjective   Overall he is feeling better.  Denies new issues or complaints today.  His cough is improved no longer having any hemoptysis and strength is improved as well.  He still on supplemental oxygen but down to 4 L.  At home he wears 3  General: No Fever or Chills, GI: No Nausea, Vomiting, or Diarrhea, and Other: No bleeding    azithromycin, 500 mg, Oral, Q24H  cefTRIAXone, 2,000 mg, Intravenous, Q24H  cetirizine, 10 mg, Oral, Daily  ferrous sulfate, 162.5 mg, Oral, Daily With Breakfast  folic acid, 1 mg, Oral, Daily  gabapentin, 600 mg, Oral, Q8H  insulin glargine, 20 Units, Subcutaneous, Daily  insulin lispro, 2-9 Units, Subcutaneous, 4x Daily AC & at Bedtime  methylPREDNISolone sodium succinate, 40 mg, Intravenous, Q8H  multivitamin, 1 tablet, Oral, Daily  rosuvastatin, 40 mg, Oral, Daily  sertraline, 50 mg, Oral, Daily  sodium chloride, 10 mL, Intravenous, Q12H  [Held by provider] torsemide, 20 mg, Oral, BID           Objective   Vital Signs  Temp:  [97.7 °F (36.5 °C)-99 °F (37.2 °C)] 97.7 °F (36.5 °C)  Heart Rate:  [72-92] 80  Resp:  [20-28] 20  BP: (115-144)/() 125/60  Body mass index is 37.34 kg/m².    Intake/Output Summary (Last 24 hours) at 2024 0813  Last data filed at 2024 1353  Gross per 24 hour   Intake --   Output 1150 ml   Net -1150 ml       Physical Exam  Constitutional:       General: He is not in acute distress.     Appearance: He is ill-appearing.   Cardiovascular:      Rate and Rhythm: Normal rate and regular rhythm.   Pulmonary:      Effort: No respiratory distress.      Breath sounds: Wheezing and rales present.   Abdominal:      General: Bowel sounds are normal. There is no distension.      Palpations: Abdomen is soft.   Skin:      General: Skin is warm and dry.   Neurological:      Mental Status: He is alert.           Results Review:       I reviewed the patient's new clinical results.  Results from last 7 days   Lab Units 11/30/24  0451 11/29/24  0951 11/27/24  0806 11/24/24  1045   WBC 10*3/mm3 9.46 10.40 10.02 8.98   HEMOGLOBIN g/dL 11.1* 11.1* 10.8* 11.9*   PLATELETS 10*3/mm3 338 293 250 244     Results from last 7 days   Lab Units 11/30/24  0451 11/29/24  0951 11/27/24  0807 11/24/24  1045   SODIUM mmol/L 137 139 135* 136   POTASSIUM mmol/L 3.9 3.9 3.6 3.6   CHLORIDE mmol/L 96* 96* 92* 95*   CO2 mmol/L 27.0 29.4* 30.4* 27.2   BUN mg/dL 16 14 13 19   CREATININE mg/dL 0.77 0.84 0.77 0.96   CALCIUM mg/dL 9.1 9.5 9.0 9.2   MAGNESIUM mg/dL 1.6  --   --   --    PHOSPHORUS mg/dL 4.2  --   --   --    Estimated Creatinine Clearance: 108.1 mL/min (by C-G formula based on SCr of 0.77 mg/dL).      Assessment & Plan   Active Hospital Problems    Diagnosis  POA    **Acute respiratory failure [J96.00]  Yes    Anemia associated with chemotherapy [D64.81, T45.1X5A]  Yes    Malignant neoplasm of middle lobe of right lung [C34.2]  Yes    Essential hypertension [I10]  Yes    COPD (chronic obstructive pulmonary disease) [J44.9]  Yes    Hyperlipidemia [E78.5]  Yes    Type 2 diabetes mellitus, with long-term current use of insulin [E11.9, Z79.4]  Not Applicable    A-fib [I48.91]  Yes    Apnea, sleep [G47.30]  Yes      Resolved Hospital Problems   No resolved problems to display.       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  Renal function electrolytes stable.  Consider transition back to oral torsemide.  Awaiting cardiology evaluation  -Appreciate pulmonary input CT scan independently reviewed and verified.  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  Continue steroids and supportive care  -Noted plans for for bronchoscopy and thoracentesis.  -Placed on antibiotics last evening but suspicion for infection remains low  -Xarelto stopped last night by pulmonary lower concern for hemoptysis.  He is also having procedures done today.  -His blood sugars running high secondary to steroids this is an expected phenomenon.  Will add 5 units of mealtime insulin to his current coverage and basal coverage.  If plans are to send home on steroids may need additional insulin at home.  -Mechanical DVT prophylaxis  -Full code      Disposition  Expected discharge date/ time has not been documented.       Yoan Art MD  [facility]  11/30/24  08:13 EST

## 2024-11-30 NOTE — PLAN OF CARE
Goal Outcome Evaluation:  Plan of Care Reviewed With: patient, spouse        Progress: improving  Outcome Evaluation: Pt is a 73 y/o F with SOA and COPD, on 3L NC at home at baseline and reports hypoxia upon admission. pt going down for thoracentis today but verb'd all symptoms have improved, currently on 3L baseline NC and verb'd has been up ad katelyn in room to/from bathroom. Using spouses hand initially to help manage o2 tubing upon standing, but no physical or verbal cues/assistance required this date, up in room and cont'd around bed up to chair. Spouse present also verb'd no concerns. Pt denies any OT needs. DC OT and verb'd/recommending cont'd mobility ad katelyn in room  and on unit.    Anticipated Discharge Disposition (OT): home

## 2024-11-30 NOTE — CONSULTS
[facility]  Cardiology Consult Note    Patient Identification:  Name: Branden Hearn  Age: 74 y.o.  Sex: male  :  1950  MRN: [id number]             Requesting Physician: Dr. Art    Reason for Consultation / Chief Complaint: Questionable diastolic heart failure    History of Present Illness:     Torrey hearn is a 74-year-old male who is new to this provider.  He has followed Dr. Ureña in the past.  He has a history of A-fib, sleep apnea, COPD, hyperlipidemia, hypertension, malignant neoplasm of middle lung with recent lobectomy, anemia and A-fib who presented to [facility] questionable volume overload and hypoxic respiratory failure.      Chest x-ray shows dilated and indistinct central pulmonary vascular with prominent pulmonary interstitial markings bilaterally, most suspicious for pulmonary edema.  Moderate right-sided pleural effusion.    2024 echo: EF 60.6%, LV diastolic function was indeterminate in the setting of possible A-fib.  RV borderline dilated with normal function.  No significant valvular abnormalities.  RVSP is mildly elevated.  Mild to moderate left to right interatrial shunt noted on color Doppler.  PFO suspected.  No enlargement, normal IVC size.    2021 stress test: Myocardial perfusion imaging indicated a small medium sized, mildly severe area of ischemia located in the apex and inferior wall.  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  CAD noncritical coronary atherosclerosis.        Past Medical History:  Past Medical History:   Diagnosis Date    Anxiety     Arthritis     Atrial fibrillation     CURRENTLY    Bunion of right foot     Colon polyps     COPD (chronic obstructive pulmonary disease)     MILD. NO MEDS FOR    Depression     History of atrial fibrillation     WITH CARDIOVERSION. NO PROBLEMS    History of skin cancer     BCC REMOVED FROM BACK    [place] (hard of hearing)      Hyperlipidemia     Inguinal hernia, recurrent     RIGHT    Left foot pain     Lung nodules     Rash     IN GROIN TREATING FOR YEAST INFECTION BY PCP    Redness of skin     LOWER LEGS BILATERAL    Scab     BILATERAL LEGS HEALING    Sleep apnea with use of continuous positive airway pressure (CPAP)     CPAP    Streptococcus pneumoniae     ABOUT 6-7 YR AGO.     Type 2 diabetes mellitus      Past Surgical History:  Past Surgical History:   Procedure Laterality Date    BASAL CELL CARCINOMA EXCISION      BRONCHOSCOPY WITH ION ROBOTIC ASSIST N/A 5/6/2024    Procedure: BRONCHOSCOPY WITH ION ROBOT WITH FNA, BIOPSIES, BAL AND ENDOBRONCHIAL ULTRASOUND WITH FNA;  Surgeon: Yony Coles MD;  Location: [facility] ENDOSCOPY;  Service: Robotics - Pulmonary;  Laterality: N/A;  PRE: PULMONARY NODULES  POST: SAME    CARDIAC CATHETERIZATION N/A 11/15/2021    Procedure: Coronary angiography;  Surgeon: Alfredo Jennings MD;  Location: [facility] CATH INVASIVE LOCATION;  Service: Cardiovascular;  Laterality: N/A;    CARDIAC CATHETERIZATION N/A 11/15/2021    Procedure: Left heart cath;  Surgeon: Alfredo Jennings MD;  Location: [facility] CATH INVASIVE LOCATION;  Service: Cardiovascular;  Laterality: N/A;    CARDIAC CATHETERIZATION N/A 11/15/2021    Procedure: Left ventriculography;  Surgeon: Alfredo Jennings MD;  Location: [facility] CATH INVASIVE LOCATION;  Service: Cardiovascular;  Laterality: N/A;    CARDIAC CATHETERIZATION N/A 11/15/2021    Procedure: Aortic root aortogram;  Surgeon: Alfredo Jennings MD;  Location: [facility] CATH INVASIVE LOCATION;  Service: Cardiovascular;  Laterality: N/A;    CARDIOVERSION      CHOLECYSTECTOMY N/A 10/07/2017    Procedure: CHOLECYSTECTOMY LAPAROSCOPIC;  Surgeon: Martir Trevino MD;  Location: [facility] MAIN OR;  Service:     COLONOSCOPY  2007    COLONOSCOPY N/A 8/30/2022    Procedure: COLONOSCOPY TO CECUM AND TI AND COLD SNARE POLYPECTOMY;  Surgeon: Cj Lopez MD;  Location: [facility] ENDOSCOPY;   Service: Gastroenterology;  Laterality: N/A;  Pre: History of colon polyps  Post: POLYP, PREVIOUS TATTOO    FOOT SURGERY Left     TOES ARE PINNED. ALL BUT GREAT TOE    HAND SURGERY      THUMB    HERNIA REPAIR      INGUINAL HERNIA REPAIR Right 06/27/2018    Procedure: INGUINAL HERNIA REPAIR, OPEN, RECURRENT;  Surgeon: Martir Trevino MD;  Location:  [facility];  Service: General    LASIK Bilateral     LOBECTOMY Right 6/12/2024    Procedure: BRONCHOSCOPY, RIGHT VIDEO ASSISTED THORACOSCOPY WITH RIGHT MIDDLE LOBECTOMY WITH DAVINCI ROBOT, MEDIASTINAL LYMPH NODE DISSECTION, INTERCOSTAL NERVE BLOCK;  Surgeon: David Figueroa MD;  Location: [facility] MAIN OR;  Service: Robotics - DaVinci;  Laterality: Right;    NECK SURGERY      growth on salivary gland    REPLACEMENT TOTAL KNEE Right 2007    (he is going to have a LTKR next month)    REPLACEMENT TOTAL KNEE Left     VENOUS ACCESS DEVICE (PORT) INSERTION Right 7/5/2024    Procedure: INSERTION VENOUS ACCESS DEVICE;  Surgeon: David Figueroa MD;  Location: [facility] MAIN OR;  Service: Thoracic;  Laterality: Right;      Allergies:  No Known Allergies  Home Meds:  Medications Prior to Admission   Medication Sig Dispense Refill Last Dose/Taking    ferrous sulfate 325 (65 FE) MG tablet Take 0.5 tablets by mouth Daily With Breakfast.   11/28/2024    fexofenadine (ALLEGRA) 180 MG tablet Take 1 tablet by mouth Daily.   11/29/2024    folic acid (FOLVITE) 1 MG tablet Take 1 tablet by mouth Daily. 90 tablet 3 11/28/2024    gabapentin (NEURONTIN) 300 MG capsule Take 2 capsules by mouth Every 8 (Eight) Hours. 180 capsule 2 11/28/2024    HYDROcodone-acetaminophen (NORCO) 5-325 MG per tablet Take 1 tablet by mouth Every 6 (Six) Hours As Needed for Moderate Pain. 12 tablet 0 Past Week    Insulin Glargine, 1 Unit Dial, (Toujeo SoloStar) 300 UNIT/ML solution pen-injector injection Inject 24 Units under the skin into the appropriate area as directed Daily.   11/28/2024    Insulin Lispro (humaLOG) 100  UNIT/ML injection Inject  under the skin into the appropriate area as directed As Needed for High Blood Sugar. Per sliding scale   11/28/2024    Multiple Vitamin (MULTIVITAMINS PO) Take 1 tablet by mouth Daily.   11/29/2024    predniSONE (DELTASONE) 20 MG tablet Take 1 tablet by mouth Daily. Take 20 mg daily for 7 days, then 10 mg daily (Patient taking differently: Take  by mouth See Admin Instructions. Take 20 mg daily for 7 days, then 10 mg daily) 30 tablet 0 11/29/2024    rosuvastatin (CRESTOR) 40 MG tablet TAKE ONE TABLET BY MOUTH DAILY (Patient taking differently: Take 1 tablet by mouth Daily.) 90 tablet 1 11/29/2024    sertraline (ZOLOFT) 50 MG tablet TAKE ONE TABLET BY MOUTH DAILY (Patient taking differently: Take 1 tablet by mouth Daily.) 90 tablet 1 11/29/2024    Tirzepatide (Mounjaro) 2.5 MG/0.5ML solution pen-injector pen Inject 2.5 mg under the skin into the appropriate area as directed 1 (One) Time Per Week. Mondays   Past Week    torsemide (DEMADEX) 20 MG tablet Take 1 tablet by mouth 2 (Two) Times a Day.   11/29/2024    vitamin B-12 (CYANOCOBALAMIN) 1000 MCG tablet Take 1 tablet by mouth Daily. 90 tablet 3 11/28/2024    vitamin B-6 (PYRIDOXINE) 50 MG tablet Take 1 tablet by mouth Daily. 90 tablet 3 11/28/2024    Xarelto 20 MG tablet TAKE ONE TABLET BY MOUTH DAILY (Patient taking differently: Take 1 tablet by mouth Daily With Dinner.) 30 tablet 9 11/29/2024     Current Meds:   [unfilled]  Social History:   Social History     Tobacco Use    Smoking status: Former     Current packs/day: 0.00     Average packs/day: 1 pack/day for 30.0 years (30.0 ttl pk-yrs)     Types: Cigars, Cigarettes     Start date: 1/1/1984     Quit date: 1/1/2014     Years since quitting: 10.9    Smokeless tobacco: Never   Substance Use Topics    Alcohol use: Never     Comment: caffeine use- decaf coffee      Family History:  Family History   Problem Relation Age of Onset    Cancer Other     Stroke Mother     Alcohol abuse Father      Malig Hyperthermia Neg Hx         Review of Systems    Constitutional: No weakness,fatigue, fever, rigors, chills   Eyes: No vision changes, eye pain   ENT/oropharynx: No difficulty swallowing, sore throat, epistaxis, changes in hearing   Cardiovascular: No chest pain, chest tightness, palpitations, paroxysmal nocturnal dyspnea, orthopnea, diaphoresis, dizziness / syncopal episode   Respiratory: No shortness of breath, dyspnea on exertion, cough, wheezing hemoptysis   Gastrointestinal: No abdominal pain, nausea, vomiting, diarrhea, bloody stools   Genitourinary: No hematuria, dysuria   Neurological: No headache, tremors, numbness,  one-sided weakness    Musculoskeletal: No cramps, myalgias,  joint pain, joint swelling   Integument: No rash, edema           Constitutional:  Temp:  [97.7 °F (36.5 °C)-98.4 °F (36.9 °C)] 97.7 °F (36.5 °C)  Heart Rate:  [72-86] 81  Resp:  [18-24] 18  BP: (115-144)/() 138/65    Physical Exam   General:  Appears in no acute distress resting in bed  Eyes: No abnormal no conjunctival drainage  HEENT:  No JVD. Thyroid not visibly enlarged. No mucosal pallor or cyanosis  Respiratory: Respirations regular and unlabored at rest. BBS with good air entry in all fields. No crackles, rubs or wheezes auscultated  Cardiovascular: S1S2 Regular rate and rhythm. No murmur, rub or gallop auscultated. No carotid bruits. DP/PT pulses    . No pretibial pitting edema  Gastrointestinal: Abdomen soft, flat, non tender. Bowel sounds present. No hepatosplenomegaly. No ascites  Musculoskeletal: VERA x4. No abnormal movements  Extremities: No digital clubbing or cyanosis  Skin:  Skin warm and dry to touch. No rashes  No xanthoma  Neuro: AAO x3 CN II-XII grossly intact              Cardiographics  ECG:          Echocardiogram: 5/8/2024    Interpretation Summary         Left ventricular systolic function is normal. Calculated left ventricular EF = 62.6%    Left ventricular diastolic function was indeterminate in  the setting of possible A-fib.    RV borderline dilated with normal function.    No significant valvular abnormalities.    Estimated right ventricular systolic pressure from tricuspid regurgitation is mildly elevated (35-45 mmHg). Calculated right ventricular systolic pressure from tricuspid regurgitation is 39 mmHg.    Mild-moderate left to right interatrial shunt noted on color Doppler, PFO suspected.  Bubble study not performed.    Biatrial enlargement, normal IVC size.  Imaging  Chest X-ray:     Study Result    Narrative & Impression   XR CHEST 1 VW-     INDICATION: Shortness of breath     COMPARISON: CT chest 10/25/2024 and radiographs dating back to 2/16/2016     IMPRESSION:  Dilated and indistinct central pulmonary vasculature with  prominent pulmonary interstitial markings bilaterally, most suspicious  for pulmonary edema. Moderate right-sided pleural effusion. Recommend  imaging follow-up to ensure clearance. No pneumothorax. Normal size  cardiomediastinal silhouette. Right IJ port with tip overlying the  caudal SVC. No focal osseous abnormality.     This report was finalized on 11/29/2024 10:38 AM by Dr. Kartik Humphrey M.D on Workstation: [id number]     Lab Review   Results from last 7 days   Lab Units 11/29/24  0951   HSTROP T ng/L 21     Results from last 7 days   Lab Units 11/30/24  0451   MAGNESIUM mg/dL 1.6     Results from last 7 days   Lab Units 11/30/24  0451   SODIUM mmol/L 137   POTASSIUM mmol/L 3.9   BUN mg/dL 16   CREATININE mg/dL 0.77   CALCIUM mg/dL 9.1     @[id number]@  Results from last 7 days   Lab Units 11/30/24  0451 11/29/24  0951 11/27/24  0806   WBC 10*3/mm3 9.46 10.40 10.02   HEMOGLOBIN g/dL 11.1* 11.1* 10.8*   HEMATOCRIT % 34.5* 34.1* 33.5*   PLATELETS 10*3/mm3 338 293 250         CHADS-VASc Risk Assessment               4 Total Score    1 Hypertension    2 Age >/= 75    1 DM    Criteria that do not apply:    CHF    PRIOR STROKE/TIA/THROMBO    Vascular Disease    Age 65-74     Sex: Female            Intake/Output Summary (Last 24 hours) at 11/30/2024 1107  Last data filed at 11/30/2024 0958  Gross per 24 hour   Intake --   Output 1150 ml   Net -1150 ml         Assessment:      Acute respiratory failure    A-fib    Apnea, sleep    Type 2 diabetes mellitus, with long-term current use of insulin    Hyperlipidemia    COPD (chronic obstructive pulmonary disease)    Essential hypertension    Malignant neoplasm of middle lobe of right lung    Anemia associated with chemotherapy      Recommendations / Plan:   1.  Questionable diastolic CHF: Echo 5/8/2024 EF 60.6% with indeterminate LV diastolic function in the setting of A-fib.  No significant valvular abnormalities.  RVSP mildly elevated.  PFO suspected.  See full report as above.  Chest xray with pulmonary vascular with prominent pulmonary interstitial markings bilaterally, most suspicious for pulmonary edema.  Moderate right-sided pleural effusion. Bnp 1254.  a. Repeat echo with saline test.  Further recs once completed.  b. Responding well to IV Lasix. Thoracentesis today, final report pending    c. Daily weights with slight decrease of approx. 3 lbs   He missed a doses of his Lasix in the outpatient setting.  2.  Persistent a-fib  a.  A-fib with rate controlled on the monitor.  Repeat EKG   a. Anticoagulation at home on xarelto. Anticoagulation stopped due to hemoptysis.  Please add once medically stable and ok with all.  3.  History of sleep apnea  4.  History of hypertension: Blood pressure stable  5. Malignant neoplasm of middle lobe og recent lobectomy               Patty Jolly, APRN  11/30/2024, 10:44 EST      EMR Dragon/Transcription:   Dictated utilizing Dragon dictation

## 2024-11-30 NOTE — PROGRESS NOTES
Consult Daily Progress Note  [facility]   11/30/24      Patient Name:  Branden Diop  MRN:  [id number]   YOB: 1950  Age: 74 y.o.  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    Interval History:  Admitted yesterday for hypoxia and shortness of breath  On 4 L nasal cannula  Patient states that he feels markedly better and almost back to his baseline  Cough is improved  No chest pain or palpitations  No nausea or vomiting    Physical Exam:  Vitals:    11/30/24 0706   BP: 125/60   Pulse:    Resp: 20   Temp: 97.7 °F (36.5 °C)   SpO2:        Intake/Output    Intake/Output Summary (Last 24 hours) at 11/30/2024 0910  Last data filed at 11/29/2024 1353  Gross per 24 hour   Intake --   Output 1150 ml   Net -1150 ml       General: Alert, nontoxic, NAD  HEENT: NC/AT, EOMI, MMM  Neck: Supple, trachea midline  Cardiac: RRR, no murmur, gallops, rubs  Pulmonary: Diminished at the right lung base  GI: Soft, non-tender, non-distended, normal bowel sounds  Extremities: Warm, well perfused, no LE edema  Skin: no visible rash  Neuro: CN II - XII grossly intact  Psychiatry: Normal mood and affect      Data Review:  Results from last 7 days   Lab Units 11/30/24  0451 11/29/24  0951 11/27/24  0806 11/24/24  1045   WBC 10*3/mm3 9.46 10.40 10.02 8.98   HEMOGLOBIN g/dL 11.1* 11.1* 10.8* 11.9*   PLATELETS 10*3/mm3 338 293 250 244     Results from last 7 days   Lab Units 11/30/24  0451 11/29/24  0951 11/27/24  0807 11/24/24  1045   SODIUM mmol/L 137 139 135* 136   POTASSIUM mmol/L 3.9 3.9 3.6 3.6    CHLORIDE mmol/L 96* 96* 92* 95*   CO2 mmol/L 27.0 29.4* 30.4* 27.2   BUN mg/dL 16 14 13 19   CREATININE mg/dL 0.77 0.84 0.77 0.96   GLUCOSE mg/dL 240* 291* 241* 205*   CALCIUM mg/dL 9.1 9.5 9.0 9.2   MAGNESIUM mg/dL 1.6  --   --   --    PHOSPHORUS mg/dL 4.2  --   --   --    Estimated Creatinine Clearance: 108.1 mL/min (by C-G formula based on SCr of 0.77 mg/dL).    Results from last 7 days   Lab Units 11/30/24  0451 11/29/24  2345 11/29/24  1633 11/29/24  0951 11/27/24  0807 11/27/24  0806 11/24/24  1045   AST (SGOT) U/L  --   --   --  32 34  --   --    ALT (SGPT) U/L  --   --   --  35 26  --   --    PROCALCITONIN ng/mL  --   --  0.06  --   --   --   --    LACTATE mmol/L  --  1.1  --   --   --   --   --    CRP mg/dL  --   --   --   --   --   --  11.62*   PLATELETS 10*3/mm3 338  --   --  293  --  250 244               Imaging:  Reviewed chest images personally from past 3 days    ASSESSMENT  /  PLAN:    Acute on chronic hypoxic respiratory failure (uses 3L at home per patient report)  Immune checkpoint inhibitor pneumonitis, on pembrolizumab  Community-acquired pneumonia  Hemoptysis, resolved  MGUS  Type 2 diabetes mellitus  Peripheral neuropathy secondary to chemotherapy  Anemia secondary to chemotherapy  Right pleural effusion  Atrial fibrillation on anticoagulation  Obstructive sleep apnea  Essential hypertension    -Patient presented with worsening shortness of breath and acute on chronic hypoxic respiratory failure found to have immune checkpoint inhibitor induced pneumonitis  -CT chest this admission demonstrates significant groundglass opacification in bilateral lungs especially in the mid and upper fields, this is markedly worse from last CT chest 1 month prior.  -Agree with steroids with Methylpred, will need taper over several weeks upon discharge.  -Continue empiric antibiotics with ceftriaxone and azithromycin at this time, if clinical status continues to improve tomorrow can discontinue.  With his  drastic improvement, no cytosis, normal lactate and procalcitonin, my concerns for infection is lower  -Hemoptysis resolved, no indication for bronchoscopy at this time  -Agree with thoracentesis of right pleural effusion, will assess labs.  There is a possibility of pneumothorax ex vacuo after thoracentesis especially with his history of right middle lobectomy recently.  -Repeat echocardiogram pending  -Instructed to bring in home CPAP mask if able so we can use the machine while in the hospital  -Patient will need to have close follow-up in pulmonary clinic as an outpatient for further weaning of steroids    All issues new to me today.  Prior hospital course, labs and imaging reviewed.    Thank you for allowing us to participate in this patients care. Pulmonary will continue to follow.     Yony Coles MD  [facility]  Pulmonary and Critical Care Medicine, Interventional Pulmonology    Parts of this note may be an electronic transcription/translation of spoken language to printed text using the Dragon dictation system.

## 2024-11-30 NOTE — PLAN OF CARE
Goal Outcome Evaluation:              Outcome Evaluation: VSS NPO since midnight, US Thoracentesis and Adult Transthoracic echo ordered. 4L down from 5L. Maintain safety and continue to monitor.

## 2024-11-30 NOTE — THERAPY DISCHARGE NOTE
Patient Name: Branden Diop  : 1950    MRN: [id number]                              Today's Date: 2024       Admit Date: 2024    Visit Dx:     ICD-10-CM ICD-9-CM   1. Acute respiratory failure with hypoxia  J96.01 518.81   2. Acute pulmonary edema  J81.0 518.4   3. Pleural effusion on right  J90 511.9     Patient Active Problem List   Diagnosis    A-fib    Atrioventricular block, Mobitz type 1, Wenckebach    Apnea, sleep    Obesity    Streptococcus pneumoniae    Sleep apnea with use of continuous positive airway pressure (CPAP)    Sinusitis    Right wrist pain    Pneumonia    Type 2 diabetes mellitus, with long-term current use of insulin    Hyperlipidemia    Hammertoe    Depression    COPD (chronic obstructive pulmonary disease)    Colon polyps    Anxiety    Pruritus    Cholelithiasis    Fatty liver    Essential hypertension    Vitamin D deficiency    Emphysema, unspecified    Bronchiectasis with acute exacerbation    FHx: colonic polyps    Diverticulosis    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 NO MEDS FOR    Depression     History of atrial fibrillation     WITH CARDIOVERSION. NO PROBLEMS    History of skin cancer     BCC REMOVED FROM BACK    [place] (hard of hearing)     Hyperlipidemia     Inguinal hernia, recurrent     RIGHT    Left foot pain     Lung nodules     Rash     IN GROIN TREATING FOR YEAST INFECTION BY PCP     Redness of skin     LOWER LEGS BILATERAL    Scab     BILATERAL LEGS HEALING    Sleep apnea with use of continuous positive airway pressure (CPAP)     CPAP    Streptococcus pneumoniae     ABOUT 6-7 YR AGO.     Type 2 diabetes mellitus      Past Surgical History:   Procedure Laterality Date    BASAL CELL CARCINOMA EXCISION      BRONCHOSCOPY WITH ION ROBOTIC ASSIST N/A 5/6/2024    Procedure: BRONCHOSCOPY WITH ION ROBOT WITH FNA, BIOPSIES, BAL AND ENDOBRONCHIAL ULTRASOUND WITH FNA;  Surgeon: Yony Coles MD;  Location: [facility] ENDOSCOPY;  Service: Robotics - Pulmonary;  Laterality: N/A;  PRE: PULMONARY NODULES  POST: SAME    CARDIAC CATHETERIZATION N/A 11/15/2021    Procedure: Coronary angiography;  Surgeon: Alfredo Jennings MD;  Location: [facility] CATH INVASIVE LOCATION;  Service: Cardiovascular;  Laterality: N/A;    CARDIAC CATHETERIZATION N/A 11/15/2021    Procedure: Left heart cath;  Surgeon: Alfredo Jennings MD;  Location: [facility] CATH INVASIVE LOCATION;  Service: Cardiovascular;  Laterality: N/A;    CARDIAC CATHETERIZATION N/A 11/15/2021    Procedure: Left ventriculography;  Surgeon: Alfredo Jennings MD;  Location: [facility] CATH INVASIVE LOCATION;  Service: Cardiovascular;  Laterality: N/A;    CARDIAC CATHETERIZATION N/A 11/15/2021    Procedure: Aortic root aortogram;  Surgeon: Alfredo Jennings MD;  Location: [facility] CATH INVASIVE LOCATION;  Service: Cardiovascular;  Laterality: N/A;    CARDIOVERSION      CHOLECYSTECTOMY N/A 10/07/2017    Procedure: CHOLECYSTECTOMY LAPAROSCOPIC;  Surgeon: Martir Trevino MD;  Location: [facility] MAIN OR;  Service:     COLONOSCOPY 2007    COLONOSCOPY N/A 8/30/2022    Procedure: COLONOSCOPY TO CECUM AND TI AND COLD SNARE POLYPECTOMY;  Surgeon: Cj Lopez MD;  Location: [facility] ENDOSCOPY;  Service: Gastroenterology;  Laterality: N/A;  Pre: History of colon polyps  Post: POLYP, PREVIOUS TATTOO    FOOT SURGERY Left     TOES ARE PINNED. ALL BUT GREAT TOE    HAND  SURGERY      THUMB    HERNIA REPAIR      INGUINAL HERNIA REPAIR Right 06/27/2018    Procedure: INGUINAL HERNIA REPAIR, OPEN, RECURRENT;  Surgeon: Martir Trevino MD;  [place] [facility] [place] [facility] [place]  Service: General    LASIK Bilateral     LOBECTOMY Right 6/12/2024    Procedure: BRONCHOSCOPY, RIGHT VIDEO ASSISTED THORACOSCOPY WITH RIGHT MIDDLE LOBECTOMY WITH DAVINCI ROBOT, MEDIASTINAL LYMPH NODE DISSECTION, INTERCOSTAL NERVE BLOCK;  Surgeon: David Figueroa MD;  Location: [facility] MAIN OR;  Service: Robotics - DaVinci;  Laterality: Right;    NECK SURGERY      growth on salivary gland    REPLACEMENT TOTAL KNEE Right 2007    (he is going to have a LTKR next month)    REPLACEMENT TOTAL KNEE Left     VENOUS ACCESS DEVICE (PORT) INSERTION Right 7/5/2024    Procedure: INSERTION VENOUS ACCESS DEVICE;  Surgeon: David Figueroa MD;  Location: [facility] MAIN OR;  Service: Thoracic;  Laterality: Right;      General Information       Row Name 11/30/24 1259          Physical Therapy Time and Intention    Document Type evaluation;discharge evaluation/summary  -MR       Row Name 11/30/24 1259          General Information    Patient Profile Reviewed yes  -MR     Prior Level of Function independent:;all household mobility;community mobility;gait  -MR     Existing Precautions/Restrictions no known precautions/restrictions  -MR       Row Name 11/30/24 1259          Living Environment    People in Home spouse  -MR       Row Name 11/30/24 1259          Home Main Entrance    Number of Stairs, Main Entrance none  -MR       Row Name 11/30/24 1259          Cognition    Orientation Status (Cognition) oriented x 4  -MR               User Key  (r) = Recorded By, (t) = Taken By, (c) = Cosigned By      Initials Name Provider Type    Margaret Escobedo, PT Physical Therapist                   Mobility       Row Name 11/30/24 1300          Bed Mobility    Bed Mobility bed mobility (all) activities  -MR     All Activities, [place] (Bed Mobility) modified  independence  -MR     Supine-Sit [place] (Bed Mobility) independent  -MR       Row Name 11/30/24 1300          Bed-Chair Transfer    Bed-Chair [place] (Transfers) modified independence  -MR       Row Name 11/30/24 1300          Sit-Stand Transfer    Sit-Stand [place] (Transfers) modified independence  -MR       Row Name 11/30/24 1300          Gait/Stairs (Locomotion)    [place] Level (Gait) independent  -MR     Patient was able to Ambulate yes  -MR     Distance in Feet (Gait) 25  -MR               User Key  (r) = Recorded By, (t) = Taken By, (c) = Cosigned By      Initials Name Provider Type    Margaret Escobedo, PT Physical Therapist                   Obj/Interventions       Row Name 11/30/24 1301          Range of Motion Comprehensive    General Range of Motion no range of motion deficits identified  -MR       Row Name 11/30/24 1301          Strength Comprehensive (MMT)    General Manual Muscle Testing (MMT) Assessment no strength deficits identified  -MR       Row Name 11/30/24 1301          Sensory Assessment (Somatosensory)    Sensory Assessment (Somatosensory) sensation intact  -MR               User Key  (r) = Recorded By, (t) = Taken By, (c) = Cosigned By      Initials Name Provider Type    Margaret Escobedo, PT Physical Therapist                   Goals/Plan    No documentation.                  Clinical Impression       Row Name 11/30/24 1305          Pain    Pretreatment Pain Rating 0/10 - no pain  -MR     Posttreatment Pain Rating 0/10 - no pain  -MR       Row Name 11/30/24 1305          Plan of Care Review    Progress improving  -MR     Outcome Evaluation Pt is a 73 y/o F with SOA and COPD, on 3L NC at home at baseline and reports hypoxia upon admission.  PT performed a thorough assessment of pt’s musculoskeletal, nervous, cardiovascular, integumentary, respiratory, and digestive system and levels of functional independence. Examined current levels of functional components including  but not limited to bed mobility, functional transfers, gait, seated balance, standing balance, posture, strength, fall risk.  Patient appears to be at baseline and is independent with all functional mobility, he and his wife report no concerns or needs for physical therapy at this time therefore recommending evaluation only patient to discharge home upon stable discharge.  -       Row Name 11/30/24 1305          Therapy Assessment/Plan (PT)    Criteria for Skilled Interventions Met (PT) no problems identified which require skilled intervention  -MR       Row Name 11/30/24 1305          Positioning and Restraints    Pre-Treatment Position sitting in chair/recliner  -MR     Post Treatment Position bed  -MR     In Bed with family/caregiver;sitting;call light within reach;sitting EOB;with nsg  -MR     In Chair with nsg;sitting;call light within reach  -MR               User Key  (r) = Recorded By, (t) = Taken By, (c) = Cosigned By      Initials Name Provider Type    Margaret Escobedo, PT Physical Therapist                   Outcome Measures       Row Name 11/30/24 1308 11/30/24 0805       How much help from another person do you currently need...    Turning from your back to your side while in flat bed without using bedrails? 4  -MR 4  -KS    Moving from lying on back to sitting on the side of a flat bed without bedrails? 4  -MR 4  -KS    Moving to and from a bed to a chair (including a wheelchair)? 4  -MR 4  -KS    Standing up from a chair using your arms (e.g., wheelchair, bedside chair)? 4  -MR 4  -KS    Climbing 3-5 steps with a railing? 4  -MR 3  -KS    To walk in hospital room? 4  -MR 3  -KS    AM-PAC 6 Clicks Score (PT) 24  -MR 22  -KS      Row Name 11/30/24 1149          Functional Assessment    Outcome Measure Options AM-PAC 6 Clicks Daily Activity (OT)  -BC               User Key  (r) = Recorded By, (t) = Taken By, (c) = Cosigned By      Initials Name Provider Type    KS Siegrist, Kaitlyn, [facility] Registered  Nurse    Josefina Roy, OT Occupational Therapist    Margaret Escobedo, PT Physical Therapist                  Physical Therapy Education       Title: PT OT SLP Therapies (In Progress)       Topic: Physical Therapy (Done)       Point: Mobility training (Done)       Learning Progress Summary            Patient Eager, E, VU by MR at 11/30/2024 1308   Family Eager, E, VU by MR at 11/30/2024 1308                      Point: Home exercise program (Done)       Learning Progress Summary            Patient Eager, E, VU by MR at 11/30/2024 1308   Family Eager, E, VU by MR at 11/30/2024 1308                      Point: Body mechanics (Done)       Learning Progress Summary            Patient Eager, E, VU by MR at 11/30/2024 1308   Family Eager, E, VU by MR at 11/30/2024 1308                      Point: Precautions (Done)       Learning Progress Summary            Patient Eager, E, VU by MR at 11/30/2024 1308   Family Eager, E, VU by MR at 11/30/2024 1308                                      User Key       Initials Effective Dates Name Provider Type Discipline    MR 10/09/24 -  Margaret Mcconnell, PT Physical Therapist PT                  PT Recommendation and Plan     Progress: improving  Outcome Evaluation: Pt is a 73 y/o F with SOA and COPD, on 3L NC at home at baseline and reports hypoxia upon admission.  PT performed a thorough assessment of pt’s musculoskeletal, nervous, cardiovascular, integumentary, respiratory, and digestive system and levels of functional independence. Examined current levels of functional components including but not limited to bed mobility, functional transfers, gait, seated balance, standing balance, posture, strength, fall risk.  Patient appears to be at baseline and is independent with all functional mobility, he and his wife report no concerns or needs for physical therapy at this time therefore recommending evaluation only patient to discharge home upon stable discharge.     Time Calculation:    PT Evaluation Complexity  History, PT Evaluation Complexity: 1-2 personal factors and/or comorbidities  Examination of Body Systems (PT Eval Complexity): 1-2 elements  Clinical Presentation (PT Evaluation Complexity): stable  Clinical Decision Making (PT Evaluation Complexity): low complexity  Overall Complexity (PT Evaluation Complexity): low complexity     PT Charges       Row Name 11/30/24 1309             Time Calculation    Start Time 1003  -MR      Stop Time 1011  -MR      Time Calculation (min) 8 min  -MR                User Key  (r) = Recorded By, (t) = Taken By, (c) = Cosigned By      Initials Name Provider Type    MR Margaret Mcconenll, PT Physical Therapist                  Therapy Charges for Today       Code Description Service Date Service Provider Modifiers Qty    [id number]  PT EVAL LOW COMPLEXITY 2 11/30/2024 Margaret Mcconnell, PT GP 1            PT G-Codes  Outcome Measure Options: AM-PAC 6 Clicks Daily Activity (OT)  AM-PAC 6 Clicks Score (PT): 24  AM-PAC 6 Clicks Score (OT): 24    PT Discharge Summary  Anticipated Discharge Disposition (PT): home    Margaret Mcconnell PT  11/30/2024

## 2024-11-30 NOTE — CONSULTS
Patient Identification:  Branden Diop  74 y.o.  male  1950  [id number]          LOS 0    Requesting physician: Dr. Art    Reason for Consult:  Hypoxia, shortness of breath    History of Present Illness:     74-year-old male has a history of squamous cell carcinoma of the lung status post a right middle lobe lobectomy which has been treated with chemotherapy.  The lobectomy sample came back positive for PD-L1 so decision was made to start him on pembrolizumab.  He received his first dose of pembrolizumab on 11/6/2024 with initial plans to get the second dose approximately 3 weeks after that.  After his first dose of pembrolizumab, he developed a diffuse pruritic skin rash on his trunk and upper extremities and lower extremities about 5 days after starting the immunotherapy.  He received a Medrol Dosepak and hydrocortisone cream which helped with the rash.  Oncology started him on oral prednisone 20 mg daily recently and that has been helping his rash as well.    Earlier this evening, he reported to the ER due to worsening shortness of breath and acute on chronic hypoxic respiratory failure.  He uses 3 L nasal cannula at home normally, but turned it up to 5 L.  For the past 2 days, he has been having some small amount of intermittent hemoptysis.  The hemoptysis has been bright red and occurs 1-2 times per day.  He has never had hemoptysis before except after his bronchoscopy earlier this year.  He has not had any fevers, chills, or weakness.  A few days ago, he did go and have Thanksgiving with some family members, 1 of which has cancer as well.  He does not think any of them have been sick recently.        Past Medical History:  Past Medical History:   Diagnosis Date    Anxiety     Arthritis     Atrial fibrillation     CURRENTLY    Bunion of right foot     Colon polyps     COPD (chronic obstructive pulmonary disease)     MILD. NO MEDS FOR    Depression     History of atrial fibrillation     WITH  CARDIOVERSION. NO PROBLEMS    History of skin cancer     BCC REMOVED FROM BACK    [place] (hard of hearing)     Hyperlipidemia     Inguinal hernia, recurrent     RIGHT    Left foot pain     Lung nodules     Rash     IN GROIN TREATING FOR YEAST INFECTION BY PCP    Redness of skin     LOWER LEGS BILATERAL    Scab     BILATERAL LEGS HEALING    Sleep apnea with use of continuous positive airway pressure (CPAP)     CPAP    Streptococcus pneumoniae     ABOUT 6-7 YR AGO.     Type 2 diabetes mellitus        Past Surgical History:  Past Surgical History:   Procedure Laterality Date    BASAL CELL CARCINOMA EXCISION      BRONCHOSCOPY WITH ION ROBOTIC ASSIST N/A 5/6/2024    Procedure: BRONCHOSCOPY WITH ION ROBOT WITH FNA, BIOPSIES, BAL AND ENDOBRONCHIAL ULTRASOUND WITH FNA;  Surgeon: Yony Coles MD;  Location: [facility] ENDOSCOPY;  Service: Robotics - Pulmonary;  Laterality: N/A;  PRE: PULMONARY NODULES  POST: SAME    CARDIAC CATHETERIZATION N/A 11/15/2021    Procedure: Coronary angiography;  Surgeon: Alfredo Jennings MD;  Location: [facility] CATH INVASIVE LOCATION;  Service: Cardiovascular;  Laterality: N/A;    CARDIAC CATHETERIZATION N/A 11/15/2021    Procedure: Left heart cath;  Surgeon: Alfredo Jennings MD;  Location: [facility] CATH INVASIVE LOCATION;  Service: Cardiovascular;  Laterality: N/A;    CARDIAC CATHETERIZATION N/A 11/15/2021    Procedure: Left ventriculography;  Surgeon: Alfredo Jennings MD;  Location: [facility] CATH INVASIVE LOCATION;  Service: Cardiovascular;  Laterality: N/A;    CARDIAC CATHETERIZATION N/A 11/15/2021    Procedure: Aortic root aortogram;  Surgeon: Alfredo Jennings MD;  Location: [facility] CATH INVASIVE LOCATION;  Service: Cardiovascular;  Laterality: N/A;    CARDIOVERSION      CHOLECYSTECTOMY N/A 10/07/2017    Procedure: CHOLECYSTECTOMY LAPAROSCOPIC;  Surgeon: Martir Trevino MD;  Location: [facility] MAIN OR;  Service:     COLONOSCOPY  2007    COLONOSCOPY N/A 8/30/2022    Procedure:  COLONOSCOPY TO CECUM AND TI AND COLD SNARE POLYPECTOMY;  Surgeon: Cj Lopez MD;  Location: [facility] ENDOSCOPY;  Service: Gastroenterology;  Laterality: N/A;  Pre: History of colon polyps  Post: POLYP, PREVIOUS TATTOO    FOOT SURGERY Left     TOES ARE PINNED. ALL BUT GREAT TOE    HAND SURGERY      THUMB    HERNIA REPAIR      INGUINAL HERNIA REPAIR Right 06/27/2018    Procedure: INGUINAL HERNIA REPAIR, OPEN, RECURRENT;  Surgeon: Martir Trevino MD;  Location: [facility] OR OSC;  Service: General    LASIK Bilateral     LOBECTOMY Right 6/12/2024    Procedure: BRONCHOSCOPY, RIGHT VIDEO ASSISTED THORACOSCOPY WITH RIGHT MIDDLE LOBECTOMY WITH DAVINCI ROBOT, MEDIASTINAL LYMPH NODE DISSECTION, INTERCOSTAL NERVE BLOCK;  Surgeon: David Figueroa MD;  Location: [facility] MAIN OR;  Service: Robotics - DaVinci;  Laterality: Right;    NECK SURGERY      growth on salivary gland    REPLACEMENT TOTAL KNEE Right 2007    (he is going to have a LTKR next month)    REPLACEMENT TOTAL KNEE Left     VENOUS ACCESS DEVICE (PORT) INSERTION Right 7/5/2024    Procedure: INSERTION VENOUS ACCESS DEVICE;  Surgeon: David Figueroa MD;  Location: [facility] MAIN OR;  Service: Thoracic;  Laterality: Right;        Home Meds:  Medications Prior to Admission   Medication Sig Dispense Refill Last Dose/Taking    ferrous sulfate 325 (65 FE) MG tablet Take 0.5 tablets by mouth Daily With Breakfast.   11/28/2024    fexofenadine (ALLEGRA) 180 MG tablet Take 1 tablet by mouth Daily.   11/29/2024    folic acid (FOLVITE) 1 MG tablet Take 1 tablet by mouth Daily. 90 tablet 3 11/28/2024    gabapentin (NEURONTIN) 300 MG capsule Take 2 capsules by mouth Every 8 (Eight) Hours. 180 capsule 2 11/28/2024    HYDROcodone-acetaminophen (NORCO) 5-325 MG per tablet Take 1 tablet by mouth Every 6 (Six) Hours As Needed for Moderate Pain. 12 tablet 0 Past Week    Insulin Glargine, 1 Unit Dial, (Toujeo SoloStar) 300 UNIT/ML solution pen-injector injection Inject 24 Units under the skin into  the appropriate area as directed Daily.   11/28/2024    Insulin Lispro (humaLOG) 100 UNIT/ML injection Inject  under the skin into the appropriate area as directed As Needed for High Blood Sugar. Per sliding scale   11/28/2024    Multiple Vitamin (MULTIVITAMINS PO) Take 1 tablet by mouth Daily.   11/29/2024    predniSONE (DELTASONE) 20 MG tablet Take 1 tablet by mouth Daily. Take 20 mg daily for 7 days, then 10 mg daily (Patient taking differently: Take  by mouth See Admin Instructions. Take 20 mg daily for 7 days, then 10 mg daily) 30 tablet 0 11/29/2024    rosuvastatin (CRESTOR) 40 MG tablet TAKE ONE TABLET BY MOUTH DAILY (Patient taking differently: Take 1 tablet by mouth Daily.) 90 tablet 1 11/29/2024    sertraline (ZOLOFT) 50 MG tablet TAKE ONE TABLET BY MOUTH DAILY (Patient taking differently: Take 1 tablet by mouth Daily.) 90 tablet 1 11/29/2024    Tirzepatide (Mounjaro) 2.5 MG/0.5ML solution pen-injector pen Inject 2.5 mg under the skin into the appropriate area as directed 1 (One) Time Per Week. Mondays   Past Week    torsemide (DEMADEX) 20 MG tablet Take 1 tablet by mouth 2 (Two) Times a Day.   11/29/2024    vitamin B-12 (CYANOCOBALAMIN) 1000 MCG tablet Take 1 tablet by mouth Daily. 90 tablet 3 11/28/2024    vitamin B-6 (PYRIDOXINE) 50 MG tablet Take 1 tablet by mouth Daily. 90 tablet 3 11/28/2024    Xarelto 20 MG tablet TAKE ONE TABLET BY MOUTH DAILY (Patient taking differently: Take 1 tablet by mouth Daily With Dinner.) 30 tablet 9 11/29/2024         Allergies:  No Known Allergies    Social History:   Social History     Socioeconomic History    Marital status:      Spouse name: Luba    Number of children: 2   Tobacco Use    Smoking status: Former     Current packs/day: 0.00     Average packs/day: 1 pack/day for 30.0 years (30.0 ttl pk-yrs)     Types: Cigars, Cigarettes     Start date: 1/1/1984     Quit date: 1/1/2014     Years since quitting: 10.9    Smokeless tobacco: Never   Vaping Use    Vaping  "status: Never Used   Substance and Sexual Activity    Alcohol use: Never     Comment: caffeine use- decaf coffee    Drug use: Never    Sexual activity: Defer       Family History:  Family History   Problem Relation Age of Onset    Cancer Other     Stroke Mother     Alcohol abuse Father     Malig Hyperthermia Neg Hx        Review of Systems:  Review of Systems   Respiratory:  Positive for shortness of breath.    All other systems reviewed and are negative.       Objective:    PHYSICAL EXAM:    /88 (BP Location: Right arm, Patient Position: Lying)   Pulse 86   Temp 97.7 °F (36.5 °C) (Oral)   Resp 24   Ht 180.3 cm (70.98\")   Wt 123 kg (271 lb 2.7 oz)   SpO2 94%   BMI 37.84 kg/m²  Body mass index is 37.84 kg/m². 94% 123 kg (271 lb 2.7 oz)    Physical Exam  Constitutional:       Appearance: Normal appearance.   HENT:      Head: Normocephalic and atraumatic.      Nose: Nose normal.      Mouth/Throat:      Mouth: Mucous membranes are moist.      Pharynx: Oropharynx is clear.   Eyes:      Extraocular Movements: Extraocular movements intact.      Conjunctiva/sclera: Conjunctivae normal.   Cardiovascular:      Rate and Rhythm: Normal rate and regular rhythm.      Pulses: Normal pulses.      Heart sounds: Normal heart sounds. No murmur heard.  Pulmonary:      Effort: Pulmonary effort is normal. No respiratory distress.      Breath sounds: No stridor. No wheezing or rales.      Comments: Decreased breath sounds on right side  Abdominal:      General: Abdomen is flat. Bowel sounds are normal.      Palpations: Abdomen is soft.      Tenderness: There is no abdominal tenderness.   Skin:     General: Skin is warm and dry.   Neurological:      General: No focal deficit present.      Mental Status: He is alert and oriented to person, place, and time.            Results Review:   I have personally reviewed the results from last note by [facility] physician to 9/27/2024 22:51 EDT and agree with these findings:  [x]  Laboratory " accordion  [x]  Microbiology  [x]  Radiology  [x]  EKG/Telemetry   [x]  Cardiology/Vascular   [x]  Pathology  [x]  Old records  []  Other:       Medication Review:  I have reviewed the current MAR.  Antibiotics  This patient does not have an active medication from one of the medication groupers.     Scheduled Medications  [START ON 11/30/2024] cetirizine, 10 mg, Oral, Daily  [START ON 11/30/2024] ferrous sulfate, 162.5 mg, Oral, Daily With Breakfast  folic acid, 1 mg, Oral, Daily  gabapentin, 600 mg, Oral, Q8H  insulin glargine, 20 Units, Subcutaneous, Daily  insulin lispro, 2-9 Units, Subcutaneous, 4x Daily AC & at Bedtime  [START ON 11/30/2024] multivitamin, 1 tablet, Oral, Daily  [START ON 11/30/2024] rivaroxaban, 20 mg, Oral, Daily With Dinner  [START ON 11/30/2024] rosuvastatin, 40 mg, Oral, Daily  [START ON 11/30/2024] sertraline, 50 mg, Oral, Daily  sodium chloride, 10 mL, Intravenous, Q12H  [Held by provider] torsemide, 20 mg, Oral, BID      ICU Drips     PRN Medications    acetaminophen **OR** acetaminophen **OR** acetaminophen    senna-docusate sodium **AND** polyethylene glycol **AND** bisacodyl **AND** bisacodyl    Calcium Replacement - Follow Nurse / BPA Driven Protocol    dextrose    dextrose    glucagon (human recombinant)    HYDROcodone-acetaminophen    Magnesium Standard Dose Replacement - Follow Nurse / BPA Driven Protocol    nitroglycerin    ondansetron ODT **OR** ondansetron    Phosphorus Replacement - Follow Nurse / BPA Driven Protocol    Potassium Replacement - Follow Nurse / BPA Driven Protocol    sodium chloride    sodium chloride    sodium chloride    Lines, Drains & Airways       Active LDAs       Name Placement date Placement time Site Days    Peripheral IV 11/29/24 1010 Anterior;Right;Upper Arm 11/29/24  1010  Arm  less than 1    Single Lumen Implantable Port Right Chest --  --  Chest  --                    Diet Orders (active) (From admission, onward)       Start     Ordered    11/29/24  1446  Diet: Cardiac; Healthy Heart (2-3 Na+); Fluid Consistency: Thin (IDDSI 0)  Diet Effective Now         11/29/24 1446                    Assessment:  Acute on chronic hypoxic respiratory failure (uses 3L at home per patient report)  Immune checkpoint inhibitor pneumonitis  Community-acquired pneumonia  Hemoptysis  MGUS  Type 2 diabetes mellitus  Peripheral neuropathy secondary to chemotherapy  Anemia secondary to chemotherapy  Right pleural effusion  A-fib, on anticoagulation  JUAN  Essential hypertension      Plan:  - Reviewed the CT chest obtained earlier this evening  - Overall, I feel that the etiology of his worsening respiratory failure is most likely to be immune checkpoint inhibitor pneumonitis given that he has already developed skin rashes secondary to the pembrolizumab.  Will start methylprednisone at 1 mg/kg/day (40 mg Q8H) to treat pneumonitis.  If his respiratory status does not improve over the next 1 to 2 days, can increase to 2 mg/kg/day.  - Seems less likely that he has community-acquired pneumonia, but given the groundglass on the CT scan and his worsening oxygen requirements, we will go ahead and start treatment with ceftriaxone and azithromycin  - Because of the hemoptysis and his history of cancer, he would benefit from bronchoscopy for BAL and airway inspection.  Would like to see his O2 requirements improve a little bit prior to undergoing sedation for the procedure.  Will make him n.p.o. at midnight and reassess oxygenation in the morning.  - Regarding his right pleural effusion, he has never had a thoracentesis before.  Etiology could represent a parapneumonic effusion versus malignancy.  Will plan for ultrasound-guided thoracentesis tomorrow.  Patient agreeable.  - Based on PFTs obtained prior to his lobectomy, the patient does not have a COPD.   - Due to hemoptysis, hold anticoagulation  - Of note, he had an echo performed on 5/28/2024 which showed a mild to moderate left to right  interatrial shunt with a suspected PFO, but a bubble study was not performed at that time.  He did have borderline dilation of the RV.  Will repeat echo with bubble study to further assess if shunting could be contributing to his worsening hypoxia.  - Patient's wife forgot to bring his mask for his CPAP machine, but he does have the machine here in the hospital.  Unfortunately, I do not think that the hospitals mask will work with his machine.  I offered to order a hospital CPAP machine for him, but he deferred at this time.      Tyrell Back MD  [facility], [facility]  Pulmonary and Critical Care Medicine    Electronically signed by Tyrell Back MD, 11/29/24, 7:04 PM EST.  Part of this note may be an electronic transcription/translation of spoken language to printed text using the Dragon Dictation System.

## 2024-11-30 NOTE — PLAN OF CARE
Goal Outcome Evaluation:           Progress: improving  Outcome Evaluation: Pt is a 75 y/o F with SOA and COPD, on 3L NC at home at baseline and reports hypoxia upon admission.  PT performed a thorough assessment of pt’s musculoskeletal, nervous, cardiovascular, integumentary, respiratory, and digestive system and levels of functional independence. Examined current levels of functional components including but not limited to bed mobility, functional transfers, gait, seated balance, standing balance, posture, strength, fall risk.  Patient appears to be at baseline and is independent with all functional mobility, he and his wife report no concerns or needs for physical therapy at this time therefore recommending evaluation only patient to discharge home upon stable discharge.    Anticipated Discharge Disposition (PT): home

## 2024-11-30 NOTE — PROGRESS NOTES
"[facility] Clinical Pharmacy Services: Enoxaparin Consult    Branden Diop has a pharmacy consult to dose enoxaparin for atrial fibrillation.    Home Anticoagulation: Xarelto      Relevant clinical data and objective history reviewed:  74 y.o. male 180.3 cm (70.98\") 121 kg (267 lb 9.6 oz)   Body mass index is 37.34 kg/m².   Results from last 7 days   Lab Units 11/30/24  0451   PLATELETS 10*3/mm3 338     Estimated Creatinine Clearance: 108.1 mL/min (by C-G formula based on SCr of 0.77 mg/dL).    Assessment/Plan    Will start patient on  enoxaparin 120 mg (1mg/kg) subcutaneous every 12 hours, adjusted for indication and current patient weight+ renal function. Pharmacy will follow and adjust regimen if clinically indicated.    Cheyenne Gowers, [facility]  Clinical Pharmacist    "

## 2024-11-30 NOTE — THERAPY EVALUATION
Patient Name: Branden Diop  : 1950    MRN: [id number]                              Today's Date: 2024       Admit Date: 2024    Visit Dx:     ICD-10-CM ICD-9-CM   1. Acute respiratory failure with hypoxia  J96.01 518.81   2. Acute pulmonary edema  J81.0 518.4   3. Pleural effusion on right  J90 511.9     Patient Active Problem List   Diagnosis    A-fib    Atrioventricular block, Mobitz type 1, Wenckebach    Apnea, sleep    Obesity    Streptococcus pneumoniae    Sleep apnea with use of continuous positive airway pressure (CPAP)    Sinusitis    Right wrist pain    Pneumonia    Type 2 diabetes mellitus, with long-term current use of insulin    Hyperlipidemia    Hammertoe    Depression    COPD (chronic obstructive pulmonary disease)    Colon polyps    Anxiety    Pruritus    Cholelithiasis    Fatty liver    Essential hypertension    Vitamin D deficiency    Emphysema, unspecified    Bronchiectasis with acute exacerbation    FHx: colonic polyps    Diverticulosis    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 NO MEDS FOR    Depression     History of atrial fibrillation     WITH CARDIOVERSION. NO PROBLEMS    History of skin cancer     BCC REMOVED FROM BACK    [place] (hard of hearing)     Hyperlipidemia     Inguinal hernia, recurrent     RIGHT    Left foot pain     Lung nodules     Rash     IN GROIN TREATING FOR YEAST INFECTION BY PCP     Redness of skin     LOWER LEGS BILATERAL    Scab     BILATERAL LEGS HEALING    Sleep apnea with use of continuous positive airway pressure (CPAP)     CPAP    Streptococcus pneumoniae     ABOUT 6-7 YR AGO.     Type 2 diabetes mellitus      Past Surgical History:   Procedure Laterality Date    BASAL CELL CARCINOMA EXCISION      BRONCHOSCOPY WITH ION ROBOTIC ASSIST N/A 5/6/2024    Procedure: BRONCHOSCOPY WITH ION ROBOT WITH FNA, BIOPSIES, BAL AND ENDOBRONCHIAL ULTRASOUND WITH FNA;  Surgeon: Yony Coles MD;  Location: [facility] ENDOSCOPY;  Service: Robotics - Pulmonary;  Laterality: N/A;  PRE: PULMONARY NODULES  POST: SAME    CARDIAC CATHETERIZATION N/A 11/15/2021    Procedure: Coronary angiography;  Surgeon: Alfredo Jennings MD;  Location: [facility] CATH INVASIVE LOCATION;  Service: Cardiovascular;  Laterality: N/A;    CARDIAC CATHETERIZATION N/A 11/15/2021    Procedure: Left heart cath;  Surgeon: Alfredo Jennings MD;  Location: [facility] CATH INVASIVE LOCATION;  Service: Cardiovascular;  Laterality: N/A;    CARDIAC CATHETERIZATION N/A 11/15/2021    Procedure: Left ventriculography;  Surgeon: Alfredo Jennings MD;  Location: [facility] CATH INVASIVE LOCATION;  Service: Cardiovascular;  Laterality: N/A;    CARDIAC CATHETERIZATION N/A 11/15/2021    Procedure: Aortic root aortogram;  Surgeon: Alfredo Jennings MD;  Location: [facility] CATH INVASIVE LOCATION;  Service: Cardiovascular;  Laterality: N/A;    CARDIOVERSION      CHOLECYSTECTOMY N/A 10/07/2017    Procedure: CHOLECYSTECTOMY LAPAROSCOPIC;  Surgeon: Martir Trevino MD;  Location: [facility] MAIN OR;  Service:     COLONOSCOPY 2007    COLONOSCOPY N/A 8/30/2022    Procedure: COLONOSCOPY TO CECUM AND TI AND COLD SNARE POLYPECTOMY;  Surgeon: Cj Lopez MD;  Location: [facility] ENDOSCOPY;  Service: Gastroenterology;  Laterality: N/A;  Pre: History of colon polyps  Post: POLYP, PREVIOUS TATTOO    FOOT SURGERY Left     TOES ARE PINNED. ALL BUT GREAT TOE    HAND  SURGERY      THUMB    HERNIA REPAIR      INGUINAL HERNIA REPAIR Right 06/27/2018    Procedure: INGUINAL HERNIA REPAIR, OPEN, RECURRENT;  Surgeon: Martir Trevino MD;  Location: [facility];  Service: General    LASIK Bilateral     LOBECTOMY Right 6/12/2024    Procedure: BRONCHOSCOPY, RIGHT VIDEO ASSISTED THORACOSCOPY WITH RIGHT MIDDLE LOBECTOMY WITH DAVINCI ROBOT, MEDIASTINAL LYMPH NODE DISSECTION, INTERCOSTAL NERVE BLOCK;  Surgeon: David Figueroa MD;  Location: [facility] OR;  Service: Robotics - DaVinci;  Laterality: Right;    NECK SURGERY      growth on salivary gland    REPLACEMENT TOTAL KNEE Right 2007    (he is going to have a LTKR next month)    REPLACEMENT TOTAL KNEE Left     VENOUS ACCESS DEVICE (PORT) INSERTION Right 7/5/2024    Procedure: INSERTION VENOUS ACCESS DEVICE;  Surgeon: David Figueroa MD;  Location: [facility] OR;  Service: Thoracic;  Laterality: Right;      General Information       Row Name 11/30/24 1143          OT Time and Intention    Subjective Information no complaints  -BC     Document Type discharge evaluation/summary  -BC     Patient Effort good  -BC     Symptoms Noted During/After Treatment none  -BC       Row Name 11/30/24 1143          General Information    Patient Profile Reviewed yes  -BC     Prior Level of Function independent:;all household mobility;ADL's  has cane as needed, (I) PLF  -BC     Existing Precautions/Restrictions no known precautions/restrictions  -BC       Row Name 11/30/24 1143          Living Environment    People in Home spouse  -BC       Row Name 11/30/24 1143          Home Main Entrance    Number of Stairs, Main Entrance none  -BC       Row Name 11/30/24 1143          Cognition    Orientation Status (Cognition) oriented x 4  -BC       Row Name 11/30/24 1143          Safety Issues/Impairments Affecting Functional Mobility    Impairments Affecting Function (Mobility) shortness of breath  -BC               User Key  (r) = Recorded By, (t) = Taken By, (c) =  Cosigned By      Initials Name Provider Type    Josefina Roy OT Occupational Therapist                     Mobility/ADL's       Row Name 11/30/24 1144          Bed Mobility    Bed Mobility supine-sit  -BC     Supine-Sit [place] (Bed Mobility) independent  -BC       Row Name 11/30/24 1144          Transfers    Transfers bed-chair transfer;sit-stand transfer;stand-sit transfer  -BC       Row Name 11/30/24 1144          Bed-Chair Transfer    Bed-Chair [place] (Transfers) modified independence  -BC       Row Name 11/30/24 1144          Sit-Stand Transfer    Sit-Stand [place] (Transfers) modified independence  -BC       Row Name 11/30/24 1144          Stand-Sit Transfer    Stand-Sit [place] (Transfers) modified independence  -BC       Row Name 11/30/24 1144          Functional Mobility    Functional Mobility- Comment short distance in room w/o AD  -BC     Patient was able to Ambulate yes  -BC       Row Name 11/30/24 1144          Activities of Daily Living    BADL Assessment/Intervention --  (I) w/ ADLs in room, denies any need for ADL participation, practice, simulating reach towards BLEs for LBD (I) w/ not deficits noted  -BC               User Key  (r) = Recorded By, (t) = Taken By, (c) = Cosigned By      Initials Name Provider Type    Josefina Roy OT Occupational Therapist                   Obj/Interventions       Row Name 11/30/24 1144          Sensory Assessment (Somatosensory)    Sensory Assessment (Somatosensory) sensation intact  -BC       Row Name 11/30/24 1144          Vision Assessment/Intervention    Visual Impairment/Limitations WFL  -BC       Row Name 11/30/24 1144          Range of Motion Comprehensive    General Range of Motion no range of motion deficits identified  -BC       Row Name 11/30/24 1144          Strength Comprehensive (MMT)    General Manual Muscle Testing (MMT) Assessment no strength deficits identified  -BC       Row Name 11/30/24 1144          Balance     Comment, Balance (I)/Mod (I) w/ in room transfers.  -BC               User Key  (r) = Recorded By, (t) = Taken By, (c) = Cosigned By      Initials Name Provider Type    BC Josefina Valderrama, SHEILA Occupational Therapist                   Goals/Plan    No documentation.                  Clinical Impression       Row Name 11/30/24 1145          Pain Assessment    Pretreatment Pain Rating 0/10 - no pain  -BC     Posttreatment Pain Rating 0/10 - no pain  -BC       Row Name 11/30/24 1144          Plan of Care Review    Plan of Care Reviewed With patient;spouse  -BC     Progress improving  -BC     Outcome Evaluation Pt is a 73 y/o F with SOA and COPD, on 3L NC at home at baseline and reports hypoxia upon admission. pt going down for thoracentis today but verb'd all symptoms have improved, currently on 3L baseline NC and verb'd has been up ad katelyn in room to/from bathroom. Using spouses hand initially to help manage o2 tubing upon standing, but no physical or verbal cues/assistance required this date, up in room and cont'd around bed up to chair. Spouse present also verb'd no concerns. Pt denies any OT needs. DC OT and verb'd/recommending cont'd mobility ad katelyn in room  and on unit.  -BC       Row Name 11/30/24 1147          Therapy Assessment/Plan (OT)    Criteria for Skilled Therapeutic Interventions Met (OT) no;no problems identified which require skilled intervention  -BC       Row Name 11/30/24 1143          Therapy Plan Review/Discharge Plan (OT)    Anticipated Discharge Disposition (OT) home  -BC       Row Name 11/30/24 1147          Vital Signs    O2 Delivery Pre Treatment supplemental O2  -BC     O2 Delivery Intra Treatment supplemental O2  unable to get accurate pleth for spo2. Denies any SAUCEDO/SOA  -BC     O2 Delivery Post Treatment supplemental O2  -BC     Pre Patient Position Supine  -BC     Intra Patient Position Standing  -BC     Post Patient Position Sitting  -BC       Row Name 11/30/24 1147          Positioning  and Restraints    Pre-Treatment Position in bed  -BC     Post Treatment Position chair  -BC     In Chair with family/caregiver;call light within reach  -BC               User Key  (r) = Recorded By, (t) = Taken By, (c) = Cosigned By      Initials Name Provider Type    Josefina Roy OT Occupational Therapist                   Outcome Measures       Row Name 11/30/24 1149          How much help from another is currently needed...    Putting on and taking off regular lower body clothing? 4  -BC     Bathing (including washing, rinsing, and drying) 4  -BC     Toileting (which includes using toilet bed pan or urinal) 4  -BC     Putting on and taking off regular upper body clothing 4  -BC     Taking care of personal grooming (such as brushing teeth) 4  -BC     Eating meals 4  -BC     AM-PAC 6 Clicks Score (OT) 24  -BC       Row Name 11/30/24 0805          How much help from another person do you currently need...    Turning from your back to your side while in flat bed without using bedrails? 4  -KS     Moving from lying on back to sitting on the side of a flat bed without bedrails? 4  -KS     Moving to and from a bed to a chair (including a wheelchair)? 4  -KS     Standing up from a chair using your arms (e.g., wheelchair, bedside chair)? 4  -KS     Climbing 3-5 steps with a railing? 3  -KS     To walk in hospital room? 3  -KS     AM-PAC 6 Clicks Score (PT) 22  -KS       Row Name 11/30/24 1149          Functional Assessment    Outcome Measure Options AM-PAC 6 Clicks Daily Activity (OT)  -BC               User Key  (r) = Recorded By, (t) = Taken By, (c) = Cosigned By      Initials Name Provider Type    KS Siegrist, Kaitlyn, RN Registered Nurse    Josefina Roy OT Occupational Therapist                    Occupational Therapy Education       Title: PT OT SLP Therapies (In Progress)       Topic: Occupational Therapy (In Progress)       Point: ADL training (Done)       Description:   Instruct learner(s) on proper  safety adaptation and remediation techniques during self care or transfers.   Instruct in proper use of assistive devices.                  Learning Progress Summary            Patient Acceptance, E,TB, VU by BC at 11/30/2024 1149    Comment: Role of OT at acute level, DC recs/safety, no further teaching indicated   Family Acceptance, E,TB, VU by BC at 11/30/2024 1149    Comment: Role of OT at acute level, DC recs/safety, no further teaching indicated                      Point: Home exercise program (Not Started)       Description:   Instruct learner(s) on appropriate technique for monitoring, assisting and/or progressing therapeutic exercises/activities.                  Learner Progress:  Not documented in this visit.              Point: Precautions (Not Started)       Description:   Instruct learner(s) on prescribed precautions during self-care and functional transfers.                  Learner Progress:  Not documented in this visit.              Point: Body mechanics (Not Started)       Description:   Instruct learner(s) on proper positioning and spine alignment during self-care, functional mobility activities and/or exercises.                  Learner Progress:  Not documented in this visit.                              User Key       Initials Effective Dates Name Provider Type Discipline    BC 07/29/24 -  Josefina Valderrama OT Occupational Therapist OT                  OT Recommendation and Plan     Plan of Care Review  Plan of Care Reviewed With: patient, spouse  Progress: improving  Outcome Evaluation: Pt is a 73 y/o F with SOA and COPD, on 3L NC at home at baseline and reports hypoxia upon admission. pt going down for thoracentis today but verb'd all symptoms have improved, currently on 3L baseline NC and verb'd has been up ad katelyn in room to/from bathroom. Using spouses hand initially to help manage o2 tubing upon standing, but no physical or verbal cues/assistance required this date, up in room and cont'd  around bed up to chair. Spouse present also verb'd no concerns. Pt denies any OT needs. DC OT and verb'd/recommending cont'd mobility ad katelyn in room  and on unit.     Time Calculation:   Evaluation Complexity (OT)  Review Occupational Profile/Medical/Therapy History Complexity: brief/low complexity  Assessment, Occupational Performance/Identification of Deficit Complexity: 1-3 performance deficits  Clinical Decision Making Complexity (OT): problem focused assessment/low complexity  Overall Complexity of Evaluation (OT): low complexity     Time Calculation- OT       Row Name 11/30/24 1150             Time Calculation- OT    OT Start Time 0942  -BC      OT Stop Time 0951  -BC      OT Time Calculation (min) 9 min  -BC      Total Timed Code Minutes- OT 0 minute(s)  -BC      OT Received On 11/30/24  -BC                User Key  (r) = Recorded By, (t) = Taken By, (c) = Cosigned By      Initials Name Provider Type    BC Josefina Valderrama OT Occupational Therapist                  Therapy Charges for Today       Code Description Service Date Service Provider Modifiers Qty    [id number]  OT EVAL LOW COMPLEXITY 2 11/30/2024 Josefina Valderrama OT GO 1                 Josefina Valderrama OT  11/30/2024

## 2024-11-30 NOTE — PLAN OF CARE
Goal Outcome Evaluation:              Outcome Evaluation: pt maintained on 5L, denies any SOA. pt able to ambulate with stand by assist. went down for thoracentesis this shift, states he is breathing much better this shift. pt denies any pain. pt using at home bipap while he sleeps. pt expresses no needs at this time. call light within reach.

## 2024-12-01 ENCOUNTER — READMISSION MANAGEMENT (OUTPATIENT)
Dept: CALL CENTER | Facility: HOSPITAL | Age: 74
End: 2024-12-01
Payer: MEDICARE

## 2024-12-01 ENCOUNTER — APPOINTMENT (OUTPATIENT)
Dept: CARDIOLOGY | Facility: HOSPITAL | Age: 74
DRG: 189 | End: 2024-12-01
Payer: MEDICARE

## 2024-12-01 ENCOUNTER — NURSE TRIAGE (OUTPATIENT)
Dept: CALL CENTER | Facility: HOSPITAL | Age: 74
End: 2024-12-01
Payer: MEDICARE

## 2024-12-01 VITALS
OXYGEN SATURATION: 96 % | RESPIRATION RATE: 18 BRPM | HEART RATE: 71 BPM | HEIGHT: 71 IN | DIASTOLIC BLOOD PRESSURE: 72 MMHG | SYSTOLIC BLOOD PRESSURE: 120 MMHG | WEIGHT: 264.55 LBS | TEMPERATURE: 97.7 F | BODY MASS INDEX: 37.04 KG/M2

## 2024-12-01 LAB
AORTIC DIMENSIONLESS INDEX: 0.7 (DI)
BH CV ECHO MEAS - AO MAX PG: 8.6 MMHG
BH CV ECHO MEAS - AO MEAN PG: 5 MMHG
BH CV ECHO MEAS - AO ROOT DIAM: 3.4 CM
BH CV ECHO MEAS - AO V2 MAX: 147 CM/SEC
BH CV ECHO MEAS - AO V2 VTI: 25.3 CM
BH CV ECHO MEAS - AVA(I,D): 2.6 CM2
BH CV ECHO MEAS - EDV(CUBED): 125 ML
BH CV ECHO MEAS - EDV(MOD-SP2): 106 ML
BH CV ECHO MEAS - EDV(MOD-SP4): 79 ML
BH CV ECHO MEAS - EF(MOD-BP): 59.2 %
BH CV ECHO MEAS - EF(MOD-SP2): 54.7 %
BH CV ECHO MEAS - EF(MOD-SP4): 63.3 %
BH CV ECHO MEAS - ESV(CUBED): 45.3 ML
BH CV ECHO MEAS - ESV(MOD-SP2): 48 ML
BH CV ECHO MEAS - ESV(MOD-SP4): 29 ML
BH CV ECHO MEAS - FS: 28.7 %
BH CV ECHO MEAS - IVS/LVPW: 1.11 CM
BH CV ECHO MEAS - IVSD: 1 CM
BH CV ECHO MEAS - LAT PEAK E' VEL: 17 CM/SEC
BH CV ECHO MEAS - LV DIASTOLIC VOL/BSA (35-75): 33.2 CM2
BH CV ECHO MEAS - LV MASS(C)D: 169.9 GRAMS
BH CV ECHO MEAS - LV MAX PG: 4.7 MMHG
BH CV ECHO MEAS - LV MEAN PG: 2 MMHG
BH CV ECHO MEAS - LV SYSTOLIC VOL/BSA (12-30): 12.2 CM2
BH CV ECHO MEAS - LV V1 MAX: 108 CM/SEC
BH CV ECHO MEAS - LV V1 VTI: 17.7 CM
BH CV ECHO MEAS - LVIDD: 5 CM
BH CV ECHO MEAS - LVIDS: 3.6 CM
BH CV ECHO MEAS - LVOT AREA: 3.7 CM2
BH CV ECHO MEAS - LVOT DIAM: 2.18 CM
BH CV ECHO MEAS - LVPWD: 0.9 CM
BH CV ECHO MEAS - MED PEAK E' VEL: 8.7 CM/SEC
BH CV ECHO MEAS - MV DEC SLOPE: 724.6 CM/SEC2
BH CV ECHO MEAS - MV MAX PG: 6.7 MMHG
BH CV ECHO MEAS - MV MEAN PG: 1.83 MMHG
BH CV ECHO MEAS - MV P1/2T: 55.4 MSEC
BH CV ECHO MEAS - MV V2 VTI: 29.2 CM
BH CV ECHO MEAS - MVA(P1/2T): 4 CM2
BH CV ECHO MEAS - MVA(VTI): 2.26 CM2
BH CV ECHO MEAS - PA ACC TIME: 0.11 SEC
BH CV ECHO MEAS - PA V2 MAX: 115.4 CM/SEC
BH CV ECHO MEAS - QP/QS: 1.54
BH CV ECHO MEAS - RAP SYSTOLE: 3 MMHG
BH CV ECHO MEAS - RV MAX PG: 2.7 MMHG
BH CV ECHO MEAS - RV V1 MAX: 82.3 CM/SEC
BH CV ECHO MEAS - RV V1 VTI: 17.3 CM
BH CV ECHO MEAS - RVOT DIAM: 2.7 CM
BH CV ECHO MEAS - RVSP: 38 MMHG
BH CV ECHO MEAS - SV(LVOT): 66.1 ML
BH CV ECHO MEAS - SV(MOD-SP2): 58 ML
BH CV ECHO MEAS - SV(MOD-SP4): 50 ML
BH CV ECHO MEAS - SV(RVOT): 101.5 ML
BH CV ECHO MEAS - SVI(LVOT): 27.8 ML/M2
BH CV ECHO MEAS - SVI(MOD-SP2): 24.4 ML/M2
BH CV ECHO MEAS - SVI(MOD-SP4): 21 ML/M2
BH CV ECHO MEAS - TAPSE (>1.6): 2.2 CM
BH CV ECHO MEAS - TR MAX PG: 35.8 MMHG
BH CV ECHO MEAS - TR MAX VEL: 299 CM/SEC
BH CV ECHO SHUNT ASSESSMENT PERFORMED (HIDDEN SCRIPTING): 1
BH CV XLRA - RV BASE: 4.5 CM
BH CV XLRA - RV LENGTH: 8.6 CM
BH CV XLRA - RV MID: 3.1 CM
BH CV XLRA - TDI S': 16.2 CM/SEC
GLUCOSE BLDC GLUCOMTR-MCNC: 355 MG/DL (ref 70–130)
GLUCOSE BLDC GLUCOMTR-MCNC: 386 MG/DL (ref 70–130)
LEFT ATRIUM VOLUME INDEX: 54.6 ML/M2
SINUS: 3.5 CM

## 2024-12-01 PROCEDURE — 93010 ELECTROCARDIOGRAM REPORT: CPT | Performed by: INTERNAL MEDICINE

## 2024-12-01 PROCEDURE — 93306 TTE W/DOPPLER COMPLETE: CPT

## 2024-12-01 PROCEDURE — 93306 TTE W/DOPPLER COMPLETE: CPT | Performed by: INTERNAL MEDICINE

## 2024-12-01 PROCEDURE — 63710000001 PREDNISONE PER 1 MG: Performed by: HOSPITALIST

## 2024-12-01 PROCEDURE — 99232 SBSQ HOSP IP/OBS MODERATE 35: CPT | Performed by: INTERNAL MEDICINE

## 2024-12-01 PROCEDURE — 25010000002 CEFTRIAXONE PER 250 MG: Performed by: STUDENT IN AN ORGANIZED HEALTH CARE EDUCATION/TRAINING PROGRAM

## 2024-12-01 PROCEDURE — 25010000002 ENOXAPARIN PER 10 MG: Performed by: NURSE PRACTITIONER

## 2024-12-01 PROCEDURE — 63710000001 INSULIN GLARGINE PER 5 UNITS: Performed by: HOSPITALIST

## 2024-12-01 PROCEDURE — 63710000001 INSULIN LISPRO (HUMAN) PER 5 UNITS: Performed by: HOSPITALIST

## 2024-12-01 PROCEDURE — 82948 REAGENT STRIP/BLOOD GLUCOSE: CPT

## 2024-12-01 PROCEDURE — 93005 ELECTROCARDIOGRAM TRACING: CPT | Performed by: NURSE PRACTITIONER

## 2024-12-01 PROCEDURE — 25010000002 METHYLPREDNISOLONE PER 40 MG: Performed by: STUDENT IN AN ORGANIZED HEALTH CARE EDUCATION/TRAINING PROGRAM

## 2024-12-01 RX ORDER — INSULIN LISPRO 100 [IU]/ML
3-14 INJECTION, SOLUTION INTRAVENOUS; SUBCUTANEOUS
Status: DISCONTINUED | OUTPATIENT
Start: 2024-12-01 | End: 2024-12-01 | Stop reason: HOSPADM

## 2024-12-01 RX ORDER — AZITHROMYCIN 250 MG/1
500 TABLET, FILM COATED ORAL
Status: DISCONTINUED | OUTPATIENT
Start: 2024-12-02 | End: 2024-12-01 | Stop reason: HOSPADM

## 2024-12-01 RX ORDER — PREDNISONE 20 MG/1
60 TABLET ORAL
Status: DISCONTINUED | OUTPATIENT
Start: 2024-12-01 | End: 2024-12-01 | Stop reason: HOSPADM

## 2024-12-01 RX ORDER — PREDNISONE 10 MG/1
TABLET ORAL
Qty: 138 TABLET | Refills: 0 | Status: SHIPPED | OUTPATIENT
Start: 2024-12-01 | End: 2025-01-16

## 2024-12-01 RX ORDER — INSULIN ASPART 100 [IU]/ML
5 INJECTION, SOLUTION INTRAVENOUS; SUBCUTANEOUS
Qty: 15 ML | Refills: 0 | Status: SHIPPED | OUTPATIENT
Start: 2024-12-01 | End: 2025-03-11

## 2024-12-01 RX ADMIN — PREDNISONE 60 MG: 20 TABLET ORAL at 12:09

## 2024-12-01 RX ADMIN — FERROUS SULFATE TAB 325 MG (65 MG ELEMENTAL FE) 162.5 MG: 325 (65 FE) TAB at 08:21

## 2024-12-01 RX ADMIN — GABAPENTIN 600 MG: 300 CAPSULE ORAL at 06:10

## 2024-12-01 RX ADMIN — INSULIN LISPRO 8 UNITS: 100 INJECTION, SOLUTION INTRAVENOUS; SUBCUTANEOUS at 08:22

## 2024-12-01 RX ADMIN — INSULIN LISPRO 5 UNITS: 100 INJECTION, SOLUTION INTRAVENOUS; SUBCUTANEOUS at 08:24

## 2024-12-01 RX ADMIN — METHYLPREDNISOLONE SODIUM SUCCINATE 40 MG: 40 INJECTION, POWDER, FOR SOLUTION INTRAMUSCULAR; INTRAVENOUS at 01:06

## 2024-12-01 RX ADMIN — INSULIN LISPRO 12 UNITS: 100 INJECTION, SOLUTION INTRAVENOUS; SUBCUTANEOUS at 12:09

## 2024-12-01 RX ADMIN — SERTRALINE 50 MG: 50 TABLET, FILM COATED ORAL at 08:22

## 2024-12-01 RX ADMIN — AZITHROMYCIN DIHYDRATE 500 MG: 250 TABLET ORAL at 08:21

## 2024-12-01 RX ADMIN — INSULIN LISPRO 5 UNITS: 100 INJECTION, SOLUTION INTRAVENOUS; SUBCUTANEOUS at 12:10

## 2024-12-01 RX ADMIN — METHYLPREDNISOLONE SODIUM SUCCINATE 40 MG: 40 INJECTION, POWDER, FOR SOLUTION INTRAMUSCULAR; INTRAVENOUS at 06:10

## 2024-12-01 RX ADMIN — ROSUVASTATIN CALCIUM 40 MG: 40 TABLET, FILM COATED ORAL at 08:21

## 2024-12-01 RX ADMIN — ENOXAPARIN SODIUM 120 MG: 150 INJECTION SUBCUTANEOUS at 08:21

## 2024-12-01 RX ADMIN — CETIRIZINE HYDROCHLORIDE 10 MG: 10 TABLET, FILM COATED ORAL at 08:35

## 2024-12-01 RX ADMIN — INSULIN GLARGINE 30 UNITS: 100 INJECTION, SOLUTION SUBCUTANEOUS at 08:22

## 2024-12-01 RX ADMIN — CEFTRIAXONE 2000 MG: 2 INJECTION, POWDER, FOR SOLUTION INTRAMUSCULAR; INTRAVENOUS at 01:06

## 2024-12-01 RX ADMIN — FOLIC ACID 1 MG: 1 TABLET ORAL at 08:21

## 2024-12-01 RX ADMIN — Medication 1 TABLET: at 08:22

## 2024-12-01 RX ADMIN — Medication 10 ML: at 08:35

## 2024-12-01 NOTE — PROGRESS NOTES
Consult Daily Progress Note  [facility]   12/01/24      Patient Name:  Branden Diop  MRN:  [id number]   YOB: 1950  Age: 74 y.o.  Sex: male  LOS: 2    Reason for Consult:  Hypoxia, shortness of breath    Hospital Course:   74-year-old male with a history of squamous cell carcinoma of the lung status post right middle lobectomy with subsequent chemotherapy and immunotherapy with pembrolizumab who recently developed diffuse pruritic rash suspected to be immunotherapy related and subsequently presented with worsening shortness of breath with acute on chronic hypoxic respiratory failure.    Interval History:  No acute events overnight  States that he feels markedly better  No chest pain no palpitations  No nausea vomiting  Sitting up in chair comfortably  Walked around the halls this morning after he woke up at 5 AM    Physical Exam:  Vitals:    12/01/24 0734   BP: 138/59   Pulse: 71   Resp: 18   Temp: 97.7 °F (36.5 °C)   SpO2: 96%       Intake/Output    Intake/Output Summary (Last 24 hours) at 12/1/2024 0929  Last data filed at 11/30/2024 1112  Gross per 24 hour   Intake --   Output 800 ml   Net -800 ml       General: Alert, nontoxic, NAD  HEENT: NC/AT, EOMI, MMM  Neck: Supple, trachea midline  Cardiac: RRR, no murmur, gallops, rubs  Pulmonary: Diminished at the right lung base  GI: Soft, non-tender, non-distended, normal bowel sounds  Extremities: Warm, well perfused, no LE edema  Skin: no visible rash  Neuro: CN II - XII grossly intact  Psychiatry: Normal mood and affect      Data Review:  Results from last 7 days   Lab Units 11/30/24  0451 11/29/24  0951 11/27/24  0806 11/24/24  1045   WBC 10*3/mm3 9.46 10.40 10.02 8.98   HEMOGLOBIN g/dL 11.1* 11.1* 10.8* 11.9*   PLATELETS 10*3/mm3 338 293 250 244     Results from last 7 days   Lab Units 11/30/24  0451 11/29/24  0951 11/27/24  0807 11/24/24  1045   SODIUM mmol/L 137 139 135* 136   POTASSIUM mmol/L 3.9 3.9 3.6 3.6   CHLORIDE mmol/L 96*  96* 92* 95*   CO2 mmol/L 27.0 29.4* 30.4* 27.2   BUN mg/dL 16 14 13 19   CREATININE mg/dL 0.77 0.84 0.77 0.96   GLUCOSE mg/dL 240* 291* 241* 205*   CALCIUM mg/dL 9.1 9.5 9.0 9.2   MAGNESIUM mg/dL 1.6  --   --   --    PHOSPHORUS mg/dL 4.2  --   --   --    Estimated Creatinine Clearance: 108.1 mL/min (by C-G formula based on SCr of 0.77 mg/dL).    Results from last 7 days   Lab Units 11/30/24  2031 11/30/24  0451 11/29/24  2345 11/29/24  1633 11/29/24  0951 11/27/24  0807 11/27/24  0806 11/24/24  1045   LDH U/L 230*  --   --   --   --   --   --   --    AST (SGOT) U/L  --   --   --   --  32 34  --   --    ALT (SGPT) U/L  --   --   --   --  35 26  --   --    PROCALCITONIN ng/mL  --   --   --  0.06  --   --   --   --    LACTATE mmol/L  --   --  1.1  --   --   --   --   --    CRP mg/dL  --   --   --   --   --   --   --  11.62*   PLATELETS 10*3/mm3  --  338  --   --  293  --  250 244               Imaging:  Reviewed chest images personally from past 3 days    ASSESSMENT  /  PLAN:    Acute on chronic hypoxic respiratory failure (uses 3L at home per patient report)  Immune checkpoint inhibitor pneumonitis, on pembrolizumab  Community-acquired pneumonia  Hemoptysis, resolved  MGUS  Type 2 diabetes mellitus  Peripheral neuropathy secondary to chemotherapy  Anemia secondary to chemotherapy  Right pleural effusion  Status post thoracentesis cysts (11/30) with 800 cc removed, exudative.  Atrial fibrillation on anticoagulation  Obstructive sleep apnea  Essential hypertension    -Patient presented with worsening shortness of breath and acute on chronic hypoxic respiratory failure found to have immune checkpoint inhibitor induced pneumonitis  -CT chest this admission demonstrates significant groundglass opacification in bilateral lungs especially in the mid and upper fields, this is markedly worse from last CT chest 1 month prior.  -Will transition methylprednisolone to oral prednisone 60 mg daily.  Anticipate he will need  approximately 6-week course of steroids and can likely decrease by 10 mg every week.  -Uptitrated insulin regimen  -Continue empiric antibiotics with ceftriaxone and azithromycin at this time, would complete a 5-day course.  -Hemoptysis resolved, no indication for bronchoscopy at this time  -Thoracentesis performed with 800 cc removed, exudative in nature.  Cultures and cytology pending  -Repeat echocardiogram pending  -Instructed to bring in home CPAP mask if able so we can use the machine while in the hospital  -Patient will need to follow-up in pulmonary clinic with myself in  4 to 6 weeks with repeat CT imaging of the chest to ensure resolution of findings after steroid course.    Thank you for allowing us to participate in this patients care. Pulmonary will continue to follow.     Yony Coles MD  [facility]  Pulmonary and Critical Care Medicine, Interventional Pulmonology    Parts of this note may be an electronic transcription/translation of spoken language to printed text using the Dragon dictation system.

## 2024-12-01 NOTE — TELEPHONE ENCOUNTER
"He was discharged from [facility] with acute respiratory failure he was discharged with She is asking about the medications on the [facility] discharge papers-   predniSONE 10 MG Take 6 tablets by mouth Daily for 5 days, THEN 5 tablets Daily for 7 days, THEN 4 tablets Daily for 7 days, THEN 3 tablets Daily for 7 days, THEN 2 tablets Daily for 7 days, THEN 1 tablet Daily for 7 days, THEN 0.5 tablets Daily for 6 days.     Her question is answered-   Reason for Disposition   Health Information question, no triage required and triager able to answer question    Additional Information   Negative: [1] Caller is not with the adult (patient) AND [2] reporting urgent symptoms   Negative: Lab result questions   Negative: Medication questions   Negative: Caller can't be reached by phone   Negative: Caller has already spoken to PCP or another triager   Negative: RN needs further essential information from caller in order to complete triage   Negative: Requesting regular office appointment   Negative: [1] Caller requesting NON-URGENT health information AND [2] PCP's office is the best resource    Answer Assessment - Initial Assessment Questions  1. REASON FOR CALL or QUESTION: \"What is your reason for calling today?\" or \"How can I best help you?\" or \"What question do you have that I can help answer?\"      She would like the prednisone medications discussed - the medications were discussed per the S discharge papers.    Protocols used: Information Only Call-ADULT-    "

## 2024-12-01 NOTE — PROGRESS NOTES
"Patient Name: Branden Diop  :1950  74 y.o.      Patient Care Team:  Tino Lopez MD as PCP - General (Internal Medicine)  Tino Lopez MD as PCP - Family Medicine  Mart Ureña MD as Consulting Physician (Cardiology)  Martir Trevino MD as Surgeon (General Surgery)  Dione Carter RN as Nurse Navigator  David Figueroa MD as Referring Physician (Thoracic Surgery)  Duy Villasenor MD PhD as Consulting Physician (Hematology and Oncology)  Yashira Mendiola MD as Consulting Physician (Radiation Oncology)    Interval History:   No hemoptysis    Subjective:  Following for A-fib    Objective   Vital Signs  Temp:  [97.3 °F (36.3 °C)-98.1 °F (36.7 °C)] 97.7 °F (36.5 °C)  Heart Rate:  [70-81] 71  Resp:  [18] 18  BP: (124-138)/(59-76) 138/59    Intake/Output Summary (Last 24 hours) at 2024 0852  Last data filed at 2024 1112  Gross per 24 hour   Intake --   Output 800 ml   Net -800 ml     Flowsheet Rows      Flowsheet Row First Filed Value   Admission Height 180.3 cm (70.98\") Documented at 2024 1358   Admission Weight 123 kg (270 lb 1 oz) Documented at 2024 1010                Results Review:    Results from last 7 days   Lab Units 24  0451   SODIUM mmol/L 137   POTASSIUM mmol/L 3.9   CHLORIDE mmol/L 96*   CO2 mmol/L 27.0   BUN mg/dL 16   CREATININE mg/dL 0.77   GLUCOSE mg/dL 240*   CALCIUM mg/dL 9.1     Results from last 7 days   Lab Units 24  0951   HSTROP T ng/L 21     Results from last 7 days   Lab Units 24  0451   WBC 10*3/mm3 9.46   HEMOGLOBIN g/dL 11.1*   HEMATOCRIT % 34.5*   PLATELETS 10*3/mm3 338             Results from last 7 days   Lab Units 24  0451   MAGNESIUM mg/dL 1.6             Medication Review:   azithromycin, 500 mg, Oral, Q24H  cefTRIAXone, 2,000 mg, Intravenous, Q24H  cetirizine, 10 mg, Oral, Daily  enoxaparin, 1 mg/kg, Subcutaneous, BID  ferrous sulfate, 162.5 mg, Oral, Daily With Breakfast  folic acid, 1 mg, Oral, " Daily  gabapentin, 600 mg, Oral, Q8H  insulin glargine, 30 Units, Subcutaneous, Daily  insulin lispro, 2-9 Units, Subcutaneous, 4x Daily AC & at Bedtime  insulin lispro, 5 Units, Subcutaneous, TID With Meals  methylPREDNISolone sodium succinate, 40 mg, Intravenous, Q8H  multivitamin, 1 tablet, Oral, Daily  Pharmacy to Dose enoxaparin (LOVENOX), , Not Applicable, Daily  rosuvastatin, 40 mg, Oral, Daily  sertraline, 50 mg, Oral, Daily  sodium chloride, 10 mL, Intravenous, Q12H  [Held by provider] torsemide, 20 mg, Oral, BID              Assessment & Plan     1.  Hemoptysis/acute on chronic hypoxic respiratory failure/pneumonitis.  -Ultrasound-guided thoracentesis on November 30 with 800 mL of fluid removed from the right lung.  -Normal BN P on admission in November 29.  -Repeat echocardiogram is pending.  However, echocardiogram from May of this year showed normal LV function, right ventricular systolic pressure of 39 mmHg.  PFO suspected.    2.  Permanent atrial fibrillation.  Anticoagulated with Xarelto at home.  It is currently not held because of hemoptysis.  Hemoglobin is stable.  -I recommend resuming anticoagulation with Xarelto (or Lovenox if more procedures are planned)  -He is not on rate lowering medication at home.    3.  Hyperlipidemia on rosuvastatin.    4.  Diabetes.  On insulin.    Follow-up with Yola Davis or BAM Saxena in 2 to 4 weeks.    Lupe Somers MD, [facility]  12/01/24  08:52 EST

## 2024-12-01 NOTE — DISCHARGE SUMMARY
Patient Name: Branden Diop  : 1950  MRN: [id number]    Date of Admission: 2024  Date of Discharge:  2024  Primary Care Physician: Tino Lopez MD      Chief Complaint:   Coughing Up Blood and Shortness of Breath      Discharge Diagnoses     Active Hospital Problems    Diagnosis  POA    **Acute respiratory failure [J96.00]  Yes    Anemia associated with chemotherapy [D64.81, T45.1X5A]  Yes    Malignant neoplasm of middle lobe of right lung [C34.2]  Yes    Essential hypertension [I10]  Yes    COPD (chronic obstructive pulmonary disease) [J44.9]  Yes    Hyperlipidemia [E78.5]  Yes    Type 2 diabetes mellitus, with long-term current use of insulin [E11.9, Z79.4]  Not Applicable    A-fib [I48.91]  Yes    Apnea, sleep [G47.30]  Yes      Resolved Hospital Problems   No resolved problems to display.        Hospital Course     Mr. Diop is a 74 y.o. male with a history of lung cancer who presented to [facility] initially complaining of hemoptysis and shortness of breath.  Please see the admitting history and physical for further details.  He was found to have hemoptysis and shortness of breath and was admitted to the hospital for further evaluation and treatment.  He had undergone previous lobectomy and was on immunotherapy and presented to the hospital with shortness of breath and was found to be a little bit volume overloaded.  He also had acute hypoxic respiratory failure.  Cardiology evaluated and helped with diuretics.  He has been transition back to oral diuretics at this time.  A CT scan of his chest was performed and findings were not consistent really with infection but more possible pneumonitis related to his checkpoint inhibitor.  He was started on steroids and had significant improvement with this.  He did have a thoracentesis done and tolerated this well.  No plans for bronchoscopy at this time.  He was resumed on his Xarelto and plans were to taper steroids over the  outpatient setting for the next few weeks.  His blood sugars were running high related to steroids and his medications were titrated.  At this point he is stable to discharge home and follow-up in the outpatient setting with pulmonary and oncology.        Day of Discharge     Subjective:  Denies new issues or complaints today.  Eager to go home    Physical Exam:  Temp:  [97.3 °F (36.3 °C)-98.1 °F (36.7 °C)] 97.7 °F (36.5 °C)  Heart Rate:  [70-74] 71  Resp:  [18] 18  BP: (120-138)/(59-76) 120/72  Body mass index is 37.04 kg/m².  Physical Exam    Consultants     Consult Orders (all) (From admission, onward)       Start     Ordered    11/29/24 1447  Inpatient Pulmonology Consult  Once        Specialty:  Pulmonary Disease  Provider:  Elvin Teague MD    11/29/24 1446    11/29/24 1446  Inpatient Cardiology Consult  Once        Specialty:  Cardiology  Provider:  Mart Painting MD    11/29/24 1446    11/29/24 1429  Inpatient Case Management  Consult  Once        Provider:  (Not yet assigned)    11/29/24 1429    11/29/24 1149  [facility] (on-call MD unless specified) Details  Once        Specialty:  Hospitalist  Provider:  Yaon Art MD    11/29/24 1148                  Procedures     * Surgery not found *    Imaging Results (All)       Procedure Component Value Units Date/Time    XR Chest 1 View [[id number]] Collected: 11/30/24 1354     Updated: 11/30/24 1359    Narrative:      XR CHEST 1 VW-     HISTORY: Male who is 74 years-old, post thoracentesis evaluation     TECHNIQUE: Frontal view of the chest     COMPARISON: 11/29/2024     FINDINGS: Right chest port appears stable. The heart is enlarged.  Pulmonary vasculature is congested. No large pleural effusion or  pneumothorax. Hazy opacities at the mid lungs may be areas of edema  and/or pneumonia, and appear similar to prior exam. No acute osseous  process.       Impression:      As described.     This report was finalized on 11/30/2024 1:56 PM by   Arsh Jorgensen M.D on Workstation: [id number]       US Thoracentesis [[id number]] Collected: 11/30/24 1323    Specimen: Body Fluid Updated: 11/30/24 1326    Narrative:      US THORACENTESIS-     INDICATIONS: Pleural effusion      FINDINGS:     Following a detailed discussion of the proposed procedure with the  patient, verbal and written informed consent was obtained.     The patient was placed in a seated upright position, final timeout was  performed verifying patient identity and procedure, using ultrasound  guidance a location was chosen over the right aspect of the chest,  revealing pleural effusion. The overlying skin was cleaned and  anesthetized, and a thoracentesis needle set was advanced into the  pleural space revealing clear debora-colored fluid. 800 cc was removed  and sent to the laboratory for further evaluation. The catheter was  removed intact.     The patient remained asymptomatic throughout the procedure.             Impression:         Successful right thoracentesis.           This report was finalized on 11/30/2024 1:23 PM by Dr. Arsh Jorgensen M.D on Workstation: [id number]       CT Chest With Contrast Diagnostic [[id number]] Collected: 11/29/24 2018     Updated: 11/29/24 2028    Narrative:      CT CHEST W CONTRAST DIAGNOSTIC-     INDICATIONS: Hemoptysis, dyspnea     TECHNIQUE: Radiation dose reduction techniques were utilized, including  automated exposure control and exposure modulation based on body size.  ENHANCED CHEST CT     COMPARISON: 10/25/2024     FINDINGS:           The heart size is borderline without pericardial effusion. Coronary  arterial calcifications are present. Right chest port extends to the  superior vena cava. A 1.4 cm short axis pretracheal lymph node on axial  image 34 is nonspecific, could be reactive in nature or potentially  evidence of neoplasm, previously 1.2 cm. A 1.1 cm short axis subcarinal  lymph node on axial image 43 is stable. A 1.3 cm left hilar lymph  node  on axial image 32 is not clearly distinguished on the prior unenhanced  exam. A 1.6 cm short axis right hilar lymph node on axial image 35 does  not appear significantly changed. If indicated, PET/CT could be obtained  for further evaluation of this adenopathy.           The airways appear clear.     Moderate to large, increased right pleural effusion is noted.     The lungs show emphysematous changes. Groundglass opacification at the  mid lungs suggest edema and/or atypical pneumonia, clinical correlation  and follow-up advised. Atelectasis/consolidation is apparent posteriorly  in the right lower lobe.     Upper abdominal structures show no acute findings. The gallbladder is  surgically absent. Bilateral renal cysts are partly included the spleen  is enlarged, 16.3 cm, previously 15.9 cm.     Degenerative changes are seen in the spine. No acute fracture is  identified.       Impression:         Increased right pleural effusion. Groundglass opacification at the mid  lungs suggesting edema and/or pneumonia, with atelectasis/consolidation  in the right lower lobe. Nonspecific mediastinal and right hilar  adenopathy continued follow-up/further evaluation advised as indicated.           This report was finalized on 11/29/2024 8:25 PM by Dr. Arsh Jorgensen M.D on Workstation: [id number]       XR Chest 1 View [[id number]] Collected: 11/29/24 1035     Updated: 11/29/24 1041    Narrative:      XR CHEST 1 VW-     INDICATION: Shortness of breath     COMPARISON: CT chest 10/25/2024 and radiographs dating back to 2/16/2016       Impression:      Dilated and indistinct central pulmonary vasculature with  prominent pulmonary interstitial markings bilaterally, most suspicious  for pulmonary edema. Moderate right-sided pleural effusion. Recommend  imaging follow-up to ensure clearance. No pneumothorax. Normal size  cardiomediastinal silhouette. Right IJ port with tip overlying the  caudal SVC. No focal osseous  abnormality.     This report was finalized on 11/29/2024 10:38 AM by Dr. Kartik Humphrey M.D on Workstation: [id number]             Results for orders placed during the hospital encounter of 05/17/21    Doppler Arterial Multi Level Lower Extremity - Bilateral CAR    Interpretation Summary  · Right Conclusion: The right FRANCO is normal.  · Left Conclusion: The left FRANCO is normal.    Results for orders placed during the hospital encounter of 11/29/24    Adult Transthoracic Echo Complete W/ Cont if Necessary Per Protocol    Interpretation Summary    Left ventricular systolic function is normal. Calculated left ventricular EF = 59.2%    Left ventricular diastolic function was indeterminate.    The left atrial cavity is severely dilated.    Small patent foramen ovale present.    Saline test results are positive.    Estimated right ventricular systolic pressure from tricuspid regurgitation is mildly elevated (35-45 mmHg). Calculated right ventricular systolic pressure from tricuspid regurgitation is 38 mmHg.    Pertinent Labs     Results from last 7 days   Lab Units 11/30/24  0451 11/29/24  0951 11/27/24  0806   WBC 10*3/mm3 9.46 10.40 10.02   HEMOGLOBIN g/dL 11.1* 11.1* 10.8*   PLATELETS 10*3/mm3 338 293 250     Results from last 7 days   Lab Units 11/30/24  0451 11/29/24  0951 11/27/24  0807   SODIUM mmol/L 137 139 135*   POTASSIUM mmol/L 3.9 3.9 3.6   CHLORIDE mmol/L 96* 96* 92*   CO2 mmol/L 27.0 29.4* 30.4*   BUN mg/dL 16 14 13   CREATININE mg/dL 0.77 0.84 0.77   GLUCOSE mg/dL 240* 291* 241*   EGFR mL/min/1.73 92.8 90.4 92.8     Results from last 7 days   Lab Units 11/29/24  0951 11/27/24  0807   ALBUMIN g/dL 3.8 3.7   BILIRUBIN mg/dL 0.9 1.3*   ALK PHOS U/L 172* 155*   AST (SGOT) U/L 32 34   ALT (SGPT) U/L 35 26     Results from last 7 days   Lab Units 11/30/24  0451 11/29/24  0951 11/27/24  0807   CALCIUM mg/dL 9.1 9.5 9.0   ALBUMIN g/dL  --  3.8 3.7   MAGNESIUM mg/dL 1.6  --   --    PHOSPHORUS mg/dL 4.2  --   --  "       Results from last 7 days   Lab Units 11/29/24  0951   HSTROP T ng/L 21   PROBNP pg/mL 1,254.0           Invalid input(s): \"LDLCALC\"  Results from last 7 days   Lab Units 11/30/24  1102 11/29/24  2345 11/29/24  2336   BLOODCX   --  No growth at 24 hours No growth at 24 hours   BODYFLDCX  No growth  --   --      Results from last 7 days   Lab Units 11/29/24  1007   COVID19  Not Detected       Test Results Pending at Discharge     Pending Results       Procedure [Order ID] Specimen - Date/Time    AFB Culture - Body Fluid, Pleural Cavity [[id number]] Collected: 11/30/24 1102    Specimen: Body Fluid from Pleural Cavity Updated: 11/30/24 1139    Flow Cytometry [[id number]] Collected: 11/30/24 1102    Specimen: Body Fluid from Pleural Cavity Updated: 11/30/24 1139    Fungus Culture - Body Fluid, Pleural Cavity [[id number]] Collected: 11/30/24 1102    Specimen: Body Fluid from Pleural Cavity Updated: 11/30/24 1139    Non-gynecologic Cytology [[id number]] Collected: 11/30/24 1102    Specimen: Pleural Fluid from Pleural Cavity     Respiratory Culture - Sputum, Throat [[id number]]     Specimen: Sputum from Throat               Discharge Details        Discharge Medications        New Medications        Instructions Start Date   BD Pen Needle Cait 2nd Gen 32G X 4 MM misc  Generic drug: Insulin Pen Needle   1 each, 3 times daily      NovoLOG FlexPen 100 UNIT/ML solution pen-injector sc pen  Generic drug: insulin aspart  Replaces: Insulin Lispro 100 UNIT/ML injection   Inject 5 Units under the skin into the appropriate area as directed 3 (Three) Times a Day With Meals for 30 days per sliding scale             Changes to Medications        Instructions Start Date   predniSONE 10 MG tablet  Commonly known as: DELTASONE  What changed:   medication strength  See the new instructions.   Take 6 tablets by mouth Daily for 5 days, THEN 5 tablets Daily for 7 days, THEN 4 tablets Daily for 7 days, THEN 3 tablets Daily for 7 days, THEN 2 " tablets Daily for 7 days, THEN 1 tablet Daily for 7 days, THEN 0.5 tablets Daily for 6 days.   Start Date: December 1, 2024     Xarelto 20 MG tablet  Generic drug: rivaroxaban  What changed:   how much to take  when to take this   TAKE ONE TABLET BY MOUTH DAILY             Continue These Medications        Instructions Start Date   ferrous sulfate 325 (65 FE) MG tablet   0.5 tablets, Daily With Breakfast      fexofenadine 180 MG tablet  Commonly known as: ALLEGRA   180 mg, Daily      folic acid 1 MG tablet  Commonly known as: FOLVITE   1 mg, Oral, Daily      gabapentin 300 MG capsule  Commonly known as: NEURONTIN   600 mg, Oral, Every 8 Hours Scheduled      HYDROcodone-acetaminophen 5-325 MG per tablet  Commonly known as: NORCO   1 tablet, Oral, Every 6 Hours PRN      Mounjaro 2.5 MG/0.5ML solution auto-injector  Generic drug: Tirzepatide   2.5 mg, Subcutaneous, Weekly, Mondays       MULTIVITAMINS PO   1 tablet, Daily      rosuvastatin 40 MG tablet  Commonly known as: CRESTOR   TAKE ONE TABLET BY MOUTH DAILY      sertraline 50 MG tablet  Commonly known as: ZOLOFT   TAKE ONE TABLET BY MOUTH DAILY      torsemide 20 MG tablet  Commonly known as: DEMADEX   20 mg, Oral, 2 Times Daily      Toujeo SoloStar 300 UNIT/ML solution pen-injector injection  Generic drug: Insulin Glargine (1 Unit Dial)   24 Units, Subcutaneous, Daily      vitamin B-12 1000 MCG tablet  Commonly known as: CYANOCOBALAMIN   1,000 mcg, Oral, Daily      vitamin B-6 50 MG tablet  Commonly known as: PYRIDOXINE   50 mg, Oral, Daily             Stop These Medications      Insulin Lispro 100 UNIT/ML injection  Commonly known as: humaLOG  Replaced by: NovoLOG FlexPen 100 UNIT/ML solution pen-injector sc pen              No Known Allergies    Discharge Disposition:  Home or Self Care      Discharge Diet:  Diet Order   Procedures    Diet: Cardiac; Healthy Heart (2-3 Na+); Fluid Consistency: Thin (IDDSI 0)       Discharge Activity:   Activity Instructions        Activity as Tolerated              CODE STATUS:    Code Status and Medical Interventions: CPR (Attempt to Resuscitate); Full Support   Ordered at: 11/29/24 1446     Level Of Support Discussed With:    Patient     Code Status (Patient has no pulse and is not breathing):    CPR (Attempt to Resuscitate)     Medical Interventions (Patient has pulse or is breathing):    Full Support       Future Appointments   Date Time Provider Department Center   12/4/2024 10:00 AM [facility] US 3 BH [facility]   12/18/2024 12:45 PM [facility] PORT CHAIR  [facility]   12/18/2024  1:00 PM Dione Shaikh APRN [facility]   12/18/2024  1:30 PM CHAIR 09 WHIT Santos MD  [facility]   12/23/2024  9:00 AM [facility] CT 2 BH [facility]   1/8/2025  8:15 AM [facility] PORT CHAIR  [facility]   1/8/2025  8:40 AM Duy Villasenor MD PhD [facility]   1/8/2025  9:15 AM CHAIR 10 WHIT Santos MD  [facility]   1/22/2025  1:30 PM Yashira Mendiola MD [facility] RO [facility] None   2/13/2025  9:45 AM David Figueroa MD [facility] TS [facility]     Additional Instructions for the Follow-ups that You Need to Schedule       Discharge Follow-up with PCP   As directed       Currently Documented PCP:    Tino Lopez MD    PCP Phone Number:    [phone]     Follow Up Details: 2-3 weeks        Discharge Follow-up with Specified Provider: Cardiology; 1 Week   As directed      To: Cardiology   Follow Up: 1 Week        Discharge Follow-up with Specified Provider: Dr Villasenor; 1 Week   As directed      To: Dr Villasenor   Follow Up: 1 Week        Discharge Follow-up with Specified Provider: pulmonary; 1 Month   As directed      To: pulmonary   Follow Up: 1 Month               Follow-up Information       Tino Lopez MD .    Specialty: Internal Medicine  Why: 2-3 weeks  Contact information:  [address] [facility], [address] [address] [phone]                             Additional Instructions for the Follow-ups that You Need to  Schedule       Discharge Follow-up with PCP   As directed       Currently Documented PCP:    Tino Lopez MD    PCP Phone Number:    [phone]     Follow Up Details: 2-3 weeks        Discharge Follow-up with Specified Provider: Cardiology; 1 Week   As directed      To: Cardiology   Follow Up: 1 Week        Discharge Follow-up with Specified Provider: Dr Villasenor; 1 Week   As directed      To: Dr Villasenor   Follow Up: 1 Week        Discharge Follow-up with Specified Provider: pulmonary; 1 Month   As directed      To: pulmonary   Follow Up: 1 Month            Time Spent on Discharge:  Greater than 30 minutes      Yoan Art MD  [facility]  12/01/24  15:12 EST

## 2024-12-01 NOTE — PLAN OF CARE
Goal Outcome Evaluation:              Outcome Evaluation: d/c home, family to transport

## 2024-12-01 NOTE — PLAN OF CARE
Goal Outcome Evaluation:      Pt alert and oriented. VSS. Pt denies SOB. On CPAP while asleep. Afib on tele. Plan of care ongoing.

## 2024-12-02 ENCOUNTER — TELEPHONE (OUTPATIENT)
Dept: ONCOLOGY | Facility: CLINIC | Age: 74
End: 2024-12-02
Payer: MEDICARE

## 2024-12-02 NOTE — CASE MANAGEMENT/SOCIAL WORK
Case Management Discharge Note      Final Note: Home, family to transport         Selected Continued Care - Discharged on 12/1/2024 Admission date: 11/29/2024 - Discharge disposition: Home or Self Care      Destination    No services have been selected for the patient.                Durable Medical Equipment    No services have been selected for the patient.                Dialysis/Infusion    No services have been selected for the patient.                Home Medical Care    No services have been selected for the patient.                Therapy    No services have been selected for the patient.                Community Resources    No services have been selected for the patient.                Community & DME    No services have been selected for the patient.                    Selected Continued Care - Episodes Includes continued care and service providers with selected services from the active episodes listed below      [facility] Episode start date: 2/11/2022   There are no active outsourced providers for this episode.                 Transportation Services  Private: Car    Final Discharge Disposition Code: 01 - home or self-care

## 2024-12-02 NOTE — TELEPHONE ENCOUNTER
CALLED PATIENT WIFE AND SPOKE WITH EZIO- SHE WANTED ME TO CALL HER CELL PHONE AND LVM - I LEFT MY NAME AND NUMBER IF SHE HAD QUESTIONS

## 2024-12-02 NOTE — OUTREACH NOTE
Prep Survey      Flowsheet Row Responses   [facility]   Is LACE score < 7 ? No   Eligibility Readm Mgmt   Discharge diagnosis Acute respiratory failure   Does the patient have one of the following disease processes/diagnoses(primary or secondary)? Other   Does the patient have Home health ordered? No   Is there a DME ordered? No   Prep survey completed? Yes            HEATH QUINTANA - Registered Nurse

## 2024-12-03 LAB
CYTO UR: NORMAL
LAB AP CASE REPORT: NORMAL
PATH REPORT.FINAL DX SPEC: NORMAL
PATH REPORT.GROSS SPEC: NORMAL

## 2024-12-04 ENCOUNTER — READMISSION MANAGEMENT (OUTPATIENT)
Dept: CALL CENTER | Facility: HOSPITAL | Age: 74
End: 2024-12-04
Payer: MEDICARE

## 2024-12-04 ENCOUNTER — HOSPITAL ENCOUNTER (OUTPATIENT)
Dept: ULTRASOUND IMAGING | Facility: HOSPITAL | Age: 74
Discharge: HOME | End: 2024-12-04
Admitting: INTERNAL MEDICINE
Payer: MEDICARE

## 2024-12-04 DIAGNOSIS — R80.9 PROTEINURIA, UNSPECIFIED TYPE: ICD-10-CM

## 2024-12-04 PROCEDURE — 76775 US EXAM ABDO BACK WALL LIM: CPT

## 2024-12-04 NOTE — OUTREACH NOTE
Medical Week 1 Survey      Flowsheet Row Responses   [facility] patient discharged from? [place]   Does the patient have one of the following disease processes/diagnoses(primary or secondary)? Other   Week 1 attempt successful? Yes   Call start time 1552   Call end time 1600   Discharge diagnosis Acute respiratory failure   Is patient permission given to speak with other caregiver? Yes   List who call center can speak with Spouse   Person spoke with today (if not patient) and relationship Spouse   Meds reviewed with patient/caregiver? Yes   Is the patient having any side effects they believe may be caused by any medication additions or changes? No   Does the patient have all medications ordered at discharge? Yes   Is the patient taking all medications as directed (includes completed medication regime)? Yes   Does the patient have a primary care provider?  Yes   Does the patient have an appointment with their PCP within 7 days of discharge? Yes   Comments regarding PCP PCP follow up 12/5/24   Has the patient kept scheduled appointments due by today? N/A   Has home health visited the patient within 72 hours of discharge? N/A   Psychosocial issues? No   Did the patient receive a copy of their discharge instructions? Yes   Nursing interventions Reviewed instructions with patient   What is the patient's perception of their health status since discharge? Improving   Is the patient/caregiver able to teach back signs and symptoms related to disease process for when to call PCP? Yes   Is the patient/caregiver able to teach back signs and symptoms related to disease process for when to call 911? Yes   Is the patient/caregiver able to teach back the hierarchy of who to call/visit for symptoms/problems? PCP, Specialist, Home health nurse, Urgent Care, ED, 911 Yes   Week 1 call completed? Yes   Would this patient benefit from a Referral to [facility] Social Work? No   Is the patient interested in additional calls from an  ambulatory ? No   Wrap up additional comments Blood sugar readings have been high, last reading today was 357.  aware and seeing patient tomorrow.   Call end time 1600            Ramseur T - Registered Nurse

## 2024-12-05 LAB
BACTERIA FLD CULT: NORMAL
BACTERIA SPEC AEROBE CULT: NORMAL
BACTERIA SPEC AEROBE CULT: NORMAL
BACTERIA SPEC ANAEROBE CULT: NORMAL
GRAM STN SPEC: NORMAL
GRAM STN SPEC: NORMAL

## 2024-12-06 LAB
QT INTERVAL: 433 MS
QTC INTERVAL: 415 MS

## 2024-12-07 LAB
FUNGUS WND CULT: NORMAL
MYCOBACTERIUM SPEC CULT: NORMAL
NIGHT BLUE STAIN TISS: NORMAL

## 2024-12-11 ENCOUNTER — OFFICE VISIT (OUTPATIENT)
Dept: ONCOLOGY | Facility: CLINIC | Age: 74
End: 2024-12-11
Payer: MEDICARE

## 2024-12-11 ENCOUNTER — INFUSION (OUTPATIENT)
Dept: ONCOLOGY | Facility: HOSPITAL | Age: 74
End: 2024-12-11
Payer: MEDICARE

## 2024-12-11 VITALS
HEART RATE: 60 BPM | OXYGEN SATURATION: 99 % | WEIGHT: 264.5 LBS | DIASTOLIC BLOOD PRESSURE: 62 MMHG | BODY MASS INDEX: 37.03 KG/M2 | TEMPERATURE: 97.6 F | HEIGHT: 71 IN | SYSTOLIC BLOOD PRESSURE: 124 MMHG

## 2024-12-11 DIAGNOSIS — Z79.899 ENCOUNTER FOR LONG-TERM (CURRENT) USE OF HIGH-RISK MEDICATION: ICD-10-CM

## 2024-12-11 DIAGNOSIS — T45.1X5A ANEMIA ASSOCIATED WITH CHEMOTHERAPY: ICD-10-CM

## 2024-12-11 DIAGNOSIS — Z79.4 TYPE 2 DIABETES MELLITUS WITH DIABETIC NEPHROPATHY, WITH LONG-TERM CURRENT USE OF INSULIN: ICD-10-CM

## 2024-12-11 DIAGNOSIS — L27.0 DRUG-INDUCED SKIN RASH: ICD-10-CM

## 2024-12-11 DIAGNOSIS — C34.2 SQUAMOUS CELL CARCINOMA OF BRONCHUS IN RIGHT MIDDLE LOBE: Primary | ICD-10-CM

## 2024-12-11 DIAGNOSIS — L29.9 PRURITUS: ICD-10-CM

## 2024-12-11 DIAGNOSIS — J90 PLEURAL EFFUSION ON RIGHT: ICD-10-CM

## 2024-12-11 DIAGNOSIS — C34.2 MALIGNANT NEOPLASM OF MIDDLE LOBE OF RIGHT LUNG: ICD-10-CM

## 2024-12-11 DIAGNOSIS — G62.0 PERIPHERAL NEUROPATHY DUE TO CHEMOTHERAPY: ICD-10-CM

## 2024-12-11 DIAGNOSIS — E11.21 TYPE 2 DIABETES MELLITUS WITH DIABETIC NEPHROPATHY, WITH LONG-TERM CURRENT USE OF INSULIN: ICD-10-CM

## 2024-12-11 DIAGNOSIS — Z45.2 FITTING AND ADJUSTMENT OF VASCULAR CATHETER: Primary | ICD-10-CM

## 2024-12-11 DIAGNOSIS — T45.1X5S ADVERSE EFFECT OF ANTINEOPLASTIC DRUG, SEQUELA: ICD-10-CM

## 2024-12-11 DIAGNOSIS — D64.81 ANEMIA ASSOCIATED WITH CHEMOTHERAPY: ICD-10-CM

## 2024-12-11 DIAGNOSIS — C34.2 SQUAMOUS CELL CARCINOMA OF BRONCHUS IN RIGHT MIDDLE LOBE: ICD-10-CM

## 2024-12-11 DIAGNOSIS — T45.1X5A PERIPHERAL NEUROPATHY DUE TO CHEMOTHERAPY: ICD-10-CM

## 2024-12-11 LAB
ALBUMIN SERPL-MCNC: 3.5 G/DL (ref 3.5–5.2)
ALBUMIN/GLOB SERPL: 1.1 G/DL
ALP SERPL-CCNC: 110 U/L (ref 39–117)
ALT SERPL W P-5'-P-CCNC: 42 U/L (ref 1–41)
ANION GAP SERPL CALCULATED.3IONS-SCNC: 11.6 MMOL/L (ref 5–15)
AST SERPL-CCNC: 28 U/L (ref 1–40)
BASOPHILS # BLD AUTO: 0.01 10*3/MM3 (ref 0–0.2)
BASOPHILS NFR BLD AUTO: 0.1 % (ref 0–1.5)
BILIRUB SERPL-MCNC: 0.7 MG/DL (ref 0–1.2)
BUN SERPL-MCNC: 23 MG/DL (ref 8–23)
BUN/CREAT SERPL: 32.4 (ref 7–25)
CALCIUM SPEC-SCNC: 9.7 MG/DL (ref 8.6–10.5)
CHLORIDE SERPL-SCNC: 93 MMOL/L (ref 98–107)
CO2 SERPL-SCNC: 32.4 MMOL/L (ref 22–29)
CREAT SERPL-MCNC: 0.71 MG/DL (ref 0.76–1.27)
DEPRECATED RDW RBC AUTO: 53.3 FL (ref 37–54)
EGFRCR SERPLBLD CKD-EPI 2021: 95.1 ML/MIN/1.73
EOSINOPHIL # BLD AUTO: 0.06 10*3/MM3 (ref 0–0.4)
EOSINOPHIL NFR BLD AUTO: 0.5 % (ref 0.3–6.2)
ERYTHROCYTE [DISTWIDTH] IN BLOOD BY AUTOMATED COUNT: 15.8 % (ref 12.3–15.4)
GLOBULIN UR ELPH-MCNC: 3.3 GM/DL
GLUCOSE SERPL-MCNC: 262 MG/DL (ref 65–99)
HCT VFR BLD AUTO: 38.7 % (ref 37.5–51)
HGB BLD-MCNC: 12.3 G/DL (ref 13–17.7)
IMM GRANULOCYTES # BLD AUTO: 0.12 10*3/MM3 (ref 0–0.05)
IMM GRANULOCYTES NFR BLD AUTO: 1 % (ref 0–0.5)
LYMPHOCYTES # BLD AUTO: 0.99 10*3/MM3 (ref 0.7–3.1)
LYMPHOCYTES NFR BLD AUTO: 8.5 % (ref 19.6–45.3)
MCH RBC QN AUTO: 29.4 PG (ref 26.6–33)
MCHC RBC AUTO-ENTMCNC: 31.8 G/DL (ref 31.5–35.7)
MCV RBC AUTO: 92.4 FL (ref 79–97)
MONOCYTES # BLD AUTO: 0.45 10*3/MM3 (ref 0.1–0.9)
MONOCYTES NFR BLD AUTO: 3.9 % (ref 5–12)
NEUTROPHILS NFR BLD AUTO: 86 % (ref 42.7–76)
NEUTROPHILS NFR BLD AUTO: 9.97 10*3/MM3 (ref 1.7–7)
NRBC BLD AUTO-RTO: 0 /100 WBC (ref 0–0.2)
PLATELET # BLD AUTO: 315 10*3/MM3 (ref 140–450)
PMV BLD AUTO: 9.2 FL (ref 6–12)
POTASSIUM SERPL-SCNC: 4 MMOL/L (ref 3.5–5.2)
PROT SERPL-MCNC: 6.8 G/DL (ref 6–8.5)
RBC # BLD AUTO: 4.19 10*6/MM3 (ref 4.14–5.8)
SODIUM SERPL-SCNC: 137 MMOL/L (ref 136–145)
WBC NRBC COR # BLD AUTO: 11.6 10*3/MM3 (ref 3.4–10.8)

## 2024-12-11 PROCEDURE — 85025 COMPLETE CBC W/AUTO DIFF WBC: CPT

## 2024-12-11 PROCEDURE — 80053 COMPREHEN METABOLIC PANEL: CPT

## 2024-12-11 PROCEDURE — 36591 DRAW BLOOD OFF VENOUS DEVICE: CPT

## 2024-12-11 PROCEDURE — 25010000002 HEPARIN LOCK FLUSH PER 10 UNITS: Performed by: INTERNAL MEDICINE

## 2024-12-11 RX ORDER — HEPARIN SODIUM (PORCINE) LOCK FLUSH IV SOLN 100 UNIT/ML 100 UNIT/ML
500 SOLUTION INTRAVENOUS AS NEEDED
Status: DISCONTINUED | OUTPATIENT
Start: 2024-12-11 | End: 2024-12-11 | Stop reason: HOSPADM

## 2024-12-11 RX ORDER — SODIUM CHLORIDE 0.9 % (FLUSH) 0.9 %
10 SYRINGE (ML) INJECTION AS NEEDED
Status: DISCONTINUED | OUTPATIENT
Start: 2024-12-11 | End: 2024-12-11 | Stop reason: HOSPADM

## 2024-12-11 RX ORDER — HEPARIN SODIUM (PORCINE) LOCK FLUSH IV SOLN 100 UNIT/ML 100 UNIT/ML
500 SOLUTION INTRAVENOUS AS NEEDED
OUTPATIENT
Start: 2024-12-11

## 2024-12-11 RX ORDER — SODIUM CHLORIDE 0.9 % (FLUSH) 0.9 %
10 SYRINGE (ML) INJECTION AS NEEDED
OUTPATIENT
Start: 2024-12-11

## 2024-12-11 RX ADMIN — Medication 500 UNITS: at 13:17

## 2024-12-11 RX ADMIN — Medication 10 ML: at 13:17

## 2024-12-11 NOTE — PROGRESS NOTES
REASON FOR FOLLOW-UP:     *. Poorly differentiated squamous cell lung cancer with intralobular metastatic disease, stage IIIa ([id number]).  Status post right middle lobectomy on 6/12/2024.  Patient was started on adjuvant chemotherapy carboplatin AUC 5, and Taxol 200 mg/m² on 7/31/2024.      HISTORY OF PRESENT ILLNESS:  The patient is a 74 y.o. year old male who is here for evaluation with the above-mentioned history.    The patient is here for an evaluation prior to his second cycle adjuvant immunotherapy, pembrolizumab. He is accompanied by his wife.    He recently had an abnormal x-ray examination of his left hand at the [facility] which is suspicious for a tumor.  Patient informed us about the abnormalities.  So we arranged an an MRI of his left hand to be conducted on 11/18/2024, which revealed a large expansile bone lesion involving the entirety of the thumb proximal phalanx, measuring 2.5 x 2.0 cm and 3.5 cm in length. There was no associated pathologic fracture. He was referred to hand surgeon at the [facility]. He has an appointment with a hand surgeon in 01/2025.     The patient reports that he has had issues with his left thumb since an accident in the 1960s where he cut off his finger with chainsaw while cutting down tree.  This was attached back by surgeon from the [facility].  About 10 years ago, he injured his thumb while cutting plywood. He has noticed a growth on his thumb since then, which has remained the same size. He does not experience any pain in his thumb. Dr. Espinal informed him that he had a bone tumor and recommended surgery, but he declined.    On 11/24/2024, he visited the ER due to diffuse pruritic skin rashes. He developed a skin rash on his arms 5 days ago after using a new bottle of lotion. The rash also appeared 5 days after his first dose of immunotherapy. He also has rashes on his legs and back, but not on his face as he did  not apply the lotion there. He has been using hydrocortisone cream 2.5 percent twice daily, prescribed by Dr. Reid, but it has not been effective. He experiences itching only on his arms. He has been feeling slightly short of breath. He was given a 6-day course of steroids in the ER, which helped with his wrist, knee, and shoulder pain. However, the rash returned after he finished the steroids.    He has diabetes and his blood sugar levels have been elevated since starting the steroids, ranging from 195 to 240. His usual blood sugar level is around 130 to 140. He uses two types of insulin at home, one of which is short-acting.    Since increasing his gabapentin dosage to 600 mg every 8 hours, his peripheral neuropathy has improved significantly. However, his wife reports that there have been issues with the pharmacy refilling his medication on time.    MRI of the left hand 11/18/2024 reported a large expansile bone lesion involving the entirety of the thumb proximal phalanx measuring 2.5 x 2.0 cm and 3.5 cm in length. No associated pathologic fracture.    INTERVAL HISTORY:    History of Present Illness    The patient is here today on 12/11/2024 for a 2-week follow-up to assess the effectiveness of the steroid treatment for pruritic skin rashes that developed after the first cycle of immunotherapy with pembrolizumab. The immunotherapy was paused, and he was started on prednisone 20 mg daily on 11/27/2024.     However he actually presented to [facility] emergency room on 11/29/2024 because of worsening dyspnea and mild hemoptysis.    He had a thoracentesis on 11/30/2024 during hospitalization, with 800 mL pleural effusion removed from the right side. The cytology study reported negative for cancer cells. There were mixed mesothelial cells, histiocytes, chronic inflammation, fibrin in the blood. This sample was also sent to flow cytometry study and reported no evidence for hematologic malignancy.  Patient  reports has improved breathing after thoracentesis.  His oxygen saturation is 99% today on oxygen 2 L/min NC.    He was discharged from the hospital on 12/01/2024, and Dr. Art prescribed prednisone starting at 60 mg for 5 days, then taper down by 10 mg every 7 days and to finish actually the last will be taking 5 mg for 7 days to finish.     He reports significant improvement in his pruritic rash, which is primarily located on his legs. He no longer experiences itching or discomfort associated with the rash. He recalls severe itching on his back during the last visit but notes that this symptom has resolved. He did not have the rash prior to the initiation of immunotherapy. He has been maintaining his hygiene routine, including regular showers, without any issues. He reports has been causing fluctuations in his blood sugar levels. He also notes an increase in appetite, which he attributes to the steroid treatment.      Results  Laboratory Studies  Labs today on 12/11/2024 reported mild leukocytosis, WBC 11,600 including neutrophils 9970, lymphocytes 990 and hemoglobin 12.3 and platelets 315,000. Chemistry lab reported ALT 42, otherwise normal liver function panel, glucose elevated at 262, and normal renal function, creatinine 0.71, normal electrolytes for sodium, potassium and calcium. Chloride is marginally low at 93.      Past Medical History:   Diagnosis Date    Anxiety     Arthritis     Atrial fibrillation     CURRENTLY    Bunion of right foot     Colon polyps     COPD (chronic obstructive pulmonary disease)     MILD. NO MEDS FOR    Depression     History of atrial fibrillation     WITH CARDIOVERSION. NO PROBLEMS    History of skin cancer     BCC REMOVED FROM BACK    [place] (hard of hearing)     Hyperlipidemia     Inguinal hernia, recurrent     RIGHT    Left foot pain     Lung nodules     Rash     IN GROIN TREATING FOR YEAST INFECTION BY PCP    Redness of skin     LOWER LEGS BILATERAL    Scab     BILATERAL  LEGS HEALING    Sleep apnea with use of continuous positive airway pressure (CPAP)     CPAP    Streptococcus pneumoniae     ABOUT 6-7 YR AGO.     Type 2 diabetes mellitus      Past Surgical History:   Procedure Laterality Date    BASAL CELL CARCINOMA EXCISION      BRONCHOSCOPY WITH ION ROBOTIC ASSIST N/A 5/6/2024    Procedure: BRONCHOSCOPY WITH ION ROBOT WITH FNA, BIOPSIES, BAL AND ENDOBRONCHIAL ULTRASOUND WITH FNA;  Surgeon: Yony Coles MD;  Location: [facility] ENDOSCOPY;  Service: Robotics - Pulmonary;  Laterality: N/A;  PRE: PULMONARY NODULES  POST: SAME    CARDIAC CATHETERIZATION N/A 11/15/2021    Procedure: Coronary angiography;  Surgeon: Alfredo Jennings MD;  Location: [facility] CATH INVASIVE LOCATION;  Service: Cardiovascular;  Laterality: N/A;    CARDIAC CATHETERIZATION N/A 11/15/2021    Procedure: Left heart cath;  Surgeon: Alfredo Jennings MD;  Location: [facility] CATH INVASIVE LOCATION;  Service: Cardiovascular;  Laterality: N/A;    CARDIAC CATHETERIZATION N/A 11/15/2021    Procedure: Left ventriculography;  Surgeon: Alfredo Jennings MD;  Location: [facility] CATH INVASIVE LOCATION;  Service: Cardiovascular;  Laterality: N/A;    CARDIAC CATHETERIZATION N/A 11/15/2021    Procedure: Aortic root aortogram;  Surgeon: Alfredo Jennings MD;  Location: [facility] CATH INVASIVE LOCATION;  Service: Cardiovascular;  Laterality: N/A;    CARDIOVERSION      CHOLECYSTECTOMY N/A 10/07/2017    Procedure: CHOLECYSTECTOMY LAPAROSCOPIC;  Surgeon: Martir Trevino MD;  Location: [facility] MAIN OR;  Service:     COLONOSCOPY  2007    COLONOSCOPY N/A 8/30/2022    Procedure: COLONOSCOPY TO CECUM AND TI AND COLD SNARE POLYPECTOMY;  Surgeon: Cj Lopez MD;  Location: [facility] ENDOSCOPY;  Service: Gastroenterology;  Laterality: N/A;  Pre: History of colon polyps  Post: POLYP, PREVIOUS TATTOO    FOOT SURGERY Left     TOES ARE PINNED. ALL BUT GREAT TOE    HAND SURGERY      THUMB    HERNIA REPAIR      INGUINAL HERNIA REPAIR Right  2018    Procedure: INGUINAL HERNIA REPAIR, OPEN, RECURRENT;  Surgeon: Martir Trevino MD;  Location: [facility] OR OSC;  Service: General    LASIK Bilateral     LOBECTOMY Right 2024    Procedure: BRONCHOSCOPY, RIGHT VIDEO ASSISTED THORACOSCOPY WITH RIGHT MIDDLE LOBECTOMY WITH DAVINCI ROBOT, MEDIASTINAL LYMPH NODE DISSECTION, INTERCOSTAL NERVE BLOCK;  Surgeon: David Figueroa MD;  Location: [facility] MAIN OR;  Service: Robotics - DaVinci;  Laterality: Right;    NECK SURGERY      growth on salivary gland    REPLACEMENT TOTAL KNEE Right     (he is going to have a LTKR next month)    REPLACEMENT TOTAL KNEE Left     VENOUS ACCESS DEVICE (PORT) INSERTION Right 2024    Procedure: INSERTION VENOUS ACCESS DEVICE;  Surgeon: David Figueroa MD;  Location: [facility] OR;  Service: Thoracic;  Laterality: Right;       ONCOLOGY HISTORY:  The patient is a 73 years old male, who presents for oncology evaluation 2024. He is accompanied by his wife.    He underwent right middle lobe lobectomy for squamous cell lung cancer by Dr. Figueroa on 2024 and is recovering well from the surgery. However, there is an open wound at the site of chest tube on the right side of the chest which now has a few stitches, which is still leaking. He was advised to apply Band-Aids.  He reports no fever sweating or chills.      Patient has been on oxygen supplementation since surgery, currently 2 L/min.  Patient says occasionally at home he does not need oxygen.  Dr. Figueroa has suggested gradually reducing his oxygen use.     His appetite remains normal.    Patient reports some family health issues.  His son-in-law recently  of HLH just last week.  His brother-in-law has stage IV liver cancer.     This patient has a history of emphysema, followed by pulmonologist Dr. Camp.  His high-resolution chest CT scan on 2021 reported 7 mm nodule in the right middle lobe.  There is also index subcarinal lymph node 1.1 cm.     Patient subsequently had  serial CT scan examination.    On 4/10/2023 chest high-resolution CT scan reported stable examination with the pulmonary nodules measuring up to 9-10 mm in the right middle lobe and a sub-6 mm in the right upper lobe.  The largest subcarinal lymph node measures  2.1 x  1.2 cm.     However chest high resolution CT scan 4/2/2024 reported disease progression, with right middle lobe pulm nodule 1.8 cm from margin at 9 mm.  There is also a new right middle lobe 1.1 cm nodule.  Stable right upper lobe 7 mm nodule.  Also a new right hilar lymphadenopathy up to 2 cm.  The 1.5 cm subcarinal lymph node is stable.  No pleural effusion.    Patient subsequently had PET scan examination on 4/22/2024 requested by Dr. Camp.  This reported SUV 3.1 corresponding to the 19 mm right middle lobe nodule.  The 1.1 cm right middle nodule was photopenic.  The right hilar lymph node with SUV 5.6.  The 1.5 cm subcarinal lymph node with maximum SUV 7.    Patient subsequently seen by Dr. Figueroa who performed ION bronchoscopic biopsy on 5/7/2024 with pathology evaluation reporting squamous cell carcinoma involving one of the right middle lobe lesion, and the other pulmonary nodule is at least squamous cell carcinoma in situ.  PD-L1 study TPS was 0%.       Dr. Figueroa performed robot-assisted thoracoscopic right middle lobe lobectomy and mediastinal lymph node dissection on 6/12/2024.  Pathology evaluation reported poorly differentiated squamous cell carcinoma, clear margin, however with lymph nodes involved 3 lymph nodes in the right 10 R, 11 R and 12 R lymph node station.  There was also frequent lymphovascular invasion and focal perineural invasion.    Patient subsequently had a portacatheter placement on 7/5/2024.      The patient presents today on 7/31/2024 for re-evaluation to start chemotherapy.    His case was presented at the multimodality conference 7/18/2024.  Because of negative margin and N1 disease, as well as marginal pulmonary function,  the consensus was for patient to have adjuvant chemotherapy alone without radiation.    On 7/18/2024 peripheral blood was sent for Tempus xF liquid biopsy with inconclusive results, no reportable treatment options found.    His lung cancer sample from 6/12/2024 was sent for Tempus xT DNA and RNA study.  It reported stable MSI.  Tumor mutation burden 6.3 m/MB.  Negative for EGFR, KRAS, BRAF, ALK, ROS1, RET, MET, HER2.    The patient recently consulted her nephrologist, during which blood work was conducted. The results indicated a slight presence of protein in urine. He denies experiencing any dyspnea.        MEDICATIONS    Current Outpatient Medications:     ferrous sulfate 325 (65 FE) MG tablet, Take 0.5 tablets by mouth Daily With Breakfast., Disp: , Rfl:     fexofenadine (ALLEGRA) 180 MG tablet, Take 1 tablet by mouth Daily., Disp: , Rfl:     folic acid (FOLVITE) 1 MG tablet, Take 1 tablet by mouth Daily., Disp: 90 tablet, Rfl: 3    gabapentin (NEURONTIN) 300 MG capsule, Take 2 capsules by mouth Every 8 (Eight) Hours., Disp: 180 capsule, Rfl: 2    HYDROcodone-acetaminophen (NORCO) 5-325 MG per tablet, Take 1 tablet by mouth Every 6 (Six) Hours As Needed for Moderate Pain., Disp: 12 tablet, Rfl: 0    insulin aspart (NovoLOG FlexPen) 100 UNIT/ML solution pen-injector sc pen, Inject 5 Units under the skin into the appropriate area as directed 3 (Three) Times a Day With Meals for 30 days per sliding scale, Disp: 15 mL, Rfl: 0    Insulin Glargine, 1 Unit Dial, (Toujeo SoloStar) 300 UNIT/ML solution pen-injector injection, Inject 24 Units under the skin into the appropriate area as directed Daily., Disp: , Rfl:     Insulin Pen Needle (BD Pen Needle Cait 2nd Gen) 32G X 4 MM misc, 1 each 3 times a day., Disp: 100 each, Rfl: 0    Multiple Vitamin (MULTIVITAMINS PO), Take 1 tablet by mouth Daily., Disp: , Rfl:     predniSONE (DELTASONE) 10 MG tablet, Take 6 tablets by mouth Daily for 5 days, THEN 5 tablets Daily for 7  days, THEN 4 tablets Daily for 7 days, THEN 3 tablets Daily for 7 days, THEN 2 tablets Daily for 7 days, THEN 1 tablet Daily for 7 days, THEN 0.5 tablets Daily for 6 days., Disp: 138 tablet, Rfl: 0    rosuvastatin (CRESTOR) 40 MG tablet, TAKE ONE TABLET BY MOUTH DAILY (Patient taking differently: Take 1 tablet by mouth Daily.), Disp: 90 tablet, Rfl: 1    sertraline (ZOLOFT) 50 MG tablet, TAKE ONE TABLET BY MOUTH DAILY (Patient taking differently: Take 1 tablet by mouth Daily.), Disp: 90 tablet, Rfl: 1    Tirzepatide (Mounjaro) 2.5 MG/0.5ML solution pen-injector pen, Inject 2.5 mg under the skin into the appropriate area as directed 1 (One) Time Per Week. Mondays, Disp: , Rfl:     torsemide (DEMADEX) 20 MG tablet, Take 1 tablet by mouth 2 (Two) Times a Day., Disp: , Rfl:     vitamin B-12 (CYANOCOBALAMIN) 1000 MCG tablet, Take 1 tablet by mouth Daily., Disp: 90 tablet, Rfl: 3    vitamin B-6 (PYRIDOXINE) 50 MG tablet, Take 1 tablet by mouth Daily., Disp: 90 tablet, Rfl: 3    Xarelto 20 MG tablet, TAKE ONE TABLET BY MOUTH DAILY (Patient taking differently: Take 1 tablet by mouth Daily With Dinner.), Disp: 30 tablet, Rfl: 9    ALLERGIES:   No Known Allergies    SOCIAL HISTORY:       Social History     Socioeconomic History    Marital status:      Spouse name: Luba    Number of children: 2   Tobacco Use    Smoking status: Former     Current packs/day: 0.00     Average packs/day: 1 pack/day for 30.0 years (30.0 ttl pk-yrs)     Types: Cigars, Cigarettes     Start date: 1/1/1984     Quit date: 1/1/2014     Years since quitting: 10.9    Smokeless tobacco: Never   Vaping Use    Vaping status: Never Used   Substance and Sexual Activity    Alcohol use: Never     Comment: caffeine use- decaf coffee    Drug use: Never    Sexual activity: Defer         FAMILY HISTORY:  Family History   Problem Relation Age of Onset    Cancer Other     Stroke Mother     Alcohol abuse Father     Malig Hyperthermia Neg Hx        REVIEW OF  "SYSTEMS:  Review of Systems   Constitutional:  Positive for activity change, appetite change and fatigue. Negative for diaphoresis and fever.   HENT:  Negative for nosebleeds and trouble swallowing.    Eyes:  Negative for visual disturbance.   Respiratory:  Negative for cough, shortness of breath (Significantly improved) and wheezing.    Gastrointestinal:  Negative for abdominal pain and blood in stool.   Genitourinary:  Negative for frequency and hematuria.   Musculoskeletal:  Negative for joint swelling.   Skin:  Positive for rash (Improved pruritic skin rashes).   Allergic/Immunologic: Positive for immunocompromised state.   Neurological:  Negative for weakness and numbness.   Hematological:  Negative for adenopathy. Does not bruise/bleed easily.   Psychiatric/Behavioral:  Negative for confusion.               Vitals:    12/11/24 1233   BP: 124/62   Pulse: 60   Temp: 97.6 °F (36.4 °C)   TempSrc: Oral   SpO2: 99%   Weight: 120 kg (264 lb 8 oz)   Height: 180 cm (70.87\")   PainSc: 0-No pain         12/11/2024    12:26 PM   Current Status   ECOG score 0     Physical Exam  Vital Signs  Oxygen saturation is 99%.  GENERAL:  Well-developed, well-nourished elderly male no acute distress.    SKIN:  Warm, dry without rashes, purpura or petechiae.  HEENT:  Normocephalic.   LYMPHATICS:  No cervical, supraclavicular or axillary adenopathy.  CHEST: Normal respiratory effort.  Lungs clear to auscultation.   CARDIAC:  Regular rate and rhythm. Normal S1,S2.  ABDOMEN:  Soft, no tender.  Bowel sounds normal.  EXTREMITIES:  No lower extremity edema.        RECENT LABS:  Results from last 7 days   Lab Units 12/11/24  1214   WBC 10*3/mm3 11.60*   NEUTROS ABS 10*3/mm3 9.97*   HEMOGLOBIN g/dL 12.3*   HEMATOCRIT % 38.7   PLATELETS 10*3/mm3 315     Results from last 7 days   Lab Units 12/11/24  1214 12/05/24  0957 12/05/24  0955   SODIUM mmol/L 137  --  135   POTASSIUM mmol/L 4.0  --  3.6   CHLORIDE mmol/L 93*  --   --    TOTAL CO2 mmol/L  " --   --  31*   CO2 mmol/L 32.4*  --   --    BUN mg/dL 23  --  16   CREATININE mg/dL 0.71*  --  0.8   CALCIUM mg/dL 9.7  --  8.9   ALBUMIN g/dL 3.5  --   --    BILIRUBIN mg/dL 0.7  --   --    ALK PHOS U/L 110  --   --    ALT (SGPT) U/L 42*  --   --    AST (SGOT) U/L 28  --   --    GLUCOSE mg/dL 262*  --  278*   FERRITIN ng/mL  --  146  --          Lab Results   Component Value Date    IRON 68 11/06/2024    LABIRON 16 (L) 11/06/2024    TRANSFERRIN 287 11/06/2024    TIBC 428 11/06/2024    FERRITIN 146 12/05/2024     Lab Results   Component Value Date    FOLATE 13.90 11/06/2024     Lab Results   Component Value Date    [id number] 525 11/06/2024         Assessment & Plan     Assessment & Plan      1. Poorly differentiated squamous cell lung cancer with intralobular metastatic disease, stage IIIa ([id number]).  Tumor was poorly differentiated. This patient has stage 3a disease.   Patient had Ion bronchoscopic biopsy 5/6/2024.  Right middle lobe reported fragments of squamous cell carcinoma.  PD-L1 study reported TPS 0%.  I discussed with the patient on 7/12/2024 that he will need adjuvant chemotherapy and concurrent adjuvant radiation therapy, and likely will need consolidative immunotherapy. I recommended using weekly carboplatin plus Taxol, in conjunction with radiotherapy for 6.5 weeks treatment. In reality, he probably will only receive 5 or 6 weekly chemotherapy instead of the 7 doses due to tolerance issues.   We will present his case at the chest conference on 7/18/2024, due to poor pulmonary function, negative surgical margins and N1 disease, the consensus is for patient to have adjuvant chemotherapy without concurrent radiation therapy.  Also recommended genetic study.   On 7/18/2024 peripheral blood was sent for OrthoFipus xF liquid biopsy with inconclusive results, no reportable treatment options found.   His lung cancer sample from 6/12/2024 was sent for OrthoFipus xT DNA and RNA study.  It reported stable MSI.  Tumor  mutation burden 6.3 m/MB.  Negative for EGFR, KRAS, BRAF, ALK, ROS1, RET, MET, HER2.  Tumor sample was positive for BRIP1 mutation, TP53 mutation and also ARID1B mutation, all of those LOF.   On 7/31/2024 will start the patient on adjuvant chemotherapy.  Because his overall health condition, performance status ECOG 2, his age, he would not tolerate cisplatin based chemotherapy.  So we recommended using carboplatin in conjunction with Taxol every 3 weeks chemotherapy for 4 cycles.  Unfortunately patient would not be a candidate for immunotherapy as part of adjuvant therapy.  8/21/2024 patient presents for evaluation prior to cycle #2 adjuvant chemotherapy.  He is currently undergoing chemotherapy with a total of four cycles planned; this is his second cycle. The biopsy from his lung revealed a PD-L1 negative result. Given his overall health condition, he is not physically capable of tolerating the most toxic chemotherapy, cisplatin, and is therefore being treated with carboplatin. A request has been made for a larger tissue sample from his surgery to be retested for PD-L1. If the result is positive, immunotherapy could be considered for prolonged treatment, which has shown better results in preventing cancer recurrence. This decision will be made after the completion of his chemotherapy, to determin whether maintenance or consolidative immunotherapy is necessary. He continues on oxygen supplementation at 3 L/min.  PD-L1 study from the lobectomy sample reported positive for PD-L1 with a TPS tumor proportion score 5%.  9/11/2024 due today for cycle 3 carboplatin/Taxol.  He is overall tolerating this well with stable neuropathy.  We will reduce his steroids as further detailed below due to exacerbation of his underlying DM type II.  Today 10/2/2024 he presented for evaluation prior to his cycle #4 adjuvant chemotherapy with carboplatin plus Taxol. Laboratory studies today reported normal WBC 5700, neutrophils 3010,  hemoglobin 11.7, and platelets 240,000. Chemistry lab reported baseline creatinine 0.69, normal electrolytes except magnesium 1.5, and marginally elevated alkaline phosphatase 133. Given the presence of neuropathy, there is a concern that this could develop into a chronic issue. A reduction in the dosage of Taxol by 25% is recommended.   Today on 11/6/2024 Cycle 1 adjuvant immunotherapy with pembrolizumab will be initiated and repeated every 3 weeks.  This will be total for 12 months.  Today on 11/27/2024 patient developed diffuse pruritic skin rashes on his trunk, upper extremities, and lower extremities about 5 days after starting immunotherapy. He was given a Medrol dose pack in the ER, which improved his symptoms, but the pruritic skin rashes recurred when the steroids were tapered off. Dr. Reid prescribed 2.5% hydrocortisone cream, but due to the large body area involved, he uses the cream quickly, and the pharmacy will not refill it.  I recommended to start oral prednisone 20 mg daily for 7 days, then tapering to 10 mg daily, has been recommended. A larger quantity of hydrocortisone cream has been prescribed.  Hydroxyzine 25 mg every 8 hours as needed for pruritus has also been prescribed. Immunotherapy with pembrolizumab is canceled today.   The patient underwent a thoracentesis on 11/30/2024, during which 800 mL of pleural effusion was removed from the right side.  Cytology study negative for malignancy.  12/11/2024 due to large dose prednisone, will continue to hold pembrolizumab.      2. Pruritic skin rashes.  The skin rashes are attributed to the immunotherapy with pembrolizumab.  Patient currently is on moderately high-dose prednisone since admission in the hospital 11/29/2024.  He reports that the rashes have significantly improved and are no longer itchy. He has been on a tapering dose of prednisone, currently at 50 mg daily. Labs today on 12/11/2024 reported mild leukocytosis, WBC 11,600 including  neutrophils 9910, glucose elevated at 262, all fits with steroids use.        3.  Emphysema.    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  He was instructed by his surgeon Dr. Figueroa to taper off gradually.  on 11/6/2024 patient reports that he is currently on oxygen supplementation at 3 L/min and reports no cough or hemoptysis.  12/11/2024 continues on oxygen 2 L/min.    4.  Monoclonal gammopathy of unknown significance.  IgA kappa.  This was discovered at the his nephrologist office.  Laboratory study on 4/13/2022 reported serum monoclonal IgA kappa with elevated IgA 604 mg/dL, normal IgG 861 and IgM 84.  Free kappa chain was 38.4, lambda 21.3, kappa/lambda ratio 1.8.  Lab study on 7/19/2024 at her nephrologist office Positive serum monoclonal IgA kappa. IgA was slightly elevated at 597 with normal serum IgG 830 and IgM 95. Kappa chain is 50.4 and lambda 23.3 with a kappa lambda ratio of 2.16.  Continue to observe.      5.  DM type II  Patient is on sliding scale insulin prior to meals and bedtime along with weekly Mounjaro  Blood sugars are being exacerbated by oral dexamethasone.  He is also receiving IV dexamethasone.  At this point he is doing well with Taxol we will discontinue oral dexamethasone and adjust IV premedication.  11/6/2024 laboratory study conducted today reported a glucose level of 181. Diabetes management will be continued.  Today on 12/11/2024 glucose 262.  This is exacerbated by oral prednisone.  Continue management of diabetes.    6.  Peripheral neuropathy due to chemotherapy.   He reports tingling in the hands and feet, which interferes with functionality, such as writing checks. This neuropathy started since beginning chemotherapy. He is currently taking gabapentin, which he reports is helping. The neuropathy is likely caused by Taxol.  On 11/6/2024 patient reports that he continues to experience peripheral neuropathy, particularly in his feet,  characterized by numbness and tingling. Despite being on gabapentin 300 mg three times a day, he reports no apparent effect. Oral vitamin B12 at 1000 mcg daily, folic acid 1 mg daily, and vitamin B6 at 50 mg daily have been recommended to alleviate peripheral neuropathy symptoms.    7. 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  Last chemotherapy finished on 10/2/2024.  Hemoglobin was 11.7.  Laboratory studies conducted today on 11/6/2024 reported persistent anemia with hemoglobin level of 11.0 and MCV of 94.8. An iron study with ferritin, iron profile, along with B12 and folate, has been recommended for further assessment.  11/27/2024 hemoglobin 10.8 MCV 91.8.  Labs today on 12/11/2024 reported hemoglobin 12.3.     8. Leukocytosis.  On 12/11/2024 mild leukocytosis with WBC count of 11,600 is noted, likely due to steroid use. No immediate intervention is required.    9. Low chloride levels.  12/11/2024 chloride level is marginally low at 93. He is advised to consume slightly more salty foods to help normalize chloride and sodium levels.      PLAN:   Cancel immunotherapy pembrolizumab today.  Continue tapering dose of prednisone as prescribed by Dr. Art.  Patient currently is on 50 mg daily for 2 more days, then taper down to 40 mg daily for 7 days and by 10 mg every 7 days and to finish actually by taking 5 mg for 7 days to finish.   Immunotherapy with Keytruda will be reconsidered in 4 weeks.  I will see patient at time for reevaluation.  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    Continue oral vitamin B12 at 1000 mcg daily, folic acid 1 mg daily, and vitamin B6 at 50 mg daily have been recommended to alleviate peripheral neuropathy symptoms.   Management of diabetes with the primary care physician.  Patient continues on insulin 4 times daily and Mounjaro  weekly.  Continue anticoagulation with Xarelto 20 mg daily.  Continue gabapentin 300 mg every 8 hours for peripheral neuropathy caused by chemotherapy.       I spent 40 minutes caring for Branden on this date of service. This time includes time spent by me in the following activities: preparing for the visit, reviewing tests, obtaining and/or reviewing a separately obtained history, performing a medically appropriate examination and/or evaluation, counseling and educating the patient/family/caregiver, ordering medications, tests, or procedures, referring and communicating with other health care professionals, documenting information in the medical record, independently interpreting results and communicating that information with the patient/family/caregiver and care coordination         Duy Villasenor MD PhD  12/11/24      CC:  MD John Paul Govea MD Sasser, Robert L, MD

## 2024-12-14 LAB
FUNGUS WND CULT: NORMAL
MYCOBACTERIUM SPEC CULT: NORMAL
NIGHT BLUE STAIN TISS: NORMAL

## 2024-12-17 ENCOUNTER — PATIENT OUTREACH (OUTPATIENT)
Dept: OTHER | Facility: HOSPITAL | Age: 74
End: 2024-12-17
Payer: MEDICARE

## 2024-12-17 NOTE — PROGRESS NOTES
Reviewed chart. CT scheduled 12/23, sees Dr. Villasenor 1/7/25    Called patient. Left message that I was just touching base to see how he was doing with treatment. Wanted to know if he had any questions/concerns or resource/supportive care needs; requested cb

## 2024-12-19 DIAGNOSIS — Z85.118 HISTORY OF LUNG CANCER: Primary | ICD-10-CM

## 2024-12-21 LAB
FUNGUS WND CULT: NORMAL
MYCOBACTERIUM SPEC CULT: NORMAL
NIGHT BLUE STAIN TISS: NORMAL

## 2024-12-23 ENCOUNTER — HOSPITAL ENCOUNTER (OUTPATIENT)
Dept: CT IMAGING | Facility: HOSPITAL | Age: 74
Discharge: HOME | End: 2024-12-23
Admitting: SURGERY
Payer: MEDICARE

## 2024-12-23 DIAGNOSIS — C34.2 MALIGNANT NEOPLASM OF MIDDLE LOBE OF RIGHT LUNG: ICD-10-CM

## 2024-12-23 PROCEDURE — 71250 CT THORAX DX C-: CPT

## 2024-12-25 PROBLEM — L27.0 DRUG-INDUCED SKIN RASH: Status: ACTIVE | Noted: 2024-12-25

## 2024-12-25 PROBLEM — J90 PLEURAL EFFUSION ON RIGHT: Status: ACTIVE | Noted: 2024-12-25

## 2024-12-25 PROBLEM — T45.1X5A ADVERSE EFFECT OF ANTINEOPLASTIC DRUG: Status: ACTIVE | Noted: 2024-12-25

## 2024-12-28 LAB
FUNGUS WND CULT: NORMAL
MYCOBACTERIUM SPEC CULT: NORMAL
NIGHT BLUE STAIN TISS: NORMAL

## 2025-01-03 ENCOUNTER — TELEPHONE (OUTPATIENT)
Dept: ONCOLOGY | Facility: CLINIC | Age: 75
End: 2025-01-03
Payer: MEDICARE

## 2025-01-04 LAB
MYCOBACTERIUM SPEC CULT: NORMAL
NIGHT BLUE STAIN TISS: NORMAL

## 2025-01-08 ENCOUNTER — PATIENT OUTREACH (OUTPATIENT)
Dept: OTHER | Facility: HOSPITAL | Age: 75
End: 2025-01-08
Payer: MEDICARE

## 2025-01-08 NOTE — PROGRESS NOTES
Reviewed chart. Patient with poorly differentiated squamous cell lung cancer with intralobular metastatic disease, stage IIIa ([id number]).  Status post right middle lobectomy on 6/12/2024. Patient was started on adjuvant chemotherapy carboplatin AUC 5, and Taxol 200 mg/m² on 7/31/2024, rec'd 4th/final cycle 10/2. Rec'd cycle #1 adjuvant immunotherapy with Pembrolizumab 11/6. Saw Dr. Villasenor 12/189, Keytruda held due as of 11/27 due to rash, still on hold.   Had CT 12/23/24, sees Dr. Villasenor 1/14, Dr. Figueroa 2/13    Called patient's wife, preferred contact on chart. Left message that I was just touching base to see how the patient was doing. Wanted to know if he had any questions/concerns or resource/supportive care needs; requested cb

## 2025-01-11 LAB
MYCOBACTERIUM SPEC CULT: NORMAL
NIGHT BLUE STAIN TISS: NORMAL

## 2025-01-14 ENCOUNTER — INFUSION (OUTPATIENT)
Dept: ONCOLOGY | Facility: HOSPITAL | Age: 75
End: 2025-01-14
Payer: MEDICARE

## 2025-01-14 ENCOUNTER — OFFICE VISIT (OUTPATIENT)
Dept: ONCOLOGY | Facility: CLINIC | Age: 75
End: 2025-01-14
Payer: MEDICARE

## 2025-01-14 VITALS
WEIGHT: 265.9 LBS | TEMPERATURE: 97.6 F | HEART RATE: 90 BPM | BODY MASS INDEX: 37.23 KG/M2 | HEIGHT: 71 IN | RESPIRATION RATE: 16 BRPM | DIASTOLIC BLOOD PRESSURE: 65 MMHG | SYSTOLIC BLOOD PRESSURE: 104 MMHG | OXYGEN SATURATION: 92 %

## 2025-01-14 DIAGNOSIS — Z79.899 ENCOUNTER FOR LONG-TERM (CURRENT) USE OF HIGH-RISK MEDICATION: ICD-10-CM

## 2025-01-14 DIAGNOSIS — Z79.4 TYPE 2 DIABETES MELLITUS WITH DIABETIC NEPHROPATHY, WITH LONG-TERM CURRENT USE OF INSULIN: ICD-10-CM

## 2025-01-14 DIAGNOSIS — T45.1X5A ANEMIA ASSOCIATED WITH CHEMOTHERAPY: ICD-10-CM

## 2025-01-14 DIAGNOSIS — D64.81 ANEMIA ASSOCIATED WITH CHEMOTHERAPY: ICD-10-CM

## 2025-01-14 DIAGNOSIS — C34.2 SQUAMOUS CELL CARCINOMA OF BRONCHUS IN RIGHT MIDDLE LOBE: ICD-10-CM

## 2025-01-14 DIAGNOSIS — C34.2 MALIGNANT NEOPLASM OF MIDDLE LOBE OF RIGHT LUNG: ICD-10-CM

## 2025-01-14 DIAGNOSIS — C34.2 MALIGNANT NEOPLASM OF MIDDLE LOBE OF RIGHT LUNG: Primary | ICD-10-CM

## 2025-01-14 DIAGNOSIS — E11.21 TYPE 2 DIABETES MELLITUS WITH DIABETIC NEPHROPATHY, WITH LONG-TERM CURRENT USE OF INSULIN: ICD-10-CM

## 2025-01-14 DIAGNOSIS — C34.2 SQUAMOUS CELL CARCINOMA OF BRONCHUS IN RIGHT MIDDLE LOBE: Primary | ICD-10-CM

## 2025-01-14 DIAGNOSIS — G62.0 PERIPHERAL NEUROPATHY DUE TO CHEMOTHERAPY: ICD-10-CM

## 2025-01-14 DIAGNOSIS — T45.1X5A PERIPHERAL NEUROPATHY DUE TO CHEMOTHERAPY: ICD-10-CM

## 2025-01-14 DIAGNOSIS — T45.1X5S ADVERSE EFFECT OF ANTINEOPLASTIC DRUG, SEQUELA: ICD-10-CM

## 2025-01-14 DIAGNOSIS — L27.0 DRUG-INDUCED SKIN RASH: ICD-10-CM

## 2025-01-14 DIAGNOSIS — J43.9 PULMONARY EMPHYSEMA, UNSPECIFIED EMPHYSEMA TYPE: ICD-10-CM

## 2025-01-14 LAB
ALBUMIN SERPL-MCNC: 3.6 G/DL (ref 3.5–5.2)
ALBUMIN/GLOB SERPL: 1.1 G/DL
ALP SERPL-CCNC: 129 U/L (ref 39–117)
ALT SERPL W P-5'-P-CCNC: 29 U/L (ref 1–41)
ANION GAP SERPL CALCULATED.3IONS-SCNC: 12.2 MMOL/L (ref 5–15)
AST SERPL-CCNC: 29 U/L (ref 1–40)
BASOPHILS # BLD AUTO: 0.04 10*3/MM3 (ref 0–0.2)
BASOPHILS NFR BLD AUTO: 0.5 % (ref 0–1.5)
BILIRUB SERPL-MCNC: 0.9 MG/DL (ref 0–1.2)
BUN SERPL-MCNC: 13 MG/DL (ref 8–23)
BUN/CREAT SERPL: 16.9 (ref 7–25)
CALCIUM SPEC-SCNC: 9.6 MG/DL (ref 8.6–10.5)
CHLORIDE SERPL-SCNC: 91 MMOL/L (ref 98–107)
CO2 SERPL-SCNC: 32.8 MMOL/L (ref 22–29)
CREAT SERPL-MCNC: 0.77 MG/DL (ref 0.76–1.27)
DEPRECATED RDW RBC AUTO: 51.9 FL (ref 37–54)
EGFRCR SERPLBLD CKD-EPI 2021: 92.8 ML/MIN/1.73
EOSINOPHIL # BLD AUTO: 0.12 10*3/MM3 (ref 0–0.4)
EOSINOPHIL NFR BLD AUTO: 1.4 % (ref 0.3–6.2)
ERYTHROCYTE [DISTWIDTH] IN BLOOD BY AUTOMATED COUNT: 15.9 % (ref 12.3–15.4)
GLOBULIN UR ELPH-MCNC: 3.4 GM/DL
GLUCOSE SERPL-MCNC: 326 MG/DL (ref 65–99)
HCT VFR BLD AUTO: 36.2 % (ref 37.5–51)
HGB BLD-MCNC: 11.5 G/DL (ref 13–17.7)
IMM GRANULOCYTES # BLD AUTO: 0.09 10*3/MM3 (ref 0–0.05)
IMM GRANULOCYTES NFR BLD AUTO: 1 % (ref 0–0.5)
LYMPHOCYTES # BLD AUTO: 1.08 10*3/MM3 (ref 0.7–3.1)
LYMPHOCYTES NFR BLD AUTO: 12.5 % (ref 19.6–45.3)
MCH RBC QN AUTO: 28.4 PG (ref 26.6–33)
MCHC RBC AUTO-ENTMCNC: 31.8 G/DL (ref 31.5–35.7)
MCV RBC AUTO: 89.4 FL (ref 79–97)
MONOCYTES # BLD AUTO: 0.54 10*3/MM3 (ref 0.1–0.9)
MONOCYTES NFR BLD AUTO: 6.3 % (ref 5–12)
NEUTROPHILS NFR BLD AUTO: 6.77 10*3/MM3 (ref 1.7–7)
NEUTROPHILS NFR BLD AUTO: 78.3 % (ref 42.7–76)
NRBC BLD AUTO-RTO: 0 /100 WBC (ref 0–0.2)
PLATELET # BLD AUTO: 274 10*3/MM3 (ref 140–450)
PMV BLD AUTO: 9.1 FL (ref 6–12)
POTASSIUM SERPL-SCNC: 3.6 MMOL/L (ref 3.5–5.2)
PROT SERPL-MCNC: 7 G/DL (ref 6–8.5)
RBC # BLD AUTO: 4.05 10*6/MM3 (ref 4.14–5.8)
SODIUM SERPL-SCNC: 136 MMOL/L (ref 136–145)
WBC NRBC COR # BLD AUTO: 8.64 10*3/MM3 (ref 3.4–10.8)

## 2025-01-14 PROCEDURE — 96413 CHEMO IV INFUSION 1 HR: CPT

## 2025-01-14 PROCEDURE — A9270 NON-COVERED ITEM OR SERVICE: HCPCS | Performed by: INTERNAL MEDICINE

## 2025-01-14 PROCEDURE — 85025 COMPLETE CBC W/AUTO DIFF WBC: CPT

## 2025-01-14 PROCEDURE — 80053 COMPREHEN METABOLIC PANEL: CPT

## 2025-01-14 PROCEDURE — 63710000001 INSULIN REGULAR HUMAN PER 5 UNITS: Performed by: INTERNAL MEDICINE

## 2025-01-14 PROCEDURE — 96372 THER/PROPH/DIAG INJ SC/IM: CPT

## 2025-01-14 PROCEDURE — 25010000002 PEMBROLIZUMAB 100 MG/4ML SOLUTION 4 ML VIAL: Performed by: INTERNAL MEDICINE

## 2025-01-14 RX ORDER — SODIUM CHLORIDE 9 MG/ML
20 INJECTION, SOLUTION INTRAVENOUS ONCE
Status: CANCELLED | OUTPATIENT
Start: 2025-01-14

## 2025-01-14 RX ADMIN — SODIUM CHLORIDE 200 MG: 900 INJECTION, SOLUTION INTRAVENOUS at 14:08

## 2025-01-14 RX ADMIN — HUMAN INSULIN 8 UNITS: 100 INJECTION, SOLUTION SUBCUTANEOUS at 14:02

## 2025-01-14 NOTE — PROGRESS NOTES
REASON FOR FOLLOW-UP:     *. Poorly differentiated squamous cell lung cancer with intralobular metastatic disease, stage IIIa ([id number]).  Status post right middle lobectomy on 6/12/2024.  Patient was started on adjuvant chemotherapy carboplatin AUC 5, and Taxol 200 mg/m² on 7/31/2024.      HISTORY OF PRESENT ILLNESS:  The patient is a 74 y.o. year old male who is here for evaluation with the above-mentioned history.    The patient is here for an evaluation prior to his second cycle adjuvant immunotherapy, pembrolizumab. He is accompanied by his wife.    He recently had an abnormal x-ray examination of his left hand at the [facility] which is suspicious for a tumor.  Patient informed us about the abnormalities.  So we arranged an an MRI of his left hand to be conducted on 11/18/2024, which revealed a large expansile bone lesion involving the entirety of the thumb proximal phalanx, measuring 2.5 x 2.0 cm and 3.5 cm in length. There was no associated pathologic fracture. He was referred to hand surgeon at the [facility]. He has an appointment with a hand surgeon in 01/2025.     The patient reports that he has had issues with his left thumb since an accident in the 1960s where he cut off his finger with chainsaw while cutting down tree.  This was attached back by surgeon from the [facility].  About 10 years ago, he injured his thumb while cutting plywood. He has noticed a growth on his thumb since then, which has remained the same size. He does not experience any pain in his thumb. Dr. Espinal informed him that he had a bone tumor and recommended surgery, but he declined.    On 11/24/2024, he visited the ER due to diffuse pruritic skin rashes. He developed a skin rash on his arms 5 days ago after using a new bottle of lotion. The rash also appeared 5 days after his first dose of immunotherapy. He also has rashes on his legs and back, but not on his face as he did  not apply the lotion there. He has been using hydrocortisone cream 2.5 percent twice daily, prescribed by Dr. Reid, but it has not been effective. He experiences itching only on his arms. He has been feeling slightly short of breath. He was given a 6-day course of steroids in the ER, which helped with his wrist, knee, and shoulder pain. However, the rash returned after he finished the steroids.    He has diabetes and his blood sugar levels have been elevated since starting the steroids, ranging from 195 to 240. His usual blood sugar level is around 130 to 140. He uses two types of insulin at home, one of which is short-acting.    Since increasing his gabapentin dosage to 600 mg every 8 hours, his peripheral neuropathy has improved significantly. However, his wife reports that there have been issues with the pharmacy refilling his medication on time.    MRI of the left hand 11/18/2024 reported a large expansile bone lesion involving the entirety of the thumb proximal phalanx measuring 2.5 x 2.0 cm and 3.5 cm in length. No associated pathologic fracture.    The patient is here today on 12/11/2024 for a 2-week follow-up to assess the effectiveness of the steroid treatment for pruritic skin rashes that developed after the first cycle of immunotherapy with pembrolizumab. The immunotherapy was paused, and he was started on prednisone 20 mg daily on 11/27/2024.     However he actually presented to [facility] emergency room on 11/29/2024 because of worsening dyspnea and mild hemoptysis.    He had a thoracentesis on 11/30/2024 during hospitalization, with 800 mL pleural effusion removed from the right side. The cytology study reported negative for cancer cells. There were mixed mesothelial cells, histiocytes, chronic inflammation, fibrin in the blood. This sample was also sent to flow cytometry study and reported no evidence for hematologic malignancy.  Patient reports has improved breathing after thoracentesis.   His oxygen saturation is 99% today on oxygen 2 L/min NC.    He was discharged from the hospital on 12/01/2024, and Dr. Art prescribed prednisone starting at 60 mg for 5 days, then taper down by 10 mg every 7 days and to finish actually the last will be taking 5 mg for 7 days to finish.     He reports significant improvement in his pruritic rash, which is primarily located on his legs. He no longer experiences itching or discomfort associated with the rash. He recalls severe itching on his back during the last visit but notes that this symptom has resolved. He did not have the rash prior to the initiation of immunotherapy. He has been maintaining his hygiene routine, including regular showers, without any issues. He reports has been causing fluctuations in his blood sugar levels. He also notes an increase in appetite, which he attributes to the steroid treatment.      INTERVAL HISTORY:  History of Present Illness  The patient is returning today on 1/14/2025 for a 1-month follow-up evaluation. He presented on 12/11/2024 for evaluation prior to pembrolizumab treatment, however due to being on a large dose of prednisone for pruritic skin rashes, we recommended cancellation of pembrolizumab treatment.     Today, he is here for reassessment.    He reports a slight improvement in his rash, attributing this to the application of a prescribed cream three times daily. He has been adhering to a regimen of half a 10 mg prednisone tablet, with only two days remaining. He occasionally experiences itchy spots, which he manages with lotion and an anti-itch cream. His sleep quality remains satisfactory.    He has not yet consulted with an orthopedic surgeon.     He occasionally experiences shortness of breath, but it is not persistent.    He reports significant relief from peripheral neuropathy symptoms since starting gabapentin 300 mg three times daily.    Supplemental Information  He has lost his oldest daughter, two  sons-in-law, and a brother-in-law in the past 5 months.    FAMILY HISTORY  His brother-in-law had stage IV liver cancer and passed away 2 days ago.      Results  Laboratory Studies  On 01/14/2025, hemoglobin 11.5, MCV 89.4, platelets 274,000, WBC 8640, neutrophils 6770, lymphocytes 1080. Glucose 326.      Past Medical History:   Diagnosis Date    Anxiety     Arthritis     Atrial fibrillation     CURRENTLY    Bunion of right foot     Colon polyps     COPD (chronic obstructive pulmonary disease)     MILD. NO MEDS FOR    Depression     History of atrial fibrillation     WITH CARDIOVERSION. NO PROBLEMS    History of skin cancer     BCC REMOVED FROM BACK    [place] (hard of hearing)     Hyperlipidemia     Inguinal hernia, recurrent     RIGHT    Left foot pain     Lung nodules     Rash     IN GROIN TREATING FOR YEAST INFECTION BY PCP    Redness of skin     LOWER LEGS BILATERAL    Scab     BILATERAL LEGS HEALING    Sleep apnea with use of continuous positive airway pressure (CPAP)     CPAP    Streptococcus pneumoniae     ABOUT 6-7 YR AGO.     Type 2 diabetes mellitus      Past Surgical History:   Procedure Laterality Date    BASAL CELL CARCINOMA EXCISION      BRONCHOSCOPY WITH ION ROBOTIC ASSIST N/A 5/6/2024    Procedure: BRONCHOSCOPY WITH ION ROBOT WITH FNA, BIOPSIES, BAL AND ENDOBRONCHIAL ULTRASOUND WITH FNA;  Surgeon: Yony Coles MD;  Location: [facility] ENDOSCOPY;  Service: Robotics - Pulmonary;  Laterality: N/A;  PRE: PULMONARY NODULES  POST: SAME    CARDIAC CATHETERIZATION N/A 11/15/2021    Procedure: Coronary angiography;  Surgeon: Alfredo Jennings MD;  Location: [facility] CATH INVASIVE LOCATION;  Service: Cardiovascular;  Laterality: N/A;    CARDIAC CATHETERIZATION N/A 11/15/2021    Procedure: Left heart cath;  Surgeon: Alfredo Jennings MD;  Location: [facility] CATH INVASIVE LOCATION;  Service: Cardiovascular;  Laterality: N/A;    CARDIAC CATHETERIZATION N/A 11/15/2021    Procedure: Left ventriculography;   Surgeon: Alfredo Jennings MD;  Location: [facility] CATH INVASIVE LOCATION;  Service: Cardiovascular;  Laterality: N/A;    CARDIAC CATHETERIZATION N/A 11/15/2021    Procedure: Aortic root aortogram;  Surgeon: Alfredo Jennings MD;  Location: [facility] CATH INVASIVE LOCATION;  Service: Cardiovascular;  Laterality: N/A;    CARDIOVERSION      CHOLECYSTECTOMY N/A 10/07/2017    Procedure: CHOLECYSTECTOMY LAPAROSCOPIC;  Surgeon: Martir Trevino MD;  Location: [facility] MAIN OR;  Service:     COLONOSCOPY  2007    COLONOSCOPY N/A 8/30/2022    Procedure: COLONOSCOPY TO CECUM AND TI AND COLD SNARE POLYPECTOMY;  Surgeon: Cj Lopez MD;  Location: [facility] ENDOSCOPY;  Service: Gastroenterology;  Laterality: N/A;  Pre: History of colon polyps  Post: POLYP, PREVIOUS TATTOO    FOOT SURGERY Left     TOES ARE PINNED. ALL BUT GREAT TOE    HAND SURGERY      THUMB    HERNIA REPAIR      INGUINAL HERNIA REPAIR Right 06/27/2018    Procedure: INGUINAL HERNIA REPAIR, OPEN, RECURRENT;  Surgeon: Martir Trevino MD;  Location: [facility] OR OSC;  Service: General    LASIK Bilateral     LOBECTOMY Right 6/12/2024    Procedure: BRONCHOSCOPY, RIGHT VIDEO ASSISTED THORACOSCOPY WITH RIGHT MIDDLE LOBECTOMY WITH DAVINCI ROBOT, MEDIASTINAL LYMPH NODE DISSECTION, INTERCOSTAL NERVE BLOCK;  Surgeon: David Figueroa MD;  Location: [facility] MAIN OR;  Service: Robotics - DaVinci;  Laterality: Right;    NECK SURGERY      growth on salivary gland    REPLACEMENT TOTAL KNEE Right 2007    (he is going to have a LTKR next month)    REPLACEMENT TOTAL KNEE Left     VENOUS ACCESS DEVICE (PORT) INSERTION Right 7/5/2024    Procedure: INSERTION VENOUS ACCESS DEVICE;  Surgeon: David Figueroa MD;  Location: [facility] MAIN OR;  Service: Thoracic;  Laterality: Right;       ONCOLOGY HISTORY:  The patient is a 73 years old male, who presents for oncology evaluation 7/12/2024. He is accompanied by his wife.    He underwent right middle lobe lobectomy for squamous cell lung cancer by   Henry on 2024 and is recovering well from the surgery. However, there is an open wound at the site of chest tube on the right side of the chest which now has a few stitches, which is still leaking. He was advised to apply Band-Aids.  He reports no fever sweating or chills.      Patient has been on oxygen supplementation since surgery, currently 2 L/min.  Patient says occasionally at home he does not need oxygen.  Dr. Figueroa has suggested gradually reducing his oxygen use.     His appetite remains normal.    Patient reports some family health issues.  His son-in-law recently  of HLH just last week.  His brother-in-law has stage IV liver cancer.     This patient has a history of emphysema, followed by pulmonologist Dr. Camp.  His high-resolution chest CT scan on 2021 reported 7 mm nodule in the right middle lobe.  There is also index subcarinal lymph node 1.1 cm.     Patient subsequently had serial CT scan examination.    On 4/10/2023 chest high-resolution CT scan reported stable examination with the pulmonary nodules measuring up to 9-10 mm in the right middle lobe and a sub-6 mm in the right upper lobe.  The largest subcarinal lymph node measures  2.1 x  1.2 cm.     However chest high resolution CT scan 2024 reported disease progression, with right middle lobe pulm nodule 1.8 cm from margin at 9 mm.  There is also a new right middle lobe 1.1 cm nodule.  Stable right upper lobe 7 mm nodule.  Also a new right hilar lymphadenopathy up to 2 cm.  The 1.5 cm subcarinal lymph node is stable.  No pleural effusion.    Patient subsequently had PET scan examination on 2024 requested by Dr. Camp.  This reported SUV 3.1 corresponding to the 19 mm right middle lobe nodule.  The 1.1 cm right middle nodule was photopenic.  The right hilar lymph node with SUV 5.6.  The 1.5 cm subcarinal lymph node with maximum SUV 7.    Patient subsequently seen by Dr. Figueroa who performed ION bronchoscopic biopsy on 2024 with  pathology evaluation reporting squamous cell carcinoma involving one of the right middle lobe lesion, and the other pulmonary nodule is at least squamous cell carcinoma in situ.  PD-L1 study TPS was 0%.       Dr. Figueroa performed robot-assisted thoracoscopic right middle lobe lobectomy and mediastinal lymph node dissection on 6/12/2024.  Pathology evaluation reported poorly differentiated squamous cell carcinoma, clear margin, however with lymph nodes involved 3 lymph nodes in the right 10 R, 11 R and 12 R lymph node station.  There was also frequent lymphovascular invasion and focal perineural invasion.    Patient subsequently had a portacatheter placement on 7/5/2024.      The patient presents today on 7/31/2024 for re-evaluation to start chemotherapy.    His case was presented at the multimodality conference 7/18/2024.  Because of negative margin and N1 disease, as well as marginal pulmonary function, the consensus was for patient to have adjuvant chemotherapy alone without radiation.    On 7/18/2024 peripheral blood was sent for Vidlypus xF liquid biopsy with inconclusive results, no reportable treatment options found.    His lung cancer sample from 6/12/2024 was sent for Tempus xT DNA and RNA study.  It reported stable MSI.  Tumor mutation burden 6.3 m/MB.  Negative for EGFR, KRAS, BRAF, ALK, ROS1, RET, MET, HER2.    The patient recently consulted her nephrologist, during which blood work was conducted. The results indicated a slight presence of protein in urine. He denies experiencing any dyspnea.        MEDICATIONS    Current Outpatient Medications:     ferrous sulfate 325 (65 FE) MG tablet, Take 0.5 tablets by mouth Daily With Breakfast., Disp: , Rfl:     fexofenadine (ALLEGRA) 180 MG tablet, Take 1 tablet by mouth Daily., Disp: , Rfl:     folic acid (FOLVITE) 1 MG tablet, Take 1 tablet by mouth Daily., Disp: 90 tablet, Rfl: 3    gabapentin (NEURONTIN) 300 MG capsule, Take 2 capsules by mouth Every 8 (Eight)  Hours., Disp: 180 capsule, Rfl: 2    HYDROcodone-acetaminophen (NORCO) 5-325 MG per tablet, Take 1 tablet by mouth Every 6 (Six) Hours As Needed for Moderate Pain., Disp: 12 tablet, Rfl: 0    insulin aspart (NovoLOG FlexPen) 100 UNIT/ML solution pen-injector sc pen, Inject 5 Units under the skin into the appropriate area as directed 3 (Three) Times a Day With Meals for 30 days per sliding scale, Disp: 15 mL, Rfl: 0    Insulin Glargine, 1 Unit Dial, (Toujeo SoloStar) 300 UNIT/ML solution pen-injector injection, Inject 24 Units under the skin into the appropriate area as directed Daily., Disp: , Rfl:     Insulin Pen Needle (BD Pen Needle Cait 2nd Gen) 32G X 4 MM misc, 1 each 3 times a day., Disp: 100 each, Rfl: 0    Multiple Vitamin (MULTIVITAMINS PO), Take 1 tablet by mouth Daily., Disp: , Rfl:     predniSONE (DELTASONE) 10 MG tablet, Take 6 tablets by mouth Daily for 5 days, THEN 5 tablets Daily for 7 days, THEN 4 tablets Daily for 7 days, THEN 3 tablets Daily for 7 days, THEN 2 tablets Daily for 7 days, THEN 1 tablet Daily for 7 days, THEN 0.5 tablets Daily for 6 days., Disp: 138 tablet, Rfl: 0    rosuvastatin (CRESTOR) 40 MG tablet, TAKE ONE TABLET BY MOUTH DAILY (Patient taking differently: Take 1 tablet by mouth Daily.), Disp: 90 tablet, Rfl: 1    sertraline (ZOLOFT) 50 MG tablet, TAKE ONE TABLET BY MOUTH DAILY (Patient taking differently: Take 1 tablet by mouth Daily.), Disp: 90 tablet, Rfl: 1    Tirzepatide (Mounjaro) 2.5 MG/0.5ML solution pen-injector pen, Inject 2.5 mg under the skin into the appropriate area as directed 1 (One) Time Per Week. Mondays, Disp: , Rfl:     torsemide (DEMADEX) 20 MG tablet, Take 1 tablet by mouth 2 (Two) Times a Day., Disp: , Rfl:     vitamin B-12 (CYANOCOBALAMIN) 1000 MCG tablet, Take 1 tablet by mouth Daily., Disp: 90 tablet, Rfl: 3    vitamin B-6 (PYRIDOXINE) 50 MG tablet, Take 1 tablet by mouth Daily., Disp: 90 tablet, Rfl: 3    Xarelto 20 MG tablet, TAKE ONE TABLET BY MOUTH  "DAILY (Patient taking differently: Take 1 tablet by mouth Daily With Dinner.), Disp: 30 tablet, Rfl: 9      ALLERGIES:   No Known Allergies      SOCIAL HISTORY:       Social History     Socioeconomic History    Marital status:      Spouse name: Luba    Number of children: 2   Tobacco Use    Smoking status: Former     Current packs/day: 0.00     Average packs/day: 1 pack/day for 30.0 years (30.0 ttl pk-yrs)     Types: Cigars, Cigarettes     Start date: 1984     Quit date: 2014     Years since quittin.0    Smokeless tobacco: Never   Vaping Use    Vaping status: Never Used   Substance and Sexual Activity    Alcohol use: Never     Comment: caffeine use- decaf coffee    Drug use: Never    Sexual activity: Defer         FAMILY HISTORY:  Family History   Problem Relation Age of Onset    Cancer Other     Stroke Mother     Alcohol abuse Father     Malig Hyperthermia Neg Hx        REVIEW OF SYSTEMS:  See HPI.           Vitals:    25 1259   BP: 104/65   Pulse: 90   Resp: 16   Temp: 97.6 °F (36.4 °C)   TempSrc: Oral   SpO2: 92%   Weight: 121 kg (265 lb 14.4 oz)   Height: 180 cm (70.87\")   PainSc: 0-No pain         2025     1:00 PM   Current Status   ECOG score 0     Physical Exam    Vital Signs  The patient's temperature is 97.6. Blood pressure is 104/65. Oxygen saturation is 92 percent.  GENERAL:  Well-developed, well-nourished elderly male no acute distress.    SKIN:  Warm, dry.  Diffuse skin rashes.  HEENT:  Normocephalic.   LYMPHATICS:  No cervical, supraclavicular adenopathy.  CHEST: Normal respiratory effort.  Lungs clear to auscultation.   CARDIAC:  Regular rate and rhythm. Normal S1,S2.  ABDOMEN:  Soft, no tender.  Bowel sounds normal.  EXTREMITIES:  No lower extremity edema.        RECENT LABS:  Results from last 7 days   Lab Units 25  1229   WBC 10*3/mm3 8.64   NEUTROS ABS 10*3/mm3 6.77   HEMOGLOBIN g/dL 11.5*   HEMATOCRIT % 36.2*   PLATELETS 10*3/mm3 274     Results from last 7 " days   Lab Units 01/14/25  1229   SODIUM mmol/L 136   POTASSIUM mmol/L 3.6   CHLORIDE mmol/L 91*   CO2 mmol/L 32.8*   BUN mg/dL 13   CREATININE mg/dL 0.77   CALCIUM mg/dL 9.6   ALBUMIN g/dL 3.6   BILIRUBIN mg/dL 0.9   ALK PHOS U/L 129*   ALT (SGPT) U/L 29   AST (SGOT) U/L 29   GLUCOSE mg/dL 326*         Lab Results   Component Value Date    IRON 68 11/06/2024    LABIRON 16 (L) 11/06/2024    TRANSFERRIN 287 11/06/2024    TIBC 428 11/06/2024    FERRITIN 146 12/05/2024     Lab Results   Component Value Date    FOLATE 13.90 11/06/2024     Lab Results   Component Value Date    [id number] 525 11/06/2024         Assessment & Plan     Assessment & Plan      1. Poorly differentiated squamous cell lung cancer with intralobular metastatic disease, stage IIIa ([id number]).  Tumor was poorly differentiated. This patient has stage 3a disease.   Patient had Ion bronchoscopic biopsy 5/6/2024.  Right middle lobe reported fragments of squamous cell carcinoma.  PD-L1 study reported TPS 0%.  I discussed with the patient on 7/12/2024 that he will need adjuvant chemotherapy and concurrent adjuvant radiation therapy, and likely will need consolidative immunotherapy. I recommended using weekly carboplatin plus Taxol, in conjunction with radiotherapy for 6.5 weeks treatment. In reality, he probably will only receive 5 or 6 weekly chemotherapy instead of the 7 doses due to tolerance issues.   We will present his case at the chest conference on 7/18/2024, due to poor pulmonary function, negative surgical margins and N1 disease, the consensus is for patient to have adjuvant chemotherapy without concurrent radiation therapy.  Also recommended genetic study.   On 7/18/2024 peripheral blood was sent for Tempus xF liquid biopsy with inconclusive results, no reportable treatment options found.   His lung cancer sample from 6/12/2024 was sent for Pokenpus xT DNA and RNA study.  It reported stable MSI.  Tumor mutation burden 6.3 m/MB.  Negative for  EGFR, KRAS, BRAF, ALK, ROS1, RET, MET, HER2.  Tumor sample was positive for BRIP1 mutation, TP53 mutation and also ARID1B mutation, all of those LOF.   On 7/31/2024 will start the patient on adjuvant chemotherapy.  Because his overall health condition, performance status ECOG 2, his age, he would not tolerate cisplatin based chemotherapy.  So we recommended using carboplatin in conjunction with Taxol every 3 weeks chemotherapy for 4 cycles.  Unfortunately patient would not be a candidate for immunotherapy as part of adjuvant therapy.  8/21/2024 patient presents for evaluation prior to cycle #2 adjuvant chemotherapy.  He is currently undergoing chemotherapy with a total of four cycles planned; this is his second cycle. The biopsy from his lung revealed a PD-L1 negative result. Given his overall health condition, he is not physically capable of tolerating the most toxic chemotherapy, cisplatin, and is therefore being treated with carboplatin. A request has been made for a larger tissue sample from his surgery to be retested for PD-L1. If the result is positive, immunotherapy could be considered for prolonged treatment, which has shown better results in preventing cancer recurrence. This decision will be made after the completion of his chemotherapy, to determin whether maintenance or consolidative immunotherapy is necessary. He continues on oxygen supplementation at 3 L/min.  PD-L1 study from the lobectomy sample reported positive for PD-L1 with a TPS tumor proportion score 5%.  9/11/2024 due today for cycle 3 carboplatin/Taxol.  He is overall tolerating this well with stable neuropathy.  We will reduce his steroids as further detailed below due to exacerbation of his underlying DM type II.  Today 10/2/2024 he presented for evaluation prior to his cycle #4 adjuvant chemotherapy with carboplatin plus Taxol. Laboratory studies today reported normal WBC 5700, neutrophils 3010, hemoglobin 11.7, and platelets 240,000.  Chemistry lab reported baseline creatinine 0.69, normal electrolytes except magnesium 1.5, and marginally elevated alkaline phosphatase 133. Given the presence of neuropathy, there is a concern that this could develop into a chronic issue. A reduction in the dosage of Taxol by 25% is recommended.   Today on 11/6/2024 Cycle 1 adjuvant immunotherapy with pembrolizumab will be initiated and repeated every 3 weeks.  This will be total for 12 months.  Today on 11/27/2024 patient developed diffuse pruritic skin rashes on his trunk, upper extremities, and lower extremities about 5 days after starting immunotherapy. He was given a Medrol dose pack in the ER, which improved his symptoms, but the pruritic skin rashes recurred when the steroids were tapered off. Dr. Reid prescribed 2.5% hydrocortisone cream, but due to the large body area involved, he uses the cream quickly, and the pharmacy will not refill it.  I recommended to start oral prednisone 20 mg daily for 7 days, then tapering to 10 mg daily, has been recommended. A larger quantity of hydrocortisone cream has been prescribed.  Hydroxyzine 25 mg every 8 hours as needed for pruritus has also been prescribed. Immunotherapy with pembrolizumab is canceled today.   The patient underwent a thoracentesis on 11/30/2024, during which 800 mL of pleural effusion was removed from the right side.  Cytology study negative for malignancy.  12/11/2024 due to large dose prednisone, will continue to hold pembrolizumab.  11/14/2025 patient currently is on tapering dose prednisone 5 mg daily.  We discussed with the patient today, he will proceed with cycle 2 of adjuvant pembrolizumab today, to be repeated every 3 weeks. He will see the APRN in 3 weeks for laboratory studies and prior to cycle 3 of pembrolizumab. He will keep his appointment with the CT scan of the chest and the thoracic surgery team. He will be seen in 6 weeks with laboratory studies (CBC, CMP) prior to cycle 4 of  pembrolizumab.      2. Pruritic skin rashes.  The skin rashes are attributed to the immunotherapy with pembrolizumab.  Patient currently is on moderately high-dose prednisone since admission in the hospital 11/29/2024.  He reports that the rashes have significantly improved and are no longer itchy. He has been on a tapering dose of prednisone, currently at 50 mg daily. Labs today on 12/11/2024 reported mild leukocytosis, WBC 11,600 including neutrophils 9910, glucose elevated at 262, all fits with steroids use.    Today 1/14/2025 he reports slight improvement in the rash after using the prescribed cream three times a day. He has been tapering off prednisone and has two days left for the low-dose 5 mg daily. He will continue to finish the last couple of days.      3.  Emphysema.    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  He was instructed by his surgeon Dr. Figueroa to taper off gradually.  on 11/6/2024 patient reports that he is currently on oxygen supplementation at 3 L/min and reports no cough or hemoptysis.  12/11/2024 continues on oxygen 2 L/min.  Today 1/14/2025 O2 saturation 92% on 2 L/min oxygen supplement. He uses 3 L/min of oxygen at home, which improves his level.    4.  Monoclonal gammopathy of unknown significance.  IgA kappa.  This was discovered at the his nephrologist office.  Laboratory study on 4/13/2022 reported serum monoclonal IgA kappa with elevated IgA 604 mg/dL, normal IgG 861 and IgM 84.  Free kappa chain was 38.4, lambda 21.3, kappa/lambda ratio 1.8.  Lab study on 7/19/2024 at her nephrologist office Positive serum monoclonal IgA kappa. IgA was slightly elevated at 597 with normal serum IgG 830 and IgM 95. Kappa chain is 50.4 and lambda 23.3 with a kappa lambda ratio of 2.16.  Continue to observe.      5.  DM type II  Patient is on sliding scale insulin prior to meals and bedtime along with weekly Mounjaro  Blood sugars are being exacerbated by oral  dexamethasone.  He is also receiving IV dexamethasone.  At this point he is doing well with Taxol we will discontinue oral dexamethasone and adjust IV premedication.  11/6/2024 laboratory study conducted today reported a glucose level of 181. Diabetes management will be continued.  Today on 12/11/2024 glucose 262.  This is exacerbated by oral prednisone.  Continue management of diabetes.  1/14/2025 glucose 326.  Patient is tapering off prednisone.  Patient will be given insulin today in the clinic.     6.  Peripheral neuropathy due to chemotherapy.   He reports tingling in the hands and feet, which interferes with functionality, such as writing checks. This neuropathy started since beginning chemotherapy. He is currently taking gabapentin, which he reports is helping. The neuropathy is likely caused by Taxol.  On 11/6/2024 patient reports that he continues to experience peripheral neuropathy, particularly in his feet, characterized by numbness and tingling. Despite being on gabapentin 300 mg three times a day, he reports no apparent effect. Oral vitamin B12 at 1000 mcg daily, folic acid 1 mg daily, and vitamin B6 at 50 mg daily have been recommended to alleviate peripheral neuropathy symptoms.    1/14/2025 He reports significant improvement in his peripheral neuropathy symptoms with gabapentin 300 mg taken three times a day. He will continue this medication.    7. 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  Last chemotherapy finished on 10/2/2024.  Hemoglobin was 11.7.  Laboratory studies conducted today on 11/6/2024 reported persistent anemia with hemoglobin level of 11.0 and MCV of 94.8. An iron study with ferritin, iron profile, along with B12 and folate, has been recommended for further assessment.  11/27/2024 hemoglobin 10.8 MCV 91.8.  Labs on 12/11/2024 reported hemoglobin 12.3.   Today 1/14/2025 hemoglobin 11.5.  Continue to monitor.    8. Leukocytosis.  On 12/11/2024 mild leukocytosis with WBC count of 11,600 is  noted, likely due to steroid use. No immediate intervention is required.  Today 1/14/2025 normalized WBC 8640 neutrophils 6770.    9. Low chloride levels.  12/11/2024 chloride level is marginally low at 93. He is advised to consume slightly more salty foods to help normalize chloride and sodium levels.  1/14/2025 chloride 91 with a sodium 136.  Continue to monitor.        PLAN:   Resume cycle 2 of adjuvant pembrolizumab today, to be repeated every 3 weeks.  If he has worsening skin rashes after resuming immunotherapy, this will be pulmonary discontinued.  Patient will be given regular insulin today for his hyperglycemia.  Continue finishing up low-dose prednisone 5 mg daily.   Continue gabapentin 300 mg every 8 hours for peripheral neuropathy caused by chemotherapy.  He will see the APRN in 3 weeks for laboratory studies prior to cycle 3 pembrolizumab.   He will keep his appointments with the chest CT scan and the thoracic surgery team.   I will see him in 6 weeks with laboratory studies (CBC, CMP) prior to cycle 4 of pembrolizumab.  Keep appointment with hand surgeon and the MRI of the left hand reported a large expansile bone lesion involving the entirety of the thumb proximal phalanx measuring 2.5 x 2.0 cm and 3.5 cm in length.    Continue oral vitamin B12 at 1000 mcg daily, folic acid 1 mg daily, and vitamin B6 at 50 mg daily have been recommended to alleviate peripheral neuropathy symptoms.   Management of diabetes with the primary care physician.  Patient continues on insulin 4 times daily and Mounjaro weekly.  Continue anticoagulation with Xarelto 20 mg daily.  Patient will keep appointment with orthopedic surgeon for evaluation of bone lesion on his hand.       I spent 42 minutes caring for Branden on this date of service. This time includes time spent by me in the following activities: preparing for the visit, reviewing tests, obtaining and/or reviewing a separately obtained history, performing a medically  appropriate examination and/or evaluation, counseling and educating the patient/family/caregiver, ordering medications, tests, or procedures, referring and communicating with other health care professionals, documenting information in the medical record, independently interpreting results and communicating that information with the patient/family/caregiver and care coordination          Duy Villasenor MD PhD  01/14/25      CC:  MD John Paul Govea MD Sasser, Robert L, MD

## 2025-01-20 ENCOUNTER — TELEPHONE (OUTPATIENT)
Dept: RADIATION ONCOLOGY | Facility: HOSPITAL | Age: 75
End: 2025-01-20
Payer: MEDICARE

## 2025-01-20 DIAGNOSIS — Z85.118 HISTORY OF LUNG CANCER: Primary | ICD-10-CM

## 2025-01-20 NOTE — TELEPHONE ENCOUNTER
Pts wife called regarding appt with [facility] on 1/22 for a follow up. Confused as to why they needed it. Looked into CHM's notes and relayed message that follows up are PRN. Wife decided to cancel appt.

## 2025-01-20 NOTE — PROGRESS NOTES
Spoke with wife.  Patient is increased shortness of breath with exertion without oxygen.  Oxygen saturations read 92 to 94% with supplemental oxygen at rest.  She is concerned he has a recurrent pleural effusion.  I have advised ER for further evaluation.

## 2025-01-22 ENCOUNTER — APPOINTMENT (OUTPATIENT)
Dept: GENERAL RADIOLOGY | Facility: HOSPITAL | Age: 75
DRG: 205 | End: 2025-01-22
Payer: MEDICARE

## 2025-01-22 ENCOUNTER — APPOINTMENT (OUTPATIENT)
Dept: CT IMAGING | Facility: HOSPITAL | Age: 75
DRG: 205 | End: 2025-01-22
Payer: MEDICARE

## 2025-01-22 ENCOUNTER — HOSPITAL ENCOUNTER (INPATIENT)
Facility: HOSPITAL | Age: 75
LOS: 2 days | Discharge: HOME | DRG: 205 | End: 2025-01-24
Attending: EMERGENCY MEDICINE | Admitting: STUDENT IN AN ORGANIZED HEALTH CARE EDUCATION/TRAINING PROGRAM
Payer: MEDICARE

## 2025-01-22 DIAGNOSIS — J90 RECURRENT RIGHT PLEURAL EFFUSION: ICD-10-CM

## 2025-01-22 DIAGNOSIS — J18.9 PNEUMONIA OF BOTH LUNGS DUE TO INFECTIOUS ORGANISM, UNSPECIFIED PART OF LUNG: ICD-10-CM

## 2025-01-22 DIAGNOSIS — J96.21 ACUTE ON CHRONIC RESPIRATORY FAILURE WITH HYPOXIA: Primary | ICD-10-CM

## 2025-01-22 LAB
ALBUMIN SERPL-MCNC: 3.4 G/DL (ref 3.5–5.2)
ALBUMIN/GLOB SERPL: 0.9 G/DL
ALP SERPL-CCNC: 134 U/L (ref 39–117)
ALT SERPL W P-5'-P-CCNC: 20 U/L (ref 1–41)
ANION GAP SERPL CALCULATED.3IONS-SCNC: 9.2 MMOL/L (ref 5–15)
AST SERPL-CCNC: 30 U/L (ref 1–40)
B PARAPERT DNA SPEC QL NAA+PROBE: NOT DETECTED
B PERT DNA SPEC QL NAA+PROBE: NOT DETECTED
BASOPHILS # BLD AUTO: 0.03 10*3/MM3 (ref 0–0.2)
BASOPHILS NFR BLD AUTO: 0.4 % (ref 0–1.5)
BILIRUB SERPL-MCNC: 1 MG/DL (ref 0–1.2)
BUN SERPL-MCNC: 9 MG/DL (ref 8–23)
BUN/CREAT SERPL: 11.8 (ref 7–25)
C PNEUM DNA NPH QL NAA+NON-PROBE: NOT DETECTED
CALCIUM SPEC-SCNC: 9.3 MG/DL (ref 8.6–10.5)
CHLORIDE SERPL-SCNC: 97 MMOL/L (ref 98–107)
CO2 SERPL-SCNC: 29.8 MMOL/L (ref 22–29)
CREAT SERPL-MCNC: 0.76 MG/DL (ref 0.76–1.27)
D-LACTATE SERPL-SCNC: 1.9 MMOL/L (ref 0.5–2)
DEPRECATED RDW RBC AUTO: 55 FL (ref 37–54)
EGFRCR SERPLBLD CKD-EPI 2021: 93.2 ML/MIN/1.73
EOSINOPHIL # BLD AUTO: 0.25 10*3/MM3 (ref 0–0.4)
EOSINOPHIL NFR BLD AUTO: 3.6 % (ref 0.3–6.2)
ERYTHROCYTE [DISTWIDTH] IN BLOOD BY AUTOMATED COUNT: 16.5 % (ref 12.3–15.4)
FLUAV SUBTYP SPEC NAA+PROBE: NOT DETECTED
FLUBV RNA ISLT QL NAA+PROBE: NOT DETECTED
GEN 5 1HR TROPONIN T REFLEX: 21 NG/L
GLOBULIN UR ELPH-MCNC: 3.6 GM/DL
GLUCOSE BLDC GLUCOMTR-MCNC: 149 MG/DL (ref 70–130)
GLUCOSE BLDC GLUCOMTR-MCNC: 172 MG/DL (ref 70–130)
GLUCOSE SERPL-MCNC: 254 MG/DL (ref 65–99)
HADV DNA SPEC NAA+PROBE: NOT DETECTED
HCOV 229E RNA SPEC QL NAA+PROBE: NOT DETECTED
HCOV HKU1 RNA SPEC QL NAA+PROBE: NOT DETECTED
HCOV NL63 RNA SPEC QL NAA+PROBE: NOT DETECTED
HCOV OC43 RNA SPEC QL NAA+PROBE: NOT DETECTED
HCT VFR BLD AUTO: 33.9 % (ref 37.5–51)
HGB BLD-MCNC: 10.8 G/DL (ref 13–17.7)
HMPV RNA NPH QL NAA+NON-PROBE: NOT DETECTED
HOLD SPECIMEN: NORMAL
HOLD SPECIMEN: NORMAL
HPIV1 RNA ISLT QL NAA+PROBE: NOT DETECTED
HPIV2 RNA SPEC QL NAA+PROBE: NOT DETECTED
HPIV3 RNA NPH QL NAA+PROBE: NOT DETECTED
HPIV4 P GENE NPH QL NAA+PROBE: NOT DETECTED
IMM GRANULOCYTES # BLD AUTO: 0.07 10*3/MM3 (ref 0–0.05)
IMM GRANULOCYTES NFR BLD AUTO: 1 % (ref 0–0.5)
INR PPP: 2.43 (ref 0.9–1.1)
LYMPHOCYTES # BLD AUTO: 0.98 10*3/MM3 (ref 0.7–3.1)
LYMPHOCYTES NFR BLD AUTO: 14.2 % (ref 19.6–45.3)
M PNEUMO IGG SER IA-ACNC: NOT DETECTED
MAGNESIUM SERPL-MCNC: 1.8 MG/DL (ref 1.6–2.4)
MCH RBC QN AUTO: 29 PG (ref 26.6–33)
MCHC RBC AUTO-ENTMCNC: 31.9 G/DL (ref 31.5–35.7)
MCV RBC AUTO: 90.9 FL (ref 79–97)
MONOCYTES # BLD AUTO: 0.48 10*3/MM3 (ref 0.1–0.9)
MONOCYTES NFR BLD AUTO: 6.9 % (ref 5–12)
NEUTROPHILS NFR BLD AUTO: 5.1 10*3/MM3 (ref 1.7–7)
NEUTROPHILS NFR BLD AUTO: 73.9 % (ref 42.7–76)
NRBC BLD AUTO-RTO: 0 /100 WBC (ref 0–0.2)
NT-PROBNP SERPL-MCNC: 671 PG/ML (ref 0–1800)
PHOSPHATE SERPL-MCNC: 3.1 MG/DL (ref 2.5–4.5)
PLATELET # BLD AUTO: 269 10*3/MM3 (ref 140–450)
PMV BLD AUTO: 9.2 FL (ref 6–12)
POTASSIUM SERPL-SCNC: 3.6 MMOL/L (ref 3.5–5.2)
PROCALCITONIN SERPL-MCNC: 0.06 NG/ML (ref 0–0.25)
PROT SERPL-MCNC: 7 G/DL (ref 6–8.5)
PROTHROMBIN TIME: 26.6 SECONDS (ref 11.7–14.2)
QT INTERVAL: 382 MS
QTC INTERVAL: 416 MS
RBC # BLD AUTO: 3.73 10*6/MM3 (ref 4.14–5.8)
RHINOVIRUS RNA SPEC NAA+PROBE: NOT DETECTED
RSV RNA NPH QL NAA+NON-PROBE: NOT DETECTED
SARS-COV-2 RNA NPH QL NAA+NON-PROBE: NOT DETECTED
SODIUM SERPL-SCNC: 136 MMOL/L (ref 136–145)
TROPONIN T % DELTA: -5
TROPONIN T NUMERIC DELTA: -1 NG/L
TROPONIN T SERPL HS-MCNC: 22 NG/L
WBC NRBC COR # BLD AUTO: 6.91 10*3/MM3 (ref 3.4–10.8)
WHOLE BLOOD HOLD COAG: NORMAL
WHOLE BLOOD HOLD SPECIMEN: NORMAL

## 2025-01-22 PROCEDURE — 83880 ASSAY OF NATRIURETIC PEPTIDE: CPT | Performed by: EMERGENCY MEDICINE

## 2025-01-22 PROCEDURE — 84100 ASSAY OF PHOSPHORUS: CPT | Performed by: EMERGENCY MEDICINE

## 2025-01-22 PROCEDURE — 25010000002 CEFTRIAXONE PER 250 MG: Performed by: EMERGENCY MEDICINE

## 2025-01-22 PROCEDURE — 25010000002 FUROSEMIDE PER 20 MG: Performed by: EMERGENCY MEDICINE

## 2025-01-22 PROCEDURE — 25510000001 IOPAMIDOL PER 1 ML: Performed by: EMERGENCY MEDICINE

## 2025-01-22 PROCEDURE — 36415 COLL VENOUS BLD VENIPUNCTURE: CPT

## 2025-01-22 PROCEDURE — 83735 ASSAY OF MAGNESIUM: CPT | Performed by: EMERGENCY MEDICINE

## 2025-01-22 PROCEDURE — 93010 ELECTROCARDIOGRAM REPORT: CPT | Performed by: INTERNAL MEDICINE

## 2025-01-22 PROCEDURE — 85610 PROTHROMBIN TIME: CPT | Performed by: EMERGENCY MEDICINE

## 2025-01-22 PROCEDURE — 93005 ELECTROCARDIOGRAM TRACING: CPT

## 2025-01-22 PROCEDURE — 83605 ASSAY OF LACTIC ACID: CPT | Performed by: EMERGENCY MEDICINE

## 2025-01-22 PROCEDURE — 63710000001 INSULIN LISPRO (HUMAN) PER 5 UNITS: Performed by: STUDENT IN AN ORGANIZED HEALTH CARE EDUCATION/TRAINING PROGRAM

## 2025-01-22 PROCEDURE — 82948 REAGENT STRIP/BLOOD GLUCOSE: CPT

## 2025-01-22 PROCEDURE — 71275 CT ANGIOGRAPHY CHEST: CPT

## 2025-01-22 PROCEDURE — 84145 PROCALCITONIN (PCT): CPT | Performed by: EMERGENCY MEDICINE

## 2025-01-22 PROCEDURE — 71045 X-RAY EXAM CHEST 1 VIEW: CPT

## 2025-01-22 PROCEDURE — 94799 UNLISTED PULMONARY SVC/PX: CPT

## 2025-01-22 PROCEDURE — 93005 ELECTROCARDIOGRAM TRACING: CPT | Performed by: EMERGENCY MEDICINE

## 2025-01-22 PROCEDURE — 99285 EMERGENCY DEPT VISIT HI MDM: CPT

## 2025-01-22 PROCEDURE — 0202U NFCT DS 22 TRGT SARS-COV-2: CPT | Performed by: EMERGENCY MEDICINE

## 2025-01-22 PROCEDURE — 85025 COMPLETE CBC W/AUTO DIFF WBC: CPT | Performed by: EMERGENCY MEDICINE

## 2025-01-22 PROCEDURE — 80053 COMPREHEN METABOLIC PANEL: CPT | Performed by: EMERGENCY MEDICINE

## 2025-01-22 PROCEDURE — 84484 ASSAY OF TROPONIN QUANT: CPT | Performed by: EMERGENCY MEDICINE

## 2025-01-22 RX ORDER — IBUPROFEN 600 MG/1
1 TABLET ORAL
Status: DISCONTINUED | OUTPATIENT
Start: 2025-01-22 | End: 2025-01-24 | Stop reason: HOSPADM

## 2025-01-22 RX ORDER — SODIUM CHLORIDE 0.9 % (FLUSH) 0.9 %
10 SYRINGE (ML) INJECTION AS NEEDED
Status: DISCONTINUED | OUTPATIENT
Start: 2025-01-22 | End: 2025-01-24 | Stop reason: HOSPADM

## 2025-01-22 RX ORDER — GABAPENTIN 300 MG/1
600 CAPSULE ORAL EVERY 8 HOURS SCHEDULED
Status: DISCONTINUED | OUTPATIENT
Start: 2025-01-22 | End: 2025-01-24 | Stop reason: HOSPADM

## 2025-01-22 RX ORDER — FAMOTIDINE 20 MG/1
20 TABLET, FILM COATED ORAL 2 TIMES DAILY PRN
COMMUNITY

## 2025-01-22 RX ORDER — BISACODYL 10 MG
10 SUPPOSITORY, RECTAL RECTAL DAILY PRN
Status: DISCONTINUED | OUTPATIENT
Start: 2025-01-22 | End: 2025-01-24 | Stop reason: HOSPADM

## 2025-01-22 RX ORDER — INSULIN LISPRO 100 [IU]/ML
2-9 INJECTION, SOLUTION INTRAVENOUS; SUBCUTANEOUS
Status: DISCONTINUED | OUTPATIENT
Start: 2025-01-22 | End: 2025-01-24 | Stop reason: HOSPADM

## 2025-01-22 RX ORDER — POLYETHYLENE GLYCOL 3350 17 G/17G
17 POWDER, FOR SOLUTION ORAL DAILY PRN
Status: DISCONTINUED | OUTPATIENT
Start: 2025-01-22 | End: 2025-01-24 | Stop reason: HOSPADM

## 2025-01-22 RX ORDER — SODIUM CHLORIDE 0.9 % (FLUSH) 0.9 %
10 SYRINGE (ML) INJECTION EVERY 12 HOURS SCHEDULED
Status: DISCONTINUED | OUTPATIENT
Start: 2025-01-22 | End: 2025-01-24 | Stop reason: HOSPADM

## 2025-01-22 RX ORDER — SODIUM CHLORIDE 9 MG/ML
40 INJECTION, SOLUTION INTRAVENOUS AS NEEDED
Status: DISCONTINUED | OUTPATIENT
Start: 2025-01-22 | End: 2025-01-24 | Stop reason: HOSPADM

## 2025-01-22 RX ORDER — BISACODYL 5 MG/1
5 TABLET, DELAYED RELEASE ORAL DAILY PRN
Status: DISCONTINUED | OUTPATIENT
Start: 2025-01-22 | End: 2025-01-24 | Stop reason: HOSPADM

## 2025-01-22 RX ORDER — TRIAMCINOLONE ACETONIDE 1 MG/G
1 CREAM TOPICAL 2 TIMES DAILY
Status: DISCONTINUED | OUTPATIENT
Start: 2025-01-22 | End: 2025-01-23

## 2025-01-22 RX ORDER — TRIAMCINOLONE ACETONIDE 1 MG/G
1 CREAM TOPICAL 2 TIMES DAILY
COMMUNITY

## 2025-01-22 RX ORDER — FUROSEMIDE 10 MG/ML
40 INJECTION INTRAMUSCULAR; INTRAVENOUS ONCE
Status: COMPLETED | OUTPATIENT
Start: 2025-01-22 | End: 2025-01-22

## 2025-01-22 RX ORDER — POTASSIUM CHLORIDE 750 MG/1
40 TABLET, FILM COATED, EXTENDED RELEASE ORAL EVERY 4 HOURS
Status: COMPLETED | OUTPATIENT
Start: 2025-01-22 | End: 2025-01-23

## 2025-01-22 RX ORDER — PREDNISONE 10 MG/1
10 TABLET ORAL DAILY
COMMUNITY
End: 2025-01-22

## 2025-01-22 RX ORDER — IOPAMIDOL 755 MG/ML
95 INJECTION, SOLUTION INTRAVASCULAR
Status: COMPLETED | OUTPATIENT
Start: 2025-01-22 | End: 2025-01-22

## 2025-01-22 RX ORDER — DEXTROSE MONOHYDRATE 25 G/50ML
25 INJECTION, SOLUTION INTRAVENOUS
Status: DISCONTINUED | OUTPATIENT
Start: 2025-01-22 | End: 2025-01-24 | Stop reason: HOSPADM

## 2025-01-22 RX ORDER — LANOLIN ALCOHOL/MO/W.PET/CERES
50 CREAM (GRAM) TOPICAL DAILY
Status: DISCONTINUED | OUTPATIENT
Start: 2025-01-23 | End: 2025-01-24 | Stop reason: HOSPADM

## 2025-01-22 RX ORDER — INSULIN LISPRO 100 [IU]/ML
5 INJECTION, SOLUTION INTRAVENOUS; SUBCUTANEOUS
Status: DISCONTINUED | OUTPATIENT
Start: 2025-01-23 | End: 2025-01-24 | Stop reason: HOSPADM

## 2025-01-22 RX ORDER — ROSUVASTATIN CALCIUM 20 MG/1
40 TABLET, COATED ORAL DAILY
Status: DISCONTINUED | OUTPATIENT
Start: 2025-01-23 | End: 2025-01-24 | Stop reason: HOSPADM

## 2025-01-22 RX ORDER — NICOTINE POLACRILEX 4 MG
15 LOZENGE BUCCAL
Status: DISCONTINUED | OUTPATIENT
Start: 2025-01-22 | End: 2025-01-24 | Stop reason: HOSPADM

## 2025-01-22 RX ORDER — AMOXICILLIN 250 MG
2 CAPSULE ORAL 2 TIMES DAILY PRN
Status: DISCONTINUED | OUTPATIENT
Start: 2025-01-22 | End: 2025-01-24 | Stop reason: HOSPADM

## 2025-01-22 RX ORDER — MULTIVITAMIN WITH IRON
1000 TABLET ORAL DAILY
Status: DISCONTINUED | OUTPATIENT
Start: 2025-01-23 | End: 2025-01-24 | Stop reason: HOSPADM

## 2025-01-22 RX ORDER — TORSEMIDE 20 MG/1
20 TABLET ORAL
Status: DISCONTINUED | OUTPATIENT
Start: 2025-01-22 | End: 2025-01-24 | Stop reason: HOSPADM

## 2025-01-22 RX ADMIN — FUROSEMIDE 40 MG: 10 INJECTION, SOLUTION INTRAMUSCULAR; INTRAVENOUS at 14:32

## 2025-01-22 RX ADMIN — POTASSIUM CHLORIDE 40 MEQ: 750 TABLET, EXTENDED RELEASE ORAL at 21:15

## 2025-01-22 RX ADMIN — GABAPENTIN 600 MG: 300 CAPSULE ORAL at 21:30

## 2025-01-22 RX ADMIN — INSULIN LISPRO 2 UNITS: 100 INJECTION, SOLUTION INTRAVENOUS; SUBCUTANEOUS at 21:31

## 2025-01-22 RX ADMIN — CEFTRIAXONE 2000 MG: 2 INJECTION, POWDER, FOR SOLUTION INTRAMUSCULAR; INTRAVENOUS at 17:36

## 2025-01-22 RX ADMIN — Medication 10 ML: at 21:32

## 2025-01-22 RX ADMIN — TORSEMIDE 20 MG: 20 TABLET ORAL at 21:30

## 2025-01-22 RX ADMIN — IOPAMIDOL 95 ML: 755 INJECTION, SOLUTION INTRAVENOUS at 16:16

## 2025-01-22 NOTE — ED NOTES
Nursing report ED to floor  Branden Diop  74 y.o.  male    Branden Diop is a 74 y.o. male with a medical history of A-fib (on Xarelto), lung cancer status post lobectomy, COPD on oxygen chronically who presents to the ED c/o acute shortness of breath for the past 1 week.  Patient said since his lobectomy with Dr. Figueroa he has had 1 other episode where he had fluid accumulate that needed to be drained on the right side.  He feels similar to that and feels like that fluid has reaccumulated.  Patient denies any fevers, chills, new cough although does have a chronic cough, vomiting, diarrhea.   HPI :  HPI  Stated Reason for Visit: SOA    Chief Complaint  Chief Complaint   Patient presents with    Shortness of Breath       Admitting doctor:   Juancho Diallo MD    Admitting diagnosis:   The primary encounter diagnosis was Acute on chronic respiratory failure with hypoxia. Diagnoses of Recurrent right pleural effusion and Pneumonia of both lungs due to infectious organism, unspecified part of lung were also pertinent to this visit.    Code status:   Current Code Status       Date Active Code Status Order ID Comments User Context       Prior            Allergies:   Patient has no known allergies.    Isolation:   No active isolations    Intake and Output    Intake/Output Summary (Last 24 hours) at 1/22/2025 1815  Last data filed at 1/22/2025 1811  Gross per 24 hour   Intake 100 ml   Output 1400 ml   Net -1300 ml       Weight:   There were no vitals filed for this visit.    Most recent vitals:   Vitals:    01/22/25 1531 01/22/25 1601 01/22/25 1701 01/22/25 1801   BP: 117/65 127/79 118/58 124/69   Pulse: 64 73 75 62   Resp:       Temp:       SpO2: 90% 93% 96% 94%       Active LDAs/IV Access:   Lines, Drains & Airways       Active LDAs       Name Placement date Placement time Site Days    Peripheral IV 01/22/25 1158 Anterior;Right;Upper Arm 01/22/25  1158  Arm  less than 1    External Urinary Catheter 01/22/25  1438  --  less than 1     Single Lumen Implantable Port Right Chest --  --  Chest  --                    Labs (abnormal labs have a star):   Labs Reviewed   COMPREHENSIVE METABOLIC PANEL - Abnormal; Notable for the following components:       Result Value    Glucose 254 (*)     Chloride 97 (*)     CO2 29.8 (*)     Albumin 3.4 (*)     Alkaline Phosphatase 134 (*)     All other components within normal limits    Narrative:     GFR Categories in Chronic Kidney Disease (CKD)      GFR Category          GFR (mL/min/1.73)    Interpretation  G1                     90 or greater         Normal or high (1)  G2                      60-89                Mild decrease (1)  G3a                   45-59                Mild to moderate decrease  G3b                   30-44                Moderate to severe decrease  G4                    15-29                Severe decrease  G5                    14 or less           Kidney failure          (1)In the absence of evidence of kidney disease, neither GFR category G1 or G2 fulfill the criteria for CKD.    eGFR calculation 2021 CKD-EPI creatinine equation, which does not include race as a factor   TROPONIN - Abnormal; Notable for the following components:    HS Troponin T 22 (*)     All other components within normal limits    Narrative:     High Sensitive Troponin T Reference Range:  <14.0 ng/L- Negative Female for AMI  <22.0 ng/L- Negative Male for AMI  >=14 - Abnormal Female indicating possible myocardial injury.  >=22 - Abnormal Male indicating possible myocardial injury.   Clinicians would have to utilize clinical acumen, EKG, Troponin, and serial changes to determine if it is an Acute Myocardial Infarction or myocardial injury due to an underlying chronic condition.        CBC WITH AUTO DIFFERENTIAL - Abnormal; Notable for the following components:    RBC 3.73 (*)     Hemoglobin 10.8 (*)     Hematocrit 33.9 (*)     RDW 16.5 (*)     RDW-SD 55.0 (*)     Lymphocyte % 14.2 (*)     Immature Grans % 1.0 (*)      Immature Grans, Absolute 0.07 (*)     All other components within normal limits   PROTIME-INR - Abnormal; Notable for the following components:    Protime 26.6 (*)     INR 2.43 (*)     All other components within normal limits   RESPIRATORY PANEL PCR W/ COVID-19 (SARS-COV-2), NP SWAB IN UTM/VTP, 2 HR TAT - Normal    Narrative:     In the setting of a positive respiratory panel with a viral infection PLUS a negative procalcitonin without other underlying concern for bacterial infection, consider observing off antibiotics or discontinuation of antibiotics and continue supportive care. If the respiratory panel is positive for atypical bacterial infection (Bordetella pertussis, Chlamydophila pneumoniae, or Mycoplasma pneumoniae), consider antibiotic de-escalation to target atypical bacterial infection.   BNP (IN-HOUSE) - Normal    Narrative:     This assay is used as an aid in the diagnosis of individuals suspected of having heart failure. It can be used as an aid in the diagnosis of acute decompensated heart failure (ADHF) in patients presenting with signs and symptoms of ADHF to the emergency department (ED). In addition, NT-proBNP of <300 pg/mL indicates ADHF is not likely.    Age Range Result Interpretation  NT-proBNP Concentration (pg/mL:      <50             Positive            >450                   Gray                 300-450                    Negative             <300    50-75           Positive            >900                  Gray                300-900                  Negative            <300      >75             Positive            >1800                  Gray                300-1800                  Negative            <300   LACTIC ACID, PLASMA - Normal   PROCALCITONIN - Normal    Narrative:     As a Marker for Sepsis (Non-Neonates):    1. <0.5 ng/mL represents a low risk of severe sepsis and/or septic shock.  2. >2 ng/mL represents a high risk of severe sepsis and/or septic shock.    As a Marker for  "Lower Respiratory Tract Infections that require antibiotic therapy:    PCT on Admission    Antibiotic Therapy       6-12 Hrs later    >0.5                Strongly Recommended  >0.25 - <0.5        Recommended   0.1 - 0.25          Discouraged              Remeasure/reassess PCT  <0.1                Strongly Discouraged     Remeasure/reassess PCT    As 28 day mortality risk marker: \"Change in Procalcitonin Result\" (>80% or <=80%) if Day 0 (or Day 1) and Day 4 values are available. Refer to [email or web address]    Change in PCT <=80%  A decrease of PCT levels below or equal to 80% defines a positive change in PCT test result representing a higher risk for 28-day all-cause mortality of patients diagnosed with severe sepsis for septic shock.    Change in PCT >80%  A decrease of PCT levels of more than 80% defines a negative change in PCT result representing a lower risk for 28-day all-cause mortality of patients diagnosed with severe sepsis or septic shock.      MAGNESIUM - Normal   PHOSPHORUS - Normal   RAINBOW DRAW    Narrative:     The following orders were created for panel order [place] Draw.  Procedure                               Abnormality         Status                     ---------                               -----------         ------                     Green Top (Gel)[[id number]]                                  Final result               Lavender Top[[id number]]                                     Final result               Gold Top - SST[[id number]]                                   Final result               Light Blue Top[[id number]]                                   Final result                 Please view results for these tests on the individual orders.   HIGH SENSITIVITIY TROPONIN T 1HR    Narrative:     High Sensitive Troponin T Reference Range:  <14.0 ng/L- Negative Female for AMI  <22.0 ng/L- Negative Male for AMI  >=14 - Abnormal Female indicating possible myocardial injury.  >=22 " - Abnormal Male indicating possible myocardial injury.   Clinicians would have to utilize clinical acumen, EKG, Troponin, and serial changes to determine if it is an Acute Myocardial Infarction or myocardial injury due to an underlying chronic condition.        CBC AND DIFFERENTIAL    Narrative:     The following orders were created for panel order CBC & Differential.  Procedure                               Abnormality         Status                     ---------                               -----------         ------                     CBC Auto Differential[[id number]]        Abnormal            Final result                 Please view results for these tests on the individual orders.   GREEN TOP   LAVENDER TOP   GOLD TOP - SST   LIGHT BLUE TOP       EKG:   ECG 12 Lead ED Triage Standing Order; SOA   Final Result   HEART RATE=71  bpm   RR Vpooapao=148  ms   TN Interval=  ms   P Horizontal Axis=  deg   P Front Axis=  deg   QRSD Interval=93  ms   QT Hwwuojgl=094  ms   GKqV=203  ms   QRS Axis=32  deg   T Wave Axis=60  deg   - ABNORMAL ECG -   Atrial fibrillation   Borderline  low voltage, extremity leads   No change from previous tracing   Electronically Signed By: Sandra Hoffmann ([facility]) 2025-01-22 17:02:39   Date and Time of Study:2025-01-22 11:43:14          Meds given in ED:   Medications   sodium chloride 0.9 % flush 10 mL (has no administration in time range)   furosemide (LASIX) injection 40 mg (40 mg Intravenous Given 1/22/25 1432)   iopamidol (ISOVUE-370) 76 % injection 95 mL (95 mL Intravenous Given by Other 1/22/25 1616)   cefTRIAXone (ROCEPHIN) 2,000 mg in sodium chloride 0.9 % 100 mL MBP (0 mg Intravenous Stopped 1/22/25 1811)       Imaging results:  CT Angiogram Chest    Result Date: 1/22/2025  1. No evidence of pulmonary embolus. 2. Moderate size right pleural effusion. 3. Patchy areas of infiltrate in the bilateral lungs are consistent with areas of acute pneumonia and have progressed since the  2024 study. Please correlate with the clinical findings. Follow-up CT of the chest also recommended.  Radiation dose reduction techniques were utilized, including automated exposure control and exposure modulation based on body size.       XR Chest 1 View    Result Date: 2025  Low lung volumes. Mildly increased small to moderate right pleural effusion. New adjacent hazy right lower lobe opacities, layering pleural fluid versus atelectasis versus infiltrate. No pneumothorax. Cardiac silhouette at the upper limits of normal as before. Right IJ port with tip overlying the mid SVC. No focal osseous abnormality.  This report was finalized on 2025 12:55 PM by Dr. Kartik Humphrey M.D on Workstation: [id number]       Ambulatory status:   - with assist    Social issues:   Social History     Socioeconomic History    Marital status:      Spouse name: Luba    Number of children: 2   Tobacco Use    Smoking status: Former     Current packs/day: 0.00     Average packs/day: 1 pack/day for 30.0 years (30.0 ttl pk-yrs)     Types: Cigars, Cigarettes     Start date: 1984     Quit date: 2014     Years since quittin.0    Smokeless tobacco: Never   Vaping Use    Vaping status: Never Used   Substance and Sexual Activity    Alcohol use: Never     Comment: caffeine use- decaf coffee    Drug use: Never    Sexual activity: Defer       Peripheral Neurovascular  Peripheral Neurovascular (Adult)  Peripheral Neurovascular WDL: neurovascular assessment upper, neurovascular assessment lower, .WDL except  Additional Documentation: Edema (Group)  Edema  Edema: ankle, left, ankle, right  Ankle, Left Edema: 2+ (Mild)  Ankle, Right Edema: 2+ (Mild)  LUE Neurovascular Assessment  Temperature LUE: warm  Color LUE: no discoloration  Sensation LUE: no tenderness, no tingling, no numbness  RUE Neurovascular Assessment  Temperature RUE: warm  Color RUE: no discoloration  Sensation RUE: no numbness, no tenderness, no  tingling  LLE Neurovascular Assessment  Temperature LLE: warm  Color LLE: no discoloration  Sensation LLE: no numbness, no tenderness, no tingling  RLE Neurovascular Assessment  Temperature RLE: warm  Color RLE: no discoloration  Sensation RLE: no numbness, no tenderness, no tingling    Neuro Cognitive  Neuro Cognitive (Adult)  Cognitive/Neuro/Behavioral WDL: WDL, orientation  Orientation: oriented x 4    Learning  Learning Assessment  Learning Readiness and Ability: no barriers identified  Education Provided  Person Taught: patient, significant other/partner  Teaching Focus: symptom/problem overview    Respiratory  Respiratory WDL  Respiratory WDL: .WDL except, rhythm/pattern  Rhythm/Pattern, Respiratory: shortness of breath, other (see comments), labored (With exertion)    Abdominal Pain       Pain Assessments  Pain (Adult)  (0-10) Pain Rating: Rest: 0    NIH Stroke Scale       Nikko Tabares RN  01/22/25 18:15 EST

## 2025-01-22 NOTE — ED NOTES
PT to Er via Pv from home for fluid around his lungs. Pt said he had a R middle lobectomy this past December. Pt normally on 2-3L but now requiring 4-5L

## 2025-01-22 NOTE — ED PROVIDER NOTES
EMERGENCY DEPARTMENT ENCOUNTER    Room Number:  3108/1  PCP: Tino Lopez MD  Independent Historians: Patient and Family    HPI:  Chief Complaint: had concerns including Shortness of Breath.      A complete HPI/ROS/PMH/PSH/SH/FH are unobtainable due to: None    Chronic or social conditions impacting patient care (Social Determinants of Health): None      Context: Branden Diop is a 74 y.o. male with a medical history of A-fib (on Xarelto), lung cancer status post lobectomy, COPD on oxygen chronically who presents to the ED c/o acute shortness of breath for the past 1 week.  Patient said since his lobectomy with Dr. Figueroa he has had 1 other episode where he had fluid accumulate that needed to be drained on the right side.  He feels similar to that and feels like that fluid has reaccumulated.  Patient denies any fevers, chills, new cough although does have a chronic cough, vomiting, diarrhea.        Review of prior external notes (non-ED) -and- Review of prior external test results outside of this encounter: I reviewed records from 12/1 /24 admission when patient had r pleural effusion but presented with hemoptysis and sob.    Prescription drug monitoring program review:     N/A    PAST MEDICAL HISTORY  Active Ambulatory Problems     Diagnosis Date Noted    A-fib 01/29/2016    Atrioventricular block, Mobitz type 1, Wenckebach 01/29/2016    Apnea, sleep 01/29/2016    Obesity 01/29/2016    Streptococcus pneumoniae     Sleep apnea with use of continuous positive airway pressure (CPAP)     Sinusitis     Right wrist pain 11/11/2015    Pneumonia 12/01/2013    Type 2 diabetes mellitus, with long-term current use of insulin     Hyperlipidemia     Hammertoe     Depression     COPD (chronic obstructive pulmonary disease)     Colon polyps     Anxiety     Pruritus 04/05/2016    Cholelithiasis 10/06/2017    Fatty liver 10/09/2017    Essential hypertension 05/24/2019    Vitamin D deficiency 08/10/2020    Emphysema, unspecified  10/14/2021    Bronchiectasis with acute exacerbation 10/14/2021    FHx: colonic polyps 08/30/2022    Diverticulosis 08/30/2022    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 05/30/2024    Malignant neoplasm of middle lobe of right lung 05/30/2024    Fluid collection at surgical site, initial encounter 06/20/2024    Encounter for long-term (current) use of high-risk medication 07/12/2024    Fitting and adjustment of vascular catheter 07/12/2024    Anemia associated with chemotherapy 10/10/2024    Peripheral neuropathy due to chemotherapy 11/06/2024    Acute respiratory failure 11/29/2024    Rash in adult 11/30/2024    Bone mass 11/30/2024    Drug-induced skin rash 12/25/2024    Adverse effect of antineoplastic drug 12/25/2024    Pleural effusion on right 12/25/2024     Resolved Ambulatory Problems     Diagnosis Date Noted    BP (high blood pressure) 01/29/2016    Atrial fibrillation     Atrial flutter 08/25/2016    Acute cholecystitis 10/06/2017    Right upper quadrant pain 10/06/2017    Recurrent inguinal hernia of right side without obstruction or gangrene 06/13/2018    Uncontrolled type 2 diabetes mellitus with hyperglycemia 04/25/2019    Chest pain, atypical 11/11/2021     Past Medical History:   Diagnosis Date    Arthritis     Bunion of right foot     History of atrial fibrillation     History of skin cancer     [place] (hard of hearing)     Inguinal hernia, recurrent     Left foot pain     Lung nodules     Rash     Redness of skin     Scab     Type 2 diabetes mellitus          PAST SURGICAL HISTORY  Past Surgical History:   Procedure Laterality Date    BASAL CELL CARCINOMA EXCISION      BRONCHOSCOPY WITH ION ROBOTIC ASSIST N/A 5/6/2024    Procedure: BRONCHOSCOPY WITH ION ROBOT WITH FNA, BIOPSIES, BAL AND ENDOBRONCHIAL ULTRASOUND WITH FNA;  Surgeon: Yony Coles MD;  Location: [facility] ENDOSCOPY;  Service: Robotics - Pulmonary;  Laterality: N/A;  PRE: PULMONARY NODULES  POST: SAME    CARDIAC  CATHETERIZATION N/A 11/15/2021    Procedure: Coronary angiography;  Surgeon: Alfredo Jennings MD;  Location: [facility] CATH INVASIVE LOCATION;  Service: Cardiovascular;  Laterality: N/A;    CARDIAC CATHETERIZATION N/A 11/15/2021    Procedure: Left heart cath;  Surgeon: Alfredo Jennings MD;  Location: [facility] CATH INVASIVE LOCATION;  Service: Cardiovascular;  Laterality: N/A;    CARDIAC CATHETERIZATION N/A 11/15/2021    Procedure: Left ventriculography;  Surgeon: Alfredo Jennings MD;  Location: [facility] CATH INVASIVE LOCATION;  Service: Cardiovascular;  Laterality: N/A;    CARDIAC CATHETERIZATION N/A 11/15/2021    Procedure: Aortic root aortogram;  Surgeon: Alfredo Jennings MD;  Location: [facility] CATH INVASIVE LOCATION;  Service: Cardiovascular;  Laterality: N/A;    CARDIOVERSION      CHOLECYSTECTOMY N/A 10/07/2017    Procedure: CHOLECYSTECTOMY LAPAROSCOPIC;  Surgeon: Martir Trevino MD;  Location: [facility] MAIN OR;  Service:     COLONOSCOPY  2007    COLONOSCOPY N/A 8/30/2022    Procedure: COLONOSCOPY TO CECUM AND TI AND COLD SNARE POLYPECTOMY;  Surgeon: Cj Lopez MD;  Location: [facility] ENDOSCOPY;  Service: Gastroenterology;  Laterality: N/A;  Pre: History of colon polyps  Post: POLYP, PREVIOUS TATTOO    FOOT SURGERY Left     TOES ARE PINNED. ALL BUT GREAT TOE    HAND SURGERY      THUMB    HERNIA REPAIR      INGUINAL HERNIA REPAIR Right 06/27/2018    Procedure: INGUINAL HERNIA REPAIR, OPEN, RECURRENT;  Surgeon: Martir Trevino MD;  Location: [facility] OR OSC;  Service: General    LASIK Bilateral     LOBECTOMY Right 6/12/2024    Procedure: BRONCHOSCOPY, RIGHT VIDEO ASSISTED THORACOSCOPY WITH RIGHT MIDDLE LOBECTOMY WITH DAVINCI ROBOT, MEDIASTINAL LYMPH NODE DISSECTION, INTERCOSTAL NERVE BLOCK;  Surgeon: David Figueroa MD;  Location: [facility] MAIN OR;  Service: Robotics - DaVinci;  Laterality: Right;    NECK SURGERY      growth on salivary gland    REPLACEMENT TOTAL KNEE Right 2007    (he is going to have a LTKR  next month)    REPLACEMENT TOTAL KNEE Left     VENOUS ACCESS DEVICE (PORT) INSERTION Right 2024    Procedure: INSERTION VENOUS ACCESS DEVICE;  Surgeon: David Figueroa MD;  Location: [facility];  Service: Thoracic;  Laterality: Right;         FAMILY HISTORY  Family History   Problem Relation Age of Onset    Cancer Other     Stroke Mother     Alcohol abuse Father     Malig Hyperthermia Neg Hx          SOCIAL HISTORY  Social History     Socioeconomic History    Marital status:      Spouse name: Luba    Number of children: 2   Tobacco Use    Smoking status: Former     Current packs/day: 0.00     Average packs/day: 1 pack/day for 30.0 years (30.0 ttl pk-yrs)     Types: Cigars, Cigarettes     Start date: 1984     Quit date: 2014     Years since quittin.0    Smokeless tobacco: Never   Vaping Use    Vaping status: Never Used   Substance and Sexual Activity    Alcohol use: Never     Comment: caffeine use- decaf coffee    Drug use: Never    Sexual activity: Defer         ALLERGIES  Patient has no known allergies.        REVIEW OF SYSTEMS  Review of Systems  Included in HPI  All systems reviewed and negative except for those discussed in HPI.      PHYSICAL EXAM    I have reviewed the triage vital signs and nursing notes.    ED Triage Vitals [25 1134]   Temp Heart Rate Resp BP SpO2   97 °F (36.1 °C) 106 26 -- 90 %      Temp src Heart Rate Source Patient Position BP Location FiO2 (%)   -- -- -- -- --       Physical Exam  GENERAL: pleasant obese M, cooperative, alert, no acute distress  SKIN: Warm, dry  HENT: Normocephalic, atraumatic  EYES: no scleral icterus  CV: regular rhythm, regular rate  RESPIRATORY: normal effort, r base diminished, wheezing to lll  ABDOMEN: soft, nontender, nondistended, obese  MUSCULOSKELETAL: no deformity  NEURO: alert, moves all extremities, follows commands                                                                   LAB RESULTS  Recent Results (from the past 24  hours)   ECG 12 Lead ED Triage Standing Order; SOA    Collection Time: 01/22/25 11:43 AM   Result Value Ref Range    QT Interval 382 ms    QTC Interval 416 ms   Comprehensive Metabolic Panel    Collection Time: 01/22/25 11:58 AM    Specimen: Blood   Result Value Ref Range    Glucose 254 (H) 65 - 99 mg/dL    BUN 9 8 - 23 mg/dL    Creatinine 0.76 0.76 - 1.27 mg/dL    Sodium 136 136 - 145 mmol/L    Potassium 3.6 3.5 - 5.2 mmol/L    Chloride 97 (L) 98 - 107 mmol/L    CO2 29.8 (H) 22.0 - 29.0 mmol/L    Calcium 9.3 8.6 - 10.5 mg/dL    Total Protein 7.0 6.0 - 8.5 g/dL    Albumin 3.4 (L) 3.5 - 5.2 g/dL    ALT (SGPT) 20 1 - 41 U/L    AST (SGOT) 30 1 - 40 U/L    Alkaline Phosphatase 134 (H) 39 - 117 U/L    Total Bilirubin 1.0 0.0 - 1.2 mg/dL    Globulin 3.6 gm/dL    A/G Ratio 0.9 g/dL    BUN/Creatinine Ratio 11.8 7.0 - 25.0    Anion Gap 9.2 5.0 - 15.0 mmol/L    eGFR 93.2 >60.0 mL/min/1.73   BNP    Collection Time: 01/22/25 11:58 AM    Specimen: Blood   Result Value Ref Range    proBNP 671.0 0.0 - 1,800.0 pg/mL   High Sensitivity Troponin T    Collection Time: 01/22/25 11:58 AM    Specimen: Blood   Result Value Ref Range    HS Troponin T 22 (H) <22 ng/L   Green Top (Gel)    Collection Time: 01/22/25 11:58 AM   Result Value Ref Range    Extra Tube Hold for add-ons.    Lavender Top    Collection Time: 01/22/25 11:58 AM   Result Value Ref Range    Extra Tube hold for add-on    Gold Top - SST    Collection Time: 01/22/25 11:58 AM   Result Value Ref Range    Extra Tube Hold for add-ons.    Light Blue Top    Collection Time: 01/22/25 11:58 AM   Result Value Ref Range    Extra Tube Hold for add-ons.    CBC Auto Differential    Collection Time: 01/22/25 11:58 AM    Specimen: Blood   Result Value Ref Range    WBC 6.91 3.40 - 10.80 10*3/mm3    RBC 3.73 (L) 4.14 - 5.80 10*6/mm3    Hemoglobin 10.8 (L) 13.0 - 17.7 g/dL    Hematocrit 33.9 (L) 37.5 - 51.0 %    MCV 90.9 79.0 - 97.0 fL    MCH 29.0 26.6 - 33.0 pg    MCHC 31.9 31.5 - 35.7 g/dL     RDW 16.5 (H) 12.3 - 15.4 %    RDW-SD 55.0 (H) 37.0 - 54.0 fl    MPV 9.2 6.0 - 12.0 fL    Platelets 269 140 - 450 10*3/mm3    Neutrophil % 73.9 42.7 - 76.0 %    Lymphocyte % 14.2 (L) 19.6 - 45.3 %    Monocyte % 6.9 5.0 - 12.0 %    Eosinophil % 3.6 0.3 - 6.2 %    Basophil % 0.4 0.0 - 1.5 %    Immature Grans % 1.0 (H) 0.0 - 0.5 %    Neutrophils, Absolute 5.10 1.70 - 7.00 10*3/mm3    Lymphocytes, Absolute 0.98 0.70 - 3.10 10*3/mm3    Monocytes, Absolute 0.48 0.10 - 0.90 10*3/mm3    Eosinophils, Absolute 0.25 0.00 - 0.40 10*3/mm3    Basophils, Absolute 0.03 0.00 - 0.20 10*3/mm3    Immature Grans, Absolute 0.07 (H) 0.00 - 0.05 10*3/mm3    nRBC 0.0 0.0 - 0.2 /100 WBC   Protime-INR    Collection Time: 01/22/25 11:58 AM    Specimen: Blood   Result Value Ref Range    Protime 26.6 (H) 11.7 - 14.2 Seconds    INR 2.43 (H) 0.90 - 1.10   Lactic Acid, Plasma    Collection Time: 01/22/25 11:58 AM    Specimen: Blood   Result Value Ref Range    Lactate 1.9 0.5 - 2.0 mmol/L   Procalcitonin    Collection Time: 01/22/25 11:58 AM    Specimen: Blood   Result Value Ref Range    Procalcitonin 0.06 0.00 - 0.25 ng/mL   Magnesium    Collection Time: 01/22/25 11:58 AM    Specimen: Blood   Result Value Ref Range    Magnesium 1.8 1.6 - 2.4 mg/dL   Phosphorus    Collection Time: 01/22/25 11:58 AM    Specimen: Blood   Result Value Ref Range    Phosphorus 3.1 2.5 - 4.5 mg/dL   Respiratory Panel PCR w/COVID-19(SARS-CoV-2) CRISTIANO/AUBREE/EVAN/PAD/COR/FRANCISCO In-House, NP Swab in [facility], 2 HR TAT - Swab, Nasopharynx    Collection Time: 01/22/25 12:00 PM    Specimen: Nasopharynx; Swab   Result Value Ref Range    ADENOVIRUS, PCR Not Detected Not Detected    Coronavirus 229E Not Detected Not Detected    Coronavirus HKU1 Not Detected Not Detected    Coronavirus NL63 Not Detected Not Detected    Coronavirus OC43 Not Detected Not Detected    COVID19 Not Detected Not Detected - Ref. Range    Human Metapneumovirus Not Detected Not Detected    Human  Rhinovirus/Enterovirus Not Detected Not Detected    Influenza A PCR Not Detected Not Detected    Influenza B PCR Not Detected Not Detected    Parainfluenza Virus 1 Not Detected Not Detected    Parainfluenza Virus 2 Not Detected Not Detected    Parainfluenza Virus 3 Not Detected Not Detected    Parainfluenza Virus 4 Not Detected Not Detected    RSV, PCR Not Detected Not Detected    Bordetella pertussis pcr Not Detected Not Detected    Bordetella parapertussis PCR Not Detected Not Detected    Chlamydophila pneumoniae PCR Not Detected Not Detected    Mycoplasma pneumo by PCR Not Detected Not Detected   High Sensitivity Troponin T 1Hr    Collection Time: 01/22/25  1:31 PM    Specimen: Blood   Result Value Ref Range    HS Troponin T 21 <22 ng/L    Troponin T Numeric Delta -1 ng/L    Troponin T % Delta -5 Abnormal if >/= 20%   POC Glucose Once    Collection Time: 01/22/25  6:26 PM    Specimen: Blood   Result Value Ref Range    Glucose 149 (H) 70 - 130 mg/dL   POC Glucose Once    Collection Time: 01/22/25  9:22 PM    Specimen: Blood   Result Value Ref Range    Glucose 172 (H) 70 - 130 mg/dL         RADIOLOGY  CT Angiogram Chest    Result Date: 1/22/2025  CT ANGIOGRAM OF THE CHEST WITH CONTRAST INCLUDING RECONSTRUCTION IMAGES 01/22/2025  HISTORY: Shortness of breath. Possible pulmonary embolus.  TECHNIQUE: Following the intravenous contrast injection CT angiography was performed through the chest. Sagittal, coronal and 3D reconstruction images were reviewed.  FINDINGS: The pulmonary arterial system is well opacified with no evidence of pulmonary embolus. A few small to mildly enlarged mediastinal lymph nodes are seen. There is some aortic and coronary calcification.  Moderate sized right pleural effusion is seen with some right lower lobe atelectasis or pneumonia. There are patchy areas of increased density in the bilateral upper lobes, left lower lobe and minimally in the right middle lobe. These findings are more  pronounced than on the 12/23/2024 study. Emphysematous changes are seen in both lungs primarily in the upper lobes.  The upper abdomen appears unremarkable except for bilateral renal cysts and prior cholecystectomy.      1. No evidence of pulmonary embolus. 2. Moderate size right pleural effusion. 3. Patchy areas of infiltrate in the bilateral lungs are consistent with areas of acute pneumonia and have progressed since the 12/23/2024 study. Please correlate with the clinical findings. Follow-up CT of the chest also recommended.  Radiation dose reduction techniques were utilized, including automated exposure control and exposure modulation based on body size.       XR Chest 1 View    Result Date: 1/22/2025  XR CHEST 1 VW-  INDICATION: Shortness of breath  COMPARISON: CT chest 12/23/2024 and chest radiographs dating back to 2/16/2016      Low lung volumes. Mildly increased small to moderate right pleural effusion. New adjacent hazy right lower lobe opacities, layering pleural fluid versus atelectasis versus infiltrate. No pneumothorax. Cardiac silhouette at the upper limits of normal as before. Right IJ port with tip overlying the mid SVC. No focal osseous abnormality.  This report was finalized on 1/22/2025 12:55 PM by Dr. Kartik Humphrey M.D on Workstation: [id number]         MEDICATIONS GIVEN IN ER  Medications   sodium chloride 0.9 % flush 10 mL (has no administration in time range)   sodium chloride 0.9 % flush 10 mL (10 mL Intravenous Given 1/22/25 2132)   sodium chloride 0.9 % flush 10 mL (has no administration in time range)   sodium chloride 0.9 % infusion 40 mL (has no administration in time range)   sennosides-docusate (PERICOLACE) 8.6-50 MG per tablet 2 tablet (has no administration in time range)     And   polyethylene glycol (MIRALAX) packet 17 g (has no administration in time range)     And   bisacodyl (DULCOLAX) EC tablet 5 mg (has no administration in time range)     And   bisacodyl (DULCOLAX)  suppository 10 mg (has no administration in time range)   Potassium Replacement - Follow Nurse / BPA Driven Protocol (has no administration in time range)   Magnesium Standard Dose Replacement - Follow Nurse / BPA Driven Protocol (has no administration in time range)   Phosphorus Replacement - Follow Nurse / BPA Driven Protocol (has no administration in time range)   Calcium Replacement - Follow Nurse / BPA Driven Protocol (has no administration in time range)   potassium chloride (K-DUR,KLOR-CON) ER tablet 40 mEq (40 mEq Oral Given 1/22/25 2115)   gabapentin (NEURONTIN) capsule 600 mg (600 mg Oral Given 1/22/25 2130)   insulin glargine (LANTUS, SEMGLEE) injection 20 Units (has no administration in time range)   insulin lispro (HUMALOG/ADMELOG) injection 5 Units (has no administration in time range)   rosuvastatin (CRESTOR) tablet 40 mg (has no administration in time range)   sertraline (ZOLOFT) tablet 50 mg (has no administration in time range)   torsemide (DEMADEX) tablet 20 mg (20 mg Oral Given 1/22/25 2130)   triamcinolone (KENALOG) 0.1 % cream 1 Application (1 Application Topical Not Given 1/22/25 2139)   vitamin B-6 (PYRIDOXINE) tablet 50 mg (has no administration in time range)   vitamin B-12 (CYANOCOBALAMIN) tablet 1,000 mcg (has no administration in time range)   rivaroxaban (XARELTO) tablet 20 mg ( Oral Dose Auto Held 1/31/25 1800)   dextrose (GLUTOSE) oral gel 15 g (has no administration in time range)   dextrose (D50W) (25 g/50 mL) IV injection 25 g (has no administration in time range)   glucagon (GLUCAGEN) injection 1 mg (has no administration in time range)   insulin lispro (HUMALOG/ADMELOG) injection 2-9 Units (2 Units Subcutaneous Given 1/22/25 2131)   furosemide (LASIX) injection 40 mg (40 mg Intravenous Given 1/22/25 1432)   iopamidol (ISOVUE-370) 76 % injection 95 mL (95 mL Intravenous Given by Other 1/22/25 1616)   cefTRIAXone (ROCEPHIN) 2,000 mg in sodium chloride 0.9 % 100 mL MBP (0 mg  Intravenous Stopped 1/22/25 1811)         ORDERS PLACED DURING THIS VISIT:  Orders Placed This Encounter   Procedures    Respiratory Panel PCR w/COVID-19(SARS-CoV-2) CRISTIANO/AUBREE/EVAN/PAD/COR/FRANCISCO In-House, NP Swab in UTM/VTM, 2 HR TAT - Swab, Nasopharynx    XR Chest 1 View    CT Angiogram Chest    CT Guided Thoracentesis    [place] Draw    Comprehensive Metabolic Panel    BNP    High Sensitivity Troponin T    CBC Auto Differential    Protime-INR    Lactic Acid, Plasma    Procalcitonin    Magnesium    Phosphorus    High Sensitivity Troponin T 1Hr    CBC (No Diff)    Comprehensive Metabolic Panel    Magnesium    Phosphorus    Potassium    Diet: Regular/House; Fluid Consistency: Thin (IDDSI 0)    NPO Diet NPO Type: Strict NPO    Undress & Gown    Continuous Pulse Oximetry    Vital Signs    Vital Signs    Intake & Output    Weigh Patient    Oral Care    Saline Lock & Maintain IV Access    Place Sequential Compression Device    Maintain Sequential Compression Device    Code Status and Medical Interventions: CPR (Attempt to Resuscitate); Full Support    LHA (on-call MD unless specified) Details    Inpatient Case Management  Consult    PT Consult: Eval & Treat Functional Mobility Below Baseline    Oxygen Therapy- Nasal Cannula; Titrate 1-6 LPM Per SpO2; 90 - 95%    POC Glucose STAT    POC Glucose Once    POC Glucose 4x Daily Before Meals & at Bedtime    POC Glucose Once    ECG 12 Lead ED Triage Standing Order; SOA    Telemetry Scan    Insert Peripheral IV    Insert Peripheral IV    Inpatient Admission    CBC & Differential    Green Top (Gel)    Lavender Top    Gold Top - SST    Light Blue Top         OUTPATIENT MEDICATION MANAGEMENT:  Current Facility-Administered Medications Ordered in Epic   Medication Dose Route Frequency Provider Last Rate Last Admin    sennosides-docusate (PERICOLACE) 8.6-50 MG per tablet 2 tablet  2 tablet Oral BID PRN Juancho Diallo MD        And    polyethylene glycol (MIRALAX) packet 17 g   17 g Oral Daily PRN Juancho Diallo MD        And    bisacodyl (DULCOLAX) EC tablet 5 mg  5 mg Oral Daily PRN Juancho Diallo MD        And    bisacodyl (DULCOLAX) suppository 10 mg  10 mg Rectal Daily PRN Juancho Diallo MD        Calcium Replacement - Follow Nurse / BPA Driven Protocol   Not Applicable PRJuancho Prabhakar MD        dextrose (D50W) (25 g/50 mL) IV injection 25 g  25 g Intravenous Q15 Min PRN Juancho Diallo MD        dextrose (GLUTOSE) oral gel 15 g  15 g Oral Q15 Min PRJuancho Prabhakar MD        gabapentin (NEURONTIN) capsule 600 mg  600 mg Oral Q8H Juancho Diallo MD   600 mg at 01/22/25 2130    glucagon (GLUCAGEN) injection 1 mg  1 mg Intramuscular Q15 Min PRJuancho Prabhakar MD        [START ON 1/23/2025] insulin glargine (LANTUS, SEMGLEE) injection 20 Units  20 Units Subcutaneous Daily Juancho Diallo MD        insulin lispro (HUMALOG/ADMELOG) injection 2-9 Units  2-9 Units Subcutaneous 4x Daily AC & at Bedtime Juancho Diallo MD   2 Units at 01/22/25 2131    [START ON 1/23/2025] insulin lispro (HUMALOG/ADMELOG) injection 5 Units  5 Units Subcutaneous TID With Meals Juancho Diallo MD        Magnesium Standard Dose Replacement - Follow Nurse / BPA Driven Protocol   Not Applicable PRJuancho Prabhakar MD        Phosphorus Replacement - Follow Nurse / BPA Driven Protocol   Not Applicable Juancho Posey MD        potassium chloride (K-DUR,KLOR-CON) ER tablet 40 mEq  40 mEq Oral Q4H Juancho Diallo MD   40 mEq at 01/22/25 2115    Potassium Replacement - Follow Nurse / BPA Driven Protocol   Not Applicable Juancho Posey MD        [Held by provider] rivaroxaban (XARELTO) tablet 20 mg  20 mg Oral Daily With Dinner Juancho Diallo MD        [START ON 1/23/2025] rosuvastatin (CRESTOR) tablet 40 mg  40 mg Oral Daily Juancho Diallo MD        [START ON 1/23/2025] sertraline (ZOLOFT) tablet 50 mg  50 mg Oral Daily Juancho Diallo MD        sodium chloride 0.9 % flush 10 mL  10 mL Intravenous PRN Erlinda Rodriguez MD         sodium chloride 0.9 % flush 10 mL  10 mL Intravenous Q12H Juancho Diallo MD   10 mL at 01/22/25 2132    sodium chloride 0.9 % flush 10 mL  10 mL Intravenous PRN Juancho Diallo MD        sodium chloride 0.9 % infusion 40 mL  40 mL Intravenous PRN Juancho Diallo MD        torsemide (DEMADEX) tablet 20 mg  20 mg Oral BID Diuretics Juancho Diallo MD   20 mg at 01/22/25 2130    triamcinolone (KENALOG) 0.1 % cream 1 Application  1 Application Topical BID Juancho Diallo MD        [START ON 1/23/2025] vitamin B-12 (CYANOCOBALAMIN) tablet 1,000 mcg  1,000 mcg Oral Daily Juancho Diallo MD        [START ON 1/23/2025] vitamin B-6 (PYRIDOXINE) tablet 50 mg  50 mg Oral Daily Juancho Diallo MD         No current [facility]-ordered outpatient medications on file.         PROCEDURES  Procedures            PROGRESS, DATA ANALYSIS, CONSULTS, AND MEDICAL DECISION MAKING  All labs have been independently interpreted by me.  All radiology studies have been reviewed by me. All EKG's have been independently viewed and interpreted by me.  Discussion below represents my analysis of pertinent findings related to patient's condition, differential diagnosis, treatment plan and final disposition.    This patient presents with dyspnea, most likely secondary to pleural effusion, chf, pneumonia.  The differential diagnosis list includes but is not limited to:   - acute cardiac etiologies like ACS, CHF, pericardial effusion or even tamponade  - acute respiratory etiologies like acute PE, pneumothorax , asthma, COPD exacerbation, allergic etiologies, or infectious etiologies such as PNA   - non-cardiopulmonary causes like toxidromes, metabolic etiologies such as acidemia or electrolyte derangements or even DKA, sepsis, neurologic causes including GBS and myasthenia gravis  Plan EKG, CXR, Labs incl bnp and trop and +/- viral swab--pt will likely need cta chest.        ED Course as of 01/22/25 2224 Wed Jan 22, 2025   1210 EKG ER MD interpretation   Time: 11:  43  Rhythm and rate: Atrial fibrillation at a rate of 71  Axis: Normal  QRS complexes: Normal  ST segments: no elevation nor depressions  T waves: no flattening or inversions  Comparison EKG is from December 1, 2024 where patient was also in A-fib at a rate of 55 [AR]   1213 I viewed patient's chest x-ray and on my interpretation patient has cardiomegaly with postsurgical changes right lung fields with pleural effusion on right with fluid in fissure and or scar tissue--will await radiologist interpretation and may need CT chest [AR]   1646 I discussed results thus far and plan for admission with patient.  Patient is still requiring 5 L via nasal cannula as opposed to his normal 3 L.  Awaiting CT scan result prior to booking in bed and discussing case with hospitalist. [AR]   1809 I discussed with Dr. Diallo--will admit [AR]      ED Course User Index  [AR] Erlinda Rodriguez MD             AS OF 22:24 EST VITALS:    BP - 120/53  HR - 78  TEMP - 97 °F (36.1 °C) (Oral)  O2 SATS - 93%      COMPLEXITY OF CARE  The patient requires admission.    DIAGNOSIS  Final diagnoses:   Acute on chronic respiratory failure with hypoxia   Recurrent right pleural effusion   Pneumonia of both lungs due to infectious organism, unspecified part of lung         DISPOSITION  ED Disposition       ED Disposition   Decision to Admit    Condition   --    Comment   Level of Care: Telemetry [5]   Diagnosis: Acute on chronic hypoxic respiratory failure [1977204]   Admitting Physician: STEPH DIALLO [[id number]]   Attending Physician: STEPH DIALLO [[id number]]   Isolate for COVID?: No [0]   Certification: I Certify That Inpatient Hospital Services Are Medically Necessary For Greater Than 2 Midnights                  Please note that portions of this document were completed with a voice recognition program.    Note Disclaimer: At [facility], we believe that sharing information builds trust and better relationships. You are receiving this note because  you recently visited [facility]. It is possible you will see health information before a provider has talked with you about it. This kind of information can be easy to misunderstand. To help you fully understand what it means for your health, we urge you to discuss this note with your provider.         Erlinda Rodriguez MD  01/22/25 2072

## 2025-01-23 ENCOUNTER — APPOINTMENT (OUTPATIENT)
Dept: ULTRASOUND IMAGING | Facility: HOSPITAL | Age: 75
DRG: 205 | End: 2025-01-23
Payer: MEDICARE

## 2025-01-23 PROBLEM — J98.4 PNEUMONITIS: Status: ACTIVE | Noted: 2025-01-23

## 2025-01-23 PROBLEM — R09.02 HYPOXEMIA: Status: ACTIVE | Noted: 2025-01-23

## 2025-01-23 LAB
ALBUMIN FLD-MCNC: 2.7 G/DL
ALBUMIN SERPL-MCNC: 3 G/DL (ref 3.5–5.2)
ALBUMIN/GLOB SERPL: 0.8 G/DL
ALP SERPL-CCNC: 136 U/L (ref 39–117)
ALT SERPL W P-5'-P-CCNC: 19 U/L (ref 1–41)
ANION GAP SERPL CALCULATED.3IONS-SCNC: 11.3 MMOL/L (ref 5–15)
APPEARANCE FLD: ABNORMAL
AST SERPL-CCNC: 29 U/L (ref 1–40)
BILIRUB SERPL-MCNC: 0.9 MG/DL (ref 0–1.2)
BUN SERPL-MCNC: 8 MG/DL (ref 8–23)
BUN/CREAT SERPL: 9 (ref 7–25)
CALCIUM SPEC-SCNC: 8.9 MG/DL (ref 8.6–10.5)
CHLORIDE SERPL-SCNC: 99 MMOL/L (ref 98–107)
CO2 SERPL-SCNC: 31.7 MMOL/L (ref 22–29)
COLOR FLD: ABNORMAL
CREAT SERPL-MCNC: 0.89 MG/DL (ref 0.76–1.27)
DEPRECATED RDW RBC AUTO: 53 FL (ref 37–54)
EGFRCR SERPLBLD CKD-EPI 2021: 88.8 ML/MIN/1.73
EOSINOPHIL NFR FLD MANUAL: 1 %
ERYTHROCYTE [DISTWIDTH] IN BLOOD BY AUTOMATED COUNT: 16.5 % (ref 12.3–15.4)
GLOBULIN UR ELPH-MCNC: 4 GM/DL
GLUCOSE BLDC GLUCOMTR-MCNC: 198 MG/DL (ref 70–130)
GLUCOSE BLDC GLUCOMTR-MCNC: 218 MG/DL (ref 70–130)
GLUCOSE BLDC GLUCOMTR-MCNC: 328 MG/DL (ref 70–130)
GLUCOSE BLDC GLUCOMTR-MCNC: 350 MG/DL (ref 70–130)
GLUCOSE FLD-MCNC: 214 MG/DL
GLUCOSE SERPL-MCNC: 188 MG/DL (ref 65–99)
HCT VFR BLD AUTO: 34.4 % (ref 37.5–51)
HGB BLD-MCNC: 11.2 G/DL (ref 13–17.7)
LDH FLD-CCNC: 345 U/L
LYMPHOCYTES NFR FLD MANUAL: 89 %
MAGNESIUM SERPL-MCNC: 1.8 MG/DL (ref 1.6–2.4)
MCH RBC QN AUTO: 29 PG (ref 26.6–33)
MCHC RBC AUTO-ENTMCNC: 32.6 G/DL (ref 31.5–35.7)
MCV RBC AUTO: 89.1 FL (ref 79–97)
METHOD: ABNORMAL
MONOCYTES NFR FLD: 3 %
NEUTROPHILS NFR FLD MANUAL: 7 %
NUC CELL # FLD: 8410 /MM3
PH FLD: 7.48 [PH]
PHOSPHATE SERPL-MCNC: 3.9 MG/DL (ref 2.5–4.5)
PLATELET # BLD AUTO: 295 10*3/MM3 (ref 140–450)
PMV BLD AUTO: 9.5 FL (ref 6–12)
POTASSIUM SERPL-SCNC: 4.2 MMOL/L (ref 3.5–5.2)
PROT FLD-MCNC: 4.7 G/DL
PROT SERPL-MCNC: 7 G/DL (ref 6–8.5)
RBC # BLD AUTO: 3.86 10*6/MM3 (ref 4.14–5.8)
RBC # FLD AUTO: ABNORMAL /MM3
SODIUM SERPL-SCNC: 142 MMOL/L (ref 136–145)
WBC NRBC COR # BLD AUTO: 6.02 10*3/MM3 (ref 3.4–10.8)

## 2025-01-23 PROCEDURE — 97162 PT EVAL MOD COMPLEX 30 MIN: CPT

## 2025-01-23 PROCEDURE — 76942 ECHO GUIDE FOR BIOPSY: CPT

## 2025-01-23 PROCEDURE — 82948 REAGENT STRIP/BLOOD GLUCOSE: CPT

## 2025-01-23 PROCEDURE — 94799 UNLISTED PULMONARY SVC/PX: CPT

## 2025-01-23 PROCEDURE — 63710000001 PREDNISONE PER 1 MG: Performed by: INTERNAL MEDICINE

## 2025-01-23 PROCEDURE — 84157 ASSAY OF PROTEIN OTHER: CPT | Performed by: STUDENT IN AN ORGANIZED HEALTH CARE EDUCATION/TRAINING PROGRAM

## 2025-01-23 PROCEDURE — 89051 BODY FLUID CELL COUNT: CPT | Performed by: STUDENT IN AN ORGANIZED HEALTH CARE EDUCATION/TRAINING PROGRAM

## 2025-01-23 PROCEDURE — 25010000002 LIDOCAINE 1 % SOLUTION: Performed by: NURSE PRACTITIONER

## 2025-01-23 PROCEDURE — 94660 CPAP INITIATION&MGMT: CPT

## 2025-01-23 PROCEDURE — 63710000001 INSULIN LISPRO (HUMAN) PER 5 UNITS: Performed by: STUDENT IN AN ORGANIZED HEALTH CARE EDUCATION/TRAINING PROGRAM

## 2025-01-23 PROCEDURE — 0W993ZZ DRAINAGE OF RIGHT PLEURAL CAVITY, PERCUTANEOUS APPROACH: ICD-10-PCS | Performed by: RADIOLOGY

## 2025-01-23 PROCEDURE — 87205 SMEAR GRAM STAIN: CPT | Performed by: STUDENT IN AN ORGANIZED HEALTH CARE EDUCATION/TRAINING PROGRAM

## 2025-01-23 PROCEDURE — 97110 THERAPEUTIC EXERCISES: CPT

## 2025-01-23 PROCEDURE — 85027 COMPLETE CBC AUTOMATED: CPT | Performed by: STUDENT IN AN ORGANIZED HEALTH CARE EDUCATION/TRAINING PROGRAM

## 2025-01-23 PROCEDURE — 36415 COLL VENOUS BLD VENIPUNCTURE: CPT | Performed by: STUDENT IN AN ORGANIZED HEALTH CARE EDUCATION/TRAINING PROGRAM

## 2025-01-23 PROCEDURE — 82945 GLUCOSE OTHER FLUID: CPT | Performed by: STUDENT IN AN ORGANIZED HEALTH CARE EDUCATION/TRAINING PROGRAM

## 2025-01-23 PROCEDURE — 83615 LACTATE (LD) (LDH) ENZYME: CPT | Performed by: STUDENT IN AN ORGANIZED HEALTH CARE EDUCATION/TRAINING PROGRAM

## 2025-01-23 PROCEDURE — 84100 ASSAY OF PHOSPHORUS: CPT | Performed by: STUDENT IN AN ORGANIZED HEALTH CARE EDUCATION/TRAINING PROGRAM

## 2025-01-23 PROCEDURE — 88112 CYTOPATH CELL ENHANCE TECH: CPT | Performed by: STUDENT IN AN ORGANIZED HEALTH CARE EDUCATION/TRAINING PROGRAM

## 2025-01-23 PROCEDURE — 82042 OTHER SOURCE ALBUMIN QUAN EA: CPT | Performed by: STUDENT IN AN ORGANIZED HEALTH CARE EDUCATION/TRAINING PROGRAM

## 2025-01-23 PROCEDURE — 80053 COMPREHEN METABOLIC PANEL: CPT | Performed by: STUDENT IN AN ORGANIZED HEALTH CARE EDUCATION/TRAINING PROGRAM

## 2025-01-23 PROCEDURE — 88305 TISSUE EXAM BY PATHOLOGIST: CPT | Performed by: STUDENT IN AN ORGANIZED HEALTH CARE EDUCATION/TRAINING PROGRAM

## 2025-01-23 PROCEDURE — 87070 CULTURE OTHR SPECIMN AEROBIC: CPT | Performed by: STUDENT IN AN ORGANIZED HEALTH CARE EDUCATION/TRAINING PROGRAM

## 2025-01-23 PROCEDURE — 63710000001 INSULIN GLARGINE PER 5 UNITS: Performed by: STUDENT IN AN ORGANIZED HEALTH CARE EDUCATION/TRAINING PROGRAM

## 2025-01-23 PROCEDURE — 83986 ASSAY PH BODY FLUID NOS: CPT | Performed by: STUDENT IN AN ORGANIZED HEALTH CARE EDUCATION/TRAINING PROGRAM

## 2025-01-23 PROCEDURE — 83735 ASSAY OF MAGNESIUM: CPT | Performed by: STUDENT IN AN ORGANIZED HEALTH CARE EDUCATION/TRAINING PROGRAM

## 2025-01-23 RX ORDER — FAMOTIDINE 20 MG/1
20 TABLET, FILM COATED ORAL 2 TIMES DAILY PRN
Status: DISCONTINUED | OUTPATIENT
Start: 2025-01-23 | End: 2025-01-24 | Stop reason: HOSPADM

## 2025-01-23 RX ORDER — LIDOCAINE HYDROCHLORIDE 10 MG/ML
10 INJECTION, SOLUTION INFILTRATION; PERINEURAL ONCE
Status: COMPLETED | OUTPATIENT
Start: 2025-01-23 | End: 2025-01-23

## 2025-01-23 RX ORDER — BENZOCAINE/MENTHOL 6 MG-10 MG
1 LOZENGE MUCOUS MEMBRANE EVERY 12 HOURS SCHEDULED
Status: DISCONTINUED | OUTPATIENT
Start: 2025-01-23 | End: 2025-01-24 | Stop reason: HOSPADM

## 2025-01-23 RX ORDER — PREDNISONE 20 MG/1
40 TABLET ORAL
Status: DISCONTINUED | OUTPATIENT
Start: 2025-01-23 | End: 2025-01-24 | Stop reason: HOSPADM

## 2025-01-23 RX ADMIN — HYDROCORTISONE 1 APPLICATION: 1 CREAM TOPICAL at 21:35

## 2025-01-23 RX ADMIN — LIDOCAINE HYDROCHLORIDE 4 ML: 10 INJECTION, SOLUTION INFILTRATION; PERINEURAL at 09:28

## 2025-01-23 RX ADMIN — INSULIN LISPRO 4 UNITS: 100 INJECTION, SOLUTION INTRAVENOUS; SUBCUTANEOUS at 10:31

## 2025-01-23 RX ADMIN — INSULIN LISPRO 8 UNITS: 100 INJECTION, SOLUTION INTRAVENOUS; SUBCUTANEOUS at 21:35

## 2025-01-23 RX ADMIN — GABAPENTIN 600 MG: 300 CAPSULE ORAL at 10:15

## 2025-01-23 RX ADMIN — INSULIN GLARGINE 22 UNITS: 100 INJECTION, SOLUTION SUBCUTANEOUS at 10:31

## 2025-01-23 RX ADMIN — INSULIN LISPRO 5 UNITS: 100 INJECTION, SOLUTION INTRAVENOUS; SUBCUTANEOUS at 10:30

## 2025-01-23 RX ADMIN — GABAPENTIN 600 MG: 300 CAPSULE ORAL at 21:35

## 2025-01-23 RX ADMIN — POTASSIUM CHLORIDE 40 MEQ: 750 TABLET, EXTENDED RELEASE ORAL at 00:09

## 2025-01-23 RX ADMIN — ROSUVASTATIN 40 MG: 20 TABLET, FILM COATED ORAL at 10:13

## 2025-01-23 RX ADMIN — Medication 10 ML: at 08:16

## 2025-01-23 RX ADMIN — INSULIN LISPRO 7 UNITS: 100 INJECTION, SOLUTION INTRAVENOUS; SUBCUTANEOUS at 16:02

## 2025-01-23 RX ADMIN — Medication 50 MG: at 10:14

## 2025-01-23 RX ADMIN — SERTRALINE HYDROCHLORIDE 50 MG: 50 TABLET ORAL at 10:14

## 2025-01-23 RX ADMIN — Medication 1000 MCG: at 10:14

## 2025-01-23 RX ADMIN — TORSEMIDE 20 MG: 20 TABLET ORAL at 17:06

## 2025-01-23 RX ADMIN — GABAPENTIN 600 MG: 300 CAPSULE ORAL at 16:02

## 2025-01-23 RX ADMIN — TORSEMIDE 20 MG: 20 TABLET ORAL at 10:14

## 2025-01-23 RX ADMIN — Medication 10 ML: at 21:35

## 2025-01-23 RX ADMIN — INSULIN LISPRO 5 UNITS: 100 INJECTION, SOLUTION INTRAVENOUS; SUBCUTANEOUS at 16:02

## 2025-01-23 RX ADMIN — PREDNISONE 40 MG: 20 TABLET ORAL at 10:14

## 2025-01-23 NOTE — PLAN OF CARE
Goal Outcome Evaluation:  Plan of Care Reviewed With: patient        Progress: improving  Outcome Evaluation: VSS on 3L NC. R thoracentesis today, 1 L off. Pt reports no more SOA and feels better. Standby assist to bathroom. PO diuretics, good UOP. Psoriasis rash r/t chemo, hydrocortisone ordered. Bed alarm on.

## 2025-01-23 NOTE — CASE MANAGEMENT/SOCIAL WORK
Discharge Planning Assessment  [facility]     Patient Name: Branden Diop  MRN: [id number]  Today's Date: 1/23/2025    Admit Date: 1/22/2025    Plan: home with family; follow for needs   Discharge Needs Assessment       Row Name 01/23/25 1332       Living Environment    People in Home spouse    Current Living Arrangements home    Potentially Unsafe Housing Conditions none    Primary Care Provided by self    Provides Primary Care For no one    Family Caregiver if Needed spouse    Quality of Family Relationships helpful;involved       Resource/Environmental Concerns    Resource/Environmental Concerns none       Transition Planning    Patient/Family Anticipates Transition to home with family    Transportation Anticipated family or friend will provide       Discharge Needs Assessment    Readmission Within the Last 30 Days no previous admission in last 30 days    Equipment Currently Used at Home oxygen;cane, straight    Concerns to be Addressed discharge planning    Equipment Needed After Discharge none    Provided Post Acute Provider List? Refused    Refused Provider List Comment declined                   Discharge Plan       Row Name 01/23/25 1333       Plan    Plan home with family; follow for needs    Patient/Family in Agreement with Plan yes    Plan Comments Spoke with pt bedside. Confirmed facesheet correct. Pt reports he lives with his wife. He is IADLs has cane at home and home O2 from [place]. He has no SNF, has used [facility] in past. his PCP is Dr. Tino Lopez and uses [facility] on [address] no issues. He plans home at d/c. [facility] to follow for needs.                  Continued Care and Services - Admitted Since 1/22/2025    No active coordination exists for this encounter.       Selected Continued Care - Episodes Includes continued care and service providers with selected services from the active episodes listed below      [facility] Episode start date: 2/11/2022   There are no active outsourced providers  for this episode.                    Demographic Summary    No documentation.                  Functional Status    No documentation.                  Psychosocial    No documentation.                  Abuse/Neglect    No documentation.                  Legal    No documentation.                  Substance Abuse    No documentation.                  Patient Forms    No documentation.                     SVETLANA Vogel

## 2025-01-23 NOTE — PROGRESS NOTES
Name: Branden Diop ADMIT: 2025   : 1950  PCP: Tino Lopez MD    MRN: [id number] LOS: 1 days   AGE/SEX: 74 y.o. male  ROOM: [facility]/     Subjective   Subjective   Patient seen and examined this morning.  Hospital day 1.  He is awake and resting in bed, he is breathing better after thoracentesis this morning.  He remains on supplemental oxygen.       Objective   Objective   Vital Signs  Temp:  [97 °F (36.1 °C)-98.5 °F (36.9 °C)] 98.5 °F (36.9 °C)  Heart Rate:  [] 84  Resp:  [19-26] 20  BP: (103-135)/(47-85) 111/59  SpO2:  [89 %-97 %] 91 %  on  Flow (L/min) (Oxygen Therapy):  [3-5] 3;   Device (Oxygen Therapy): humidified;high-flow nasal cannula  Body mass index is 37.11 kg/m².  Physical Exam  Vitals and nursing note reviewed.   Constitutional:       General: He is awake. He is not in acute distress.  Cardiovascular:      Rate and Rhythm: Normal rate.      Pulses: Normal pulses.      Heart sounds: Normal heart sounds.   Pulmonary:      Effort: Pulmonary effort is normal. No respiratory distress.      Breath sounds: Decreased breath sounds present.      Comments: On supplemental oxygen  Abdominal:      Palpations: Abdomen is soft.      Tenderness: There is no abdominal tenderness.   Skin:     General: Skin is warm and dry.   Neurological:      Mental Status: He is alert.   Psychiatric:         Behavior: Behavior is cooperative.       Results Review     I reviewed the patient's new clinical results.  Results from last 7 days   Lab Units 25  0447 25  1158   WBC 10*3/mm3 6.02 6.91   HEMOGLOBIN g/dL 11.2* 10.8*   PLATELETS 10*3/mm3 295 269     Results from last 7 days   Lab Units 25  0447 25  1158   SODIUM mmol/L 142 136   POTASSIUM mmol/L 4.2 3.6   CHLORIDE mmol/L 99 97*   CO2 mmol/L 31.7* 29.8*   BUN mg/dL 8 9   CREATININE mg/dL 0.89 0.76   GLUCOSE mg/dL 188* 254*   EGFR mL/min/1.73 88.8 93.2     Results from last 7 days   Lab Units 25  0447 25  1158   ALBUMIN  g/dL 3.0* 3.4*   BILIRUBIN mg/dL 0.9 1.0   ALK PHOS U/L 136* 134*   AST (SGOT) U/L 29 30   ALT (SGPT) U/L 19 20     Results from last 7 days   Lab Units 01/23/25  0447 01/22/25  1158   CALCIUM mg/dL 8.9 9.3   ALBUMIN g/dL 3.0* 3.4*   MAGNESIUM mg/dL 1.8 1.8   PHOSPHORUS mg/dL 3.9 3.1     Results from last 7 days   Lab Units 01/22/25  1158   PROCALCITONIN ng/mL 0.06   LACTATE mmol/L 1.9     Glucose   Date/Time Value Ref Range Status   01/23/2025 0604 198 (H) 70 - 130 mg/dL Final   01/22/2025 2122 172 (H) 70 - 130 mg/dL Final   01/22/2025 1826 149 (H) 70 - 130 mg/dL Final       CT Angiogram Chest    Result Date: 1/22/2025  1. No evidence of pulmonary embolus. 2. Moderate size right pleural effusion. 3. Patchy areas of infiltrate in the bilateral lungs are consistent with areas of acute pneumonia and have progressed since the 12/23/2024 study. Please correlate with the clinical findings. Follow-up CT of the chest also recommended.  Radiation dose reduction techniques were utilized, including automated exposure control and exposure modulation based on body size.       XR Chest 1 View    Result Date: 1/22/2025  Low lung volumes. Mildly increased small to moderate right pleural effusion. New adjacent hazy right lower lobe opacities, layering pleural fluid versus atelectasis versus infiltrate. No pneumothorax. Cardiac silhouette at the upper limits of normal as before. Right IJ port with tip overlying the mid SVC. No focal osseous abnormality.  This report was finalized on 1/22/2025 12:55 PM by Dr. Kartik Humphrey M.D on Workstation: [id number]       I have personally reviewed all medications:  Scheduled Medications  gabapentin, 600 mg, Oral, Q8H  insulin glargine, 20 Units, Subcutaneous, Daily  insulin lispro, 2-9 Units, Subcutaneous, 4x Daily AC & at Bedtime  insulin lispro, 5 Units, Subcutaneous, TID With Meals  predniSONE, 40 mg, Oral, Daily With Breakfast  [Held by provider] rivaroxaban, 20 mg, Oral, Daily With  Dinner  rosuvastatin, 40 mg, Oral, Daily  sertraline, 50 mg, Oral, Daily  sodium chloride, 10 mL, Intravenous, Q12H  torsemide, 20 mg, Oral, BID Diuretics  triamcinolone, 1 Application, Topical, BID  vitamin B-12, 1,000 mcg, Oral, Daily  vitamin B-6, 50 mg, Oral, Daily    Infusions   Diet  NPO Diet NPO Type: Strict NPO    I have personally reviewed:  [x]  Laboratory   [x]  Microbiology   [x]  Radiology   [x]  EKG/Telemetry  [x]  Cardiology/Vascular   []  Pathology    []  Records       Assessment/Plan     Active Hospital Problems    Diagnosis  POA    **Acute on chronic hypoxic respiratory failure [J96.21]  Yes    Hypoxemia [R09.02]  Yes    Drug-induced skin rash [L27.0]  Yes    Adverse effect of antineoplastic drug [T45.1X5A]  Yes    Pleural effusion on right [J90]  Yes    Bone mass [M89.8X9]  Yes    Anemia associated with chemotherapy [D64.81, T45.1X5A]  Yes    Malignant neoplasm of middle lobe of right lung [C34.2]  Yes    Essential hypertension [I10]  Yes    Type 2 diabetes mellitus, with long-term current use of insulin [E11.9, Z79.4]  Not Applicable    Hyperlipidemia [E78.5]  Yes    COPD (chronic obstructive pulmonary disease) [J44.9]  Yes    A-fib [I48.91]  Yes    Obesity [E66.9]  Yes      Resolved Hospital Problems   No resolved problems to display.       74 y.o. male admitted with Acute on chronic hypoxic respiratory failure.    Initial chest x-ray obtained, showing mildly increased small to moderate right pleural effusion, right lower lobe opacity felt to represent atelectasis versus infiltrate, CT angiogram of the chest obtained, negative for evidence of pulmonary embolism, did show moderate size right pleural effusion, Patchy areas of infiltrate in the bilateral lungs are consistent with   areas of acute pneumonia and have progressed since the 12/23/2024 study. Troponin slightly elevated on arrival, however, normalized on repeat testing.  proBNP normal, RVP negative, procalcitonin normal.  WBC count  normal, patient afebrile. Patient status post ultrasound-guided thoracentesis earlier today (01/23), 1 L of debora-colored pleural fluid removed, fluid studies ordered and pending.  Will follow-up results of this.  Pulmonology consulted and following, patient with acute on chronic hypoxic respiratory failure, bilateral pulmonary infiltrates felt to represent pneumonitis from Keytruda, he is on prednisone 40 mg daily, can use home BiPAP for JUAN.  Continue to closely monitor respiratory status, will continue to follow their plans and recommendations, greatly appreciate their help.  Wean oxygen as tolerated.    Hemoglobin A1c 8.00% (06/21/2024), glucose elevated at present, currently uncontrolled, warrants adjustment.  Will increase insulin now.  Monitor response to this, adjust regimen as needed.  Order repeat A1c for the morning.    Heart rate appears controlled at present, Xarelto held for thoracentesis, will resume as soon as appropriate.  Continue telemetry monitoring.    Hemoglobin slightly low, no current evidence of bleeding, closely monitor this.  Repeat CBC in the morning for reassessment.    Blood pressure appears acceptable acutely, continue to monitor this.    Xarelto (home med) for DVT prophylaxis.  Full code.  Discussed with patient and nursing staff.  Anticipate discharge  TBD  when cleared by consultants.  And pending physical therapy evaluation.  Expected discharge date/ time has not been documented.      Flaco Solitario DO  [facility]  01/23/25

## 2025-01-23 NOTE — H&P
Patient Name:  Branden Diop  YOB: 1950  MRN:  [id number]  Admit Date:  1/22/2025  Patient Care Team:  Tino Lopez MD as PCP - General (Internal Medicine)  Tino Lopez MD as PCP - Family Medicine  Mart Ureña MD as Consulting Physician (Cardiology)  Martir Trevino MD as Surgeon (General Surgery)  Dione Carter RN as Nurse Navigator  David Figueroa MD as Referring Physician (Thoracic Surgery)  Duy Villasenor MD PhD as Consulting Physician (Hematology and Oncology)  Yashira Mendiola MD as Consulting Physician (Radiation Oncology)      Subjective   History Present Illness     Chief Complaint   Patient presents with    Shortness of Breath       This is a 74-year-old male with a past medical history of atrial fibrillation on Xarelto, lung cancer status post lobectomy, type 2 diabetes, hyperlipidemia, COPD on supplemental oxygen who presents the hospital after experiencing shortness of air for 1 week.  He underwent a CT Emergency Department that was concerning for reaccumulation of the right pleural effusion as well as multifocal opacities.  Denies any chest pain, fevers, chills.      ersonal History     Past Medical History:   Diagnosis Date    Anxiety     Arthritis     Atrial fibrillation     CURRENTLY    Bunion of right foot     Colon polyps     COPD (chronic obstructive pulmonary disease)     MILD. NO MEDS FOR    Depression     History of atrial fibrillation     WITH CARDIOVERSION. NO PROBLEMS    History of skin cancer     BCC REMOVED FROM [place]    [place]     Hyperlipidemia     Inguinal hernia, recurrent     RIGHT    Left foot pain     Lung nodules     Rash     IN GROIN TREATING FOR YEAST INFECTION BY PCP    Redness of skin     LOWER LEGS BILATERAL    Scab     BILATERAL LEGS HEALING    Sleep apnea with use of continuous positive airway pressure (CPAP)     CPAP    Streptococcus pneumoniae     ABOUT 6-7 YR AGO.     Type 2 diabetes mellitus      Past Surgical  History:   Procedure Laterality Date    BASAL CELL CARCINOMA EXCISION      BRONCHOSCOPY WITH ION ROBOTIC ASSIST N/A 5/6/2024    Procedure: BRONCHOSCOPY WITH ION ROBOT WITH FNA, BIOPSIES, BAL AND ENDOBRONCHIAL ULTRASOUND WITH FNA;  Surgeon: Yony Coles MD;  Location: [facility] ENDOSCOPY;  Service: Robotics - Pulmonary;  Laterality: N/A;  PRE: PULMONARY NODULES  POST: SAME    CARDIAC CATHETERIZATION N/A 11/15/2021    Procedure: Coronary angiography;  Surgeon: Alfredo Jennings MD;  Location: [facility] CATH INVASIVE LOCATION;  Service: Cardiovascular;  Laterality: N/A;    CARDIAC CATHETERIZATION N/A 11/15/2021    Procedure: Left heart cath;  Surgeon: Alfredo Jennings MD;  Location: [facility] CATH INVASIVE LOCATION;  Service: Cardiovascular;  Laterality: N/A;    CARDIAC CATHETERIZATION N/A 11/15/2021    Procedure: Left ventriculography;  Surgeon: Alfredo Jennings MD;  Location: [facility] CATH INVASIVE LOCATION;  Service: Cardiovascular;  Laterality: N/A;    CARDIAC CATHETERIZATION N/A 11/15/2021    Procedure: Aortic root aortogram;  Surgeon: Alfredo Jennings MD;  Location: [facility] CATH INVASIVE LOCATION;  Service: Cardiovascular;  Laterality: N/A;    CARDIOVERSION      CHOLECYSTECTOMY N/A 10/07/2017    Procedure: CHOLECYSTECTOMY LAPAROSCOPIC;  Surgeon: Martir Trevino MD;  Location: [facility] MAIN OR;  Service:     COLONOSCOPY  2007    COLONOSCOPY N/A 8/30/2022    Procedure: COLONOSCOPY TO CECUM AND TI AND COLD SNARE POLYPECTOMY;  Surgeon: Cj Lopez MD;  Location: [facility] ENDOSCOPY;  Service: Gastroenterology;  Laterality: N/A;  Pre: History of colon polyps  Post: POLYP, PREVIOUS TATTOO    FOOT SURGERY Left     TOES ARE PINNED. ALL BUT GREAT TOE    HAND SURGERY      THUMB    HERNIA REPAIR      INGUINAL HERNIA REPAIR Right 06/27/2018    Procedure: INGUINAL HERNIA REPAIR, OPEN, RECURRENT;  Surgeon: Martir Trevino MD;  Location: [facility] OR OSC;  Service: General    LASIK Bilateral     LOBECTOMY Right 6/12/2024     Procedure: BRONCHOSCOPY, RIGHT VIDEO ASSISTED THORACOSCOPY WITH RIGHT MIDDLE LOBECTOMY WITH DAVINCI ROBOT, MEDIASTINAL LYMPH NODE DISSECTION, INTERCOSTAL NERVE BLOCK;  Surgeon: David Figueroa MD;  Location: [facility] OR;  Service: Robotics - DaVinci;  Laterality: Right;    NECK SURGERY      growth on salivary gland    REPLACEMENT TOTAL KNEE Right     (he is going to have a LTKR next month)    REPLACEMENT TOTAL KNEE Left     VENOUS ACCESS DEVICE (PORT) INSERTION Right 2024    Procedure: INSERTION VENOUS ACCESS DEVICE;  Surgeon: David Figueroa MD;  Location: [facility] OR;  Service: Thoracic;  Laterality: Right;     Family History   Problem Relation Age of Onset    Cancer Other     Stroke Mother     Alcohol abuse Father     Malig Hyperthermia Neg Hx      Social History     Tobacco Use    Smoking status: Former     Current packs/day: 0.00     Average packs/day: 1 pack/day for 30.0 years (30.0 ttl pk-yrs)     Types: Cigars, Cigarettes     Start date: 1984     Quit date: 2014     Years since quittin.0    Smokeless tobacco: Never   Vaping Use    Vaping status: Never Used   Substance Use Topics    Alcohol use: Never     Comment: caffeine use- decaf coffee    Drug use: Never     No current facility-administered medications on file prior to encounter.     Current Outpatient Medications on File Prior to Encounter   Medication Sig Dispense Refill    famotidine (PEPCID) 20 MG tablet Take 1 tablet by mouth 2 (Two) Times a Day As Needed for Heartburn.      ferrous sulfate 325 (65 FE) MG tablet Take 0.5 tablets by mouth Daily With Breakfast.      fexofenadine (ALLEGRA) 180 MG tablet Take 1 tablet by mouth Daily.      folic acid (FOLVITE) 1 MG tablet Take 1 tablet by mouth Daily. 90 tablet 3    gabapentin (NEURONTIN) 300 MG capsule Take 2 capsules by mouth Every 8 (Eight) Hours. 180 capsule 2    insulin aspart (NovoLOG FlexPen) 100 UNIT/ML solution pen-injector sc pen Inject 5 Units under the skin into the  appropriate area as directed 3 (Three) Times a Day With Meals for 30 days per sliding scale 15 mL 0    Insulin Glargine, 1 Unit Dial, (Toujeo SoloStar) 300 UNIT/ML solution pen-injector injection Inject 24 Units under the skin into the appropriate area as directed Daily.      Multiple Vitamin (MULTIVITAMINS PO) Take 1 tablet by mouth Daily.      rosuvastatin (CRESTOR) 40 MG tablet TAKE ONE TABLET BY MOUTH DAILY (Patient taking differently: Take 1 tablet by mouth Daily.) 90 tablet 1    sertraline (ZOLOFT) 50 MG tablet TAKE ONE TABLET BY MOUTH DAILY (Patient taking differently: Take 1 tablet by mouth Daily.) 90 tablet 1    Tirzepatide (Mounjaro) 2.5 MG/0.5ML solution pen-injector pen Inject 2.5 mg under the skin into the appropriate area as directed 1 (One) Time Per Week. Mondays      torsemide (DEMADEX) 20 MG tablet Take 1 tablet by mouth 2 (Two) Times a Day.      triamcinolone (KENALOG) 0.1 % cream Apply 1 Application topically to the appropriate area as directed 2 (Two) Times a Day.      vitamin B-12 (CYANOCOBALAMIN) 1000 MCG tablet Take 1 tablet by mouth Daily. 90 tablet 3    vitamin B-6 (PYRIDOXINE) 50 MG tablet Take 1 tablet by mouth Daily. 90 tablet 3    Xarelto 20 MG tablet TAKE ONE TABLET BY MOUTH DAILY (Patient taking differently: Take 1 tablet by mouth Daily With Dinner.) 30 tablet 9    [DISCONTINUED] HYDROcodone-acetaminophen (NORCO) 5-325 MG per tablet Take 1 tablet by mouth Every 6 (Six) Hours As Needed for Moderate Pain. 12 tablet 0    [DISCONTINUED] Insulin Pen Needle (BD Pen Needle Cait 2nd Gen) 32G X 4 MM misc 1 each 3 times a day. 100 each 0    [DISCONTINUED] predniSONE (DELTASONE) 10 MG tablet Take 1 tablet by mouth Daily.       No Known Allergies    Objective    Objective     Vital Signs  Temp:  [97 °F (36.1 °C)] 97 °F (36.1 °C)  Heart Rate:  [] 78  Resp:  [24-26] 24  BP: (103-134)/(47-85) 120/53  SpO2:  [89 %-96 %] 93 %  on  Flow (L/min) (Oxygen Therapy):  [3-5] 3;   Device (Oxygen  Therapy): nasal cannula  Body mass index is 37.11 kg/m².    Physical Exam  Constitutional:       General: He is not in acute distress.     Appearance: He is ill-appearing.   HENT:      Head: Normocephalic and atraumatic.   Eyes:      Extraocular Movements: Extraocular movements intact.      Pupils: Pupils are equal, round, and reactive to light.   Cardiovascular:      Rate and Rhythm: Normal rate. Rhythm irregular.   Pulmonary:      Effort: Pulmonary effort is normal.      Breath sounds: No wheezing.   Abdominal:      General: There is no distension.      Palpations: Abdomen is soft.      Tenderness: There is no abdominal tenderness.   Skin:     Comments: Widespread rash noted on arms and legs    Neurological:      Mental Status: He is alert and oriented to person, place, and time.      Motor: Weakness present.         Results Review:  I reviewed the patient's new clinical results.  I reviewed the patient's new imaging results and agree with the interpretation.  I reviewed the patient's other test results and agree with the interpretation  I personally viewed and interpreted the patient's EKG/Telemetry data  Discussed with ED provider.    Lab Results (last 24 hours)       Procedure Component Value Units Date/Time    CBC & Differential [[id number]]  (Abnormal) Collected: 01/22/25 1158    Specimen: Blood Updated: 01/22/25 1212    Narrative:      The following orders were created for panel order CBC & Differential.  Procedure                               Abnormality         Status                     ---------                               -----------         ------                     CBC Auto Differential[[id number]]        Abnormal            Final result                 Please view results for these tests on the individual orders.    Comprehensive Metabolic Panel [[id number]]  (Abnormal) Collected: 01/22/25 1158    Specimen: Blood Updated: 01/22/25 1236     Glucose 254 mg/dL      BUN 9 mg/dL      Creatinine 0.76  mg/dL      Sodium 136 mmol/L      Potassium 3.6 mmol/L      Chloride 97 mmol/L      CO2 29.8 mmol/L      Calcium 9.3 mg/dL      Total Protein 7.0 g/dL      Albumin 3.4 g/dL      ALT (SGPT) 20 U/L      AST (SGOT) 30 U/L      Alkaline Phosphatase 134 U/L      Total Bilirubin 1.0 mg/dL      Globulin 3.6 gm/dL      A/G Ratio 0.9 g/dL      BUN/Creatinine Ratio 11.8     Anion Gap 9.2 mmol/L      eGFR 93.2 mL/min/1.73     Narrative:      GFR Categories in Chronic Kidney Disease (CKD)      GFR Category          GFR (mL/min/1.73)    Interpretation  G1                     90 or greater         Normal or high (1)  G2                      60-89                Mild decrease (1)  G3a                   45-59                Mild to moderate decrease  G3b                   30-44                Moderate to severe decrease  G4                    15-29                Severe decrease  G5                    14 or less           Kidney failure          (1)In the absence of evidence of kidney disease, neither GFR category G1 or G2 fulfill the criteria for CKD.    eGFR calculation 2021 CKD-EPI creatinine equation, which does not include race as a factor    BNP [[id number]]  (Normal) Collected: 01/22/25 1158    Specimen: Blood Updated: 01/22/25 1243     proBNP 671.0 pg/mL     Narrative:      This assay is used as an aid in the diagnosis of individuals suspected of having heart failure. It can be used as an aid in the diagnosis of acute decompensated heart failure (ADHF) in patients presenting with signs and symptoms of ADHF to the emergency department (ED). In addition, NT-proBNP of <300 pg/mL indicates ADHF is not likely.    Age Range Result Interpretation  NT-proBNP Concentration (pg/mL:      <50             Positive            >450                   Gray                 300-450                    Negative             <300    50-75           Positive            >900                  Gray                300-900                  Negative             <300      >75             Positive            >1800                  Gray                300-1800                  Negative            <300    High Sensitivity Troponin T [[id number]]  (Abnormal) Collected: 01/22/25 1158    Specimen: Blood Updated: 01/22/25 1243     HS Troponin T 22 ng/L     Narrative:      High Sensitive Troponin T Reference Range:  <14.0 ng/L- Negative Female for AMI  <22.0 ng/L- Negative Male for AMI  >=14 - Abnormal Female indicating possible myocardial injury.  >=22 - Abnormal Male indicating possible myocardial injury.   Clinicians would have to utilize clinical acumen, EKG, Troponin, and serial changes to determine if it is an Acute Myocardial Infarction or myocardial injury due to an underlying chronic condition.         CBC Auto Differential [[id number]]  (Abnormal) Collected: 01/22/25 1158    Specimen: Blood Updated: 01/22/25 1212     WBC 6.91 10*3/mm3      RBC 3.73 10*6/mm3      Hemoglobin 10.8 g/dL      Hematocrit 33.9 %      MCV 90.9 fL      MCH 29.0 pg      MCHC 31.9 g/dL      RDW 16.5 %      RDW-SD 55.0 fl      MPV 9.2 fL      Platelets 269 10*3/mm3      Neutrophil % 73.9 %      Lymphocyte % 14.2 %      Monocyte % 6.9 %      Eosinophil % 3.6 %      Basophil % 0.4 %      Immature Grans % 1.0 %      Neutrophils, Absolute 5.10 10*3/mm3      Lymphocytes, Absolute 0.98 10*3/mm3      Monocytes, Absolute 0.48 10*3/mm3      Eosinophils, Absolute 0.25 10*3/mm3      Basophils, Absolute 0.03 10*3/mm3      Immature Grans, Absolute 0.07 10*3/mm3      nRBC 0.0 /100 WBC     Protime-INR [[id number]]  (Abnormal) Collected: 01/22/25 1158    Specimen: Blood Updated: 01/22/25 1229     Protime 26.6 Seconds      INR 2.43    Lactic Acid, Plasma [[id number]]  (Normal) Collected: 01/22/25 1158    Specimen: Blood Updated: 01/22/25 1234     Lactate 1.9 mmol/L     Procalcitonin [[id number]]  (Normal) Collected: 01/22/25 1158    Specimen: Blood Updated: 01/22/25 1243     Procalcitonin 0.06 ng/mL     Narrative:    "   As a Marker for Sepsis (Non-Neonates):    1. <0.5 ng/mL represents a low risk of severe sepsis and/or septic shock.  2. >2 ng/mL represents a high risk of severe sepsis and/or septic shock.    As a Marker for Lower Respiratory Tract Infections that require antibiotic therapy:    PCT on Admission    Antibiotic Therapy       6-12 Hrs later    >0.5                Strongly Recommended  >0.25 - <0.5        Recommended   0.1 - 0.25          Discouraged              Remeasure/reassess PCT  <0.1                Strongly Discouraged     Remeasure/reassess PCT    As 28 day mortality risk marker: \"Change in Procalcitonin Result\" (>80% or <=80%) if Day 0 (or Day 1) and Day 4 values are available. Refer to [email or web address]    Change in PCT <=80%  A decrease of PCT levels below or equal to 80% defines a positive change in PCT test result representing a higher risk for 28-day all-cause mortality of patients diagnosed with severe sepsis for septic shock.    Change in PCT >80%  A decrease of PCT levels of more than 80% defines a negative change in PCT result representing a lower risk for 28-day all-cause mortality of patients diagnosed with severe sepsis or septic shock.       Magnesium [[id number]]  (Normal) Collected: 01/22/25 1158    Specimen: Blood Updated: 01/22/25 1236     Magnesium 1.8 mg/dL     Phosphorus [[id number]]  (Normal) Collected: 01/22/25 1158    Specimen: Blood Updated: 01/22/25 1236     Phosphorus 3.1 mg/dL     Respiratory Panel PCR w/COVID-19(SARS-CoV-2) CRISTIANO/AUBREE/EVAN/PAD/COR/FRANCISCO In-House, NP Swab in UTM/VTM, 2 HR TAT - Swab, Nasopharynx [[id number]]  (Normal) Collected: 01/22/25 1200    Specimen: Swab from Nasopharynx Updated: 01/22/25 1317     ADENOVIRUS, PCR Not Detected     Coronavirus 229E Not Detected     Coronavirus HKU1 Not Detected     Coronavirus NL63 Not Detected     Coronavirus OC43 Not Detected     COVID19 Not Detected     Human Metapneumovirus Not Detected     Human " Rhinovirus/Enterovirus Not Detected     Influenza A PCR Not Detected     Influenza B PCR Not Detected     Parainfluenza Virus 1 Not Detected     Parainfluenza Virus 2 Not Detected     Parainfluenza Virus 3 Not Detected     Parainfluenza Virus 4 Not Detected     RSV, PCR Not Detected     Bordetella pertussis pcr Not Detected     Bordetella parapertussis PCR Not Detected     Chlamydophila pneumoniae PCR Not Detected     Mycoplasma pneumo by PCR Not Detected    Narrative:      In the setting of a positive respiratory panel with a viral infection PLUS a negative procalcitonin without other underlying concern for bacterial infection, consider observing off antibiotics or discontinuation of antibiotics and continue supportive care. If the respiratory panel is positive for atypical bacterial infection (Bordetella pertussis, Chlamydophila pneumoniae, or Mycoplasma pneumoniae), consider antibiotic de-escalation to target atypical bacterial infection.    High Sensitivity Troponin T 1Hr [[id number]] Collected: 01/22/25 1331    Specimen: Blood Updated: 01/22/25 1402     HS Troponin T 21 ng/L      Troponin T Numeric Delta -1 ng/L      Troponin T % Delta -5    Narrative:      High Sensitive Troponin T Reference Range:  <14.0 ng/L- Negative Female for AMI  <22.0 ng/L- Negative Male for AMI  >=14 - Abnormal Female indicating possible myocardial injury.  >=22 - Abnormal Male indicating possible myocardial injury.   Clinicians would have to utilize clinical acumen, EKG, Troponin, and serial changes to determine if it is an Acute Myocardial Infarction or myocardial injury due to an underlying chronic condition.         POC Glucose Once [[id number]]  (Abnormal) Collected: 01/22/25 1826    Specimen: Blood Updated: 01/22/25 1826     Glucose 149 mg/dL             Results for orders placed or performed during the hospital encounter of 11/29/24   Blood Culture - Blood, Hand, Right    Specimen: Hand, Right; Blood   Result Value Ref Range     Blood Culture No growth at 5 days        Imaging Results (Last 24 Hours)       Procedure Component Value Units Date/Time    CT Angiogram Chest [[id number]] Collected: 01/22/25 1714     Updated: 01/22/25 1714    Narrative:      CT ANGIOGRAM OF THE CHEST WITH CONTRAST INCLUDING RECONSTRUCTION IMAGES  01/22/2025     HISTORY: Shortness of breath. Possible pulmonary embolus.     TECHNIQUE: Following the intravenous contrast injection CT angiography  was performed through the chest. Sagittal, coronal and 3D reconstruction  images were reviewed.     FINDINGS: The pulmonary arterial system is well opacified with no  evidence of pulmonary embolus. A few small to mildly enlarged  mediastinal lymph nodes are seen. There is some aortic and coronary  calcification.     Moderate sized right pleural effusion is seen with some right lower lobe  atelectasis or pneumonia. There are patchy areas of increased density in  the bilateral upper lobes, left lower lobe and minimally in the right  middle lobe. These findings are more pronounced than on the 12/23/2024  study. Emphysematous changes are seen in both lungs primarily in the  upper lobes.     The upper abdomen appears unremarkable except for bilateral renal cysts  and prior cholecystectomy.       Impression:      1. No evidence of pulmonary embolus.  2. Moderate size right pleural effusion.  3. Patchy areas of infiltrate in the bilateral lungs are consistent with  areas of acute pneumonia and have progressed since the 12/23/2024 study.  Please correlate with the clinical findings. Follow-up CT of the chest  also recommended.     Radiation dose reduction techniques were utilized, including automated  exposure control and exposure modulation based on body size.          XR Chest 1 View [[id number]] Collected: 01/22/25 1252     Updated: 01/22/25 1258    Narrative:      XR CHEST 1 VW-     INDICATION: Shortness of breath     COMPARISON: CT chest 12/23/2024 and chest radiographs dating  back to  2/16/2016       Impression:      Low lung volumes. Mildly increased small to moderate right  pleural effusion. New adjacent hazy right lower lobe opacities, layering  pleural fluid versus atelectasis versus infiltrate. No pneumothorax.  Cardiac silhouette at the upper limits of normal as before. Right IJ  port with tip overlying the mid SVC. No focal osseous abnormality.     This report was finalized on 1/22/2025 12:55 PM by Dr. Kartik Humphrey M.D on Workstation: [id number]               Results for orders placed during the hospital encounter of 11/29/24    Adult Transthoracic Echo Complete W/ Cont if Necessary Per Protocol    Interpretation Summary    Left ventricular systolic function is normal. Calculated left ventricular EF = 59.2%    Left ventricular diastolic function was indeterminate.    The left atrial cavity is severely dilated.    Small patent foramen ovale present.    Saline test results are positive.    Estimated right ventricular systolic pressure from tricuspid regurgitation is mildly elevated (35-45 mmHg). Calculated right ventricular systolic pressure from tricuspid regurgitation is 38 mmHg.      ECG 12 Lead ED Triage Standing Order; SOA   Final Result   HEART RATE=71  bpm   RR Ukplvjpg=480  ms   HI Interval=  ms   P Horizontal Axis=  deg   P Front Axis=  deg   QRSD Interval=93  ms   QT Lydfqoeq=455  ms   IBfB=037  ms   QRS Axis=32  deg   T Wave Axis=60  deg   - ABNORMAL ECG -   Atrial fibrillation   Borderline  low voltage, extremity leads   No change from previous tracing   Electronically Signed By: Sandra Hoffmann ([facility]) 2025-01-22 17:02:39   Date and Time of Study:2025-01-22 11:43:14      Telemetry Scan   Final Result                 Assessment/Plan     Active Hospital Problems    Diagnosis  POA    **Acute on chronic hypoxic respiratory failure [J96.21]  Yes    Drug-induced skin rash [L27.0]  Yes    Adverse effect of antineoplastic drug [T45.1X5A]  Yes    Pleural effusion on right  [J90]  Yes    Bone mass [M89.8X9]  Yes    Anemia associated with chemotherapy [D64.81, T45.1X5A]  Yes    Malignant neoplasm of middle lobe of right lung [C34.2]  Yes    Essential hypertension [I10]  Yes    Type 2 diabetes mellitus, with long-term current use of insulin [E11.9, Z79.4]  Not Applicable    Hyperlipidemia [E78.5]  Yes    COPD (chronic obstructive pulmonary disease) [J44.9]  Yes    A-fib [I48.91]  Yes      Resolved Hospital Problems   No resolved problems to display.     Assessment and plan    Dyspnea  Right-sided pleural effusion  Order thoracentesis with pleural fluid analysis  RVP negative     History of squamous cell lung cancer  Status post right middle lobectomy on 6/12/2024  He is currently on pembrolizumab.  There are concerns of associated pneumonitis, dermatitis.   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    Abnormal CT chest  Afebrile, negative procalcitonin, no leukocytosis.  Will hold off on initiating antibiotics at this time  This is most likely progression of pneumonitis related to keytruda.     Abnormal bone lesion  MRI of the left hand in November 2024.  Outpatient hand surgery consultation has been recommended    Type 2 diabetes with hyperglycemia  SSI    Paroxysmal atrial fibrillation  Not on any RC agents. AC with xarelto, holding tonight for thoracentesis       I discussed the patient's findings and my recommendations with patient and ED provider.    VTE Prophylaxis - Xarelto (home med).  Code Status - Full code.       Juancho Diallo MD  [facility]  01/22/25  21:13 EST

## 2025-01-23 NOTE — THERAPY EVALUATION
Patient Name: Branden Diop  : 1950    MRN: [id number]                              Today's Date: 2025       Admit Date: 2025    Visit Dx:     ICD-10-CM ICD-9-CM   1. Acute on chronic respiratory failure with hypoxia  J96.21 518.84     799.02   2. Recurrent right pleural effusion  J90 511.9   3. Pneumonia of both lungs due to infectious organism, unspecified part of lung  J18.9 483.8     Patient Active Problem List   Diagnosis    A-fib    Atrioventricular block, Mobitz type 1, Wenckebach    Apnea, sleep    Obesity    Streptococcus pneumoniae    Sleep apnea with use of continuous positive airway pressure (CPAP)    Sinusitis    Right wrist pain    Pneumonia    Type 2 diabetes mellitus, with long-term current use of insulin    Hyperlipidemia    Hammertoe    Depression    COPD (chronic obstructive pulmonary disease)    Colon polyps    Anxiety    Pruritus    Cholelithiasis    Fatty liver    Essential hypertension    Vitamin D deficiency    Emphysema, unspecified    Bronchiectasis with acute exacerbation    FHx: colonic polyps    Diverticulosis    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    Malignant neoplasm of middle lobe of right lung    Fluid collection at surgical site, initial encounter    Encounter for long-term (current) use of high-risk medication    Fitting and adjustment of vascular catheter    Anemia associated with chemotherapy    Peripheral neuropathy due to chemotherapy    Acute respiratory failure    Rash in adult    Bone mass    Drug-induced skin rash    Adverse effect of antineoplastic drug    Pleural effusion on right    Acute on chronic hypoxic respiratory failure    Hypoxemia     Past Medical History:   Diagnosis Date    Anxiety     Arthritis     Atrial fibrillation     CURRENTLY    Bunion of right foot     Colon polyps     COPD (chronic obstructive pulmonary disease)     MILD. NO MEDS FOR    Depression     History of atrial fibrillation     WITH CARDIOVERSION. NO  PROBLEMS    History of skin cancer     BCC REMOVED FROM BACK    [place] (hard of hearing)     Hyperlipidemia     Inguinal hernia, recurrent     RIGHT    Left foot pain     Lung nodules     Rash     IN GROIN TREATING FOR YEAST INFECTION BY PCP    Redness of skin     LOWER LEGS BILATERAL    Scab     BILATERAL LEGS HEALING    Sleep apnea with use of continuous positive airway pressure (CPAP)     CPAP    Streptococcus pneumoniae     ABOUT 6-7 YR AGO.     Type 2 diabetes mellitus      Past Surgical History:   Procedure Laterality Date    BASAL CELL CARCINOMA EXCISION      BRONCHOSCOPY WITH ION ROBOTIC ASSIST N/A 5/6/2024    Procedure: BRONCHOSCOPY WITH ION ROBOT WITH FNA, BIOPSIES, BAL AND ENDOBRONCHIAL ULTRASOUND WITH FNA;  Surgeon: Yony Coles MD;  Location: [facility] ENDOSCOPY;  Service: Robotics - Pulmonary;  Laterality: N/A;  PRE: PULMONARY NODULES  POST: SAME    CARDIAC CATHETERIZATION N/A 11/15/2021    Procedure: Coronary angiography;  Surgeon: Alfredo Jennings MD;  Location: [facility] CATH INVASIVE LOCATION;  Service: Cardiovascular;  Laterality: N/A;    CARDIAC CATHETERIZATION N/A 11/15/2021    Procedure: Left heart cath;  Surgeon: Alfredo Jennings MD;  Location: [facility] CATH INVASIVE LOCATION;  Service: Cardiovascular;  Laterality: N/A;    CARDIAC CATHETERIZATION N/A 11/15/2021    Procedure: Left ventriculography;  Surgeon: Alfredo Jennings MD;  Location: [facility] CATH INVASIVE LOCATION;  Service: Cardiovascular;  Laterality: N/A;    CARDIAC CATHETERIZATION N/A 11/15/2021    Procedure: Aortic root aortogram;  Surgeon: Alfredo Jennings MD;  Location: [facility] CATH INVASIVE LOCATION;  Service: Cardiovascular;  Laterality: N/A;    CARDIOVERSION      CHOLECYSTECTOMY N/A 10/07/2017    Procedure: CHOLECYSTECTOMY LAPAROSCOPIC;  Surgeon: Martir Trevino MD;  Location: [facility] MAIN OR;  Service:     COLONOSCOPY  2007    COLONOSCOPY N/A 8/30/2022    Procedure: COLONOSCOPY TO CECUM AND TI AND COLD SNARE  POLYPECTOMY;  Surgeon: Cj Lopez MD;  Location: [facility] ENDOSCOPY;  Service: Gastroenterology;  Laterality: N/A;  Pre: History of colon polyps  Post: POLYP, PREVIOUS TATTOO    FOOT SURGERY Left     TOES ARE PINNED. ALL BUT GREAT TOE    HAND SURGERY      THUMB    HERNIA REPAIR      INGUINAL HERNIA REPAIR Right 06/27/2018    Procedure: INGUINAL HERNIA REPAIR, OPEN, RECURRENT;  Surgeon: Martir Trevino MD;  Location: [facility] OR OSC;  Service: General    LASIK Bilateral     LOBECTOMY Right 6/12/2024    Procedure: BRONCHOSCOPY, RIGHT VIDEO ASSISTED THORACOSCOPY WITH RIGHT MIDDLE LOBECTOMY WITH DAVINCI ROBOT, MEDIASTINAL LYMPH NODE DISSECTION, INTERCOSTAL NERVE BLOCK;  Surgeon: David Figueroa MD;  Location: [facility] MAIN OR;  Service: Robotics - DaVinci;  Laterality: Right;    NECK SURGERY      growth on salivary gland    REPLACEMENT TOTAL KNEE Right 2007    (he is going to have a LTKR next month)    REPLACEMENT TOTAL KNEE Left     VENOUS ACCESS DEVICE (PORT) INSERTION Right 7/5/2024    Procedure: INSERTION VENOUS ACCESS DEVICE;  Surgeon: David Figueroa MD;  Location: [facility] MAIN OR;  Service: Thoracic;  Laterality: Right;      General Information       Row Name 01/23/25 1205          Physical Therapy Time and Intention    Document Type evaluation  -AR     Mode of Treatment physical therapy  -AR       Row Name 01/23/25 1205          General Information    Patient Profile Reviewed yes  -AR     Prior Level of Function independent:  2-3L NC at baseline at home, golf cart around  property at home, no relevant recent falls  -AR     Existing Precautions/Restrictions no known precautions/restrictions  -AR     Barriers to Rehab none identified  -AR       Row Name 01/23/25 1205          Living Environment    People in Home spouse  -AR       Row Name 01/23/25 1205          Home Main Entrance    Number of Stairs, Main Entrance two  -AR       Row Name 01/23/25 1205          Cognition    Orientation Status (Cognition) oriented x 4  -AR        Row Name 01/23/25 1205          Safety Issues/Impairments Affecting Functional Mobility    Impairments Affecting Function (Mobility) balance;endurance/activity tolerance;shortness of breath  -AR               User Key  (r) = Recorded By, (t) = Taken By, (c) = Cosigned By      Initials Name Provider Type    Dulce Manzano, PT Physical Therapist                   Mobility       Row Name 01/23/25 1205          Bed Mobility    Bed Mobility supine-sit;sit-supine  -AR     Supine-Sit [place] (Bed Mobility) standby assist  -AR     Sit-Supine [place] (Bed Mobility) not tested  -AR     Assistive Device (Bed Mobility) head of bed elevated  -AR       Row Name 01/23/25 1205          Sit-Stand Transfer    Sit-Stand [place] (Transfers) standby assist  -AR       Row Name 01/23/25 1205          Gait/Stairs (Locomotion)    [place] Level (Gait) contact guard;standby assist  -AR     Patient was able to Ambulate yes  -AR     Distance in Feet (Gait) 100  -AR     Deviations/Abnormal Patterns (Gait) festinating/shuffling;gus decreased  -AR     Bilateral Gait Deviations heel strike decreased  -AR     Comment, (Gait/Stairs) no UE support, soem slight  unsteadiness for first 20' requiring CGA, but improved to SBA  -AR               User Key  (r) = Recorded By, (t) = Taken By, (c) = Cosigned By      Initials Name Provider Type    Dulce Manzano, PT Physical Therapist                   Obj/Interventions       Row Name 01/23/25 1207          Range of Motion Comprehensive    Comment, General Range of Motion B LE WFL  -AR       Row Name 01/23/25 1207          Strength Comprehensive (MMT)    Comment, General Manual Muscle Testing (MMT) Assessment B LE at least 4/5  -AR       Row Name 01/23/25 1207          Balance    Balance Assessment standing dynamic balance  -AR     Dynamic Standing Balance standby assist  -AR               User Key  (r) = Recorded By, (t) = Taken By, (c) = Cosigned By      Initials Name  Provider Type    Dulce Manzano, PT Physical Therapist                   Goals/Plan       Row Name 01/23/25 1210          Transfer Goal 1 (PT)    Activity/Assistive Device (Transfer Goal 1, PT) sit-to-stand/stand-to-sit;bed-to-chair/chair-to-bed  -AR     [place] Level/Cues Needed (Transfer Goal 1, PT) independent  -AR       Row Name 01/23/25 1210          Gait Training Goal 1 (PT)    Activity/Assistive Device (Gait Training Goal 1, PT) gait (walking locomotion)  -AR     [place] Level (Gait Training Goal 1, PT) independent  -AR     Distance (Gait Training Goal 1, PT) 300  -AR     Time Frame (Gait Training Goal 1, PT) 1 week  -AR       Row Name 01/23/25 1210          Therapy Assessment/Plan (PT)    Planned Therapy Interventions (PT) balance training;bed mobility training;gait training;home exercise program;transfer training;strengthening;stair training;postural re-education;patient/family education  -AR               User Key  (r) = Recorded By, (t) = Taken By, (c) = Cosigned By      Initials Name Provider Type    AR Dulce Henry, PT Physical Therapist                   Clinical Impression       Row Name 01/23/25 1207          Pain    Pretreatment Pain Rating 0/10 - no pain  -AR     Posttreatment Pain Rating 0/10 - no pain  -AR       Row Name 01/23/25 1207          Plan of Care Review    Plan of Care Reviewed With patient  -AR     Outcome Evaluation Pt admitted with SOB, +recurrent pleural effusion, s/p thoracentesis this morning with 1L removed.  Pt normally on 3L NC at home, no use of assistive device, no falls, h/o lung CA on keytruda, and COPD.   Today pt able to sit up w/ SBA.  stood w/ SBA.  Ambulated 100' w/o any UE support or use of AD, some slight unsteadiness for first 20' requiring CGA, but improved to SBA.  Limited by some SOB, SP02 maintaiend >90% on 3L NC.  recommend sitting in chair for meals and ambulating in [place] 3x/day.  DC to home with assist as needed.  -AR       Row Name  01/23/25 1207          Therapy Assessment/Plan (PT)    Rehab Potential (PT) good  -AR     Criteria for Skilled Interventions Met (PT) yes  -AR     Therapy Frequency (PT) 3 times/wk  -AR       Row Name 01/23/25 1207          Vital Signs    Pre SpO2 (%) 95  -AR     O2 Delivery Pre Treatment supplemental O2  -AR     Intra SpO2 (%) 92  -AR     O2 Delivery Intra Treatment supplemental O2  -AR     Post SpO2 (%) 96  -AR     O2 Delivery Post Treatment supplemental O2  -AR       Row Name 01/23/25 1207          Positioning and Restraints    Pre-Treatment Position in bed  -AR     Post Treatment Position chair  -AR     In Chair notified nsg;reclined;sitting;call light within reach;encouraged to call for assist;exit alarm on  alarm on per RN request  -AR               User Key  (r) = Recorded By, (t) = Taken By, (c) = Cosigned By      Initials Name Provider Type    Dulce Manzano, PT Physical Therapist                   Outcome Measures       Row Name 01/23/25 1211 01/23/25 0817       How much help from another person do you currently need...    Turning from your back to your side while in flat bed without using bedrails? 4  -AR 4  -LH    Moving from lying on back to sitting on the side of a flat bed without bedrails? 4  -AR 4  -LH    Moving to and from a bed to a chair (including a wheelchair)? 3  -AR 3  -LH    Standing up from a chair using your arms (e.g., wheelchair, bedside chair)? 3  -AR 3  -LH    Climbing 3-5 steps with a railing? 3  -AR 3  -LH    To walk in hospital room? 3  -AR 3  -LH    AM-PAC 6 Clicks Score (PT) 20  -AR 20  -LH    Highest Level of Mobility Goal 6 --> Walk 10 steps or more  -AR 6 --> Walk 10 steps or more  -LH      Row Name 01/23/25 0356          How much help from another person do you currently need...    Turning from your back to your side while in flat bed without using bedrails? 4  -CB     Moving from lying on back to sitting on the side of a flat bed without bedrails? 4  -CB     Moving to  and from a bed to a chair (including a wheelchair)? 3  -CB     Standing up from a chair using your arms (e.g., wheelchair, bedside chair)? 3  -CB     Climbing 3-5 steps with a railing? 3  -CB     To walk in hospital room? 3  -CB     AM-PAC 6 Clicks Score (PT) 20  -CB     Highest Level of Mobility Goal 6 --> Walk 10 steps or more  -CB       Row Name 01/23/25 1211          Functional Assessment    Outcome Measure Options AM-PAC 6 Clicks Basic Mobility (PT)  -AR               User Key  (r) = Recorded By, (t) = Taken By, (c) = Cosigned By      Initials Name Provider Type    AR Dulce Henry, PT Physical Therapist    Jesi Washington, RN Registered Nurse    Lorene Perrin RN Registered Nurse                                 Physical Therapy Education       Title: PT OT SLP Therapies (In Progress)       Topic: Physical Therapy (In Progress)       Point: Mobility training (In Progress)       Learning Progress Summary            Patient Acceptance, E, NR by AR at 1/23/2025 1212                      Point: Home exercise program (In Progress)       Learning Progress Summary            Patient Acceptance, E, NR by AR at 1/23/2025 1212                      Point: Body mechanics (In Progress)       Learning Progress Summary            Patient Acceptance, E, NR by AR at 1/23/2025 1212                      Point: Precautions (In Progress)       Learning Progress Summary            Patient Acceptance, E, NR by AR at 1/23/2025 1212                                      User Key       Initials Effective Dates Name Provider Type Discipline    AR 06/16/21 -  Dulce Henry, PT Physical Therapist PT                  PT Recommendation and Plan  Planned Therapy Interventions (PT): balance training, bed mobility training, gait training, home exercise program, transfer training, strengthening, stair training, postural re-education, patient/family education  Outcome Evaluation: Pt admitted with SOB, +recurrent pleural effusion, s/p  thoracentesis this morning with 1L removed.  Pt normally on 3L NC at home, no use of assistive device, no falls, h/o lung CA on keytruda, and COPD.   Today pt able to sit up w/ SBA.  stood w/ SBA.  Ambulated 100' w/o any UE support or use of AD, some slight unsteadiness for first 20' requiring CGA, but improved to SBA.  Limited by some SOB, SP02 maintaiend >90% on 3L NC.  recommend sitting in chair for meals and ambulating in [place] 3x/day.  DC to home with assist as needed.     Time Calculation:         PT Charges       Row Name 01/23/25 1204             Time Calculation    Start Time 1032  -AR      Stop Time 1053  -AR      Time Calculation (min) 21 min  -AR      PT Received On 01/23/25  -AR      PT - Next Appointment 01/24/25  -AR      PT Goal Re-Cert Due Date 01/30/25  -AR                User Key  (r) = Recorded By, (t) = Taken By, (c) = Cosigned By      Initials Name Provider Type    AR Dulce Henry, PT Physical Therapist                  Therapy Charges for Today       Code Description Service Date Service Provider Modifiers Qty    96703182424 HC PT EVAL MOD COMPLEXITY 3 1/23/2025 Dulce Henry, PT GP 1    45053254483 HC PT THER PROC EA 15 MIN 1/23/2025 Dulce Henry, PT GP 1            PT G-Codes  Outcome Measure Options: AM-PAC 6 Clicks Basic Mobility (PT)  AM-PAC 6 Clicks Score (PT): 20  PT Discharge Summary  Anticipated Discharge Disposition (PT): home with assist    Dulce Henry PT  1/23/2025

## 2025-01-23 NOTE — SIGNIFICANT NOTE
01/23/25 7358   OTHER   Discipline occupational therapist   Rehab Time/Intention   Session Not Performed other (see comments)  (Spoke with nursing who reports pt with no OT needs at this time. Will complete orders now. Please reconsult should pt have decline in status.)   Therapy Assessment/Plan (PT)   Criteria for Skilled Interventions Met (PT) no;no problems identified which require skilled intervention

## 2025-01-23 NOTE — PLAN OF CARE
Goal Outcome Evaluation:  Plan of Care Reviewed With: patient           Outcome Evaluation: Pt admitted with SOB, +recurrent pleural effusion, s/p thoracentesis this morning with 1L removed.  Pt normally on 3L NC at home, no use of assistive device, no falls, h/o lung CA on keytruda, and COPD.   Today pt able to sit up w/ SBA.  stood w/ SBA.  Ambulated 100' w/o any UE support or use of AD, some slight unsteadiness for first 20' requiring CGA, but improved to SBA.  Limited by some SOB, SP02 maintaiend >90% on 3L NC.  recommend sitting in chair for meals and ambulating in [place] 3x/day.  DC to home with assist as needed.    Anticipated Discharge Disposition (PT): home with assist

## 2025-01-23 NOTE — CONSULTS
[facility]    Reason for consult: AHRF, pneumonitis, yana    HPI:  Mr. Diop is a 75yo male with lung cancer s/p resection and getting keytruda.  He had recent thoracentesis.  He turned to hospital with gradually increasing shortness of breath worse on exretion, moderte in nature, concerned about recurrent effusion. No alleviating factors. Placed on oxygen in Er.  Ct chest with emphysema, bilateral infiltrates and right sided effusion. A has already placed orders for thoreacnetsis. Note patient recently completed steroids.    Past Medical History:   Diagnosis Date    Anxiety     Arthritis     Atrial fibrillation     CURRENTLY    Bunion of right foot     Colon polyps     COPD (chronic obstructive pulmonary disease)     MILD. NO MEDS FOR    Depression     History of atrial fibrillation     WITH CARDIOVERSION. NO PROBLEMS    History of skin cancer     BCC REMOVED FROM BACK    [place] (hard of hearing)     Hyperlipidemia     Inguinal hernia, recurrent     RIGHT    Left foot pain     Lung nodules     Rash     IN GROIN TREATING FOR YEAST INFECTION BY PCP    Redness of skin     LOWER LEGS BILATERAL    Scab     BILATERAL LEGS HEALING    Sleep apnea with use of continuous positive airway pressure (CPAP)     CPAP    Streptococcus pneumoniae     ABOUT 6-7 YR AGO.     Type 2 diabetes mellitus      Social History     Socioeconomic History    Marital status:      Spouse name: Luba    Number of children: 2   Tobacco Use    Smoking status: Former     Current packs/day: 0.00     Average packs/day: 1 pack/day for 30.0 years (30.0 ttl pk-yrs)     Types: Cigars, Cigarettes     Start date: 1984     Quit date: 2014     Years since quittin.0    Smokeless tobacco: Never   Vaping Use    Vaping status: Never Used   Substance and Sexual Activity    Alcohol use: Never     Comment: caffeine use- decaf coffee    Drug use: Never    Sexual activity: Defer     Family History   Problem Relation Age of Onset    Cancer  Other     Stroke Mother     Alcohol abuse Father     Malig Hyperthermia Neg Hx      MEDS: reviewd as per chart  ALL: nkda  ROS: dry skin, skin rash  12 oitn otherwise neg except as in hpi    Vital Sign Min/Max for last 24 hours  Temp  Min: 97 °F (36.1 °C)  Max: 98 °F (36.7 °C)   BP  Min: 103/79  Max: 134/60   Pulse  Min: 58  Max: 106   Resp  Min: 19  Max: 26   SpO2  Min: 89 %  Max: 96 %   Flow (L/min) (Oxygen Therapy)  Min: 3  Max: 5   Weight  Min: 121 kg (266 lb 1.5 oz)  Max: 121 kg (266 lb 1.5 oz)     GEN:  NAD, appears ill, obese, A&O x3  HEENT: PERRL, EOMI, no icterus, mmm, no JVD, trachea midline, neck supple  CHEST: decreased ae more on right, no wheezes, no crackles, no use of accessory muscles  CV: RRR, no m/g/r  ABD: soft, nt, nd +bs, no hepatosplenomegaly  EXT: no c/c/e  SKIN: + rash, no xanthomas, nl turgor, warm, dry  LYMPH: no palpable cervical or supraclavicular lymphadenopathy  NEURO: CN 2-12 intact and symmetric bilaterally  PSYCH: nl affect, nl orientation, nl judgment, nl mood  MSK: no kyphoscoliosis, 5/5 strength ue and le bilaterally    Labs: 1/23: reviewed:  Rpp neg  Glucose 254  Bun 9  C r0.76  Bicarb 29  Wbc 6.9  Hgb 10.8  Plts 269  Lactate 1.9  Procal 0.06    Ct chest; 1/22 reviewed as in HPI and dictated report    A/P:  AHRF -- wean oxygen as able  Right sided pleural effusion -- agree with thoractensis  Bilateral pulmonary infiltrates -- agree likely pneumonitis from keytruda, will resume prednisone at 40mg a day  Emphysema-- patient should probably be on laba/lama  Champ -- patient may use home bipap  Anemia  Lung cancer s/p resection on keytruda

## 2025-01-24 ENCOUNTER — READMISSION MANAGEMENT (OUTPATIENT)
Dept: CALL CENTER | Facility: HOSPITAL | Age: 75
End: 2025-01-24
Payer: MEDICARE

## 2025-01-24 VITALS
HEART RATE: 74 BPM | SYSTOLIC BLOOD PRESSURE: 131 MMHG | TEMPERATURE: 97.9 F | HEIGHT: 71 IN | OXYGEN SATURATION: 94 % | BODY MASS INDEX: 37.25 KG/M2 | RESPIRATION RATE: 18 BRPM | WEIGHT: 266.1 LBS | DIASTOLIC BLOOD PRESSURE: 71 MMHG

## 2025-01-24 LAB
ANION GAP SERPL CALCULATED.3IONS-SCNC: 10.5 MMOL/L (ref 5–15)
BASOPHILS # BLD AUTO: 0.04 10*3/MM3 (ref 0–0.2)
BASOPHILS NFR BLD AUTO: 0.5 % (ref 0–1.5)
BUN SERPL-MCNC: 16 MG/DL (ref 8–23)
BUN/CREAT SERPL: 16.8 (ref 7–25)
CALCIUM SPEC-SCNC: 9 MG/DL (ref 8.6–10.5)
CHLORIDE SERPL-SCNC: 99 MMOL/L (ref 98–107)
CO2 SERPL-SCNC: 30.5 MMOL/L (ref 22–29)
CREAT SERPL-MCNC: 0.95 MG/DL (ref 0.76–1.27)
CYTO UR: NORMAL
DEPRECATED RDW RBC AUTO: 56.1 FL (ref 37–54)
EGFRCR SERPLBLD CKD-EPI 2021: 83 ML/MIN/1.73
EOSINOPHIL # BLD AUTO: 0.16 10*3/MM3 (ref 0–0.4)
EOSINOPHIL NFR BLD AUTO: 2.1 % (ref 0.3–6.2)
ERYTHROCYTE [DISTWIDTH] IN BLOOD BY AUTOMATED COUNT: 16.8 % (ref 12.3–15.4)
GLUCOSE BLDC GLUCOMTR-MCNC: 265 MG/DL (ref 70–130)
GLUCOSE BLDC GLUCOMTR-MCNC: 276 MG/DL (ref 70–130)
GLUCOSE SERPL-MCNC: 217 MG/DL (ref 65–99)
HCT VFR BLD AUTO: 35.6 % (ref 37.5–51)
HGB BLD-MCNC: 10.9 G/DL (ref 13–17.7)
IMM GRANULOCYTES # BLD AUTO: 0.06 10*3/MM3 (ref 0–0.05)
IMM GRANULOCYTES NFR BLD AUTO: 0.8 % (ref 0–0.5)
LAB AP CASE REPORT: NORMAL
LYMPHOCYTES # BLD AUTO: 1.23 10*3/MM3 (ref 0.7–3.1)
LYMPHOCYTES NFR BLD AUTO: 15.9 % (ref 19.6–45.3)
MCH RBC QN AUTO: 28 PG (ref 26.6–33)
MCHC RBC AUTO-ENTMCNC: 30.6 G/DL (ref 31.5–35.7)
MCV RBC AUTO: 91.5 FL (ref 79–97)
MONOCYTES # BLD AUTO: 0.62 10*3/MM3 (ref 0.1–0.9)
MONOCYTES NFR BLD AUTO: 8 % (ref 5–12)
NEUTROPHILS NFR BLD AUTO: 5.62 10*3/MM3 (ref 1.7–7)
NEUTROPHILS NFR BLD AUTO: 72.7 % (ref 42.7–76)
NRBC BLD AUTO-RTO: 0 /100 WBC (ref 0–0.2)
PATH REPORT.FINAL DX SPEC: NORMAL
PATH REPORT.GROSS SPEC: NORMAL
PLATELET # BLD AUTO: 282 10*3/MM3 (ref 140–450)
PMV BLD AUTO: 9.3 FL (ref 6–12)
POTASSIUM SERPL-SCNC: 3.7 MMOL/L (ref 3.5–5.2)
RBC # BLD AUTO: 3.89 10*6/MM3 (ref 4.14–5.8)
SODIUM SERPL-SCNC: 140 MMOL/L (ref 136–145)
WBC NRBC COR # BLD AUTO: 7.73 10*3/MM3 (ref 3.4–10.8)

## 2025-01-24 PROCEDURE — 63710000001 INSULIN GLARGINE PER 5 UNITS: Performed by: STUDENT IN AN ORGANIZED HEALTH CARE EDUCATION/TRAINING PROGRAM

## 2025-01-24 PROCEDURE — 63710000001 PREDNISONE PER 1 MG: Performed by: INTERNAL MEDICINE

## 2025-01-24 PROCEDURE — 82948 REAGENT STRIP/BLOOD GLUCOSE: CPT

## 2025-01-24 PROCEDURE — 63710000001 INSULIN LISPRO (HUMAN) PER 5 UNITS: Performed by: STUDENT IN AN ORGANIZED HEALTH CARE EDUCATION/TRAINING PROGRAM

## 2025-01-24 PROCEDURE — 85025 COMPLETE CBC W/AUTO DIFF WBC: CPT | Performed by: STUDENT IN AN ORGANIZED HEALTH CARE EDUCATION/TRAINING PROGRAM

## 2025-01-24 PROCEDURE — 80048 BASIC METABOLIC PNL TOTAL CA: CPT | Performed by: STUDENT IN AN ORGANIZED HEALTH CARE EDUCATION/TRAINING PROGRAM

## 2025-01-24 RX ORDER — PREDNISONE 20 MG/1
40 TABLET ORAL DAILY
Qty: 10 TABLET | Refills: 0 | Status: SHIPPED | OUTPATIENT
Start: 2025-01-24 | End: 2025-01-29

## 2025-01-24 RX ADMIN — INSULIN GLARGINE 22 UNITS: 100 INJECTION, SOLUTION SUBCUTANEOUS at 08:40

## 2025-01-24 RX ADMIN — Medication 50 MG: at 08:40

## 2025-01-24 RX ADMIN — INSULIN LISPRO 5 UNITS: 100 INJECTION, SOLUTION INTRAVENOUS; SUBCUTANEOUS at 08:41

## 2025-01-24 RX ADMIN — TORSEMIDE 20 MG: 20 TABLET ORAL at 08:40

## 2025-01-24 RX ADMIN — INSULIN LISPRO 5 UNITS: 100 INJECTION, SOLUTION INTRAVENOUS; SUBCUTANEOUS at 11:33

## 2025-01-24 RX ADMIN — Medication 1000 MCG: at 08:40

## 2025-01-24 RX ADMIN — PREDNISONE 40 MG: 20 TABLET ORAL at 08:40

## 2025-01-24 RX ADMIN — INSULIN LISPRO 6 UNITS: 100 INJECTION, SOLUTION INTRAVENOUS; SUBCUTANEOUS at 11:34

## 2025-01-24 RX ADMIN — INSULIN LISPRO 6 UNITS: 100 INJECTION, SOLUTION INTRAVENOUS; SUBCUTANEOUS at 08:41

## 2025-01-24 RX ADMIN — ROSUVASTATIN 40 MG: 20 TABLET, FILM COATED ORAL at 08:40

## 2025-01-24 RX ADMIN — SERTRALINE HYDROCHLORIDE 50 MG: 50 TABLET ORAL at 08:40

## 2025-01-24 RX ADMIN — Medication 10 ML: at 08:42

## 2025-01-24 RX ADMIN — HYDROCORTISONE 1 APPLICATION: 1 CREAM TOPICAL at 08:42

## 2025-01-24 RX ADMIN — GABAPENTIN 600 MG: 300 CAPSULE ORAL at 05:37

## 2025-01-24 NOTE — PLAN OF CARE
BG remains elevated.  Pt states he is feeling better today.    Problem: Adult Inpatient Plan of Care  Goal: Plan of Care Review  Outcome: Progressing  Goal: Patient-Specific Goal (Individualized)  Outcome: Progressing  Goal: Absence of Hospital-Acquired Illness or Injury  Outcome: Progressing  Intervention: Identify and Manage Fall Risk  Recent Flowsheet Documentation  Taken 1/24/2025 0200 by Ivana Clemente RN  Safety Promotion/Fall Prevention:   activity supervised   assistive device/personal items within reach   clutter free environment maintained   fall prevention program maintained   lighting adjusted   mobility aid in reach   nonskid shoes/slippers when out of bed   room organization consistent   safety round/check completed   toileting scheduled  Taken 1/23/2025 2200 by Ivana Clemente RN  Safety Promotion/Fall Prevention:   activity supervised   assistive device/personal items within reach   clutter free environment maintained   fall prevention program maintained   lighting adjusted   mobility aid in reach   nonskid shoes/slippers when out of bed   room organization consistent   safety round/check completed   toileting scheduled  Taken 1/23/2025 2000 by Ivana Clemente RN  Safety Promotion/Fall Prevention:   activity supervised   assistive device/personal items within reach   clutter free environment maintained   fall prevention program maintained   lighting adjusted   mobility aid in reach   nonskid shoes/slippers when out of bed   room organization consistent   safety round/check completed   toileting scheduled  Intervention: Prevent Skin Injury  Recent Flowsheet Documentation  Taken 1/23/2025 2000 by Ivana Clemente RN  Body Position: position changed independently  Skin Protection:   incontinence pads utilized   drying agents applied  Intervention: Prevent Infection  Recent Flowsheet Documentation  Taken 1/24/2025 0200 by Ivana Clemente RN  Infection Prevention:   environmental surveillance performed    equipment surfaces disinfected   hand hygiene promoted   personal protective equipment utilized   rest/sleep promoted   single patient room provided  Taken 1/23/2025 2200 by Ivana Clemente RN  Infection Prevention:   environmental surveillance performed   equipment surfaces disinfected   hand hygiene promoted   personal protective equipment utilized   rest/sleep promoted   single patient room provided  Taken 1/23/2025 2000 by Ivana Clemente RN  Infection Prevention:   environmental surveillance performed   equipment surfaces disinfected   hand hygiene promoted   personal protective equipment utilized   rest/sleep promoted   single patient room provided  Goal: Optimal Comfort and Wellbeing  Outcome: Progressing  Intervention: Provide Person-Centered Care  Recent Flowsheet Documentation  Taken 1/23/2025 2000 by Ivana Clemente RN  Trust Relationship/Rapport:   care explained   choices provided  Goal: Readiness for Transition of Care  Outcome: Progressing     Problem: Comorbidity Management  Goal: Maintenance of Behavioral Health Symptom Control  Outcome: Progressing  Intervention: Maintain Behavioral Health Symptom Control  Recent Flowsheet Documentation  Taken 1/24/2025 0200 by Ivana Clemente RN  Medication Review/Management: medications reviewed  Taken 1/23/2025 2200 by Ivana Clemente RN  Medication Review/Management: medications reviewed  Taken 1/23/2025 2000 by Ivana Clemente RN  Medication Review/Management: medications reviewed  Goal: Blood Glucose Level Within Target Range  Outcome: Progressing  Intervention: Monitor and Manage Glycemia  Recent Flowsheet Documentation  Taken 1/24/2025 0200 by Ivana Clemente RN  Medication Review/Management: medications reviewed  Taken 1/23/2025 2200 by Ivana Clemente RN  Medication Review/Management: medications reviewed  Taken 1/23/2025 2000 by Ivana Clemente RN  Medication Review/Management: medications reviewed     Problem: Noninvasive Ventilation Acute  Goal: Effective  Unassisted Ventilation and Oxygenation  Outcome: Progressing     Problem: Fall Injury Risk  Goal: Absence of Fall and Fall-Related Injury  Outcome: Progressing  Intervention: Identify and Manage Contributors  Recent Flowsheet Documentation  Taken 1/24/2025 0200 by Ivana Clemente RN  Medication Review/Management: medications reviewed  Taken 1/23/2025 2200 by Ivana Clemente RN  Medication Review/Management: medications reviewed  Taken 1/23/2025 2000 by Ivana Clemente RN  Medication Review/Management: medications reviewed  Intervention: Promote Injury-Free Environment  Recent Flowsheet Documentation  Taken 1/24/2025 0200 by Ivana Clemente RN  Safety Promotion/Fall Prevention:   activity supervised   assistive device/personal items within reach   clutter free environment maintained   fall prevention program maintained   lighting adjusted   mobility aid in reach   nonskid shoes/slippers when out of bed   room organization consistent   safety round/check completed   toileting scheduled  Taken 1/23/2025 2200 by Ivana Clemente RN  Safety Promotion/Fall Prevention:   activity supervised   assistive device/personal items within reach   clutter free environment maintained   fall prevention program maintained   lighting adjusted   mobility aid in reach   nonskid shoes/slippers when out of bed   room organization consistent   safety round/check completed   toileting scheduled  Taken 1/23/2025 2000 by Ivana Clemente RN  Safety Promotion/Fall Prevention:   activity supervised   assistive device/personal items within reach   clutter free environment maintained   fall prevention program maintained   lighting adjusted   mobility aid in reach   nonskid shoes/slippers when out of bed   room organization consistent   safety round/check completed   toileting scheduled   Goal Outcome Evaluation:

## 2025-01-24 NOTE — DISCHARGE SUMMARY
Patient Name: Branden Diop  : 1950  MRN: [id number]    Date of Admission: 2025  Date of Discharge:  2025  Primary Care Physician: Tino Lopez MD      Chief Complaint:   Shortness of Breath      Discharge Diagnoses     Active Hospital Problems    Diagnosis  POA    **Acute on chronic hypoxic respiratory failure [J96.21]  Yes    Hypoxemia [R09.02]  Yes    Pneumonitis [J98.4]  Yes    Drug-induced skin rash [L27.0]  Yes    Adverse effect of antineoplastic drug [T45.1X5A]  Yes    Pleural effusion on right [J90]  Yes    Bone mass [M89.8X9]  Yes    Anemia associated with chemotherapy [D64.81, T45.1X5A]  Yes    Malignant neoplasm of middle lobe of right lung [C34.2]  Yes    Essential hypertension [I10]  Yes    Type 2 diabetes mellitus, with long-term current use of insulin [E11.9, Z79.4]  Not Applicable    Hyperlipidemia [E78.5]  Yes    COPD (chronic obstructive pulmonary disease) [J44.9]  Yes    A-fib [I48.91]  Yes    Obesity [E66.9]  Yes      Resolved Hospital Problems   No resolved problems to display.        Hospital Course     Mr. Diop is a 74 y.o. male with a history of atrial fibrillation on Xarelto, lung cancer status post lobectomy, type 2 diabetes, hyperlipidemia, COPD  who presented to [facility] initially complaining of shortness of breath.  Please see the admitting history and physical for further details.  He was admitted to the hospital for further evaluation and treatment.      Initial chest x-ray obtained, showing mildly increased small to moderate right pleural effusion, right lower lobe opacity felt to represent atelectasis versus infiltrate, CT angiogram of the chest obtained, negative for evidence of pulmonary embolism, did show moderate size right pleural effusion, areas of patchy infiltrates within both lungs. Troponin slightly elevated on arrival, however, normalized on repeat testing.  proBNP normal, RVP negative, procalcitonin normal.  WBC count normal,  patient afebrile. Patient status post ultrasound-guided thoracentesis earlier (01/23), 1 L of debora-colored pleural fluid removed, which led to notable improvement in respiratory symptoms. Pulmonology consulted and followed patient during hospital stay, imaging findings felt to represent pneumonitis, most likely from Keytruda, he was placed on Prednisone 40 mg daily and tolerated well. On day of discharge patient endorsed overall improvement in symptoms, he was cleared for discharge by Pulmonology, they recommended Prednisone 40 mg daily x5 days and are planning for outpatient follow up. Continue other treatments as previously prescribed. Hemoglobin A1c 8.00% (06/21/2024). Glucose elevated likely due to steroids, resume home medication regimen as previously prescribed after discharge. Recommend that patient check blood glucose 2-3 times daily and record those values in log book and take it to next PCP visit to allow for greater insight into treatment efficacy to guide further management decisions. Recommend consistent carbohydrate/diabetic diet. Heart rate appears controlled at present, continue Xarelto as previously prescribed, follow up with managing provider after discharge. Hemoglobin slightly low. Recommend repeat CBC with PCP within 1 week for reassessment. Blood pressure appears acceptable acutely. Recommend that patient check blood pressure 2-3 times daily and record those values in log book and take it to next PCP visit to allow for greater insight into treatment efficacy to guide further management decisions. Recommend heart healthy/low sodium diet.    Day of Discharge     Subjective:  Patient seen and examined this morning.  Hospital day 2.  He is awake and resting comfortably in bed, states that he feels much better today overall, breathing is much improved.  He has been cleared for discharge by Pulmonology.    Physical Exam:  Temp:  [97.5 °F (36.4 °C)-98.4 °F (36.9 °C)] 97.7 °F (36.5 °C)  Heart Rate:   [64-90] 64  Resp:  [17-20] 18  BP: (109-129)/(53-71) 121/60  Body mass index is 37.11 kg/m².  Physical Exam  Vitals and nursing note reviewed.   Constitutional:       General: He is awake. He is not in acute distress.  Cardiovascular:      Rate and Rhythm: Normal rate.      Pulses: Normal pulses.      Heart sounds: Normal heart sounds.   Pulmonary:      Effort: Pulmonary effort is normal. No respiratory distress.      Comments: On supplemental oxygen, slightly diminished breath sounds but overall improved  Abdominal:      Palpations: Abdomen is soft.      Tenderness: There is no abdominal tenderness.   Skin:     General: Skin is warm and dry.   Neurological:      Mental Status: He is alert.   Psychiatric:         Behavior: Behavior is cooperative.         Consultants     Consult Orders (all) (From admission, onward)       Start     Ordered    01/23/25 0145  Inpatient Pulmonology Consult  Once        Specialty:  Pulmonary Disease  Provider:  Elvin Teague MD    01/23/25 0145 01/22/25 2112  Inpatient Case Management  Consult  Once        Provider:  (Not yet assigned)    01/22/25 2112 01/22/25 1752  [facility] (on-call MD unless specified) Details  Once        Specialty:  Hospitalist  Provider:  (Not yet assigned)    01/22/25 1751                  Procedures     * Surgery not found *    Imaging Results (All)       Procedure Component Value Units Date/Time    CT Angiogram Chest [[id number]] Collected: 01/22/25 1714     Updated: 01/23/25 1729    Narrative:      CT ANGIOGRAM OF THE CHEST WITH CONTRAST INCLUDING RECONSTRUCTION IMAGES  01/22/2025     HISTORY: Shortness of breath. Possible pulmonary embolus.     TECHNIQUE: Following the intravenous contrast injection CT angiography  was performed through the chest. Sagittal, coronal and 3D reconstruction  images were reviewed.     FINDINGS: The pulmonary arterial system is well opacified with no  evidence of pulmonary embolus. A few small to mildly  enlarged  mediastinal lymph nodes are seen. There is some aortic and coronary  calcification.     Moderate sized right pleural effusion is seen with some right lower lobe  atelectasis or pneumonia. There are patchy areas of increased density in  the bilateral upper lobes, left lower lobe and minimally in the right  middle lobe. These findings are more pronounced than on the 12/23/2024  study. Emphysematous changes are seen in both lungs primarily in the  upper lobes.     The upper abdomen appears unremarkable except for bilateral renal cysts  and prior cholecystectomy.       Impression:      1. No evidence of pulmonary embolus.  2. Moderate size right pleural effusion.  3. Patchy areas of infiltrate in the bilateral lungs are consistent with  areas of acute pneumonia and have progressed since the 12/23/2024 study.  Please correlate with the clinical findings. Follow-up CT of the chest  also recommended.     Radiation dose reduction techniques were utilized, including automated  exposure control and exposure modulation based on body size.        This report was finalized on 1/23/2025 5:26 PM by Dr. David Ruiz M.D on Workstation: [id number]       US Thoracentesis [[id number]] Collected: 01/23/25 1628    Specimen: Body Fluid Updated: 01/23/25 1628    Narrative:      ULTRASOUND-GUIDED RIGHT THORACENTESIS, 1/23/2025     HISTORY:   74-year-old male with recurrent right pleural effusion. Chronic  respiratory failure with hypoxia. Pneumonia.     CONSENT:   The procedure, risks and alternatives were discussed in detail with the  patient, emphasizing risks of pneumothorax, chest tube placement and  bleeding. Questions were entertained, and written informed consent was  obtained. Timeout was observed in the procedure room for patient  identification and procedure site verification, and the procedure site  was marked by me. Permanent images were recorded.     PROCEDURE:   Using sterile technique, 1% lidocaine local  anesthetic and real-time  ultrasound guidance, a 5 Kinyarwanda thoracentesis catheter was placed into  the largest portion of the pleural effusion from a low posterior  intercostal approach in a single pass without difficulty. 1 L of  debora-colored pleural effusion fluid was removed. The patient remained  stable in the department during and after the procedure. Fluid was sent  for requested laboratory studies.     Post procedure chest x-ray shows no pneumothorax.       Impression:      Technically successful ultrasound-guided right thoracentesis with  removal of 1 L of debora-colored pleural effusion fluid.             XR Chest 1 View [[id number]] Collected: 01/22/25 1252     Updated: 01/22/25 1258    Narrative:      XR CHEST 1 VW-     INDICATION: Shortness of breath     COMPARISON: CT chest 12/23/2024 and chest radiographs dating back to  2/16/2016       Impression:      Low lung volumes. Mildly increased small to moderate right  pleural effusion. New adjacent hazy right lower lobe opacities, layering  pleural fluid versus atelectasis versus infiltrate. No pneumothorax.  Cardiac silhouette at the upper limits of normal as before. Right IJ  port with tip overlying the mid SVC. No focal osseous abnormality.     This report was finalized on 1/22/2025 12:55 PM by Dr. Kartik Humphrey M.D on Workstation: [id number]             Results for orders placed during the hospital encounter of 05/17/21    Doppler Arterial Multi Level Lower Extremity - Bilateral CAR    Interpretation Summary  · Right Conclusion: The right FRANCO is normal.  · Left Conclusion: The left FRANCO is normal.    Results for orders placed during the hospital encounter of 11/29/24    Adult Transthoracic Echo Complete W/ Cont if Necessary Per Protocol    Interpretation Summary    Left ventricular systolic function is normal. Calculated left ventricular EF = 59.2%    Left ventricular diastolic function was indeterminate.    The left atrial cavity is severely  "dilated.    Small patent foramen ovale present.    Saline test results are positive.    Estimated right ventricular systolic pressure from tricuspid regurgitation is mildly elevated (35-45 mmHg). Calculated right ventricular systolic pressure from tricuspid regurgitation is 38 mmHg.    Pertinent Labs     Results from last 7 days   Lab Units 01/24/25  0537 01/23/25  0447 01/22/25  1158   WBC 10*3/mm3 7.73 6.02 6.91   HEMOGLOBIN g/dL 10.9* 11.2* 10.8*   PLATELETS 10*3/mm3 282 295 269     Results from last 7 days   Lab Units 01/24/25  0537 01/23/25  0447 01/22/25  1158   SODIUM mmol/L 140 142 136   POTASSIUM mmol/L 3.7 4.2 3.6   CHLORIDE mmol/L 99 99 97*   CO2 mmol/L 30.5* 31.7* 29.8*   BUN mg/dL 16 8 9   CREATININE mg/dL 0.95 0.89 0.76   GLUCOSE mg/dL 217* 188* 254*   EGFR mL/min/1.73 83.0 88.8 93.2     Results from last 7 days   Lab Units 01/23/25  0447 01/22/25  1158   ALBUMIN g/dL 3.0* 3.4*   BILIRUBIN mg/dL 0.9 1.0   ALK PHOS U/L 136* 134*   AST (SGOT) U/L 29 30   ALT (SGPT) U/L 19 20     Results from last 7 days   Lab Units 01/24/25  0537 01/23/25  0447 01/22/25  1158   CALCIUM mg/dL 9.0 8.9 9.3   ALBUMIN g/dL  --  3.0* 3.4*   MAGNESIUM mg/dL  --  1.8 1.8   PHOSPHORUS mg/dL  --  3.9 3.1       Results from last 7 days   Lab Units 01/22/25  1331 01/22/25  1158   HSTROP T ng/L 21 22*   PROBNP pg/mL  --  671.0           Invalid input(s): \"LDLCALC\"  Results from last 7 days   Lab Units 01/23/25  0933   BODYFLDCX  No growth     Results from last 7 days   Lab Units 01/22/25  1200   COVID19  Not Detected       Test Results Pending at Discharge     Pending Results       None              Discharge Details        Discharge Medications        New Medications        Instructions Start Date   predniSONE 20 MG tablet  Commonly known as: DELTASONE   40 mg, Oral, Daily             Changes to Medications        Instructions Start Date   Xarelto 20 MG tablet  Generic drug: rivaroxaban  What changed:   how much to take  when to " take this   TAKE ONE TABLET BY MOUTH DAILY             Continue These Medications        Instructions Start Date   famotidine 20 MG tablet  Commonly known as: PEPCID   20 mg, 2 Times Daily PRN      ferrous sulfate 325 (65 FE) MG tablet   0.5 tablets, Daily With Breakfast      fexofenadine 180 MG tablet  Commonly known as: ALLEGRA   180 mg, Daily      folic acid 1 MG tablet  Commonly known as: FOLVITE   1 mg, Oral, Daily      gabapentin 300 MG capsule  Commonly known as: NEURONTIN   600 mg, Oral, Every 8 Hours Scheduled      Mounjaro 2.5 MG/0.5ML solution auto-injector  Generic drug: Tirzepatide   2.5 mg, Weekly      MULTIVITAMINS PO   1 tablet, Daily      NovoLOG FlexPen 100 UNIT/ML solution pen-injector sc pen  Generic drug: insulin aspart   Inject 5 Units under the skin into the appropriate area as directed 3 (Three) Times a Day With Meals for 30 days per sliding scale      rosuvastatin 40 MG tablet  Commonly known as: CRESTOR   TAKE ONE TABLET BY MOUTH DAILY      sertraline 50 MG tablet  Commonly known as: ZOLOFT   TAKE ONE TABLET BY MOUTH DAILY      torsemide 20 MG tablet  Commonly known as: DEMADEX   20 mg, 2 Times Daily      Toujeo SoloStar 300 UNIT/ML solution pen-injector injection  Generic drug: Insulin Glargine (1 Unit Dial)   24 Units, Daily      triamcinolone 0.1 % cream  Commonly known as: KENALOG   1 Application, Topical, 2 Times Daily      vitamin B-12 1000 MCG tablet  Commonly known as: CYANOCOBALAMIN   1,000 mcg, Oral, Daily      vitamin B-6 50 MG tablet  Commonly known as: PYRIDOXINE   50 mg, Oral, Daily               No Known Allergies    Discharge Disposition:  Home or Self Care      Discharge Diet:  Diet Order   Procedures    Diet: Cardiac, Diabetic; Healthy Heart (2-3 Na+); Consistent Carbohydrate; Fluid Consistency: Thin (IDDSI 0)       Discharge Activity:   Activity Instructions       Gradually Increase Activity Until at Pre-Hospitalization Level              CODE STATUS:    Code Status and  Medical Interventions: CPR (Attempt to Resuscitate); Full Support   Ordered at: 01/22/25 1910     Code Status (Patient has no pulse and is not breathing):    CPR (Attempt to Resuscitate)     Medical Interventions (Patient has pulse or is breathing):    Full Support       Future Appointments   Date Time Provider Department Center   2/4/2025 10:30 AM INFU CBC [facility] PORT [facility]AIR  [facility]   2/4/2025 11:00 AM Indu Burroughs APRN [facility]   2/4/2025 11:30 AM CHEMO INF 5 CBC KRE  [facility]   2/5/2025  7:00 AM CRISTIANO CT 3 BH [facility]   2/13/2025  9:45 AM David Figueroa MD [facility] TS [facility]   2/25/2025  9:30 AM INFU CBC KRE PORT CHAIR  [facility]   2/25/2025 10:20 AM Duy Villasenor MD PhD [facility] CBC [facility]   2/25/2025 10:45 AM CHAIR  WHIT Santos MD  INFUS K[facility]     Additional Instructions for the Follow-ups that You Need to Schedule       Discharge Follow-up with PCP   As directed       Currently Documented PCP:    Tino Lopez MD    PCP Phone Number:    [phone]     Follow Up Details: within 1 week for reassessment, medication and symptom review, glucose, weight and BP check, BMP and CBC        Discharge Follow-up with Specialty: Pulmonology   As directed      Specialty: Pulmonology   Follow Up Details: 1-2 weeks or as scheduled               Follow-up Information       Tino Lopez MD .    Specialty: Internal Medicine  Why: within 1 week for reassessment, medication and symptom review, glucose, weight and BP check, BMP and CBC  Contact information:  [address] [facility], [address] [address] [phone]                             Additional Instructions for the Follow-ups that You Need to Schedule       Discharge Follow-up with PCP   As directed       Currently Documented PCP:    Tino Lopez MD    PCP Phone Number:    [phone]     Follow Up Details: within 1 week for reassessment, medication and symptom review, glucose, weight and BP check, BMP and CBC         Discharge Follow-up with Specialty: Pulmonology   As directed      Specialty: Pulmonology   Follow Up Details: 1-2 weeks or as scheduled            Time Spent on Discharge:  Greater than 30 minutes      Flaco Solitario DO  [facility]  01/24/25  08:56 EST

## 2025-01-25 NOTE — OUTREACH NOTE
Prep Survey      Flowsheet Row Responses   [facility] facility patient discharged from? [place]   Is LACE score < 7 ? No   Eligibility Readm Mgmt   Discharge diagnosis Acute on chronic hypoxic respiratory failure   Does the patient have one of the following disease processes/diagnoses(primary or secondary)? Other   Does the patient have Home health ordered? No   Is there a DME ordered? No   Prep survey completed? Yes            HEATH QUINTANA - Registered Nurse

## 2025-01-27 ENCOUNTER — PATIENT OUTREACH (OUTPATIENT)
Dept: OTHER | Facility: HOSPITAL | Age: 75
End: 2025-01-27
Payer: MEDICARE

## 2025-01-27 NOTE — PROGRESS NOTES
Reviewed chart. Saw Dr. Villasenor 1/14/25, rec'd cycle 2 of adjuvant Pembrolizumab.  IP 1/22-1/24/25 for shortness of breath; required additional thoracentesis for pleural effusion 1/23/25.  Pulmonology consulted and followed patient during hospital stay, imaging findings felt to represent pneumonitis, most likely from Keytruda, he was placed on Prednisone 40 mg daily. CT scan 2/5/25, sees Dr. Figueroa 2/13/25.     Called patient and wife. We discussed his recent admission.  He reports feeling better after thoracentesis. He will discuss his recurrent pleural effusion with Dr. Figueroa at his upcoming appt.    The patient states he has experienced dry skin from immunotherapy although denies other significant side effects.    The patient denies any questions/concerns or ongoing resource needs. Navigation will continue to follow; encouraged patient to call as needed.

## 2025-01-28 ENCOUNTER — READMISSION MANAGEMENT (OUTPATIENT)
Dept: CALL CENTER | Facility: HOSPITAL | Age: 75
End: 2025-01-28
Payer: MEDICARE

## 2025-01-28 LAB
BACTERIA FLD CULT: NORMAL
GRAM STN SPEC: NORMAL
GRAM STN SPEC: NORMAL

## 2025-01-28 NOTE — OUTREACH NOTE
Medical Week 1 Survey      Flowsheet Row Responses   [facility] patient discharged from? [place]   Does the patient have one of the following disease processes/diagnoses(primary or secondary)? Other   Week 1 attempt successful? No   Unsuccessful attempts Attempt 1            Connie MORALES - Registered Nurse

## 2025-01-31 ENCOUNTER — READMISSION MANAGEMENT (OUTPATIENT)
Dept: CALL CENTER | Facility: HOSPITAL | Age: 75
End: 2025-01-31
Payer: MEDICARE

## 2025-01-31 NOTE — OUTREACH NOTE
Medical Week 1 Survey      Flowsheet Row Responses   [facility] [place] patient discharged from? [place]   Does the patient have one of the following disease processes/diagnoses(primary or secondary)? Other   Week 1 attempt successful? Yes   Call start time 1535   Call end time 1548   Discharge diagnosis Acute on chronic hypoxic respiratory failure   Is patient permission given to speak with other caregiver? Yes   List who call center can speak with [place]   Person spoke with today (if not patient) and relationship L[place] and pt   Meds reviewed with patient/caregiver? Yes   Is the patient having any side effects they believe may be caused by any medication additions or changes? No   Does the patient have all medications ordered at discharge? Yes   Is the patient taking all medications as directed (includes completed medication regime)? Yes   Does the patient have a primary care provider?  Yes   Does the patient have an appointment with their PCP within 7 days of discharge? Yes   Has the patient kept scheduled appointments due by today? N/A   DME comments 4L O2 at home continuously,  sitting saturations are 94%   Comments Pt reports that his SOA is improving. Pt reports that some of his hair is returning. His rash and itching are improved after using Head and Shoulders shampoo on his body, he reports.Pt reports a good appetite. RN encouraged pt to continue with cough/deep breathing exercises daily.   Did the patient receive a copy of their discharge instructions? Yes   Nursing interventions Reviewed instructions with patient   What is the patient's perception of their health status since discharge? Improving   Is the patient/caregiver able to teach back signs and symptoms related to disease process for when to call PCP? Yes   Is the patient/caregiver able to teach back signs and symptoms related to disease process for when to call 911? Yes   If the patient is a current smoker, are they able to teach back resources for  cessation? Not a smoker   Week 1 call completed? Yes   Revoked No further contact(revokes)-requires comment   Call end time 2684            Lis TINEO - Registered Nurse

## 2025-02-04 ENCOUNTER — INFUSION (OUTPATIENT)
Dept: ONCOLOGY | Facility: HOSPITAL | Age: 75
End: 2025-02-04
Payer: MEDICARE

## 2025-02-04 ENCOUNTER — OFFICE VISIT (OUTPATIENT)
Dept: ONCOLOGY | Facility: CLINIC | Age: 75
End: 2025-02-04
Payer: MEDICARE

## 2025-02-04 VITALS
BODY MASS INDEX: 37.27 KG/M2 | DIASTOLIC BLOOD PRESSURE: 74 MMHG | HEIGHT: 71 IN | OXYGEN SATURATION: 94 % | WEIGHT: 266.2 LBS | HEART RATE: 60 BPM | SYSTOLIC BLOOD PRESSURE: 106 MMHG

## 2025-02-04 DIAGNOSIS — Z79.899 ENCOUNTER FOR LONG-TERM (CURRENT) USE OF HIGH-RISK MEDICATION: ICD-10-CM

## 2025-02-04 DIAGNOSIS — C34.2 MALIGNANT NEOPLASM OF MIDDLE LOBE OF RIGHT LUNG: ICD-10-CM

## 2025-02-04 DIAGNOSIS — C34.2 MALIGNANT NEOPLASM OF MIDDLE LOBE OF RIGHT LUNG: Primary | ICD-10-CM

## 2025-02-04 DIAGNOSIS — Z45.2 FITTING AND ADJUSTMENT OF VASCULAR CATHETER: ICD-10-CM

## 2025-02-04 DIAGNOSIS — C34.2 SQUAMOUS CELL CARCINOMA OF BRONCHUS IN RIGHT MIDDLE LOBE: ICD-10-CM

## 2025-02-04 LAB
ALBUMIN SERPL-MCNC: 3.4 G/DL (ref 3.5–5.2)
ALBUMIN/GLOB SERPL: 1.1 G/DL
ALP SERPL-CCNC: 118 U/L (ref 39–117)
ALT SERPL W P-5'-P-CCNC: 20 U/L (ref 1–41)
ANION GAP SERPL CALCULATED.3IONS-SCNC: 12.4 MMOL/L (ref 5–15)
AST SERPL-CCNC: 30 U/L (ref 1–40)
BASOPHILS # BLD AUTO: 0.07 10*3/MM3 (ref 0–0.2)
BASOPHILS NFR BLD AUTO: 0.9 % (ref 0–1.5)
BILIRUB SERPL-MCNC: 1.1 MG/DL (ref 0–1.2)
BUN SERPL-MCNC: 15 MG/DL (ref 8–23)
BUN/CREAT SERPL: 16 (ref 7–25)
CALCIUM SPEC-SCNC: 9.2 MG/DL (ref 8.6–10.5)
CHLORIDE SERPL-SCNC: 96 MMOL/L (ref 98–107)
CO2 SERPL-SCNC: 31.6 MMOL/L (ref 22–29)
CREAT SERPL-MCNC: 0.94 MG/DL (ref 0.76–1.27)
DEPRECATED RDW RBC AUTO: 57 FL (ref 37–54)
EGFRCR SERPLBLD CKD-EPI 2021: 84 ML/MIN/1.73
EOSINOPHIL # BLD AUTO: 0.63 10*3/MM3 (ref 0–0.4)
EOSINOPHIL NFR BLD AUTO: 7.7 % (ref 0.3–6.2)
ERYTHROCYTE [DISTWIDTH] IN BLOOD BY AUTOMATED COUNT: 17.1 % (ref 12.3–15.4)
GLOBULIN UR ELPH-MCNC: 3.2 GM/DL
GLUCOSE SERPL-MCNC: 223 MG/DL (ref 65–99)
HCT VFR BLD AUTO: 34.2 % (ref 37.5–51)
HGB BLD-MCNC: 10.9 G/DL (ref 13–17.7)
IMM GRANULOCYTES # BLD AUTO: 0.06 10*3/MM3 (ref 0–0.05)
IMM GRANULOCYTES NFR BLD AUTO: 0.7 % (ref 0–0.5)
LYMPHOCYTES # BLD AUTO: 1.13 10*3/MM3 (ref 0.7–3.1)
LYMPHOCYTES NFR BLD AUTO: 13.8 % (ref 19.6–45.3)
MAGNESIUM SERPL-MCNC: 1.5 MG/DL (ref 1.6–2.4)
MCH RBC QN AUTO: 28.7 PG (ref 26.6–33)
MCHC RBC AUTO-ENTMCNC: 31.9 G/DL (ref 31.5–35.7)
MCV RBC AUTO: 90 FL (ref 79–97)
MONOCYTES # BLD AUTO: 0.57 10*3/MM3 (ref 0.1–0.9)
MONOCYTES NFR BLD AUTO: 7 % (ref 5–12)
NEUTROPHILS NFR BLD AUTO: 5.72 10*3/MM3 (ref 1.7–7)
NEUTROPHILS NFR BLD AUTO: 69.9 % (ref 42.7–76)
NRBC BLD AUTO-RTO: 0 /100 WBC (ref 0–0.2)
PLATELET # BLD AUTO: 223 10*3/MM3 (ref 140–450)
PMV BLD AUTO: 9 FL (ref 6–12)
POTASSIUM SERPL-SCNC: 3 MMOL/L (ref 3.5–5.2)
PROT SERPL-MCNC: 6.6 G/DL (ref 6–8.5)
RBC # BLD AUTO: 3.8 10*6/MM3 (ref 4.14–5.8)
SODIUM SERPL-SCNC: 140 MMOL/L (ref 136–145)
T4 FREE SERPL-MCNC: 0.99 NG/DL (ref 0.92–1.68)
TSH SERPL DL<=0.05 MIU/L-ACNC: 1.64 UIU/ML (ref 0.27–4.2)
WBC NRBC COR # BLD AUTO: 8.18 10*3/MM3 (ref 3.4–10.8)

## 2025-02-04 PROCEDURE — 85025 COMPLETE CBC W/AUTO DIFF WBC: CPT

## 2025-02-04 PROCEDURE — 84439 ASSAY OF FREE THYROXINE: CPT | Performed by: INTERNAL MEDICINE

## 2025-02-04 PROCEDURE — 96413 CHEMO IV INFUSION 1 HR: CPT

## 2025-02-04 PROCEDURE — 96367 TX/PROPH/DG ADDL SEQ IV INF: CPT

## 2025-02-04 PROCEDURE — 84443 ASSAY THYROID STIM HORMONE: CPT | Performed by: INTERNAL MEDICINE

## 2025-02-04 PROCEDURE — 25010000002 PEMBROLIZUMAB 100 MG/4ML SOLUTION 4 ML VIAL

## 2025-02-04 PROCEDURE — 25010000002 MAGNESIUM SULFATE 2 GM/50ML SOLUTION

## 2025-02-04 PROCEDURE — A9270 NON-COVERED ITEM OR SERVICE: HCPCS | Performed by: INTERNAL MEDICINE

## 2025-02-04 PROCEDURE — 25010000002 HEPARIN LOCK FLUSH PER 10 UNITS: Performed by: INTERNAL MEDICINE

## 2025-02-04 PROCEDURE — 80053 COMPREHEN METABOLIC PANEL: CPT

## 2025-02-04 PROCEDURE — 63710000001 POTASSIUM CHLORIDE 20 MEQ TABLET CONTROLLED-RELEASE: Performed by: INTERNAL MEDICINE

## 2025-02-04 PROCEDURE — 83735 ASSAY OF MAGNESIUM: CPT

## 2025-02-04 RX ORDER — SODIUM CHLORIDE 9 MG/ML
20 INJECTION, SOLUTION INTRAVENOUS ONCE
Status: CANCELLED | OUTPATIENT
Start: 2025-02-04

## 2025-02-04 RX ORDER — IRON POLYSACCHARIDE COMPLEX 150 MG
150 CAPSULE ORAL 2 TIMES DAILY
COMMUNITY
Start: 2024-11-12

## 2025-02-04 RX ORDER — POTASSIUM CHLORIDE 750 MG/1
10 CAPSULE, EXTENDED RELEASE ORAL 2 TIMES DAILY
Qty: 40 CAPSULE | Refills: 2 | Status: SHIPPED | OUTPATIENT
Start: 2025-02-04 | End: 2025-02-05

## 2025-02-04 RX ORDER — MAGNESIUM SULFATE HEPTAHYDRATE 40 MG/ML
2 INJECTION, SOLUTION INTRAVENOUS ONCE
Status: COMPLETED | OUTPATIENT
Start: 2025-02-04 | End: 2025-02-04

## 2025-02-04 RX ORDER — SODIUM CHLORIDE 0.9 % (FLUSH) 0.9 %
10 SYRINGE (ML) INJECTION AS NEEDED
OUTPATIENT
Start: 2025-02-04

## 2025-02-04 RX ORDER — POTASSIUM CHLORIDE 1500 MG/1
20 TABLET, EXTENDED RELEASE ORAL ONCE
Status: COMPLETED | OUTPATIENT
Start: 2025-02-04 | End: 2025-02-04

## 2025-02-04 RX ORDER — HEPARIN SODIUM (PORCINE) LOCK FLUSH IV SOLN 100 UNIT/ML 100 UNIT/ML
500 SOLUTION INTRAVENOUS AS NEEDED
Status: DISCONTINUED | OUTPATIENT
Start: 2025-02-04 | End: 2025-02-04 | Stop reason: HOSPADM

## 2025-02-04 RX ORDER — SODIUM CHLORIDE 0.9 % (FLUSH) 0.9 %
10 SYRINGE (ML) INJECTION AS NEEDED
Status: DISCONTINUED | OUTPATIENT
Start: 2025-02-04 | End: 2025-02-04 | Stop reason: HOSPADM

## 2025-02-04 RX ORDER — POTASSIUM CHLORIDE 750 MG/1
20 CAPSULE, EXTENDED RELEASE ORAL ONCE
Status: DISCONTINUED | OUTPATIENT
Start: 2025-02-04 | End: 2025-02-04

## 2025-02-04 RX ORDER — HEPARIN SODIUM (PORCINE) LOCK FLUSH IV SOLN 100 UNIT/ML 100 UNIT/ML
500 SOLUTION INTRAVENOUS AS NEEDED
OUTPATIENT
Start: 2025-02-04

## 2025-02-04 RX ADMIN — SODIUM CHLORIDE 200 MG: 900 INJECTION, SOLUTION INTRAVENOUS at 12:34

## 2025-02-04 RX ADMIN — HEPARIN 500 UNITS: 100 SYRINGE at 13:05

## 2025-02-04 RX ADMIN — Medication 10 ML: at 13:05

## 2025-02-04 RX ADMIN — MAGNESIUM SULFATE HEPTAHYDRATE 2 G: 40 INJECTION, SOLUTION INTRAVENOUS at 11:42

## 2025-02-04 RX ADMIN — POTASSIUM CHLORIDE 20 MEQ: 1500 TABLET, EXTENDED RELEASE ORAL at 12:04

## 2025-02-04 NOTE — PROGRESS NOTES
REASON FOR FOLLOW-UP:     *. Poorly differentiated squamous cell lung cancer with intralobular metastatic disease, stage IIIa ([id number]).  Status post right middle lobectomy on 6/12/2024.  Patient was started on adjuvant chemotherapy carboplatin AUC 5, and Taxol 200 mg/m² on 7/31/2024.    HISTORY OF PRESENT ILLNESS:  The patient is a 74 y.o. year old male who is here for evaluation with the above-mentioned history.    The patient is here for an evaluation prior to his second cycle adjuvant immunotherapy, pembrolizumab. He is accompanied by his wife.    He recently had an abnormal x-ray examination of his left hand at the [facility] which is suspicious for a tumor.  Patient informed us about the abnormalities.  So we arranged an an MRI of his left hand to be conducted on 11/18/2024, which revealed a large expansile bone lesion involving the entirety of the thumb proximal phalanx, measuring 2.5 x 2.0 cm and 3.5 cm in length. There was no associated pathologic fracture. He was referred to hand surgeon at the [facility]. He has an appointment with a hand surgeon in 01/2025.     The patient reports that he has had issues with his left thumb since an accident in the 1960s where he cut off his finger with chainsaw while cutting down tree.  This was attached back by surgeon from the [facility].  About 10 years ago, he injured his thumb while cutting plywood. He has noticed a growth on his thumb since then, which has remained the same size. He does not experience any pain in his thumb. Dr. Espinal informed him that he had a bone tumor and recommended surgery, but he declined.    On 11/24/2024, he visited the ER due to diffuse pruritic skin rashes. He developed a skin rash on his arms 5 days ago after using a new bottle of lotion. The rash also appeared 5 days after his first dose of immunotherapy. He also has rashes on his legs and back, but not on his face as he did not apply  the lotion there. He has been using hydrocortisone cream 2.5 percent twice daily, prescribed by Dr. Reid, but it has not been effective. He experiences itching only on his arms. He has been feeling slightly short of breath. He was given a 6-day course of steroids in the ER, which helped with his wrist, knee, and shoulder pain. However, the rash returned after he finished the steroids.    He has diabetes and his blood sugar levels have been elevated since starting the steroids, ranging from 195 to 240. His usual blood sugar level is around 130 to 140. He uses two types of insulin at home, one of which is short-acting.    Since increasing his gabapentin dosage to 600 mg every 8 hours, his peripheral neuropathy has improved significantly. However, his wife reports that there have been issues with the pharmacy refilling his medication on time.    MRI of the left hand 11/18/2024 reported a large expansile bone lesion involving the entirety of the thumb proximal phalanx measuring 2.5 x 2.0 cm and 3.5 cm in length. No associated pathologic fracture.    The patient is here today on 12/11/2024 for a 2-week follow-up to assess the effectiveness of the steroid treatment for pruritic skin rashes that developed after the first cycle of immunotherapy with pembrolizumab. The immunotherapy was paused, and he was started on prednisone 20 mg daily on 11/27/2024.     However he actually presented to [facility] emergency room on 11/29/2024 because of worsening dyspnea and mild hemoptysis.    He had a thoracentesis on 11/30/2024 during hospitalization, with 800 mL pleural effusion removed from the right side. The cytology study reported negative for cancer cells. There were mixed mesothelial cells, histiocytes, chronic inflammation, fibrin in the blood. This sample was also sent to flow cytometry study and reported no evidence for hematologic malignancy.  Patient reports has improved breathing after thoracentesis.  His oxygen  saturation is 99% today on oxygen 2 L/min NC.    He was discharged from the hospital on 12/01/2024, and Dr. Art prescribed prednisone starting at 60 mg for 5 days, then taper down by 10 mg every 7 days and to finish actually the last will be taking 5 mg for 7 days to finish.     He reports significant improvement in his pruritic rash, which is primarily located on his legs. He no longer experiences itching or discomfort associated with the rash. He recalls severe itching on his back during the last visit but notes that this symptom has resolved. He did not have the rash prior to the initiation of immunotherapy. He has been maintaining his hygiene routine, including regular showers, without any issues. He reports has been causing fluctuations in his blood sugar levels. He also notes an increase in appetite, which he attributes to the steroid treatment.      INTERVAL HISTORY:  He presents today 2/4/2025 for 1 month follow up while continuing on Keytruda. Thankfully, the rash has greatly improved. He states the only areas are on his arms now but it is not bothersome. He continues to use creams and lotions. His neuropathy is stable and improved greatly when increasing the gabapentin at the last visit. He does not issues with constipation about once a week that resolve with stool softeners and ducolax. He was admitted 2 weeks ago for shortness of breath and given a short interval of prednisone and notes improvement in symptoms. He continues on 3-4L oxygen via NC.       Past Medical History:   Diagnosis Date    Anxiety     Arthritis     Atrial fibrillation     CURRENTLY    Bunion of right foot     Colon polyps     COPD (chronic obstructive pulmonary disease)     MILD. NO MEDS FOR    Depression     History of atrial fibrillation     WITH CARDIOVERSION. NO PROBLEMS    History of skin cancer     BCC REMOVED FROM BACK    Lummi (hard of hearing)     Hyperlipidemia     Inguinal hernia, recurrent     RIGHT    Left foot pain      Lung nodules     Rash     IN GROIN TREATING FOR YEAST INFECTION BY PCP    Redness of skin     LOWER LEGS BILATERAL    Scab     BILATERAL LEGS HEALING    Sleep apnea with use of continuous positive airway pressure (CPAP)     CPAP    Streptococcus pneumoniae     ABOUT 6-7 YR AGO.     Type 2 diabetes mellitus      Past Surgical History:   Procedure Laterality Date    BASAL CELL CARCINOMA EXCISION      BRONCHOSCOPY WITH ION ROBOTIC ASSIST N/A 5/6/2024    Procedure: BRONCHOSCOPY WITH ION ROBOT WITH FNA, BIOPSIES, BAL AND ENDOBRONCHIAL ULTRASOUND WITH FNA;  Surgeon: Yony Coles MD;  Location: [facility] ENDOSCOPY;  Service: Robotics - Pulmonary;  Laterality: N/A;  PRE: PULMONARY NODULES  POST: SAME    CARDIAC CATHETERIZATION N/A 11/15/2021    Procedure: Coronary angiography;  Surgeon: Alfredo Jennings MD;  Location: [facility] CATH INVASIVE LOCATION;  Service: Cardiovascular;  Laterality: N/A;    CARDIAC CATHETERIZATION N/A 11/15/2021    Procedure: Left heart cath;  Surgeon: Alfredo Jennings MD;  Location: [facility] CATH INVASIVE LOCATION;  Service: Cardiovascular;  Laterality: N/A;    CARDIAC CATHETERIZATION N/A 11/15/2021    Procedure: Left ventriculography;  Surgeon: Alfredo Jennings MD;  Location: [facility] CATH INVASIVE LOCATION;  Service: Cardiovascular;  Laterality: N/A;    CARDIAC CATHETERIZATION N/A 11/15/2021    Procedure: Aortic root aortogram;  Surgeon: Alfredo Jennings MD;  Location: [facility] CATH INVASIVE LOCATION;  Service: Cardiovascular;  Laterality: N/A;    CARDIOVERSION      CHOLECYSTECTOMY N/A 10/07/2017    Procedure: CHOLECYSTECTOMY LAPAROSCOPIC;  Surgeon: Martir Trevino MD;  Location: [facility] MAIN OR;  Service:     COLONOSCOPY  2007    COLONOSCOPY N/A 8/30/2022    Procedure: COLONOSCOPY TO CECUM AND TI AND COLD SNARE POLYPECTOMY;  Surgeon: Cj Lopez MD;  Location: [facility] ENDOSCOPY;  Service: Gastroenterology;  Laterality: N/A;  Pre: History of colon polyps  Post: POLYP, PREVIOUS  TATTOO    FOOT SURGERY Left     TOES ARE PINNED. ALL BUT GREAT TOE    HAND SURGERY      THUMB    HERNIA REPAIR      INGUINAL HERNIA REPAIR Right 2018    Procedure: INGUINAL HERNIA REPAIR, OPEN, RECURRENT;  Surgeon: Martir Trevino MD;  Location: [facility];  Service: General    LASIK Bilateral     LOBECTOMY Right 2024    Procedure: BRONCHOSCOPY, RIGHT VIDEO ASSISTED THORACOSCOPY WITH RIGHT MIDDLE LOBECTOMY WITH DAVINCI ROBOT, MEDIASTINAL LYMPH NODE DISSECTION, INTERCOSTAL NERVE BLOCK;  Surgeon: David Figueroa MD;  Location: [facility];  Service: Robotics - DaVinci;  Laterality: Right;    NECK SURGERY      growth on salivary gland    REPLACEMENT TOTAL KNEE Right     (he is going to have a LTKR next month)    REPLACEMENT TOTAL KNEE Left     VENOUS ACCESS DEVICE (PORT) INSERTION Right 2024    Procedure: INSERTION VENOUS ACCESS DEVICE;  Surgeon: David Figueroa MD;  Location: [facility];  Service: Thoracic;  Laterality: Right;       ONCOLOGY HISTORY:  The patient is a 73 years old male, who presents for oncology evaluation 2024. He is accompanied by his wife.    He underwent right middle lobe lobectomy for squamous cell lung cancer by Dr. Figueroa on 2024 and is recovering well from the surgery. However, there is an open wound at the site of chest tube on the right side of the chest which now has a few stitches, which is still leaking. He was advised to apply Band-Aids.  He reports no fever sweating or chills.      Patient has been on oxygen supplementation since surgery, currently 2 L/min.  Patient says occasionally at home he does not need oxygen.  Dr. Figueroa has suggested gradually reducing his oxygen use.     His appetite remains normal.    Patient reports some family health issues.  His son-in-law recently  of HLH just last week.  His brother-in-law has stage IV liver cancer.     This patient has a history of emphysema, followed by pulmonologist Dr. Camp.  His high-resolution chest CT  scan on 6/14/2021 reported 7 mm nodule in the right middle lobe.  There is also index subcarinal lymph node 1.1 cm.     Patient subsequently had serial CT scan examination.    On 4/10/2023 chest high-resolution CT scan reported stable examination with the pulmonary nodules measuring up to 9-10 mm in the right middle lobe and a sub-6 mm in the right upper lobe.  The largest subcarinal lymph node measures  2.1 x  1.2 cm.     However chest high resolution CT scan 4/2/2024 reported disease progression, with right middle lobe pulm nodule 1.8 cm from margin at 9 mm.  There is also a new right middle lobe 1.1 cm nodule.  Stable right upper lobe 7 mm nodule.  Also a new right hilar lymphadenopathy up to 2 cm.  The 1.5 cm subcarinal lymph node is stable.  No pleural effusion.    Patient subsequently had PET scan examination on 4/22/2024 requested by Dr. Camp.  This reported SUV 3.1 corresponding to the 19 mm right middle lobe nodule.  The 1.1 cm right middle nodule was photopenic.  The right hilar lymph node with SUV 5.6.  The 1.5 cm subcarinal lymph node with maximum SUV 7.    Patient subsequently seen by Dr. Figueroa who performed ION bronchoscopic biopsy on 5/7/2024 with pathology evaluation reporting squamous cell carcinoma involving one of the right middle lobe lesion, and the other pulmonary nodule is at least squamous cell carcinoma in situ.  PD-L1 study TPS was 0%.       Dr. Figueroa performed robot-assisted thoracoscopic right middle lobe lobectomy and mediastinal lymph node dissection on 6/12/2024.  Pathology evaluation reported poorly differentiated squamous cell carcinoma, clear margin, however with lymph nodes involved 3 lymph nodes in the right 10 R, 11 R and 12 R lymph node station.  There was also frequent lymphovascular invasion and focal perineural invasion.    Patient subsequently had a portacatheter placement on 7/5/2024.      The patient presents today on 7/31/2024 for re-evaluation to start chemotherapy.    His  case was presented at the multimodality conference 7/18/2024.  Because of negative margin and N1 disease, as well as marginal pulmonary function, the consensus was for patient to have adjuvant chemotherapy alone without radiation.    On 7/18/2024 peripheral blood was sent for Tempus xF liquid biopsy with inconclusive results, no reportable treatment options found.    His lung cancer sample from 6/12/2024 was sent for Tempus xT DNA and RNA study.  It reported stable MSI.  Tumor mutation burden 6.3 m/MB.  Negative for EGFR, KRAS, BRAF, ALK, ROS1, RET, MET, HER2.    The patient recently consulted her nephrologist, during which blood work was conducted. The results indicated a slight presence of protein in urine. He denies experiencing any dyspnea.        MEDICATIONS    Current Outpatient Medications:     famotidine (PEPCID) 20 MG tablet, Take 1 tablet by mouth 2 (Two) Times a Day As Needed for Heartburn., Disp: , Rfl:     ferrous sulfate 325 (65 FE) MG tablet, Take 0.5 tablets by mouth Daily With Breakfast., Disp: , Rfl:     fexofenadine (ALLEGRA) 180 MG tablet, Take 1 tablet by mouth Daily., Disp: , Rfl:     folic acid (FOLVITE) 1 MG tablet, Take 1 tablet by mouth Daily., Disp: 90 tablet, Rfl: 3    gabapentin (NEURONTIN) 300 MG capsule, Take 2 capsules by mouth Every 8 (Eight) Hours., Disp: 180 capsule, Rfl: 2    insulin aspart (NovoLOG FlexPen) 100 UNIT/ML solution pen-injector sc pen, Inject 5 Units under the skin into the appropriate area as directed 3 (Three) Times a Day With Meals for 30 days per sliding scale, Disp: 15 mL, Rfl: 0    Insulin Glargine, 1 Unit Dial, (Toujeo SoloStar) 300 UNIT/ML solution pen-injector injection, Inject 24 Units under the skin into the appropriate area as directed Daily., Disp: , Rfl:     Multiple Vitamin (MULTIVITAMINS PO), Take 1 tablet by mouth Daily., Disp: , Rfl:     rosuvastatin (CRESTOR) 40 MG tablet, TAKE ONE TABLET BY MOUTH DAILY (Patient taking differently: Take 1 tablet by  "mouth Daily.), Disp: 90 tablet, Rfl: 1    sertraline (ZOLOFT) 50 MG tablet, TAKE ONE TABLET BY MOUTH DAILY (Patient taking differently: Take 1 tablet by mouth Daily.), Disp: 90 tablet, Rfl: 1    Tirzepatide (Mounjaro) 2.5 MG/0.5ML solution pen-injector pen, Inject 2.5 mg under the skin into the appropriate area as directed 1 (One) Time Per Week. , Disp: , Rfl:     torsemide (DEMADEX) 20 MG tablet, Take 1 tablet by mouth 2 (Two) Times a Day., Disp: , Rfl:     triamcinolone (KENALOG) 0.1 % cream, Apply 1 Application topically to the appropriate area as directed 2 (Two) Times a Day., Disp: , Rfl:     vitamin B-12 (CYANOCOBALAMIN) 1000 MCG tablet, Take 1 tablet by mouth Daily., Disp: 90 tablet, Rfl: 3    vitamin B-6 (PYRIDOXINE) 50 MG tablet, Take 1 tablet by mouth Daily., Disp: 90 tablet, Rfl: 3    Xarelto 20 MG tablet, TAKE ONE TABLET BY MOUTH DAILY (Patient taking differently: Take 1 tablet by mouth Daily With Dinner.), Disp: 30 tablet, Rfl: 9      ALLERGIES:   No Known Allergies      SOCIAL HISTORY:       Social History     Socioeconomic History    Marital status:      Spouse name: Luba    Number of children: 2   Tobacco Use    Smoking status: Former     Current packs/day: 0.00     Average packs/day: 1 pack/day for 30.0 years (30.0 ttl pk-yrs)     Types: Cigars, Cigarettes     Start date: 1984     Quit date: 2014     Years since quittin.1    Smokeless tobacco: Never   Vaping Use    Vaping status: Never Used   Substance and Sexual Activity    Alcohol use: Never     Comment: caffeine use- decaf coffee    Drug use: Never    Sexual activity: Defer         FAMILY HISTORY:  Family History   Problem Relation Age of Onset    Cancer Other     Stroke Mother     Alcohol abuse Father     Malig Hyperthermia Neg Hx           Vitals:    25 1042 25 1050   BP:  106/74   Pulse:  60   SpO2:  94%   Weight:  121 kg (266 lb 3.2 oz)   Height:  180.3 cm (70.98\")   PainSc: 0-No pain 0-No pain           " 1/14/2025     1:00 PM   Current Status   ECOG score 0     Physical Exam:   GENERAL:  Well-developed, well-nourished elderly male no acute distress.    SKIN:  Warm, dry.  Erythematous rash remains on arms and legs. Not itching or irritating. Patient reports much improvement.   HEENT:  Normocephalic.   LYMPHATICS:  No cervical, supraclavicular adenopathy.  CHEST: Normal respiratory effort.  Lungs clear to auscultation .  CARDIAC:  Regular rate and rhythm. Normal S1,S2.  ABDOMEN:  Soft, no tender.  Bowel sounds normal.  EXTREMITIES:  No lower extremity edema.        RECENT LABS:  Results from last 7 days   Lab Units 02/04/25  1002   WBC 10*3/mm3 8.18   NEUTROS ABS 10*3/mm3 5.72   HEMOGLOBIN g/dL 10.9*   HEMATOCRIT % 34.2*   PLATELETS 10*3/mm3 223     Results from last 7 days   Lab Units 02/04/25  1002   SODIUM mmol/L 140   POTASSIUM mmol/L 3.0*   CHLORIDE mmol/L 96*   CO2 mmol/L 31.6*   BUN mg/dL 15   CREATININE mg/dL 0.94   CALCIUM mg/dL 9.2   ALBUMIN g/dL 3.4*   BILIRUBIN mg/dL 1.1   ALK PHOS U/L 118*   ALT (SGPT) U/L 20   AST (SGOT) U/L 30   GLUCOSE mg/dL 223*   MAGNESIUM mg/dL 1.5*             Assessment & Plan   1. Poorly differentiated squamous cell lung cancer with intralobular metastatic disease, stage IIIa ([id number]).  Tumor was poorly differentiated. This patient has stage 3a disease.   Patient had Ion bronchoscopic biopsy 5/6/2024.  Right middle lobe reported fragments of squamous cell carcinoma.  PD-L1 study reported TPS 0%.  I discussed with the patient on 7/12/2024 that he will need adjuvant chemotherapy and concurrent adjuvant radiation therapy, and likely will need consolidative immunotherapy. I recommended using weekly carboplatin plus Taxol, in conjunction with radiotherapy for 6.5 weeks treatment. In reality, he probably will only receive 5 or 6 weekly chemotherapy instead of the 7 doses due to tolerance issues.   We will present his case at the chest conference on 7/18/2024, due to poor pulmonary  function, negative surgical margins and N1 disease, the consensus is for patient to have adjuvant chemotherapy without concurrent radiation therapy.  Also recommended genetic study.   On 7/18/2024 peripheral blood was sent for Tempus xF liquid biopsy with inconclusive results, no reportable treatment options found.   His lung cancer sample from 6/12/2024 was sent for Tempus xT DNA and RNA study.  It reported stable MSI.  Tumor mutation burden 6.3 m/MB.  Negative for EGFR, KRAS, BRAF, ALK, ROS1, RET, MET, HER2.  Tumor sample was positive for BRIP1 mutation, TP53 mutation and also ARID1B mutation, all of those LOF.   On 7/31/2024 will start the patient on adjuvant chemotherapy.  Because his overall health condition, performance status ECOG 2, his age, he would not tolerate cisplatin based chemotherapy.  So we recommended using carboplatin in conjunction with Taxol every 3 weeks chemotherapy for 4 cycles.  Unfortunately patient would not be a candidate for immunotherapy as part of adjuvant therapy.  8/21/2024 patient presents for evaluation prior to cycle #2 adjuvant chemotherapy.  He is currently undergoing chemotherapy with a total of four cycles planned; this is his second cycle. The biopsy from his lung revealed a PD-L1 negative result. Given his overall health condition, he is not physically capable of tolerating the most toxic chemotherapy, cisplatin, and is therefore being treated with carboplatin. A request has been made for a larger tissue sample from his surgery to be retested for PD-L1. If the result is positive, immunotherapy could be considered for prolonged treatment, which has shown better results in preventing cancer recurrence. This decision will be made after the completion of his chemotherapy, to determin whether maintenance or consolidative immunotherapy is necessary. He continues on oxygen supplementation at 3 L/min.  PD-L1 study from the lobectomy sample reported positive for PD-L1 with a TPS  tumor proportion score 5%.  9/11/2024 due today for cycle 3 carboplatin/Taxol.  He is overall tolerating this well with stable neuropathy.  We will reduce his steroids as further detailed below due to exacerbation of his underlying DM type II.  Today 10/2/2024 he presented for evaluation prior to his cycle #4 adjuvant chemotherapy with carboplatin plus Taxol. Laboratory studies today reported normal WBC 5700, neutrophils 3010, hemoglobin 11.7, and platelets 240,000. Chemistry lab reported baseline creatinine 0.69, normal electrolytes except magnesium 1.5, and marginally elevated alkaline phosphatase 133. Given the presence of neuropathy, there is a concern that this could develop into a chronic issue. A reduction in the dosage of Taxol by 25% is recommended.   Today on 11/6/2024 Cycle 1 adjuvant immunotherapy with pembrolizumab will be initiated and repeated every 3 weeks.  This will be total for 12 months.  Today on 11/27/2024 patient developed diffuse pruritic skin rashes on his trunk, upper extremities, and lower extremities about 5 days after starting immunotherapy. He was given a Medrol dose pack in the ER, which improved his symptoms, but the pruritic skin rashes recurred when the steroids were tapered off. Dr. Reid prescribed 2.5% hydrocortisone cream, but due to the large body area involved, he uses the cream quickly, and the pharmacy will not refill it.  I recommended to start oral prednisone 20 mg daily for 7 days, then tapering to 10 mg daily, has been recommended. A larger quantity of hydrocortisone cream has been prescribed.  Hydroxyzine 25 mg every 8 hours as needed for pruritus has also been prescribed. Immunotherapy with pembrolizumab is canceled today.   The patient underwent a thoracentesis on 11/30/2024, during which 800 mL of pleural effusion was removed from the right side.  Cytology study negative for malignancy.  12/11/2024 due to large dose prednisone, will continue to hold  pembrolizumab.  11/14/2025 patient currently is on tapering dose prednisone 5 mg daily.  We discussed with the patient today, he will proceed with cycle 2 of adjuvant pembrolizumab today, to be repeated every 3 weeks. He will see the APRN in 3 weeks for laboratory studies and prior to cycle 3 of pembrolizumab. He will keep his appointment with the CT scan of the chest and the thoracic surgery team. He will be seen in 6 weeks with laboratory studies (CBC, CMP) prior to cycle 4 of pembrolizumab.  2/4/2025: Thankfully, rash has improved. Patient states it is not bothersome anymore and is MUCH better than it was a few weeks ago. We will proceed with Cycle 3 Keytruda today.       2. Pruritic skin rashes.  The skin rashes are attributed to the immunotherapy with pembrolizumab.  Patient currently is on moderately high-dose prednisone since admission in the hospital 11/29/2024.  He reports that the rashes have significantly improved and are no longer itchy. He has been on a tapering dose of prednisone, currently at 50 mg daily. Labs today on 12/11/2024 reported mild leukocytosis, WBC 11,600 including neutrophils 9910, glucose elevated at 262, all fits with steroids use.    Today 1/14/2025 he reports slight improvement in the rash after using the prescribed cream three times a day. He has been tapering off prednisone and has two days left for the low-dose 5 mg daily. He will continue to finish the last couple of days.  2/4/2025: Much improved. Continue over the counter creams.       3.  Emphysema.    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  He was instructed by his surgeon Dr. Figueroa to taper off gradually.  on 11/6/2024 patient reports that he is currently on oxygen supplementation at 3 L/min and reports no cough or hemoptysis.  12/11/2024 continues on oxygen 2 L/min.  Today 1/14/2025 O2 saturation 92% on 2 L/min oxygen supplement. He uses 3 L/min of oxygen at home, which improves his  level.    4.  Monoclonal gammopathy of unknown significance.  IgA kappa.  This was discovered at the his nephrologist office.  Laboratory study on 4/13/2022 reported serum monoclonal IgA kappa with elevated IgA 604 mg/dL, normal IgG 861 and IgM 84.  Free kappa chain was 38.4, lambda 21.3, kappa/lambda ratio 1.8.  Lab study on 7/19/2024 at her nephrologist office Positive serum monoclonal IgA kappa. IgA was slightly elevated at 597 with normal serum IgG 830 and IgM 95. Kappa chain is 50.4 and lambda 23.3 with a kappa lambda ratio of 2.16.  Continue to observe.      5.  DM type II  Patient is on sliding scale insulin prior to meals and bedtime along with weekly Mounjaro  Blood sugars are being exacerbated by oral dexamethasone.  He is also receiving IV dexamethasone.  At this point he is doing well with Taxol we will discontinue oral dexamethasone and adjust IV premedication.  11/6/2024 laboratory study conducted today reported a glucose level of 181. Diabetes management will be continued.  Today on 12/11/2024 glucose 262.  This is exacerbated by oral prednisone.  Continue management of diabetes.  1/14/2025 glucose 326.  Patient is tapering off prednisone.  Patient will be given insulin today in the clinic.   2/4/2025: Follows with endocrinology. Glucose today 223.     6.  Peripheral neuropathy due to chemotherapy.   He reports tingling in the hands and feet, which interferes with functionality, such as writing checks. This neuropathy started since beginning chemotherapy. He is currently taking gabapentin, which he reports is helping. The neuropathy is likely caused by Taxol.  On 11/6/2024 patient reports that he continues to experience peripheral neuropathy, particularly in his feet, characterized by numbness and tingling. Despite being on gabapentin 300 mg three times a day, he reports no apparent effect. Oral vitamin B12 at 1000 mcg daily, folic acid 1 mg daily, and vitamin B6 at 50 mg daily have been recommended  to alleviate peripheral neuropathy symptoms.    1/14/2025 He reports significant improvement in his peripheral neuropathy symptoms with gabapentin 300 mg taken three times a day. He will continue this medication.  2/4/2025: continue gabapentin.     7. 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  Last chemotherapy finished on 10/2/2024.  Hemoglobin was 11.7.  Laboratory studies conducted today on 11/6/2024 reported persistent anemia with hemoglobin level of 11.0 and MCV of 94.8. An iron study with ferritin, iron profile, along with B12 and folate, has been recommended for further assessment.  11/27/2024 hemoglobin 10.8 MCV 91.8.  Labs on 12/11/2024 reported hemoglobin 12.3.   Today 1/14/2025 hemoglobin 11.5.  Continue to monitor.  2/4/2025: Hemoglobin 10.9. Continue oral iron.     8. Leukocytosis.  On 12/11/2024 mild leukocytosis with WBC count of 11,600 is noted, likely due to steroid use. No immediate intervention is required.  Today 1/14/2025 normalized WBC 8640 neutrophils 6770.    9. Low chloride levels.  12/11/2024 chloride level is marginally low at 93. He is advised to consume slightly more salty foods to help normalize chloride and sodium levels.  1/14/2025 chloride 91 with a sodium 136.  Continue to monitor.    10. Hypokalemia   2/4/2025: Potassium today is 3.0. He does not wish to restart an oral potassium supplement, we reviewed risks with not starting this. He previously ate a banana daily, but recently stopped doing that. He will restart this. We will give 1 potassium chloride 20mEq tablet in infusion center today and then continue potassium chloride 10mEq tablet twice a day.     11. Hypomagnesemia   2/4/2025: Magnesium 1.5 today. Proceed with IV magnesium per protocol.       12. Constipation   2/4/2025: he notes getting constipated about once a week taht does resolve with bowel regimen. Advised scheduling stool softener and taking it every other day and then taking ducolax as needed. If constipation continues,  will consider trying IV iron.     PLAN:   Proceed with Cycle 3 Adjuvant Keytruda today   Proceed with IV magnesium per protocol   Will give 1 dose of oral potassium chloride 20 meq today in infusion   Start potassium chloride 10meq tablet twice a day.  Continue to follow with encrinology for management of diabetes.   Start senokot every other day. Continue ducolax as needed for constipation.   Continue gabapentin 300mg every 8 hours for peripheral neuropathy   Continue to follow thoracic surgery. Next CT scheduled tomorrow.   Continue to follow with hand surgeon for bone lesion on left hand   Continue vitamin b12 oral 1000mcg daily, folic acid 1mg daily, vitamin B6 50mg daily   Continue mounjaro weekly   Return to clinic in 3 weeks for MD visit, scan review, Cycle 4 Keytruda, cbc and cmp  Instructed to reach out sooner with any concerns.     Indu Burroughs, BAM  02/04/25

## 2025-02-05 ENCOUNTER — HOSPITAL ENCOUNTER (OUTPATIENT)
Dept: CT IMAGING | Facility: HOSPITAL | Age: 75
Discharge: HOME | End: 2025-02-05
Admitting: NURSE PRACTITIONER
Payer: MEDICARE

## 2025-02-05 ENCOUNTER — TELEPHONE (OUTPATIENT)
Dept: ONCOLOGY | Facility: CLINIC | Age: 75
End: 2025-02-05
Payer: MEDICARE

## 2025-02-05 ENCOUNTER — TELEPHONE (OUTPATIENT)
Dept: ONCOLOGY | Facility: CLINIC | Age: 75
End: 2025-02-05

## 2025-02-05 DIAGNOSIS — Z85.118 HISTORY OF LUNG CANCER: ICD-10-CM

## 2025-02-05 PROCEDURE — 71250 CT THORAX DX C-: CPT

## 2025-02-05 RX ORDER — POTASSIUM CHLORIDE 750 MG/1
10 CAPSULE, EXTENDED RELEASE ORAL 2 TIMES DAILY
Qty: 40 CAPSULE | Refills: 2 | Status: SHIPPED | OUTPATIENT
Start: 2025-02-05

## 2025-02-06 ENCOUNTER — TELEPHONE (OUTPATIENT)
Dept: ONCOLOGY | Facility: CLINIC | Age: 75
End: 2025-02-06
Payer: MEDICARE

## 2025-02-06 NOTE — TELEPHONE ENCOUNTER
I spoke to Luba, she states that Branden went to [facility] and had an implant and 2 other teeth next too it fall out. She states that the dentist wanted to see if the immunotherapy could be a cause. I let her know per Dr. Villasenor that the patient's treatment is unlikely the reason for this occurrence. Luba V/u and said she would let the dentist know.

## 2025-02-06 NOTE — TELEPHONE ENCOUNTER
Caller: Luba Diop    Relationship: Emergency Contact    Best call back number: [phone]    What is the best time to reach you: ANYTIME    Who are you requesting to speak with (clinical staff, provider,  specific staff member): CLINICAL    What was the call regarding: PT WAS SEEN AT [facility] REGARDING A IMPLANT THAT HAS CAME OUT, THEY ARE WANTING TO KNOW IN REGARDS TO HIS IMMUNOTHERAPY WHAT THIS CONSISTS OF, WOULD THIS SOFTEN HIS BONES.    DR CRUMP [facility]  PHONE [phone]  [phone]

## 2025-02-10 ENCOUNTER — TELEPHONE (OUTPATIENT)
Dept: ONCOLOGY | Facility: CLINIC | Age: 75
End: 2025-02-10
Payer: MEDICARE

## 2025-02-10 DIAGNOSIS — C34.2 MALIGNANT NEOPLASM OF MIDDLE LOBE OF RIGHT LUNG: Primary | ICD-10-CM

## 2025-02-10 DIAGNOSIS — C34.2 SQUAMOUS CELL CARCINOMA OF BRONCHUS IN RIGHT MIDDLE LOBE: ICD-10-CM

## 2025-02-10 DIAGNOSIS — R21 RASH IN ADULT: ICD-10-CM

## 2025-02-10 NOTE — TELEPHONE ENCOUNTER
Called patient's wife and clarified Dr Villasenor wants patient to be seen this week to evaluate his rash.    Patient's wife v/u

## 2025-02-10 NOTE — TELEPHONE ENCOUNTER
Caller: Luba Diop    Relationship: Emergency Contact    Best call back number: [phone]      What was the call regarding: SPOUSE CALLED STATES DR GATES CALLED THEM YESTERDAY AND WANTED TO SEE HIM THIS WEEK

## 2025-02-11 ENCOUNTER — LAB (OUTPATIENT)
Dept: LAB | Facility: HOSPITAL | Age: 75
End: 2025-02-11
Payer: MEDICARE

## 2025-02-11 ENCOUNTER — OFFICE VISIT (OUTPATIENT)
Dept: ONCOLOGY | Facility: CLINIC | Age: 75
End: 2025-02-11
Payer: MEDICARE

## 2025-02-11 VITALS
DIASTOLIC BLOOD PRESSURE: 82 MMHG | BODY MASS INDEX: 36.43 KG/M2 | HEIGHT: 71 IN | RESPIRATION RATE: 17 BRPM | OXYGEN SATURATION: 96 % | HEART RATE: 70 BPM | TEMPERATURE: 98.3 F | WEIGHT: 260.2 LBS | SYSTOLIC BLOOD PRESSURE: 126 MMHG

## 2025-02-11 DIAGNOSIS — C34.2 MALIGNANT NEOPLASM OF MIDDLE LOBE OF RIGHT LUNG: ICD-10-CM

## 2025-02-11 DIAGNOSIS — Z79.899 HIGH RISK MEDICATION USE: ICD-10-CM

## 2025-02-11 DIAGNOSIS — Z79.899 ENCOUNTER FOR LONG-TERM (CURRENT) USE OF HIGH-RISK MEDICATION: ICD-10-CM

## 2025-02-11 DIAGNOSIS — C34.2 SQUAMOUS CELL CARCINOMA OF BRONCHUS IN RIGHT MIDDLE LOBE: ICD-10-CM

## 2025-02-11 DIAGNOSIS — R21 RASH IN ADULT: ICD-10-CM

## 2025-02-11 DIAGNOSIS — C34.2 MALIGNANT NEOPLASM OF MIDDLE LOBE OF RIGHT LUNG: Primary | ICD-10-CM

## 2025-02-11 LAB
ALBUMIN SERPL-MCNC: 3.8 G/DL (ref 3.5–5.2)
ALBUMIN/GLOB SERPL: 1.2 G/DL
ALP SERPL-CCNC: 106 U/L (ref 39–117)
ALT SERPL W P-5'-P-CCNC: 19 U/L (ref 1–41)
ANION GAP SERPL CALCULATED.3IONS-SCNC: 12.1 MMOL/L (ref 5–15)
AST SERPL-CCNC: 23 U/L (ref 1–40)
BASOPHILS # BLD AUTO: 0.03 10*3/MM3 (ref 0–0.2)
BASOPHILS NFR BLD AUTO: 0.4 % (ref 0–1.5)
BILIRUB SERPL-MCNC: 0.7 MG/DL (ref 0–1.2)
BUN SERPL-MCNC: 20 MG/DL (ref 8–23)
BUN/CREAT SERPL: 27 (ref 7–25)
CALCIUM SPEC-SCNC: 9.4 MG/DL (ref 8.6–10.5)
CHLORIDE SERPL-SCNC: 94 MMOL/L (ref 98–107)
CO2 SERPL-SCNC: 30.9 MMOL/L (ref 22–29)
CREAT SERPL-MCNC: 0.74 MG/DL (ref 0.76–1.27)
DEPRECATED RDW RBC AUTO: 55.4 FL (ref 37–54)
EGFRCR SERPLBLD CKD-EPI 2021: 93.9 ML/MIN/1.73
EOSINOPHIL # BLD AUTO: 0.06 10*3/MM3 (ref 0–0.4)
EOSINOPHIL NFR BLD AUTO: 0.7 % (ref 0.3–6.2)
ERYTHROCYTE [DISTWIDTH] IN BLOOD BY AUTOMATED COUNT: 16.8 % (ref 12.3–15.4)
GLOBULIN UR ELPH-MCNC: 3.2 GM/DL
GLUCOSE SERPL-MCNC: 373 MG/DL (ref 65–99)
HCT VFR BLD AUTO: 35.5 % (ref 37.5–51)
HGB BLD-MCNC: 11.3 G/DL (ref 13–17.7)
IMM GRANULOCYTES # BLD AUTO: 0.05 10*3/MM3 (ref 0–0.05)
IMM GRANULOCYTES NFR BLD AUTO: 0.6 % (ref 0–0.5)
LYMPHOCYTES # BLD AUTO: 0.79 10*3/MM3 (ref 0.7–3.1)
LYMPHOCYTES NFR BLD AUTO: 9.6 % (ref 19.6–45.3)
MAGNESIUM SERPL-MCNC: 1.4 MG/DL (ref 1.6–2.4)
MCH RBC QN AUTO: 28.8 PG (ref 26.6–33)
MCHC RBC AUTO-ENTMCNC: 31.8 G/DL (ref 31.5–35.7)
MCV RBC AUTO: 90.6 FL (ref 79–97)
MONOCYTES # BLD AUTO: 0.17 10*3/MM3 (ref 0.1–0.9)
MONOCYTES NFR BLD AUTO: 2.1 % (ref 5–12)
NEUTROPHILS NFR BLD AUTO: 7.14 10*3/MM3 (ref 1.7–7)
NEUTROPHILS NFR BLD AUTO: 86.6 % (ref 42.7–76)
NRBC BLD AUTO-RTO: 0 /100 WBC (ref 0–0.2)
PLATELET # BLD AUTO: 247 10*3/MM3 (ref 140–450)
PMV BLD AUTO: 8.7 FL (ref 6–12)
POTASSIUM SERPL-SCNC: 3.9 MMOL/L (ref 3.5–5.2)
PROT SERPL-MCNC: 7 G/DL (ref 6–8.5)
RBC # BLD AUTO: 3.92 10*6/MM3 (ref 4.14–5.8)
SODIUM SERPL-SCNC: 137 MMOL/L (ref 136–145)
WBC NRBC COR # BLD AUTO: 8.24 10*3/MM3 (ref 3.4–10.8)

## 2025-02-11 PROCEDURE — 80053 COMPREHEN METABOLIC PANEL: CPT

## 2025-02-11 PROCEDURE — 36415 COLL VENOUS BLD VENIPUNCTURE: CPT

## 2025-02-11 PROCEDURE — 83735 ASSAY OF MAGNESIUM: CPT

## 2025-02-11 PROCEDURE — 85025 COMPLETE CBC W/AUTO DIFF WBC: CPT

## 2025-02-11 NOTE — PROGRESS NOTES
REASON FOR FOLLOW-UP:     *. Poorly differentiated squamous cell lung cancer with intralobular metastatic disease, stage IIIa ([id number]).  Status post right middle lobectomy on 6/12/2024.  Patient was started on adjuvant chemotherapy carboplatin AUC 5, and Taxol 200 mg/m² on 7/31/2024.    HISTORY OF PRESENT ILLNESS:  The patient is a 74 y.o. year old male who is here for evaluation with the above-mentioned history.    The patient is here for an evaluation prior to his second cycle adjuvant immunotherapy, pembrolizumab. He is accompanied by his wife.    He recently had an abnormal x-ray examination of his left hand at the [facility] which is suspicious for a tumor.  Patient informed us about the abnormalities.  So we arranged an an MRI of his left hand to be conducted on 11/18/2024, which revealed a large expansile bone lesion involving the entirety of the thumb proximal phalanx, measuring 2.5 x 2.0 cm and 3.5 cm in length. There was no associated pathologic fracture. He was referred to hand surgeon at the [facility]. He has an appointment with a hand surgeon in 01/2025.     The patient reports that he has had issues with his left thumb since an accident in the 1960s where he cut off his finger with chainsaw while cutting down tree.  This was attached back by surgeon from the [facility].  About 10 years ago, he injured his thumb while cutting plywood. He has noticed a growth on his thumb since then, which has remained the same size. He does not experience any pain in his thumb. Dr. Espinal informed him that he had a bone tumor and recommended surgery, but he declined.    On 11/24/2024, he visited the ER due to diffuse pruritic skin rashes. He developed a skin rash on his arms 5 days ago after using a new bottle of lotion. The rash also appeared 5 days after his first dose of immunotherapy. He also has rashes on his legs and back, but not on his face as he did not apply  the lotion there. He has been using hydrocortisone cream 2.5 percent twice daily, prescribed by Dr. Reid, but it has not been effective. He experiences itching only on his arms. He has been feeling slightly short of breath. He was given a 6-day course of steroids in the ER, which helped with his wrist, knee, and shoulder pain. However, the rash returned after he finished the steroids.    He has diabetes and his blood sugar levels have been elevated since starting the steroids, ranging from 195 to 240. His usual blood sugar level is around 130 to 140. He uses two types of insulin at home, one of which is short-acting.    Since increasing his gabapentin dosage to 600 mg every 8 hours, his peripheral neuropathy has improved significantly. However, his wife reports that there have been issues with the pharmacy refilling his medication on time.    MRI of the left hand 11/18/2024 reported a large expansile bone lesion involving the entirety of the thumb proximal phalanx measuring 2.5 x 2.0 cm and 3.5 cm in length. No associated pathologic fracture.    The patient is here today on 12/11/2024 for a 2-week follow-up to assess the effectiveness of the steroid treatment for pruritic skin rashes that developed after the first cycle of immunotherapy with pembrolizumab. The immunotherapy was paused, and he was started on prednisone 20 mg daily on 11/27/2024.     However he actually presented to [facility] emergency room on 11/29/2024 because of worsening dyspnea and mild hemoptysis.    He had a thoracentesis on 11/30/2024 during hospitalization, with 800 mL pleural effusion removed from the right side. The cytology study reported negative for cancer cells. There were mixed mesothelial cells, histiocytes, chronic inflammation, fibrin in the blood. This sample was also sent to flow cytometry study and reported no evidence for hematologic malignancy.  Patient reports has improved breathing after thoracentesis.  His oxygen  saturation is 99% today on oxygen 2 L/min NC.    He was discharged from the hospital on 12/01/2024, and Dr. Art prescribed prednisone starting at 60 mg for 5 days, then taper down by 10 mg every 7 days and to finish actually the last will be taking 5 mg for 7 days to finish.     He reports significant improvement in his pruritic rash, which is primarily located on his legs. He no longer experiences itching or discomfort associated with the rash. He recalls severe itching on his back during the last visit but notes that this symptom has resolved. He did not have the rash prior to the initiation of immunotherapy. He has been maintaining his hygiene routine, including regular showers, without any issues. He reports has been causing fluctuations in his blood sugar levels. He also notes an increase in appetite, which he attributes to the steroid treatment.      INTERVAL HISTORY:  Patient is seen back today in short-term interval follow-up.  Last week on 2/4/2025 he received his third dose of Keytruda.  Just prior to that he had been hospitalized 1/22 to 1/24/2025 with acute COPD exacerbation and infiltrates consistent with pneumonia.  Was concerned that this might be related to pneumonitis however and he was placed on placed on prednisone 40 mg x 5 days.  Patient did also undergo right-sided thoracentesis 1/23/2025 with 1 L of fluid removed. Patient has also had immune-mediated rash though at his visit last week this was noted to be improved.    Patient unfortunately called over this past weekend reporting worsening rash on his arms and legs.  He was started back on prednisone 40 mg daily is now reviewed back today accompanied by his wife.    Patient states that while the rash is unsightly it is not overly pruritic.  He does have some topical cream that he is utilizing that has been helpful.  He is very concerned about his blood sugars being elevated on the higher dose prednisone.          Past Medical History:    Diagnosis Date    Anxiety     Arthritis     Atrial fibrillation     CURRENTLY    Bunion of right foot     Colon polyps     COPD (chronic obstructive pulmonary disease)     MILD. NO MEDS FOR    Depression     History of atrial fibrillation     WITH CARDIOVERSION. NO PROBLEMS    History of skin cancer     BCC REMOVED FROM BACK    [place] (hard of hearing)     Hyperlipidemia     Inguinal hernia, recurrent     RIGHT    Left foot pain     Lung nodules     Rash     IN GROIN TREATING FOR YEAST INFECTION BY PCP    Redness of skin     LOWER LEGS BILATERAL    Scab     BILATERAL LEGS HEALING    Sleep apnea with use of continuous positive airway pressure (CPAP)     CPAP    Streptococcus pneumoniae     ABOUT 6-7 YR AGO.     Type 2 diabetes mellitus      Past Surgical History:   Procedure Laterality Date    BASAL CELL CARCINOMA EXCISION      BRONCHOSCOPY WITH ION ROBOTIC ASSIST N/A 5/6/2024    Procedure: BRONCHOSCOPY WITH ION ROBOT WITH FNA, BIOPSIES, BAL AND ENDOBRONCHIAL ULTRASOUND WITH FNA;  Surgeon: Yony Coles MD;  Location: [facility] ENDOSCOPY;  Service: Robotics - Pulmonary;  Laterality: N/A;  PRE: PULMONARY NODULES  POST: SAME    CARDIAC CATHETERIZATION N/A 11/15/2021    Procedure: Coronary angiography;  Surgeon: Alfredo Jennings MD;  Location: [facility] CATH INVASIVE LOCATION;  Service: Cardiovascular;  Laterality: N/A;    CARDIAC CATHETERIZATION N/A 11/15/2021    Procedure: Left heart cath;  Surgeon: Alfredo Jennings MD;  Location: [facility] CATH INVASIVE LOCATION;  Service: Cardiovascular;  Laterality: N/A;    CARDIAC CATHETERIZATION N/A 11/15/2021    Procedure: Left ventriculography;  Surgeon: Alfredo Jennings MD;  Location: [facility] CATH INVASIVE LOCATION;  Service: Cardiovascular;  Laterality: N/A;    CARDIAC CATHETERIZATION N/A 11/15/2021    Procedure: Aortic root aortogram;  Surgeon: Alfredo Jennings MD;  Location: [facility] CATH INVASIVE LOCATION;  Service: Cardiovascular;  Laterality: N/A;     CARDIOVERSION      CHOLECYSTECTOMY N/A 10/07/2017    Procedure: CHOLECYSTECTOMY LAPAROSCOPIC;  Surgeon: Martir Trevino MD;  Location: [facility] MAIN OR;  Service:     COLONOSCOPY  2007    COLONOSCOPY N/A 8/30/2022    Procedure: COLONOSCOPY TO CECUM AND TI AND COLD SNARE POLYPECTOMY;  Surgeon: Cj Lopez MD;  Location: [facility] ENDOSCOPY;  Service: Gastroenterology;  Laterality: N/A;  Pre: History of colon polyps  Post: POLYP, PREVIOUS TATTOO    FOOT SURGERY Left     TOES ARE PINNED. ALL BUT GREAT TOE    HAND SURGERY      THUMB    HERNIA REPAIR      INGUINAL HERNIA REPAIR Right 06/27/2018    Procedure: INGUINAL HERNIA REPAIR, OPEN, RECURRENT;  Surgeon: Martir Trevino MD;  Location: [facility] OR OSC;  Service: General    LASIK Bilateral     LOBECTOMY Right 6/12/2024    Procedure: BRONCHOSCOPY, RIGHT VIDEO ASSISTED THORACOSCOPY WITH RIGHT MIDDLE LOBECTOMY WITH DAVINCI ROBOT, MEDIASTINAL LYMPH NODE DISSECTION, INTERCOSTAL NERVE BLOCK;  Surgeon: David Figueroa MD;  Location: [facility] OR;  Service: Robotics - DaVinci;  Laterality: Right;    NECK SURGERY      growth on salivary gland    REPLACEMENT TOTAL KNEE Right 2007    (he is going to have a LTKR next month)    REPLACEMENT TOTAL KNEE Left     VENOUS ACCESS DEVICE (PORT) INSERTION Right 7/5/2024    Procedure: INSERTION VENOUS ACCESS DEVICE;  Surgeon: David Figueroa MD;  Location: [facility] OR;  Service: Thoracic;  Laterality: Right;       ONCOLOGY HISTORY:  The patient is a 73 years old male, who presents for oncology evaluation 7/12/2024. He is accompanied by his wife.    He underwent right middle lobe lobectomy for squamous cell lung cancer by Dr. Figueroa on 6/12/2024 and is recovering well from the surgery. However, there is an open wound at the site of chest tube on the right side of the chest which now has a few stitches, which is still leaking. He was advised to apply Band-Aids.  He reports no fever sweating or chills.      Patient has been on oxygen supplementation  since surgery, currently 2 L/min.  Patient says occasionally at home he does not need oxygen.  Dr. Figueroa has suggested gradually reducing his oxygen use.     His appetite remains normal.    Patient reports some family health issues.  His son-in-law recently  of HLH just last week.  His brother-in-law has stage IV liver cancer.     This patient has a history of emphysema, followed by pulmonologist Dr. Camp.  His high-resolution chest CT scan on 2021 reported 7 mm nodule in the right middle lobe.  There is also index subcarinal lymph node 1.1 cm.     Patient subsequently had serial CT scan examination.    On 4/10/2023 chest high-resolution CT scan reported stable examination with the pulmonary nodules measuring up to 9-10 mm in the right middle lobe and a sub-6 mm in the right upper lobe.  The largest subcarinal lymph node measures  2.1 x  1.2 cm.     However chest high resolution CT scan 2024 reported disease progression, with right middle lobe pulm nodule 1.8 cm from margin at 9 mm.  There is also a new right middle lobe 1.1 cm nodule.  Stable right upper lobe 7 mm nodule.  Also a new right hilar lymphadenopathy up to 2 cm.  The 1.5 cm subcarinal lymph node is stable.  No pleural effusion.    Patient subsequently had PET scan examination on 2024 requested by Dr. Camp.  This reported SUV 3.1 corresponding to the 19 mm right middle lobe nodule.  The 1.1 cm right middle nodule was photopenic.  The right hilar lymph node with SUV 5.6.  The 1.5 cm subcarinal lymph node with maximum SUV 7.    Patient subsequently seen by Dr. Figueroa who performed ION bronchoscopic biopsy on 2024 with pathology evaluation reporting squamous cell carcinoma involving one of the right middle lobe lesion, and the other pulmonary nodule is at least squamous cell carcinoma in situ.  PD-L1 study TPS was 0%.       Dr. Figueroa performed robot-assisted thoracoscopic right middle lobe lobectomy and mediastinal lymph node dissection on  6/12/2024.  Pathology evaluation reported poorly differentiated squamous cell carcinoma, clear margin, however with lymph nodes involved 3 lymph nodes in the right 10 R, 11 R and 12 R lymph node station.  There was also frequent lymphovascular invasion and focal perineural invasion.    Patient subsequently had a portacatheter placement on 7/5/2024.      The patient presents today on 7/31/2024 for re-evaluation to start chemotherapy.    His case was presented at the multimodality conference 7/18/2024.  Because of negative margin and N1 disease, as well as marginal pulmonary function, the consensus was for patient to have adjuvant chemotherapy alone without radiation.    On 7/18/2024 peripheral blood was sent for Tempus xF liquid biopsy with inconclusive results, no reportable treatment options found.    His lung cancer sample from 6/12/2024 was sent for Tempus xT DNA and RNA study.  It reported stable MSI.  Tumor mutation burden 6.3 m/MB.  Negative for EGFR, KRAS, BRAF, ALK, ROS1, RET, MET, HER2.    The patient recently consulted her nephrologist, during which blood work was conducted. The results indicated a slight presence of protein in urine. He denies experiencing any dyspnea.        MEDICATIONS    Current Outpatient Medications:     famotidine (PEPCID) 20 MG tablet, Take 1 tablet by mouth 2 (Two) Times a Day As Needed for Heartburn., Disp: , Rfl:     ferrous sulfate 325 (65 FE) MG tablet, Take 0.5 tablets by mouth Daily With Breakfast., Disp: , Rfl:     fexofenadine (ALLEGRA) 180 MG tablet, Take 1 tablet by mouth Daily., Disp: , Rfl:     folic acid (FOLVITE) 1 MG tablet, Take 1 tablet by mouth Daily., Disp: 90 tablet, Rfl: 3    gabapentin (NEURONTIN) 300 MG capsule, Take 2 capsules by mouth Every 8 (Eight) Hours., Disp: 180 capsule, Rfl: 2    insulin aspart (NovoLOG FlexPen) 100 UNIT/ML solution pen-injector sc pen, Inject 5 Units under the skin into the appropriate area as directed 3 (Three) Times a Day With  Meals for 30 days per sliding scale, Disp: 15 mL, Rfl: 0    Insulin Glargine, 1 Unit Dial, (Toujeo SoloStar) 300 UNIT/ML solution pen-injector injection, Inject 24 Units under the skin into the appropriate area as directed Daily., Disp: , Rfl:     iron polysaccharides (NIFEREX) 150 MG capsule, Take 1 capsule by mouth 2 (Two) Times a Day., Disp: , Rfl:     Multiple Vitamin (MULTIVITAMINS PO), Take 1 tablet by mouth Daily., Disp: , Rfl:     potassium chloride (MICRO-K) 10 MEQ CR capsule, Take 1 capsule by mouth 2 (Two) Times a Day., Disp: 40 capsule, Rfl: 2    rosuvastatin (CRESTOR) 40 MG tablet, TAKE ONE TABLET BY MOUTH DAILY (Patient taking differently: Take 1 tablet by mouth Daily.), Disp: 90 tablet, Rfl: 1    sertraline (ZOLOFT) 50 MG tablet, TAKE ONE TABLET BY MOUTH DAILY (Patient taking differently: Take 1 tablet by mouth Daily.), Disp: 90 tablet, Rfl: 1    Tirzepatide (Mounjaro) 2.5 MG/0.5ML solution pen-injector pen, Inject 2.5 mg under the skin into the appropriate area as directed 1 (One) Time Per Week. Mondays, Disp: , Rfl:     torsemide (DEMADEX) 20 MG tablet, Take 1 tablet by mouth 2 (Two) Times a Day., Disp: , Rfl:     triamcinolone (KENALOG) 0.1 % cream, Apply 1 Application topically to the appropriate area as directed 2 (Two) Times a Day., Disp: , Rfl:     vitamin B-12 (CYANOCOBALAMIN) 1000 MCG tablet, Take 1 tablet by mouth Daily., Disp: 90 tablet, Rfl: 3    vitamin B-6 (PYRIDOXINE) 50 MG tablet, Take 1 tablet by mouth Daily., Disp: 90 tablet, Rfl: 3    Xarelto 20 MG tablet, TAKE ONE TABLET BY MOUTH DAILY (Patient taking differently: Take 1 tablet by mouth Daily With Dinner.), Disp: 30 tablet, Rfl: 9      ALLERGIES:   No Known Allergies      SOCIAL HISTORY:       Social History     Socioeconomic History    Marital status:      Spouse name: Luba    Number of children: 2   Tobacco Use    Smoking status: Former     Current packs/day: 0.00     Average packs/day: 1 pack/day for 30.0 years (30.0 ttl  "pk-yrs)     Types: Cigars, Cigarettes     Start date: 1984     Quit date: 2014     Years since quittin.1    Smokeless tobacco: Never   Vaping Use    Vaping status: Never Used   Substance and Sexual Activity    Alcohol use: Never     Comment: caffeine use- decaf coffee    Drug use: Never    Sexual activity: Defer         FAMILY HISTORY:  Family History   Problem Relation Age of Onset    Cancer Other     Stroke Mother     Alcohol abuse Father     Malig Hyperthermia Neg Hx           Vitals:    25 1310   BP: 126/82   Pulse: 70   Resp: 17   Temp: 98.3 °F (36.8 °C)   TempSrc: Oral   SpO2: 96%   Weight: 118 kg (260 lb 3.2 oz)   Height: 180.3 cm (70.98\")   PainSc: 0-No pain             2025     1:10 PM   Current Status   ECOG score 0     Physical Exam:   GENERAL:  Well-developed, well-nourished elderly male no acute distress.    SKIN:  Warm, dry.  Erythematous rash remains on arms and legs. Not itching or irritating. Patient reports much improvement.   HEENT:  Normocephalic.   LYMPHATICS:  No cervical, supraclavicular adenopathy.  CHEST: Normal respiratory effort.  Lungs clear to auscultation .  CARDIAC:  Regular rate and rhythm. Normal S1,S2.  ABDOMEN:  Soft, no tender.  Bowel sounds normal.  EXTREMITIES:  No lower extremity edema.        RECENT LABS:  Results from last 7 days   Lab Units 25  1246   WBC 10*3/mm3 8.24   NEUTROS ABS 10*3/mm3 7.14*   HEMOGLOBIN g/dL 11.3*   HEMATOCRIT % 35.5*   PLATELETS 10*3/mm3 247     Results from last 7 days   Lab Units 25  1246   SODIUM mmol/L 137   POTASSIUM mmol/L 3.9   CHLORIDE mmol/L 94*   CO2 mmol/L 30.9*   BUN mg/dL 20   CREATININE mg/dL 0.74*   CALCIUM mg/dL 9.4   ALBUMIN g/dL 3.8   BILIRUBIN mg/dL 0.7   ALK PHOS U/L 106   ALT (SGPT) U/L 19   AST (SGOT) U/L 23   GLUCOSE mg/dL 373*   MAGNESIUM mg/dL 1.4*               Assessment & Plan   1. Poorly differentiated squamous cell lung cancer with intralobular metastatic disease, stage IIIa " ([id number]).  Tumor was poorly differentiated. This patient has stage 3a disease.   Patient had Ion bronchoscopic biopsy 5/6/2024.  Right middle lobe reported fragments of squamous cell carcinoma.  PD-L1 study reported TPS 0%.  I discussed with the patient on 7/12/2024 that he will need adjuvant chemotherapy and concurrent adjuvant radiation therapy, and likely will need consolidative immunotherapy. I recommended using weekly carboplatin plus Taxol, in conjunction with radiotherapy for 6.5 weeks treatment. In reality, he probably will only receive 5 or 6 weekly chemotherapy instead of the 7 doses due to tolerance issues.   We will present his case at the chest conference on 7/18/2024, due to poor pulmonary function, negative surgical margins and N1 disease, the consensus is for patient to have adjuvant chemotherapy without concurrent radiation therapy.  Also recommended genetic study.   On 7/18/2024 peripheral blood was sent for Masabipus xF liquid biopsy with inconclusive results, no reportable treatment options found.   His lung cancer sample from 6/12/2024 was sent for Masabipus xT DNA and RNA study.  It reported stable MSI.  Tumor mutation burden 6.3 m/MB.  Negative for EGFR, KRAS, BRAF, ALK, ROS1, RET, MET, HER2.  Tumor sample was positive for BRIP1 mutation, TP53 mutation and also ARID1B mutation, all of those LOF.   On 7/31/2024 will start the patient on adjuvant chemotherapy.  Because his overall health condition, performance status ECOG 2, his age, he would not tolerate cisplatin based chemotherapy.  So we recommended using carboplatin in conjunction with Taxol every 3 weeks chemotherapy for 4 cycles.  Unfortunately patient would not be a candidate for immunotherapy as part of adjuvant therapy.  8/21/2024 patient presents for evaluation prior to cycle #2 adjuvant chemotherapy.  He is currently undergoing chemotherapy with a total of four cycles planned; this is his second cycle. The biopsy from his lung revealed  a PD-L1 negative result. Given his overall health condition, he is not physically capable of tolerating the most toxic chemotherapy, cisplatin, and is therefore being treated with carboplatin. A request has been made for a larger tissue sample from his surgery to be retested for PD-L1. If the result is positive, immunotherapy could be considered for prolonged treatment, which has shown better results in preventing cancer recurrence. This decision will be made after the completion of his chemotherapy, to determin whether maintenance or consolidative immunotherapy is necessary. He continues on oxygen supplementation at 3 L/min.  PD-L1 study from the lobectomy sample reported positive for PD-L1 with a TPS tumor proportion score 5%.  9/11/2024 due today for cycle 3 carboplatin/Taxol.  He is overall tolerating this well with stable neuropathy.  We will reduce his steroids as further detailed below due to exacerbation of his underlying DM type II.  Today 10/2/2024 he presented for evaluation prior to his cycle #4 adjuvant chemotherapy with carboplatin plus Taxol. Laboratory studies today reported normal WBC 5700, neutrophils 3010, hemoglobin 11.7, and platelets 240,000. Chemistry lab reported baseline creatinine 0.69, normal electrolytes except magnesium 1.5, and marginally elevated alkaline phosphatase 133. Given the presence of neuropathy, there is a concern that this could develop into a chronic issue. A reduction in the dosage of Taxol by 25% is recommended.   Today on 11/6/2024 Cycle 1 adjuvant immunotherapy with pembrolizumab will be initiated and repeated every 3 weeks.  This will be total for 12 months.  Today on 11/27/2024 patient developed diffuse pruritic skin rashes on his trunk, upper extremities, and lower extremities about 5 days after starting immunotherapy. He was given a Medrol dose pack in the ER, which improved his symptoms, but the pruritic skin rashes recurred when the steroids were tapered off.   Reid prescribed 2.5% hydrocortisone cream, but due to the large body area involved, he uses the cream quickly, and the pharmacy will not refill it.  I recommended to start oral prednisone 20 mg daily for 7 days, then tapering to 10 mg daily, has been recommended. A larger quantity of hydrocortisone cream has been prescribed.  Hydroxyzine 25 mg every 8 hours as needed for pruritus has also been prescribed. Immunotherapy with pembrolizumab is canceled today.   The patient underwent a thoracentesis on 11/30/2024, during which 800 mL of pleural effusion was removed from the right side.  Cytology study negative for malignancy.  12/11/2024 due to large dose prednisone, will continue to hold pembrolizumab.  11/14/2025 patient currently is on tapering dose prednisone 5 mg daily.  We discussed with the patient today, he will proceed with cycle 2 of adjuvant pembrolizumab today, to be repeated every 3 weeks. He will see the APRN in 3 weeks for laboratory studies and prior to cycle 3 of pembrolizumab. He will keep his appointment with the CT scan of the chest and the thoracic surgery team. He will be seen in 6 weeks with laboratory studies (CBC, CMP) prior to cycle 4 of pembrolizumab.  2/4/2025: Cycle 3 Keytruda.     2.  Immune mediated skin rash   The skin rashes are attributed to the immunotherapy with pembrolizumab.  Patient currently is on moderately high-dose prednisone since admission in the hospital 11/29/2024.  He reports that the rashes have significantly improved and are no longer itchy. He has been on a tapering dose of prednisone, currently at 50 mg daily. Labs today on 12/11/2024 reported mild leukocytosis, WBC 11,600 including neutrophils 9910, glucose elevated at 262, all fits with steroids use.    Today 1/14/2025 he reports slight improvement in the rash after using the prescribed cream three times a day. He has been tapering off prednisone and has two days left for the low-dose 5 mg daily. He will continue to  finish the last couple of days.  2/4/2025: Much improved. Continue over the counter creams.   2/8/2025 worsening over the weekend, speaking with Dr. Villasenor on-call, started back on prednisone 40 mg daily  2/1/2025 per review today patient has taken 4 doses of prednisone 40 mg daily.  Rash is comparatively improved (images were taken today, under media).  At this point it is unsightly but not pruritic.  Patient with significant exacerbation of blood sugar on steroids.  Will have him drop back to prednisone 20 mg daily for the rest of this week while monitoring rash and breathing.  He will thereafter discontinue unless symptoms worsen.  He will see Dr. Villasenor back in 2 weeks for further review.    3.  Emphysema.    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  He was instructed by his surgeon Dr. Figueroa to taper off gradually.  on 11/6/2024 patient reports that he is currently on oxygen supplementation at 3 L/min and reports no cough or hemoptysis.  12/11/2024 continues on oxygen 2 L/min.  1/14/2025 O2 saturation 92% on 2 L/min oxygen supplement. He uses 3 L/min of oxygen at home, which improves his level.    4.  Monoclonal gammopathy of unknown significance.  IgA kappa.  This was discovered at the his nephrologist office.  Laboratory study on 4/13/2022 reported serum monoclonal IgA kappa with elevated IgA 604 mg/dL, normal IgG 861 and IgM 84.  Free kappa chain was 38.4, lambda 21.3, kappa/lambda ratio 1.8.  Lab study on 7/19/2024 at her nephrologist office Positive serum monoclonal IgA kappa. IgA was slightly elevated at 597 with normal serum IgG 830 and IgM 95. Kappa chain is 50.4 and lambda 23.3 with a kappa lambda ratio of 2.16.  Continue to observe.    5.  DM type II  Patient is on sliding scale insulin prior to meals and bedtime along with weekly Mounjaro  Blood sugars are being exacerbated by oral dexamethasone.  He is also receiving IV dexamethasone.  At this point he is doing well  with Taxol we will discontinue oral dexamethasone and adjust IV premedication.  11/6/2024 laboratory study conducted today reported a glucose level of 181. Diabetes management will be continued.  Today on 12/11/2024 glucose 262.  This is exacerbated by oral prednisone.  Continue management of diabetes.  1/14/2025 glucose 326.  Patient is tapering off prednisone.  Patient will be given insulin today in the clinic.   2/11/2025: Glucose today exacerbated by prednisone, glucose 373.  Decreasing prednisone as outlined.    6.  Peripheral neuropathy due to chemotherapy.   He reports tingling in the hands and feet, which interferes with functionality, such as writing checks. This neuropathy started since beginning chemotherapy. He is currently taking gabapentin, which he reports is helping. The neuropathy is likely caused by Taxol.  On 11/6/2024 patient reports that he continues to experience peripheral neuropathy, particularly in his feet, characterized by numbness and tingling. Despite being on gabapentin 300 mg three times a day, he reports no apparent effect. Oral vitamin B12 at 1000 mcg daily, folic acid 1 mg daily, and vitamin B6 at 50 mg daily have been recommended to alleviate peripheral neuropathy symptoms.    1/14/2025 He reports significant improvement in his peripheral neuropathy symptoms with gabapentin 300 mg taken three times a day. He will continue this medication.  2/11/2025: continue gabapentin.     7. 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  Last chemotherapy finished on 10/2/2024.  Hemoglobin was 11.7.  Laboratory studies conducted today on 11/6/2024 reported persistent anemia with hemoglobin level of 11.0 and MCV of 94.8. An iron study with ferritin, iron profile, along with B12 and folate, has been recommended for further assessment.  11/27/2024 hemoglobin 10.8 MCV 91.8.  Labs on 12/11/2024 reported hemoglobin 12.3.   Today 1/14/2025 hemoglobin 11.5.  Continue to monitor.  2/11/2025: Hemoglobin 11.3.  Continue oral iron.     8. Leukocytosis.  On 12/11/2024 mild leukocytosis with WBC count of 11,600 is noted, likely due to steroid use. No immediate intervention is required.  2/11/2025 normalized WBC 8240 neutrophils 7140.    9. Low chloride levels.  12/11/2024 chloride level is marginally low at 93. He is advised to consume slightly more salty foods to help normalize chloride and sodium levels.  2/11/2025 chloride 94 with a sodium 137.  Continue to monitor.    10. Hypokalemia   2/4/2025: Potassium today is 3.0. He does not wish to restart an oral potassium supplement, we reviewed risks with not starting this. He previously ate a banana daily, but recently stopped doing that. He will restart this. We will give 1 potassium chloride 20mEq tablet in infusion center today and then continue potassium chloride 10mEq tablet twice a day.     11. Hypomagnesemia   2/4/2025: Magnesium 1.5 today. Proceed with IV magnesium per protocol.       PLAN:   Patient is status post cycle 3 adjuvant Keytruda 2/4/2025   Images of rash on upper and lower extremities taken today, documented under MEDIA.  Decrease prednisone to 20 mg daily for another 5 days and then discontinue.  Monitor rash and breathing.  Continue to follow with encrinology for management of diabetes.   Continue gabapentin 300mg every 8 hours for peripheral neuropathy   Continue to follow thoracic surgery.   Continue to follow with hand surgeon for bone lesion on left hand   Continue vitamin b12 oral 1000mcg daily, folic acid 1mg daily, vitamin B6 50mg daily   Continue mounjaro weekly   Return to clinic in 2 weeks for MD visit and potential consideration for cycle 4 Keytruda, cbc and cmp  NOTE: the patient has had fairly significant rash ever since the first dose of Keytruda, though rash is not overly pruritic or bothersome to him; no open areas or signs of infection.  Secondly recent hospitalization mentioned potential immune-mediated pneumonitis though clinically he  seemed to improve in terms of shortness of breath after thoracentesis and currently has no symptoms to suggest pneumonitis.  I am tapering steroids quickly because of diabetes exacerbation and the fact that he otherwise is doing well.  If he continues to have issues however with various side effects presumed to be related to Keytruda, we need to strongly consider whether we can continue adjuvant therapy.    This patient is on high risk drug therapy requiring intensive monitoring for toxicity.      I spent 45 minutes caring for Branden on this date of service. This time includes time spent by me in the following activities: preparing for the visit, reviewing tests, performing a medically appropriate examination and/or evaluation, counseling and educating the patient/family/caregiver, referring and communicating with other health care professionals, documenting information in the medical record, care coordination, obtaining a separately obtained history, and reviewing a separately obtained history      Rosita Galvan, APRN  02/11/25

## 2025-02-13 ENCOUNTER — OFFICE VISIT (OUTPATIENT)
Dept: SURGERY | Facility: CLINIC | Age: 75
End: 2025-02-13
Payer: MEDICARE

## 2025-02-13 VITALS
HEIGHT: 71 IN | SYSTOLIC BLOOD PRESSURE: 114 MMHG | DIASTOLIC BLOOD PRESSURE: 68 MMHG | HEART RATE: 68 BPM | WEIGHT: 267.6 LBS | OXYGEN SATURATION: 97 % | BODY MASS INDEX: 37.46 KG/M2

## 2025-02-13 DIAGNOSIS — Z85.118 HISTORY OF LUNG CANCER: Primary | ICD-10-CM

## 2025-02-13 RX ORDER — PREDNISONE 20 MG/1
20 TABLET ORAL DAILY
COMMUNITY

## 2025-02-19 NOTE — PROGRESS NOTES
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 male who has significant medical problems as mentioned in the medical chart.     History of Present Illness  He had shortness of breath and high-resolution CT scan was performed on 6/14/2021.  He has small noncalcified lung nodules measuring up to 7 mm.  Repeat CT scan of the chest on 4/10/2023 reported stable pulmonary nodule and presence of emphysema.  CT scan of the chest in 12 months was recommended which was performed on 4/2/2024 and reported 1.8 cm and 1.1 cm right middle lobe solid nodules.  PET/CT on 4/23/2024 reported mildly hypermetabolic right middle lobe 1.9 cm and 1.1 cm solid nodule. The findings were concerning for malignant disease.  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  He had ION robotic navigational bronchoscopy of the right middle lobe nodule.  One of the nodules had fragment of squamous cell carcinoma and the other nodule had highly dysplastic squamous epithelium suggesting at least squamous cell carcinoma in situ.  Level 11 L, 7, 4R, 11 R were negative for metastatic disease.  MRI of the brain was negative for metastatic disease.  He was referred to thoracic surgery for further evaluation.     At the time of initial consultation, he could occasionally walk 1 block without dyspnea. He denied any history of myocardial infarction, cerebrovascular accident, hepatic or renal issues. He had pneumonia in 2017 resulting in a 17-day intensive care unit stay. His respiratory function has not returned to its previous state. A recent pulmonary function test was conducted on 05/10/2024. He is under the care of a cardiologist and is currently on anticoagulant therapy. He reported occasional pedal  edema. His echocardiogram and lung tests yielded normal results.      On 6/12/2024, he underwent robot-assisted thoracoscopic right middle lobectomy, systematic mediastinal lymph node dissection.  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  There were 2 additional branches to the right middle lobe noted.  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  He tolerated the procedure well.  There were no intraoperative or immediate postoperative complications.  He was discharged home in a stable condition on supplemental oxygen.  The final pathology revealed poorly differentiated, invasive squamous cell carcinoma, nonkeratinizing.  He had separate metastases in the same lobe.  All resection margins were negative for carcinoma.  I was able to retrieve 9 lymph nodes and the level 10 R, 11 R and 12 are were positive for malignancy.     He had ipsilateral lobar metastases and lymph node involvement. He recovered well from surgery. He required adjuvant chemotherapy. He needed Mediport for systemic treatment which was placed on 7/5/2024.    He received adjuvant systemic treatment.  He came to clinic for follow-up visit.  He reports experiencing fatigue upon exertion, necessitating the use of oxygen therapy intermittently, though he uses it all the time. He has been utilizing a 3-liter oxygen supply. Despite a decrease in his food intake, he maintains a healthy diet. He has undergone two procedures for fluid drainage from his chest, which have significantly improved his respiratory function. He is currently on a regimen of immunotherapy, with a duration of 4 months completed and an additional 8 months anticipated. He also mentions the development of a rash as a side effect of his chemotherapy treatment. He expresses confidence in his ability to manage stress effectively.    FAMILY HISTORY  The patient reports a significant family history of cancer, including melanoma in a son-in-law, a rare  cancer in another son-in-law, and stage IV liver cancer in a brother-in-law. The patient also mentions the loss of his oldest daughter and her  in a car wreck.    Past Medical History:   Diagnosis Date    Anxiety     Arthritis     Atrial fibrillation     CURRENTLY    Bunion of right foot     Colon polyps     COPD (chronic obstructive pulmonary disease)     MILD. NO MEDS FOR    Depression     History of atrial fibrillation     WITH CARDIOVERSION. NO PROBLEMS    History of skin cancer     BCC REMOVED FROM BACK    [place] (hard of hearing)     Hyperlipidemia     Inguinal hernia, recurrent     RIGHT    Left foot pain     Lung nodules     Rash     IN GROIN TREATING FOR YEAST INFECTION BY PCP    Redness of skin     LOWER LEGS BILATERAL    Scab     BILATERAL LEGS HEALING    Sleep apnea with use of continuous positive airway pressure (CPAP)     CPAP    Streptococcus pneumoniae     ABOUT 6-7 YR AGO.     Type 2 diabetes mellitus        Past Surgical History:   Procedure Laterality Date    BASAL CELL CARCINOMA EXCISION      BRONCHOSCOPY WITH ION ROBOTIC ASSIST N/A 5/6/2024    Procedure: BRONCHOSCOPY WITH ION ROBOT WITH FNA, BIOPSIES, BAL AND ENDOBRONCHIAL ULTRASOUND WITH FNA;  Surgeon: Yony Coles MD;  Location: [facility] ENDOSCOPY;  Service: Robotics - Pulmonary;  Laterality: N/A;  PRE: PULMONARY NODULES  POST: SAME    CARDIAC CATHETERIZATION N/A 11/15/2021    Procedure: Coronary angiography;  Surgeon: Alfredo Jennings MD;  Location: [facility] CATH INVASIVE LOCATION;  Service: Cardiovascular;  Laterality: N/A;    CARDIAC CATHETERIZATION N/A 11/15/2021    Procedure: Left heart cath;  Surgeon: Alfredo Jennings MD;  Location: [facility] CATH INVASIVE LOCATION;  Service: Cardiovascular;  Laterality: N/A;    CARDIAC CATHETERIZATION N/A 11/15/2021    Procedure: Left ventriculography;  Surgeon: Alfredo Jennings MD;  Location: [facility] CATH INVASIVE LOCATION;  Service: Cardiovascular;  Laterality: N/A;    CARDIAC  CATHETERIZATION N/A 11/15/2021    Procedure: Aortic root aortogram;  Surgeon: Alfredo Jennings MD;  Location: [facility] CATH INVASIVE LOCATION;  Service: Cardiovascular;  Laterality: N/A;    CARDIOVERSION      CHOLECYSTECTOMY N/A 10/07/2017    Procedure: CHOLECYSTECTOMY LAPAROSCOPIC;  Surgeon: Martir Trevino MD;  Location: [facility] MAIN OR;  Service:     COLONOSCOPY  2007    COLONOSCOPY N/A 8/30/2022    Procedure: COLONOSCOPY TO CECUM AND TI AND COLD SNARE POLYPECTOMY;  Surgeon: Cj Lopez MD;  Location: [facility] ENDOSCOPY;  Service: Gastroenterology;  Laterality: N/A;  Pre: History of colon polyps  Post: POLYP, PREVIOUS TATTOO    FOOT SURGERY Left     TOES ARE PINNED. ALL BUT GREAT TOE    HAND SURGERY      THUMB    HERNIA REPAIR      INGUINAL HERNIA REPAIR Right 06/27/2018    Procedure: INGUINAL HERNIA REPAIR, OPEN, RECURRENT;  Surgeon: Martir Trevino MD;  Location: [facility] OR OSC;  Service: General    LASIK Bilateral     LOBECTOMY Right 6/12/2024    Procedure: BRONCHOSCOPY, RIGHT VIDEO ASSISTED THORACOSCOPY WITH RIGHT MIDDLE LOBECTOMY WITH DAVINCI ROBOT, MEDIASTINAL LYMPH NODE DISSECTION, INTERCOSTAL NERVE BLOCK;  Surgeon: David Figueroa MD;  Location: [facility] MAIN OR;  Service: Robotics - DaVinci;  Laterality: Right;    NECK SURGERY      growth on salivary gland    REPLACEMENT TOTAL KNEE Right 2007    (he is going to have a LTKR next month)    REPLACEMENT TOTAL KNEE Left     VENOUS ACCESS DEVICE (PORT) INSERTION Right 7/5/2024    Procedure: INSERTION VENOUS ACCESS DEVICE;  Surgeon: David Figueroa MD;  Location: [facility] MAIN OR;  Service: Thoracic;  Laterality: Right;       Family History   Problem Relation Age of Onset    Cancer Other     Stroke Mother     Alcohol abuse Father     Malig Hyperthermia Neg Hx        Social History     Socioeconomic History    Marital status:      Spouse name: Luba    Number of children: 2   Tobacco Use    Smoking status: Former     Current packs/day: 0.00     Average packs/day:  1 pack/day for 30.0 years (30.0 ttl pk-yrs)     Types: Cigars, Cigarettes     Start date: 1984     Quit date: 2014     Years since quittin.1    Smokeless tobacco: Never   Vaping Use    Vaping status: Never Used   Substance and Sexual Activity    Alcohol use: Never     Comment: caffeine use- decaf coffee    Drug use: Never    Sexual activity: Defer         Current Outpatient Medications:     famotidine (PEPCID) 20 MG tablet, Take 1 tablet by mouth 2 (Two) Times a Day As Needed for Heartburn., Disp: , Rfl:     ferrous sulfate 325 (65 FE) MG tablet, Take 0.5 tablets by mouth Daily With Breakfast., Disp: , Rfl:     fexofenadine (ALLEGRA) 180 MG tablet, Take 1 tablet by mouth Daily., Disp: , Rfl:     folic acid (FOLVITE) 1 MG tablet, Take 1 tablet by mouth Daily., Disp: 90 tablet, Rfl: 3    gabapentin (NEURONTIN) 300 MG capsule, Take 2 capsules by mouth Every 8 (Eight) Hours., Disp: 180 capsule, Rfl: 2    insulin aspart (NovoLOG FlexPen) 100 UNIT/ML solution pen-injector sc pen, Inject 5 Units under the skin into the appropriate area as directed 3 (Three) Times a Day With Meals for 30 days per sliding scale, Disp: 15 mL, Rfl: 0    Insulin Glargine, 1 Unit Dial, (Toujeo SoloStar) 300 UNIT/ML solution pen-injector injection, Inject 24 Units under the skin into the appropriate area as directed Daily., Disp: , Rfl:     iron polysaccharides (NIFEREX) 150 MG capsule, Take 1 capsule by mouth 2 (Two) Times a Day., Disp: , Rfl:     Multiple Vitamin (MULTIVITAMINS PO), Take 1 tablet by mouth Daily., Disp: , Rfl:     potassium chloride (MICRO-K) 10 MEQ CR capsule, Take 1 capsule by mouth 2 (Two) Times a Day., Disp: 40 capsule, Rfl: 2    predniSONE (DELTASONE) 20 MG tablet, Take 1 tablet by mouth Daily., Disp: , Rfl:     rosuvastatin (CRESTOR) 40 MG tablet, TAKE ONE TABLET BY MOUTH DAILY (Patient taking differently: Take 1 tablet by mouth Daily.), Disp: 90 tablet, Rfl: 1    sertraline (ZOLOFT) 50 MG tablet, TAKE ONE  "TABLET BY MOUTH DAILY (Patient taking differently: Take 1 tablet by mouth Daily.), Disp: 90 tablet, Rfl: 1    Tirzepatide (Mounjaro) 2.5 MG/0.5ML solution pen-injector pen, Inject 2.5 mg under the skin into the appropriate area as directed 1 (One) Time Per Week. Mondays, Disp: , Rfl:     torsemide (DEMADEX) 20 MG tablet, Take 1 tablet by mouth 2 (Two) Times a Day., Disp: , Rfl:     vitamin B-12 (CYANOCOBALAMIN) 1000 MCG tablet, Take 1 tablet by mouth Daily., Disp: 90 tablet, Rfl: 3    vitamin B-6 (PYRIDOXINE) 50 MG tablet, Take 1 tablet by mouth Daily., Disp: 90 tablet, Rfl: 3    Xarelto 20 MG tablet, TAKE ONE TABLET BY MOUTH DAILY (Patient taking differently: Take 1 tablet by mouth Daily With Dinner.), Disp: 30 tablet, Rfl: 9    triamcinolone (KENALOG) 0.1 % cream, Apply 1 Application topically to the appropriate area as directed 2 (Two) Times a Day. (Patient not taking: Reported on 2/13/2025), Disp: , Rfl:      No Known Allergies          Objective    OBJECTIVE:     VITAL SIGNS:  /68 (BP Location: Left arm, Patient Position: Sitting)   Pulse 68   Ht 180.3 cm (70.98\")   Wt 121 kg (267 lb 9.6 oz)   SpO2 97%   BMI 37.34 kg/m²     PHYSICAL EXAM:  Normal appearance.   Head is normocephalic.   Nose appears normal.   No obvious deformity of the mouth and throat.  Conjunctivae normal.   Heart rate and rhythm is normal.  Pulmonary effort is normal.   Moving all 4 extremities.  Extremities warm.  No focal deficit present.   Alert and oriented to person, place, and time.     RESULTS REVIEW:  I have reviewed the patient's all relevant laboratory and imaging findings.     Assessment & Plan    ASSESSMENT & PLAN:  Branden Diop is a 74 y.o. male with significant medical conditions as mentioned above presented to my clinic.    Assessment & Plan  1. Lung cancer.  The recent CT scan shows improvement in the previously identified spot on the lung, which was concerning for infection or inflammation. The pleural effusion has " also improved post-drainage. He is currently undergoing immunotherapy and has completed 4 months of the anticipated year-long treatment. He is advised to continue with the immunotherapy to maximize the chances of cure and minimize the chances of cancer recurrence. If his breathing worsens, he should contact the office immediately. A follow-up CT scan will be conducted in 3 months. If fluid accumulation is detected again, a long-term tube may be considered for home drainage.    Follow-up  The patient will follow up in 3 months.    PROCEDURE  The patient has undergone two procedures for fluid drainage from the chest, which have significantly improved his respiratory function.    I discussed the patients findings and my recommendations with the patient/family/caregiver. The patient/family/caregiver was given adequate time to ask questions and all questions were answered to patient satisfaction. Thank you for this consult and allowing us to participate in the care of your patient.      David Figueroa MD  Thoracic Surgeon  [facility]        Dictated utilizing Dragon dictation    I spent 40 minutes caring for Branden on this date of service. This time includes time spent by me in the following activities:preparing for the visit, reviewing tests, obtaining and/or reviewing a separately obtained history, performing a medically appropriate examination and/or evaluation, counseling and educating the patient/family/caregiver, ordering medications, tests, or procedures, referring and communicating with other health care professionals , documenting information in the medical record, independently interpreting results and communicating that information with the patient/family/caregiver, and care coordination and more than half the time was spent in direct face to face evaluation and decision making.     Patient or patient representative verbalized consent for the use of Ambient Listening during the visit with   David Figueroa MD for chart documentation. 2/18/2025  22:36 EST

## 2025-02-20 ENCOUNTER — TELEPHONE (OUTPATIENT)
Dept: ONCOLOGY | Facility: CLINIC | Age: 75
End: 2025-02-20
Payer: MEDICARE

## 2025-02-20 NOTE — TELEPHONE ENCOUNTER
Provider: [place]  Caller: Luba  Relationship to Patient: wife  Call Back Phone Number: [phone]  Reason for Call: patient's wife is calling because he has Covid, flu, and a touch of Pneumonia.  His rash has returned since stopping the prednisone and she was told to call and report all this to Dr. Vilalsenor and see if he can still come next week for his appointment

## 2025-02-20 NOTE — TELEPHONE ENCOUNTER
"I called Luba, he states that the patient went to see PCP yesterday, feeling ok, he said he tested positive for the FLU, COVID, and has a \"touch of pneumonia from the chest x-ray \". She states the PCP sent in tamiflu, paxloivid, vistaril for itching. Patient is wanting to see if he can come in for his appt next week. Luba states patient has no fevers, but has a productive cough w/ yellow/green phloem. This is his only symptom.   "

## 2025-02-20 NOTE — TELEPHONE ENCOUNTER
I called Luba back, I relayed Ganesh's message to her. I asked her to call us back on Monday to let us know how he is feeling and we can make a final decision then. Luba granger/louie

## 2025-02-25 ENCOUNTER — TELEPHONE (OUTPATIENT)
Dept: ONCOLOGY | Facility: CLINIC | Age: 75
End: 2025-02-25

## 2025-02-25 DIAGNOSIS — C34.2 MALIGNANT NEOPLASM OF MIDDLE LOBE OF RIGHT LUNG: Primary | ICD-10-CM

## 2025-02-25 DIAGNOSIS — Z79.899 ENCOUNTER FOR LONG-TERM (CURRENT) USE OF HIGH-RISK MEDICATION: ICD-10-CM

## 2025-02-25 DIAGNOSIS — C34.2 SQUAMOUS CELL CARCINOMA OF BRONCHUS IN RIGHT MIDDLE LOBE: ICD-10-CM

## 2025-02-25 NOTE — TELEPHONE ENCOUNTER
Patient's wife stated patient is taking Lomotil for diarrhea and needs to reschedule appointment from today to next week meeta to COVID, Flu and pneumonia. Explained to patient's wife Luba will call with new appointment.

## 2025-02-25 NOTE — TELEPHONE ENCOUNTER
Received: 02/24/2025 05:49 PM  I tried to text, and then when I sent it, it said there was nobody monitoring it. Branden Diop has an appointment in the morning, and he was taken to the hospital, and Dr. Ferris had given him some, he had COVID and the flu, and so he's on some medications that he takes, which causes diarrhea, so he's having to take a pill. So he doesn't have diarrhea, but that doesn't seem to be helping real good. Sometimes it will stop, but he can't come in tomorrow, because he's still on the COVID medication, and he can't control it. So he needs to reschedule, and I would like somebody to call me back.

## 2025-02-26 DIAGNOSIS — C34.2 SQUAMOUS CELL CARCINOMA OF BRONCHUS IN RIGHT MIDDLE LOBE: ICD-10-CM

## 2025-02-26 RX ORDER — GABAPENTIN 300 MG/1
CAPSULE ORAL
Qty: 180 CAPSULE | Refills: 0 | Status: SHIPPED | OUTPATIENT
Start: 2025-02-26

## 2025-02-27 ENCOUNTER — APPOINTMENT (OUTPATIENT)
Dept: GENERAL RADIOLOGY | Facility: HOSPITAL | Age: 75
DRG: 205 | End: 2025-02-27
Payer: MEDICARE

## 2025-02-27 ENCOUNTER — HOSPITAL ENCOUNTER (INPATIENT)
Facility: HOSPITAL | Age: 75
LOS: 4 days | Discharge: HOME | DRG: 205 | End: 2025-03-03
Attending: EMERGENCY MEDICINE | Admitting: STUDENT IN AN ORGANIZED HEALTH CARE EDUCATION/TRAINING PROGRAM
Payer: MEDICARE

## 2025-02-27 DIAGNOSIS — R21 RASH IN ADULT: ICD-10-CM

## 2025-02-27 DIAGNOSIS — J44.1 COPD WITH ACUTE EXACERBATION: ICD-10-CM

## 2025-02-27 DIAGNOSIS — J90 PLEURAL EFFUSION ON RIGHT: ICD-10-CM

## 2025-02-27 DIAGNOSIS — J96.21 ACUTE ON CHRONIC HYPOXIC RESPIRATORY FAILURE: Primary | ICD-10-CM

## 2025-02-27 PROBLEM — J96.20 ACUTE ON CHRONIC RESPIRATORY FAILURE: Status: ACTIVE | Noted: 2025-02-27

## 2025-02-27 PROBLEM — R06.09 DYSPNEA ON EXERTION: Status: ACTIVE | Noted: 2025-02-27

## 2025-02-27 LAB
ALBUMIN SERPL-MCNC: 3.4 G/DL (ref 3.5–5.2)
ALBUMIN/GLOB SERPL: 0.9 G/DL
ALP SERPL-CCNC: 135 U/L (ref 39–117)
ALT SERPL W P-5'-P-CCNC: 16 U/L (ref 1–41)
ANION GAP SERPL CALCULATED.3IONS-SCNC: 7.4 MMOL/L (ref 5–15)
AST SERPL-CCNC: 25 U/L (ref 1–40)
B PARAPERT DNA SPEC QL NAA+PROBE: NOT DETECTED
B PERT DNA SPEC QL NAA+PROBE: NOT DETECTED
BASOPHILS # BLD AUTO: 0.04 10*3/MM3 (ref 0–0.2)
BASOPHILS NFR BLD AUTO: 0.5 % (ref 0–1.5)
BILIRUB SERPL-MCNC: 0.6 MG/DL (ref 0–1.2)
BUN SERPL-MCNC: 11 MG/DL (ref 8–23)
BUN/CREAT SERPL: 13.9 (ref 7–25)
C PNEUM DNA NPH QL NAA+NON-PROBE: NOT DETECTED
CALCIUM SPEC-SCNC: 9.5 MG/DL (ref 8.6–10.5)
CHLORIDE SERPL-SCNC: 96 MMOL/L (ref 98–107)
CO2 SERPL-SCNC: 33.6 MMOL/L (ref 22–29)
CREAT SERPL-MCNC: 0.79 MG/DL (ref 0.76–1.27)
DEPRECATED RDW RBC AUTO: 53.1 FL (ref 37–54)
EGFRCR SERPLBLD CKD-EPI 2021: 92.1 ML/MIN/1.73
EOSINOPHIL # BLD AUTO: 0.55 10*3/MM3 (ref 0–0.4)
EOSINOPHIL NFR BLD AUTO: 7.4 % (ref 0.3–6.2)
ERYTHROCYTE [DISTWIDTH] IN BLOOD BY AUTOMATED COUNT: 16 % (ref 12.3–15.4)
FLUAV SUBTYP SPEC NAA+PROBE: NOT DETECTED
FLUBV RNA ISLT QL NAA+PROBE: NOT DETECTED
GEN 5 1HR TROPONIN T REFLEX: 22 NG/L
GLOBULIN UR ELPH-MCNC: 3.8 GM/DL
GLUCOSE BLDC GLUCOMTR-MCNC: 354 MG/DL (ref 70–130)
GLUCOSE BLDC GLUCOMTR-MCNC: 423 MG/DL (ref 70–130)
GLUCOSE SERPL-MCNC: 262 MG/DL (ref 65–99)
HADV DNA SPEC NAA+PROBE: NOT DETECTED
HCOV 229E RNA SPEC QL NAA+PROBE: NOT DETECTED
HCOV HKU1 RNA SPEC QL NAA+PROBE: NOT DETECTED
HCOV NL63 RNA SPEC QL NAA+PROBE: NOT DETECTED
HCOV OC43 RNA SPEC QL NAA+PROBE: NOT DETECTED
HCT VFR BLD AUTO: 34.5 % (ref 37.5–51)
HGB BLD-MCNC: 10.8 G/DL (ref 13–17.7)
HMPV RNA NPH QL NAA+NON-PROBE: NOT DETECTED
HOLD SPECIMEN: NORMAL
HOLD SPECIMEN: NORMAL
HPIV1 RNA ISLT QL NAA+PROBE: NOT DETECTED
HPIV2 RNA SPEC QL NAA+PROBE: NOT DETECTED
HPIV3 RNA NPH QL NAA+PROBE: NOT DETECTED
HPIV4 P GENE NPH QL NAA+PROBE: NOT DETECTED
IMM GRANULOCYTES # BLD AUTO: 0.08 10*3/MM3 (ref 0–0.05)
IMM GRANULOCYTES NFR BLD AUTO: 1.1 % (ref 0–0.5)
LYMPHOCYTES # BLD AUTO: 0.94 10*3/MM3 (ref 0.7–3.1)
LYMPHOCYTES NFR BLD AUTO: 12.6 % (ref 19.6–45.3)
M PNEUMO IGG SER IA-ACNC: NOT DETECTED
MAGNESIUM SERPL-MCNC: 1.7 MG/DL (ref 1.6–2.4)
MCH RBC QN AUTO: 28.4 PG (ref 26.6–33)
MCHC RBC AUTO-ENTMCNC: 31.3 G/DL (ref 31.5–35.7)
MCV RBC AUTO: 90.8 FL (ref 79–97)
MONOCYTES # BLD AUTO: 0.35 10*3/MM3 (ref 0.1–0.9)
MONOCYTES NFR BLD AUTO: 4.7 % (ref 5–12)
NEUTROPHILS NFR BLD AUTO: 5.5 10*3/MM3 (ref 1.7–7)
NEUTROPHILS NFR BLD AUTO: 73.7 % (ref 42.7–76)
NRBC BLD AUTO-RTO: 0 /100 WBC (ref 0–0.2)
NT-PROBNP SERPL-MCNC: 504 PG/ML (ref 0–1800)
PLATELET # BLD AUTO: 303 10*3/MM3 (ref 140–450)
PMV BLD AUTO: 8.4 FL (ref 6–12)
POTASSIUM SERPL-SCNC: 3.8 MMOL/L (ref 3.5–5.2)
PROCALCITONIN SERPL-MCNC: 0.06 NG/ML (ref 0–0.25)
PROT SERPL-MCNC: 7.2 G/DL (ref 6–8.5)
QT INTERVAL: 408 MS
QTC INTERVAL: 421 MS
RBC # BLD AUTO: 3.8 10*6/MM3 (ref 4.14–5.8)
RHINOVIRUS RNA SPEC NAA+PROBE: NOT DETECTED
RSV RNA NPH QL NAA+NON-PROBE: NOT DETECTED
SARS-COV-2 RNA NPH QL NAA+NON-PROBE: NOT DETECTED
SODIUM SERPL-SCNC: 137 MMOL/L (ref 136–145)
TROPONIN T % DELTA: 0
TROPONIN T NUMERIC DELTA: 0 NG/L
TROPONIN T SERPL HS-MCNC: 22 NG/L
WBC NRBC COR # BLD AUTO: 7.46 10*3/MM3 (ref 3.4–10.8)
WHOLE BLOOD HOLD COAG: NORMAL
WHOLE BLOOD HOLD SPECIMEN: NORMAL

## 2025-02-27 PROCEDURE — 84145 PROCALCITONIN (PCT): CPT | Performed by: STUDENT IN AN ORGANIZED HEALTH CARE EDUCATION/TRAINING PROGRAM

## 2025-02-27 PROCEDURE — 82948 REAGENT STRIP/BLOOD GLUCOSE: CPT

## 2025-02-27 PROCEDURE — 25010000002 FUROSEMIDE PER 20 MG: Performed by: STUDENT IN AN ORGANIZED HEALTH CARE EDUCATION/TRAINING PROGRAM

## 2025-02-27 PROCEDURE — 25010000002 METHYLPREDNISOLONE PER 125 MG: Performed by: STUDENT IN AN ORGANIZED HEALTH CARE EDUCATION/TRAINING PROGRAM

## 2025-02-27 PROCEDURE — 63710000001 INSULIN GLARGINE PER 5 UNITS: Performed by: STUDENT IN AN ORGANIZED HEALTH CARE EDUCATION/TRAINING PROGRAM

## 2025-02-27 PROCEDURE — 94640 AIRWAY INHALATION TREATMENT: CPT

## 2025-02-27 PROCEDURE — 83880 ASSAY OF NATRIURETIC PEPTIDE: CPT | Performed by: EMERGENCY MEDICINE

## 2025-02-27 PROCEDURE — 93010 ELECTROCARDIOGRAM REPORT: CPT | Performed by: INTERNAL MEDICINE

## 2025-02-27 PROCEDURE — 63710000001 INSULIN LISPRO (HUMAN) PER 5 UNITS: Performed by: STUDENT IN AN ORGANIZED HEALTH CARE EDUCATION/TRAINING PROGRAM

## 2025-02-27 PROCEDURE — 25010000002 METHYLPREDNISOLONE PER 125 MG: Performed by: EMERGENCY MEDICINE

## 2025-02-27 PROCEDURE — 36415 COLL VENOUS BLD VENIPUNCTURE: CPT

## 2025-02-27 PROCEDURE — 0202U NFCT DS 22 TRGT SARS-COV-2: CPT | Performed by: STUDENT IN AN ORGANIZED HEALTH CARE EDUCATION/TRAINING PROGRAM

## 2025-02-27 PROCEDURE — 99285 EMERGENCY DEPT VISIT HI MDM: CPT

## 2025-02-27 PROCEDURE — 25010000002 DIPHENHYDRAMINE PER 50 MG: Performed by: EMERGENCY MEDICINE

## 2025-02-27 PROCEDURE — 71045 X-RAY EXAM CHEST 1 VIEW: CPT

## 2025-02-27 PROCEDURE — 83735 ASSAY OF MAGNESIUM: CPT | Performed by: STUDENT IN AN ORGANIZED HEALTH CARE EDUCATION/TRAINING PROGRAM

## 2025-02-27 PROCEDURE — 80053 COMPREHEN METABOLIC PANEL: CPT | Performed by: EMERGENCY MEDICINE

## 2025-02-27 PROCEDURE — 85025 COMPLETE CBC W/AUTO DIFF WBC: CPT | Performed by: EMERGENCY MEDICINE

## 2025-02-27 PROCEDURE — 84484 ASSAY OF TROPONIN QUANT: CPT | Performed by: EMERGENCY MEDICINE

## 2025-02-27 PROCEDURE — 63710000001 INSULIN LISPRO (HUMAN) PER 5 UNITS

## 2025-02-27 PROCEDURE — 94799 UNLISTED PULMONARY SVC/PX: CPT

## 2025-02-27 PROCEDURE — 93005 ELECTROCARDIOGRAM TRACING: CPT | Performed by: EMERGENCY MEDICINE

## 2025-02-27 PROCEDURE — 94761 N-INVAS EAR/PLS OXIMETRY MLT: CPT

## 2025-02-27 RX ORDER — BISACODYL 10 MG
10 SUPPOSITORY, RECTAL RECTAL DAILY PRN
Status: DISCONTINUED | OUTPATIENT
Start: 2025-02-27 | End: 2025-03-03 | Stop reason: HOSPADM

## 2025-02-27 RX ORDER — SODIUM CHLORIDE 0.9 % (FLUSH) 0.9 %
10 SYRINGE (ML) INJECTION EVERY 12 HOURS SCHEDULED
Status: DISCONTINUED | OUTPATIENT
Start: 2025-02-27 | End: 2025-03-03 | Stop reason: HOSPADM

## 2025-02-27 RX ORDER — BISACODYL 5 MG/1
5 TABLET, DELAYED RELEASE ORAL DAILY PRN
Status: DISCONTINUED | OUTPATIENT
Start: 2025-02-27 | End: 2025-03-03 | Stop reason: HOSPADM

## 2025-02-27 RX ORDER — TORSEMIDE 20 MG/1
20 TABLET ORAL
Status: DISCONTINUED | OUTPATIENT
Start: 2025-02-27 | End: 2025-02-27

## 2025-02-27 RX ORDER — INSULIN LISPRO 100 [IU]/ML
2-7 INJECTION, SOLUTION INTRAVENOUS; SUBCUTANEOUS
Status: DISCONTINUED | OUTPATIENT
Start: 2025-02-27 | End: 2025-02-27

## 2025-02-27 RX ORDER — SODIUM CHLORIDE 9 MG/ML
40 INJECTION, SOLUTION INTRAVENOUS AS NEEDED
Status: DISCONTINUED | OUTPATIENT
Start: 2025-02-27 | End: 2025-03-03 | Stop reason: HOSPADM

## 2025-02-27 RX ORDER — FERROUS SULFATE 325(65) MG
162.5 TABLET ORAL 2 TIMES DAILY
Status: DISCONTINUED | OUTPATIENT
Start: 2025-02-27 | End: 2025-03-03 | Stop reason: HOSPADM

## 2025-02-27 RX ORDER — IBUPROFEN 600 MG/1
1 TABLET ORAL
Status: DISCONTINUED | OUTPATIENT
Start: 2025-02-27 | End: 2025-03-03 | Stop reason: HOSPADM

## 2025-02-27 RX ORDER — POLYETHYLENE GLYCOL 3350 17 G/17G
17 POWDER, FOR SOLUTION ORAL DAILY PRN
Status: DISCONTINUED | OUTPATIENT
Start: 2025-02-27 | End: 2025-03-03 | Stop reason: HOSPADM

## 2025-02-27 RX ORDER — METHYLPREDNISOLONE SODIUM SUCCINATE 125 MG/2ML
60 INJECTION, POWDER, LYOPHILIZED, FOR SOLUTION INTRAMUSCULAR; INTRAVENOUS 2 TIMES DAILY
Status: DISCONTINUED | OUTPATIENT
Start: 2025-02-27 | End: 2025-03-01

## 2025-02-27 RX ORDER — FAMOTIDINE 10 MG/ML
20 INJECTION, SOLUTION INTRAVENOUS ONCE
Status: COMPLETED | OUTPATIENT
Start: 2025-02-27 | End: 2025-02-27

## 2025-02-27 RX ORDER — INSULIN LISPRO 100 [IU]/ML
2-9 INJECTION, SOLUTION INTRAVENOUS; SUBCUTANEOUS
Status: DISCONTINUED | OUTPATIENT
Start: 2025-02-27 | End: 2025-03-01

## 2025-02-27 RX ORDER — METHYLPREDNISOLONE SODIUM SUCCINATE 125 MG/2ML
125 INJECTION, POWDER, LYOPHILIZED, FOR SOLUTION INTRAMUSCULAR; INTRAVENOUS ONCE
Status: COMPLETED | OUTPATIENT
Start: 2025-02-27 | End: 2025-02-27

## 2025-02-27 RX ORDER — ALBUTEROL SULFATE 90 UG/1
2 INHALANT RESPIRATORY (INHALATION) EVERY 4 HOURS PRN
COMMUNITY

## 2025-02-27 RX ORDER — GABAPENTIN 300 MG/1
600 CAPSULE ORAL 3 TIMES DAILY
Status: DISCONTINUED | OUTPATIENT
Start: 2025-02-27 | End: 2025-03-03 | Stop reason: HOSPADM

## 2025-02-27 RX ORDER — FOLIC ACID 1 MG/1
1 TABLET ORAL DAILY
Status: DISCONTINUED | OUTPATIENT
Start: 2025-02-27 | End: 2025-03-03 | Stop reason: HOSPADM

## 2025-02-27 RX ORDER — IPRATROPIUM BROMIDE AND ALBUTEROL SULFATE 2.5; .5 MG/3ML; MG/3ML
3 SOLUTION RESPIRATORY (INHALATION) ONCE
Status: COMPLETED | OUTPATIENT
Start: 2025-02-27 | End: 2025-02-27

## 2025-02-27 RX ORDER — HYDROXYZINE PAMOATE 50 MG/1
50 CAPSULE ORAL 3 TIMES DAILY PRN
COMMUNITY

## 2025-02-27 RX ORDER — ROSUVASTATIN CALCIUM 20 MG/1
40 TABLET, COATED ORAL DAILY
Status: DISCONTINUED | OUTPATIENT
Start: 2025-02-27 | End: 2025-03-03 | Stop reason: HOSPADM

## 2025-02-27 RX ORDER — FUROSEMIDE 10 MG/ML
60 INJECTION INTRAMUSCULAR; INTRAVENOUS ONCE
Status: COMPLETED | OUTPATIENT
Start: 2025-02-27 | End: 2025-02-27

## 2025-02-27 RX ORDER — SODIUM CHLORIDE 0.9 % (FLUSH) 0.9 %
10 SYRINGE (ML) INJECTION AS NEEDED
Status: DISCONTINUED | OUTPATIENT
Start: 2025-02-27 | End: 2025-03-03 | Stop reason: HOSPADM

## 2025-02-27 RX ORDER — NICOTINE POLACRILEX 4 MG
15 LOZENGE BUCCAL
Status: DISCONTINUED | OUTPATIENT
Start: 2025-02-27 | End: 2025-03-03 | Stop reason: HOSPADM

## 2025-02-27 RX ORDER — DIPHENHYDRAMINE HYDROCHLORIDE 50 MG/ML
25 INJECTION INTRAMUSCULAR; INTRAVENOUS ONCE
Status: COMPLETED | OUTPATIENT
Start: 2025-02-27 | End: 2025-02-27

## 2025-02-27 RX ORDER — DEXTROSE MONOHYDRATE 25 G/50ML
25 INJECTION, SOLUTION INTRAVENOUS
Status: DISCONTINUED | OUTPATIENT
Start: 2025-02-27 | End: 2025-03-03 | Stop reason: HOSPADM

## 2025-02-27 RX ORDER — AMOXICILLIN 250 MG
2 CAPSULE ORAL 2 TIMES DAILY PRN
Status: DISCONTINUED | OUTPATIENT
Start: 2025-02-27 | End: 2025-03-03 | Stop reason: HOSPADM

## 2025-02-27 RX ADMIN — METHYLPREDNISOLONE SODIUM SUCCINATE 60 MG: 125 INJECTION, POWDER, FOR SOLUTION INTRAMUSCULAR; INTRAVENOUS at 22:17

## 2025-02-27 RX ADMIN — METHYLPREDNISOLONE SODIUM SUCCINATE 125 MG: 125 INJECTION, POWDER, FOR SOLUTION INTRAMUSCULAR; INTRAVENOUS at 12:05

## 2025-02-27 RX ADMIN — FUROSEMIDE 60 MG: 10 INJECTION, SOLUTION INTRAMUSCULAR; INTRAVENOUS at 15:30

## 2025-02-27 RX ADMIN — INSULIN GLARGINE 20 UNITS: 100 INJECTION, SOLUTION SUBCUTANEOUS at 22:15

## 2025-02-27 RX ADMIN — INSULIN LISPRO 9 UNITS: 100 INJECTION, SOLUTION INTRAVENOUS; SUBCUTANEOUS at 22:15

## 2025-02-27 RX ADMIN — Medication 10 ML: at 22:18

## 2025-02-27 RX ADMIN — FAMOTIDINE 20 MG: 10 INJECTION INTRAVENOUS at 12:07

## 2025-02-27 RX ADMIN — INSULIN LISPRO 6 UNITS: 100 INJECTION, SOLUTION INTRAVENOUS; SUBCUTANEOUS at 17:30

## 2025-02-27 RX ADMIN — IPRATROPIUM BROMIDE AND ALBUTEROL SULFATE 3 ML: .5; 3 SOLUTION RESPIRATORY (INHALATION) at 12:20

## 2025-02-27 RX ADMIN — DIPHENHYDRAMINE HYDROCHLORIDE 25 MG: 50 INJECTION, SOLUTION INTRAMUSCULAR; INTRAVENOUS at 12:06

## 2025-02-27 RX ADMIN — GABAPENTIN 600 MG: 300 CAPSULE ORAL at 15:30

## 2025-02-27 RX ADMIN — GABAPENTIN 600 MG: 300 CAPSULE ORAL at 22:16

## 2025-02-27 NOTE — PLAN OF CARE
Problem: Adult Inpatient Plan of Care  Goal: Plan of Care Review  Outcome: Progressing  Flowsheets (Taken 2/27/2025 1714)  Progress: no change  Outcome Evaluation: arrived from ED, VSS, 4L NC baseline, IV lasix given, COVID isolation,  Plan of Care Reviewed With: patient  Goal: Patient-Specific Goal (Individualized)  Outcome: Progressing  Goal: Absence of Hospital-Acquired Illness or Injury  Outcome: Progressing  Intervention: Identify and Manage Fall Risk  Recent Flowsheet Documentation  Taken 2/27/2025 1600 by Chela Jasmine RN  Safety Promotion/Fall Prevention:   activity supervised   assistive device/personal items within reach   clutter free environment maintained   fall prevention program maintained   nonskid shoes/slippers when out of bed   room organization consistent   safety round/check completed  Taken 2/27/2025 1433 by Chela Jasmine RN  Safety Promotion/Fall Prevention:   activity supervised   assistive device/personal items within reach   clutter free environment maintained   fall prevention program maintained   nonskid shoes/slippers when out of bed   room organization consistent   safety round/check completed  Intervention: Prevent Skin Injury  Recent Flowsheet Documentation  Taken 2/27/2025 1433 by Chela Jasmine RN  Body Position: supine  Goal: Optimal Comfort and Wellbeing  Outcome: Progressing  Goal: Readiness for Transition of Care  Outcome: Progressing  Intervention: Mutually Develop Transition Plan  Recent Flowsheet Documentation  Taken 2/27/2025 1433 by Chela Jasmine RN  Equipment Currently Used at Home:   bath bench   cpap   walker, standard   pulse ox   oxygen   grab bar   glucometer  Transportation Anticipated: family or friend will provide  Transportation Concerns: none  Patient/Family Anticipated Services at Transition: none  Patient/Family Anticipates Transition to: home with family     Problem: Comorbidity Management  Goal: Maintenance of COPD Symptom  Control  Outcome: Progressing  Goal: Blood Glucose Level Within Target Range  Outcome: Progressing  Goal: Blood Pressure in Desired Range  Outcome: Progressing   Goal Outcome Evaluation:  Plan of Care Reviewed With: patient        Progress: no change  Outcome Evaluation: arrived from ED, VSS, 4L NC baseline, IV lasix given, COVID isolation,

## 2025-02-27 NOTE — PROGRESS NOTES
Clinical Pharmacy Services: Medication History    Branden Diop is a 74 y.o. male presenting to [facility] for   Chief Complaint   Patient presents with    Shortness of Breath       He  has a past medical history of Anxiety, Arthritis, Atrial fibrillation, Bunion of right foot, Colon polyps, COPD (chronic obstructive pulmonary disease), Depression, History of atrial fibrillation, History of skin cancer, [place] (hard of hearing), Hyperlipidemia, Inguinal hernia, recurrent, Left foot pain, Lung nodules, Rash, Redness of skin, Scab, Sleep apnea with use of continuous positive airway pressure (CPAP), Streptococcus pneumoniae, and Type 2 diabetes mellitus.    Allergies as of 02/27/2025    (No Known Allergies)       Medication information was obtained from: Medication [employer]les & Spouse  Pharmacy and Phone Number:     Prior to Admission Medications       Prescriptions Last Dose Informant Patient Reported? Taking?    albuterol sulfate  (90 Base) MCG/ACT inhaler  Spouse/Significant Other, Medication Bottle Yes Yes    Inhale 2 puffs Every 4 (Four) Hours As Needed for Wheezing.    famotidine (PEPCID) 20 MG tablet  Medication Bottle, Spouse/Significant Other Yes Yes    Take 1 tablet by mouth 2 (Two) Times a Day As Needed for Heartburn.    ferrous sulfate 325 (65 FE) MG tablet  Spouse/Significant Other, Medication Bottle Yes Yes    Take 0.5 tablets by mouth 2 (Two) Times a Day.    fexofenadine (ALLEGRA) 180 MG tablet  Spouse/Significant Other, Medication Bottle Yes Yes    Take 1 tablet by mouth Daily.    folic acid (FOLVITE) 1 MG tablet  Spouse/Significant Other, Medication Bottle No Yes    Take 1 tablet by mouth Daily.    gabapentin (NEURONTIN) 300 MG capsule  Spouse/Significant Other, Medication Bottle No Yes    TAKE TWO CAPSULES BY MOUTH EVERY 8 HOURS    Patient taking differently:  Take 2 capsules by mouth 3 (Three) Times a Day.    hydrOXYzine pamoate (VISTARIL) 50 MG capsule  Spouse/Significant Other,  Medication Bottle Yes Yes    Take 1 capsule by mouth 3 (Three) Times a Day As Needed for Itching.    insulin aspart (NovoLOG FlexPen) 100 UNIT/ML solution pen-injector sc pen  Spouse/Significant Other No Yes    Inject 5 Units under the skin into the appropriate area as directed 3 (Three) Times a Day With Meals for 30 days per sliding scale    Insulin Glargine, 1 Unit Dial, (Toujeo SoloStar) 300 UNIT/ML solution pen-injector injection  Spouse/Significant Other Yes Yes    Inject 28 Units under the skin into the appropriate area as directed Daily.    magnesium oxide (MAG-OX) 400 tablet tablet  Spouse/Significant Other Yes Yes    Take 1 tablet by mouth 2 (Two) Times a Day.    Multiple Vitamin (MULTIVITAMINS PO)  Spouse/Significant Other Yes Yes    Take 1 tablet by mouth Daily.    potassium chloride (MICRO-K) 10 MEQ CR capsule  Spouse/Significant Other, Medication Bottle No Yes    Take 1 capsule by mouth 2 (Two) Times a Day.    rosuvastatin (CRESTOR) 40 MG tablet  Medication Bottle, Spouse/Significant Other No Yes    TAKE ONE TABLET BY MOUTH DAILY    Patient taking differently:  Take 1 tablet by mouth Daily.    sertraline (ZOLOFT) 50 MG tablet  Spouse/Significant Other, Medication Bottle No Yes    TAKE ONE TABLET BY MOUTH DAILY    Patient taking differently:  Take 1 tablet by mouth Daily.    Tirzepatide (Mounjaro) 2.5 MG/0.5ML solution pen-injector pen  Spouse/Significant Other Yes Yes    Inject 2.5 mg under the skin into the appropriate area as directed 1 (One) Time Per Week. Mondays    torsemide (DEMADEX) 20 MG tablet  Spouse/Significant Other, Medication Bottle Yes Yes    Take 1 tablet by mouth 2 (Two) Times a Day.    vitamin B-12 (CYANOCOBALAMIN) 1000 MCG tablet  Spouse/Significant Other No Yes    Take 1 tablet by mouth Daily.    Xarelto 20 MG tablet  Spouse/Significant Other, Medication Bottle No Yes    TAKE ONE TABLET BY MOUTH DAILY    Patient taking differently:  Take 1 tablet by mouth Daily With Dinner.               Medication notes:     This medication list is complete to the best of my knowledge as of 2/27/2025    Please call if questions.    Mi Concepcion  Medication History Technician   [phone]    2/27/2025 12:44 EST

## 2025-02-27 NOTE — ED PROVIDER NOTES
EMERGENCY DEPARTMENT ENCOUNTER  Room Number:  28/28  PCP: Tino Lopez MD  Independent Historians: Patient      HPI:  Chief Complaint: had concerns including Shortness of Breath.  Rash    A complete HPI/ROS/PMH/PSH/SH/FH are unobtainable due to: Nothing      Context: Branden Diop is a 74 y.o. male with a medical history of atrial fibrillation, COPD, squamous cell carcinoma of the lung, chronic hypoxic respiratory failure who presents to the ED c/o acute shortness of breath and rash.  He states that he has been more short of breath than normal recently.  He was reportedly with O2 saturations in the 70s on arrival here on his home 4 L nasal cannula.  He reports increasing dyspnea on exertion.  He denies chest pain.  He denies known fever.  He states that he was diagnosed with COVID-19 about a week ago.  He is also recently developed a diffuse rash over his back, chest, bilateral upper extremities.  It is itchy.  He denies any oral mucosal pain or rash involving his lips or mouth.  He cannot recall any new medications that he has been prescribed.  From review of his chart, he was recently taking Paxlovid for COVID-19 and antidiarrheal medication.  He also resumed his torsemide recently.      Review of prior external notes (non-ED) -and- Review of prior external test results outside of this encounter: See ED course        PAST MEDICAL HISTORY  Active Ambulatory Problems     Diagnosis Date Noted    A-fib 01/29/2016    Atrioventricular block, Mobitz type 1, Wenckebach 01/29/2016    Apnea, sleep 01/29/2016    Obesity 01/29/2016    Streptococcus pneumoniae     Sleep apnea with use of continuous positive airway pressure (CPAP)     Sinusitis     Right wrist pain 11/11/2015    Pneumonia 12/01/2013    Type 2 diabetes mellitus, with long-term current use of insulin     Hyperlipidemia     Hammertoe     Depression     COPD (chronic obstructive pulmonary disease)     Colon polyps     Anxiety     Pruritus 04/05/2016     Cholelithiasis 10/06/2017    Fatty liver 10/09/2017    Essential hypertension 05/24/2019    Vitamin D deficiency 08/10/2020    Emphysema, unspecified 10/14/2021    Bronchiectasis with acute exacerbation 10/14/2021    FHx: colonic polyps 08/30/2022    Diverticulosis 08/30/2022    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 05/30/2024    Malignant neoplasm of middle lobe of right lung 05/30/2024    Fluid collection at surgical site, initial encounter 06/20/2024    Encounter for long-term (current) use of high-risk medication 07/12/2024    Fitting and adjustment of vascular catheter 07/12/2024    Anemia associated with chemotherapy 10/10/2024    Peripheral neuropathy due to chemotherapy 11/06/2024    Acute respiratory failure 11/29/2024    Rash in adult 11/30/2024    Bone mass 11/30/2024    Drug-induced skin rash 12/25/2024    Adverse effect of antineoplastic drug 12/25/2024    Pleural effusion on right 12/25/2024    Acute on chronic hypoxic respiratory failure 01/22/2025    Hypoxemia 01/23/2025    Pneumonitis 01/23/2025     Resolved Ambulatory Problems     Diagnosis Date Noted    BP (high blood pressure) 01/29/2016    Atrial fibrillation     Atrial flutter 08/25/2016    Acute cholecystitis 10/06/2017    Right upper quadrant pain 10/06/2017    Recurrent inguinal hernia of right side without obstruction or gangrene 06/13/2018    Uncontrolled type 2 diabetes mellitus with hyperglycemia 04/25/2019    Chest pain, atypical 11/11/2021     Past Medical History:   Diagnosis Date    Arthritis     Bunion of right foot     History of atrial fibrillation     History of skin cancer     "[employer] (hard of hearing)     Inguinal hernia, recurrent     Left foot pain     Lung nodules     Rash     Redness of skin     Scab     Type 2 diabetes mellitus          PAST SURGICAL HISTORY  Past Surgical History:   Procedure Laterality Date    BASAL CELL CARCINOMA EXCISION      BRONCHOSCOPY WITH ION ROBOTIC ASSIST N/A 5/6/2024     Procedure: BRONCHOSCOPY WITH ION ROBOT WITH FNA, BIOPSIES, BAL AND ENDOBRONCHIAL ULTRASOUND WITH FNA;  Surgeon: Yony Coles MD;  Location: [facility] ENDOSCOPY;  Service: Robotics - Pulmonary;  Laterality: N/A;  PRE: PULMONARY NODULES  POST: SAME    CARDIAC CATHETERIZATION N/A 11/15/2021    Procedure: Coronary angiography;  Surgeon: Alfredo Jennings MD;  Location: [facility] CATH INVASIVE LOCATION;  Service: Cardiovascular;  Laterality: N/A;    CARDIAC CATHETERIZATION N/A 11/15/2021    Procedure: Left heart cath;  Surgeon: Alfredo Jennings MD;  Location: [facility] CATH INVASIVE LOCATION;  Service: Cardiovascular;  Laterality: N/A;    CARDIAC CATHETERIZATION N/A 11/15/2021    Procedure: Left ventriculography;  Surgeon: Alfredo Jennings MD;  Location: [facility] CATH INVASIVE LOCATION;  Service: Cardiovascular;  Laterality: N/A;    CARDIAC CATHETERIZATION N/A 11/15/2021    Procedure: Aortic root aortogram;  Surgeon: Alfredo Jennings MD;  Location: [facility] CATH INVASIVE LOCATION;  Service: Cardiovascular;  Laterality: N/A;    CARDIOVERSION      CHOLECYSTECTOMY N/A 10/07/2017    Procedure: CHOLECYSTECTOMY LAPAROSCOPIC;  Surgeon: Martir Trevino MD;  Location: [facility] MAIN OR;  Service:     COLONOSCOPY  2007    COLONOSCOPY N/A 8/30/2022    Procedure: COLONOSCOPY TO CECUM AND TI AND COLD SNARE POLYPECTOMY;  Surgeon: Cj Lopez MD;  Location: [facility] ENDOSCOPY;  Service: Gastroenterology;  Laterality: N/A;  Pre: History of colon polyps  Post: POLYP, PREVIOUS TATTOO    FOOT SURGERY Left     TOES ARE PINNED. ALL BUT GREAT TOE    HAND SURGERY      THUMB    HERNIA REPAIR      INGUINAL HERNIA REPAIR Right 06/27/2018    Procedure: INGUINAL HERNIA REPAIR, OPEN, RECURRENT;  Surgeon: Martir Trevino MD;  Location: [facility] OR OSC;  Service: General    LASIK Bilateral     LOBECTOMY Right 6/12/2024    Procedure: BRONCHOSCOPY, RIGHT VIDEO ASSISTED THORACOSCOPY WITH RIGHT MIDDLE LOBECTOMY WITH DAVINCI ROBOT, MEDIASTINAL LYMPH  NODE DISSECTION, INTERCOSTAL NERVE BLOCK;  Surgeon: David Figueroa MD;  Location: [facility] MAIN OR;  Service: Robotics - DaVinci;  Laterality: Right;    NECK SURGERY      growth on salivary gland    REPLACEMENT TOTAL KNEE Right     (he is going to have a LTKR next month)    REPLACEMENT TOTAL KNEE Left     VENOUS ACCESS DEVICE (PORT) INSERTION Right 2024    Procedure: INSERTION VENOUS ACCESS DEVICE;  Surgeon: David Figueroa MD;  Location: [facility] MAIN OR;  Service: Thoracic;  Laterality: Right;         FAMILY HISTORY  Family History   Problem Relation Age of Onset    Cancer Other     Stroke Mother     Alcohol abuse Father     Malig Hyperthermia Neg Hx          SOCIAL HISTORY  Social History     Socioeconomic History    Marital status:      Spouse name: Luba    Number of children: 2   Tobacco Use    Smoking status: Former     Current packs/day: 0.00     Average packs/day: 1 pack/day for 30.0 years (30.0 ttl pk-yrs)     Types: Cigars, Cigarettes     Start date: 1984     Quit date: 2014     Years since quittin.1    Smokeless tobacco: Never   Vaping Use    Vaping status: Never Used   Substance and Sexual Activity    Alcohol use: Never     Comment: caffeine use- decaf coffee    Drug use: Never    Sexual activity: Defer         ALLERGIES  Patient has no known allergies.      REVIEW OF SYSTEMS  Review of all 14 systems is negative other than stated in the HPI above.      PHYSICAL EXAM    I have reviewed the triage vital signs and nursing notes.    ED Triage Vitals   Temp Heart Rate Resp BP SpO2   -- 25 0948 25 1005 25 1005 25 0948    84 25 127/65 (!) 74 %      Temp src Heart Rate Source Patient Position BP Location FiO2 (%)   -- -- -- -- --                GENERAL: awake and alert, chronically ill-appearing, appears to be in mild respiratory distress  HENT: Normocephalic, atraumatic.  Lips and oropharynx are normal in appearance, no no blisters involving the mucosa.  EYES: no  scleral icterus, conjunctiva clear bilaterally  CV: regular rhythm, regular rate  RESPIRATORY: Slightly labored breathing, mild rhonchi present bilaterally, no active wheezing, slightly diminished breath sounds at the lung bases bilaterally  ABDOMEN: soft, nontender throughout  MUSCULOSKELETAL: no deformity  NEURO: alert, moves all extremities, follows commands  PSYCH: calm, cooperative  SKIN: Warm, dry.  There is a erythematous maculopapular eruption over the dorsal surface of the arms, anterior chest, anterior abdomen that blanches to pressure, negative Nikolsky sign.  He has similar appearance on the upper back however some of these lesions appear scabbed over.  There is mild erythema of the anterior legs bilaterally without discrete rash.          LAB RESULTS  Recent Results (from the past 24 hours)   ECG 12 Lead ED Triage Standing Order; SOA    Collection Time: 02/27/25  9:57 AM   Result Value Ref Range    QT Interval 408 ms    QTC Interval 421 ms   Comprehensive Metabolic Panel    Collection Time: 02/27/25 10:04 AM    Specimen: Blood   Result Value Ref Range    Glucose 262 (H) 65 - 99 mg/dL    BUN 11 8 - 23 mg/dL    Creatinine 0.79 0.76 - 1.27 mg/dL    Sodium 137 136 - 145 mmol/L    Potassium 3.8 3.5 - 5.2 mmol/L    Chloride 96 (L) 98 - 107 mmol/L    CO2 33.6 (H) 22.0 - 29.0 mmol/L    Calcium 9.5 8.6 - 10.5 mg/dL    Total Protein 7.2 6.0 - 8.5 g/dL    Albumin 3.4 (L) 3.5 - 5.2 g/dL    ALT (SGPT) 16 1 - 41 U/L    AST (SGOT) 25 1 - 40 U/L    Alkaline Phosphatase 135 (H) 39 - 117 U/L    Total Bilirubin 0.6 0.0 - 1.2 mg/dL    Globulin 3.8 gm/dL    A/G Ratio 0.9 g/dL    BUN/Creatinine Ratio 13.9 7.0 - 25.0    Anion Gap 7.4 5.0 - 15.0 mmol/L    eGFR 92.1 >60.0 mL/min/1.73   BNP    Collection Time: 02/27/25 10:04 AM    Specimen: Blood   Result Value Ref Range    proBNP 504.0 0.0 - 1,800.0 pg/mL   High Sensitivity Troponin T    Collection Time: 02/27/25 10:04 AM    Specimen: Blood   Result Value Ref Range    HS  Troponin T 22 (H) <22 ng/L   Green Top (Gel)    Collection Time: 02/27/25 10:04 AM   Result Value Ref Range    Extra Tube Hold for add-ons.    Lavender Top    Collection Time: 02/27/25 10:04 AM   Result Value Ref Range    Extra Tube hold for add-on    Gold Top - SST    Collection Time: 02/27/25 10:04 AM   Result Value Ref Range    Extra Tube Hold for add-ons.    Light Blue Top    Collection Time: 02/27/25 10:04 AM   Result Value Ref Range    Extra Tube Hold for add-ons.    CBC Auto Differential    Collection Time: 02/27/25 10:04 AM    Specimen: Blood   Result Value Ref Range    WBC 7.46 3.40 - 10.80 10*3/mm3    RBC 3.80 (L) 4.14 - 5.80 10*6/mm3    Hemoglobin 10.8 (L) 13.0 - 17.7 g/dL    Hematocrit 34.5 (L) 37.5 - 51.0 %    MCV 90.8 79.0 - 97.0 fL    MCH 28.4 26.6 - 33.0 pg    MCHC 31.3 (L) 31.5 - 35.7 g/dL    RDW 16.0 (H) 12.3 - 15.4 %    RDW-SD 53.1 37.0 - 54.0 fl    MPV 8.4 6.0 - 12.0 fL    Platelets 303 140 - 450 10*3/mm3    Neutrophil % 73.7 42.7 - 76.0 %    Lymphocyte % 12.6 (L) 19.6 - 45.3 %    Monocyte % 4.7 (L) 5.0 - 12.0 %    Eosinophil % 7.4 (H) 0.3 - 6.2 %    Basophil % 0.5 0.0 - 1.5 %    Immature Grans % 1.1 (H) 0.0 - 0.5 %    Neutrophils, Absolute 5.50 1.70 - 7.00 10*3/mm3    Lymphocytes, Absolute 0.94 0.70 - 3.10 10*3/mm3    Monocytes, Absolute 0.35 0.10 - 0.90 10*3/mm3    Eosinophils, Absolute 0.55 (H) 0.00 - 0.40 10*3/mm3    Basophils, Absolute 0.04 0.00 - 0.20 10*3/mm3    Immature Grans, Absolute 0.08 (H) 0.00 - 0.05 10*3/mm3    nRBC 0.0 0.0 - 0.2 /100 WBC   High Sensitivity Troponin T 1Hr    Collection Time: 02/27/25 11:31 AM    Specimen: Blood   Result Value Ref Range    HS Troponin T 22 (H) <22 ng/L    Troponin T Numeric Delta 0 ng/L    Troponin T % Delta 0 Abnormal if >/= 20%       The above labs were ordered by me and independently reviewed by me.     RADIOLOGY  XR Chest 1 View    Result Date: 2/27/2025  XR CHEST 1 VW-  HISTORY: Male who is 74 years-old, short of breath  TECHNIQUE: Frontal view  of the chest  COMPARISON: 1/22/2025  FINDINGS: Right chest port appears stable. Heart size is borderline. Pulmonary vasculature is congested. Mild atelectasis or infiltrate in the right mid and lower lung, superimposed on chronic changes of the lungs, follow-up suggested. Mild to moderate right pleural effusion is apparent. No pneumothorax. No acute osseous process.      As described.  This report was finalized on 2/27/2025 10:57 AM by Dr. Arsh Jorgensen M.D on Workstation: [email or web address]       The above radiology studies were ordered by me.  See ED course for independent interpretations.     MEDICATIONS GIVEN IN ER  Medications   sodium chloride 0.9 % flush 10 mL (has no administration in time range)   ipratropium-albuterol (DUO-NEB) nebulizer solution 3 mL (has no administration in time range)   diphenhydrAMINE (BENADRYL) injection 25 mg (25 mg Intravenous Given 2/27/25 1206)   famotidine (PEPCID) injection 20 mg (20 mg Intravenous Given 2/27/25 1207)   methylPREDNISolone sodium succinate (SOLU-Medrol) injection 125 mg (125 mg Intravenous Given 2/27/25 1205)         ORDERS PLACED DURING THIS VISIT:  Orders Placed This Encounter   Procedures    XR Chest 1 View    [place] Draw    Comprehensive Metabolic Panel    BNP    High Sensitivity Troponin T    CBC Auto Differential    High Sensitivity Troponin T 1Hr    NPO Diet NPO Type: Strict NPO    Undress & Gown    Continuous Pulse Oximetry    Vital Signs    LHA (on-call MD unless specified) Details    Oxygen Therapy- Nasal Cannula; Titrate 1-6 LPM Per SpO2; 90 - 95%    ECG 12 Lead ED Triage Standing Order; SOA    Insert Peripheral IV    Inpatient Admission    CBC & Differential    Green Top (Gel)    Lavender Top    Gold Top - SST    Light Blue Top         OUTPATIENT MEDICATION MANAGEMENT:  Current Facility-Administered Medications Ordered in Epic   Medication Dose Route Frequency Provider Last Rate Last Admin    ipratropium-albuterol (DUO-NEB) nebulizer solution 3 mL  3 mL  Nebulization Once Humphrey Chavez MD        sodium chloride 0.9 % flush 10 mL  10 mL Intravenous PRN Humphrey Chavez MD         Current Outpatient Medications Ordered in Epic   Medication Sig Dispense Refill    famotidine (PEPCID) 20 MG tablet Take 1 tablet by mouth 2 (Two) Times a Day As Needed for Heartburn.      ferrous sulfate 325 (65 FE) MG tablet Take 0.5 tablets by mouth Daily With Breakfast.      fexofenadine (ALLEGRA) 180 MG tablet Take 1 tablet by mouth Daily.      folic acid (FOLVITE) 1 MG tablet Take 1 tablet by mouth Daily. 90 tablet 3    gabapentin (NEURONTIN) 300 MG capsule TAKE TWO CAPSULES BY MOUTH EVERY 8 HOURS 180 capsule 0    insulin aspart (NovoLOG FlexPen) 100 UNIT/ML solution pen-injector sc pen Inject 5 Units under the skin into the appropriate area as directed 3 (Three) Times a Day With Meals for 30 days per sliding scale 15 mL 0    Insulin Glargine, 1 Unit Dial, (Toujeo SoloStar) 300 UNIT/ML solution pen-injector injection Inject 24 Units under the skin into the appropriate area as directed Daily.      iron polysaccharides (NIFEREX) 150 MG capsule Take 1 capsule by mouth 2 (Two) Times a Day.      Multiple Vitamin (MULTIVITAMINS PO) Take 1 tablet by mouth Daily.      potassium chloride (MICRO-K) 10 MEQ CR capsule Take 1 capsule by mouth 2 (Two) Times a Day. 40 capsule 2    predniSONE (DELTASONE) 20 MG tablet Take 1 tablet by mouth Daily.      rosuvastatin (CRESTOR) 40 MG tablet TAKE ONE TABLET BY MOUTH DAILY (Patient taking differently: Take 1 tablet by mouth Daily.) 90 tablet 1    sertraline (ZOLOFT) 50 MG tablet TAKE ONE TABLET BY MOUTH DAILY (Patient taking differently: Take 1 tablet by mouth Daily.) 90 tablet 1    Tirzepatide (Mounjaro) 2.5 MG/0.5ML solution pen-injector pen Inject 2.5 mg under the skin into the appropriate area as directed 1 (One) Time Per Week. Mondays      torsemide (DEMADEX) 20 MG tablet Take 1 tablet by mouth 2 (Two) Times a Day.      triamcinolone  (KENALOG) 0.1 % cream Apply 1 Application topically to the appropriate area as directed 2 (Two) Times a Day. (Patient not taking: Reported on 2/13/2025)      vitamin B-12 (CYANOCOBALAMIN) 1000 MCG tablet Take 1 tablet by mouth Daily. 90 tablet 3    vitamin B-6 (PYRIDOXINE) 50 MG tablet Take 1 tablet by mouth Daily. 90 tablet 3    Xarelto 20 MG tablet TAKE ONE TABLET BY MOUTH DAILY (Patient taking differently: Take 1 tablet by mouth Daily With Dinner.) 30 tablet 9         PROCEDURES  Procedures            PROGRESS, DATA ANALYSIS, CONSULTS, AND MEDICAL DECISION MAKING  All labs have been independently interpreted by me.  All radiology studies have been reviewed by me. All EKG's have been independently viewed and interpreted by me.  Discussion below represents my analysis of pertinent findings related to patient's condition, differential diagnosis, treatment plan and final disposition.    Differential diagnosis includes but is not limited to:  COPD exacerbation  Pneumonia  Throat fusion  CHF  Pulmonary embolism  Tc Hany syndrome  Dress  AGEP  Drug reaction      Clinical Scores:                  ED Course as of 02/27/25 1213   Thu Feb 27, 2025   1012 EKG          EKG time: 9:57 AM  Rhythm/Rate: A-fib, 64  P waves and MN: N/A  QRS, axis: Low voltage, normal axis  ST and T waves: No acute ischemic changes    Interpreted Contemporaneously by me, independently viewed         [JR]   1015 I reviewed outside ED visit from 2/22/2025.  Patient complained of shortness of breath.  He had also recently tested positive for COVID-19.  He had been on Paxlovid as well as an antidiarrheal medication.  His wife states that he had been noncompliant with his torsemide recently.  There was no rash reported during that visit.  Patient was offered admission for possible pulmonary edema versus pneumonia but patient reportedly declined admission.  He was advised to resume his torsemide at discharge and follow-up with his primary care  provider. [JR]   1015 I reviewed thoracic surgery progress note from 2/13/2025.  Patient was seen in follow-up for right middle lobe squamous cell carcinoma status post robotic assisted right middle lobectomy. [JR]   1142 Chest x-ray dependently interpreted PACS.  Patient has some interstitial prominence as well as a right pleural effusion.  No definite focal infiltrate.  Port-A-Cath right anterior chest. [JR]   1142 SpO2(!): 74 % [JR]   1150 Eosinophils Absolute(!): 0.55  Mild eosinophilia, possibly related to allergic etiology of skin rash.  DRESS also consideration.   [JR]   1211 Discussed with Dr. Braun, [facility] hospitalist, who agrees to admit. [JR]      ED Course User Index  [JR] Humphrey Chavez MD             AS OF 12:13 EST VITALS:    BP - 119/74  HR - 67  TEMP - 97.6 °F (36.4 °C) (Tympanic)  O2 SATS - 95%    COMPLEXITY OF CARE  The patient requires admission.      Chronic or social conditions impacting patient care (Social Determinants of Health):     DIAGNOSIS  Final diagnoses:   COPD with acute exacerbation   Pleural effusion on right   Rash in adult   Acute on chronic hypoxic respiratory failure           DISPOSITION  Admit      Prescription drug monitoring program review:           Please note that portions of this document were completed with a voice recognition program.    Note Disclaimer: At [facility], we believe that sharing information builds trust and better relationships. You are receiving this note because you recently visited [facility]. It is possible you will see health information before a provider has talked with you about it. This kind of information can be easy to misunderstand. To help you fully understand what it means for your health, we urge you to discuss this note with your provider.         Humphrey Chavez MD  02/27/25 1213

## 2025-02-27 NOTE — ED NOTES
"Nursing report ED to floor  Branden Diop  74 y.o.  male    HPI :  HPI  Stated Reason for Visit: SOA  History Obtained From: patient    Chief Complaint  Chief Complaint   Patient presents with    Shortness of Breath       Admitting doctor:   Zia Branu MD    Admitting diagnosis:   The primary encounter diagnosis was Acute on chronic hypoxic respiratory failure. Diagnoses of COPD with acute exacerbation, Pleural effusion on right, and Rash in adult were also pertinent to this visit.    Code status:   Current Code Status       Date Active Code Status Order ID Comments User Context       2/27/2025 1313 CPR (Attempt to Resuscitate) [id number]  Zia Braun MD ED        Question Answer    Code Status (Patient has no pulse and is not breathing) CPR (Attempt to Resuscitate)    Medical Interventions (Patient has pulse or is breathing) Full Support                    Allergies:   Patient has no known allergies.    Isolation:   Enhanced Droplet/Contact     Intake and Output  No intake or output data in the 24 hours ending 02/27/25 1334    Weight:       02/27/25  1020   Weight: 118 kg (260 lb)       Most recent vitals:   Vitals:    02/27/25 1019 02/27/25 1020 02/27/25 1131 02/27/25 1220   BP:   119/74    Pulse:   67 61   Resp:    20   Temp: 97.6 °F (36.4 °C)      TempSrc: Tympanic      SpO2:   95% 95%   Weight:  118 kg (260 lb)     Height:  180.3 cm (71\")         Active LDAs/IV Access:   Lines, Drains & Airways       Active LDAs       Name Placement date Placement time Site Days    Peripheral IV 02/27/25 1020 Left Antecubital 02/27/25  1020  Antecubital  less than 1    Single Lumen Implantable Port Right Chest --  --  Chest  --                    Labs (abnormal labs have a star):   Labs Reviewed   COMPREHENSIVE METABOLIC PANEL - Abnormal; Notable for the following components:       Result Value    Glucose 262 (*)     Chloride 96 (*)     CO2 33.6 (*)     Albumin 3.4 (*)     Alkaline Phosphatase 135 (*)     All other components " within normal limits    Narrative:     GFR Categories in Chronic Kidney Disease (CKD)      GFR Category          GFR (mL/min/1.73)    Interpretation  G1                     90 or greater         Normal or high (1)  G2                      60-89                Mild decrease (1)  G3a                   45-59                Mild to moderate decrease  G3b                   30-44                Moderate to severe decrease  G4                    15-29                Severe decrease  G5                    14 or less           Kidney failure          (1)In the absence of evidence of kidney disease, neither GFR category G1 or G2 fulfill the criteria for CKD.    eGFR calculation 2021 CKD-EPI creatinine equation, which does not include race as a factor   TROPONIN - Abnormal; Notable for the following components:    HS Troponin T 22 (*)     All other components within normal limits    Narrative:     High Sensitive Troponin T Reference Range:  <14.0 ng/L- Negative Female for AMI  <22.0 ng/L- Negative Male for AMI  >=14 - Abnormal Female indicating possible myocardial injury.  >=22 - Abnormal Male indicating possible myocardial injury.   Clinicians would have to utilize clinical acumen, EKG, Troponin, and serial changes to determine if it is an Acute Myocardial Infarction or myocardial injury due to an underlying chronic condition.        CBC WITH AUTO DIFFERENTIAL - Abnormal; Notable for the following components:    RBC 3.80 (*)     Hemoglobin 10.8 (*)     Hematocrit 34.5 (*)     MCHC 31.3 (*)     RDW 16.0 (*)     Lymphocyte % 12.6 (*)     Monocyte % 4.7 (*)     Eosinophil % 7.4 (*)     Immature Grans % 1.1 (*)     Eosinophils, Absolute 0.55 (*)     Immature Grans, Absolute 0.08 (*)     All other components within normal limits   HIGH SENSITIVITIY TROPONIN T 1HR - Abnormal; Notable for the following components:    HS Troponin T 22 (*)     All other components within normal limits    Narrative:     High Sensitive Troponin T  Reference Range:  <14.0 ng/L- Negative Female for AMI  <22.0 ng/L- Negative Male for AMI  >=14 - Abnormal Female indicating possible myocardial injury.  >=22 - Abnormal Male indicating possible myocardial injury.   Clinicians would have to utilize clinical acumen, EKG, Troponin, and serial changes to determine if it is an Acute Myocardial Infarction or myocardial injury due to an underlying chronic condition.        BNP (IN-HOUSE) - Normal    Narrative:     This assay is used as an aid in the diagnosis of individuals suspected of having heart failure. It can be used as an aid in the diagnosis of acute decompensated heart failure (ADHF) in patients presenting with signs and symptoms of ADHF to the emergency department (ED). In addition, NT-proBNP of <300 pg/mL indicates ADHF is not likely.    Age Range Result Interpretation  NT-proBNP Concentration (pg/mL:      <50             Positive            >450                   Gray                 300-450                    Negative             <300    50-75           Positive            >900                  Gray                300-900                  Negative            <300      >75             Positive            >1800                  Gray                300-1800                  Negative            <300   MAGNESIUM - Normal   RESPIRATORY PANEL PCR W/ COVID-19 (SARS-COV-2), NP SWAB IN UTM/VTP, 2 HR TAT   RAINBOW DRAW    Narrative:     The following orders were created for panel order [place] Draw.  Procedure                               Abnormality         Status                     ---------                               -----------         ------                     Green Top (Gel)[[id number]]                                  Final result               Lavender Top[[id number]]                                     Final result               Gold Top - SST[[id number]]                                   Final result               Light Blue Top[[id number]]                                    Final result                 Please view results for these tests on the individual orders.   PROCALCITONIN   POCT GLUCOSE FINGERSTICK   POCT GLUCOSE FINGERSTICK   POCT GLUCOSE FINGERSTICK   POCT GLUCOSE FINGERSTICK   CBC AND DIFFERENTIAL    Narrative:     The following orders were created for panel order CBC & Differential.  Procedure                               Abnormality         Status                     ---------                               -----------         ------                     CBC Auto Differential[[id number]]        Abnormal            Final result                 Please view results for these tests on the individual orders.   GREEN TOP   LAVENDER TOP   GOLD TOP - SST   LIGHT BLUE TOP       EKG:   ECG 12 Lead ED Triage Standing Order; SOA   Preliminary Result   HEART RATE=64  bpm   RR Xzvyniwk=134  ms   [place] Interval=  ms   P Horizontal Axis=  deg   P Front Axis=  deg   QRSD Interval=96  ms   QT Hbsrcfut=671  ms   IMfN=674  ms   QRS Axis=57  deg   T Wave Axis=119  deg   - ABNORMAL ECG -   Atrial fibrillation   Low voltage, extremity leads   Borderline repolarization abnormality   Date and Time of Study:2025-02-27 09:57:23          Meds given in ED:   Medications   sodium chloride 0.9 % flush 10 mL (has no administration in time range)   ferrous sulfate tablet 162.5 mg (has no administration in time range)   folic acid (FOLVITE) tablet 1 mg (has no administration in time range)   gabapentin (NEURONTIN) capsule 600 mg (has no administration in time range)   rosuvastatin (CRESTOR) tablet 40 mg (has no administration in time range)   sertraline (ZOLOFT) tablet 50 mg (has no administration in time range)   rivaroxaban (XARELTO) tablet 20 mg (has no administration in time range)   insulin glargine (LANTUS, SEMGLEE) injection 20 Units (has no administration in time range)   dextrose (GLUTOSE) oral gel 15 g (has no administration in time range)   dextrose (D50W) (25 g/50 mL) IV injection 25 g  (has no administration in time range)   glucagon (GLUCAGEN) injection 1 mg (has no administration in time range)   insulin lispro (HUMALOG/ADMELOG) injection 2-7 Units (has no administration in time range)   sodium chloride 0.9 % flush 10 mL (has no administration in time range)   sodium chloride 0.9 % flush 10 mL (has no administration in time range)   sodium chloride 0.9 % infusion 40 mL (has no administration in time range)   Potassium Replacement - Follow Nurse / BPA Driven Protocol (has no administration in time range)   Magnesium Standard Dose Replacement - Follow Nurse / BPA Driven Protocol (has no administration in time range)   Phosphorus Replacement - Follow Nurse / BPA Driven Protocol (has no administration in time range)   sennosides-docusate (PERICOLACE) 8.6-50 MG per tablet 2 tablet (has no administration in time range)     And   polyethylene glycol (MIRALAX) packet 17 g (has no administration in time range)     And   bisacodyl (DULCOLAX) EC tablet 5 mg (has no administration in time range)     And   bisacodyl (DULCOLAX) suppository 10 mg (has no administration in time range)   furosemide (LASIX) injection 60 mg (has no administration in time range)   diphenhydrAMINE (BENADRYL) injection 25 mg (25 mg Intravenous Given 2/27/25 1206)   famotidine (PEPCID) injection 20 mg (20 mg Intravenous Given 2/27/25 1207)   methylPREDNISolone sodium succinate (SOLU-Medrol) injection 125 mg (125 mg Intravenous Given 2/27/25 1205)   ipratropium-albuterol (DUO-NEB) nebulizer solution 3 mL (3 mL Nebulization Given 2/27/25 1220)       Imaging results:  XR Chest 1 View    Result Date: 2/27/2025  As described.  This report was finalized on 2/27/2025 10:57 AM by Dr. Arsh Jorgensen M.D on Workstation: [id number]       Ambulatory status:   - assist     Social issues:   Social History     Socioeconomic History    Marital status:      Spouse name: Luba    Number of children: 2   Tobacco Use    Smoking status: Former      Current packs/day: 0.00     Average packs/day: 1 pack/day for 30.0 years (30.0 ttl pk-yrs)     Types: Cigars, Cigarettes     Start date: 1984     Quit date: 2014     Years since quittin.1    Smokeless tobacco: Never   Vaping Use    Vaping status: Never Used   Substance and Sexual Activity    Alcohol use: Never     Comment: caffeine use- decaf coffee    Drug use: Never    Sexual activity: Defer       Peripheral Neurovascular  Peripheral Neurovascular (Adult)  Peripheral Neurovascular WDL: WDL    Neuro Cognitive  Neuro Cognitive (Adult)  Cognitive/Neuro/Behavioral WDL: WDL, orientation  Orientation: oriented x 4    Learning  Learning Assessment  Learning Readiness and Ability: no barriers identified    Respiratory  Respiratory WDL  Respiratory WDL: .WDL except, all  Rhythm/Pattern, Respiratory: shortness of breath, tachypneic  Cough Type: productive  Breath Sounds  All Lung Fields Breath Sounds: All Fields  All Lung Fields Breath Sounds: Anterior:, Lateral:, clear  Breath Sounds Post-Respiratory Treatment  Breath Sounds Posttreatment All Fields: All Fields  Breath Sounds Posttreatment All Fields: no change    Abdominal Pain       Pain Assessments  Pain (Adult)  (0-10) Pain Rating: Rest: 0  (0-10) Pain Rating: Activity: 0    NIH Stroke Scale       Savannah Carter RN  25 13:34 EST

## 2025-02-27 NOTE — H&P
Patient Name:  Branden Diop  YOB: 1950  MRN:  [id number]  Admit Date:  2/27/2025  Patient Care Team:  Tino Lopez MD as PCP - General (Internal Medicine)  Tino Lopez MD as PCP - Family Medicine  Mart Ureña MD as Consulting Physician (Cardiology)  Martir Trevino MD as Surgeon (General Surgery)  Dione Carter RN as Nurse Navigator  David Figueroa MD as Referring Physician (Thoracic Surgery)  Duy Villasenor MD PhD as Consulting Physician (Hematology and Oncology)  Yashira Mendiola MD as Consulting Physician (Radiation Oncology)      Subjective   History Present Illness     Chief Complaint   Patient presents with    Shortness of Breath         History of Present Illness  Mr. Diop is a 74 y.o. with COPD, squamous cell carcinoma status post resection and currently on Keytruda, diabetes type 2 on insulin, Keytruda rash presenting with shortness of breath.  Patient recently seen by PCP and was diagnosed with COVID and flu on 2/19 and finished a course of Paxlovid and Tamiflu.  Patient has had a consistent productive cough since then, has not worsened.  No nausea or vomiting.  Shortness of breath on exertion that started around 6 days ago when he was diagnosed with flu/COVID.  Patient recently got Keytruda on 2/4.  He has had admissions in the past for what appears to be Keytruda pneumonitis.  Has been on 4 L nasal cannula at baseline.  Oxygen saturations are appropriate at rest, but he had oxygen desaturations to the 70s on exertion.    Review of Systems   Constitutional:  Positive for activity change and fatigue. Negative for chills and fever.   Respiratory:  Positive for cough and shortness of breath.    Cardiovascular:  Negative for chest pain and palpitations.   Gastrointestinal:  Negative for abdominal pain, diarrhea, nausea and vomiting.        Personal History     Past Medical History:   Diagnosis Date    Anxiety     Arthritis     Atrial fibrillation     CURRENTLY     Bunion of right foot     Colon polyps     COPD (chronic obstructive pulmonary disease)     MILD. NO MEDS FOR    Depression     History of atrial fibrillation     WITH CARDIOVERSION. NO PROBLEMS    History of skin cancer     BCC REMOVED FROM BACK    [place] (hard of hearing)     Hyperlipidemia     Inguinal hernia, recurrent     RIGHT    Left foot pain     Lung nodules     Rash     IN GROIN TREATING FOR YEAST INFECTION BY PCP    Redness of skin     LOWER LEGS BILATERAL    Scab     BILATERAL LEGS HEALING    Sleep apnea with use of continuous positive airway pressure (CPAP)     CPAP    Streptococcus pneumoniae     ABOUT 6-7 YR AGO.     Type 2 diabetes mellitus      Past Surgical History:   Procedure Laterality Date    BASAL CELL CARCINOMA EXCISION      BRONCHOSCOPY WITH ION ROBOTIC ASSIST N/A 5/6/2024    Procedure: BRONCHOSCOPY WITH ION ROBOT WITH FNA, BIOPSIES, BAL AND ENDOBRONCHIAL ULTRASOUND WITH FNA;  Surgeon: Yony Coles MD;  Location: [facility] ENDOSCOPY;  Service: Robotics - Pulmonary;  Laterality: N/A;  PRE: PULMONARY NODULES  POST: SAME    CARDIAC CATHETERIZATION N/A 11/15/2021    Procedure: Coronary angiography;  Surgeon: Alfredo Jennings MD;  Location:  [facility] CATH INVASIVE LOCATION;  Service: Cardiovascular;  Laterality: N/A;    CARDIAC CATHETERIZATION N/A 11/15/2021    Procedure: Left heart cath;  Surgeon: Alfredo Jennings MD;  Location:  [facility] CATH INVASIVE LOCATION;  Service: Cardiovascular;  Laterality: N/A;    CARDIAC CATHETERIZATION N/A 11/15/2021    Procedure: Left ventriculography;  Surgeon: Alfredo Jennings MD;  Location: [facility] CATH [facility]VASIVE LOC[facility];  Service: Cardiovascular;  Laterality: N/A;    CARDIAC CATHETERIZATION N/A 11/15/2021    Procedure: Aortic root aortogram;  Surgeon: Alfredo Jennings MD;  [facility];  Service: Cardiovascular;  Laterality: N/A;    CARDIOVERSION      CHOLECYSTECTOMY N/A 10/07/2017    Procedure: CHOLECYSTECTOMY  LAPAROSCOPIC;  Surgeon: Martir Trevino MD;  Location: [facility] MAIN OR;  Service:     COLONOSCOPY      COLONOSCOPY N/A 2022    Procedure: COLONOSCOPY TO CECUM AND TI AND COLD SNARE POLYPECTOMY;  Surgeon: Cj Lopez MD;  Location: [facility] ENDOSCOPY;  Service: Gastroenterology;  Laterality: N/A;  Pre: History of colon polyps  Post: POLYP, PREVIOUS TATTOO    FOOT SURGERY Left     TOES ARE PINNED. ALL BUT GREAT TOE    HAND SURGERY      THUMB    HERNIA REPAIR      INGUINAL HERNIA REPAIR Right 2018    Procedure: INGUINAL HERNIA REPAIR, OPEN, RECURRENT;  Surgeon: Martir Trevino MD;  Location: [facility] OR OSC;  Service: General    LASIK Bilateral     LOBECTOMY Right 2024    Procedure: BRONCHOSCOPY, RIGHT VIDEO ASSISTED THORACOSCOPY WITH RIGHT MIDDLE LOBECTOMY WITH DAVINCI ROBOT, MEDIASTINAL LYMPH NODE DISSECTION, INTERCOSTAL NERVE BLOCK;  Surgeon: David Figueroa MD;  Location: [facility] OR;  Service: Robotics - DaVinci;  Laterality: Right;    NECK SURGERY      growth on salivary gland    REPLACEMENT TOTAL KNEE Right     (he is going to have a LTKR next month)    REPLACEMENT TOTAL KNEE Left     VENOUS ACCESS DEVICE (PORT) INSERTION Right 2024    Procedure: INSERTION VENOUS ACCESS DEVICE;  Surgeon: David Figueroa MD;  Location: [facility] MAIN OR;  Service: Thoracic;  Laterality: Right;     Family History   Problem Relation Age of Onset    Cancer Other     Stroke Mother     Alcohol abuse Father     Malig Hyperthermia Neg Hx      Social History     Tobacco Use    Smoking status: Former     Current packs/day: 0.00     Average packs/day: 1 pack/day for 30.0 years (30.0 ttl pk-yrs)     Types: Cigars, Cigarettes     Start date: 1984     Quit date: 2014     Years since quittin.1    Smokeless tobacco: Never   Vaping Use    Vaping status: Never Used   Substance Use Topics    Alcohol use: Never     Comment: caffeine use- decaf coffee    Drug use: Never     No current facility-administered  medications on file prior to encounter.     Current Outpatient Medications on File Prior to Encounter   Medication Sig Dispense Refill    albuterol sulfate  (90 Base) MCG/ACT inhaler Inhale 2 puffs Every 4 (Four) Hours As Needed for Wheezing.      famotidine (PEPCID) 20 MG tablet Take 1 tablet by mouth 2 (Two) Times a Day As Needed for Heartburn.      ferrous sulfate 325 (65 FE) MG tablet Take 0.5 tablets by mouth 2 (Two) Times a Day.      fexofenadine (ALLEGRA) 180 MG tablet Take 1 tablet by mouth Daily.      folic acid (FOLVITE) 1 MG tablet Take 1 tablet by mouth Daily. 90 tablet 3    gabapentin (NEURONTIN) 300 MG capsule TAKE TWO CAPSULES BY MOUTH EVERY 8 HOURS (Patient taking differently: Take 2 capsules by mouth 3 (Three) Times a Day.) 180 capsule 0    hydrOXYzine pamoate (VISTARIL) 50 MG capsule Take 1 capsule by mouth 3 (Three) Times a Day As Needed for Itching.      insulin aspart (NovoLOG FlexPen) 100 UNIT/ML solution pen-injector sc pen Inject 5 Units under the skin into the appropriate area as directed 3 (Three) Times a Day With Meals for 30 days per sliding scale 15 mL 0    Insulin Glargine, 1 Unit Dial, (Toujeo SoloStar) 300 UNIT/ML solution pen-injector injection Inject 28 Units under the skin into the appropriate area as directed Daily.      magnesium oxide (MAG-OX) 400 tablet tablet Take 1 tablet by mouth 2 (Two) Times a Day.      Multiple Vitamin (MULTIVITAMINS PO) Take 1 tablet by mouth Daily.      potassium chloride (MICRO-K) 10 MEQ CR capsule Take 1 capsule by mouth 2 (Two) Times a Day. 40 capsule 2    rosuvastatin (CRESTOR) 40 MG tablet TAKE ONE TABLET BY MOUTH DAILY (Patient taking differently: Take 1 tablet by mouth Daily.) 90 tablet 1    sertraline (ZOLOFT) 50 MG tablet TAKE ONE TABLET BY MOUTH DAILY (Patient taking differently: Take 1 tablet by mouth Daily.) 90 tablet 1    Tirzepatide (Mounjaro) 2.5 MG/0.5ML solution pen-injector pen Inject 2.5 mg under the skin into the appropriate  area as directed 1 (One) Time Per Week. Mondays      torsemide (DEMADEX) 20 MG tablet Take 1 tablet by mouth 2 (Two) Times a Day.      vitamin B-12 (CYANOCOBALAMIN) 1000 MCG tablet Take 1 tablet by mouth Daily. 90 tablet 3    Xarelto 20 MG tablet TAKE ONE TABLET BY MOUTH DAILY (Patient taking differently: Take 1 tablet by mouth Daily With Dinner.) 30 tablet 9    [DISCONTINUED] iron polysaccharides (NIFEREX) 150 MG capsule Take 1 capsule by mouth 2 (Two) Times a Day.      [DISCONTINUED] predniSONE (DELTASONE) 20 MG tablet Take 1 tablet by mouth Daily.      [DISCONTINUED] triamcinolone (KENALOG) 0.1 % cream Apply 1 Application topically to the appropriate area as directed 2 (Two) Times a Day. (Patient not taking: Reported on 2/13/2025)      [DISCONTINUED] vitamin B-6 (PYRIDOXINE) 50 MG tablet Take 1 tablet by mouth Daily. 90 tablet 3     No Known Allergies    Objective    Objective     Vital Signs  Temp:  [97.6 °F (36.4 °C)-98.7 °F (37.1 °C)] 98.7 °F (37.1 °C)  Heart Rate:  [58-84] 58  Resp:  [20-25] 20  BP: (116-127)/(59-74) 116/59  SpO2:  [74 %-95 %] 93 %  on  Flow (L/min) (Oxygen Therapy):  [4-6] 4;   Device (Oxygen Therapy): nasal cannula  Body mass index is 36.26 kg/m².    Physical Exam  Vitals and nursing note reviewed.   Constitutional:       General: He is not in acute distress.     Appearance: He is obese. He is ill-appearing.   Cardiovascular:      Rate and Rhythm: Normal rate and regular rhythm.   Pulmonary:      Effort: Pulmonary effort is normal. No respiratory distress.      Comments: Bilateral crackles in all lung fields. no wheezing.  Abdominal:      General: Abdomen is flat. There is no distension.      Tenderness: There is no abdominal tenderness.   Musculoskeletal:         General: No swelling or deformity.   Skin:     General: Skin is warm and dry.      Comments: Keytruda rash over all extremities, sparing face   Neurological:      General: No focal deficit present.      Mental Status: He is alert.  Mental status is at baseline.         Results Review:  I reviewed the patient's new clinical results.  I reviewed the patient's new imaging results and agree with the interpretation.  I reviewed the patient's other test results and agree with the interpretation  I personally viewed and interpreted the patient's EKG/Telemetry data  Discussed with ED provider.    Lab Results (last 24 hours)       Procedure Component Value Units Date/Time    CBC & Differential [[id number]]  (Abnormal) Collected: 02/27/25 1004    Specimen: Blood Updated: 02/27/25 1019    Narrative:      The following orders were created for panel order CBC & Differential.  Procedure                               Abnormality         Status                     ---------                               -----------         ------                     CBC Auto Differential[[id number]]        Abnormal            Final result                 Please view results for these tests on the individual orders.    Comprehensive Metabolic Panel [[id number]]  (Abnormal) Collected: 02/27/25 1004    Specimen: Blood Updated: 02/27/25 1040     Glucose 262 mg/dL      BUN 11 mg/dL      Creatinine 0.79 mg/dL      Sodium 137 mmol/L      Potassium 3.8 mmol/L      Chloride 96 mmol/L      CO2 33.6 mmol/L      Calcium 9.5 mg/dL      Total Protein 7.2 g/dL      Albumin 3.4 g/dL      ALT (SGPT) 16 U/L      AST (SGOT) 25 U/L      Alkaline Phosphatase 135 U/L      Total Bilirubin 0.6 mg/dL      Globulin 3.8 gm/dL      A/G Ratio 0.9 g/dL      BUN/Creatinine Ratio 13.9     Anion Gap 7.4 mmol/L      eGFR 92.1 mL/min/1.73     Narrative:      GFR Categories in Chronic Kidney Disease (CKD)      GFR Category          GFR (mL/min/1.73)    Interpretation  G1                     90 or greater         Normal or high (1)  G2                      60-89                Mild decrease (1)  G3a                   45-59                Mild to moderate decrease  G3b                   30-44                Moderate  to severe decrease  G4                    15-29                Severe decrease  G5                    14 or less           Kidney failure          (1)In the absence of evidence of kidney disease, neither GFR category G1 or G2 fulfill the criteria for CKD.    eGFR calculation 2021 CKD-EPI creatinine equation, which does not include race as a factor    BNP [[id number]]  (Normal) Collected: 02/27/25 1004    Specimen: Blood Updated: 02/27/25 1040     proBNP 504.0 pg/mL     Narrative:      This assay is used as an aid in the diagnosis of individuals suspected of having heart failure. It can be used as an aid in the diagnosis of acute decompensated heart failure (ADHF) in patients presenting with signs and symptoms of ADHF to the emergency department (ED). In addition, NT-proBNP of <300 pg/mL indicates ADHF is not likely.    Age Range Result Interpretation  NT-proBNP Concentration (pg/mL:      <50             Positive            >450                   Gray                 300-450                    Negative             <300    50-75           Positive            >900                  Gray                300-900                  Negative            <300      >75             Positive            >1800                  Gray                300-1800                  Negative            <300    High Sensitivity Troponin T [[id number]]  (Abnormal) Collected: 02/27/25 1004    Specimen: Blood Updated: 02/27/25 1040     HS Troponin T 22 ng/L     Narrative:      High Sensitive Troponin T Reference Range:  <14.0 ng/L- Negative Female for AMI  <22.0 ng/L- Negative Male for AMI  >=14 - Abnormal Female indicating possible myocardial injury.  >=22 - Abnormal Male indicating possible myocardial injury.   Clinicians would have to utilize clinical acumen, EKG, Troponin, and serial changes to determine if it is an Acute Myocardial Infarction or myocardial injury due to an underlying chronic condition.         CBC Auto Differential [[id number]]   (Abnormal) Collected: 02/27/25 1004    Specimen: Blood Updated: 02/27/25 1019     WBC 7.46 10*3/mm3      RBC 3.80 10*6/mm3      Hemoglobin 10.8 g/dL      Hematocrit 34.5 %      MCV 90.8 fL      MCH 28.4 pg      MCHC 31.3 g/dL      RDW 16.0 %      RDW-SD 53.1 fl      MPV 8.4 fL      Platelets 303 10*3/mm3      Neutrophil % 73.7 %      Lymphocyte % 12.6 %      Monocyte % 4.7 %      Eosinophil % 7.4 %      Basophil % 0.5 %      Immature Grans % 1.1 %      Neutrophils, Absolute 5.50 10*3/mm3      Lymphocytes, Absolute 0.94 10*3/mm3      Monocytes, Absolute 0.35 10*3/mm3      Eosinophils, Absolute 0.55 10*3/mm3      Basophils, Absolute 0.04 10*3/mm3      Immature Grans, Absolute 0.08 10*3/mm3      nRBC 0.0 /100 WBC     High Sensitivity Troponin T 1Hr [[id number]]  (Abnormal) Collected: 02/27/25 1131    Specimen: Blood Updated: 02/27/25 1159     HS Troponin T 22 ng/L      Troponin T Numeric Delta 0 ng/L      Troponin T % Delta 0    Narrative:      High Sensitive Troponin T Reference Range:  <14.0 ng/L- Negative Female for AMI  <22.0 ng/L- Negative Male for AMI  >=14 - Abnormal Female indicating possible myocardial injury.  >=22 - Abnormal Male indicating possible myocardial injury.   Clinicians would have to utilize clinical acumen, EKG, Troponin, and serial changes to determine if it is an Acute Myocardial Infarction or myocardial injury due to an underlying chronic condition.         Magnesium [[id number]]  (Normal) Collected: 02/27/25 1131    Specimen: Blood Updated: 02/27/25 1325     Magnesium 1.7 mg/dL     Procalcitonin [[id number]]  (Normal) Collected: 02/27/25 1131    Specimen: Blood Updated: 02/27/25 1346     Procalcitonin 0.06 ng/mL     Narrative:      As a Marker for Sepsis (Non-Neonates):    1. <0.5 ng/mL represents a low risk of severe sepsis and/or septic shock.  2. >2 ng/mL represents a high risk of severe sepsis and/or septic shock.    As a Marker for Lower Respiratory Tract Infections that require antibiotic  "therapy:    PCT on Admission    Antibiotic Therapy       6-12 Hrs later    >0.5                Strongly Recommended  >0.25 - <0.5        Recommended   0.1 - 0.25          Discouraged              Remeasure/reassess PCT  <0.1                Strongly Discouraged     Remeasure/reassess PCT    As 28 day mortality risk marker: \"Change in Procalcitonin Result\" (>80% or <=80%) if Day 0 (or Day 1) and Day 4 values are available. Refer to [email or web address]    Change in PCT <=80%  A decrease of PCT levels below or equal to 80% defines a positive change in PCT test result representing a higher risk for 28-day all-cause mortality of patients diagnosed with severe sepsis for septic shock.    Change in PCT >80%  A decrease of PCT levels of more than 80% defines a negative change in PCT result representing a lower risk for 28-day all-cause mortality of patients diagnosed with severe sepsis or septic shock.       Respiratory Panel PCR w/COVID-19(SARS-CoV-2) [facility] In-House, NP Swab in UTM/VTM, 2 HR TAT - Swab, Nasopharynx [[id number]] Collected: 02/27/25 1325    Specimen: Swab from Nasopharynx Updated: 02/27/25 1332            Imaging Results (Last 24 Hours)       Procedure Component Value Units Date/Time    XR Chest 1 View [[id number]] Collected: 02/27/25 1055     Updated: 02/27/25 1100    Narrative:      XR CHEST 1 VW-     HISTORY: Male who is 74 years-old, short of breath     TECHNIQUE: Frontal view of the chest     COMPARISON: 1/22/2025     FINDINGS: Right chest port appears stable. Heart size is borderline.  Pulmonary vasculature is congested. Mild atelectasis or infiltrate in  the right mid and lower lung, superimposed on chronic changes of the  lungs, follow-up suggested. Mild to moderate right pleural effusion is  apparent. No pneumothorax. No acute osseous process.       Impression:      As described.     This report was finalized on 2/27/2025 10:57 AM by Dr. Arsh Jorgensen M.D on " Workstation: [id number]               Results for orders placed during the hospital encounter of 11/29/24    Adult Transthoracic Echo Complete W/ Cont if Necessary Per Protocol    Interpretation Summary    Left ventricular systolic function is normal. Calculated left ventricular EF = 59.2%    Left ventricular diastolic function was indeterminate.    The left atrial cavity is severely dilated.    Small patent foramen ovale present.    Saline test results are positive.    Estimated right ventricular systolic pressure from tricuspid regurgitation is mildly elevated (35-45 mmHg). Calculated right ventricular systolic pressure from tricuspid regurgitation is 38 mmHg.      ECG 12 Lead ED Triage Standing Order; SOA   Preliminary Result   HEART RATE=64  bpm   RR Whhsrltv=655  ms   MN Interval=  ms   P Horizontal Axis=  deg   P Front Axis=  deg   QRSD Interval=96  ms   QT Nangyvam=728  ms   BRwQ=557  ms   QRS Axis=57  deg   T Wave Axis=119  deg   - ABNORMAL ECG -   Atrial fibrillation   Low voltage, extremity leads   Borderline repolarization abnormality   Date and Time of Study:2025-02-27 09:57:23           Assessment/Plan     Active Hospital Problems    Diagnosis  POA    Hypoxemia [R09.02]  Yes    Pneumonitis [J98.4]  Yes    Adverse effect of antineoplastic drug [T45.1X5A]  Yes    Drug-induced skin rash [L27.0]  Yes    Pleural effusion on right [J90]  Yes    Peripheral neuropathy due to chemotherapy [G62.0, T45.1X5A]  Yes    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     Chronic respiratory failure from COPD,  4 L at baseline  Dyspnea on exertion, with oxygen saturations into the 70s  COVID and flu diagnosis on 2/19 by PCP  Right pleural effusion, has had thoracentesis in the past  -Fluid overload versus Keytruda pneumonitis versus pleural effusion versus deconditioning from COVID/infection  -Will check RVP, patient finished course of Paxlovid as well as Tamiflu  -No leukocytosis, fever, procalcitonin, hold off antibiotics for now  -Chest x-ray with right pleural effusion and some right lower lobe opacity likely atelectasis is at bedside on his previous x-rays, pulmonary congestion  -Plan for CT chest  -IV Lasix 60 mg x 1, had recently not been consistent with his torsemide when he was ill  -Hold Xarelto in case patient needs a thoracentesis, last dose was 2/27 morning  -Continue IV methylprednisone for possible Keytruda pneumonitis    Squamous cell carcinoma  -Follows with [facility], finished his third cycle of Keytruda on 2/4    Immune mediated skin rash  -Has been chronically on prednisone, but was being weaned off per oncology    Diabetes type 2  -On Lantus 28 units at home and 5 of aspart with meals  -Start Lantus 20 units at night and sliding scale insulin, will titrate as needed    Persistent atrial fibrillation  -Continue home Xarelto.  Not on rate or rhythm control as an outpatient    Hyperlipidemia-continue home statin    Neuropathy-continue home gabapentin    I discussed the patient's findings and my recommendations with patient, family, nursing staff, and ED provider.    VTE Prophylaxis - SCDs.  Code Status - Full code.  Discussed with patient, he is to think about it.       Zia Braun MD  [facility]  02/27/25  14:37 EST

## 2025-02-28 ENCOUNTER — TELEPHONE (OUTPATIENT)
Dept: SURGERY | Facility: CLINIC | Age: 75
End: 2025-02-28
Payer: MEDICARE

## 2025-02-28 ENCOUNTER — APPOINTMENT (OUTPATIENT)
Dept: CT IMAGING | Facility: HOSPITAL | Age: 75
DRG: 205 | End: 2025-02-28
Payer: MEDICARE

## 2025-02-28 LAB
ANION GAP SERPL CALCULATED.3IONS-SCNC: 14 MMOL/L (ref 5–15)
ARTERIAL PATENCY WRIST A: POSITIVE
ATMOSPHERIC PRESS: 749 MMHG
BASE EXCESS BLDA CALC-SCNC: 10.6 MMOL/L (ref 0–2)
BDY SITE: ABNORMAL
BUN SERPL-MCNC: 13 MG/DL (ref 8–23)
BUN/CREAT SERPL: 15.5 (ref 7–25)
CALCIUM SPEC-SCNC: 9.6 MG/DL (ref 8.6–10.5)
CHLORIDE SERPL-SCNC: 96 MMOL/L (ref 98–107)
CO2 BLDA-SCNC: >32.9 MMOL/L (ref 23–27)
CO2 SERPL-SCNC: 29 MMOL/L (ref 22–29)
CREAT SERPL-MCNC: 0.84 MG/DL (ref 0.76–1.27)
DEPRECATED RDW RBC AUTO: 53.9 FL (ref 37–54)
DEVICE COMMENT: ABNORMAL
EGFRCR SERPLBLD CKD-EPI 2021: 90.4 ML/MIN/1.73
ERYTHROCYTE [DISTWIDTH] IN BLOOD BY AUTOMATED COUNT: 15.9 % (ref 12.3–15.4)
GAS FLOW AIRWAY: 6 LPM
GLUCOSE BLDC GLUCOMTR-MCNC: 363 MG/DL (ref 70–130)
GLUCOSE BLDC GLUCOMTR-MCNC: 374 MG/DL (ref 70–130)
GLUCOSE BLDC GLUCOMTR-MCNC: 429 MG/DL (ref 70–130)
GLUCOSE BLDC GLUCOMTR-MCNC: 445 MG/DL (ref 70–130)
GLUCOSE SERPL-MCNC: 384 MG/DL (ref 65–99)
HBA1C MFR BLD: 9.6 % (ref 4.8–5.6)
HCO3 BLDA-SCNC: 35.5 MMOL/L (ref 22–28)
HCT VFR BLD AUTO: 35.8 % (ref 37.5–51)
HEMODILUTION: NO
HGB BLD-MCNC: 11.1 G/DL (ref 13–17.7)
MCH RBC QN AUTO: 28.5 PG (ref 26.6–33)
MCHC RBC AUTO-ENTMCNC: 31 G/DL (ref 31.5–35.7)
MCV RBC AUTO: 92 FL (ref 79–97)
MODALITY: ABNORMAL
PCO2 BLDA: 48.1 MM HG (ref 35–45)
PH BLDA: 7.48 PH UNITS (ref 7.35–7.45)
PLATELET # BLD AUTO: 357 10*3/MM3 (ref 140–450)
PMV BLD AUTO: 9 FL (ref 6–12)
PO2 BLDA: 73.4 MM HG (ref 80–100)
POTASSIUM SERPL-SCNC: 4 MMOL/L (ref 3.5–5.2)
RBC # BLD AUTO: 3.89 10*6/MM3 (ref 4.14–5.8)
SAO2 % BLDCOA: 95.3 % (ref 92–98.5)
SODIUM SERPL-SCNC: 139 MMOL/L (ref 136–145)
TOTAL RATE: 20 BREATHS/MINUTE
WBC NRBC COR # BLD AUTO: 10.58 10*3/MM3 (ref 3.4–10.8)

## 2025-02-28 PROCEDURE — 82948 REAGENT STRIP/BLOOD GLUCOSE: CPT

## 2025-02-28 PROCEDURE — 83036 HEMOGLOBIN GLYCOSYLATED A1C: CPT

## 2025-02-28 PROCEDURE — 63710000001 INSULIN LISPRO (HUMAN) PER 5 UNITS: Performed by: STUDENT IN AN ORGANIZED HEALTH CARE EDUCATION/TRAINING PROGRAM

## 2025-02-28 PROCEDURE — 97530 THERAPEUTIC ACTIVITIES: CPT

## 2025-02-28 PROCEDURE — 36600 WITHDRAWAL OF ARTERIAL BLOOD: CPT

## 2025-02-28 PROCEDURE — 63710000001 INSULIN LISPRO (HUMAN) PER 5 UNITS

## 2025-02-28 PROCEDURE — 71250 CT THORAX DX C-: CPT

## 2025-02-28 PROCEDURE — 94761 N-INVAS EAR/PLS OXIMETRY MLT: CPT

## 2025-02-28 PROCEDURE — 36415 COLL VENOUS BLD VENIPUNCTURE: CPT | Performed by: STUDENT IN AN ORGANIZED HEALTH CARE EDUCATION/TRAINING PROGRAM

## 2025-02-28 PROCEDURE — 80048 BASIC METABOLIC PNL TOTAL CA: CPT | Performed by: STUDENT IN AN ORGANIZED HEALTH CARE EDUCATION/TRAINING PROGRAM

## 2025-02-28 PROCEDURE — 63710000001 INSULIN GLARGINE PER 5 UNITS: Performed by: STUDENT IN AN ORGANIZED HEALTH CARE EDUCATION/TRAINING PROGRAM

## 2025-02-28 PROCEDURE — 25010000002 METHYLPREDNISOLONE PER 125 MG: Performed by: STUDENT IN AN ORGANIZED HEALTH CARE EDUCATION/TRAINING PROGRAM

## 2025-02-28 PROCEDURE — 85027 COMPLETE CBC AUTOMATED: CPT | Performed by: STUDENT IN AN ORGANIZED HEALTH CARE EDUCATION/TRAINING PROGRAM

## 2025-02-28 PROCEDURE — 63710000001 DIPHENHYDRAMINE PER 50 MG: Performed by: STUDENT IN AN ORGANIZED HEALTH CARE EDUCATION/TRAINING PROGRAM

## 2025-02-28 PROCEDURE — 82803 BLOOD GASES ANY COMBINATION: CPT

## 2025-02-28 PROCEDURE — 97165 OT EVAL LOW COMPLEX 30 MIN: CPT

## 2025-02-28 PROCEDURE — 94760 N-INVAS EAR/PLS OXIMETRY 1: CPT

## 2025-02-28 PROCEDURE — 94664 DEMO&/EVAL PT USE INHALER: CPT

## 2025-02-28 PROCEDURE — 94799 UNLISTED PULMONARY SVC/PX: CPT

## 2025-02-28 RX ORDER — TORSEMIDE 20 MG/1
40 TABLET ORAL DAILY
Status: DISCONTINUED | OUTPATIENT
Start: 2025-02-28 | End: 2025-03-03 | Stop reason: HOSPADM

## 2025-02-28 RX ORDER — BUMETANIDE 2 MG/1
2 TABLET ORAL DAILY
Status: DISCONTINUED | OUTPATIENT
Start: 2025-02-28 | End: 2025-02-28

## 2025-02-28 RX ORDER — DIPHENHYDRAMINE HCL 25 MG
25 CAPSULE ORAL EVERY 6 HOURS PRN
Status: DISCONTINUED | OUTPATIENT
Start: 2025-02-28 | End: 2025-03-03 | Stop reason: HOSPADM

## 2025-02-28 RX ORDER — IPRATROPIUM BROMIDE AND ALBUTEROL SULFATE 2.5; .5 MG/3ML; MG/3ML
3 SOLUTION RESPIRATORY (INHALATION)
Status: DISCONTINUED | OUTPATIENT
Start: 2025-02-28 | End: 2025-03-03 | Stop reason: HOSPADM

## 2025-02-28 RX ORDER — INSULIN LISPRO 100 [IU]/ML
5 INJECTION, SOLUTION INTRAVENOUS; SUBCUTANEOUS
Status: DISCONTINUED | OUTPATIENT
Start: 2025-02-28 | End: 2025-02-28

## 2025-02-28 RX ORDER — INSULIN LISPRO 100 [IU]/ML
10 INJECTION, SOLUTION INTRAVENOUS; SUBCUTANEOUS
Status: DISCONTINUED | OUTPATIENT
Start: 2025-02-28 | End: 2025-03-01

## 2025-02-28 RX ADMIN — DIPHENHYDRAMINE HYDROCHLORIDE 25 MG: 25 CAPSULE ORAL at 11:43

## 2025-02-28 RX ADMIN — GABAPENTIN 600 MG: 300 CAPSULE ORAL at 16:55

## 2025-02-28 RX ADMIN — INSULIN GLARGINE 30 UNITS: 100 INJECTION, SOLUTION SUBCUTANEOUS at 20:12

## 2025-02-28 RX ADMIN — INSULIN LISPRO 5 UNITS: 100 INJECTION, SOLUTION INTRAVENOUS; SUBCUTANEOUS at 08:54

## 2025-02-28 RX ADMIN — FERROUS SULFATE TAB 325 MG (65 MG ELEMENTAL FE) 162.5 MG: 325 (65 FE) TAB at 20:13

## 2025-02-28 RX ADMIN — INSULIN LISPRO 5 UNITS: 100 INJECTION, SOLUTION INTRAVENOUS; SUBCUTANEOUS at 11:43

## 2025-02-28 RX ADMIN — METHYLPREDNISOLONE SODIUM SUCCINATE 60 MG: 125 INJECTION, POWDER, FOR SOLUTION INTRAMUSCULAR; INTRAVENOUS at 20:12

## 2025-02-28 RX ADMIN — INSULIN LISPRO 9 UNITS: 100 INJECTION, SOLUTION INTRAVENOUS; SUBCUTANEOUS at 20:12

## 2025-02-28 RX ADMIN — INSULIN LISPRO 8 UNITS: 100 INJECTION, SOLUTION INTRAVENOUS; SUBCUTANEOUS at 16:55

## 2025-02-28 RX ADMIN — FERROUS SULFATE TAB 325 MG (65 MG ELEMENTAL FE) 162.5 MG: 325 (65 FE) TAB at 08:54

## 2025-02-28 RX ADMIN — IPRATROPIUM BROMIDE AND ALBUTEROL SULFATE 3 ML: .5; 3 SOLUTION RESPIRATORY (INHALATION) at 15:04

## 2025-02-28 RX ADMIN — ROSUVASTATIN CALCIUM 40 MG: 20 TABLET, FILM COATED ORAL at 08:54

## 2025-02-28 RX ADMIN — IPRATROPIUM BROMIDE AND ALBUTEROL SULFATE 3 ML: .5; 3 SOLUTION RESPIRATORY (INHALATION) at 06:39

## 2025-02-28 RX ADMIN — TORSEMIDE 40 MG: 20 TABLET ORAL at 18:21

## 2025-02-28 RX ADMIN — IPRATROPIUM BROMIDE AND ALBUTEROL SULFATE 3 ML: .5; 3 SOLUTION RESPIRATORY (INHALATION) at 19:12

## 2025-02-28 RX ADMIN — IPRATROPIUM BROMIDE AND ALBUTEROL SULFATE 3 ML: .5; 3 SOLUTION RESPIRATORY (INHALATION) at 11:02

## 2025-02-28 RX ADMIN — GABAPENTIN 600 MG: 300 CAPSULE ORAL at 20:13

## 2025-02-28 RX ADMIN — FOLIC ACID 1 MG: 1 TABLET ORAL at 08:54

## 2025-02-28 RX ADMIN — Medication 10 ML: at 08:54

## 2025-02-28 RX ADMIN — INSULIN LISPRO 8 UNITS: 100 INJECTION, SOLUTION INTRAVENOUS; SUBCUTANEOUS at 08:54

## 2025-02-28 RX ADMIN — SERTRALINE HYDROCHLORIDE 50 MG: 50 TABLET, FILM COATED ORAL at 08:54

## 2025-02-28 RX ADMIN — INSULIN LISPRO 10 UNITS: 100 INJECTION, SOLUTION INTRAVENOUS; SUBCUTANEOUS at 16:56

## 2025-02-28 RX ADMIN — INSULIN LISPRO 9 UNITS: 100 INJECTION, SOLUTION INTRAVENOUS; SUBCUTANEOUS at 11:44

## 2025-02-28 RX ADMIN — METHYLPREDNISOLONE SODIUM SUCCINATE 60 MG: 125 INJECTION, POWDER, FOR SOLUTION INTRAMUSCULAR; INTRAVENOUS at 08:53

## 2025-02-28 RX ADMIN — GABAPENTIN 600 MG: 300 CAPSULE ORAL at 08:54

## 2025-02-28 NOTE — PLAN OF CARE
Problem: Adult Inpatient Plan of Care  Goal: Plan of Care Review  Outcome: Progressing  Flowsheets (Taken 2/28/2025 0326)  Progress: improving  Outcome Evaluation: No complaints overnight. Resting between care. VSS. O2 increased while asleep on 5-6L. Voiding per urinal. Plan of care updated. Continue progress towards goals.  Plan of Care Reviewed With: patient  Goal: Patient-Specific Goal (Individualized)  Outcome: Progressing  Goal: Absence of Hospital-Acquired Illness or Injury  Outcome: Progressing  Intervention: Identify and Manage Fall Risk  Recent Flowsheet Documentation  Taken 2/28/2025 0200 by Lupe Varela RN  Safety Promotion/Fall Prevention:   safety round/check completed   activity supervised   assistive device/personal items within reach   fall prevention program maintained   nonskid shoes/slippers when out of bed  Taken 2/28/2025 0010 by Lupe Varela RN  Safety Promotion/Fall Prevention:   safety round/check completed   activity supervised   assistive device/personal items within reach   fall prevention program maintained   nonskid shoes/slippers when out of bed  Taken 2/27/2025 2200 by Lupe Varela RN  Safety Promotion/Fall Prevention:   safety round/check completed   activity supervised   assistive device/personal items within reach   fall prevention program maintained   nonskid shoes/slippers when out of bed  Taken 2/27/2025 2045 by Lupe Varela RN  Safety Promotion/Fall Prevention:   safety round/check completed   activity supervised   assistive device/personal items within reach   fall prevention program maintained   nonskid shoes/slippers when out of bed  Intervention: Prevent Skin Injury  Recent Flowsheet Documentation  Taken 2/28/2025 0010 by Lupe Varela RN  Body Position: position changed independently  Intervention: Prevent Infection  Recent Flowsheet Documentation  Taken 2/28/2025 0200 by Lupe Varela RN  Infection Prevention:   rest/sleep promoted   personal  protective equipment utilized  Taken 2/28/2025 0010 by Lupe Varela RN  Infection Prevention:   rest/sleep promoted   personal protective equipment utilized  Taken 2/27/2025 2200 by Lpue Varela RN  Infection Prevention:   rest/sleep promoted   personal protective equipment utilized  Taken 2/27/2025 2045 by Lupe Varela RN  Infection Prevention:   rest/sleep promoted   personal protective equipment utilized  Goal: Optimal Comfort and Wellbeing  Outcome: Progressing  Intervention: Provide Person-Centered Care  Recent Flowsheet Documentation  Taken 2/28/2025 0010 by Lupe Varela RN  Trust Relationship/Rapport:   care explained   choices provided   questions answered   reassurance provided   thoughts/feelings acknowledged  Taken 2/27/2025 2045 by Lupe Varela RN  Trust Relationship/Rapport:   care explained   choices provided   questions answered   reassurance provided   thoughts/feelings acknowledged  Goal: Readiness for Transition of Care  Outcome: Progressing   Goal Outcome Evaluation:  Plan of Care Reviewed With: patient        Progress: improving  Outcome Evaluation: No complaints overnight. Resting between care. VSS. O2 increased while asleep on 5-6L. Voiding per urinal. Plan of care updated. Continue progress towards goals.

## 2025-02-28 NOTE — CONSULTS
Group: [facility]         CONSULT NOTE    Patient Identification:  Branden Diop  74 y.o.  male  1950  [id number]            Requesting physician: Dr. Braun    Reason for Consultation:  shortness of breath    CC: shortness of breath    History of Present Illness:  Branden Diop is a 74-year-old male with a medical history including: COPD, atrial fibrillation on Xarelto, hyperlipidemia type 2 diabetes mellitus, and squamous cell carcinoma.     In 2024, patient was found to have some solid nodules in his lung, PET/CT revealed findings that were concerning for malignant disease.  Biopsy of the right middle lobe nodule was concerning for squamous cell carcinoma.  He was seen by thoracic surgery and underwent robot-assisted thorascopic right middle lobe lobectomy and systematic mediastinal lymph node dissection in June 2024.  He underwent chemotherapy with carboplatin plus Taxol and then was initiated on Keytruda.  Last cycle of Keytruda was cycle 3 on 2/4/25.    Patient was recently hospitalized from 1/22 to 1/24 with acute on chronic hypoxic respiratory failure, concern for Keytruda induced pneumonitis and right pleural effusion.    He presented to the emergency department with acute shortness of breath.  Patient reports that he is felt short of breath for about a week, he was diagnosed with COVID-19 a week ago and was treated with Paxlovid.  He noted that his oxygen saturations dropped into the 70s on his home oxygen at 4 L nasal cannula.  Endorses worsening of shortness of breath with exertion.  He is on torsemide, however has not been consistently taking this while he has been on his Paxlovid and not feeling well.  ED workup revealed: proBNP 504.0, WBC 7.46, procalcitonin 0.06.  Chest x-ray revealed pulmonary vascular congestion and mild to moderate right pleural effusion.  Respiratory viral panel was negative.    Patient does endorse that he has previously had thoracentesis for this recurrent right  "pleural effusion, says that he typically feels \"night and day\" improvement after previously having thoracentesis.    Review of Systems:  CONSTITUTIONAL:  Denies fevers or chills  EYE:  No new vision changes  EAR:  No change in hearing  CARDIAC:  No chest pain, +increased BLE swelling  PULMONARY:  No productive cough,+shortness of breath  GI:  No diarrhea, hematemesis or hematochezia  RENAL:  No dysuria or urinary frequency  MUSCULOSKELETAL:  No musculoskeletal complaints  ENDOCRINE:  No heat or cold intolerance  INTEGUMENTARY: No skin rashes  NEUROLOGICAL:  No dizziness or confusion.  No seizure activity  PSYCHIATRIC:  No new anxiety or depression  12 system review of systems performed and all else negative     Past Medical History:  Past Medical History:   Diagnosis Date    Anxiety     Arthritis     Atrial fibrillation     CURRENTLY    Bunion of right foot     Colon polyps     COPD (chronic obstructive pulmonary disease)     MILD. NO MEDS FOR    Depression     History of atrial fibrillation     WITH CARDIOVERSION. NO PROBLEMS    History of skin cancer     BCC REMOVED FROM BACK    [place] (hard of hearing)     Hyperlipidemia     Inguinal hernia, recurrent     RIGHT    Left foot pain     Lung nodules     Rash     IN GROIN TREATING FOR YEAST INFECTION BY PCP    Redness of skin     LOWER LEGS BILATERAL    Scab     BILATERAL LEGS HEALING    Sleep apnea with use of continuous positive airway pressure (CPAP)     CPAP    Streptococcus pneumoniae     ABOUT 6-7 YR AGO.     Type 2 diabetes mellitus        Past Surgical History:  Past Surgical History:   Procedure Laterality Date    BASAL CELL CARCINOMA EXCISION      BRONCHOSCOPY WITH ION ROBOTIC ASSIST N/A 5/6/2024    Procedure: BRONCHOSCOPY WITH ION ROBOT WITH FNA, BIOPSIES, BAL AND ENDOBRONCHIAL ULTRASOUND WITH FNA;  Surgeon: Yony Coles MD;  Location: [facility] ENDOSCOPY;  Service: Robotics - Pulmonary;  Laterality: N/A;  PRE: PULMONARY NODULES  POST: SAME    CARDIAC " CATHETERIZATION N/A 11/15/2021    Procedure: Coronary angiography;  Surgeon: Alfredo Jennings MD;  Location: [facility] CATH INVASIVE LOCATION;  Service: Cardiovascular;  Laterality: N/A;    CARDIAC CATHETERIZATION N/A 11/15/2021    Procedure: Left heart cath;  Surgeon: Alfredo Jennings MD;  Location: [facility] CATH INVASIVE LOCATION;  Service: Cardiovascular;  Laterality: N/A;    CARDIAC CATHETERIZATION N/A 11/15/2021    Procedure: Left ventriculography;  Surgeon: Alfredo Jennings MD;  Location: [facility] CATH INVASIVE LOCATION;  Service: Cardiovascular;  Laterality: N/A;    CARDIAC CATHETERIZATION N/A 11/15/2021    Procedure: Aortic root aortogram;  Surgeon: Alfredo Jennings MD;  Location: [facility] CATH INVASIVE LOCATION;  Service: Cardiovascular;  Laterality: N/A;    CARDIOVERSION      CHOLECYSTECTOMY N/A 10/07/2017    Procedure: CHOLECYSTECTOMY LAPAROSCOPIC;  Surgeon: Martir Trevino MD;  Location: [facility] MAIN OR;  Service:     COLONOSCOPY  2007    COLONOSCOPY N/A 8/30/2022    Procedure: COLONOSCOPY TO CECUM AND TI AND COLD SNARE POLYPECTOMY;  Surgeon: Cj Lopez MD;  Location: [facility] ENDOSCOPY;  Service: Gastroenterology;  Laterality: N/A;  Pre: History of colon polyps  Post: POLYP, PREVIOUS TATTOO    FOOT SURGERY Left     TOES ARE PINNED. ALL BUT GREAT TOE    HAND SURGERY      THUMB    HERNIA REPAIR      INGUINAL HERNIA REPAIR Right 06/27/2018    Procedure: INGUINAL HERNIA REPAIR, OPEN, RECURRENT;  Surgeon: Martir Trevino MD;  Location: [facility] OR OSC;  Service: General    LASIK Bilateral     LOBECTOMY Right 6/12/2024    Procedure: BRONCHOSCOPY, RIGHT VIDEO ASSISTED THORACOSCOPY WITH RIGHT MIDDLE LOBECTOMY WITH DAVINCI ROBOT, MEDIASTINAL LYMPH NODE DISSECTION, INTERCOSTAL NERVE BLOCK;  Surgeon: David Figueroa MD;  Location: [facility] MAIN OR;  Service: Robotics - DaVinci;  Laterality: Right;    NECK SURGERY      growth on salivary gland    REPLACEMENT TOTAL KNEE Right 2007    (he is going to have a LTKR  next month)    REPLACEMENT TOTAL KNEE Left     VENOUS ACCESS DEVICE (PORT) INSERTION Right 7/5/2024    Procedure: INSERTION VENOUS ACCESS DEVICE;  Surgeon: David Figueroa MD;  Location: [facility];  Service: Thoracic;  Laterality: Right;        Home Meds:  Medications Prior to Admission   Medication Sig Dispense Refill Last Dose/Taking    albuterol sulfate  (90 Base) MCG/ACT inhaler Inhale 2 puffs Every 4 (Four) Hours As Needed for Wheezing.   Taking As Needed    famotidine (PEPCID) 20 MG tablet Take 1 tablet by mouth 2 (Two) Times a Day As Needed for Heartburn.   Taking As Needed    ferrous sulfate 325 (65 FE) MG tablet Take 0.5 tablets by mouth 2 (Two) Times a Day.   Taking    fexofenadine (ALLEGRA) 180 MG tablet Take 1 tablet by mouth Daily.   Taking    folic acid (FOLVITE) 1 MG tablet Take 1 tablet by mouth Daily. 90 tablet 3 Taking    gabapentin (NEURONTIN) 300 MG capsule TAKE TWO CAPSULES BY MOUTH EVERY 8 HOURS (Patient taking differently: Take 2 capsules by mouth 3 (Three) Times a Day.) 180 capsule 0 Taking Differently    hydrOXYzine pamoate (VISTARIL) 50 MG capsule Take 1 capsule by mouth 3 (Three) Times a Day As Needed for Itching.   Taking As Needed    insulin aspart (NovoLOG FlexPen) 100 UNIT/ML solution pen-injector sc pen Inject 5 Units under the skin into the appropriate area as directed 3 (Three) Times a Day With Meals for 30 days per sliding scale 15 mL 0 Taking    Insulin Glargine, 1 Unit Dial, (Toujeo SoloStar) 300 UNIT/ML solution pen-injector injection Inject 28 Units under the skin into the appropriate area as directed Daily.   Taking    magnesium oxide (MAG-OX) 400 tablet tablet Take 1 tablet by mouth 2 (Two) Times a Day.   Taking    Multiple Vitamin (MULTIVITAMINS PO) Take 1 tablet by mouth Daily.   Taking    potassium chloride (MICRO-K) 10 MEQ CR capsule Take 1 capsule by mouth 2 (Two) Times a Day. 40 capsule 2 Taking    rosuvastatin (CRESTOR) 40 MG tablet TAKE ONE TABLET BY MOUTH  "DAILY (Patient taking differently: Take 1 tablet by mouth Daily.) 90 tablet 1 Taking Differently    sertraline (ZOLOFT) 50 MG tablet TAKE ONE TABLET BY MOUTH DAILY (Patient taking differently: Take 1 tablet by mouth Daily.) 90 tablet 1 Taking Differently    Tirzepatide (Mounjaro) 2.5 MG/0.5ML solution pen-injector pen Inject 2.5 mg under the skin into the appropriate area as directed 1 (One) Time Per Week.    Taking    torsemide (DEMADEX) 20 MG tablet Take 1 tablet by mouth 2 (Two) Times a Day.   Taking    vitamin B-12 (CYANOCOBALAMIN) 1000 MCG tablet Take 1 tablet by mouth Daily. 90 tablet 3 Taking    Xarelto 20 MG tablet TAKE ONE TABLET BY MOUTH DAILY (Patient taking differently: Take 1 tablet by mouth Daily With Dinner.) 30 tablet 9 Taking Differently       Allergies:  No Known Allergies    Social History:   Social History     Socioeconomic History    Marital status:      Spouse name: Luba    Number of children: 2   Tobacco Use    Smoking status: Former     Current packs/day: 0.00     Average packs/day: 1 pack/day for 30.0 years (30.0 ttl pk-yrs)     Types: Cigars, Cigarettes     Start date: 1984     Quit date: 2014     Years since quittin.1    Smokeless tobacco: Never   Vaping Use    Vaping status: Never Used   Substance and Sexual Activity    Alcohol use: Never     Comment: caffeine use- decaf coffee    Drug use: Never    Sexual activity: Defer       Family History:  Family History   Problem Relation Age of Onset    Cancer Other     Stroke Mother     Alcohol abuse Father     Malig Hyperthermia Neg Hx        Physical Exam:  /75 (BP Location: Left arm, Patient Position: Lying)   Pulse 75   Temp 97.4 °F (36.3 °C) (Oral)   Resp 20   Ht 180.3 cm (71\")   Wt 118 kg (260 lb)   SpO2 (!) 89%   BMI 36.26 kg/m²  Body mass index is 36.26 kg/m². (!) 89% 118 kg (260 lb)  Constitutional: Elderly male pt in bed, No acute respiratory distress, no accessory muscle use, pleasant, nonlabored " breathing, able to speak in full sentences  Head: - NCAT  Eyes: No pallor, Anicteric conjunctiva, EOMI.  ENMT:  No oral thrush. Moist MM.   NECK: Trachea midline, No thyromegaly, no palpable cervical LNpathy, thick neck  Heart: RRR, no murmur. Trace pedal edema- pt says has improved greatly  Lungs: JACQUELIN +, Bibasilar crackles, diminished over right base   Abdomen: Soft. No tenderness, guarding or rigidity. No palpable masses  Extremities: Extremities warm and well perfused. No cyanosis/ clubbing- diffuse rash over back, chest, bilateral upper and lower extremities  Neuro: Conscious, answers appropriately, no gross focal neuro deficits  Psych: Mood and affect appropriate    PPE recommended per [facility] infectious disease Isolation protocol for the current clinical scenario(as mentioned below) was followed.      LABS:  COVID19   Date Value Ref Range Status   02/27/2025 Not Detected Not Detected - Ref. Range Final       Lab Results   Component Value Date    CALCIUM 9.5 02/27/2025    PHOS 3.9 01/23/2025     Results from last 7 days   Lab Units 02/27/25  1131 02/27/25  1004   MAGNESIUM mg/dL 1.7  --    SODIUM mmol/L  --  137   POTASSIUM mmol/L  --  3.8   CHLORIDE mmol/L  --  96*   CO2 mmol/L  --  33.6*   BUN mg/dL  --  11   CREATININE mg/dL  --  0.79   GLUCOSE mg/dL  --  262*   CALCIUM mg/dL  --  9.5   WBC 10*3/mm3  --  7.46   HEMOGLOBIN g/dL  --  10.8*   PLATELETS 10*3/mm3  --  303   ALT (SGPT) U/L  --  16   AST (SGOT) U/L  --  25   PROBNP pg/mL  --  504.0   PROCALCITONIN ng/mL 0.06  --      Lab Results   Component Value Date    TROPONINT 22 (H) 02/27/2025     Results from last 7 days   Lab Units 02/27/25  1131 02/27/25  1004   HSTROP T ng/L 22* 22*         Results from last 7 days   Lab Units 02/27/25  1131   PROCALCITONIN ng/mL 0.06         Results from last 7 days   Lab Units 02/27/25  1325   ADENOVIRUS DETECTION BY PCR  Not Detected   CORONAVIRUS 229E  Not Detected   CORONAVIRUS HKU1  Not Detected    CORONAVIRUS NL63  Not Detected   CORONAVIRUS OC43  Not Detected   HUMAN METAPNEUMOVIRUS  Not Detected   HUMAN RHINOVIRUS/ENTEROVIRUS  Not Detected   INFLUENZA B PCR  Not Detected   PARAINFLUENZA 1  Not Detected   PARAINFLUENZA VIRUS 2  Not Detected   PARAINFLUENZA VIRUS 3  Not Detected   PARAINFLUENZA VIRUS 4  Not Detected   BORDETELLA PERTUSSIS PCR  Not Detected   BORDETELLA PARAPERTUSSIS PCR  Not Detected   CHLAMYDOPHILA PNEUMONIAE PCR  Not Detected   MYCOPLAMA PNEUMO PCR  Not Detected   RSV, PCR  Not Detected             Lab Results   Component Value Date    TSH 1.640 02/04/2025     Estimated Creatinine Clearance: 104 mL/min (by C-G formula based on SCr of 0.79 mg/dL).         Imaging: I personally visualized the images of scans/x-rays performed within last 3 days.      Assessment:  Acute on chronic hypoxic respiratory failure  Right pleural effusion  Pulmonary vascular congestion  Recent COVID and flu  Squamous cell carcinoma  Immune-mediated skin rash  Diabetes mellitus  Persistent atrial fibrillation  Hyperlipidemia  Neuropathy    Recommendations:  Patient presented to the emergency department for complaints of shortness of breath and hypoxia on his baseline level of oxygen.  Endorses recent bilateral lower extremity swelling, as well as unintentional noncompliance with his home diuretics.  Received 80 mg IV Lasix in the emergency department with drastic improvement in his dyspnea and lower extremity swelling.  Chest x-ray reviewed showing pulmonary vascular congestion and small to moderate right pleural effusion-patient has had previous thoracenteses in the past.  He reports that he typically feels significantly improved from respiratory standpoint after thoracentesis.  For now, continue diuresis per primary, repeat chest x-ray in the morning and depending on results, discussed with patient if he is interested in having a repeat Thora.  Currently on IV methylprednisone for possible Keytruda pneumonitis-CT  chest without contrast ordered, will follow for results.      Patient was placed in face mask upon entering room and kept mask on throughout our encounter. I wore full protective equipment throughout this patient encounter including a face mask, gown and gloves. Hand hygiene was performed before donning protective equipment and after removal when leaving the room.    Xin Santillan, APRN  2/28/2025  00:59 EST      Much of this encounter note is an electronic transcription/translation of spoken language to printed text using Dragon Software.

## 2025-02-28 NOTE — THERAPY EVALUATION
Patient Name: Branden Diop  : 1950    MRN: [id number]                              Today's Date: 2025       Admit Date: 2025    Visit Dx:     ICD-10-CM ICD-9-CM   1. Acute on chronic hypoxic respiratory failure  J96.21 518.84     799.02   2. COPD with acute exacerbation  J44.1 491.21   3. Pleural effusion on right  J90 511.9   4. Rash in adult  R21 782.1     Patient Active Problem List   Diagnosis    A-fib    Atrioventricular block, Mobitz type 1, Wenckebach    Apnea, sleep    Obesity    Streptococcus pneumoniae    Sleep apnea with use of continuous positive airway pressure (CPAP)    Sinusitis    Right wrist pain    Pneumonia    Type 2 diabetes mellitus, with long-term current use of insulin    Hyperlipidemia    Hammertoe    Depression    COPD (chronic obstructive pulmonary disease)    Colon polyps    Anxiety    Pruritus    Cholelithiasis    Fatty liver    Essential hypertension    Vitamin D deficiency    Emphysema, unspecified    Bronchiectasis with acute exacerbation    FHx: colonic polyps    Diverticulosis    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    Malignant neoplasm of middle lobe of right lung    Fluid collection at surgical site, initial encounter    Encounter for long-term (current) use of high-risk medication    Fitting and adjustment of vascular catheter    Anemia associated with chemotherapy    Peripheral neuropathy due to chemotherapy    Acute respiratory failure    Rash in adult    Bone mass    Drug-induced skin rash    Adverse effect of antineoplastic drug    Pleural effusion on right    Acute on chronic hypoxic respiratory failure    Hypoxemia    Pneumonitis    Acute on chronic respiratory failure    Dyspnea on exertion     Past Medical History:   Diagnosis Date    Anxiety     Arthritis     Atrial fibrillation     CURRENTLY    Bunion of right foot     Colon polyps     COPD (chronic obstructive pulmonary disease)     MILD. NO MEDS FOR    Depression     History of  atrial fibrillation     WITH CARDIOVERSION. NO PROBLEMS    History of skin cancer     BCC REMOVED FROM BACK    [place] (hard of hearing)     Hyperlipidemia     Inguinal hernia, recurrent     RIGHT    Left foot pain     Lung nodules     Rash     IN GROIN TREATING FOR YEAST INFECTION BY PCP    Redness of skin     LOWER LEGS BILATERAL    Scab     BILATERAL LEGS HEALING    Sleep apnea with use of continuous positive airway pressure (CPAP)     CPAP    Streptococcus pneumoniae     ABOUT 6-7 YR AGO.     Type 2 diabetes mellitus      Past Surgical History:   Procedure Laterality Date    BASAL CELL CARCINOMA EXCISION      BRONCHOSCOPY WITH ION ROBOTIC ASSIST N/A 5/6/2024    Procedure: BRONCHOSCOPY WITH ION ROBOT WITH FNA, BIOPSIES, BAL AND ENDOBRONCHIAL ULTRASOUND WITH FNA;  Surgeon: Yony Coles MD;  Location: [facility] ENDOSCOPY;  Service: Robotics - Pulmonary;  Laterality: N/A;  PRE: PULMONARY NODULES  POST: SAME    CARDIAC CATHETERIZATION N/A 11/15/2021    Procedure: Coronary angiography;  Surgeon: Alfredo Jennings MD;  Location: [facility] CATH INVASIVE LOCATION;  Service: Cardiovascular;  Laterality: N/A;    CARDIAC CATHETERIZATION N/A 11/15/2021    Procedure: Left heart cath;  Surgeon: Alfredo Jennings MD;  Location: [facility] CATH INVASIVE LOCATION;  Service: Cardiovascular;  Laterality: N/A;    CARDIAC CATHETERIZATION N/A 11/15/2021    Procedure: Left ventriculography;  Surgeon: Alfredo Jennings MD;  Location: [facility] CATH INVASIVE LOCATION;  Service: Cardiovascular;  Laterality: N/A;    CARDIAC CATHETERIZATION N/A 11/15/2021    Procedure: Aortic root aortogram;  Surgeon: Alfredo Jennings MD;  Location: [facility] CATH INVASIVE LOCATION;  Service: Cardiovascular;  Laterality: N/A;    CARDIOVERSION      CHOLECYSTECTOMY N/A 10/07/2017    Procedure: CHOLECYSTECTOMY LAPAROSCOPIC;  Surgeon: Martir Trevino MD;  Location: [facility] MAIN OR;  Service:     COLONOSCOPY  2007    COLONOSCOPY N/A 8/30/2022    Procedure:  COLONOSCOPY TO CECUM AND TI AND COLD SNARE POLYPECTOMY;  Surgeon: Cj Lopez MD;  Location: [facility] ENDOSCOPY;  Service: Gastroenterology;  Laterality: N/A;  Pre: History of colon polyps  Post: POLYP, PREVIOUS TATTOO    FOOT SURGERY Left     TOES ARE PINNED. ALL BUT GREAT TOE    HAND SURGERY      THUMB    HERNIA REPAIR      INGUINAL HERNIA REPAIR Right 06/27/2018    Procedure: INGUINAL HERNIA REPAIR, OPEN, RECURRENT;  Surgeon: Martir Trevino MD;  Location: [facility] OR OSC;  Service: General    LASIK Bilateral     LOBECTOMY Right 6/12/2024    Procedure: BRONCHOSCOPY, RIGHT VIDEO ASSISTED THORACOSCOPY WITH RIGHT MIDDLE LOBECTOMY WITH DAVINCI ROBOT, MEDIASTINAL LYMPH NODE DISSECTION, INTERCOSTAL NERVE BLOCK;  Surgeon: David Figueroa MD;  Location: [facility] MAIN OR;  Service: Robotics - DaVinci;  Laterality: Right;    NECK SURGERY      growth on salivary gland    REPLACEMENT TOTAL KNEE Right 2007    (he is going to have a LTKR next month)    REPLACEMENT TOTAL KNEE Left     VENOUS ACCESS DEVICE (PORT) INSERTION Right 7/5/2024    Procedure: INSERTION VENOUS ACCESS DEVICE;  Surgeon: David Figueroa MD;  Location: [facility] MAIN OR;  Service: Thoracic;  Laterality: Right;      General Information       Row Name 02/28/25 1102          OT Time and Intention    Subjective Information no complaints  -KG     Document Type evaluation  -KG     Mode of Treatment individual therapy;occupational therapy  -KG     Patient Effort excellent  -KG     Symptoms Noted During/After Treatment none  -KG       Row Name 02/28/25 1102          General Information    Patient Profile Reviewed yes  -KG     Prior Level of Function --  Requires Min A for LBD at baseline, otherwise is (I)<>Mod (I) w/ BADLs. Performs functional mobility w/o AD.  -KG     Existing Precautions/Restrictions oxygen therapy device and L/min  -KG     Barriers to Rehab none identified  -KG       Row Name 02/28/25 1102          Living Environment    People in Home spouse  -KG       Row  Name 02/28/25 1102          Cognition    Orientation Status (Cognition) oriented x 4  -KG               User Key  (r) = Recorded By, (t) = Taken By, (c) = Cosigned By      Initials Name Provider Type    Alex Guerra OT Occupational Therapist                     Mobility/ADL's       Row Name 02/28/25 1103          Bed Mobility    Bed Mobility supine-sit  -KG     Supine-Sit [place] (Bed Mobility) independent  -KG       Row Name 02/28/25 1103          Transfers    Transfers sit-stand transfer;stand-sit transfer  -KG       Row Name 02/28/25 1103          Sit-Stand Transfer    Sit-Stand [place] (Transfers) supervision  -KG       Row Name 02/28/25 1103          Stand-Sit Transfer    Stand-Sit [place] (Transfers) supervision  -KG       Row Name 02/28/25 1103          Functional Mobility    Functional Mobility- Ind. Level supervision required  within room x2 minutes, simulating household/bathroom distance. No AD used, SpO2 maintaining 90% throughout  -KG       Row Name 02/28/25 1103          Activities of Daily Living    BADL Assessment/Intervention lower body dressing  -KG       Row Name 02/28/25 1103          Lower Body Dressing Assessment/Training    [place] Level (Lower Body Dressing) lower body dressing skills;minimum assist (75% patient effort)  -KG     Position (Lower Body Dressing) edge of bed sitting  -KG               User Key  (r) = Recorded By, (t) = Taken By, (c) = Cosigned By      Initials Name Provider Type    Alex Guerra OT Occupational Therapist                   Obj/Interventions       Row Name 02/28/25 1104          Sensory Assessment (Somatosensory)    Sensory Assessment (Somatosensory) sensation intact  -KG       Row Name 02/28/25 1104          Vision Assessment/Intervention    Visual Impairment/Limitations WNL  -KG       Row Name 02/28/25 1104          Range of Motion Comprehensive    General Range of Motion no range of motion deficits identified  -KG       Row Name  02/28/25 1104          Strength Comprehensive (MMT)    General Manual Muscle Testing (MMT) Assessment no strength deficits identified  -KG       Row Name 02/28/25 1104          Motor Skills    Motor Skills functional endurance  -KG     Functional Endurance WFL  -KG       Row Name 02/28/25 1104          Balance    Balance Assessment sitting static balance;sitting dynamic balance;sit to stand dynamic balance;standing static balance;standing dynamic balance  -KG     Static Sitting Balance independent  -KG     Dynamic Sitting Balance independent  -KG     Position, Sitting Balance unsupported;sitting edge of bed  -KG     Sit to Stand Dynamic Balance supervision  -KG     Static Standing Balance supervision  -KG     Dynamic Standing Balance supervision  -KG     Position/Device Used, Standing Balance unsupported  -KG     Balance Interventions sitting;standing;sit to stand;supported;static;dynamic;occupation based/functional task  -KG               User Key  (r) = Recorded By, (t) = Taken By, (c) = Cosigned By      Initials Name Provider Type    KG Alex Dow OT Occupational Therapist                   Goals/Plan       Row Name 02/28/25 1108          Bed Mobility Goal 1 (OT)    Activity/Assistive Device (Bed Mobility Goal 1, OT) sit to supine  -KG     [place] Level/Cues Needed (Bed Mobility Goal 1, OT) independent  -KG     Time Frame (Bed Mobility Goal 1, OT) short term goal (STG);2 weeks  -KG     Progress/Outcomes (Bed Mobility Goal 1, OT) new goal;goal met  -KG               User Key  (r) = Recorded By, (t) = Taken By, (c) = Cosigned By      Initials Name Provider Type    KG Alex Dow OT Occupational Therapist                   Clinical Impression       Row Name 02/28/25 1105          Pain Assessment    Pretreatment Pain Rating 0/10 - no pain  -KG     Posttreatment Pain Rating 0/10 - no pain  -KG       Row Name 02/28/25 1105          Plan of Care Review    Plan of Care Reviewed With patient;spouse  -KG      Outcome Evaluation Pt admitted to [facility] for COPD exacerbation & respiratory failure 2/2 COVID and Flu A diagnoses 2/19. Pt lives w/ his spouse, is Min A for LBD but otherwise is Mod (I) at baseline. Performs functional mobility w/o AD. Today, pt was (I) w/ bed mobility, Min A for LBD, and SPV for all transfers and functional mobility. Mild endurance deficits noted, which pt reports is chronic and did not hinder overall performance. Pt is performing at baseline and has no further OT needs at this time. Plans home w/ spouse at d/c.  -KG       Row Name 02/28/25 1105          Therapy Assessment/Plan (OT)    Criteria for Skilled Therapeutic Interventions Met (OT) no problems identified which require skilled intervention  -KG     Therapy Frequency (OT) evaluation only  -KG       Row Name 02/28/25 1105          Therapy Plan Review/Discharge Plan (OT)    Anticipated Discharge Disposition (OT) home;home with assist  -KG       Row Name 02/28/25 1105          Vital Signs    Pre Patient Position Supine  -KG     Intra Patient Position Standing  -KG     Post Patient Position Sitting  -KG       Row Name 02/28/25 1105          Positioning and Restraints    Pre-Treatment Position in bed  -KG     Post Treatment Position bed  -KG     In Bed notified nsg;sitting EOB;call light within reach;encouraged to call for assist;with family/caregiver  -KG               User Key  (r) = Recorded By, (t) = Taken By, (c) = Cosigned By      Initials Name Provider Type    Alex Guerra, OT Occupational Therapist                   Outcome Measures       Row Name 02/28/25 1103          How much help from another is currently needed...    Putting on and taking off regular lower body clothing? 3  -KG     Bathing (including washing, rinsing, and drying) 4  -KG     Toileting (which includes using toilet bed pan or urinal) 4  -KG     Putting on and taking off regular upper body clothing 4  -KG     Taking care of personal grooming (such as brushing teeth) 4   -KG     Eating meals 4  -KG     AM-PAC 6 Clicks Score (OT) 23  -KG       Row Name 02/28/25 0845          How much help from another person do you currently need...    Turning from your back to your side while in flat bed without using bedrails? 4  -CE     Moving from lying on back to sitting on the side of a flat bed without bedrails? 4  -CE     Moving to and from a bed to a chair (including a wheelchair)? 4  -CE     Standing up from a chair using your arms (e.g., wheelchair, bedside chair)? 4  -CE     Climbing 3-5 steps with a railing? 3  -CE     To walk in hospital room? 4  -CE     AM-PAC 6 Clicks Score (PT) 23  -CE       Row Name 02/28/25 1108          Modified [place] Scale    Modified [place] Scale 1 - No significant disability despite symptoms.  Able to carry out all usual duties and activities.  -KG       Row Name 02/28/25 1108          Functional Assessment    Outcome Measure Options AM-PAC 6 Clicks Daily Activity (OT);Modified [place]  -KG               User Key  (r) = Recorded By, (t) = Taken By, (c) = Cosigned By      Initials Name Provider Type    Chela England RN Registered Nurse    Alex Guerra OT Occupational Therapist                    Occupational Therapy Education       Title: PT OT SLP Therapies (In Progress)       Topic: Occupational Therapy (Not Started)       Point: ADL training (Not Started)       Description:   Instruct learner(s) on proper safety adaptation and remediation techniques during self care or transfers.   Instruct in proper use of assistive devices.                  Learner Progress:  Not documented in this visit.              Point: Home exercise program (Not Started)       Description:   Instruct learner(s) on appropriate technique for monitoring, assisting and/or progressing therapeutic exercises/activities.                  Learner Progress:  Not documented in this visit.              Point: Precautions (Not Started)       Description:   Instruct learner(s) on  prescribed precautions during self-care and functional transfers.                  Learner Progress:  Not documented in this visit.              Point: Body mechanics (Not Started)       Description:   Instruct learner(s) on proper positioning and spine alignment during self-care, functional mobility activities and/or exercises.                  Learner Progress:  Not documented in this visit.                                  OT Recommendation and Plan  Therapy Frequency (OT): evaluation only  Plan of Care Review  Plan of Care Reviewed With: patient, spouse  Outcome Evaluation: Pt admitted to [facility] for COPD exacerbation & respiratory failure 2/2 COVID and Flu A diagnoses 2/19. Pt lives w/ his spouse, is Min A for LBD but otherwise is Mod (I) at baseline. Performs functional mobility w/o AD. Today, pt was (I) w/ bed mobility, Min A for LBD, and SPV for all transfers and functional mobility. Mild endurance deficits noted, which pt reports is chronic and did not hinder overall performance. Pt is performing at baseline and has no further OT needs at this time. Plans home w/ spouse at d/c.     Time Calculation:   Evaluation Complexity (OT)  Review Occupational Profile/Medical/Therapy History Complexity: brief/low complexity  Assessment, Occupational Performance/Identification of Deficit Complexity: 1-3 performance deficits  Clinical Decision Making Complexity (OT): problem focused assessment/low complexity  Overall Complexity of Evaluation (OT): low complexity     Time Calculation- OT       Row Name 02/28/25 1109             Time Calculation- OT    OT Start Time 0825  -KG      OT Stop Time 0847  -KG      OT Time Calculation (min) 22 min  -KG      Total Timed Code Minutes- OT 15 minute(s)  -KG      OT Non-Billable Time (min) 7 min  -KG      OT Received On 02/28/25  -KG         Timed Charges    79392 - OT Therapeutic Activity Minutes 15  -KG         Untimed Charges    OT Eval/Re-eval Minutes 7  -KG         Total Minutes     Timed Charges Total Minutes 15  -KG      Untimed Charges Total Minutes 7  -KG       Total Minutes 22  -KG                User Key  (r) = Recorded By, (t) = Taken By, (c) = Cosigned By      Initials Name Provider Type    Alex Guerra OT Occupational Therapist                  Therapy Charges for Today       Code Description Service Date Service Provider Modifiers Qty    [id number]  OT THERAPEUTIC ACT EA 15 MIN 2/28/2025 Alex Dow OT GO 1    [id number]  OT EVAL LOW COMPLEXITY 2 2/28/2025 Alex Dow OT GO 1                 Alex Dow OT  2/28/2025

## 2025-02-28 NOTE — SIGNIFICANT NOTE
02/28/25 0920   OTHER   Discipline physical therapist   Therapy Assessment/Plan (PT)   Criteria for Skilled Interventions Met (PT) no problems identified which require skilled intervention     73 yo admitted with resp failure. Per adult pt profile, pt with no mobility issues PTA. Per adult PCS, pt with ampac score of 23. No indication for acute PT. Continue to facilitate HLM per [facility] (25' or more)

## 2025-02-28 NOTE — PROGRESS NOTES
[facility]     Mar/chart reviewed  Follow up acute on chronic hypoxemic respiratory failure  Feeling better today, says benadryl made a big difference.    Vital Sign Min/Max for last 24 hours  Temp  Min: 97.4 °F (36.3 °C)  Max: 98.7 °F (37.1 °C)   BP  Min: 103/60  Max: 139/71   Pulse  Min: 58  Max: 102   Resp  Min: 18  Max: 25   SpO2  Min: 74 %  Max: 97 %   Flow (L/min) (Oxygen Therapy)  Min: 4  Max: 6   Weight  Min: 118 kg (260 lb)  Max: 118 kg (260 lb)     Nad, axox3,   perrl, eomi, no icterus,  mmm, no jvd, trachea midline, neck supple,  chest fair ae bilaterally, + crackles, no wheezes,   rrr,   soft, nt, nd +bs,  no c/c/ trace edema  Skin warm, dry +rash    Labs; 2/28: reviewed:  7.47/48/73  Rpp neg  Procal 0.06  Glucose 262  Bun 11  Cr 0.79  Bicarb 33  Probnp 504  Wbc 7 (7.4% eos; abs eos 0.55)  Hgb 10.8  Plts 303    2/27: CXR: reviewed bilateral infilrates    A/P:  Acute on chronic hypoxemic respiratory failure -- 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 Villasenor  Pneumonitis -- iv steroids  Rash -- bendaryl, iv steroids  Lung cancer  Afib  DMI -- with expected hyperglycemia due to steroids    Reviewed resulted CT chest since I saw him and discussed with DR. Braun  Right sided effusion looks larger, suspect he would benefit from therapeutic thoracentesis.  Looks like it was drained last in November.

## 2025-02-28 NOTE — PLAN OF CARE
Goal Outcome Evaluation:  Plan of Care Reviewed With: patient, spouse           Outcome Evaluation: Pt admitted to [facility] for COPD exacerbation & respiratory failure 2/2 COVID and Flu A diagnoses 2/19. Pt lives w/ his spouse, is Min A for LBD but otherwise is Mod (I) at baseline. Performs functional mobility w/o AD. Today, pt was (I) w/ bed mobility, Min A for LBD, and SPV for all transfers and functional mobility. Mild endurance deficits noted, which pt reports is chronic and did not hinder overall performance. Pt is performing at baseline and has no further OT needs at this time. Plans home w/ spouse at d/c.    Anticipated Discharge Disposition (OT): home, home with assist

## 2025-02-28 NOTE — PROGRESS NOTES
Name: Branden Diop ADMIT: 2025   : 1950  PCP: Tino Lopez MD    MRN: [id number] LOS: 1 days   AGE/SEX: 74 y.o. male  ROOM: [facility]     Subjective   Subjective   Patient not been on his diuretics for multiple days prior to admission.  Swelling improved today.  Patient feeling better but oxygen up to 6 L and satting in the high 80s.  Benadryl helping his itchiness.         Objective   Objective   Vital Signs  Temp:  [97.4 °F (36.3 °C)-98 °F (36.7 °C)] 98 °F (36.7 °C)  Heart Rate:  [] 55  Resp:  [18-20] 18  BP: (103-136)/(60-75) 111/60  SpO2:  [89 %-99 %] 95 %  on  Flow (L/min) (Oxygen Therapy):  [5-6] 6;   Device (Oxygen Therapy): humidified;high-flow nasal cannula  Body mass index is 36.26 kg/m².  Physical Exam  Vitals and nursing note reviewed.   Constitutional:       General: He is not in acute distress.     Appearance: He is obese. He is ill-appearing.   Cardiovascular:      Rate and Rhythm: Normal rate and regular rhythm.   Pulmonary:      Effort: Pulmonary effort is normal. No respiratory distress.      Comments: Clear to auscultation  Abdominal:      General: Abdomen is flat. There is no distension.      Tenderness: There is no abdominal tenderness.   Musculoskeletal:         General: No swelling or deformity.   Skin:     General: Skin is warm and dry.      Comments: Keytruda rash over all extremities, sparing face   Neurological:      General: No focal deficit present.      Mental Status: He is alert. Mental status is at baseline.    Results Review     I reviewed the patient's new clinical results.  Results from last 7 days   Lab Units 25  0913 25  1004   WBC 10*3/mm3 10.58 7.46   HEMOGLOBIN g/dL 11.1* 10.8*   PLATELETS 10*3/mm3 357 303     Results from last 7 days   Lab Units 25  0913 25  1004   SODIUM mmol/L 139 137   POTASSIUM mmol/L 4.0 3.8   CHLORIDE mmol/L 96* 96*   CO2 mmol/L 29.0 33.6*   BUN mg/dL 13 11   CREATININE mg/dL 0.84 0.79   GLUCOSE mg/dL 384*  262*   Estimated Creatinine Clearance: 97.8 mL/min (by C-G formula based on SCr of 0.84 mg/dL).  Results from last 7 days   Lab Units 02/27/25  1004   ALBUMIN g/dL 3.4*   BILIRUBIN mg/dL 0.6   ALK PHOS U/L 135*   AST (SGOT) U/L 25   ALT (SGPT) U/L 16     Results from last 7 days   Lab Units 02/28/25  0913 02/27/25  1131 02/27/25  1004   CALCIUM mg/dL 9.6  --  9.5   ALBUMIN g/dL  --   --  3.4*   MAGNESIUM mg/dL  --  1.7  --      Results from last 7 days   Lab Units 02/27/25  1131   PROCALCITONIN ng/mL 0.06     COVID19   Date Value Ref Range Status   02/27/2025 Not Detected Not Detected - Ref. Range Final   01/22/2025 Not Detected Not Detected - Ref. Range Final     Hemoglobin A1C   Date/Time Value Ref Range Status   02/28/2025 0913 9.60 (H) 4.80 - 5.60 % Final     Glucose   Date/Time Value Ref Range Status   02/28/2025 1120 429 (C) 70 - 130 mg/dL Final   02/28/2025 0808 374 (H) 70 - 130 mg/dL Final   02/27/2025 2040 423 (C) 70 - 130 mg/dL Final   02/27/2025 1631 354 (H) 70 - 130 mg/dL Final       CT Chest Without Contrast Diagnostic  Narrative: CT CHEST WO CONTRAST DIAGNOSTIC-     INDICATION: Shortness of breath     COMPARISON: CTA chest January 22, 2025     TECHNIQUE:  Routine CT chest without IV contrast. Coronal and sagittal reformats.  Radiation dose reduction techniques were utilized, including automated  exposure control and exposure modulation based on body size.     FINDINGS:      Chest wall: No lymphadenopathy. Right-sided Port-A-Cath with the  catheter tip in the distal SVC.     Mediastinum: Coronary artery atherosclerotic calcifications. Heart is at  upper limits in size. No pericardial effusion. Precarinal lymph node,  series 2, axial mage 38, measures 1.1 cm, unchanged. Right  paratracheal/4R lymph node, series 2, axial mage 37, measures 9 mm in  short axis, unchanged. No new or enlarging mediastinal lymph nodes.  Right hilar lymph node, series 2, axial mage 38, measures 1.5 cm,  unchanged. No new or  enlarging hilar lymph nodes identified.     Lung/pleura: Moderate right pleural effusion, extends from the apex to  the base and extends into the major fissure, may be partially loculated  in the upper chest airways wall thickening. Right middle lobectomy. Mild  to moderate centrilobular emphysema seen in the upper lungs. Posterior  dependent and basilar opacities in the right lung, suspect partial  atelectasis from the adjacent effusion. Solid pulmonary nodule in the  right lower lobe on series 3, axial mage 53, measures 5 mm, unchanged.  Some generalized groundglass lung opacity. Suspect scarring in the upper  lobes. Subpleural nodular opacity in the anterior left upper lobe,  series 3, axial mage 34, measuring 8 mm, new. Peripheral nodular  opacities seen in the posterior left lower lobe, series 3, axial mage  39, new. Subpleural nodular opacity in the left lower lobe base, series  3, axial mage 63, new.     Upper abdomen: Fluid attenuating cyst seen in the superior pole left  kidney, incompletely imaged.     Osseous structures: Bilateral glenohumeral osteoarthritis. Old T1 and T2  spinous process fractures. Spondylosis/degenerative disc disease with  grade 1 anterolisthesis of C7 on T1 and T1 on T2.     Impression:    1. Moderate right pleural effusion, slightly larger in the interval and  may be partially loculated.  2. Airways disease and mild to moderate emphysema with suspected  scarring in the upper lobes.  3. Increased partial atelectasis in the right lower lobe along the  pleural effusion.  4. New nodular opacities in the left lung, greatest in the left lower  lobe, most likely multifocal pneumonia. Some generalized groundglass  opacities present which may be infectious or inflammatory. Can be  followed to resolution.  5. Right middle lobectomy.  6. Stable mediastinal and right hilar lymphadenopathy     This report was finalized on 2/28/2025 10:26 AM by Dr. Mart Fuentes M.D on Workstation:  [id number]       I reviewed the patient's daily medications.  Scheduled Medications  ferrous sulfate, 162.5 mg, Oral, BID  folic acid, 1 mg, Oral, Daily  gabapentin, 600 mg, Oral, TID  insulin glargine, 30 Units, Subcutaneous, Nightly  insulin lispro, 10 Units, Subcutaneous, TID With Meals  insulin lispro, 2-9 Units, Subcutaneous, 4x Daily AC & at Bedtime  ipratropium-albuterol, 3 mL, Nebulization, 4x Daily - RT  methylPREDNISolone sodium succinate, 60 mg, Intravenous, BID  [Held by provider] rivaroxaban, 20 mg, Oral, Daily  rosuvastatin, 40 mg, Oral, Daily  sertraline, 50 mg, Oral, Daily  sodium chloride, 10 mL, Intravenous, Q12H  torsemide, 40 mg, Oral, Daily    Infusions   Diet  Diet: Cardiac, Diabetic; Healthy Heart (2-3 Na+); Consistent Carbohydrate; Fluid Consistency: Thin (IDDSI 0)         I have personally reviewed:  [x]  Laboratory   [x]  Microbiology   [x]  Radiology   [x]  EKG/Telemetry   []  Cardiology/Vascular   []  Pathology   [x]  Records     Assessment/Plan     Active Hospital Problems    Diagnosis  POA    Dyspnea on exertion [R06.09]  Yes    Hypoxemia [R09.02]  Yes    Pneumonitis [J98.4]  Yes    Adverse effect of antineoplastic drug [T45.1X5A]  Yes    Drug-induced skin rash [L27.0]  Yes    Pleural effusion on right [J90]  Yes    Peripheral neuropathy due to chemotherapy [G62.0, T45.1X5A]  Yes    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       74 y.o. male admitted with <principal problem not specified>.    Acute on chronic chronic respiratory failure from COPD, 4 L at  baseline  Dyspnea on exertion, with oxygen saturations into the 70s  COVID and flu diagnosis on 2/19 by PCP  Right pleural effusion, has had thoracentesis in the past  Keytruda pneumonitis  -RVP negative, patient finished course of Paxlovid as well as Tamiflu  -No leukocytosis, fever, procalcitonin, hold off antibiotics for now, discussed with pulm  -CT chest with worsening right pleural effusion, discussed with pulm and they plan discussing with patient tomorrow about possible thoracentesis  -Transition diuretics to oral torsemide 40 mg daily  -Hold Xarelto in case patient needs a thoracentesis, last dose was 2/27 morning  -Continue IV methylprednisone for  Keytruda pneumonitis     Squamous cell carcinoma  -Follows with [facility], finished his third cycle of Keytruda on 2/4     Immune mediated skin rash  -Has been chronically on prednisone, but was being weaned off per oncology note     Diabetes type 2, A1c 9.6%  -On Lantus 28 units at home and 5 of aspart with meals  -Increase Lantus to 30 units at night, add lispro 10 units with meals and sliding scale insulin, will titrate as needed     Persistent atrial fibrillation  -Hold Xarelto as above.  Not on rate or rhythm control as an outpatient     Hyperlipidemia-continue home statin     Neuropathy-continue home gabapentin    Left lung nodules-discussed with pulmonology and we will low suspicion for acute infectious etiology at this time with negative procalcitonin, normal white count, no fevers no clinical signs of pneumonia..  Will need follow-up scan    SCDs for DVT prophylaxis.  Full code.  Discussed with patient and nursing staff.  Anticipate discharge home with home health next week.    Expected Discharge Date: 3/3/2025; Expected Discharge Time:       Zia Braun MD  [facility]  02/28/25  15:50 EST

## 2025-02-28 NOTE — PLAN OF CARE
Problem: Adult Inpatient Plan of Care  Goal: Plan of Care Review  Outcome: Not Progressing  Flowsheets (Taken 2/28/2025 7167)  Progress: improving  Outcome Evaluation: VSS, 5-6L highflow, CPAP at bedside, benadryl ordered for itching, IV steroids continued,  Plan of Care Reviewed With: patient  Goal: Patient-Specific Goal (Individualized)  Outcome: Not Progressing  Goal: Absence of Hospital-Acquired Illness or Injury  Outcome: Not Progressing  Intervention: Identify and Manage Fall Risk  Recent Flowsheet Documentation  Taken 2/28/2025 1600 by Chela Jasmine RN  Safety Promotion/Fall Prevention:   activity supervised   assistive device/personal items within reach   clutter free environment maintained   fall prevention program maintained   nonskid shoes/slippers when out of bed   room organization consistent   safety round/check completed  Taken 2/28/2025 1400 by Chela Jasmine RN  Safety Promotion/Fall Prevention:   activity supervised   assistive device/personal items within reach   clutter free environment maintained   fall prevention program maintained   nonskid shoes/slippers when out of bed   room organization consistent   safety round/check completed  Taken 2/28/2025 0845 by Chela Jasmine RN  Safety Promotion/Fall Prevention:   activity supervised   assistive device/personal items within reach   clutter free environment maintained   fall prevention program maintained   nonskid shoes/slippers when out of bed   room organization consistent   safety round/check completed  Intervention: Prevent Skin Injury  Recent Flowsheet Documentation  Taken 2/28/2025 1600 by Chela Jasmine RN  Body Position:   supine   position changed independently  Taken 2/28/2025 1400 by Chela Jasmine RN  Body Position:   supine   position changed independently  Taken 2/28/2025 0845 by Chela Jasmine RN  Body Position: dangle, side of bed  Goal: Optimal Comfort and Wellbeing  Outcome: Not Progressing  Goal:  Readiness for Transition of Care  Outcome: Not Progressing     Problem: Comorbidity Management  Goal: Maintenance of COPD Symptom Control  Outcome: Not Progressing  Goal: Blood Glucose Level Within Target Range  Outcome: Not Progressing  Goal: Blood Pressure in Desired Range  Outcome: Not Progressing     Problem: Fall Injury Risk  Goal: Absence of Fall and Fall-Related Injury  Outcome: Not Progressing  Intervention: Promote Injury-Free Environment  Recent Flowsheet Documentation  Taken 2/28/2025 1600 by Chela Jasmine RN  Safety Promotion/Fall Prevention:   activity supervised   assistive device/personal items within reach   clutter free environment maintained   fall prevention program maintained   nonskid shoes/slippers when out of bed   room organization consistent   safety round/check completed  Taken 2/28/2025 1400 by Chela Jasmine RN  Safety Promotion/Fall Prevention:   activity supervised   assistive device/personal items within reach   clutter free environment maintained   fall prevention program maintained   nonskid shoes/slippers when out of bed   room organization consistent   safety round/check completed  Taken 2/28/2025 0845 by Chela Jasmine RN  Safety Promotion/Fall Prevention:   activity supervised   assistive device/personal items within reach   clutter free environment maintained   fall prevention program maintained   nonskid shoes/slippers when out of bed   room organization consistent   safety round/check completed   Goal Outcome Evaluation:  Plan of Care Reviewed With: patient        Progress: improving  Outcome Evaluation: VSS, 5-6L highflow, CPAP at bedside, benadryl ordered for itching, IV steroids continued,

## 2025-02-28 NOTE — TELEPHONE ENCOUNTER
Called pt to see about his CXR order placed on 1/20/25 spoke with wife she stated he was admitted yesterday 2/27/25 and they performed a CXR on him.

## 2025-03-01 LAB
ANION GAP SERPL CALCULATED.3IONS-SCNC: 12.3 MMOL/L (ref 5–15)
BUN SERPL-MCNC: 22 MG/DL (ref 8–23)
BUN/CREAT SERPL: 26.2 (ref 7–25)
CALCIUM SPEC-SCNC: 8.9 MG/DL (ref 8.6–10.5)
CHLORIDE SERPL-SCNC: 94 MMOL/L (ref 98–107)
CO2 SERPL-SCNC: 31.7 MMOL/L (ref 22–29)
CREAT SERPL-MCNC: 0.84 MG/DL (ref 0.76–1.27)
DEPRECATED RDW RBC AUTO: 52.9 FL (ref 37–54)
EGFRCR SERPLBLD CKD-EPI 2021: 90.4 ML/MIN/1.73
ERYTHROCYTE [DISTWIDTH] IN BLOOD BY AUTOMATED COUNT: 15.7 % (ref 12.3–15.4)
GLUCOSE BLDC GLUCOMTR-MCNC: 414 MG/DL (ref 70–130)
GLUCOSE BLDC GLUCOMTR-MCNC: 444 MG/DL (ref 70–130)
GLUCOSE BLDC GLUCOMTR-MCNC: 460 MG/DL (ref 70–130)
GLUCOSE BLDC GLUCOMTR-MCNC: 465 MG/DL (ref 70–130)
GLUCOSE BLDC GLUCOMTR-MCNC: 513 MG/DL (ref 70–130)
GLUCOSE SERPL-MCNC: 592 MG/DL (ref 65–99)
HCT VFR BLD AUTO: 33 % (ref 37.5–51)
HGB BLD-MCNC: 10.1 G/DL (ref 13–17.7)
MCH RBC QN AUTO: 28.2 PG (ref 26.6–33)
MCHC RBC AUTO-ENTMCNC: 30.6 G/DL (ref 31.5–35.7)
MCV RBC AUTO: 92.2 FL (ref 79–97)
PLATELET # BLD AUTO: 304 10*3/MM3 (ref 140–450)
PMV BLD AUTO: 8.9 FL (ref 6–12)
POTASSIUM SERPL-SCNC: 4.2 MMOL/L (ref 3.5–5.2)
PROCALCITONIN SERPL-MCNC: 0.04 NG/ML (ref 0–0.25)
RBC # BLD AUTO: 3.58 10*6/MM3 (ref 4.14–5.8)
SODIUM SERPL-SCNC: 138 MMOL/L (ref 136–145)
WBC NRBC COR # BLD AUTO: 12.85 10*3/MM3 (ref 3.4–10.8)

## 2025-03-01 PROCEDURE — 94799 UNLISTED PULMONARY SVC/PX: CPT

## 2025-03-01 PROCEDURE — 25010000002 METHYLPREDNISOLONE PER 125 MG: Performed by: STUDENT IN AN ORGANIZED HEALTH CARE EDUCATION/TRAINING PROGRAM

## 2025-03-01 PROCEDURE — 82948 REAGENT STRIP/BLOOD GLUCOSE: CPT

## 2025-03-01 PROCEDURE — 84145 PROCALCITONIN (PCT): CPT | Performed by: STUDENT IN AN ORGANIZED HEALTH CARE EDUCATION/TRAINING PROGRAM

## 2025-03-01 PROCEDURE — 63710000001 INSULIN LISPRO (HUMAN) PER 5 UNITS: Performed by: STUDENT IN AN ORGANIZED HEALTH CARE EDUCATION/TRAINING PROGRAM

## 2025-03-01 PROCEDURE — 80048 BASIC METABOLIC PNL TOTAL CA: CPT | Performed by: STUDENT IN AN ORGANIZED HEALTH CARE EDUCATION/TRAINING PROGRAM

## 2025-03-01 PROCEDURE — 63710000001 INSULIN LISPRO (HUMAN) PER 5 UNITS

## 2025-03-01 PROCEDURE — 94760 N-INVAS EAR/PLS OXIMETRY 1: CPT

## 2025-03-01 PROCEDURE — 85027 COMPLETE CBC AUTOMATED: CPT | Performed by: STUDENT IN AN ORGANIZED HEALTH CARE EDUCATION/TRAINING PROGRAM

## 2025-03-01 PROCEDURE — 63710000001 INSULIN GLARGINE PER 5 UNITS: Performed by: STUDENT IN AN ORGANIZED HEALTH CARE EDUCATION/TRAINING PROGRAM

## 2025-03-01 PROCEDURE — 94761 N-INVAS EAR/PLS OXIMETRY MLT: CPT

## 2025-03-01 PROCEDURE — 36415 COLL VENOUS BLD VENIPUNCTURE: CPT | Performed by: STUDENT IN AN ORGANIZED HEALTH CARE EDUCATION/TRAINING PROGRAM

## 2025-03-01 PROCEDURE — 94660 CPAP INITIATION&MGMT: CPT

## 2025-03-01 PROCEDURE — 63710000001 DIPHENHYDRAMINE PER 50 MG: Performed by: STUDENT IN AN ORGANIZED HEALTH CARE EDUCATION/TRAINING PROGRAM

## 2025-03-01 RX ORDER — INSULIN LISPRO 100 [IU]/ML
15 INJECTION, SOLUTION INTRAVENOUS; SUBCUTANEOUS
Status: DISCONTINUED | OUTPATIENT
Start: 2025-03-01 | End: 2025-03-02

## 2025-03-01 RX ORDER — INSULIN LISPRO 100 [IU]/ML
3-14 INJECTION, SOLUTION INTRAVENOUS; SUBCUTANEOUS
Status: DISCONTINUED | OUTPATIENT
Start: 2025-03-01 | End: 2025-03-01

## 2025-03-01 RX ORDER — INSULIN LISPRO 100 [IU]/ML
6 INJECTION, SOLUTION INTRAVENOUS; SUBCUTANEOUS ONCE
Status: COMPLETED | OUTPATIENT
Start: 2025-03-01 | End: 2025-03-01

## 2025-03-01 RX ORDER — INSULIN LISPRO 100 [IU]/ML
3-14 INJECTION, SOLUTION INTRAVENOUS; SUBCUTANEOUS
Status: DISCONTINUED | OUTPATIENT
Start: 2025-03-01 | End: 2025-03-02

## 2025-03-01 RX ORDER — METHYLPREDNISOLONE SODIUM SUCCINATE 40 MG/ML
40 INJECTION, POWDER, LYOPHILIZED, FOR SOLUTION INTRAMUSCULAR; INTRAVENOUS DAILY
Status: DISCONTINUED | OUTPATIENT
Start: 2025-03-02 | End: 2025-03-02

## 2025-03-01 RX ADMIN — GABAPENTIN 600 MG: 300 CAPSULE ORAL at 09:30

## 2025-03-01 RX ADMIN — FERROUS SULFATE TAB 325 MG (65 MG ELEMENTAL FE) 162.5 MG: 325 (65 FE) TAB at 20:51

## 2025-03-01 RX ADMIN — SERTRALINE HYDROCHLORIDE 50 MG: 50 TABLET, FILM COATED ORAL at 09:29

## 2025-03-01 RX ADMIN — INSULIN LISPRO 10 UNITS: 100 INJECTION, SOLUTION INTRAVENOUS; SUBCUTANEOUS at 09:26

## 2025-03-01 RX ADMIN — DIPHENHYDRAMINE HYDROCHLORIDE 25 MG: 25 CAPSULE ORAL at 15:43

## 2025-03-01 RX ADMIN — INSULIN LISPRO 15 UNITS: 100 INJECTION, SOLUTION INTRAVENOUS; SUBCUTANEOUS at 17:59

## 2025-03-01 RX ADMIN — INSULIN LISPRO 10 UNITS: 100 INJECTION, SOLUTION INTRAVENOUS; SUBCUTANEOUS at 12:41

## 2025-03-01 RX ADMIN — Medication 10 ML: at 20:52

## 2025-03-01 RX ADMIN — IPRATROPIUM BROMIDE AND ALBUTEROL SULFATE 3 ML: .5; 3 SOLUTION RESPIRATORY (INHALATION) at 15:23

## 2025-03-01 RX ADMIN — INSULIN GLARGINE 10 UNITS: 100 INJECTION, SOLUTION SUBCUTANEOUS at 09:27

## 2025-03-01 RX ADMIN — FERROUS SULFATE TAB 325 MG (65 MG ELEMENTAL FE) 162.5 MG: 325 (65 FE) TAB at 09:29

## 2025-03-01 RX ADMIN — RIVAROXABAN 20 MG: 20 TABLET, FILM COATED ORAL at 17:58

## 2025-03-01 RX ADMIN — Medication 10 ML: at 09:31

## 2025-03-01 RX ADMIN — DIPHENHYDRAMINE HYDROCHLORIDE 25 MG: 25 CAPSULE ORAL at 09:40

## 2025-03-01 RX ADMIN — GABAPENTIN 600 MG: 300 CAPSULE ORAL at 15:43

## 2025-03-01 RX ADMIN — INSULIN LISPRO 14 UNITS: 100 INJECTION, SOLUTION INTRAVENOUS; SUBCUTANEOUS at 12:41

## 2025-03-01 RX ADMIN — INSULIN GLARGINE 40 UNITS: 100 INJECTION, SOLUTION SUBCUTANEOUS at 20:52

## 2025-03-01 RX ADMIN — IPRATROPIUM BROMIDE AND ALBUTEROL SULFATE 3 ML: .5; 3 SOLUTION RESPIRATORY (INHALATION) at 07:39

## 2025-03-01 RX ADMIN — INSULIN LISPRO 14 UNITS: 100 INJECTION, SOLUTION INTRAVENOUS; SUBCUTANEOUS at 20:52

## 2025-03-01 RX ADMIN — IPRATROPIUM BROMIDE AND ALBUTEROL SULFATE 3 ML: .5; 3 SOLUTION RESPIRATORY (INHALATION) at 19:50

## 2025-03-01 RX ADMIN — DIPHENHYDRAMINE HYDROCHLORIDE 25 MG: 25 CAPSULE ORAL at 22:13

## 2025-03-01 RX ADMIN — METHYLPREDNISOLONE SODIUM SUCCINATE 60 MG: 125 INJECTION, POWDER, FOR SOLUTION INTRAMUSCULAR; INTRAVENOUS at 09:31

## 2025-03-01 RX ADMIN — INSULIN LISPRO 6 UNITS: 100 INJECTION, SOLUTION INTRAVENOUS; SUBCUTANEOUS at 22:13

## 2025-03-01 RX ADMIN — IPRATROPIUM BROMIDE AND ALBUTEROL SULFATE 3 ML: .5; 3 SOLUTION RESPIRATORY (INHALATION) at 11:24

## 2025-03-01 RX ADMIN — ROSUVASTATIN CALCIUM 40 MG: 20 TABLET, FILM COATED ORAL at 09:29

## 2025-03-01 RX ADMIN — GABAPENTIN 600 MG: 300 CAPSULE ORAL at 20:50

## 2025-03-01 RX ADMIN — INSULIN LISPRO 14 UNITS: 100 INJECTION, SOLUTION INTRAVENOUS; SUBCUTANEOUS at 17:58

## 2025-03-01 RX ADMIN — TORSEMIDE 40 MG: 20 TABLET ORAL at 09:31

## 2025-03-01 RX ADMIN — INSULIN LISPRO 14 UNITS: 100 INJECTION, SOLUTION INTRAVENOUS; SUBCUTANEOUS at 09:27

## 2025-03-01 RX ADMIN — FOLIC ACID 1 MG: 1 TABLET ORAL at 09:30

## 2025-03-01 NOTE — PROGRESS NOTES
Name: Branden Diop ADMIT: 2025   : 1950  PCP: Tino Lopez MD    MRN: [id number] LOS: 2 days   AGE/SEX: 74 y.o. male  ROOM: [facility]     Subjective   Subjective   Patient down to 5 L today.  Itching has improved with Benadryl.  Hyperglycemia from steroids.  Wife at bedside.  No fever, chills, productive cough         Objective   Objective   Vital Signs  Temp:  [97.5 °F (36.4 °C)-98.7 °F (37.1 °C)] 97.7 °F (36.5 °C)  Heart Rate:  [55-72] 67  Resp:  [18-20] 18  BP: ()/(57-69) 114/69  SpO2:  [87 %-95 %] 92 %  on  Flow (L/min) (Oxygen Therapy):  [5-7] 5;   Device (Oxygen Therapy): CPAP  Body mass index is 36.26 kg/m².  Physical Exam  Vitals and nursing note reviewed.   Constitutional:       General: He is not in acute distress.     Appearance: He is obese. He is ill-appearing.   Cardiovascular:      Rate and Rhythm: Normal rate and regular rhythm.   Pulmonary:      Effort: Pulmonary effort is normal. No respiratory distress.      Comments: Clear to auscultation  Abdominal:      General: Abdomen is flat. There is no distension.      Tenderness: There is no abdominal tenderness.   Musculoskeletal:         General: No swelling or deformity.   Skin:     General: Skin is warm and dry.      Comments: Keytruda rash over all extremities, sparing face   Neurological:      General: No focal deficit present.      Mental Status: He is alert. Mental status is at baseline.    Results Review     I reviewed the patient's new clinical results.  Results from last 7 days   Lab Units 25  0943 25  0913 25  1004   WBC 10*3/mm3 12.85* 10.58 7.46   HEMOGLOBIN g/dL 10.1* 11.1* 10.8*   PLATELETS 10*3/mm3 304 357 303     Results from last 7 days   Lab Units 25  0943 25  0913 25  1004   SODIUM mmol/L 138 139 137   POTASSIUM mmol/L 4.2 4.0 3.8   CHLORIDE mmol/L 94* 96* 96*   CO2 mmol/L 31.7* 29.0 33.6*   BUN mg/dL 22 13 11   CREATININE mg/dL 0.84 0.84 0.79   GLUCOSE mg/dL 592* 384* 262*    Estimated Creatinine Clearance: 97.8 mL/min (by C-G formula based on SCr of 0.84 mg/dL).  Results from last 7 days   Lab Units 02/27/25  1004   ALBUMIN g/dL 3.4*   BILIRUBIN mg/dL 0.6   ALK PHOS U/L 135*   AST (SGOT) U/L 25   ALT (SGPT) U/L 16     Results from last 7 days   Lab Units 03/01/25  0943 02/28/25  0913 02/27/25  1131 02/27/25  1004   CALCIUM mg/dL 8.9 9.6  --  9.5   ALBUMIN g/dL  --   --   --  3.4*   MAGNESIUM mg/dL  --   --  1.7  --      Results from last 7 days   Lab Units 03/01/25  0943 02/27/25  1131   PROCALCITONIN ng/mL 0.04 0.06     COVID19   Date Value Ref Range Status   02/27/2025 Not Detected Not Detected - Ref. Range Final   01/22/2025 Not Detected Not Detected - Ref. Range Final     Hemoglobin A1C   Date/Time Value Ref Range Status   02/28/2025 0913 9.60 (H) 4.80 - 5.60 % Final     Glucose   Date/Time Value Ref Range Status   03/01/2025 0844 513 (C) 70 - 130 mg/dL Final   02/28/2025 2011 445 (C) 70 - 130 mg/dL Final   02/28/2025 1631 363 (H) 70 - 130 mg/dL Final   02/28/2025 1120 429 (C) 70 - 130 mg/dL Final   02/28/2025 0808 374 (H) 70 - 130 mg/dL Final   02/27/2025 2040 423 (C) 70 - 130 mg/dL Final   02/27/2025 1631 354 (H) 70 - 130 mg/dL Final       CT Chest Without Contrast Diagnostic  Narrative: CT CHEST WO CONTRAST DIAGNOSTIC-     INDICATION: Shortness of breath     COMPARISON: CTA chest January 22, 2025     TECHNIQUE:  Routine CT chest without IV contrast. Coronal and sagittal reformats.  Radiation dose reduction techniques were utilized, including automated  exposure control and exposure modulation based on body size.     FINDINGS:      Chest wall: No lymphadenopathy. Right-sided Port-A-Cath with the  catheter tip in the distal SVC.     Mediastinum: Coronary artery atherosclerotic calcifications. Heart is at  upper limits in size. No pericardial effusion. Precarinal lymph node,  series 2, axial mage 38, measures 1.1 cm, unchanged. Right  paratracheal/4R lymph node, series 2, axial  mage 37, measures 9 mm in  short axis, unchanged. No new or enlarging mediastinal lymph nodes.  Right hilar lymph node, series 2, axial mage 38, measures 1.5 cm,  unchanged. No new or enlarging hilar lymph nodes identified.     Lung/pleura: Moderate right pleural effusion, extends from the apex to  the base and extends into the major fissure, may be partially loculated  in the upper chest airways wall thickening. Right middle lobectomy. Mild  to moderate centrilobular emphysema seen in the upper lungs. Posterior  dependent and basilar opacities in the right lung, suspect partial  atelectasis from the adjacent effusion. Solid pulmonary nodule in the  right lower lobe on series 3, axial mage 53, measures 5 mm, unchanged.  Some generalized groundglass lung opacity. Suspect scarring in the upper  lobes. Subpleural nodular opacity in the anterior left upper lobe,  series 3, axial mage 34, measuring 8 mm, new. Peripheral nodular  opacities seen in the posterior left lower lobe, series 3, axial mage  39, new. Subpleural nodular opacity in the left lower lobe base, series  3, axial mage 63, new.     Upper abdomen: Fluid attenuating cyst seen in the superior pole left  kidney, incompletely imaged.     Osseous structures: Bilateral glenohumeral osteoarthritis. Old T1 and T2  spinous process fractures. Spondylosis/degenerative disc disease with  grade 1 anterolisthesis of C7 on T1 and T1 on T2.     Impression:    1. Moderate right pleural effusion, slightly larger in the interval and  may be partially loculated.  2. Airways disease and mild to moderate emphysema with suspected  scarring in the upper lobes.  3. Increased partial atelectasis in the right lower lobe along the  pleural effusion.  4. New nodular opacities in the left lung, greatest in the left lower  lobe, most likely multifocal pneumonia. Some generalized groundglass  opacities present which may be infectious or inflammatory. Can be  followed to resolution.  5.  Right middle lobectomy.  6. Stable mediastinal and right hilar lymphadenopathy     This report was finalized on 2/28/2025 10:26 AM by Dr. Mart Fuentes M.D on Workstation: [id number]       I reviewed the patient's daily medications.  Scheduled Medications  ferrous sulfate, 162.5 mg, Oral, BID  folic acid, 1 mg, Oral, Daily  gabapentin, 600 mg, Oral, TID  insulin glargine, 10 Units, Subcutaneous, Daily  insulin glargine, 30 Units, Subcutaneous, Nightly  insulin lispro, 10 Units, Subcutaneous, TID With Meals  insulin lispro, 3-14 Units, Subcutaneous, 4x Daily AC & at Bedtime  ipratropium-albuterol, 3 mL, Nebulization, 4x Daily - RT  methylPREDNISolone sodium succinate, 60 mg, Intravenous, BID  [Held by provider] rivaroxaban, 20 mg, Oral, Daily  rosuvastatin, 40 mg, Oral, Daily  sertraline, 50 mg, Oral, Daily  sodium chloride, 10 mL, Intravenous, Q12H  torsemide, 40 mg, Oral, Daily    Infusions   Diet  Diet: Cardiac, Diabetic; Healthy Heart (2-3 Na+); Consistent Carbohydrate; Fluid Consistency: Thin (IDDSI 0)         I have personally reviewed:  [x]  Laboratory   [x]  Microbiology   [x]  Radiology   [x]  EKG/Telemetry   []  Cardiology/Vascular   []  Pathology   [x]  Records     Assessment/Plan     Active Hospital Problems    Diagnosis  POA    Dyspnea on exertion [R06.09]  Yes    Hypoxemia [R09.02]  Yes    Pneumonitis [J98.4]  Yes    Adverse effect of antineoplastic drug [T45.1X5A]  Yes    Drug-induced skin rash [L27.0]  Yes    Pleural effusion on right [J90]  Yes    Peripheral neuropathy due to chemotherapy [G62.0, T45.1X5A]  Yes    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       74 y.o. male admitted with <principal problem not specified>.    Acute on chronic chronic respiratory failure from COPD, 4 L at baseline  Dyspnea on exertion, with oxygen saturations into the 70s  COVID and flu diagnosis on 2/19 by PCP  Right pleural effusion, has had thoracentesis in the past  Keytruda pneumonitis  -RVP negative, patient finished course of Paxlovid as well as Tamiflu  -fever, procalcitonin, hold off antibiotics for now, discussed with pulm  -CT chest with worsening right pleural effusion  -Transition diuretics to oral torsemide 40 mg daily  -Hold Xarelto in case patient needs a thoracentesis, last dose was 2/27 morning  -Continue IV methylprednisone for  Keytruda pneumonitis thoracentesis ordered  -Thoracentesis ordered     Squamous cell carcinoma  -Follows with [facility], finished his third cycle of Keytruda on 2/4     Immune mediated skin rash  -Has been chronically on prednisone, but was being weaned off per oncology note     Diabetes type 2, A1c 9.6%  -On Lantus 28 units at home and 5 of aspart with meals  -Increase 10 units in the morning and Lantus to 30 units at night, increase lispro 10 units with meals sliding scale insulin to medium high, will titrate as needed     Persistent atrial fibrillation  -Hold Xarelto as above.  Not on rate or rhythm control as an outpatient     Hyperlipidemia-continue home statin     Neuropathy-continue home gabapentin    Left lung nodules-discussed with pulmonology on Friday and we will low suspicion for acute infectious etiology at this time with negative procalcitonin x 2, mild white count since starting steroids, no fevers no clinical signs of pneumonia..  Will need follow-up scan    Mild leukocytosis-suspect from high-dose steroids.  No fevers or chills.    SCDs for DVT prophylaxis.  Full code.  Discussed with patient and nursing staff.  Anticipate discharge home  with home health next week.    Expected Discharge Date: 3/3/2025; Expected Discharge Time:       Zia Braun MD  [facility]  03/01/25  11:19 EST

## 2025-03-01 NOTE — PLAN OF CARE
Goal Outcome Evaluation:  Plan of Care Reviewed With: patient        Progress: no change  Outcome Evaluation: No changes overnight, sleeping between care. Oxygen requirements between 5-7L. VSS. Possible thoracentesis today. Breathing tx per RT, IV steroids per MAR. Evening lantus dose increased due to increasing blood sugars. Plan of care updated. Continue safety and progress towards goals.         Problem: Adult Inpatient Plan of Care  Goal: Plan of Care Review  3/1/2025 0617 by Lupe Varela RN  Outcome: Progressing  Flowsheets (Taken 3/1/2025 0617)  Progress: no change  Outcome Evaluation: No changes overnight, sleeping between care. Oxygen requirements between 5-7L. VSS. Possible thoracentesis today. Breathing tx per RT, IV steroids per MAR. Evening lantus dose increased due to increasing blood sugars. Plan of care updated. Continue safety and progress towards goals.  Plan of Care Reviewed With: patient  3/1/2025 0616 by Lupe Varela RN  Outcome: Progressing  Goal: Patient-Specific Goal (Individualized)  3/1/2025 0617 by Lupe Varela RN  Outcome: Progressing  3/1/2025 0616 by Lupe Varela RN  Outcome: Progressing  Goal: Absence of Hospital-Acquired Illness or Injury  3/1/2025 0617 by Lupe Varela RN  Outcome: Progressing  3/1/2025 0616 by Lupe Varela RN  Outcome: Progressing  Intervention: Identify and Manage Fall Risk  Recent Flowsheet Documentation  Taken 3/1/2025 0600 by Lupe Varela RN  Safety Promotion/Fall Prevention:   safety round/check completed   activity supervised   assistive device/personal items within reach   fall prevention program maintained   nonskid shoes/slippers when out of bed  Taken 3/1/2025 0200 by Lupe Varela RN  Safety Promotion/Fall Prevention:   safety round/check completed   activity supervised   assistive device/personal items within reach   fall prevention program maintained   nonskid shoes/slippers when out of bed  Taken 3/1/2025 0005 by Avery  CISCO Andrade  Safety Promotion/Fall Prevention:   safety round/check completed   activity supervised  Taken 2/28/2025 2200 by Lupe Varela RN  Safety Promotion/Fall Prevention:   safety round/check completed   activity supervised   assistive device/personal items within reach   fall prevention program maintained   nonskid shoes/slippers when out of bed  Taken 2/28/2025 2014 by Lupe Varela RN  Safety Promotion/Fall Prevention:   safety round/check completed   activity supervised   assistive device/personal items within reach   fall prevention program maintained   nonskid shoes/slippers when out of bed  Intervention: Prevent Skin Injury  Recent Flowsheet Documentation  Taken 3/1/2025 0005 by Lupe Varela RN  Body Position: position changed independently  Taken 2/28/2025 2014 by Lupe Varela RN  Body Position: position changed independently  Skin Protection: skin sealant/moisture barrier applied  Intervention: Prevent Infection  Recent Flowsheet Documentation  Taken 3/1/2025 0600 by Lupe Varela RN  Infection Prevention:   rest/sleep promoted   personal protective equipment utilized  Taken 3/1/2025 0200 by Lupe Varela RN  Infection Prevention:   rest/sleep promoted   personal protective equipment utilized  Taken 3/1/2025 0005 by Lupe Varela RN  Infection Prevention:   rest/sleep promoted   personal protective equipment utilized  Taken 2/28/2025 2200 by Lupe Varela RN  Infection Prevention:   rest/sleep promoted   personal protective equipment utilized  Goal: Optimal Comfort and Wellbeing  3/1/2025 0617 by Lupe Varela RN  Outcome: Progressing  3/1/2025 0616 by Lupe Varela RN  Outcome: Progressing  Intervention: Provide Person-Centered Care  Recent Flowsheet Documentation  Taken 3/1/2025 0005 by Lupe Varela RN  Trust Relationship/Rapport:   care explained   choices provided   questions answered   reassurance provided   thoughts/feelings acknowledged  Taken 2/28/2025 2014  by Lupe Varela, RN  Trust Relationship/Rapport:   care explained   choices provided   questions answered   reassurance provided   thoughts/feelings acknowledged  Goal: Readiness for Transition of Care  3/1/2025 0617 by Lupe Varela, RN  Outcome: Progressing  3/1/2025 0616 by Lupe Varela, RN  Outcome: Progressing

## 2025-03-01 NOTE — PROGRESS NOTES
[facility]    Mar/chart reviewed  Follow up acute on chronic hypoxemic respiratory failure  Feeling better today, remains worreid about the steorids    Vital Sign Min/Max for last 24 hours  Temp  Min: 97.5 °F (36.4 °C)  Max: 98.7 °F (37.1 °C)   BP  Min: 99/59  Max: 114/69   Pulse  Min: 55  Max: 72   Resp  Min: 18  Max: 20   SpO2  Min: 87 %  Max: 96 %   Flow (L/min) (Oxygen Therapy)  Min: 5  Max: 7   No data recorded     .Nad, axox3,   perrl, eomi, no icterus,  mmm, no jvd, trachea midline, neck supple,  chest fair ae bilaterally, + crackles, no wheezes,   rrr,   soft, nt, nd +bs,  no c/c/ trace edema  Skin warm, dry +rash    Labs; 3/1: reviewed:  Wbc 12  Hgb 10  Ptls 304  Glucose 592  Bun 22  Cr 0.84  Bicarb 31    A/P:  Acute on chronic hypoxemic respiratory failure -- 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 Villasenor  Pneumonitis -- iv steroids  Rash -- bendaryl, iv steroids  Lung cancer  Afib  DMI -- with expected hyperglycemia due to steroids  Large right sided jpleural effusion - recurrent, for thora tomorrow

## 2025-03-02 ENCOUNTER — APPOINTMENT (OUTPATIENT)
Dept: ULTRASOUND IMAGING | Facility: HOSPITAL | Age: 75
DRG: 205 | End: 2025-03-02
Payer: MEDICARE

## 2025-03-02 LAB
ANION GAP SERPL CALCULATED.3IONS-SCNC: 12.3 MMOL/L (ref 5–15)
APPEARANCE FLD: ABNORMAL
BUN SERPL-MCNC: 25 MG/DL (ref 8–23)
BUN/CREAT SERPL: 31.3 (ref 7–25)
CALCIUM SPEC-SCNC: 8.9 MG/DL (ref 8.6–10.5)
CHLORIDE SERPL-SCNC: 95 MMOL/L (ref 98–107)
CO2 SERPL-SCNC: 32.7 MMOL/L (ref 22–29)
COLOR FLD: YELLOW
CREAT SERPL-MCNC: 0.8 MG/DL (ref 0.76–1.27)
DEPRECATED RDW RBC AUTO: 51.8 FL (ref 37–54)
EGFRCR SERPLBLD CKD-EPI 2021: 91.7 ML/MIN/1.73
EOSINOPHIL NFR FLD MANUAL: 1 %
ERYTHROCYTE [DISTWIDTH] IN BLOOD BY AUTOMATED COUNT: 15.8 % (ref 12.3–15.4)
GLUCOSE BLDC GLUCOMTR-MCNC: 224 MG/DL (ref 70–130)
GLUCOSE BLDC GLUCOMTR-MCNC: 314 MG/DL (ref 70–130)
GLUCOSE BLDC GLUCOMTR-MCNC: 374 MG/DL (ref 70–130)
GLUCOSE BLDC GLUCOMTR-MCNC: 433 MG/DL (ref 70–130)
GLUCOSE SERPL-MCNC: 443 MG/DL (ref 65–99)
HCT VFR BLD AUTO: 31.8 % (ref 37.5–51)
HGB BLD-MCNC: 9.9 G/DL (ref 13–17.7)
LDH FLD-CCNC: 220 U/L
LYMPHOCYTES NFR FLD MANUAL: 88 %
MCH RBC QN AUTO: 28 PG (ref 26.6–33)
MCHC RBC AUTO-ENTMCNC: 31.1 G/DL (ref 31.5–35.7)
MCV RBC AUTO: 90.1 FL (ref 79–97)
METHOD: ABNORMAL
MONOCYTES NFR FLD: 4 %
NEUTROPHILS NFR FLD MANUAL: 7 %
NUC CELL # FLD: 2976 /MM3
PLATELET # BLD AUTO: 289 10*3/MM3 (ref 140–450)
PMV BLD AUTO: 9.1 FL (ref 6–12)
POTASSIUM SERPL-SCNC: 3.5 MMOL/L (ref 3.5–5.2)
POTASSIUM SERPL-SCNC: 3.7 MMOL/L (ref 3.5–5.2)
PROT FLD-MCNC: 4.3 G/DL
RBC # BLD AUTO: 3.53 10*6/MM3 (ref 4.14–5.8)
RBC # FLD AUTO: ABNORMAL /MM3
SODIUM SERPL-SCNC: 140 MMOL/L (ref 136–145)
WBC NRBC COR # BLD AUTO: 12.12 10*3/MM3 (ref 3.4–10.8)

## 2025-03-02 PROCEDURE — 25010000002 LIDOCAINE 1 % SOLUTION: Performed by: RADIOLOGY

## 2025-03-02 PROCEDURE — 94761 N-INVAS EAR/PLS OXIMETRY MLT: CPT

## 2025-03-02 PROCEDURE — 63710000001 DIPHENHYDRAMINE PER 50 MG: Performed by: STUDENT IN AN ORGANIZED HEALTH CARE EDUCATION/TRAINING PROGRAM

## 2025-03-02 PROCEDURE — 63710000001 INSULIN LISPRO (HUMAN) PER 5 UNITS: Performed by: STUDENT IN AN ORGANIZED HEALTH CARE EDUCATION/TRAINING PROGRAM

## 2025-03-02 PROCEDURE — 76942 ECHO GUIDE FOR BIOPSY: CPT

## 2025-03-02 PROCEDURE — 94799 UNLISTED PULMONARY SVC/PX: CPT

## 2025-03-02 PROCEDURE — 25010000002 METHYLPREDNISOLONE PER 40 MG: Performed by: INTERNAL MEDICINE

## 2025-03-02 PROCEDURE — 0W993ZX DRAINAGE OF RIGHT PLEURAL CAVITY, PERCUTANEOUS APPROACH, DIAGNOSTIC: ICD-10-PCS | Performed by: STUDENT IN AN ORGANIZED HEALTH CARE EDUCATION/TRAINING PROGRAM

## 2025-03-02 PROCEDURE — 82948 REAGENT STRIP/BLOOD GLUCOSE: CPT

## 2025-03-02 PROCEDURE — 84132 ASSAY OF SERUM POTASSIUM: CPT | Performed by: STUDENT IN AN ORGANIZED HEALTH CARE EDUCATION/TRAINING PROGRAM

## 2025-03-02 PROCEDURE — 88305 TISSUE EXAM BY PATHOLOGIST: CPT | Performed by: STUDENT IN AN ORGANIZED HEALTH CARE EDUCATION/TRAINING PROGRAM

## 2025-03-02 PROCEDURE — 84157 ASSAY OF PROTEIN OTHER: CPT | Performed by: STUDENT IN AN ORGANIZED HEALTH CARE EDUCATION/TRAINING PROGRAM

## 2025-03-02 PROCEDURE — 83615 LACTATE (LD) (LDH) ENZYME: CPT | Performed by: STUDENT IN AN ORGANIZED HEALTH CARE EDUCATION/TRAINING PROGRAM

## 2025-03-02 PROCEDURE — 80048 BASIC METABOLIC PNL TOTAL CA: CPT | Performed by: STUDENT IN AN ORGANIZED HEALTH CARE EDUCATION/TRAINING PROGRAM

## 2025-03-02 PROCEDURE — 88112 CYTOPATH CELL ENHANCE TECH: CPT | Performed by: STUDENT IN AN ORGANIZED HEALTH CARE EDUCATION/TRAINING PROGRAM

## 2025-03-02 PROCEDURE — 94664 DEMO&/EVAL PT USE INHALER: CPT

## 2025-03-02 PROCEDURE — 63710000001 INSULIN LISPRO (HUMAN) PER 5 UNITS: Performed by: INTERNAL MEDICINE

## 2025-03-02 PROCEDURE — 89051 BODY FLUID CELL COUNT: CPT | Performed by: STUDENT IN AN ORGANIZED HEALTH CARE EDUCATION/TRAINING PROGRAM

## 2025-03-02 PROCEDURE — 63710000001 INSULIN GLARGINE PER 5 UNITS: Performed by: STUDENT IN AN ORGANIZED HEALTH CARE EDUCATION/TRAINING PROGRAM

## 2025-03-02 PROCEDURE — 85027 COMPLETE CBC AUTOMATED: CPT | Performed by: STUDENT IN AN ORGANIZED HEALTH CARE EDUCATION/TRAINING PROGRAM

## 2025-03-02 RX ORDER — POTASSIUM CHLORIDE 1500 MG/1
40 TABLET, EXTENDED RELEASE ORAL EVERY 4 HOURS
Status: COMPLETED | OUTPATIENT
Start: 2025-03-02 | End: 2025-03-02

## 2025-03-02 RX ORDER — LIDOCAINE HYDROCHLORIDE 10 MG/ML
10 INJECTION, SOLUTION INFILTRATION; PERINEURAL ONCE
Status: COMPLETED | OUTPATIENT
Start: 2025-03-02 | End: 2025-03-02

## 2025-03-02 RX ORDER — INSULIN LISPRO 100 [IU]/ML
4-24 INJECTION, SOLUTION INTRAVENOUS; SUBCUTANEOUS
Status: DISCONTINUED | OUTPATIENT
Start: 2025-03-02 | End: 2025-03-03 | Stop reason: HOSPADM

## 2025-03-02 RX ORDER — INSULIN LISPRO 100 [IU]/ML
20 INJECTION, SOLUTION INTRAVENOUS; SUBCUTANEOUS
Status: DISCONTINUED | OUTPATIENT
Start: 2025-03-02 | End: 2025-03-03

## 2025-03-02 RX ORDER — PREDNISONE 20 MG/1
40 TABLET ORAL
Status: DISCONTINUED | OUTPATIENT
Start: 2025-03-03 | End: 2025-03-03 | Stop reason: HOSPADM

## 2025-03-02 RX ADMIN — Medication 10 ML: at 09:17

## 2025-03-02 RX ADMIN — IPRATROPIUM BROMIDE AND ALBUTEROL SULFATE 3 ML: .5; 3 SOLUTION RESPIRATORY (INHALATION) at 15:50

## 2025-03-02 RX ADMIN — IPRATROPIUM BROMIDE AND ALBUTEROL SULFATE 3 ML: .5; 3 SOLUTION RESPIRATORY (INHALATION) at 11:27

## 2025-03-02 RX ADMIN — INSULIN LISPRO 16 UNITS: 100 INJECTION, SOLUTION INTRAVENOUS; SUBCUTANEOUS at 21:26

## 2025-03-02 RX ADMIN — Medication 10 ML: at 21:26

## 2025-03-02 RX ADMIN — RIVAROXABAN 20 MG: 20 TABLET, FILM COATED ORAL at 17:08

## 2025-03-02 RX ADMIN — POTASSIUM CHLORIDE 40 MEQ: 1500 TABLET, EXTENDED RELEASE ORAL at 12:20

## 2025-03-02 RX ADMIN — INSULIN LISPRO 20 UNITS: 100 INJECTION, SOLUTION INTRAVENOUS; SUBCUTANEOUS at 12:20

## 2025-03-02 RX ADMIN — INSULIN GLARGINE 35 UNITS: 100 INJECTION, SOLUTION SUBCUTANEOUS at 21:27

## 2025-03-02 RX ADMIN — GABAPENTIN 600 MG: 300 CAPSULE ORAL at 16:39

## 2025-03-02 RX ADMIN — POTASSIUM CHLORIDE 40 MEQ: 1500 TABLET, EXTENDED RELEASE ORAL at 09:15

## 2025-03-02 RX ADMIN — TORSEMIDE 40 MG: 20 TABLET ORAL at 09:15

## 2025-03-02 RX ADMIN — FERROUS SULFATE TAB 325 MG (65 MG ELEMENTAL FE) 162.5 MG: 325 (65 FE) TAB at 21:24

## 2025-03-02 RX ADMIN — DIPHENHYDRAMINE HYDROCHLORIDE 25 MG: 25 CAPSULE ORAL at 21:25

## 2025-03-02 RX ADMIN — FERROUS SULFATE TAB 325 MG (65 MG ELEMENTAL FE) 162.5 MG: 325 (65 FE) TAB at 09:16

## 2025-03-02 RX ADMIN — IPRATROPIUM BROMIDE AND ALBUTEROL SULFATE 3 ML: .5; 3 SOLUTION RESPIRATORY (INHALATION) at 20:19

## 2025-03-02 RX ADMIN — INSULIN LISPRO 20 UNITS: 100 INJECTION, SOLUTION INTRAVENOUS; SUBCUTANEOUS at 09:16

## 2025-03-02 RX ADMIN — ROSUVASTATIN CALCIUM 40 MG: 20 TABLET, FILM COATED ORAL at 09:16

## 2025-03-02 RX ADMIN — DIPHENHYDRAMINE HYDROCHLORIDE 25 MG: 25 CAPSULE ORAL at 14:40

## 2025-03-02 RX ADMIN — METHYLPREDNISOLONE SODIUM SUCCINATE 40 MG: 40 INJECTION, POWDER, FOR SOLUTION INTRAMUSCULAR; INTRAVENOUS at 09:15

## 2025-03-02 RX ADMIN — LIDOCAINE HYDROCHLORIDE 10 ML: 10 INJECTION, SOLUTION INFILTRATION; PERINEURAL at 08:40

## 2025-03-02 RX ADMIN — INSULIN LISPRO 12 UNITS: 100 INJECTION, SOLUTION INTRAVENOUS; SUBCUTANEOUS at 09:16

## 2025-03-02 RX ADMIN — SERTRALINE HYDROCHLORIDE 50 MG: 50 TABLET, FILM COATED ORAL at 09:15

## 2025-03-02 RX ADMIN — INSULIN LISPRO 8 UNITS: 100 INJECTION, SOLUTION INTRAVENOUS; SUBCUTANEOUS at 17:08

## 2025-03-02 RX ADMIN — GABAPENTIN 600 MG: 300 CAPSULE ORAL at 09:15

## 2025-03-02 RX ADMIN — GABAPENTIN 600 MG: 300 CAPSULE ORAL at 21:24

## 2025-03-02 RX ADMIN — INSULIN GLARGINE 20 UNITS: 100 INJECTION, SOLUTION SUBCUTANEOUS at 09:16

## 2025-03-02 RX ADMIN — DIPHENHYDRAMINE HYDROCHLORIDE 25 MG: 25 CAPSULE ORAL at 07:29

## 2025-03-02 RX ADMIN — FOLIC ACID 1 MG: 1 TABLET ORAL at 09:16

## 2025-03-02 RX ADMIN — INSULIN LISPRO 20 UNITS: 100 INJECTION, SOLUTION INTRAVENOUS; SUBCUTANEOUS at 17:08

## 2025-03-02 NOTE — PROGRESS NOTES
[facility]   Mar/chart reviewed  Follow up pleural effusion, pneumonitis from keytruda  Feeling better, less short of breath    Vital Sign Min/Max for last 24 hours  Temp  Min: 97.3 °F (36.3 °C)  Max: 97.9 °F (36.6 °C)   BP  Min: 113/61  Max: 132/69   Pulse  Min: 56  Max: 67   Resp  Min: 16  Max: 20   SpO2  Min: 90 %  Max: 99 %   Flow (L/min) (Oxygen Therapy)  Min: 5  Max: 10   No data recorded     Nad, axox3,   perrl, eomi, no icterus,  mmm, no jvd, trachea midline, neck supple,  chest decreased ae bilaterally, no crackles, no wheezes,   rrr,   soft, nt, nd +bs,  no c/c/ 1+ edema  Skin warm, dry +rash, improving    Labs: 3/2; reviewed:  Glucose 443  Bun 25  Cr 0.8  Bicarb 32  Wbc 12  Hgb 9.9  Plts 289    Pleural fluid studies pending    A/P:  Acute on chronic hypoxemic respiratory failure -- 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 Villasenor  Pneumonitis -- iv steroids  Rash -- bendaryl, iv steroids  Lung cancer  Afib  DMI -- with expected hyperglycemia due to steroids  Large right sided jpleural effusion - recurrent, for thora tomorrow    Ok for po prednisone would do 40mg for one week then drop to 20mg a day, follow up with DR. Coles in pulmonary clinic at that time.

## 2025-03-02 NOTE — PLAN OF CARE
Goal Outcome Evaluation:  Plan of Care Reviewed With: patient           Outcome Evaluation: Pt. is AOX4, vital signs, lab and medication reviewed, no complain of pain ,Blood glucose (BG) intial was 460. A sliding scale of 14 units and lantus 40 units were administered. [facility] was notified, and new order for Lispro once 6 units given.BG rechecked after 45 minutes and dropped to 444. [facility] was notified again, recommended to recheck in the morning.O2 sat to 80's, so O2 increase to 10 L via CPAP. Respiratory was notified of the change. Run 12 beat of V_tach , pt. was asymptomatic, [facility] notified no new order. Plan of care ongoing.

## 2025-03-02 NOTE — PLAN OF CARE
Problem: Adult Inpatient Plan of Care  Goal: Plan of Care Review  Outcome: Progressing  Flowsheets (Taken 3/2/2025 1531)  Plan of Care Reviewed With: patient   Goal Outcome Evaluation:  Plan of Care Reviewed With: patient                      Pt had a thoracentesis to the right lung today. Pt is now on 4L nasal cannula. Pt has full body rash, benadryl given to help with the itching. Pt has no complaints of pain at this time. Blood sugars are still elevated, MD changing up his insulin orders. Steroids changed from IV to oral. Anticipate discharge home in the next day or two. VSS.

## 2025-03-02 NOTE — PROGRESS NOTES
Name: Branden Diop ADMIT: 2025   : 1950  PCP: Tino Lopez MD    MRN: [id number] LOS: 3 days   AGE/SEX: 74 y.o. male  ROOM: [facility]     Subjective   Subjective   Patient back from thoracentesis, 1.2 L off, patient feeling much better.  Sugars were markedly elevated even with aggressive insulin changes yesterday.  And decreasing of his IV Solu-Medrol.  Skin rash is actually remarkably improved since admission         Objective   Objective   Vital Signs  Temp:  [97.3 °F (36.3 °C)-97.9 °F (36.6 °C)] 97.3 °F (36.3 °C)  Heart Rate:  [56-67] 59  Resp:  [16-20] 18  BP: (113-132)/(50-69) 113/61  SpO2:  [90 %-99 %] 99 %  on  Flow (L/min) (Oxygen Therapy):  [5-10] 5;   Device (Oxygen Therapy): high-flow nasal cannula  Body mass index is 36.26 kg/m².  Physical Exam  Vitals and nursing note reviewed.   Constitutional:       General: He is not in acute distress.     Appearance: He is obese. He is ill-appearing.   Cardiovascular:      Rate and Rhythm: Normal rate and regular rhythm.   Pulmonary:      Effort: Pulmonary effort is normal. No respiratory distress.      Comments: Clear to auscultation  Abdominal:      General: Abdomen is flat. There is no distension.      Tenderness: There is no abdominal tenderness.   Musculoskeletal:         General: No swelling or deformity.   Skin:     General: Skin is warm and dry.      Comments: Keytruda rash over all extremities, sparing face   Neurological:      General: No focal deficit present.      Mental Status: He is alert. Mental status is at baseline.    Results Review     I reviewed the patient's new clinical results.  Results from last 7 days   Lab Units 25  0426 25  0943 25  0913 25  1004   WBC 10*3/mm3 12.12* 12.85* 10.58 7.46   HEMOGLOBIN g/dL 9.9* 10.1* 11.1* 10.8*   PLATELETS 10*3/mm3 289 304 357 303     Results from last 7 days   Lab Units 25  0427 25  0943 25  0913 25  1004   SODIUM mmol/L 140 138 139 137    POTASSIUM mmol/L 3.5 4.2 4.0 3.8   CHLORIDE mmol/L 95* 94* 96* 96*   CO2 mmol/L 32.7* 31.7* 29.0 33.6*   BUN mg/dL 25* 22 13 11   CREATININE mg/dL 0.80 0.84 0.84 0.79   GLUCOSE mg/dL 443* 592* 384* 262*   Estimated Creatinine Clearance: 102.7 mL/min (by C-G formula based on SCr of 0.8 mg/dL).  Results from last 7 days   Lab Units 02/27/25  1004   ALBUMIN g/dL 3.4*   BILIRUBIN mg/dL 0.6   ALK PHOS U/L 135*   AST (SGOT) U/L 25   ALT (SGPT) U/L 16     Results from last 7 days   Lab Units 03/02/25  0427 03/01/25  0943 02/28/25  0913 02/27/25  1131 02/27/25  1004   CALCIUM mg/dL 8.9 8.9 9.6  --  9.5   ALBUMIN g/dL  --   --   --   --  3.4*   MAGNESIUM mg/dL  --   --   --  1.7  --      Results from last 7 days   Lab Units 03/01/25  0943 02/27/25  1131   PROCALCITONIN ng/mL 0.04 0.06     COVID19   Date Value Ref Range Status   02/27/2025 Not Detected Not Detected - Ref. Range Final   01/22/2025 Not Detected Not Detected - Ref. Range Final     Hemoglobin A1C   Date/Time Value Ref Range Status   02/28/2025 0913 9.60 (H) 4.80 - 5.60 % Final     Glucose   Date/Time Value Ref Range Status   03/02/2025 0606 433 (C) 70 - 130 mg/dL Final   03/01/2025 2333 444 (C) 70 - 130 mg/dL Final   03/01/2025 2025 460 (C) 70 - 130 mg/dL Final   03/01/2025 1634 414 (C) 70 - 130 mg/dL Final   03/01/2025 1223 465 (C) 70 - 130 mg/dL Final   03/01/2025 0844 513 (C) 70 - 130 mg/dL Final   02/28/2025 2011 445 (C) 70 - 130 mg/dL Final       US Thoracentesis  ULTRASOUND-GUIDED RIGHT THORACENTESIS     HISTORY: Right pleural effusion. Shortness of breath.     Following sterile prep and local anesthetic, a 5 Bulgarian thoracentesis  catheter was inserted into the right pleural effusion by Dr. Javier.  1.2 L of straw-colored pleural fluid was then removed with suction  technique. The patient tolerated the procedure well and there were no  immediate complications.     This report was finalized on 3/2/2025 9:21 AM by Dr. Sushil Javier M.D  on Workstation:  [id number]       I reviewed the patient's daily medications.  Scheduled Medications  ferrous sulfate, 162.5 mg, Oral, BID  folic acid, 1 mg, Oral, Daily  gabapentin, 600 mg, Oral, TID  insulin glargine, 20 Units, Subcutaneous, Daily  insulin glargine, 50 Units, Subcutaneous, Nightly  insulin lispro, 20 Units, Subcutaneous, TID With Meals  insulin lispro, 4-24 Units, Subcutaneous, 4x Daily AC & at Bedtime  ipratropium-albuterol, 3 mL, Nebulization, 4x Daily - RT  potassium chloride ER, 40 mEq, Oral, Q4H  [START ON 3/3/2025] predniSONE, 40 mg, Oral, Daily With Breakfast  rivaroxaban, 20 mg, Oral, Daily With Dinner  rosuvastatin, 40 mg, Oral, Daily  sertraline, 50 mg, Oral, Daily  sodium chloride, 10 mL, Intravenous, Q12H  torsemide, 40 mg, Oral, Daily    Infusions   Diet  Diet: Cardiac, Diabetic; Healthy Heart (2-3 Na+); Consistent Carbohydrate; Fluid Consistency: Thin (IDDSI 0)         I have personally reviewed:  [x]  Laboratory   [x]  Microbiology   [x]  Radiology   [x]  EKG/Telemetry   []  Cardiology/Vascular   []  Pathology   [x]  Records     Assessment/Plan     Active Hospital Problems    Diagnosis  POA    Dyspnea on exertion [R06.09]  Yes    Hypoxemia [R09.02]  Yes    Pneumonitis [J98.4]  Yes    Adverse effect of antineoplastic drug [T45.1X5A]  Yes    Drug-induced skin rash [L27.0]  Yes    Pleural effusion on right [J90]  Yes    Peripheral neuropathy due to chemotherapy [G62.0, T45.1X5A]  Yes    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       74 y.o. male admitted with <principal problem not  specified>.    Acute on chronic chronic respiratory failure from COPD, 4 L at baseline  Dyspnea on exertion, with oxygen saturations into the 70s  COVID and flu diagnosis on 2/19 by PCP  Right pleural effusion, has had thoracentesis in the past  Keytruda pneumonitis  -RVP negative, patient finished course of Paxlovid as well as Tamiflu  -Transition diuretics to oral torsemide 40 mg daily  -Xarelto resumed  -Discussed with pulmonology, changing IV methylprednisone to oral prednisone.  -Thoracentesis with 1 L off, studies pending, cytology pending     Squamous cell carcinoma  -Follows with [facility], [place], operated by [facility] hematology, finished his third cycle of Keytruda on 2/4     Immune mediated skin rash, markably improved   -Has been chronically on prednisone, but was being weaned off per oncology note     Diabetes type 2, A1c 9.6%  -On Lantus 28 units at home and 5 of aspart with meals  -Increase 20 units in the morning and increase Lantus to 40 units at night, increase lispro 15 units with meals sliding scale insulin to medium high, will continue to titrate as needed     Persistent atrial fibrillation  -Hold Xarelto as above.  Not on rate or rhythm control as an outpatient     Hyperlipidemia-continue home statin     Neuropathy-continue home gabapentin    Left lung nodules-discussed with pulmonology on Friday and we will low suspicion for acute infectious etiology at this time with negative procalcitonin x 2, mild white count since starting steroids, no fevers no clinical signs of pneumonia..  Will need follow-up scan    Mild leukocytosis-suspect from high-dose steroids.  No fevers or chills.    SCDs for DVT prophylaxis.  Full code.  Discussed with patient and nursing staff.  Anticipate discharge home with home health in 1-2 days.  Once glucoses are controlled on steroids    Expected Discharge Date: 3/3/2025; Expected Discharge Time:       Zia Braun MD  [facility]  03/02/25  10:36 EST

## 2025-03-03 ENCOUNTER — READMISSION MANAGEMENT (OUTPATIENT)
Dept: CALL CENTER | Facility: HOSPITAL | Age: 75
End: 2025-03-03
Payer: MEDICARE

## 2025-03-03 VITALS
DIASTOLIC BLOOD PRESSURE: 69 MMHG | SYSTOLIC BLOOD PRESSURE: 125 MMHG | BODY MASS INDEX: 36.4 KG/M2 | HEIGHT: 71 IN | HEART RATE: 73 BPM | WEIGHT: 260 LBS | OXYGEN SATURATION: 98 % | RESPIRATION RATE: 20 BRPM | TEMPERATURE: 98.3 F

## 2025-03-03 LAB
ANION GAP SERPL CALCULATED.3IONS-SCNC: 13.4 MMOL/L (ref 5–15)
BUN SERPL-MCNC: 26 MG/DL (ref 8–23)
BUN/CREAT SERPL: 30.6 (ref 7–25)
CALCIUM SPEC-SCNC: 8.9 MG/DL (ref 8.6–10.5)
CHLORIDE SERPL-SCNC: 97 MMOL/L (ref 98–107)
CO2 SERPL-SCNC: 32.6 MMOL/L (ref 22–29)
CREAT SERPL-MCNC: 0.85 MG/DL (ref 0.76–1.27)
DEPRECATED RDW RBC AUTO: 54.5 FL (ref 37–54)
EGFRCR SERPLBLD CKD-EPI 2021: 90.1 ML/MIN/1.73
ERYTHROCYTE [DISTWIDTH] IN BLOOD BY AUTOMATED COUNT: 16.2 % (ref 12.3–15.4)
GLUCOSE BLDC GLUCOMTR-MCNC: 202 MG/DL (ref 70–130)
GLUCOSE BLDC GLUCOMTR-MCNC: 221 MG/DL (ref 70–130)
GLUCOSE SERPL-MCNC: 227 MG/DL (ref 65–99)
HCT VFR BLD AUTO: 35.8 % (ref 37.5–51)
HGB BLD-MCNC: 11.1 G/DL (ref 13–17.7)
MCH RBC QN AUTO: 28.5 PG (ref 26.6–33)
MCHC RBC AUTO-ENTMCNC: 31 G/DL (ref 31.5–35.7)
MCV RBC AUTO: 92 FL (ref 79–97)
PLATELET # BLD AUTO: 287 10*3/MM3 (ref 140–450)
PMV BLD AUTO: 9.1 FL (ref 6–12)
POTASSIUM SERPL-SCNC: 3.7 MMOL/L (ref 3.5–5.2)
RBC # BLD AUTO: 3.89 10*6/MM3 (ref 4.14–5.8)
SODIUM SERPL-SCNC: 143 MMOL/L (ref 136–145)
WBC NRBC COR # BLD AUTO: 9.42 10*3/MM3 (ref 3.4–10.8)

## 2025-03-03 PROCEDURE — 63710000001 INSULIN LISPRO (HUMAN) PER 5 UNITS: Performed by: INTERNAL MEDICINE

## 2025-03-03 PROCEDURE — 94799 UNLISTED PULMONARY SVC/PX: CPT

## 2025-03-03 PROCEDURE — 85027 COMPLETE CBC AUTOMATED: CPT | Performed by: STUDENT IN AN ORGANIZED HEALTH CARE EDUCATION/TRAINING PROGRAM

## 2025-03-03 PROCEDURE — 63710000001 DIPHENHYDRAMINE PER 50 MG: Performed by: STUDENT IN AN ORGANIZED HEALTH CARE EDUCATION/TRAINING PROGRAM

## 2025-03-03 PROCEDURE — 63710000001 INSULIN LISPRO (HUMAN) PER 5 UNITS: Performed by: STUDENT IN AN ORGANIZED HEALTH CARE EDUCATION/TRAINING PROGRAM

## 2025-03-03 PROCEDURE — 80048 BASIC METABOLIC PNL TOTAL CA: CPT | Performed by: STUDENT IN AN ORGANIZED HEALTH CARE EDUCATION/TRAINING PROGRAM

## 2025-03-03 PROCEDURE — 94761 N-INVAS EAR/PLS OXIMETRY MLT: CPT

## 2025-03-03 PROCEDURE — 36415 COLL VENOUS BLD VENIPUNCTURE: CPT | Performed by: STUDENT IN AN ORGANIZED HEALTH CARE EDUCATION/TRAINING PROGRAM

## 2025-03-03 PROCEDURE — 82948 REAGENT STRIP/BLOOD GLUCOSE: CPT

## 2025-03-03 PROCEDURE — 63710000001 INSULIN GLARGINE PER 5 UNITS: Performed by: STUDENT IN AN ORGANIZED HEALTH CARE EDUCATION/TRAINING PROGRAM

## 2025-03-03 PROCEDURE — 94664 DEMO&/EVAL PT USE INHALER: CPT

## 2025-03-03 PROCEDURE — 63710000001 PREDNISONE PER 1 MG: Performed by: STUDENT IN AN ORGANIZED HEALTH CARE EDUCATION/TRAINING PROGRAM

## 2025-03-03 RX ORDER — ECHINACEA PURPUREA EXTRACT 125 MG
1 TABLET ORAL AS NEEDED
Status: DISCONTINUED | OUTPATIENT
Start: 2025-03-03 | End: 2025-03-03 | Stop reason: HOSPADM

## 2025-03-03 RX ORDER — INSULIN ASPART 100 [IU]/ML
8 INJECTION, SOLUTION INTRAVENOUS; SUBCUTANEOUS
Start: 2025-03-03 | End: 2025-04-02

## 2025-03-03 RX ORDER — PREDNISONE 20 MG/1
TABLET ORAL
Qty: 35 TABLET | Refills: 0 | Status: SHIPPED | OUTPATIENT
Start: 2025-03-04 | End: 2025-04-01

## 2025-03-03 RX ORDER — DIPHENHYDRAMINE HCL 25 MG
25 CAPSULE ORAL EVERY 6 HOURS PRN
Qty: 60 CAPSULE | Refills: 0 | Status: SHIPPED | OUTPATIENT
Start: 2025-03-03 | End: 2025-04-02

## 2025-03-03 RX ORDER — TORSEMIDE 20 MG/1
40 TABLET ORAL DAILY
Qty: 60 TABLET | Refills: 0 | Status: SHIPPED | OUTPATIENT
Start: 2025-03-04 | End: 2025-04-03

## 2025-03-03 RX ORDER — INSULIN LISPRO 100 [IU]/ML
15 INJECTION, SOLUTION INTRAVENOUS; SUBCUTANEOUS
Status: DISCONTINUED | OUTPATIENT
Start: 2025-03-03 | End: 2025-03-03 | Stop reason: HOSPADM

## 2025-03-03 RX ORDER — INSULIN GLARGINE 300 U/ML
30 INJECTION, SOLUTION SUBCUTANEOUS DAILY
Start: 2025-03-03

## 2025-03-03 RX ADMIN — FOLIC ACID 1 MG: 1 TABLET ORAL at 08:10

## 2025-03-03 RX ADMIN — Medication 10 ML: at 08:11

## 2025-03-03 RX ADMIN — DIPHENHYDRAMINE HYDROCHLORIDE 25 MG: 25 CAPSULE ORAL at 04:30

## 2025-03-03 RX ADMIN — IPRATROPIUM BROMIDE AND ALBUTEROL SULFATE 3 ML: .5; 3 SOLUTION RESPIRATORY (INHALATION) at 11:16

## 2025-03-03 RX ADMIN — INSULIN LISPRO 15 UNITS: 100 INJECTION, SOLUTION INTRAVENOUS; SUBCUTANEOUS at 08:11

## 2025-03-03 RX ADMIN — SERTRALINE HYDROCHLORIDE 50 MG: 50 TABLET, FILM COATED ORAL at 08:10

## 2025-03-03 RX ADMIN — GABAPENTIN 600 MG: 300 CAPSULE ORAL at 08:10

## 2025-03-03 RX ADMIN — INSULIN LISPRO 15 UNITS: 100 INJECTION, SOLUTION INTRAVENOUS; SUBCUTANEOUS at 12:22

## 2025-03-03 RX ADMIN — INSULIN LISPRO 8 UNITS: 100 INJECTION, SOLUTION INTRAVENOUS; SUBCUTANEOUS at 08:10

## 2025-03-03 RX ADMIN — TORSEMIDE 40 MG: 20 TABLET ORAL at 08:10

## 2025-03-03 RX ADMIN — INSULIN GLARGINE 10 UNITS: 100 INJECTION, SOLUTION SUBCUTANEOUS at 08:10

## 2025-03-03 RX ADMIN — FERROUS SULFATE TAB 325 MG (65 MG ELEMENTAL FE) 162.5 MG: 325 (65 FE) TAB at 08:10

## 2025-03-03 RX ADMIN — ROSUVASTATIN CALCIUM 40 MG: 20 TABLET, FILM COATED ORAL at 08:10

## 2025-03-03 RX ADMIN — PREDNISONE 40 MG: 20 TABLET ORAL at 08:10

## 2025-03-03 RX ADMIN — INSULIN LISPRO 8 UNITS: 100 INJECTION, SOLUTION INTRAVENOUS; SUBCUTANEOUS at 12:23

## 2025-03-03 NOTE — DISCHARGE SUMMARY
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        Hospital Course     Mr. Diop is a 74 y.o. male with a history of diabetes type 2, hyperlipidemia, neuropathy, lung squamous cell carcinoma on Keytruda, chronic respiratory failure from COPD on 4 L at baseline, immune mediated skin rash from Keytruda, persistent atrial fibrillation presenting with shortness of breath.  Recently diagnosed with COVID and influenza A on  finish Tamiflu and Paxlovid prior to admission found to have Keytruda pneumonitis and a large right pleural effusion.  Pulmonary consulted, patient had thoracentesis 1.2 L off.  Patient to follow-up with pulmonology as an outpatient.  CT chest with some left pulm nodules that will need follow up imaging as outpatient.  Patient's breathing and his skin rash improved with IV Solu-Medrol.  Patient had some pretty significant hyperglycemia with IV Solu-Medrol requiring more than triple the amount of insulin he  was on at home.  Now that he is going down to prednisone daily he is needing a lot less insulin.  Discussed with him to use Lantus 30 units at night and 8 units of lispro with meals.  Decrease insulin to his home doses once he gets to prednisone 20 mg and to call his PCP if his glucoses remain elevated.    Had a little bit ankle swelling, wife says he missed a couple doses of his torsemide.  Have changed his torsemide from 20 mg twice a day to 40 mg once daily.    At the time of discharge patient was told to take all medications as prescribed, keep all follow-up appointments, and call their doctor or return to the hospital with any worsening or concerning symptoms.    Day of Discharge     Subjective:  Patient sitting up in chair.  Eating lunch.  Wife at bedside.  Discussed discharging later today.  Glucoses improved now that IV steroids have been changed to oral prednisone.          Physical Exam:  Temp:  [98.1 °F (36.7 °C)-98.3 °F (36.8 °C)] 98.3 °F (36.8 °C)  Heart Rate:  [54-82] 73  Resp:  [16-20] 20  BP: (112-125)/(55-69) 125/69  Body mass index is 36.26 kg/m².  Physical Exam  Vitals and nursing note reviewed.   Constitutional:       General: He is not in acute distress.  Cardiovascular:      Rate and Rhythm: Normal rate and regular rhythm.   Pulmonary:      Effort: Pulmonary effort is normal. No respiratory distress.   Abdominal:      General: Abdomen is flat. There is no distension.      Tenderness: There is no abdominal tenderness.   Musculoskeletal:         General: No swelling or deformity.   Skin:     General: Skin is warm and dry.   Neurological:      General: No focal deficit present.      Mental Status: He is alert. Mental status is at baseline.         Consultants     Consult Orders (all) (From admission, onward)       Start     Ordered    02/27/25 1310  Inpatient Pulmonology Consult  Once        Specialty:  Pulmonary Disease  Provider:  (Not yet assigned)    02/27/25 1309    02/27/25 1150  [facility]  (on-call MD unless specified) Details  Once        Specialty:  Hospitalist  Provider:  Zia Braun MD    02/27/25 1149                  Procedures     Imaging Results (All)       Procedure Component Value Units Date/Time    US Thoracentesis [[id number]] Collected: 03/02/25 0920    Specimen: Body Fluid Updated: 03/02/25 0924    Narrative:      ULTRASOUND-GUIDED RIGHT THORACENTESIS     HISTORY: Right pleural effusion. Shortness of breath.     Following sterile prep and local anesthetic, a 5 French thoracentesis  catheter was inserted into the right pleural effusion by Dr. Javier.  1.2 L of straw-colored pleural fluid was then removed with suction  technique. The patient tolerated the procedure well and there were no  immediate complications.     This report was finalized on 3/2/2025 9:21 AM by Dr. Sushil Javier M.D  on Workstation: [id number]       CT Chest Without Contrast Diagnostic [[id number]] Collected: 02/28/25 1011     Updated: 02/28/25 1029    Narrative:      CT CHEST WO CONTRAST DIAGNOSTIC-     INDICATION: Shortness of breath     COMPARISON: CTA chest January 22, 2025     TECHNIQUE:  Routine CT chest without IV contrast. Coronal and sagittal reformats.  Radiation dose reduction techniques were utilized, including automated  exposure control and exposure modulation based on body size.     FINDINGS:      Chest wall: No lymphadenopathy. Right-sided Port-A-Cath with the  catheter tip in the distal SVC.     Mediastinum: Coronary artery atherosclerotic calcifications. Heart is at  upper limits in size. No pericardial effusion. Precarinal lymph node,  series 2, axial mage 38, measures 1.1 cm, unchanged. Right  paratracheal/4R lymph node, series 2, axial mage 37, measures 9 mm in  short axis, unchanged. No new or enlarging mediastinal lymph nodes.  Right hilar lymph node, series 2, axial mage 38, measures 1.5 cm,  unchanged. No new or enlarging hilar lymph nodes identified.     Lung/pleura: Moderate right pleural  effusion, extends from the apex to  the base and extends into the major fissure, may be partially loculated  in the upper chest airways wall thickening. Right middle lobectomy. Mild  to moderate centrilobular emphysema seen in the upper lungs. Posterior  dependent and basilar opacities in the right lung, suspect partial  atelectasis from the adjacent effusion. Solid pulmonary nodule in the  right lower lobe on series 3, axial mage 53, measures 5 mm, unchanged.  Some generalized groundglass lung opacity. Suspect scarring in the upper  lobes. Subpleural nodular opacity in the anterior left upper lobe,  series 3, axial mage 34, measuring 8 mm, new. Peripheral nodular  opacities seen in the posterior left lower lobe, series 3, axial mage  39, new. Subpleural nodular opacity in the left lower lobe base, series  3, axial mage 63, new.     Upper abdomen: Fluid attenuating cyst seen in the superior pole left  kidney, incompletely imaged.     Osseous structures: Bilateral glenohumeral osteoarthritis. Old T1 and T2  spinous process fractures. Spondylosis/degenerative disc disease with  grade 1 anterolisthesis of C7 on T1 and T1 on T2.       Impression:         1. Moderate right pleural effusion, slightly larger in the interval and  may be partially loculated.  2. Airways disease and mild to moderate emphysema with suspected  scarring in the upper lobes.  3. Increased partial atelectasis in the right lower lobe along the  pleural effusion.  4. New nodular opacities in the left lung, greatest in the left lower  lobe, most likely multifocal pneumonia. Some generalized groundglass  opacities present which may be infectious or inflammatory. Can be  followed to resolution.  5. Right middle lobectomy.  6. Stable mediastinal and right hilar lymphadenopathy     This report was finalized on 2/28/2025 10:26 AM by Dr. Mart Fuentes M.D on Workstation: [id number]       XR Chest 1 View [[id number]] Collected: 02/27/25 3726      Updated: 02/27/25 1100    Narrative:      XR CHEST 1 VW-     HISTORY: Male who is 74 years-old, short of breath     TECHNIQUE: Frontal view of the chest     COMPARISON: 1/22/2025     FINDINGS: Right chest port appears stable. Heart size is borderline.  Pulmonary vasculature is congested. Mild atelectasis or infiltrate in  the right mid and lower lung, superimposed on chronic changes of the  lungs, follow-up suggested. Mild to moderate right pleural effusion is  apparent. No pneumothorax. No acute osseous process.       Impression:      As described.     This report was finalized on 2/27/2025 10:57 AM by Dr. Arsh Jorgensen M.D on Workstation: [email or web address]               Pertinent Labs     Results from last 7 days   Lab Units 03/03/25  0443 03/02/25  0426 03/01/25  0943 02/28/25  0913   WBC 10*3/mm3 9.42 12.12* 12.85* 10.58   HEMOGLOBIN g/dL 11.1* 9.9* 10.1* 11.1*   PLATELETS 10*3/mm3 287 289 304 357     Results from last 7 days   Lab Units 03/03/25  0443 03/02/25  1643 03/02/25  0427 03/01/25  0943 02/28/25  0913   SODIUM mmol/L 143  --  140 138 139   POTASSIUM mmol/L 3.7 3.7 3.5 4.2 4.0   CHLORIDE mmol/L 97*  --  95* 94* 96*   CO2 mmol/L 32.6*  --  32.7* 31.7* 29.0   BUN mg/dL 26*  --  25* 22 13   CREATININE mg/dL 0.85  --  0.80 0.84 0.84   GLUCOSE mg/dL 227*  --  443* 592* 384*   Estimated Creatinine Clearance: 96.6 mL/min (by C-G formula based on SCr of 0.85 mg/dL).  Results from last 7 days   Lab Units 02/27/25  1004   ALBUMIN g/dL 3.4*   BILIRUBIN mg/dL 0.6   ALK PHOS U/L 135*   AST (SGOT) U/L 25   ALT (SGPT) U/L 16     Results from last 7 days   Lab Units 03/03/25  0443 03/02/25  0427 03/01/25  0943 02/28/25  0913 02/27/25  1131 02/27/25  1004   CALCIUM mg/dL 8.9 8.9 8.9 9.6  --  9.5   ALBUMIN g/dL  --   --   --   --   --  3.4*   MAGNESIUM mg/dL  --   --   --   --  1.7  --        Results from last 7 days   Lab Units 02/27/25  1131 02/27/25  1004   HSTROP T ng/L 22* 22*   PROBNP pg/mL  --  504.0           Invalid  "input(s): \"LDLCALC\"        Test Results Pending at Discharge     Pending Labs       Order Current Status    Non-gynecologic Cytology In process            Discharge Details        Discharge Medications        New Medications        Instructions Start Date   Banophen 25 MG capsule  Generic drug: diphenhydrAMINE   25 mg, Oral, Every 6 Hours PRN      predniSONE 20 MG tablet  Commonly known as: DELTASONE   Take 2 tablets by mouth Daily With Breakfast for 7 days, THEN 1 tablet Daily With Breakfast for 21 days.   Start Date: March 4, 2025            Changes to Medications        Instructions Start Date   gabapentin 300 MG capsule  Commonly known as: NEURONTIN  What changed: See the new instructions.   TAKE TWO CAPSULES BY MOUTH EVERY 8 HOURS      NovoLOG FlexPen 100 UNIT/ML solution pen-injector sc pen  Generic drug: insulin aspart  What changed: how much to take   8 Units, Subcutaneous, 3 Times Daily With Meals      torsemide 20 MG tablet  Commonly known as: DEMADEX  What changed:   how much to take  when to take this   40 mg, Oral, Daily   Start Date: March 4, 2025     Toujeo SoloStar 300 UNIT/ML solution pen-injector injection  Generic drug: Insulin Glargine (1 Unit Dial)  What changed: how much to take   30 Units, Subcutaneous, Daily      Xarelto 20 MG tablet  Generic drug: rivaroxaban  What changed:   how much to take  when to take this   TAKE ONE TABLET BY MOUTH DAILY             Continue These Medications        Instructions Start Date   albuterol sulfate  (90 Base) MCG/ACT inhaler  Commonly known as: PROVENTIL HFA;VENTOLIN HFA;PROAIR HFA   2 puffs, Every 4 Hours PRN      famotidine 20 MG tablet  Commonly known as: PEPCID   20 mg, 2 Times Daily PRN      ferrous sulfate 325 (65 FE) MG tablet   0.5 tablets, 2 Times Daily      fexofenadine 180 MG tablet  Commonly known as: ALLEGRA   180 mg, Daily      folic acid 1 MG tablet  Commonly known as: FOLVITE   1 mg, Oral, Daily      hydrOXYzine pamoate 50 MG " capsule  Commonly known as: VISTARIL   50 mg, 3 Times Daily PRN      magnesium oxide 400 tablet tablet  Commonly known as: MAG-OX   400 mg, 2 Times Daily      Mounjaro 2.5 MG/0.5ML solution auto-injector  Generic drug: Tirzepatide   2.5 mg, Weekly      MULTIVITAMINS PO   1 tablet, Daily      potassium chloride 10 MEQ CR capsule  Commonly known as: MICRO-K   10 mEq, Oral, 2 Times Daily      rosuvastatin 40 MG tablet  Commonly known as: CRESTOR   TAKE ONE TABLET BY MOUTH DAILY      sertraline 50 MG tablet  Commonly known as: ZOLOFT   TAKE ONE TABLET BY MOUTH DAILY      vitamin B-12 1000 MCG tablet  Commonly known as: CYANOCOBALAMIN   1,000 mcg, Oral, Daily               No Known Allergies      Discharge Disposition:  Home or Self Care    Discharge Diet:  Diet Order   Procedures    Diet: Cardiac, Diabetic; Healthy Heart (2-3 Na+); Consistent Carbohydrate; Fluid Consistency: Thin (IDDSI 0)       Discharge Activity:   As tolerated    CODE STATUS:    Code Status and Medical Interventions: CPR (Attempt to Resuscitate); Full Support   Ordered at: 02/27/25 1313     Code Status (Patient has no pulse and is not breathing):    CPR (Attempt to Resuscitate)     Medical Interventions (Patient has pulse or is breathing):    Full Support       Future Appointments   Date Time Provider Department Center   5/15/2025  9:00 AM David Figueroa MD [facility]      Follow-up Information       Tino Lopez MD .    Specialty: Internal Medicine  Contact information:  [address], [address]  [facility] [address] [phone]                             Time Spent on Discharge:  Greater than 30 minutes      Zia Braun MD  [facility]  03/03/25  13:50 EST

## 2025-03-03 NOTE — PROGRESS NOTES
[facility]   Mar/chart reviewed  Follow up pleural effusion, pneumonitis from keytruda  Feeling better, less short of breath    Vital Sign Min/Max for last 24 hours  Temp  Min: 97.3 °F (36.3 °C)  Max: 98.3 °F (36.8 °C)   BP  Min: 112/55  Max: 125/69   Pulse  Min: 54  Max: 82   Resp  Min: 16  Max: 19   SpO2  Min: 92 %  Max: 99 %   Flow (L/min) (Oxygen Therapy)  Min: 4  Max: 6   No data recorded     Nad, axox3,   perrl, eomi, no icterus,  mmm, no jvd, trachea midline, neck supple,  chest decreased ae bilaterally, no crackles, no wheezes,   rrr,   soft, nt, nd +bs,  no c/c/ 1+ edema  Skin warm, dry +rash, improving    Labs: 3/3: reviewed:  Wbc 9.4  Hgb 11  Plts 287  Glucose 227  Bun 26  Cr 0.85  Bicarb 32    A/P:  Acute on chronic hypoxemic respiratory failure -- 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 Villasenor  Pneumonitis --  steroids  Rash -- bendaryl, iv steroids  Lung cancer  Afib  DMI -- with expected hyperglycemia due to steroids  Large right sided jpleural effusion - recurrent, for thora tomorrow     Ok for po prednisone would do 40mg for one week then drop to 20mg a day, follow up with me in pulmonary clinic at that time.

## 2025-03-03 NOTE — PROGRESS NOTES
Case Management Discharge Note      Final Note: DC'd home         Selected Continued Care - Discharged on 3/3/2025 Admission date: 2/27/2025 - Discharge disposition: Home or Self Care                  Selected Continued Care - Episodes Includes continued care and service providers with selected services from the active episodes listed below      [facility] Episode start date: 2/11/2022   There are no active outsourced providers for this episode.                 Transportation Services  Private: Car    Final Discharge Disposition Code: 01 - home or self-care

## 2025-03-03 NOTE — PLAN OF CARE
Problem: Adult Inpatient Plan of Care  Goal: Plan of Care Review  Outcome: Met  Flowsheets (Taken 3/3/2025 1227)  Progress: improving  Plan of Care Reviewed With: patient  Goal: Patient-Specific Goal (Individualized)  Outcome: Met  Goal: Absence of Hospital-Acquired Illness or Injury  Outcome: Met  Intervention: Identify and Manage Fall Risk  Recent Flowsheet Documentation  Taken 3/3/2025 1000 by Libby Mohan RN  Safety Promotion/Fall Prevention: safety round/check completed  Taken 3/3/2025 0805 by Libby Mohan RN  Safety Promotion/Fall Prevention: safety round/check completed  Intervention: Prevent Infection  Recent Flowsheet Documentation  Taken 3/3/2025 1000 by Libby Mohan RN  Infection Prevention: hand hygiene promoted  Taken 3/3/2025 0805 by Libby Mohan RN  Infection Prevention: hand hygiene promoted  Goal: Optimal Comfort and Wellbeing  Outcome: Met  Intervention: Provide Person-Centered Care  Recent Flowsheet Documentation  Taken 3/3/2025 0805 by Libby Mohan RN  Trust Relationship/Rapport:   care explained   questions answered   reassurance provided  Goal: Readiness for Transition of Care  Outcome: Met   Goal Outcome Evaluation:  Plan of Care Reviewed With: patient        Progress: improving                Pt is on home O2, pt is stable to discharge home with family. Pt has follow up appointment with PCP already scheduled.

## 2025-03-03 NOTE — PLAN OF CARE
Goal Outcome Evaluation:  Plan of Care Reviewed With: patient           Outcome Evaluation: Pt. is AOX4, vital sign, lab and medication reviewed.O2 drop to 80's increase to 6 L via CPAP.Respiratory was notified of the change. PRN Benadryl given for itching. medication administered per order. Plan of care ongoing.

## 2025-03-04 ENCOUNTER — TELEPHONE (OUTPATIENT)
Dept: ONCOLOGY | Facility: CLINIC | Age: 75
End: 2025-03-04

## 2025-03-04 NOTE — OUTREACH NOTE
Prep Survey      Flowsheet Row Responses   [facility] [employer] facility patient discharged from? [facility]   Is LACE score < 7 ? No   Eligibility Readm Mgmt   Discharge diagnosis Dyspnea on exertion   Does the patient have one of the following disease processes/diagnoses(primary or secondary)? Other   Does the patient have Home health ordered? No   Is there a DME ordered? No   Prep survey completed? Yes            HEATH QUINTANA - Registered Nurse

## 2025-03-04 NOTE — TELEPHONE ENCOUNTER
----- Message from Jayashree TINEO sent at 3/3/2025  2:59 PM EST -----  Regarding: FW: [phone]    ----- Message -----  From: Bre Vasques  Sent: 3/3/2025   2:55 PM EST  To: [facility]  [place]  Subject: [phone]                                     Pt is now out of the hospital and would like to reschedule his appt. Thank you, Brody

## 2025-03-05 ENCOUNTER — PATIENT OUTREACH (OUTPATIENT)
Dept: RADIATION ONCOLOGY | Facility: HOSPITAL | Age: 75
End: 2025-03-05
Payer: MEDICARE

## 2025-03-05 NOTE — PROGRESS NOTES
Reviewed chart. Patient with poorly differentiated squamous cell lung cancer with intralobular metastatic disease, stage IIIa ([id number]).  Status post right middle lobectomy on 6/12/2024. Patient was started on adjuvant chemotherapy carboplatin AUC 5, and Taxol 200 mg/m² on 7/31/2024, rec'd 4th/final cycle 10/2. Rec'd cycle #1 adjuvant immunotherapy with Pembrolizumab 11/6.  Recent COVID/Flu, seen in ER at [facility] 2/22.  Admitted 2/27-3/3/25 for shortness of breath, Keytruda pneumonitis and a large right pleural effusion. Required thoracentesis 3/2, path - for malignancy. Scheduled with Dr. Villasenor 3/10    Called patient's wife, preferred contact on chart.  We discussed his admission.  She reports the patient is feeling better.  They were very pleased with Dr. Sixto Dodd, [facility].  The patient will be seen in his office in the near future.    We discussed his upcoming appt with Dr Villasenor.  His immune related rash is nearly resolved.  The patient's blood sugar levels were elevated due to recent steroids; his insulin was adjusted while IP.    The patient/wife denies any questions/concerns or ongoing resource needs. Navigation will continue to follow; encouraged patient/wife to call as needed.

## 2025-03-07 ENCOUNTER — READMISSION MANAGEMENT (OUTPATIENT)
Dept: CALL CENTER | Facility: HOSPITAL | Age: 75
End: 2025-03-07
Payer: MEDICARE

## 2025-03-07 NOTE — OUTREACH NOTE
Medical Week 1 Survey      Flowsheet Row Responses   [facility] [place] patient discharged from? [facility]   Does the patient have one of the following disease processes/diagnoses(primary or secondary)? Other   Week 1 attempt successful? Yes   Call start time 0858   Call end time 0900   Discharge diagnosis Dyspnea on exertion   Person spoke with today (if not patient) and relationship Spouse   Meds reviewed with patient/caregiver? Yes   Is the patient having any side effects they believe may be caused by any medication additions or changes? No   Does the patient have all medications ordered at discharge? Yes   Is the patient taking all medications as directed (includes completed medication regime)? Yes   Does the patient have a primary care provider?  Yes   Does the patient have an appointment with their PCP within 7 days of discharge? Yes   Comments regarding PCP Pt had PCP fu appt   Has the patient kept scheduled appointments due by today? Yes   Psychosocial issues? No   Did the patient receive a copy of their discharge instructions? Yes   Nursing interventions Reviewed instructions with patient   What is the patient's perception of their health status since discharge? Improving   Is the patient/caregiver able to teach back signs and symptoms related to disease process for when to call PCP? Yes   Is the patient/caregiver able to teach back signs and symptoms related to disease process for when to call 911? Yes   Is the patient/caregiver able to teach back the hierarchy of who to call/visit for symptoms/problems? PCP, Specialist, Home health nurse, Urgent Care, ED, 911 Yes   If the patient is a current smoker, are they able to teach back resources for cessation? Not a smoker   Week 1 call completed? Yes   Would this patient benefit from a Referral to Amb Social Work? No   Is the patient interested in additional calls from an ambulatory ? No   Wrap up additional comments Spouse states pt is doing so much  better, and denies pt has any SOB. Reviewed AVS/meds with spouse. Spouse shares pt had PCP fu appt already, and verified Oncology fu appt.   Call end time 0900            Candi Santos Registered Nurse

## 2025-03-10 ENCOUNTER — INFUSION (OUTPATIENT)
Dept: ONCOLOGY | Facility: HOSPITAL | Age: 75
End: 2025-03-10
Payer: MEDICARE

## 2025-03-10 ENCOUNTER — OFFICE VISIT (OUTPATIENT)
Dept: ONCOLOGY | Facility: CLINIC | Age: 75
End: 2025-03-10
Payer: MEDICARE

## 2025-03-10 VITALS
BODY MASS INDEX: 36.44 KG/M2 | HEART RATE: 58 BPM | OXYGEN SATURATION: 95 % | WEIGHT: 260.3 LBS | HEIGHT: 71 IN | SYSTOLIC BLOOD PRESSURE: 112 MMHG | DIASTOLIC BLOOD PRESSURE: 63 MMHG | TEMPERATURE: 97.7 F

## 2025-03-10 DIAGNOSIS — Z45.2 FITTING AND ADJUSTMENT OF VASCULAR CATHETER: Primary | ICD-10-CM

## 2025-03-10 DIAGNOSIS — Z79.899 ENCOUNTER FOR LONG-TERM (CURRENT) USE OF HIGH-RISK MEDICATION: ICD-10-CM

## 2025-03-10 DIAGNOSIS — D64.81 ANEMIA ASSOCIATED WITH CHEMOTHERAPY: ICD-10-CM

## 2025-03-10 DIAGNOSIS — L27.0 DRUG-INDUCED SKIN RASH: ICD-10-CM

## 2025-03-10 DIAGNOSIS — C34.2 MALIGNANT NEOPLASM OF MIDDLE LOBE OF RIGHT LUNG: ICD-10-CM

## 2025-03-10 DIAGNOSIS — C34.2 SQUAMOUS CELL CARCINOMA OF BRONCHUS IN RIGHT MIDDLE LOBE: Primary | ICD-10-CM

## 2025-03-10 DIAGNOSIS — C34.2 SQUAMOUS CELL CARCINOMA OF BRONCHUS IN RIGHT MIDDLE LOBE: ICD-10-CM

## 2025-03-10 DIAGNOSIS — G62.0 PERIPHERAL NEUROPATHY DUE TO CHEMOTHERAPY: ICD-10-CM

## 2025-03-10 DIAGNOSIS — T45.1X5A PERIPHERAL NEUROPATHY DUE TO CHEMOTHERAPY: ICD-10-CM

## 2025-03-10 DIAGNOSIS — J98.4 DRUG-INDUCED PNEUMONITIS: ICD-10-CM

## 2025-03-10 DIAGNOSIS — E83.42 HYPOMAGNESEMIA: ICD-10-CM

## 2025-03-10 DIAGNOSIS — E87.1 HYPONATREMIA: ICD-10-CM

## 2025-03-10 DIAGNOSIS — T45.1X5A ANEMIA ASSOCIATED WITH CHEMOTHERAPY: ICD-10-CM

## 2025-03-10 DIAGNOSIS — T50.905A DRUG-INDUCED PNEUMONITIS: ICD-10-CM

## 2025-03-10 LAB
ALBUMIN SERPL-MCNC: 3.4 G/DL (ref 3.5–5.2)
ALBUMIN/GLOB SERPL: 1 G/DL
ALP SERPL-CCNC: 108 U/L (ref 39–117)
ALT SERPL W P-5'-P-CCNC: 48 U/L (ref 1–41)
ANION GAP SERPL CALCULATED.3IONS-SCNC: 8.6 MMOL/L (ref 5–15)
AST SERPL-CCNC: 24 U/L (ref 1–40)
BASOPHILS # BLD AUTO: 0.03 10*3/MM3 (ref 0–0.2)
BASOPHILS NFR BLD AUTO: 0.2 % (ref 0–1.5)
BILIRUB SERPL-MCNC: 0.5 MG/DL (ref 0–1.2)
BUN SERPL-MCNC: 17 MG/DL (ref 8–23)
BUN/CREAT SERPL: 19.8 (ref 7–25)
CALCIUM SPEC-SCNC: 9.5 MG/DL (ref 8.6–10.5)
CHLORIDE SERPL-SCNC: 94 MMOL/L (ref 98–107)
CO2 SERPL-SCNC: 32.4 MMOL/L (ref 22–29)
CREAT SERPL-MCNC: 0.86 MG/DL (ref 0.76–1.27)
DEPRECATED RDW RBC AUTO: 52.8 FL (ref 37–54)
EGFRCR SERPLBLD CKD-EPI 2021: 89.7 ML/MIN/1.73
EOSINOPHIL # BLD AUTO: 0.06 10*3/MM3 (ref 0–0.4)
EOSINOPHIL NFR BLD AUTO: 0.4 % (ref 0.3–6.2)
ERYTHROCYTE [DISTWIDTH] IN BLOOD BY AUTOMATED COUNT: 16.2 % (ref 12.3–15.4)
GLOBULIN UR ELPH-MCNC: 3.3 GM/DL
GLUCOSE SERPL-MCNC: 240 MG/DL (ref 65–99)
HCT VFR BLD AUTO: 38.3 % (ref 37.5–51)
HGB BLD-MCNC: 12 G/DL (ref 13–17.7)
IMM GRANULOCYTES # BLD AUTO: 0.28 10*3/MM3 (ref 0–0.05)
IMM GRANULOCYTES NFR BLD AUTO: 2 % (ref 0–0.5)
LYMPHOCYTES # BLD AUTO: 0.82 10*3/MM3 (ref 0.7–3.1)
LYMPHOCYTES NFR BLD AUTO: 5.8 % (ref 19.6–45.3)
MAGNESIUM SERPL-MCNC: 2 MG/DL (ref 1.6–2.4)
MCH RBC QN AUTO: 28 PG (ref 26.6–33)
MCHC RBC AUTO-ENTMCNC: 31.3 G/DL (ref 31.5–35.7)
MCV RBC AUTO: 89.5 FL (ref 79–97)
MONOCYTES # BLD AUTO: 0.28 10*3/MM3 (ref 0.1–0.9)
MONOCYTES NFR BLD AUTO: 2 % (ref 5–12)
NEUTROPHILS NFR BLD AUTO: 12.76 10*3/MM3 (ref 1.7–7)
NEUTROPHILS NFR BLD AUTO: 89.6 % (ref 42.7–76)
NRBC BLD AUTO-RTO: 0 /100 WBC (ref 0–0.2)
PLATELET # BLD AUTO: 272 10*3/MM3 (ref 140–450)
PMV BLD AUTO: 9.3 FL (ref 6–12)
POTASSIUM SERPL-SCNC: 4.2 MMOL/L (ref 3.5–5.2)
PROT SERPL-MCNC: 6.7 G/DL (ref 6–8.5)
RBC # BLD AUTO: 4.28 10*6/MM3 (ref 4.14–5.8)
SODIUM SERPL-SCNC: 135 MMOL/L (ref 136–145)
WBC NRBC COR # BLD AUTO: 14.23 10*3/MM3 (ref 3.4–10.8)

## 2025-03-10 PROCEDURE — 25010000002 HEPARIN LOCK FLUSH PER 10 UNITS: Performed by: INTERNAL MEDICINE

## 2025-03-10 PROCEDURE — 36591 DRAW BLOOD OFF VENOUS DEVICE: CPT

## 2025-03-10 PROCEDURE — 85025 COMPLETE CBC W/AUTO DIFF WBC: CPT

## 2025-03-10 PROCEDURE — 83735 ASSAY OF MAGNESIUM: CPT | Performed by: INTERNAL MEDICINE

## 2025-03-10 PROCEDURE — 80053 COMPREHEN METABOLIC PANEL: CPT | Performed by: INTERNAL MEDICINE

## 2025-03-10 RX ORDER — HEPARIN SODIUM (PORCINE) LOCK FLUSH IV SOLN 100 UNIT/ML 100 UNIT/ML
500 SOLUTION INTRAVENOUS AS NEEDED
Status: DISCONTINUED | OUTPATIENT
Start: 2025-03-10 | End: 2025-03-10 | Stop reason: HOSPADM

## 2025-03-10 RX ORDER — SODIUM CHLORIDE 0.9 % (FLUSH) 0.9 %
10 SYRINGE (ML) INJECTION AS NEEDED
OUTPATIENT
Start: 2025-03-10

## 2025-03-10 RX ORDER — HEPARIN SODIUM (PORCINE) LOCK FLUSH IV SOLN 100 UNIT/ML 100 UNIT/ML
500 SOLUTION INTRAVENOUS AS NEEDED
OUTPATIENT
Start: 2025-03-10

## 2025-03-10 RX ORDER — SODIUM CHLORIDE 0.9 % (FLUSH) 0.9 %
10 SYRINGE (ML) INJECTION AS NEEDED
Status: DISCONTINUED | OUTPATIENT
Start: 2025-03-10 | End: 2025-03-10 | Stop reason: HOSPADM

## 2025-03-10 RX ADMIN — Medication 10 ML: at 11:58

## 2025-03-10 RX ADMIN — Medication 500 UNITS: at 11:58

## 2025-03-10 NOTE — PROGRESS NOTES
REASON FOR FOLLOW-UP:     *. Poorly differentiated squamous cell lung cancer with intralobular metastatic disease, stage IIIa ([id number]).  Status post right middle lobectomy on 6/12/2024.  Patient was started on adjuvant chemotherapy carboplatin AUC 5, and Taxol 200 mg/m² on 7/31/2024.    HISTORY OF PRESENT ILLNESS:  The patient is a 74 y.o. year old male who is here for evaluation with the above-mentioned history.    History of Present Illness  The patient presented today on 03/10/2025 for follow-up. He is accompanied by his wife.    He recently had a hospitalization, discharged on 3/3/2025, due to acute on chronic hypoxic respiratory failure, during which he was seen by pulmonologist Dr. Sixto Dodd. Dr. 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 At the time of discharge on 03/03/2025, it was recommended that he continue prednisone 40 mg once daily for 7 days, followed by a reduction to 20 mg once daily for 21 days.     Today on 3/10/2025 he reports an improvement in his dyspnea, with oxygen saturation at 95% on room air. He is not currently using supplemental oxygen in the clinic but has it available at home and in his vehicle. He utilizes a CPAP machine during sleep.    He is on diuretics and is taking potassium supplements. He adheres to a low-salt diet and is also on magnesium supplements.      Results  Laboratory Studies  On 03/10/2025, leukocytosis with WBC 14,230, neutrophils 12,760, lymphocytes 820, immature granulocytes 280. Hemoglobin is 12.0, platelets 272,000. Creatinine 0.86. ALT is 48. Albumin is 3.4. Glucose is 240. Sodium is 135. Potassium is 4.2. Magnesium is 2.    During hospitalization, yaneth hemoglobin was 9.9 on 03/02/2025.     Imaging  During hospitalization, ultrasound-guided thoracentesis on the right side with 1.2 L pleural effusion removed on 3/2/2025. Cytology study reported negative for malignant cells.   There were reactive mesothelial cells and macrophages with background of mixed chronic inflammation.      Past Medical History:   Diagnosis Date    Anxiety     Arthritis     Atrial fibrillation     CURRENTLY    Bunion of right foot     Colon polyps     COPD (chronic obstructive pulmonary disease)     MILD. NO MEDS FOR    Depression     History of atrial fibrillation     WITH CARDIOVERSION. NO PROBLEMS    History of skin cancer     BCC REMOVED FROM BACK    [place] (hard of hearing)     Hyperlipidemia     Inguinal hernia, recurrent     RIGHT    Left foot pain     Lung nodules     Rash     IN GROIN TREATING FOR YEAST INFECTION BY PCP    Redness of skin     LOWER LEGS BILATERAL    Scab     BILATERAL LEGS HEALING    Sleep apnea with use of continuous positive airway pressure (CPAP)     CPAP    Streptococcus pneumoniae     ABOUT 6-7 YR AGO.     Type 2 diabetes mellitus      Past Surgical History:   Procedure Laterality Date    BASAL CELL CARCINOMA EXCISION      BRONCHOSCOPY WITH ION ROBOTIC ASSIST N/A 5/6/2024    Procedure: BRONCHOSCOPY WITH ION ROBOT WITH FNA, BIOPSIES, BAL AND ENDOBRONCHIAL ULTRASOUND WITH FNA;  Surgeon: Yony Coles MD;  Location: [facility] ENDOSCOPY;  Service: Robotics - Pulmonary;  Laterality: N/A;  PRE: PULMONARY NODULES  POST: SAME    CARDIAC CATHETERIZATION N/A 11/15/2021    Procedure: Coronary angiography;  Surgeon: Alfredo Jennings MD;  Location: [facility] CATH INVASIVE LOCATION;  Service: Cardiovascular;  Laterality: N/A;    CARDIAC CATHETERIZATION N/A 11/15/2021    Procedure: Left heart cath;  Surgeon: Alfredo Jennings MD;  Location: [facility] CATH INVASIVE LOCATION;  Service: Cardiovascular;  Laterality: N/A;    CARDIAC CATHETERIZATION N/A 11/15/2021    Procedure: Left ventriculography;  Surgeon: Aflredo Jennings MD;  Location: [facility] CATH INVASIVE LOCATION;  Service: Cardiovascular;  Laterality: N/A;    CARDIAC CATHETERIZATION N/A 11/15/2021    Procedure: Aortic root aortogram;   Surgeon: Alfredo Jennings MD;  Location: [facility] CATH INVASIVE LOCATION;  Service: Cardiovascular;  Laterality: N/A;    CARDIOVERSION      CHOLECYSTECTOMY N/A 10/07/2017    Procedure: CHOLECYSTECTOMY LAPAROSCOPIC;  Surgeon: Martir Trevino MD;  Location: [facility] MAIN OR;  Service:     COLONOSCOPY  2007    COLONOSCOPY N/A 8/30/2022    Procedure: COLONOSCOPY TO CECUM AND TI AND COLD SNARE POLYPECTOMY;  Surgeon: Cj Lopez MD;  Location: [facility] ENDOSCOPY;  Service: Gastroenterology;  Laterality: N/A;  Pre: History of colon polyps  Post: POLYP, PREVIOUS TATTOO    FOOT SURGERY Left     TOES ARE PINNED. ALL BUT GREAT TOE    HAND SURGERY      THUMB    HERNIA REPAIR      INGUINAL HERNIA REPAIR Right 06/27/2018    Procedure: INGUINAL HERNIA REPAIR, OPEN, RECURRENT;  Surgeon: Martir Trevino MD;  Location: [facility] OR OSC;  Service: General    LASIK Bilateral     LOBECTOMY Right 6/12/2024    Procedure: BRONCHOSCOPY, RIGHT VIDEO ASSISTED THORACOSCOPY WITH RIGHT MIDDLE LOBECTOMY WITH DAVINCI ROBOT, MEDIASTINAL LYMPH NODE DISSECTION, INTERCOSTAL NERVE BLOCK;  Surgeon: David Figueroa MD;  Location: [facility] OR;  Service: Robotics - DaVinci;  Laterality: Right;    NECK SURGERY      growth on salivary gland    REPLACEMENT TOTAL KNEE Right 2007    (he is going to have a LTKR next month)    REPLACEMENT TOTAL KNEE Left     VENOUS ACCESS DEVICE (PORT) INSERTION Right 7/5/2024    Procedure: INSERTION VENOUS ACCESS DEVICE;  Surgeon: David Figueroa MD;  Location: [facility] OR;  Service: Thoracic;  Laterality: Right;       ONCOLOGY HISTORY:  The patient is a 73 years old male, who presents for oncology evaluation 7/12/2024. He is accompanied by his wife.    He underwent right middle lobe lobectomy for squamous cell lung cancer by Dr. Figueroa on 6/12/2024 and is recovering well from the surgery. However, there is an open wound at the site of chest tube on the right side of the chest which now has a few stitches, which is still leaking.  He was advised to apply Band-Aids.  He reports no fever sweating or chills.      Patient has been on oxygen supplementation since surgery, currently 2 L/min.  Patient says occasionally at home he does not need oxygen.  Dr. Figueroa has suggested gradually reducing his oxygen use.     His appetite remains normal.    Patient reports some family health issues.  His son-in-law recently  of HLH just last week.  His brother-in-law has stage IV liver cancer.     This patient has a history of emphysema, followed by pulmonologist Dr. Camp.  His high-resolution chest CT scan on 2021 reported 7 mm nodule in the right middle lobe.  There is also index subcarinal lymph node 1.1 cm.     Patient subsequently had serial CT scan examination.    On 4/10/2023 chest high-resolution CT scan reported stable examination with the pulmonary nodules measuring up to 9-10 mm in the right middle lobe and a sub-6 mm in the right upper lobe.  The largest subcarinal lymph node measures  2.1 x  1.2 cm.     However chest high resolution CT scan 2024 reported disease progression, with right middle lobe pulm nodule 1.8 cm from margin at 9 mm.  There is also a new right middle lobe 1.1 cm nodule.  Stable right upper lobe 7 mm nodule.  Also a new right hilar lymphadenopathy up to 2 cm.  The 1.5 cm subcarinal lymph node is stable.  No pleural effusion.    Patient subsequently had PET scan examination on 2024 requested by Dr. Camp.  This reported SUV 3.1 corresponding to the 19 mm right middle lobe nodule.  The 1.1 cm right middle nodule was photopenic.  The right hilar lymph node with SUV 5.6.  The 1.5 cm subcarinal lymph node with maximum SUV 7.    Patient subsequently seen by Dr. Figueroa who performed ION bronchoscopic biopsy on 2024 with pathology evaluation reporting squamous cell carcinoma involving one of the right middle lobe lesion, and the other pulmonary nodule is at least squamous cell carcinoma in situ.  PD-L1 study TPS was 0%.        Dr. Figueroa performed robot-assisted thoracoscopic right middle lobe lobectomy and mediastinal lymph node dissection on 6/12/2024.  Pathology evaluation reported poorly differentiated squamous cell carcinoma, clear margin, however with lymph nodes involved 3 lymph nodes in the right 10 R, 11 R and 12 R lymph node station.  There was also frequent lymphovascular invasion and focal perineural invasion.    Patient subsequently had a portacatheter placement on 7/5/2024.      The patient presents today on 7/31/2024 for re-evaluation to start chemotherapy.    His case was presented at the multimodality conference 7/18/2024.  Because of negative margin and N1 disease, as well as marginal pulmonary function, the consensus was for patient to have adjuvant chemotherapy alone without radiation.    On 7/18/2024 peripheral blood was sent for Tempus xF liquid biopsy with inconclusive results, no reportable treatment options found.    His lung cancer sample from 6/12/2024 was sent for Tempus xT DNA and RNA study.  It reported stable MSI.  Tumor mutation burden 6.3 m/MB.  Negative for EGFR, KRAS, BRAF, ALK, ROS1, RET, MET, HER2.    The patient recently consulted her nephrologist, during which blood work was conducted. The results indicated a slight presence of protein in urine. He denies experiencing any dyspnea.      He recently had an abnormal x-ray examination of his left hand at the [facility] which is suspicious for a tumor.  Patient informed us about the abnormalities.  So we arranged an an MRI of his left hand to be conducted on 11/18/2024, which revealed a large expansile bone lesion involving the entirety of the thumb proximal phalanx, measuring 2.5 x 2.0 cm and 3.5 cm in length. There was no associated pathologic fracture. He was referred to hand surgeon at the [facility]. He has an appointment with a hand surgeon in 01/2025.     The patient reports that he has had issues with his  left thumb since an accident in the 1960s where he cut off his finger with chainsaw while cutting down tree.  This was attached back by surgeon from the [facility].  About 10 years ago, he injured his thumb while cutting plywood. He has noticed a growth on his thumb since then, which has remained the same size. He does not experience any pain in his thumb. Dr. Espinal informed him that he had a bone tumor and recommended surgery, but he declined.    On 11/24/2024, he visited the ER due to diffuse pruritic skin rashes. He developed a skin rash on his arms 5 days ago after using a new bottle of lotion. The rash also appeared 5 days after his first dose of immunotherapy. He also has rashes on his legs and back, but not on his face as he did not apply the lotion there. He has been using hydrocortisone cream 2.5 percent twice daily, prescribed by Dr. Reid, but it has not been effective. He experiences itching only on his arms. He has been feeling slightly short of breath. He was given a 6-day course of steroids in the ER, which helped with his wrist, knee, and shoulder pain. However, the rash returned after he finished the steroids.    He has diabetes and his blood sugar levels have been elevated since starting the steroids, ranging from 195 to 240. His usual blood sugar level is around 130 to 140. He uses two types of insulin at home, one of which is short-acting.    Since increasing his gabapentin dosage to 600 mg every 8 hours, his peripheral neuropathy has improved significantly. However, his wife reports that there have been issues with the pharmacy refilling his medication on time.    MRI of the left hand 11/18/2024 reported a large expansile bone lesion involving the entirety of the thumb proximal phalanx measuring 2.5 x 2.0 cm and 3.5 cm in length. No associated pathologic fracture.    The patient is here today on 12/11/2024 for a 2-week follow-up to assess the effectiveness of the steroid treatment for  pruritic skin rashes that developed after the first cycle of immunotherapy with pembrolizumab. The immunotherapy was paused, and he was started on prednisone 20 mg daily on 11/27/2024.     However he actually presented to [facility] emergency room on 11/29/2024 because of worsening dyspnea and mild hemoptysis.    He had a thoracentesis on 11/30/2024 during hospitalization, with 800 mL pleural effusion removed from the right side. The cytology study reported negative for cancer cells. There were mixed mesothelial cells, histiocytes, chronic inflammation, fibrin in the blood. This sample was also sent to flow cytometry study and reported no evidence for hematologic malignancy.  Patient reports has improved breathing after thoracentesis.  His oxygen saturation is 99% today on oxygen 2 L/min NC.    He was discharged from the hospital on 12/01/2024, and Dr. Art prescribed prednisone starting at 60 mg for 5 days, then taper down by 10 mg every 7 days and to finish actually the last will be taking 5 mg for 7 days to finish.     He reports significant improvement in his pruritic rash, which is primarily located on his legs. He no longer experiences itching or discomfort associated with the rash. He recalls severe itching on his back during the last visit but notes that this symptom has resolved. He did not have the rash prior to the initiation of immunotherapy. He has been maintaining his hygiene routine, including regular showers, without any issues. He reports has been causing fluctuations in his blood sugar levels. He also notes an increase in appetite, which he attributes to the steroid treatment.      Patient is seen back on 2/11/2025 in short-term interval follow-up.  Last week on 2/4/2025 he received his third dose of Keytruda.  Just prior to that he had been hospitalized 1/22 to 1/24/2025 with acute COPD exacerbation and infiltrates consistent with pneumonia.  Was concerned that this might be related to  pneumonitis however and he was placed on placed on prednisone 40 mg x 5 days.  Patient did also undergo right-sided thoracentesis 1/23/2025 with 1 L of fluid removed. Patient has also had immune-mediated rash though at his visit last week this was noted to be improved.    Patient unfortunately called over this past weekend reporting worsening rash on his arms and legs.  He was started back on prednisone 40 mg daily is now reviewed back today accompanied by his wife.    Patient states that while the rash is unsightly it is not overly pruritic.  He does have some topical cream that he is utilizing that has been helpful.  He is very concerned about his blood sugars being elevated on the higher dose prednisone.      MEDICATIONS    Current Outpatient Medications:     albuterol sulfate  (90 Base) MCG/ACT inhaler, Inhale 2 puffs Every 4 (Four) Hours As Needed for Wheezing., Disp: , Rfl:     diphenhydrAMINE (BENADRYL) 25 mg capsule, Take 1 capsule by mouth Every 6 (Six) Hours As Needed for Itching for up to 30 days., Disp: 60 capsule, Rfl: 0    famotidine (PEPCID) 20 MG tablet, Take 1 tablet by mouth 2 (Two) Times a Day As Needed for Heartburn., Disp: , Rfl:     ferrous sulfate 325 (65 FE) MG tablet, Take 0.5 tablets by mouth 2 (Two) Times a Day., Disp: , Rfl:     fexofenadine (ALLEGRA) 180 MG tablet, Take 1 tablet by mouth Daily., Disp: , Rfl:     folic acid (FOLVITE) 1 MG tablet, Take 1 tablet by mouth Daily., Disp: 90 tablet, Rfl: 3    gabapentin (NEURONTIN) 300 MG capsule, TAKE TWO CAPSULES BY MOUTH EVERY 8 HOURS (Patient taking differently: Take 2 capsules by mouth 3 (Three) Times a Day.), Disp: 180 capsule, Rfl: 0    hydrOXYzine pamoate (VISTARIL) 50 MG capsule, Take 1 capsule by mouth 3 (Three) Times a Day As Needed for Itching., Disp: , Rfl:     insulin aspart (NovoLOG FlexPen) 100 UNIT/ML solution pen-injector sc pen, Inject 8 Units under the skin into the appropriate area as directed 3 (Three) Times a Day  With Meals for 30 days., Disp: , Rfl:     Insulin Glargine, 1 Unit Dial, (Toujeo SoloStar) 300 UNIT/ML solution pen-injector injection, Inject 30 Units under the skin into the appropriate area as directed Daily., Disp: , Rfl:     magnesium oxide (MAG-OX) 400 tablet tablet, Take 1 tablet by mouth 2 (Two) Times a Day., Disp: , Rfl:     Multiple Vitamin (MULTIVITAMINS PO), Take 1 tablet by mouth Daily., Disp: , Rfl:     potassium chloride (MICRO-K) 10 MEQ CR capsule, Take 1 capsule by mouth 2 (Two) Times a Day., Disp: 40 capsule, Rfl: 2    predniSONE (DELTASONE) 20 MG tablet, Take 2 tablets by mouth Daily With Breakfast for 7 days, THEN 1 tablet Daily With Breakfast for 21 days., Disp: 35 tablet, Rfl: 0    rosuvastatin (CRESTOR) 40 MG tablet, TAKE ONE TABLET BY MOUTH DAILY (Patient taking differently: Take 1 tablet by mouth Daily.), Disp: 90 tablet, Rfl: 1    sertraline (ZOLOFT) 50 MG tablet, TAKE ONE TABLET BY MOUTH DAILY (Patient taking differently: Take 1 tablet by mouth Daily.), Disp: 90 tablet, Rfl: 1    Tirzepatide (Mounjaro) 2.5 MG/0.5ML solution pen-injector pen, Inject 2.5 mg under the skin into the appropriate area as directed 1 (One) Time Per Week. Mondays, Disp: , Rfl:     torsemide (DEMADEX) 20 MG tablet, Take 2 tablets by mouth Daily for 30 days., Disp: 60 tablet, Rfl: 0    vitamin B-12 (CYANOCOBALAMIN) 1000 MCG tablet, Take 1 tablet by mouth Daily., Disp: 90 tablet, Rfl: 3    Xarelto 20 MG tablet, TAKE ONE TABLET BY MOUTH DAILY (Patient taking differently: Take 1 tablet by mouth Daily With Dinner.), Disp: 30 tablet, Rfl: 9      ALLERGIES:   No Known Allergies      SOCIAL HISTORY:       Social History     Socioeconomic History    Marital status:      Spouse name: Luba    Number of children: 2   Tobacco Use    Smoking status: Former     Current packs/day: 0.00     Average packs/day: 1 pack/day for 30.0 years (30.0 ttl pk-yrs)     Types: Cigars, Cigarettes     Start date: 1/1/1984     Quit date:  "2014     Years since quittin.1    Smokeless tobacco: Never   Vaping Use    Vaping status: Never Used   Substance and Sexual Activity    Alcohol use: Never     Comment: caffeine use- decaf coffee    Drug use: Never    Sexual activity: Defer         FAMILY HISTORY:  Family History   Problem Relation Age of Onset    Cancer Other     Stroke Mother     Alcohol abuse Father     Malnathan Hyperthermia Neg Hx         Vitals:    03/10/25 1109   BP: 112/63   Pulse: 58   Temp: 97.7 °F (36.5 °C)   TempSrc: Oral   SpO2: 95%   Weight: 118 kg (260 lb 4.8 oz)   Height: 180.3 cm (70.98\")   PainSc: 0-No pain         3/10/2025    11:09 AM   Current Status   ECOG score 0     Physical Exam  Vital Signs  Oxygen saturation is at 95% on room air.  GENERAL:  Well-developed, well-nourished in no acute distress.    SKIN:  Warm, dry without rashes, purpura or petechiae.  HEENT:  Normocephalic.   LYMPHATICS:  No cervical, supraclavicular or axillary adenopathy.  CHEST: Normal respiratory effort.  Lungs clear to auscultation. Good airflow.  CARDIAC:  Regular rate and rhythm. Normal S1,S2.  ABDOMEN:  Soft, no tender.  Bowel sounds normal.  EXTREMITIES:  No lower extremity edema.    RECENT LABS:  Results from last 7 days   Lab Units 03/10/25  0958   WBC 10*3/mm3 14.23*   NEUTROS ABS 10*3/mm3 12.76*   HEMOGLOBIN g/dL 12.0*   HEMATOCRIT % 38.3   PLATELETS 10*3/mm3 272     Results from last 7 days   Lab Units 03/10/25  0958   SODIUM mmol/L 135*   POTASSIUM mmol/L 4.2   CHLORIDE mmol/L 94*   CO2 mmol/L 32.4*   BUN mg/dL 17   CREATININE mg/dL 0.86   CALCIUM mg/dL 9.5   ALBUMIN g/dL 3.4*   BILIRUBIN mg/dL 0.5   ALK PHOS U/L 108   ALT (SGPT) U/L 48*   AST (SGOT) U/L 24   GLUCOSE mg/dL 240*   MAGNESIUM mg/dL 2.0     Lab Results   Component Value Date    HFEZYRYC29 525 2024     Lab Results   Component Value Date    FOLATE 13.90 2024     Lab Results   Component Value Date    IRON 68 2024    LABIRON 16 (L) 2024    TRANSFERRIN 287 " 11/06/2024    [facility] 428 11/06/2024    FERRITIN 146 12/05/2024               Assessment & Plan     Assessment & Plan      1. Poorly differentiated squamous cell lung cancer with intralobular metastatic disease, stage IIIa ([id number]).  Tumor was poorly differentiated. This patient has stage 3a disease.   Patient had Ion bronchoscopic biopsy 5/6/2024.  Right middle lobe reported fragments of squamous cell carcinoma.  PD-L1 study reported TPS 0%.  I discussed with the patient on 7/12/2024 that he will need adjuvant chemotherapy and concurrent adjuvant radiation therapy, and likely will need consolidative immunotherapy. I recommended using weekly carboplatin plus Taxol, in conjunction with radiotherapy for 6.5 weeks treatment. In reality, he probably will only receive 5 or 6 weekly chemotherapy instead of the 7 doses due to tolerance issues.   We will present his case at the chest conference on 7/18/2024, due to poor pulmonary function, negative surgical margins and N1 disease, the consensus is for patient to have adjuvant chemotherapy without concurrent radiation therapy.  Also recommended genetic study.   On 7/18/2024 peripheral blood was sent for Tempus xF liquid biopsy with inconclusive results, no reportable treatment options found.   His lung cancer sample from 6/12/2024 was sent for Tempus xT DNA and RNA study.  It reported stable MSI.  Tumor mutation burden 6.3 m/MB.  Negative for EGFR, KRAS, BRAF, ALK, ROS1, RET, MET, HER2.  Tumor sample was positive for BRIP1 mutation, TP53 mutation and also ARID1B mutation, all of those LOF.   On 7/31/2024 will start the patient on adjuvant chemotherapy.  Because his overall health condition, performance status ECOG 2, his age, he would not tolerate cisplatin based chemotherapy.  So we recommended using carboplatin in conjunction with Taxol every 3 weeks chemotherapy for 4 cycles.  Unfortunately patient would not be a candidate for immunotherapy as part of adjuvant  therapy.  8/21/2024 patient presents for evaluation prior to cycle #2 adjuvant chemotherapy.  He is currently undergoing chemotherapy with a total of four cycles planned; this is his second cycle. The biopsy from his lung revealed a PD-L1 negative result. Given his overall health condition, he is not physically capable of tolerating the most toxic chemotherapy, cisplatin, and is therefore being treated with carboplatin. A request has been made for a larger tissue sample from his surgery to be retested for PD-L1. If the result is positive, immunotherapy could be considered for prolonged treatment, which has shown better results in preventing cancer recurrence. This decision will be made after the completion of his chemotherapy, to determin whether maintenance or consolidative immunotherapy is necessary. He continues on oxygen supplementation at 3 L/min.  PD-L1 study from the lobectomy sample reported positive for PD-L1 with a TPS tumor proportion score 5%.  9/11/2024 due today for cycle 3 carboplatin/Taxol.  He is overall tolerating this well with stable neuropathy.  We will reduce his steroids as further detailed below due to exacerbation of his underlying DM type II.  Today 10/2/2024 he presented for evaluation prior to his cycle #4 adjuvant chemotherapy with carboplatin plus Taxol. Laboratory studies today reported normal WBC 5700, neutrophils 3010, hemoglobin 11.7, and platelets 240,000. Chemistry lab reported baseline creatinine 0.69, normal electrolytes except magnesium 1.5, and marginally elevated alkaline phosphatase 133. Given the presence of neuropathy, there is a concern that this could develop into a chronic issue. A reduction in the dosage of Taxol by 25% is recommended.   Today on 11/6/2024 Cycle 1 adjuvant immunotherapy with pembrolizumab will be initiated and repeated every 3 weeks.  This will be total for 12 months.  Today on 11/27/2024 patient developed diffuse pruritic skin rashes on his trunk,  upper extremities, and lower extremities about 5 days after starting immunotherapy. He was given a Medrol dose pack in the ER, which improved his symptoms, but the pruritic skin rashes recurred when the steroids were tapered off. Dr. Reid prescribed 2.5% hydrocortisone cream, but due to the large body area involved, he uses the cream quickly, and the pharmacy will not refill it.  I recommended to start oral prednisone 20 mg daily for 7 days, then tapering to 10 mg daily, has been recommended. A larger quantity of hydrocortisone cream has been prescribed.  Hydroxyzine 25 mg every 8 hours as needed for pruritus has also been prescribed. Immunotherapy with pembrolizumab is canceled today.   The patient underwent a thoracentesis on 11/30/2024, during which 800 mL of pleural effusion was removed from the right side.  Cytology study negative for malignancy.  12/11/2024 due to large dose prednisone, will continue to hold pembrolizumab.  11/14/2025 patient currently is on tapering dose prednisone 5 mg daily.  We discussed with the patient today, he will proceed with cycle 2 of adjuvant pembrolizumab today, to be repeated every 3 weeks. He will see the APRN in 3 weeks for laboratory studies and prior to cycle 3 of pembrolizumab. He will keep his appointment with the CT scan of the chest and the thoracic surgery team. He will be seen in 6 weeks with laboratory studies (CBC, CMP) prior to cycle 4 of pembrolizumab.  2/4/2025: Cycle 3 Keytruda.   Patient was hospitalized on 2/27/2025 due to acute on chronic respiratory failure.  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  Patient was started on steroids.  Today on 3/10/2025 patient reports that his significant proved symptoms.  Discussed with the patient will taper off his prednisone.  We will cancel future immunotherapy completely.      *2. Acute on chronic hypoxic respiratory failure.  This condition is largely attributed to Keytruda-induced  pneumonitis, as assessed by Dr. Sixto Dodd. The patient was hospitalized for about a week in late February and early March 2025.  He was started on prednisone.    Today on 3/10/2025 his oxygen saturation is 95% on room air, and he is not currently using supplemental oxygen in the clinic. He will continue on prednisone 20 mg daily for 10 days, then decrease to 10 mg daily until completion, approximately 4 weeks from now.     3.  Emphysema.    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  He was instructed by his surgeon Dr. Figueroa to taper off gradually.  on 11/6/2024 patient reports that he is currently on oxygen supplementation at 3 L/min and reports no cough or hemoptysis.  12/11/2024 continues on oxygen 2 L/min.  1/14/2025 O2 saturation 92% on 2 L/min oxygen supplement. He uses 3 L/min of oxygen at home, which improves his level.  Today our clinic on 3/10/2025 patient has O2 saturation 95% room air.      4.  Immune mediated skin rash   The skin rashes are attributed to the immunotherapy with pembrolizumab.  Patient currently is on moderately high-dose prednisone since admission in the hospital 11/29/2024.  He reports that the rashes have significantly improved and are no longer itchy. He has been on a tapering dose of prednisone, currently at 50 mg daily. Labs today on 12/11/2024 reported mild leukocytosis, WBC 11,600 including neutrophils 9910, glucose elevated at 262, all fits with steroids use.    On 1/14/2025 he reports slight improvement in the rash after using the prescribed cream three times a day. He has been tapering off prednisone and has two days left for the low-dose 5 mg daily. He will continue to finish the last couple of days.  2/4/2025: Much improved. Continue over the counter creams.   2/8/2025 worsening over the weekend, speaking with Dr. Villasenor on-call, started back on prednisone 40 mg daily  2/1/2025 per review today patient has taken 4 doses of prednisone 40 mg  daily.  Rash is comparatively improved (images were taken today, under media).  At this point it is unsightly but not pruritic.  Patient with significant exacerbation of blood sugar on steroids.  Will have him drop back to prednisone 20 mg daily for the rest of this week while monitoring rash and breathing.  He will thereafter discontinue unless symptoms worsen.    3/10/2025 patient has much improved the skin rashes with some remnant rashes.  Continue tapering oral prednisone.    5.  Monoclonal gammopathy of unknown significance.  IgA kappa.  This was discovered at the his nephrologist office.  Laboratory study on 4/13/2022 reported serum monoclonal IgA kappa with elevated IgA 604 mg/dL, normal IgG 861 and IgM 84.  Free kappa chain was 38.4, lambda 21.3, kappa/lambda ratio 1.8.  Lab study on 7/19/2024 at her nephrologist office positive serum monoclonal IgA kappa. IgA was slightly elevated at 597 with normal serum IgG 830 and IgM 95. Kappa chain is 50.4 and lambda 23.3 with a kappa lambda ratio of 2.16.  Continue to observe.    6.  DM type II  Patient is on sliding scale insulin prior to meals and bedtime along with weekly Mounjaro  Blood sugars are being exacerbated by oral dexamethasone.  He is also receiving IV dexamethasone.  At this point he is doing well with Taxol we will discontinue oral dexamethasone and adjust IV premedication.  11/6/2024 laboratory study conducted today reported a glucose level of 181. Diabetes management will be continued.  Today on 12/11/2024 glucose 262.  This is exacerbated by oral prednisone.  Continue management of diabetes.  1/14/2025 glucose 326.  Patient is tapering off prednisone.  Patient will be given insulin today in the clinic.   2/11/2025: Glucose today exacerbated by prednisone, glucose 373.  Decreasing prednisone as outlined.   Today on 3/10/2025 his glucose level is elevated at 240, due to steroid use. He will continue diabetes management under the care of his primary  care physician.      6.  Peripheral neuropathy due to chemotherapy.   He reports tingling in the hands and feet, which interferes with functionality, such as writing checks. This neuropathy started since beginning chemotherapy. He is currently taking gabapentin, which he reports is helping. The neuropathy is likely caused by Taxol.  On 11/6/2024 patient reports that he continues to experience peripheral neuropathy, particularly in his feet, characterized by numbness and tingling. Despite being on gabapentin 300 mg three times a day, he reports no apparent effect. Oral vitamin B12 at 1000 mcg daily, folic acid 1 mg daily, and vitamin B6 at 50 mg daily have been recommended to alleviate peripheral neuropathy symptoms.    1/14/2025 He reports significant improvement in his peripheral neuropathy symptoms with gabapentin 300 mg taken three times a day. He will continue this medication.  2/11/2025: continue gabapentin.   3/10/2025 continue gabapentin, B12, folate, and vitamin B6.    7. 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  Last chemotherapy finished on 10/2/2024.  Hemoglobin was 11.7.  Laboratory studies conducted today on 11/6/2024 reported persistent anemia with hemoglobin level of 11.0 and MCV of 94.8. An iron study with ferritin, iron profile, along with B12 and folate, has been recommended for further assessment.  11/27/2024 hemoglobin 10.8 MCV 91.8.  Labs on 12/11/2024 reported hemoglobin 12.3.   Today 1/14/2025 hemoglobin 11.5.  Continue to monitor.  2/11/2025: Hemoglobin 11.3. Continue oral iron.   3/10/2025 his hemoglobin has improved from 9.9 during hospitalization in early March 2025 to 12.0 today. Iron studies, including ferritin and iron profile, will be checked in 3 months to reevaluate anemia.    8. Leukocytosis.  On 12/11/2024 mild leukocytosis with WBC count of 11,600 is noted, likely due to steroid use. No immediate intervention is required.  2/11/2025 normalized WBC 8240 neutrophils 7140.   Today 3/10/2025  his WBC count is elevated at 14,230, with neutrophils at 12,760, likely due to steroid use.    9. Low sodium and chloride levels.  12/11/2024 chloride level is marginally low at 93. He is advised to consume slightly more salty foods to help normalize chloride and sodium levels.  2/11/2025 chloride 94 with a sodium 137.  Continue to monitor.    3/10/2025 sodium 135 chloride 94.  likely due to diuretic use and a low-salt diet. He will increase salt intake slightly to maintain sodium balance.    10. Hypokalemia   2/4/2025: Potassium today is 3.0. He does not wish to restart an oral potassium supplement, we reviewed risks with not starting this. He previously ate a banana daily, but recently stopped doing that. He will restart this. We will give 1 potassium chloride 20mEq tablet in infusion center today and then continue potassium chloride 10mEq tablet twice a day.   3/10/2025 normal potassium 4.2.    11. Hypomagnesemia   2/4/2025: Magnesium 1.5. Proceed with IV magnesium per protocol.   3/10/2025 normal magnesium 2.0.  Continue oral magnesium.    12.  Pleural effusion.  The patient underwent an ultrasound-guided thoracentesis on the right side during hospitalization, with 1.2 L of pleural effusion removed on 3/2/2025. Cytology study reported negative for malignant cells, reactive mesothelial cells, and macrophages with a background of mixed chronic inflammation.    PLAN:   Cancel scheduled Keytruda treatment today cycle #4.    Keytruda treatment will be completely discontinued due to intolerance and side effects.   He will continue on prednisone 20 mg daily for 10 days, then decrease to 10 mg daily until completion, approximately 4 weeks from now.   Port-A-Cath flushes will be maintained every 6 weeks.   A chest CT with IV contrast will be arranged for the end of May 2025.   Continue to follow with encrinology for management of diabetes.   Continue gabapentin 300mg every 8 hours for peripheral neuropathy   Continue to  follow thoracic surgery.   Continue to follow with hand surgeon for bone lesion on left hand .   Continue vitamin B12 oral 1000mcg daily, folic acid 1mg daily, vitamin B6 at 50mg daily   Continue mounjaro weekly.   The patient will follow up 1 week after the CT scan to discuss the results of CT scan and laboratory studies CBC CMP magnesium, and iron studies.      I spent 56 minutes caring for Branden on this date of service. This time includes time spent by me in the following activities: preparing for the visit, reviewing tests, obtaining and/or reviewing a separately obtained history, performing a medically appropriate examination and/or evaluation, counseling and educating the patient/family/caregiver, ordering medications, tests, or procedures, referring and communicating with other health care professionals, documenting information in the medical record, independently interpreting results and communicating that information with the patient/family/caregiver and care coordination       Duy Villasenor MD PhD  03/10/25

## 2025-03-18 ENCOUNTER — READMISSION MANAGEMENT (OUTPATIENT)
Dept: CALL CENTER | Facility: HOSPITAL | Age: 75
End: 2025-03-18
Payer: MEDICARE

## 2025-03-18 RX ORDER — LANOLIN ALCOHOL/MO/W.PET/CERES
1 CREAM (GRAM) TOPICAL 2 TIMES DAILY
Qty: 60 TABLET | Refills: 1 | Status: SHIPPED | OUTPATIENT
Start: 2025-03-18

## 2025-03-18 NOTE — OUTREACH NOTE
Medical Week 2 Survey      Flowsheet Row Responses   [facility] patient discharged from? [facility]   Does the patient have one of the following disease processes/diagnoses(primary or secondary)? Other   Week 2 attempt successful? Yes   Call start time 1047   Discharge diagnosis Dyspnea on exertion   Call end time 1048   Person spoke with today (if not patient) and relationship Spouse   Did the patient receive a copy of their discharge instructions? Yes   Nursing interventions Reviewed instructions with patient   What is the patient's perception of their health status since discharge? Improving   Is the patient/caregiver able to teach back signs and symptoms related to disease process for when to call PCP? Yes   Is the patient/caregiver able to teach back signs and symptoms related to disease process for when to call 911? Yes   Is the patient/caregiver able to teach back the hierarchy of who to call/visit for symptoms/problems? PCP, Specialist, Home health nurse, Urgent Care, ED, 911 Yes   Week 2 Call Completed? Yes   Graduated Yes   Wrap up additional comments Spouse states patient is doing well. No concerns or questions noted.   Call end time 1048            Connie MORALES - Registered Nurse      [facility], [place] [address], [address]  [place] [address] [phone]

## 2025-03-23 PROBLEM — E87.1 HYPONATREMIA: Status: ACTIVE | Noted: 2025-03-23

## 2025-03-23 PROBLEM — T50.905A DRUG-INDUCED PNEUMONITIS: Status: ACTIVE | Noted: 2025-01-23

## 2025-03-25 ENCOUNTER — PATIENT OUTREACH (OUTPATIENT)
Dept: OTHER | Facility: HOSPITAL | Age: 75
End: 2025-03-25
Payer: MEDICARE

## 2025-03-25 ENCOUNTER — TELEPHONE (OUTPATIENT)
Dept: ONCOLOGY | Facility: CLINIC | Age: 75
End: 2025-03-25
Payer: MEDICARE

## 2025-03-25 NOTE — TELEPHONE ENCOUNTER
Patient's wife had called earlier today stating:  Patient's wife called stating patient has a new rash under his arms, back and groin areas that started approximately 4 days ago. Patient's wife is questioning whether it's the Pembrolizumab even though patient received the last dose on 2/4/25.    Patient's wife denies patient has started any new medication, changed bath soap or detergent. Patient is still on Prednisone 10 mg until 4/7/25.     Per Dr Villasenor patient to take Zyrtec OTC daily and apply hydrocortisone cream to areas with a rash. Patient's wife instructed if the Zyrtec does not help with the rash in 6-7 days patient to switch to Singular 10 mg daily.    Patient's wife v/u

## 2025-03-25 NOTE — TELEPHONE ENCOUNTER
Patient's wife called stating patient has a new rash under his arms, back and groin areas that started approximately 4 days ago. Patient's wife is questioning whether it's the Pembrolizumab even though patient received the last dose on 2/4/25.    Patient's wife denies patient has started any new medication, changed bath soap or detergent. Patient is still on Prednisone 10 mg until 4/7/25.     Explained to patient will speak with Dr Villasenor and call patient's wife back

## 2025-03-25 NOTE — TELEPHONE ENCOUNTER
Caller: Luba Diop (ON  [facility])    Relationship: Emergency Contact    Best call back number: [phone]    What was the call regarding: PATIENT IS BREAKING OUT WITH RASH UNDER HIS ARMS, BACK AND NEAR HIS GROIN, STARTED FOUR DAYS AGO. LAST INFUSIONS WAS ON 3/10/2025, SHE IS THINKING IT MIGHT BE A MEDICATION HE IS TAKING?    PLEASE ADVISE

## 2025-03-25 NOTE — PROGRESS NOTES
Message from Dr. Villasenor. Patient's immunotherapy has been discontinued due to poor tolerance.  He is now scheduled for chest CT scan on 5/30/2025    Sees Dr. Villasenor 6/4, Dr. Figueroa 6/12

## 2025-04-03 DIAGNOSIS — C34.2 SQUAMOUS CELL CARCINOMA OF BRONCHUS IN RIGHT MIDDLE LOBE: ICD-10-CM

## 2025-04-03 RX ORDER — GABAPENTIN 300 MG/1
600 CAPSULE ORAL EVERY 8 HOURS
Qty: 180 CAPSULE | Refills: 2 | Status: SHIPPED | OUTPATIENT
Start: 2025-04-03

## 2025-04-07 RX ORDER — POTASSIUM CHLORIDE 750 MG/1
10 CAPSULE, EXTENDED RELEASE ORAL 2 TIMES DAILY
Qty: 40 CAPSULE | Refills: 2 | Status: SHIPPED | OUTPATIENT
Start: 2025-04-07

## 2025-04-09 ENCOUNTER — PATIENT OUTREACH (OUTPATIENT)
Dept: OTHER | Facility: HOSPITAL | Age: 75
End: 2025-04-09
Payer: MEDICARE

## 2025-04-09 NOTE — PROGRESS NOTES
Reviewed chart. No longer on immunotherapy due to intolerance.  CT scan scheduled 5/30, Sees Dr. Villasenor 6/4, Dr. Figueroa 6/12     Called patient's wife, preferred contact on chart. Left message that I was checking in. Requested cb

## 2025-04-14 ENCOUNTER — HOSPITAL ENCOUNTER (OUTPATIENT)
Dept: PET IMAGING | Facility: HOSPITAL | Age: 75
Discharge: HOME | End: 2025-04-14
Admitting: INTERNAL MEDICINE
Payer: MEDICARE

## 2025-04-14 ENCOUNTER — TRANSCRIBE ORDERS (OUTPATIENT)
Dept: ADMINISTRATIVE | Facility: HOSPITAL | Age: 75
End: 2025-04-14
Payer: MEDICARE

## 2025-04-14 DIAGNOSIS — R06.02 SHORTNESS OF BREATH: ICD-10-CM

## 2025-04-14 DIAGNOSIS — R91.8 PULMONARY INFILTRATE: ICD-10-CM

## 2025-04-14 DIAGNOSIS — J90 RECURRENT PLEURAL EFFUSION: ICD-10-CM

## 2025-04-14 DIAGNOSIS — Z85.118 HISTORY OF LUNG CANCER: ICD-10-CM

## 2025-04-14 DIAGNOSIS — J90 RECURRENT PLEURAL EFFUSION: Primary | ICD-10-CM

## 2025-04-14 PROCEDURE — 71250 CT THORAX DX C-: CPT

## 2025-04-18 ENCOUNTER — TRANSCRIBE ORDERS (OUTPATIENT)
Dept: GENERAL RADIOLOGY | Facility: HOSPITAL | Age: 75
End: 2025-04-18
Payer: MEDICARE

## 2025-04-18 DIAGNOSIS — J90 PLEURAL EFFUSION, RIGHT: Primary | ICD-10-CM

## 2025-04-18 NOTE — PROGRESS NOTES
04/28/25 0001   Pre-Procedure Phone Call   Procedure Time Verified Yes   Arrival Time 0630   Procedure Location Verified Yes   Medical History Reviewed No   NPO Status Reinforced Yes   Ride and Caregiver Arranged Yes   Phone Number for Ride/Caregiver Luba-wife-number on file   Patient Knows to Bring Current Medications No   Bring Outside Films Requested No

## 2025-04-21 ENCOUNTER — APPOINTMENT (OUTPATIENT)
Dept: GENERAL RADIOLOGY | Facility: HOSPITAL | Age: 75
End: 2025-04-21
Payer: MEDICARE

## 2025-04-21 ENCOUNTER — HOSPITAL ENCOUNTER (INPATIENT)
Facility: HOSPITAL | Age: 75
LOS: 8 days | Discharge: HOME HEALTH | End: 2025-04-30
Attending: EMERGENCY MEDICINE | Admitting: INTERNAL MEDICINE
Payer: MEDICARE

## 2025-04-21 ENCOUNTER — TELEPHONE (OUTPATIENT)
Dept: ONCOLOGY | Facility: CLINIC | Age: 75
End: 2025-04-21
Payer: MEDICARE

## 2025-04-21 ENCOUNTER — INFUSION (OUTPATIENT)
Dept: ONCOLOGY | Facility: HOSPITAL | Age: 75
End: 2025-04-21
Payer: MEDICARE

## 2025-04-21 DIAGNOSIS — R09.02 HYPOXIA: Primary | ICD-10-CM

## 2025-04-21 DIAGNOSIS — C34.2 SQUAMOUS CELL CARCINOMA OF BRONCHUS IN RIGHT MIDDLE LOBE: ICD-10-CM

## 2025-04-21 DIAGNOSIS — B95.61 MSSA BACTEREMIA: ICD-10-CM

## 2025-04-21 DIAGNOSIS — G47.30 SLEEP APNEA WITH USE OF CONTINUOUS POSITIVE AIRWAY PRESSURE (CPAP): ICD-10-CM

## 2025-04-21 DIAGNOSIS — Z85.118 HISTORY OF LUNG CANCER: ICD-10-CM

## 2025-04-21 DIAGNOSIS — R06.00 DYSPNEA, UNSPECIFIED TYPE: ICD-10-CM

## 2025-04-21 DIAGNOSIS — C34.2 MALIGNANT NEOPLASM OF MIDDLE LOBE OF RIGHT LUNG: ICD-10-CM

## 2025-04-21 DIAGNOSIS — R78.81 MSSA BACTEREMIA: ICD-10-CM

## 2025-04-21 DIAGNOSIS — Z45.2 FITTING AND ADJUSTMENT OF VASCULAR CATHETER: Primary | ICD-10-CM

## 2025-04-21 DIAGNOSIS — J96.21 ACUTE ON CHRONIC RESPIRATORY FAILURE WITH HYPOXIA: ICD-10-CM

## 2025-04-21 DIAGNOSIS — J90 RECURRENT PLEURAL EFFUSION: ICD-10-CM

## 2025-04-21 DIAGNOSIS — A41.01 SEPSIS DUE TO METHICILLIN SUSCEPTIBLE STAPHYLOCOCCUS AUREUS (MSSA) WITHOUT ACUTE ORGAN DYSFUNCTION: ICD-10-CM

## 2025-04-21 PROBLEM — D63.8 ANEMIA, CHRONIC DISEASE: Status: ACTIVE | Noted: 2025-04-21

## 2025-04-21 LAB
ALBUMIN SERPL-MCNC: 3.9 G/DL (ref 3.5–5.2)
ALBUMIN/GLOB SERPL: 1.2 G/DL
ALP SERPL-CCNC: 119 U/L (ref 39–117)
ALT SERPL W P-5'-P-CCNC: 15 U/L (ref 1–41)
ANION GAP SERPL CALCULATED.3IONS-SCNC: 11.1 MMOL/L (ref 5–15)
APTT PPP: 31.4 SECONDS (ref 22.7–35.4)
ARTERIAL PATENCY WRIST A: POSITIVE
AST SERPL-CCNC: 25 U/L (ref 1–40)
ATMOSPHERIC PRESS: 749.3 MMHG
B PARAPERT DNA SPEC QL NAA+PROBE: NOT DETECTED
B PERT DNA SPEC QL NAA+PROBE: NOT DETECTED
BASE EXCESS BLDA CALC-SCNC: 0.3 MMOL/L (ref 0–2)
BASOPHILS # BLD AUTO: 0.05 10*3/MM3 (ref 0–0.2)
BASOPHILS NFR BLD AUTO: 0.6 % (ref 0–1.5)
BDY SITE: ABNORMAL
BILIRUB SERPL-MCNC: 0.9 MG/DL (ref 0–1.2)
BUN SERPL-MCNC: 10 MG/DL (ref 8–23)
BUN/CREAT SERPL: 13 (ref 7–25)
C PNEUM DNA NPH QL NAA+NON-PROBE: NOT DETECTED
CALCIUM SPEC-SCNC: 9.6 MG/DL (ref 8.6–10.5)
CHLORIDE SERPL-SCNC: 100 MMOL/L (ref 98–107)
CO2 SERPL-SCNC: 31.9 MMOL/L (ref 22–29)
CREAT SERPL-MCNC: 0.77 MG/DL (ref 0.76–1.27)
D-LACTATE SERPL-SCNC: 1.2 MMOL/L (ref 0.5–2)
DEPRECATED RDW RBC AUTO: 52.3 FL (ref 37–54)
DEVICE COMMENT: ABNORMAL
EGFRCR SERPLBLD CKD-EPI 2021: 92.8 ML/MIN/1.73
EOSINOPHIL # BLD AUTO: 0.65 10*3/MM3 (ref 0–0.4)
EOSINOPHIL NFR BLD AUTO: 7.9 % (ref 0.3–6.2)
ERYTHROCYTE [DISTWIDTH] IN BLOOD BY AUTOMATED COUNT: 16.1 % (ref 12.3–15.4)
FLUAV SUBTYP SPEC NAA+PROBE: NOT DETECTED
FLUBV RNA ISLT QL NAA+PROBE: NOT DETECTED
GAS FLOW AIRWAY: 15 LPM
GEN 5 1HR TROPONIN T REFLEX: 28 NG/L
GLOBULIN UR ELPH-MCNC: 3.3 GM/DL
GLUCOSE BLDC GLUCOMTR-MCNC: 194 MG/DL (ref 70–130)
GLUCOSE SERPL-MCNC: 99 MG/DL (ref 65–99)
HADV DNA SPEC NAA+PROBE: NOT DETECTED
HBA1C MFR BLD: 8.6 % (ref 4.8–5.6)
HBA1C MFR BLD: 8.8 % (ref 4.8–5.6)
HCO3 BLDA-SCNC: 25.8 MMOL/L (ref 22–28)
HCOV 229E RNA SPEC QL NAA+PROBE: NOT DETECTED
HCOV HKU1 RNA SPEC QL NAA+PROBE: NOT DETECTED
HCOV NL63 RNA SPEC QL NAA+PROBE: NOT DETECTED
HCOV OC43 RNA SPEC QL NAA+PROBE: NOT DETECTED
HCT VFR BLD AUTO: 38 % (ref 37.5–51)
HEMODILUTION: NO
HGB BLD-MCNC: 11.9 G/DL (ref 13–17.7)
HMPV RNA NPH QL NAA+NON-PROBE: NOT DETECTED
HOLD SPECIMEN: NORMAL
HOLD SPECIMEN: NORMAL
HPIV1 RNA ISLT QL NAA+PROBE: NOT DETECTED
HPIV2 RNA SPEC QL NAA+PROBE: NOT DETECTED
HPIV3 RNA NPH QL NAA+PROBE: NOT DETECTED
HPIV4 P GENE NPH QL NAA+PROBE: NOT DETECTED
IMM GRANULOCYTES # BLD AUTO: 0.05 10*3/MM3 (ref 0–0.05)
IMM GRANULOCYTES NFR BLD AUTO: 0.6 % (ref 0–0.5)
INR PPP: 1.92 (ref 0.9–1.1)
L PNEUMO1 AG UR QL IA: NEGATIVE
LYMPHOCYTES # BLD AUTO: 0.89 10*3/MM3 (ref 0.7–3.1)
LYMPHOCYTES NFR BLD AUTO: 10.9 % (ref 19.6–45.3)
M PNEUMO IGG SER IA-ACNC: NOT DETECTED
MAGNESIUM SERPL-MCNC: 1.8 MG/DL (ref 1.6–2.4)
MCH RBC QN AUTO: 28.1 PG (ref 26.6–33)
MCHC RBC AUTO-ENTMCNC: 31.3 G/DL (ref 31.5–35.7)
MCV RBC AUTO: 89.8 FL (ref 79–97)
MODALITY: ABNORMAL
MONOCYTES # BLD AUTO: 0.34 10*3/MM3 (ref 0.1–0.9)
MONOCYTES NFR BLD AUTO: 4.2 % (ref 5–12)
NEUTROPHILS NFR BLD AUTO: 6.2 10*3/MM3 (ref 1.7–7)
NEUTROPHILS NFR BLD AUTO: 75.8 % (ref 42.7–76)
NRBC BLD AUTO-RTO: 0 /100 WBC (ref 0–0.2)
NT-PROBNP SERPL-MCNC: 416 PG/ML (ref 0–1800)
PCO2 BLDA: 44.1 MM HG (ref 35–45)
PH BLDA: 7.38 PH UNITS (ref 7.35–7.45)
PLATELET # BLD AUTO: 226 10*3/MM3 (ref 140–450)
PMV BLD AUTO: 8.6 FL (ref 6–12)
PO2 BLDA: 73.5 MM HG (ref 80–100)
POTASSIUM SERPL-SCNC: 3.4 MMOL/L (ref 3.5–5.2)
PROCALCITONIN SERPL-MCNC: 0.06 NG/ML (ref 0–0.25)
PROT SERPL-MCNC: 7.2 G/DL (ref 6–8.5)
PROTHROMBIN TIME: 22.1 SECONDS (ref 11.7–14.2)
QT INTERVAL: 373 MS
QTC INTERVAL: 426 MS
RBC # BLD AUTO: 4.23 10*6/MM3 (ref 4.14–5.8)
RHINOVIRUS RNA SPEC NAA+PROBE: NOT DETECTED
RSV RNA NPH QL NAA+NON-PROBE: NOT DETECTED
S PNEUM AG SPEC QL LA: NEGATIVE
SAO2 % BLDCOA: 94.1 % (ref 92–98.5)
SARS-COV-2 RNA NPH QL NAA+NON-PROBE: NOT DETECTED
SODIUM SERPL-SCNC: 143 MMOL/L (ref 136–145)
TOTAL RATE: 30 BREATHS/MINUTE
TROPONIN T % DELTA: 12
TROPONIN T NUMERIC DELTA: 3 NG/L
TROPONIN T SERPL HS-MCNC: 25 NG/L
WBC NRBC COR # BLD AUTO: 8.18 10*3/MM3 (ref 3.4–10.8)
WHOLE BLOOD HOLD COAG: NORMAL
WHOLE BLOOD HOLD SPECIMEN: NORMAL

## 2025-04-21 PROCEDURE — 87154 CUL TYP ID BLD PTHGN 6+ TRGT: CPT | Performed by: INTERNAL MEDICINE

## 2025-04-21 PROCEDURE — 83605 ASSAY OF LACTIC ACID: CPT | Performed by: EMERGENCY MEDICINE

## 2025-04-21 PROCEDURE — 87449 NOS EACH ORGANISM AG IA: CPT | Performed by: INTERNAL MEDICINE

## 2025-04-21 PROCEDURE — 87147 CULTURE TYPE IMMUNOLOGIC: CPT | Performed by: INTERNAL MEDICINE

## 2025-04-21 PROCEDURE — 99285 EMERGENCY DEPT VISIT HI MDM: CPT

## 2025-04-21 PROCEDURE — 25810000003 SODIUM CHLORIDE 0.9 % SOLUTION 250 ML FLEX CONT: Performed by: INTERNAL MEDICINE

## 2025-04-21 PROCEDURE — 87205 SMEAR GRAM STAIN: CPT | Performed by: INTERNAL MEDICINE

## 2025-04-21 PROCEDURE — 25010000002 CEFTRIAXONE PER 250 MG: Performed by: INTERNAL MEDICINE

## 2025-04-21 PROCEDURE — 85610 PROTHROMBIN TIME: CPT | Performed by: INTERNAL MEDICINE

## 2025-04-21 PROCEDURE — 63710000001 INSULIN GLARGINE PER 5 UNITS: Performed by: INTERNAL MEDICINE

## 2025-04-21 PROCEDURE — 25010000002 HEPARIN LOCK FLUSH PER 10 UNITS: Performed by: INTERNAL MEDICINE

## 2025-04-21 PROCEDURE — 83880 ASSAY OF NATRIURETIC PEPTIDE: CPT | Performed by: EMERGENCY MEDICINE

## 2025-04-21 PROCEDURE — 94640 AIRWAY INHALATION TREATMENT: CPT

## 2025-04-21 PROCEDURE — 83036 HEMOGLOBIN GLYCOSYLATED A1C: CPT | Performed by: INTERNAL MEDICINE

## 2025-04-21 PROCEDURE — 84484 ASSAY OF TROPONIN QUANT: CPT | Performed by: EMERGENCY MEDICINE

## 2025-04-21 PROCEDURE — 85025 COMPLETE CBC W/AUTO DIFF WBC: CPT | Performed by: EMERGENCY MEDICINE

## 2025-04-21 PROCEDURE — 96523 IRRIG DRUG DELIVERY DEVICE: CPT

## 2025-04-21 PROCEDURE — 93005 ELECTROCARDIOGRAM TRACING: CPT

## 2025-04-21 PROCEDURE — 83735 ASSAY OF MAGNESIUM: CPT | Performed by: EMERGENCY MEDICINE

## 2025-04-21 PROCEDURE — 0202U NFCT DS 22 TRGT SARS-COV-2: CPT | Performed by: EMERGENCY MEDICINE

## 2025-04-21 PROCEDURE — 85730 THROMBOPLASTIN TIME PARTIAL: CPT | Performed by: INTERNAL MEDICINE

## 2025-04-21 PROCEDURE — 84145 PROCALCITONIN (PCT): CPT | Performed by: EMERGENCY MEDICINE

## 2025-04-21 PROCEDURE — 82803 BLOOD GASES ANY COMBINATION: CPT

## 2025-04-21 PROCEDURE — 94760 N-INVAS EAR/PLS OXIMETRY 1: CPT

## 2025-04-21 PROCEDURE — 93005 ELECTROCARDIOGRAM TRACING: CPT | Performed by: EMERGENCY MEDICINE

## 2025-04-21 PROCEDURE — 25010000002 VANCOMYCIN 10 G RECONSTITUTED SOLUTION: Performed by: INTERNAL MEDICINE

## 2025-04-21 PROCEDURE — 87186 SC STD MICRODIL/AGAR DIL: CPT | Performed by: INTERNAL MEDICINE

## 2025-04-21 PROCEDURE — 82948 REAGENT STRIP/BLOOD GLUCOSE: CPT

## 2025-04-21 PROCEDURE — 94660 CPAP INITIATION&MGMT: CPT

## 2025-04-21 PROCEDURE — 36600 WITHDRAWAL OF ARTERIAL BLOOD: CPT

## 2025-04-21 PROCEDURE — 94799 UNLISTED PULMONARY SVC/PX: CPT

## 2025-04-21 PROCEDURE — 80053 COMPREHEN METABOLIC PANEL: CPT | Performed by: EMERGENCY MEDICINE

## 2025-04-21 PROCEDURE — 36415 COLL VENOUS BLD VENIPUNCTURE: CPT

## 2025-04-21 PROCEDURE — G0378 HOSPITAL OBSERVATION PER HR: HCPCS

## 2025-04-21 PROCEDURE — 25010000002 AZITHROMYCIN PER 500 MG: Performed by: INTERNAL MEDICINE

## 2025-04-21 PROCEDURE — 94761 N-INVAS EAR/PLS OXIMETRY MLT: CPT

## 2025-04-21 PROCEDURE — 25810000003 SODIUM CHLORIDE 0.9 % SOLUTION: Performed by: INTERNAL MEDICINE

## 2025-04-21 PROCEDURE — 87040 BLOOD CULTURE FOR BACTERIA: CPT | Performed by: INTERNAL MEDICINE

## 2025-04-21 PROCEDURE — 71045 X-RAY EXAM CHEST 1 VIEW: CPT

## 2025-04-21 RX ORDER — INSULIN LISPRO 100 [IU]/ML
8 INJECTION, SOLUTION INTRAVENOUS; SUBCUTANEOUS
Status: DISCONTINUED | OUTPATIENT
Start: 2025-04-22 | End: 2025-04-26

## 2025-04-21 RX ORDER — NITROGLYCERIN 0.4 MG/1
0.4 TABLET SUBLINGUAL
Status: DISCONTINUED | OUTPATIENT
Start: 2025-04-21 | End: 2025-04-30 | Stop reason: HOSPADM

## 2025-04-21 RX ORDER — ACETAMINOPHEN 325 MG/1
650 TABLET ORAL EVERY 4 HOURS PRN
Status: DISCONTINUED | OUTPATIENT
Start: 2025-04-21 | End: 2025-04-30 | Stop reason: HOSPADM

## 2025-04-21 RX ORDER — FERROUS SULFATE 325(65) MG
162.5 TABLET ORAL 2 TIMES DAILY
Status: DISCONTINUED | OUTPATIENT
Start: 2025-04-21 | End: 2025-04-30 | Stop reason: HOSPADM

## 2025-04-21 RX ORDER — GUAIFENESIN 600 MG/1
1200 TABLET, EXTENDED RELEASE ORAL EVERY 12 HOURS SCHEDULED
Status: DISCONTINUED | OUTPATIENT
Start: 2025-04-21 | End: 2025-04-30 | Stop reason: HOSPADM

## 2025-04-21 RX ORDER — BUDESONIDE 0.5 MG/2ML
0.5 INHALANT ORAL
Status: DISCONTINUED | OUTPATIENT
Start: 2025-04-21 | End: 2025-04-30 | Stop reason: HOSPADM

## 2025-04-21 RX ORDER — POTASSIUM CHLORIDE 750 MG/1
10 TABLET, FILM COATED, EXTENDED RELEASE ORAL 2 TIMES DAILY
Status: DISCONTINUED | OUTPATIENT
Start: 2025-04-21 | End: 2025-04-30 | Stop reason: HOSPADM

## 2025-04-21 RX ORDER — SODIUM CHLORIDE 0.9 % (FLUSH) 0.9 %
10 SYRINGE (ML) INJECTION AS NEEDED
OUTPATIENT
Start: 2025-04-21

## 2025-04-21 RX ORDER — ROSUVASTATIN CALCIUM 40 MG/1
40 TABLET, COATED ORAL DAILY
Status: DISCONTINUED | OUTPATIENT
Start: 2025-04-22 | End: 2025-04-30 | Stop reason: HOSPADM

## 2025-04-21 RX ORDER — ACETAMINOPHEN 650 MG/1
650 SUPPOSITORY RECTAL EVERY 4 HOURS PRN
Status: DISCONTINUED | OUTPATIENT
Start: 2025-04-21 | End: 2025-04-24

## 2025-04-21 RX ORDER — AMOXICILLIN 250 MG
2 CAPSULE ORAL 2 TIMES DAILY
Status: DISCONTINUED | OUTPATIENT
Start: 2025-04-21 | End: 2025-04-30 | Stop reason: HOSPADM

## 2025-04-21 RX ORDER — FOLIC ACID 1 MG/1
1 TABLET ORAL DAILY
Status: DISCONTINUED | OUTPATIENT
Start: 2025-04-22 | End: 2025-04-30 | Stop reason: HOSPADM

## 2025-04-21 RX ORDER — OXYCODONE AND ACETAMINOPHEN 5; 325 MG/1; MG/1
1 TABLET ORAL EVERY 8 HOURS PRN
Status: DISCONTINUED | OUTPATIENT
Start: 2025-04-21 | End: 2025-04-28

## 2025-04-21 RX ORDER — BISACODYL 5 MG/1
5 TABLET, DELAYED RELEASE ORAL DAILY PRN
Status: DISCONTINUED | OUTPATIENT
Start: 2025-04-21 | End: 2025-04-30 | Stop reason: HOSPADM

## 2025-04-21 RX ORDER — FAMOTIDINE 20 MG/1
20 TABLET, FILM COATED ORAL
Status: DISCONTINUED | OUTPATIENT
Start: 2025-04-22 | End: 2025-04-30 | Stop reason: HOSPADM

## 2025-04-21 RX ORDER — CETIRIZINE HYDROCHLORIDE 10 MG/1
10 TABLET ORAL DAILY
Status: DISCONTINUED | OUTPATIENT
Start: 2025-04-22 | End: 2025-04-30 | Stop reason: HOSPADM

## 2025-04-21 RX ORDER — HEPARIN SODIUM (PORCINE) LOCK FLUSH IV SOLN 100 UNIT/ML 100 UNIT/ML
500 SOLUTION INTRAVENOUS AS NEEDED
OUTPATIENT
Start: 2025-04-21

## 2025-04-21 RX ORDER — IPRATROPIUM BROMIDE AND ALBUTEROL SULFATE 2.5; .5 MG/3ML; MG/3ML
3 SOLUTION RESPIRATORY (INHALATION)
Status: DISCONTINUED | OUTPATIENT
Start: 2025-04-21 | End: 2025-04-30 | Stop reason: HOSPADM

## 2025-04-21 RX ORDER — HEPARIN SODIUM (PORCINE) LOCK FLUSH IV SOLN 100 UNIT/ML 100 UNIT/ML
500 SOLUTION INTRAVENOUS AS NEEDED
Status: DISCONTINUED | OUTPATIENT
Start: 2025-04-21 | End: 2025-04-21 | Stop reason: HOSPADM

## 2025-04-21 RX ORDER — SODIUM CHLORIDE 0.9 % (FLUSH) 0.9 %
10 SYRINGE (ML) INJECTION EVERY 12 HOURS SCHEDULED
Status: DISCONTINUED | OUTPATIENT
Start: 2025-04-21 | End: 2025-04-30 | Stop reason: HOSPADM

## 2025-04-21 RX ORDER — SODIUM CHLORIDE 9 MG/ML
40 INJECTION, SOLUTION INTRAVENOUS AS NEEDED
Status: DISCONTINUED | OUTPATIENT
Start: 2025-04-21 | End: 2025-04-30 | Stop reason: HOSPADM

## 2025-04-21 RX ORDER — BISACODYL 10 MG
10 SUPPOSITORY, RECTAL RECTAL DAILY PRN
Status: DISCONTINUED | OUTPATIENT
Start: 2025-04-21 | End: 2025-04-30 | Stop reason: HOSPADM

## 2025-04-21 RX ORDER — HYDROXYZINE PAMOATE 50 MG/1
50 CAPSULE ORAL 3 TIMES DAILY PRN
Status: DISCONTINUED | OUTPATIENT
Start: 2025-04-21 | End: 2025-04-30 | Stop reason: HOSPADM

## 2025-04-21 RX ORDER — GABAPENTIN 300 MG/1
600 CAPSULE ORAL EVERY 8 HOURS
Status: DISCONTINUED | OUTPATIENT
Start: 2025-04-21 | End: 2025-04-30 | Stop reason: HOSPADM

## 2025-04-21 RX ORDER — ONDANSETRON 2 MG/ML
4 INJECTION INTRAMUSCULAR; INTRAVENOUS EVERY 6 HOURS PRN
Status: DISCONTINUED | OUTPATIENT
Start: 2025-04-21 | End: 2025-04-30 | Stop reason: HOSPADM

## 2025-04-21 RX ORDER — ARFORMOTEROL TARTRATE 15 UG/2ML
15 SOLUTION RESPIRATORY (INHALATION)
Status: DISCONTINUED | OUTPATIENT
Start: 2025-04-21 | End: 2025-04-30 | Stop reason: HOSPADM

## 2025-04-21 RX ORDER — LORAZEPAM 0.5 MG/1
0.5 TABLET ORAL EVERY 8 HOURS PRN
Status: DISPENSED | OUTPATIENT
Start: 2025-04-21 | End: 2025-04-26

## 2025-04-21 RX ORDER — ONDANSETRON 4 MG/1
4 TABLET, ORALLY DISINTEGRATING ORAL EVERY 6 HOURS PRN
Status: DISCONTINUED | OUTPATIENT
Start: 2025-04-21 | End: 2025-04-30 | Stop reason: HOSPADM

## 2025-04-21 RX ORDER — ACETAMINOPHEN 160 MG/5ML
650 SOLUTION ORAL EVERY 4 HOURS PRN
Status: DISCONTINUED | OUTPATIENT
Start: 2025-04-21 | End: 2025-04-30 | Stop reason: HOSPADM

## 2025-04-21 RX ORDER — POLYETHYLENE GLYCOL 3350 17 G/17G
17 POWDER, FOR SOLUTION ORAL DAILY PRN
Status: DISCONTINUED | OUTPATIENT
Start: 2025-04-21 | End: 2025-04-30 | Stop reason: HOSPADM

## 2025-04-21 RX ORDER — ACETAMINOPHEN 650 MG/1
650 SUPPOSITORY RECTAL EVERY 4 HOURS PRN
Status: DISCONTINUED | OUTPATIENT
Start: 2025-04-21 | End: 2025-04-30 | Stop reason: HOSPADM

## 2025-04-21 RX ORDER — SODIUM CHLORIDE 0.9 % (FLUSH) 0.9 %
10 SYRINGE (ML) INJECTION AS NEEDED
Status: DISCONTINUED | OUTPATIENT
Start: 2025-04-21 | End: 2025-04-30 | Stop reason: HOSPADM

## 2025-04-21 RX ORDER — ACETAMINOPHEN 160 MG/5ML
650 SOLUTION ORAL EVERY 4 HOURS PRN
Status: DISCONTINUED | OUTPATIENT
Start: 2025-04-21 | End: 2025-04-24

## 2025-04-21 RX ORDER — ACETAMINOPHEN 325 MG/1
650 TABLET ORAL EVERY 4 HOURS PRN
Status: DISCONTINUED | OUTPATIENT
Start: 2025-04-21 | End: 2025-04-24

## 2025-04-21 RX ORDER — DIPHENOXYLATE HYDROCHLORIDE AND ATROPINE SULFATE 2.5; .025 MG/1; MG/1
1 TABLET ORAL DAILY
Status: DISCONTINUED | OUTPATIENT
Start: 2025-04-22 | End: 2025-04-30 | Stop reason: HOSPADM

## 2025-04-21 RX ORDER — SODIUM CHLORIDE 0.9 % (FLUSH) 0.9 %
10 SYRINGE (ML) INJECTION AS NEEDED
Status: DISCONTINUED | OUTPATIENT
Start: 2025-04-21 | End: 2025-04-21 | Stop reason: HOSPADM

## 2025-04-21 RX ORDER — POTASSIUM CHLORIDE 1500 MG/1
20 TABLET, EXTENDED RELEASE ORAL ONCE
Status: COMPLETED | OUTPATIENT
Start: 2025-04-21 | End: 2025-04-21

## 2025-04-21 RX ORDER — ALBUTEROL SULFATE 0.83 MG/ML
2.5 SOLUTION RESPIRATORY (INHALATION) EVERY 4 HOURS PRN
Status: DISCONTINUED | OUTPATIENT
Start: 2025-04-21 | End: 2025-04-30 | Stop reason: HOSPADM

## 2025-04-21 RX ADMIN — Medication 10 ML: at 23:31

## 2025-04-21 RX ADMIN — ACETAMINOPHEN 650 MG: 325 TABLET, FILM COATED ORAL at 20:30

## 2025-04-21 RX ADMIN — POTASSIUM CHLORIDE 20 MEQ: 1500 TABLET, EXTENDED RELEASE ORAL at 20:30

## 2025-04-21 RX ADMIN — GUAIFENESIN 1200 MG: 600 TABLET, EXTENDED RELEASE ORAL at 20:30

## 2025-04-21 RX ADMIN — SENNOSIDES AND DOCUSATE SODIUM 2 TABLET: 50; 8.6 TABLET ORAL at 21:19

## 2025-04-21 RX ADMIN — INSULIN GLARGINE 30 UNITS: 100 INJECTION, SOLUTION SUBCUTANEOUS at 22:01

## 2025-04-21 RX ADMIN — Medication 500 UNITS: at 14:19

## 2025-04-21 RX ADMIN — VANCOMYCIN HYDROCHLORIDE 2500 MG: 10 INJECTION, POWDER, LYOPHILIZED, FOR SOLUTION INTRAVENOUS at 20:35

## 2025-04-21 RX ADMIN — MAGNESIUM OXIDE TAB 400 MG (241.3 MG ELEMENTAL MG) 400 MG: 400 (241.3 MG) TAB at 23:35

## 2025-04-21 RX ADMIN — CEFTRIAXONE 2000 MG: 2 INJECTION, POWDER, FOR SOLUTION INTRAMUSCULAR; INTRAVENOUS at 17:55

## 2025-04-21 RX ADMIN — IPRATROPIUM BROMIDE AND ALBUTEROL SULFATE 3 ML: .5; 3 SOLUTION RESPIRATORY (INHALATION) at 20:00

## 2025-04-21 RX ADMIN — BUDESONIDE 0.5 MG: 0.5 INHALANT RESPIRATORY (INHALATION) at 20:01

## 2025-04-21 RX ADMIN — FERROUS SULFATE TAB 325 MG (65 MG ELEMENTAL FE) 162.5 MG: 325 (65 FE) TAB at 21:19

## 2025-04-21 RX ADMIN — Medication 10 ML: at 14:19

## 2025-04-21 RX ADMIN — SODIUM CHLORIDE 500 MG: 9 INJECTION, SOLUTION INTRAVENOUS at 18:56

## 2025-04-21 RX ADMIN — GABAPENTIN 600 MG: 300 CAPSULE ORAL at 21:19

## 2025-04-21 NOTE — TELEPHONE ENCOUNTER
Returned call to patient's wife who states Dr Dodd ordered a Thoracentesis for patient scheduled for 4/28/25. Patient's wife states patient needs Thoracentesis sooner due to increase SOA. Explained to patient's wife she needs to call Dr Dodd's office since he is managing patient's Thoracentesis and if they cannot get patient in soon AND patient's wife thinks his SOA is increasing and cannot wait until 4/28/25 then patient needs to go to ER.    Patient's wife v/u

## 2025-04-21 NOTE — PROGRESS NOTES
Pt came in for port flush and appeared tachypneic. Pt c/o increased SOA upon exertion. Pt had home oxygen on but ran out of oxygen. Sats checked and 02 was 77%. S/w Queta RN who agreed pt should go to the ER if Dr. Dodd had not gotten back with pt about scheduling a thoracentesis. Pt had not heard from Dr. Dodd yet. Pt and wife agreeable to go to the er. Pt placed on 4 L NC and wheeled to the ER. Handed off care to ER triage RN.

## 2025-04-21 NOTE — CONSULTS
Referring Provider: Dr. Rigo Eng  Reason for Consultation: Pleural effusion    Patient Care Team:  Tino Lopez MD as PCP - General (Internal Medicine)  Tino Lopez MD as PCP - Family Medicine  Mart Ureña MD as Consulting Physician (Cardiology)  Martir Trevino MD as Surgeon (General Surgery)  Dione Carter, RN as Nurse Navigator  David Figueroa MD as Referring Physician (Thoracic Surgery)  Duy Villasenor MD PhD as Consulting Physician (Hematology and Oncology)  Yashira Mendiola MD as Consulting Physician (Radiation Oncology)    Chief complaint:   Transferred to the ED for hypoxia    History of present illness:    Subjective   This is a 73-year-old male patient, former smoker with history of COPD.  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  Patient had recurrent pleural effusion and underwent right thoracentesis on 11/30/2024 with removal of 800 mL, 1/23/2025 with removal of 1 L and 3/2/2025 with removal of 1.2 L.  All were negative for malignancy but exudative by LDH.    Patient was treated with Keytruda but experienced pneumonitis and therefore therapy was discontinued and he was treated with prednisone 40 mg daily for 7 days during his recent hospitalization in early March and advised to continue prednisone 20 mg daily for 21 days thereafter which he finished on 4/15.      Patient was at his oncologist office today to have his port flushed when he was noted to be dyspneic and had low SpO2.  He was sent to the ED.  He had a low-grade fever of 100.9 in the ED. WBC and Procal normal.     Patient indicated worsening dyspnea started on Thursday.  No change in his cough which is mild and sometimes productive.  He also reported weight gain of 4 LB over the last week.  He stated that he is compliant with the diuretics.      Seen by thoracic surgery on 2/18/2025 and was recommended to continue surveillance for pleural effusion and  recommended indwelling pleural catheter if recurrence.    Echo 12/1/2024: EF 59%; RVSP 38; LA severely dilated; small PFO.    Review of Systems  Constitutional: No fever or chills.   ENMT: No sinus congestion  Cardiovascular: No chest pain, palpitation but legs swelling.    Respiratory: See above.  Gastrointestinal: No constipation, diarrhea or abdominal pain   Neurology: No headache, weakness, numbness or dizziness.   Musculoskeletal: No joints pain, stiffness or swelling.   Psychiatry: No depression.  Genitourinary: No dysuria or frequent urination  Endo: No weight changes. No cold or warm intolerance.  Lymphatic: No swollen glands.  Integumentary: Rash, improved significantly with steroid.    History  Past Medical History:   Diagnosis Date    Anxiety     Arthritis     Atrial fibrillation     CURRENTLY    Bunion of right foot     Colon polyps     COPD (chronic obstructive pulmonary disease)     MILD. NO MEDS FOR    Depression     History of atrial fibrillation     WITH CARDIOVERSION. NO PROBLEMS    History of skin cancer     BCC REMOVED FROM BACK    [place]     Hyperlipidemia     Inguinal hernia, recurrent     RIGHT    Left foot pain     Lung nodules     Rash     IN GROIN TREATING FOR YEAST INFECTION BY PCP    Redness of skin     LOWER LEGS BILATERAL    Scab     BILATERAL LEGS HEALING    Sleep apnea with use of continuous positive airway pressure (CPAP)     CPAP    Streptococcus pneumoniae     ABOUT 6-7 YR AGO.     Type 2 diabetes mellitus    ,   Past Surgical History:   Procedure Laterality Date    BASAL CELL CARCINOMA EXCISION      BRONCHOSCOPY WITH ION ROBOTIC ASSIST N/A 5/6/2024    Procedure: BRONCHOSCOPY WITH ION ROBOT WITH FNA, BIOPSIES, BAL AND ENDOBRONCHIAL ULTRASOUND WITH FNA;  Surgeon: Yony Coles MD;  [facility] ENDOSCOPY;  Service: Robotics - Pulmonary;  Laterality: N/A;  PRE: PULMONARY NODULES  POST: SAME    CARDIAC CATHETERIZATION N/A 11/15/2021    Procedure: Coronary  angiography;  Surgeon: Alfredo Jennings MD;  Location: [facility] CATH INVASIVE LOCATION;  Service: Cardiovascular;  Laterality: N/A;    CARDIAC CATHETERIZATION N/A 11/15/2021    Procedure: Left heart cath;  Surgeon: Alfredo Jennings MD;  Location: [facility] CATH INVASIVE LOCATION;  Service: Cardiovascular;  Laterality: N/A;    CARDIAC CATHETERIZATION N/A 11/15/2021    Procedure: Left ventriculography;  Surgeon: Alfredo Jennings MD;  Location: [facility] CATH INVASIVE LOCATION;  Service: Cardiovascular;  Laterality: N/A;    CARDIAC CATHETERIZATION N/A 11/15/2021    Procedure: Aortic root aortogram;  Surgeon: Alfredo Jennings MD;  Location: [facility] CATH INVASIVE LOCATION;  Service: Cardiovascular;  Laterality: N/A;    CARDIOVERSION      CHOLECYSTECTOMY N/A 10/07/2017    Procedure: CHOLECYSTECTOMY LAPAROSCOPIC;  Surgeon: Martir Trevino MD;  Location: [facility] MAIN OR;  Service:     COLONOSCOPY  2007    COLONOSCOPY N/A 8/30/2022    Procedure: COLONOSCOPY TO CECUM AND TI AND COLD SNARE POLYPECTOMY;  Surgeon: Cj Lopez MD;  Location: [facility] ENDOSCOPY;  Service: Gastroenterology;  Laterality: N/A;  Pre: History of colon polyps  Post: POLYP, PREVIOUS TATTOO    FOOT SURGERY Left     TOES ARE PINNED. ALL BUT GREAT TOE    HAND SURGERY      THUMB    HERNIA REPAIR      INGUINAL HERNIA REPAIR Right 06/27/2018    Procedure: INGUINAL HERNIA REPAIR, OPEN, RECURRENT;  Surgeon: Martir Trevino MD;  Location: [facility] OR OSC;  Service: General    LASIK Bilateral     LOBECTOMY Right 6/12/2024    Procedure: BRONCHOSCOPY, RIGHT VIDEO ASSISTED THORACOSCOPY WITH RIGHT MIDDLE LOBECTOMY WITH DAVINCI ROBOT, MEDIASTINAL LYMPH NODE DISSECTION, INTERCOSTAL NERVE BLOCK;  Surgeon: David Figueroa MD;  Location: [facility] MAIN OR;  Service: Robotics - DaVinci;  Laterality: Right;    NECK SURGERY      growth on salivary gland    REPLACEMENT TOTAL KNEE Right 2007    (he is going to have a LTKR next month)    REPLACEMENT TOTAL KNEE Left     VENOUS  ACCESS DEVICE (PORT) INSERTION Right 2024    Procedure: INSERTION VENOUS ACCESS DEVICE;  Surgeon: David Figueroa MD;  Location: [facility] OR;  Service: Thoracic;  Laterality: Right;   ,   Family History   Problem Relation Age of Onset    Cancer Other     Stroke Mother     Alcohol abuse Father     Malig Hyperthermia Neg Hx    ,   Social History     Socioeconomic History    Marital status:      Spouse name: Luba    Number of children: 2   Tobacco Use    Smoking status: Former     Current packs/day: 0.00     Average packs/day: 1 pack/day for 30.0 years (30.0 ttl pk-yrs)     Types: Cigars, Cigarettes     Start date: 1984     Quit date: 2014     Years since quittin.3    Smokeless tobacco: Never   Vaping Use    Vaping status: Never Used   Substance and Sexual Activity    Alcohol use: Never     Comment: caffeine use- decaf coffee    Drug use: Never    Sexual activity: Defer     E-cigarette/Vaping    E-cigarette/Vaping Use Never User      E-cigarette/Vaping Substances    Nicotine No     THC No     CBD No     Flavoring No      E-cigarette/Vaping Devices    Disposable No     Pre-filled or Refillable Cartridge No     Refillable Tank No     Pre-filled Pod No          , (Not in a hospital admission) , Scheduled Meds:   , Continuous Infusions:   , PRN Meds:    sodium chloride, and Allergies:  Patient has no known allergies.    Objective     Vital Signs   Temp:  [99.3 °F (37.4 °C)-100.9 °F (38.3 °C)] 99.3 °F (37.4 °C)  Heart Rate:  [89] 89  Resp:  [24] 24  BP: (165)/(94) 165/94    PPE used per hospital policy    Physical Exam:  Constitutional: Not in acute distress.  Eyes: Injected conjunctivae, EOMI. pupils equal reactive to light.  ENMT: Recio 4. No oral thrush.   Neck: Large. Trachea midline. No thyromegaly  Heart: RRR, no murmur  Lungs: Equal but diminished air entry throughout.  Coarse breath sounds mostly at the bases.  Abdomen: Obese. Soft. No tenderness or dullness. No HSM.  Extremities: No  cyanosis, clubbing but grade 2-3 pitting edema.  Warm extremities and well-perfused.  Neuro: Conscious, alert, oriented x3.  Strength 5/5 in arms.  Psych: Appropriate mood and affect.    Integumentary: No rash..  Normal skin turgor  Lymphatic: No palpable cervical or supraclavicular lymph nodes.      Diagnostic imaging:  I personally and independently reviewed the following images:   CXR 4/21/25: Stable compared to previously with evidence of right pleural effusion and bilateral pulmonary infiltrates more noticeable on recent CT chest 4/14/2025.    Laboratory workup:    Results from last 7 days   Lab Units 04/21/25  1455   SODIUM mmol/L 143   POTASSIUM mmol/L 3.4*   CHLORIDE mmol/L 100   CO2 mmol/L 31.9*   BUN mg/dL 10   CREATININE mg/dL 0.77   GLUCOSE mg/dL 99   CALCIUM mg/dL 9.6     Results from last 7 days   Lab Units 04/21/25  1455   HSTROP T ng/L 25*     Results from last 7 days   Lab Units 04/21/25  1455   WBC 10*3/mm3 8.18   HEMOGLOBIN g/dL 11.9*   HEMATOCRIT % 38.0   PLATELETS 10*3/mm3 226         Results from last 7 days   Lab Units 04/21/25  1455   PROBNP pg/mL 416.0       Assessment   Right pleural effusion, recurrent.  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  On O2 4 L/minute baseline    A-fib, on AC with Xarelto  JUAN, on CPAP  IDDM type II    Recommendations:    Initiate bronchodilators with DuoNeb 4 times a day Pulmicort and Brovana twice daily.  Incentive spirometry  Consult thoracic surgery for Pleurx catheter placement.  Will not order thoracentesis now to allow room for insertion of the pleural catheter,  If indicated,  Will watch clinically after t drainage of the pleural fluid and could consider repeating CT +/- steroid therapy for pneumonitis    Hold Xarelto in anticipation of procedure  Resume diuretic therapy.  Low-salt diet  Lovenox  1 mg/KG twice daily      Boni Barnes MD  04/21/25  15:55 EDT

## 2025-04-21 NOTE — ED NOTES
Nursing report ED to floor  Branden Diop  74 y.o.  male    HPI :  HPI  Stated Reason for Visit: SOA from CBC; 87% on home 4L NC; pt just discovered home 02 tank empty  History Obtained From: patient    Chief Complaint  Chief Complaint   Patient presents with    Shortness of Breath       Admitting doctor:   Rigo Eng MD    Admitting diagnosis:   The primary encounter diagnosis was Hypoxia. Diagnoses of Dyspnea, unspecified type, Recurrent pleural effusion, and History of lung cancer were also pertinent to this visit.    Code status:   Current Code Status       Date Active Code Status Order ID Comments User Context       4/21/2025 1711 CPR (Attempt to Resuscitate) [id number]  Rigo Eng MD ED        Question Answer    Code Status (Patient has no pulse and is not breathing) CPR (Attempt to Resuscitate)    Medical Interventions (Patient has pulse or is breathing) Full Support                    Allergies:   Patient has no known allergies.    Isolation:   Enhanced Droplet/Contact     Intake and Output  No intake or output data in the 24 hours ending 04/21/25 1753    Weight:   There were no vitals filed for this visit.    Most recent vitals:   Vitals:    04/21/25 1606 04/21/25 1630 04/21/25 1631 04/21/25 1653   BP: 143/73      Pulse: 81 81 77 88   Resp:       Temp:       TempSrc:       SpO2: 92% (!) 88% 90% (!) 86%       Active LDAs/IV Access:   Lines, Drains & Airways       Active LDAs       Name Placement date Placement time Site Days    Peripheral IV 04/21/25 1515 20 G Right Antecubital 04/21/25  1515  Antecubital  less than 1    Single Lumen Implantable Port Right Chest --  --  Chest  --                    Labs (abnormal labs have a star):   Labs Reviewed   COMPREHENSIVE METABOLIC PANEL - Abnormal; Notable for the following components:       Result Value    Potassium 3.4 (*)     CO2 31.9 (*)     Alkaline Phosphatase 119 (*)     All other components within normal limits    Narrative:     GFR Categories in  Chronic Kidney Disease (CKD)      GFR Category          GFR (mL/min/1.73)    Interpretation  G1                     90 or greater         Normal or high (1)  G2                      60-89                Mild decrease (1)  G3a                   45-59                Mild to moderate decrease  G3b                   30-44                Moderate to severe decrease  G4                    15-29                Severe decrease  G5                    14 or less           Kidney failure          (1)In the absence of evidence of kidney disease, neither GFR category G1 or G2 fulfill the criteria for CKD.    eGFR calculation 2021 CKD-EPI creatinine equation, which does not include race as a factor   TROPONIN - Abnormal; Notable for the following components:    HS Troponin T 25 (*)     All other components within normal limits    Narrative:     High Sensitive Troponin T Reference Range:  <14.0 ng/L- Negative Female for AMI  <22.0 ng/L- Negative Male for AMI  >=14 - Abnormal Female indicating possible myocardial injury.  >=22 - Abnormal Male indicating possible myocardial injury.   Clinicians would have to utilize clinical acumen, EKG, Troponin, and serial changes to determine if it is an Acute Myocardial Infarction or myocardial injury due to an underlying chronic condition.        CBC WITH AUTO DIFFERENTIAL - Abnormal; Notable for the following components:    Hemoglobin 11.9 (*)     MCHC 31.3 (*)     RDW 16.1 (*)     Lymphocyte % 10.9 (*)     Monocyte % 4.2 (*)     Eosinophil % 7.9 (*)     Immature Grans % 0.6 (*)     Eosinophils, Absolute 0.65 (*)     All other components within normal limits   HIGH SENSITIVITIY TROPONIN T 1HR - Abnormal; Notable for the following components:    HS Troponin T 28 (*)     All other components within normal limits    Narrative:     High Sensitive Troponin T Reference Range:  <14.0 ng/L- Negative Female for AMI  <22.0 ng/L- Negative Male for AMI  >=14 - Abnormal Female indicating possible  myocardial injury.  >=22 - Abnormal Male indicating possible myocardial injury.   Clinicians would have to utilize clinical acumen, EKG, Troponin, and serial changes to determine if it is an Acute Myocardial Infarction or myocardial injury due to an underlying chronic condition.        HEMOGLOBIN A1C - Abnormal; Notable for the following components:    Hemoglobin A1C 8.80 (*)     All other components within normal limits    Narrative:     Hemoglobin A1C Ranges:    Increased Risk for Diabetes  5.7% to 6.4%  Diabetes                     >= 6.5%  Diabetic Goal                < 7.0%   RESPIRATORY PANEL PCR W/ COVID-19 (SARS-COV-2), NP SWAB IN UTM/VTP, 2 HR TAT - Normal    Narrative:     In the setting of a positive respiratory panel with a viral infection PLUS a negative procalcitonin without other underlying concern for bacterial infection, consider observing off antibiotics or discontinuation of antibiotics and continue supportive care. If the respiratory panel is positive for atypical bacterial infection (Bordetella pertussis, Chlamydophila pneumoniae, or Mycoplasma pneumoniae), consider antibiotic de-escalation to target atypical bacterial infection.   BNP (IN-HOUSE) - Normal    Narrative:     This assay is used as an aid in the diagnosis of individuals suspected of having heart failure. It can be used as an aid in the diagnosis of acute decompensated heart failure (ADHF) in patients presenting with signs and symptoms of ADHF to the emergency department (ED). In addition, NT-proBNP of <300 pg/mL indicates ADHF is not likely.    Age Range Result Interpretation  NT-proBNP Concentration (pg/mL:      <50             Positive            >450                   Gray                 300-450                    Negative             <300    50-75           Positive            >900                  Gray                300-900                  Negative            <300      >75             Positive            >1800                 "  Dow                300-1800                  Negative            <300   LACTIC ACID, PLASMA - Normal   PROCALCITONIN - Normal    Narrative:     As a Marker for Sepsis (Non-Neonates):    1. <0.5 ng/mL represents a low risk of severe sepsis and/or septic shock.  2. >2 ng/mL represents a high risk of severe sepsis and/or septic shock.    As a Marker for Lower Respiratory Tract Infections that require antibiotic therapy:    PCT on Admission    Antibiotic Therapy       6-12 Hrs later    >0.5                Strongly Recommended  >0.25 - <0.5        Recommended   0.1 - 0.25          Discouraged              Remeasure/reassess PCT  <0.1                Strongly Discouraged     Remeasure/reassess PCT    As 28 day mortality risk marker: \"Change in Procalcitonin Result\" (>80% or <=80%) if Day 0 (or Day 1) and Day 4 values are available. Refer to [email or web address] [email or web address]    Change in PCT <=80%  A decrease of PCT levels below or equal to 80% defines a positive change in PCT test result representing a higher risk for 28-day all-cause mortality of patients diagnosed with severe sepsis for septic shock.    Change in PCT >80%  A decrease of PCT levels of more than 80% defines a negative change in PCT result representing a lower risk for 28-day all-cause mortality of patients diagnosed with severe sepsis or septic shock.      MAGNESIUM - Normal   BLOOD CULTURE   BLOOD CULTURE   RESPIRATORY CULTURE   LEGIONELLA ANTIGEN, URINE   STREP PNEUMO AG, URINE OR CSF   MRSA SCREEN, PCR   RAINBOW DRAW    Narrative:     The following orders were created for panel order [place] Draw.  Procedure                               Abnormality         Status                     ---------                               -----------         ------                     Green Top (Gel)[[id number]]                                  Final result               Lavender Top[[id number]]                                     Final result               Gold " Top - SST[[id number]]                                   Final result               Light Blue Top[[id number]]                                   Final result                 Please view results for these tests on the individual orders.   CBC AND DIFFERENTIAL    Narrative:     The following orders were created for panel order CBC & Differential.  Procedure                               Abnormality         Status                     ---------                               -----------         ------                     CBC Auto Differential[[id number]]        Abnormal            Final result                 Please view results for these tests on the individual orders.   GREEN TOP   LAVENDER TOP   GOLD TOP - SST   LIGHT BLUE TOP       EKG:   ECG 12 Lead ED Triage Standing Order; SOA   Preliminary Result   HEART RATE=78  bpm   RR Ynidulxp=857  ms   CA Interval=  ms   P Horizontal Axis=  deg   P Front Axis=  deg   QRSD Interval=95  ms   QT Anrusocy=472  ms   JJeW=607  ms   QRS Axis=32  deg   T Wave Axis=72  deg   - ABNORMAL ECG -   Atrial fibrillation   Low voltage, extremity leads   Minimal ST depression, lateral leads   Date and Time of Study:2025-04-21 14:52:48          Meds given in ED:   Medications   sodium chloride 0.9 % flush 10 mL (has no administration in time range)   ipratropium-albuterol (DUO-NEB) nebulizer solution 3 mL (has no administration in time range)   budesonide (PULMICORT) nebulizer solution 0.5 mg (has no administration in time range)   guaiFENesin (MUCINEX) 12 hr tablet 1,200 mg (has no administration in time range)   cefTRIAXone (ROCEPHIN) 2,000 mg in sodium chloride 0.9 % 100 mL MBP (has no administration in time range)   azithromycin (ZITHROMAX) 500 mg in sodium chloride 0.9 % 250 mL IVPB-VTB (has no administration in time range)   arformoterol (BROVANA) nebulizer solution 15 mcg (has no administration in time range)   Pharmacy to dose vancomycin (has no administration in time range)       Imaging  results:  XR Chest 1 View  Result Date: 2025  1. Underinflation of the lungs with some vascular crowding and there may be mild vascular congestion. 2. Patchy areas of increased density in the right upper lobe and bilateral lower lungs as described above. Please correlate. Follow-up films also recommended   This report was finalized on 2025 3:44 PM by Dr. David Ruiz M.D on Workstation: [id number]        Ambulatory status:   - bed rest    Social issues:   Social History     Socioeconomic History    Marital status:      Spouse name: Luba    Number of children: 2   Tobacco Use    Smoking status: Former     Current packs/day: 0.00     Average packs/day: 1 pack/day for 30.0 years (30.0 ttl pk-yrs)     Types: Cigars, Cigarettes     Start date: 1984     Quit date: 2014     Years since quittin.3    Smokeless tobacco: Never   Vaping Use    Vaping status: Never Used   Substance and Sexual Activity    Alcohol use: Never     Comment: caffeine use- decaf coffee    Drug use: Never    Sexual activity: Defer       Peripheral Neurovascular  Peripheral Neurovascular (Adult)  Peripheral Neurovascular WDL: .WDL except    Neuro Cognitive  Neuro Cognitive (Adult)  Cognitive/Neuro/Behavioral WDL: WDL    Learning  Learning Assessment  Learning Readiness and Ability: no barriers identified  Education Provided  Person Taught: patient    Respiratory  Respiratory WDL  Respiratory WDL: .WDL except, all  Rhythm/Pattern, Respiratory: shortness of breath    Abdominal Pain       Pain Assessments  Pain (Adult)  (0-10) Pain Rating: Rest: 0    NIH Stroke Scale       Blessing Wong RN  25 17:53 EDT

## 2025-04-21 NOTE — ED PROVIDER NOTES
EMERGENCY DEPARTMENT ENCOUNTER    Room Number:  09/09  PCP: Tino Lopez MD  Historian: Patient, wife      HPI:  Chief Complaint: Shortness of breath  A complete HPI/ROS/PMH/PSH/SH/FH are unobtainable due to: None    Context: Branden Diop is a 74 y.o. male who presents to the ED via private vehicle c/o acute shortness of breath, chills.  Patient with history of recurrent pleural effusions, scheduled for an outpatient ultrasound thoracentesis next week.  Was sent over today for low oxygen levels at oncology office when he had his port flushed and was found to be in the 80s.  Oxygen was out of oxygen unbeknownst to him.       MEDICAL RECORD REVIEW    External (non-ED) record review: CT chest without contrast reviewed in epic from April 14, 2025, stable appearing CT scan is compared to February 28, 2025, moderate pleural effusion with circumferential right upper lung pleural thickening.  5 mm and 8 mm nodules noted which are unchanged.  Moderate pulmonary emphysema without change.              PAST MEDICAL HISTORY  Active Ambulatory Problems     Diagnosis Date Noted    A-fib 01/29/2016    Atrioventricular block, Mobitz type 1, Wenckebach 01/29/2016    Apnea, sleep 01/29/2016    Obesity 01/29/2016    Streptococcus pneumoniae     Sleep apnea with use of continuous positive airway pressure (CPAP)     Sinusitis     Right wrist pain 11/11/2015    Pneumonia 12/01/2013    Type 2 diabetes mellitus, with long-term current use of insulin     Hyperlipidemia     Hammertoe     Depression     COPD (chronic obstructive pulmonary disease)     Colon polyps     Anxiety     Pruritus 04/05/2016    Cholelithiasis 10/06/2017    Fatty liver 10/09/2017    Essential hypertension 05/24/2019    Vitamin D deficiency 08/10/2020    Emphysema, unspecified 10/14/2021    Bronchiectasis with acute exacerbation 10/14/2021    FHx: colonic polyps 08/30/2022    Diverticulosis 08/30/2022    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  05/30/2024    Malignant neoplasm of middle lobe of right lung 05/30/2024    Fluid collection at surgical site, initial encounter 06/20/2024    Encounter for long-term (current) use of high-risk medication 07/12/2024    Fitting and adjustment of vascular catheter 07/12/2024    Anemia associated with chemotherapy 10/10/2024    Peripheral neuropathy due to chemotherapy 11/06/2024    Acute respiratory failure 11/29/2024    Rash in adult 11/30/2024    Bone mass 11/30/2024    Drug-induced skin rash 12/25/2024    Adverse effect of antineoplastic drug 12/25/2024    Pleural effusion on right 12/25/2024    Acute on chronic hypoxic respiratory failure 01/22/2025    Hypoxemia 01/23/2025    Drug-induced pneumonitis 01/23/2025    Acute on chronic respiratory failure 02/27/2025    Dyspnea on exertion 02/27/2025    Hyponatremia 03/23/2025     Resolved Ambulatory Problems     Diagnosis Date Noted    BP (high blood pressure) 01/29/2016    Atrial fibrillation     Atrial flutter 08/25/2016    Acute cholecystitis 10/06/2017    Right upper quadrant pain 10/06/2017    Recurrent inguinal hernia of right side without obstruction or gangrene 06/13/2018    Uncontrolled type 2 diabetes mellitus with hyperglycemia 04/25/2019    Chest pain, atypical 11/11/2021     Past Medical History:   Diagnosis Date    Arthritis     Bunion of right foot     History of atrial fibrillation     History of skin cancer     [place] (hard of hearing)     Inguinal hernia, recurrent     Left foot pain     Lung nodules     Rash     Redness of skin     Scab     Type 2 diabetes mellitus          PAST SURGICAL HISTORY  Past Surgical History:   Procedure Laterality Date    BASAL CELL CARCINOMA EXCISION      BRONCHOSCOPY WITH ION ROBOTIC ASSIST N/A 5/6/2024    Procedure: BRONCHOSCOPY WITH ION ROBOT WITH FNA, BIOPSIES, BAL AND ENDOBRONCHIAL ULTRASOUND WITH FNA;  Surgeon: Yony Coles MD;  Location: [facility] ENDOSCOPY;  Service: Robotics - Pulmonary;  Laterality: N/A;  PRE:  PULMONARY NODULES  POST: SAME    CARDIAC CATHETERIZATION N/A 11/15/2021    Procedure: Coronary angiography;  Surgeon: Alfredo Jennings MD;  Location: [facility] CATH INVASIVE LOCATION;  Service: Cardiovascular;  Laterality: N/A;    CARDIAC CATHETERIZATION N/A 11/15/2021    Procedure: Left heart cath;  Surgeon: Alfredo Jennings MD;  Location: [facility] CATH INVASIVE LOCATION;  Service: Cardiovascular;  Laterality: N/A;    CARDIAC CATHETERIZATION N/A 11/15/2021    Procedure: Left ventriculography;  Surgeon: Alfredo Jennings MD;  Location: [facility] CATH INVASIVE LOCATION;  Service: Cardiovascular;  Laterality: N/A;    CARDIAC CATHETERIZATION N/A 11/15/2021    Procedure: Aortic root aortogram;  Surgeon: Alfredo Jennings MD;  Location: [facility] CATH INVASIVE LOCATION;  Service: Cardiovascular;  Laterality: N/A;    CARDIOVERSION      CHOLECYSTECTOMY N/A 10/07/2017    Procedure: CHOLECYSTECTOMY LAPAROSCOPIC;  Surgeon: Martir Trevino MD;  Location: [facility] MAIN OR;  Service:     COLONOSCOPY  2007    COLONOSCOPY N/A 8/30/2022    Procedure: COLONOSCOPY TO CECUM AND TI AND COLD SNARE POLYPECTOMY;  Surgeon: Cj Lopez MD;  Location: [facility] ENDOSCOPY;  Service: Gastroenterology;  Laterality: N/A;  Pre: History of colon polyps  Post: POLYP, PREVIOUS TATTOO    FOOT SURGERY Left     TOES ARE PINNED. ALL BUT GREAT TOE    HAND SURGERY      THUMB    HERNIA REPAIR      INGUINAL HERNIA REPAIR Right 06/27/2018    Procedure: INGUINAL HERNIA REPAIR, OPEN, RECURRENT;  Surgeon: Martir rTevino MD;  Location: [facility] OR OSC;  Service: General    LASIK Bilateral     LOBECTOMY Right 6/12/2024    Procedure: BRONCHOSCOPY, RIGHT VIDEO ASSISTED THORACOSCOPY WITH RIGHT MIDDLE LOBECTOMY WITH DAVINCI ROBOT, MEDIASTINAL LYMPH NODE DISSECTION, INTERCOSTAL NERVE BLOCK;  Surgeon: David Figueroa MD;  Location: [facility] MAIN OR;  Service: Robotics - DaVinci;  Laterality: Right;    NECK SURGERY      growth on salivary gland    REPLACEMENT TOTAL KNEE  Right     (he is going to have a LTKR next month)    REPLACEMENT TOTAL KNEE Left     VENOUS ACCESS DEVICE (PORT) INSERTION Right 2024    Procedure: INSERTION VENOUS ACCESS DEVICE;  Surgeon: David Figueroa MD;  Location: [facility] OR;  Service: Thoracic;  Laterality: Right;         FAMILY HISTORY  Family History   Problem Relation Age of Onset    Cancer Other     Stroke Mother     Alcohol abuse Father     Malnathan Hyperthermia Neg Hx          SOCIAL HISTORY  Social History     Socioeconomic History    Marital status:      Spouse name: Luba    Number of children: 2   Tobacco Use    Smoking status: Former     Current packs/day: 0.00     Average packs/day: 1 pack/day for 30.0 years (30.0 ttl pk-yrs)     Types: Cigars, Cigarettes     Start date: 1984     Quit date: 2014     Years since quittin.3    Smokeless tobacco: Never   Vaping Use    Vaping status: Never Used   Substance and Sexual Activity    Alcohol use: Never     Comment: caffeine use- decaf coffee    Drug use: Never    Sexual activity: Defer         ALLERGIES  Patient has no known allergies.        REVIEW OF SYSTEMS  Review of Systems     All systems reviewed and negative except for those discussed in HPI.       PHYSICAL EXAM    I have reviewed the triage vital signs and nursing notes.    ED Triage Vitals   Temp Heart Rate Resp BP SpO2   25 1443 25 1443 25 1443 25 1506 25 1443   (!) 100.9 °F (38.3 °C) 89 24 165/94 96 %      Temp src Heart Rate Source Patient Position BP Location FiO2 (%)   25 1443 -- -- -- --   Tympanic           Physical Exam  General: No acute distress, nontoxic  HEENT: Mucous membranes moist, atraumatic, EOMI  Neck: Full ROM  Pulm: Symmetric chest rise, nonlabored, lungs diminished bilaterally without overt wheezing, rales  Cardiovascular: Regular rate and rhythm, intact distal pulses, trace bilateral pitting lower extremity edema  GI: Soft, nontender, nondistended, no rebound, no  guarding, bowel sounds present  MSK: Full ROM, no deformity  Skin: Warm, dry  Neuro: Awake, alert, oriented x 4, GCS 15, moving all extremities, no focal deficits  Psych: Calm, cooperative      I wore an N95 mask, eye protection, and gloves used during this encounter.       LAB RESULTS  Recent Results (from the past 24 hours)   ECG 12 Lead ED Triage Standing Order; SOA    Collection Time: 04/21/25  2:52 PM   Result Value Ref Range    QT Interval 373 ms    QTC Interval 426 ms   Comprehensive Metabolic Panel    Collection Time: 04/21/25  2:55 PM    Specimen: Arm, Right; Blood   Result Value Ref Range    Glucose 99 65 - 99 mg/dL    BUN 10 8 - 23 mg/dL    Creatinine 0.77 0.76 - 1.27 mg/dL    Sodium 143 136 - 145 mmol/L    Potassium 3.4 (L) 3.5 - 5.2 mmol/L    Chloride 100 98 - 107 mmol/L    CO2 31.9 (H) 22.0 - 29.0 mmol/L    Calcium 9.6 8.6 - 10.5 mg/dL    Total Protein 7.2 6.0 - 8.5 g/dL    Albumin 3.9 3.5 - 5.2 g/dL    ALT (SGPT) 15 1 - 41 U/L    AST (SGOT) 25 1 - 40 U/L    Alkaline Phosphatase 119 (H) 39 - 117 U/L    Total Bilirubin 0.9 0.0 - 1.2 mg/dL    Globulin 3.3 gm/dL    A/G Ratio 1.2 g/dL    BUN/Creatinine Ratio 13.0 7.0 - 25.0    Anion Gap 11.1 5.0 - 15.0 mmol/L    eGFR 92.8 >60.0 mL/min/1.73   BNP    Collection Time: 04/21/25  2:55 PM    Specimen: Arm, Right; Blood   Result Value Ref Range    proBNP 416.0 0.0 - 1,800.0 pg/mL   High Sensitivity Troponin T    Collection Time: 04/21/25  2:55 PM    Specimen: Arm, Right; Blood   Result Value Ref Range    HS Troponin T 25 (H) <22 ng/L   Green Top (Gel)    Collection Time: 04/21/25  2:55 PM   Result Value Ref Range    Extra Tube Hold for add-ons.    Lavender Top    Collection Time: 04/21/25  2:55 PM   Result Value Ref Range    Extra Tube hold for add-on    Gold Top - SST    Collection Time: 04/21/25  2:55 PM   Result Value Ref Range    Extra Tube Hold for add-ons.    Light Blue Top    Collection Time: 04/21/25  2:55 PM   Result Value Ref Range    Extra Tube Hold  for add-ons.    CBC Auto Differential    Collection Time: 04/21/25  2:55 PM    Specimen: Arm, Right; Blood   Result Value Ref Range    WBC 8.18 3.40 - 10.80 10*3/mm3    RBC 4.23 4.14 - 5.80 10*6/mm3    Hemoglobin 11.9 (L) 13.0 - 17.7 g/dL    Hematocrit 38.0 37.5 - 51.0 %    MCV 89.8 79.0 - 97.0 fL    MCH 28.1 26.6 - 33.0 pg    MCHC 31.3 (L) 31.5 - 35.7 g/dL    RDW 16.1 (H) 12.3 - 15.4 %    RDW-SD 52.3 37.0 - 54.0 fl    MPV 8.6 6.0 - 12.0 fL    Platelets 226 140 - 450 10*3/mm3    Neutrophil % 75.8 42.7 - 76.0 %    Lymphocyte % 10.9 (L) 19.6 - 45.3 %    Monocyte % 4.2 (L) 5.0 - 12.0 %    Eosinophil % 7.9 (H) 0.3 - 6.2 %    Basophil % 0.6 0.0 - 1.5 %    Immature Grans % 0.6 (H) 0.0 - 0.5 %    Neutrophils, Absolute 6.20 1.70 - 7.00 10*3/mm3    Lymphocytes, Absolute 0.89 0.70 - 3.10 10*3/mm3    Monocytes, Absolute 0.34 0.10 - 0.90 10*3/mm3    Eosinophils, Absolute 0.65 (H) 0.00 - 0.40 10*3/mm3    Basophils, Absolute 0.05 0.00 - 0.20 10*3/mm3    Immature Grans, Absolute 0.05 0.00 - 0.05 10*3/mm3    nRBC 0.0 0.0 - 0.2 /100 WBC   Procalcitonin    Collection Time: 04/21/25  2:55 PM    Specimen: Arm, Right; Blood   Result Value Ref Range    Procalcitonin 0.06 0.00 - 0.25 ng/mL   Magnesium    Collection Time: 04/21/25  2:55 PM    Specimen: Arm, Right; Blood   Result Value Ref Range    Magnesium 1.8 1.6 - 2.4 mg/dL   Respiratory Panel PCR w/COVID-19(SARS-CoV-2) [facility]/EVAN/[facility] In-House, NP Swab in UTM/VTM, 2 HR TAT - Swab, Nasopharynx    Collection Time: 04/21/25  3:09 PM    Specimen: Nasopharynx; Swab   Result Value Ref Range    ADENOVIRUS, PCR Not Detected Not Detected    Coronavirus 229E Not Detected Not Detected    Coronavirus HKU1 Not Detected Not Detected    Coronavirus NL63 Not Detected Not Detected    Coronavirus OC43 Not Detected Not Detected    COVID19 Not Detected Not Detected - Ref. Range    Human Metapneumovirus Not Detected Not Detected    Human Rhinovirus/Enterovirus Not Detected Not Detected    Influenza  A PCR Not Detected Not Detected    Influenza B PCR Not Detected Not Detected    Parainfluenza Virus 1 Not Detected Not Detected    Parainfluenza Virus 2 Not Detected Not Detected    Parainfluenza Virus 3 Not Detected Not Detected    Parainfluenza Virus 4 Not Detected Not Detected    RSV, PCR Not Detected Not Detected    Bordetella pertussis pcr Not Detected Not Detected    Bordetella parapertussis PCR Not Detected Not Detected    Chlamydophila pneumoniae PCR Not Detected Not Detected    Mycoplasma pneumo by PCR Not Detected Not Detected   Lactic Acid, Plasma    Collection Time: 04/21/25  3:16 PM    Specimen: Blood   Result Value Ref Range    Lactate 1.2 0.5 - 2.0 mmol/L   High Sensitivity Troponin T 1Hr    Collection Time: 04/21/25  4:00 PM    Specimen: Blood   Result Value Ref Range    HS Troponin T 28 (H) <22 ng/L    Troponin T Numeric Delta 3 ng/L    Troponin T % Delta 12 Abnormal if >/= 20%       Ordered the above labs and independently interpreted results. My findings will be discussed in the medical decision making section below        RADIOLOGY  XR Chest 1 View  Result Date: 4/21/2025  XR CHEST 1 VW-4/21/2025  HISTORY: Shortness of breath.  Heart size is within normal limits. Lungs are underinflated with some vascular crowding. There is some bilateral vascular congestion with more patchy areas of increased density in the right upper lung and bilateral lower lungs which may represent bilateral asymmetric edema atelectasis and/or pneumonia. These findings may be partially chronic in nature as well as some of these were present on the 2/27/2025 study.  Mediport catheter is seen in good position.      1. Underinflation of the lungs with some vascular crowding and there may be mild vascular congestion. 2. Patchy areas of increased density in the right upper lobe and bilateral lower lungs as described above. Please correlate. Follow-up films also recommended   This report was finalized on 4/21/2025 3:44 PM by   David Ruiz M.D on Workstation: [id number]        Ordered the above noted radiological studies.  Independently interpreted by me and my independent review of findings can be found in the ED Course.  See dictation for official radiology interpretation.      PROCEDURES    Procedures      EKG - Per my independent interpretation:    EKG Time: 1452  Rhythm/Rate: A-fib with rate of 78  Normal axis  Normal intervals  No acute ischemic changes  No STEMI       No emergent changes compared to February 27, 2025      MEDICATIONS GIVEN IN ER    Medications   sodium chloride 0.9 % flush 10 mL (has no administration in time range)         PROGRESS, DATA ANALYSIS, CONSULTS, AND MEDICAL DECISION MAKING    Please note that this section constitutes my independent interpretation of clinical data including lab results, radiology, EKG's.  This constitutes my independent professional opinion regarding differential diagnosis and management of this patient.  It may include any factors such as history from outside sources, review of external records, social determinants of health, management of medications, response to those treatments, and discussions with other providers.    Differential Diagnosis and Plan: Initial concern for viral process, pneumonia, recurrent or worsening pleural effusion, heart failure, renal failure, electrolyte imbalance, among others.  Plan for labs, chest x-ray, EKG, supplemental O2 due to his hypoxia, and likely hospitalization.  Will reevaluate with results    Additional sources:  - Discussed/ obtained information from independent historians:       - (Social Determinants of Health): None     - Shared decision making:  Patient and wife at bedside fully updated on and in agreement with the course and plan moving forward    ED Course as of 04/21/25 1638   Mon Apr 21, 2025   1513 WBC: 8.18 [DC]   1513 Hemoglobin(!): 11.9 [DC]   1513 Platelets: 226 [DC]   1529 Magnesium: 1.8 [DC]   1529 Glucose: 99 [DC]    1529 BUN: 10 [DC]   1529 Creatinine: 0.77 [DC]   1529 Sodium: 143 [DC]   1529 Potassium(!): 3.4 [DC]   1529 ALT (SGPT): 15 [DC]   1529 AST (SGOT): 25 [DC]   1529 Alkaline Phosphatase(!): 119 [DC]   1529 Total Bilirubin: 0.9 [DC]   1532 XR Chest 1 View  Per my independent interpretation of the chest x-ray, no evidence of pneumothorax, scattered fluffy opacities bilaterally, right sided Mediport in place [DC]   1539 Procalcitonin: 0.06 [DC]   1539 proBNP: 416.0 [DC]   1539 HS Troponin T(!): 25  22 one month ago [DC]   1555 Lactate: 1.2 [DC]   1556 Discussed with Dr. Barnes, Pulmonary, discussed patient's, course and findings today, treatment modality, and they will consult.  Can hold off on antibiotics for now [DC]   1631 Respiratory Panel PCR w/COVID-19(SARS-CoV-2) [facility]/EVAN/[facility] In-House, NP Swab in UTM/VTM, 2 HR TAT - Swab, Nasopharynx  Pan-negative [DC]   1637 Discussed with Dr. Eng, [facility], discussed patient's clinical course and findings today, treatment modalities, right consult recommendations, and need for hospitalization [DC]      ED Course User Index  [DC] Woodrow Mercado MD       Hospitalization Considered?: yes    Orders Placed During This Visit:  Orders Placed This Encounter   Procedures    Respiratory Panel PCR w/COVID-19(SARS-CoV-2) [facility]/EVAN/[facility] In-House, NP Swab in UTM/VTM, 2 HR TAT - Swab, Nasopharynx    XR Chest 1 View    B[facility]ant Draw    Comprehensive Metabolic Panel    BNP    High Sensitivity Troponin T    CBC Auto Differential    Lactic Acid, Plasma    Procalcitonin    Magnesium    High Sensitivity Troponin T 1Hr    NPO Diet NPO Type: Strict NPO    Undress & Gown    Continuous Pulse Oximetry    Vital Signs    Pulmonology (on-call MD unless specified)    [facility] (on-call MD unless specified) Details    Oxygen Therapy- Nasal Cannula; Titrate 1-6 LPM Per SpO2; 90 - 95%    ECG 12 Lead ED Triage Standing Order; SOA    Insert Peripheral IV    Initiate Observation Status    CBC &  Differential    Green Top (Gel)    Lavender Top    Gold Top - SST    Light Blue Top       Additional orders considered but not placed:      Independent interpretation of labs, radiology studies, and discussions with consultants: See ED Course        AS OF 16:38 EDT VITALS:    BP - 143/73  HR - 77  TEMP - 99.3 °F (37.4 °C) (Oral)  02 SATS - 90%          DIAGNOSIS  Final diagnoses:   Hypoxia   Dyspnea, unspecified type   Recurrent pleural effusion   History of lung cancer         DISPOSITION  ED Disposition       ED Disposition   Decision to Admit    Condition   --    Comment   Level of Care: Telemetry [5]   Diagnosis: Hypoxia [748418]   Admitting Physician: JESSICA JORDAN [[id number]]   Attending Physician: JESSICA JORDAN [[id number]]   Is patient appropriate for Inpatient Observation Unit?: No [0]                  Please note that portions of this document were completed with a voice recognition program.    Note Disclaimer: At [facility], we believe that sharing information builds trust and better relationships. You are receiving this note because you recently visited [facility]. It is possible you will see health information before a provider has talked with you about it. This kind of information can be easy to misunderstand. To help you fully understand what it means for your health, we urge you to discuss this note with your provider.                       Woodrow Mercado MD  04/21/25 3916

## 2025-04-21 NOTE — H&P
Patient Name:  Branden Diop  YOB: 1950  MRN:  [id number]  Admit Date:  4/21/2025  Patient Care Team:  Tino Lopez MD as PCP - General (Internal Medicine)  Tino Lopez MD as PCP - Family Medicine  Mart Ureña MD as Consulting Physician (Cardiology)  Martir Trevino MD as Surgeon (General Surgery)  Dione Carter RN as Nurse Navigator  David Figueroa MD as Referring Physician (Thoracic Surgery)  Duy Villasenor MD PhD as Consulting Physician (Hematology and Oncology)  Yashira Mendiola MD as Consulting Physician (Radiation Oncology)        Chief Complaint   Patient presents with    Shortness of Breath   Duration 3 days    History Present Illness     A pleasant 74 years old gentleman with a past history of type II insulin requiring diabetes/right lung cancer status post lobectomy followed by chemotherapy and was intolerant to accrue data/obstructive sleep apnea on CPAP/COPD/chronic hypoxemic respiratory failure/gallstone disease and fatty liver/hypertension/AV Mobitz type I block/A-fib status post cardioversion/dyslipidemia/anemia of chronic disease/mood disorders.  Patient went to flush his port today at the hematology oncology clinic and ran out of his oxygen and was noticed to have a pulse ox on the mid 70s and was sent to the emergency department.  Patient reports increasing shortness of breath over the last 3 days.  He does describe history of old wheeze.  No chest pain.  No cough.  No hemoptysis.  No palpitation.  Positive old lower extremity edema.  No fever or chills.  In the emergency department he was found to be hypoxemic and having a low-grade fever.  Respiratory PCR panel is negative.  Troponin is mildly elevated at 25 with a repeat of 28.  Lactic acid is normal.  CMP normal except a potassium of 3.4 and CO2 of 31.9 and an alkaline phosphatase of 119.  proBNP was normal.  CBC normal except a hemoglobin of 11.9.  Procalcitonin was normal.  Magnesium was normal.   Chest x-ray revealed vascular congestion with patchy areas of increased density in the right upper lobe and bilateral lower lobes.  A recent CAT scan on 4/14/2025 revealed moderate right pleural effusion/COPD/right lobectomy/bilateral pulmonary nodules/fibrosis.  Patient was given broad-spectrum antibiotics and was subsequently admitted        Review of Systems   Cardiovascular/respiratory.  As above  GI.  Positive abdominal pain.  No nausea or vomiting.  Normal bowel habits without constipation/diarrhea/bleeding per rectum/melena.  .  No dysuria or hematuria.  CNS.  No acute focal neurological symptoms.  No loss of consciousness or dizziness.    Personal History     Past Medical History:   Diagnosis Date    Anxiety     Arthritis     Atrial fibrillation     CURRENTLY    Bunion of right foot     Colon polyps     COPD (chronic obstructive pulmonary disease)     MILD. NO MEDS FOR    Depression     History of atrial fibrillation     WITH CARDIOVERSION. NO PROBLEMS    History of skin cancer     BCC REMOVED FROM BACK    [place] (hard of hearing)     Hyperlipidemia     Inguinal hernia, recurrent     RIGHT    Left foot pain     Lung nodules     Rash     IN GROIN TREATING FOR YEAST INFECTION BY PCP    Redness of skin     LOWER LEGS BILATERAL    Scab     BILATERAL LEGS HEALING    Sleep apnea with use of continuous positive airway pressure (CPAP)     CPAP    Streptococcus pneumoniae     ABOUT 6-7 YR AGO.     Type 2 diabetes mellitus      Past Surgical History:   Procedure Laterality Date    BASAL CELL CARCINOMA EXCISION      BRONCHOSCOPY WITH ION ROBOTIC ASSIST N/A 5/6/2024    Procedure: BRONCHOSCOPY WITH ION ROBOT WITH FNA, BIOPSIES, BAL AND ENDOBRONCHIAL ULTRASOUND WITH FNA;  Surgeon: Yony Coles MD;  Location: [facility] ENDOSCOPY;  Service: Robotics - Pulmonary;  Laterality: N/A;  PRE: PULMONARY NODULES  POST: SAME    CARDIAC CATHETERIZATION N/A 11/15/2021    Procedure: Coronary angiography;  Surgeon: Alfredo Jennings  MD;  Location: [facility] CATH INVASIVE LOCATION;  Service: Cardiovascular;  Laterality: N/A;    CARDIAC CATHETERIZATION N/A 11/15/2021    Procedure: Left heart cath;  Surgeon: Alfredo Jennings MD;  Location: [facility] CATH INVASIVE LOCATION;  Service: Cardiovascular;  Laterality: N/A;    CARDIAC CATHETERIZATION N/A 11/15/2021    Procedure: Left ventriculography;  Surgeon: Alfredo Jennings MD;  Location: [facility] CATH INVASIVE LOCATION;  Service: Cardiovascular;  Laterality: N/A;    CARDIAC CATHETERIZATION N/A 11/15/2021    Procedure: Aortic root aortogram;  Surgeon: Alfredo Jennings MD;  Location: [facility] CATH INVASIVE LOCATION;  Service: Cardiovascular;  Laterality: N/A;    CARDIOVERSION      CHOLECYSTECTOMY N/A 10/07/2017    Procedure: CHOLECYSTECTOMY LAPAROSCOPIC;  Surgeon: Martir Trevino MD;  Location: [facility] OR;  Service:     COLONOSCOPY  2007    COLONOSCOPY N/A 8/30/2022    Procedure: COLONOSCOPY TO CECUM AND TI AND COLD SNARE POLYPECTOMY;  Surgeon: Cj Lopez MD;  Location: [facility] ENDOSCOPY;  Service: Gastroenterology;  Laterality: N/A;  Pre: History of colon polyps  Post: POLYP, PREVIOUS TATTOO    FOOT SURGERY Left     TOES ARE PINNED. ALL BUT GREAT TOE    HAND SURGERY      THUMB    HERNIA REPAIR      INGUINAL HERNIA REPAIR Right 06/27/2018    Procedure: INGUINAL HERNIA REPAIR, OPEN, RECURRENT;  Surgeon: Martir Trevino MD;  Location: [facility] OR OSC;  Service: General    LASIK Bilateral     LOBECTOMY Right 6/12/2024    Procedure: BRONCHOSCOPY, RIGHT VIDEO ASSISTED THORACOSCOPY WITH RIGHT MIDDLE LOBECTOMY WITH DAVINCI ROBOT, MEDIASTINAL LYMPH NODE DISSECTION, INTERCOSTAL NERVE BLOCK;  Surgeon: David Figueroa MD;  Location: [facility] MAIN OR;  Service: Robotics - DaVinci;  Laterality: Right;    NECK SURGERY      growth on salivary gland    REPLACEMENT TOTAL KNEE Right 2007    (he is going to have a LTKR next month)    REPLACEMENT TOTAL KNEE Left     VENOUS ACCESS DEVICE (PORT) INSERTION Right 7/5/2024     Procedure: INSERTION VENOUS ACCESS DEVICE;  Surgeon: David Figueroa MD;  Location: [facility] MAIN OR;  Service: Thoracic;  Laterality: Right;     Family History   Problem Relation Age of Onset    Cancer Other     Stroke Mother     Alcohol abuse Father     Malig Hyperthermia Neg Hx      Social History     Tobacco Use    Smoking status: Former     Current packs/day: 0.00     Average packs/day: 1 pack/day for 30.0 years (30.0 ttl pk-yrs)     Types: Cigars, Cigarettes     Start date: 1984     Quit date: 2014     Years since quittin.3    Smokeless tobacco: Never   Vaping Use    Vaping status: Never Used   Substance Use Topics    Alcohol use: Never     Comment: caffeine use- decaf coffee    Drug use: Never     Current Facility-Administered Medications on File Prior to Encounter   Medication Dose Route Frequency Provider Last Rate Last Admin    [DISCONTINUED] heparin injection 500 Units  500 Units Intravenous PRN Duy Villasenor MD PhD   500 Units at 25 1419    [DISCONTINUED] sodium chloride 0.9 % flush 10 mL  10 mL Intravenous PRN Duy Villasenor MD PhD   10 mL at 25 1419     Current Outpatient Medications on File Prior to Encounter   Medication Sig Dispense Refill    albuterol sulfate  (90 Base) MCG/ACT inhaler Inhale 2 puffs Every 4 (Four) Hours As Needed for Wheezing.      famotidine (PEPCID) 20 MG tablet Take 1 tablet by mouth 2 (Two) Times a Day As Needed for Heartburn.      ferrous sulfate 325 (65 FE) MG tablet Take 0.5 tablets by mouth 2 (Two) Times a Day.      fexofenadine (ALLEGRA) 180 MG tablet Take 1 tablet by mouth Daily.      folic acid (FOLVITE) 1 MG tablet Take 1 tablet by mouth Daily. 90 tablet 3    gabapentin (NEURONTIN) 300 MG capsule TAKE 2 CAPSULES BY MOUTH EVERY 8 HOURS 180 capsule 2    hydrOXYzine pamoate (VISTARIL) 50 MG capsule Take 1 capsule by mouth 3 (Three) Times a Day As Needed for Itching.      insulin aspart (NovoLOG FlexPen) 100 UNIT/ML solution pen-injector  sc pen Inject 8 Units under the skin into the appropriate area as directed 3 (Three) Times a Day With Meals for 30 days.      Insulin Glargine, 1 Unit Dial, (Toujeo SoloStar) 300 UNIT/ML solution pen-injector injection Inject 30 Units under the skin into the appropriate area as directed Daily.      Magnesium Oxide -Mg Supplement 400 (240 Mg) MG tablet TAKE 1 TABLET BY MOUTH 2 TIMES A DAY 60 tablet 1    Multiple Vitamin (MULTIVITAMINS PO) Take 1 tablet by mouth Daily.      potassium chloride (MICRO-K) 10 MEQ CR capsule TAKE 1 CAPSULE BY MOUTH 2 TIMES A DAY 40 capsule 2    rosuvastatin (CRESTOR) 40 MG tablet TAKE ONE TABLET BY MOUTH DAILY (Patient taking differently: Take 1 tablet by mouth Daily.) 90 tablet 1    sertraline (ZOLOFT) 50 MG tablet TAKE ONE TABLET BY MOUTH DAILY (Patient taking differently: Take 1 tablet by mouth Daily.) 90 tablet 1    Tirzepatide (Mounjaro) 2.5 MG/0.5ML solution pen-injector pen Inject 2.5 mg under the skin into the appropriate area as directed 1 (One) Time Per Week. Mondays      torsemide (DEMADEX) 20 MG tablet Take 2 tablets by mouth Daily for 30 days. 60 tablet 0    vitamin B-12 (CYANOCOBALAMIN) 1000 MCG tablet Take 1 tablet by mouth Daily. 90 tablet 3    Xarelto 20 MG tablet TAKE ONE TABLET BY MOUTH DAILY (Patient taking differently: Take 1 tablet by mouth Daily With Dinner.) 30 tablet 9     No Known Allergies    Objective    Objective     Vital Signs  Temp:  [99.3 °F (37.4 °C)-100.9 °F (38.3 °C)] 99.3 °F (37.4 °C)  Heart Rate:  [77-89] 88  Resp:  [24] 24  BP: (143-165)/(73-94) 143/73  SpO2:  [86 %-96 %] 86 %  on  Flow (L/min) (Oxygen Therapy):  [4-8] 8;   Device (Oxygen Therapy): high-flow nasal cannula  There is no height or weight on file to calculate BMI.    Physical Exam  General.  Elderly gentleman.  Obese.  Alert and oriented x 4.  In mild respiratory distress.  No acute pain  Eyes.  Status post bilateral cataract surgery.  Pupils equal round and reactive.  Intact extraocular  musculature.  No pallor or jaundice  Oral cavity.  Moist mucous membrane with no lesions  Neck.  Supple.  No JVD.  No lymphadenopathy or thyromegaly  Cardiovascular.  Regular rate and rhythm with occasional ectopic beats and grade 2 systolic murmur.  Chest.  Poor bilateral air entry with bilateral rhonchi.  No wheeze.  Abdomen.  Obese.  Soft lax.  No tenderness.  No organomegaly.  No guarding or rebound  Extremities.  +1 bilateral lower extremity edema.  No clubbing or cyanosis  CNS.  No acute focal neurological deficits.      Results Review:  I reviewed the patient's new clinical results.  I reviewed the patient's new imaging results and agree with the interpretation.  I reviewed the patient's other test results and agree with the interpretation  I personally viewed and interpreted the patient's EKG/Telemetry data  Discussed with ED provider.    Lab Results (last 24 hours)       Procedure Component Value Units Date/Time    CBC & Differential [[id number]]  (Abnormal) Collected: 04/21/25 1455    Specimen: Blood from Arm, Right Updated: 04/21/25 1505    Narrative:      The following orders were created for panel order CBC & Differential.  Procedure                               Abnormality         Status                     ---------                               -----------         ------                     CBC Auto Differential[[id number]]        Abnormal            Final result                 Please view results for these tests on the individual orders.    Comprehensive Metabolic Panel [[id number]]  (Abnormal) Collected: 04/21/25 1455    Specimen: Blood from Arm, Right Updated: 04/21/25 1528     Glucose 99 mg/dL      BUN 10 mg/dL      Creatinine 0.77 mg/dL      Sodium 143 mmol/L      Potassium 3.4 mmol/L      Chloride 100 mmol/L      CO2 31.9 mmol/L      Calcium 9.6 mg/dL      Total Protein 7.2 g/dL      Albumin 3.9 g/dL      ALT (SGPT) 15 U/L      AST (SGOT) 25 U/L      Alkaline Phosphatase 119 U/L      Total  Bilirubin 0.9 mg/dL      Globulin 3.3 gm/dL      A/G Ratio 1.2 g/dL      BUN/Creatinine Ratio 13.0     Anion Gap 11.1 mmol/L      eGFR 92.8 mL/min/1.73     Narrative:      GFR Categories in Chronic Kidney Disease (CKD)      GFR Category          GFR (mL/min/1.73)    Interpretation  G1                     90 or greater         Normal or high (1)  G2                      60-89                Mild decrease (1)  G3a                   45-59                Mild to moderate decrease  G3b                   30-44                Moderate to severe decrease  G4                    15-29                Severe decrease  G5                    14 or less           Kidney failure          (1)In the absence of evidence of kidney disease, neither GFR category G1 or G2 fulfill the criteria for CKD.    eGFR calculation 2021 CKD-EPI creatinine equation, which does not include race as a factor    BNP [[id number]]  (Normal) Collected: 04/21/25 1455    Specimen: Blood from Arm, Right Updated: 04/21/25 1535     proBNP 416.0 pg/mL     Narrative:      This assay is used as an aid in the diagnosis of individuals suspected of having heart failure. It can be used as an aid in the diagnosis of acute decompensated heart failure (ADHF) in patients presenting with signs and symptoms of ADHF to the emergency department (ED). In addition, NT-proBNP of <300 pg/mL indicates ADHF is not likely.    Age Range Result Interpretation  NT-proBNP Concentration (pg/mL:      <50             Positive            >450                   Gray                 300-450                    Negative             <300    50-75           Positive            >900                  Gray                300-900                  Negative            <300      >75             Positive            >1800                  Gray                300-1800                  Negative            <300    High Sensitivity Troponin T [[id number]]  (Abnormal) Collected: 04/21/25 1455    Specimen: Blood  from Arm, Right Updated: 04/21/25 1535     HS Troponin T 25 ng/L     Narrative:      High Sensitive Troponin T Reference Range:  <14.0 ng/L- Negative Female for AMI  <22.0 ng/L- Negative Male for AMI  >=14 - Abnormal Female indicating possible myocardial injury.  >=22 - Abnormal Male indicating possible myocardial injury.   Clinicians would have to utilize clinical acumen, EKG, Troponin, and serial changes to determine if it is an Acute Myocardial Infarction or myocardial injury due to an underlying chronic condition.         CBC Auto Differential [[id number]]  (Abnormal) Collected: 04/21/25 1455    Specimen: Blood from Arm, Right Updated: 04/21/25 1505     WBC 8.18 10*3/mm3      RBC 4.23 10*6/mm3      Hemoglobin 11.9 g/dL      Hematocrit 38.0 %      MCV 89.8 fL      MCH 28.1 pg      MCHC 31.3 g/dL      RDW 16.1 %      RDW-SD 52.3 fl      MPV 8.6 fL      Platelets 226 10*3/mm3      Neutrophil % 75.8 %      Lymphocyte % 10.9 %      Monocyte % 4.2 %      Eosinophil % 7.9 %      Basophil % 0.6 %      Immature Grans % 0.6 %      Neutrophils, Absolute 6.20 10*3/mm3      Lymphocytes, Absolute 0.89 10*3/mm3      Monocytes, Absolute 0.34 10*3/mm3      Eosinophils, Absolute 0.65 10*3/mm3      Basophils, Absolute 0.05 10*3/mm3      Immature Grans, Absolute 0.05 10*3/mm3      nRBC 0.0 /100 WBC     Procalcitonin [[id number]]  (Normal) Collected: 04/21/25 1455    Specimen: Blood from Arm, Right Updated: 04/21/25 1535     Procalcitonin 0.06 ng/mL     Narrative:      As a Marker for Sepsis (Non-Neonates):    1. <0.5 ng/mL represents a low risk of severe sepsis and/or septic shock.  2. >2 ng/mL represents a high risk of severe sepsis and/or septic shock.    As a Marker for Lower Respiratory Tract Infections that require antibiotic therapy:    PCT on Admission    Antibiotic Therapy       6-12 Hrs later    >0.5                Strongly Recommended  >0.25 - <0.5        Recommended   0.1 - 0.25          Discouraged               "Remeasure/reassess PCT  <0.1                Strongly Discouraged     Remeasure/reassess PCT    As 28 day mortality risk marker: \"Change in Procalcitonin Result\" (>80% or <=80%) if Day 0 (or Day 1) and Day 4 values are available. Refer to [email or web address]://[email or web address]-[email or web address]-[email or web address]    Change in PCT <=80%  A decrease of PCT levels below or equal to 80% defines a positive change in PCT test result representing a higher risk for 28-day all-cause mortality of patients diagnosed with severe sepsis for septic shock.    Change in PCT >80%  A decrease of PCT levels of more than 80% defines a negative change in PCT result representing a lower risk for 28-day all-cause mortality of patients diagnosed with severe sepsis or septic shock.       Magnesium [[id number]]  (Normal) Collected: 04/21/25 1455    Specimen: Blood from Arm, Right Updated: 04/21/25 1528     Magnesium 1.8 mg/dL     Respiratory Panel PCR w/COVID-19(SARS-CoV-2) CRISTIANO/AUBREE/EVAN/PAD/COR/FRANCISCO In-House, NP Swab in UTM/VTM, 2 HR TAT - Swab, Nasopharynx [[id number]]  (Normal) Collected: 04/21/25 1509    Specimen: Swab from Nasopharynx Updated: 04/21/25 1621     ADENOVIRUS, PCR Not Detected     Coronavirus 229E Not Detected     Coronavirus HKU1 Not Detected     Coronavirus NL63 Not Detected     Coronavirus OC43 Not Detected     COVID19 Not Detected     Human Metapneumovirus Not Detected     Human Rhinovirus/Enterovirus Not Detected     Influenza A PCR Not Detected     Influenza B PCR Not Detected     Parainfluenza Virus 1 Not Detected     Parainfluenza Virus 2 Not Detected     Parainfluenza Virus 3 Not Detected     Parainfluenza Virus 4 Not Detected     RSV, PCR Not Detected     Bordetella pertussis pcr Not Detected     Bordetella parapertussis PCR Not Detected     Chlamydophila pneumoniae PCR Not Detected     Mycoplasma pneumo by PCR Not Detected    Narrative:      In the setting of a positive respiratory panel with a viral infection PLUS a negative procalcitonin without other " underlying concern for bacterial infection, consider observing off antibiotics or discontinuation of antibiotics and continue supportive care. If the respiratory panel is positive for atypical bacterial infection (Bordetella pertussis, Chlamydophila pneumoniae, or Mycoplasma pneumoniae), consider antibiotic de-escalation to target atypical bacterial infection.    Lactic Acid, Plasma [[id number]]  (Normal) Collected: 04/21/25 1516    Specimen: Blood Updated: 04/21/25 1551     Lactate 1.2 mmol/L     High Sensitivity Troponin T 1Hr [[id number]]  (Abnormal) Collected: 04/21/25 1600    Specimen: Blood Updated: 04/21/25 1635     HS Troponin T 28 ng/L      Troponin T Numeric Delta 3 ng/L      Troponin T % Delta 12    Narrative:      High Sensitive Troponin T Reference Range:  <14.0 ng/L- Negative Female for AMI  <22.0 ng/L- Negative Male for AMI  >=14 - Abnormal Female indicating possible myocardial injury.  >=22 - Abnormal Male indicating possible myocardial injury.   Clinicians would have to utilize clinical acumen, EKG, Troponin, and serial changes to determine if it is an Acute Myocardial Infarction or myocardial injury due to an underlying chronic condition.                 Imaging Results (Last 24 Hours)       Procedure Component Value Units Date/Time    XR Chest 1 View [[id number]] Collected: 04/21/25 1543     Updated: 04/21/25 1547    Narrative:      XR CHEST 1 VW-4/21/2025     HISTORY: Shortness of breath.     Heart size is within normal limits. Lungs are underinflated with some  vascular crowding. There is some bilateral vascular congestion with more  patchy areas of increased density in the right upper lung and bilateral  lower lungs which may represent bilateral asymmetric edema atelectasis  and/or pneumonia. These findings may be partially chronic in nature as  well as some of these were present on the 2/27/2025 study.     Mediport catheter is seen in good position.       Impression:      1. Underinflation of the  lungs with some vascular crowding and there may  be mild vascular congestion.  2. Patchy areas of increased density in the right upper lobe and  bilateral lower lungs as described above. Please correlate. Follow-up  films also recommended        This report was finalized on 4/21/2025 3:44 PM by Dr. David Ruiz M.D on Workstation: [id number]               Results for orders placed during the hospital encounter of 11/29/24    Adult Transthoracic Echo Complete W/ Cont if Necessary Per Protocol    Interpretation Summary    Left ventricular systolic function is normal. Calculated left ventricular EF = 59.2%    Left ventricular diastolic function was indeterminate.    The left atrial cavity is severely dilated.    Small patent foramen ovale present.    Saline test results are positive.    Estimated right ventricular systolic pressure from tricuspid regurgitation is mildly elevated (35-45 mmHg). Calculated right ventricular systolic pressure from tricuspid regurgitation is 38 mmHg.      ECG 12 Lead ED Triage Standing Order; SOA   Preliminary Result   HEART RATE=78  bpm   RR Sdwplscy=878  ms   NC Interval=  ms   P Horizontal Axis=  deg   P Front Axis=  deg   QRSD Interval=95  ms   QT Aakwqqoh=901  ms   RVkJ=642  ms   QRS Axis=32  deg   T Wave Axis=72  deg   - ABNORMAL ECG -   Atrial fibrillation   Low voltage, extremity leads   Minimal ST depression, lateral leads   Date and Time of Study:2025-04-21 14:52:48               Assessment/Plan     Active Hospital Problems    Diagnosis  POA    **Hypoxia [R09.02]  Yes      Resolved Hospital Problems   No resolved problems to display.           Acute on chronic hypoxemic respiratory failure in a patient with a history of right lung cancer status post lobectomy and chemo (intolerant to Keytruda)/COPD/obstructive sleep apnea/chronic right pleural effusion in an immunocompromise patient.  Positive fever.  Normal procalcitonin.  Normal proBNP.  Negative respiratory PCR panel.   Normal lactic acid.  Chest x-ray with vascular congestion and increased right upper lobe lobe density and bilateral lower lobe infiltrates.  CT scan of the chest earlier this month revealed a moderate right pleural effusion/COPD/pulmonary fibrosis/right lobectomy/bilateral pulmonary nodules.  Plan oxygen/IV Zithromax/IV Rocephin/IV vancomycin/DuoNeb/Pulmicort nebs/Mucinex/check blood cultures and sputum cultures/incentive spirometry/consult pulmonary as he might need thoracocentesis.  DM2.  Check A1c.  And continue long-acting insulin and Humalog AC.  Stop Mounjaro.  Fatty liver/history of gallstone disease.  Benign GI examination.  Normal liver function test.  Will monitor.  Hypertension/atrial fibrillation status post cardioversion/Mobitz type I AV block.  No clinical evidence of angina or congestive heart failure.  Troponin mildly elevated but plateaued.  Abnormal BMP.  Continue Xarelto.  Good blood pressure and rate control.  Chronic disease anemia.  Baseline hemoglobin between 10 and 12.  Hemoglobin stable at baseline.  Will monitor  Mood disorder.  Continue Zoloft and start as needed Xanax.  Dyslipidemia.  Continue Crestor.  VTE prophylaxis.  Patient anticoagulated        Discussed my findings and plan of treatment with the patient/ER provider.  Code Status -full code.       Rigo Eng MD  [facility]  04/21/25  17:12 EDT

## 2025-04-22 ENCOUNTER — APPOINTMENT (OUTPATIENT)
Dept: CT IMAGING | Facility: HOSPITAL | Age: 75
End: 2025-04-22
Payer: MEDICARE

## 2025-04-22 ENCOUNTER — APPOINTMENT (OUTPATIENT)
Dept: GENERAL RADIOLOGY | Facility: HOSPITAL | Age: 75
End: 2025-04-22
Payer: MEDICARE

## 2025-04-22 LAB
ANION GAP SERPL CALCULATED.3IONS-SCNC: 13 MMOL/L (ref 5–15)
ARTERIAL PATENCY WRIST A: POSITIVE
ATMOSPHERIC PRESS: 751.7 MMHG
BACTERIA BLD CULT: ABNORMAL
BACTERIA SPEC RESP CULT: NORMAL
BASE EXCESS BLDA CALC-SCNC: 4.9 MMOL/L (ref 0–2)
BASOPHILS # BLD AUTO: 0.04 10*3/MM3 (ref 0–0.2)
BASOPHILS NFR BLD AUTO: 0.4 % (ref 0–1.5)
BDY SITE: ABNORMAL
BOTTLE TYPE: ABNORMAL
BUN SERPL-MCNC: 12 MG/DL (ref 8–23)
BUN/CREAT SERPL: 15 (ref 7–25)
CALCIUM SPEC-SCNC: 8.8 MG/DL (ref 8.6–10.5)
CHLORIDE SERPL-SCNC: 98 MMOL/L (ref 98–107)
CO2 SERPL-SCNC: 24 MMOL/L (ref 22–29)
CREAT SERPL-MCNC: 0.8 MG/DL (ref 0.76–1.27)
DEPRECATED RDW RBC AUTO: 51.4 FL (ref 37–54)
DEVICE COMMENT: ABNORMAL
EGFRCR SERPLBLD CKD-EPI 2021: 91.7 ML/MIN/1.73
EOSINOPHIL # BLD AUTO: 0.05 10*3/MM3 (ref 0–0.4)
EOSINOPHIL NFR BLD AUTO: 0.4 % (ref 0.3–6.2)
ERYTHROCYTE [DISTWIDTH] IN BLOOD BY AUTOMATED COUNT: 16.1 % (ref 12.3–15.4)
GAS FLOW AIRWAY: 10 LPM
GLUCOSE BLDC GLUCOMTR-MCNC: 166 MG/DL (ref 70–130)
GLUCOSE BLDC GLUCOMTR-MCNC: 237 MG/DL (ref 70–130)
GLUCOSE FLD-MCNC: 195 MG/DL
GLUCOSE SERPL-MCNC: 162 MG/DL (ref 65–99)
GRAM STN SPEC: NORMAL
HCO3 BLDA-SCNC: 29.6 MMOL/L (ref 22–28)
HCT VFR BLD AUTO: 33 % (ref 37.5–51)
HEMODILUTION: NO
HGB BLD-MCNC: 10.8 G/DL (ref 13–17.7)
IMM GRANULOCYTES # BLD AUTO: 0.05 10*3/MM3 (ref 0–0.05)
IMM GRANULOCYTES NFR BLD AUTO: 0.4 % (ref 0–0.5)
LDH FLD-CCNC: 213 U/L
LYMPHOCYTES # BLD AUTO: 0.44 10*3/MM3 (ref 0.7–3.1)
LYMPHOCYTES NFR BLD AUTO: 3.9 % (ref 19.6–45.3)
MCH RBC QN AUTO: 29 PG (ref 26.6–33)
MCHC RBC AUTO-ENTMCNC: 32.7 G/DL (ref 31.5–35.7)
MCV RBC AUTO: 88.5 FL (ref 79–97)
MODALITY: ABNORMAL
MONOCYTES # BLD AUTO: 0.41 10*3/MM3 (ref 0.1–0.9)
MONOCYTES NFR BLD AUTO: 3.7 % (ref 5–12)
MRSA DNA SPEC QL NAA+PROBE: NORMAL
NEUTROPHILS NFR BLD AUTO: 10.23 10*3/MM3 (ref 1.7–7)
NEUTROPHILS NFR BLD AUTO: 91.2 % (ref 42.7–76)
NRBC BLD AUTO-RTO: 0 /100 WBC (ref 0–0.2)
PCO2 BLDA: 43.4 MM HG (ref 35–45)
PH BLDA: 7.44 PH UNITS (ref 7.35–7.45)
PH FLD: 7.48 [PH]
PLATELET # BLD AUTO: 180 10*3/MM3 (ref 140–450)
PMV BLD AUTO: 8.8 FL (ref 6–12)
PO2 BLDA: 66.5 MM HG (ref 80–100)
POTASSIUM SERPL-SCNC: 3.8 MMOL/L (ref 3.5–5.2)
PROT FLD-MCNC: 4.4 G/DL
RBC # BLD AUTO: 3.73 10*6/MM3 (ref 4.14–5.8)
SAO2 % BLDCOA: 93.5 % (ref 92–98.5)
SODIUM SERPL-SCNC: 135 MMOL/L (ref 136–145)
TOTAL RATE: 20 BREATHS/MINUTE
WBC NRBC COR # BLD AUTO: 11.22 10*3/MM3 (ref 3.4–10.8)

## 2025-04-22 PROCEDURE — 0W9930Z DRAINAGE OF RIGHT PLEURAL CAVITY WITH DRAINAGE DEVICE, PERCUTANEOUS APPROACH: ICD-10-PCS | Performed by: RADIOLOGY

## 2025-04-22 PROCEDURE — 94664 DEMO&/EVAL PT USE INHALER: CPT

## 2025-04-22 PROCEDURE — 25010000002 MIDAZOLAM PER 1 MG: Performed by: RADIOLOGY

## 2025-04-22 PROCEDURE — G0545 PR INHERENT VISIT TO INPT: HCPCS | Performed by: INTERNAL MEDICINE

## 2025-04-22 PROCEDURE — 63710000001 INSULIN LISPRO (HUMAN) PER 5 UNITS: Performed by: INTERNAL MEDICINE

## 2025-04-22 PROCEDURE — 83986 ASSAY PH BODY FLUID NOS: CPT

## 2025-04-22 PROCEDURE — 80048 BASIC METABOLIC PNL TOTAL CA: CPT | Performed by: INTERNAL MEDICINE

## 2025-04-22 PROCEDURE — 82803 BLOOD GASES ANY COMBINATION: CPT

## 2025-04-22 PROCEDURE — 25010000002 LIDOCAINE 1 % SOLUTION: Performed by: RADIOLOGY

## 2025-04-22 PROCEDURE — 99223 1ST HOSP IP/OBS HIGH 75: CPT | Performed by: INTERNAL MEDICINE

## 2025-04-22 PROCEDURE — 94799 UNLISTED PULMONARY SVC/PX: CPT

## 2025-04-22 PROCEDURE — 82948 REAGENT STRIP/BLOOD GLUCOSE: CPT

## 2025-04-22 PROCEDURE — 87070 CULTURE OTHR SPECIMN AEROBIC: CPT

## 2025-04-22 PROCEDURE — 71045 X-RAY EXAM CHEST 1 VIEW: CPT

## 2025-04-22 PROCEDURE — 87075 CULTR BACTERIA EXCEPT BLOOD: CPT

## 2025-04-22 PROCEDURE — 87641 MR-STAPH DNA AMP PROBE: CPT | Performed by: STUDENT IN AN ORGANIZED HEALTH CARE EDUCATION/TRAINING PROGRAM

## 2025-04-22 PROCEDURE — 83615 LACTATE (LD) (LDH) ENZYME: CPT

## 2025-04-22 PROCEDURE — C1729 CATH, DRAINAGE: HCPCS

## 2025-04-22 PROCEDURE — 88305 TISSUE EXAM BY PATHOLOGIST: CPT

## 2025-04-22 PROCEDURE — 87205 SMEAR GRAM STAIN: CPT

## 2025-04-22 PROCEDURE — 25010000002 CEFAZOLIN PER 500 MG: Performed by: STUDENT IN AN ORGANIZED HEALTH CARE EDUCATION/TRAINING PROGRAM

## 2025-04-22 PROCEDURE — 84157 ASSAY OF PROTEIN OTHER: CPT

## 2025-04-22 PROCEDURE — 99223 1ST HOSP IP/OBS HIGH 75: CPT

## 2025-04-22 PROCEDURE — 85025 COMPLETE CBC W/AUTO DIFF WBC: CPT | Performed by: INTERNAL MEDICINE

## 2025-04-22 PROCEDURE — 82945 GLUCOSE OTHER FLUID: CPT

## 2025-04-22 PROCEDURE — 63710000001 INSULIN GLARGINE PER 5 UNITS: Performed by: INTERNAL MEDICINE

## 2025-04-22 PROCEDURE — 88112 CYTOPATH CELL ENHANCE TECH: CPT

## 2025-04-22 PROCEDURE — 36600 WITHDRAWAL OF ARTERIAL BLOOD: CPT

## 2025-04-22 PROCEDURE — 87015 SPECIMEN INFECT AGNT CONCNTJ: CPT

## 2025-04-22 PROCEDURE — 71250 CT THORAX DX C-: CPT

## 2025-04-22 PROCEDURE — 94761 N-INVAS EAR/PLS OXIMETRY MLT: CPT

## 2025-04-22 RX ORDER — LIDOCAINE HYDROCHLORIDE 10 MG/ML
20 INJECTION, SOLUTION INFILTRATION; PERINEURAL ONCE
Status: COMPLETED | OUTPATIENT
Start: 2025-04-22 | End: 2025-04-22

## 2025-04-22 RX ORDER — MIDAZOLAM HYDROCHLORIDE 1 MG/ML
1 INJECTION, SOLUTION INTRAMUSCULAR; INTRAVENOUS ONCE
Status: COMPLETED | OUTPATIENT
Start: 2025-04-22 | End: 2025-04-22

## 2025-04-22 RX ORDER — TORSEMIDE 20 MG/1
40 TABLET ORAL DAILY
Status: DISCONTINUED | OUTPATIENT
Start: 2025-04-22 | End: 2025-04-30 | Stop reason: HOSPADM

## 2025-04-22 RX ADMIN — Medication 10 ML: at 08:52

## 2025-04-22 RX ADMIN — GABAPENTIN 600 MG: 300 CAPSULE ORAL at 20:30

## 2025-04-22 RX ADMIN — CETIRIZINE HYDROCHLORIDE 10 MG: 10 TABLET, FILM COATED ORAL at 08:51

## 2025-04-22 RX ADMIN — MAGNESIUM OXIDE TAB 400 MG (241.3 MG ELEMENTAL MG) 400 MG: 400 (241.3 MG) TAB at 08:52

## 2025-04-22 RX ADMIN — GUAIFENESIN 1200 MG: 600 TABLET, EXTENDED RELEASE ORAL at 08:51

## 2025-04-22 RX ADMIN — LIDOCAINE HYDROCHLORIDE 20 ML: 10 INJECTION, SOLUTION INFILTRATION; PERINEURAL at 14:37

## 2025-04-22 RX ADMIN — Medication 10 ML: at 20:29

## 2025-04-22 RX ADMIN — ARFORMOTEROL TARTRATE 15 MCG: 15 SOLUTION RESPIRATORY (INHALATION) at 20:45

## 2025-04-22 RX ADMIN — FERROUS SULFATE TAB 325 MG (65 MG ELEMENTAL FE) 162.5 MG: 325 (65 FE) TAB at 08:51

## 2025-04-22 RX ADMIN — RIVAROXABAN 20 MG: 20 TABLET, FILM COATED ORAL at 08:51

## 2025-04-22 RX ADMIN — SODIUM CHLORIDE 2000 MG: 9 INJECTION, SOLUTION INTRAVENOUS at 20:29

## 2025-04-22 RX ADMIN — IPRATROPIUM BROMIDE AND ALBUTEROL SULFATE 3 ML: .5; 3 SOLUTION RESPIRATORY (INHALATION) at 15:59

## 2025-04-22 RX ADMIN — SENNOSIDES AND DOCUSATE SODIUM 2 TABLET: 50; 8.6 TABLET ORAL at 20:28

## 2025-04-22 RX ADMIN — FERROUS SULFATE TAB 325 MG (65 MG ELEMENTAL FE) 162.5 MG: 325 (65 FE) TAB at 20:28

## 2025-04-22 RX ADMIN — GABAPENTIN 600 MG: 300 CAPSULE ORAL at 15:21

## 2025-04-22 RX ADMIN — SERTRALINE HYDROCHLORIDE 50 MG: 50 TABLET, FILM COATED ORAL at 08:51

## 2025-04-22 RX ADMIN — INSULIN LISPRO 8 UNITS: 100 INJECTION, SOLUTION INTRAVENOUS; SUBCUTANEOUS at 08:50

## 2025-04-22 RX ADMIN — ROSUVASTATIN CALCIUM 40 MG: 20 TABLET, FILM COATED ORAL at 08:51

## 2025-04-22 RX ADMIN — INSULIN LISPRO 8 UNITS: 100 INJECTION, SOLUTION INTRAVENOUS; SUBCUTANEOUS at 12:00

## 2025-04-22 RX ADMIN — INSULIN LISPRO 8 UNITS: 100 INJECTION, SOLUTION INTRAVENOUS; SUBCUTANEOUS at 17:59

## 2025-04-22 RX ADMIN — Medication 1 TABLET: at 08:51

## 2025-04-22 RX ADMIN — FAMOTIDINE 20 MG: 20 TABLET, FILM COATED ORAL at 17:59

## 2025-04-22 RX ADMIN — ARFORMOTEROL TARTRATE 15 MCG: 15 SOLUTION RESPIRATORY (INHALATION) at 07:13

## 2025-04-22 RX ADMIN — IPRATROPIUM BROMIDE AND ALBUTEROL SULFATE 3 ML: .5; 3 SOLUTION RESPIRATORY (INHALATION) at 11:34

## 2025-04-22 RX ADMIN — BUDESONIDE 0.5 MG: 0.5 INHALANT RESPIRATORY (INHALATION) at 07:17

## 2025-04-22 RX ADMIN — POTASSIUM CHLORIDE 10 MEQ: 750 TABLET, EXTENDED RELEASE ORAL at 20:28

## 2025-04-22 RX ADMIN — SENNOSIDES AND DOCUSATE SODIUM 2 TABLET: 50; 8.6 TABLET ORAL at 08:51

## 2025-04-22 RX ADMIN — ACETAMINOPHEN 650 MG: 325 TABLET, FILM COATED ORAL at 03:54

## 2025-04-22 RX ADMIN — FAMOTIDINE 20 MG: 20 TABLET, FILM COATED ORAL at 06:32

## 2025-04-22 RX ADMIN — BUDESONIDE 0.5 MG: 0.5 INHALANT RESPIRATORY (INHALATION) at 20:45

## 2025-04-22 RX ADMIN — MIDAZOLAM 1 MG: 1 INJECTION INTRAMUSCULAR; INTRAVENOUS at 14:15

## 2025-04-22 RX ADMIN — GUAIFENESIN 1200 MG: 600 TABLET, EXTENDED RELEASE ORAL at 20:28

## 2025-04-22 RX ADMIN — INSULIN GLARGINE 30 UNITS: 100 INJECTION, SOLUTION SUBCUTANEOUS at 20:29

## 2025-04-22 RX ADMIN — GABAPENTIN 600 MG: 300 CAPSULE ORAL at 05:37

## 2025-04-22 RX ADMIN — TORSEMIDE 40 MG: 20 TABLET ORAL at 15:21

## 2025-04-22 RX ADMIN — LORAZEPAM 0.5 MG: 0.5 TABLET ORAL at 04:14

## 2025-04-22 RX ADMIN — FOLIC ACID 1 MG: 1 TABLET ORAL at 08:51

## 2025-04-22 RX ADMIN — IPRATROPIUM BROMIDE AND ALBUTEROL SULFATE 3 ML: .5; 3 SOLUTION RESPIRATORY (INHALATION) at 07:13

## 2025-04-22 RX ADMIN — POTASSIUM CHLORIDE 10 MEQ: 750 TABLET, EXTENDED RELEASE ORAL at 08:51

## 2025-04-22 RX ADMIN — SODIUM CHLORIDE 2000 MG: 9 INJECTION, SOLUTION INTRAVENOUS at 12:01

## 2025-04-22 RX ADMIN — IPRATROPIUM BROMIDE AND ALBUTEROL SULFATE 3 ML: .5; 3 SOLUTION RESPIRATORY (INHALATION) at 20:45

## 2025-04-22 NOTE — PROGRESS NOTES
Name: Branden Diop ADMIT: 2025   : 1950  PCP: Tino Lopez MD    MRN: [id number] LOS: 0 days   AGE/SEX: 74 y.o. male  ROOM: [facility]     Subjective   Subjective   Patient resting in bed.  Dyspnea somewhat improved.  Rapid response called overnight for increasing oxygen needs.  Patient without any nausea or vomiting.  Wife is at bedside.  Last fever was at 4 AM, 101.8.  Patient in good spirits.         Objective   Objective   Vital Signs  Temp:  [97.7 °F (36.5 °C)-101.8 °F (38.8 °C)] 99 °F (37.2 °C)  Heart Rate:  [] 88  Resp:  [18-30] 18  BP: ()/(53-94) 99/54  SpO2:  [86 %-99 %] 93 %  on  Flow (L/min) (Oxygen Therapy):  [4-15] 6;   Device (Oxygen Therapy): high-flow nasal cannula  Body mass index is 40.14 kg/m².  Physical Exam  Vitals and nursing note reviewed.   Constitutional:       General: He is not in acute distress.     Appearance: He is ill-appearing.   Cardiovascular:      Rate and Rhythm: Normal rate and regular rhythm.      Comments: Port  Pulmonary:      Effort: Pulmonary effort is normal. No respiratory distress.      Comments: Coarse breath sounds bilaterally  Abdominal:      General: Abdomen is flat. There is no distension.      Tenderness: There is no abdominal tenderness.   Musculoskeletal:         General: No swelling or deformity.   Skin:     General: Skin is warm and dry.   Neurological:      General: No focal deficit present.      Mental Status: He is alert. Mental status is at baseline.         Results Review     I reviewed the patient's new clinical results.  Results from last 7 days   Lab Units 25  0444 25  1455   WBC 10*3/mm3 11.22* 8.18   HEMOGLOBIN g/dL 10.8* 11.9*   PLATELETS 10*3/mm3 180 226     Results from last 7 days   Lab Units 25  0444 25  1455   SODIUM mmol/L 135* 143   POTASSIUM mmol/L 3.8 3.4*   CHLORIDE mmol/L 98 100   CO2 mmol/L 24.0 31.9*   BUN mg/dL 12 10   CREATININE mg/dL 0.80 0.77   GLUCOSE mg/dL 162* 99   Estimated  Creatinine Clearance: 108.4 mL/min (by C-G formula based on SCr of 0.8 mg/dL).  Results from last 7 days   Lab Units 04/21/25  1455   ALBUMIN g/dL 3.9   BILIRUBIN mg/dL 0.9   ALK PHOS U/L 119*   AST (SGOT) U/L 25   ALT (SGPT) U/L 15     Results from last 7 days   Lab Units 04/22/25  0444 04/21/25  1455   CALCIUM mg/dL 8.8 9.6   ALBUMIN g/dL  --  3.9   MAGNESIUM mg/dL  --  1.8     Results from last 7 days   Lab Units 04/21/25  1516 04/21/25  1455   PROCALCITONIN ng/mL  --  0.06   LACTATE mmol/L 1.2  --      COVID19   Date Value Ref Range Status   04/21/2025 Not Detected Not Detected - Ref. Range Final   02/27/2025 Not Detected Not Detected - Ref. Range Final     Hemoglobin A1C   Date/Time Value Ref Range Status   04/21/2025 2103 8.60 (H) 4.80 - 5.60 % Final   04/21/2025 1455 8.80 (H) 4.80 - 5.60 % Final     Glucose   Date/Time Value Ref Range Status   04/22/2025 0639 166 (H) 70 - 130 mg/dL Final   04/21/2025 2154 194 (H) 70 - 130 mg/dL Final       XR Chest 1 View  Narrative: XR CHEST 1 VW-     HISTORY: Male who is 74 years-old, hypoxia     TECHNIQUE: Frontal view of the chest     COMPARISON: 4/21/2025     FINDINGS: Right chest port appears stable. The heart size is borderline.  Pulmonary vasculature appears congested. Bilateral alveolar interstitial  opacities appear similar to prior exam. Persistent right pleural  effusion. No pneumothorax. Otherwise stable.     Impression: No significant change.           This report was finalized on 4/22/2025 10:12 AM by Dr. Arsh Jorgensen M.D on Workstation: [id number]       I reviewed the patient's daily medications.  Scheduled Medications  arformoterol, 15 mcg, Nebulization, BID - RT  budesonide, 0.5 mg, Nebulization, BID - RT  ceFAZolin, 2,000 mg, Intravenous, Q8H  cetirizine, 10 mg, Oral, Daily  famotidine, 20 mg, Oral, BID AC  ferrous sulfate, 162.5 mg, Oral, BID  folic acid, 1 mg, Oral, Daily  gabapentin, 600 mg, Oral, Q8H  guaiFENesin, 1,200 mg, Oral, Q12H  insulin  glargine, 30 Units, Subcutaneous, Nightly  insulin lispro, 8 Units, Subcutaneous, TID With Meals  ipratropium-albuterol, 3 mL, Nebulization, 4x Daily - RT  magnesium oxide, 400 mg, Oral, BID  multivitamin, 1 tablet, Oral, Daily  potassium chloride, 10 mEq, Oral, BID  [Held by provider] rivaroxaban, 20 mg, Oral, Daily  rosuvastatin, 40 mg, Oral, Daily  senna-docusate sodium, 2 tablet, Oral, BID  sertraline, 50 mg, Oral, Daily  sodium chloride, 10 mL, Intravenous, Q12H    Infusions   Diet  NPO Diet NPO Type: Sips with Meds         I have personally reviewed:  [x]  Laboratory   [x]  Microbiology   [x]  Radiology   [x]  EKG/Telemetry   []  Cardiology/Vascular   []  Pathology   [x]  Records     Assessment/Plan     Active Hospital Problems    Diagnosis  POA    **Hypoxia [R09.02]  Yes    History of lung cancer [Z85.118]  Not Applicable    Anemia, chronic disease [D63.8]  Yes    Pleural effusion on right [J90]  Yes    Essential hypertension [I10]  Yes    Fatty liver [K76.0]  Yes    Cholelithiasis [K80.20]  Yes    DM2 (diabetes mellitus, type 2) [E11.9]  Yes    Atrial fibrillation [I48.91]  Yes    Dyslipidemia [E78.5]  Yes    Mood disorder [F39]  Yes    Lobar pneumonia [J18.1]  Yes      Resolved Hospital Problems   No resolved problems to display.       74 y.o. male admitted with Hypoxia.    Acute on chronic chronic respiratory failure from COPD, 4 L at baseline  Right pleural effusion, has had multiple thoracentesis in the past  Hx of Keytruda pneumonitis  -CT chest with worsening right pleural effusion  -Transition diuretics to oral torsemide 40 mg daily  -Hold Xarelto. On Lovenox.  -Discussed with thoracic surgery, plan for chest tube  -Restart home Torsemide     MSSA bacteremia  -ID consulted, now on cefazolin 2 g IV every 8 hours.  Plan for port removal when stable. ECHO pending.    Squamous cell lung carcinoma  -Follows with [facility], finished his third cycle of Keytruda on 2/4  -Keytruda intolerance, no  further Keytruda     Diabetes type 2, A1c 8.6%  - Lantus 30 units at 9 lispro 8 units with meals.  Sliding scale insulin, titrate as needed     Persistent atrial fibrillation  -Hold Xarelto as above.  Not on rate or rhythm control as an outpatient     Hyperlipidemia-continue home statin     Neuropathy-continue home gabapentin         Pharmacy to dose Lovenox for DVT prophylaxis.  Full code.  Discussed with patient and wife.  Case management, pharmacy, RN in the room.  Discussed with patient, family, nursing staff, CCP, and care team on multidisciplinary rounds.  Anticipate discharge home with HH vs SNU facility timing yet to be determined.    Expected Discharge Date: 4/24/2025; Expected Discharge Time:       Zia Braun MD  [facility]  04/22/25  13:04 EDT

## 2025-04-22 NOTE — PLAN OF CARE
Goal Outcome Evaluation:      Pt. Admitted to this unit around 1930 from ED with ATB IV infusion and 02 inhal. @8.5l/m per rick flow with humidify, Pt. Transferred to bed with 2 person assisted, but right away Pt. Said I could not breathe noted 02 sats down to 80% to 79%,  Pt. Wants to sit up, and saying I can not lie on bed, wants to sit chair, transferred to recliner chair. Staying in chair until this time,  02 rates increased up to 15l/m, 02 sats not going up, non rebreather mask in placed with 02 15l/m, o2 sats going up with non rebreather, RT , Rapid team arrived,and pulmonary on call provider here,  new order noted from pulmonary, Bi pap in placed 15l/ 70%, 02 sats stable until this time,pt. Said feeling so much better now, temp. Up over 100.4, tylenol 650mg given po as ordered x2 , Pt. Noted so anxious, anxiety noted, ativan given po at 0414, Pt. Became calm down, 02 sats 98% , no c/o short of air, BP and HR stable, resp.rates 18 , no s/s acute distress noted at this time,

## 2025-04-22 NOTE — DISCHARGE PLACEMENT REQUEST
"Caren Wong (74 y.o. Male)       Date of Birth   1950    Social Security Number       Address   [address] [place] [address]    Home Phone   [phone]    MRN   [id number]       [facility]    Marital Status                               Admission Date   4/21/2025    Admission Type   Emergency    Admitting Provider   Rigo Eng MD    Attending Provider   Zia Braun MD    Department, Room/Bed   [address], E[address]/1       Discharge Date       Discharge Disposition       Discharge Destination                                 Attending Provider: Zia Braun MD    Allergies: No Known Allergies    Isolation: None   Infection: None   Code Status: CPR    Ht: 180.3 cm (70.98\")   Wt: 130 kg (287 lb 11.2 oz)    Admission Cmt: None   Principal Problem: Hypoxia [R09.02]                   Active Insurance as of 4/21/2025       Primary Coverage       Payor Plan Insurance Group Employer/Plan Group    MEDICARE MEDICARE A & B        Payor Plan Address Payor Plan Phone Number Payor Plan Fax Number Effective Dates    [address] [phone]  10/1/2013 - None Entered    [facility] [address]         Subscriber Name Subscriber Birth Date Member ID       CAREN WONG 1950 [id number]               Secondary Coverage       Payor Plan Insurance Group Employer/Plan Group    AARP  SUP A[employer] HEALTH CARE OPTIONS        Payor Plan Address Payor Plan Phone Number Payor Plan Fax Number Effective Dates    [facility] [phone]  1/1/2016 - None Entered    [address]       [facility] [address]         Subscriber Name Subscriber Birth Date Member ID       CAREN WONG 1950 [id number]                     Emergency Contacts        (Rel.) Home Phone Work Phone Mobile Phone    Luba Wong (Spouse) -- -- [phone]                "

## 2025-04-22 NOTE — CODE DOCUMENTATION
"Patient Name:  Branden Diop  YOB: 1950  MRN:  [id number]  Admit Date:  4/21/2025    Visit Diagnoses:     ICD-10-CM ICD-9-CM   1. Hypoxia  R09.02 799.02   2. Dyspnea, unspecified type  R06.00 786.09   3. Recurrent pleural effusion  J90 511.9   4. History of lung cancer  Z85.118 V10.11       Reason For Rapid: Increased oxygen requirements    RN Communicated With: Primary RN, RT, Xin KUHN with [facility]      Rapid Outcome: Remain on unit    Communication From Rapid Team: Patient on a NRB with oxygen saturation 98%.   See abg.  Xin KUHN at bedside.   Bipap ordered.     Call RRT with any other concerns.     Most Recent Vital Signs  Temp:  [98.5 °F (36.9 °C)-101.6 °F (38.7 °C)] 101.6 °F (38.7 °C)  Heart Rate:  [] 103  Resp:  [24-30] 30  BP: (143-165)/(72-94) 158/72  SpO2:  [86 %-98 %] 97 %  on  Flow (L/min) (Oxygen Therapy):  [4-15] 15;   Device (Oxygen Therapy): nonrebreather mask    Labs:  Results from last 7 days   Lab Units 04/21/25  1509   COVID19  Not Detected     No results found for: \"POCGLU\"  Site   Date Value Ref Range Status   04/21/2025 Right Radial  Final     Micheal's Test   Date Value Ref Range Status   04/21/2025 Positive  Final     pH, Arterial   Date Value Ref Range Status   04/21/2025 7.375 7.350 - 7.450 pH units Final     pCO2, Arterial   Date Value Ref Range Status   04/21/2025 44.1 35.0 - 45.0 mm Hg Final     pO2, Arterial   Date Value Ref Range Status   04/21/2025 73.5 (L) 80.0 - 100.0 mm Hg Final     HCO3, Arterial   Date Value Ref Range Status   04/21/2025 25.8 22.0 - 28.0 mmol/L Final     Base Excess, Arterial   Date Value Ref Range Status   04/21/2025 0.3 0.0 - 2.0 mmol/L Final     Comment:     Serial Number: [id number]:  [id number]     O2 Saturation, Arterial   Date Value Ref Range Status   04/21/2025 94.1 92.0 - 98.5 % Final     Barometric Pressure for Blood Gas   Date Value Ref Range Status   04/21/2025 749.3000 mmHg Final     Modality   Date Value Ref Range " "Status   04/21/2025 NRB  Final     Results from last 7 days   Lab Units 04/21/25  1455   WBC 10*3/mm3 8.18   HEMOGLOBIN g/dL 11.9*   PLATELETS 10*3/mm3 226     Results from last 7 days   Lab Units 04/21/25  1455   SODIUM mmol/L 143   POTASSIUM mmol/L 3.4*   CHLORIDE mmol/L 100   CO2 mmol/L 31.9*   BUN mg/dL 10   CREATININE mg/dL 0.77   GLUCOSE mg/dL 99   ALBUMIN g/dL 3.9   BILIRUBIN mg/dL 0.9   ALK PHOS U/L 119*   AST (SGOT) U/L 25   ALT (SGPT) U/L 15   CrCl cannot be calculated (Unknown ideal weight.).  Results from last 7 days   Lab Units 04/21/25  1600 04/21/25  1455   HSTROP T ng/L 28* 25*   PROBNP pg/mL  --  416.0     Results from last 7 days   Lab Units 04/21/25  1516 04/21/25  1455   PROCALCITONIN ng/mL  --  0.06   LACTATE mmol/L 1.2  --      Results from last 7 days   Lab Units 04/21/25  1955   PH, ARTERIAL pH units 7.375   PO2 ART mm Hg 73.5*   PCO2, ARTERIAL mm Hg 44.1   HCO3 ART mmol/L 25.8   O2 SATURATION ART % 94.1   MODALITY  NRB   No results found for: \"STREPPNEUAG\", \"LEGANTIGENUR\"    Results from last 7 days   Lab Units 04/21/25  1509   ADENOVIRUS DETECTION BY PCR  Not Detected   CORONAVIRUS 229E  Not Detected   CORONAVIRUS HKU1  Not Detected   CORONAVIRUS NL63  Not Detected   CORONAVIRUS OC43  Not Detected   HUMAN METAPNEUMOVIRUS  Not Detected   HUMAN RHINOVIRUS/ENTEROVIRUS  Not Detected   INFLUENZA B PCR  Not Detected   PARAINFLUENZA 1  Not Detected   PARAINFLUENZA VIRUS 2  Not Detected   PARAINFLUENZA VIRUS 3  Not Detected   PARAINFLUENZA VIRUS 4  Not Detected   BORDETELLA PERTUSSIS PCR  Not Detected   CHLAMYDOPHILA PNEUMONIAE PCR  Not Detected   MYCOPLAMA PNEUMO PCR  Not Detected   INFLUENZA A PCR  Not Detected   RSV, PCR  Not Detected       NIH Stroke Scale:                                                         Please refer to full rapid documentation on summary page under Index / Code Timeline   "

## 2025-04-22 NOTE — PROGRESS NOTES
"[facility] Clinical Pharmacy Services: Vancomycin Pharmacokinetic Initial Consult Note    Branden Diop is a 74 y.o. male who is on day 1 of pharmacy to dose vancomycin.    Indication: Pneumonia  Consulting Provider: Rigo Eng  Planned Duration of Therapy: 5 days  Loading Dose Ordered or Given: 2500 mg on 4/21 at 2330  MRSA PCR performed: yes; Result: pending  Culture/Source: bc pending  Target: -600 mg/L.hr   Pertinent Vanc Dosing History: na  Other Antimicrobials: azithromycin, ceftriaxone    Vitals/Labs  Ht: 180.3 cm (70.98\"); Wt: 130 kg (287 lb 11.2 oz)  Temp Readings from Last 1 Encounters:   04/21/25 (!) 101.6 °F (38.7 °C) (Oral)    Estimated Creatinine Clearance: 112.7 mL/min (by C-G formula based on SCr of 0.77 mg/dL).        Results from last 7 days   Lab Units 04/21/25  1455   CREATININE mg/dL 0.77   WBC 10*3/mm3 8.18     Assessment/Plan:    Vancomycin Dose:   1250 mg IV every  12  hours  Predictive AUC level for the dose ordered is 463 mg/L.hr, which is within the target of 400-600 mg/L.hr  Vanc Trough has been ordered for 4/23 at 1100     Pharmacy will follow patient's kidney function and will adjust doses and obtain levels as necessary. Thank you for involving pharmacy in this patient's care. Please contact pharmacy with any questions or concerns.                           Cynthia Looney, PharmD  Clinical Pharmacist    "

## 2025-04-22 NOTE — CONSULTS
Inpatient Thoracic Surgery Consult  Consult performed by: Hansel James APRN  Consult ordered by: Boni Barnes MD          Patient Care Team:  Tino Lopez MD as PCP - General (Internal Medicine)  Tino Lopez MD as PCP - Family Medicine  Mart Ureña MD as Consulting Physician (Cardiology)  Martir Trevino MD as Surgeon (General Surgery)  Dione Carter, RN as Nurse Navigator  David Figueroa MD as Referring Physician (Thoracic Surgery)  Duy Villasenor MD PhD as Consulting Physician (Hematology and Oncology)  Yashira Mendiola MD as Consulting Physician (Radiation Oncology)    Chief Complaint   Patient presents with    Shortness of Breath       Subjective     History of Present Illness  Branden Diop is a 74-year-old male with a past medical history significant for DM 2, hyperlipidemia, atrial fibrillation (AC Xarelto), fatty liver disease, hypertension, recurrent right pleural effusion, COPD. He is  to our service for  undergoing right middle lobectomy on 06/12/2024 for a stage IIIa ([id number])  poorly differentiated squamous cell carcinoma by Dr. David Figueroa. Subsequent port placement on 07/05/2024. He follows Dr. Villasenor with Oncology and was initiated on adjuvant chemotherapy and immunotherapy.  Patient did not tolerate cisplatin which was transitioned to carboplatin. Further tissue sampling positive PD-L1 therefore he was initiated on immunotherapy. Unfortunately he developed pneumonitis from Keytruda and was hospitalized last month.  Started on prolonged course of prednisone which he completed on 4/15/2025.    Patient was seen at his oncology appointment yesterday for port flushing was noted to be dyspneic and hypoxic.  He was sent to the emergency department for further workup where he was noted to have low-grade fever, 100.9.  He has had an issue with recurrent right-sided pleural effusions s/p serial thoracenteses--11/30/2024 (0.8 L), 1/23/2025 (1 L), 3/2/2025 (1.2 L).  He was  seen by pulm and initiated on antibiotic therapy.  Of note rapid response was called on him yesterday evening secondary to progressive dyspnea and hypoxia requiring 15 L nonrebreather and ultimately BiPAP.  Chest imaging demonstrating recurrence of the right pleural effusion.  We have been consulted for consideration for surgical intervention including Pleurx catheter placement.    He appears comfortable on exam while he is endorsing some mild dyspnea.  He is on 60 L via nasal cannula and saturating well.  Noted fevers overnight, 101.8 Tmax.  Mild leukocytosis with a.m. labs.  Accompanied by his wife also provided a portion of his medical history.    Review of Systems   Constitutional:  Positive for activity change (Dyspnea on exertion) and fever. Negative for fatigue.   HENT:  Positive for sore throat. Negative for congestion.    Respiratory:  Positive for cough. Negative for shortness of breath.    Cardiovascular:  Negative for chest pain.   Gastrointestinal:  Negative for diarrhea and vomiting.   Genitourinary:  Positive for penile discharge. Negative for dysuria.   Musculoskeletal:  Negative for back pain.   Skin:  Negative for color change and pallor.   Neurological:  Negative for facial asymmetry.   Psychiatric/Behavioral:  Negative for confusion.         Past Medical History:   Diagnosis Date    Anxiety     Arthritis     Atrial fibrillation     CURRENTLY    Bunion of right foot     Colon polyps     COPD (chronic obstructive pulmonary disease)     MILD. NO MEDS FOR    Depression     History of atrial fibrillation     WITH CARDIOVERSION. NO PROBLEMS    History of skin cancer     BCC REMOVED FROM BACK    [place] (hard of hearing)     Hyperlipidemia     Inguinal hernia, recurrent     RIGHT    Left foot pain     Lung nodules     Rash     IN GROIN TREATING FOR YEAST INFECTION BY PCP    Redness of skin     LOWER LEGS BILATERAL    Scab     BILATERAL LEGS HEALING    Sleep apnea with use of continuous positive airway  pressure (CPAP)     CPAP    Streptococcus pneumoniae     ABOUT 6-7 YR AGO.     Type 2 diabetes mellitus      Past Surgical History:   Procedure Laterality Date    BASAL CELL CARCINOMA EXCISION      BRONCHOSCOPY WITH ION ROBOTIC ASSIST N/A 5/6/2024    Procedure: BRONCHOSCOPY WITH ION ROBOT WITH FNA, BIOPSIES, BAL AND ENDOBRONCHIAL ULTRASOUND WITH FNA;  Surgeon: Yony Coles MD;  Location: [facility] ENDOSCOPY;  Service: Robotics - Pulmonary;  Laterality: N/A;  PRE: PULMONARY NODULES  POST: SAME    CARDIAC CATHETERIZATION N/A 11/15/2021    Procedure: Coronary angiography;  Surgeon: Alfredo Jennings MD;  Location: [facility] CATH INVASIVE LOCATION;  Service: Cardiovascular;  Laterality: N/A;    CARDIAC CATHETERIZATION N/A 11/15/2021    Procedure: Left heart cath;  Surgeon: Alfredo Jennings MD;  Location: [facility] CATH INVASIVE LOCATION;  Service: Cardiovascular;  Laterality: N/A;    CARDIAC CATHETERIZATION N/A 11/15/2021    Procedure: Left ventriculography;  Surgeon: Alrfedo Jennings MD;  Location: [facility] CATH INVASIVE LOCATION;  Service: Cardiovascular;  Laterality: N/A;    CARDIAC CATHETERIZATION N/A 11/15/2021    Procedure: Aortic root aortogram;  Surgeon: Alfredo Jennings MD;  Location: [facility] CATH INVASIVE LOCATION;  Service: Cardiovascular;  Laterality: N/A;    CARDIOVERSION      CHOLECYSTECTOMY N/A 10/07/2017    Procedure: CHOLECYSTECTOMY LAPAROSCOPIC;  Surgeon: Martir Trevino MD;  Location: [facility] MAIN OR;  Service:     COLONOSCOPY  2007    COLONOSCOPY N/A 8/30/2022    Procedure: COLONOSCOPY TO CECUM AND TI AND COLD SNARE POLYPECTOMY;  Surgeon: Cj Lopez MD;  Location: [facility] ENDOSCOPY;  Service: Gastroenterology;  Laterality: N/A;  Pre: History of colon polyps  Post: POLYP, PREVIOUS TATTOO    FOOT SURGERY Left     TOES ARE PINNED. ALL BUT GREAT TOE    HAND SURGERY      THUMB    HERNIA REPAIR      INGUINAL HERNIA REPAIR Right 06/27/2018    Procedure: INGUINAL HERNIA REPAIR, OPEN, RECURRENT;   Surgeon: Martir Trevino MD;  Location:  [facility];  Service: General    LASIK Bilateral     LOBECTOMY Right 2024    Procedure: BRONCHOSCOPY, RIGHT VIDEO ASSISTED THORACOSCOPY WITH RIGHT MIDDLE LOBECTOMY WITH DAVINCI ROBOT, MEDIASTINAL LYMPH NODE DISSECTION, INTERCOSTAL NERVE BLOCK;  Surgeon: David Figueroa MD;  [facility] MA[facility] OR;  Service: Robotics - DaVinci;  Laterality: Right;    NECK SURGERY      growth on salivary gland    REPLACEMENT TOTAL KNEE Right     (he is going to have a LTKR next month)    REPLACEMENT TOTAL KNEE Left     VENOUS ACCESS DEVICE (PORT) INSERTION Right 2024    Procedure: INSERTION VENOUS ACCESS DEVICE;  Surgeon: David Figueroa MD;  [facility];  Service: Thoracic;  Laterality: Right;     Family History   Problem Relation Age of Onset    Cancer Other     Stroke Mother     Alcohol abuse Father     Malig Hyperthermia Neg Hx      Social History     Socioeconomic History    Marital status:      Spouse name: Luba    Number of children: 2   Tobacco Use    Smoking status: Former     Current packs/day: 0.00     Average packs/day: 1 pack/day for 30.0 years (30.0 ttl pk-yrs)     Types: Cigars, Cigarettes     Start date: 1984     Quit date: 2014     Years since quittin.3    Smokeless tobacco: Never   Vaping Use    Vaping status: Never Used   Substance and Sexual Activity    Alcohol use: Never     Comment: caffeine use- decaf coffee    Drug use: Never    Sexual activity: Defer     Medications Prior to Admission   Medication Sig Dispense Refill Last Dose/Taking    albuterol sulfate  (90 Base) MCG/ACT inhaler Inhale 2 puffs Every 4 (Four) Hours As Needed for Wheezing.   Taking As Needed    famotidine (PEPCID) 20 MG tablet Take 1 tablet by mouth 2 (Two) Times a Day As Needed for Heartburn.   Taking As Needed    ferrous sulfate 325 (65 FE) MG tablet Take 0.5 tablets by mouth 2 (Two) Times a Day.   Taking    fexofenadine (ALLEGRA) 180 MG tablet Take 1  tablet by mouth Daily.   Taking    folic acid (FOLVITE) 1 MG tablet Take 1 tablet by mouth Daily. 90 tablet 3 Taking    gabapentin (NEURONTIN) 300 MG capsule TAKE 2 CAPSULES BY MOUTH EVERY 8 HOURS 180 capsule 2 Taking    hydrOXYzine pamoate (VISTARIL) 50 MG capsule Take 1 capsule by mouth 3 (Three) Times a Day As Needed for Itching.   Taking As Needed    insulin aspart (NovoLOG FlexPen) 100 UNIT/ML solution pen-injector sc pen Inject 8 Units under the skin into the appropriate area as directed 3 (Three) Times a Day With Meals for 30 days.   Taking    Insulin Glargine, 1 Unit Dial, (Toujeo SoloStar) 300 UNIT/ML solution pen-injector injection Inject 30 Units under the skin into the appropriate area as directed Daily.   Taking    Magnesium Oxide -Mg Supplement 400 (240 Mg) MG tablet TAKE 1 TABLET BY MOUTH 2 TIMES A DAY 60 tablet 1 Taking    Multiple Vitamin (MULTIVITAMINS PO) Take 1 tablet by mouth Daily.   Taking    potassium chloride (MICRO-K) 10 MEQ CR capsule TAKE 1 CAPSULE BY MOUTH 2 TIMES A DAY 40 capsule 2 Taking    rosuvastatin (CRESTOR) 40 MG tablet TAKE ONE TABLET BY MOUTH DAILY (Patient taking differently: Take 1 tablet by mouth Daily.) 90 tablet 1 Taking Differently    sertraline (ZOLOFT) 50 MG tablet TAKE ONE TABLET BY MOUTH DAILY (Patient taking differently: Take 1 tablet by mouth Daily.) 90 tablet 1 Taking Differently    Tirzepatide (Mounjaro) 2.5 MG/0.5ML solution pen-injector pen Inject 2.5 mg under the skin into the appropriate area as directed 1 (One) Time Per Week. Mondays   Taking    vitamin B-12 (CYANOCOBALAMIN) 1000 MCG tablet Take 1 tablet by mouth Daily. 90 tablet 3 Taking    Xarelto 20 MG tablet TAKE ONE TABLET BY MOUTH DAILY (Patient taking differently: Take 1 tablet by mouth Daily With Dinner.) 30 tablet 9 4/20/2025    torsemide (DEMADEX) 20 MG tablet Take 2 tablets by mouth Daily for 30 days. 60 tablet 0      No Known Allergies    Objective      Vital Signs  Temp:  [97.7 °F (36.5 °C)-101.8  °F (38.8 °C)] 97.7 °F (36.5 °C)  Heart Rate:  [] 96  Resp:  [18-30] 18  BP: (114-165)/(53-94) 115/55    Intake & Output (last day)         04/21 0701  04/22 0700 04/22 0701  04/23 0700    IV Piggyback 100     Total Intake(mL/kg) 100 (0.8)     Urine (mL/kg/hr)  400 (1.2)    Total Output  400    Net +100 -400                  Physical Exam  Constitutional:       General: He is not in acute distress.     Appearance: Normal appearance. He is not ill-appearing or toxic-appearing.   HENT:      Head: Normocephalic.      Mouth/Throat:      Mouth: Mucous membranes are moist.      Pharynx: Oropharynx is clear.   Eyes:      Pupils: Pupils are equal, round, and reactive to light.   Cardiovascular:      Rate and Rhythm: Normal rate and regular rhythm.   Pulmonary:      Effort: Pulmonary effort is normal. No respiratory distress.   Abdominal:      General: Abdomen is flat. There is no distension.      Palpations: Abdomen is soft.   Musculoskeletal:         General: No swelling or tenderness.   Skin:     General: Skin is warm and dry.      Capillary Refill: Capillary refill takes less than 2 seconds.   Neurological:      General: No focal deficit present.      Mental Status: He is alert and oriented to person, place, and time.   Psychiatric:         Mood and Affect: Mood normal.       Results Review:    I reviewed the patient's new clinical results.  I reviewed the patient's new imaging results and agree with the interpretation.  Discussed with patient, Nurse, Surgeon     Imaging Results (Last 24 Hours)       Procedure Component Value Units Date/Time    XR Chest 1 View [[id number]] Resulted: 04/22/25 0918     Updated: 04/22/25 0933    XR Chest 1 View [[id number]] Collected: 04/21/25 1543     Updated: 04/21/25 1547    Narrative:      XR CHEST 1 VW-4/21/2025     HISTORY: Shortness of breath.     Heart size is within normal limits. Lungs are underinflated with some  vascular crowding. There is some bilateral vascular congestion  with more  patchy areas of increased density in the right upper lung and bilateral  lower lungs which may represent bilateral asymmetric edema atelectasis  and/or pneumonia. These findings may be partially chronic in nature as  well as some of these were present on the 2/27/2025 study.     Mediport catheter is seen in good position.       Impression:      1. Underinflation of the lungs with some vascular crowding and there may  be mild vascular congestion.  2. Patchy areas of increased density in the right upper lobe and  bilateral lower lungs as described above. Please correlate. Follow-up  films also recommended        This report was finalized on 4/21/2025 3:44 PM by Dr. David Ruiz M.D on Workstation: [id number]               Lab Results:  Lab Results (last 24 hours)       Procedure Component Value Units Date/Time    Blood Culture - Blood, Arm, Right [[id number]]  (Abnormal) Collected: 04/21/25 1747    Specimen: Blood from Arm, Right Updated: 04/22/25 0803     Blood Culture Abnormal Stain     Gram Stain Aerobic Bottle Gram positive cocci in clusters     Comment: Modified report. Previous result was Aerobic Bottle Gram negative bacilli on 4/22/2025 at 0759 EDT.         Anaerobic Bottle Gram positive cocci in clusters     Comment: Modified report. Previous result was Anaerobic Bottle Gram negative bacilli on 4/22/2025 at 0759 EDT.       Blood Culture - Blood, Hand, Right [[id number]]  (Abnormal) Collected: 04/21/25 1747    Specimen: Blood from Hand, Right Updated: 04/22/25 0803     Blood Culture Abnormal Stain     Gram Stain Anaerobic Bottle Gram positive cocci in clusters     Comment: Modified report. Previous result was Anaerobic Bottle Gram negative bacilli on 4/22/2025 at 0759 EDT.         Aerobic Bottle Gram positive cocci in clusters     Comment: Modified report. Previous result was Aerobic Bottle Gram negative bacilli on 4/22/2025 at 0759 EDT.       Blood Culture ID, PCR - Blood, Hand, Right  [[id number]] Collected: 04/21/25 1747    Specimen: Blood from Hand, Right Updated: 04/22/25 0759    POC Glucose Once [[id number]]  (Abnormal) Collected: 04/22/25 0639    Specimen: Blood Updated: 04/22/25 0641     Glucose 166 mg/dL     Basic Metabolic Panel [[id number]]  (Abnormal) Collected: 04/22/25 0444    Specimen: Blood Updated: 04/22/25 0549     Glucose 162 mg/dL      BUN 12 mg/dL      Creatinine 0.80 mg/dL      Sodium 135 mmol/L      Potassium 3.8 mmol/L      Chloride 98 mmol/L      CO2 24.0 mmol/L      Calcium 8.8 mg/dL      BUN/Creatinine Ratio 15.0     Anion Gap 13.0 mmol/L      eGFR 91.7 mL/min/1.73     Narrative:      GFR Categories in Chronic Kidney Disease (CKD)      GFR Category          GFR (mL/min/1.73)    Interpretation  G1                     90 or greater         Normal or high (1)  G2                      60-89                Mild decrease (1)  G3a                   45-59                Mild to moderate decrease  G3b                   30-44                Moderate to severe decrease  G4                    15-29                Severe decrease  G5                    14 or less           Kidney failure          (1)In the absence of evidence of kidney disease, neither GFR category G1 or G2 fulfill the criteria for CKD.    eGFR calculation 2021 CKD-EPI creatinine equation, which does not include race as a factor    CBC & Differential [[id number]]  (Abnormal) Collected: 04/22/25 0444    Specimen: Blood Updated: 04/22/25 0518    Narrative:      The following orders were created for panel order CBC & Differential.  Procedure                               Abnormality         Status                     ---------                               -----------         ------                     CBC Auto Differential[[id number]]        Abnormal            Final result                 Please view results for these tests on the individual orders.    CBC Auto Differential [[id number]]  (Abnormal) Collected: 04/22/25 0444     Specimen: Blood Updated: 04/22/25 0518     WBC 11.22 10*3/mm3      RBC 3.73 10*6/mm3      Hemoglobin 10.8 g/dL      Hematocrit 33.0 %      MCV 88.5 fL      MCH 29.0 pg      MCHC 32.7 g/dL      RDW 16.1 %      RDW-SD 51.4 fl      MPV 8.8 fL      Platelets 180 10*3/mm3      Neutrophil % 91.2 %      Lymphocyte % 3.9 %      Monocyte % 3.7 %      Eosinophil % 0.4 %      Basophil % 0.4 %      Immature Grans % 0.4 %      Neutrophils, Absolute 10.23 10*3/mm3      Lymphocytes, Absolute 0.44 10*3/mm3      Monocytes, Absolute 0.41 10*3/mm3      Eosinophils, Absolute 0.05 10*3/mm3      Basophils, Absolute 0.04 10*3/mm3      Immature Grans, Absolute 0.05 10*3/mm3      nRBC 0.0 /100 WBC     Respiratory Culture - Sputum, Cough [[id number]] Collected: 04/21/25 1853    Specimen: Sputum from Cough Updated: 04/22/25 0425     Respiratory Culture Rejected     Gram Stain Moderate (3+) Mixed bacterial kevin      Few (2+) WBCs seen      Rare (1+) Epithelial cells seen    Narrative:      Specimen rejected due to oropharyngeal contamination. Please reorder and recollect specimen if clinically necessary.    POC Glucose Once [[id number]]  (Abnormal) Collected: 04/21/25 2154    Specimen: Blood Updated: 04/21/25 2156     Glucose 194 mg/dL     Hemoglobin A1c [[id number]]  (Abnormal) Collected: 04/21/25 2103    Specimen: Blood Updated: 04/21/25 2131     Hemoglobin A1C 8.60 %     Narrative:      Hemoglobin A1C Ranges:    Increased Risk for Diabetes  5.7% to 6.4%  Diabetes                     >= 6.5%  Diabetic Goal                < 7.0%    aPTT [[id number]]  (Normal) Collected: 04/21/25 2103    Specimen: Blood from Hand, Left Updated: 04/21/25 2125     PTT 31.4 seconds     Protime-INR [[id number]]  (Abnormal) Collected: 04/21/25 1455    Specimen: Blood from Arm, Right Updated: 04/21/25 2040     Protime 22.1 Seconds      INR 1.92    S. Pneumo Ag Urine or CSF - Urine, Urine, Clean Catch [[id number]]  (Normal) Collected: 04/21/25 2934    Specimen: Urine,  Clean Catch Updated: 04/21/25 2036     Strep Pneumo Ag Negative    Legionella Antigen, Urine - Urine, Urine, Clean Catch [[id number]]  (Normal) Collected: 04/21/25 1845    Specimen: Urine, Clean Catch Updated: 04/21/25 2036     LEGIONELLA ANTIGEN, URINE Negative    Blood Gas, Arterial - [[id number]]  (Abnormal) Collected: 04/21/25 1955    Specimen: Arterial Blood Updated: 04/21/25 2000     Site Right Radial     Micheal's Test Positive     pH, Arterial 7.375 pH units      pCO2, Arterial 44.1 mm Hg      pO2, Arterial 73.5 mm Hg      HCO3, Arterial 25.8 mmol/L      Base Excess, Arterial 0.3 mmol/L      Comment: Serial Number: [id number]:  [id number]        O2 Saturation, Arterial 94.1 %      Barometric Pressure for Blood Gas 749.3000 mmHg      Modality NRB     Flow Rate 15.0000 lpm      Rate 30 Breaths/minute      Hemodilution No     Device Comment sat 98    Hemoglobin A1c [[id number]]  (Abnormal) Collected: 04/21/25 1455    Specimen: Blood from Arm, Right Updated: 04/21/25 1739     Hemoglobin A1C 8.80 %     Narrative:      Hemoglobin A1C Ranges:    Increased Risk for Diabetes  5.7% to 6.4%  Diabetes                     >= 6.5%  Diabetic Goal                < 7.0%    High Sensitivity Troponin T 1Hr [[id number]]  (Abnormal) Collected: 04/21/25 1600    Specimen: Blood Updated: 04/21/25 1635     HS Troponin T 28 ng/L      Troponin T Numeric Delta 3 ng/L      Troponin T % Delta 12    Narrative:      High Sensitive Troponin T Reference Range:  <14.0 ng/L- Negative Female for AMI  <22.0 ng/L- Negative Male for AMI  >=14 - Abnormal Female indicating possible myocardial injury.  >=22 - Abnormal Male indicating possible myocardial injury.   Clinicians would have to utilize clinical acumen, EKG, Troponin, and serial changes to determine if it is an Acute Myocardial Infarction or myocardial injury due to an underlying chronic condition.         Respiratory Panel PCR w/COVID-19(SARS-CoV-2) [facility] In-House, NP Swab in  UTM/VTM, 2 HR TAT - Swab, Nasopharynx [[id number]]  (Normal) Collected: 04/21/25 1509    Specimen: Swab from Nasopharynx Updated: 04/21/25 1621     ADENOVIRUS, PCR Not Detected     Coronavirus 229E Not Detected     Coronavirus HKU1 Not Detected     Coronavirus NL63 Not Detected     Coronavirus OC43 Not Detected     COVID19 Not Detected     Human Metapneumovirus Not Detected     Human Rhinovirus/Enterovirus Not Detected     Influenza A PCR Not Detected     Influenza B PCR Not Detected     Parainfluenza Virus 1 Not Detected     Parainfluenza Virus 2 Not Detected     Parainfluenza Virus 3 Not Detected     Parainfluenza Virus 4 Not Detected     RSV, PCR Not Detected     Bordetella pertussis pcr Not Detected     Bordetella parapertussis PCR Not Detected     Chlamydophila pneumoniae PCR Not Detected     Mycoplasma pneumo by PCR Not Detected    Narrative:      In the setting of a positive respiratory panel with a viral infection PLUS a negative procalcitonin without other underlying concern for bacterial infection, consider observing off antibiotics or discontinuation of antibiotics and continue supportive care. If the respiratory panel is positive for atypical bacterial infection (Bordetella pertussis, Chlamydophila pneumoniae, or Mycoplasma pneumoniae), consider antibiotic de-escalation to target atypical bacterial infection.    Lactic Acid, Plasma [[id number]]  (Normal) Collected: 04/21/25 1516    Specimen: Blood Updated: 04/21/25 1551     Lactate 1.2 mmol/L     BNP [[id number]]  (Normal) Collected: 04/21/25 1455    Specimen: Blood from Arm, Right Updated: 04/21/25 1535     proBNP 416.0 pg/mL     Narrative:      This assay is used as an aid in the diagnosis of individuals suspected of having heart failure. It can be used as an aid in the diagnosis of acute decompensated heart failure (ADHF) in patients presenting with signs and symptoms of ADHF to the emergency department (ED). In addition, NT-proBNP of <300 pg/mL  "indicates ADHF is not likely.    Age Range Result Interpretation  NT-proBNP Concentration (pg/mL:      <50             Positive            >450                   Gray                 300-450                    Negative             <300    50-75           Positive            >900                  Gray                300-900                  Negative            <300      >75             Positive            >1800                  Gray                300-1800                  Negative            <300    High Sensitivity Troponin T [[id number]]  (Abnormal) Collected: 04/21/25 1455    Specimen: Blood from Arm, Right Updated: 04/21/25 1535     HS Troponin T 25 ng/L     Narrative:      High Sensitive Troponin T Reference Range:  <14.0 ng/L- Negative Female for AMI  <22.0 ng/L- Negative Male for AMI  >=14 - Abnormal Female indicating possible myocardial injury.  >=22 - Abnormal Male indicating possible myocardial injury.   Clinicians would have to utilize clinical acumen, EKG, Troponin, and serial changes to determine if it is an Acute Myocardial Infarction or myocardial injury due to an underlying chronic condition.         Procalcitonin [[id number]]  (Normal) Collected: 04/21/25 1455    Specimen: Blood from Arm, Right Updated: 04/21/25 1535     Procalcitonin 0.06 ng/mL     Narrative:      As a Marker for Sepsis (Non-Neonates):    1. <0.5 ng/mL represents a low risk of severe sepsis and/or septic shock.  2. >2 ng/mL represents a high risk of severe sepsis and/or septic shock.    As a Marker for Lower Respiratory Tract Infections that require antibiotic therapy:    PCT on Admission    Antibiotic Therapy       6-12 Hrs later    >0.5                Strongly Recommended  >0.25 - <0.5        Recommended   0.1 - 0.25          Discouraged              Remeasure/reassess PCT  <0.1                Strongly Discouraged     Remeasure/reassess PCT    As 28 day mortality risk marker: \"Change in Procalcitonin Result\" (>80% or <=80%) if Day " 0 (or Day 1) and Day 4 values are available. Refer to http://www.[facility]-pct-calculator.com    Change in PCT <=80%  A decrease of PCT levels below or equal to 80% defines a positive change in PCT test result representing a higher risk for 28-day all-cause mortality of patients diagnosed with severe sepsis for septic shock.    Change in PCT >80%  A decrease of PCT levels of more than 80% defines a negative change in PCT result representing a lower risk for 28-day all-cause mortality of patients diagnosed with severe sepsis or septic shock.       Comprehensive Metabolic Panel [[id number]]  (Abnormal) Collected: 04/21/25 1455    Specimen: Blood from Arm, Right Updated: 04/21/25 1528     Glucose 99 mg/dL      BUN 10 mg/dL      Creatinine 0.77 mg/dL      Sodium 143 mmol/L      Potassium 3.4 mmol/L      Chloride 100 mmol/L      CO2 31.9 mmol/L      Calcium 9.6 mg/dL      Total Protein 7.2 g/dL      Albumin 3.9 g/dL      ALT (SGPT) 15 U/L      AST (SGOT) 25 U/L      Alkaline Phosphatase 119 U/L      Total Bilirubin 0.9 mg/dL      Globulin 3.3 gm/dL      A/G Ratio 1.2 g/dL      BUN/Creatinine Ratio 13.0     Anion Gap 11.1 mmol/L      eGFR 92.8 mL/min/1.73     Narrative:      GFR Categories in Chronic Kidney Disease (CKD)      GFR Category          GFR (mL/min/1.73)    Interpretation  G1                     90 or greater         Normal or high (1)  G2                      60-89                Mild decrease (1)  G3a                   45-59                Mild to moderate decrease  G3b                   30-44                Moderate to severe decrease  G4                    15-29                Severe decrease  G5                    14 or less           Kidney failure          (1)In the absence of evidence of kidney disease, neither GFR category G1 or G2 fulfill the criteria for CKD.    eGFR calculation 2021 CKD-EPI creatinine equation, which does not include race as a factor    Magnesium [[id number]]  (Normal) Collected:  04/21/25 1455    Specimen: Blood from Arm, Right Updated: 04/21/25 1528     Magnesium 1.8 mg/dL     [facility] Draw [[id number]] Collected: 04/21/25 1455    Specimen: Blood from Arm, Right Updated: 04/21/25 1515    Narrative:      The following orders were created for panel order [facility] Draw.  Procedure                               Abnormality         Status                     ---------                               -----------         ------                     Green Top (Gel)[[id number]]                                  Final result               Lavender Top[[id number]]                                     Final result               Gold Top - SST[[id number]]                                   Final result               Light Blue Top[[id number]]                                   Final result                 Please view results for these tests on the individual orders.    Green Top (Gel) [[id number]] Collected: 04/21/25 1455    Specimen: Blood from Arm, Right Updated: 04/21/25 1515     Extra Tube Hold for add-ons.     Comment: Auto resulted.       Lavender Top [[id number]] Collected: 04/21/25 1455    Specimen: Blood from Arm, Right Updated: 04/21/25 1515     Extra Tube hold for add-on     Comment: Auto resulted       Gold Top - SST [[id number]] Collected: 04/21/25 1455    Specimen: Blood from Arm, Right Updated: 04/21/25 1515     Extra Tube Hold for add-ons.     Comment: Auto resulted.       Light Blue Top [[id number]] Collected: 04/21/25 1455    Specimen: Blood from Arm, Right Updated: 04/21/25 1515     Extra Tube Hold for add-ons.     Comment: Auto resulted       CBC & Differential [[id number]]  (Abnormal) Collected: 04/21/25 1455    Specimen: Blood from Arm, Right Updated: 04/21/25 1505    Narrative:      The following orders were created for panel order CBC & Differential.  Procedure                               Abnormality         Status                     ---------                               -----------          ------                     CBC Auto Differential[[id number]]        Abnormal            Final result                 Please view results for these tests on the individual orders.    CBC Auto Differential [[id number]]  (Abnormal) Collected: 04/21/25 1455    Specimen: Blood from Arm, Right Updated: 04/21/25 1505     WBC 8.18 10*3/mm3      RBC 4.23 10*6/mm3      Hemoglobin 11.9 g/dL      Hematocrit 38.0 %      MCV 89.8 fL      MCH 28.1 pg      MCHC 31.3 g/dL      RDW 16.1 %      RDW-SD 52.3 fl      MPV 8.6 fL      Platelets 226 10*3/mm3      Neutrophil % 75.8 %      Lymphocyte % 10.9 %      Monocyte % 4.2 %      Eosinophil % 7.9 %      Basophil % 0.6 %      Immature Grans % 0.6 %      Neutrophils, Absolute 6.20 10*3/mm3      Lymphocytes, Absolute 0.89 10*3/mm3      Monocytes, Absolute 0.34 10*3/mm3      Eosinophils, Absolute 0.65 10*3/mm3      Basophils, Absolute 0.05 10*3/mm3      Immature Grans, Absolute 0.05 10*3/mm3      nRBC 0.0 /100 WBC                 Assessment & Plan       Hypoxia    Lobar pneumonia    DM2 (diabetes mellitus, type 2)    Dyslipidemia    Mood disorder    Atrial fibrillation    Cholelithiasis    Fatty liver    Essential hypertension    Pleural effusion on right    History of lung cancer    Anemia, chronic disease      Assessment & Plan  Patient with recurrent right-sided pleural effusion.  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  S/p serial thoracenteses, 11/30/2024 (0.8 L), 1/23/2025 (1 L), 3/2/2025 (1.2 L).  Cytology negative for malignant cells. Appears exudative on most recent fluid studies.  Prior pleural fluid cultures without growth.  Most recent chest x-ray performed today ordered and reviewed demonstrating persistent right-sided pleural effusion.  Right-sided port seen in position.    Blood cultures demonstrating growth of MSSA.  Antibiotics per infectious disease recommendations. Question source, difficult to ascertain if  attributed to port versus lung.   He is currently not an ideal candidate for Pleurx catheter placement given possible bacteremia, increased oxygen requirements, AC.  Additionally the patient is against Pleurx catheter placement. recommend right CT-guided chest tube for initial management of his right-sided pleural effusion.  Plan for fluid studies as well including cultures to help elucidate etiology of bacteremia prior to considering port removal.     No follow-up imaging following thoracenteses. Difficult to determine if persistent effusion is residual from prior and loculated. repeat a CT chest to further characterize effusion.  May ultimately require intrapleural lytic therapy to drain effusion.  Will consider bedside chemical pleurodesis to mitigate recurrence of his effusion in the future.     N.p.o. status until chest tube is placed.  Please continue chest tube to -20 cm suction once placed. Continue to hold oral anticoagulation with chest tube in place.  Daily chest x-rays.  Encourage pulmonary hygiene, I-S.  Rest of care per primary team.  Discussed with IR & Mabel Julian Moore, & Henry     Transfer to     I discussed the patient's findings and our recommendations with patient, family, nursing staff, primary care team, and consulting provider    Thank you for this consult and allowing us to participate in the care of your patient.  We will follow along with you during this hospitalization.     BAM Rodgers  [facility]  04/22/25  09:40 EDT    I have spent greater than 75 minutes reviewing the patient's chart including medical history, notes, radiographic images, labs, and performing assessment and development of a plan and discussion with the patient/family at bedside.

## 2025-04-22 NOTE — PROGRESS NOTES
"                                              LOS: 0 days   Patient Care Team:  Tino Lopez MD as PCP - General (Internal Medicine)  Tino Lopez MD as PCP - Family Medicine  Mart Ureña MD as Consulting Physician (Cardiology)  Martir Trevino MD as Surgeon (General Surgery)  Dione Carter, RN as Nurse Navigator  David Figueroa MD as Referring Physician (Thoracic Surgery)  Duy Villasenor MD PhD as Consulting Physician (Hematology and Oncology)  Yashira Mendiola MD as Consulting Physician (Radiation Oncology)    Chief Complaint:  F/up hypoxic respiratory failure and pleural effusion.    Subjective   Interval History    He feels much better he said.  Down to 6 L oxygen.  Using BiPAP at night.  Chest tube is on suction and he had about 400 mL.  Chest x-ray improved.  He denies dyspnea at rest.    REVIEW OF SYSTEMS:   CARDIOVASCULAR: Minimal chest pain at the site of the chest tube.  GASTROINTESTINAL: No anorexia, nausea, vomiting or diarrhea. No abdominal pain.  CONSTITUTIONAL: No fever or chills.     Ventilator/Non-Invasive Ventilation Settings (From admission, onward)       Start     Ordered    04/21/25 2015  NIPPV (CPAP or BIPAP)  As Needed-RT        Question Answer Comment   Indication Acute Respiratory Failure    Type BIPAP    IPAP 16    EPAP 8    Tidal Volume (mL) 500    Titrate Oxygen for SpO2 88 - 92%        04/21/25 2014                          Physical Exam:     Vital Signs  Temp:  [97.7 °F (36.5 °C)-101.8 °F (38.8 °C)] 97.7 °F (36.5 °C)  Heart Rate:  [] 96  Resp:  [18-30] 18  BP: (114-165)/(53-94) 115/55    Intake/Output Summary (Last 24 hours) at 4/22/2025 0918  Last data filed at 4/22/2025 0817  Gross per 24 hour   Intake 100 ml   Output 400 ml   Net -300 ml     Flowsheet Rows      Flowsheet Row First Filed Value   Admission Height 180.3 cm (70.98\") Documented at 04/21/2025 2100   Admission Weight 130 kg (287 lb 11.2 oz) Documented at 04/21/2025 2100            PPE used " per hospital policy    General Appearance:   Alert, cooperative, in no acute distress   ENMT:  Mallampati score 3, moist mucous membrane   Eyes:  Pupils equal and reactive to light. EOMI   Neck:   Large. Trachea midline. No thyromegaly.   Lungs:    Clear to auscultation,respirations regular, even and nonlabored.  Slightly diminished air entry at the bases.    Heart:   Regular rhythm and normal rate, normal S1 and S2, no         murmur   Skin:   No rash or ecchymosis   Abdomen:    Obese. Soft. No tenderness. No HSM.   Neuro/psych:  Conscious, alert, oriented x3. Strength 5/5 in upper and lower  ext.  Appropriate mood and affect   Extremities:  No cyanosis, clubbing or edema.  Warm extremities and well-perfused          Results Review:        Results from last 7 days   Lab Units 04/22/25  0444 04/21/25  1455   SODIUM mmol/L 135* 143   POTASSIUM mmol/L 3.8 3.4*   CHLORIDE mmol/L 98 100   CO2 mmol/L 24.0 31.9*   BUN mg/dL 12 10   CREATININE mg/dL 0.80 0.77   GLUCOSE mg/dL 162* 99   CALCIUM mg/dL 8.8 9.6     Results from last 7 days   Lab Units 04/21/25  1600 04/21/25  1455   HSTROP T ng/L 28* 25*     Results from last 7 days   Lab Units 04/22/25  0444 04/21/25  1455   WBC 10*3/mm3 11.22* 8.18   HEMOGLOBIN g/dL 10.8* 11.9*   HEMATOCRIT % 33.0* 38.0   PLATELETS 10*3/mm3 180 226     Results from last 7 days   Lab Units 04/21/25  2103 04/21/25  1455   INR   --  1.92*   APTT seconds 31.4  --      Results from last 7 days   Lab Units 04/21/25  1455   PROBNP pg/mL 416.0                   Results from last 7 days   Lab Units 04/21/25  1955   PH, ARTERIAL pH units 7.375   PCO2, ARTERIAL mm Hg 44.1   PO2 ART mm Hg 73.5*   O2 SATURATION ART % 94.1   FLOW RATE lpm 15.0000   MODALITY  NRB         I reviewed the patient's new clinical results.        Medication Review:   arformoterol, 15 mcg, Nebulization, BID - RT  budesonide, 0.5 mg, Nebulization, BID - RT  cefTRIAXone, 2,000 mg, Intravenous, Q24H  cetirizine, 10 mg, Oral,  Daily  famotidine, 20 mg, Oral, BID AC  ferrous sulfate, 162.5 mg, Oral, BID  folic acid, 1 mg, Oral, Daily  gabapentin, 600 mg, Oral, Q8H  guaiFENesin, 1,200 mg, Oral, Q12H  insulin glargine, 30 Units, Subcutaneous, Nightly  insulin lispro, 8 Units, Subcutaneous, TID With Meals  ipratropium-albuterol, 3 mL, Nebulization, 4x Daily - RT  magnesium oxide, 400 mg, Oral, BID  multivitamin, 1 tablet, Oral, Daily  potassium chloride, 10 mEq, Oral, BID  [Held by provider] rivaroxaban, 20 mg, Oral, Daily  rosuvastatin, 40 mg, Oral, Daily  senna-docusate sodium, 2 tablet, Oral, BID  sertraline, 50 mg, Oral, Daily  sodium chloride, 10 mL, Intravenous, Q12H  vancomycin, 1,250 mg, Intravenous, Q12H        Pharmacy to dose vancomycin,       Diagnostic imaging:  I personally and Independently reviewed and interpreted the following images:  CXR 4/23/25: Improved right pleural effusion.      Assessment     Right pleural effusion, recurrent.  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  On O2 4 L/minute baseline     A-fib, on AC with Xarelto  JUAN, on CPAP  IDDM type II        Plan       Brovana and Pulmicort twice daily.  DuoNeb 4 times a day  Mucinex 600 mg twice daily  Antibiotics with Rocephin and vancomycin.  Follow-up cultures.  Chest tube to suction.  Thoracic surgery managing.  Incentive spirometry  Consider resuming anticoagulation with Xarelto if okay with thoracic surgery  Oxygen by NC and titrate to keep SpO2 >90%  BiPAP 16/10 night    Boni Barnes MD  04/22/25  09:18 EDT          This note was dictated utilizing Dragon dictation

## 2025-04-22 NOTE — PROGRESS NOTES
Pulm CC Note    Called to assess patient during rapid response.  On assessment, patient sitting on side of bed, tripod position on 15L NRB.  Complaining of severe shortness of breath while laying flat- improved significantly with sitting up.   While sitting up, patient conversant although tachypneic.      Chart reviewed.  Patient seen by Dr. Barnes earlier in the day for hypoxia.   Patient is a former smoker with history of COPD, right middle lobe squamous cell carcinoma status post right middle lobe lobectomy with recurrent pleural effusion.  Plans for evaluation by thoracic surgery for consideration of Pleurx catheter-repeat thoracentesis was not ordered pending evaluation by thoracic surgery.    Chest x-ray performed earlier in the day showed underinflation of the lung with some mild vascular congestion and patchy areas of increased density in the right upper lobe and bilateral lower lungs.  Initiated on azithromycin and ceftriaxone.    ABG reviewed showing hypoxemia with a PaO2 of 73.5 on 15 L nonrebreather.  Discussed with patient BiPAP versus Airvo-patient reports he wears a CPAP at home, feels like BiPAP would be more comfortable.  BiPAP initiated 16/8, tidal volume 500, SpO2 goal 88 to 92%.    Called back to check on patient approximately 1 hour after BiPAP initiation, patient reportedly resting comfortably on BiPAP.

## 2025-04-22 NOTE — SIGNIFICANT NOTE
04/22/25 1429   OTHER   Discipline physical therapist   Rehab Time/Intention   Session Not Performed unable to treat, medical status change  (RN calling rapid response on patient as PT arrived to the floor. PT will check back tomorrow.)   Recommendation   PT - Next Appointment 04/23/25

## 2025-04-22 NOTE — CONSULTS
LOS: 0 days   Patient Care Team:  Tino Lopez MD as PCP - General (Internal Medicine)  Tino Lopez MD as PCP - Family Medicine  Mart Ureña MD as Consulting Physician (Cardiology)  Martir Trevino MD as Surgeon (General Surgery)  Dione Carter, RN as Nurse Navigator  David Figueroa MD as Referring Physician (Thoracic Surgery)  Duy Villasenor MD PhD as Consulting Physician (Hematology and Oncology)  Yashira Mendiola MD as Consulting Physician (Radiation Oncology)    Chief Complaint:  F/up respiratory failure and medical problems listed below.    Subjective   Interval History  I reviewed the admission note, progress notes, PMH, PSH, Family hx, social history, imagings and prior records on this admission, summarized the finding in my note and formulated a transition of care plan.        On 8 L oxygen.  Use the hospital NIPPV last night and tolerated well.  Continues to have dyspnea and mild cough.    REVIEW OF SYSTEMS:   CARDIOVASCULAR: No chest pain, chest pressure or chest discomfort. No palpitations or edema.    GASTROINTESTINAL: No anorexia, nausea, vomiting or diarrhea. No abdominal pain.  CONSTITUTIONAL: No fever or chills.     Ventilator/Non-Invasive Ventilation Settings (From admission, onward)       Start     Ordered    04/21/25 2015  NIPPV (CPAP or BIPAP)  As Needed-RT        Question Answer Comment   Indication Acute Respiratory Failure    Type BIPAP    IPAP 16    EPAP 8    Tidal Volume (mL) 500    Titrate Oxygen for SpO2 88 - 92%        04/21/25 2014                          Physical Exam:     Vital Signs  Temp:  [97.7 °F (36.5 °C)-101.8 °F (38.8 °C)] 98.5 °F (36.9 °C)  Heart Rate:  [] 92  Resp:  [18-30] 18  BP: ()/() 133/108    Intake/Output Summary (Last 24 hours) at 4/22/2025 1511  Last data filed at 4/22/2025 0817  Gross per 24 hour   Intake 100 ml   Output 400 ml   Net -300 ml     Flowsheet Rows      Flowsheet  "Row First Filed Value   Admission Height 180.3 cm (70.98\") Documented at 04/21/2025 2100   Admission Weight 130 kg (287 lb 11.2 oz) Documented at 04/21/2025 2100            PPE used per hospital policy    General Appearance:   Alert, cooperative, in no acute distress   ENMT:  Mallampati score 3, moist mucous membrane   Eyes:  Pupils equal and reactive to light. EOMI   Neck:   Large. Trachea midline. No thyromegaly.   Lungs:   Equal but diminished overall.  Coarse breath sounds bilaterally.    Heart:   Regular rhythm and normal rate, normal S1 and S2, no         murmur   Skin:   No rash or ecchymosis   Abdomen:    Obese. Soft. No tenderness. No HSM.   Neuro/psych:  Conscious, alert, oriented x3. Strength 5/5 in upper and lower  ext.  Appropriate mood and affect   Extremities:  No cyanosis, clubbing or edema.  Warm extremities and well-perfused          Results Review:        Results from last 7 days   Lab Units 04/22/25  0444 04/21/25  1455   SODIUM mmol/L 135* 143   POTASSIUM mmol/L 3.8 3.4*   CHLORIDE mmol/L 98 100   CO2 mmol/L 24.0 31.9*   BUN mg/dL 12 10   CREATININE mg/dL 0.80 0.77   GLUCOSE mg/dL 162* 99   CALCIUM mg/dL 8.8 9.6     Results from last 7 days   Lab Units 04/21/25  1600 04/21/25  1455   HSTROP T ng/L 28* 25*     Results from last 7 days   Lab Units 04/22/25  0444 04/21/25  1455   WBC 10*3/mm3 11.22* 8.18   HEMOGLOBIN g/dL 10.8* 11.9*   HEMATOCRIT % 33.0* 38.0   PLATELETS 10*3/mm3 180 226     Results from last 7 days   Lab Units 04/21/25  2103 04/21/25  1455   INR   --  1.92*   APTT seconds 31.4  --      Results from last 7 days   Lab Units 04/21/25  1455   PROBNP pg/mL 416.0                   Results from last 7 days   Lab Units 04/21/25  1955   PH, ARTERIAL pH units 7.375   PCO2, ARTERIAL mm Hg 44.1   PO2 ART mm Hg 73.5*   O2 SATURATION ART % 94.1   FLOW RATE lpm 15.0000   MODALITY  NRB         I reviewed the patient's new clinical results.        Medication Review:   arformoterol, 15 mcg, " Nebulization, BID - RT  budesonide, 0.5 mg, Nebulization, BID - RT  ceFAZolin, 2,000 mg, Intravenous, Q8H  cetirizine, 10 mg, Oral, Daily  famotidine, 20 mg, Oral, BID AC  ferrous sulfate, 162.5 mg, Oral, BID  folic acid, 1 mg, Oral, Daily  gabapentin, 600 mg, Oral, Q8H  guaiFENesin, 1,200 mg, Oral, Q12H  insulin glargine, 30 Units, Subcutaneous, Nightly  insulin lispro, 8 Units, Subcutaneous, TID With Meals  ipratropium-albuterol, 3 mL, Nebulization, 4x Daily - RT  magnesium oxide, 400 mg, Oral, BID  multivitamin, 1 tablet, Oral, Daily  potassium chloride, 10 mEq, Oral, BID  [Held by provider] rivaroxaban, 20 mg, Oral, Daily  rosuvastatin, 40 mg, Oral, Daily  senna-docusate sodium, 2 tablet, Oral, BID  sertraline, 50 mg, Oral, Daily  sodium chloride, 10 mL, Intravenous, Q12H  torsemide, 40 mg, Oral, Daily             Diagnostic imaging:  I personally and independently reviewed the following images:  CT chest 4/22/25: Right small bore chest tube noted.  Reduced right pleural effusion.  Bilateral lower lobe consolidations more prominent on the right.  Pulmonary infiltrates bilaterally with reticular pattern and some groundglass and upper lobe predominant emphysema.    Assessment     Right pleural effusion, recurrent.  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  On O2 4 L/minute baseline  Gram-positive cocci bacteremia?     A-fib, on AC with Xarelto  JUAN, on BiPAP 16/10  IDDM type II      Plan     DuoNeb 4 times a day  Pulmicort and Brovana twice daily.  Mucinex 1200 mg twice daily.  Antibiotics with cefazolin  Thoracic surgery managing chest tube.  Resume anticoagulation when okay with thoracic surgery.  Oxygen by NC and titrate to keep SpO2 >90%  BiPAP 16/10 at night.        Boni Barnes MD  04/22/25  15:11 EDT          This note was dictated utilizing  Ritu dictation

## 2025-04-22 NOTE — CASE MANAGEMENT/SOCIAL WORK
Discharge Planning Assessment  [facility]     Patient Name: Branden Diop  MRN: [id number]  Today's Date: 4/22/2025    Admit Date: 4/21/2025    Plan: Home, family to transport - PT/OT evals pending   Discharge Needs Assessment       Row Name 04/22/25 0946       Living Environment    People in Home spouse    Current Living Arrangements home    Primary Care Provided by self    Provides Primary Care For no one    Family Caregiver if Needed spouse    Able to Return to Prior Arrangements yes       Resource/Environmental Concerns    Resource/Environmental Concerns none       Transition Planning    Patient/Family Anticipates Transition to home with family    Patient/Family Anticipated Services at Transition none    Transportation Anticipated family or friend will provide       Discharge Needs Assessment    Equipment Currently Used at Home cpap;oxygen    Concerns to be Addressed discharge planning                   Discharge Plan       Row Name 04/22/25 0947       Plan    Plan Home, family to transport - PT/OT evals pending    Patient/Family in Agreement with Plan yes    Plan Comments CCP spoke with patient's spouse; explained role, verified facesheet, and discussed dc plan. Patient uses a cpap and oxygen as needed from [facility]. Patient lives with his spouse Luba in a 2 level home but resides on 1 level. There are no steps to enter the home. He has used [facility] in the past. He has no history of SNF. PT/OT evals are pending, CCP following for recommendations. Per spouse, they are having issues with their oxygen tanks at home. CCP spoke with Declan who is going to have their  reach out to patient's spouse. Per Declan, they will send patient home with new oxygen tank if we notify them prior to dc. ANDRE TANNER                  Continued Care and Services - Admitted Since 4/21/2025       Durable Medical Equipment       Service Provider Request Status Services Address Phone Fax Patient Preferred     [facility] - Request Sent -- [id number], [facility] [address] [phone] --                  Selected Continued Care - Episodes Includes continued care and service providers with selected services from the active episodes listed below             Demographic Summary       Row Name 04/22/25 0946       General Information    Admission Type observation    Arrived From home    Referral Source admission list    Reason for Consult discharge planning    Preferred Language English       Contact Information    Permission Granted to Share Info With family/designee                   Functional Status       Row Name 04/22/25 0946       Functional Status    Usual Activity Tolerance good    Current Activity Tolerance good       Functional Status, IADL    Medications independent    Meal Preparation independent    Housekeeping independent    Laundry independent    Shopping independent       Mental Status    General Appearance WDL WDL                   Psychosocial    No documentation.                  Abuse/Neglect    No documentation.                  Legal    No documentation.                  Substance Abuse    No documentation.                  Patient Forms    No documentation.                     ANDRE Richards

## 2025-04-22 NOTE — CONSULTS
"Referring Provider: Dr. Zia Braun    Reason for Consultation: GPC bacteremia    History of present illness:  Branden Diop is a 74 y.o. with PMH squamous cell carcinoma of the lung status post right middle lobectomy in May 2024 and recurrent pleural effusions who I am asked to evaluate and give opinion for \"GPC bacteremia\". History is obtained from the patient and review of the old medical records which I summarize/synthesize as follows: He presented to the emergency room on 4/21 due to hypoxia and dyspnea at the oncology office having his port flushed.  He was found to be febrile with a temperature of 100.9 F.  Symptoms have been going on for a few days and gradually worsening.  He has had some weight gain despite diuretic compliance.  Labs were notable for WBC 8 and procalcitonin 0.06.  RPP was negative.  CXR showed pulmonary vascular congestion and patchy areas of increased density in the right upper lobe and bilateral lower lobes.  He was admitted to the hospitalist service with pulmonary consulting.  He was started on empiric vancomycin, ceftriaxone and azithromycin.  Overnight he had a rapid response called for increasing oxygen requirements.  ID consulted today because his blood cultures are positive for MSSA in 2/2 sets.    No issues at his port site.  He says he has completed chemotherapy.  It sounds like he had significant reactions to immunotherapy and there were no further plans for immunotherapy at this time.  D/W Dr. Villasenor who confirmed this.  The patient has had both of his knees replaced.  Neither 1 is bothering him.  He says the right knee is always more swollen than the left knee.  He has a small excoriation on his right forearm but no other known skin wounds.    Past Medical History:   Diagnosis Date    Anxiety     Arthritis     Atrial fibrillation     CURRENTLY    Bunion of right foot     Colon polyps     COPD (chronic obstructive pulmonary disease)     MILD. NO MEDS FOR    Depression     History " of atrial fibrillation     WITH CARDIOVERSION. NO PROBLEMS    History of skin cancer     BCC REMOVED FROM BACK    [place])     Hyperlipidemia     Inguinal hernia, recurrent     RIGHT    Left foot pain     Lung nodules     Rash     IN GROIN TREATING FOR YEAST INFECTION BY PCP    Redness of skin     LOWER LEGS BILATERAL    Scab     BILATERAL LEGS HEALING    Sleep apnea with use of continuous positive airway pressure (CPAP)     CPAP    Streptococcus pneumoniae     ABOUT 6-7 YR AGO.     Type 2 diabetes mellitus        Past Surgical History:   Procedure Laterality Date    BASAL CELL CARCINOMA EXCISION      BRONCHOSCOPY WITH ION ROBOTIC ASSIST N/A 5/6/2024    Procedure: BRONCHOSCOPY WITH ION ROBOT WITH FNA, BIOPSIES, BAL AND ENDOBRONCHIAL ULTRASOUND WITH FNA;  Surgeon: Yony Coles MD;  Location: [facility] ENDOSCOPY;  Service: Robotics - Pulmonary;  Laterality: N/A;  PRE: PULMONARY NODULES  POST: SAME    CARDIAC CATHETERIZATION N/A 11/15/2021    Procedure: Coronary angiography;  Surgeon: Alfredo Jennings MD;  Location: [facility] CATH INVASIVE LOCATION;  Service: Cardiovascular;  Laterality: N/A;    CARDIAC CATHETERIZATION N/A 11/15/2021    Procedure: Left heart cath;  Surgeon: Alfredo Jennings MD;  Location: [facility] CATH INVASIVE LOCATION;  Service: Cardiovascular;  Laterality: N/A;    CARDIAC CATHETERIZATION N/A 11/15/2021    Procedure: Left ventriculography;  Surgeon: Alfredo Jennings MD;  Location: [facility] CATH INVASIVE LOCATION;  Service: Cardiovascular;  Laterality: N/A;    CARDIAC CATHETERIZATION N/A 11/15/2021    Procedure: Aortic root aortogram;  Surgeon: Alfredo Jennings MD;  Location: [facility] CATH INVASIVE LOCATION;  Service: Cardiovascular;  Laterality: N/A;    CARDIOVERSION      CHOLECYSTECTOMY N/A 10/07/2017    Procedure: CHOLECYSTECTOMY LAPAROSCOPIC;  Surgeon: Martir Trevino MD;  Location: [facility] MAIN OR;  Service:     COLONOSCOPY  2007    COLONOSCOPY N/A 8/30/2022    Procedure:  COLONOSCOPY TO CECUM AND TI AND COLD SNARE POLYPECTOMY;  Surgeon: Cj Lopez MD;  Location: [facility] ENDOSCOPY;  Service: Gastroenterology;  Laterality: N/A;  Pre: History of colon polyps  Post: POLYP, PREVIOUS TATTOO    FOOT SURGERY Left     TOES ARE PINNED. ALL BUT GREAT TOE    HAND SURGERY      THUMB    HERNIA REPAIR      INGUINAL HERNIA REPAIR Right 06/27/2018    Procedure: INGUINAL HERNIA REPAIR, OPEN, RECURRENT;  Surgeon: Martir Trevino MD;  Location:  [facility];  Service: General    LASIK Bilateral     LOBECTOMY Right 6/12/2024    Procedure: BRONCHOSCOPY, RIGHT VIDEO ASSISTED THORACOSCOPY WITH RIGHT MIDDLE LOBECTOMY WITH DAVINCI ROBOT, MEDIASTINAL LYMPH NODE DISSECTION, INTERCOSTAL NERVE BLOCK;  Surgeon: David Figueroa MD;  Location: [facility] MAIN OR;  Service: Robotics - DaVinci;  Laterality: Right;    NECK SURGERY      growth on salivary gland    REPLACEMENT TOTAL KNEE Right 2007    (he is going to have a LTKR next month)    REPLACEMENT TOTAL KNEE Left     VENOUS ACCESS DEVICE (PORT) INSERTION Right 7/5/2024    Procedure: INSERTION VENOUS ACCESS DEVICE;  Surgeon: David Figueroa MD;  Location: [facility] MAIN OR;  Service: Thoracic;  Laterality: Right;       Social History:  Lives in [place], [place]    Retired     Antibiotic allergies and intolerances:  None    Medications:    Current Facility-Administered Medications:     acetaminophen (TYLENOL) tablet 650 mg, 650 mg, Oral, Q4H PRN **OR** acetaminophen (TYLENOL) 160 MG/5ML oral solution 650 mg, 650 mg, Oral, Q4H PRN **OR** acetaminophen (TYLENOL) suppository 650 mg, 650 mg, Rectal, Q4H PRN, Rigo Eng MD    acetaminophen (TYLENOL) tablet 650 mg, 650 mg, Oral, Q4H PRN, 650 mg at 04/22/25 0354 **OR** acetaminophen (TYLENOL) 160 MG/5ML oral solution 650 mg, 650 mg, Oral, Q4H PRN **OR** acetaminophen (TYLENOL) suppository 650 mg, 650 mg, Rectal, Q4H PRN, Soham Mejia APRN    albuterol (PROVENTIL) nebulizer solution 0.083% 2.5  mg/3mL, 2.5 mg, Nebulization, Q4H PRN, Rigo Eng MD    arformoterol (BROVANA) nebulizer solution 15 mcg, 15 mcg, Nebulization, BID - RT, Boni Barnes MD, 15 mcg at 04/22/25 0713    sennosides-docusate (PERICOLACE) 8.6-50 MG per tablet 2 tablet, 2 tablet, Oral, BID, 2 tablet at 04/22/25 0851 **AND** polyethylene glycol (MIRALAX) packet 17 g, 17 g, Oral, Daily PRN **AND** bisacodyl (DULCOLAX) EC tablet 5 mg, 5 mg, Oral, Daily PRN **AND** bisacodyl (DULCOLAX) suppository 10 mg, 10 mg, Rectal, Daily PRN, Rigo Eng MD    budesonide (PULMICORT) nebulizer solution 0.5 mg, 0.5 mg, Nebulization, BID - RT, Rigo Eng MD, 0.5 mg at 04/22/25 0717    Calcium Replacement - Follow Nurse / BPA Driven Protocol, , Not Applicable, PRN, Rigo Eng MD    cefTRIAXone (ROCEPHIN) 2,000 mg in sodium chloride 0.9 % 100 mL MBP, 2,000 mg, Intravenous, Q24H, Rigo Eng MD, Stopped at 04/21/25 1832    cetirizine (zyrTEC) tablet 10 mg, 10 mg, Oral, Daily, Rigo Eng MD, 10 mg at 04/22/25 0851    famotidine (PEPCID) tablet 20 mg, 20 mg, Oral, BID AC, Rigo Eng MD, 20 mg at 04/22/25 0632    ferrous sulfate tablet 162.5 mg, 162.5 mg, Oral, BID, Rigo Eng MD, 162.5 mg at 04/22/25 0851    folic acid (FOLVITE) tablet 1 mg, 1 mg, Oral, Daily, Rigo Eng MD, 1 mg at 04/22/25 0851    gabapentin (NEURONTIN) capsule 600 mg, 600 mg, Oral, Q8H, Rigo Eng MD, 600 mg at 04/22/25 0537    guaiFENesin (MUCINEX) 12 hr tablet 1,200 mg, 1,200 mg, Oral, Q12H, Rigo Eng MD, 1,200 mg at 04/22/25 0851    hydrOXYzine pamoate (VISTARIL) capsule 50 mg, 50 mg, Oral, TID PRN, Rigo Eng MD    insulin glargine (LANTUS, SEMGLEE) injection 30 Units, 30 Units, Subcutaneous, Nightly, Rigo Eng MD, 30 Units at 04/21/25 2201    insulin lispro (HUMALOG/ADMELOG) injection 8 Units, 8 Units, Subcutaneous, TID With Meals, Rigo Eng MD, 8 Units at 04/22/25 0850     ipratropium-albuterol (DUO-NEB) nebulizer solution 3 mL, 3 mL, Nebulization, 4x Daily - RT, Rigo Eng MD, 3 mL at 04/22/25 0713    LORazepam (ATIVAN) tablet 0.5 mg, 0.5 mg, Oral, Q8H PRN, Rigo Eng MD, 0.5 mg at 04/22/25 0414    magnesium oxide (MAG-OX) tablet 400 mg, 400 mg, Oral, BID, Rigo Eng MD, 400 mg at 04/22/25 0852    Magnesium Standard Dose Replacement - Follow Nurse / BPA Driven Protocol, , Not Applicable, PRN, Rigo Eng MD    melatonin tablet 5 mg, 5 mg, Oral, Nightly PRN, Rigo Eng MD    multivitamin (THERAGRAN) tablet 1 tablet, 1 tablet, Oral, Daily, Rigo Eng MD, 1 tablet at 04/22/25 0851    nitroglycerin (NITROSTAT) SL tablet 0.4 mg, 0.4 mg, Sublingual, Q5 Min PRN, Rigo Eng MD    ondansetron ODT (ZOFRAN-ODT) disintegrating tablet 4 mg, 4 mg, Oral, Q6H PRN **OR** ondansetron (ZOFRAN) injection 4 mg, 4 mg, Intravenous, Q6H PRN, Rigo Eng MD    oxyCODONE-acetaminophen (PERCOCET) 5-325 MG per tablet 1 tablet, 1 tablet, Oral, Q8H PRN, Rigo Eng MD    Pharmacy to dose vancomycin, , Not Applicable, Continuous PRN, Rigo Eng MD    Phosphorus Replacement - Follow Nurse / BPA Driven Protocol, , Not Applicable, PRN, Rigo Eng MD    potassium chloride (K-DUR,KLOR-CON) ER tablet 10 mEq, 10 mEq, Oral, BID, Rigo Eng MD, 10 mEq at 04/22/25 0851    Potassium Replacement - Follow Nurse / BPA Driven Protocol, , Not Applicable, PRN, Rigo Eng MD    [Held by provider] rivaroxaban (XARELTO) tablet 20 mg, 20 mg, Oral, Daily, Rigo Eng MD, 20 mg at 04/22/25 0851    rosuvastatin (CRESTOR) tablet 40 mg, 40 mg, Oral, Daily, Rigo Eng MD, 40 mg at 04/22/25 0851    sertraline (ZOLOFT) tablet 50 mg, 50 mg, Oral, Daily, Rigo Eng MD, 50 mg at 04/22/25 0851    sodium chloride 0.9 % flush 10 mL, 10 mL, Intravenous, PRN, Rigo Eng MD    sodium chloride 0.9 % flush 10 mL, 10 mL, Intravenous, Q12H,  "Rigo Eng MD, 10 mL at 04/22/25 0852    sodium chloride 0.9 % flush 10 mL, 10 mL, Intravenous, PRN, Rigo Eng MD    sodium chloride 0.9 % infusion 40 mL, 40 mL, Intravenous, PRN, Rigo Eng MD    Vancomycin HCl 1,250 mg in sodium chloride 0.9 % 250 mL VTB, 1,250 mg, Intravenous, Q12H, Rigo Eng MD      Objective   Vital Signs   Temp:  [97.7 °F (36.5 °C)-101.8 °F (38.8 °C)] 97.7 °F (36.5 °C)  Heart Rate:  [] 96  Resp:  [18-30] 18  BP: (114-165)/(53-94) 115/55    Physical Exam:   General: awake, very nice, sitting up in chair  Eyes: no scleral icterus  ENT: no thrush  Cardiovascular: Normal rate  Respiratory: normal work of breathing on 2L NC  GI: Abdomen is obese  :  no Gonzalez catheter  Skin: Mild rash in multiple sites; he says due to immunotherapy and that it is now improving   Neurological: Alert and oriented x 3  Psychiatric: Normal mood and affect   Vasc: Right chest port in place; not accessed    Labs:     Lab Results   Component Value Date    WBC 11.22 (H) 04/22/2025    HGB 10.8 (L) 04/22/2025    HCT 33.0 (L) 04/22/2025    MCV 88.5 04/22/2025     04/22/2025     Lab Results   Component Value Date    GLUCOSE 162 (H) 04/22/2025    CALCIUM 8.8 04/22/2025     (L) 04/22/2025    K 3.8 04/22/2025    CO2 24.0 04/22/2025    CL 98 04/22/2025    BUN 12 04/22/2025    CREATININE 0.80 04/22/2025    EGFR 91.7 04/22/2025    BCR 15.0 04/22/2025    ANIONGAP 13.0 04/22/2025     Lab Results   Component Value Date    HGBA1C 8.60 (H) 04/21/2025     Lab Results   Component Value Date    CRP 11.62 (H) 11/24/2024         Microbiology:  4/21 RPP: Negative  4/21 BCx: MSSA and 2/2 sets  4/21 urine strep pneumo and Legionella antigens: Negative  4/21 respiratory Cx: \"Rejected\"      Radiology:  4/22 CXR with pulmonary vascular congestion and bilateral interstitial opacities and right pleural effusion; right chest port in place    Isolation:  No active isolations; isolation not required for " MSSA    ASSESSMENT/PLAN:  MSSA septicemia  Port catheter present  Pulmonary emphysema  History of right middle lobectomy  Morbid obesity BMI 40 complicating above    For MSSA septicemia, stop vancomycin and ceftriaxone then start cefazolin 2 g IV every 8 hours with duration TBD.  Typically port removal is recommended.  I discussed the case with his oncologist who said there were no further plans for treatment at this time.  I will asked thoracic surgery if they can remove it.  Especially if he is going to the OR for Pleurx catheter placement.  I will wait and repeat blood cultures to document sterility after the port has been removed.  I will go ahead and order a TTE.    ID will follow.       ------------------------------------------------------------------  The above decisions regarding antimicrobials incorporates elements of engaging in complex medical decision-making associated with antimicrobial prescribing including considerations such as antimicrobial resistance patterns, interactions/complications from comorbidities including concurrent infections, public health considerations to minimize development of antimicrobial resistance, and emerging and re-emerging infections.

## 2025-04-22 NOTE — NURSING NOTE
1340 Patient sitting up in the chair. C/O shortness of breath. 02 titrated from 8 to 11L HF in approx 20-30 minutes. Transport here to take patient to CT for chest tube.  Janette called about condition and safety of patient going to CT. She suggested calling a Rapid Response. Rapid Response called to evaluate

## 2025-04-22 NOTE — CODE DOCUMENTATION
Patient Name:  Branden Diop  YOB: 1950  MRN:  [id number]  Admit Date:  4/21/2025    Visit Diagnoses:     ICD-10-CM ICD-9-CM   1. Hypoxia  R09.02 799.02   2. Dyspnea, unspecified type  R06.00 786.09   3. Recurrent pleural effusion  J90 511.9   4. History of lung cancer  Z85.118 V10.11       Reason For Rapid:    hypoxia, increased work of breathing, increased O2 needs    RN Communicated With:   primary RN updated Dr. Figueroa      Rapid Outcome:   transfer to tele bed that routinely cares for chest tubes    Communication From Rapid Team:    RRT called d/t need for transfer to CT, but primary RN unable to accompany pt (as per hospital policy for pts on more than 10L n/c). Pt having increasing work of breathing, shortness of breath at rest, and increase of O2 needs from 4L to 12L HF quickly. I transported pt to Xray triage/CT myself on 15L HF. While laying flat for CT, pt required 15L NRB on top of 15L HF to maintain O2 sats. CT guided chest tube was placed by Dr. Clemente. After approximately 500mL drained off, able to remove NRB. Once sitting up, pt able to maintain sats on 12L HF. Transported to CCU d/t no available 5E beds at this time. Pt remains telemetry status. Update given to new RN Luci. Wife at bedside updated. By end of my transport, pt had dumped over 1.5L out of chest tube.       Most Recent Vital Signs  Temp:  [97.7 °F (36.5 °C)-101.8 °F (38.8 °C)] 101.3 °F (38.5 °C)  Heart Rate:  [] 92  Resp:  [18-30] 18  BP: ()/() 133/108  SpO2:  [86 %-100 %] 95 %  on  Flow (L/min) (Oxygen Therapy):  [4-15] 15;   Device (Oxygen Therapy): high-flow nasal cannula    Labs:  Results from last 7 days   Lab Units 04/21/25  1509   COVID19  Not Detected     Glucose   Date/Time Value Ref Range Status   04/22/2025 1347 237 (H) 70 - 130 mg/dL Final   04/22/2025 0639 166 (H) 70 - 130 mg/dL Final   04/21/2025 2154 194 (H) 70 - 130 mg/dL Final     Site   Date Value Ref Range Status   04/21/2025 Right Radial   Final     Micheal's Test   Date Value Ref Range Status   04/21/2025 Positive  Final     pH, Arterial   Date Value Ref Range Status   04/21/2025 7.375 7.350 - 7.450 pH units Final     pCO2, Arterial   Date Value Ref Range Status   04/21/2025 44.1 35.0 - 45.0 mm Hg Final     pO2, Arterial   Date Value Ref Range Status   04/21/2025 73.5 (L) 80.0 - 100.0 mm Hg Final     HCO3, Arterial   Date Value Ref Range Status   04/21/2025 25.8 22.0 - 28.0 mmol/L Final     Base Excess, Arterial   Date Value Ref Range Status   04/21/2025 0.3 0.0 - 2.0 mmol/L Final     Comment:     Serial Number: [id number]:  [id number]     O2 Saturation, Arterial   Date Value Ref Range Status   04/21/2025 94.1 92.0 - 98.5 % Final     Barometric Pressure for Blood Gas   Date Value Ref Range Status   04/21/2025 749.3000 mmHg Final     Modality   Date Value Ref Range Status   04/21/2025 NRB  Final     Results from last 7 days   Lab Units 04/22/25  0444 04/21/25  1455   WBC 10*3/mm3 11.22* 8.18   HEMOGLOBIN g/dL 10.8* 11.9*   PLATELETS 10*3/mm3 180 226     Results from last 7 days   Lab Units 04/22/25  0444 04/21/25  1455   SODIUM mmol/L 135* 143   POTASSIUM mmol/L 3.8 3.4*   CHLORIDE mmol/L 98 100   CO2 mmol/L 24.0 31.9*   BUN mg/dL 12 10   CREATININE mg/dL 0.80 0.77   GLUCOSE mg/dL 162* 99   ALBUMIN g/dL  --  3.9   BILIRUBIN mg/dL  --  0.9   ALK PHOS U/L  --  119*   AST (SGOT) U/L  --  25   ALT (SGPT) U/L  --  15   Estimated Creatinine Clearance: 108.4 mL/min (by C-G formula based on SCr of 0.8 mg/dL).  Results from last 7 days   Lab Units 04/21/25  1600 04/21/25  1455   HSTROP T ng/L 28* 25*   PROBNP pg/mL  --  416.0     Results from last 7 days   Lab Units 04/21/25  1516 04/21/25  1455   PROCALCITONIN ng/mL  --  0.06   LACTATE mmol/L 1.2  --      Results from last 7 days   Lab Units 04/21/25  1955   PH, ARTERIAL pH units 7.375   PO2 ART mm Hg 73.5*   PCO2, ARTERIAL mm Hg 44.1   HCO3 ART mmol/L 25.8   O2 SATURATION ART % 94.1   MODALITY  NRB     Lab  Results   Component Value Date    STREPPNEUAG Negative 04/21/2025    LEGANTIGENUR Negative 04/21/2025     Results from last 7 days   Lab Units 04/21/25  1853 04/21/25  1747   BLOODCX   --  Abnormal Stain*  Abnormal Stain*   RESPCX  Rejected  --      Results from last 7 days   Lab Units 04/21/25  1509   ADENOVIRUS DETECTION BY PCR  Not Detected   CORONAVIRUS 229E  Not Detected   CORONAVIRUS HKU1  Not Detected   CORONAVIRUS NL63  Not Detected   CORONAVIRUS OC43  Not Detected   HUMAN METAPNEUMOVIRUS  Not Detected   HUMAN RHINOVIRUS/ENTEROVIRUS  Not Detected   INFLUENZA B PCR  Not Detected   PARAINFLUENZA 1  Not Detected   PARAINFLUENZA VIRUS 2  Not Detected   PARAINFLUENZA VIRUS 3  Not Detected   PARAINFLUENZA VIRUS 4  Not Detected   BORDETELLA PERTUSSIS PCR  Not Detected   CHLAMYDOPHILA PNEUMONIAE PCR  Not Detected   MYCOPLAMA PNEUMO PCR  Not Detected   INFLUENZA A PCR  Not Detected   RSV, PCR  Not Detected       NIH Stroke Scale:   n/a                                                         Please refer to full rapid documentation on summary page under Index / Code Timeline

## 2025-04-23 ENCOUNTER — APPOINTMENT (OUTPATIENT)
Dept: GENERAL RADIOLOGY | Facility: HOSPITAL | Age: 75
End: 2025-04-23
Payer: MEDICARE

## 2025-04-23 ENCOUNTER — APPOINTMENT (OUTPATIENT)
Dept: CARDIOLOGY | Facility: HOSPITAL | Age: 75
End: 2025-04-23
Payer: MEDICARE

## 2025-04-23 PROBLEM — B95.61 MSSA BACTEREMIA: Status: ACTIVE | Noted: 2025-04-21

## 2025-04-23 PROBLEM — R78.81 MSSA BACTEREMIA: Status: ACTIVE | Noted: 2025-04-21

## 2025-04-23 LAB
ANION GAP SERPL CALCULATED.3IONS-SCNC: 7.1 MMOL/L (ref 5–15)
AORTIC DIMENSIONLESS INDEX: 0.68 (DI)
ASCENDING AORTA: 3.5 CM
AV MEAN PRESS GRAD SYS DOP V1V2: 5.8 MMHG
AV VMAX SYS DOP: 159 CM/SEC
BH CV ECHO MEAS - ACS: 2.43 CM
BH CV ECHO MEAS - AO MAX PG: 10.1 MMHG
BH CV ECHO MEAS - AO ROOT DIAM: 3.7 CM
BH CV ECHO MEAS - AO V2 VTI: 26.3 CM
BH CV ECHO MEAS - AVA(I,D): 2.8 CM2
BH CV ECHO MEAS - EDV(CUBED): 105.1 ML
BH CV ECHO MEAS - EDV(MOD-SP2): 98 ML
BH CV ECHO MEAS - EDV(MOD-SP4): 98 ML
BH CV ECHO MEAS - EF(MOD-SP2): 61.2 %
BH CV ECHO MEAS - EF(MOD-SP4): 63.3 %
BH CV ECHO MEAS - ESV(CUBED): 45.1 ML
BH CV ECHO MEAS - ESV(MOD-SP2): 38 ML
BH CV ECHO MEAS - ESV(MOD-SP4): 36 ML
BH CV ECHO MEAS - FS: 24.6 %
BH CV ECHO MEAS - IVS/LVPW: 1 CM
BH CV ECHO MEAS - IVSD: 1.06 CM
BH CV ECHO MEAS - LAT PEAK E' VEL: 15.2 CM/SEC
BH CV ECHO MEAS - LV DIASTOLIC VOL/BSA (35-75): 40.3 CM2
BH CV ECHO MEAS - LV MASS(C)D: 178.6 GRAMS
BH CV ECHO MEAS - LV MAX PG: 5.8 MMHG
BH CV ECHO MEAS - LV MEAN PG: 3.3 MMHG
BH CV ECHO MEAS - LV SYSTOLIC VOL/BSA (12-30): 14.8 CM2
BH CV ECHO MEAS - LV V1 MAX: 120.9 CM/SEC
BH CV ECHO MEAS - LV V1 VTI: 17.8 CM
BH CV ECHO MEAS - LVIDD: 4.7 CM
BH CV ECHO MEAS - LVIDS: 3.6 CM
BH CV ECHO MEAS - LVOT AREA: 4.1 CM2
BH CV ECHO MEAS - LVOT DIAM: 2.28 CM
BH CV ECHO MEAS - LVPWD: 1.06 CM
BH CV ECHO MEAS - MED PEAK E' VEL: 10.1 CM/SEC
BH CV ECHO MEAS - MV DEC SLOPE: 475.5 CM/SEC2
BH CV ECHO MEAS - MV DEC TIME: 0.15 SEC
BH CV ECHO MEAS - MV E MAX VEL: 104.6 CM/SEC
BH CV ECHO MEAS - MV MAX PG: 7.2 MMHG
BH CV ECHO MEAS - MV MEAN PG: 1.73 MMHG
BH CV ECHO MEAS - MV P1/2T: 100.3 MSEC
BH CV ECHO MEAS - MV V2 VTI: 28.3 CM
BH CV ECHO MEAS - MVA(P1/2T): 2.19 CM2
BH CV ECHO MEAS - MVA(VTI): 2.6 CM2
BH CV ECHO MEAS - PA ACC TIME: 0.06 SEC
BH CV ECHO MEAS - PA V2 MAX: 123.5 CM/SEC
BH CV ECHO MEAS - PI END-D VEL: 81.4 CM/SEC
BH CV ECHO MEAS - PULM DIAS VEL: 58.4 CM/SEC
BH CV ECHO MEAS - PULM S/D: 0.59
BH CV ECHO MEAS - PULM SYS VEL: 34.3 CM/SEC
BH CV ECHO MEAS - QP/QS: 1.23
BH CV ECHO MEAS - RAP SYSTOLE: 3 MMHG
BH CV ECHO MEAS - RV MAX PG: 5.7 MMHG
BH CV ECHO MEAS - RV V1 MAX: 119.2 CM/SEC
BH CV ECHO MEAS - RV V1 VTI: 20.7 CM
BH CV ECHO MEAS - RVOT DIAM: 2.34 CM
BH CV ECHO MEAS - RVSP: 24 MMHG
BH CV ECHO MEAS - SV(LVOT): 72.7 ML
BH CV ECHO MEAS - SV(MOD-SP2): 60 ML
BH CV ECHO MEAS - SV(MOD-SP4): 62 ML
BH CV ECHO MEAS - SV(RVOT): 89.5 ML
BH CV ECHO MEAS - SVI(LVOT): 29.9 ML/M2
BH CV ECHO MEAS - SVI(MOD-SP2): 24.7 ML/M2
BH CV ECHO MEAS - SVI(MOD-SP4): 25.5 ML/M2
BH CV ECHO MEAS - TR MAX PG: 21.3 MMHG
BH CV ECHO MEAS - TR MAX VEL: 230.6 CM/SEC
BH CV ECHO MEASUREMENTS AVERAGE E/E' RATIO: 8.27
BH CV XLRA - RV BASE: 5.9 CM
BH CV XLRA - RV LENGTH: 8.1 CM
BH CV XLRA - RV MID: 4.6 CM
BH CV XLRA - TDI S': 14.6 CM/SEC
BUN SERPL-MCNC: 17 MG/DL (ref 8–23)
BUN/CREAT SERPL: 20.2 (ref 7–25)
CALCIUM SPEC-SCNC: 8.2 MG/DL (ref 8.6–10.5)
CHLORIDE SERPL-SCNC: 98 MMOL/L (ref 98–107)
CO2 SERPL-SCNC: 25.9 MMOL/L (ref 22–29)
CREAT SERPL-MCNC: 0.84 MG/DL (ref 0.76–1.27)
DEPRECATED RDW RBC AUTO: 52.6 FL (ref 37–54)
EGFRCR SERPLBLD CKD-EPI 2021: 90.4 ML/MIN/1.73
ERYTHROCYTE [DISTWIDTH] IN BLOOD BY AUTOMATED COUNT: 16.3 % (ref 12.3–15.4)
GLUCOSE BLDC GLUCOMTR-MCNC: 201 MG/DL (ref 70–130)
GLUCOSE BLDC GLUCOMTR-MCNC: 256 MG/DL (ref 70–130)
GLUCOSE SERPL-MCNC: 156 MG/DL (ref 65–99)
HCT VFR BLD AUTO: 32 % (ref 37.5–51)
HGB BLD-MCNC: 10.1 G/DL (ref 13–17.7)
LEFT ATRIUM VOLUME INDEX: 46.9 ML/M2
LV EF BIPLANE MOD: 65 %
MCH RBC QN AUTO: 28.3 PG (ref 26.6–33)
MCHC RBC AUTO-ENTMCNC: 31.6 G/DL (ref 31.5–35.7)
MCV RBC AUTO: 89.6 FL (ref 79–97)
PLATELET # BLD AUTO: 149 10*3/MM3 (ref 140–450)
PMV BLD AUTO: 9.3 FL (ref 6–12)
POTASSIUM SERPL-SCNC: 3.4 MMOL/L (ref 3.5–5.2)
POTASSIUM SERPL-SCNC: 3.5 MMOL/L (ref 3.5–5.2)
RBC # BLD AUTO: 3.57 10*6/MM3 (ref 4.14–5.8)
SINUS: 3.7 CM
SODIUM SERPL-SCNC: 131 MMOL/L (ref 136–145)
STJ: 3.5 CM
WBC NRBC COR # BLD AUTO: 8.09 10*3/MM3 (ref 3.4–10.8)

## 2025-04-23 PROCEDURE — 63710000001 INSULIN LISPRO (HUMAN) PER 5 UNITS: Performed by: INTERNAL MEDICINE

## 2025-04-23 PROCEDURE — 71045 X-RAY EXAM CHEST 1 VIEW: CPT

## 2025-04-23 PROCEDURE — 84132 ASSAY OF SERUM POTASSIUM: CPT | Performed by: STUDENT IN AN ORGANIZED HEALTH CARE EDUCATION/TRAINING PROGRAM

## 2025-04-23 PROCEDURE — 93306 TTE W/DOPPLER COMPLETE: CPT

## 2025-04-23 PROCEDURE — 63710000001 INSULIN LISPRO (HUMAN) PER 5 UNITS: Performed by: STUDENT IN AN ORGANIZED HEALTH CARE EDUCATION/TRAINING PROGRAM

## 2025-04-23 PROCEDURE — 97165 OT EVAL LOW COMPLEX 30 MIN: CPT

## 2025-04-23 PROCEDURE — 80048 BASIC METABOLIC PNL TOTAL CA: CPT | Performed by: INTERNAL MEDICINE

## 2025-04-23 PROCEDURE — 99222 1ST HOSP IP/OBS MODERATE 55: CPT | Performed by: INTERNAL MEDICINE

## 2025-04-23 PROCEDURE — 82948 REAGENT STRIP/BLOOD GLUCOSE: CPT

## 2025-04-23 PROCEDURE — 94664 DEMO&/EVAL PT USE INHALER: CPT

## 2025-04-23 PROCEDURE — 93306 TTE W/DOPPLER COMPLETE: CPT | Performed by: INTERNAL MEDICINE

## 2025-04-23 PROCEDURE — 97530 THERAPEUTIC ACTIVITIES: CPT

## 2025-04-23 PROCEDURE — 94761 N-INVAS EAR/PLS OXIMETRY MLT: CPT

## 2025-04-23 PROCEDURE — 94799 UNLISTED PULMONARY SVC/PX: CPT

## 2025-04-23 PROCEDURE — 97162 PT EVAL MOD COMPLEX 30 MIN: CPT

## 2025-04-23 PROCEDURE — 99233 SBSQ HOSP IP/OBS HIGH 50: CPT | Performed by: INTERNAL MEDICINE

## 2025-04-23 PROCEDURE — 94660 CPAP INITIATION&MGMT: CPT

## 2025-04-23 PROCEDURE — G0545 PR INHERENT VISIT TO INPT: HCPCS | Performed by: INTERNAL MEDICINE

## 2025-04-23 PROCEDURE — 85027 COMPLETE CBC AUTOMATED: CPT | Performed by: INTERNAL MEDICINE

## 2025-04-23 PROCEDURE — 25010000002 CEFAZOLIN PER 500 MG: Performed by: STUDENT IN AN ORGANIZED HEALTH CARE EDUCATION/TRAINING PROGRAM

## 2025-04-23 PROCEDURE — 94760 N-INVAS EAR/PLS OXIMETRY 1: CPT

## 2025-04-23 PROCEDURE — 63710000001 INSULIN GLARGINE PER 5 UNITS: Performed by: INTERNAL MEDICINE

## 2025-04-23 PROCEDURE — 99232 SBSQ HOSP IP/OBS MODERATE 35: CPT

## 2025-04-23 RX ORDER — INSULIN LISPRO 100 [IU]/ML
2-7 INJECTION, SOLUTION INTRAVENOUS; SUBCUTANEOUS
Status: DISCONTINUED | OUTPATIENT
Start: 2025-04-23 | End: 2025-04-25

## 2025-04-23 RX ORDER — POTASSIUM CHLORIDE 1500 MG/1
40 TABLET, EXTENDED RELEASE ORAL EVERY 4 HOURS
Status: COMPLETED | OUTPATIENT
Start: 2025-04-23 | End: 2025-04-23

## 2025-04-23 RX ORDER — IBUPROFEN 600 MG/1
1 TABLET ORAL
Status: DISCONTINUED | OUTPATIENT
Start: 2025-04-23 | End: 2025-04-30 | Stop reason: HOSPADM

## 2025-04-23 RX ORDER — NICOTINE POLACRILEX 4 MG
15 LOZENGE BUCCAL
Status: DISCONTINUED | OUTPATIENT
Start: 2025-04-23 | End: 2025-04-30 | Stop reason: HOSPADM

## 2025-04-23 RX ORDER — DEXTROSE MONOHYDRATE 25 G/50ML
25 INJECTION, SOLUTION INTRAVENOUS
Status: DISCONTINUED | OUTPATIENT
Start: 2025-04-23 | End: 2025-04-30 | Stop reason: HOSPADM

## 2025-04-23 RX ADMIN — INSULIN LISPRO 8 UNITS: 100 INJECTION, SOLUTION INTRAVENOUS; SUBCUTANEOUS at 09:07

## 2025-04-23 RX ADMIN — POTASSIUM CHLORIDE 40 MEQ: 1500 TABLET, EXTENDED RELEASE ORAL at 12:52

## 2025-04-23 RX ADMIN — INSULIN LISPRO 8 UNITS: 100 INJECTION, SOLUTION INTRAVENOUS; SUBCUTANEOUS at 17:57

## 2025-04-23 RX ADMIN — GABAPENTIN 600 MG: 300 CAPSULE ORAL at 06:31

## 2025-04-23 RX ADMIN — IPRATROPIUM BROMIDE AND ALBUTEROL SULFATE 3 ML: .5; 3 SOLUTION RESPIRATORY (INHALATION) at 19:51

## 2025-04-23 RX ADMIN — SERTRALINE HYDROCHLORIDE 50 MG: 50 TABLET, FILM COATED ORAL at 09:06

## 2025-04-23 RX ADMIN — Medication 10 ML: at 22:09

## 2025-04-23 RX ADMIN — IPRATROPIUM BROMIDE AND ALBUTEROL SULFATE 3 ML: .5; 3 SOLUTION RESPIRATORY (INHALATION) at 10:25

## 2025-04-23 RX ADMIN — ARFORMOTEROL TARTRATE 15 MCG: 15 SOLUTION RESPIRATORY (INHALATION) at 19:52

## 2025-04-23 RX ADMIN — Medication 1 TABLET: at 09:06

## 2025-04-23 RX ADMIN — BUDESONIDE 0.5 MG: 0.5 INHALANT RESPIRATORY (INHALATION) at 19:51

## 2025-04-23 RX ADMIN — MAGNESIUM OXIDE TAB 400 MG (241.3 MG ELEMENTAL MG) 400 MG: 400 (241.3 MG) TAB at 22:05

## 2025-04-23 RX ADMIN — SODIUM CHLORIDE 2000 MG: 9 INJECTION, SOLUTION INTRAVENOUS at 22:04

## 2025-04-23 RX ADMIN — GABAPENTIN 600 MG: 300 CAPSULE ORAL at 22:04

## 2025-04-23 RX ADMIN — SENNOSIDES AND DOCUSATE SODIUM 2 TABLET: 50; 8.6 TABLET ORAL at 09:06

## 2025-04-23 RX ADMIN — GUAIFENESIN 1200 MG: 600 TABLET, EXTENDED RELEASE ORAL at 09:05

## 2025-04-23 RX ADMIN — GABAPENTIN 600 MG: 300 CAPSULE ORAL at 13:59

## 2025-04-23 RX ADMIN — Medication 10 ML: at 12:53

## 2025-04-23 RX ADMIN — ROSUVASTATIN CALCIUM 40 MG: 20 TABLET, FILM COATED ORAL at 09:06

## 2025-04-23 RX ADMIN — POTASSIUM CHLORIDE 10 MEQ: 750 TABLET, EXTENDED RELEASE ORAL at 22:04

## 2025-04-23 RX ADMIN — MUPIROCIN 1 APPLICATION: 20 OINTMENT TOPICAL at 12:53

## 2025-04-23 RX ADMIN — MUPIROCIN 1 APPLICATION: 20 OINTMENT TOPICAL at 22:05

## 2025-04-23 RX ADMIN — BUDESONIDE 0.5 MG: 0.5 INHALANT RESPIRATORY (INHALATION) at 06:51

## 2025-04-23 RX ADMIN — ARFORMOTEROL TARTRATE 15 MCG: 15 SOLUTION RESPIRATORY (INHALATION) at 06:51

## 2025-04-23 RX ADMIN — FOLIC ACID 1 MG: 1 TABLET ORAL at 09:06

## 2025-04-23 RX ADMIN — IPRATROPIUM BROMIDE AND ALBUTEROL SULFATE 3 ML: .5; 3 SOLUTION RESPIRATORY (INHALATION) at 15:14

## 2025-04-23 RX ADMIN — MAGNESIUM OXIDE TAB 400 MG (241.3 MG ELEMENTAL MG) 400 MG: 400 (241.3 MG) TAB at 12:53

## 2025-04-23 RX ADMIN — FERROUS SULFATE TAB 325 MG (65 MG ELEMENTAL FE) 162.5 MG: 325 (65 FE) TAB at 09:06

## 2025-04-23 RX ADMIN — INSULIN LISPRO 4 UNITS: 100 INJECTION, SOLUTION INTRAVENOUS; SUBCUTANEOUS at 16:56

## 2025-04-23 RX ADMIN — FERROUS SULFATE TAB 325 MG (65 MG ELEMENTAL FE) 162.5 MG: 325 (65 FE) TAB at 22:04

## 2025-04-23 RX ADMIN — FAMOTIDINE 20 MG: 20 TABLET, FILM COATED ORAL at 06:31

## 2025-04-23 RX ADMIN — SODIUM CHLORIDE 2000 MG: 9 INJECTION, SOLUTION INTRAVENOUS at 12:52

## 2025-04-23 RX ADMIN — CETIRIZINE HYDROCHLORIDE 10 MG: 10 TABLET, FILM COATED ORAL at 09:06

## 2025-04-23 RX ADMIN — POTASSIUM CHLORIDE 40 MEQ: 1500 TABLET, EXTENDED RELEASE ORAL at 16:55

## 2025-04-23 RX ADMIN — FAMOTIDINE 20 MG: 20 TABLET, FILM COATED ORAL at 16:56

## 2025-04-23 RX ADMIN — INSULIN GLARGINE 30 UNITS: 100 INJECTION, SOLUTION SUBCUTANEOUS at 22:06

## 2025-04-23 RX ADMIN — POTASSIUM CHLORIDE 10 MEQ: 750 TABLET, EXTENDED RELEASE ORAL at 09:06

## 2025-04-23 RX ADMIN — SODIUM CHLORIDE 2000 MG: 9 INJECTION, SOLUTION INTRAVENOUS at 03:41

## 2025-04-23 RX ADMIN — GUAIFENESIN 1200 MG: 600 TABLET, EXTENDED RELEASE ORAL at 22:04

## 2025-04-23 RX ADMIN — TORSEMIDE 40 MG: 20 TABLET ORAL at 09:05

## 2025-04-23 RX ADMIN — IPRATROPIUM BROMIDE AND ALBUTEROL SULFATE 3 ML: .5; 3 SOLUTION RESPIRATORY (INHALATION) at 06:51

## 2025-04-23 NOTE — THERAPY EVALUATION
Patient Name: Branden Diop  : 1950    MRN: [id number]                              Today's Date: 2025       Admit Date: 2025    Visit Dx:     ICD-10-CM ICD-9-CM   1. Hypoxia  R09.02 799.02   2. Dyspnea, unspecified type  R06.00 786.09   3. Recurrent pleural effusion  J90 511.9   4. History of lung cancer  Z85.118 V10.11   5. MSSA bacteremia  R78.81 790.7    B95.61 041.11     Patient Active Problem List   Diagnosis    A-fib    Atrioventricular block, Mobitz type 1, Wenckebach    Apnea, sleep    Obesity    Streptococcus pneumoniae    Sleep apnea with use of continuous positive airway pressure (CPAP)    Sinusitis    Right wrist pain    Lobar pneumonia    DM2 (diabetes mellitus, type 2)    Dyslipidemia    Hammertoe    Mood disorder    COPD (chronic obstructive pulmonary disease)    Colon polyps    Atrial fibrillation    Anxiety    Pruritus    Cholelithiasis    Fatty liver    Essential hypertension    Vitamin D deficiency    Emphysema, unspecified    Bronchiectasis with acute exacerbation    FHx: colonic polyps    Diverticulosis    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    Malignant neoplasm of middle lobe of right lung    Fluid collection at surgical site, initial encounter    Encounter for long-term (current) use of high-risk medication    Fitting and adjustment of vascular catheter    Anemia associated with chemotherapy    Peripheral neuropathy due to chemotherapy    Acute respiratory failure    Rash in adult    Bone mass    Drug-induced skin rash    Adverse effect of antineoplastic drug    Pleural effusion on right    Acute on chronic hypoxic respiratory failure    Hypoxemia    Drug-induced pneumonitis    Acute on chronic respiratory failure    Dyspnea on exertion    Hyponatremia    Hypoxia    History of lung cancer    Anemia, chronic disease    MSSA bacteremia     Past Medical History:   Diagnosis Date    Anxiety     Arthritis     Atrial fibrillation     CURRENTLY    Bunion of right  foot     Colon polyps     COPD (chronic obstructive pulmonary disease)     MILD. NO MEDS FOR    Depression     History of atrial fibrillation     WITH CARDIOVERSION. NO PROBLEMS    History of skin cancer     BCC REMOVED FROM BACK    [place] (hard of hearing)     Hyperlipidemia     Inguinal hernia, recurrent     RIGHT    Left foot pain     Lung nodules     Rash     IN GROIN TREATING FOR YEAST INFECTION BY PCP    Redness of skin     LOWER LEGS BILATERAL    Scab     BILATERAL LEGS HEALING    Sleep apnea with use of continuous positive airway pressure (CPAP)     CPAP    Streptococcus pneumoniae     ABOUT 6-7 YR AGO.     Type 2 diabetes mellitus      Past Surgical History:   Procedure Laterality Date    BASAL CELL CARCINOMA EXCISION      BRONCHOSCOPY WITH ION ROBOTIC ASSIST N/A 5/6/2024    Procedure: BRONCHOSCOPY WITH ION ROBOT WITH FNA, BIOPSIES, BAL AND ENDOBRONCHIAL ULTRASOUND WITH FNA;  Surgeon: Yony Coles MD;  Location: [facility] ENDOSCOPY;  Service: Robotics - Pulmonary;  Laterality: N/A;  PRE: PULMONARY NODULES  POST: SAME    CARDIAC CATHETERIZATION N/A 11/15/2021    Procedure: Coronary angiography;  Surgeon: Alfredo Jennings MD;  Location:  [facility] CATH INVASIVE LOCATION;  Service: Cardiovascular;  Laterality: N/A;    CARDIAC CATHETERIZATION N/A 11/15/2021    Procedure: Left heart cath;  Surgeon: Alfredo Jennings MD;  Location: [facility] CATH INVASIVE LOCATION;  Service: Cardiovascular;  Laterality: N/A;    CARDIAC CATHETERIZATION N/A 11/15/2021    Procedure: Left ventriculography;  Surgeon: Alfredo Jennings MD;  Location: [facility] CATH [facility]ASIVE LOC[facility];  Service: Cardiovascular;  Laterality: N/A;    CARDIAC CATHETERIZATION N/A 11/15/2021    Procedure: Aortic root aortogram;  Surgeon: Alfredo Jennings MD;  Location: [facility] CATH INVASIVE LOCATION;  Service: Cardiovascular;  Laterality: N/A;    CARDIOVERSION      CHOLECYSTECTOMY N/A 10/07/2017    Procedure: CHOLECYSTECTOMY LAPAROSCOPIC;  Surgeon: Martir MARTINEZ  MD Uriel;  Location: [facility] MAIN OR;  Service:     COLONOSCOPY  2007    COLONOSCOPY N/A 8/30/2022    Procedure: COLONOSCOPY TO CECUM AND TI AND COLD SNARE POLYPECTOMY;  Surgeon: Cj Lopez MD;  Location: [facility] ENDOSCOPY;  Service: Gastroenterology;  Laterality: N/A;  Pre: History of colon polyps  Post: POLYP, PREVIOUS TATTOO    FOOT SURGERY Left     TOES ARE PINNED. ALL BUT GREAT TOE    HAND SURGERY      THUMB    HERNIA REPAIR      INGUINAL HERNIA REPAIR Right 06/27/2018    Procedure: INGUINAL HERNIA REPAIR, OPEN, RECURRENT;  Surgeon: Martir Trevino MD;  Location: [facility] OR OSC;  Service: General    LASIK Bilateral     LOBECTOMY Right 6/12/2024    Procedure: BRONCHOSCOPY, RIGHT VIDEO ASSISTED THORACOSCOPY WITH RIGHT MIDDLE LOBECTOMY WITH DAVINCI ROBOT, MEDIASTINAL LYMPH NODE DISSECTION, INTERCOSTAL NERVE BLOCK;  Surgeon: David Figueroa MD;  Location: [facility] OR;  Service: Robotics - DaVinci;  Laterality: Right;    NECK SURGERY      growth on salivary gland    REPLACEMENT TOTAL KNEE Right 2007    (he is going to have a LTKR next month)    REPLACEMENT TOTAL KNEE Left     VENOUS ACCESS DEVICE (PORT) INSERTION Right 7/5/2024    Procedure: INSERTION VENOUS ACCESS DEVICE;  Surgeon: David Figueroa MD;  Location: [facility] MAIN OR;  Service: Thoracic;  Laterality: Right;      General Information       Row Name 04/23/25 1403          Physical Therapy Time and Intention    Document Type evaluation  -ER     Mode of Treatment co-treatment;physical therapy;occupational therapy  -ER       Row Name 04/23/25 140          General Information    Patient Profile Reviewed yes  -ER     Prior Level of Function independent:;ADL's;all household mobility  -ER     Existing Precautions/Restrictions no known precautions/restrictions  -ER     Barriers to Rehab none identified  -ER       Row Name 04/23/25 1409          Living Environment    Current Living Arrangements home  -ER     People in Home spouse  -ER       Row Name 04/23/25 4612           Home Main Entrance    Number of Stairs, Main Entrance none  -ER       Row Name 04/23/25 1403          Stairs Within Home, Primary    Stairs, Within Home, Primary Notes all needs are met on one level  -ER     Number of Stairs, Within Home, Primary twelve  -ER       Row Name 04/23/25 1403          Cognition    Orientation Status (Cognition) oriented x 3  -ER       Row Name 04/23/25 1403          Safety Issues/Impairments Affecting Functional Mobility    Safety Issues Affecting Function (Mobility) ability to follow commands  -ER     Impairments Affecting Function (Mobility) balance;endurance/activity tolerance;pain;strength;shortness of breath  -ER     Comment, Safety Issues/Impairments (Mobility) gait belt and non skid socks donned  -ER               User Key  (r) = Recorded By, (t) = Taken By, (c) = Cosigned By      Initials Name Provider Type    ER Cara Hooker, PT Physical Therapist                   Mobility       Row Name 04/23/25 1405          Bed Mobility    Bed Mobility supine-sit  -ER     Supine-Sit [place] (Bed Mobility) contact guard;verbal cues  -ER     Assistive Device (Bed Mobility) head of bed elevated;bed rails  -ER       Row Name 04/23/25 1405          Bed-Chair Transfer    Bed-Chair [place] (Transfers) minimum assist (75% patient effort);verbal cues;2 person assist  -ER     Comment, (Bed-Chair Transfer) B HHA  -ER       Row Name 04/23/25 1405          Sit-Stand Transfer    Sit-Stand [place] (Transfers) minimum assist (75% patient effort);verbal cues;contact guard;2 person assist  -ER     Comment, (Sit-Stand Transfer) B HHA, Min A necessary for mild LOB once initially standing, otherwise CGA  -ER       Row Name 04/23/25 1405          Gait/Stairs (Locomotion)    [place] Level (Gait) minimum assist (75% patient effort);2 person assist  -ER     Assistive Device (Gait) other (see comments)  B HHA  -ER     Patient was able to Ambulate yes  -ER     Distance in Feet (Gait) 5  -ER      Deviations/Abnormal Patterns (Gait) bilateral deviations;gait speed decreased;base of support, wide;weight shifting decreased  -ER     Bilateral Gait Deviations forward flexed posture;heel strike decreased  -ER     Comment, (Gait/Stairs) Able to ambulate to bedside chair  -ER               User Key  (r) = Recorded By, (t) = Taken By, (c) = Cosigned By      Initials Name Provider Type    ER Nhung, Cara, PT Physical Therapist                   Obj/Interventions       Row Name 04/23/25 1406          Range of Motion Comprehensive    General Range of Motion no range of motion deficits identified  -ER       Row Name 04/23/25 1406          Strength Comprehensive (MMT)    Comment, General Manual Muscle Testing (MMT) Assessment BLE with generalized weakness, ~4/5  -ER       Row Name 04/23/25 1406          Balance    Balance Assessment sitting static balance;sitting dynamic balance;standing static balance;standing dynamic balance  -ER     Static Sitting Balance supervision  -ER     Dynamic Sitting Balance supervision;contact guard  -ER     Position, Sitting Balance unsupported;sitting edge of bed  -ER     Static Standing Balance minimal assist;2-person assist  -ER     Dynamic Standing Balance minimal assist;2-person assist  -ER     Position/Device Used, Standing Balance supported;other (see comments)  B HHA  -ER     Balance Interventions sitting;standing;sit to stand;supported;static;dynamic  -ER       Row Name 04/23/25 1406          Sensory Assessment (Somatosensory)    Sensory Assessment (Somatosensory) sensation intact  -ER               User Key  (r) = Recorded By, (t) = Taken By, (c) = Cosigned By      Initials Name Provider Type    ER Cara Hooker, PT Physical Therapist                   Goals/Plan       Row Name 04/23/25 1409          Bed Mobility Goal 1 (PT)    Activity/Assistive Device (Bed Mobility Goal 1, PT) bed mobility activities, all  -ER     [place] Level/Cues Needed (Bed Mobility Goal 1, PT) standby  assist  -ER     Time Frame (Bed Mobility Goal 1, PT) 2 weeks  -ER       Row Name 04/23/25 1409          Transfer Goal 1 (PT)    Activity/Assistive Device (Transfer Goal 1, PT) transfers, all  -ER     [place] Level/Cues Needed (Transfer Goal 1, PT) standby assist  -ER     Time Frame (Transfer Goal 1, PT) 2 weeks  -ER       Row Name 04/23/25 1409          Gait Training Goal 1 (PT)    Activity/Assistive Device (Gait Training Goal 1, PT) gait (walking locomotion)  -ER     [place] Level (Gait Training Goal 1, PT) supervision required  -ER     Distance (Gait Training Goal 1, PT) 60  -ER     Time Frame (Gait Training Goal 1, PT) 2 weeks  -ER       Row Name 04/23/25 1409          Stairs Goal 1 (PT)    Activity/Assistive Device (Stairs Goal 1, PT) stairs, all skills  -ER     [place] Level/Cues Needed (Stairs Goal 1, PT) contact guard required  -ER     Number of Stairs (Stairs Goal 1, PT) 5  -ER     Time Frame (Stairs Goal 1, PT) 2 weeks  -ER       Row Name 04/23/25 1409          Therapy Assessment/Plan (PT)    Planned Therapy Interventions (PT) balance training;bed mobility training;gait training;home exercise program;patient/family education;stretching;strengthening;stair training;ROM (range of motion);postural re-education;transfer training;neuromuscular re-education  -ER               User Key  (r) = Recorded By, (t) = Taken By, (c) = Cosigned By      Initials Name Provider Type    ER Cara Hooker, PT Physical Therapist                   Clinical Impression       Row Name 04/23/25 1406          Pain    Pretreatment Pain Rating 0/10 - no pain  -ER     Posttreatment Pain Rating 0/10 - no pain  -ER     Pre/Posttreatment Pain Comment Does not report pain this date  -ER       Row Name 04/23/25 1406          Plan of Care Review    Plan of Care Reviewed With patient;spouse  -ER     Outcome Evaluation Branden Diop is a 74 year old male seen for physical therapy/occupational therapy evaluation to maximize therapeutic  benefit and safely mobilize, after being admitted for dyspnea. Pt. lives with his wife in a multi level home, however notes that all his needs are met on one level. He does not use an AD, and is indep at [facility]. Denies hx of falls. He performs supine>sit with CGAx2. He performs STS with HHA x2 and CGA-Min A. mild LOB initially upon standing requiring Min Ax2 to recover. He ambulates 5 ft to bedside chair requiring CGA A x2 and HHA x2. Pt. has multiple lines, Ax2 helpful in managing safe transfers. PT rec HH therapy, with potential for OP pulmonary rehab as needed.  -ER       Row Name 04/23/25 1406          Therapy Assessment/Plan (PT)    Criteria for Skilled Interventions Met (PT) yes  -ER     Therapy Frequency (PT) 5 times/wk  -ER       Row Name 04/23/25 1406          Positioning and Restraints    Pre-Treatment Position in bed  -ER     Post Treatment Position chair  -ER     In Chair notified nsg;with family/caregiver;call light within reach;encouraged to call for assist;exit alarm on  -ER               User Key  (r) = Recorded By, (t) = Taken By, (c) = Cosigned By      Initials Name Provider Type    ER Cara Hooker, PT Physical Therapist                   Outcome Measures       Row Name 04/23/25 1410          How much help from another person do you currently need...    Turning from your back to your side while in flat bed without using bedrails? 3  -ER     Moving from lying on back to sitting on the side of a flat bed without bedrails? 3  -ER     Moving to and from a bed to a chair (including a wheelchair)? 3  -ER     Standing up from a chair using your arms (e.g., wheelchair, bedside chair)? 3  -ER     Climbing 3-5 steps with a railing? 2  -ER     To walk in hospital room? 2  -ER     AM-PAC 6 Clicks Score (PT) 16  -ER     Highest Level of Mobility Goal 5 --> Static standing  -ER       Row Name 04/23/25 1410 04/23/25 1314       Functional Assessment    Outcome Measure Options AM-PAC 6 Clicks Basic Mobility (PT)  -ER  AM-PAC 6 Clicks Daily Activity (OT)  -KR              User Key  (r) = Recorded By, (t) = Taken By, (c) = Cosigned By      Initials Name Provider Type    ER Cara Hooker, SUMAYA Physical Therapist    Michael Marinelli OT Occupational Therapist                                 Physical Therapy Education       Title: PT OT SLP Therapies (In Progress)       Topic: Physical Therapy (Done)       Point: Mobility training (Done)       Learning Progress Summary            Patient Acceptance, E, VU,NR by ER at 4/23/2025 1410                      Point: Home exercise program (Done)       Learning Progress Summary            Patient Acceptance, E, VU,NR by ER at 4/23/2025 1410                      Point: Body mechanics (Done)       Learning Progress Summary            Patient Acceptance, E, VU,NR by ER at 4/23/2025 1410                      Point: Precautions (Done)       Learning Progress Summary            Patient Acceptance, E, VU,NR by ER at 4/23/2025 1410                                      User Key       Initials Effective Dates Name Provider Type Discipline    ER 10/15/23 -  Cara Hooker, PT Physical Therapist PT                  PT Recommendation and Plan  Planned Therapy Interventions (PT): balance training, bed mobility training, gait training, home exercise program, patient/family education, stretching, strengthening, stair training, ROM (range of motion), postural re-education, transfer training, neuromuscular re-education  Outcome Evaluation: Branden Diop is a 74 year old male seen for physical therapy/occupational therapy evaluation to maximize therapeutic benefit and safely mobilize, after being admitted for dyspnea. Pt. lives with his wife in a multi level home, however notes that all his needs are met on one level. He does not use an AD, and is indep at [facility]. Denies hx of falls. He performs supine>sit with CGAx2. He performs STS with HHA x2 and CGA-Min A. mild LOB initially upon standing requiring Min Ax2 to recover. He  ambulates 5 ft to bedside chair requiring CGA A x2 and HHA x2. Pt. has multiple lines, Ax2 helpful in managing safe transfers. PT rec HH therapy, with potential for OP pulmonary rehab as needed.     Time Calculation:         PT Charges       Row Name 04/23/25 1411             Time Calculation    Start Time 0830  -ER      Stop Time 0855  -ER      Time Calculation (min) 25 min  -ER      PT Received On 04/23/25  -ER      PT - Next Appointment 04/24/25  -ER      PT Goal Re-Cert Due Date 05/07/25  -ER         Time Calculation- PT    Total Timed Code Minutes- PT 23 minute(s)  -ER         Timed Charges    65207 - PT Therapeutic Activity Minutes 23  -ER         Total Minutes    Timed Charges Total Minutes 23  -ER       Total Minutes 23  -ER                User Key  (r) = Recorded By, (t) = Taken By, (c) = Cosigned By      Initials Name Provider Type    ER Cara Hooker, PT Physical Therapist                  Therapy Charges for Today       Code Description Service Date Service Provider Modifiers Qty    75460887128 HC PT THERAPEUTIC ACT EA 15 MIN 4/23/2025 Cara Hooker, PT GP 2    87410260201 HC PT EVAL MOD COMPLEXITY 3 4/23/2025 Cara Hooker, PT GP 1            PT G-Codes  Outcome Measure Options: AM-PAC 6 Clicks Basic Mobility (PT)  AM-PAC 6 Clicks Score (PT): 16  AM-PAC 6 Clicks Score (OT): 16  PT Discharge Summary  Anticipated Discharge Disposition (PT): home with home health (OP pulm rehab)    Cara Hooker PT  4/23/2025

## 2025-04-23 NOTE — CONSULTS
.     REASON FOR CONSULTATION:     Provide an opinion on any further workup or treatment                             REQUESTING PHYSICIAN: Woodrow Mercado MD  RECORDS OBTAINED:  Records of the patient's history including those obtained from the referring provider were reviewed and summarized in detail.    HISTORY OF PRESENT ILLNESS:  The patient is a 74 y.o. year old male  who is here for follow-up with the above-mentioned history.        Past Medical History:   Diagnosis Date    Anxiety     Arthritis     Atrial fibrillation     CURRENTLY    Bunion of right foot     Colon polyps     COPD (chronic obstructive pulmonary disease)     MILD. NO MEDS FOR    Depression     History of atrial fibrillation     WITH CARDIOVERSION. NO PROBLEMS    History of skin cancer     BCC REMOVED FROM BACK    [place] (hard of hearing)     Hyperlipidemia     Inguinal hernia, recurrent     RIGHT    Left foot pain     Lung nodules     Rash     IN GROIN TREATING FOR YEAST INFECTION BY PCP    Redness of skin     LOWER LEGS BILATERAL    Scab     BILATERAL LEGS HEALING    Sleep apnea with use of continuous positive airway pressure (CPAP)     CPAP    Streptococcus pneumoniae     ABOUT 6-7 YR AGO.     Type 2 diabetes mellitus      Past Surgical History:   Procedure Laterality Date    BASAL CELL CARCINOMA EXCISION      BRONCHOSCOPY WITH ION ROBOTIC ASSIST N/A 5/6/2024    Procedure: BRONCHOSCOPY WITH ION ROBOT WITH FNA, BIOPSIES, BAL AND ENDOBRONCHIAL ULTRASOUND WITH FNA;  Surgeon: Yony Coles MD;  Location: [facility] ENDOSCOPY;  Service: Robotics - Pulmonary;  Laterality: N/A;  PRE: PULMONARY NODULES  POST: SAME    CARDIAC CATHETERIZATION N/A 11/15/2021    Procedure: Coronary angiography;  Surgeon: Alfreod Jennings MD;  Location: [facility] CATH INVASIVE LOCATION;  Service: Cardiovascular;  Laterality: N/A;    CARDIAC CATHETERIZATION N/A 11/15/2021    Procedure: Left heart cath;  Surgeon: Alfredo Jennings MD;  Location: [facility] CATH INVASIVE  LOCATION;  Service: Cardiovascular;  Laterality: N/A;    CARDIAC CATHETERIZATION N/A 11/15/2021    Procedure: Left ventriculography;  Surgeon: Alfredo Jennings MD;  Location: [facility] CATH INVASIVE LOCATION;  Service: Cardiovascular;  Laterality: N/A;    CARDIAC CATHETERIZATION N/A 11/15/2021    Procedure: Aortic root aortogram;  Surgeon: Alfredo eJnnings MD;  Location: [facility] CATH INVASIVE LOCATION;  Service: Cardiovascular;  Laterality: N/A;    CARDIOVERSION      CHOLECYSTECTOMY N/A 10/07/2017    Procedure: CHOLECYSTECTOMY LAPAROSCOPIC;  Surgeon: Martir Trevino MD;  Location: [facility] MAIN OR;  Service:     COLONOSCOPY  2007    COLONOSCOPY N/A 8/30/2022    Procedure: COLONOSCOPY TO CECUM AND TI AND COLD SNARE POLYPECTOMY;  Surgeon: Cj Lopez MD;  Location: [facility] ENDOSCOPY;  Service: Gastroenterology;  Laterality: N/A;  Pre: History of colon polyps  Post: POLYP, PREVIOUS TATTOO    FOOT SURGERY Left     TOES ARE PINNED. ALL BUT GREAT TOE    HAND SURGERY      THUMB    HERNIA REPAIR      INGUINAL HERNIA REPAIR Right 06/27/2018    Procedure: INGUINAL HERNIA REPAIR, OPEN, RECURRENT;  Surgeon: Martir Trevino MD;  Location: [facility] OR OSC;  Service: General    LASIK Bilateral     LOBECTOMY Right 6/12/2024    Procedure: BRONCHOSCOPY, RIGHT VIDEO ASSISTED THORACOSCOPY WITH RIGHT MIDDLE LOBECTOMY WITH DAVINCI ROBOT, MEDIASTINAL LYMPH NODE DISSECTION, INTERCOSTAL NERVE BLOCK;  Surgeon: David Figueroa MD;  Location: [facility] MAIN OR;  Service: Robotics - DaVinci;  Laterality: Right;    NECK SURGERY      growth on salivary gland    REPLACEMENT TOTAL KNEE Right 2007    (he is going to have a LTKR next month)    REPLACEMENT TOTAL KNEE Left     VENOUS ACCESS DEVICE (PORT) INSERTION Right 7/5/2024    Procedure: INSERTION VENOUS ACCESS DEVICE;  Surgeon: David Figueroa MD;  Location: [facility] MAIN OR;  Service: Thoracic;  Laterality: Right;       MEDICATIONS    Current Facility-Administered Medications:     acetaminophen  (TYLENOL) tablet 650 mg, 650 mg, Oral, Q4H PRN **OR** acetaminophen (TYLENOL) 160 MG/5ML oral solution 650 mg, 650 mg, Oral, Q4H PRN **OR** acetaminophen (TYLENOL) suppository 650 mg, 650 mg, Rectal, Q4H PRN, Rigo Eng MD    acetaminophen (TYLENOL) tablet 650 mg, 650 mg, Oral, Q4H PRN, 650 mg at 04/22/25 0354 **OR** acetaminophen (TYLENOL) 160 MG/5ML oral solution 650 mg, 650 mg, Oral, Q4H PRN **OR** acetaminophen (TYLENOL) suppository 650 mg, 650 mg, Rectal, Q4H PRN, Soham Mejia APRN    albuterol (PROVENTIL) nebulizer solution 0.083% 2.5 mg/3mL, 2.5 mg, Nebulization, Q4H PRN, Rigo Eng MD    arformoterol (BROVANA) nebulizer solution 15 mcg, 15 mcg, Nebulization, BID - RT, Boni Barnes MD, 15 mcg at 04/23/25 0651    sennosides-docusate (PERICOLACE) 8.6-50 MG per tablet 2 tablet, 2 tablet, Oral, BID, 2 tablet at 04/23/25 0906 **AND** polyethylene glycol (MIRALAX) packet 17 g, 17 g, Oral, Daily PRN **AND** bisacodyl (DULCOLAX) EC tablet 5 mg, 5 mg, Oral, Daily PRN **AND** bisacodyl (DULCOLAX) suppository 10 mg, 10 mg, Rectal, Daily PRN, Rigo Eng MD    budesonide (PULMICORT) nebulizer solution 0.5 mg, 0.5 mg, Nebulization, BID - RT, Rigo Eng MD, 0.5 mg at 04/23/25 0651    Calcium Replacement - Follow Nurse / BPA Driven Protocol, , Not Applicable, PRN, Rigo Eng MD    ceFAZolin 2000 mg IVPB in 100 mL NS (MBP), 2,000 mg, Intravenous, Q8H, Zia Braun MD, 2,000 mg at 04/23/25 0341    cetirizine (zyrTEC) tablet 10 mg, 10 mg, Oral, Daily, Rigo Eng MD, 10 mg at 04/23/25 0906    famotidine (PEPCID) tablet 20 mg, 20 mg, Oral, BID AC, Rigo Eng MD, 20 mg at 04/23/25 0631    ferrous sulfate tablet 162.5 mg, 162.5 mg, Oral, BID, Rigo Eng MD, 162.5 mg at 04/23/25 0906    folic acid (FOLVITE) tablet 1 mg, 1 mg, Oral, Daily, Rigo Eng MD, 1 mg at 04/23/25 0906    gabapentin (NEURONTIN) capsule 600 mg, 600 mg, Oral, Q8H, Rigo Eng MD,  600 mg at 04/23/25 0631    guaiFENesin (MUCINEX) 12 hr tablet 1,200 mg, 1,200 mg, Oral, Q12H, Rigo Eng MD, 1,200 mg at 04/23/25 0905    hydrOXYzine pamoate (VISTARIL) capsule 50 mg, 50 mg, Oral, TID PRN, Rigo Eng MD    insulin glargine (LANTUS, SEMGLEE) injection 30 Units, 30 Units, Subcutaneous, Nightly, Rigo Eng MD, 30 Units at 04/22/25 2029    insulin lispro (HUMALOG/ADMELOG) injection 8 Units, 8 Units, Subcutaneous, TID With Meals, Rigo Eng MD, 8 Units at 04/23/25 0907    ipratropium-albuterol (DUO-NEB) nebulizer solution 3 mL, 3 mL, Nebulization, 4x Daily - RT, Rigo Eng MD, 3 mL at 04/23/25 0651    LORazepam (ATIVAN) tablet 0.5 mg, 0.5 mg, Oral, Q8H PRN, Rigo Eng MD, 0.5 mg at 04/22/25 0414    magnesium oxide (MAG-OX) tablet 400 mg, 400 mg, Oral, BID, Rigo Eng MD, 400 mg at 04/22/25 0852    Magnesium Standard Dose Replacement - Follow Nurse / BPA Driven Protocol, , Not Applicable, PRN, Rigo Eng MD    melatonin tablet 5 mg, 5 mg, Oral, Nightly PRN, Rigo Eng MD    multivitamin (THERAGRAN) tablet 1 tablet, 1 tablet, Oral, Daily, Rigo Eng MD, 1 tablet at 04/23/25 0906    mupirocin (BACTROBAN) 2 % nasal ointment 1 Application, 1 Application, Each Nare, BID, Zia Braun MD    nitroglycerin (NITROSTAT) SL tablet 0.4 mg, 0.4 mg, Sublingual, Q5 Min PRN, Rigo Eng MD    ondansetron ODT (ZOFRAN-ODT) disintegrating tablet 4 mg, 4 mg, Oral, Q6H PRN **OR** ondansetron (ZOFRAN) injection 4 mg, 4 mg, Intravenous, Q6H PRN, Rigo Eng MD    oxyCODONE-acetaminophen (PERCOCET) 5-325 MG per tablet 1 tablet, 1 tablet, Oral, Q8H PRN, Rigo Eng MD    Phosphorus Replacement - Follow Nurse / BPA Driven Protocol, , Not Applicable, PRN, Rigo Eng MD    potassium chloride (K-DUR,KLOR-CON) ER tablet 10 mEq, 10 mEq, Oral, BID, Rigo Eng MD, 10 mEq at 04/23/25 0906    potassium chloride (KLOR-CON M20) CR tablet  40 mEq, 40 mEq, Oral, Q4H, Zia Braun MD    Potassium Replacement - Follow Nurse / BPA Driven Protocol, , Not Applicable, PRN, Rigo Eng MD    [Held by provider] rivaroxaban (XARELTO) tablet 20 mg, 20 mg, Oral, Daily, Rigo Eng MD, 20 mg at 25 0851    rosuvastatin (CRESTOR) tablet 40 mg, 40 mg, Oral, Daily, Rigo Eng MD, 40 mg at 25 0906    sertraline (ZOLOFT) tablet 50 mg, 50 mg, Oral, Daily, Rigo Eng MD, 50 mg at 25 0906    sodium chloride 0.9 % flush 10 mL, 10 mL, Intravenous, PRN, Rigo Eng MD    sodium chloride 0.9 % flush 10 mL, 10 mL, Intravenous, Q12H, Rigo Eng MD, 10 mL at 25    sodium chloride 0.9 % flush 10 mL, 10 mL, Intravenous, PRN, Rigo Eng MD    sodium chloride 0.9 % infusion 40 mL, 40 mL, Intravenous, PRN, Rigo Eng MD    torsemide (DEMADEX) tablet 40 mg, 40 mg, Oral, Daily, Zia Braun MD, 40 mg at 25 0905    ALLERGIES:   No Known Allergies    SOCIAL HISTORY:       Social History     Socioeconomic History    Marital status:      Spouse name: Luba    Number of children: 2   Tobacco Use    Smoking status: Former     Current packs/day: 0.00     Average packs/day: 1 pack/day for 30.0 years (30.0 ttl pk-yrs)     Types: Cigars, Cigarettes     Start date: 1984     Quit date: 2014     Years since quittin.3    Smokeless tobacco: Never   Vaping Use    Vaping status: Never Used   Substance and Sexual Activity    Alcohol use: Never     Comment: caffeine use- decaf coffee    Drug use: Never    Sexual activity: Defer         FAMILY HISTORY:  Family History   Problem Relation Age of Onset    Cancer Other     Stroke Mother     Alcohol abuse Father     Malig Hyperthermia Neg Hx        REVIEW OF SYSTEMS:  Review of Systems  See HPI.           Vitals:    25 0400 25 0610 25 0651 25 0750   BP: 94/56 114/60  98/62   BP Location: Right arm Left arm  Left arm   Patient  Position: Lying Lying  Lying   Pulse: 80  81 75   Resp: 15  22 23   Temp: 98.2 °F (36.8 °C)   98.4 °F (36.9 °C)   TempSrc: Oral   Oral   SpO2: 98%  93% 93%   Weight:       Height:             3/10/2025    11:09 AM   Current Status   ECOG score 0      PHYSICAL EXAM:      CONSTITUTIONAL:  Vital signs reviewed.  No distress, looks comfortable.  EYES:  Conjunctivae and lids unremarkable.    EARS,NOSE,MOUTH,THROAT:  Ears and nose appear unremarkable.  Lips appear unremarkable.  RESPIRATORY:  Normal respiratory effort.  Right-sided chest tube in place connected to negative suction.  CARDIOVASCULAR:  Normal S1, S2.  No murmurs rubs or gallops.    GASTROINTESTINAL: Abdomen appears unremarkable.  Nontender.  No hepatomegaly.  No splenomegaly.  MUSCULOSKELETAL:  Unremarkable digits/nails.  No cyanosis or clubbing.  SKIN:  Warm.  No rashes.  PSYCHIATRIC:  Normal judgment and insight.  Normal mood and affect.      RECENT LABS:        WBC   Date Value Ref Range Status   04/23/2025 8.09 3.40 - 10.80 10*3/mm3 Final   04/22/2025 11.22 (H) 3.40 - 10.80 10*3/mm3 Final   04/21/2025 8.18 3.40 - 10.80 10*3/mm3 Final     Hemoglobin   Date Value Ref Range Status   04/23/2025 10.1 (L) 13.0 - 17.7 g/dL Final   04/22/2025 10.8 (L) 13.0 - 17.7 g/dL Final   04/21/2025 11.9 (L) 13.0 - 17.7 g/dL Final     Platelets   Date Value Ref Range Status   04/23/2025 149 140 - 450 10*3/mm3 Final   04/22/2025 180 140 - 450 10*3/mm3 Final   04/21/2025 226 140 - 450 10*3/mm3 Final     CMP:        Lab 04/23/25  0730 04/22/25  0444 04/21/25  1455   SODIUM 131* 135* 143   POTASSIUM 3.4* 3.8 3.4*   CHLORIDE 98 98 100   CO2 25.9 24.0 31.9*   ANION GAP 7.1 13.0 11.1   BUN 17 12 10   CREATININE 0.84 0.80 0.77   EGFR 90.4 91.7 92.8   GLUCOSE 156* 162* 99   CALCIUM 8.2* 8.8 9.6   MAGNESIUM  --   --  1.8   TOTAL PROTEIN  --   --  7.2   ALBUMIN  --   --  3.9   GLOBULIN  --   --  3.3   ALT (SGPT)  --   --  15   AST (SGOT)  --   --  25   BILIRUBIN  --   --  0.9   ALK  PHOS  --   --  119*     Lab Results   Component Value Date    IRON 68 11/06/2024    LABIRON 16 (L) 11/06/2024    TRANSFERRIN 287 11/06/2024    TIBC 428 11/06/2024    FERRITIN 146 12/05/2024     Lab Results   Component Value Date    FOLATE 13.90 11/06/2024     Lab Results   Component Value Date    [id number] 525 11/06/2024       IMAGING:  CT CHEST WITHOUT IV CONTRAST 4/22/2025     HISTORY: Follow-up effusion status post chest tube placement     TECHNIQUE: Radiation dose reduction techniques were utilized, including  automated exposure control and exposure modulation based on body size.   3 mm images were obtained through the chest without IV contrast.     COMPARISON: CT chest 4/14/2025        IMPRESSION:  FINDINGS AND IMPRESSION:  Please note evaluation is suboptimal intravenous contrast. Right  Port-A-Cath tip terminates in the superior vena cava. Gallbladder  surgically absent. Visualized liver has a somewhat lobulated appearance.  Hypodense renal lesions only partially seen and cannot be evaluated.     Mediastinal and hilar adenopathy is present. Index right hilar node  measures 1.8 cm in short axis dimension, grossly unchanged since  2/5/2025. Index precarinal adenopathy measures 1.6 cm in short axis  dimension. (Previously 1.3 cm on 2/5/2025). At least mild to moderate  coronary artery calcification and/or coronary stents.     Percutaneous right thoracostomy tube terminates within a moderate right  pleural effusion. Overlying collapse of the right lower lobe is present.  Intralobular septal thickening is present with intervening areas of  groundglass and pulmonary opacification which to have worsened since  4/14/2025, most notably in the lung apices in the lingula. Small left  pleural effusion with overlying pulmonary opacification is also  increased. FINDINGS ARE CONCERNING FOR WORSENING PULMONARY EDEMA AND/OR  MULTIFOCAL PNEUMONIA IN THE APPROPRIATE CONTEXT CORRELATION WITH PATIENT  HISTORY IS RECOMMENDED. THE  NODULAR APPEARANCE OF THE ABOVE-MENTIONED  OPACIFICATION AND ABOVE-MENTIONED ADENOPATHY, CONTINUED CLOSE INTERVAL  FOLLOW-UP WITH CHEST CT IN 8 WEEKS IS RECOMMENDED TO ENSURE STABILITY  AND RESOLUTION EXCLUDE THE POSSIBILITY OF MALIGNANCY OR METASTATIC  DISEASE.     Postsurgical changes from prior wedge resection on the right. No  suspicious lytic or blastic osseous lesion. Anterolisthesis of C7 on  T1.. MIP        This report was finalized on 4/22/2025 3:33 PM by Dr. Adam Mohan M.D  on Workstation: [id number]    Assessment & Plan     1. Poorly differentiated squamous cell lung cancer with intralobular metastatic disease, stage IIIa ([id number]).  Tumor was poorly differentiated. This patient has stage 3a disease.   Patient had Ion bronchoscopic biopsy 5/6/2024.  Right middle lobe reported fragments of squamous cell carcinoma.  PD-L1 study reported TPS 0%.  I discussed with the patient on 7/12/2024 that he will need adjuvant chemotherapy and concurrent adjuvant radiation therapy, and likely will need consolidative immunotherapy. I recommended using weekly carboplatin plus Taxol, in conjunction with radiotherapy for 6.5 weeks treatment. In reality, he probably will only receive 5 or 6 weekly chemotherapy instead of the 7 doses due to tolerance issues.   We will present his case at the chest conference on 7/18/2024, due to poor pulmonary function, negative surgical margins and N1 disease, the consensus is for patient to have adjuvant chemotherapy without concurrent radiation therapy.  Also recommended genetic study.   On 7/18/2024 peripheral blood was sent for Tempus xF liquid biopsy with inconclusive results, no reportable treatment options found.   His lung cancer sample from 6/12/2024 was sent for Tempus xT DNA and RNA study.  It reported stable MSI.  Tumor mutation burden 6.3 m/MB.  Negative for EGFR, KRAS, BRAF, ALK, ROS1, RET, MET, HER2.  Tumor sample was positive for BRIP1 mutation, TP53 mutation and also  ARID1B mutation, all of those LOF.   On 7/31/2024 will start the patient on adjuvant chemotherapy.  Because his overall health condition, performance status ECOG 2, his age, he would not tolerate cisplatin based chemotherapy.  So we recommended using carboplatin in conjunction with Taxol every 3 weeks chemotherapy for 4 cycles.  Unfortunately patient would not be a candidate for immunotherapy as part of adjuvant therapy.  8/21/2024 patient presents for evaluation prior to cycle #2 adjuvant chemotherapy.  He is currently undergoing chemotherapy with a total of four cycles planned; this is his second cycle. The biopsy from his lung revealed a PD-L1 negative result. Given his overall health condition, he is not physically capable of tolerating the most toxic chemotherapy, cisplatin, and is therefore being treated with carboplatin. A request has been made for a larger tissue sample from his surgery to be retested for PD-L1. If the result is positive, immunotherapy could be considered for prolonged treatment, which has shown better results in preventing cancer recurrence. This decision will be made after the completion of his chemotherapy, to determin whether maintenance or consolidative immunotherapy is necessary. He continues on oxygen supplementation at 3 L/min.  PD-L1 study from the lobectomy sample reported positive for PD-L1 with a TPS tumor proportion score 5%.  9/11/2024 due today for cycle 3 carboplatin/Taxol.  He is overall tolerating this well with stable neuropathy.  We will reduce his steroids as further detailed below due to exacerbation of his underlying DM type II.  Today 10/2/2024 he presented for evaluation prior to his cycle #4 adjuvant chemotherapy with carboplatin plus Taxol. Laboratory studies today reported normal WBC 5700, neutrophils 3010, hemoglobin 11.7, and platelets 240,000. Chemistry lab reported baseline creatinine 0.69, normal electrolytes except magnesium 1.5, and marginally elevated  alkaline phosphatase 133. Given the presence of neuropathy, there is a concern that this could develop into a chronic issue. A reduction in the dosage of Taxol by 25% is recommended.   Today on 11/6/2024 Cycle 1 adjuvant immunotherapy with pembrolizumab will be initiated and repeated every 3 weeks.  This will be total for 12 months.  Today on 11/27/2024 patient developed diffuse pruritic skin rashes on his trunk, upper extremities, and lower extremities about 5 days after starting immunotherapy. He was given a Medrol dose pack in the ER, which improved his symptoms, but the pruritic skin rashes recurred when the steroids were tapered off. Dr. Reid prescribed 2.5% hydrocortisone cream, but due to the large body area involved, he uses the cream quickly, and the pharmacy will not refill it.  I recommended to start oral prednisone 20 mg daily for 7 days, then tapering to 10 mg daily, has been recommended. A larger quantity of hydrocortisone cream has been prescribed.  Hydroxyzine 25 mg every 8 hours as needed for pruritus has also been prescribed. Immunotherapy with pembrolizumab is canceled today.   The patient underwent a thoracentesis on 11/30/2024, during which 800 mL of pleural effusion was removed from the right side.  Cytology study negative for malignancy.  12/11/2024 due to large dose prednisone, will continue to hold pembrolizumab.  11/14/2025 patient currently is on tapering dose prednisone 5 mg daily.  We discussed with the patient today, he will proceed with cycle 2 of adjuvant pembrolizumab today, to be repeated every 3 weeks. He will see the APRN in 3 weeks for laboratory studies and prior to cycle 3 of pembrolizumab. He will keep his appointment with the CT scan of the chest and the thoracic surgery team. He will be seen in 6 weeks with laboratory studies (CBC, CMP) prior to cycle 4 of pembrolizumab.  2/4/2025: Cycle 3 Keytruda.   Patient was hospitalized on 2/27/2025 due to acute on chronic respiratory  failure.  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  Patient was started on steroids.  on 3/10/2025 patient reports that his significant proved symptoms.  Discussed with the patient will taper off his prednisone.  We will cancel future immunotherapy completely.        *2. Acute on chronic hypoxic respiratory failure with emphysema.  This condition is largely attributed to Keytruda-induced pneumonitis, as assessed by Dr. Sitxo Dodd. The patient was hospitalized for about a week in late February and early March 2025.  He was started on prednisone.    On 3/10/2025 his oxygen saturation is 95% on room air, and he is not currently using supplemental oxygen in the clinic. He will continue on prednisone 20 mg daily for 10 days, then decrease to 10 mg daily until completion, approximately 4 weeks from now.   On 4/21/2025 patient did not hide oxygen saturation in the mid 70s when he ran out of oxygen from the tank.  Patient was started to [facility] emergency room for evaluation and was admitted.  He does have worsening dyspnea in the past few days.  Denies fever sweating chills cough hemoptysis.  Respiratory viral panel was negative.  4/22/2025 patient episode of hypoxia, and was transferred to CCU for progressive care.  4/23/2025 patient feels a lot better today with his dyspnea.  Oxygen saturation 96% on 6 L/min oxygen supplement.         3.  Pleural effusion.  The patient underwent an ultrasound-guided thoracentesis on the right side during hospitalization, with 1.2 L of pleural effusion removed on 3/2/2025. Cytology study reported negative for malignant cells, reactive mesothelial cells, and macrophages with a background of mixed chronic inflammation.  4/22/2025 patient has CT-guided chest tube placement.   4/23/2025 patient reports feeling more comfortable and less dyspneic.     4.  Gram-positive cocci discovered during his hospitalization 4/21/2025.    Patient was found  to have a low fever 100.9 Fahrenheit in the ER.  Blood culture was positive for gram-positive cocci.  Patient was seen by ID service Dr. Monroe, who recommended to remove the portacatheter.      5.  DM type II  Management per primary team.        6.  Peripheral neuropathy due to chemotherapy.   Continue gabapentin, B12, folate, and vitamin B6.     7. 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  Last chemotherapy finished on 10/2/2024.  Hemoglobin was 11.7.  Laboratory studies conducted today on 11/6/2024 reported persistent anemia with hemoglobin level of 11.0 and MCV of 94.8. An iron study with ferritin, iron profile, along with B12 and folate, has been recommended for further assessment.  11/27/2024 hemoglobin 10.8 MCV 91.8.  Labs on 12/11/2024 reported hemoglobin 12.3.   Today 1/14/2025 hemoglobin 11.5.  Continue to monitor.  2/11/2025: Hemoglobin 11.3. Continue oral iron.   3/10/2025 his hemoglobin has improved from 9.9 during hospitalization in early March 2025 to 12.0 today. Iron studies, including ferritin and iron profile, will be checked in 3 months to reevaluate anemia.  4/23/2025 hemoglobin 10.1.       8. Leukocytosis.  On 12/11/2024 mild leukocytosis with WBC count of 11,600 is noted, likely due to steroid use. No immediate intervention is required.  2/11/2025 normalized WBC 8240 neutrophils 7140.   3/10/2025 his WBC count is elevated at 14,230, with neutrophils at 12,760, likely due to steroid use.  4/22/2025 WBC 11,200, including ANC 10,230.    4/23/2025 WBC 8080 without differentiation.     9. Low sodium and chloride levels.  12/11/2024 chloride level is marginally low at 93. He is advised to consume slightly more salty foods to help normalize chloride and sodium levels.  2/11/2025 chloride 94 with a sodium 137.  Continue to monitor.    3/10/2025 sodium 135 chloride 94.  likely due to diuretic use and a low-salt diet. He will increase salt intake slightly to maintain sodium balance.  4/23/2025 sodium 131  chloride 98.     10. Hypokalemia   4/23/2025 potassium 3.4.       11.  Monoclonal gammopathy of unknown significance.  IgA kappa.  This was discovered at the his nephrologist office.  Laboratory study on 4/13/2022 reported serum monoclonal IgA kappa with elevated IgA 604 mg/dL, normal IgG 861 and IgM 84.  Free kappa chain was 38.4, lambda 21.3, kappa/lambda ratio 1.8.  Lab study on 7/19/2024 at her nephrologist office positive serum monoclonal IgA kappa. IgA was slightly elevated at 597 with normal serum IgG 830 and IgM 95. Kappa chain is 50.4 and lambda 23.3 with a kappa lambda ratio of 2.16.  Continue to observe.         PLAN:  Continue IV antibiotics.  Pending surgery to remove the portacatheter.  Continue chest tube care.   Check labs in the morning for CBC, and CMP.  Other management per primary team.   There is also evidence for metastatic cancer at this point.  However his pleural effusion is suspicious but nevertheless cytology study in March 2025 was negative for malignancy.    Chest CT scan needs to be repeated for reassessment of moderate enlarged mediastinal adenopathy.   Patient has appointment with me as outpatient on 6/4/2025.      Discussed with patient and his wife at the bedside.      Reviewed medical records including notes from thoracic service, primary team [facility], ID service, and the pulmonology service.     Will sign off for now.  Please call if having questions.      JEREMY GATES M.D., Ph.D.

## 2025-04-23 NOTE — PLAN OF CARE
Goal Outcome Evaluation:  Plan of Care Reviewed With: patient, spouse           Outcome Evaluation: Branden Diop is a 74 year old male seen for physical therapy/occupational therapy evaluation to maximize therapeutic benefit and safely mobilize, after being admitted for dyspnea. Pt. lives with his wife in a multi level home, however notes that all his needs are met on one level. He does not use an AD, and is indep at [facility]. Denies hx of falls. He performs supine>sit with CGAx2. He performs STS with HHA x2 and CGA-Min A. mild LOB initially upon standing requiring Min Ax2 to recover. He ambulates 5 ft to bedside chair requiring CGA A x2 and HHA x2. Pt. has multiple lines, Ax2 helpful in managing safe transfers. PT rec HH therapy, with potential for OP pulmonary rehab as needed.    Anticipated Discharge Disposition (PT): home with home health (OP pulm rehab)

## 2025-04-23 NOTE — PLAN OF CARE
Goal Outcome Evaluation:  Plan of Care Reviewed With: patient, spouse        Progress: no change  Outcome Evaluation: Pt is a 74yom admitted 2/2 dyspnea, hypoxia, possible sepsis, with CT placement. PMH includes squamous cell lung CA s/p chemo and R middle lobectomy 6/2024, DM2, HLD, a-fib, fatty liver disease, HTN, R pleural effusion, and COPD. Pt lives with his [facility] in [facility] with [place]STE. Pt with living area on 1st floor and overall mod IND with ADLs, and uses home O2. Pt was able to complete sit<>stand and functional mobility in room with CGA-Min A. Pt demonstrating impaired balance, endurance, safety, and strength impacting ADL IND, indicating the need for OT. Pt anticipated to be able to progress to d/c home with HH therapy and help from family.    Anticipated Discharge Disposition (OT): home with supervision, home with home health

## 2025-04-23 NOTE — NURSING NOTE
"CWOCN- asked to see patient by nursing for bruise vs pressure injury to buttocks. Patient up in chair. At home he is active. Patient states \"I probably bumped it on something in the garage.\"   Assessed skin- there is a linear bruise noted. No pressure injury. No topical treatment required. Patient without any discomfort to the buttocks.   "

## 2025-04-23 NOTE — CASE MANAGEMENT/SOCIAL WORK
Continued Stay Note  [facility]     Patient Name: Branden Diop  MRN: [id number]  Today's Date: 4/23/2025    Admit Date: 4/21/2025    Plan: Home-will need o2 tank from [employer] delivered to bedside at dc. (existing [employer] customer for home o2)   Discharge Plan       Row Name 04/23/25 1019       Plan    Plan Home-will need o2 tank from [employer] delivered to bedside at dc. (existing [employer] customer for home o2)    Plan Comments Spoke with patient and spouse at bedside. Patient and spouse deny dc needs at this time with exception of needing an o2 tank to get home on. Patient is a current customer with [employer] for his home o2. [employer] rep notfied and asks ccp dcing patient to home call on day of dc. Patient informed. Vebralizes understanding.                     Nini Cardona RN

## 2025-04-23 NOTE — THERAPY EVALUATION
Patient Name: Branden Diop  : 1950    MRN: [id number]                              Today's Date: 2025       Admit Date: 2025    Visit Dx:     ICD-10-CM ICD-9-CM   1. Hypoxia  R09.02 799.02   2. Dyspnea, unspecified type  R06.00 786.09   3. Recurrent pleural effusion  J90 511.9   4. History of lung cancer  Z85.118 V10.11     Patient Active Problem List   Diagnosis    A-fib    Atrioventricular block, Mobitz type 1, Wenckebach    Apnea, sleep    Obesity    Streptococcus pneumoniae    Sleep apnea with use of continuous positive airway pressure (CPAP)    Sinusitis    Right wrist pain    Lobar pneumonia    DM2 (diabetes mellitus, type 2)    Dyslipidemia    Hammertoe    Mood disorder    COPD (chronic obstructive pulmonary disease)    Colon polyps    Atrial fibrillation    Anxiety    Pruritus    Cholelithiasis    Fatty liver    Essential hypertension    Vitamin D deficiency    Emphysema, unspecified    Bronchiectasis with acute exacerbation    FHx: colonic polyps    Diverticulosis    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    Malignant neoplasm of middle lobe of right lung    Fluid collection at surgical site, initial encounter    Encounter for long-term (current) use of high-risk medication    Fitting and adjustment of vascular catheter    Anemia associated with chemotherapy    Peripheral neuropathy due to chemotherapy    Acute respiratory failure    Rash in adult    Bone mass    Drug-induced skin rash    Adverse effect of antineoplastic drug    Pleural effusion on right    Acute on chronic hypoxic respiratory failure    Hypoxemia    Drug-induced pneumonitis    Acute on chronic respiratory failure    Dyspnea on exertion    Hyponatremia    Hypoxia    History of lung cancer    Anemia, chronic disease     Past Medical History:   Diagnosis Date    Anxiety     Arthritis     Atrial fibrillation     CURRENTLY    Bunion of right foot     Colon polyps     COPD (chronic obstructive pulmonary disease)      MILD. NO MEDS FOR    Depression     History of atrial fibrillation     WITH CARDIOVERSION. NO PROBLEMS    History of skin cancer     BCC REMOVED FROM BACK    [place] (hard of hearing)     Hyperlipidemia     Inguinal hernia, recurrent     RIGHT    Left foot pain     Lung nodules     Rash     IN GROIN TREATING FOR YEAST INFECTION BY PCP    Redness of skin     LOWER LEGS BILATERAL    Scab     BILATERAL LEGS HEALING    Sleep apnea with use of continuous positive airway pressure (CPAP)     CPAP    Streptococcus pneumoniae     ABOUT 6-7 YR AGO.     Type 2 diabetes mellitus      Past Surgical History:   Procedure Laterality Date    BASAL CELL CARCINOMA EXCISION      BRONCHOSCOPY WITH ION ROBOTIC ASSIST N/A 5/6/2024    Procedure: BRONCHOSCOPY WITH ION ROBOT WITH FNA, BIOPSIES, BAL AND ENDOBRONCHIAL ULTRASOUND WITH FNA;  Surgeon: Yony Coles MD;  Location: [facility] ENDOSCOPY;  Service: Robotics - Pulmonary;  Laterality: N/A;  PRE: PULMONARY NODULES  POST: SAME    CARDIAC CATHETERIZATION N/A 11/15/2021    Procedure: Coronary angiography;  Surgeon: Alfredo Jennings MD;  Location: [facility] CATH INVASIVE LOCATION;  Service: Cardiovascular;  Laterality: N/A;    CARDIAC CATHETERIZATION N/A 11/15/2021    Procedure: Left heart cath;  Surgeon: Alfredo Jennings MD;  Location: [facility] CATH INVASIVE LOCATION;  Service: Cardiovascular;  Laterality: N/A;    CARDIAC CATHETERIZATION N/A 11/15/2021    Procedure: Left ventriculography;  Surgeon: Alfredo Jennings MD;  Location: [facility] CATH INVASIVE LOCATION;  Service: Cardiovascular;  Laterality: N/A;    CARDIAC CATHETERIZATION N/A 11/15/2021    Procedure: Aortic root aortogram;  Surgeon: Alfredo Jennings MD;  Location: [facility] CATH INVASIVE LOCATION;  Service: Cardiovascular;  Laterality: N/A;    CARDIOVERSION      CHOLECYSTECTOMY N/A 10/07/2017    Procedure: CHOLECYSTECTOMY LAPAROSCOPIC;  Surgeon: Martir Trevino MD;  Location: [facility] MAIN OR;  Service:     COLONOSCOPY  2007     COLONOSCOPY N/A 8/30/2022    Procedure: COLONOSCOPY TO CECUM AND TI AND COLD SNARE POLYPECTOMY;  Surgeon: Cj Lopez MD;  Location: [facility] ENDOSCOPY;  Service: Gastroenterology;  Laterality: N/A;  Pre: History of colon polyps  Post: POLYP, PREVIOUS TATTOO    FOOT SURGERY Left     TOES ARE PINNED. ALL BUT GREAT TOE    HAND SURGERY      THUMB    HERNIA REPAIR      INGUINAL HERNIA REPAIR Right 06/27/2018    Procedure: INGUINAL HERNIA REPAIR, OPEN, RECURRENT;  Surgeon: Martir Trevino MD;  Location: [facility] OR OSC;  Service: General    LASIK Bilateral     LOBECTOMY Right 6/12/2024    Procedure: BRONCHOSCOPY, RIGHT VIDEO ASSISTED THORACOSCOPY WITH RIGHT MIDDLE LOBECTOMY WITH DAVINCI ROBOT, MEDIASTINAL LYMPH NODE DISSECTION, INTERCOSTAL NERVE BLOCK;  Surgeon: David Figueroa MD;  Location: [facility] MAIN OR;  Service: Robotics - DaVinci;  Laterality: Right;    NECK SURGERY      growth on salivary gland    REPLACEMENT TOTAL KNEE Right 2007    (he is going to have a LTKR next month)    REPLACEMENT TOTAL KNEE Left     VENOUS ACCESS DEVICE (PORT) INSERTION Right 7/5/2024    Procedure: INSERTION VENOUS ACCESS DEVICE;  Surgeon: David Figueroa MD;  Location: [facility] MAIN OR;  Service: Thoracic;  Laterality: Right;      General Information       Row Name 04/23/25 1251          OT Time and Intention    Subjective Information no complaints  -KR     Document Type evaluation  -KR     Mode of Treatment co-treatment;physical therapy;occupational therapy  co-tx 2/2 impaired endurance and medical complexity unclear tolerance of multiple sessions  -KR     Patient Effort good  -KR       Row Name 04/23/25 1254          General Information    Patient Profile Reviewed yes  -KR     Prior Level of Function independent:;ADL's  Pt overall mod IND with ADLs, with his wife helpping as needed, particularly for donning socks/shoes.  -KR     Existing Precautions/Restrictions no known precautions/restrictions  -KR     Barriers to Rehab none identified   -KR       Row Name 04/23/25 1259          Occupational Profile    Environmental Supports and Barriers (Occupational Profile) DME: Pt with home O2  -KR       Row Name 04/23/25 1259          Living Environment    Current Living Arrangements home  -KR     People in Home spouse  -KR       Row Name 04/23/25 1259          Home Main Entrance    Number of Stairs, Main Entrance none  -KR       Row Name 04/23/25 1259          Stairs Within Home, Primary    Number of Stairs, Within Home, Primary twelve  -KR     Stairs Comment, Within Home, Primary pt with MSH but able to live on main floor with no steps  -KR       Row Name 04/23/25 1259          Cognition    Orientation Status (Cognition) oriented x 3  -KR       Row Name 04/23/25 1259          Safety Issues/Impairments Affecting Functional Mobility    Safety Issues Affecting Function (Mobility) ability to follow commands  -KR     Impairments Affecting Function (Mobility) balance;endurance/activity tolerance;pain;strength;shortness of breath  -KR               User Key  (r) = Recorded By, (t) = Taken By, (c) = Cosigned By      Initials Name Provider Type    KR Michael Grande OT Occupational Therapist                     Mobility/ADL's       Row Name 04/23/25 1302          Bed Mobility    Bed Mobility supine-sit  -KR     Supine-Sit [place] (Bed Mobility) contact guard;verbal cues  -KR     Assistive Device (Bed Mobility) head of bed elevated;bed rails  -KR     Comment, (Bed Mobility) pt instructed on technique but able to use counterweight to pull self up into sitting EOB  -KR       Row Name 04/23/25 1302          Transfers    Transfers bed-chair transfer;sit-stand transfer;stand-sit transfer  -KR       Row Name 04/23/25 1302          Bed-Chair Transfer    Bed-Chair [place] (Transfers) minimum assist (75% patient effort);verbal cues  -KR     Comment, (Bed-Chair Transfer) HHA for stand and few steps to chair. Pt with min A for balance and cues for safety  -KR       Row  Name 04/23/25 1302          Sit-Stand Transfer    Sit-Stand [place] (Transfers) minimum assist (75% patient effort);verbal cues;contact guard  -KR     Comment, (Sit-Stand Transfer) pt able to stand with CGA but then with Min A 2/2 some unsteadiness once fully standing  -KR       Row Name 04/23/25 1302          Stand-Sit Transfer    Stand-Sit [place] (Transfers) contact guard;verbal cues  -KR     Comment, (Stand-Sit Transfer) cues for technique  -KR       Row Name 04/23/25 1302          Functional Mobility    Functional Mobility- Comment limited by chest tube suction for mobility distances but able to take steps to chair and get OOB.  -KR       Row Name 04/23/25 1302          Activities of Daily Living    BADL Assessment/Intervention lower body dressing;toileting  -KR       Row Name 04/23/25 1302          Lower Body Dressing Assessment/Training    [place] Level (Lower Body Dressing) shoes/slippers;don;maximum assist (25% patient effort)  -KR     Position (Lower Body Dressing) supine  -KR     Comment, (Lower Body Dressing) Max A for donning socks sup in bed; pt expressing he req A for socks/shoes at home but can don pants without help  -KR       Row Name 04/23/25 1302          Toileting Assessment/Training    Comment, (Toileting) pt with condom catheter on and pt reporting increased urgency 2/2 diuretics.  -KR               User Key  (r) = Recorded By, (t) = Taken By, (c) = Cosigned By      Initials Name Provider Type    Michael Marinelli OT Occupational Therapist                   Obj/Interventions       Row Name 04/23/25 1307          Vision Assessment/Intervention    Visual Impairment/Limitations WNL  -KR       Row Name 04/23/25 1307          Range of Motion Comprehensive    General Range of Motion no range of motion deficits identified  -KR     Comment, General Range of Motion BUE WFL  -KR       Row Name 04/23/25 1307          Strength Comprehensive (MMT)    General Manual Muscle Testing (MMT)  Assessment no strength deficits identified  -KR     Comment, General Manual Muscle Testing (MMT) Assessment BUE WFL  -KR       Row Name 04/23/25 1307          Balance    Balance Assessment sitting dynamic balance;sitting static balance;standing static balance;standing dynamic balance  -KR     Static Sitting Balance supervision  -KR     Dynamic Sitting Balance contact guard  -KR     Static Standing Balance minimal assist;contact guard  -KR     Dynamic Standing Balance minimal assist  -KR               User Key  (r) = Recorded By, (t) = Taken By, (c) = Cosigned By      Initials Name Provider Type    KR Michael Grande OT Occupational Therapist                   Goals/Plan       Row Name 04/23/25 1312          Bed Mobility Goal 1 (OT)    Activity/Assistive Device (Bed Mobility Goal 1, OT) bed mobility activities, all  -KR     [place] Level/Cues Needed (Bed Mobility Goal 1, OT) modified independence  -KR     Time Frame (Bed Mobility Goal 1, OT) short term goal (STG);2 weeks  -KR     Progress/Outcomes (Bed Mobility Goal 1, OT) new goal  -KR       Row Name 04/23/25 1312          Transfer Goal 1 (OT)    Activity/Assistive Device (Transfer Goal 1, OT) sit-to-stand/stand-to-sit;bed-to-chair/chair-to-bed;toilet  -KR     [place] Level/Cues Needed (Transfer Goal 1, OT) supervision required  -KR     Time Frame (Transfer Goal 1, OT) short term goal (STG);2 weeks  -KR     Progress/Outcome (Transfer Goal 1, OT) new goal  -KR       Row Name 04/23/25 1312          Dressing Goal 1 (OT)    Activity/Device (Dressing Goal 1, OT) upper body dressing  -KR     [place]/Cues Needed (Dressing Goal 1, OT) supervision required  -KR     Time Frame (Dressing Goal 1, OT) short term goal (STG);2 weeks  -KR     Progress/Outcome (Dressing Goal 1, OT) new goal  -KR       Row Name 04/23/25 1312          Toileting Goal 1 (OT)    Activity/Device (Toileting Goal 1, OT) toileting skills, all;adjust/manage clothing;perform perineal hygiene  -KR      [place] Level/Cues Needed (Toileting Goal 1, OT) supervision required  -KR     Time Frame (Toileting Goal 1, OT) short term goal (STG);2 weeks  -KR     Progress/Outcome (Toileting Goal 1, OT) new goal  -KR       Row Name 04/23/25 1312          Grooming Goal 1 (OT)    Activity/Device (Grooming Goal 1, OT) grooming skills, all  -KR     [place] (Grooming Goal 1, OT) supervision required  -KR     Time Frame (Grooming Goal 1, OT) 2 weeks;short term goal (STG)  -KR     Strategies/Barriers (Grooming Goal 1, OT) standing sinkside for ADL IND  -KR     Progress/Outcome (Grooming Goal 1, OT) new goal  -KR       Row Name 04/23/25 1312          Problem Specific Goal 1 (OT)    Problem Specific Goal 1 (OT) Pt will tolerate standing functional ax for >8min with stable vitals to promote ADL IND and safety.  -KR     Time Frame (Problem Specific Goal 1, OT) 2 weeks;short term goal (STG)  -KR     Progress/Outcome (Problem Specific Goal 1, OT) new goal  -KR       Row Name 04/23/25 1312          Therapy Assessment/Plan (OT)    Planned Therapy Interventions (OT) activity tolerance training;BADL retraining;cognitive/visual perception retraining;functional balance retraining;neuromuscular control/coordination retraining;occupation/activity based interventions;patient/caregiver education/training;strengthening exercise;transfer/mobility retraining;passive ROM/stretching;ROM/therapeutic exercise  -KR               User Key  (r) = Recorded By, (t) = Taken By, (c) = Cosigned By      Initials Name Provider Type    KR Michael Grande OT Occupational Therapist                   Clinical Impression       Row Name 04/23/25 1308          Pain Assessment    Response to Pain Interventions functional ability unchanged  -KR     Pre/Posttreatment Pain Comment pt with no indications of pain  -KR       Row Name 04/23/25 1308          Plan of Care Review    Plan of Care Reviewed With patient;spouse  -KR     Progress no change  -KR     Outcome  Evaluation Pt is a 74yom admitted 2/2 dyspnea, hypoxia, possible sepsis, with CT placement. PMH includes squamous cell lung CA s/p chemo and R middle lobectomy 6/2024, DM2, HLD, a-fib, fatty liver disease, HTN, R pleural effusion, and COPD. Pt [place] [facility] [place] Pt with living area on 1st floor and overall mod IND with ADLs, and uses home O2. Pt was able to complete sit<>stand and functional mobility in room with CGA-Min A. Pt demonstrating impaired balance, endurance, safety, and strength impacting ADL IND, indicating the need for OT. Pt anticipated to be able to progress to d/c home with HH therapy and help from family. Pt would possible benefit from OP pulmonary rehab to promote home safety and long term functional IND and prevent rehospitalization.  -KR       Row Name 04/23/25 1305          Therapy Assessment/Plan (OT)    Rehab Potential (OT) good  -KR     Criteria for Skilled Therapeutic Interventions Met (OT) yes  -KR     Therapy Frequency (OT) 5 times/wk  -KR       Row Name 04/23/25 1306          Therapy Plan Review/Discharge Plan (OT)    Equipment Needs Upon Discharge (OT) other (see comments)  will cont to assess- may benefit from 3-in-one commode to promote toileting and shower safety  -KR     Anticipated Discharge Disposition (OT) home with supervision;home with home health;home with outpatient therapy services  -KR       Row Name 04/23/25 1300          Vital Signs    O2 Delivery Pre Treatment supplemental O2  -KR     O2 Delivery Intra Treatment supplemental O2  -KR     O2 Delivery Post Treatment supplemental O2  -KR     Pre Patient Position Supine  -KR     Intra Patient Position Standing  -KR     Post Patient Position Sitting  -KR       Row Name 04/23/25 1308          Positioning and Restraints    Pre-Treatment Position in bed  -KR     Post Treatment Position chair  -KR     In Chair notified nsg;reclined;sitting;call light within reach;encouraged to call for assist;exit alarm on;with  family/caregiver;legs elevated  -KR               User Key  (r) = Recorded By, (t) = Taken By, (c) = Cosigned By      Initials Name Provider Type    Michael Marinelli OT Occupational Therapist                   Outcome Measures       Row Name 04/23/25 1314          How much help from another is currently needed...    Putting on and taking off regular lower body clothing? 2  -KR     Bathing (including washing, rinsing, and drying) 2  -KR     Toileting (which includes using toilet bed pan or urinal) 2  -KR     Putting on and taking off regular upper body clothing 3  -KR     Taking care of personal grooming (such as brushing teeth) 3  -KR     Eating meals 4  -KR     AM-PAC 6 Clicks Score (OT) 16  -KR       Row Name 04/23/25 1314          Functional Assessment    Outcome Measure Options AM-PAC 6 Clicks Daily Activity (OT)  -KR               User Key  (r) = Recorded By, (t) = Taken By, (c) = Cosigned By      Initials Name Provider Type    Michael Marinelli OT Occupational Therapist                    Occupational Therapy Education       Title: PT OT SLP Therapies (In Progress)       Topic: Occupational Therapy (In Progress)       Point: ADL training (Done)       Learning Progress Summary            Patient Acceptance, E,TB, VU,NR by KR at 4/23/2025 1314    Comment: role of OT, benefits of OOB, and d/c recs   Family Acceptance, E,TB, VU,NR by KR at 4/23/2025 1314    Comment: role of OT, benefits of OOB, and d/c recs                      Point: Precautions (Done)       Learning Progress Summary            Patient Acceptance, E,TB, VU,NR by KR at 4/23/2025 1314    Comment: role of OT, benefits of OOB, and d/c recs   Family Acceptance, E,TB, VU,NR by KR at 4/23/2025 1314    Comment: role of OT, benefits of OOB, and d/c recs                      Point: Body mechanics (Done)       Learning Progress Summary            Patient Acceptance, E,TB, VU,NR by KR at 4/23/2025 1314    Comment: role of OT, benefits of OOB, and d/c recs    Family Acceptance, E,TB, VU,NR by JIAN at 4/23/2025 1314    Comment: role of OT, benefits of OOB, and d/c recs                                      User Key       Initials Effective Dates Name Provider Type Discipline    JIAN 04/01/25 -  Michael Grande OT Occupational Therapist OT                  OT Recommendation and Plan  Planned Therapy Interventions (OT): activity tolerance training, BADL retraining, cognitive/visual perception retraining, functional balance retraining, neuromuscular control/coordination retraining, occupation/activity based interventions, patient/caregiver education/training, strengthening exercise, transfer/mobility retraining, passive ROM/stretching, ROM/therapeutic exercise  Therapy Frequency (OT): 5 times/wk  Plan of Care Review  Plan of Care Reviewed With: patient, spouse  Progress: no change  Outcome Evaluation: Pt is a 74yom admitted 2/2 dyspnea, hypoxia, possible sepsis, with CT placement. PMH includes squamous cell lung CA s/p chemo and R middle lobectomy 6/2024, DM2, HLD, a-fib, fatty liver disease, HTN, R pleural effusion, and COPD. Pt lives with his wife in [facility] [place]. Pt with living area on 1st floor and overall mod IND with ADLs, and uses home O2. Pt was able to complete sit<>stand and functional mobility in room with CGA-Min A. Pt demonstrating impaired balance, endurance, safety, and strength impacting ADL IND, indicating the need for OT. Pt anticipated to be able to progress to d/c home with HH therapy and help from family. Pt would possible benefit from OP pulmonary rehab to promote home safety and long term functional IND and prevent rehospitalization.     Time Calculation:   Evaluation Complexity (OT)  Review Occupational Profile/Medical/Therapy History Complexity: expanded/moderate complexity  Assessment, Occupational Performance/Identification of Deficit Complexity: 1-3 performance deficits  Clinical Decision Making Complexity (OT): problem focused assessment/low  complexity  Overall Complexity of Evaluation (OT): low complexity     Time Calculation- OT       Row Name 04/23/25 1317             Time Calculation- OT    OT Start Time 0830  -KR      OT Stop Time 0855  -KR      OT Time Calculation (min) 25 min  -KR      Total Timed Code Minutes- OT 13 minute(s)  -KR      OT Received On 04/23/25  -KR      OT - Next Appointment 04/24/25  -KR      OT Goal Re-Cert Due Date 05/07/25  -KR         Timed Charges    05644 - OT Therapeutic Activity Minutes 13  -KR         Untimed Charges    OT Eval/Re-eval Minutes 12  -KR         Total Minutes    Timed Charges Total Minutes 13  -KR      Untimed Charges Total Minutes 12  -KR       Total Minutes 25  -KR                User Key  (r) = Recorded By, (t) = Taken By, (c) = Cosigned By      Initials Name Provider Type    Michael Marinelli OT Occupational Therapist                  Therapy Charges for Today       Code Description Service Date Service Provider Modifiers Qty    05089303201 HC OT THERAPEUTIC ACT EA 15 MIN 4/23/2025 Michael Grande OT GO 1    70413542067 HC OT EVAL LOW COMPLEXITY 2 4/23/2025 Michael Grande OT GO 1                 Michael Grande OT  4/23/2025

## 2025-04-23 NOTE — PROGRESS NOTES
Name: Branden Diop ADMIT: 2025   : 1950  PCP: Tino Lopez MD    MRN: [id number] LOS: 1 days   AGE/SEX: 74 y.o. male  ROOM: [facility]     Subjective   Subjective   Patient resting comfortably in chair.  Breathing is much improved after chest tube placement.  Has had almost 2 L out.  No growth so far.  Family at bedside.         Objective   Objective   Vital Signs  Temp:  [98 °F (36.7 °C)-99.5 °F (37.5 °C)] 98.4 °F (36.9 °C)  Heart Rate:  [] 78  Resp:  [15-27] 22  BP: ()/() 98/62  SpO2:  [89 %-100 %] 92 %  on  Flow (L/min) (Oxygen Therapy):  [6-10] 6;   Device (Oxygen Therapy): humidified;high-flow nasal cannula  Body mass index is 40.05 kg/m².  Physical Exam  Vitals and nursing note reviewed.   Constitutional:       General: He is not in acute distress.     Appearance: He is ill-appearing.   Cardiovascular:      Rate and Rhythm: Normal rate and regular rhythm.      Comments: Port  Pulmonary:      Effort: Pulmonary effort is normal. No respiratory distress.      Comments: Coarse breath sounds bilaterally  Abdominal:      General: Abdomen is flat. There is no distension.      Tenderness: There is no abdominal tenderness.   Musculoskeletal:         General: No swelling or deformity.   Skin:     General: Skin is warm and dry.   Neurological:      General: No focal deficit present.      Mental Status: He is alert. Mental status is at baseline.         Results Review     I reviewed the patient's new clinical results.  Results from last 7 days   Lab Units 25  0730 25  0444 25  1455   WBC 10*3/mm3 8.09 11.22* 8.18   HEMOGLOBIN g/dL 10.1* 10.8* 11.9*   PLATELETS 10*3/mm3 149 180 226     Results from last 7 days   Lab Units 25  0730 25  0444 25  1455   SODIUM mmol/L 131* 135* 143   POTASSIUM mmol/L 3.4* 3.8 3.4*   CHLORIDE mmol/L 98 98 100   CO2 mmol/L 25.9 24.0 31.9*   BUN mg/dL 17 12 10   CREATININE mg/dL 0.84 0.80 0.77   GLUCOSE mg/dL 156* 162* 99    Estimated Creatinine Clearance: 102.9 mL/min (by C-G formula based on SCr of 0.84 mg/dL).  Results from last 7 days   Lab Units 04/21/25  1455   ALBUMIN g/dL 3.9   BILIRUBIN mg/dL 0.9   ALK PHOS U/L 119*   AST (SGOT) U/L 25   ALT (SGPT) U/L 15     Results from last 7 days   Lab Units 04/23/25  0730 04/22/25  0444 04/21/25  1455   CALCIUM mg/dL 8.2* 8.8 9.6   ALBUMIN g/dL  --   --  3.9   MAGNESIUM mg/dL  --   --  1.8     Results from last 7 days   Lab Units 04/21/25  1516 04/21/25  1455   PROCALCITONIN ng/mL  --  0.06   LACTATE mmol/L 1.2  --      COVID19   Date Value Ref Range Status   04/21/2025 Not Detected Not Detected - Ref. Range Final   02/27/2025 Not Detected Not Detected - Ref. Range Final     Hemoglobin A1C   Date/Time Value Ref Range Status   04/21/2025 2103 8.60 (H) 4.80 - 5.60 % Final   04/21/2025 1455 8.80 (H) 4.80 - 5.60 % Final     Glucose   Date/Time Value Ref Range Status   04/22/2025 1347 237 (H) 70 - 130 mg/dL Final   04/22/2025 0639 166 (H) 70 - 130 mg/dL Final   04/21/2025 2154 194 (H) 70 - 130 mg/dL Final       XR Chest 1 View  Narrative: XR CHEST 1 VW-4/23/2025     HISTORY: Possible pleural effusion.     2 images are submitted. Heart size is at the upper limits of normal.  Pigtail catheter terminates over the right lower hemithorax. There is  bilateral interstitial and alveolar fluid which may represent pulmonary  edema. The right base has cleared somewhat since the 4/22/2025 study and  this may represent improving pleural effusion.     No pneumothorax is seen. Mediport catheter is seen in good position.     Impression: 1. There has been slight clearing of the right base presumably from  improving pleural effusion since yesterday's study.  2. The chest otherwise appears largely unchanged.        This report was finalized on 4/23/2025 7:43 AM by Dr. David Ruiz M.D on Workstation: [id number]       I reviewed the patient's daily medications.  Scheduled Medications  arformoterol, 15  mcg, Nebulization, BID - RT  budesonide, 0.5 mg, Nebulization, BID - RT  ceFAZolin, 2,000 mg, Intravenous, Q8H  cetirizine, 10 mg, Oral, Daily  famotidine, 20 mg, Oral, BID AC  ferrous sulfate, 162.5 mg, Oral, BID  folic acid, 1 mg, Oral, Daily  gabapentin, 600 mg, Oral, Q8H  guaiFENesin, 1,200 mg, Oral, Q12H  insulin glargine, 30 Units, Subcutaneous, Nightly  insulin lispro, 2-7 Units, Subcutaneous, 4x Daily AC & at Bedtime  insulin lispro, 8 Units, Subcutaneous, TID With Meals  ipratropium-albuterol, 3 mL, Nebulization, 4x Daily - RT  magnesium oxide, 400 mg, Oral, BID  multivitamin, 1 tablet, Oral, Daily  mupirocin, 1 Application, Each Nare, BID  potassium chloride, 10 mEq, Oral, BID  potassium chloride ER, 40 mEq, Oral, Q4H  [Held by provider] rivaroxaban, 20 mg, Oral, Daily  rosuvastatin, 40 mg, Oral, Daily  senna-docusate sodium, 2 tablet, Oral, BID  sertraline, 50 mg, Oral, Daily  sodium chloride, 10 mL, Intravenous, Q12H  torsemide, 40 mg, Oral, Daily    Infusions   Diet  Diet: Cardiac, Diabetic; Healthy Heart (2-3 Na+); Consistent Carbohydrate; Fluid Consistency: Thin (IDDSI 0)  NPO Diet NPO Type: Strict NPO         I have personally reviewed:  [x]  Laboratory   [x]  Microbiology   [x]  Radiology   [x]  EKG/Telemetry   []  Cardiology/Vascular   []  Pathology   [x]  Records     Assessment/Plan     Active Hospital Problems    Diagnosis  POA    **Hypoxia [R09.02]  Yes    History of lung cancer [Z85.118]  Not Applicable    Anemia, chronic disease [D63.8]  Yes    MSSA bacteremia [R78.81, B95.61]  Unknown    Pleural effusion on right [J90]  Yes    Essential hypertension [I10]  Yes    Fatty liver [K76.0]  Yes    Cholelithiasis [K80.20]  Yes    DM2 (diabetes mellitus, type 2) [E11.9]  Yes    Atrial fibrillation [I48.91]  Yes    Dyslipidemia [E78.5]  Yes    Mood disorder [F39]  Yes    Lobar pneumonia [J18.1]  Yes      Resolved Hospital Problems   No resolved problems to display.       74 y.o. male admitted with  Hypoxia.    Acute on chronic chronic respiratory failure from COPD, 4 L at baseline  Right pleural effusion, has had multiple thoracentesis in the past  Hx of Keytruda pneumonitis  -CT chest with worsening right pleural effusion  -Transition diuretics to oral torsemide 40 mg daily  -Hold Xarelto.   - Patient tolerating chest tube well.  -Restart home Torsemide     MSSA bacteremia  -ID consulted, now on cefazolin 2 g IV every 8 hours.  Thoracic surgery planning to remove port tomorrow. ECHO completed, read pending.  ID plans for repeat blood cultures after port has been removed.    Squamous cell lung carcinoma  -Follows [facility] hematology, finished his third cycle of Keytruda on 2/4  -Keytruda intolerance, no further Keytruda  -Oncology consulted, patient recommendations     Diabetes type 2, A1c 8.6%  - Lantus 30 units and lispro 8 units with meals.  Sliding scale insulin, titrate as needed     Persistent atrial fibrillation  -Hold Xarelto as above.  Not on rate or rhythm control as an outpatient     Hyperlipidemia-continue home statin     Neuropathy-continue home gabapentin         Pharmacy to dose Lovenox for DVT prophylaxis.  Full code.  Discussed with patient and wife.  Case management, pharmacy, RN in the room.  Discussed with patient, family, nursing staff, consulting provider, and CCP.  Anticipate discharge home with HH vs SNU facility timing yet to be determined.    Expected Discharge Date: 4/25/2025; Expected Discharge Time:       Zia Braun MD  [facility]  04/23/25  14:24 EDT

## 2025-04-23 NOTE — PROGRESS NOTES
ID note    CC: Follow-up MSSA septicemia and right pleural effusion    Subjective: Yesterday he went for right chest tube placement.  Post-procedurally he had hypoxia so was admitted to the CCU.  This is improved today.  He actually tells me he is feeling much better today.  His wife is at the bedside and provided helpful details.    Medications:    Current Facility-Administered Medications:     acetaminophen (TYLENOL) tablet 650 mg, 650 mg, Oral, Q4H PRN **OR** acetaminophen (TYLENOL) 160 MG/5ML oral solution 650 mg, 650 mg, Oral, Q4H PRN **OR** acetaminophen (TYLENOL) suppository 650 mg, 650 mg, Rectal, Q4H PRN, Rigo Eng MD    acetaminophen (TYLENOL) tablet 650 mg, 650 mg, Oral, Q4H PRN, 650 mg at 04/22/25 0354 **OR** acetaminophen (TYLENOL) 160 MG/5ML oral solution 650 mg, 650 mg, Oral, Q4H PRN **OR** acetaminophen (TYLENOL) suppository 650 mg, 650 mg, Rectal, Q4H PRN, Soham Mejia, BAM    albuterol (PROVENTIL) nebulizer solution 0.083% 2.5 mg/3mL, 2.5 mg, Nebulization, Q4H PRN, Rigo Eng MD    arformoterol (BROVANA) nebulizer solution 15 mcg, 15 mcg, Nebulization, BID - RT, Boni Barnes MD, 15 mcg at 04/23/25 0651    sennosides-docusate (PERICOLACE) 8.6-50 MG per tablet 2 tablet, 2 tablet, Oral, BID, 2 tablet at 04/22/25 2028 **AND** polyethylene glycol (MIRALAX) packet 17 g, 17 g, Oral, Daily PRN **AND** bisacodyl (DULCOLAX) EC tablet 5 mg, 5 mg, Oral, Daily PRN **AND** bisacodyl (DULCOLAX) suppository 10 mg, 10 mg, Rectal, Daily PRN, Rigo Eng MD    budesonide (PULMICORT) nebulizer solution 0.5 mg, 0.5 mg, Nebulization, BID - RT, Rigo Eng MD, 0.5 mg at 04/23/25 0651    Calcium Replacement - Follow Nurse / BPA Driven Protocol, , Not Applicable, PRN, Rigo Eng MD    ceFAZolin 2000 mg IVPB in 100 mL NS (MBP), 2,000 mg, Intravenous, Q8H, Zia Braun MD, 2,000 mg at 04/23/25 0341    cetirizine (zyrTEC) tablet 10 mg, 10 mg, Oral, Daily, Rigo Eng MD, 10  mg at 04/22/25 0851    famotidine (PEPCID) tablet 20 mg, 20 mg, Oral, BID AC, Rigo Eng MD, 20 mg at 04/23/25 0631    ferrous sulfate tablet 162.5 mg, 162.5 mg, Oral, BID, Rigo Eng MD, 162.5 mg at 04/22/25 2028    folic acid (FOLVITE) tablet 1 mg, 1 mg, Oral, Daily, Rigo Eng MD, 1 mg at 04/22/25 0851    gabapentin (NEURONTIN) capsule 600 mg, 600 mg, Oral, Q8H, Rigo Eng MD, 600 mg at 04/23/25 0631    guaiFENesin (MUCINEX) 12 hr tablet 1,200 mg, 1,200 mg, Oral, Q12H, Rigo Eng MD, 1,200 mg at 04/22/25 2028    hydrOXYzine pamoate (VISTARIL) capsule 50 mg, 50 mg, Oral, TID PRN, Rigo Eng MD    insulin glargine (LANTUS, SEMGLEE) injection 30 Units, 30 Units, Subcutaneous, Nightly, Rigo Eng MD, 30 Units at 04/22/25 2029    insulin lispro (HUMALOG/ADMELOG) injection 8 Units, 8 Units, Subcutaneous, TID With Meals, Rigo Eng MD, 8 Units at 04/22/25 1759    ipratropium-albuterol (DUO-NEB) nebulizer solution 3 mL, 3 mL, Nebulization, 4x Daily - RT, Rigo Eng MD, 3 mL at 04/23/25 0651    LORazepam (ATIVAN) tablet 0.5 mg, 0.5 mg, Oral, Q8H PRN, Rigo Eng MD, 0.5 mg at 04/22/25 0414    magnesium oxide (MAG-OX) tablet 400 mg, 400 mg, Oral, BID, Rigo Eng MD, 400 mg at 04/22/25 0852    Magnesium Standard Dose Replacement - Follow Nurse / BPA Driven Protocol, , Not Applicable, PRN, Rigo Eng MD    melatonin tablet 5 mg, 5 mg, Oral, Nightly PRN, Rigo Eng MD    multivitamin (THERAGRAN) tablet 1 tablet, 1 tablet, Oral, Daily, Rigo Eng MD, 1 tablet at 04/22/25 0851    mupirocin (BACTROBAN) 2 % nasal ointment 1 Application, 1 Application, Each Nare, BID, Zia Braun MD    nitroglycerin (NITROSTAT) SL tablet 0.4 mg, 0.4 mg, Sublingual, Q5 Min PRN, Rigo Eng MD    ondansetron ODT (ZOFRAN-ODT) disintegrating tablet 4 mg, 4 mg, Oral, Q6H PRN **OR** ondansetron (ZOFRAN) injection 4 mg, 4 mg, Intravenous, Q6H PRN,  Rigo Eng MD    oxyCODONE-acetaminophen (PERCOCET) 5-325 MG per tablet 1 tablet, 1 tablet, Oral, Q8H PRN, Rigo Eng MD    Phosphorus Replacement - Follow Nurse / BPA Driven Protocol, , Not Applicable, Velasquez SALAZAR Samer H, MD    potassium chloride (K-DUR,KLOR-CON) ER tablet 10 mEq, 10 mEq, Oral, BID, Rigo Eng MD, 10 mEq at 04/22/25 2028    Potassium Replacement - Follow Nurse / BPA Driven Protocol, , Not Applicable, PRVelasquez TRUJILLO Samer H, MD    [Held by provider] rivaroxaban (XARELTO) tablet 20 mg, 20 mg, Oral, Daily, Rigo Eng MD, 20 mg at 04/22/25 0851    rosuvastatin (CRESTOR) tablet 40 mg, 40 mg, Oral, Daily, Rigo Eng MD, 40 mg at 04/22/25 0851    sertraline (ZOLOFT) tablet 50 mg, 50 mg, Oral, Daily, Rigo Eng MD, 50 mg at 04/22/25 0851    sodium chloride 0.9 % flush 10 mL, 10 mL, Intravenous, PRN, Rigo Eng MD    sodium chloride 0.9 % flush 10 mL, 10 mL, Intravenous, Q12H, Rigo Eng MD, 10 mL at 04/22/25 2029    sodium chloride 0.9 % flush 10 mL, 10 mL, Intravenous, PRN, Rigo Eng MD    sodium chloride 0.9 % infusion 40 mL, 40 mL, Intravenous, PRN, Rigo Eng MD    torsemide (DEMADEX) tablet 40 mg, 40 mg, Oral, Daily, Zia Braun MD, 40 mg at 04/22/25 1521      Objective   Vital Signs   Temp:  [98 °F (36.7 °C)-101.3 °F (38.5 °C)] 98.4 °F (36.9 °C)  Heart Rate:  [] 75  Resp:  [15-27] 23  BP: ()/() 98/62    Physical Exam:   General: awake, alert, sitting up in bed  Eyes: no scleral icterus  Cardiovascular: Normal rate  Respiratory: normal work of breathing on 5 L nasal cannula; right chest tube in place  GI: Abdomen is obese  Skin: Mild rash in multiple sites; he says due to immunotherapy and that it is now improving   Neurological: Alert and oriented x 3  Psychiatric: Normal mood and affect   Vasc: Right chest port in place; not accessed    Labs:   CBC, BMP, pleural studies, and blood and pleural cultures  "reviewed today  Lab Results   Component Value Date    WBC 8.09 04/23/2025    HGB 10.1 (L) 04/23/2025    HCT 32.0 (L) 04/23/2025    MCV 89.6 04/23/2025     04/23/2025     Lab Results   Component Value Date    GLUCOSE 156 (H) 04/23/2025    CALCIUM 8.2 (L) 04/23/2025     (L) 04/23/2025    K 3.4 (L) 04/23/2025    CO2 25.9 04/23/2025    CL 98 04/23/2025    BUN 17 04/23/2025    CREATININE 0.84 04/23/2025    EGFR 90.4 04/23/2025    BCR 20.2 04/23/2025    ANIONGAP 7.1 04/23/2025     Lab Results   Component Value Date    HGBA1C 8.60 (H) 04/21/2025     Lab Results   Component Value Date    CRP 11.62 (H) 11/24/2024         Microbiology:  4/21 RPP: Negative  4/21 BCx: MSSA in 2/2 sets  4/21 urine strep pneumo and Legionella antigens: Negative  4/21 respiratory Cx: \"Rejected\"  4/22 MRSA nares PCR: Negative  4/22 pleural fluid Cx: NGTD      Radiology:  4/23 CXR shows improvement in right pleural effusion and a new chest tube in place    Isolation:  No active isolations    ASSESSMENT/PLAN:  MSSA septicemia  Port catheter present  Pulmonary emphysema  History of right middle lobectomy  Morbid obesity BMI 40 complicating above  Right pleural effusion status post chest tube placement    For MSSA septicemia, continue cefazolin 2 g IV every 8 hours with duration TBD.  Pleural studies did not appear consistent with infection and culture is negative to date.  Thoracic surgery following and managing chest tube.  I discussed with him yesterday about port removal.  Exact timing TBD.  Okay with oncologist.    I will wait and repeat blood cultures to document sterility after the port has been removed.  TTE has been ordered.    Provided education to patient and his wife regarding his antibiotics.    ID will follow.  Case and plan D/W Dr. Braun.      ------------------------------------------------------------------  The above decisions regarding antimicrobials incorporates elements of engaging in complex medical decision-making " associated with antimicrobial prescribing including considerations such as antimicrobial resistance patterns, practicing antibiotic stewardship by selecting targeted antibiotic therapy.

## 2025-04-23 NOTE — PROGRESS NOTES
"    Chief Complaint: Recurrent right pleural effusion    S/P: Right CT-Guided Chest Tube Placement   POD #: 1     Subjective:  Symptoms:  Stable.  He reports cough and weakness.  No shortness of breath (IMPROVED).    Diet:  Adequate intake.  No nausea or vomiting.    Activity level: Returning to normal.    Pain:  He complains of pain that is mild.  Pain is well controlled and requiring pain medication.    Feels much better today. Oxygen requirements improving. Up to the recliner     Vital Signs:  Temp:  [98 °F (36.7 °C)-101.3 °F (38.5 °C)] 98.4 °F (36.9 °C)  Heart Rate:  [] 76  Resp:  [15-27] 22  BP: ()/() 98/62    Intake & Output (last day)         04/22 0701  04/23 0700 04/23 0701  04/24 0700    IV Piggyback      Total Intake(mL/kg)      Urine (mL/kg/hr) 1200 (0.4)     Chest Tube 1930     Total Output 3130     Net -3130                   Objective:  General Appearance:  Comfortable, in no acute distress and well-appearing.    Vital signs: (most recent): Blood pressure 98/62, pulse 82, temperature 98.4 °F (36.9 °C), resp. rate 22, height 180.3 cm (70.98\"), weight 130 kg (287 lb 11.2 oz), SpO2 92%.    Lungs:  Normal effort and normal respiratory rate.  He is not in respiratory distress.    Heart: Normal rate.  Regular rhythm.    Chest: Symmetric chest wall expansion. Chest wall tenderness (Around chest tube) present.    Abdomen: Abdomen is soft and non-distended.    Neurological: Patient is alert and oriented to person, place and time.    Skin:  Warm and dry.                Chest tube:   Site: Clean, Dry, and Intact  Suction: -20 cm  Air Leak: negative  24 Hour Total: 1.9L     Results Review:     I reviewed the patient's new clinical results.  I reviewed the patient's new imaging results and agree with the interpretation.  Discussed with patient, nurse, surgeon    Imaging Results (Last 24 Hours)       Procedure Component Value Units Date/Time    XR Chest 1 View [[id number]] Collected: 04/23/25 0625 "     Updated: 04/23/25 0747    Narrative:      XR CHEST 1 VW-4/23/2025     HISTORY: Possible pleural effusion.     2 images are submitted. Heart size is at the upper limits of normal.  Pigtail catheter terminates over the right lower hemithorax. There is  bilateral interstitial and alveolar fluid which may represent pulmonary  edema. The right base has cleared somewhat since the 4/22/2025 study and  this may represent improving pleural effusion.     No pneumothorax is seen. Mediport catheter is seen in good position.       Impression:      1. There has been slight clearing of the right base presumably from  improving pleural effusion since yesterday's study.  2. The chest otherwise appears largely unchanged.        This report was finalized on 4/23/2025 7:43 AM by Dr. David Ruiz M.D on Workstation: [id number]       CT Chest Without Contrast Diagnostic [[id number]] Collected: 04/22/25 1529     Updated: 04/22/25 1536    Narrative:      CT CHEST WITHOUT IV CONTRAST     HISTORY: Follow-up effusion status post chest tube placement     TECHNIQUE: Radiation dose reduction techniques were utilized, including  automated exposure control and exposure modulation based on body size.   3 mm images were obtained through the chest without IV contrast.     COMPARISON: CT chest 4/14/2025          Impression:      FINDINGS AND IMPRESSION:  Please note evaluation is suboptimal intravenous contrast. Right  Port-A-Cath tip terminates in the superior vena cava. Gallbladder  surgically absent. Visualized liver has a somewhat lobulated appearance.  Hypodense renal lesions only partially seen and cannot be evaluated.     Mediastinal and hilar adenopathy is present. Index right hilar node  measures 1.8 cm in short axis dimension, grossly unchanged since  2/5/2025. Index precarinal adenopathy measures 1.6 cm in short axis  dimension. (Previously 1.3 cm on 2/5/2025). At least mild to moderate  coronary artery calcification and/or  coronary stents.     Percutaneous right thoracostomy tube terminates within a moderate right  pleural effusion. Overlying collapse of the right lower lobe is present.  Intralobular septal thickening is present with intervening areas of  groundglass and pulmonary opacification which to have worsened since  4/14/2025, most notably in the lung apices in the lingula. Small left  pleural effusion with overlying pulmonary opacification is also  increased. FINDINGS ARE CONCERNING FOR WORSENING PULMONARY EDEMA AND/OR  MULTIFOCAL PNEUMONIA IN THE APPROPRIATE CONTEXT CORRELATION WITH PATIENT  HISTORY IS RECOMMENDED. THE NODULAR APPEARANCE OF THE ABOVE-MENTIONED  OPACIFICATION AND ABOVE-MENTIONED ADENOPATHY, CONTINUED CLOSE INTERVAL  FOLLOW-UP WITH CHEST CT IN 8 WEEKS IS RECOMMENDED TO ENSURE STABILITY  AND RESOLUTION EXCLUDE THE POSSIBILITY OF MALIGNANCY OR METASTATIC  DISEASE.     Postsurgical changes from prior wedge resection on the right. No  suspicious lytic or blastic osseous lesion. Anterolisthesis of C7 on  T1.. MIP        This report was finalized on 4/22/2025 3:33 PM by Dr. Adam Mohan M.D  on Workstation: [id number]       CT Guided Chest Tube [[id number]] Collected: 04/22/25 1457     Updated: 04/22/25 1500    Narrative:      PROCEDURE: CT guided right chest tube placement     HISTORY: Recurrent right effusion; R09.02-Hypoxemia; R06.00-Dyspnea,  unspecified; V85-Ljzdfrs effusion, not elsewhere classified;  Z85.118-Personal history of other malignant neoplasm of bronchus and  lung     TECHNIQUE:  Radiation dose reduction techniques were utilized, including automated  exposure control and exposure modulation based on body size.     The risks, benefits and alternatives of the procedure were discussed  with the patient, and informed consent was obtained. In the procedure  room a timeout was performed confirming correct patient and procedure.      The patient was placed in the supine position on the CT  scanner.  Noncontrast CT scan was performed to localize the right pleural. The  overlying skin was prepped and draped in the usual sterile fashion with  2% chlorhexidine. 1% lidocaine was used for local anesthesia.     Next, a 5 [place] Yueh catheter was advanced into the effusion under CT  guidance. . A superstiff guidewire was advanced through the needle. The  tract was serially dilated. Next a 12 [place] pigtail catheter was  advanced over the wire into the effusion. The catheter was sutured in  place to the skin and connected to an At[facility]. Sterile dressing was  applied. No immediate complications.       Impression:      Successful CT guided right chest tube placement           Radiation dose reduction techniques were utilized, including automated  exposure control and exposure modulation based on body size.        This report was finalized on 4/22/2025 2:57 PM by Dr. Yoandy Clemente M.D on Workstation: [id number]               Lab Results:     Lab Results (last 24 hours)       Procedure Component Value Units Date/Time    Non-gynecologic Cytology [[id number]] Collected: 04/22/25 1445    Specimen: Body Fluid from Pleural Cavity Updated: 04/23/25 0945    Basic Metabolic Panel [[id number]]  (Abnormal) Collected: 04/23/25 0730    Specimen: Blood Updated: 04/23/25 0828     Glucose 156 mg/dL      BUN 17 mg/dL      Creatinine 0.84 mg/dL      Sodium 131 mmol/L      Potassium 3.4 mmol/L      Chloride 98 mmol/L      CO2 25.9 mmol/L      Calcium 8.2 mg/dL      BUN/Creatinine Ratio 20.2     Anion Gap 7.1 mmol/L      eGFR 90.4 mL/min/1.73     Narrative:      GFR Categories in Chronic Kidney Disease (CKD)      GFR Category          GFR (mL/min/1.73)    Interpretation  G1                     90 or greater         Normal or high (1)  G2                      60-89                Mild decrease (1)  G3a                   45-59                Mild to moderate decrease  G3b                   30-44                Moderate to severe  decrease  G4                    15-29                Severe decrease  G5                    14 or less           Kidney failure          (1)In the absence of evidence of kidney disease, neither GFR category G1 or G2 fulfill the criteria for CKD.    eGFR calculation 2021 CKD-EPI creatinine equation, which does not include race as a factor    Blood Culture - Blood, Arm, Right [[id number]]  (Abnormal) Collected: 04/21/25 1747    Specimen: Blood from Arm, Right Updated: 04/23/25 0815     Blood Culture Staphylococcus aureus     Comment:   Infectious disease consultation is highly recommended to rule out distant foci of infection.        Isolated from Aerobic and Anaerobic Bottles     Gram Stain Aerobic Bottle Gram positive cocci in clusters     Comment: Modified report. Previous result was Aerobic Bottle Gram negative bacilli on 4/22/2025 at 0759 EDT.         Anaerobic Bottle Gram positive cocci in clusters     Comment: Modified report. Previous result was Anaerobic Bottle Gram negative bacilli on 4/22/2025 at 0759 EDT.       Blood Culture - Blood, Hand, Right [[id number]]  (Abnormal) Collected: 04/21/25 1747    Specimen: Blood from Hand, Right Updated: 04/23/25 0814     Blood Culture Staphylococcus aureus     Comment:   Infectious disease consultation is highly recommended to rule out distant foci of infection.        Isolated from Aerobic and Anaerobic Bottles     Gram Stain Anaerobic Bottle Gram positive cocci in clusters     Comment: Modified report. Previous result was Anaerobic Bottle Gram negative bacilli on 4/22/2025 at 0759 EDT.         Aerobic Bottle Gram positive cocci in clusters     Comment: Modified report. Previous result was Aerobic Bottle Gram negative bacilli on 4/22/2025 at 0759 EDT.       CBC (No Diff) [[id number]]  (Abnormal) Collected: 04/23/25 0730    Specimen: Blood Updated: 04/23/25 0807     WBC 8.09 10*3/mm3      RBC 3.57 10*6/mm3      Hemoglobin 10.1 g/dL      Hematocrit 32.0 %      MCV 89.6 fL       MCH 28.3 pg      MCHC 31.6 g/dL      RDW 16.3 %      RDW-SD 52.6 fl      MPV 9.3 fL      Platelets 149 10*3/mm3     Body Fluid Culture - Body Fluid, Pleural Cavity [[id number]] Collected: 04/22/25 1445    Specimen: Body Fluid from Pleural Cavity Updated: 04/23/25 0805     Body Fluid Culture No growth     Gram Stain Few (2+) WBCs seen      No organisms seen    Glucose, Body Fluid - Body Fluid, Pleural Cavity [[id number]] Collected: 04/22/25 1445    Specimen: Body Fluid from Pleural Cavity Updated: 04/22/25 1644     Glucose, Fluid 195 mg/dL     Narrative:      No Reference Ranges Established.    Serous fluid glucose less than 60 mg/dL or less than 30 mg/dL below serum glucose suggests an infectious or malignant exudate.     This test was developed, it performance characteristics determined and judged suitable for clinical purposes by [facility].  It has not been cleared or approved by the FDA.  The laboratory is regulated under CLIA as qualified to perform high-complexity testing.     Protein, Body Fluid - Body Fluid, Pleural Cavity [[id number]] Collected: 04/22/25 1445    Specimen: Body Fluid from Pleural Cavity Updated: 04/22/25 1644     Protein, Total, Fluid 4.4 g/dL     Narrative:      No Reference Ranges Established.    A serous fluid total fluid (TP) greater than 50 percent of the serum TP suggests the fluid is an exudate.      1. Pleural TP/Serum TP >0.5  2. Pleural LD/Serum LD >0.6  3. Pleural LD >2/3 of the upper limit of normal for serum LDH    This test was developed, it performance characteristics determined and judged suitable for clinical purposes by [facility].  It has not been cleared or approved by the FDA.  The laboratory is regulated under CLIA as qualified to perform high-complexity testing.     Lactate Dehydrogenase, Body Fluid - Body Fluid, Pleural Cavity [[id number]] Collected: 04/22/25 1445    Specimen: Body Fluid from Pleural Cavity Updated:  04/22/25 1644     Lactate Dehydrogenase (LD), Fluid 213 U/L     Narrative:      No Reference Ranges Established.    Serous fluid LDH greater than 60 percent of the serum LDH or serous fluid LDH two-thirds of the upper limit of normal for serum LDH suggests the fluid is an exudate.     1. Pleural TP/Serum TP >0.5  2. Pleural LD/Serum LD >0.6  3. Pleural LD >2/3 of the upper limit of normal for serum LDH    This test was developed, it performance characteristics determined and judged suitable for clinical purposes by [facility].  It has not been cleared or approved by the FDA.  The laboratory is regulated under CLIA as qualified to perform high-complexity testing.     Blood Gas, Arterial - [[id number]]  (Abnormal) Collected: 04/22/25 1555    Specimen: Arterial Blood Updated: 04/22/25 1608     Site Right Radial     Micheal's Test Positive     pH, Arterial 7.442 pH units      pCO2, Arterial 43.4 mm Hg      pO2, Arterial 66.5 mm Hg      HCO3, Arterial 29.6 mmol/L      Base Excess, Arterial 4.9 mmol/L      Comment: Serial Number: [id number]:  [id number]        O2 Saturation, Arterial 93.5 %      Barometric Pressure for Blood Gas 751.7000 mmHg      Modality Cannula     Flow Rate 10.0000 lpm      Rate 20 Breaths/minute      Hemodilution No     Device Comment sat 90    pH, Body Fluid - Body Fluid, Pleural Cavity [[id number]] Collected: 04/22/25 1445    Specimen: Body Fluid from Pleural Cavity Updated: 04/22/25 1539     pH, Fluid 7.48    Narrative:      Reference Range:  Serous fluids 6.8-7.6     Pleural transudates: 7.4-7.5     Pleural exudates: 7.35-7.45     All other fluids: No defined reference ranges    This test was developed, its performance characteristics determined and judged suitable for clinical purposes by [facility]. It has not been cleared or approved by the FDA. The laboratory is regulated under CLIA as qualified to perform high-complexity testing.    Anaerobic  Culture - Body Fluid, Pleural Cavity [[id number]] Collected: 04/22/25 1445    Specimen: Body Fluid from Pleural Cavity Updated: 04/22/25 1454    POC Glucose Once [[id number]]  (Abnormal) Collected: 04/22/25 1347    Specimen: Blood Updated: 04/22/25 1349     Glucose 237 mg/dL     MRSA Screen, PCR (Inpatient) - Swab, Nares [[id number]]  (Normal) Collected: 04/22/25 1020    Specimen: Swab from Nares Updated: 04/22/25 1149     MRSA PCR No MRSA Detected    Narrative:      The negative predictive value of this diagnostic test is high and should only be used to consider de-escalating anti-MRSA therapy. A positive result may indicate colonization with MRSA and must be correlated clinically.             Assessment & Plan       Hypoxia    Lobar pneumonia    DM2 (diabetes mellitus, type 2)    Dyslipidemia    Mood disorder    Atrial fibrillation    Cholelithiasis    Fatty liver    Essential hypertension    Pleural effusion on right    History of lung cancer    Anemia, chronic disease       Assessment & Plan  Patient with recurrent right pleural effusion s/p CT-guided chest tube placement yesterday.  1.9 L out of chest tube thus far.  Appears serous, exudative.  Cultures no growth to date.  Repeat a.m. chest x-ray independently reviewed and demonstrates improvement of the right effusion.  Right basilar chest tube seen in position.  Oxygen requirements improving, now on 6 L via nasal cannula- wean as able.    Will continue chest tube to -20 cm suction.  Plan for repeat CT chest tomorrow morning to reassess residual effusion.  May require intrapleural lytic therapy.  Please continue to hold Xarelto with chest tube in place.    Antibiotics per ID recommendations.  Will consider port removal should pleural fluid studies returned unremarkable for infection.  Will discuss with surgeon.  Discussed plan with patient and patient's wife at bedside.    Addendum: Case discussed with Dr. Figueroa.  Plan for port removal tomorrow afternoon.  Please  continue to hold Xarelto.  N.p.o. after midnight.    BAM Rodgers  [facility]  04/23/25  11:22 EDT    I have spent greater than 30 minutes reviewing the patient's chart including medical history, notes, radiographic images, labs, and performing assessment and development of a plan and discussion with the patient/family at bedside.

## 2025-04-24 ENCOUNTER — APPOINTMENT (OUTPATIENT)
Dept: CT IMAGING | Facility: HOSPITAL | Age: 75
End: 2025-04-24
Payer: MEDICARE

## 2025-04-24 ENCOUNTER — ANESTHESIA EVENT (OUTPATIENT)
Dept: PERIOP | Facility: HOSPITAL | Age: 75
End: 2025-04-24
Payer: MEDICARE

## 2025-04-24 ENCOUNTER — APPOINTMENT (OUTPATIENT)
Dept: GENERAL RADIOLOGY | Facility: HOSPITAL | Age: 75
End: 2025-04-24
Payer: MEDICARE

## 2025-04-24 ENCOUNTER — ANESTHESIA (OUTPATIENT)
Dept: PERIOP | Facility: HOSPITAL | Age: 75
End: 2025-04-24
Payer: MEDICARE

## 2025-04-24 LAB
ANION GAP SERPL CALCULATED.3IONS-SCNC: 9 MMOL/L (ref 5–15)
APTT PPP: 27.8 SECONDS (ref 22.7–35.4)
BACTERIA SPEC AEROBE CULT: ABNORMAL
BACTERIA SPEC AEROBE CULT: ABNORMAL
BUN SERPL-MCNC: 18 MG/DL (ref 8–23)
BUN/CREAT SERPL: 22.8 (ref 7–25)
CALCIUM SPEC-SCNC: 8.3 MG/DL (ref 8.6–10.5)
CHLORIDE SERPL-SCNC: 100 MMOL/L (ref 98–107)
CO2 SERPL-SCNC: 30 MMOL/L (ref 22–29)
CREAT SERPL-MCNC: 0.79 MG/DL (ref 0.76–1.27)
CYTO UR: NORMAL
DEPRECATED RDW RBC AUTO: 52.2 FL (ref 37–54)
EGFRCR SERPLBLD CKD-EPI 2021: 92.1 ML/MIN/1.73
ERYTHROCYTE [DISTWIDTH] IN BLOOD BY AUTOMATED COUNT: 16.1 % (ref 12.3–15.4)
GLUCOSE BLDC GLUCOMTR-MCNC: 149 MG/DL (ref 70–130)
GLUCOSE BLDC GLUCOMTR-MCNC: 155 MG/DL (ref 70–130)
GLUCOSE BLDC GLUCOMTR-MCNC: 175 MG/DL (ref 70–130)
GLUCOSE BLDC GLUCOMTR-MCNC: 180 MG/DL (ref 70–130)
GLUCOSE BLDC GLUCOMTR-MCNC: 251 MG/DL (ref 70–130)
GLUCOSE SERPL-MCNC: 181 MG/DL (ref 65–99)
GRAM STN SPEC: ABNORMAL
HCT VFR BLD AUTO: 30.9 % (ref 37.5–51)
HGB BLD-MCNC: 10.1 G/DL (ref 13–17.7)
INR PPP: 1.33 (ref 0.9–1.1)
ISOLATED FROM: ABNORMAL
ISOLATED FROM: ABNORMAL
LAB AP CASE REPORT: NORMAL
MCH RBC QN AUTO: 28.9 PG (ref 26.6–33)
MCHC RBC AUTO-ENTMCNC: 32.7 G/DL (ref 31.5–35.7)
MCV RBC AUTO: 88.5 FL (ref 79–97)
PATH REPORT.FINAL DX SPEC: NORMAL
PATH REPORT.GROSS SPEC: NORMAL
PLATELET # BLD AUTO: 150 10*3/MM3 (ref 140–450)
PMV BLD AUTO: 9.3 FL (ref 6–12)
POTASSIUM SERPL-SCNC: 3.7 MMOL/L (ref 3.5–5.2)
PROTHROMBIN TIME: 16.5 SECONDS (ref 11.7–14.2)
RBC # BLD AUTO: 3.49 10*6/MM3 (ref 4.14–5.8)
SODIUM SERPL-SCNC: 139 MMOL/L (ref 136–145)
WBC NRBC COR # BLD AUTO: 6.22 10*3/MM3 (ref 3.4–10.8)

## 2025-04-24 PROCEDURE — 94799 UNLISTED PULMONARY SVC/PX: CPT

## 2025-04-24 PROCEDURE — 32560 TREAT PLEURODESIS W/AGENT: CPT | Performed by: SURGERY

## 2025-04-24 PROCEDURE — 63710000001 INSULIN GLARGINE PER 5 UNITS: Performed by: SURGERY

## 2025-04-24 PROCEDURE — 94760 N-INVAS EAR/PLS OXIMETRY 1: CPT

## 2025-04-24 PROCEDURE — 63710000001 INSULIN LISPRO (HUMAN) PER 5 UNITS: Performed by: STUDENT IN AN ORGANIZED HEALTH CARE EDUCATION/TRAINING PROGRAM

## 2025-04-24 PROCEDURE — 25010000002 LIDOCAINE 1 % SOLUTION: Performed by: NURSE ANESTHETIST, CERTIFIED REGISTERED

## 2025-04-24 PROCEDURE — 85730 THROMBOPLASTIN TIME PARTIAL: CPT

## 2025-04-24 PROCEDURE — 25010000002 BUPIVACAINE 0.25 % SOLUTION: Performed by: SURGERY

## 2025-04-24 PROCEDURE — 05PY33Z REMOVAL OF INFUSION DEVICE FROM UPPER VEIN, PERCUTANEOUS APPROACH: ICD-10-PCS | Performed by: SURGERY

## 2025-04-24 PROCEDURE — 25010000002 PROPOFOL 10 MG/ML EMULSION: Performed by: NURSE ANESTHETIST, CERTIFIED REGISTERED

## 2025-04-24 PROCEDURE — 25010000002 CEFAZOLIN PER 500 MG: Performed by: STUDENT IN AN ORGANIZED HEALTH CARE EDUCATION/TRAINING PROGRAM

## 2025-04-24 PROCEDURE — 94761 N-INVAS EAR/PLS OXIMETRY MLT: CPT

## 2025-04-24 PROCEDURE — 71045 X-RAY EXAM CHEST 1 VIEW: CPT

## 2025-04-24 PROCEDURE — 25010000002 DOXYCYCLINE 100 MG RECONSTITUTED SOLUTION 1 EACH VIAL: Performed by: SURGERY

## 2025-04-24 PROCEDURE — 25010000002 CEFAZOLIN PER 500 MG: Performed by: INTERNAL MEDICINE

## 2025-04-24 PROCEDURE — 25010000002 FAMOTIDINE 10 MG/ML SOLUTION: Performed by: ANESTHESIOLOGY

## 2025-04-24 PROCEDURE — 25010000002 CEFAZOLIN PER 500 MG: Performed by: SURGERY

## 2025-04-24 PROCEDURE — 25810000003 LACTATED RINGERS PER 1000 ML: Performed by: ANESTHESIOLOGY

## 2025-04-24 PROCEDURE — 71250 CT THORAX DX C-: CPT

## 2025-04-24 PROCEDURE — 99233 SBSQ HOSP IP/OBS HIGH 50: CPT | Performed by: INTERNAL MEDICINE

## 2025-04-24 PROCEDURE — 25010000002 MIDAZOLAM PER 1 MG: Performed by: ANESTHESIOLOGY

## 2025-04-24 PROCEDURE — 82948 REAGENT STRIP/BLOOD GLUCOSE: CPT

## 2025-04-24 PROCEDURE — 80048 BASIC METABOLIC PNL TOTAL CA: CPT | Performed by: INTERNAL MEDICINE

## 2025-04-24 PROCEDURE — 3E0L3GC INTRODUCTION OF OTHER THERAPEUTIC SUBSTANCE INTO PLEURAL CAVITY, PERCUTANEOUS APPROACH: ICD-10-PCS | Performed by: SURGERY

## 2025-04-24 PROCEDURE — 85027 COMPLETE CBC AUTOMATED: CPT

## 2025-04-24 PROCEDURE — G0545 PR INHERENT VISIT TO INPT: HCPCS | Performed by: INTERNAL MEDICINE

## 2025-04-24 PROCEDURE — 36590 REMOVAL TUNNELED CV CATH: CPT | Performed by: SURGERY

## 2025-04-24 PROCEDURE — 63710000001 INSULIN LISPRO (HUMAN) PER 5 UNITS: Performed by: SURGERY

## 2025-04-24 PROCEDURE — 0JPT3XZ REMOVAL OF TUNNELED VASCULAR ACCESS DEVICE FROM TRUNK SUBCUTANEOUS TISSUE AND FASCIA, PERCUTANEOUS APPROACH: ICD-10-PCS | Performed by: SURGERY

## 2025-04-24 PROCEDURE — 85610 PROTHROMBIN TIME: CPT

## 2025-04-24 RX ORDER — SODIUM CHLORIDE, SODIUM LACTATE, POTASSIUM CHLORIDE, CALCIUM CHLORIDE 600; 310; 30; 20 MG/100ML; MG/100ML; MG/100ML; MG/100ML
9 INJECTION, SOLUTION INTRAVENOUS CONTINUOUS
Status: DISCONTINUED | OUTPATIENT
Start: 2025-04-24 | End: 2025-04-24

## 2025-04-24 RX ORDER — FENTANYL CITRATE 50 UG/ML
50 INJECTION, SOLUTION INTRAMUSCULAR; INTRAVENOUS ONCE AS NEEDED
Status: DISCONTINUED | OUTPATIENT
Start: 2025-04-24 | End: 2025-04-24 | Stop reason: HOSPADM

## 2025-04-24 RX ORDER — FENTANYL CITRATE 50 UG/ML
25 INJECTION, SOLUTION INTRAMUSCULAR; INTRAVENOUS
Status: DISCONTINUED | OUTPATIENT
Start: 2025-04-24 | End: 2025-04-24

## 2025-04-24 RX ORDER — MAGNESIUM HYDROXIDE 1200 MG/15ML
LIQUID ORAL AS NEEDED
Status: DISCONTINUED | OUTPATIENT
Start: 2025-04-24 | End: 2025-04-24 | Stop reason: HOSPADM

## 2025-04-24 RX ORDER — EPHEDRINE SULFATE 50 MG/ML
5 INJECTION, SOLUTION INTRAVENOUS ONCE AS NEEDED
Status: DISCONTINUED | OUTPATIENT
Start: 2025-04-24 | End: 2025-04-24

## 2025-04-24 RX ORDER — NALOXONE HCL 0.4 MG/ML
0.2 VIAL (ML) INJECTION AS NEEDED
Status: DISCONTINUED | OUTPATIENT
Start: 2025-04-24 | End: 2025-04-24

## 2025-04-24 RX ORDER — FLUMAZENIL 0.1 MG/ML
0.2 INJECTION INTRAVENOUS AS NEEDED
Status: DISCONTINUED | OUTPATIENT
Start: 2025-04-24 | End: 2025-04-24

## 2025-04-24 RX ORDER — ONDANSETRON 2 MG/ML
4 INJECTION INTRAMUSCULAR; INTRAVENOUS ONCE AS NEEDED
Status: DISCONTINUED | OUTPATIENT
Start: 2025-04-24 | End: 2025-04-24

## 2025-04-24 RX ORDER — SODIUM CHLORIDE 0.9 % (FLUSH) 0.9 %
3-10 SYRINGE (ML) INJECTION AS NEEDED
Status: DISCONTINUED | OUTPATIENT
Start: 2025-04-24 | End: 2025-04-24 | Stop reason: HOSPADM

## 2025-04-24 RX ORDER — SODIUM CHLORIDE 0.9 % (FLUSH) 0.9 %
3 SYRINGE (ML) INJECTION EVERY 12 HOURS SCHEDULED
Status: DISCONTINUED | OUTPATIENT
Start: 2025-04-24 | End: 2025-04-24 | Stop reason: HOSPADM

## 2025-04-24 RX ORDER — LIDOCAINE HYDROCHLORIDE 10 MG/ML
0.5 INJECTION, SOLUTION INFILTRATION; PERINEURAL ONCE AS NEEDED
Status: DISCONTINUED | OUTPATIENT
Start: 2025-04-24 | End: 2025-04-24 | Stop reason: HOSPADM

## 2025-04-24 RX ORDER — ATROPINE SULFATE 0.4 MG/ML
0.4 INJECTION, SOLUTION INTRAMUSCULAR; INTRAVENOUS; SUBCUTANEOUS ONCE AS NEEDED
Status: DISCONTINUED | OUTPATIENT
Start: 2025-04-24 | End: 2025-04-24

## 2025-04-24 RX ORDER — PROPOFOL 10 MG/ML
VIAL (ML) INTRAVENOUS AS NEEDED
Status: DISCONTINUED | OUTPATIENT
Start: 2025-04-24 | End: 2025-04-24 | Stop reason: SURG

## 2025-04-24 RX ORDER — PROMETHAZINE HYDROCHLORIDE 25 MG/1
25 SUPPOSITORY RECTAL ONCE AS NEEDED
Status: DISCONTINUED | OUTPATIENT
Start: 2025-04-24 | End: 2025-04-24

## 2025-04-24 RX ORDER — LABETALOL HYDROCHLORIDE 5 MG/ML
5 INJECTION, SOLUTION INTRAVENOUS
Status: DISCONTINUED | OUTPATIENT
Start: 2025-04-24 | End: 2025-04-24

## 2025-04-24 RX ORDER — FAMOTIDINE 10 MG/ML
20 INJECTION, SOLUTION INTRAVENOUS ONCE
Status: COMPLETED | OUTPATIENT
Start: 2025-04-24 | End: 2025-04-24

## 2025-04-24 RX ORDER — LIDOCAINE HYDROCHLORIDE 10 MG/ML
INJECTION, SOLUTION INFILTRATION; PERINEURAL AS NEEDED
Status: DISCONTINUED | OUTPATIENT
Start: 2025-04-24 | End: 2025-04-24 | Stop reason: SURG

## 2025-04-24 RX ORDER — PROMETHAZINE HYDROCHLORIDE 25 MG/1
25 TABLET ORAL ONCE AS NEEDED
Status: DISCONTINUED | OUTPATIENT
Start: 2025-04-24 | End: 2025-04-24

## 2025-04-24 RX ORDER — HYDROMORPHONE HYDROCHLORIDE 1 MG/ML
0.25 INJECTION, SOLUTION INTRAMUSCULAR; INTRAVENOUS; SUBCUTANEOUS
Status: DISCONTINUED | OUTPATIENT
Start: 2025-04-24 | End: 2025-04-24

## 2025-04-24 RX ORDER — IPRATROPIUM BROMIDE AND ALBUTEROL SULFATE 2.5; .5 MG/3ML; MG/3ML
3 SOLUTION RESPIRATORY (INHALATION) ONCE AS NEEDED
Status: DISCONTINUED | OUTPATIENT
Start: 2025-04-24 | End: 2025-04-24

## 2025-04-24 RX ORDER — DIPHENHYDRAMINE HYDROCHLORIDE 50 MG/ML
12.5 INJECTION, SOLUTION INTRAMUSCULAR; INTRAVENOUS
Status: DISCONTINUED | OUTPATIENT
Start: 2025-04-24 | End: 2025-04-24

## 2025-04-24 RX ORDER — HYDROCODONE BITARTRATE AND ACETAMINOPHEN 7.5; 325 MG/1; MG/1
1 TABLET ORAL EVERY 4 HOURS PRN
Status: DISCONTINUED | OUTPATIENT
Start: 2025-04-24 | End: 2025-04-24

## 2025-04-24 RX ORDER — DROPERIDOL 2.5 MG/ML
0.62 INJECTION, SOLUTION INTRAMUSCULAR; INTRAVENOUS
Status: DISCONTINUED | OUTPATIENT
Start: 2025-04-24 | End: 2025-04-24

## 2025-04-24 RX ORDER — HYDRALAZINE HYDROCHLORIDE 20 MG/ML
5 INJECTION INTRAMUSCULAR; INTRAVENOUS
Status: DISCONTINUED | OUTPATIENT
Start: 2025-04-24 | End: 2025-04-24

## 2025-04-24 RX ORDER — HYDROCODONE BITARTRATE AND ACETAMINOPHEN 5; 325 MG/1; MG/1
1 TABLET ORAL ONCE AS NEEDED
Status: DISCONTINUED | OUTPATIENT
Start: 2025-04-24 | End: 2025-04-24

## 2025-04-24 RX ORDER — MIDAZOLAM HYDROCHLORIDE 1 MG/ML
0.5 INJECTION, SOLUTION INTRAMUSCULAR; INTRAVENOUS
Status: COMPLETED | OUTPATIENT
Start: 2025-04-24 | End: 2025-04-24

## 2025-04-24 RX ORDER — BUPIVACAINE HYDROCHLORIDE 2.5 MG/ML
INJECTION, SOLUTION INFILTRATION; PERINEURAL AS NEEDED
Status: DISCONTINUED | OUTPATIENT
Start: 2025-04-24 | End: 2025-04-24 | Stop reason: HOSPADM

## 2025-04-24 RX ORDER — LIDOCAINE HYDROCHLORIDE 10 MG/ML
INJECTION, SOLUTION INFILTRATION; PERINEURAL AS NEEDED
Status: DISCONTINUED | OUTPATIENT
Start: 2025-04-24 | End: 2025-04-24

## 2025-04-24 RX ADMIN — Medication 10 ML: at 15:12

## 2025-04-24 RX ADMIN — GUAIFENESIN 1200 MG: 600 TABLET, EXTENDED RELEASE ORAL at 21:24

## 2025-04-24 RX ADMIN — IPRATROPIUM BROMIDE AND ALBUTEROL SULFATE 3 ML: .5; 3 SOLUTION RESPIRATORY (INHALATION) at 11:23

## 2025-04-24 RX ADMIN — INSULIN LISPRO 4 UNITS: 100 INJECTION, SOLUTION INTRAVENOUS; SUBCUTANEOUS at 21:26

## 2025-04-24 RX ADMIN — INSULIN LISPRO 2 UNITS: 100 INJECTION, SOLUTION INTRAVENOUS; SUBCUTANEOUS at 12:45

## 2025-04-24 RX ADMIN — FERROUS SULFATE TAB 325 MG (65 MG ELEMENTAL FE) 162.5 MG: 325 (65 FE) TAB at 21:25

## 2025-04-24 RX ADMIN — MAGNESIUM OXIDE TAB 400 MG (241.3 MG ELEMENTAL MG) 400 MG: 400 (241.3 MG) TAB at 21:25

## 2025-04-24 RX ADMIN — Medication 1 TABLET: at 10:15

## 2025-04-24 RX ADMIN — IPRATROPIUM BROMIDE AND ALBUTEROL SULFATE 3 ML: .5; 3 SOLUTION RESPIRATORY (INHALATION) at 08:55

## 2025-04-24 RX ADMIN — SERTRALINE HYDROCHLORIDE 50 MG: 50 TABLET, FILM COATED ORAL at 10:15

## 2025-04-24 RX ADMIN — SODIUM CHLORIDE 2000 MG: 9 INJECTION, SOLUTION INTRAVENOUS at 04:04

## 2025-04-24 RX ADMIN — PROPOFOL 75 MCG/KG/MIN: 10 INJECTION, EMULSION INTRAVENOUS at 15:35

## 2025-04-24 RX ADMIN — MAGNESIUM OXIDE TAB 400 MG (241.3 MG ELEMENTAL MG) 400 MG: 400 (241.3 MG) TAB at 10:13

## 2025-04-24 RX ADMIN — SENNOSIDES AND DOCUSATE SODIUM 2 TABLET: 50; 8.6 TABLET ORAL at 21:24

## 2025-04-24 RX ADMIN — FERROUS SULFATE TAB 325 MG (65 MG ELEMENTAL FE) 162.5 MG: 325 (65 FE) TAB at 10:14

## 2025-04-24 RX ADMIN — OXYCODONE AND ACETAMINOPHEN 1 TABLET: 5; 325 TABLET ORAL at 21:34

## 2025-04-24 RX ADMIN — MIDAZOLAM 0.5 MG: 1 INJECTION INTRAMUSCULAR; INTRAVENOUS at 15:08

## 2025-04-24 RX ADMIN — GUAIFENESIN 1200 MG: 600 TABLET, EXTENDED RELEASE ORAL at 10:13

## 2025-04-24 RX ADMIN — SODIUM CHLORIDE 2000 MG: 9 INJECTION, SOLUTION INTRAVENOUS at 12:44

## 2025-04-24 RX ADMIN — FOLIC ACID 1 MG: 1 TABLET ORAL at 10:14

## 2025-04-24 RX ADMIN — BUDESONIDE 0.5 MG: 0.5 INHALANT RESPIRATORY (INHALATION) at 08:54

## 2025-04-24 RX ADMIN — PROPOFOL 30 MG: 10 INJECTION, EMULSION INTRAVENOUS at 15:35

## 2025-04-24 RX ADMIN — BUDESONIDE 0.5 MG: 0.5 INHALANT RESPIRATORY (INHALATION) at 20:38

## 2025-04-24 RX ADMIN — INSULIN GLARGINE 30 UNITS: 100 INJECTION, SOLUTION SUBCUTANEOUS at 21:26

## 2025-04-24 RX ADMIN — GABAPENTIN 600 MG: 300 CAPSULE ORAL at 12:45

## 2025-04-24 RX ADMIN — MUPIROCIN 1 APPLICATION: 20 OINTMENT TOPICAL at 10:16

## 2025-04-24 RX ADMIN — DOXYCYCLINE: 100 INJECTION, POWDER, LYOPHILIZED, FOR SOLUTION INTRAVENOUS at 17:48

## 2025-04-24 RX ADMIN — POTASSIUM CHLORIDE 10 MEQ: 750 TABLET, EXTENDED RELEASE ORAL at 21:25

## 2025-04-24 RX ADMIN — FAMOTIDINE 20 MG: 20 TABLET, FILM COATED ORAL at 10:13

## 2025-04-24 RX ADMIN — MUPIROCIN 1 APPLICATION: 20 OINTMENT TOPICAL at 21:00

## 2025-04-24 RX ADMIN — GABAPENTIN 600 MG: 300 CAPSULE ORAL at 21:25

## 2025-04-24 RX ADMIN — POTASSIUM CHLORIDE 10 MEQ: 750 TABLET, EXTENDED RELEASE ORAL at 10:14

## 2025-04-24 RX ADMIN — FAMOTIDINE 20 MG: 20 TABLET, FILM COATED ORAL at 18:36

## 2025-04-24 RX ADMIN — CETIRIZINE HYDROCHLORIDE 10 MG: 10 TABLET, FILM COATED ORAL at 10:14

## 2025-04-24 RX ADMIN — SODIUM CHLORIDE, POTASSIUM CHLORIDE, SODIUM LACTATE AND CALCIUM CHLORIDE 9 ML/HR: 600; 310; 30; 20 INJECTION, SOLUTION INTRAVENOUS at 15:08

## 2025-04-24 RX ADMIN — ROSUVASTATIN CALCIUM 40 MG: 20 TABLET, FILM COATED ORAL at 10:14

## 2025-04-24 RX ADMIN — FAMOTIDINE 20 MG: 10 INJECTION, SOLUTION INTRAVENOUS at 15:08

## 2025-04-24 RX ADMIN — SODIUM CHLORIDE 2000 MG: 9 INJECTION, SOLUTION INTRAVENOUS at 21:25

## 2025-04-24 RX ADMIN — Medication 10 ML: at 21:26

## 2025-04-24 RX ADMIN — MIDAZOLAM 0.5 MG: 1 INJECTION INTRAMUSCULAR; INTRAVENOUS at 15:11

## 2025-04-24 RX ADMIN — LIDOCAINE HYDROCHLORIDE 50 MG: 10 INJECTION, SOLUTION INFILTRATION; PERINEURAL at 15:35

## 2025-04-24 RX ADMIN — ARFORMOTEROL TARTRATE 15 MCG: 15 SOLUTION RESPIRATORY (INHALATION) at 08:54

## 2025-04-24 RX ADMIN — Medication 10 ML: at 10:15

## 2025-04-24 RX ADMIN — TORSEMIDE 40 MG: 20 TABLET ORAL at 10:13

## 2025-04-24 RX ADMIN — ARFORMOTEROL TARTRATE 15 MCG: 15 SOLUTION RESPIRATORY (INHALATION) at 20:38

## 2025-04-24 NOTE — PLAN OF CARE
Goal Outcome Evaluation:                 Went to OR see op note. Chest tube unclamped and at -40 suction. Oxygen weaned down to 6L

## 2025-04-24 NOTE — PROGRESS NOTES
ID note    CC: Follow-up MSSA septicemia and right pleural effusion    Subjective: History from patient and wife who added helpful details.  He says he is feeling better today.  His main symptom is dry mouth due to n.p.o. status for port removal later today.  He is tolerating cefazolin without rash.    Medications:    Current Facility-Administered Medications:     acetaminophen (TYLENOL) tablet 650 mg, 650 mg, Oral, Q4H PRN **OR** acetaminophen (TYLENOL) 160 MG/5ML oral solution 650 mg, 650 mg, Oral, Q4H PRN **OR** acetaminophen (TYLENOL) suppository 650 mg, 650 mg, Rectal, Q4H PRN, Rigo Eng MD    acetaminophen (TYLENOL) tablet 650 mg, 650 mg, Oral, Q4H PRN, 650 mg at 04/22/25 0354 **OR** acetaminophen (TYLENOL) 160 MG/5ML oral solution 650 mg, 650 mg, Oral, Q4H PRN **OR** acetaminophen (TYLENOL) suppository 650 mg, 650 mg, Rectal, Q4H PRN, Soham Mejia, BAM    albuterol (PROVENTIL) nebulizer solution 0.083% 2.5 mg/3mL, 2.5 mg, Nebulization, Q4H PRN, Rigo Eng MD    arformoterol (BROVANA) nebulizer solution 15 mcg, 15 mcg, Nebulization, BID - RT, Boni Barnes MD, 15 mcg at 04/24/25 0854    sennosides-docusate (PERICOLACE) 8.6-50 MG per tablet 2 tablet, 2 tablet, Oral, BID, 2 tablet at 04/23/25 0906 **AND** polyethylene glycol (MIRALAX) packet 17 g, 17 g, Oral, Daily PRN **AND** bisacodyl (DULCOLAX) EC tablet 5 mg, 5 mg, Oral, Daily PRN **AND** bisacodyl (DULCOLAX) suppository 10 mg, 10 mg, Rectal, Daily PRN, Rigo Eng MD    budesonide (PULMICORT) nebulizer solution 0.5 mg, 0.5 mg, Nebulization, BID - RT, Rigo Eng MD, 0.5 mg at 04/24/25 0854    Calcium Replacement - Follow Nurse / BPA Driven Protocol, , Not Applicable, PRN, Rigo Eng MD    ceFAZolin 2000 mg IVPB in 100 mL NS (MBP), 2,000 mg, Intravenous, Q8H, Zia Braun MD, 2,000 mg at 04/24/25 0404    cetirizine (zyrTEC) tablet 10 mg, 10 mg, Oral, Daily, Rigo Eng MD, 10 mg at 04/23/25 0906    dextrose  (D50W) (25 g/50 mL) IV injection 25 g, 25 g, Intravenous, Q15 Min PRN, Zia Braun MD    dextrose (GLUTOSE) oral gel 15 g, 15 g, Oral, Q15 Min PRN, Zia Braun MD    famotidine (PEPCID) tablet 20 mg, 20 mg, Oral, BID AC, Rigo Eng MD, 20 mg at 04/23/25 1656    ferrous sulfate tablet 162.5 mg, 162.5 mg, Oral, BID, Rigo Eng MD, 162.5 mg at 04/23/25 2204    folic acid (FOLVITE) tablet 1 mg, 1 mg, Oral, Daily, Rigo Eng MD, 1 mg at 04/23/25 0906    gabapentin (NEURONTIN) capsule 600 mg, 600 mg, Oral, Q8H, Rigo Eng MD, 600 mg at 04/23/25 2204    glucagon (GLUCAGEN) injection 1 mg, 1 mg, Intramuscular, Q15 Min PRN, Zia Braun MD    guaiFENesin (MUCINEX) 12 hr tablet 1,200 mg, 1,200 mg, Oral, Q12H, Rigo Eng MD, 1,200 mg at 04/23/25 2204    hydrOXYzine pamoate (VISTARIL) capsule 50 mg, 50 mg, Oral, TID PRN, Rigo Eng MD    insulin glargine (LANTUS, SEMGLEE) injection 30 Units, 30 Units, Subcutaneous, Nightly, Rigo Eng MD, 30 Units at 04/23/25 2206    insulin lispro (HUMALOG/ADMELOG) injection 2-7 Units, 2-7 Units, Subcutaneous, 4x Daily AC & at Bedtime, Zia Braun MD, 4 Units at 04/23/25 1656    insulin lispro (HUMALOG/ADMELOG) injection 8 Units, 8 Units, Subcutaneous, TID With Meals, Rigo Eng MD, 8 Units at 04/23/25 1757    ipratropium-albuterol (DUO-NEB) nebulizer solution 3 mL, 3 mL, Nebulization, 4x Daily - RT, Rigo Eng MD, 3 mL at 04/24/25 0855    LORazepam (ATIVAN) tablet 0.5 mg, 0.5 mg, Oral, Q8H PRN, Rigo Eng MD, 0.5 mg at 04/22/25 0414    magnesium oxide (MAG-OX) tablet 400 mg, 400 mg, Oral, BID, Rigo Eng MD, 400 mg at 04/23/25 2205    Magnesium Standard Dose Replacement - Follow Nurse / BPA Driven Protocol, , Not Applicable, PRN, Rigo Eng MD    melatonin tablet 5 mg, 5 mg, Oral, Nightly PRN, Rigo Eng MD    multivitamin (THERAGRAN) tablet 1 tablet, 1 tablet, Oral, Daily, Rigo Eng,  MD, 1 tablet at 04/23/25 0906    mupirocin (BACTROBAN) 2 % nasal ointment 1 Application, 1 Application, Each Nare, BID, Zia Braun MD, 1 Application at 04/23/25 2205    nitroglycerin (NITROSTAT) SL tablet 0.4 mg, 0.4 mg, Sublingual, Q5 Min PRN, Rigo Eng MD    ondansetron ODT (ZOFRAN-ODT) disintegrating tablet 4 mg, 4 mg, Oral, Q6H PRN **OR** ondansetron (ZOFRAN) injection 4 mg, 4 mg, Intravenous, Q6H PRN, Rigo Eng MD    oxyCODONE-acetaminophen (PERCOCET) 5-325 MG per tablet 1 tablet, 1 tablet, Oral, Q8H PRN, Rigo Eng MD    Phosphorus Replacement - Follow Nurse / BPA Driven Protocol, , Not Applicable, PRN, Rigo Eng MD    potassium chloride (K-DUR,KLOR-CON) ER tablet 10 mEq, 10 mEq, Oral, BID, Rigo Eng MD, 10 mEq at 04/23/25 2204    Potassium Replacement - Follow Nurse / BPA Driven Protocol, , Not Applicable, PRN, Rigo Eng MD    [Held by provider] rivaroxaban (XARELTO) tablet 20 mg, 20 mg, Oral, Daily, Rigo Eng MD, 20 mg at 04/22/25 0851    rosuvastatin (CRESTOR) tablet 40 mg, 40 mg, Oral, Daily, Rigo Eng MD, 40 mg at 04/23/25 0906    sertraline (ZOLOFT) tablet 50 mg, 50 mg, Oral, Daily, Rigo Eng MD, 50 mg at 04/23/25 0906    sodium chloride 0.9 % flush 10 mL, 10 mL, Intravenous, PRN, Rigo Eng MD    sodium chloride 0.9 % flush 10 mL, 10 mL, Intravenous, Q12H, Rigo Eng MD, 10 mL at 04/23/25 2209    sodium chloride 0.9 % flush 10 mL, 10 mL, Intravenous, PRN, Rigo Eng MD    sodium chloride 0.9 % infusion 40 mL, 40 mL, Intravenous, PRN, Rigo Eng MD    torsemide (DEMADEX) tablet 40 mg, 40 mg, Oral, Daily, Zia Braun MD, 40 mg at 04/23/25 0905      Objective   Vital Signs   Temp:  [98.1 °F (36.7 °C)-98.4 °F (36.9 °C)] 98.2 °F (36.8 °C)  Heart Rate:  [] 80  Resp:  [16-22] 16  BP: ()/(62-70) 135/64    Physical Exam:   General: awake, alert, sitting up in bed  Eyes: no scleral  "icterus  Cardiovascular: Normal rate  Respiratory: normal work of breathing on 6 L nasal cannula; right chest tube present  GI: Abdomen is obese, not tender or distended  Skin: Mild rash in multiple sites; he says due to immunotherapy and that it is now improving   Neurological: Alert and oriented x 3  Psychiatric: Normal mood and affect   Vasc: Right chest port in place; not accessed    Labs:   CBC, BMP, and blood and pleural cultures reviewed today  Lab Results   Component Value Date    WBC 6.22 04/24/2025    HGB 10.1 (L) 04/24/2025    HCT 30.9 (L) 04/24/2025    MCV 88.5 04/24/2025     04/24/2025     Lab Results   Component Value Date    GLUCOSE 181 (H) 04/24/2025    CALCIUM 8.3 (L) 04/24/2025     04/24/2025    K 3.7 04/24/2025    CO2 30.0 (H) 04/24/2025     04/24/2025    BUN 18 04/24/2025    CREATININE 0.79 04/24/2025    EGFR 92.1 04/24/2025    BCR 22.8 04/24/2025    ANIONGAP 9.0 04/24/2025     Lab Results   Component Value Date    HGBA1C 8.60 (H) 04/21/2025     Lab Results   Component Value Date    CRP 11.62 (H) 11/24/2024         Microbiology:  4/21 RPP: Negative  4/21 BCx: MSSA in 2/2 sets  4/21 urine strep pneumo and Legionella antigens: Negative  4/21 respiratory Cx: \"Rejected\"  4/22 MRSA nares PCR: Negative  4/22 pleural fluid Cx: NGTD      Radiology:  4/24 CXR personally reviewed and shows cardiomegaly, right pleural effusion, and right chest tube in place; right chest port also present    Isolation:  No active isolations    ASSESSMENT/PLAN:  MSSA septicemia  Port catheter present  Pulmonary emphysema  History of right middle lobectomy  Morbid obesity BMI 40 complicating above  Right pleural effusion status post chest tube placement  Abnormal transthoracic echocardiogram    For MSSA septicemia, continue cefazolin 2 g IV every 8 hours with duration TBD.  Pleural studies did not appear consistent with infection and culture is negative to date.  Thoracic surgery following and managing chest " tube.  Plans for port removal today.    I will order repeat blood cultures to document sterility in the morning since the port will have been removed by that point.  TTE was poor image quality but mentions a questionable small echodensity on the aortic valve that could be endocarditis but favors sclerosis/calcification.    Provided education to patient and his wife regarding his antibiotics.  I am planning for 6 weeks course.    ID will follow.  Case and plan discussed with thoracic surgery BAM Hamm.       ------------------------------------------------------------------  The above decisions regarding antimicrobials incorporates elements of engaging in complex medical decision-making associated with antimicrobial prescribing including considerations such as antimicrobial resistance patterns, practicing antibiotic stewardship by selecting targeted antibiotic therapy.

## 2025-04-24 NOTE — SIGNIFICANT NOTE
04/24/25 0827   OTHER   Discipline physical therapist   Rehab Time/Intention   Session Not Performed other (see comments)  (Pt. LILI in cardiology, will follow up as appropriate.)   Therapy Assessment/Plan (PT)   Criteria for Skilled Interventions Met (PT) yes   Recommendation   PT - Next Appointment 04/25/25

## 2025-04-24 NOTE — PROGRESS NOTES
Name: Branden Diop ADMIT: 2025   : 1950  PCP: Tino Lopez MD    MRN: [id number] LOS: 2 days   AGE/SEX: 74 y.o. male  ROOM: [facility]     Subjective   Subjective   Feeling a lot better       Objective   Objective   Vital Signs  Temp:  [98.1 °F (36.7 °C)-98.2 °F (36.8 °C)] 98.2 °F (36.8 °C)  Heart Rate:  [] 84  Resp:  [16-20] 20  BP: ()/(59-70) 118/59  SpO2:  [93 %-98 %] 94 %  on  Flow (L/min) (Oxygen Therapy):  [6-8] 8;   Device (Oxygen Therapy): nasal cannula  Body mass index is 40.05 kg/m².  Physical Exam  Vitals reviewed.   Constitutional:       General: He is not in acute distress.     Appearance: He is obese.   Cardiovascular:      Rate and Rhythm: Normal rate and regular rhythm.   Pulmonary:      Effort: Pulmonary effort is normal.      Breath sounds: Wheezing and rhonchi present.   Abdominal:      General: There is no distension.      Palpations: Abdomen is soft.      Tenderness: There is no abdominal tenderness.   Skin:     General: Skin is warm and dry.   Neurological:      General: No focal deficit present.      Mental Status: He is alert.       Results Review     I reviewed the patient's new clinical results.  Results from last 7 days   Lab Units 25  0537 25  0730 25  0444 25  1455   WBC 10*3/mm3 6.22 8.09 11.22* 8.18   HEMOGLOBIN g/dL 10.1* 10.1* 10.8* 11.9*   PLATELETS 10*3/mm3 150 149 180 226     Results from last 7 days   Lab Units 25  0537 25  1817 25  0730 25  0444 25  1455   SODIUM mmol/L 139  --  131* 135* 143   POTASSIUM mmol/L 3.7 3.5 3.4* 3.8 3.4*   CHLORIDE mmol/L 100  --  98 98 100   CO2 mmol/L 30.0*  --  25.9 24.0 31.9*   BUN mg/dL 18  --  17 12 10   CREATININE mg/dL 0.79  --  0.84 0.80 0.77   GLUCOSE mg/dL 181*  --  156* 162* 99   EGFR mL/min/1.73 92.1  --  90.4 91.7 92.8     Results from last 7 days   Lab Units 25  1455   ALBUMIN g/dL 3.9   BILIRUBIN mg/dL 0.9   ALK PHOS U/L 119*   AST (SGOT) U/L 25    ALT (SGPT) U/L 15     Results from last 7 days   Lab Units 04/24/25  0537 04/23/25  0730 04/22/25  0444 04/21/25  1455   CALCIUM mg/dL 8.3* 8.2* 8.8 9.6   ALBUMIN g/dL  --   --   --  3.9   MAGNESIUM mg/dL  --   --   --  1.8     Results from last 7 days   Lab Units 04/21/25  1516 04/21/25  1455   PROCALCITONIN ng/mL  --  0.06   LACTATE mmol/L 1.2  --      Hemoglobin A1C   Date/Time Value Ref Range Status   04/21/2025 2103 8.60 (H) 4.80 - 5.60 % Final   04/21/2025 1455 8.80 (H) 4.80 - 5.60 % Final     Glucose   Date/Time Value Ref Range Status   04/24/2025 1135 175 (H) 70 - 130 mg/dL Final   04/24/2025 0615 180 (H) 70 - 130 mg/dL Final   04/23/2025 2312 201 (H) 70 - 130 mg/dL Final   04/23/2025 1631 256 (H) 70 - 130 mg/dL Final   04/22/2025 1347 237 (H) 70 - 130 mg/dL Final   04/22/2025 0639 166 (H) 70 - 130 mg/dL Final   04/21/2025 2154 194 (H) 70 - 130 mg/dL Final       XR Chest 1 View  Result Date: 4/24/2025  1. Bilateral interstitial and alveolar disease appears to have progressed slightly since yesterday's study and this could be related to pulmonary edema superimposed on interstitial fibrosis. 2. Slight interval increase in density of the right base since yesterday's study as well. 3. No pneumothorax is seen. 4. Additional follow-up recommended.   This report was finalized on 4/24/2025 9:09 AM by Dr. David Ruiz M.D on Workstation: [id number]      XR Chest 1 View  Result Date: 4/23/2025  1. There has been slight clearing of the right base presumably from improving pleural effusion since yesterday's study. 2. The chest otherwise appears largely unchanged.   This report was finalized on 4/23/2025 7:43 AM by Dr. David Ruiz M.D on Workstation: [id number]      CT Chest Without Contrast Diagnostic  Result Date: 4/22/2025  FINDINGS AND IMPRESSION: Please note evaluation is suboptimal intravenous contrast. Right Port-A-Cath tip terminates in the superior vena cava. Gallbladder surgically absent.  Visualized liver has a somewhat lobulated appearance. Hypodense renal lesions only partially seen and cannot be evaluated.  Mediastinal and hilar adenopathy is present. Index right hilar node measures 1.8 cm in short axis dimension, grossly unchanged since 2/5/2025. Index precarinal adenopathy measures 1.6 cm in short axis dimension. (Previously 1.3 cm on 2/5/2025). At least mild to moderate coronary artery calcification and/or coronary stents.  Percutaneous right thoracostomy tube terminates within a moderate right pleural effusion. Overlying collapse of the right lower lobe is present. Intralobular septal thickening is present with intervening areas of groundglass and pulmonary opacification which to have worsened since 4/14/2025, most notably in the lung apices in the lingula. Small left pleural effusion with overlying pulmonary opacification is also increased. FINDINGS ARE CONCERNING FOR WORSENING PULMONARY EDEMA AND/OR MULTIFOCAL PNEUMONIA IN THE APPROPRIATE CONTEXT CORRELATION WITH PATIENT HISTORY IS RECOMMENDED. THE NODULAR APPEARANCE OF THE ABOVE-MENTIONED OPACIFICATION AND ABOVE-MENTIONED ADENOPATHY, CONTINUED CLOSE INTERVAL FOLLOW-UP WITH CHEST CT IN 8 WEEKS IS RECOMMENDED TO ENSURE STABILITY AND RESOLUTION EXCLUDE THE POSSIBILITY OF MALIGNANCY OR METASTATIC DISEASE.  Postsurgical changes from prior wedge resection on the right. No suspicious lytic or blastic osseous lesion. Anterolisthesis of C7 on T1.. MIP   This report was finalized on 4/22/2025 3:33 PM by Dr. Adam Mohan M.D on Workstation: [id number]      CT Guided Chest Tube  Result Date: 4/22/2025  Successful CT guided right chest tube placement    Radiation dose reduction techniques were utilized, including automated exposure control and exposure modulation based on body size.   This report was finalized on 4/22/2025 2:57 PM by Dr. Yoandy Clemente M.D on Workstation: [id number]        I have personally reviewed all medications:  Scheduled  Medications  arformoterol, 15 mcg, Nebulization, BID - RT  budesonide, 0.5 mg, Nebulization, BID - RT  ceFAZolin, 2,000 mg, Intravenous, Q8H  cetirizine, 10 mg, Oral, Daily  doxycycline (VIBRAMYCIN) 500 mg in sodium chloride 0.9 % 50 mL Syringe, , Intrapleural, Once  famotidine, 20 mg, Oral, BID AC  ferrous sulfate, 162.5 mg, Oral, BID  folic acid, 1 mg, Oral, Daily  gabapentin, 600 mg, Oral, Q8H  guaiFENesin, 1,200 mg, Oral, Q12H  insulin glargine, 30 Units, Subcutaneous, Nightly  insulin lispro, 2-7 Units, Subcutaneous, 4x Daily AC & at Bedtime  insulin lispro, 8 Units, Subcutaneous, TID With Meals  ipratropium-albuterol, 3 mL, Nebulization, 4x Daily - RT  magnesium oxide, 400 mg, Oral, BID  multivitamin, 1 tablet, Oral, Daily  mupirocin, 1 Application, Each Nare, BID  potassium chloride, 10 mEq, Oral, BID  [Held by provider] rivaroxaban, 20 mg, Oral, Daily  rosuvastatin, 40 mg, Oral, Daily  senna-docusate sodium, 2 tablet, Oral, BID  sertraline, 50 mg, Oral, Daily  sodium chloride, 10 mL, Intravenous, Q12H  torsemide, 40 mg, Oral, Daily    Infusions   Diet  NPO Diet NPO Type: Strict NPO    I have personally reviewed:  [x]  Laboratory   [x]  Microbiology   [x]  Radiology   [x]  EKG/Telemetry  [x]  Cardiology/Vascular   []  Pathology    []  Records       Assessment/Plan     Active Hospital Problems    Diagnosis  POA    **Hypoxia [R09.02]  Yes    History of lung cancer [Z85.118]  Not Applicable    Anemia, chronic disease [D63.8]  Yes    MSSA bacteremia [R78.81, B95.61]  Unknown    Pleural effusion on right [J90]  Yes    Essential hypertension [I10]  Yes    Fatty liver [K76.0]  Yes    Cholelithiasis [K80.20]  Yes    DM2 (diabetes mellitus, type 2) [E11.9]  Yes    Atrial fibrillation [I48.91]  Yes    Dyslipidemia [E78.5]  Yes    Mood disorder [F39]  Yes    Lobar pneumonia [J18.1]  Yes      Resolved Hospital Problems   No resolved problems to display.       74 y.o. male with history of squamous cell lung cancer  admitted with acute on chronic respiratory failure, persistent right pleural effusion and MSSA bacteremia.    Extensive review of chart.  CT shows some new nodular areas but minimal effusion after chest tube.  Potential intrapleural lytics planned today per thoracic note  - Pulmonology following.  Defer to them regarding pneumonitis    Continue ABX for MSSA bacteremia per ID recommendations.  Port to be removed today.  - Echo reviewed.  Poor image quality  - Likely will need to repeat blood cultures at some point to document clearance    DM2, acceptable control currently.  Continue current regimen    A-fib: Xarelto on hold due to chest tube.  Resume when able      DVT prophy SCDs  Dispo: TBD following surgical procedure, will likely be a few days yet    Kartik Ritchie MD  [facility]  04/24/25  12:56 EDT

## 2025-04-24 NOTE — ANESTHESIA POSTPROCEDURE EVALUATION
"Patient: Branden Diop    Procedure Summary       Date: 04/24/25 Room / Location: [facility] OR 20 / [facility] MAIN OR; [facility]    Anesthesia Start: 1523 Anesthesia Stop: 1607    Procedures:       CT CHEST WO CONTRAST DIAGNOSTIC      REMOVAL VENOUS ACCESS DEVICE, DOXYCYCLINE PLEURODESIS Diagnosis:       MSSA bacteremia      (follow-up pleural effusion)      (MSSA bacteremia [R78.81, B95.61])    Scheduled Providers: David Figueroa MD Provider: Venancio Tobin MD    Anesthesia Type: MAC ASA Status: 4            Anesthesia Type: MAC    Vitals  Vitals Value Taken Time   /45 04/24/25 17:00   Temp 36.8 °C (98.3 °F) 04/24/25 16:10   Pulse 89 04/24/25 17:01   Resp 16 04/24/25 16:45   SpO2 93 % 04/24/25 17:01   Vitals shown include unfiled device data.        Post Anesthesia Care and Evaluation    Patient location during evaluation: bedside  Patient participation: complete - patient participated  Level of consciousness: sleepy but conscious  Pain score: 0  Pain management: adequate    Airway patency: patent  Anesthetic complications: No anesthetic complications    Cardiovascular status: acceptable  Respiratory status: acceptable  Hydration status: acceptable    Comments: /63   Pulse 91   Temp 36.8 °C (98.3 °F) (Oral)   Resp 16   Ht 180.3 cm (70.98\")   Wt 130 kg (287 lb)   SpO2 97%   BMI 40.05 kg/m²       "

## 2025-04-24 NOTE — PLAN OF CARE
Problem: Adult Inpatient Plan of Care  Goal: Absence of Hospital-Acquired Illness or Injury  Outcome: Progressing  Intervention: Identify and Manage Fall Risk  Recent Flowsheet Documentation  Taken 4/24/2025 0200 by Humphrey Gallardo RN  Safety Promotion/Fall Prevention:   activity supervised   assistive device/personal items within reach   clutter free environment maintained   fall prevention program maintained   lighting adjusted   nonskid shoes/slippers when out of bed   room organization consistent   safety round/check completed  Taken 4/24/2025 0001 by Humphrey Gallardo RN  Safety Promotion/Fall Prevention:   activity supervised   assistive device/personal items within reach   clutter free environment maintained   fall prevention program maintained   lighting adjusted   nonskid shoes/slippers when out of bed   room organization consistent   safety round/check completed  Taken 4/23/2025 2215 by Humphrey Gallardo RN  Safety Promotion/Fall Prevention:   activity supervised   assistive device/personal items within reach   clutter free environment maintained   fall prevention program maintained   lighting adjusted   nonskid shoes/slippers when out of bed   room organization consistent   safety round/check completed  Taken 4/23/2025 2015 by Humphrey Gallardo RN  Safety Promotion/Fall Prevention:   activity supervised   assistive device/personal items within reach   clutter free environment maintained   fall prevention program maintained   lighting adjusted   nonskid shoes/slippers when out of bed   room organization consistent   safety round/check completed  Intervention: Prevent Skin Injury  Recent Flowsheet Documentation  Taken 4/24/2025 0200 by Humphrey Gallardo RN  Body Position: position changed independently  Taken 4/24/2025 0001 by Humphrey Gallardo RN  Body Position: position changed independently  Taken 4/23/2025 2215 by Humphrey Gallardo RN  Body Position: position changed independently  Taken 4/23/2025 2015 by Paulina  CISCO Yin  Body Position: position changed independently  Intervention: Prevent Infection  Recent Flowsheet Documentation  Taken 4/24/2025 0200 by Humphrey Gallardo RN  Infection Prevention:   hand hygiene promoted   rest/sleep promoted  Taken 4/24/2025 0001 by Humphrey Gallardo RN  Infection Prevention:   hand hygiene promoted   rest/sleep promoted  Taken 4/23/2025 2215 by Humphrey Gallardo RN  Infection Prevention:   hand hygiene promoted   rest/sleep promoted  Taken 4/23/2025 2015 by Humphrey Gallardo RN  Infection Prevention:   hand hygiene promoted   rest/sleep promoted   Goal Outcome Evaluation:           Progress: improving

## 2025-04-24 NOTE — ANESTHESIA PREPROCEDURE EVALUATION
Anesthesia Evaluation     Patient summary reviewed and Nursing notes reviewed   NPO Solid Status: > 8 hours  NPO Liquid Status: > 4 hours           Airway   Mallampati: II  Neck ROM: full  Dental - normal exam     Pulmonary     breath sounds clear to auscultation  (+) pneumonia , pleural effusion, a smoker Former, lung cancer, COPD,home oxygen, shortness of breath, sleep apnea  Cardiovascular     Rhythm: regular    (+) hypertension, dysrhythmias (wenckebach) Atrial Fib, hyperlipidemia      Neuro/Psych  (+) numbness, psychiatric history Anxiety  GI/Hepatic/Renal/Endo    (+) obesity (BMI 40), morbid obesity, liver disease, diabetes mellitus    Musculoskeletal     Abdominal   (+) obese   Substance History      OB/GYN          Other   arthritis,   history of cancer                  Anesthesia Plan    ASA 4     MAC     intravenous induction     Anesthetic plan, risks, benefits, and alternatives have been provided, discussed and informed consent has been obtained with: patient.    CODE STATUS:    Code Status (Patient has no pulse and is not breathing): CPR (Attempt to Resuscitate)  Medical Interventions (Patient has pulse or is breathing): Full Support

## 2025-04-24 NOTE — PROGRESS NOTES
"                                              LOS: 2 days   Patient Care Team:  Tino Lopez MD as PCP - General (Internal Medicine)  Tino Lopez MD as PCP - Family Medicine  Mart Ureña MD as Consulting Physician (Cardiology)  Martir Trevino MD as Surgeon (General Surgery)  Dione Carter, RN as Nurse Navigator  David Figueroa MD as Referring Physician (Thoracic Surgery)  Duy Villasenor MD PhD as Consulting Physician (Hematology and Oncology)  Yashira Mendiola MD as Consulting Physician (Radiation Oncology)    Chief Complaint:  F/up pleural effusion.  Pneumonitis.  Hypoxia.    Subjective   Interval History    on 8 L oxygen.  He is continues to feel better he said since the pleural fluid was drained.  He does not have spirometer.  He sounds wheezy on exam today.  Chest tube is on suction and there is about 1000 mL altogether since it was placed.     REVIEW OF SYSTEMS:   CARDIOVASCULAR: Minimal chest pain at the site of the chest tube.  GASTROINTESTINAL: No anorexia, nausea, vomiting or diarrhea. No abdominal pain.  CONSTITUTIONAL: No fever or chills.     Ventilator/Non-Invasive Ventilation Settings (From admission, onward)       Start     Ordered    04/21/25 2015  NIPPV (CPAP or BIPAP)  As Needed-RT        Question Answer Comment   Indication Acute Respiratory Failure    Type BIPAP    IPAP 16    EPAP 8    Tidal Volume (mL) 500    Titrate Oxygen for SpO2 88 - 92%        04/21/25 2014                          Physical Exam:     Vital Signs  Temp:  [98.1 °F (36.7 °C)-98.4 °F (36.9 °C)] 98.2 °F (36.8 °C)  Heart Rate:  [] 82  Resp:  [16-20] 20  BP: ()/(62-70) 135/64    Intake/Output Summary (Last 24 hours) at 4/24/2025 1052  Last data filed at 4/24/2025 0600  Gross per 24 hour   Intake 660 ml   Output 2500 ml   Net -1840 ml     Flowsheet Rows      Flowsheet Row First Filed Value   Admission Height 180.3 cm (70.98\") Documented at 04/21/2025 2100   Admission Weight 130 kg (287 lb " 11.2 oz) Documented at 04/21/2025 2100            PPE used per hospital policy    General Appearance:   Alert, cooperative, in no acute distress   ENMT:  Mallampati score 3, moist mucous membrane   Eyes:  Pupils equal and reactive to light. EOMI   Neck:   Large. Trachea midline. No thyromegaly.   Lungs:     Bilateral expiratory wheezing.  Diminished breath sounds on the right base.    Heart:   Regular rhythm and normal rate, normal S1 and S2, no         murmur   Skin:   No rash or ecchymosis   Abdomen:    Obese. Soft. No tenderness. No HSM.   Neuro/psych:  Conscious, alert, oriented x3. Strength 5/5 in upper and lower  ext.  Appropriate mood and affect   Extremities:  No cyanosis, clubbing but edema.  Warm extremities and well-perfused          Results Review:        Results from last 7 days   Lab Units 04/24/25  0537 04/23/25  1817 04/23/25  0730 04/22/25  0444   SODIUM mmol/L 139  --  131* 135*   POTASSIUM mmol/L 3.7 3.5 3.4* 3.8   CHLORIDE mmol/L 100  --  98 98   CO2 mmol/L 30.0*  --  25.9 24.0   BUN mg/dL 18  --  17 12   CREATININE mg/dL 0.79  --  0.84 0.80   GLUCOSE mg/dL 181*  --  156* 162*   CALCIUM mg/dL 8.3*  --  8.2* 8.8     Results from last 7 days   Lab Units 04/21/25  1600 04/21/25  1455   HSTROP T ng/L 28* 25*     Results from last 7 days   Lab Units 04/24/25  0537 04/23/25  0730 04/22/25  0444   WBC 10*3/mm3 6.22 8.09 11.22*   HEMOGLOBIN g/dL 10.1* 10.1* 10.8*   HEMATOCRIT % 30.9* 32.0* 33.0*   PLATELETS 10*3/mm3 150 149 180     Results from last 7 days   Lab Units 04/24/25  0537 04/21/25  2103 04/21/25  1455   INR  1.33*  --  1.92*   APTT seconds 27.8 31.4  --      Results from last 7 days   Lab Units 04/21/25  1455   PROBNP pg/mL 416.0                   Results from last 7 days   Lab Units 04/22/25  1555 04/21/25 1955   PH, ARTERIAL pH units 7.442 7.375   PCO2, ARTERIAL mm Hg 43.4 44.1   PO2 ART mm Hg 66.5* 73.5*   O2 SATURATION ART % 93.5 94.1   FLOW RATE lpm 10.0000 15.0000   MODALITY  Cannula  NRB         I reviewed the patient's new clinical results.        Medication Review:   arformoterol, 15 mcg, Nebulization, BID - RT  budesonide, 0.5 mg, Nebulization, BID - RT  ceFAZolin, 2,000 mg, Intravenous, Q8H  cetirizine, 10 mg, Oral, Daily  doxycycline (VIBRAMYCIN) 500 mg in sodium chloride 0.9 % 50 mL Syringe, , Intrapleural, Once  famotidine, 20 mg, Oral, BID AC  ferrous sulfate, 162.5 mg, Oral, BID  folic acid, 1 mg, Oral, Daily  gabapentin, 600 mg, Oral, Q8H  guaiFENesin, 1,200 mg, Oral, Q12H  insulin glargine, 30 Units, Subcutaneous, Nightly  insulin lispro, 2-7 Units, Subcutaneous, 4x Daily AC & at Bedtime  insulin lispro, 8 Units, Subcutaneous, TID With Meals  ipratropium-albuterol, 3 mL, Nebulization, 4x Daily - RT  magnesium oxide, 400 mg, Oral, BID  multivitamin, 1 tablet, Oral, Daily  mupirocin, 1 Application, Each Nare, BID  potassium chloride, 10 mEq, Oral, BID  [Held by provider] rivaroxaban, 20 mg, Oral, Daily  rosuvastatin, 40 mg, Oral, Daily  senna-docusate sodium, 2 tablet, Oral, BID  sertraline, 50 mg, Oral, Daily  sodium chloride, 10 mL, Intravenous, Q12H  torsemide, 40 mg, Oral, Daily             Diagnostic imaging:  I personally and Independently reviewed and interpreted the following images:  CXR 4/23/25: Improved right pleural effusion.  CXR 4/24/25: Residual pleural effusion in the right fissure.  Right chest tube noted.  Interstitial pulmonary infiltrates bilaterally.    Assessment     Right pleural effusion, recurrent.  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  DuoNeb 4 times a day  Check CT chest  To  evaluate for residual pleural effusion.  Will also look at the lung parenchyma.  Of note, patient was recently treated with steroid for pneumonitis and it is possible that he continues to have low-grade pneumonitis which may require further therapy with steroid.  Mucinex 1200 mg twice daily  Antibiotics with cefazolin.  Chest tube to suction.  Thoracic surgery managing.  Consider resuming anticoagulation with Xarelto if okay with thoracic surgery  Initiate incentive spirometry.  Oxygen by NC and titrate to keep SpO2 >90%  BiPAP 16/10 night    Boni Barnes MD  04/24/25  10:52 EDT          This note was dictated utilizing Dragon dictation

## 2025-04-25 ENCOUNTER — APPOINTMENT (OUTPATIENT)
Dept: GENERAL RADIOLOGY | Facility: HOSPITAL | Age: 75
End: 2025-04-25
Payer: MEDICARE

## 2025-04-25 LAB
ANION GAP SERPL CALCULATED.3IONS-SCNC: 10.9 MMOL/L (ref 5–15)
BACTERIA FLD CULT: NORMAL
BUN SERPL-MCNC: 14 MG/DL (ref 8–23)
BUN/CREAT SERPL: 20.6 (ref 7–25)
CALCIUM SPEC-SCNC: 8.8 MG/DL (ref 8.6–10.5)
CHLORIDE SERPL-SCNC: 98 MMOL/L (ref 98–107)
CO2 SERPL-SCNC: 32.1 MMOL/L (ref 22–29)
CREAT SERPL-MCNC: 0.68 MG/DL (ref 0.76–1.27)
DEPRECATED RDW RBC AUTO: 50.1 FL (ref 37–54)
EGFRCR SERPLBLD CKD-EPI 2021: 96.3 ML/MIN/1.73
ERYTHROCYTE [DISTWIDTH] IN BLOOD BY AUTOMATED COUNT: 15.6 % (ref 12.3–15.4)
GLUCOSE BLDC GLUCOMTR-MCNC: 151 MG/DL (ref 70–130)
GLUCOSE BLDC GLUCOMTR-MCNC: 284 MG/DL (ref 70–130)
GLUCOSE BLDC GLUCOMTR-MCNC: 291 MG/DL (ref 70–130)
GLUCOSE BLDC GLUCOMTR-MCNC: 409 MG/DL (ref 70–130)
GLUCOSE BLDC GLUCOMTR-MCNC: 409 MG/DL (ref 70–130)
GLUCOSE BLDC GLUCOMTR-MCNC: 418 MG/DL (ref 70–130)
GLUCOSE SERPL-MCNC: 143 MG/DL (ref 65–99)
GRAM STN SPEC: NORMAL
GRAM STN SPEC: NORMAL
HCT VFR BLD AUTO: 32.9 % (ref 37.5–51)
HGB BLD-MCNC: 10.5 G/DL (ref 13–17.7)
MCH RBC QN AUTO: 28.2 PG (ref 26.6–33)
MCHC RBC AUTO-ENTMCNC: 31.9 G/DL (ref 31.5–35.7)
MCV RBC AUTO: 88.4 FL (ref 79–97)
PLATELET # BLD AUTO: 194 10*3/MM3 (ref 140–450)
PMV BLD AUTO: 9.9 FL (ref 6–12)
POTASSIUM SERPL-SCNC: 3.4 MMOL/L (ref 3.5–5.2)
POTASSIUM SERPL-SCNC: 4.4 MMOL/L (ref 3.5–5.2)
RBC # BLD AUTO: 3.72 10*6/MM3 (ref 4.14–5.8)
SODIUM SERPL-SCNC: 141 MMOL/L (ref 136–145)
WBC NRBC COR # BLD AUTO: 7.64 10*3/MM3 (ref 3.4–10.8)

## 2025-04-25 PROCEDURE — 84132 ASSAY OF SERUM POTASSIUM: CPT | Performed by: HOSPITALIST

## 2025-04-25 PROCEDURE — 99024 POSTOP FOLLOW-UP VISIT: CPT | Performed by: NURSE PRACTITIONER

## 2025-04-25 PROCEDURE — 63710000001 INSULIN LISPRO (HUMAN) PER 5 UNITS: Performed by: SURGERY

## 2025-04-25 PROCEDURE — 94799 UNLISTED PULMONARY SVC/PX: CPT

## 2025-04-25 PROCEDURE — 94664 DEMO&/EVAL PT USE INHALER: CPT

## 2025-04-25 PROCEDURE — 85027 COMPLETE CBC AUTOMATED: CPT | Performed by: SURGERY

## 2025-04-25 PROCEDURE — 94660 CPAP INITIATION&MGMT: CPT

## 2025-04-25 PROCEDURE — 99233 SBSQ HOSP IP/OBS HIGH 50: CPT | Performed by: INTERNAL MEDICINE

## 2025-04-25 PROCEDURE — 94761 N-INVAS EAR/PLS OXIMETRY MLT: CPT

## 2025-04-25 PROCEDURE — 71045 X-RAY EXAM CHEST 1 VIEW: CPT

## 2025-04-25 PROCEDURE — 94760 N-INVAS EAR/PLS OXIMETRY 1: CPT

## 2025-04-25 PROCEDURE — 25010000002 METHYLPREDNISOLONE PER 40 MG: Performed by: INTERNAL MEDICINE

## 2025-04-25 PROCEDURE — 82948 REAGENT STRIP/BLOOD GLUCOSE: CPT

## 2025-04-25 PROCEDURE — G0545 PR INHERENT VISIT TO INPT: HCPCS | Performed by: INTERNAL MEDICINE

## 2025-04-25 PROCEDURE — 25010000002 CEFAZOLIN PER 500 MG: Performed by: SURGERY

## 2025-04-25 PROCEDURE — 63710000001 INSULIN GLARGINE PER 5 UNITS: Performed by: HOSPITALIST

## 2025-04-25 PROCEDURE — 80048 BASIC METABOLIC PNL TOTAL CA: CPT | Performed by: SURGERY

## 2025-04-25 PROCEDURE — 63710000001 INSULIN LISPRO (HUMAN) PER 5 UNITS: Performed by: HOSPITALIST

## 2025-04-25 RX ORDER — INSULIN LISPRO 100 [IU]/ML
3-14 INJECTION, SOLUTION INTRAVENOUS; SUBCUTANEOUS
Status: DISCONTINUED | OUTPATIENT
Start: 2025-04-25 | End: 2025-04-30 | Stop reason: HOSPADM

## 2025-04-25 RX ORDER — METHYLPREDNISOLONE SODIUM SUCCINATE 40 MG/ML
40 INJECTION, POWDER, LYOPHILIZED, FOR SOLUTION INTRAMUSCULAR; INTRAVENOUS EVERY 8 HOURS
Status: DISCONTINUED | OUTPATIENT
Start: 2025-04-25 | End: 2025-04-27

## 2025-04-25 RX ORDER — TRIAMCINOLONE ACETONIDE 1 MG/G
1 CREAM TOPICAL EVERY 12 HOURS SCHEDULED
Status: DISCONTINUED | OUTPATIENT
Start: 2025-04-25 | End: 2025-04-30 | Stop reason: HOSPADM

## 2025-04-25 RX ORDER — POTASSIUM CHLORIDE 1.5 G/1.58G
40 POWDER, FOR SOLUTION ORAL EVERY 4 HOURS
Status: COMPLETED | OUTPATIENT
Start: 2025-04-25 | End: 2025-04-25

## 2025-04-25 RX ADMIN — INSULIN GLARGINE 30 UNITS: 100 INJECTION, SOLUTION SUBCUTANEOUS at 21:25

## 2025-04-25 RX ADMIN — POTASSIUM CHLORIDE 40 MEQ: 1.5 POWDER, FOR SOLUTION ORAL at 11:24

## 2025-04-25 RX ADMIN — INSULIN LISPRO 8 UNITS: 100 INJECTION, SOLUTION INTRAVENOUS; SUBCUTANEOUS at 12:20

## 2025-04-25 RX ADMIN — TRIAMCINOLONE ACETONIDE 1 APPLICATION: 1 CREAM TOPICAL at 20:04

## 2025-04-25 RX ADMIN — GABAPENTIN 600 MG: 300 CAPSULE ORAL at 20:05

## 2025-04-25 RX ADMIN — Medication 1 TABLET: at 08:46

## 2025-04-25 RX ADMIN — Medication 10 ML: at 08:48

## 2025-04-25 RX ADMIN — GUAIFENESIN 1200 MG: 600 TABLET, EXTENDED RELEASE ORAL at 08:46

## 2025-04-25 RX ADMIN — ARFORMOTEROL TARTRATE 15 MCG: 15 SOLUTION RESPIRATORY (INHALATION) at 18:51

## 2025-04-25 RX ADMIN — FERROUS SULFATE TAB 325 MG (65 MG ELEMENTAL FE) 162.5 MG: 325 (65 FE) TAB at 08:47

## 2025-04-25 RX ADMIN — FOLIC ACID 1 MG: 1 TABLET ORAL at 08:46

## 2025-04-25 RX ADMIN — INSULIN LISPRO 7 UNITS: 100 INJECTION, SOLUTION INTRAVENOUS; SUBCUTANEOUS at 11:00

## 2025-04-25 RX ADMIN — FAMOTIDINE 20 MG: 20 TABLET, FILM COATED ORAL at 17:06

## 2025-04-25 RX ADMIN — POTASSIUM CHLORIDE 10 MEQ: 750 TABLET, EXTENDED RELEASE ORAL at 20:04

## 2025-04-25 RX ADMIN — INSULIN LISPRO 8 UNITS: 100 INJECTION, SOLUTION INTRAVENOUS; SUBCUTANEOUS at 17:07

## 2025-04-25 RX ADMIN — TORSEMIDE 40 MG: 20 TABLET ORAL at 08:45

## 2025-04-25 RX ADMIN — MAGNESIUM OXIDE TAB 400 MG (241.3 MG ELEMENTAL MG) 400 MG: 400 (241.3 MG) TAB at 20:03

## 2025-04-25 RX ADMIN — SENNOSIDES AND DOCUSATE SODIUM 2 TABLET: 50; 8.6 TABLET ORAL at 08:46

## 2025-04-25 RX ADMIN — IPRATROPIUM BROMIDE AND ALBUTEROL SULFATE 3 ML: .5; 3 SOLUTION RESPIRATORY (INHALATION) at 18:51

## 2025-04-25 RX ADMIN — ARFORMOTEROL TARTRATE 15 MCG: 15 SOLUTION RESPIRATORY (INHALATION) at 07:19

## 2025-04-25 RX ADMIN — IPRATROPIUM BROMIDE AND ALBUTEROL SULFATE 3 ML: .5; 3 SOLUTION RESPIRATORY (INHALATION) at 15:08

## 2025-04-25 RX ADMIN — GABAPENTIN 600 MG: 300 CAPSULE ORAL at 05:33

## 2025-04-25 RX ADMIN — METHYLPREDNISOLONE SODIUM SUCCINATE 40 MG: 40 INJECTION, POWDER, FOR SOLUTION INTRAMUSCULAR; INTRAVENOUS at 11:20

## 2025-04-25 RX ADMIN — SENNOSIDES AND DOCUSATE SODIUM 2 TABLET: 50; 8.6 TABLET ORAL at 20:03

## 2025-04-25 RX ADMIN — BUDESONIDE 0.5 MG: 0.5 INHALANT RESPIRATORY (INHALATION) at 07:21

## 2025-04-25 RX ADMIN — SERTRALINE HYDROCHLORIDE 50 MG: 50 TABLET, FILM COATED ORAL at 08:46

## 2025-04-25 RX ADMIN — CETIRIZINE HYDROCHLORIDE 10 MG: 10 TABLET, FILM COATED ORAL at 08:46

## 2025-04-25 RX ADMIN — FAMOTIDINE 20 MG: 20 TABLET, FILM COATED ORAL at 08:46

## 2025-04-25 RX ADMIN — IPRATROPIUM BROMIDE AND ALBUTEROL SULFATE 3 ML: .5; 3 SOLUTION RESPIRATORY (INHALATION) at 12:11

## 2025-04-25 RX ADMIN — SODIUM CHLORIDE 2000 MG: 9 INJECTION, SOLUTION INTRAVENOUS at 11:21

## 2025-04-25 RX ADMIN — INSULIN LISPRO 2 UNITS: 100 INJECTION, SOLUTION INTRAVENOUS; SUBCUTANEOUS at 08:50

## 2025-04-25 RX ADMIN — MAGNESIUM OXIDE TAB 400 MG (241.3 MG ELEMENTAL MG) 400 MG: 400 (241.3 MG) TAB at 08:47

## 2025-04-25 RX ADMIN — BUDESONIDE 0.5 MG: 0.5 INHALANT RESPIRATORY (INHALATION) at 18:51

## 2025-04-25 RX ADMIN — MUPIROCIN 1 APPLICATION: 20 OINTMENT TOPICAL at 20:03

## 2025-04-25 RX ADMIN — Medication 10 ML: at 20:02

## 2025-04-25 RX ADMIN — MUPIROCIN 1 APPLICATION: 20 OINTMENT TOPICAL at 08:47

## 2025-04-25 RX ADMIN — IPRATROPIUM BROMIDE AND ALBUTEROL SULFATE 3 ML: .5; 3 SOLUTION RESPIRATORY (INHALATION) at 07:21

## 2025-04-25 RX ADMIN — GUAIFENESIN 1200 MG: 600 TABLET, EXTENDED RELEASE ORAL at 20:04

## 2025-04-25 RX ADMIN — ROSUVASTATIN CALCIUM 40 MG: 20 TABLET, FILM COATED ORAL at 08:46

## 2025-04-25 RX ADMIN — INSULIN LISPRO 8 UNITS: 100 INJECTION, SOLUTION INTRAVENOUS; SUBCUTANEOUS at 18:03

## 2025-04-25 RX ADMIN — OXYCODONE AND ACETAMINOPHEN 1 TABLET: 5; 325 TABLET ORAL at 20:03

## 2025-04-25 RX ADMIN — POTASSIUM CHLORIDE 40 MEQ: 1.5 POWDER, FOR SOLUTION ORAL at 16:10

## 2025-04-25 RX ADMIN — SODIUM CHLORIDE 2000 MG: 9 INJECTION, SOLUTION INTRAVENOUS at 19:59

## 2025-04-25 RX ADMIN — FERROUS SULFATE TAB 325 MG (65 MG ELEMENTAL FE) 162.5 MG: 325 (65 FE) TAB at 20:03

## 2025-04-25 RX ADMIN — GABAPENTIN 600 MG: 300 CAPSULE ORAL at 13:48

## 2025-04-25 RX ADMIN — INSULIN LISPRO 14 UNITS: 100 INJECTION, SOLUTION INTRAVENOUS; SUBCUTANEOUS at 21:25

## 2025-04-25 RX ADMIN — SODIUM CHLORIDE 2000 MG: 9 INJECTION, SOLUTION INTRAVENOUS at 03:22

## 2025-04-25 RX ADMIN — POTASSIUM CHLORIDE 10 MEQ: 750 TABLET, EXTENDED RELEASE ORAL at 08:45

## 2025-04-25 NOTE — DISCHARGE PLACEMENT REQUEST
"Caren Wong (74 y.o. Male)       Date of Birth   1950    Social Security Number       Address   [address] [place] [address]    Home Phone   [phone]    MRN   [id number]       [facility]    Marital Status                               Admission Date   4/21/2025    Admission Type   Emergency    Admitting Provider   Rigo Eng MD    Attending Provider   Kartik Ritchie MD    Department, Room/Bed   [facility], N325/1       Discharge Date       Discharge Disposition       Discharge Destination                                 Attending Provider: Kartik Ritchie MD    Allergies: No Known Allergies    Isolation: None   Infection: None   Code Status: CPR    Ht: 180.3 cm (70.98\")   Wt: 130 kg (287 lb)    Admission Cmt: None   Principal Problem: Hypoxia [R09.02]                   Active Insurance as of 4/21/2025       Primary Coverage       Payor Plan Insurance Group Employer/Plan Group    MEDICARE MEDICARE A & B        Payor Plan Address Payor Plan Phone Number Payor Plan Fax Number Effective Dates    [address] [phone]  10/1/2013 - None Entered    [facility] [id number]         Subscriber Name Subscriber Birth Date Member ID       CAREN WONG 1950 [id number]               Secondary Coverage       Payor Plan Insurance Group Employer/Plan Group    AARP  SUP AARP HEALTH CARE OPTIONS        Payor Plan Address Payor Plan Phone Number Payor Plan Fax Number Effective Dates    [facility] [phone]  1/1/2016 - None Entered    [address]       [facility] [id number]         Subscriber Name Subscriber Birth Date Member ID       CAREN WONG 1950 [id number]                     Emergency Contacts        (Rel.) Home Phone Work Phone Mobile Phone    Luba Wong (Spouse) -- -- [phone]                "

## 2025-04-25 NOTE — PROGRESS NOTES
ID note    CC: Follow-up MSSA septicemia and right pleural effusion    Subjective: History from patient and wife who added helpful details.  He says he is feeling better again today although his oxygen requirements are higher.  He went to the OR yesterday for removal of port catheter.  CT chest yesterday showed improvement in his pleural effusion.  He is tolerating cefazolin without any apparent side effects.    Medications:    Current Facility-Administered Medications:     acetaminophen (TYLENOL) tablet 650 mg, 650 mg, Oral, Q4H PRN **OR** acetaminophen (TYLENOL) 160 MG/5ML oral solution 650 mg, 650 mg, Oral, Q4H PRN **OR** acetaminophen (TYLENOL) suppository 650 mg, 650 mg, Rectal, Q4H PRN, David Figueroa MD    albuterol (PROVENTIL) nebulizer solution 0.083% 2.5 mg/3mL, 2.5 mg, Nebulization, Q4H PRN, David Figueroa MD    arformoterol (BROVANA) nebulizer solution 15 mcg, 15 mcg, Nebulization, BID - RT, David Figueroa MD, 15 mcg at 04/25/25 0719    sennosides-docusate (PERICOLACE) 8.6-50 MG per tablet 2 tablet, 2 tablet, Oral, BID, 2 tablet at 04/25/25 0846 **AND** polyethylene glycol (MIRALAX) packet 17 g, 17 g, Oral, Daily PRN **AND** bisacodyl (DULCOLAX) EC tablet 5 mg, 5 mg, Oral, Daily PRN **AND** bisacodyl (DULCOLAX) suppository 10 mg, 10 mg, Rectal, Daily PRN, David Figueroa MD    budesonide (PULMICORT) nebulizer solution 0.5 mg, 0.5 mg, Nebulization, BID - RT, David Figueroa MD, 0.5 mg at 04/25/25 0721    Calcium Replacement - Follow Nurse / BPA Driven Protocol, , Not Applicable, PRN, David Figueroa MD    ceFAZolin 2000 mg IVPB in 100 mL NS (MBP), 2,000 mg, Intravenous, Q8H, David Figueroa MD, 2,000 mg at 04/25/25 0322    cetirizine (zyrTEC) tablet 10 mg, 10 mg, Oral, Daily, David Figueroa MD, 10 mg at 04/25/25 0846    dextrose (D50W) (25 g/50 mL) IV injection 25 g, 25 g, Intravenous, Q15 Min PRN, David Figueroa MD    dextrose (GLUTOSE) oral gel 15 g, 15 g, Oral, Q15 Min PRN, David Figueroa MD    famotidine (PEPCID) tablet 20  mg, 20 mg, Oral, BID AC, David Figueroa MD, 20 mg at 04/25/25 0846    ferrous sulfate tablet 162.5 mg, 162.5 mg, Oral, BID, David Figueroa MD, 162.5 mg at 04/25/25 0847    folic acid (FOLVITE) tablet 1 mg, 1 mg, Oral, Daily, David Figueroa MD, 1 mg at 04/25/25 0846    gabapentin (NEURONTIN) capsule 600 mg, 600 mg, Oral, Q8H, David Figueroa MD, 600 mg at 04/25/25 0533    glucagon (GLUCAGEN) injection 1 mg, 1 mg, Intramuscular, Q15 Min PRN, David Figueroa MD    guaiFENesin (MUCINEX) 12 hr tablet 1,200 mg, 1,200 mg, Oral, Q12H, David Figueroa MD, 1,200 mg at 04/25/25 0846    hydrOXYzine pamoate (VISTARIL) capsule 50 mg, 50 mg, Oral, TID PRN, David Figueroa MD    insulin glargine (LANTUS, SEMGLEE) injection 30 Units, 30 Units, Subcutaneous, Nightly, David Figueroa MD, 30 Units at 04/24/25 2126    insulin lispro (HUMALOG/ADMELOG) injection 2-7 Units, 2-7 Units, Subcutaneous, 4x Daily AC & at Bedtime, David Figueroa MD, 2 Units at 04/25/25 0850    insulin lispro (HUMALOG/ADMELOG) injection 8 Units, 8 Units, Subcutaneous, TID With Meals, David Figueroa MD, 8 Units at 04/23/25 1757    ipratropium-albuterol (DUO-NEB) nebulizer solution 3 mL, 3 mL, Nebulization, 4x Daily - RT, David Figueroa MD, 3 mL at 04/25/25 0721    LORazepam (ATIVAN) tablet 0.5 mg, 0.5 mg, Oral, Q8H PRN, David Figueroa MD, 0.5 mg at 04/22/25 0414    magnesium oxide (MAG-OX) tablet 400 mg, 400 mg, Oral, BID, David Figueroa MD, 400 mg at 04/25/25 0847    Magnesium Standard Dose Replacement - Follow Nurse / BPA Driven Protocol, , Not Applicable, PRNHenry Nabeel, MD    melatonin tablet 5 mg, 5 mg, Oral, Nightly PRN, David Figueroa MD    multivitamin (THERAGRAN) tablet 1 tablet, 1 tablet, Oral, Daily, David Figueroa MD, 1 tablet at 04/25/25 0846    mupirocin (BACTROBAN) 2 % nasal ointment 1 Application, 1 Application, Each Nare, BID, David Figueroa MD, 1 Application at 04/25/25 0847    nitroglycerin (NITROSTAT) SL tablet 0.4 mg, 0.4 mg, Sublingual, Q5 Min PRNHenry Nabeel, MD     ondansetron ODT (ZOFRAN-ODT) disintegrating tablet 4 mg, 4 mg, Oral, Q6H PRN **OR** ondansetron (ZOFRAN) injection 4 mg, 4 mg, Intravenous, Q6H PRN, David Figueroa MD    oxyCODONE-acetaminophen (PERCOCET) 5-325 MG per tablet 1 tablet, 1 tablet, Oral, Q8H PRN, David Figueroa MD, 1 tablet at 04/24/25 2134    Phosphorus Replacement - Follow Nurse / BPA Driven Protocol, , Not Applicable, PRNHenry Nabeel, MD    potassium chloride (K-DUR,KLOR-CON) ER tablet 10 mEq, 10 mEq, Oral, BID, David Figueroa MD, 10 mEq at 04/25/25 0845    Potassium Replacement - Follow Nurse / BPA Driven Protocol, , Not Applicable, Henry SALAZAR Nabeel, MD    [Held by provider] rivaroxaban (XARELTO) tablet 20 mg, 20 mg, Oral, Daily, Rigo Eng MD, 20 mg at 04/22/25 0851    rosuvastatin (CRESTOR) tablet 40 mg, 40 mg, Oral, Daily, David Figueroa MD, 40 mg at 04/25/25 0846    sertraline (ZOLOFT) tablet 50 mg, 50 mg, Oral, Daily, David Figueroa MD, 50 mg at 04/25/25 0846    sodium chloride 0.9 % flush 10 mL, 10 mL, Intravenous, PRN, David Figueroa MD    sodium chloride 0.9 % flush 10 mL, 10 mL, Intravenous, Q12H, David Figueroa MD, 10 mL at 04/25/25 0848    sodium chloride 0.9 % flush 10 mL, 10 mL, Intravenous, PRNHenry Nabeel, MD    sodium chloride 0.9 % infusion 40 mL, 40 mL, Intravenous, PRN, David Figueroa MD    torsemide (DEMADEX) tablet 40 mg, 40 mg, Oral, Daily, David Figueroa MD, 40 mg at 04/25/25 0845      Objective   Vital Signs   Temp:  [97.9 °F (36.6 °C)-98.5 °F (36.9 °C)] 97.9 °F (36.6 °C)  Heart Rate:  [78-95] 82  Resp:  [16-22] 20  BP: (104-125)/(45-69) 121/60    Physical Exam:   General: awake, alert, sitting up in bed  Eyes: no scleral icterus  Cardiovascular: NR  Respiratory: normal work of breathing on 7 L nasal cannula; right chest tube present  GI: Abdomen is obese, not tender or distended  Skin: Mild rash in multiple sites; he says due to immunotherapy and that it is now improving   Neurological: Alert and oriented x 3  Psychiatric: Normal  "mood and affect   Vasc: Right chest port has been removed    Labs:   CBC, BMP, and blood and pleural cultures reviewed today  Lab Results   Component Value Date    WBC 7.64 04/25/2025    HGB 10.5 (L) 04/25/2025    HCT 32.9 (L) 04/25/2025    MCV 88.4 04/25/2025     04/25/2025     Lab Results   Component Value Date    GLUCOSE 143 (H) 04/25/2025    CALCIUM 8.8 04/25/2025     04/25/2025    K 3.4 (L) 04/25/2025    CO2 32.1 (H) 04/25/2025    CL 98 04/25/2025    BUN 14 04/25/2025    CREATININE 0.68 (L) 04/25/2025    EGFR 96.3 04/25/2025    BCR 20.6 04/25/2025    ANIONGAP 10.9 04/25/2025     Lab Results   Component Value Date    HGBA1C 8.60 (H) 04/21/2025     Lab Results   Component Value Date    CRP 11.62 (H) 11/24/2024         Microbiology:  4/21 RPP: Negative  4/21 BCx: MSSA in 2/2 sets  4/21 urine strep pneumo and Legionella antigens: Negative  4/21 respiratory Cx: \"Rejected\"  4/22 MRSA nares PCR: Negative  4/22 pleural fluid Cx: Negative, final      Radiology:  4/24 CT chest with improvement in right pleural effusion with chest tube present    4/25 CXR with bilateral pulmonary opacities    Isolation:  No active isolations    ASSESSMENT/PLAN:  MSSA septicemia  Port catheter present now removed  Pulmonary emphysema  History of right middle lobectomy  Morbid obesity BMI 40 complicating above  Right pleural effusion status post chest tube placement  Abnormal transthoracic echocardiogram    For MSSA septicemia, continue cefazolin 2 g IV every 8 hours with duration TBD.  Pleural studies did not appear consistent with infection and culture finalized as negative.  Thoracic surgery following and managing chest tube.  CT chest yesterday showed improvement in pleural effusion.    On 4/24/2025 he went to the OR with thoracic surgery for port removal. I will order repeat blood cultures to document sterility now that the port is removed.  TTE was poor image quality but mentions a questionable small echodensity on the " aortic valve that could be endocarditis but favors sclerosis/calcification.    Provided education to patient and his wife regarding his antibiotics.  I am planning for 6 weeks course given the abnormal echocardiogram.  Stop date will be 6/3/2025 at which time he will follow-up with me in the ID clinic.  He will need a weekly CBC with differential and creatinine faxed to me at [phone].    I will order a single-lumen PICC  to the time of discharge.    I will see him again on 4/28/25 so please call ID MD on call at [phone] if any ID questions or concerns in the interim.         ------------------------------------------------------------------  The above decisions regarding antimicrobials incorporates elements of engaging in complex medical decision-making associated with antimicrobial prescribing including considerations such as antimicrobial resistance patterns, practicing antibiotic stewardship by selecting targeted antibiotic therapy, and considering patient-specific resistance patterns.

## 2025-04-25 NOTE — PROGRESS NOTES
"    Chief Complaint: Recurrent right pleural effusion    S/P: Port removal and Doxy pleurodesis  POD #: 1    Subjective:  Symptoms:  Stable.  He reports cough and weakness.  No shortness of breath (IMPROVED).    Diet:  Adequate intake.  No nausea or vomiting.    Activity level: Returning to normal.    Pain:  He complains of pain that is mild.  Pain is well controlled and requiring pain medication.    Improved overall.  Oxygen has been weaned down, now on 4 L nasal cannula.    Vital Signs:  Temp:  [97.9 °F (36.6 °C)-98.5 °F (36.9 °C)] 98.1 °F (36.7 °C)  Heart Rate:  [78-95] 79  Resp:  [16-22] 22  BP: (104-125)/(45-69) 124/59    Intake & Output (last day)         04/24 0701  04/25 0700 04/25 0701  04/26 0700    P.O.  300    I.V. (mL/kg) 400 (3.1)     IV Piggyback      Total Intake(mL/kg) 400 (3.1) 300 (2.3)    Urine (mL/kg/hr) 1450 (0.5)     Chest Tube 200     Total Output 1650     Net -1250 +300          Urine Unmeasured Occurrence 1 x             Objective:  General Appearance:  Comfortable, in no acute distress and well-appearing.    Vital signs: (most recent): Blood pressure 124/59, pulse 79, temperature 98.1 °F (36.7 °C), temperature source Oral, resp. rate 22, height 180.3 cm (70.98\"), weight 130 kg (287 lb), SpO2 93%.    Lungs:  Normal effort and normal respiratory rate.  He is not in respiratory distress.    Heart: Normal rate.  Regular rhythm.    Chest: Symmetric chest wall expansion. Chest wall tenderness (Around chest tube) present.    Abdomen: Abdomen is soft and non-distended.    Neurological: Patient is alert and oriented to person, place and time.    Skin:  Warm and dry.  (Postoperative incision with Dermabond intact.  Chest tube with blistering around insertion site, likely from adhesive)              Chest tube:   Site: Clean, Dry, and Intact  Suction: -40cm  Air Leak: negative  24 Hour Total: 200ml    Results Review:     I reviewed the patient's new clinical results.  I reviewed the patient's new " imaging results and agree with the interpretation.  Discussed with patient, nurse, surgeon    Imaging Results (Last 24 Hours)       Procedure Component Value Units Date/Time    XR Chest 1 View [[id number]] Collected: 04/25/25 0646     Updated: 04/25/25 0651    Narrative:      XR CHEST 1 VW-     HISTORY: Male who is 74 years-old, recurrent right pleural effusion     TECHNIQUE: Frontal views of the chest     COMPARISON: 4/24/2025     FINDINGS: Right chest tube appears stable. The heart is enlarged.  Pulmonary vasculature is congested. Diffuse bilateral alveolar  interstitial opacities show interval worsening. No large pleural  effusion, or pneumothorax. No acute osseous process.       Impression:      Interval worsening of extensive bilateral pulmonary  opacities.     This report was finalized on 4/25/2025 6:48 AM by Dr. Arsh Jorgensen M.D on Workstation: [id number]               Lab Results:     Lab Results (last 24 hours)       Procedure Component Value Units Date/Time    POC Glucose Once [[id number]]  (Abnormal) Collected: 04/25/25 1212    Specimen: Blood Updated: 04/25/25 1214     Glucose 284 mg/dL     POC Glucose Once [[id number]]  (Abnormal) Collected: 04/25/25 1036    Specimen: Blood Updated: 04/25/25 1036     Glucose 409 mg/dL     Basic Metabolic Panel [[id number]]  (Abnormal) Collected: 04/25/25 0742    Specimen: Blood Updated: 04/25/25 0902     Glucose 143 mg/dL      BUN 14 mg/dL      Creatinine 0.68 mg/dL      Sodium 141 mmol/L      Potassium 3.4 mmol/L      Chloride 98 mmol/L      CO2 32.1 mmol/L      Calcium 8.8 mg/dL      BUN/Creatinine Ratio 20.6     Anion Gap 10.9 mmol/L      eGFR 96.3 mL/min/1.73     Narrative:      GFR Categories in Chronic Kidney Disease (CKD)      GFR Category          GFR (mL/min/1.73)    Interpretation  G1                     90 or greater         Normal or high (1)  G2                      60-89                Mild decrease (1)  G3a                   45-59                Mild to  moderate decrease  G3b                   30-44                Moderate to severe decrease  G4                    15-29                Severe decrease  G5                    14 or less           Kidney failure          (1)In the absence of evidence of kidney disease, neither GFR category G1 or G2 fulfill the criteria for CKD.    eGFR calculation 2021 CKD-EPI creatinine equation, which does not include race as a factor    CBC (No Diff) [[id number]]  (Abnormal) Collected: 04/25/25 0742    Specimen: Blood Updated: 04/25/25 0843     WBC 7.64 10*3/mm3      RBC 3.72 10*6/mm3      Hemoglobin 10.5 g/dL      Hematocrit 32.9 %      MCV 88.4 fL      MCH 28.2 pg      MCHC 31.9 g/dL      RDW 15.6 %      RDW-SD 50.1 fl      MPV 9.9 fL      Platelets 194 10*3/mm3     Body Fluid Culture - Body Fluid, Pleural Cavity [[id number]] Collected: 04/22/25 1445    Specimen: Body Fluid from Pleural Cavity Updated: 04/25/25 0714     Body Fluid Culture No growth at 3 days     Gram Stain Few (2+) WBCs seen      No organisms seen    Anaerobic Culture - Body Fluid, Pleural Cavity [[id number]]  (Normal) Collected: 04/22/25 1445    Specimen: Body Fluid from Pleural Cavity Updated: 04/25/25 0649     Anaerobic Culture No anaerobes isolated at 3 days    POC Glucose Once [[id number]]  (Abnormal) Collected: 04/25/25 0643    Specimen: Blood Updated: 04/25/25 0645     Glucose 151 mg/dL     POC Glucose Once [[id number]]  (Abnormal) Collected: 04/24/25 2109    Specimen: Blood Updated: 04/24/25 2110     Glucose 251 mg/dL     POC Glucose Once [[id number]]  (Abnormal) Collected: 04/24/25 1644    Specimen: Blood Updated: 04/24/25 1646     Glucose 149 mg/dL     POC Glucose Once [[id number]]  (Abnormal) Collected: 04/24/25 1434    Specimen: Blood Updated: 04/24/25 1443     Glucose 155 mg/dL              Assessment & Plan       Hypoxia    Lobar pneumonia    DM2 (diabetes mellitus, type 2)    Dyslipidemia    Mood disorder    Atrial fibrillation    Cholelithiasis    Fatty  liver    Essential hypertension    Pleural effusion on right    History of lung cancer    Anemia, chronic disease    MSSA bacteremia       Assessment & Plan    POD 1 status post port removal with Doxy pleurodesis via right-sided chest tube for recurrent pleural effusion of uncertain etiology.  He is improved overall.  Now on 6 L high flow cannula.  Leave chest tube to -40 cm for today s/p pleurodesis.  Will plan to wean over the weekend.    Please hold Xarelto with chest tube in place.  Would recommend to wean steroids as able so that pleurodesis is able to take effect-- will discuss with pulmonary.  Continue antibiotics per ID recommendations.  Thoracic surgery will follow.    Cynthia Soria DNP, APRN  [facility]  04/25/25  14:00 EDT    I have spent greater than 30 minutes reviewing the patient's chart including medical history, notes, radiographic images, labs, and performing assessment and development of a plan and discussion with the patient/family at bedside.

## 2025-04-25 NOTE — PROGRESS NOTES
Name: Branden Diop ADMIT: 2025   : 1950  PCP: Tino Lopez MD    MRN: [id number] LOS: 3 days   AGE/SEX: 74 y.o. male  ROOM: [facility]     Subjective   Subjective   Feeling better.  O2 has been weaned down    Complains of itchy rash on his arm and chest which is not new (present since receiving chemo previously)       Objective   Objective   Vital Signs  Temp:  [97.9 °F (36.6 °C)-98.5 °F (36.9 °C)] 98.1 °F (36.7 °C)  Heart Rate:  [78-95] 79  Resp:  [16-22] 22  BP: (104-124)/(45-68) 124/59  SpO2:  [89 %-100 %] 93 %  on  Flow (L/min) (Oxygen Therapy):  [4-10] 4;   Device (Oxygen Therapy): nasal cannula  Body mass index is 40.05 kg/m².  Physical Exam  Vitals reviewed.   Constitutional:       General: He is not in acute distress.     Appearance: He is obese.   Cardiovascular:      Rate and Rhythm: Normal rate and regular rhythm.   Pulmonary:      Effort: Pulmonary effort is normal.      Breath sounds: Rhonchi present. No wheezing.   Abdominal:      General: There is no distension.      Palpations: Abdomen is soft.      Tenderness: There is no abdominal tenderness.   Skin:     General: Skin is warm and dry.      Comments: Patchy erythematous rash on his left chest and arm primarily   Neurological:      General: No focal deficit present.      Mental Status: He is alert.       Results Review     I reviewed the patient's new clinical results.  Results from last 7 days   Lab Units 25  0742 25  0537 25  0730 25  0444   WBC 10*3/mm3 7.64 6.22 8.09 11.22*   HEMOGLOBIN g/dL 10.5* 10.1* 10.1* 10.8*   PLATELETS 10*3/mm3 194 150 149 180     Results from last 7 days   Lab Units 25  0742 25  0537 25  1817 25  0730 25  0444   SODIUM mmol/L 141 139  --  131* 135*   POTASSIUM mmol/L 3.4* 3.7 3.5 3.4* 3.8   CHLORIDE mmol/L 98 100  --  98 98   CO2 mmol/L 32.1* 30.0*  --  25.9 24.0   BUN mg/dL 14 18  --  17 12   CREATININE mg/dL 0.68* 0.79  --  0.84 0.80   GLUCOSE mg/dL  143* 181*  --  156* 162*   EGFR mL/min/1.73 96.3 92.1  --  90.4 91.7     Results from last 7 days   Lab Units 04/21/25  1455   ALBUMIN g/dL 3.9   BILIRUBIN mg/dL 0.9   ALK PHOS U/L 119*   AST (SGOT) U/L 25   ALT (SGPT) U/L 15     Results from last 7 days   Lab Units 04/25/25  0742 04/24/25  0537 04/23/25  0730 04/22/25  0444 04/21/25  1455   CALCIUM mg/dL 8.8 8.3* 8.2* 8.8 9.6   ALBUMIN g/dL  --   --   --   --  3.9   MAGNESIUM mg/dL  --   --   --   --  1.8     Results from last 7 days   Lab Units 04/21/25  1516 04/21/25  1455   PROCALCITONIN ng/mL  --  0.06   LACTATE mmol/L 1.2  --      Glucose   Date/Time Value Ref Range Status   04/25/2025 1538 291 (H) 70 - 130 mg/dL Final   04/25/2025 1212 284 (H) 70 - 130 mg/dL Final   04/25/2025 1036 409 (C) 70 - 130 mg/dL Final   04/25/2025 0643 151 (H) 70 - 130 mg/dL Final   04/24/2025 2109 251 (H) 70 - 130 mg/dL Final   04/24/2025 1644 149 (H) 70 - 130 mg/dL Final   04/24/2025 1434 155 (H) 70 - 130 mg/dL Final       XR Chest 1 View  Result Date: 4/25/2025  Interval worsening of extensive bilateral pulmonary opacities.  This report was finalized on 4/25/2025 6:48 AM by Dr. Arsh Jorgensen M.D on Workstation: [id number]      CT Chest Without Contrast Diagnostic  Result Date: 4/24/2025  1. Significant decrease in size of right pleural effusion, now only very small. Right-sided chest tube remains in place 2. Improved aeration of the right lower lobe with decreased atelectasis 3. New areas of groundglass and nodular infiltrate in the lungs as described most likely infectious/inflammatory. Follow-up chest CT recommended in 1 month to ensure clearing    Radiation dose reduction techniques were utilized, including automated exposure control and exposure modulation based on body size.   This report was finalized on 4/24/2025 1:35 PM by Dr. Yoandy Clemente M.D on Workstation: [id number]      XR Chest 1 View  Result Date: 4/24/2025  1. Bilateral interstitial and alveolar disease  appears to have progressed slightly since yesterday's study and this could be related to pulmonary edema superimposed on interstitial fibrosis. 2. Slight interval increase in density of the right base since yesterday's study as well. 3. No pneumothorax is seen. 4. Additional follow-up recommended.   This report was finalized on 4/24/2025 9:09 AM by Dr. David Ruiz M.D on Workstation: [id number]        I have personally reviewed all medications:  Scheduled Medications  arformoterol, 15 mcg, Nebulization, BID - RT  budesonide, 0.5 mg, Nebulization, BID - RT  ceFAZolin, 2,000 mg, Intravenous, Q8H  cetirizine, 10 mg, Oral, Daily  famotidine, 20 mg, Oral, BID AC  ferrous sulfate, 162.5 mg, Oral, BID  folic acid, 1 mg, Oral, Daily  gabapentin, 600 mg, Oral, Q8H  guaiFENesin, 1,200 mg, Oral, Q12H  insulin glargine, 30 Units, Subcutaneous, Nightly  insulin lispro, 3-14 Units, Subcutaneous, 4x Daily AC & at Bedtime  insulin lispro, 8 Units, Subcutaneous, TID With Meals  ipratropium-albuterol, 3 mL, Nebulization, 4x Daily - RT  magnesium oxide, 400 mg, Oral, BID  [Held by provider] methylPREDNISolone sodium succinate, 40 mg, Intravenous, Q8H  multivitamin, 1 tablet, Oral, Daily  mupirocin, 1 Application, Each Nare, BID  potassium chloride, 10 mEq, Oral, BID  potassium chloride, 40 mEq, Oral, Q4H  [Held by provider] rivaroxaban, 20 mg, Oral, Daily  rosuvastatin, 40 mg, Oral, Daily  senna-docusate sodium, 2 tablet, Oral, BID  sertraline, 50 mg, Oral, Daily  sodium chloride, 10 mL, Intravenous, Q12H  torsemide, 40 mg, Oral, Daily  triamcinolone, 1 Application, Topical, Q12H    Infusions   Diet  Diet: Cardiac; Healthy Heart (2-3 Na+); Fluid Consistency: Thin (IDDSI 0)    I have personally reviewed:  [x]  Laboratory   [x]  Microbiology   [x]  Radiology   [x]  EKG/Telemetry  [x]  Cardiology/Vascular   []  Pathology    []  Records       Assessment/Plan     Active Hospital Problems    Diagnosis  POA    **Hypoxia [R09.02]  Yes     History of lung cancer [Z85.118]  Not Applicable    Anemia, chronic disease [D63.8]  Yes    MSSA bacteremia [R78.81, B95.61]  Unknown    Pleural effusion on right [J90]  Yes    Essential hypertension [I10]  Yes    Fatty liver [K76.0]  Yes    Cholelithiasis [K80.20]  Yes    DM2 (diabetes mellitus, type 2) [E11.9]  Yes    Atrial fibrillation [I48.91]  Yes    Dyslipidemia [E78.5]  Yes    Mood disorder [F39]  Yes    Lobar pneumonia [J18.1]  Yes      Resolved Hospital Problems   No resolved problems to display.       74 y.o. male with history of squamous cell lung cancer and Keytruda pneumonitis admitted with acute on chronic respiratory failure, persistent right pleural effusion and MSSA bacteremia.  Status post Port-A-Cath removal with doxycycline pleurodesis 4/24      MSSA bacteremia treatment per ID recommendations.  Status post removal of his Port-A-Cath  - Echo reviewed.  Poor image quality, ?veg on AV  - Likely will need to repeat blood cultures at some point soon to document clearance    Steroids started this morning for pneumonitis but now discontinued after 1 dose by thoracic team.  Defer decision to pulm/thoracic.  - Chest tube management per thoracic  - Continue neb regimen    DM2: Uncontrolled this morning.  Did not receive his a.m. scheduled dose with meals and then got steroids which probably got us behind.  Would increase his correctional insulin but will not make further adjustments to his scheduled doses since steroids have already been discontinued.  Discussed with RN    A-fib: Xarelto on hold due to chest tube.  Resume when able    Lung cancer plans per oncology.  Rash related to chemo.  Encouraged RN to use Atarax which is already ordered as needed.  Ordered some topical steroid cream as well though he says it has not helped much in the past      DVT prophy SCDs  Dispo: TBD, will likely be a few days yet    Kartik Ritchie MD  [facility]  04/25/25  15:50 EDT

## 2025-04-25 NOTE — PROGRESS NOTES
Continued Stay Note  [facility]     Patient Name: Branden Diop  MRN: [id number]  Today's Date: 4/25/2025    Admit Date: 4/21/2025    Plan: Home with wife and referrals pending to [facility] and [facility] for 6 weeks of  IV antibx at DC   Discharge Plan       Row Name 04/25/25 1359       Plan    Plan Home with wife and referrals pending to [facility] and [facility] for 6 weeks of  IV antibx at DC    Patient/Family in Agreement with Plan yes    Provided Post Acute Provider List? Yes    Post Acute Provider List Home Health    Provided Post Acute Provider Quality & Resource List? Yes    Post Acute Provider Quality and Resource List Home Health    Delivered To Patient;Support Person    Support Person wife    Method of Delivery In person;Telephone    Plan Comments Spoke with pt at bedside and his wife by phone to discuss need for 6 weeks IV antibx at home upon DC per ID.    Both voiced understanding.  provided pt a CMS list of HH agencies printed from pt's choice in EPIC.  Pt and his wife voiced that they would like to use [facility] and [facility].  Referrals placed in EPIC for both.  Also pt and his wife confirm [facility] has already followed up and pt's portable O2 tanks are now working.  Pt is on 3-4L O2 at home.  CCP will follow up to confirm pt's DC plan.                   Discharge Codes    No documentation.                 Expected Discharge Date and Time       Expected Discharge Date Expected Discharge Time    Apr 28, 2025               SVETLANA Tellez

## 2025-04-25 NOTE — PLAN OF CARE
Problem: Adult Inpatient Plan of Care  Goal: Absence of Hospital-Acquired Illness or Injury  Intervention: Identify and Manage Fall Risk  Recent Flowsheet Documentation  Taken 4/25/2025 1800 by Julissa Burrows RN  Safety Promotion/Fall Prevention:   activity supervised   fall prevention program maintained   nonskid shoes/slippers when out of bed   room organization consistent   safety round/check completed   clutter free environment maintained  Taken 4/25/2025 1600 by Julissa Burrows RN  Safety Promotion/Fall Prevention:   activity supervised   fall prevention program maintained   nonskid shoes/slippers when out of bed   room organization consistent   safety round/check completed   clutter free environment maintained  Taken 4/25/2025 1400 by Julissa Burrows RN  Safety Promotion/Fall Prevention:   activity supervised   fall prevention program maintained   nonskid shoes/slippers when out of bed   room organization consistent   safety round/check completed   clutter free environment maintained  Taken 4/25/2025 1200 by Julissa Burrows RN  Safety Promotion/Fall Prevention:   activity supervised   fall prevention program maintained   nonskid shoes/slippers when out of bed   room organization consistent   safety round/check completed   clutter free environment maintained  Taken 4/25/2025 1000 by Julissa Burrows RN  Safety Promotion/Fall Prevention:   activity supervised   fall prevention program maintained   nonskid shoes/slippers when out of bed   room organization consistent   safety round/check completed   clutter free environment maintained  Taken 4/25/2025 0800 by Julissa Burrows RN  Safety Promotion/Fall Prevention:   activity supervised   fall prevention program maintained   nonskid shoes/slippers when out of bed   room organization consistent   safety round/check completed   clutter free environment maintained  Intervention: Prevent Skin Injury  Recent Flowsheet Documentation  Taken  4/25/2025 1800 by Julissa Burrows RN  Body Position: position changed independently  Taken 4/25/2025 1600 by Julissa Burrows RN  Body Position: position changed independently  Taken 4/25/2025 1400 by Julissa Burrows RN  Body Position: position changed independently  Taken 4/25/2025 1200 by Julissa Burrows RN  Body Position: position changed independently  Taken 4/25/2025 1000 by Julissa Burrows RN  Body Position: position changed independently  Taken 4/25/2025 0800 by Julissa Burrows RN  Body Position: position changed independently  Intervention: Prevent and Manage VTE (Venous Thromboembolism) Risk  Recent Flowsheet Documentation  Taken 4/25/2025 1400 by Julissa Burrows RN  VTE Prevention/Management:   bilateral   SCDs (sequential compression devices) off  Taken 4/25/2025 0800 by Julissa Burrows RN  VTE Prevention/Management:   bilateral   SCDs (sequential compression devices) on  Intervention: Prevent Infection  Recent Flowsheet Documentation  Taken 4/25/2025 1800 by Julissa Burrows RN  Infection Prevention: single patient room provided  Taken 4/25/2025 1600 by Julissa Burrows RN  Infection Prevention: single patient room provided  Taken 4/25/2025 1400 by Julissa Burrows RN  Infection Prevention: single patient room provided  Taken 4/25/2025 1200 by Julissa Burrows RN  Infection Prevention: single patient room provided  Taken 4/25/2025 1000 by Julissa Burrows RN  Infection Prevention: single patient room provided  Taken 4/25/2025 0800 by Julissa Burrows RN  Infection Prevention: single patient room provided  Goal: Optimal Comfort and Wellbeing  Intervention: Provide Person-Centered Care  Recent Flowsheet Documentation  Taken 4/25/2025 1400 by Julissa Burrows RN  Trust Relationship/Rapport:   care explained   choices provided   emotional support provided   empathic listening provided   questions answered   questions encouraged   reassurance provided    thoughts/feelings acknowledged  Taken 4/25/2025 0800 by Julissa Burrows RN  Trust Relationship/Rapport:   care explained   choices provided   questions answered   questions encouraged   reassurance provided   thoughts/feelings acknowledged     Problem: Noninvasive Ventilation Acute  Goal: Effective Unassisted Ventilation and Oxygenation  Intervention: Monitor and Manage Noninvasive Ventilation  Recent Flowsheet Documentation  Taken 4/25/2025 0800 by Julissa Burrows RN  Airway/Ventilation Management:   pulmonary hygiene promoted   positive pressure ventilation provided     Problem: Comorbidity Management  Goal: Maintenance of COPD Symptom Control  Intervention: Maintain COPD (Chronic Obstructive Pulmonary Disease) Symptom Control  Recent Flowsheet Documentation  Taken 4/25/2025 1800 by Julissa Burrows RN  Medication Review/Management: medications reviewed  Taken 4/25/2025 1600 by Julissa Burrows RN  Medication Review/Management: medications reviewed  Taken 4/25/2025 1400 by Julissa Burrows RN  Medication Review/Management: medications reviewed  Taken 4/25/2025 1200 by Julissa Burrows RN  Medication Review/Management: medications reviewed  Taken 4/25/2025 1000 by Julissa Burrows RN  Medication Review/Management: medications reviewed  Taken 4/25/2025 0800 by Julissa Burrows RN  Medication Review/Management: medications reviewed  Goal: Blood Glucose Level Within Target Range  Intervention: Monitor and Manage Glycemia  Recent Flowsheet Documentation  Taken 4/25/2025 1800 by Julissa Burrows RN  Medication Review/Management: medications reviewed  Taken 4/25/2025 1600 by Julissa Burrows RN  Medication Review/Management: medications reviewed  Taken 4/25/2025 1400 by Julissa Burrows RN  Medication Review/Management: medications reviewed  Taken 4/25/2025 1200 by Julissa Burrows RN  Medication Review/Management: medications reviewed  Taken 4/25/2025 1000 by Julissa Burrows  RN  Medication Review/Management: medications reviewed  Taken 4/25/2025 0800 by Julissa Burrows RN  Medication Review/Management: medications reviewed     Problem: Fall Injury Risk  Goal: Absence of Fall and Fall-Related Injury  Intervention: Identify and Manage Contributors  Recent Flowsheet Documentation  Taken 4/25/2025 1800 by Julissa Burrows RN  Medication Review/Management: medications reviewed  Self-Care Promotion: independence encouraged  Taken 4/25/2025 1600 by Julissa Burrows RN  Medication Review/Management: medications reviewed  Self-Care Promotion: independence encouraged  Taken 4/25/2025 1400 by Julissa Burrows RN  Medication Review/Management: medications reviewed  Self-Care Promotion: independence encouraged  Taken 4/25/2025 1200 by Julissa Burrows RN  Medication Review/Management: medications reviewed  Self-Care Promotion: independence encouraged  Taken 4/25/2025 1000 by Julissa Burrows RN  Medication Review/Management: medications reviewed  Self-Care Promotion: independence encouraged  Taken 4/25/2025 0800 by Julissa Burrows RN  Medication Review/Management: medications reviewed  Self-Care Promotion: independence encouraged  Intervention: Promote Injury-Free Environment  Recent Flowsheet Documentation  Taken 4/25/2025 1800 by Julissa Burrows RN  Safety Promotion/Fall Prevention:   activity supervised   fall prevention program maintained   nonskid shoes/slippers when out of bed   room organization consistent   safety round/check completed   clutter free environment maintained  Taken 4/25/2025 1600 by Julissa Burrows RN  Safety Promotion/Fall Prevention:   activity supervised   fall prevention program maintained   nonskid shoes/slippers when out of bed   room organization consistent   safety round/check completed   clutter free environment maintained  Taken 4/25/2025 1400 by Julissa Burrows RN  Safety Promotion/Fall Prevention:   activity supervised   fall  prevention program maintained   nonskid shoes/slippers when out of bed   room organization consistent   safety round/check completed   clutter free environment maintained  Taken 4/25/2025 1200 by Julissa Burrows RN  Safety Promotion/Fall Prevention:   activity supervised   fall prevention program maintained   nonskid shoes/slippers when out of bed   room organization consistent   safety round/check completed   clutter free environment maintained  Taken 4/25/2025 1000 by Julissa Burrows RN  Safety Promotion/Fall Prevention:   activity supervised   fall prevention program maintained   nonskid shoes/slippers when out of bed   room organization consistent   safety round/check completed   clutter free environment maintained  Taken 4/25/2025 0800 by Julissa Burrows RN  Safety Promotion/Fall Prevention:   activity supervised   fall prevention program maintained   nonskid shoes/slippers when out of bed   room organization consistent   safety round/check completed   clutter free environment maintained     Problem: Sepsis/Septic Shock  Goal: Optimal Coping  Intervention: Support Patient and Family Response  Recent Flowsheet Documentation  Taken 4/25/2025 1400 by Julissa Burrows RN  Family/Support System Care:   support provided   involvement promoted   presence promoted  Taken 4/25/2025 0800 by Julissa Burrows RN  Family/Support System Care: support provided  Goal: Absence of Bleeding  Intervention: Monitor and Manage Bleeding  Recent Flowsheet Documentation  Taken 4/25/2025 1800 by Julissa Burrows RN  Bleeding Management: direct pressure applied  Taken 4/25/2025 1600 by Julissa Burrows RN  Bleeding Management: dressing monitored  Taken 4/25/2025 1400 by Julissa Burrows RN  Bleeding Precautions: blood pressure closely monitored  Bleeding Management: dressing monitored  Taken 4/25/2025 0800 by Julissa Burrows RN  Bleeding Precautions: blood pressure closely monitored  Bleeding Management:  dressing monitored  Goal: Blood Glucose Level Within Target Range  Intervention: Optimize Glycemic Control  Recent Flowsheet Documentation  Taken 4/25/2025 1400 by Julissa Burrows RN  Hyperglycemia Management: blood glucose monitored  Hypoglycemia Management: blood glucose monitored  Taken 4/25/2025 0800 by Julissa Burrows RN  Hyperglycemia Management: blood glucose monitored  Hypoglycemia Management: blood glucose monitored  Goal: Absence of Infection Signs and Symptoms  Intervention: Initiate Sepsis Management  Recent Flowsheet Documentation  Taken 4/25/2025 1800 by Julissa Burrows RN  Infection Management: aseptic technique maintained  Infection Prevention: single patient room provided  Isolation Precautions: precautions maintained  Taken 4/25/2025 1600 by Julissa Burrows RN  Infection Management: aseptic technique maintained  Infection Prevention: single patient room provided  Isolation Precautions: precautions maintained  Taken 4/25/2025 1400 by Julissa Burrows RN  Infection Management: aseptic technique maintained  Infection Prevention: single patient room provided  Isolation Precautions: precautions maintained  Taken 4/25/2025 1200 by Julissa Burrows RN  Infection Management: aseptic technique maintained  Infection Prevention: single patient room provided  Isolation Precautions: precautions maintained  Taken 4/25/2025 1000 by Julissa Burrows RN  Infection Prevention: single patient room provided  Isolation Precautions: precautions maintained  Taken 4/25/2025 0800 by Julissa Burrows RN  Infection Management: aseptic technique maintained  Infection Prevention: single patient room provided  Isolation Precautions: precautions maintained  Intervention: Promote Stabilization  Recent Flowsheet Documentation  Taken 4/25/2025 1400 by Julissa Burrows RN  Fluid/Electrolyte Management: fluids provided  Taken 4/25/2025 0800 by Julissa Burrows RN  Fluid/Electrolyte Management: fluids  provided  Intervention: Promote Recovery  Recent Flowsheet Documentation  Taken 4/25/2025 1800 by Julissa Burrows RN  Activity Management: activity encouraged  Taken 4/25/2025 1600 by Julissa Burrows RN  Activity Management: activity encouraged  Taken 4/25/2025 1400 by Julissa Burrows RN  Activity Management: activity encouraged  Taken 4/25/2025 1200 by Julissa Burrows RN  Activity Management: activity encouraged  Taken 4/25/2025 1000 by Julissa Burrows RN  Activity Management: activity encouraged  Taken 4/25/2025 0800 by Julissa Burrows RN  Activity Management: activity encouraged  Airway/Ventilation Management:   pulmonary hygiene promoted   positive pressure ventilation provided  Goal: Optimal Nutrition Delivery  Intervention: Optimize Nutrition Delivery  Recent Flowsheet Documentation  Taken 4/25/2025 1400 by Julissa Burrows RN  Nutrition Interventions: meal set-up provided  Taken 4/25/2025 0800 by Julissa Burrows RN  Nutrition Interventions: meal set-up provided   Goal Outcome Evaluation:

## 2025-04-25 NOTE — PROGRESS NOTES
"                                              LOS: 3 days   Patient Care Team:  Tino Lopez MD as PCP - General (Internal Medicine)  Tino Lopez MD as PCP - Family Medicine  Mart Ureña MD as Consulting Physician (Cardiology)  Martir Trevino MD as Surgeon (General Surgery)  Dione Carter, RN as Nurse Navigator  David Figueroa MD as Referring Physician (Thoracic Surgery)  Duy Villasenor MD PhD as Consulting Physician (Hematology and Oncology)  Yashira Mendiola MD as Consulting Physician (Radiation Oncology)    Chief Complaint:  F/up pleural effusion.  Pneumonitis.  Hypoxia.    Subjective   Interval History    on 8 L oxygen.  Used the BiPAP last night.    No significant cough.    REVIEW OF SYSTEMS:   CARDIOVASCULAR: Minimal chest pain at the site of the chest tube.  GASTROINTESTINAL: No anorexia, nausea, vomiting or diarrhea. No abdominal pain.  CONSTITUTIONAL: No fever or chills.     Ventilator/Non-Invasive Ventilation Settings (From admission, onward)       Start     Ordered    04/21/25 2015  NIPPV (CPAP or BIPAP)  As Needed-RT        Question Answer Comment   Indication Acute Respiratory Failure    Type BIPAP    IPAP 16    EPAP 8    Tidal Volume (mL) 500    Titrate Oxygen for SpO2 88 - 92%        04/21/25 2014                          Physical Exam:     Vital Signs  Temp:  [97.9 °F (36.6 °C)-98.5 °F (36.9 °C)] 97.9 °F (36.6 °C)  Heart Rate:  [78-95] 82  Resp:  [16-22] 20  BP: (104-125)/(45-69) 121/60    Intake/Output Summary (Last 24 hours) at 4/25/2025 0856  Last data filed at 4/25/2025 0500  Gross per 24 hour   Intake 400 ml   Output 1650 ml   Net -1250 ml     Flowsheet Rows      Flowsheet Row First Filed Value   Admission Height 180.3 cm (70.98\") Documented at 04/21/2025 2100   Admission Weight 130 kg (287 lb 11.2 oz) Documented at 04/21/2025 2100            PPE used per hospital policy    General Appearance:   Alert, cooperative, in no acute distress   ENMT:  Mallampati score 3, " moist mucous membrane   Eyes:  Pupils equal and reactive to light. EOMI   Neck:   Large. Trachea midline. No thyromegaly.   Lungs:     Bilateral expiratory wheezing, improved.  Diminished breath sounds at the bases    Heart:   Regular rhythm and normal rate, normal S1 and S2, no         murmur   Skin:   No rash or ecchymosis   Abdomen:    Obese. Soft. No tenderness. No HSM.   Neuro/psych:  Conscious, alert, oriented x3. Strength 5/5 in upper and lower  ext.  Appropriate mood and affect   Extremities:  No cyanosis, clubbing but edema.  Warm extremities and well-perfused          Results Review:        Results from last 7 days   Lab Units 04/24/25  0537 04/23/25  1817 04/23/25  0730 04/22/25  0444   SODIUM mmol/L 139  --  131* 135*   POTASSIUM mmol/L 3.7 3.5 3.4* 3.8   CHLORIDE mmol/L 100  --  98 98   CO2 mmol/L 30.0*  --  25.9 24.0   BUN mg/dL 18  --  17 12   CREATININE mg/dL 0.79  --  0.84 0.80   GLUCOSE mg/dL 181*  --  156* 162*   CALCIUM mg/dL 8.3*  --  8.2* 8.8     Results from last 7 days   Lab Units 04/21/25  1600 04/21/25  1455   HSTROP T ng/L 28* 25*     Results from last 7 days   Lab Units 04/25/25  0742 04/24/25  0537 04/23/25  0730   WBC 10*3/mm3 7.64 6.22 8.09   HEMOGLOBIN g/dL 10.5* 10.1* 10.1*   HEMATOCRIT % 32.9* 30.9* 32.0*   PLATELETS 10*3/mm3 194 150 149     Results from last 7 days   Lab Units 04/24/25  0537 04/21/25  2103 04/21/25  1455   INR  1.33*  --  1.92*   APTT seconds 27.8 31.4  --      Results from last 7 days   Lab Units 04/21/25  1455   PROBNP pg/mL 416.0                   Results from last 7 days   Lab Units 04/22/25  1555 04/21/25  1955   PH, ARTERIAL pH units 7.442 7.375   PCO2, ARTERIAL mm Hg 43.4 44.1   PO2 ART mm Hg 66.5* 73.5*   O2 SATURATION ART % 93.5 94.1   FLOW RATE lpm 10.0000 15.0000   MODALITY  Cannula NRB         I reviewed the patient's new clinical results.        Medication Review:   arformoterol, 15 mcg, Nebulization, BID - RT  budesonide, 0.5 mg, Nebulization, BID -  RT  ceFAZolin, 2,000 mg, Intravenous, Q8H  cetirizine, 10 mg, Oral, Daily  famotidine, 20 mg, Oral, BID AC  ferrous sulfate, 162.5 mg, Oral, BID  folic acid, 1 mg, Oral, Daily  gabapentin, 600 mg, Oral, Q8H  guaiFENesin, 1,200 mg, Oral, Q12H  insulin glargine, 30 Units, Subcutaneous, Nightly  insulin lispro, 2-7 Units, Subcutaneous, 4x Daily AC & at Bedtime  insulin lispro, 8 Units, Subcutaneous, TID With Meals  ipratropium-albuterol, 3 mL, Nebulization, 4x Daily - RT  magnesium oxide, 400 mg, Oral, BID  multivitamin, 1 tablet, Oral, Daily  mupirocin, 1 Application, Each Nare, BID  potassium chloride, 10 mEq, Oral, BID  [Held by provider] rivaroxaban, 20 mg, Oral, Daily  rosuvastatin, 40 mg, Oral, Daily  senna-docusate sodium, 2 tablet, Oral, BID  sertraline, 50 mg, Oral, Daily  sodium chloride, 10 mL, Intravenous, Q12H  torsemide, 40 mg, Oral, Daily             Diagnostic imaging:  I personally and Independently reviewed and interpreted the following images:  CXR 4/23/25: Improved right pleural effusion.  CXR 4/24/25: Residual pleural effusion in the right fissure.  Right chest tube noted.  Interstitial pulmonary infiltrates bilaterally.  CT chest 4/25/25: Only trace layer of right pleural effusion.  Bilateral groundglass pulmonary infiltrates in the reticular infiltrates in the upper lobes + emphysema.    Assessment     Right pleural effusion, recurrent. S/p CT-guided chest tube 4/22 -> slightly exudative by LDH  COPD/upper lobe predominant emphysema, no current exacerbation  Keytruda pneumonitis: Only minimal GG infiltrates on latest CT 4/14- finished steroid therapy on 4/15  Acute on chronic hypoxic respiratory failure, secondary to the above.  On O2 4 L/minute baseline  MSSA septicemia  Abnormal echo: Severely dilated right and left atrium on echo 4/24.     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  DuoNeb 4 times a day  Start steroid therapy for pneumonitis as patient remains significantly hypoxic despite almost complete drainage of the right pleural effusion and also due to the presence of groundglass infiltrates bilaterally on his CTs performed during this admission.  Other differential for GG infiltrates is fluid overload but normal proBNP argues against that  Mucinex 1200 mg twice daily  Antibiotics with cefazolin.  Chest tube to suction.  Thoracic surgery managing.  Consider resuming anticoagulation with Xarelto if okay with thoracic surgery  Initiate incentive spirometry.  Oxygen by NC and titrate to keep SpO2 >90%  BiPAP 16/10 night    Boni Barnes MD  04/25/25  08:56 EDT          This note was dictated utilizing Dragon dictation

## 2025-04-25 NOTE — SIGNIFICANT NOTE
04/25/25 0928   OTHER   Discipline physical therapist   Rehab Time/Intention   Session Not Performed other (see comments)  (Attempted PT treatment session this AM, pt. declines noting that he is getting around well, and is waiting for his MD to f/u with him. Will f/u as time allows this afternoon, or next service date as appropriate.)   Recommendation   PT - Next Appointment 04/26/25

## 2025-04-26 ENCOUNTER — APPOINTMENT (OUTPATIENT)
Dept: GENERAL RADIOLOGY | Facility: HOSPITAL | Age: 75
End: 2025-04-26
Payer: MEDICARE

## 2025-04-26 LAB
ANION GAP SERPL CALCULATED.3IONS-SCNC: 12 MMOL/L (ref 5–15)
BUN SERPL-MCNC: 19 MG/DL (ref 8–23)
BUN/CREAT SERPL: 21.1 (ref 7–25)
CALCIUM SPEC-SCNC: 9.3 MG/DL (ref 8.6–10.5)
CHLORIDE SERPL-SCNC: 95 MMOL/L (ref 98–107)
CO2 SERPL-SCNC: 34 MMOL/L (ref 22–29)
CREAT SERPL-MCNC: 0.9 MG/DL (ref 0.76–1.27)
EGFRCR SERPLBLD CKD-EPI 2021: 88.5 ML/MIN/1.73
GLUCOSE BLDC GLUCOMTR-MCNC: 137 MG/DL (ref 70–130)
GLUCOSE BLDC GLUCOMTR-MCNC: 231 MG/DL (ref 70–130)
GLUCOSE BLDC GLUCOMTR-MCNC: 278 MG/DL (ref 70–130)
GLUCOSE BLDC GLUCOMTR-MCNC: 290 MG/DL (ref 70–130)
GLUCOSE SERPL-MCNC: 212 MG/DL (ref 65–99)
POTASSIUM SERPL-SCNC: 3.9 MMOL/L (ref 3.5–5.2)
SODIUM SERPL-SCNC: 141 MMOL/L (ref 136–145)

## 2025-04-26 PROCEDURE — 99024 POSTOP FOLLOW-UP VISIT: CPT

## 2025-04-26 PROCEDURE — 94799 UNLISTED PULMONARY SVC/PX: CPT

## 2025-04-26 PROCEDURE — 25010000002 CEFAZOLIN PER 500 MG: Performed by: SURGERY

## 2025-04-26 PROCEDURE — 63710000001 INSULIN LISPRO (HUMAN) PER 5 UNITS: Performed by: HOSPITALIST

## 2025-04-26 PROCEDURE — 82948 REAGENT STRIP/BLOOD GLUCOSE: CPT

## 2025-04-26 PROCEDURE — 71045 X-RAY EXAM CHEST 1 VIEW: CPT

## 2025-04-26 PROCEDURE — 63710000001 INSULIN LISPRO (HUMAN) PER 5 UNITS: Performed by: SURGERY

## 2025-04-26 PROCEDURE — 94761 N-INVAS EAR/PLS OXIMETRY MLT: CPT

## 2025-04-26 PROCEDURE — 80048 BASIC METABOLIC PNL TOTAL CA: CPT | Performed by: SURGERY

## 2025-04-26 PROCEDURE — 63710000001 INSULIN GLARGINE PER 5 UNITS: Performed by: HOSPITALIST

## 2025-04-26 PROCEDURE — 94760 N-INVAS EAR/PLS OXIMETRY 1: CPT

## 2025-04-26 PROCEDURE — 94664 DEMO&/EVAL PT USE INHALER: CPT

## 2025-04-26 RX ORDER — INSULIN LISPRO 100 [IU]/ML
10 INJECTION, SOLUTION INTRAVENOUS; SUBCUTANEOUS
Status: DISCONTINUED | OUTPATIENT
Start: 2025-04-26 | End: 2025-04-30 | Stop reason: HOSPADM

## 2025-04-26 RX ADMIN — FAMOTIDINE 20 MG: 20 TABLET, FILM COATED ORAL at 10:31

## 2025-04-26 RX ADMIN — GUAIFENESIN 1200 MG: 600 TABLET, EXTENDED RELEASE ORAL at 10:26

## 2025-04-26 RX ADMIN — TORSEMIDE 40 MG: 20 TABLET ORAL at 10:24

## 2025-04-26 RX ADMIN — INSULIN LISPRO 8 UNITS: 100 INJECTION, SOLUTION INTRAVENOUS; SUBCUTANEOUS at 13:21

## 2025-04-26 RX ADMIN — MUPIROCIN 1 APPLICATION: 20 OINTMENT TOPICAL at 20:12

## 2025-04-26 RX ADMIN — SENNOSIDES AND DOCUSATE SODIUM 2 TABLET: 50; 8.6 TABLET ORAL at 20:13

## 2025-04-26 RX ADMIN — FERROUS SULFATE TAB 325 MG (65 MG ELEMENTAL FE) 162.5 MG: 325 (65 FE) TAB at 20:13

## 2025-04-26 RX ADMIN — ARFORMOTEROL TARTRATE 15 MCG: 15 SOLUTION RESPIRATORY (INHALATION) at 08:22

## 2025-04-26 RX ADMIN — GUAIFENESIN 1200 MG: 600 TABLET, EXTENDED RELEASE ORAL at 20:12

## 2025-04-26 RX ADMIN — FOLIC ACID 1 MG: 1 TABLET ORAL at 10:26

## 2025-04-26 RX ADMIN — MAGNESIUM OXIDE TAB 400 MG (241.3 MG ELEMENTAL MG) 400 MG: 400 (241.3 MG) TAB at 20:13

## 2025-04-26 RX ADMIN — IPRATROPIUM BROMIDE AND ALBUTEROL SULFATE 3 ML: .5; 3 SOLUTION RESPIRATORY (INHALATION) at 17:05

## 2025-04-26 RX ADMIN — Medication 1 TABLET: at 10:24

## 2025-04-26 RX ADMIN — Medication 10 ML: at 10:38

## 2025-04-26 RX ADMIN — INSULIN LISPRO 5 UNITS: 100 INJECTION, SOLUTION INTRAVENOUS; SUBCUTANEOUS at 10:27

## 2025-04-26 RX ADMIN — INSULIN LISPRO 8 UNITS: 100 INJECTION, SOLUTION INTRAVENOUS; SUBCUTANEOUS at 21:02

## 2025-04-26 RX ADMIN — SODIUM CHLORIDE 2000 MG: 9 INJECTION, SOLUTION INTRAVENOUS at 13:20

## 2025-04-26 RX ADMIN — SENNOSIDES AND DOCUSATE SODIUM 2 TABLET: 50; 8.6 TABLET ORAL at 10:26

## 2025-04-26 RX ADMIN — IPRATROPIUM BROMIDE AND ALBUTEROL SULFATE 3 ML: .5; 3 SOLUTION RESPIRATORY (INHALATION) at 19:22

## 2025-04-26 RX ADMIN — FERROUS SULFATE TAB 325 MG (65 MG ELEMENTAL FE) 162.5 MG: 325 (65 FE) TAB at 10:30

## 2025-04-26 RX ADMIN — INSULIN GLARGINE 35 UNITS: 100 INJECTION, SOLUTION SUBCUTANEOUS at 20:13

## 2025-04-26 RX ADMIN — GABAPENTIN 600 MG: 300 CAPSULE ORAL at 20:14

## 2025-04-26 RX ADMIN — BUDESONIDE 0.5 MG: 0.5 INHALANT RESPIRATORY (INHALATION) at 19:22

## 2025-04-26 RX ADMIN — POTASSIUM CHLORIDE 10 MEQ: 750 TABLET, EXTENDED RELEASE ORAL at 10:25

## 2025-04-26 RX ADMIN — SODIUM CHLORIDE 2000 MG: 9 INJECTION, SOLUTION INTRAVENOUS at 20:14

## 2025-04-26 RX ADMIN — IPRATROPIUM BROMIDE AND ALBUTEROL SULFATE 3 ML: .5; 3 SOLUTION RESPIRATORY (INHALATION) at 11:32

## 2025-04-26 RX ADMIN — POTASSIUM CHLORIDE 10 MEQ: 750 TABLET, EXTENDED RELEASE ORAL at 20:13

## 2025-04-26 RX ADMIN — MAGNESIUM OXIDE TAB 400 MG (241.3 MG ELEMENTAL MG) 400 MG: 400 (241.3 MG) TAB at 10:30

## 2025-04-26 RX ADMIN — TRIAMCINOLONE ACETONIDE 1 APPLICATION: 1 CREAM TOPICAL at 20:12

## 2025-04-26 RX ADMIN — ARFORMOTEROL TARTRATE 15 MCG: 15 SOLUTION RESPIRATORY (INHALATION) at 19:22

## 2025-04-26 RX ADMIN — SODIUM CHLORIDE 2000 MG: 9 INJECTION, SOLUTION INTRAVENOUS at 03:36

## 2025-04-26 RX ADMIN — GABAPENTIN 600 MG: 300 CAPSULE ORAL at 05:58

## 2025-04-26 RX ADMIN — CETIRIZINE HYDROCHLORIDE 10 MG: 10 TABLET, FILM COATED ORAL at 10:26

## 2025-04-26 RX ADMIN — BUDESONIDE 0.5 MG: 0.5 INHALANT RESPIRATORY (INHALATION) at 08:23

## 2025-04-26 RX ADMIN — FAMOTIDINE 20 MG: 20 TABLET, FILM COATED ORAL at 18:25

## 2025-04-26 RX ADMIN — TRIAMCINOLONE ACETONIDE 1 APPLICATION: 1 CREAM TOPICAL at 10:39

## 2025-04-26 RX ADMIN — IPRATROPIUM BROMIDE AND ALBUTEROL SULFATE 3 ML: .5; 3 SOLUTION RESPIRATORY (INHALATION) at 08:21

## 2025-04-26 RX ADMIN — Medication 10 ML: at 20:15

## 2025-04-26 RX ADMIN — SERTRALINE HYDROCHLORIDE 50 MG: 50 TABLET, FILM COATED ORAL at 10:26

## 2025-04-26 RX ADMIN — MUPIROCIN 1 APPLICATION: 20 OINTMENT TOPICAL at 10:31

## 2025-04-26 RX ADMIN — ROSUVASTATIN CALCIUM 40 MG: 20 TABLET, FILM COATED ORAL at 10:25

## 2025-04-26 RX ADMIN — INSULIN LISPRO 8 UNITS: 100 INJECTION, SOLUTION INTRAVENOUS; SUBCUTANEOUS at 13:20

## 2025-04-26 RX ADMIN — INSULIN LISPRO 8 UNITS: 100 INJECTION, SOLUTION INTRAVENOUS; SUBCUTANEOUS at 10:29

## 2025-04-26 RX ADMIN — GABAPENTIN 600 MG: 300 CAPSULE ORAL at 13:20

## 2025-04-26 NOTE — PLAN OF CARE
Problem: Adult Inpatient Plan of Care  Goal: Plan of Care Review  Outcome: Progressing  Flowsheets (Taken 4/25/2025 2333)  Plan of Care Reviewed With: patient  Goal: Patient-Specific Goal (Individualized)  Outcome: Progressing  Goal: Absence of Hospital-Acquired Illness or Injury  Outcome: Progressing  Intervention: Identify and Manage Fall Risk  Recent Flowsheet Documentation  Taken 4/25/2025 2200 by Luci Guerra RN  Safety Promotion/Fall Prevention: safety round/check completed  Taken 4/25/2025 2000 by Luci Guerra RN  Safety Promotion/Fall Prevention: safety round/check completed  Intervention: Prevent Skin Injury  Recent Flowsheet Documentation  Taken 4/25/2025 2200 by Luci Guerra RN  Body Position: turned  Taken 4/25/2025 2000 by Luci Guerra RN  Body Position: turned  Skin Protection: protective footwear used  Intervention: Prevent and Manage VTE (Venous Thromboembolism) Risk  Recent Flowsheet Documentation  Taken 4/25/2025 2000 by Luci Geurra RN  VTE Prevention/Management:   bilateral   SCDs (sequential compression devices) on  Intervention: Prevent Infection  Recent Flowsheet Documentation  Taken 4/25/2025 2200 by Luci Guerra RN  Infection Prevention: single patient room provided  Taken 4/25/2025 2000 by Luci Guerra RN  Infection Prevention: single patient room provided  Goal: Optimal Comfort and Wellbeing  Outcome: Progressing  Intervention: Provide Person-Centered Care  Recent Flowsheet Documentation  Taken 4/25/2025 2000 by Luci Guerra RN  Trust Relationship/Rapport:   choices provided   care explained  Goal: Readiness for Transition of Care  Outcome: Progressing     Problem: Noninvasive Ventilation Acute  Goal: Effective Unassisted Ventilation and Oxygenation  Outcome: Progressing     Problem: Comorbidity Management  Goal: Maintenance of COPD Symptom Control  Outcome: Progressing  Intervention: Maintain COPD (Chronic Obstructive Pulmonary Disease) Symptom Control  Recent  Flowsheet Documentation  Taken 4/25/2025 2200 by Luci Guerra RN  Medication Review/Management: medications reviewed  Taken 4/25/2025 2000 by Luci Guerra RN  Medication Review/Management: medications reviewed  Goal: Blood Glucose Level Within Target Range  Outcome: Progressing  Intervention: Monitor and Manage Glycemia  Recent Flowsheet Documentation  Taken 4/25/2025 2200 by Luci Guerra RN  Medication Review/Management: medications reviewed  Taken 4/25/2025 2000 by Luci Guerra RN  Medication Review/Management: medications reviewed     Problem: Fall Injury Risk  Goal: Absence of Fall and Fall-Related Injury  Outcome: Progressing  Intervention: Identify and Manage Contributors  Recent Flowsheet Documentation  Taken 4/25/2025 2200 by Luci Guerra RN  Medication Review/Management: medications reviewed  Taken 4/25/2025 2000 by Luci Guerra RN  Medication Review/Management: medications reviewed  Intervention: Promote Injury-Free Environment  Recent Flowsheet Documentation  Taken 4/25/2025 2200 by Luci Guerra RN  Safety Promotion/Fall Prevention: safety round/check completed  Taken 4/25/2025 2000 by Luci Guerra RN  Safety Promotion/Fall Prevention: safety round/check completed     Problem: Sepsis/Septic Shock  Goal: Optimal Coping  Outcome: Progressing  Intervention: Support Patient and Family Response  Recent Flowsheet Documentation  Taken 4/25/2025 2000 by Luci Guerra RN  Supportive Measures: active listening utilized  Family/Support System Care: self-care encouraged  Goal: Absence of Bleeding  Outcome: Progressing  Goal: Blood Glucose Level Within Target Range  Outcome: Progressing  Goal: Absence of Infection Signs and Symptoms  Outcome: Progressing  Intervention: Initiate Sepsis Management  Recent Flowsheet Documentation  Taken 4/25/2025 2200 by Luci Guerra RN  Infection Prevention: single patient room provided  Taken 4/25/2025 2000 by Luci Geurra RN  Infection Prevention:  single patient room provided  Intervention: Promote Recovery  Recent Flowsheet Documentation  Taken 4/25/2025 2000 by Luci Guerra, RN  Sleep/Rest Enhancement: awakenings minimized  Goal: Optimal Nutrition Delivery  Outcome: Progressing   Goal Outcome Evaluation:  Plan of Care Reviewed With: patient

## 2025-04-26 NOTE — PLAN OF CARE
Goal Outcome Evaluation: Pt remains in CCU in telemetry status on 4L nasal cannula. Pt A&O x4. Chest tube now set to -20cm; 0 ml output this shift and atrium still marked at 1300cc from previous shift. Pt denies any pain or shortness of breath at this time.

## 2025-04-26 NOTE — PROGRESS NOTES
[facility] INPATIENT PROGRESS NOTE         [facility] CORONARY CARE    2025      PATIENT IDENTIFICATION:  Name: Branden Diop ADMIT: 2025   : 1950  PCP: Tino Lopez MD    MRN: [id number] LOS: 4 days   AGE/SEX: 74 y.o. male  ROOM: [facility]                     LOS 4    Reason for visit: Pneumonia with pleural effusion and hypoxemia      SUBJECTIVE:      Resting comfortably at time of visit.  Says that he slept well on BiPAP.  On 4-1/2 L supplemental oxygen at time of visit with saturations in the mid 90s.  Scattered coarse breath sounds right greater than left.  Chest tubes stable without airleak. I am seeing the patient for the first time today.  All patient problems are new to me.      Objective   OBJECTIVE:    Vital Sign Min/Max for last 24 hours  Temp  Min: 98 °F (36.7 °C)  Max: 98.5 °F (36.9 °C)   BP  Min: 117/72  Max: 125/66   Pulse  Min: 56  Max: 95   Resp  Min: 16  Max: 25   SpO2  Min: 91 %  Max: 100 %   No data recorded   No data recorded    Vitals:    25 0326 25 0400 25 0821 25 0827   BP:  122/79     BP Location:  Right arm     Patient Position:  Lying     Pulse: 56 63 94 95   Resp: 25 18 16 16   Temp:  98 °F (36.7 °C)     TempSrc:  Oral     SpO2: 99% 100% 100%    Weight:       Height:                25  2100 25  1413   Weight: 130 kg (287 lb 11.2 oz) 130 kg (287 lb)       Body mass index is 40.05 kg/m².                          Body mass index is 40.05 kg/m².    Intake/Output Summary (Last 24 hours) at 2025 0835  Last data filed at 2025 0257  Gross per 24 hour   Intake 300 ml   Output 1600 ml   Net -1300 ml         Exam:  GEN:  No distress, appears stated age  EYES:   PERRL, anicteric sclerae  ENT:    External ears/nose normal, OP clear  NECK:  No adenopathy, midline trachea  LUNGS: Normal chest on inspection, palpation and coarse breath sounds bilaterally on auscultation.  Chest tube stable without airleak.  CV:  Normal S1S2,  without murmur  ABD:  Nontender, nondistended, no hepatosplenomegaly, +BS  EXT:  No edema.  No cyanosis or clubbing.  No mottling and normal cap refill.    Assessment     Scheduled meds:  arformoterol, 15 mcg, Nebulization, BID - RT  budesonide, 0.5 mg, Nebulization, BID - RT  ceFAZolin, 2,000 mg, Intravenous, Q8H  cetirizine, 10 mg, Oral, Daily  famotidine, 20 mg, Oral, BID AC  ferrous sulfate, 162.5 mg, Oral, BID  folic acid, 1 mg, Oral, Daily  gabapentin, 600 mg, Oral, Q8H  guaiFENesin, 1,200 mg, Oral, Q12H  insulin glargine, 30 Units, Subcutaneous, Nightly  insulin lispro, 3-14 Units, Subcutaneous, 4x Daily AC & at Bedtime  insulin lispro, 8 Units, Subcutaneous, TID With Meals  ipratropium-albuterol, 3 mL, Nebulization, 4x Daily - RT  magnesium oxide, 400 mg, Oral, BID  [Held by provider] methylPREDNISolone sodium succinate, 40 mg, Intravenous, Q8H  multivitamin, 1 tablet, Oral, Daily  mupirocin, 1 Application, Each Nare, BID  potassium chloride, 10 mEq, Oral, BID  [Held by provider] rivaroxaban, 20 mg, Oral, Daily  rosuvastatin, 40 mg, Oral, Daily  senna-docusate sodium, 2 tablet, Oral, BID  sertraline, 50 mg, Oral, Daily  sodium chloride, 10 mL, Intravenous, Q12H  torsemide, 40 mg, Oral, Daily  triamcinolone, 1 Application, Topical, Q12H      IV meds:                         Data Review:  Results from last 7 days   Lab Units 04/26/25  0556 04/25/25  2019 04/25/25  0742 04/24/25  0537 04/23/25  1817 04/23/25  0730 04/22/25  0444   SODIUM mmol/L 141  --  141 139  --  131* 135*   POTASSIUM mmol/L 3.9 4.4 3.4* 3.7 3.5 3.4* 3.8   CHLORIDE mmol/L 95*  --  98 100  --  98 98   CO2 mmol/L 34.0*  --  32.1* 30.0*  --  25.9 24.0   BUN mg/dL 19  --  14 18  --  17 12   CREATININE mg/dL 0.90  --  0.68* 0.79  --  0.84 0.80   GLUCOSE mg/dL 212*  --  143* 181*  --  156* 162*   CALCIUM mg/dL 9.3  --  8.8 8.3*  --  8.2* 8.8         Estimated Creatinine Clearance: 96 mL/min (by C-G formula based on SCr of 0.9 mg/dL).  Results from  last 7 days   Lab Units 04/25/25  0742 04/24/25  0537 04/23/25  0730 04/22/25  0444 04/21/25  1455   WBC 10*3/mm3 7.64 6.22 8.09 11.22* 8.18   HEMOGLOBIN g/dL 10.5* 10.1* 10.1* 10.8* 11.9*   PLATELETS 10*3/mm3 194 150 149 180 226     Results from last 7 days   Lab Units 04/24/25  0537 04/21/25  1455   INR  1.33* 1.92*     Results from last 7 days   Lab Units 04/21/25  1455   ALT (SGPT) U/L 15   AST (SGOT) U/L 25     Results from last 7 days   Lab Units 04/22/25  1555 04/21/25  1955   PH, ARTERIAL pH units 7.442 7.375   PO2 ART mm Hg 66.5* 73.5*   PCO2, ARTERIAL mm Hg 43.4 44.1   HCO3 ART mmol/L 29.6* 25.8     Results from last 7 days   Lab Units 04/21/25  1516 04/21/25  1455   PROCALCITONIN ng/mL  --  0.06   LACTATE mmol/L 1.2  --          Glucose   Date/Time Value Ref Range Status   04/26/2025 0617 231 (H) 70 - 130 mg/dL Final   04/25/2025 2115 418 (C) 70 - 130 mg/dL Final   04/25/2025 1756 409 (C) 70 - 130 mg/dL Final   04/25/2025 1538 291 (H) 70 - 130 mg/dL Final   04/25/2025 1212 284 (H) 70 - 130 mg/dL Final   04/25/2025 1036 409 (C) 70 - 130 mg/dL Final   04/25/2025 0643 151 (H) 70 - 130 mg/dL Final         Imaging reviewed  Chest x-ray 4/26 reviewed        Microbiology reviewed            Active Hospital Problems    Diagnosis  POA    **Hypoxia [R09.02]  Yes    History of lung cancer [Z85.118]  Not Applicable    Anemia, chronic disease [D63.8]  Yes    MSSA bacteremia [R78.81, B95.61]  Unknown    Pleural effusion on right [J90]  Yes    Essential hypertension [I10]  Yes    Fatty liver [K76.0]  Yes    Cholelithiasis [K80.20]  Yes    DM2 (diabetes mellitus, type 2) [E11.9]  Yes    Atrial fibrillation [I48.91]  Yes    Dyslipidemia [E78.5]  Yes    Mood disorder [F39]  Yes    Lobar pneumonia [J18.1]  Yes      Resolved Hospital Problems   No resolved problems to display.         ASSESSMENT:  Right pleural effusion, recurrent. S/p CT-guided chest tube 4/22 -> slightly exudative by LDH  COPD/upper lobe predominant  emphysema, no current exacerbation  Keytruda pneumonitis: Only minimal GG infiltrates on latest CT 4/14- finished steroid therapy on 4/15  Acute on chronic hypoxic respiratory failure, secondary to the above.  On O2 4 L/minute baseline  MSSA septicemia  Abnormal echo: Severely dilated right and left atrium on echo 4/24.  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  Continue antibiotics.  Chest tube management per thoracic surgery.  Bronchodilators to help with obstructive lung disease symptoms.  Resume anticoagulant when okay with thoracic surgery.  BiPAP 16 over 10 at night.        Rick Fernandez MD  Pulmonary and Critical Care Medicine  [facility], [facility]  4/26/2025    08:35 EDT

## 2025-04-26 NOTE — PROGRESS NOTES
"    Chief Complaint: Recurrent right pleural effusion    S/P: Port removal and Doxy pleurodesis  POD #: 2    Subjective:  Symptoms:  Improved.  No shortness of breath (IMPROVED).    Diet:  Adequate intake.  No nausea or vomiting.    Activity level: Returning to normal.    Pain:  He complains of pain that is mild.  Pain is well controlled and requiring pain medication.    Improved overall.  Oxygen has been weaned down on room air during exam.     Vital Signs:  Temp:  [98 °F (36.7 °C)-98.5 °F (36.9 °C)] 98 °F (36.7 °C)  Heart Rate:  [56-95] 69  Resp:  [16-25] 18  BP: (113-130)/(57-79) 113/57    Intake & Output (last day)         04/25 0701  04/26 0700 04/26 0701  04/27 0700    P.O. 600     I.V. (mL/kg)      Total Intake(mL/kg) 600 (4.6)     Urine (mL/kg/hr) 1200 (0.4)     Chest Tube 400     Total Output 1600     Net -1000           Urine Unmeasured Occurrence 1 x             Objective:  General Appearance:  Comfortable, in no acute distress and well-appearing.    Vital signs: (most recent): Blood pressure 113/57, pulse 69, temperature 98.8 °F (37.1 °C), temperature source Oral, resp. rate 18, height 180.3 cm (70.98\"), weight 130 kg (287 lb), SpO2 94%.    Lungs:  Normal effort and normal respiratory rate.  He is not in respiratory distress.    Heart: Normal rate.  Regular rhythm.    Chest: Symmetric chest wall expansion. Chest wall tenderness (Around chest tube) present.    Abdomen: Abdomen is soft and non-distended.    Neurological: Patient is alert and oriented to person, place and time.    Skin:  Warm and dry.  (Postoperative incision with Dermabond intact.  Chest tube with blistering around insertion site, likely from adhesive. New adhesive in place today.)              Chest tube:   Site: Clean, Dry, and Intact  Suction: -40cm  Air Leak: negative  24 Hour Total: 400ml    Results Review:     I reviewed the patient's new clinical results.  I reviewed the patient's new imaging results and agree with the " interpretation.  Discussed with patient, nurse, surgeon    Imaging Results (Last 24 Hours)       Procedure Component Value Units Date/Time    XR Chest 1 View [[id number]] Collected: 04/26/25 0807     Updated: 04/26/25 0813    Narrative:      ONE-VIEW PORTABLE CHEST AT 4:43 A.M.     HISTORY: Recurrent right effusion. Right pleural drain.     FINDINGS: A pigtail pleural drain is present at the right base and there  appears to be a small residual somewhat loculated right pleural effusion  and vague adjacent atelectasis showing no change from yesterday's exam.  The lungs are moderately expanded with diffuse interstitial and alveolar  haziness that is unchanged and likely represents combination of chronic  change and superimposed areas of pneumonia and groundglass opacity as  noted on the CT scan performed 2 days ago. The heart remains enlarged.     This report was finalized on 4/26/2025 8:10 AM by Dr. Sushil Javier M.D  on Workstation: [id number]               Lab Results:     Lab Results (last 24 hours)       Procedure Component Value Units Date/Time    POC Glucose Once [[id number]]  (Abnormal) Collected: 04/26/25 1153    Specimen: Blood Updated: 04/26/25 1155     Glucose 290 mg/dL     Basic Metabolic Panel [[id number]]  (Abnormal) Collected: 04/26/25 0556    Specimen: Blood Updated: 04/26/25 0743     Glucose 212 mg/dL      BUN 19 mg/dL      Creatinine 0.90 mg/dL      Sodium 141 mmol/L      Potassium 3.9 mmol/L      Chloride 95 mmol/L      CO2 34.0 mmol/L      Calcium 9.3 mg/dL      BUN/Creatinine Ratio 21.1     Anion Gap 12.0 mmol/L      eGFR 88.5 mL/min/1.73     Narrative:      GFR Categories in Chronic Kidney Disease (CKD)      GFR Category          GFR (mL/min/1.73)    Interpretation  G1                     90 or greater         Normal or high (1)  G2                      60-89                Mild decrease (1)  G3a                   45-59                Mild to moderate decrease  G3b                   30-44                 Moderate to severe decrease  G4                    15-29                Severe decrease  G5                    14 or less           Kidney failure          (1)In the absence of evidence of kidney disease, neither GFR category G1 or G2 fulfill the criteria for CKD.    eGFR calculation 2021 CKD-EPI creatinine equation, which does not include race as a factor    POC Glucose Once [[id number]]  (Abnormal) Collected: 04/26/25 0617    Specimen: Blood Updated: 04/26/25 0619     Glucose 231 mg/dL     Potassium [[id number]]  (Normal) Collected: 04/25/25 2019    Specimen: Blood Updated: 04/25/25 2144     Potassium 4.4 mmol/L     POC Glucose Once [[id number]]  (Abnormal) Collected: 04/25/25 2115    Specimen: Blood Updated: 04/25/25 2119     Glucose 418 mg/dL     POC Glucose Once [[id number]]  (Abnormal) Collected: 04/25/25 1756    Specimen: Blood Updated: 04/25/25 1804     Glucose 409 mg/dL     POC Glucose Once [[id number]]  (Abnormal) Collected: 04/25/25 1538    Specimen: Blood Updated: 04/25/25 1540     Glucose 291 mg/dL              Assessment & Plan       Hypoxia    Lobar pneumonia    DM2 (diabetes mellitus, type 2)    Dyslipidemia    Mood disorder    Atrial fibrillation    Cholelithiasis    Fatty liver    Essential hypertension    Pleural effusion on right    History of lung cancer    Anemia, chronic disease    MSSA bacteremia       Assessment & Plan    POD 2 status post port removal with doxycycline pleurodesis via right-sided chest tube for recurrent pleural effusion of uncertain etiology.  He is improved overall.  On room air during examination. Chest tube in position on AM imaging. Repeat AM CXR.    Leave chest tube to -40 cm for today s/p pleurodesis. Chest tube to -20 at 0300.     Please hold Xarelto with chest tube in place.  Please continue to hold steroids s/p pleurodesis to allow for scar tissue to form appropriately.   Continue antibiotics per ID recommendations.  Thoracic surgery will follow.    Vaishali Lacey,  HORTENCIA  [facility]  04/26/25  14:04 EDT    I have spent greater than 30 minutes reviewing the patient's chart including medical history, notes, radiographic images, labs, and performing assessment and development of a plan and discussion with the patient/family at bedside.

## 2025-04-26 NOTE — PROGRESS NOTES
Name: Branden Diop ADMIT: 2025   : 1950  PCP: Tino Lopez MD    MRN: [id number] LOS: 4 days   AGE/SEX: 74 y.o. male  ROOM: [facility]     Subjective   Subjective   Feeling much better, not short of breath.  Itching controlled pretty well       Objective   Objective   Vital Signs  Temp:  [98 °F (36.7 °C)-98.8 °F (37.1 °C)] 98.8 °F (37.1 °C)  Heart Rate:  [56-95] 69  Resp:  [16-25] 18  BP: (113-130)/(57-79) 113/57  SpO2:  [94 %-100 %] 94 %  on  Flow (L/min) (Oxygen Therapy):  [4-5] 4;   Device (Oxygen Therapy): nasal cannula  Body mass index is 40.05 kg/m².  Physical Exam  Vitals reviewed.   Constitutional:       General: He is not in acute distress.     Appearance: He is obese.   Cardiovascular:      Rate and Rhythm: Normal rate and regular rhythm.   Pulmonary:      Effort: Pulmonary effort is normal.      Breath sounds: Rhonchi present. No wheezing.   Abdominal:      General: There is no distension.      Palpations: Abdomen is soft.      Tenderness: There is no abdominal tenderness.   Skin:     General: Skin is warm and dry.      Comments: Patchy erythematous rash on his left chest and arm primarily, unchanged   Neurological:      General: No focal deficit present.      Mental Status: He is alert.       Results Review     I reviewed the patient's new clinical results.  Results from last 7 days   Lab Units 25  0742 25  0537 25  0730 25  0444   WBC 10*3/mm3 7.64 6.22 8.09 11.22*   HEMOGLOBIN g/dL 10.5* 10.1* 10.1* 10.8*   PLATELETS 10*3/mm3 194 150 149 180     Results from last 7 days   Lab Units 25  0556 25  2019 25  0742 25  0537 25  1817 25  0730   SODIUM mmol/L 141  --  141 139  --  131*   POTASSIUM mmol/L 3.9 4.4 3.4* 3.7   < > 3.4*   CHLORIDE mmol/L 95*  --  98 100  --  98   CO2 mmol/L 34.0*  --  32.1* 30.0*  --  25.9   BUN mg/dL 19  --  14 18  --  17   CREATININE mg/dL 0.90  --  0.68* 0.79  --  0.84   GLUCOSE mg/dL 212*  --  143* 181*   --  156*   EGFR mL/min/1.73 88.5  --  96.3 92.1  --  90.4    < > = values in this interval not displayed.     Results from last 7 days   Lab Units 04/21/25  1455   ALBUMIN g/dL 3.9   BILIRUBIN mg/dL 0.9   ALK PHOS U/L 119*   AST (SGOT) U/L 25   ALT (SGPT) U/L 15     Results from last 7 days   Lab Units 04/26/25  0556 04/25/25  0742 04/24/25  0537 04/23/25  0730 04/22/25  0444 04/21/25  1455   CALCIUM mg/dL 9.3 8.8 8.3* 8.2*   < > 9.6   ALBUMIN g/dL  --   --   --   --   --  3.9   MAGNESIUM mg/dL  --   --   --   --   --  1.8    < > = values in this interval not displayed.     Results from last 7 days   Lab Units 04/21/25  1516 04/21/25  1455   PROCALCITONIN ng/mL  --  0.06   LACTATE mmol/L 1.2  --      Glucose   Date/Time Value Ref Range Status   04/26/2025 1153 290 (H) 70 - 130 mg/dL Final   04/26/2025 0617 231 (H) 70 - 130 mg/dL Final   04/25/2025 2115 418 (C) 70 - 130 mg/dL Final   04/25/2025 1756 409 (C) 70 - 130 mg/dL Final   04/25/2025 1538 291 (H) 70 - 130 mg/dL Final   04/25/2025 1212 284 (H) 70 - 130 mg/dL Final   04/25/2025 1036 409 (C) 70 - 130 mg/dL Final       XR Chest 1 View  Result Date: 4/25/2025  Interval worsening of extensive bilateral pulmonary opacities.  This report was finalized on 4/25/2025 6:48 AM by Dr. Arsh Jorgensen M.D on Workstation: [id number]        I have personally reviewed all medications:  Scheduled Medications  arformoterol, 15 mcg, Nebulization, BID - RT  budesonide, 0.5 mg, Nebulization, BID - RT  ceFAZolin, 2,000 mg, Intravenous, Q8H  cetirizine, 10 mg, Oral, Daily  famotidine, 20 mg, Oral, BID AC  ferrous sulfate, 162.5 mg, Oral, BID  folic acid, 1 mg, Oral, Daily  gabapentin, 600 mg, Oral, Q8H  guaiFENesin, 1,200 mg, Oral, Q12H  insulin glargine, 30 Units, Subcutaneous, Nightly  insulin lispro, 3-14 Units, Subcutaneous, 4x Daily AC & at Bedtime  insulin lispro, 8 Units, Subcutaneous, TID With Meals  ipratropium-albuterol, 3 mL, Nebulization, 4x Daily - RT  magnesium oxide,  400 mg, Oral, BID  [Held by provider] methylPREDNISolone sodium succinate, 40 mg, Intravenous, Q8H  multivitamin, 1 tablet, Oral, Daily  mupirocin, 1 Application, Each Nare, BID  potassium chloride, 10 mEq, Oral, BID  [Held by provider] rivaroxaban, 20 mg, Oral, Daily  rosuvastatin, 40 mg, Oral, Daily  senna-docusate sodium, 2 tablet, Oral, BID  sertraline, 50 mg, Oral, Daily  sodium chloride, 10 mL, Intravenous, Q12H  torsemide, 40 mg, Oral, Daily  triamcinolone, 1 Application, Topical, Q12H    Infusions   Diet  Diet: Cardiac; Healthy Heart (2-3 Na+); Fluid Consistency: Thin (IDDSI 0)    I have personally reviewed:  [x]  Laboratory   []  Microbiology   [x]  Radiology   []  EKG/Telemetry  []  Cardiology/Vascular   []  Pathology    []  Records       Assessment/Plan     Active Hospital Problems    Diagnosis  POA    **Hypoxia [R09.02]  Yes    History of lung cancer [Z85.118]  Not Applicable    Anemia, chronic disease [D63.8]  Yes    MSSA bacteremia [R78.81, B95.61]  Unknown    Pleural effusion on right [J90]  Yes    Essential hypertension [I10]  Yes    Fatty liver [K76.0]  Yes    Cholelithiasis [K80.20]  Yes    DM2 (diabetes mellitus, type 2) [E11.9]  Yes    Atrial fibrillation [I48.91]  Yes    Dyslipidemia [E78.5]  Yes    Mood disorder [F39]  Yes    Lobar pneumonia [J18.1]  Yes      Resolved Hospital Problems   No resolved problems to display.       74 y.o. male with history of squamous cell lung cancer and Keytruda pneumonitis admitted with acute on chronic respiratory failure, persistent right pleural effusion and MSSA bacteremia.  Status post Port-A-Cath removal with doxycycline pleurodesis 4/24      MSSA bacteremia treatment per ID recommendations.  Status post removal of his Port-A-Cath  - Echo reviewed.  Poor image quality, ?veg on AV  - Repeat blood cultures were never ordered.  Will place order now    Steroids started briefly for pneumonitis but now discontinued after 1 dose by thoracic team to allow scarring  after pleurodesis  - Chest tube management per thoracic  - Continue neb regimen    DM2: Uncontrolled even without steroids for over 24 hours.  Will increase scheduled insulin dosing and continue correctional SSI as needed     A-fib: Xarelto on hold due to chest tube.  Resume when able    Lung cancer plans per oncology.  Rash related to chemo.  Atarax available for itching as needed.  Ordered some topical steroid cream as well though he says it has not helped much in the past      DVT prophy SCDs  Dispo: Potential DC early week.    Kartik Ritchie MD  [facility]  04/26/25  15:47 EDT

## 2025-04-27 ENCOUNTER — APPOINTMENT (OUTPATIENT)
Dept: GENERAL RADIOLOGY | Facility: HOSPITAL | Age: 75
End: 2025-04-27
Payer: MEDICARE

## 2025-04-27 LAB
ANION GAP SERPL CALCULATED.3IONS-SCNC: 10 MMOL/L (ref 5–15)
BACTERIA SPEC ANAEROBE CULT: NORMAL
BUN SERPL-MCNC: 20 MG/DL (ref 8–23)
BUN/CREAT SERPL: 31.3 (ref 7–25)
CALCIUM SPEC-SCNC: 8.9 MG/DL (ref 8.6–10.5)
CHLORIDE SERPL-SCNC: 96 MMOL/L (ref 98–107)
CO2 SERPL-SCNC: 34 MMOL/L (ref 22–29)
CREAT SERPL-MCNC: 0.64 MG/DL (ref 0.76–1.27)
EGFRCR SERPLBLD CKD-EPI 2021: 98.1 ML/MIN/1.73
GLUCOSE BLDC GLUCOMTR-MCNC: 158 MG/DL (ref 70–130)
GLUCOSE BLDC GLUCOMTR-MCNC: 179 MG/DL (ref 70–130)
GLUCOSE BLDC GLUCOMTR-MCNC: 203 MG/DL (ref 70–130)
GLUCOSE BLDC GLUCOMTR-MCNC: 307 MG/DL (ref 70–130)
GLUCOSE SERPL-MCNC: 147 MG/DL (ref 65–99)
POTASSIUM SERPL-SCNC: 3.4 MMOL/L (ref 3.5–5.2)
POTASSIUM SERPL-SCNC: 3.5 MMOL/L (ref 3.5–5.2)
SODIUM SERPL-SCNC: 140 MMOL/L (ref 136–145)

## 2025-04-27 PROCEDURE — 71045 X-RAY EXAM CHEST 1 VIEW: CPT

## 2025-04-27 PROCEDURE — 25010000002 CEFAZOLIN PER 500 MG: Performed by: SURGERY

## 2025-04-27 PROCEDURE — 80048 BASIC METABOLIC PNL TOTAL CA: CPT | Performed by: SURGERY

## 2025-04-27 PROCEDURE — 63710000001 INSULIN GLARGINE PER 5 UNITS: Performed by: HOSPITALIST

## 2025-04-27 PROCEDURE — 84132 ASSAY OF SERUM POTASSIUM: CPT | Performed by: HOSPITALIST

## 2025-04-27 PROCEDURE — 94761 N-INVAS EAR/PLS OXIMETRY MLT: CPT

## 2025-04-27 PROCEDURE — 94799 UNLISTED PULMONARY SVC/PX: CPT

## 2025-04-27 PROCEDURE — 82948 REAGENT STRIP/BLOOD GLUCOSE: CPT

## 2025-04-27 PROCEDURE — 97530 THERAPEUTIC ACTIVITIES: CPT

## 2025-04-27 PROCEDURE — 99024 POSTOP FOLLOW-UP VISIT: CPT

## 2025-04-27 PROCEDURE — 94664 DEMO&/EVAL PT USE INHALER: CPT

## 2025-04-27 PROCEDURE — 63710000001 INSULIN LISPRO (HUMAN) PER 5 UNITS: Performed by: HOSPITALIST

## 2025-04-27 RX ORDER — POTASSIUM CHLORIDE 1500 MG/1
40 TABLET, EXTENDED RELEASE ORAL EVERY 4 HOURS
Status: DISPENSED | OUTPATIENT
Start: 2025-04-27 | End: 2025-04-27

## 2025-04-27 RX ADMIN — FAMOTIDINE 20 MG: 20 TABLET, FILM COATED ORAL at 09:42

## 2025-04-27 RX ADMIN — Medication 10 ML: at 19:29

## 2025-04-27 RX ADMIN — POTASSIUM CHLORIDE 40 MEQ: 1500 TABLET, EXTENDED RELEASE ORAL at 14:03

## 2025-04-27 RX ADMIN — BUDESONIDE 0.5 MG: 0.5 INHALANT RESPIRATORY (INHALATION) at 18:46

## 2025-04-27 RX ADMIN — ARFORMOTEROL TARTRATE 15 MCG: 15 SOLUTION RESPIRATORY (INHALATION) at 18:46

## 2025-04-27 RX ADMIN — SERTRALINE HYDROCHLORIDE 50 MG: 50 TABLET, FILM COATED ORAL at 09:40

## 2025-04-27 RX ADMIN — GUAIFENESIN 1200 MG: 600 TABLET, EXTENDED RELEASE ORAL at 09:37

## 2025-04-27 RX ADMIN — IPRATROPIUM BROMIDE AND ALBUTEROL SULFATE 3 ML: .5; 3 SOLUTION RESPIRATORY (INHALATION) at 18:46

## 2025-04-27 RX ADMIN — FOLIC ACID 1 MG: 1 TABLET ORAL at 09:39

## 2025-04-27 RX ADMIN — Medication 5 MG: at 19:30

## 2025-04-27 RX ADMIN — Medication 1 TABLET: at 09:37

## 2025-04-27 RX ADMIN — POTASSIUM CHLORIDE 10 MEQ: 750 TABLET, EXTENDED RELEASE ORAL at 19:30

## 2025-04-27 RX ADMIN — TRIAMCINOLONE ACETONIDE 1 APPLICATION: 1 CREAM TOPICAL at 09:44

## 2025-04-27 RX ADMIN — Medication 10 ML: at 09:43

## 2025-04-27 RX ADMIN — MAGNESIUM OXIDE TAB 400 MG (241.3 MG ELEMENTAL MG) 400 MG: 400 (241.3 MG) TAB at 09:40

## 2025-04-27 RX ADMIN — GABAPENTIN 600 MG: 300 CAPSULE ORAL at 14:03

## 2025-04-27 RX ADMIN — ROSUVASTATIN CALCIUM 40 MG: 20 TABLET, FILM COATED ORAL at 09:43

## 2025-04-27 RX ADMIN — FAMOTIDINE 20 MG: 20 TABLET, FILM COATED ORAL at 18:12

## 2025-04-27 RX ADMIN — INSULIN LISPRO 3 UNITS: 100 INJECTION, SOLUTION INTRAVENOUS; SUBCUTANEOUS at 09:42

## 2025-04-27 RX ADMIN — TRIAMCINOLONE ACETONIDE 1 APPLICATION: 1 CREAM TOPICAL at 19:30

## 2025-04-27 RX ADMIN — SENNOSIDES AND DOCUSATE SODIUM 2 TABLET: 50; 8.6 TABLET ORAL at 19:30

## 2025-04-27 RX ADMIN — SENNOSIDES AND DOCUSATE SODIUM 2 TABLET: 50; 8.6 TABLET ORAL at 09:37

## 2025-04-27 RX ADMIN — FERROUS SULFATE TAB 325 MG (65 MG ELEMENTAL FE) 162.5 MG: 325 (65 FE) TAB at 19:29

## 2025-04-27 RX ADMIN — INSULIN GLARGINE 35 UNITS: 100 INJECTION, SOLUTION SUBCUTANEOUS at 21:43

## 2025-04-27 RX ADMIN — BUDESONIDE 0.5 MG: 0.5 INHALANT RESPIRATORY (INHALATION) at 07:23

## 2025-04-27 RX ADMIN — ARFORMOTEROL TARTRATE 15 MCG: 15 SOLUTION RESPIRATORY (INHALATION) at 07:23

## 2025-04-27 RX ADMIN — SODIUM CHLORIDE 2000 MG: 9 INJECTION, SOLUTION INTRAVENOUS at 04:31

## 2025-04-27 RX ADMIN — IPRATROPIUM BROMIDE AND ALBUTEROL SULFATE 3 ML: .5; 3 SOLUTION RESPIRATORY (INHALATION) at 11:08

## 2025-04-27 RX ADMIN — INSULIN LISPRO 10 UNITS: 100 INJECTION, SOLUTION INTRAVENOUS; SUBCUTANEOUS at 14:03

## 2025-04-27 RX ADMIN — POTASSIUM CHLORIDE 10 MEQ: 750 TABLET, EXTENDED RELEASE ORAL at 09:39

## 2025-04-27 RX ADMIN — IPRATROPIUM BROMIDE AND ALBUTEROL SULFATE 3 ML: .5; 3 SOLUTION RESPIRATORY (INHALATION) at 16:00

## 2025-04-27 RX ADMIN — TORSEMIDE 40 MG: 20 TABLET ORAL at 09:37

## 2025-04-27 RX ADMIN — CETIRIZINE HYDROCHLORIDE 10 MG: 10 TABLET, FILM COATED ORAL at 09:37

## 2025-04-27 RX ADMIN — INSULIN LISPRO 10 UNITS: 100 INJECTION, SOLUTION INTRAVENOUS; SUBCUTANEOUS at 09:42

## 2025-04-27 RX ADMIN — OXYCODONE AND ACETAMINOPHEN 1 TABLET: 5; 325 TABLET ORAL at 19:30

## 2025-04-27 RX ADMIN — MAGNESIUM OXIDE TAB 400 MG (241.3 MG ELEMENTAL MG) 400 MG: 400 (241.3 MG) TAB at 19:30

## 2025-04-27 RX ADMIN — GABAPENTIN 600 MG: 300 CAPSULE ORAL at 05:39

## 2025-04-27 RX ADMIN — SODIUM CHLORIDE 2000 MG: 9 INJECTION, SOLUTION INTRAVENOUS at 19:30

## 2025-04-27 RX ADMIN — MUPIROCIN 1 APPLICATION: 20 OINTMENT TOPICAL at 19:30

## 2025-04-27 RX ADMIN — INSULIN LISPRO 3 UNITS: 100 INJECTION, SOLUTION INTRAVENOUS; SUBCUTANEOUS at 21:43

## 2025-04-27 RX ADMIN — GUAIFENESIN 1200 MG: 600 TABLET, EXTENDED RELEASE ORAL at 19:30

## 2025-04-27 RX ADMIN — GABAPENTIN 600 MG: 300 CAPSULE ORAL at 19:30

## 2025-04-27 RX ADMIN — MUPIROCIN 1 APPLICATION: 20 OINTMENT TOPICAL at 09:40

## 2025-04-27 RX ADMIN — INSULIN LISPRO 5 UNITS: 100 INJECTION, SOLUTION INTRAVENOUS; SUBCUTANEOUS at 18:13

## 2025-04-27 RX ADMIN — FERROUS SULFATE TAB 325 MG (65 MG ELEMENTAL FE) 162.5 MG: 325 (65 FE) TAB at 09:39

## 2025-04-27 RX ADMIN — INSULIN LISPRO 10 UNITS: 100 INJECTION, SOLUTION INTRAVENOUS; SUBCUTANEOUS at 18:13

## 2025-04-27 RX ADMIN — IPRATROPIUM BROMIDE AND ALBUTEROL SULFATE 3 ML: .5; 3 SOLUTION RESPIRATORY (INHALATION) at 07:23

## 2025-04-27 NOTE — PLAN OF CARE
Problem: Adult Inpatient Plan of Care  Goal: Plan of Care Review  Outcome: Progressing  Flowsheets (Taken 4/27/2025 0106)  Plan of Care Reviewed With: patient  Goal: Patient-Specific Goal (Individualized)  Outcome: Progressing  Goal: Absence of Hospital-Acquired Illness or Injury  Outcome: Progressing  Intervention: Identify and Manage Fall Risk  Recent Flowsheet Documentation  Taken 4/27/2025 0000 by Luci Guerra RN  Safety Promotion/Fall Prevention: safety round/check completed  Taken 4/26/2025 2200 by Luci Guerra RN  Safety Promotion/Fall Prevention: safety round/check completed  Taken 4/26/2025 1928 by Luci Guerra RN  Safety Promotion/Fall Prevention: safety round/check completed  Intervention: Prevent Skin Injury  Recent Flowsheet Documentation  Taken 4/27/2025 0000 by Luci Guerra RN  Body Position: position changed independently  Taken 4/26/2025 2200 by Luci Guerra RN  Body Position: position changed independently  Taken 4/26/2025 1928 by Luci Guerra RN  Body Position: position changed independently  Skin Protection: protective footwear used  Intervention: Prevent and Manage VTE (Venous Thromboembolism) Risk  Recent Flowsheet Documentation  Taken 4/26/2025 1928 by Luci Guerra RN  VTE Prevention/Management:   bilateral   SCDs (sequential compression devices) on  Intervention: Prevent Infection  Recent Flowsheet Documentation  Taken 4/27/2025 0000 by Luci Guerra RN  Infection Prevention: single patient room provided  Taken 4/26/2025 2200 by Luci Guerra RN  Infection Prevention: single patient room provided  Taken 4/26/2025 1928 by Luci Guerra RN  Infection Prevention: single patient room provided  Goal: Optimal Comfort and Wellbeing  Outcome: Progressing  Intervention: Provide Person-Centered Care  Recent Flowsheet Documentation  Taken 4/26/2025 1928 by Luci Guerra RN  Trust Relationship/Rapport:   choices provided   care explained  Goal: Readiness for Transition of  Care  Outcome: Progressing     Problem: Noninvasive Ventilation Acute  Goal: Effective Unassisted Ventilation and Oxygenation  Outcome: Progressing     Problem: Comorbidity Management  Goal: Maintenance of COPD Symptom Control  Outcome: Progressing  Intervention: Maintain COPD (Chronic Obstructive Pulmonary Disease) Symptom Control  Recent Flowsheet Documentation  Taken 4/27/2025 0000 by Luci Guerra RN  Medication Review/Management: medications reviewed  Taken 4/26/2025 2200 by Luci Guerra RN  Medication Review/Management: medications reviewed  Taken 4/26/2025 1928 by Luci Guerra RN  Medication Review/Management: medications reviewed  Goal: Blood Glucose Level Within Target Range  Outcome: Progressing  Intervention: Monitor and Manage Glycemia  Recent Flowsheet Documentation  Taken 4/27/2025 0000 by Luci Guerra RN  Medication Review/Management: medications reviewed  Taken 4/26/2025 2200 by Luci Guerra RN  Medication Review/Management: medications reviewed  Taken 4/26/2025 1928 by Luci Guerra RN  Medication Review/Management: medications reviewed     Problem: Fall Injury Risk  Goal: Absence of Fall and Fall-Related Injury  Outcome: Progressing  Intervention: Identify and Manage Contributors  Recent Flowsheet Documentation  Taken 4/27/2025 0000 by Luci Guerra RN  Medication Review/Management: medications reviewed  Taken 4/26/2025 2200 by Luci Guerra RN  Medication Review/Management: medications reviewed  Taken 4/26/2025 1928 by Luci Guerra RN  Medication Review/Management: medications reviewed  Intervention: Promote Injury-Free Environment  Recent Flowsheet Documentation  Taken 4/27/2025 0000 by Luci Guerra RN  Safety Promotion/Fall Prevention: safety round/check completed  Taken 4/26/2025 2200 by Luci Guerra RN  Safety Promotion/Fall Prevention: safety round/check completed  Taken 4/26/2025 1928 by Luci Guerra RN  Safety Promotion/Fall Prevention: safety round/check  completed     Problem: Sepsis/Septic Shock  Goal: Optimal Coping  Outcome: Progressing  Intervention: Support Patient and Family Response  Recent Flowsheet Documentation  Taken 4/26/2025 1928 by Luci Guerra RN  Supportive Measures: active listening utilized  Family/Support System Care: self-care encouraged  Goal: Absence of Bleeding  Outcome: Progressing  Goal: Blood Glucose Level Within Target Range  Outcome: Progressing  Goal: Absence of Infection Signs and Symptoms  Outcome: Progressing  Intervention: Initiate Sepsis Management  Recent Flowsheet Documentation  Taken 4/27/2025 0000 by Luci Guerra RN  Infection Prevention: single patient room provided  Taken 4/26/2025 2200 by Luci Guerra RN  Infection Prevention: single patient room provided  Taken 4/26/2025 1928 by Luci Guerra RN  Infection Prevention: single patient room provided  Intervention: Promote Recovery  Recent Flowsheet Documentation  Taken 4/26/2025 1928 by Luci Guerra RN  Sleep/Rest Enhancement: awakenings minimized  Goal: Optimal Nutrition Delivery  Outcome: Progressing   Goal Outcome Evaluation:  Plan of Care Reviewed With: patient

## 2025-04-27 NOTE — PLAN OF CARE
Goal Outcome Evaluation:  Plan of Care Reviewed With: patient        Progress: improving  Outcome Evaluation: Pt sitting UIC at start of session, requesting assist in BR. PT helped pt ambulate to BR with CGA, using RW to allow for easier management of lines and drains although pt does not demo need for RW for balance. Once finished in BR, pt requesting to return to bed, performs sit<>sup with SV. Pt supine in bed at end of session with all of his needs met. Continues to benefit from skilled PT to progress mobility and improve independence.

## 2025-04-27 NOTE — THERAPY TREATMENT NOTE
Patient Name: Branden Diop  : 1950    MRN: [id number]                              Today's Date: 2025       Admit Date: 2025    Visit Dx:     ICD-10-CM ICD-9-CM   1. Hypoxia  R09.02 799.02   2. Dyspnea, unspecified type  R06.00 786.09   3. Recurrent pleural effusion  J90 511.9   4. History of lung cancer  Z85.118 V10.11   5. MSSA bacteremia  R78.81 790.7    B95.61 041.11     Patient Active Problem List   Diagnosis    A-fib    Atrioventricular block, Mobitz type 1, Wenckebach    Apnea, sleep    Obesity    Streptococcus pneumoniae    Sleep apnea with use of continuous positive airway pressure (CPAP)    Sinusitis    Right wrist pain    Lobar pneumonia    DM2 (diabetes mellitus, type 2)    Dyslipidemia    Hammertoe    Mood disorder    COPD (chronic obstructive pulmonary disease)    Colon polyps    Atrial fibrillation    Anxiety    Pruritus    Cholelithiasis    Fatty liver    Essential hypertension    Vitamin D deficiency    Emphysema, unspecified    Bronchiectasis with acute exacerbation    FHx: colonic polyps    Diverticulosis    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    Malignant neoplasm of middle lobe of right lung    Fluid collection at surgical site, initial encounter    Encounter for long-term (current) use of high-risk medication    Fitting and adjustment of vascular catheter    Anemia associated with chemotherapy    Peripheral neuropathy due to chemotherapy    Acute respiratory failure    Rash in adult    Bone mass    Drug-induced skin rash    Adverse effect of antineoplastic drug    Pleural effusion on right    Acute on chronic hypoxic respiratory failure    Hypoxemia    Drug-induced pneumonitis    Acute on chronic respiratory failure    Dyspnea on exertion    Hyponatremia    Hypoxia    History of lung cancer    Anemia, chronic disease    MSSA bacteremia     Past Medical History:   Diagnosis Date    Anxiety     Arthritis     Atrial fibrillation     CURRENTLY    Bunion of right  foot     Colon polyps     COPD (chronic obstructive pulmonary disease)     MILD. NO MEDS FOR    Depression     History of atrial fibrillation     WITH CARDIOVERSION. NO PROBLEMS    History of skin cancer     BCC REMOVED FROM BACK    [place] (hard of hearing)     Hyperlipidemia     Inguinal hernia, recurrent     RIGHT    Left foot pain     Lung nodules     Rash     IN GROIN TREATING FOR YEAST INFECTION BY PCP    Redness of skin     LOWER LEGS BILATERAL    Scab     BILATERAL LEGS HEALING    Sleep apnea with use of continuous positive airway pressure (CPAP)     CPAP    Streptococcus pneumoniae     ABOUT 6-7 YR AGO.     Type 2 diabetes mellitus      Past Surgical History:   Procedure Laterality Date    BASAL CELL CARCINOMA EXCISION      BRONCHOSCOPY WITH ION ROBOTIC ASSIST N/A 5/6/2024    Procedure: BRONCHOSCOPY WITH ION ROBOT WITH FNA, BIOPSIES, BAL AND ENDOBRONCHIAL ULTRASOUND WITH FNA;  Surgeon: Yony Coles MD;  Location: [facility] ENDOSCOPY;  Service: Robotics - Pulmonary;  Laterality: N/A;  PRE: PULMONARY NODULES  POST: SAME    CARDIAC CATHETERIZATION N/A 11/15/2021    Procedure: Coronary angiography;  Surgeon: Alfredo Jennings MD;  Location:  [facility] C[facility] INVASIVE LOCATION;  Service: Cardiovascular;  Laterality: N/A;    CARDIAC CATHETERIZATION N/A 11/15/2021    Procedure: Left heart cath;  Surgeon: Alfredo Jennings MD;  Location: [facility] CATH INVASIVE LOCATION;  Service: Cardiovascular;  Laterality: N/A;    CARDIAC CATHETERIZATION N/A 11/15/2021    Procedure: Left ventriculography;  Surgeon: Alfredo Jennings MD;  Location: [facility] C[facility] LOC[facility];  Service: Cardiovascular;  Laterality: N/A;    CARDIAC CATHETERIZATION N/A 11/15/2021    Procedure: Aortic root aortogram;  Surgeon: Alfredo Jennings MD;  Location: [facility] CATH INVASIVE LOCATION;  Service: Cardiovascular;  Laterality: N/A;    CARDIOVERSION      CHOLECYSTECTOMY N/A 10/07/2017    Procedure: CHOLECYSTECTOMY LAPAROSCOPIC;  Surgeon: Martir MARTINEZ  MD Uriel;  Location: [facility] MAIN OR;  Service:     COLONOSCOPY  2007    COLONOSCOPY N/A 8/30/2022    Procedure: COLONOSCOPY TO CECUM AND TI AND COLD SNARE POLYPECTOMY;  Surgeon: Cj Lopez MD;  Location: [facility] ENDOSCOPY;  Service: Gastroenterology;  Laterality: N/A;  Pre: History of colon polyps  Post: POLYP, PREVIOUS TATTOO    FOOT SURGERY Left     TOES ARE PINNED. ALL BUT GREAT TOE    HAND SURGERY      THUMB    HERNIA REPAIR      INGUINAL HERNIA REPAIR Right 06/27/2018    Procedure: INGUINAL HERNIA REPAIR, OPEN, RECURRENT;  Surgeon: Martir Trevino MD;  Location: [facility] OR [facility];  Service: General    LASIK Bilateral     LOBECTOMY Right 6/12/2024    Procedure: BRONCHOSCOPY, RIGHT VIDEO ASSISTED THORACOSCOPY WITH RIGHT MIDDLE LOBECTOMY WITH DAVINCI ROBOT, MEDIASTINAL LYMPH NODE DISSECTION, INTERCOSTAL NERVE BLOCK;  Surgeon: David Figueroa MD;  Location: [facility] OR;  Service: Robotics - DaVinci;  Laterality: Right;    NECK SURGERY      growth on salivary gland    REPLACEMENT TOTAL KNEE Right 2007    (he is going to have a LTKR next month)    REPLACEMENT TOTAL KNEE Left     VENOUS ACCESS DEVICE (PORT) INSERTION Right 7/5/2024    Procedure: INSERTION VENOUS ACCESS DEVICE;  Surgeon: David Figueroa MD;  Location: [facility] OR;  Service: Thoracic;  Laterality: Right;    VENOUS ACCESS DEVICE (PORT) REMOVAL N/A 4/24/2025    Procedure: REMOVAL VENOUS ACCESS DEVICE, DOXYCYCLINE PLEURODESIS;  Surgeon: David Figueroa MD;  Location: [facility] OR;  Service: Thoracic;  Laterality: N/A;      General Information       Row Name 04/27/25 1349          Physical Therapy Time and Intention    Document Type therapy note (daily note)  -DB     Mode of Treatment individual therapy;physical therapy  -DB       Row Name 04/27/25 1349          General Information    Patient Profile Reviewed yes  -DB     Existing Precautions/Restrictions fall;oxygen therapy device and L/min  -DB     Barriers to Rehab none identified  -DB                User Key  (r) = Recorded By, (t) = Taken By, (c) = Cosigned By      Initials Name Provider Type    DB Libby Mckeon, SUMAYA Physical Therapist                   Mobility       Row Name 04/27/25 1350          Bed Mobility    Bed Mobility sit-supine  -DB     Sit-Supine [place] (Bed Mobility) supervision  -DB       Row Name 04/27/25 1350          Sit-Stand Transfer    Sit-Stand [place] (Transfers) standby assist  -DB       Row Name 04/27/25 1350          Gait/Stairs (Locomotion)    [place] Level (Gait) standby assist  -DB     Assistive Device (Gait) walker, front-wheeled  -DB     Distance in Feet (Gait) 10  x2 to BR and back  -DB     Deviations/Abnormal Patterns (Gait) gait speed decreased;base of support, wide  -DB     Bilateral Gait Deviations heel strike decreased  -DB               User Key  (r) = Recorded By, (t) = Taken By, (c) = Cosigned By      Initials Name Provider Type    DB Libby Mckeon, SUMAYA Physical Therapist                   Obj/Interventions       Row Name 04/27/25 1350          Balance    Balance Assessment sitting static balance;sitting dynamic balance;standing static balance;standing dynamic balance  -DB     Static Sitting Balance independent  -DB     Dynamic Sitting Balance supervision  -DB     Position, Sitting Balance other (see comments)  on toilet  -DB     Static Standing Balance standby assist  -DB     Dynamic Standing Balance standby assist;contact guard  -DB     Position/Device Used, Standing Balance unsupported;supported;walker, front-wheeled  -DB     Balance Interventions sitting;standing;sit to stand  -DB               User Key  (r) = Recorded By, (t) = Taken By, (c) = Cosigned By      Initials Name Provider Type    DB Libby Mckeon, SUMAYA Physical Therapist                   Goals/Plan    No documentation.                  Clinical Impression       Row Name 04/27/25 1351          Plan of Care Review    Plan of Care Reviewed With patient  -DB     Progress improving  -DB      Outcome Evaluation Pt sitting UIC at start of session, requesting assist in BR. PT helped pt ambulate to BR with CGA, using RW to allow for easier management of lines and drains although pt does not demo need for RW for balance. Once finished in BR, pt requesting to return to bed, performs sit<>sup with SV. Pt supine in bed at end of session with all of his needs met. Continues to benefit from skilled PT to progress mobility and improve independence.  -DB       Row Name 04/27/25 1353          Vital Signs    O2 Delivery Pre Treatment supplemental O2  -DB     O2 Delivery Intra Treatment supplemental O2  -DB     O2 Delivery Post Treatment supplemental O2  -DB     Pre Patient Position Sitting  -DB     Intra Patient Position Standing  -DB     Post Patient Position Supine  -DB       Row Name 04/27/25 1355          Positioning and Restraints    Pre-Treatment Position sitting in chair/recliner  -DB     Post Treatment Position bed  -DB     In Bed supine;notified nsg;call light within reach;encouraged to call for assist;exit alarm on;with family/caregiver  -DB               User Key  (r) = Recorded By, (t) = Taken By, (c) = Cosigned By      Initials Name Provider Type    DB Libby Mckeon, PT Physical Therapist                   Outcome Measures       Row Name 04/27/25 9540          How much help from another person do you currently need...    Turning from your back to your side while in flat bed without using bedrails? 4  -DB     Moving from lying on back to sitting on the side of a flat bed without bedrails? 3  -DB     Moving to and from a bed to a chair (including a wheelchair)? 3  -DB     Standing up from a chair using your arms (e.g., wheelchair, bedside chair)? 4  -DB     Climbing 3-5 steps with a railing? 3  -DB     To walk in hospital room? 3  -DB     AM-PAC 6 Clicks Score (PT) 20  -DB     Highest Level of Mobility Goal 6 --> Walk 10 steps or more  -DB       Row Name 04/27/25 4721          Functional Assessment     Outcome Measure Options AM-PAC 6 Clicks Basic Mobility (PT)  -DB               User Key  (r) = Recorded By, (t) = Taken By, (c) = Cosigned By      Initials Name Provider Type    DB Libby Mckeon, SUMAYA Physical Therapist                                 Physical Therapy Education       Title: PT OT SLP Therapies (In Progress)       Topic: Physical Therapy (Done)       Point: Mobility training (Done)       Learning Progress Summary            Patient Acceptance, E, VU by DB at 4/27/2025 1354    Acceptance, E, VU,NR by ER at 4/23/2025 1410                      Point: Home exercise program (Done)       Learning Progress Summary            Patient Acceptance, E, VU by DB at 4/27/2025 1354    Acceptance, E, VU,NR by ER at 4/23/2025 1410                      Point: Body mechanics (Done)       Learning Progress Summary            Patient Acceptance, E, VU by DB at 4/27/2025 1354    Acceptance, E, VU,NR by ER at 4/23/2025 1410                      Point: Precautions (Done)       Learning Progress Summary            Patient Acceptance, E, VU by DB at 4/27/2025 1354    Acceptance, E, VU,NR by ER at 4/23/2025 1410                                      User Key       Initials Effective Dates Name Provider Type Discipline    DB 06/16/21 -  Libby Mckeon, SUMAYA Physical Therapist PT    ER 10/15/23 -  Cara Hooker, SUMAYA Physical Therapist PT                  PT Recommendation and Plan     Progress: improving  Outcome Evaluation: Pt sitting UIC at start of session, requesting assist in BR. PT helped pt ambulate to BR with CGA, using RW to allow for easier management of lines and drains although pt does not demo need for RW for balance. Once finished in BR, pt requesting to return to bed, performs sit<>sup with SV. Pt supine in bed at end of session with all of his needs met. Continues to benefit from skilled PT to progress mobility and improve independence.     Time Calculation:         PT Charges       Row Name 04/27/25 9869              Time Calculation    Start Time 1322  -DB      Stop Time 1340  -DB      Time Calculation (min) 18 min  -DB      PT Received On 04/27/25  -DB      PT - Next Appointment 04/28/25  -DB         Time Calculation- PT    Total Timed Code Minutes- PT 18 minute(s)  -DB                User Key  (r) = Recorded By, (t) = Taken By, (c) = Cosigned By      Initials Name Provider Type    DB Libby Mckeon, PT Physical Therapist                  Therapy Charges for Today       Code Description Service Date Service Provider Modifiers Qty    [id number]  PT THERAPEUTIC ACT EA 15 MIN 4/27/2025 Libby Mckeon, PT GP 1            PT G-Codes  Outcome Measure Options: AM-PAC 6 Clicks Basic Mobility (PT)  AM-PAC 6 Clicks Score (PT): 20  AM-PAC 6 Clicks Score (OT): 16       Libby Mckeon PT  4/27/2025

## 2025-04-27 NOTE — PROGRESS NOTES
[facility] INPATIENT PROGRESS NOTE         [facility] CORONARY CARE    2025      PATIENT IDENTIFICATION:  Name: Branden Diop ADMIT: 2025   : 1950  PCP: Tino Lopez MD    MRN: [id number] LOS: 5 days   AGE/SEX: 74 y.o. male  ROOM: [facility]                     LOS 5    Reason for visit: Pneumonia with pleural effusion and hypoxemia      SUBJECTIVE:      Resting comfortably.  Slept well on BiPAP last night.  Chest tube stable without airleak.  Mild nonproductive cough predominantly.  Denies chest pain or active wheezing symptoms.      Objective   OBJECTIVE:    Vital Sign Min/Max for last 24 hours  Temp  Min: 97.6 °F (36.4 °C)  Max: 98.8 °F (37.1 °C)   BP  Min: 95/64  Max: 130/74   Pulse  Min: 67  Max: 92   Resp  Min: 16  Max: 20   SpO2  Min: 93 %  Max: 100 %   No data recorded   No data recorded    Vitals:    25 0309 25 0348 25 0724 25 0740   BP:  122/64  95/64   BP Location:  Right arm  Right arm   Patient Position:  Lying  Lying   Pulse: 84 70 73 77   Resp:  20 19 18   Temp:  97.6 °F (36.4 °C)     TempSrc:  Oral     SpO2: 93% 93% 97%    Weight:       Height:                25  2100 25  1413   Weight: 130 kg (287 lb 11.2 oz) 130 kg (287 lb)       Body mass index is 40.05 kg/m².                          Body mass index is 40.05 kg/m².    Intake/Output Summary (Last 24 hours) at 2025 0907  Last data filed at 2025 0730  Gross per 24 hour   Intake --   Output 2320 ml   Net -2320 ml         Exam:  GEN:  No distress, appears stated age  EYES:   PERRL, anicteric sclerae  ENT:    External ears/nose normal, OP clear  NECK:  No adenopathy, midline trachea  LUNGS: Normal chest on inspection, palpation and coarse breath sounds bilaterally on auscultation.  Chest tube stable without airleak.  CV:  Normal S1S2, without murmur  ABD:  Nontender, nondistended, no hepatosplenomegaly, +BS  EXT:  No edema.  No cyanosis or clubbing.  No mottling and normal cap  refill.    Assessment     Scheduled meds:  arformoterol, 15 mcg, Nebulization, BID - RT  budesonide, 0.5 mg, Nebulization, BID - RT  ceFAZolin, 2,000 mg, Intravenous, Q8H  cetirizine, 10 mg, Oral, Daily  famotidine, 20 mg, Oral, BID AC  ferrous sulfate, 162.5 mg, Oral, BID  folic acid, 1 mg, Oral, Daily  gabapentin, 600 mg, Oral, Q8H  guaiFENesin, 1,200 mg, Oral, Q12H  insulin glargine, 35 Units, Subcutaneous, Nightly  insulin lispro, 10 Units, Subcutaneous, TID With Meals  insulin lispro, 3-14 Units, Subcutaneous, 4x Daily AC & at Bedtime  ipratropium-albuterol, 3 mL, Nebulization, 4x Daily - RT  magnesium oxide, 400 mg, Oral, BID  [Held by provider] methylPREDNISolone sodium succinate, 40 mg, Intravenous, Q8H  multivitamin, 1 tablet, Oral, Daily  mupirocin, 1 Application, Each Nare, BID  potassium chloride, 10 mEq, Oral, BID  potassium chloride ER, 40 mEq, Oral, Q4H  [Held by provider] rivaroxaban, 20 mg, Oral, Daily  rosuvastatin, 40 mg, Oral, Daily  senna-docusate sodium, 2 tablet, Oral, BID  sertraline, 50 mg, Oral, Daily  sodium chloride, 10 mL, Intravenous, Q12H  torsemide, 40 mg, Oral, Daily  triamcinolone, 1 Application, Topical, Q12H      IV meds:                         Data Review:  Results from last 7 days   Lab Units 04/27/25  0542 04/26/25  0556 04/25/25  2019 04/25/25  0742 04/24/25  0537 04/23/25  1817 04/23/25  0730   SODIUM mmol/L 140 141  --  141 139  --  131*   POTASSIUM mmol/L 3.4* 3.9 4.4 3.4* 3.7   < > 3.4*   CHLORIDE mmol/L 96* 95*  --  98 100  --  98   CO2 mmol/L 34.0* 34.0*  --  32.1* 30.0*  --  25.9   BUN mg/dL 20 19  --  14 18  --  17   CREATININE mg/dL 0.64* 0.90  --  0.68* 0.79  --  0.84   GLUCOSE mg/dL 147* 212*  --  143* 181*  --  156*   CALCIUM mg/dL 8.9 9.3  --  8.8 8.3*  --  8.2*    < > = values in this interval not displayed.         Estimated Creatinine Clearance: 135 mL/min (A) (by C-G formula based on SCr of 0.64 mg/dL (L)).  Results from last 7 days   Lab Units 04/25/25  0328  04/24/25  0537 04/23/25  0730 04/22/25  0444 04/21/25  1455   WBC 10*3/mm3 7.64 6.22 8.09 11.22* 8.18   HEMOGLOBIN g/dL 10.5* 10.1* 10.1* 10.8* 11.9*   PLATELETS 10*3/mm3 194 150 149 180 226     Results from last 7 days   Lab Units 04/24/25  0537 04/21/25  1455   INR  1.33* 1.92*     Results from last 7 days   Lab Units 04/21/25  1455   ALT (SGPT) U/L 15   AST (SGOT) U/L 25     Results from last 7 days   Lab Units 04/22/25  1555 04/21/25  1955   PH, ARTERIAL pH units 7.442 7.375   PO2 ART mm Hg 66.5* 73.5*   PCO2, ARTERIAL mm Hg 43.4 44.1   HCO3 ART mmol/L 29.6* 25.8     Results from last 7 days   Lab Units 04/21/25  1516 04/21/25  1455   PROCALCITONIN ng/mL  --  0.06   LACTATE mmol/L 1.2  --          Glucose   Date/Time Value Ref Range Status   04/27/2025 0626 158 (H) 70 - 130 mg/dL Final   04/26/2025 2006 278 (H) 70 - 130 mg/dL Final   04/26/2025 1619 137 (H) 70 - 130 mg/dL Final   04/26/2025 1153 290 (H) 70 - 130 mg/dL Final   04/26/2025 0617 231 (H) 70 - 130 mg/dL Final   04/25/2025 2115 418 (C) 70 - 130 mg/dL Final   04/25/2025 1756 409 (C) 70 - 130 mg/dL Final         Imaging reviewed  Chest x-ray 4/27 reviewed        Microbiology reviewed            Active Hospital Problems    Diagnosis  POA    **Hypoxia [R09.02]  Yes    History of lung cancer [Z85.118]  Not Applicable    Anemia, chronic disease [D63.8]  Yes    MSSA bacteremia [R78.81, B95.61]  Unknown    Pleural effusion on right [J90]  Yes    Essential hypertension [I10]  Yes    Fatty liver [K76.0]  Yes    Cholelithiasis [K80.20]  Yes    DM2 (diabetes mellitus, type 2) [E11.9]  Yes    Atrial fibrillation [I48.91]  Yes    Dyslipidemia [E78.5]  Yes    Mood disorder [F39]  Yes    Lobar pneumonia [J18.1]  Yes      Resolved Hospital Problems   No resolved problems to display.         ASSESSMENT:  Right pleural effusion, recurrent. S/p CT-guided chest tube 4/22 -> slightly exudative by LDH  COPD/upper lobe predominant emphysema, no current exacerbation  Keytruda  pneumonitis: Only minimal GG infiltrates on latest CT 4/14- finished steroid therapy on 4/15  Acute on chronic hypoxic respiratory failure, secondary to the above.  On O2 4 L/minute baseline  MSSA septicemia  Abnormal echo: Severely dilated right and left atrium on echo 4/24.  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  Continue antibiotics.  Chest tube management per thoracic surgery.  Bronchodilators to help with obstructive lung disease symptoms.  Steroid has been on hold since the 25th.  Will discontinue.  Resume anticoagulant when okay with thoracic surgery.  BiPAP 16 over 10 at night.        Rick Fernandez MD  Pulmonary and Critical Care Medicine  [facility], [facility]  4/27/2025    09:07 EDT

## 2025-04-27 NOTE — PROGRESS NOTES
"    Chief Complaint: Recurrent right pleural effusion    S/P: Port removal and Doxy pleurodesis  POD #: 3    Subjective:  Symptoms:  Improved.  No shortness of breath (IMPROVED).    Diet:  Adequate intake.  No nausea or vomiting.    Activity level: Returning to normal.    Pain:  He complains of pain that is mild.  Pain is well controlled and requiring pain medication.    Improved overall. Up in chair on exam receiving breathing treatment.     Vital Signs:  Temp:  [97.6 °F (36.4 °C)-98.8 °F (37.1 °C)] 98.4 °F (36.9 °C)  Heart Rate:  [70-92] 78  Resp:  [16-20] 20  BP: ()/(59-77) 95/64    Intake & Output (last day)         04/26 0701  04/27 0700 04/27 0701  04/28 0700    P.O.      Total Intake(mL/kg)      Urine (mL/kg/hr) 1300 (0.4) 1700 (1.3)    Chest Tube 120     Total Output 1420 1700    Net -1420 -1700          Urine Unmeasured Occurrence 3 x             Objective:  General Appearance:  Comfortable, in no acute distress and well-appearing.    Vital signs: (most recent): Blood pressure 95/64, pulse 78, temperature 98.4 °F (36.9 °C), resp. rate 20, height 180.3 cm (70.98\"), weight 130 kg (287 lb), SpO2 92%.    Lungs:  Normal effort and normal respiratory rate.  He is not in respiratory distress.    Heart: Normal rate.  Regular rhythm.    Chest: Symmetric chest wall expansion. Chest wall tenderness (Around chest tube) present.    Abdomen: Abdomen is soft and non-distended.    Neurological: Patient is alert and oriented to person, place and time.    Skin:  Warm and dry.  (Postoperative incision with Dermabond intact.  Chest tube with blistering around insertion site, likely from adhesive. New adhesive in place today.)              Chest tube:   Site: Clean, Dry, and Intact  Suction: -20cm  Air Leak: negative  24 Hour Total: 120ml    Results Review:     I reviewed the patient's new clinical results.  I reviewed the patient's new imaging results and agree with the interpretation.  Discussed with patient, nurse, " surgeon    Imaging Results (Last 24 Hours)       Procedure Component Value Units Date/Time    XR Chest 1 View [[id number]] Collected: 04/27/25 0535     Updated: 04/27/25 0539    Narrative:      SINGLE VIEW OF THE CHEST     HISTORY: Recurrent right pleural effusion     COMPARISON: April 26, 2025     FINDINGS:  Right-sided pigtail drainage catheter remains in position. Right pleural  effusion appears stable. No pneumothorax is seen. Bilateral alveolar and  interstitial infiltrates are again seen. This appears stable. No  pneumothorax is identified.       Impression:      No significant interval change.     This report was finalized on 4/27/2025 5:36 AM by Dr. Renee Alvarado M.D on Workstation: [id number]               Lab Results:     Lab Results (last 24 hours)       Procedure Component Value Units Date/Time    Potassium [[id number]]  (Normal) Collected: 04/27/25 1602    Specimen: Blood Updated: 04/27/25 1639     Potassium 3.5 mmol/L     POC Glucose Once [[id number]]  (Abnormal) Collected: 04/27/25 1622    Specimen: Blood Updated: 04/27/25 1633     Glucose 203 mg/dL     POC Glucose Once [[id number]]  (Abnormal) Collected: 04/27/25 1058    Specimen: Blood Updated: 04/27/25 1059     Glucose 307 mg/dL     Basic Metabolic Panel [[id number]]  (Abnormal) Collected: 04/27/25 0542    Specimen: Blood Updated: 04/27/25 0647     Glucose 147 mg/dL      BUN 20 mg/dL      Creatinine 0.64 mg/dL      Sodium 140 mmol/L      Potassium 3.4 mmol/L      Chloride 96 mmol/L      CO2 34.0 mmol/L      Calcium 8.9 mg/dL      BUN/Creatinine Ratio 31.3     Anion Gap 10.0 mmol/L      eGFR 98.1 mL/min/1.73     Narrative:      GFR Categories in Chronic Kidney Disease (CKD)      GFR Category          GFR (mL/min/1.73)    Interpretation  G1                     90 or greater         Normal or high (1)  G2                      60-89                Mild decrease (1)  G3a                   45-59                Mild to moderate decrease  G3b                    30-44                Moderate to severe decrease  G4                    15-29                Severe decrease  G5                    14 or less           Kidney failure          (1)In the absence of evidence of kidney disease, neither GFR category G1 or G2 fulfill the criteria for CKD.    eGFR calculation 2021 CKD-EPI creatinine equation, which does not include race as a factor    POC Glucose Once [[id number]]  (Abnormal) Collected: 04/27/25 0626    Specimen: Blood Updated: 04/27/25 0628     Glucose 158 mg/dL     Anaerobic Culture - Body Fluid, Pleural Cavity [[id number]]  (Normal) Collected: 04/22/25 1445    Specimen: Body Fluid from Pleural Cavity Updated: 04/27/25 0622     Anaerobic Culture No anaerobes isolated at 5 days    POC Glucose Once [[id number]]  (Abnormal) Collected: 04/26/25 2006    Specimen: Blood Updated: 04/26/25 2009     Glucose 278 mg/dL              Assessment & Plan       Hypoxia    Lobar pneumonia    DM2 (diabetes mellitus, type 2)    Dyslipidemia    Mood disorder    Atrial fibrillation    Cholelithiasis    Fatty liver    Essential hypertension    Pleural effusion on right    History of lung cancer    Anemia, chronic disease    MSSA bacteremia       Assessment & Plan    POD 3 status post port removal with doxycycline pleurodesis via right-sided chest tube for recurrent pleural effusion of uncertain etiology.  He is improved overall.  Chest tube in position on AM imaging. Repeat AM CXR.    Leave chest tube to -20 cm for today s/p pleurodesis. Chest tube to waterseal at 0300.     Please hold Xarelto with chest tube in place.  Steroids discontinued s/p pleurodesis.    Continue antibiotics per ID recommendations.  Thoracic surgery will follow.    Vaishali Lacey PA-C  [facility]  04/27/25  16:59 EDT    I have spent greater than 30 minutes reviewing the patient's chart including medical history, notes, radiographic images, labs, and performing assessment and development of a plan  and discussion with the patient/family at bedside.

## 2025-04-28 ENCOUNTER — APPOINTMENT (OUTPATIENT)
Dept: GENERAL RADIOLOGY | Facility: HOSPITAL | Age: 75
End: 2025-04-28
Payer: MEDICARE

## 2025-04-28 ENCOUNTER — HOSPITAL ENCOUNTER (OUTPATIENT)
Dept: ULTRASOUND IMAGING | Facility: HOSPITAL | Age: 75
Discharge: HOME | End: 2025-04-28
Payer: MEDICARE

## 2025-04-28 LAB
ANION GAP SERPL CALCULATED.3IONS-SCNC: 9.2 MMOL/L (ref 5–15)
BUN SERPL-MCNC: 16 MG/DL (ref 8–23)
BUN/CREAT SERPL: 25.8 (ref 7–25)
CALCIUM SPEC-SCNC: 9.1 MG/DL (ref 8.6–10.5)
CHLORIDE SERPL-SCNC: 96 MMOL/L (ref 98–107)
CO2 SERPL-SCNC: 34.8 MMOL/L (ref 22–29)
CREAT SERPL-MCNC: 0.62 MG/DL (ref 0.76–1.27)
DEPRECATED RDW RBC AUTO: 51.5 FL (ref 37–54)
EGFRCR SERPLBLD CKD-EPI 2021: 99.1 ML/MIN/1.73
ERYTHROCYTE [DISTWIDTH] IN BLOOD BY AUTOMATED COUNT: 15.8 % (ref 12.3–15.4)
GLUCOSE BLDC GLUCOMTR-MCNC: 150 MG/DL (ref 70–130)
GLUCOSE BLDC GLUCOMTR-MCNC: 156 MG/DL (ref 70–130)
GLUCOSE BLDC GLUCOMTR-MCNC: 233 MG/DL (ref 70–130)
GLUCOSE BLDC GLUCOMTR-MCNC: 255 MG/DL (ref 70–130)
GLUCOSE SERPL-MCNC: 142 MG/DL (ref 65–99)
HCT VFR BLD AUTO: 31.5 % (ref 37.5–51)
HGB BLD-MCNC: 10 G/DL (ref 13–17.7)
MCH RBC QN AUTO: 28.2 PG (ref 26.6–33)
MCHC RBC AUTO-ENTMCNC: 31.7 G/DL (ref 31.5–35.7)
MCV RBC AUTO: 89 FL (ref 79–97)
PLATELET # BLD AUTO: 327 10*3/MM3 (ref 140–450)
PMV BLD AUTO: 9.6 FL (ref 6–12)
POTASSIUM SERPL-SCNC: 3.7 MMOL/L (ref 3.5–5.2)
RBC # BLD AUTO: 3.54 10*6/MM3 (ref 4.14–5.8)
SODIUM SERPL-SCNC: 140 MMOL/L (ref 136–145)
WBC NRBC COR # BLD AUTO: 8.65 10*3/MM3 (ref 3.4–10.8)

## 2025-04-28 PROCEDURE — 87040 BLOOD CULTURE FOR BACTERIA: CPT | Performed by: HOSPITALIST

## 2025-04-28 PROCEDURE — 94799 UNLISTED PULMONARY SVC/PX: CPT

## 2025-04-28 PROCEDURE — 94761 N-INVAS EAR/PLS OXIMETRY MLT: CPT

## 2025-04-28 PROCEDURE — 94664 DEMO&/EVAL PT USE INHALER: CPT

## 2025-04-28 PROCEDURE — 99233 SBSQ HOSP IP/OBS HIGH 50: CPT | Performed by: INTERNAL MEDICINE

## 2025-04-28 PROCEDURE — 71045 X-RAY EXAM CHEST 1 VIEW: CPT

## 2025-04-28 PROCEDURE — 63710000001 INSULIN LISPRO (HUMAN) PER 5 UNITS: Performed by: HOSPITALIST

## 2025-04-28 PROCEDURE — G0545 PR INHERENT VISIT TO INPT: HCPCS | Performed by: INTERNAL MEDICINE

## 2025-04-28 PROCEDURE — 94660 CPAP INITIATION&MGMT: CPT

## 2025-04-28 PROCEDURE — 97110 THERAPEUTIC EXERCISES: CPT

## 2025-04-28 PROCEDURE — 85027 COMPLETE CBC AUTOMATED: CPT | Performed by: HOSPITALIST

## 2025-04-28 PROCEDURE — 25010000002 CEFAZOLIN PER 500 MG: Performed by: SURGERY

## 2025-04-28 PROCEDURE — 82948 REAGENT STRIP/BLOOD GLUCOSE: CPT

## 2025-04-28 PROCEDURE — C1751 CATH, INF, PER/CENT/MIDLINE: HCPCS

## 2025-04-28 PROCEDURE — 80048 BASIC METABOLIC PNL TOTAL CA: CPT | Performed by: SURGERY

## 2025-04-28 PROCEDURE — 99024 POSTOP FOLLOW-UP VISIT: CPT

## 2025-04-28 PROCEDURE — 97535 SELF CARE MNGMENT TRAINING: CPT

## 2025-04-28 PROCEDURE — 63710000001 INSULIN GLARGINE PER 5 UNITS: Performed by: HOSPITALIST

## 2025-04-28 RX ORDER — SODIUM CHLORIDE 0.9 % (FLUSH) 0.9 %
10 SYRINGE (ML) INJECTION EVERY 12 HOURS SCHEDULED
Status: DISCONTINUED | OUTPATIENT
Start: 2025-04-28 | End: 2025-04-30 | Stop reason: HOSPADM

## 2025-04-28 RX ORDER — OXYCODONE AND ACETAMINOPHEN 5; 325 MG/1; MG/1
1 TABLET ORAL EVERY 4 HOURS PRN
Refills: 0 | Status: DISCONTINUED | OUTPATIENT
Start: 2025-04-28 | End: 2025-04-30 | Stop reason: HOSPADM

## 2025-04-28 RX ORDER — SODIUM CHLORIDE 9 MG/ML
40 INJECTION, SOLUTION INTRAVENOUS AS NEEDED
Status: DISCONTINUED | OUTPATIENT
Start: 2025-04-28 | End: 2025-04-30 | Stop reason: HOSPADM

## 2025-04-28 RX ORDER — SODIUM CHLORIDE 0.9 % (FLUSH) 0.9 %
10 SYRINGE (ML) INJECTION AS NEEDED
Status: DISCONTINUED | OUTPATIENT
Start: 2025-04-28 | End: 2025-04-30 | Stop reason: HOSPADM

## 2025-04-28 RX ORDER — SODIUM CHLORIDE 0.9 % (FLUSH) 0.9 %
20 SYRINGE (ML) INJECTION AS NEEDED
Status: DISCONTINUED | OUTPATIENT
Start: 2025-04-28 | End: 2025-04-30 | Stop reason: HOSPADM

## 2025-04-28 RX ADMIN — Medication 10 ML: at 20:56

## 2025-04-28 RX ADMIN — TORSEMIDE 40 MG: 20 TABLET ORAL at 08:36

## 2025-04-28 RX ADMIN — FOLIC ACID 1 MG: 1 TABLET ORAL at 08:36

## 2025-04-28 RX ADMIN — SODIUM CHLORIDE 2000 MG: 9 INJECTION, SOLUTION INTRAVENOUS at 20:55

## 2025-04-28 RX ADMIN — OXYCODONE AND ACETAMINOPHEN 1 TABLET: 5; 325 TABLET ORAL at 16:58

## 2025-04-28 RX ADMIN — IPRATROPIUM BROMIDE AND ALBUTEROL SULFATE 3 ML: .5; 3 SOLUTION RESPIRATORY (INHALATION) at 11:15

## 2025-04-28 RX ADMIN — MAGNESIUM OXIDE TAB 400 MG (241.3 MG ELEMENTAL MG) 400 MG: 400 (241.3 MG) TAB at 08:35

## 2025-04-28 RX ADMIN — INSULIN LISPRO 10 UNITS: 100 INJECTION, SOLUTION INTRAVENOUS; SUBCUTANEOUS at 17:00

## 2025-04-28 RX ADMIN — GABAPENTIN 600 MG: 300 CAPSULE ORAL at 04:04

## 2025-04-28 RX ADMIN — SODIUM CHLORIDE 2000 MG: 9 INJECTION, SOLUTION INTRAVENOUS at 03:25

## 2025-04-28 RX ADMIN — ROSUVASTATIN CALCIUM 40 MG: 20 TABLET, FILM COATED ORAL at 08:35

## 2025-04-28 RX ADMIN — IPRATROPIUM BROMIDE AND ALBUTEROL SULFATE 3 ML: .5; 3 SOLUTION RESPIRATORY (INHALATION) at 15:57

## 2025-04-28 RX ADMIN — OXYCODONE AND ACETAMINOPHEN 1 TABLET: 5; 325 TABLET ORAL at 03:28

## 2025-04-28 RX ADMIN — SERTRALINE HYDROCHLORIDE 50 MG: 50 TABLET, FILM COATED ORAL at 08:35

## 2025-04-28 RX ADMIN — Medication 10 ML: at 12:26

## 2025-04-28 RX ADMIN — BUDESONIDE 0.5 MG: 0.5 INHALANT RESPIRATORY (INHALATION) at 21:10

## 2025-04-28 RX ADMIN — GUAIFENESIN 1200 MG: 600 TABLET, EXTENDED RELEASE ORAL at 20:56

## 2025-04-28 RX ADMIN — POTASSIUM CHLORIDE 10 MEQ: 750 TABLET, EXTENDED RELEASE ORAL at 08:36

## 2025-04-28 RX ADMIN — ARFORMOTEROL TARTRATE 15 MCG: 15 SOLUTION RESPIRATORY (INHALATION) at 21:10

## 2025-04-28 RX ADMIN — FAMOTIDINE 20 MG: 20 TABLET, FILM COATED ORAL at 08:36

## 2025-04-28 RX ADMIN — INSULIN LISPRO 10 UNITS: 100 INJECTION, SOLUTION INTRAVENOUS; SUBCUTANEOUS at 12:18

## 2025-04-28 RX ADMIN — SENNOSIDES AND DOCUSATE SODIUM 2 TABLET: 50; 8.6 TABLET ORAL at 20:55

## 2025-04-28 RX ADMIN — TRIAMCINOLONE ACETONIDE 1 APPLICATION: 1 CREAM TOPICAL at 12:26

## 2025-04-28 RX ADMIN — GUAIFENESIN 1200 MG: 600 TABLET, EXTENDED RELEASE ORAL at 08:35

## 2025-04-28 RX ADMIN — GABAPENTIN 600 MG: 300 CAPSULE ORAL at 14:47

## 2025-04-28 RX ADMIN — INSULIN GLARGINE 35 UNITS: 100 INJECTION, SOLUTION SUBCUTANEOUS at 20:56

## 2025-04-28 RX ADMIN — Medication 1 TABLET: at 08:34

## 2025-04-28 RX ADMIN — OXYCODONE AND ACETAMINOPHEN 1 TABLET: 5; 325 TABLET ORAL at 20:56

## 2025-04-28 RX ADMIN — FAMOTIDINE 20 MG: 20 TABLET, FILM COATED ORAL at 16:58

## 2025-04-28 RX ADMIN — INSULIN LISPRO 5 UNITS: 100 INJECTION, SOLUTION INTRAVENOUS; SUBCUTANEOUS at 12:18

## 2025-04-28 RX ADMIN — FERROUS SULFATE TAB 325 MG (65 MG ELEMENTAL FE) 162.5 MG: 325 (65 FE) TAB at 20:56

## 2025-04-28 RX ADMIN — SODIUM CHLORIDE 2000 MG: 9 INJECTION, SOLUTION INTRAVENOUS at 12:18

## 2025-04-28 RX ADMIN — GABAPENTIN 600 MG: 300 CAPSULE ORAL at 20:55

## 2025-04-28 RX ADMIN — INSULIN LISPRO 8 UNITS: 100 INJECTION, SOLUTION INTRAVENOUS; SUBCUTANEOUS at 20:56

## 2025-04-28 RX ADMIN — IPRATROPIUM BROMIDE AND ALBUTEROL SULFATE 3 ML: .5; 3 SOLUTION RESPIRATORY (INHALATION) at 21:10

## 2025-04-28 RX ADMIN — INSULIN LISPRO 10 UNITS: 100 INJECTION, SOLUTION INTRAVENOUS; SUBCUTANEOUS at 08:37

## 2025-04-28 RX ADMIN — CETIRIZINE HYDROCHLORIDE 10 MG: 10 TABLET, FILM COATED ORAL at 08:36

## 2025-04-28 RX ADMIN — OXYCODONE AND ACETAMINOPHEN 1 TABLET: 5; 325 TABLET ORAL at 12:17

## 2025-04-28 RX ADMIN — ARFORMOTEROL TARTRATE 15 MCG: 15 SOLUTION RESPIRATORY (INHALATION) at 07:54

## 2025-04-28 RX ADMIN — FERROUS SULFATE TAB 325 MG (65 MG ELEMENTAL FE) 162.5 MG: 325 (65 FE) TAB at 08:35

## 2025-04-28 RX ADMIN — SENNOSIDES AND DOCUSATE SODIUM 2 TABLET: 50; 8.6 TABLET ORAL at 08:35

## 2025-04-28 RX ADMIN — POTASSIUM CHLORIDE 10 MEQ: 750 TABLET, EXTENDED RELEASE ORAL at 20:56

## 2025-04-28 RX ADMIN — IPRATROPIUM BROMIDE AND ALBUTEROL SULFATE 3 ML: .5; 3 SOLUTION RESPIRATORY (INHALATION) at 07:50

## 2025-04-28 RX ADMIN — MAGNESIUM OXIDE TAB 400 MG (241.3 MG ELEMENTAL MG) 400 MG: 400 (241.3 MG) TAB at 20:55

## 2025-04-28 RX ADMIN — Medication 5 MG: at 20:56

## 2025-04-28 RX ADMIN — TRIAMCINOLONE ACETONIDE 1 APPLICATION: 1 CREAM TOPICAL at 20:55

## 2025-04-28 RX ADMIN — BUDESONIDE 0.5 MG: 0.5 INHALANT RESPIRATORY (INHALATION) at 07:53

## 2025-04-28 NOTE — PROGRESS NOTES
ID note    CC: Follow-up MSSA septicemia, port in place now removed, and right pleural effusion status post right chest tube all in the context of lung cancer    Subjective: No acute events.  No fever.  He is tolerating cefazolin without rash or diarrhea.  His site of prior port is healing without any erythema or drainage.  His O2 needs are down to 4 L.    Medications:    Current Facility-Administered Medications:     acetaminophen (TYLENOL) tablet 650 mg, 650 mg, Oral, Q4H PRN **OR** acetaminophen (TYLENOL) 160 MG/5ML oral solution 650 mg, 650 mg, Oral, Q4H PRN **OR** acetaminophen (TYLENOL) suppository 650 mg, 650 mg, Rectal, Q4H PRN, David Figueroa MD    albuterol (PROVENTIL) nebulizer solution 0.083% 2.5 mg/3mL, 2.5 mg, Nebulization, Q4H PRN, David Figueroa MD    arformoterol (BROVANA) nebulizer solution 15 mcg, 15 mcg, Nebulization, BID - RT, David Figueroa MD, 15 mcg at 04/28/25 0754    sennosides-docusate (PERICOLACE) 8.6-50 MG per tablet 2 tablet, 2 tablet, Oral, BID, 2 tablet at 04/28/25 0835 **AND** polyethylene glycol (MIRALAX) packet 17 g, 17 g, Oral, Daily PRN **AND** bisacodyl (DULCOLAX) EC tablet 5 mg, 5 mg, Oral, Daily PRN **AND** bisacodyl (DULCOLAX) suppository 10 mg, 10 mg, Rectal, Daily PRN, David Figueroa MD    budesonide (PULMICORT) nebulizer solution 0.5 mg, 0.5 mg, Nebulization, BID - RT, David Figueroa MD, 0.5 mg at 04/28/25 0753    Calcium Replacement - Follow Nurse / BPA Driven Protocol, , Not Applicable, PRN, David Figueroa MD    ceFAZolin 2000 mg IVPB in 100 mL NS (MBP), 2,000 mg, Intravenous, Q8H, David Figueroa MD, Last Rate: 0 mL/hr at 04/27/25 1402, 2,000 mg at 04/28/25 0325    cetirizine (zyrTEC) tablet 10 mg, 10 mg, Oral, Daily, David Figueroa MD, 10 mg at 04/28/25 0836    dextrose (D50W) (25 g/50 mL) IV injection 25 g, 25 g, Intravenous, Q15 Min PRN, David Figueroa MD    dextrose (GLUTOSE) oral gel 15 g, 15 g, Oral, Q15 Min PRN, David Figueroa MD    famotidine (PEPCID) tablet 20 mg, 20 mg, Oral, BID  AC, David Figueroa MD, 20 mg at 04/28/25 0836    ferrous sulfate tablet 162.5 mg, 162.5 mg, Oral, BID, David Figueroa MD, 162.5 mg at 04/28/25 0835    folic acid (FOLVITE) tablet 1 mg, 1 mg, Oral, Daily, David Figueroa MD, 1 mg at 04/28/25 0836    gabapentin (NEURONTIN) capsule 600 mg, 600 mg, Oral, Q8H, David Figueroa MD, 600 mg at 04/28/25 0404    glucagon (GLUCAGEN) injection 1 mg, 1 mg, Intramuscular, Q15 Min PRN, David Figueroa MD guaiFENesin (MUCINEX) 12 hr tablet 1,200 mg, 1,200 mg, Oral, Q12H, David Figueroa MD, 1,200 mg at 04/28/25 0835    hydrOXYzine pamoate (VISTARIL) capsule 50 mg, 50 mg, Oral, TID PRN, David Figueroa MD    insulin glargine (LANTUS, SEMGLEE) injection 35 Units, 35 Units, Subcutaneous, Nightly, Kartik Ritchie MD, 35 Units at 04/27/25 2143    insulin lispro (HUMALOG/ADMELOG) injection 10 Units, 10 Units, Subcutaneous, TID With Meals, Kartik Ritchie MD, 10 Units at 04/28/25 0837    insulin lispro (HUMALOG/ADMELOG) injection 3-14 Units, 3-14 Units, Subcutaneous, 4x Daily AC & at Bedtime, Kartik Ritchie MD, 3 Units at 04/27/25 2143    ipratropium-albuterol (DUO-NEB) nebulizer solution 3 mL, 3 mL, Nebulization, 4x Daily - RT, David Figueroa MD, 3 mL at 04/28/25 0750    magnesium oxide (MAG-OX) tablet 400 mg, 400 mg, Oral, BID, David Figueroa MD, 400 mg at 04/28/25 0835    Magnesium Standard Dose Replacement - Follow Nurse / BPA Driven Protocol, , Not Applicable, PRN, David Figueroa MD    melatonin tablet 5 mg, 5 mg, Oral, Nightly PRN, David Figueroa MD, 5 mg at 04/27/25 1930    multivitamin (THERAGRAN) tablet 1 tablet, 1 tablet, Oral, Daily, David Figueroa MD, 1 tablet at 04/28/25 0834    nitroglycerin (NITROSTAT) SL tablet 0.4 mg, 0.4 mg, Sublingual, Q5 Min PRN, David Figueroa MD    ondansetron ODT (ZOFRAN-ODT) disintegrating tablet 4 mg, 4 mg, Oral, Q6H PRN **OR** ondansetron (ZOFRAN) injection 4 mg, 4 mg, Intravenous, Q6H PRN, David Figueroa MD    oxyCODONE-acetaminophen  (PERCOCET) 5-325 MG per tablet 1 tablet, 1 tablet, Oral, Q8H PRN, Cynthia Soria, JEAN-PAUL, APRN, 1 tablet at 04/28/25 0328    Phosphorus Replacement - Follow Nurse / BPA Driven Protocol, , Not Applicable, Henry SALAZAR Nabeel, MD    potassium chloride (K-DUR,KLOR-CON) ER tablet 10 mEq, 10 mEq, Oral, BID, David Figueroa MD, 10 mEq at 04/28/25 0836    Potassium Replacement - Follow Nurse / BPA Driven Protocol, , Not Applicable, Henry SALAZAR Nabeel, MD    [Held by provider] rivaroxaban (XARELTO) tablet 20 mg, 20 mg, Oral, Daily, Rigo Eng MD, 20 mg at 04/22/25 0851    rosuvastatin (CRESTOR) tablet 40 mg, 40 mg, Oral, Daily, David Figueroa MD, 40 mg at 04/28/25 0835    sertraline (ZOLOFT) tablet 50 mg, 50 mg, Oral, Daily, David Figueroa MD, 50 mg at 04/28/25 0835    sodium chloride 0.9 % flush 10 mL, 10 mL, Intravenous, PRN, David Figueroa MD    sodium chloride 0.9 % flush 10 mL, 10 mL, Intravenous, Q12H, David Figueroa MD, 10 mL at 04/27/25 1929    sodium chloride 0.9 % flush 10 mL, 10 mL, Intravenous, PRNHenry Nabeel, MD    sodium chloride 0.9 % infusion 40 mL, 40 mL, Intravenous, PRN, David Figueroa MD    torsemide (DEMADEX) tablet 40 mg, 40 mg, Oral, Daily, David Figueroa MD, 40 mg at 04/28/25 0836    triamcinolone (KENALOG) 0.1 % cream 1 Application, 1 Application, Topical, Q12H, Kartik Ritchie MD, 1 Application at 04/27/25 1930      Objective   Vital Signs   Temp:  [97.3 °F (36.3 °C)-98.6 °F (37 °C)] 98.6 °F (37 °C)  Heart Rate:  [55-90] 73  Resp:  [17-27] 17  BP: (101-112)/(53-84) 112/53    Physical Exam:   General: awake, alert, sitting up in bed  Eyes: no scleral icterus  Cardiovascular: Normal rate  Respiratory: normal work of breathing on 4 L nasal cannula; right chest tube present  GI: Abdomen is obese, not tender or distended  Skin: Mild rash in multiple sites; he says due to immunotherapy and that it is now improving   Neurological: Alert and oriented x 3  Psychiatric: Normal mood and affect   Vasc: Right  "chest port has been removed; site is slightly bruised but no erythema or drainage    Labs:   CBC, BMP, and blood and pleural cultures reviewed today  Lab Results   Component Value Date    WBC 8.65 04/28/2025    HGB 10.0 (L) 04/28/2025    HCT 31.5 (L) 04/28/2025    MCV 89.0 04/28/2025     04/28/2025     Lab Results   Component Value Date    GLUCOSE 142 (H) 04/28/2025    CALCIUM 9.1 04/28/2025     04/28/2025    K 3.7 04/28/2025    CO2 34.8 (H) 04/28/2025    CL 96 (L) 04/28/2025    BUN 16 04/28/2025    CREATININE 0.62 (L) 04/28/2025    EGFR 99.1 04/28/2025    BCR 25.8 (H) 04/28/2025    ANIONGAP 9.2 04/28/2025     Lab Results   Component Value Date    HGBA1C 8.60 (H) 04/21/2025     Microbiology:  4/21 RPP: Negative  4/21 BCx: MSSA in 2/2 sets  4/21 urine strep pneumo and Legionella antigens: Negative  4/21 respiratory Cx: \"Rejected\"  4/22 MRSA nares PCR: Negative  4/22 pleural fluid Cx: Negative, final  4/28 BCx: Pending      Radiology:  4/28 CXR personally reviewed and shows a right chest tube in place with a small pleural effusion    Isolation:  No active isolations    ASSESSMENT/PLAN:  MSSA septicemia  Port catheter present now removed  Pulmonary emphysema  History of right middle lobectomy  Morbid obesity BMI 40 complicating above  Right pleural effusion status post chest tube placement  Abnormal transthoracic echocardiogram    He is improving.  He is afebrile with a normal WBC.  Repeat blood cultures drawn this morning to document sterility are pending.  He had his port removed to help with source control.  His pleural cultures did not grow any bacteria.    He did have a TTE that was poor image quality but mentions a questionable small echodensity on the aortic valve that could be endocarditis but favors sclerosis/calcification.    Provided education to patient and his wife regarding his antibiotics.  I am planning for 6 weeks course given the abnormal echocardiogram.  Stop date will be 6/3/2025 at " which time he will follow-up with me in the ID clinic.  He will need a weekly CBC with differential and creatinine faxed to me at [phone].    I will go ahead and order a single-lumen PICC line.    Thank you for allowing me to be involved in the care of this patient. Infectious diseases will sign off at this time with antibiotics plan in place, but please call me at [phone] if any further ID questions or new ID concerns.          ------------------------------------------------------------------  The above decisions regarding antimicrobials incorporates elements of engaging in complex medical decision-making associated with antimicrobial prescribing including considerations such as antimicrobial resistance patterns, duration of therapy, practicing antibiotic stewardship by selecting targeted antibiotic therapy, and considering patient-specific resistance patterns after micro data review.

## 2025-04-28 NOTE — CASE MANAGEMENT/SOCIAL WORK
Continued Stay Note  [facility]     Patient Name: Branden Diop  MRN: [id number]  Today's Date: 4/28/2025    Admit Date: 4/21/2025    Plan: Home with spouse and [facility] home care and [facility] infusion for IV abx at dc.   Discharge Plan       Row Name 04/28/25 1252       Plan    Plan Home with spouse and [facility] home care and [facility] infusion for IV abx at dc.    Plan Comments DC plan:   Home with spouse and [facility] home care and [facility] infusion for IV abx at dc.     Transport: spouse  Watch for increase home o2 needs.                    Expected Discharge Date and Time       Expected Discharge Date Expected Discharge Time    Apr 29, 2025               Nini Cardona RN

## 2025-04-28 NOTE — THERAPY TREATMENT NOTE
Patient Name: Branden Diop  : 1950    MRN: [id number]                              Today's Date: 2025       Admit Date: 2025    Visit Dx:     ICD-10-CM ICD-9-CM   1. Hypoxia  R09.02 799.02   2. Dyspnea, unspecified type  R06.00 786.09   3. Recurrent pleural effusion  J90 511.9   4. History of lung cancer  Z85.118 V10.11   5. MSSA bacteremia  R78.81 790.7    B95.61 041.11     Patient Active Problem List   Diagnosis    A-fib    Atrioventricular block, Mobitz type 1, Wenckebach    Apnea, sleep    Obesity    Streptococcus pneumoniae    Sleep apnea with use of continuous positive airway pressure (CPAP)    Sinusitis    Right wrist pain    Lobar pneumonia    DM2 (diabetes mellitus, type 2)    Dyslipidemia    [place]amm[place]oe    Mood disorder    COPD (chronic obstructive pulmonary disease)    Colon polyps    Atrial fibrillation    Anxiety    Pruritus    Cholelithiasis    Fatty liver    Essential hypertension    Vitamin D deficiency    Emphysema, unspecified    Bronchiectasis with acute exacerbation    FHx: colonic polyps    Diverticulosis    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    Malignant neoplasm of middle lobe of right lung    Fluid collection at surgical site, initial encounter    Encounter for long-term (current) use of high-risk medication    Fitting and adjustment of vascular catheter    Anemia associated with chemotherapy    Peripheral neuropathy due to chemotherapy    Acute respiratory failure    Rash in adult    Bone mass    Drug-induced skin rash    Adverse effect of antineoplastic drug    Pleural effusion on right    Acute on chronic hypoxic respiratory failure    Hypoxemia    Drug-induced pneumonitis    Acute on chronic respiratory failure    Dyspnea on exertion    Hyponatremia    Hypoxia    History of lung cancer    Anemia, chronic disease    MSSA bacteremia     Past Medical History:   Diagnosis Date    Anxiety     Arthritis     Atrial fibrillation     CURRENTLY    Bunion of right  foot     Colon polyps     COPD (chronic obstructive pulmonary disease)     MILD. NO MEDS FOR    Depression     History of atrial fibrillation     WITH CARDIOVERSION. NO PROBLEMS    History of skin cancer     BCC REMOVED FROM BACK    [place] (hard of hearing)     Hyperlipidemia     Inguinal hernia, recurrent     RIGHT    Left foot pain     Lung nodules     Rash     IN GROIN TREATING FOR YEAST INFECTION BY PCP    Redness of skin     LOWER LEGS BILATERAL    Scab     BILATERAL LEGS HEALING    Sleep apnea with use of continuous positive airway pressure (CPAP)     CPAP    Streptococcus pneumoniae     ABOUT 6-7 YR AGO.     Type 2 diabetes mellitus      Past Surgical History:   Procedure Laterality Date    BASAL CELL CARCINOMA EXCISION      BRONCHOSCOPY WITH ION ROBOTIC ASSIST N/A 5/6/2024    Procedure: BRONCHOSCOPY WITH ION ROBOT WITH FNA, BIOPSIES, BAL AND ENDOBRONCHIAL ULTRASOUND WITH FNA;  Surgeon: Yony Coles MD;  Location: [facility] ENDOSCOPY;  Service: Robotics - Pulmonary;  Laterality: N/A;  PRE: PULMONARY NODULES  POST: SAME    CARDIAC CATHETERIZATION N/A 11/15/2021    Procedure: Coronary angiography;  Surgeon: Alfredo Jennings MD;  Location:  [facility] CATH INVASIVE LOCATION;  Service: Cardiovascular;  Laterality: N/A;    CARDIAC CATHETERIZATION N/A 11/15/2021    Procedure: Left heart cath;  Surgeon: Alfredo Jeninngs MD;  [facility];  Service: Cardiovascular;  Laterality: N/A;    CARDIAC CATHETERIZATION N/A 11/15/2021    Procedure: Left ventriculography;  Surgeon: Alfredo Jennings MD;  [facility];  Service: Cardiovascular;  Laterality: N/A;    CARDIAC CATHETERIZATION N/A 11/15/2021    Procedure: Aortic root aortogram;  Surgeon: Alfredo Jennings MD;  Location: [facility] CATH INVASIVE LOCATION;  Service: Cardiovascular;  Laterality: N/A;    CARDIOVERSION      CHOLECYSTECTOMY N/A 10/07/2017    Procedure: CHOLECYSTECTOMY LAPAROSCOPIC;  Surgeon: Martir MARTINEZ  MD Uriel;  Location: [facility] MAIN OR;  Service:     COLONOSCOPY  2007    COLONOSCOPY N/A 8/30/2022    Procedure: COLONOSCOPY TO CECUM AND TI AND COLD SNARE POLYPECTOMY;  Surgeon: Cj Lopez MD;  Location: [facility] ENDOSCOPY;  Service: Gastroenterology;  Laterality: N/A;  Pre: History of colon polyps  Post: POLYP, PREVIOUS TATTOO    FOOT SURGERY Left     TOES ARE PINNED. ALL BUT GREAT TOE    HAND SURGERY      THUMB    HERNIA REPAIR      INGUINAL HERNIA REPAIR Right 06/27/2018    Procedure: INGUINAL HERNIA REPAIR, OPEN, RECURRENT;  Surgeon: Martir Trevino MD;  Location: [facility] OR OSC;  Service: General    LASIK Bilateral     LOBECTOMY Right 6/12/2024    Procedure: BRONCHOSCOPY, RIGHT VIDEO ASSISTED THORACOSCOPY WITH RIGHT MIDDLE LOBECTOMY WITH DAVINCI ROBOT, MEDIASTINAL LYMPH NODE DISSECTION, INTERCOSTAL NERVE BLOCK;  Surgeon: David Figueroa MD;  Location: [facility] OR;  Service: Robotics - DaVinci;  Laterality: Right;    NECK SURGERY      growth on salivary gland    REPLACEMENT TOTAL KNEE Right 2007    (he is going to have a LTKR next month)    REPLACEMENT TOTAL KNEE Left     VENOUS ACCESS DEVICE (PORT) INSERTION Right 7/5/2024    Procedure: INSERTION VENOUS ACCESS DEVICE;  Surgeon: David Figueroa MD;  Location: [facility] OR;  Service: Thoracic;  Laterality: Right;    VENOUS ACCESS DEVICE (PORT) REMOVAL N/A 4/24/2025    Procedure: REMOVAL VENOUS ACCESS DEVICE, DOXYCYCLINE PLEURODESIS;  Surgeon: David Figueroa MD;  Location: [facility] OR;  Service: Thoracic;  Laterality: N/A;      General Information       Row Name 04/28/25 1626          Physical Therapy Time and Intention    Document Type therapy note (daily note)  -PC     Mode of Treatment physical therapy  -PC       Row Name 04/28/25 1626          General Information    Existing Precautions/Restrictions fall;oxygen therapy device and L/min  -PC       Row Name 04/28/25 1626          Cognition    Orientation Status (Cognition) oriented x 3  -PC                User Key  (r) = Recorded By, (t) = Taken By, (c) = Cosigned By      Initials Name Provider Type    PC Rosanna Sher, PT Physical Therapist                   Mobility       Row Name 04/28/25 1626          Bed Mobility    Comment, (Bed Mobility) in chair  -PC       Row Name 04/28/25 1626          Sit-Stand Transfer    Sit-Stand [place] (Transfers) contact guard;standby assist  -PC       Row Name 04/28/25 1626          Gait/Stairs (Locomotion)    [place] Level (Gait) standby assist  -PC     Distance in Feet (Gait) 150  -PC     Deviations/Abnormal Patterns (Gait) gait speed decreased;base of support, wide  -PC     Comment, (Gait/Stairs) no assistive device today, started with HHA, but pt was walking well and progressed to SBA  -PC               User Key  (r) = Recorded By, (t) = Taken By, (c) = Cosigned By      Initials Name Provider Type    PC Rosanna Sher, PT Physical Therapist                   Obj/Interventions    No documentation.                  Goals/Plan    No documentation.                  Clinical Impression       Row Name 04/28/25 1627          Pain    Pretreatment Pain Rating 0/10 - no pain  -PC     Posttreatment Pain Rating 0/10 - no pain  -PC       Row Name 04/28/25 1627          Plan of Care Review    Plan of Care Reviewed With patient;spouse  -PC     Progress improving  -PC     Outcome Evaluation Pt with good progress with mobility today, able to stand and walk with SBA for 150 ft with no assistive device, will cont to progress as tolerated, pt on 4L o2 throughout session  -PC       Row Name 04/28/25 1627          Positioning and Restraints    Pre-Treatment Position sitting in chair/recliner  -PC     Post Treatment Position chair  -PC     In Chair reclined;call light within reach;encouraged to call for assist;with family/caregiver  -PC               User Key  (r) = Recorded By, (t) = Taken By, (c) = Cosigned By      Initials Name Provider Type    PC Rosanna Sher, PT Physical Therapist                    Outcome Measures       Row Name 04/28/25 1629          How much help from another person do you currently need...    Turning from your back to your side while in flat bed without using bedrails? 3  -PC     Moving from lying on back to sitting on the side of a flat bed without bedrails? 3  -PC     Moving to and from a bed to a chair (including a wheelchair)? 4  -PC     Standing up from a chair using your arms (e.g., wheelchair, bedside chair)? 4  -PC     Climbing 3-5 steps with a railing? 2  -PC     To walk in hospital room? 3  -PC     AM-PAC 6 Clicks Score (PT) 19  -PC     Highest Level of Mobility Goal 6 --> Walk 10 steps or more  -PC       Row Name 04/28/25 1547          Functional Assessment    Outcome Measure Options AM-PAC 6 Clicks Daily Activity (OT)  -MAR               User Key  (r) = Recorded By, (t) = Taken By, (c) = Cosigned By      Initials Name Provider Type    Merna Campbell, OTR/L, CSRS Occupational Therapist    PC Rosanna Sher, PT Physical Therapist                                 Physical Therapy Education       Title: PT OT SLP Therapies (In Progress)       Topic: Physical Therapy (Done)       Point: Mobility training (Done)       Learning Progress Summary            Patient Acceptance, E,D, DU by PC at 4/28/2025 1629    Acceptance, E, VU by DB at 4/27/2025 1354    Acceptance, E, VU,NR by ER at 4/23/2025 1410                      Point: Home exercise program (Done)       Learning Progress Summary            Patient Acceptance, E,D, DU by PC at 4/28/2025 1629    Acceptance, E, VU by DB at 4/27/2025 1354    Acceptance, E, VU,NR by ER at 4/23/2025 1410                      Point: Body mechanics (Done)       Learning Progress Summary            Patient Acceptance, E,D, DU by PC at 4/28/2025 1629    Acceptance, E, VU by DB at 4/27/2025 1354    Acceptance, E, VU,NR by ER at 4/23/2025 1410                      Point: Precautions (Done)       Learning Progress Summary            Patient  Acceptance, E,D, DU by PC at 4/28/2025 1629    Acceptance, E, VU by DB at 4/27/2025 1354    Acceptance, E, VU,NR by ER at 4/23/2025 1410                                      User Key       Initials Effective Dates Name Provider Type Discipline    PC 06/16/21 -  Rosanna Sher, PT Physical Therapist PT    DB 06/16/21 -  Libby Mckeon, PT Physical Therapist PT    ER 10/15/23 -  Cara Hooker, PT Physical Therapist PT                  PT Recommendation and Plan     Progress: improving  Outcome Evaluation: Pt with good progress with mobility today, able to stand and walk with SBA for 150 ft with no assistive device, will cont to progress as tolerated, pt on 4L o2 throughout session     Time Calculation:         PT Charges       Row Name 04/28/25 1630             Time Calculation    Start Time 1508  -PC      Stop Time 1522  -PC      Time Calculation (min) 14 min  -PC      PT Received On 04/28/25  -PC      PT - Next Appointment 04/29/25  -PC                User Key  (r) = Recorded By, (t) = Taken By, (c) = Cosigned By      Initials Name Provider Type    PC Rosanna Sher, PT Physical Therapist                  Therapy Charges for Today       Code Description Service Date Service Provider Modifiers Qty    [id number] HC PT THER PROC EA 15 MIN 4/28/2025 Rosanna Sher, PT GP 1            PT G-Codes  Outcome Measure Options: AM-PAC 6 Clicks Daily Activity (OT)  AM-PAC 6 Clicks Score (PT): 19  AM-PAC 6 Clicks Score (OT): 16  PT Discharge Summary  Anticipated Discharge Disposition (PT): home    Rosanna Sher PT  4/28/2025

## 2025-04-28 NOTE — PROGRESS NOTES
Name: Branden Diop ADMIT: 2025   : 1950  PCP: Tino Lopez MD    MRN: [id number] LOS: 6 days   AGE/SEX: 74 y.o. male  ROOM: [facility]     Subjective   Subjective   Feeling pretty well. C/o some right shoulder pain and stiffness       Objective   Objective   Vital Signs  Temp:  [97.3 °F (36.3 °C)-98.7 °F (37.1 °C)] 98.7 °F (37.1 °C)  Heart Rate:  [55-90] 84  Resp:  [17-27] 17  BP: (101-116)/(53-84) 116/60  SpO2:  [90 %-100 %] 91 %  on  Flow (L/min) (Oxygen Therapy):  [4] 4;   Device (Oxygen Therapy): nasal cannula  Body mass index is 40.05 kg/m².  Physical Exam  Vitals reviewed.   Constitutional:       General: He is not in acute distress.     Appearance: He is obese.   Cardiovascular:      Rate and Rhythm: Normal rate and regular rhythm.   Pulmonary:      Effort: Pulmonary effort is normal.      Breath sounds: Rhonchi present. No wheezing.   Abdominal:      General: There is no distension.      Palpations: Abdomen is soft.      Tenderness: There is no abdominal tenderness.   Musculoskeletal:      Comments: actually has fair range of motion of the right shoulder but limited some by pain.  He is able to fully flex and extend   Skin:     General: Skin is warm and dry.      Comments: Patchy erythematous rash on his left chest and arm primarily, unchanged   Neurological:      General: No focal deficit present.      Mental Status: He is alert.       Results Review     I reviewed the patient's new clinical results.  Results from last 7 days   Lab Units 25  0533 25  0742 25  0537 25  0730   WBC 10*3/mm3 8.65 7.64 6.22 8.09   HEMOGLOBIN g/dL 10.0* 10.5* 10.1* 10.1*   PLATELETS 10*3/mm3 327 194 150 149     Results from last 7 days   Lab Units 25  0533 25  1602 25  0542 25  0556 25  2019 25  0742   SODIUM mmol/L 140  --  140 141  --  141   POTASSIUM mmol/L 3.7 3.5 3.4* 3.9   < > 3.4*   CHLORIDE mmol/L 96*  --  96* 95*  --  98   CO2 mmol/L 34.8*  --   34.0* 34.0*  --  32.1*   BUN mg/dL 16  --  20 19  --  14   CREATININE mg/dL 0.62*  --  0.64* 0.90  --  0.68*   GLUCOSE mg/dL 142*  --  147* 212*  --  143*   EGFR mL/min/1.73 99.1  --  98.1 88.5  --  96.3    < > = values in this interval not displayed.           Results from last 7 days   Lab Units 04/28/25  0533 04/27/25  0542 04/26/25  0556 04/25/25  0742   CALCIUM mg/dL 9.1 8.9 9.3 8.8     Results from last 7 days   Lab Units 04/21/25  1516   LACTATE mmol/L 1.2     Glucose   Date/Time Value Ref Range Status   04/28/2025 1129 233 (H) 70 - 130 mg/dL Final   04/28/2025 0703 156 (H) 70 - 130 mg/dL Final   04/27/2025 2137 179 (H) 70 - 130 mg/dL Final   04/27/2025 1622 203 (H) 70 - 130 mg/dL Final   04/27/2025 1058 307 (H) 70 - 130 mg/dL Final   04/27/2025 0626 158 (H) 70 - 130 mg/dL Final   04/26/2025 2006 278 (H) 70 - 130 mg/dL Final       XR Chest 1 View  Result Date: 4/27/2025  No significant interval change.  This report was finalized on 4/27/2025 5:36 AM by Dr. Renee Alvarado M.D on Workstation: [id number]        I have personally reviewed all medications:  Scheduled Medications  arformoterol, 15 mcg, Nebulization, BID - RT  budesonide, 0.5 mg, Nebulization, BID - RT  ceFAZolin, 2,000 mg, Intravenous, Q8H  cetirizine, 10 mg, Oral, Daily  famotidine, 20 mg, Oral, BID AC  ferrous sulfate, 162.5 mg, Oral, BID  folic acid, 1 mg, Oral, Daily  gabapentin, 600 mg, Oral, Q8H  guaiFENesin, 1,200 mg, Oral, Q12H  insulin glargine, 35 Units, Subcutaneous, Nightly  insulin lispro, 10 Units, Subcutaneous, TID With Meals  insulin lispro, 3-14 Units, Subcutaneous, 4x Daily AC & at Bedtime  ipratropium-albuterol, 3 mL, Nebulization, 4x Daily - RT  magnesium oxide, 400 mg, Oral, BID  multivitamin, 1 tablet, Oral, Daily  potassium chloride, 10 mEq, Oral, BID  [Held by provider] rivaroxaban, 20 mg, Oral, Daily  rosuvastatin, 40 mg, Oral, Daily  senna-docusate sodium, 2 tablet, Oral, BID  sertraline, 50 mg, Oral, Daily  sodium  chloride, 10 mL, Intravenous, Q12H  torsemide, 40 mg, Oral, Daily  triamcinolone, 1 Application, Topical, Q12H    Infusions   Diet  Diet: Cardiac; Healthy Heart (2-3 Na+); Fluid Consistency: Thin (IDDSI 0)    I have personally reviewed:  [x]  Laboratory   []  Microbiology   [x]  Radiology   []  EKG/Telemetry  []  Cardiology/Vascular   []  Pathology    []  Records       Assessment/Plan     Active Hospital Problems    Diagnosis  POA    **Hypoxia [R09.02]  Yes    History of lung cancer [Z85.118]  Not Applicable    Anemia, chronic disease [D63.8]  Yes    MSSA bacteremia [R78.81, B95.61]  Unknown    Pleural effusion on right [J90]  Yes    Essential hypertension [I10]  Yes    Fatty liver [K76.0]  Yes    Cholelithiasis [K80.20]  Yes    DM2 (diabetes mellitus, type 2) [E11.9]  Yes    Atrial fibrillation [I48.91]  Yes    Dyslipidemia [E78.5]  Yes    Mood disorder [F39]  Yes    Lobar pneumonia [J18.1]  Yes      Resolved Hospital Problems   No resolved problems to display.       74 y.o. male with history of squamous cell lung cancer and Keytruda pneumonitis admitted with acute on chronic respiratory failure, persistent right pleural effusion and MSSA bacteremia.  Status post Port-A-Cath removal with doxycycline pleurodesis 4/24    Right shoulder pain: Apparently does have some history of arthritis.  Most likely he just has some stiffness related to immobility/bedbound status with that shoulder but obviously with Staph have to at least consider the possibility of septic joint.  Will monitor for today but consider further imaging if pain persists or worsens.    MSSA bacteremia treatment per ID recommendations.  Status post removal of his Port-A-Cath  - Echo reviewed.  Poor image quality, ?veg on AV  - Repeat blood cultures to document sterility pending  - PICC line ordered    Steroids started briefly for pneumonitis but now discontinued after 1 dose by thoracic team to allow scarring after pleurodesis  - Chest tube management  per thoracic  - Continue neb regimen    DM2: Uncontrolled but improving.  Continue current insulin regimen for now    A-fib: Xarelto on hold due to chest tube.  Resume when able    Lung cancer plans per oncology.  Rash related to chemo.  Atarax available for itching as needed.  Ordered some topical steroid cream as well though he says it has not helped much in the past    Continue BiPAP for JUAN      DVT prophy SCDs  Dispo: Potential DC in the next few days depending on if chest tube can be removed.    Kartik Ritchie MD  [facility]  04/28/25  15:12 EDT

## 2025-04-28 NOTE — PLAN OF CARE
Goal Outcome Evaluation:  Plan of Care Reviewed With: patient, spouse        Progress: improving  Outcome Evaluation: Pt with good progress with mobility today, able to stand and walk with SBA for 150 ft with no assistive device, will cont to progress as tolerated, pt on 4L o2 throughout session    Anticipated Discharge Disposition (PT): home

## 2025-04-28 NOTE — PROGRESS NOTES
"    Chief Complaint: Recurrent right pleural effusion    S/P: Port removal and Doxy pleurodesis  POD #: 4    Subjective:  Symptoms:  Improved.  No shortness of breath (IMPROVED).    Diet:  Adequate intake.  No nausea or vomiting.    Activity level: Returning to normal.    Pain:  He complains of pain that is mild.  Pain is well controlled and requiring pain medication.    No acute complaints.  Pain well-controlled.  Denies any shortness of breath.    Vital Signs:  Temp:  [97.3 °F (36.3 °C)-98.7 °F (37.1 °C)] 98.7 °F (37.1 °C)  Heart Rate:  [55-90] 84  Resp:  [17-27] 17  BP: (101-116)/(53-84) 116/60    Intake & Output (last day)         04/27 0701  04/28 0700 04/28 0701  04/29 0700    Urine (mL/kg/hr) 2050 (0.7) 1250 (1.4)    Chest Tube 100 20    Total Output 2150 1270    Net -2150 -1270                  Objective:  General Appearance:  Comfortable, in no acute distress and well-appearing.    Vital signs: (most recent): Blood pressure 116/60, pulse 84, temperature 98.7 °F (37.1 °C), temperature source Oral, resp. rate 17, height 180.3 cm (70.98\"), weight 130 kg (287 lb), SpO2 91%.    Lungs:  Normal effort and normal respiratory rate.  He is not in respiratory distress.    Heart: Normal rate.  Regular rhythm.    Chest: Symmetric chest wall expansion. Chest wall tenderness (Around chest tube) present.    Abdomen: Abdomen is soft and non-distended.    Neurological: Patient is alert and oriented to person, place and time.    Skin:  Warm and dry.                Chest tube:   Site: Clean, Dry, and Intact  Suction: W/S  Air Leak: negative  24 Hour Total: 100ml    Results Review:     I reviewed the patient's new clinical results.  I reviewed the patient's new imaging results and agree with the interpretation.  Discussed with patient, nurse, surgeon    Imaging Results (Last 24 Hours)       Procedure Component Value Units Date/Time    XR Chest 1 View [[id number]] Collected: 04/28/25 0622     Updated: 04/28/25 0628    Narrative:  "     XR CHEST 1 VW-     Clinical: Recurrent pleural effusion, right-sided chest tube     COMPARISON examination 4/27/2025     FINDINGS: There has been no interval change in position of the  right-sided chest tube. Suspect small residual right-sided pleural  effusion. The cardiomediastinal silhouette is stable. Patchy infiltrates  similar to the previous examination.     CONCLUSION: Stable imaging of the chest     This report was finalized on 4/28/2025 6:24 AM by Dr. Jamar Segovia M.D  on Workstation: [id number]               Lab Results:     Lab Results (last 24 hours)       Procedure Component Value Units Date/Time    POC Glucose Once [[id number]]  (Abnormal) Collected: 04/28/25 1129    Specimen: Blood Updated: 04/28/25 1155     Glucose 233 mg/dL     Basic Metabolic Panel [[id number]]  (Abnormal) Collected: 04/28/25 0533    Specimen: Blood Updated: 04/28/25 0705     Glucose 142 mg/dL      BUN 16 mg/dL      Creatinine 0.62 mg/dL      Sodium 140 mmol/L      Potassium 3.7 mmol/L      Chloride 96 mmol/L      CO2 34.8 mmol/L      Calcium 9.1 mg/dL      BUN/Creatinine Ratio 25.8     Anion Gap 9.2 mmol/L      eGFR 99.1 mL/min/1.73     Narrative:      GFR Categories in Chronic Kidney Disease (CKD)      GFR Category          GFR (mL/min/1.73)    Interpretation  G1                     90 or greater         Normal or high (1)  G2                      60-89                Mild decrease (1)  G3a                   45-59                Mild to moderate decrease  G3b                   30-44                Moderate to severe decrease  G4                    15-29                Severe decrease  G5                    14 or less           Kidney failure          (1)In the absence of evidence of kidney disease, neither GFR category G1 or G2 fulfill the criteria for CKD.    eGFR calculation 2021 CKD-EPI creatinine equation, which does not include race as a factor    POC Glucose Once [[id number]]  (Abnormal) Collected: 04/28/25 0703     Specimen: Blood Updated: 04/28/25 0704     Glucose 156 mg/dL     CBC (No Diff) [[id number]]  (Abnormal) Collected: 04/28/25 0533    Specimen: Blood Updated: 04/28/25 0645     WBC 8.65 10*3/mm3      RBC 3.54 10*6/mm3      Hemoglobin 10.0 g/dL      Hematocrit 31.5 %      MCV 89.0 fL      MCH 28.2 pg      MCHC 31.7 g/dL      RDW 15.8 %      RDW-SD 51.5 fl      MPV 9.6 fL      Platelets 327 10*3/mm3     Blood Culture - Blood, Hand, Right [[id number]] Collected: 04/28/25 0533    Specimen: Blood from Hand, Right Updated: 04/28/25 0631    Blood Culture - Blood, Hand, Left [[id number]] Collected: 04/28/25 0533    Specimen: Blood from Hand, Left Updated: 04/28/25 0631    POC Glucose Once [[id number]]  (Abnormal) Collected: 04/27/25 2137    Specimen: Blood Updated: 04/27/25 2139     Glucose 179 mg/dL     Potassium [[id number]]  (Normal) Collected: 04/27/25 1602    Specimen: Blood Updated: 04/27/25 1639     Potassium 3.5 mmol/L     POC Glucose Once [[id number]]  (Abnormal) Collected: 04/27/25 1622    Specimen: Blood Updated: 04/27/25 1633     Glucose 203 mg/dL              Assessment & Plan       Hypoxia    Lobar pneumonia    DM2 (diabetes mellitus, type 2)    Dyslipidemia    Mood disorder    Atrial fibrillation    Cholelithiasis    Fatty liver    Essential hypertension    Pleural effusion on right    History of lung cancer    Anemia, chronic disease    MSSA bacteremia       Assessment & Plan    POD 4 status post port removal with doxycycline pleurodesis via right-sided chest tube for recurrent pleural effusion of uncertain etiology.      Continues to improve.  No acute complaints.  Denies any shortness of breath or controlled pain. Nearly at baseline of 3 L via nasal cannula, 4 L today.  Chest tube weaned to waterseal overnight.  Follow-up chest x-ray reviewed demonstrating stability.  Right basilar chest tubes in position.    Will continue chest tube to waterseal.  Trend output overnight.  Repeat a.m. chest x-ray.    Please hold  Xarelto with chest tube in place.  Steroids discontinued s/p pleurodesis.  Continue antibiotics per ID recommendations.    It is imperative we increase his mobility, walks around nurses station 3 times daily.  Plan to transfer patient to 5 E. for chest tube management.   Thoracic surgery will follow.    BAM Rodgers  [facility]  04/28/25  13:58 EDT    I have spent greater than 30 minutes reviewing the patient's chart including medical history, notes, radiographic images, labs, and performing assessment and development of a plan and discussion with the patient/family at bedside.

## 2025-04-28 NOTE — THERAPY TREATMENT NOTE
Patient Name: Branden Diop  : 1950    MRN: [id number]                              Today's Date: 2025       Admit Date: 2025    Visit Dx:     ICD-10-CM ICD-9-CM   1. Hypoxia  R09.02 799.02   2. Dyspnea, unspecified type  R06.00 786.09   3. Recurrent pleural effusion  J90 511.9   4. History of lung cancer  Z85.118 V10.11   5. MSSA bacteremia  R78.81 790.7    B95.61 041.11     Patient Active Problem List   Diagnosis    A-fib    Atrioventricular block, Mobitz type 1, Wenckebach    Apnea, sleep    Obesity    Streptococcus pneumoniae    Sleep apnea with use of continuous positive airway pressure (CPAP)    Sinusitis    Right wrist pain    Lobar pneumonia    DM2 (diabetes mellitus, type 2)    Dyslipidemia    Hammertoe    Mood disorder    COPD (chronic obstructive pulmonary disease)    Colon polyps    Atrial fibrillation    Anxiety    Pruritus    Cholelithiasis    Fatty liver    Essential hypertension    Vitamin D deficiency    Emphysema, unspecified    Bronchiectasis with acute exacerbation    FHx: colonic polyps    Diverticulosis    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    Malignant neoplasm of middle lobe of right lung    Fluid collection at surgical site, initial encounter    Encounter for long-term (current) use of high-risk medication    Fitting and adjustment of vascular catheter    Anemia associated with chemotherapy    Peripheral neuropathy due to chemotherapy    Acute respiratory failure    Rash in adult    Bone mass    Drug-induced skin rash    Adverse effect of antineoplastic drug    Pleural effusion on right    Acute on chronic hypoxic respiratory failure    Hypoxemia    Drug-induced pneumonitis    Acute on chronic respiratory failure    Dyspnea on exertion    Hyponatremia    Hypoxia    History of lung cancer    Anemia, chronic disease    MSSA bacteremia     Past Medical History:   Diagnosis Date    Anxiety     Arthritis     Atrial fibrillation     CURRENTLY    Bunion of right  foot     Colon polyps     COPD (chronic obstructive pulmonary disease)     MILD. NO MEDS FOR    Depression     History of atrial fibrillation     WITH CARDIOVERSION. NO PROBLEMS    History of skin cancer     BCC REMOVED FROM [place]    [place]     Hyperlipidemia     Inguinal hernia, recurrent     RIGHT    Left foot pain     Lung nodules     Rash     IN GROIN TREATING FOR YEAST INFECTION BY PCP    Redness of skin     LOWER LEGS BILATERAL    Scab     BILATERAL LEGS HEALING    Sleep apnea with use of continuous positive airway pressure (CPAP)     CPAP    Streptococcus pneumoniae     ABOUT 6-7 YR AGO.     Type 2 diabetes mellitus      Past Surgical History:   Procedure Laterality Date    BASAL CELL CARCINOMA EXCISION      BRONCHOSCOPY WITH ION ROBOTIC ASSIST N/A 5/6/2024    Procedure: BRONCHOSCOPY WITH ION ROBOT WITH FNA, BIOPSIES, BAL AND ENDOBRONCHIAL ULTRASOUND WITH FNA;  Surgeon: Yony Coles MD;  Location: [facility] ENDOSCOPY;  Service: Robotics - Pulmonary;  Laterality: N/A;  PRE: PULMONARY NODULES  POST: SAME    CARDIAC CATHETERIZATION N/A 11/15/2021    Procedure: Coronary angiography;  Surgeon: Alfredo Jennings MD;  Location:  [facility] CATH INVASIVE LOCATION;  Service: Cardiovascular;  Laterality: N/A;    CARDIAC CATHETERIZATION N/A 11/15/2021    Procedure: Left heart cath;  Surgeon: Alfredo Jennings MD;  Location: [facility] CATH INVASIVE LOCATION;  Service: Cardiovascular;  Laterality: N/A;    CARDIAC CATHETERIZATION N/A 11/15/2021    Procedure: Left ventriculography;  Surgeon: Alfredo Jennings MD;  Location: [facility] C[facility]IVE LOC[facility];  Service: Cardiovascular;  Laterality: N/A;    CARDIAC CATHETERIZATION N/A 11/15/2021    Procedure: Aortic root aortogram;  Surgeon: Alfredo Jennings MD;  Location: [facility] CATH INVASIVE LOCATION;  Service: Cardiovascular;  Laterality: N/A;    CARDIOVERSION      CHOLECYSTECTOMY N/A 10/07/2017    Procedure: CHOLECYSTECTOMY LAPAROSCOPIC;  Surgeon: Martir MARTINEZ  MD Uriel;  Location: [facility] MAIN OR;  Service:     COLONOSCOPY  2007    COLONOSCOPY N/A 8/30/2022    Procedure: COLONOSCOPY TO CECUM AND TI AND COLD SNARE POLYPECTOMY;  Surgeon: Cj Lopez MD;  Location: [facility] ENDOSCOPY;  Service: Gastroenterology;  Laterality: N/A;  Pre: History of colon polyps  Post: POLYP, PREVIOUS TATTOO    FOOT SURGERY Left     TOES ARE PINNED. ALL BUT GREAT TOE    HAND SURGERY      THUMB    HERNIA REPAIR      INGUINAL HERNIA REPAIR Right 06/27/2018    Procedure: INGUINAL HERNIA REPAIR, OPEN, RECURRENT;  Surgeon: Martir Trevino MD;  Location: [facility] OR OSC;  Service: General    LASIK Bilateral     LOBECTOMY Right 6/12/2024    Procedure: BRONCHOSCOPY, RIGHT VIDEO ASSISTED THORACOSCOPY WITH RIGHT MIDDLE LOBECTOMY WITH DAVINCI ROBOT, MEDIASTINAL LYMPH NODE DISSECTION, INTERCOSTAL NERVE BLOCK;  Surgeon: David Figueroa MD;  Location: [facility] OR;  Service: Robotics - DaVinci;  Laterality: Right;    NECK SURGERY      growth on salivary gland    REPLACEMENT TOTAL KNEE Right 2007    (he is going to have a LTKR next month)    REPLACEMENT TOTAL KNEE Left     VENOUS ACCESS DEVICE (PORT) INSERTION Right 7/5/2024    Procedure: INSERTION VENOUS ACCESS DEVICE;  Surgeon: David Figueroa MD;  Location: [facility] OR;  Service: Thoracic;  Laterality: Right;    VENOUS ACCESS DEVICE (PORT) REMOVAL N/A 4/24/2025    Procedure: REMOVAL VENOUS ACCESS DEVICE, DOXYCYCLINE PLEURODESIS;  Surgeon: David Figueroa MD;  Location: [facility] OR;  Service: Thoracic;  Laterality: N/A;      General Information       Row Name 04/28/25 1539          OT Time and Intention    Document Type therapy note (daily note)  -LE     Mode of Treatment individual therapy;occupational therapy  -LE     Patient Effort good  -LE     Symptoms Noted During/After Treatment fatigue  -LE       Row Name 04/28/25 1539          General Information    Existing Precautions/Restrictions fall;oxygen therapy device and L/min  -LE       Row Name 04/28/25  1539          Cognition    Orientation Status (Cognition) oriented x 3  -       Row Name 04/28/25 1539          Safety Issues/Impairments Affecting Functional Mobility    Impairments Affecting Function (Mobility) endurance/activity tolerance;balance;strength;pain;shortness of breath  -LE     Comment, Safety Issues/Impairments (Mobility) Non skid socks and gait belt worn  -LE               User Key  (r) = Recorded By, (t) = Taken By, (c) = Cosigned By      Initials Name Provider Type    Merna Campbell, OTR/L, CSRS Occupational Therapist                     Mobility/ADL's       Row Name 04/28/25 1540          Bed Mobility    Bed Mobility supine-sit;sit-supine;scooting/bridging  -LE     Supine-Sit Rogers (Bed Mobility) standby assist;verbal cues;minimum assist (75% patient effort)  -LE     Bed Mobility, Safety Issues impaired trunk control for bed mobility  -LE     Comment, (Bed Mobility) OT cues on body mechanics and to help himself move from supine to sit.  -       Row Name 04/28/25 1540          Transfers    Transfers sit-stand transfer;stand-sit transfer;bed-chair transfer;toilet transfer  -       Row Name 04/28/25 1540          Bed-Chair Transfer    Bed-Chair Rogers (Transfers) set up;verbal cues;contact guard  -       Row Name 04/28/25 1540          Sit-Stand Transfer    Sit-Stand Rogers (Transfers) set up;verbal cues;contact guard;standby assist  -       Row Name 04/28/25 1540          Stand-Sit Transfer    Stand-Sit Rogers (Transfers) set up;standby assist;contact guard  -       Row Name 04/28/25 1540          Functional Mobility    Functional Mobility- Ind. Level set up required;verbal cues required;contact guard assist;standby assist  -LE     Functional Mobility- Comment ambulate in room. OT manages chest tube and IV pole.  close SBA/CGA for turns and when pt distracted on turns.   OT ed pt/wife tech to manage O2 tubing in event pt has O2 at home.  pt ambulates within  room.  discuss walker pt prefers not to use.  -[facility] Name 04/28/25 1540          Activities of Daily Living    BADL Assessment/Intervention toileting;feeding;grooming;lower body dressing;upper body dressing;bathing  -[facility] Name 04/28/25 1540          Lower Body Dressing Assessment/Training    [place] Level (Lower Body Dressing) don;dependent (less than 25% patient effort)  -LE     Position (Lower Body Dressing) supine  -LE     Comment, (Lower Body Dressing) pt reports unable to reach feet. wife has assisted when needed at home. OT verbally ed on sock aid.  -[facility] Name 04/28/25 7090          Toileting Assessment/Training    Comment, (Toileting) wife has been assisting pt with using urinal to help with skin.  -               User Key  (r) = Recorded By, (t) = Taken By, (c) = Cosigned By      Initials Name Provider Type    Merna Campbell, OTR/L, BERNADETTES Occupational Therapist                   Obj/Interventions       [facility] Name 04/28/25 3053          Range of Motion Comprehensive    Comment, General Range of Motion c/o R shld pain today and slow and grimace to raise arm above head but denies pain once holding arm up..  pt thinks related to coming off steroids as had similar pain when off steroids in past.  -[facility] Name 04/28/25 1548          Balance    Balance Assessment sitting static balance;standing static balance;standing dynamic balance  -LE     Static Sitting Balance standby assist  -LE     Static Standing Balance standby assist  -LE     Dynamic Standing Balance contact guard;standby assist  -LE               User Key  (r) = Recorded By, (t) = Taken By, (c) = Cosigned By      Initials Name Provider Type    Merna Campbell, OTR/L, CSRS Occupational Therapist                   Goals/Plan    No documentation.                  Clinical Impression       [facility] Name 04/28/25 1547          Pain Assessment    Pre/Posttreatment Pain Comment R shld pain.  -[facility] Name 04/28/25 2439           Plan of Care Review    Plan of Care Reviewed With patient  -LE     Outcome Evaluation Pt presents in bed, wife at bedside. Pt agreeable to getting OOB, ambulating in room and then up to chair.  OT discusses and ed on manage O2 tubing and reivew home bathroom AD needs.  Wife has assisted pt in past as needed and feels comfortable with d/c home.   Pt may benefit from HH OT at d/c for activity tolerance and increase safety/independence with ADL.  -LE       Row Name 04/28/25 154          Therapy Plan Review/Discharge Plan (OT)    Anticipated Discharge Disposition (OT) home with assist;home with home health  -LE       Row Name 04/28/25 1542          Vital Signs    O2 Delivery Pre Treatment nasal cannula  -LE     O2 Delivery Intra Treatment nasal cannula  -LE     Post SpO2 (%) 93  -LE     O2 Delivery Post Treatment nasal cannula  -LE     Pre Patient Position Supine  -LE     Intra Patient Position Standing  -LE     Post Patient Position Sitting  -LE       Row Name 04/28/25 1546          Positioning and Restraints    Pre-Treatment Position in bed  -LE     Post Treatment Position chair  -LE     In Chair notified nsg;reclined;call light within reach;encouraged to call for assist;exit alarm on;with family/caregiver  all lines intact.  -LE               User Key  (r) = Recorded By, (t) = Taken By, (c) = Cosigned By      Initials Name Provider Type    Merna Campbell, SHEILAR/L, CSRS Occupational Therapist                   Outcome Measures       Row Name 04/28/25 9354          How much help from another is currently needed...    Putting on and taking off regular lower body clothing? 2  -LE     Bathing (including washing, rinsing, and drying) 2  -LE     Toileting (which includes using toilet bed pan or urinal) 2  -LE     Putting on and taking off regular upper body clothing 3  -LE     Taking care of personal grooming (such as brushing teeth) 3  -LE     Eating meals 4  -LE     AM-PAC 6 Clicks Score (OT) 16  -LE       Row Name  04/28/25 1547          Functional Assessment    Outcome Measure Options AM-PAC 6 Clicks Daily Activity (OT)  -LE               User Key  (r) = Recorded By, (t) = Taken By, (c) = Cosigned By      Initials Name Provider Type    Merna Campbell OTR/L, CSRS Occupational Therapist                      OT Recommendation and Plan     Plan of Care Review  Plan of Care Reviewed With: patient  Outcome Evaluation: Pt presents in bed, wife at bedside. Pt agreeable to getting OOB, ambulating in room and then up to chair.  OT discusses and ed on manage O2 tubing and reivew home bathroom AD needs.  Wife has assisted pt in past as needed and feels comfortable with d/c home.   Pt may benefit from HH OT at d/c for activity tolerance and increase safety/independence with ADL.     Time Calculation:         Time Calculation- OT       Row Name 04/28/25 1547             Time Calculation- OT    OT Start Time 1323  -LE      OT Stop Time 1352  -LE      OT Time Calculation (min) 29 min  -LE      Total Timed Code Minutes- OT 29 minute(s)  -LE      OT Received On 04/28/25  -LE      OT - Next Appointment 04/29/25  -LE         Timed Charges    12902 - OT Self Care/Mgmt Minutes 29  -LE         Total Minutes    Timed Charges Total Minutes 29  -LE       Total Minutes 29  -LE                User Key  (r) = Recorded By, (t) = Taken By, (c) = Cosigned By      Initials Name Provider Type    Merna Campbell OTR/L, CSRS Occupational Therapist                  Therapy Charges for Today       Code Description Service Date Service Provider Modifiers Qty    [id number] HC OT SELF CARE/MGMT/TRAIN EA 15 MIN 4/28/2025 Merna Phillips OTR/LUMESH GO 2                 Merna Elder, OTR/L, CSRS  4/28/2025

## 2025-04-28 NOTE — PLAN OF CARE
Goal Outcome Evaluation:  Plan of Care Reviewed With: patient           Outcome Evaluation: Pt presents in bed, wife at bedside. Pt agreeable to getting OOB, ambulating in room and then up to chair.  OT discusses and ed on manage O2 tubing and reivew home bathroom AD needs.  Wife has assisted pt in past as needed and feels comfortable with d/c home.   Pt may benefit from HH OT at d/c for activity tolerance and increase safety/independence with ADL.    Anticipated Discharge Disposition (OT): home with assist, home with home health

## 2025-04-28 NOTE — PROGRESS NOTES
[facility] INPATIENT PROGRESS NOTE         [facility] CORONARY CARE    2025      PATIENT IDENTIFICATION:  Name: Branden Diop ADMIT: 2025   : 1950  PCP: Tino Lpoez MD    MRN: [id number] LOS: 6 days   AGE/SEX: 74 y.o. male  ROOM: [facility]                     LOS 6    Reason for visit: Pneumonia with pleural effusion and hypoxemia      SUBJECTIVE:      Resting comfortably.  Says that he feels really good today.  No chest pain or productive cough.  Chest tube stable without airleak.  On waterseal.      Objective   OBJECTIVE:    Vital Sign Min/Max for last 24 hours  Temp  Min: 97.3 °F (36.3 °C)  Max: 97.8 °F (36.6 °C)   BP  Min: 101/64  Max: 110/84   Pulse  Min: 55  Max: 90   Resp  Min: 20  Max: 27   SpO2  Min: 90 %  Max: 100 %   No data recorded   No data recorded    Vitals:    25 0100 25 0325 25 0400 25 0753   BP: 110/84  106/59    BP Location: Right arm  Right arm    Patient Position: Lying  Lying    Pulse: 67  71 55   Resp: 20  22 20   Temp: 97.4 °F (36.3 °C)  97.3 °F (36.3 °C)    TempSrc: Oral  Oral    SpO2: 100% 90% 91% 94%   Weight:       Height:                25  2100 25  1413   Weight: 130 kg (287 lb 11.2 oz) 130 kg (287 lb)       Body mass index is 40.05 kg/m².                          Body mass index is 40.05 kg/m².    Intake/Output Summary (Last 24 hours) at 2025 0923  Last data filed at 2025 0852  Gross per 24 hour   Intake --   Output 2070 ml   Net -0 ml         Exam:  GEN:  No distress, appears stated age  EYES:   PERRL, anicteric sclerae  ENT:    External ears/nose normal, OP clear  NECK:  No adenopathy, midline trachea  LUNGS: Normal chest on inspection, palpation and coarse breath sounds bilaterally on auscultation.  Chest tube stable without airleak.  CV:  Normal S1S2, without murmur  ABD:  Nontender, nondistended, no hepatosplenomegaly, +BS  EXT:  No edema.  No cyanosis or clubbing.  No mottling and normal cap  refill.    Assessment     Scheduled meds:  arformoterol, 15 mcg, Nebulization, BID - RT  budesonide, 0.5 mg, Nebulization, BID - RT  ceFAZolin, 2,000 mg, Intravenous, Q8H  cetirizine, 10 mg, Oral, Daily  famotidine, 20 mg, Oral, BID AC  ferrous sulfate, 162.5 mg, Oral, BID  folic acid, 1 mg, Oral, Daily  gabapentin, 600 mg, Oral, Q8H  guaiFENesin, 1,200 mg, Oral, Q12H  insulin glargine, 35 Units, Subcutaneous, Nightly  insulin lispro, 10 Units, Subcutaneous, TID With Meals  insulin lispro, 3-14 Units, Subcutaneous, 4x Daily AC & at Bedtime  ipratropium-albuterol, 3 mL, Nebulization, 4x Daily - RT  magnesium oxide, 400 mg, Oral, BID  multivitamin, 1 tablet, Oral, Daily  potassium chloride, 10 mEq, Oral, BID  [Held by provider] rivaroxaban, 20 mg, Oral, Daily  rosuvastatin, 40 mg, Oral, Daily  senna-docusate sodium, 2 tablet, Oral, BID  sertraline, 50 mg, Oral, Daily  sodium chloride, 10 mL, Intravenous, Q12H  torsemide, 40 mg, Oral, Daily  triamcinolone, 1 Application, Topical, Q12H      IV meds:                         Data Review:  Results from last 7 days   Lab Units 04/28/25  0533 04/27/25  1602 04/27/25  0542 04/26/25  0556 04/25/25  2019 04/25/25  0742 04/24/25  0537   SODIUM mmol/L 140  --  140 141  --  141 139   POTASSIUM mmol/L 3.7 3.5 3.4* 3.9 4.4 3.4* 3.7   CHLORIDE mmol/L 96*  --  96* 95*  --  98 100   CO2 mmol/L 34.8*  --  34.0* 34.0*  --  32.1* 30.0*   BUN mg/dL 16  --  20 19  --  14 18   CREATININE mg/dL 0.62*  --  0.64* 0.90  --  0.68* 0.79   GLUCOSE mg/dL 142*  --  147* 212*  --  143* 181*   CALCIUM mg/dL 9.1  --  8.9 9.3  --  8.8 8.3*         Estimated Creatinine Clearance: 139.4 mL/min (A) (by C-G formula based on SCr of 0.62 mg/dL (L)).  Results from last 7 days   Lab Units 04/28/25  0533 04/25/25  0742 04/24/25  0537 04/23/25  0730 04/22/25  0444   WBC 10*3/mm3 8.65 7.64 6.22 8.09 11.22*   HEMOGLOBIN g/dL 10.0* 10.5* 10.1* 10.1* 10.8*   PLATELETS 10*3/mm3 327 194 150 149 180     Results from last  7 days   Lab Units 04/24/25  0537 04/21/25  1455   INR  1.33* 1.92*     Results from last 7 days   Lab Units 04/21/25  1455   ALT (SGPT) U/L 15   AST (SGOT) U/L 25     Results from last 7 days   Lab Units 04/22/25  1555 04/21/25  1955   PH, ARTERIAL pH units 7.442 7.375   PO2 ART mm Hg 66.5* 73.5*   PCO2, ARTERIAL mm Hg 43.4 44.1   HCO3 ART mmol/L 29.6* 25.8     Results from last 7 days   Lab Units 04/21/25  1516 04/21/25  1455   PROCALCITONIN ng/mL  --  0.06   LACTATE mmol/L 1.2  --          Glucose   Date/Time Value Ref Range Status   04/28/2025 0703 156 (H) 70 - 130 mg/dL Final   04/27/2025 2137 179 (H) 70 - 130 mg/dL Final   04/27/2025 1622 203 (H) 70 - 130 mg/dL Final   04/27/2025 1058 307 (H) 70 - 130 mg/dL Final   04/27/2025 0626 158 (H) 70 - 130 mg/dL Final   04/26/2025 2006 278 (H) 70 - 130 mg/dL Final   04/26/2025 1619 137 (H) 70 - 130 mg/dL Final         Imaging reviewed  Chest x-ray 4/28 reviewed        Microbiology reviewed            Active Hospital Problems    Diagnosis  POA    **Hypoxia [R09.02]  Yes    History of lung cancer [Z85.118]  Not Applicable    Anemia, chronic disease [D63.8]  Yes    MSSA bacteremia [R78.81, B95.61]  Unknown    Pleural effusion on right [J90]  Yes    Essential hypertension [I10]  Yes    Fatty liver [K76.0]  Yes    Cholelithiasis [K80.20]  Yes    DM2 (diabetes mellitus, type 2) [E11.9]  Yes    Atrial fibrillation [I48.91]  Yes    Dyslipidemia [E78.5]  Yes    Mood disorder [F39]  Yes    Lobar pneumonia [J18.1]  Yes      Resolved Hospital Problems   No resolved problems to display.         ASSESSMENT:  Right pleural effusion, recurrent. S/p CT-guided chest tube 4/22 -> slightly exudative by LDH  COPD/upper lobe predominant emphysema, no current exacerbation  Keytruda pneumonitis: Only minimal GG infiltrates on latest CT 4/14- finished steroid therapy on 4/15  Acute on chronic hypoxic respiratory failure, secondary to the above.  On O2 4 L/minute baseline  MSSA  septicemia  Abnormal echo: Severely dilated right and left atrium on echo 4/24.  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  Continue antibiotics.  Chest tube management per thoracic surgery.  Bronchodilators to help with obstructive lung disease symptoms.  Steroid has been on hold since the 25th.  I discontinued.  Resume anticoagulant when okay with thoracic surgery.  BiPAP 16 over 10 at night.        Rick Fernandez MD  Pulmonary and Critical Care Medicine  [facility], [facility]  4/28/2025    09:23 EDT

## 2025-04-28 NOTE — PROGRESS NOTES
"    Name: Branden Diop ADMIT: 2025   : 1950  PCP: Tino Lopez MD    MRN: [id number] LOS: 5 days   AGE/SEX: 74 y.o. male  ROOM: [facility]     Subjective   Subjective   Feeling \"great\"       Objective   Objective   Vital Signs  Temp:  [97.6 °F (36.4 °C)-98.8 °F (37.1 °C)] 97.8 °F (36.6 °C)  Heart Rate:  [70-90] 78  Resp:  [18-27] 21  BP: ()/(64-77) 101/64  SpO2:  [92 %-99 %] 98 %  on  Flow (L/min) (Oxygen Therapy):  [4] 4;   Device (Oxygen Therapy): NPPV/NIV  Body mass index is 40.05 kg/m².  Physical Exam  Vitals reviewed.   Constitutional:       General: He is not in acute distress.     Appearance: He is obese.   Cardiovascular:      Rate and Rhythm: Normal rate and regular rhythm.   Pulmonary:      Effort: Pulmonary effort is normal.      Breath sounds: Rhonchi present. No wheezing.   Abdominal:      General: There is no distension.      Palpations: Abdomen is soft.      Tenderness: There is no abdominal tenderness.   Skin:     General: Skin is warm and dry.      Comments: Patchy erythematous rash on his left chest and arm primarily, unchanged   Neurological:      General: No focal deficit present.      Mental Status: He is alert.       Results Review     I reviewed the patient's new clinical results.  Results from last 7 days   Lab Units 25  0742 25  0537 25  0730 25  0444   WBC 10*3/mm3 7.64 6.22 8.09 11.22*   HEMOGLOBIN g/dL 10.5* 10.1* 10.1* 10.8*   PLATELETS 10*3/mm3 194 150 149 180     Results from last 7 days   Lab Units 25  1602 25  0542 25  0556 25  2019 25  0742 25  0537   SODIUM mmol/L  --  140 141  --  141 139   POTASSIUM mmol/L 3.5 3.4* 3.9 4.4 3.4* 3.7   CHLORIDE mmol/L  --  96* 95*  --  98 100   CO2 mmol/L  --  34.0* 34.0*  --  32.1* 30.0*   BUN mg/dL  --  20 19  --  14 18   CREATININE mg/dL  --  0.64* 0.90  --  0.68* 0.79   GLUCOSE mg/dL  --  147* 212*  --  143* 181*   EGFR mL/min/1.73  --  98.1 88.5  --  96.3 92.1 "     Results from last 7 days   Lab Units 04/21/25  1455   ALBUMIN g/dL 3.9   BILIRUBIN mg/dL 0.9   ALK PHOS U/L 119*   AST (SGOT) U/L 25   ALT (SGPT) U/L 15     Results from last 7 days   Lab Units 04/27/25  0542 04/26/25  0556 04/25/25  0742 04/24/25  0537 04/22/25  0444 04/21/25  1455   CALCIUM mg/dL 8.9 9.3 8.8 8.3*   < > 9.6   ALBUMIN g/dL  --   --   --   --   --  3.9   MAGNESIUM mg/dL  --   --   --   --   --  1.8    < > = values in this interval not displayed.     Results from last 7 days   Lab Units 04/21/25  1516 04/21/25  1455   PROCALCITONIN ng/mL  --  0.06   LACTATE mmol/L 1.2  --      Glucose   Date/Time Value Ref Range Status   04/27/2025 2137 179 (H) 70 - 130 mg/dL Final   04/27/2025 1622 203 (H) 70 - 130 mg/dL Final   04/27/2025 1058 307 (H) 70 - 130 mg/dL Final   04/27/2025 0626 158 (H) 70 - 130 mg/dL Final   04/26/2025 2006 278 (H) 70 - 130 mg/dL Final   04/26/2025 1619 137 (H) 70 - 130 mg/dL Final   04/26/2025 1153 290 (H) 70 - 130 mg/dL Final       XR Chest 1 View  Result Date: 4/27/2025  No significant interval change.  This report was finalized on 4/27/2025 5:36 AM by Dr. Renee Alvarado M.D on Workstation: [id number]        I have personally reviewed all medications:  Scheduled Medications  arformoterol, 15 mcg, Nebulization, BID - RT  budesonide, 0.5 mg, Nebulization, BID - RT  ceFAZolin, 2,000 mg, Intravenous, Q8H  cetirizine, 10 mg, Oral, Daily  famotidine, 20 mg, Oral, BID AC  ferrous sulfate, 162.5 mg, Oral, BID  folic acid, 1 mg, Oral, Daily  gabapentin, 600 mg, Oral, Q8H  guaiFENesin, 1,200 mg, Oral, Q12H  insulin glargine, 35 Units, Subcutaneous, Nightly  insulin lispro, 10 Units, Subcutaneous, TID With Meals  insulin lispro, 3-14 Units, Subcutaneous, 4x Daily AC & at Bedtime  ipratropium-albuterol, 3 mL, Nebulization, 4x Daily - RT  magnesium oxide, 400 mg, Oral, BID  multivitamin, 1 tablet, Oral, Daily  potassium chloride, 10 mEq, Oral, BID  [Held by provider] rivaroxaban, 20 mg,  Oral, Daily  rosuvastatin, 40 mg, Oral, Daily  senna-docusate sodium, 2 tablet, Oral, BID  sertraline, 50 mg, Oral, Daily  sodium chloride, 10 mL, Intravenous, Q12H  torsemide, 40 mg, Oral, Daily  triamcinolone, 1 Application, Topical, Q12H    Infusions   Diet  Diet: Cardiac; Healthy Heart (2-3 Na+); Fluid Consistency: Thin (IDDSI 0)    I have personally reviewed:  [x]  Laboratory   []  Microbiology   [x]  Radiology   []  EKG/Telemetry  []  Cardiology/Vascular   []  Pathology    []  Records       Assessment/Plan     Active Hospital Problems    Diagnosis  POA    **Hypoxia [R09.02]  Yes    History of lung cancer [Z85.118]  Not Applicable    Anemia, chronic disease [D63.8]  Yes    MSSA bacteremia [R78.81, B95.61]  Unknown    Pleural effusion on right [J90]  Yes    Essential hypertension [I10]  Yes    Fatty liver [K76.0]  Yes    Cholelithiasis [K80.20]  Yes    DM2 (diabetes mellitus, type 2) [E11.9]  Yes    Atrial fibrillation [I48.91]  Yes    Dyslipidemia [E78.5]  Yes    Mood disorder [F39]  Yes    Lobar pneumonia [J18.1]  Yes      Resolved Hospital Problems   No resolved problems to display.       74 y.o. male with history of squamous cell lung cancer and Keytruda pneumonitis admitted with acute on chronic respiratory failure, persistent right pleural effusion and MSSA bacteremia.  Status post Port-A-Cath removal with doxycycline pleurodesis 4/24      MSSA bacteremia treatment per ID recommendations.  Status post removal of his Port-A-Cath  - Echo reviewed.  Poor image quality, ?veg on AV  - Repeat blood cultures were never ordered.  Will place order now    Steroids started briefly for pneumonitis but now discontinued after 1 dose by thoracic team to allow scarring after pleurodesis  - Chest tube management per thoracic  - Continue neb regimen    DM2: Uncontrolled even without steroids but trending down. NO changes to regimen today.       A-fib: Xarelto on hold due to chest tube.  Resume when able    Lung cancer plans  per oncology.  Rash related to chemo.  Atarax available for itching as needed.  Ordered some topical steroid cream as well though he says it has not helped much in the past      DVT prophy SCDs  Dispo: Potential DC early week.    Kartik Ritchie MD  [facility]  04/27/25  23:10 EDT

## 2025-04-28 NOTE — PLAN OF CARE
Problem: Adult Inpatient Plan of Care  Goal: Plan of Care Review  Outcome: Progressing  Flowsheets (Taken 4/28/2025 0135)  Plan of Care Reviewed With: patient  Goal: Patient-Specific Goal (Individualized)  Outcome: Progressing  Goal: Absence of Hospital-Acquired Illness or Injury  Outcome: Progressing  Intervention: Identify and Manage Fall Risk  Recent Flowsheet Documentation  Taken 4/28/2025 0000 by Luci Guerra RN  Safety Promotion/Fall Prevention: safety round/check completed  Taken 4/27/2025 2200 by Luci Guerra RN  Safety Promotion/Fall Prevention: safety round/check completed  Taken 4/27/2025 1930 by Luci Guerra RN  Safety Promotion/Fall Prevention: safety round/check completed  Intervention: Prevent Skin Injury  Recent Flowsheet Documentation  Taken 4/28/2025 0000 by Luci Guerra RN  Body Position: position changed independently  Taken 4/27/2025 2200 by Luci Guerra RN  Body Position: position changed independently  Taken 4/27/2025 1930 by Luci Guerra RN  Body Position: position changed independently  Skin Protection: protective footwear used  Intervention: Prevent and Manage VTE (Venous Thromboembolism) Risk  Recent Flowsheet Documentation  Taken 4/27/2025 1930 by uLci Guerra RN  VTE Prevention/Management:   bilateral   SCDs (sequential compression devices) on  Intervention: Prevent Infection  Recent Flowsheet Documentation  Taken 4/28/2025 0000 by Luci Guerra RN  Infection Prevention: single patient room provided  Taken 4/27/2025 2200 by Luci Guerra RN  Infection Prevention: single patient room provided  Taken 4/27/2025 1930 by Luci Guerra RN  Infection Prevention: single patient room provided  Goal: Optimal Comfort and Wellbeing  Outcome: Progressing  Intervention: Provide Person-Centered Care  Recent Flowsheet Documentation  Taken 4/27/2025 1930 by Luci Guerra RN  Trust Relationship/Rapport:   care explained   choices provided  Goal: Readiness for Transition of  Care  Outcome: Progressing     Problem: Noninvasive Ventilation Acute  Goal: Effective Unassisted Ventilation and Oxygenation  Outcome: Progressing     Problem: Comorbidity Management  Goal: Maintenance of COPD Symptom Control  Outcome: Progressing  Intervention: Maintain COPD (Chronic Obstructive Pulmonary Disease) Symptom Control  Recent Flowsheet Documentation  Taken 4/28/2025 0000 by Luci Guerra RN  Medication Review/Management: medications reviewed  Taken 4/27/2025 2200 by Luci Guerra RN  Medication Review/Management: medications reviewed  Taken 4/27/2025 1930 by Luci Guerra RN  Medication Review/Management: medications reviewed  Goal: Blood Glucose Level Within Target Range  Outcome: Progressing  Intervention: Monitor and Manage Glycemia  Recent Flowsheet Documentation  Taken 4/28/2025 0000 by Luci Guerra RN  Medication Review/Management: medications reviewed  Taken 4/27/2025 2200 by Luci Guerra RN  Medication Review/Management: medications reviewed  Taken 4/27/2025 1930 by Luci Guerra RN  Medication Review/Management: medications reviewed     Problem: Fall Injury Risk  Goal: Absence of Fall and Fall-Related Injury  Outcome: Progressing  Intervention: Identify and Manage Contributors  Recent Flowsheet Documentation  Taken 4/28/2025 0000 by Luci Guerra RN  Medication Review/Management: medications reviewed  Taken 4/27/2025 2200 by Luci Guerra RN  Medication Review/Management: medications reviewed  Taken 4/27/2025 1930 by Luci Guerra RN  Medication Review/Management: medications reviewed  Intervention: Promote Injury-Free Environment  Recent Flowsheet Documentation  Taken 4/28/2025 0000 by Luci Guerra RN  Safety Promotion/Fall Prevention: safety round/check completed  Taken 4/27/2025 2200 by Luci Guerra RN  Safety Promotion/Fall Prevention: safety round/check completed  Taken 4/27/2025 1930 by Luci Guerra RN  Safety Promotion/Fall Prevention: safety round/check  completed     Problem: Sepsis/Septic Shock  Goal: Optimal Coping  Outcome: Progressing  Intervention: Support Patient and Family Response  Recent Flowsheet Documentation  Taken 4/27/2025 1930 by Luci Guerra RN  Supportive Measures: active listening utilized  Family/Support System Care: self-care encouraged  Goal: Absence of Bleeding  Outcome: Progressing  Goal: Blood Glucose Level Within Target Range  Outcome: Progressing  Goal: Absence of Infection Signs and Symptoms  Outcome: Progressing  Intervention: Initiate Sepsis Management  Recent Flowsheet Documentation  Taken 4/28/2025 0000 by Luci Guerra RN  Infection Prevention: single patient room provided  Taken 4/27/2025 2200 by Luci Guerra RN  Infection Prevention: single patient room provided  Taken 4/27/2025 1930 by Luci Guerra RN  Infection Prevention: single patient room provided  Intervention: Promote Recovery  Recent Flowsheet Documentation  Taken 4/27/2025 1930 by Luci Guerra RN  Sleep/Rest Enhancement: awakenings minimized  Goal: Optimal Nutrition Delivery  Outcome: Progressing   Goal Outcome Evaluation:  Plan of Care Reviewed With: patient

## 2025-04-29 ENCOUNTER — PATIENT OUTREACH (OUTPATIENT)
Dept: OTHER | Facility: HOSPITAL | Age: 75
End: 2025-04-29
Payer: MEDICARE

## 2025-04-29 ENCOUNTER — APPOINTMENT (OUTPATIENT)
Dept: GENERAL RADIOLOGY | Facility: HOSPITAL | Age: 75
End: 2025-04-29
Payer: MEDICARE

## 2025-04-29 LAB
ANION GAP SERPL CALCULATED.3IONS-SCNC: 9.7 MMOL/L (ref 5–15)
BUN SERPL-MCNC: 15 MG/DL (ref 8–23)
BUN/CREAT SERPL: 21.7 (ref 7–25)
CALCIUM SPEC-SCNC: 9.3 MG/DL (ref 8.6–10.5)
CHLORIDE SERPL-SCNC: 95 MMOL/L (ref 98–107)
CO2 SERPL-SCNC: 34.3 MMOL/L (ref 22–29)
CREAT SERPL-MCNC: 0.69 MG/DL (ref 0.76–1.27)
CRP SERPL-MCNC: 5.96 MG/DL (ref 0–0.5)
DEPRECATED RDW RBC AUTO: 51.2 FL (ref 37–54)
EGFRCR SERPLBLD CKD-EPI 2021: 95.9 ML/MIN/1.73
ERYTHROCYTE [DISTWIDTH] IN BLOOD BY AUTOMATED COUNT: 15.8 % (ref 12.3–15.4)
GLUCOSE BLDC GLUCOMTR-MCNC: 137 MG/DL (ref 70–130)
GLUCOSE BLDC GLUCOMTR-MCNC: 196 MG/DL (ref 70–130)
GLUCOSE BLDC GLUCOMTR-MCNC: 199 MG/DL (ref 70–130)
GLUCOSE BLDC GLUCOMTR-MCNC: 224 MG/DL (ref 70–130)
GLUCOSE BLDC GLUCOMTR-MCNC: 261 MG/DL (ref 70–130)
GLUCOSE BLDC GLUCOMTR-MCNC: 395 MG/DL (ref 70–130)
GLUCOSE SERPL-MCNC: 156 MG/DL (ref 65–99)
HCT VFR BLD AUTO: 30.9 % (ref 37.5–51)
HGB BLD-MCNC: 9.6 G/DL (ref 13–17.7)
MCH RBC QN AUTO: 27.7 PG (ref 26.6–33)
MCHC RBC AUTO-ENTMCNC: 31.1 G/DL (ref 31.5–35.7)
MCV RBC AUTO: 89 FL (ref 79–97)
PLATELET # BLD AUTO: 347 10*3/MM3 (ref 140–450)
PMV BLD AUTO: 9.1 FL (ref 6–12)
POTASSIUM SERPL-SCNC: 3.8 MMOL/L (ref 3.5–5.2)
RBC # BLD AUTO: 3.47 10*6/MM3 (ref 4.14–5.8)
SODIUM SERPL-SCNC: 139 MMOL/L (ref 136–145)
WBC NRBC COR # BLD AUTO: 8.88 10*3/MM3 (ref 3.4–10.8)

## 2025-04-29 PROCEDURE — 63710000001 INSULIN GLARGINE PER 5 UNITS: Performed by: HOSPITALIST

## 2025-04-29 PROCEDURE — 94761 N-INVAS EAR/PLS OXIMETRY MLT: CPT

## 2025-04-29 PROCEDURE — 99024 POSTOP FOLLOW-UP VISIT: CPT

## 2025-04-29 PROCEDURE — 71045 X-RAY EXAM CHEST 1 VIEW: CPT

## 2025-04-29 PROCEDURE — 99233 SBSQ HOSP IP/OBS HIGH 50: CPT | Performed by: INTERNAL MEDICINE

## 2025-04-29 PROCEDURE — 94799 UNLISTED PULMONARY SVC/PX: CPT

## 2025-04-29 PROCEDURE — 85027 COMPLETE CBC AUTOMATED: CPT | Performed by: HOSPITALIST

## 2025-04-29 PROCEDURE — 25010000002 CEFAZOLIN PER 500 MG: Performed by: SURGERY

## 2025-04-29 PROCEDURE — 86140 C-REACTIVE PROTEIN: CPT | Performed by: HOSPITALIST

## 2025-04-29 PROCEDURE — 80048 BASIC METABOLIC PNL TOTAL CA: CPT | Performed by: SURGERY

## 2025-04-29 PROCEDURE — 63710000001 INSULIN LISPRO (HUMAN) PER 5 UNITS: Performed by: HOSPITALIST

## 2025-04-29 PROCEDURE — G0545 PR INHERENT VISIT TO INPT: HCPCS | Performed by: INTERNAL MEDICINE

## 2025-04-29 PROCEDURE — 82948 REAGENT STRIP/BLOOD GLUCOSE: CPT

## 2025-04-29 PROCEDURE — 97530 THERAPEUTIC ACTIVITIES: CPT

## 2025-04-29 RX ADMIN — INSULIN GLARGINE 35 UNITS: 100 INJECTION, SOLUTION SUBCUTANEOUS at 19:45

## 2025-04-29 RX ADMIN — OXYCODONE AND ACETAMINOPHEN 1 TABLET: 5; 325 TABLET ORAL at 19:44

## 2025-04-29 RX ADMIN — ARFORMOTEROL TARTRATE 15 MCG: 15 SOLUTION RESPIRATORY (INHALATION) at 20:00

## 2025-04-29 RX ADMIN — GUAIFENESIN 1200 MG: 600 TABLET, EXTENDED RELEASE ORAL at 08:01

## 2025-04-29 RX ADMIN — CETIRIZINE HYDROCHLORIDE 10 MG: 10 TABLET, FILM COATED ORAL at 08:02

## 2025-04-29 RX ADMIN — SODIUM CHLORIDE 2000 MG: 9 INJECTION, SOLUTION INTRAVENOUS at 12:51

## 2025-04-29 RX ADMIN — Medication 10 ML: at 20:56

## 2025-04-29 RX ADMIN — FAMOTIDINE 20 MG: 20 TABLET, FILM COATED ORAL at 17:38

## 2025-04-29 RX ADMIN — GABAPENTIN 600 MG: 300 CAPSULE ORAL at 19:42

## 2025-04-29 RX ADMIN — INSULIN LISPRO 10 UNITS: 100 INJECTION, SOLUTION INTRAVENOUS; SUBCUTANEOUS at 12:50

## 2025-04-29 RX ADMIN — Medication 10 ML: at 20:57

## 2025-04-29 RX ADMIN — OXYCODONE AND ACETAMINOPHEN 1 TABLET: 5; 325 TABLET ORAL at 13:00

## 2025-04-29 RX ADMIN — Medication 10 ML: at 09:22

## 2025-04-29 RX ADMIN — TRIAMCINOLONE ACETONIDE 1 APPLICATION: 1 CREAM TOPICAL at 12:50

## 2025-04-29 RX ADMIN — GABAPENTIN 600 MG: 300 CAPSULE ORAL at 05:00

## 2025-04-29 RX ADMIN — GABAPENTIN 600 MG: 300 CAPSULE ORAL at 12:53

## 2025-04-29 RX ADMIN — INSULIN LISPRO 10 UNITS: 100 INJECTION, SOLUTION INTRAVENOUS; SUBCUTANEOUS at 08:15

## 2025-04-29 RX ADMIN — FAMOTIDINE 20 MG: 20 TABLET, FILM COATED ORAL at 08:01

## 2025-04-29 RX ADMIN — INSULIN LISPRO 5 UNITS: 100 INJECTION, SOLUTION INTRAVENOUS; SUBCUTANEOUS at 08:15

## 2025-04-29 RX ADMIN — SERTRALINE HYDROCHLORIDE 50 MG: 50 TABLET, FILM COATED ORAL at 08:00

## 2025-04-29 RX ADMIN — Medication 1 TABLET: at 08:02

## 2025-04-29 RX ADMIN — ARFORMOTEROL TARTRATE 15 MCG: 15 SOLUTION RESPIRATORY (INHALATION) at 07:19

## 2025-04-29 RX ADMIN — SENNOSIDES AND DOCUSATE SODIUM 2 TABLET: 50; 8.6 TABLET ORAL at 19:43

## 2025-04-29 RX ADMIN — IPRATROPIUM BROMIDE AND ALBUTEROL SULFATE 3 ML: .5; 3 SOLUTION RESPIRATORY (INHALATION) at 16:06

## 2025-04-29 RX ADMIN — FERROUS SULFATE TAB 325 MG (65 MG ELEMENTAL FE) 162.5 MG: 325 (65 FE) TAB at 08:01

## 2025-04-29 RX ADMIN — BUDESONIDE 0.5 MG: 0.5 INHALANT RESPIRATORY (INHALATION) at 20:00

## 2025-04-29 RX ADMIN — INSULIN LISPRO 3 UNITS: 100 INJECTION, SOLUTION INTRAVENOUS; SUBCUTANEOUS at 12:50

## 2025-04-29 RX ADMIN — BUDESONIDE 0.5 MG: 0.5 INHALANT RESPIRATORY (INHALATION) at 07:18

## 2025-04-29 RX ADMIN — IPRATROPIUM BROMIDE AND ALBUTEROL SULFATE 3 ML: .5; 3 SOLUTION RESPIRATORY (INHALATION) at 10:51

## 2025-04-29 RX ADMIN — FOLIC ACID 1 MG: 1 TABLET ORAL at 08:02

## 2025-04-29 RX ADMIN — FERROUS SULFATE TAB 325 MG (65 MG ELEMENTAL FE) 162.5 MG: 325 (65 FE) TAB at 19:41

## 2025-04-29 RX ADMIN — MAGNESIUM OXIDE TAB 400 MG (241.3 MG ELEMENTAL MG) 400 MG: 400 (241.3 MG) TAB at 19:43

## 2025-04-29 RX ADMIN — POTASSIUM CHLORIDE 10 MEQ: 750 TABLET, EXTENDED RELEASE ORAL at 19:42

## 2025-04-29 RX ADMIN — ROSUVASTATIN CALCIUM 40 MG: 20 TABLET, FILM COATED ORAL at 08:01

## 2025-04-29 RX ADMIN — IPRATROPIUM BROMIDE AND ALBUTEROL SULFATE 3 ML: .5; 3 SOLUTION RESPIRATORY (INHALATION) at 20:00

## 2025-04-29 RX ADMIN — SODIUM CHLORIDE 2000 MG: 9 INJECTION, SOLUTION INTRAVENOUS at 18:35

## 2025-04-29 RX ADMIN — SODIUM CHLORIDE 2000 MG: 9 INJECTION, SOLUTION INTRAVENOUS at 02:32

## 2025-04-29 RX ADMIN — POLYETHYLENE GLYCOL 3350 17 G: 17 POWDER, FOR SOLUTION ORAL at 18:38

## 2025-04-29 RX ADMIN — MAGNESIUM OXIDE TAB 400 MG (241.3 MG ELEMENTAL MG) 400 MG: 400 (241.3 MG) TAB at 08:14

## 2025-04-29 RX ADMIN — IPRATROPIUM BROMIDE AND ALBUTEROL SULFATE 3 ML: .5; 3 SOLUTION RESPIRATORY (INHALATION) at 07:17

## 2025-04-29 RX ADMIN — TORSEMIDE 40 MG: 20 TABLET ORAL at 08:01

## 2025-04-29 RX ADMIN — GUAIFENESIN 1200 MG: 600 TABLET, EXTENDED RELEASE ORAL at 19:44

## 2025-04-29 RX ADMIN — INSULIN LISPRO 10 UNITS: 100 INJECTION, SOLUTION INTRAVENOUS; SUBCUTANEOUS at 17:38

## 2025-04-29 RX ADMIN — SENNOSIDES AND DOCUSATE SODIUM 2 TABLET: 50; 8.6 TABLET ORAL at 08:01

## 2025-04-29 RX ADMIN — POTASSIUM CHLORIDE 10 MEQ: 750 TABLET, EXTENDED RELEASE ORAL at 08:02

## 2025-04-29 RX ADMIN — INSULIN LISPRO 12 UNITS: 100 INJECTION, SOLUTION INTRAVENOUS; SUBCUTANEOUS at 19:45

## 2025-04-29 NOTE — PROGRESS NOTES
ID note    CC: Follow-up MSSA septicemia, port in place now removed, and right pleural effusion status post right chest tube all in the context of lung cancer    Subjective: Checking in on patient after PICC line placement yesterday.  History from patient and his wife.  He says PICC placement was without difficulty.  They state that he has continued to improve.  He is on 5 L of oxygen currently.  He wears 3 L at home.  The plan is to transfer to the [facility] for ongoing chest tube management.      Medications:    Current Facility-Administered Medications:     acetaminophen (TYLENOL) tablet 650 mg, 650 mg, Oral, Q4H PRN **OR** acetaminophen (TYLENOL) 160 MG/5ML oral solution 650 mg, 650 mg, Oral, Q4H PRN **OR** acetaminophen (TYLENOL) suppository 650 mg, 650 mg, Rectal, Q4H PRN, David Figueroa MD    albuterol (PROVENTIL) nebulizer solution 0.083% 2.5 mg/3mL, 2.5 mg, Nebulization, Q4H PRN, David Figueroa MD    arformoterol (BROVANA) nebulizer solution 15 mcg, 15 mcg, Nebulization, BID - RT, David Figueroa MD, 15 mcg at 04/29/25 0719    sennosides-docusate (PERICOLACE) 8.6-50 MG per tablet 2 tablet, 2 tablet, Oral, BID, 2 tablet at 04/29/25 0801 **AND** polyethylene glycol (MIRALAX) packet 17 g, 17 g, Oral, Daily PRN **AND** bisacodyl (DULCOLAX) EC tablet 5 mg, 5 mg, Oral, Daily PRN **AND** bisacodyl (DULCOLAX) suppository 10 mg, 10 mg, Rectal, Daily PRN, David Figueroa MD    budesonide (PULMICORT) nebulizer solution 0.5 mg, 0.5 mg, Nebulization, BID - RT, David Figueroa MD, 0.5 mg at 04/29/25 0718    Calcium Replacement - Follow Nurse / BPA Driven Protocol, , Not Applicable, PRN, David Figueroa MD    ceFAZolin 2000 mg IVPB in 100 mL NS (MBP), 2,000 mg, Intravenous, Q8H, David Figueroa MD, Last Rate: 0 mL/hr at 04/27/25 1402, 2,000 mg at 04/29/25 0232    cetirizine (zyrTEC) tablet 10 mg, 10 mg, Oral, Daily, David Figueroa MD, 10 mg at 04/29/25 0802    dextrose (D50W) (25 g/50 mL) IV injection 25 g, 25 g, Intravenous, Q15 Min PRN, Henry  MD David    dextrose (GLUTOSE) oral gel 15 g, 15 g, Oral, Q15 Min PRN, David Figueroa MD    famotidine (PEPCID) tablet 20 mg, 20 mg, Oral, BID AC, David Figueroa MD, 20 mg at 04/29/25 0801    ferrous sulfate tablet 162.5 mg, 162.5 mg, Oral, BID, David Figueroa MD, 162.5 mg at 04/29/25 0801    folic acid (FOLVITE) tablet 1 mg, 1 mg, Oral, Daily, David Figueroa MD, 1 mg at 04/29/25 0802    gabapentin (NEURONTIN) capsule 600 mg, 600 mg, Oral, Q8H, David Figueroa MD, 600 mg at 04/29/25 0500    glucagon (GLUCAGEN) injection 1 mg, 1 mg, Intramuscular, Q15 Min PRN, David Figueroa MD    guaiFENesin (MUCINEX) 12 hr tablet 1,200 mg, 1,200 mg, Oral, Q12H, David Figueroa MD, 1,200 mg at 04/29/25 0801    hydrOXYzine pamoate (VISTARIL) capsule 50 mg, 50 mg, Oral, TID PRN, David Figueroa MD    insulin glargine (LANTUS, SEMGLEE) injection 35 Units, 35 Units, Subcutaneous, Nightly, Kartik Ritchie MD, 35 Units at 04/28/25 2056    insulin lispro (HUMALOG/ADMELOG) injection 10 Units, 10 Units, Subcutaneous, TID With Meals, Kartik Ritchie MD, 10 Units at 04/29/25 0815    insulin lispro (HUMALOG/ADMELOG) injection 3-14 Units, 3-14 Units, Subcutaneous, 4x Daily AC & at Bedtime, Kartik Ritchie MD, 5 Units at 04/29/25 0815    ipratropium-albuterol (DUO-NEB) nebulizer solution 3 mL, 3 mL, Nebulization, 4x Daily - RT, David Figueroa MD, 3 mL at 04/29/25 1051    magnesium oxide (MAG-OX) tablet 400 mg, 400 mg, Oral, BID, David Figueroa MD, 400 mg at 04/29/25 0814    Magnesium Standard Dose Replacement - Follow Nurse / BPA Driven Protocol, , Not Applicable, PRNHenry Nabeel, MD    melatonin tablet 5 mg, 5 mg, Oral, Nightly PRN, David Figueroa MD, 5 mg at 04/28/25 2056    multivitamin (THERAGRAN) tablet 1 tablet, 1 tablet, Oral, Daily, David Figueroa MD, 1 tablet at 04/29/25 0802    nitroglycerin (NITROSTAT) SL tablet 0.4 mg, 0.4 mg, Sublingual, Q5 Min PRN, David Figueroa MD    ondansetron ODT (ZOFRAN-ODT) disintegrating tablet 4 mg,  4 mg, Oral, Q6H PRN **OR** ondansetron (ZOFRAN) injection 4 mg, 4 mg, Intravenous, Q6H PRN, David Figueroa MD    oxyCODONE-acetaminophen (PERCOCET) 5-325 MG per tablet 1 tablet, 1 tablet, Oral, Q4H PRN, Kartik Ritchie MD, 1 tablet at 04/28/25 2056    Phosphorus Replacement - Follow Nurse / BPA Driven Protocol, , Not Applicable, PRHenry TRUJILLO Nabeel, MD    potassium chloride (K-DUR,KLOR-CON) ER tablet 10 mEq, 10 mEq, Oral, BID, David Figueroa MD, 10 mEq at 04/29/25 0802    Potassium Replacement - Follow Nurse / BPA Driven Protocol, , Not Applicable, Henry SALAZAR Nabeel, MD    [Held by provider] rivaroxaban (XARELTO) tablet 20 mg, 20 mg, Oral, Daily, Rigo Eng MD, 20 mg at 04/22/25 0851    rosuvastatin (CRESTOR) tablet 40 mg, 40 mg, Oral, Daily, David Figueroa MD, 40 mg at 04/29/25 0801    sertraline (ZOLOFT) tablet 50 mg, 50 mg, Oral, Daily, David Figueroa MD, 50 mg at 04/29/25 0800    sodium chloride 0.9 % flush 10 mL, 10 mL, Intravenous, PRNHenry Nabeel, MD    sodium chloride 0.9 % flush 10 mL, 10 mL, Intravenous, Q12H, David Figueroa MD, 10 mL at 04/29/25 0922    sodium chloride 0.9 % flush 10 mL, 10 mL, Intravenous, PRNHenry Nabeel, MD    sodium chloride 0.9 % flush 10 mL, 10 mL, Intravenous, Q12H, Hermilo Monroe MD    sodium chloride 0.9 % flush 10 mL, 10 mL, Intravenous, PRN, Hermilo Monroe MD    sodium chloride 0.9 % flush 10 mL, 10 mL, Intravenous, Q12H, Hermilo Monroe MD    sodium chloride 0.9 % flush 10 mL, 10 mL, Intravenous, PRN, Hermilo Monroe MD    sodium chloride 0.9 % flush 10 mL, 10 mL, Intravenous, Q12H, Hermilo Monroe MD    sodium chloride 0.9 % flush 10 mL, 10 mL, Intravenous, PRN, Hermilo Monroe MD    sodium chloride 0.9 % flush 20 mL, 20 mL, Intravenous, PRN, Hermilo Monroe MD    sodium chloride 0.9 % flush 20 mL, 20 mL, Intravenous, PRN, Hermilo Monroe MD    sodium chloride 0.9 % flush  20 mL, 20 mL, Intravenous, PRN, Hermilo Monroe MD    sodium chloride 0.9 % infusion 40 mL, 40 mL, Intravenous, PRN, David Figueroa MD    sodium chloride 0.9 % infusion 40 mL, 40 mL, Intravenous, PRN, Hermilo Monroe MD    sodium chloride 0.9 % infusion 40 mL, 40 mL, Intravenous, PRN, Hermilo Monroe MD    torsemide (DEMADEX) tablet 40 mg, 40 mg, Oral, Daily, David Figueroa MD, 40 mg at 04/29/25 0801    triamcinolone (KENALOG) 0.1 % cream 1 Application, 1 Application, Topical, Q12H, Kartik Ritchie MD, 1 Application at 04/28/25 2055      Objective   Vital Signs   Temp:  [97.5 °F (36.4 °C)-98.8 °F (37.1 °C)] 97.5 °F (36.4 °C)  Heart Rate:  [70-90] 85  Resp:  [16-20] 20  BP: (106-116)/(56-94) 106/74    Physical Exam:   General: awake, alert, sitting up in chair  Eyes: no scleral icterus  Cardiovascular: NR  Respiratory: normal work of breathing on 5 L nasal cannula; right chest tube present  GI: Abdomen is obese, not tender or distended  Skin: Mild rash in multiple sites; he says due to immunotherapy and that it is now improving   Neurological: Alert and oriented x 3  Psychiatric: Calm and pleasant  Vasc: Right chest port has been removed; site is slightly bruised but no erythema or drainage; LUE PICC without erythema    Labs:   CBC, BMP, CRP, and blood cultures reviewed today  Lab Results   Component Value Date    WBC 8.88 04/29/2025    HGB 9.6 (L) 04/29/2025    HCT 30.9 (L) 04/29/2025    MCV 89.0 04/29/2025     04/29/2025     Lab Results   Component Value Date    GLUCOSE 156 (H) 04/29/2025    CALCIUM 9.3 04/29/2025     04/29/2025    K 3.8 04/29/2025    CO2 34.3 (H) 04/29/2025    CL 95 (L) 04/29/2025    BUN 15 04/29/2025    CREATININE 0.69 (L) 04/29/2025    EGFR 95.9 04/29/2025    BCR 21.7 04/29/2025    ANIONGAP 9.7 04/29/2025     Lab Results   Component Value Date    HGBA1C 8.60 (H) 04/21/2025     Lab Results   Component Value Date    CRP 5.96 (H) 04/29/2025  "        Microbiology:  4/21 RPP: Negative  4/21 BCx: MSSA in 2/2 sets  4/21 urine strep pneumo and Legionella antigens: Negative  4/21 respiratory Cx: \"Rejected\"  4/22 MRSA nares PCR: Negative  4/22 pleural fluid Cx: Negative, final  4/28 BCx: NGTD      Radiology:  4/29 CXR images reviewed and shows new LUE PICC line in place and bilateral pulmonary infiltrates; small right effusion with chest tube present     Isolation:  No active isolations; standard precautions    ASSESSMENT/PLAN:  MSSA septicemia  Port catheter present now removed  Pulmonary emphysema  History of right middle lobectomy  Morbid obesity BMI 40 complicating above  Right pleural effusion status post chest tube placement  Abnormal transthoracic echocardiogram    He is much improved compared to admission.  He is afebrile with a normal WBC.  The repeat blood cultures drawn on 4/28 to document sterility are negative today.  He had his port removed to help with source control.  His pleural cultures did not grow any bacteria. He did have a TTE that was poor image quality but mentions a questionable small echodensity on the aortic valve that could be endocarditis but favors sclerosis/calcification.    Provided education to patient and his wife regarding his choice and duration of antibiotics.  I am planning for 6 weeks course of cefazolin 2 g IV every 8 hours given the abnormal echocardiogram.  Stop date will be 6/3/2025 at which time he will follow-up with me in the ID clinic.  He will need a weekly CBC with differential and creatinine faxed to me at [phone].  Single-lumen PICC line is in.    Thank you for allowing me to be involved in the care of this patient. Infectious diseases will sign off at this time with antibiotics plan in place, but please call me at [phone] if any further ID questions or new ID concerns.      ------------------------------------------------------------------  The above decisions regarding antimicrobials incorporates " elements of engaging in complex medical decision-making associated with antimicrobial prescribing including considerations such as antimicrobial resistance patterns, duration of therapy, practicing antibiotic stewardship by selecting targeted antibiotic therapy, and considering patient-specific resistance patterns after micro data review.

## 2025-04-29 NOTE — PLAN OF CARE
Goal Outcome Evaluation:  Plan of Care Reviewed With: patient        Progress: improving  Outcome Evaluation: Pt has cont to show improvement with mobility, is able to get out of bed independently and walk 150 ft X 2 at a supervision level, seated rest in between walks to check o2 sats, pt sats >90% throughout, pt was on 6L o2, pt is safe to cont to walk with nursing staff, PT to sign off    Anticipated Discharge Disposition (PT): home

## 2025-04-29 NOTE — PLAN OF CARE
Problem: Adult Inpatient Plan of Care  Goal: Plan of Care Review  Outcome: Progressing  Flowsheets (Taken 4/29/2025 0054)  Plan of Care Reviewed With: patient  Goal: Patient-Specific Goal (Individualized)  Outcome: Progressing  Goal: Absence of Hospital-Acquired Illness or Injury  Outcome: Progressing  Intervention: Identify and Manage Fall Risk  Recent Flowsheet Documentation  Taken 4/28/2025 2351 by Luci Guerra RN  Safety Promotion/Fall Prevention: safety round/check completed  Taken 4/28/2025 2200 by Luci Guerra RN  Safety Promotion/Fall Prevention: safety round/check completed  Taken 4/28/2025 2000 by Luci Guerra RN  Safety Promotion/Fall Prevention: safety round/check completed  Intervention: Prevent Skin Injury  Recent Flowsheet Documentation  Taken 4/28/2025 2351 by Luci Guerra RN  Body Position: position changed independently  Taken 4/28/2025 2200 by Luci Guerra RN  Body Position: position changed independently  Taken 4/28/2025 2000 by Luci Guerra RN  Body Position: position changed independently  Skin Protection: protective footwear used  Intervention: Prevent and Manage VTE (Venous Thromboembolism) Risk  Recent Flowsheet Documentation  Taken 4/28/2025 2000 by Luci Guerra RN  VTE Prevention/Management:   bilateral   SCDs (sequential compression devices) on  Intervention: Prevent Infection  Recent Flowsheet Documentation  Taken 4/28/2025 2351 by Luci Guerra RN  Infection Prevention: single patient room provided  Taken 4/28/2025 2200 by Luci Guerra RN  Infection Prevention: single patient room provided  Taken 4/28/2025 2000 by Luci Guerra RN  Infection Prevention: single patient room provided  Goal: Optimal Comfort and Wellbeing  Outcome: Progressing  Intervention: Provide Person-Centered Care  Recent Flowsheet Documentation  Taken 4/28/2025 2000 by Luci Guerra RN  Trust Relationship/Rapport:   care explained   choices provided  Goal: Readiness for Transition of  Care  Outcome: Progressing     Problem: Noninvasive Ventilation Acute  Goal: Effective Unassisted Ventilation and Oxygenation  Outcome: Progressing     Problem: Comorbidity Management  Goal: Maintenance of COPD Symptom Control  Outcome: Progressing  Intervention: Maintain COPD (Chronic Obstructive Pulmonary Disease) Symptom Control  Recent Flowsheet Documentation  Taken 4/28/2025 2351 by Luci Guerra RN  Medication Review/Management: medications reviewed  Taken 4/28/2025 2200 by Luci Guerra RN  Medication Review/Management: medications reviewed  Taken 4/28/2025 2000 by Luci Guerra RN  Medication Review/Management: medications reviewed  Goal: Blood Glucose Level Within Target Range  Outcome: Progressing  Intervention: Monitor and Manage Glycemia  Recent Flowsheet Documentation  Taken 4/28/2025 2351 by Luci Guerra RN  Medication Review/Management: medications reviewed  Taken 4/28/2025 2200 by Luci Guerra RN  Medication Review/Management: medications reviewed  Taken 4/28/2025 2000 by Luci Guerra RN  Medication Review/Management: medications reviewed     Problem: Fall Injury Risk  Goal: Absence of Fall and Fall-Related Injury  Outcome: Progressing  Intervention: Identify and Manage Contributors  Recent Flowsheet Documentation  Taken 4/28/2025 2351 by Luci Guerra RN  Medication Review/Management: medications reviewed  Taken 4/28/2025 2200 by Luci Guerra RN  Medication Review/Management: medications reviewed  Taken 4/28/2025 2000 by Luci Guerra RN  Medication Review/Management: medications reviewed  Intervention: Promote Injury-Free Environment  Recent Flowsheet Documentation  Taken 4/28/2025 2351 by Luci Guerra RN  Safety Promotion/Fall Prevention: safety round/check completed  Taken 4/28/2025 2200 by Luci Guerra RN  Safety Promotion/Fall Prevention: safety round/check completed  Taken 4/28/2025 2000 by Luci Guerra RN  Safety Promotion/Fall Prevention: safety round/check  completed     Problem: Sepsis/Septic Shock  Goal: Optimal Coping  Outcome: Progressing  Intervention: Support Patient and Family Response  Recent Flowsheet Documentation  Taken 4/28/2025 2000 by Luci Guerra RN  Family/Support System Care: self-care encouraged  Goal: Absence of Bleeding  Outcome: Progressing  Goal: Blood Glucose Level Within Target Range  Outcome: Progressing  Goal: Absence of Infection Signs and Symptoms  Outcome: Progressing  Intervention: Initiate Sepsis Management  Recent Flowsheet Documentation  Taken 4/28/2025 2351 by Luci Guerra RN  Infection Prevention: single patient room provided  Taken 4/28/2025 2200 by Luci Guerra RN  Infection Prevention: single patient room provided  Taken 4/28/2025 2000 by Luci Guerra RN  Infection Prevention: single patient room provided  Intervention: Promote Recovery  Recent Flowsheet Documentation  Taken 4/28/2025 2000 by Luci Guerra RN  Sleep/Rest Enhancement: awakenings minimized  Goal: Optimal Nutrition Delivery  Outcome: Progressing     Problem: Skin Injury Risk Increased  Goal: Skin Health and Integrity  Outcome: Progressing  Intervention: Optimize Skin Protection  Recent Flowsheet Documentation  Taken 4/28/2025 2351 by Luci Guerra RN  Head of Bed (HOB) Positioning: HOB at 30 degrees  Taken 4/28/2025 2200 by Luci Guerra RN  Head of Bed (HOB) Positioning: HOB at 30 degrees  Taken 4/28/2025 2000 by Luci Guerra RN  Pressure Reduction Techniques: frequent weight shift encouraged  Head of Bed (HOB) Positioning: HOB at 20 degrees  Pressure Reduction Devices: specialty bed utilized  Skin Protection: protective footwear used   Goal Outcome Evaluation:  Plan of Care Reviewed With: patient

## 2025-04-29 NOTE — PROGRESS NOTES
Reviewed chart. Patient admitted 4/21 for recurrent pleural effusion. Status post port removal with doxycycline pleurodesis via right-sided chest tube 4/24/25 for recurrent pleural effusion of uncertain etiology. Patient being treated for MSSA bacteremia. Plan to dc home with [facility] with IV antibx.

## 2025-04-29 NOTE — PROGRESS NOTES
Chief Complaint: Recurrent right pleural effusion  S/P: Port removal and Doxy cyclin pleurodesis  POD # 5    Subjective:  Symptoms:  Improved.  No shortness of breath.    Diet:  Adequate intake.  No nausea or vomiting.    Activity level: Returning to normal.    Pain:  He complains of pain that is mild.  Pain is well controlled and requiring pain medication.      Objective:  General appearance: Comfortable, in no acute distress  Vital signs: Blood pressure 115/68, pulse 85, temperature 97.7 °F, temperature source Oral, resp. rate 18, SpO2 95% 6L NC.    Lungs: Normal effort and normal respiratory rate.  Heart: Irregular rhythm.  Regular rate.  Chest: Symmetric chest wall expansion. Chest wall tenderness present at chest tube site.    Abdomen: Abdomen is soft, non-distended   Neurological: Patient is alert and oriented to person, place and time.    Skin:  Warm and dry.     Intake & Output (last day)         04/28 0701  04/29 0700 04/29 0701  04/30 0700    Urine (mL/kg/hr) 3900 (1.3)     Chest Tube 80     Total Output 3980     Net -3980                     Chest tube:   Site: Right, Clean, Dry, and Intact  Suction: waterseal  Air Leak: negative  24 Hour Total: 80     Results Review:    I reviewed the patient's most recent labs and imaging.    Imaging Results (Last 24 Hours)       Procedure Component Value Units Date/Time    XR Chest 1 View [[id number]] Collected: 04/29/25 0609     Updated: 04/29/25 0615    Narrative:      XR CHEST 1 VW-     Clinical: Recurrent right-sided pleural effusion     COMPARISON examination dated 4/28/2025     FINDINGS: Right base chest tube and left upper extremity PICC line in  position. Cardiomediastinal silhouette is stable. There are diffuse  bilateral pulmonary infiltrates again demonstrated. Slightly diminished  right upper lobe and left base. Vague opacity at the right base likely  represents a small amount of pleural fluid. No pneumothorax or other  interval change.     This report  was finalized on 4/29/2025 6:12 AM by Dr. Jamar Segovia M.D  on Workstation: [id number]       XR Chest Post CVA Port [[id number]] Collected: 04/28/25 2124     Updated: 04/28/25 2129    Narrative:      SINGLE VIEW OF THE CHEST     HISTORY: PICC placement.      COMPARISON: April 28, 2025     FINDINGS:  Left-sided PICC appears to extend into the superior vena cava.  Cardiomegaly is noted. There are bilateral alveolar and interstitial  infiltrates. No pneumothorax is seen. There is a right-sided pleural  drainage catheter. Right pleural effusion is suspected.          Impression:      Left-sided PICC appears to terminate within the superior vena cava.     This report was finalized on 4/28/2025 9:25 PM by Dr. Renee Alvarado M.D on Workstation: [id number]               Lab Results:     Lab Results (last 24 hours)       Procedure Component Value Units Date/Time    POC Glucose Once [[id number]]  (Abnormal) Collected: 04/29/25 1134    Specimen: Blood Updated: 04/29/25 1139     Glucose 196 mg/dL     POC Glucose Once [[id number]]  (Abnormal) Collected: 04/29/25 0807    Specimen: Blood Updated: 04/29/25 0809     Glucose 224 mg/dL     Blood Culture - Blood, Hand, Right [[id number]]  (Normal) Collected: 04/28/25 0533    Specimen: Blood from Hand, Right Updated: 04/29/25 0645     Blood Culture No growth at 24 hours    Blood Culture - Blood, Hand, Left [[id number]]  (Normal) Collected: 04/28/25 0533    Specimen: Blood from Hand, Left Updated: 04/29/25 0645     Blood Culture No growth at 24 hours    POC Glucose Once [[id number]]  (Abnormal) Collected: 04/29/25 0550    Specimen: Blood Updated: 04/29/25 0552     Glucose 199 mg/dL     Basic Metabolic Panel [[id number]]  (Abnormal) Collected: 04/29/25 0457    Specimen: Blood Updated: 04/29/25 0547     Glucose 156 mg/dL      BUN 15 mg/dL      Creatinine 0.69 mg/dL      Sodium 139 mmol/L      Potassium 3.8 mmol/L      Chloride 95 mmol/L      CO2 34.3 mmol/L      Calcium 9.3 mg/dL       BUN/Creatinine Ratio 21.7     Anion Gap 9.7 mmol/L      eGFR 95.9 mL/min/1.73     Narrative:      GFR Categories in Chronic Kidney Disease (CKD)      GFR Category          GFR (mL/min/1.73)    Interpretation  G1                     90 or greater         Normal or high (1)  G2                      60-89                Mild decrease (1)  G3a                   45-59                Mild to moderate decrease  G3b                   30-44                Moderate to severe decrease  G4                    15-29                Severe decrease  G5                    14 or less           Kidney failure          (1)In the absence of evidence of kidney disease, neither GFR category G1 or G2 fulfill the criteria for CKD.    eGFR calculation 2021 CKD-EPI creatinine equation, which does not include race as a factor    C-reactive Protein [[id number]]  (Abnormal) Collected: 04/29/25 0457    Specimen: Blood Updated: 04/29/25 0546     C-Reactive Protein 5.96 mg/dL     CBC (No Diff) [[id number]]  (Abnormal) Collected: 04/29/25 0457    Specimen: Blood Updated: 04/29/25 0525     WBC 8.88 10*3/mm3      RBC 3.47 10*6/mm3      Hemoglobin 9.6 g/dL      Hematocrit 30.9 %      MCV 89.0 fL      MCH 27.7 pg      MCHC 31.1 g/dL      RDW 15.8 %      RDW-SD 51.2 fl      MPV 9.1 fL      Platelets 347 10*3/mm3     POC Glucose Once [[id number]]  (Abnormal) Collected: 04/28/25 2046    Specimen: Blood Updated: 04/28/25 2047     Glucose 255 mg/dL     POC Glucose Once [[id number]]  (Abnormal) Collected: 04/28/25 1652    Specimen: Blood Updated: 04/28/25 1706     Glucose 150 mg/dL              Assessment & Plan       Hypoxia    Lobar pneumonia    DM2 (diabetes mellitus, type 2)    Dyslipidemia    Mood disorder    Atrial fibrillation    Cholelithiasis    Fatty liver    Essential hypertension    Pleural effusion on right    History of lung cancer    Anemia, chronic disease    MSSA bacteremia       Assessment & Plan  POD 5 status post port removal doxycycline  pleurodesis via right-sided chest tube for recurrent pleural effusion of uncertain etiology.     Continues to improve.  No acute complaints.  Denies any shortness of breath or uncontrolled pain.  Requiring nasal cannula oxygen 4 to 6 L.  AM CXR reviewed, appears stable, will discontinue chest tube.  Covered with Duoderm, please leave on for 48 hours, then may remove and clean area with hydrogen peroxide twice daily, leave open to air.      Will repeat CXR AM.  Continue to limit/hold steroid use to allow scarring after pleurodesis.  Recommend optimizing diuretic therapy; will discuss with Dr Ritchie.   Continue to hold Xarelto  Continue antibiotics per ID recommendations    It is imperative we increase his mobility, walks around nurses station 3 laps 3 times daily.  PT OT.    Teresa Flowers DNP, APRN  [facility]  04/29/25  12:57 EDT

## 2025-04-29 NOTE — PLAN OF CARE
Goal Outcome Evaluation:  Plan of Care Reviewed With: patient        Progress: improving  Outcome Evaluation: Afib, 6L nc, A/Ox4, assist x1. Transfer from CCU to [facility]. Plans to d/c home pending medical clearance.

## 2025-04-29 NOTE — PROGRESS NOTES
Formerly [facility] INPATIENT PROGRESS NOTE         [facility] CORONARY CARE    2025      PATIENT IDENTIFICATION:  Name: Branden Diop ADMIT: 2025   : 1950  PCP: Tino Lopez MD    MRN: [id number] LOS: 7 days   AGE/SEX: 74 y.o. male  ROOM: [facility]                     LOS 7    Reason for visit: Pneumonia with pleural effusion and hypoxemia      SUBJECTIVE:      Resting comfortably.  Wound no new pulmonary complaints.  He does have complaints of right shoulder stiffness and arthritis which has gotten worse in the last few days.  On 5 L supplemental oxygen at time of visit.  Chest tube on waterseal.      Objective   OBJECTIVE:    Vital Sign Min/Max for last 24 hours  Temp  Min: 97.5 °F (36.4 °C)  Max: 98.8 °F (37.1 °C)   BP  Min: 107/94  Max: 116/60   Pulse  Min: 70  Max: 90   Resp  Min: 16  Max: 18   SpO2  Min: 90 %  Max: 98 %   No data recorded   No data recorded    Vitals:    25 2201 25 2255 25 0244 25 0313   BP:  115/85  109/62   BP Location:  Right arm  Right arm   Patient Position:  Lying  Lying   Pulse:  72 80 85   Resp:  16  16   Temp:  97.5 °F (36.4 °C)  97.9 °F (36.6 °C)   TempSrc:  Oral  Oral   SpO2: 98% 98% 97% 96%   Weight:       Height:                25  2100 25  1413   Weight: 130 kg (287 lb 11.2 oz) 130 kg (287 lb)       Body mass index is 40.05 kg/m².                          Body mass index is 40.05 kg/m².    Intake/Output Summary (Last 24 hours) at 2025 0753  Last data filed at 2025 0500  Gross per 24 hour   Intake --   Output 3980 ml   Net -3980 ml         Exam:  GEN:  No distress, appears stated age  EYES:   PERRL, anicteric sclerae  ENT:    External ears/nose normal, OP clear  NECK:  No adenopathy, midline trachea  LUNGS: Normal chest on inspection, palpation and coarse breath sounds bilaterally on auscultation.  Chest tube stable without airleak.  CV:  Normal S1S2, without murmur  ABD:  Nontender, nondistended, no  hepatosplenomegaly, +BS  EXT:  No edema.  No cyanosis or clubbing.  No mottling and normal cap refill.    Assessment     Scheduled meds:  arformoterol, 15 mcg, Nebulization, BID - RT  budesonide, 0.5 mg, Nebulization, BID - RT  ceFAZolin, 2,000 mg, Intravenous, Q8H  cetirizine, 10 mg, Oral, Daily  famotidine, 20 mg, Oral, BID AC  ferrous sulfate, 162.5 mg, Oral, BID  folic acid, 1 mg, Oral, Daily  gabapentin, 600 mg, Oral, Q8H  guaiFENesin, 1,200 mg, Oral, Q12H  insulin glargine, 35 Units, Subcutaneous, Nightly  insulin lispro, 10 Units, Subcutaneous, TID With Meals  insulin lispro, 3-14 Units, Subcutaneous, 4x Daily AC & at Bedtime  ipratropium-albuterol, 3 mL, Nebulization, 4x Daily - RT  magnesium oxide, 400 mg, Oral, BID  multivitamin, 1 tablet, Oral, Daily  potassium chloride, 10 mEq, Oral, BID  [Held by provider] rivaroxaban, 20 mg, Oral, Daily  rosuvastatin, 40 mg, Oral, Daily  senna-docusate sodium, 2 tablet, Oral, BID  sertraline, 50 mg, Oral, Daily  sodium chloride, 10 mL, Intravenous, Q12H  sodium chloride, 10 mL, Intravenous, Q12H  sodium chloride, 10 mL, Intravenous, Q12H  sodium chloride, 10 mL, Intravenous, Q12H  torsemide, 40 mg, Oral, Daily  triamcinolone, 1 Application, Topical, Q12H      IV meds:                         Data Review:  Results from last 7 days   Lab Units 04/29/25  0457 04/28/25  0533 04/27/25  1602 04/27/25  0542 04/26/25  0556 04/25/25  2019 04/25/25  0742   SODIUM mmol/L 139 140  --  140 141  --  141   POTASSIUM mmol/L 3.8 3.7 3.5 3.4* 3.9   < > 3.4*   CHLORIDE mmol/L 95* 96*  --  96* 95*  --  98   CO2 mmol/L 34.3* 34.8*  --  34.0* 34.0*  --  32.1*   BUN mg/dL 15 16  --  20 19  --  14   CREATININE mg/dL 0.69* 0.62*  --  0.64* 0.90  --  0.68*   GLUCOSE mg/dL 156* 142*  --  147* 212*  --  143*   CALCIUM mg/dL 9.3 9.1  --  8.9 9.3  --  8.8    < > = values in this interval not displayed.         Estimated Creatinine Clearance: 125.2 mL/min (A) (by C-G formula based on SCr of 0.69  mg/dL (L)).  Results from last 7 days   Lab Units 04/29/25  0457 04/28/25  0533 04/25/25  0742 04/24/25  0537 04/23/25  0730   WBC 10*3/mm3 8.88 8.65 7.64 6.22 8.09   HEMOGLOBIN g/dL 9.6* 10.0* 10.5* 10.1* 10.1*   PLATELETS 10*3/mm3 347 327 194 150 149     Results from last 7 days   Lab Units 04/24/25  0537   INR  1.33*           Results from last 7 days   Lab Units 04/22/25  1555   PH, ARTERIAL pH units 7.442   PO2 ART mm Hg 66.5*   PCO2, ARTERIAL mm Hg 43.4   HCO3 ART mmol/L 29.6*               Glucose   Date/Time Value Ref Range Status   04/29/2025 0550 199 (H) 70 - 130 mg/dL Final   04/28/2025 2046 255 (H) 70 - 130 mg/dL Final   04/28/2025 1652 150 (H) 70 - 130 mg/dL Final   04/28/2025 1129 233 (H) 70 - 130 mg/dL Final   04/28/2025 0703 156 (H) 70 - 130 mg/dL Final   04/27/2025 2137 179 (H) 70 - 130 mg/dL Final   04/27/2025 1622 203 (H) 70 - 130 mg/dL Final         Imaging reviewed  Chest x-ray 4/29 reviewed        Microbiology reviewed            Active Hospital Problems    Diagnosis  POA    **Hypoxia [R09.02]  Yes    History of lung cancer [Z85.118]  Not Applicable    Anemia, chronic disease [D63.8]  Yes    MSSA bacteremia [R78.81, B95.61]  Unknown    Pleural effusion on right [J90]  Yes    Essential hypertension [I10]  Yes    Fatty liver [K76.0]  Yes    Cholelithiasis [K80.20]  Yes    DM2 (diabetes mellitus, type 2) [E11.9]  Yes    Atrial fibrillation [I48.91]  Yes    Dyslipidemia [E78.5]  Yes    Mood disorder [F39]  Yes    Lobar pneumonia [J18.1]  Yes      Resolved Hospital Problems   No resolved problems to display.         ASSESSMENT:  Right pleural effusion, recurrent. S/p CT-guided chest tube 4/22 -> slightly exudative by LDH  COPD/upper lobe predominant emphysema, no current exacerbation  Keytruda pneumonitis: Only minimal GG infiltrates on latest CT 4/14- finished steroid therapy on 4/15  Acute on chronic hypoxic respiratory failure, secondary to the above.  On O2 4 L/minute baseline  MSSA  septicemia  Abnormal echo: Severely dilated right and left atrium on echo 4/24.  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  Continue antibiotics.  Chest tube management per thoracic surgery.  Bronchodilators to help with obstructive lung disease symptoms.  Steroid has been on hold since the 25th.  I discontinued.  Resume anticoagulant when okay with thoracic surgery.  BiPAP 16 over 10 at night.  May benefit from further PT and antiinflammatory pain medication for right shoulder arthritis.  Awaiting bed on 5E.      Rick Fernandez MD  Pulmonary and Critical Care Medicine  [facility], [facility]  4/29/2025    07:53 EDT

## 2025-04-29 NOTE — SIGNIFICANT NOTE
"   04/28/25 2049   PICC Single Lumen 04/28/25 Left Cephalic   Placement date: If unknown, DO NOT use \"Add Comment\" note/Placement time: If unknown, DO NOT use \"Add Comment\" note: 04/28/25 2049   Hand Hygiene Completed: Yes  Size (Fr): 4  Length (cm): 45 cm  Orientation: Left  Location: Cephalic  Site Prep: Chlor...   Site Assessment Clean;Dry;Intact   #1 Lumen Status Blood return noted;Flushed;Normal saline locked   Length oliva (cm) 0 cm   Line Care Connections checked and tightened   Dressing Type Securing device;Transparent;Antimicrobial dressing/disc   Dressing Status Clean;Dry;Intact   Dressing Intervention New dressing   Liquid Adhesive Applied   Dressing Change Due 05/05/25   Indication/Daily Review of Necessity long-term IV access >7 days     Lot number:[id number]  Exp.02-28-26    Picc placement confirmed with CXR okay to use. Primary RN notified      3 needles, 2 wires, and 1 scalpel counted and disposed of properly.  "

## 2025-04-29 NOTE — PROGRESS NOTES
Name: Branden Diop ADMIT: 2025   : 1950  PCP: Tino Lopez MD    MRN: [id number] LOS: 7 days   AGE/SEX: 74 y.o. male  ROOM: [facility]     Subjective   Subjective   Feeling pretty well.  Right shoulder pain is better       Objective   Objective   Vital Signs  Temp:  [97.5 °F (36.4 °C)-98 °F (36.7 °C)] 98 °F (36.7 °C)  Heart Rate:  [72-92] 81  Resp:  [16-20] 18  BP: (106-122)/(58-94) 122/67  SpO2:  [91 %-98 %] 93 %  on  Flow (L/min) (Oxygen Therapy):  [4-6] 5;   Device (Oxygen Therapy): room air  Body mass index is 40.05 kg/m².  Physical Exam  Vitals reviewed.   Constitutional:       General: He is not in acute distress.     Appearance: He is obese.   Cardiovascular:      Rate and Rhythm: Normal rate and regular rhythm.   Pulmonary:      Effort: Pulmonary effort is normal.      Breath sounds: No wheezing or rhonchi.   Abdominal:      General: There is no distension.      Palpations: Abdomen is soft.      Tenderness: There is no abdominal tenderness.   Musculoskeletal:      Comments: Improved right shoulder ROM   Skin:     General: Skin is warm and dry.      Comments: Patchy erythematous rash on his left chest and arm primarily, unchanged   Neurological:      General: No focal deficit present.      Mental Status: He is alert.       Results Review     I reviewed the patient's new clinical results.  Results from last 7 days   Lab Units 25  0457 25  0533 25  0742 25  0537   WBC 10*3/mm3 8.88 8.65 7.64 6.22   HEMOGLOBIN g/dL 9.6* 10.0* 10.5* 10.1*   PLATELETS 10*3/mm3 347 327 194 150     Results from last 7 days   Lab Units 25  0457 25  0533 25  1602 25  0542 25  0556   SODIUM mmol/L 139 140  --  140 141   POTASSIUM mmol/L 3.8 3.7 3.5 3.4* 3.9   CHLORIDE mmol/L 95* 96*  --  96* 95*   CO2 mmol/L 34.3* 34.8*  --  34.0* 34.0*   BUN mg/dL 15 16  --  20 19   CREATININE mg/dL 0.69* 0.62*  --  0.64* 0.90   GLUCOSE mg/dL 156* 142*  --  147* 212*   EGFR  mL/min/1.73 95.9 99.1  --  98.1 88.5           Results from last 7 days   Lab Units 04/29/25  0457 04/28/25  0533 04/27/25  0542 04/26/25  0556   CALCIUM mg/dL 9.3 9.1 8.9 9.3           Glucose   Date/Time Value Ref Range Status   04/29/2025 1629 137 (H) 70 - 130 mg/dL Final   04/29/2025 1134 196 (H) 70 - 130 mg/dL Final   04/29/2025 0807 224 (H) 70 - 130 mg/dL Final   04/29/2025 0550 199 (H) 70 - 130 mg/dL Final   04/28/2025 2046 255 (H) 70 - 130 mg/dL Final   04/28/2025 1652 150 (H) 70 - 130 mg/dL Final   04/28/2025 1129 233 (H) 70 - 130 mg/dL Final       XR Chest Post CVA Port  Result Date: 4/28/2025  Left-sided PICC appears to terminate within the superior vena cava.  This report was finalized on 4/28/2025 9:25 PM by Dr. Renee Alvarado M.D on Workstation: [id number]        I have personally reviewed all medications:  Scheduled Medications  arformoterol, 15 mcg, Nebulization, BID - RT  budesonide, 0.5 mg, Nebulization, BID - RT  ceFAZolin, 2,000 mg, Intravenous, Q8H  cetirizine, 10 mg, Oral, Daily  famotidine, 20 mg, Oral, BID AC  ferrous sulfate, 162.5 mg, Oral, BID  folic acid, 1 mg, Oral, Daily  gabapentin, 600 mg, Oral, Q8H  guaiFENesin, 1,200 mg, Oral, Q12H  insulin glargine, 35 Units, Subcutaneous, Nightly  insulin lispro, 10 Units, Subcutaneous, TID With Meals  insulin lispro, 3-14 Units, Subcutaneous, 4x Daily AC & at Bedtime  ipratropium-albuterol, 3 mL, Nebulization, 4x Daily - RT  magnesium oxide, 400 mg, Oral, BID  multivitamin, 1 tablet, Oral, Daily  potassium chloride, 10 mEq, Oral, BID  [Held by provider] rivaroxaban, 20 mg, Oral, Daily  rosuvastatin, 40 mg, Oral, Daily  senna-docusate sodium, 2 tablet, Oral, BID  sertraline, 50 mg, Oral, Daily  sodium chloride, 10 mL, Intravenous, Q12H  sodium chloride, 10 mL, Intravenous, Q12H  sodium chloride, 10 mL, Intravenous, Q12H  sodium chloride, 10 mL, Intravenous, Q12H  torsemide, 40 mg, Oral, Daily  triamcinolone, 1 Application, Topical,  Q12H    Infusions   Diet  Diet: Cardiac; Healthy Heart (2-3 Na+); Fluid Consistency: Thin (IDDSI 0)    I have personally reviewed:  [x]  Laboratory   []  Microbiology   [x]  Radiology   []  EKG/Telemetry  []  Cardiology/Vascular   []  Pathology    []  Records       Assessment/Plan     Active Hospital Problems    Diagnosis  POA    **Hypoxia [R09.02]  Yes    History of lung cancer [Z85.118]  Not Applicable    Anemia, chronic disease [D63.8]  Yes    MSSA bacteremia [R78.81, B95.61]  Unknown    Pleural effusion on right [J90]  Yes    Essential hypertension [I10]  Yes    Fatty liver [K76.0]  Yes    Cholelithiasis [K80.20]  Yes    DM2 (diabetes mellitus, type 2) [E11.9]  Yes    Atrial fibrillation [I48.91]  Yes    Dyslipidemia [E78.5]  Yes    Mood disorder [F39]  Yes    Lobar pneumonia [J18.1]  Yes      Resolved Hospital Problems   No resolved problems to display.       74 y.o. male with history of squamous cell lung cancer and Keytruda pneumonitis admitted with acute on chronic respiratory failure, persistent right pleural effusion and MSSA bacteremia.  Status post Port-A-Cath removal with doxycycline pleurodesis 4/24    Right shoulder pain: Likely stiff due to arthritis.  Unlikely septic joint given rapid improvement.  Continue to monitor.     MSSA bacteremia status post removal of his Port-A-Cath  - ABX per ID.  PICC line placed  - Echo reviewed.  Poor image quality, ?veg on AV  - Repeat blood cultures to document sterility NGTD    Steroids started briefly for pneumonitis but now discontinued after 1 dose by thoracic team to allow scarring after pleurodesis  - Chest tube removed.  Repeat CXR in a.m.  Discussed with thoracic APRN  - Continue neb regimen  - Patient says he has home O2 if needed already    DM2: Uncontrolled but improving.  Continue current insulin regimen for now    A-fib: Will resume Xarelto since chest tube is out now when okay with thoracic surgery    Lung cancer plans per oncology.  Rash related to  chemo.  Atarax available for itching as needed.  Ordered some topical steroid cream as well though he says it has not helped much in the past    Continue BiPAP for JUAN      DVT prophy: SCDs.  Hopefully restart Xarelto as above  Dispo: Potential DC tomorrow if stable    Kartik Ritchie MD  [facility]  04/29/25  17:17 EDT

## 2025-04-29 NOTE — THERAPY TREATMENT NOTE
Patient Name: Branden Diop  : 1950    MRN: [id number]                              Today's Date: 2025       Admit Date: 2025    Visit Dx:     ICD-10-CM ICD-9-CM   1. Hypoxia  R09.02 799.02   2. Dyspnea, unspecified type  R06.00 786.09   3. Recurrent pleural effusion  J90 511.9   4. History of lung cancer  Z85.118 V10.11   5. MSSA bacteremia  R78.81 790.7    B95.61 041.11     Patient Active Problem List   Diagnosis    A-fib    Atrioventricular block, Mobitz type 1, Wenckebach    Apnea, sleep    Obesity    Streptococcus pneumoniae    Sleep apnea with use of continuous positive airway pressure (CPAP)    Sinusitis    Right wrist pain    Lobar pneumonia    DM2 (diabetes mellitus, type 2)    Dyslipidemia    Hammertoe    Mood disorder    COPD (chronic obstructive pulmonary disease)    Colon polyps    Atrial fibrillation    Anxiety    Pruritus    Cholelithiasis    Fatty liver    Essential hypertension    Vitamin D deficiency    Emphysema, unspecified    Bronchiectasis with acute exacerbation    FHx: colonic polyps    Diverticulosis    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    Malignant neoplasm of middle lobe of right lung    Fluid collection at surgical site, initial encounter    Encounter for long-term (current) use of high-risk medication    Fitting and adjustment of vascular catheter    Anemia associated with chemotherapy    Peripheral neuropathy due to chemotherapy    Acute respiratory failure    Rash in adult    Bone mass    Drug-induced skin rash    Adverse effect of antineoplastic drug    Pleural effusion on right    Acute on chronic hypoxic respiratory failure    Hypoxemia    Drug-induced pneumonitis    Acute on chronic respiratory failure    Dyspnea on exertion    Hyponatremia    Hypoxia    History of lung cancer    Anemia, chronic disease    MSSA bacteremia     Past Medical History:   Diagnosis Date    Anxiety     Arthritis     Atrial fibrillation     CURRENTLY    Bunion of right  foot     Colon polyps     COPD (chronic obstructive pulmonary disease)     MILD. NO MEDS FOR    Depression     History of atrial fibrillation     WITH CARDIOVERSION. NO PROBLEMS    History of skin cancer     BCC REMOVED FROM BACK    [place] (hard of hearing)     Hyperlipidemia     Inguinal hernia, recurrent     RIGHT    Left foot pain     Lung nodules     Rash     IN GROIN TREATING FOR YEAST INFECTION BY PCP    Redness of skin     LOWER LEGS BILATERAL    Scab     BILATERAL LEGS HEALING    Sleep apnea with use of continuous positive airway pressure (CPAP)     CPAP    Streptococcus pneumoniae     ABOUT 6-7 YR AGO.     Type 2 diabetes mellitus      Past Surgical History:   Procedure Laterality Date    BASAL CELL CARCINOMA EXCISION      BRONCHOSCOPY WITH ION ROBOTIC ASSIST N/A 5/6/2024    Procedure: BRONCHOSCOPY WITH ION ROBOT WITH FNA, BIOPSIES, BAL AND ENDOBRONCHIAL ULTRASOUND WITH FNA;  Surgeon: Yony Coles MD;  Location: [facility] ENDOSCOPY;  Service: Robotics - Pulmonary;  Laterality: N/A;  PRE: PULMONARY NODULES  POST: SAME    CARDIAC CATHETERIZATION N/A 11/15/2021    Procedure: Coronary angiography;  Surgeon: Alfredo Jennings MD;  Location:  [facility] CATH INVASIVE LOCATION;  Service: Cardiovascular;  Laterality: N/A;    CARDIAC CATHETERIZATION N/A 11/15/2021    Procedure: Left heart cath;  Surgeon: Alfredo Jennings MD;  Location[facility];  Service: Cardiovascular;  Laterality: N/A;    CARDIAC CATHETERIZATION N/A 11/15/2021    Procedure: Left ventriculography;  Surgeon: Alfredo Jennings MD;  [facility];  Service: Cardiovascular;  Laterality: N/A;    CARDIAC CATHETERIZATION N/A 11/15/2021    Procedure: Aortic root aortogram;  Surgeon: Alfredo Jennings MD;  Location: [facility] CATH INVASIVE LOCATION;  Service: Cardiovascular;  Laterality: N/A;    CARDIOVERSION      CHOLECYSTECTOMY N/A 10/07/2017    Procedure: CHOLECYSTECTOMY LAPAROSCOPIC;  Surgeon: Martir MARTINEZ  MD Uriel;  Location: [facility] MAIN OR;  Service:     COLONOSCOPY  2007    COLONOSCOPY N/A 8/30/2022    Procedure: COLONOSCOPY TO CECUM AND TI AND COLD SNARE POLYPECTOMY;  Surgeon: Cj Lopez MD;  Location: [facility] ENDOSCOPY;  Service: Gastroenterology;  Laterality: N/A;  Pre: History of colon polyps  Post: POLYP, PREVIOUS TATTOO    FOOT SURGERY Left     TOES ARE PINNED. ALL BUT GREAT TOE    HAND SURGERY      THUMB    HERNIA REPAIR      INGUINAL HERNIA REPAIR Right 06/27/2018    Procedure: INGUINAL HERNIA REPAIR, OPEN, RECURRENT;  Surgeon: Martir Trevino MD;  Location: [facility] OR OSC;  Service: General    LASIK Bilateral     LOBECTOMY Right 6/12/2024    Procedure: BRONCHOSCOPY, RIGHT VIDEO ASSISTED THORACOSCOPY WITH RIGHT MIDDLE LOBECTOMY WITH DAVINCI ROBOT, MEDIASTINAL LYMPH NODE DISSECTION, INTERCOSTAL NERVE BLOCK;  Surgeon: David Figueroa MD;  Location: [facility] OR;  Service: Robotics - DaVinci;  Laterality: Right;    NECK SURGERY      growth on salivary gland    REPLACEMENT TOTAL KNEE Right 2007    (he is going to have a LTKR next month)    REPLACEMENT TOTAL KNEE Left     VENOUS ACCESS DEVICE (PORT) INSERTION Right 7/5/2024    Procedure: INSERTION VENOUS ACCESS DEVICE;  Surgeon: David Figueroa MD;  Location: [facility] OR;  Service: Thoracic;  Laterality: Right;    VENOUS ACCESS DEVICE (PORT) REMOVAL N/A 4/24/2025    Procedure: REMOVAL VENOUS ACCESS DEVICE, DOXYCYCLINE PLEURODESIS;  Surgeon: David Figueroa MD;  Location: [facility] OR;  Service: Thoracic;  Laterality: N/A;      General Information       Row Name 04/29/25 1539          Physical Therapy Time and Intention    Document Type therapy note (daily note)  -PC     Mode of Treatment physical therapy  -PC       Row Name 04/29/25 1539          General Information    Existing Precautions/Restrictions fall;oxygen therapy device and L/min  -PC       Row Name 04/29/25 1539          Cognition    Orientation Status (Cognition) oriented x 4  -PC                User Key  (r) = Recorded By, (t) = Taken By, (c) = Cosigned By      Initials Name Provider Type    PC Rosanna Sher PT Physical Therapist                   Mobility       Row Name 04/29/25 1539          Bed Mobility    Supine-Sit Atkinson (Bed Mobility) independent  -PC     Sit-Supine Atkinson (Bed Mobility) independent  -PC       Row Name 04/29/25 1539          Sit-Stand Transfer    Sit-Stand Atkinson (Transfers) supervision  -PC       Row Name 04/29/25 1539          Gait/Stairs (Locomotion)    Atkinson Level (Gait) supervision  -PC     Distance in Feet (Gait) 150  150 ft X 2 after a seated rest to check O2 sats  -PC               User Key  (r) = Recorded By, (t) = Taken By, (c) = Cosigned By      Initials Name Provider Type    Rosanna Hendrickson PT Physical Therapist                   Obj/Interventions    No documentation.                  Goals/Plan    No documentation.                  Clinical Impression       Row Name 04/29/25 1540          Pain    Pretreatment Pain Rating 0/10 - no pain  -PC     Posttreatment Pain Rating 0/10 - no pain  -PC       Row Name 04/29/25 1540          Plan of Care Review    Plan of Care Reviewed With patient  -PC     Progress improving  -PC     Outcome Evaluation Pt has cont to show improvement with mobility, is able to get out of bed independently and walk 150 ft X 2 at a supervision level, seated rest in between walks to check o2 sats, pt sats >90% throughout, pt was on 6L o2, pt is safe to cont to walk with nursing staff, PT to sign off  -PC       Row Name 04/29/25 1540          Vital Signs    Pre SpO2 (%) 95  -PC     O2 Delivery Pre Treatment supplemental O2  6L  -PC     Intra SpO2 (%) 90  -PC     O2 Delivery Intra Treatment supplemental O2  -PC     Post SpO2 (%) 95  -PC     O2 Delivery Post Treatment supplemental O2  -PC       Row Name 04/29/25 1540          Positioning and Restraints    Pre-Treatment Position in bed  -PC     Post Treatment Position bed  -PC      In Bed supine;call light within reach;encouraged to call for assist  -PC               User Key  (r) = Recorded By, (t) = Taken By, (c) = Cosigned By      Initials Name Provider Type    Rosanna Hendrickson PT Physical Therapist                   Outcome Measures       Row Name 04/29/25 1542          How much help from another person do you currently need...    Turning from your back to your side while in flat bed without using bedrails? 4  -PC     Moving from lying on back to sitting on the side of a flat bed without bedrails? 4  -PC     Moving to and from a bed to a chair (including a wheelchair)? 4  -PC     Standing up from a chair using your arms (e.g., wheelchair, bedside chair)? 4  -PC     Climbing 3-5 steps with a railing? 3  -PC     To walk in hospital room? 4  -PC     AM-PAC 6 Clicks Score (PT) 23  -PC     Highest Level of Mobility Goal 7 --> Walk 25 feet or more  -PC               User Key  (r) = Recorded By, (t) = Taken By, (c) = Cosigned By      Initials Name Provider Type    Rosanna Hendrickson PT Physical Therapist                                 Physical Therapy Education       Title: PT OT SLP Therapies (In Progress)       Topic: Physical Therapy (Done)       Point: Mobility training (Done)       Learning Progress Summary            Patient Acceptance, E,D, DU by PC at 4/29/2025 1542    Acceptance, E,D, DU by PC at 4/28/2025 1629    Acceptance, E, VU by DB at 4/27/2025 1354    Acceptance, E, VU,NR by ER at 4/23/2025 1410                      Point: Home exercise program (Done)       Learning Progress Summary            Patient Acceptance, E,D, DU by PC at 4/28/2025 1629    Acceptance, E, VU by DB at 4/27/2025 1354    Acceptance, E, VU,NR by ER at 4/23/2025 1410                      Point: Body mechanics (Done)       Learning Progress Summary            Patient Acceptance, E,D, DU by PC at 4/29/2025 1542    Acceptance, E,D, DU by PC at 4/28/2025 1629    Acceptance, E, VU by DB at 4/27/2025 1354     Acceptance, E, VU,NR by ER at 4/23/2025 1410                      Point: Precautions (Done)       Learning Progress Summary            Patient Acceptance, E,D, DU by PC at 4/29/2025 1542    Acceptance, E,D, DU by PC at 4/28/2025 1629    Acceptance, E, VU by DB at 4/27/2025 1354    Acceptance, E, VU,NR by ER at 4/23/2025 1410                                      User Key       Initials Effective Dates Name Provider Type Discipline     06/16/21 -  Rosanna Sher, PT Physical Therapist PT    DB 06/16/21 -  Libby Mckeon, PT Physical Therapist PT    ER 10/15/23 -  Cara Hooker, PT Physical Therapist PT                  PT Recommendation and Plan     Progress: improving  Outcome Evaluation: Pt has cont to show improvement with mobility, is able to get out of bed independently and walk 150 ft X 2 at a supervision level, seated rest in between walks to check o2 sats, pt sats >90% throughout, pt was on 6L o2, pt is safe to cont to walk with nursing staff, PT to sign off     Time Calculation:         PT Charges       Row Name 04/29/25 1542             Time Calculation    Start Time 1505  -PC      Stop Time 1528  -PC      Time Calculation (min) 23 min  -PC      PT Received On 04/29/25  -PC      PT - Next Appointment 04/30/25  -PC                User Key  (r) = Recorded By, (t) = Taken By, (c) = Cosigned By      Initials Name Provider Type    PC Rosanna Sher, PT Physical Therapist                  Therapy Charges for Today       Code Description Service Date Service Provider Modifiers Qty    25886183538  PT THER PROC EA 15 MIN 4/28/2025 Rosanna Sher, PT GP 1    [id number]  PT THERAPEUTIC ACT EA 15 MIN 4/29/2025 Rosanna Sher, PT  2            PT G-Codes  Outcome Measure Options: AM-PAC 6 Clicks Daily Activity (OT)  AM-PAC 6 Clicks Score (PT): 23  AM-PAC 6 Clicks Score (OT): 16  PT Discharge Summary  Anticipated Discharge Disposition (PT): home    Rosanna Sher PT  4/29/2025

## 2025-04-30 ENCOUNTER — READMISSION MANAGEMENT (OUTPATIENT)
Dept: CALL CENTER | Facility: HOSPITAL | Age: 75
End: 2025-04-30
Payer: MEDICARE

## 2025-04-30 ENCOUNTER — APPOINTMENT (OUTPATIENT)
Dept: GENERAL RADIOLOGY | Facility: HOSPITAL | Age: 75
End: 2025-04-30
Payer: MEDICARE

## 2025-04-30 VITALS
RESPIRATION RATE: 18 BRPM | HEART RATE: 65 BPM | TEMPERATURE: 97.9 F | OXYGEN SATURATION: 99 % | SYSTOLIC BLOOD PRESSURE: 106 MMHG | HEIGHT: 71 IN | BODY MASS INDEX: 40.18 KG/M2 | DIASTOLIC BLOOD PRESSURE: 67 MMHG | WEIGHT: 287 LBS

## 2025-04-30 PROBLEM — G47.33 OSA (OBSTRUCTIVE SLEEP APNEA): Status: ACTIVE | Noted: 2025-04-30

## 2025-04-30 PROBLEM — J96.21 ACUTE ON CHRONIC RESPIRATORY FAILURE WITH HYPOXIA: Status: ACTIVE | Noted: 2025-02-27

## 2025-04-30 PROBLEM — J90 PLEURAL EFFUSION ON RIGHT: Status: RESOLVED | Noted: 2024-12-25 | Resolved: 2025-04-30

## 2025-04-30 PROBLEM — A41.01 SEPSIS DUE TO METHICILLIN SUSCEPTIBLE STAPHYLOCOCCUS AUREUS (MSSA) WITHOUT ACUTE ORGAN DYSFUNCTION: Status: ACTIVE | Noted: 2025-04-30

## 2025-04-30 LAB
ANION GAP SERPL CALCULATED.3IONS-SCNC: 6.8 MMOL/L (ref 5–15)
BUN SERPL-MCNC: 13 MG/DL (ref 8–23)
BUN/CREAT SERPL: 20.6 (ref 7–25)
CALCIUM SPEC-SCNC: 9.3 MG/DL (ref 8.6–10.5)
CHLORIDE SERPL-SCNC: 97 MMOL/L (ref 98–107)
CO2 SERPL-SCNC: 35.2 MMOL/L (ref 22–29)
CREAT SERPL-MCNC: 0.63 MG/DL (ref 0.76–1.27)
EGFRCR SERPLBLD CKD-EPI 2021: 98.6 ML/MIN/1.73
GLUCOSE BLDC GLUCOMTR-MCNC: 189 MG/DL (ref 70–130)
GLUCOSE BLDC GLUCOMTR-MCNC: 311 MG/DL (ref 70–130)
GLUCOSE SERPL-MCNC: 165 MG/DL (ref 65–99)
POTASSIUM SERPL-SCNC: 3.8 MMOL/L (ref 3.5–5.2)
SODIUM SERPL-SCNC: 139 MMOL/L (ref 136–145)

## 2025-04-30 PROCEDURE — 94760 N-INVAS EAR/PLS OXIMETRY 1: CPT

## 2025-04-30 PROCEDURE — 25010000002 CEFAZOLIN PER 500 MG: Performed by: SURGERY

## 2025-04-30 PROCEDURE — 94664 DEMO&/EVAL PT USE INHALER: CPT

## 2025-04-30 PROCEDURE — 63710000001 INSULIN LISPRO (HUMAN) PER 5 UNITS: Performed by: HOSPITALIST

## 2025-04-30 PROCEDURE — 94761 N-INVAS EAR/PLS OXIMETRY MLT: CPT

## 2025-04-30 PROCEDURE — 94799 UNLISTED PULMONARY SVC/PX: CPT

## 2025-04-30 PROCEDURE — 99024 POSTOP FOLLOW-UP VISIT: CPT

## 2025-04-30 PROCEDURE — 82948 REAGENT STRIP/BLOOD GLUCOSE: CPT

## 2025-04-30 PROCEDURE — 80048 BASIC METABOLIC PNL TOTAL CA: CPT | Performed by: SURGERY

## 2025-04-30 PROCEDURE — 71045 X-RAY EXAM CHEST 1 VIEW: CPT

## 2025-04-30 PROCEDURE — 94618 PULMONARY STRESS TESTING: CPT

## 2025-04-30 RX ORDER — IPRATROPIUM BROMIDE AND ALBUTEROL SULFATE 2.5; .5 MG/3ML; MG/3ML
3 SOLUTION RESPIRATORY (INHALATION)
Qty: 360 ML | Refills: 0 | Status: SHIPPED | OUTPATIENT
Start: 2025-04-30

## 2025-04-30 RX ORDER — BUDESONIDE 0.5 MG/2ML
0.5 INHALANT ORAL
Qty: 120 ML | Refills: 0 | Status: SHIPPED | OUTPATIENT
Start: 2025-04-30

## 2025-04-30 RX ORDER — GUAIFENESIN 600 MG/1
1200 TABLET, EXTENDED RELEASE ORAL EVERY 12 HOURS SCHEDULED
Qty: 120 TABLET | Refills: 0 | Status: SHIPPED | OUTPATIENT
Start: 2025-04-30

## 2025-04-30 RX ORDER — IPRATROPIUM BROMIDE AND ALBUTEROL SULFATE 2.5; .5 MG/3ML; MG/3ML
3 SOLUTION RESPIRATORY (INHALATION)
Qty: 360 ML | Refills: 0 | Status: SHIPPED | OUTPATIENT
Start: 2025-04-30 | End: 2025-04-30

## 2025-04-30 RX ORDER — ARFORMOTEROL TARTRATE 15 UG/2ML
15 SOLUTION RESPIRATORY (INHALATION)
Qty: 120 ML | Refills: 0 | Status: SHIPPED | OUTPATIENT
Start: 2025-04-30

## 2025-04-30 RX ORDER — GUAIFENESIN 600 MG/1
1200 TABLET, EXTENDED RELEASE ORAL EVERY 12 HOURS SCHEDULED
Qty: 120 TABLET | Refills: 0 | Status: SHIPPED | OUTPATIENT
Start: 2025-04-30 | End: 2025-04-30

## 2025-04-30 RX ORDER — BUDESONIDE 0.5 MG/2ML
0.5 INHALANT ORAL
Qty: 120 ML | Refills: 0 | Status: SHIPPED | OUTPATIENT
Start: 2025-04-30 | End: 2025-04-30

## 2025-04-30 RX ORDER — ARFORMOTEROL TARTRATE 15 UG/2ML
15 SOLUTION RESPIRATORY (INHALATION)
Qty: 120 ML | Refills: 0 | Status: SHIPPED | OUTPATIENT
Start: 2025-04-30 | End: 2025-04-30

## 2025-04-30 RX ADMIN — SODIUM CHLORIDE 2000 MG: 9 INJECTION, SOLUTION INTRAVENOUS at 03:37

## 2025-04-30 RX ADMIN — INSULIN LISPRO 10 UNITS: 100 INJECTION, SOLUTION INTRAVENOUS; SUBCUTANEOUS at 11:33

## 2025-04-30 RX ADMIN — GABAPENTIN 600 MG: 300 CAPSULE ORAL at 06:34

## 2025-04-30 RX ADMIN — FOLIC ACID 1 MG: 1 TABLET ORAL at 08:54

## 2025-04-30 RX ADMIN — CETIRIZINE HYDROCHLORIDE 10 MG: 10 TABLET, FILM COATED ORAL at 08:53

## 2025-04-30 RX ADMIN — TORSEMIDE 40 MG: 20 TABLET ORAL at 08:53

## 2025-04-30 RX ADMIN — BUDESONIDE 0.5 MG: 0.5 INHALANT RESPIRATORY (INHALATION) at 07:31

## 2025-04-30 RX ADMIN — ROSUVASTATIN CALCIUM 40 MG: 20 TABLET, FILM COATED ORAL at 08:53

## 2025-04-30 RX ADMIN — Medication 10 ML: at 08:55

## 2025-04-30 RX ADMIN — INSULIN LISPRO 10 UNITS: 100 INJECTION, SOLUTION INTRAVENOUS; SUBCUTANEOUS at 08:55

## 2025-04-30 RX ADMIN — SODIUM CHLORIDE 2000 MG: 9 INJECTION, SOLUTION INTRAVENOUS at 11:33

## 2025-04-30 RX ADMIN — GABAPENTIN 600 MG: 300 CAPSULE ORAL at 14:18

## 2025-04-30 RX ADMIN — Medication 1 TABLET: at 08:53

## 2025-04-30 RX ADMIN — GUAIFENESIN 1200 MG: 600 TABLET, EXTENDED RELEASE ORAL at 08:53

## 2025-04-30 RX ADMIN — INSULIN LISPRO 3 UNITS: 100 INJECTION, SOLUTION INTRAVENOUS; SUBCUTANEOUS at 08:54

## 2025-04-30 RX ADMIN — INSULIN LISPRO 10 UNITS: 100 INJECTION, SOLUTION INTRAVENOUS; SUBCUTANEOUS at 11:32

## 2025-04-30 RX ADMIN — POTASSIUM CHLORIDE 10 MEQ: 750 TABLET, EXTENDED RELEASE ORAL at 08:53

## 2025-04-30 RX ADMIN — ARFORMOTEROL TARTRATE 15 MCG: 15 SOLUTION RESPIRATORY (INHALATION) at 07:31

## 2025-04-30 RX ADMIN — IPRATROPIUM BROMIDE AND ALBUTEROL SULFATE 3 ML: .5; 3 SOLUTION RESPIRATORY (INHALATION) at 07:31

## 2025-04-30 RX ADMIN — IPRATROPIUM BROMIDE AND ALBUTEROL SULFATE 3 ML: .5; 3 SOLUTION RESPIRATORY (INHALATION) at 12:03

## 2025-04-30 RX ADMIN — TRIAMCINOLONE ACETONIDE 1 APPLICATION: 1 CREAM TOPICAL at 08:53

## 2025-04-30 RX ADMIN — SENNOSIDES AND DOCUSATE SODIUM 2 TABLET: 50; 8.6 TABLET ORAL at 08:55

## 2025-04-30 RX ADMIN — FERROUS SULFATE TAB 325 MG (65 MG ELEMENTAL FE) 162.5 MG: 325 (65 FE) TAB at 08:53

## 2025-04-30 RX ADMIN — FAMOTIDINE 20 MG: 20 TABLET, FILM COATED ORAL at 06:34

## 2025-04-30 RX ADMIN — SERTRALINE HYDROCHLORIDE 50 MG: 50 TABLET, FILM COATED ORAL at 08:53

## 2025-04-30 RX ADMIN — OXYCODONE AND ACETAMINOPHEN 1 TABLET: 5; 325 TABLET ORAL at 10:40

## 2025-04-30 RX ADMIN — BISACODYL 5 MG: 5 TABLET, COATED ORAL at 10:40

## 2025-04-30 RX ADMIN — MAGNESIUM OXIDE TAB 400 MG (241.3 MG ELEMENTAL MG) 400 MG: 400 (241.3 MG) TAB at 08:53

## 2025-04-30 NOTE — NURSING NOTE
Pt requested that his bedtime medications be administered early because he has not had enough sleep since yesterday and would like to go to sleep.

## 2025-04-30 NOTE — PROGRESS NOTES
[facility] INPATIENT PROGRESS NOTE         [address]    2025      PATIENT IDENTIFICATION:  Name: Branden Diop ADMIT: 2025   : 1950  PCP: Tino Lopez MD    MRN: [id number] LOS: 8 days   AGE/SEX: 74 y.o. male  ROOM: [facility]                     LOS 8    Reason for visit: Pneumonia with pleural effusion and hypoxemia      SUBJECTIVE:      Resting comfortably.  Finally got moved up to 5 E. and now his chest tube is out.  No worsening wheezing or shortness of breath.  On 3 L of oxygen.      Objective   OBJECTIVE:    Vital Sign Min/Max for last 24 hours  Temp  Min: 97.1 °F (36.2 °C)  Max: 98.7 °F (37.1 °C)   BP  Min: 102/61  Max: 122/67   Pulse  Min: 64  Max: 92   Resp  Min: 18  Max: 20   SpO2  Min: 89 %  Max: 97 %   No data recorded   No data recorded    Vitals:    25 0731 25 0732 25 0733 25 0741   BP:       BP Location:       Patient Position:       Pulse: 68 71 72 71   Resp: 18 18 18 18   Temp:       TempSrc:       SpO2: (!) 89% 91% (!) 89% 97%   Weight:       Height:                25  2100 25  1413   Weight: 130 kg (287 lb 11.2 oz) 130 kg (287 lb)       Body mass index is 40.05 kg/m².                          Body mass index is 40.05 kg/m².    Intake/Output Summary (Last 24 hours) at 2025 0856  Last data filed at 2025 0043  Gross per 24 hour   Intake 360 ml   Output 600 ml   Net -240 ml         Exam:  GEN:  No distress, appears stated age  EYES:   PERRL, anicteric sclerae  ENT:    External ears/nose normal, OP clear  NECK:  No adenopathy, midline trachea  LUNGS: Normal chest on inspection, palpation and coarse breath sounds bilaterally on auscultation.   CV:  Normal S1S2, without murmur  ABD:  Nontender, nondistended, no hepatosplenomegaly, +BS  EXT:  No edema.  No cyanosis or clubbing.  No mottling and normal cap refill.    Assessment     Scheduled meds:  arformoterol, 15 mcg, Nebulization, BID - RT  budesonide, 0.5 mg,  Nebulization, BID - RT  ceFAZolin, 2,000 mg, Intravenous, Q8H  cetirizine, 10 mg, Oral, Daily  famotidine, 20 mg, Oral, BID AC  ferrous sulfate, 162.5 mg, Oral, BID  folic acid, 1 mg, Oral, Daily  gabapentin, 600 mg, Oral, Q8H  guaiFENesin, 1,200 mg, Oral, Q12H  insulin glargine, 35 Units, Subcutaneous, Nightly  insulin lispro, 10 Units, Subcutaneous, TID With Meals  insulin lispro, 3-14 Units, Subcutaneous, 4x Daily AC & at Bedtime  ipratropium-albuterol, 3 mL, Nebulization, 4x Daily - RT  magnesium oxide, 400 mg, Oral, BID  multivitamin, 1 tablet, Oral, Daily  potassium chloride, 10 mEq, Oral, BID  [Held by provider] rivaroxaban, 20 mg, Oral, Daily  rosuvastatin, 40 mg, Oral, Daily  senna-docusate sodium, 2 tablet, Oral, BID  sertraline, 50 mg, Oral, Daily  sodium chloride, 10 mL, Intravenous, Q12H  sodium chloride, 10 mL, Intravenous, Q12H  sodium chloride, 10 mL, Intravenous, Q12H  sodium chloride, 10 mL, Intravenous, Q12H  torsemide, 40 mg, Oral, Daily  triamcinolone, 1 Application, Topical, Q12H      IV meds:                         Data Review:  Results from last 7 days   Lab Units 04/30/25  0345 04/29/25  0457 04/28/25  0533 04/27/25  1602 04/27/25  0542 04/26/25  0556   SODIUM mmol/L 139 139 140  --  140 141   POTASSIUM mmol/L 3.8 3.8 3.7 3.5 3.4* 3.9   CHLORIDE mmol/L 97* 95* 96*  --  96* 95*   CO2 mmol/L 35.2* 34.3* 34.8*  --  34.0* 34.0*   BUN mg/dL 13 15 16  --  20 19   CREATININE mg/dL 0.63* 0.69* 0.62*  --  0.64* 0.90   GLUCOSE mg/dL 165* 156* 142*  --  147* 212*   CALCIUM mg/dL 9.3 9.3 9.1  --  8.9 9.3         Estimated Creatinine Clearance: 137.1 mL/min (A) (by C-G formula based on SCr of 0.63 mg/dL (L)).  Results from last 7 days   Lab Units 04/29/25  0457 04/28/25  0533 04/25/25  0742 04/24/25  0537   WBC 10*3/mm3 8.88 8.65 7.64 6.22   HEMOGLOBIN g/dL 9.6* 10.0* 10.5* 10.1*   PLATELETS 10*3/mm3 347 327 194 150     Results from last 7 days   Lab Units 04/24/25  0537   INR  1.33*                            Glucose   Date/Time Value Ref Range Status   04/30/2025 0545 189 (H) 70 - 130 mg/dL Final   04/29/2025 2018 261 (H) 70 - 130 mg/dL Final   04/29/2025 1931 395 (H) 70 - 130 mg/dL Final   04/29/2025 1629 137 (H) 70 - 130 mg/dL Final   04/29/2025 1134 196 (H) 70 - 130 mg/dL Final   04/29/2025 0807 224 (H) 70 - 130 mg/dL Final   04/29/2025 0550 199 (H) 70 - 130 mg/dL Final         Imaging reviewed  Chest x-ray 4/30 reviewed        Microbiology reviewed            Active Hospital Problems    Diagnosis  POA    **Hypoxia [R09.02]  Yes    History of lung cancer [Z85.118]  Not Applicable    Anemia, chronic disease [D63.8]  Yes    MSSA bacteremia [R78.81, B95.61]  Unknown    Pleural effusion on right [J90]  Yes    Essential hypertension [I10]  Yes    Fatty liver [K76.0]  Yes    Cholelithiasis [K80.20]  Yes    DM2 (diabetes mellitus, type 2) [E11.9]  Yes    Atrial fibrillation [I48.91]  Yes    Dyslipidemia [E78.5]  Yes    Mood disorder [F39]  Yes    Lobar pneumonia [J18.1]  Yes      Resolved Hospital Problems   No resolved problems to display.         ASSESSMENT:  Right pleural effusion, recurrent. S/p CT-guided chest tube 4/22 -> slightly exudative by LDH  COPD/upper lobe predominant emphysema, no current exacerbation  Keytruda pneumonitis: Only minimal GG infiltrates on latest CT 4/14- finished steroid therapy on 4/15  Acute on chronic hypoxic respiratory failure, secondary to the above.  On O2 4 L/minute baseline  MSSA septicemia  Abnormal echo: Severely dilated right and left atrium on echo 4/24.  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  Continue antibiotics.  Chest tube has been removed.  Bronchodilators to help with obstructive lung disease symptoms.   Resume anticoagulant when okay with thoracic surgery.  BiPAP 16 over 10 at night.        Rick Fernandez  MD  Pulmonary and Critical Care Medicine  [facility], [facility]  4/30/2025    08:56 EDT

## 2025-04-30 NOTE — DISCHARGE PLACEMENT REQUEST
"Caren Wong (74 y.o. Male)       Date of Birth   1950    Social Security Number       Address   [address] [place] [address]    Home Phone   [phone]    MRN   [id number]       [facility]    Marital Status                               Admission Date   4/21/2025    Admission Type   Emergency    Admitting Provider   Rigo Eng MD    Attending Provider   Kartik Ritchie MD    Department, Room/Bed   [address], E568/1       Discharge Date       Discharge Disposition       Discharge Destination                                 Attending Provider: Kartik Ritchie MD    Allergies: No Known Allergies    Isolation: None   Infection: None   Code Status: CPR    Ht: 180.3 cm (70.98\")   Wt: 130 kg (287 lb)    Admission Cmt: None   Principal Problem: Hypoxia [R09.02]                   Active Insurance as of 4/21/2025       Primary Coverage       Payor Plan Insurance Group Employer/Plan Group    MEDICARE MEDICARE A & B        Payor Plan Address Payor Plan Phone Number Payor Plan Fax Number Effective Dates    [address] [phone]  10/1/2013 - None Entered    [facility] [id number]         Subscriber Name Subscriber Birth Date Member ID       CAREN WONG 1950 [id number]               Secondary Coverage       Payor Plan Insurance Group Employer/Plan Group    AARP  SUP AARP HEALTH CARE OPTIONS        Payor Plan Address Payor Plan Phone Number Payor Plan Fax Number Effective Dates    [facility] [phone]  1/1/2016 - None Entered    [address]       [facility] [address]         Subscriber Name Subscriber Birth Date Member ID       CAREN WONG 1950 [id number]                     Emergency Contacts        (Rel.) Home Phone Work Phone Mobile Phone    Luba Wong (Spouse) -- -- [phone]                "

## 2025-04-30 NOTE — PROGRESS NOTES
Exercise Oximetry    Patient Name:Branden Diop   MRN: [id number]   Date: 04/30/25             ROOM AIR BASELINE   SpO2% 87   Heart Rate 87   Blood Pressure      EXERCISE ON ROOM AIR SpO2% EXERCISE ON O2 @ 3 LPM SpO2%   1 MINUTE  1 MINUTE 93   2 MINUTES  2 MINUTES 92   3 MINUTES  3 MINUTES 91   4 MINUTES  4 MINUTES 90   5 MINUTES  5 MINUTES    6 MINUTES  6 MINUTES               Distance Walked  2 laps around the nurses station Distance Walked   Dyspnea (Brook Scale)  3 Dyspnea (Brook Scale)   Fatigue (Brook Scale)  4 Fatigue (Brook Scale)   SpO2% Post Exercise  90 SpO2% Post Exercise   HR Post Exercise  92 HR Post Exercise   Time to Recovery  5 mins Time to Recovery     Comments: Patient had a resting room air sat of 87% Patient was placed on his home O@ of 3l and ambulated around the nurses station 2 timesand stated that he needed to go back to the room.  Patients SPO2 did not drop below 89% during the walk test.

## 2025-04-30 NOTE — CASE MANAGEMENT/SOCIAL WORK
Continued Stay Note  [facility]     Patient Name: Branden Diop  MRN: [id number]  Today's Date: 4/30/2025    Admit Date: 4/21/2025    Plan: Home with spouse and HH   Discharge Plan       Row Name 04/30/25 1459       Plan    Plan Home with spouse and HH    Patient/Family in Agreement with Plan yes    Plan Comments Met with pt and wife in room. Pt confirmed his plan is to return home with his wife and home health at discharge. His wife will provide transportation. Originally he said that he would like [facility] but they cannot accept due to staffing. [facility] can accept and he is agreeable. [facility] [place] has accepted and their nurse Alannah came to the bedside and educated the pt on giving his IV antibiotics. His wife brought his oxygen tank to the bedside. He denied any other needs at this time. CCP following. Lyle TRUJILLO RN                   Discharge Codes    No documentation.                 Expected Discharge Date and Time       Expected Discharge Date Expected Discharge Time    Apr 30, 2025               Lyle Campbell RN

## 2025-04-30 NOTE — PROGRESS NOTES
Enter Query Response Below      Query Response: Acute worsening of chronic hypoxic respiratory failure related to pleural effusion             If applicable, please update the problem list.

## 2025-04-30 NOTE — PLAN OF CARE
Goal Outcome Evaluation:              Outcome Evaluation: afib,5L NC,pain managed pharmacologically,ambulated x1 person assist,Plan of care ongoing

## 2025-04-30 NOTE — DISCHARGE SUMMARY
Patient Name: Branden Diop  : 1950  MRN: [id number]    Date of Admission: 2025  Date of Discharge:  2025  Primary Care Physician: Tino Lopez MD      Chief Complaint:   Shortness of Breath      Discharge Diagnoses     Active Hospital Problems    Diagnosis  POA    **Sepsis due to methicillin susceptible Staphylococcus aureus (MSSA) without acute organ dysfunction [A41.01]  Yes    JUAN (obstructive sleep apnea) [G47.33]  Yes    History of lung cancer [Z85.118]  Not Applicable    Anemia, chronic disease [D63.8]  Yes    Acute on chronic respiratory failure with hypoxia [J96.21]  Yes    Essential hypertension [I10]  Yes    Fatty liver [K76.0]  Yes    Cholelithiasis [K80.20]  Yes    Type 2 diabetes mellitus with hyperglycemia [E11.65]  Yes    Atrial fibrillation [I48.91]  Yes    Dyslipidemia [E78.5]  Yes    Mood disorder [F39]  Yes    COPD (chronic obstructive pulmonary disease) [J44.9]  Yes      Resolved Hospital Problems    Diagnosis Date Resolved POA    Pleural effusion on right [J90] 2025 Yes    Lobar pneumonia [J18.1] 2025 Yes        Hospital Course     Mr. Diop is a 74 y.o. 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  Please see H&P for full details.  He became hypoxic while getting his port flushed at the oncology office and was sent to ED where he was noted to have a low-grade fever.  There was patchy infiltration in the lungs bilaterally along with a right pleural effusion which had been seen previously and was being followed by pulmonology and Thoracics.  He was admitted and started on antibiotics and placed back on his supplemental oxygen.  He did require higher flow rates, up to 6 to 10 L at times.  He was seen by pulmonology and thoracic surgery and blood cultures were  performed which grew MSSA.  He was sent for thoracentesis and chest tube placement.  Cultures from the pleural fluid were negative.  ID saw and adjusted antibiotics to cefazolin.  He did have a port in place which was recommended for removal.  During the same procedure he underwent doxycycline pleurodesis.  Echocardiogram was done and was poor quality but could not rule out a vegetation on the aortic valve.  He was given scheduled nebulizer treatments.  No steroids were given due to concerns for healing after the pleurodesis.  Over the last several days oxygen requirements have been weaned down and he has been able to have the chest tube removed yesterday.  Chest x-ray remained stable today and he is saturating well on 3 L which is his home requirement.  He has a PICC line in place now and plans are for outpatient cefazolin through 6/3 under the direction of Dr. Monroe.  Repeat blood cultures after the port removal are negative x 2 days.      He did develop some right shoulder pain in the last couple of days but I think this was related to osteoarthritis.  I had him start doing more range of motion type exercises in the bed and it has almost resolved since then.  He will need continued close outpatient follow-up but appears to be nearing his baseline at the present time.      Blood sugars are poorly controlled but I suspect will improve once he gets back on his home regimen including Mounjaro      Day of Discharge     Subjective:  No events.  He very much wants to discharge    Physical Exam:  Temp:  [97.1 °F (36.2 °C)-98.7 °F (37.1 °C)] 97.9 °F (36.6 °C)  Heart Rate:  [64-91] 78  Resp:  [18] 18  BP: (102-122)/(58-67) 106/67  Body mass index is 40.05 kg/m².  Physical Exam  Vitals reviewed.   Constitutional:       General: He is not in acute distress.     Appearance: He is obese.   Cardiovascular:      Rate and Rhythm: Normal rate and regular rhythm.   Pulmonary:      Effort: No respiratory distress.      Breath  sounds: Rhonchi present.   Abdominal:      Palpations: Abdomen is soft.      Tenderness: There is no abdominal tenderness.   Musculoskeletal:      Right lower leg: No edema.      Left lower leg: No edema.   Neurological:      Mental Status: He is alert and oriented to person, place, and time.   Psychiatric:         Mood and Affect: Mood normal.         Consultants     Consult Orders (all) (From admission, onward)       Start     Ordered    04/28/25 1020  Inpatient IV Team Consult PICC 1 Lumen  Once        Provider:  (Not yet assigned)    04/28/25 1019    04/23/25 0744  Hematology & Oncology Inpatient Consult  Once        Specialty:  Hematology and Oncology  Provider:  Duy Villasenor MD PhD    04/23/25 0743    04/22/25 0810  Inpatient Infectious Diseases Consult  Once        Specialty:  Infectious Diseases  Provider:  Dyllan Daly MD    04/22/25 0809    04/21/25 2032  Inpatient Case Management  Consult  Once        Provider:  (Not yet assigned)    04/21/25 2032 04/21/25 1719  Inpatient Thoracic Surgery Consult  Once        Specialty:  Thoracic Surgery  Provider:  David Figueroa MD    04/21/25 1718    04/21/25 1632  [facility] (on-call MD unless specified) Details  Once        Specialty:  Hospitalist  Provider:  (Not yet assigned)    04/21/25 1631    04/21/25 1542  Pulmonology (on-call MD unless specified)  STAT        Specialty:  Pulmonary Disease  Provider:  (Not yet assigned)    04/21/25 1545                  Procedures     REMOVAL VENOUS ACCESS DEVICE, DOXYCYCLINE PLEURODESIS    Imaging Results (All)       Procedure Component Value Units Date/Time    XR Chest 1 View [[id number]] Collected: 04/30/25 0703     Updated: 04/30/25 0903    Narrative:         XR CHEST 1 VW-     HISTORY: Recurrent right effusion; R09.02-Hypoxemia; R06.00-Dyspnea,  unspecified; X50-Yzqifqn effusion, not elsewhere classified;  Z85.118-Personal history of other malignant neoplasm of bronchus and  lung;  R78.81-Bacteremia; B95.61-Methicillin susceptible Staphylococcus  aureus infection as the cause of diseases classified elsewhere     COMPARISON: AP chest 04/29/25, 04/280/2025, 04/27/2025, CT chest  04/24/2025.     FINDINGS: Right basilar pigtail drainage catheter has been withdrawn.  There is no other change in appearance of the chest. No pneumothorax.  Small right effusion with fluid or atelectasis along the minor fissure  and right perihilar and basilar atelectasis and potential infiltrate.  Diffuse interstitial disease associated with pulmonary emphysema with  superimposed hazy ground-glass infiltrates without interval change.       Impression:      Interval withdrawal of a right pleural drain. No  pneumothorax or other evidence for change.        This report was finalized on 4/30/2025 8:59 AM by Chaz Desai M.D  on Workstation: [id number]       XR Chest 1 View [[id number]] Collected: 04/29/25 0609     Updated: 04/29/25 0615    Narrative:      XR CHEST 1 VW-     Clinical: Recurrent right-sided pleural effusion     COMPARISON examination dated 4/28/2025     FINDINGS: Right base chest tube and left upper extremity PICC line in  position. Cardiomediastinal silhouette is stable. There are diffuse  bilateral pulmonary infiltrates again demonstrated. Slightly diminished  right upper lobe and left base. Vague opacity at the right base likely  represents a small amount of pleural fluid. No pneumothorax or other  interval change.     This report was finalized on 4/29/2025 6:12 AM by Dr. Jamar Segovia M.D  on Workstation: [id number]       XR Chest Post CVA Port [[id number]] Collected: 04/28/25 2124     Updated: 04/28/25 2129    Narrative:      SINGLE VIEW OF THE CHEST     HISTORY: PICC placement.      COMPARISON: April 28, 2025     FINDINGS:  Left-sided PICC appears to extend into the superior vena cava.  Cardiomegaly is noted. There are bilateral alveolar and interstitial  infiltrates. No pneumothorax is seen.  There is a right-sided pleural  drainage catheter. Right pleural effusion is suspected.          Impression:      Left-sided PICC appears to terminate within the superior vena cava.     This report was finalized on 4/28/2025 9:25 PM by Dr. Renee Alvarado M.D on Workstation: [id number]       XR Chest 1 View [[id number]] Collected: 04/28/25 0622     Updated: 04/28/25 0628    Narrative:      XR CHEST 1 VW-     Clinical: Recurrent pleural effusion, right-sided chest tube     COMPARISON examination 4/27/2025     FINDINGS: There has been no interval change in position of the  right-sided chest tube. Suspect small residual right-sided pleural  effusion. The cardiomediastinal silhouette is stable. Patchy infiltrates  similar to the previous examination.     CONCLUSION: Stable imaging of the chest     This report was finalized on 4/28/2025 6:24 AM by Dr. Jamar Segovia M.D  on Workstation: [id number]       XR Chest 1 View [[id number]] Collected: 04/27/25 0535     Updated: 04/27/25 0539    Narrative:      SINGLE VIEW OF THE CHEST     HISTORY: Recurrent right pleural effusion     COMPARISON: April 26, 2025     FINDINGS:  Right-sided pigtail drainage catheter remains in position. Right pleural  effusion appears stable. No pneumothorax is seen. Bilateral alveolar and  interstitial infiltrates are again seen. This appears stable. No  pneumothorax is identified.       Impression:      No significant interval change.     This report was finalized on 4/27/2025 5:36 AM by Dr. Renee Alvarado M.D on Workstation: [id number]       XR Chest 1 View [[id number]] Collected: 04/26/25 0807     Updated: 04/26/25 0813    Narrative:      ONE-VIEW PORTABLE CHEST AT 4:43 A.M.     HISTORY: Recurrent right effusion. Right pleural drain.     FINDINGS: A pigtail pleural drain is present at the right base and there  appears to be a small residual somewhat loculated right pleural effusion  and vague adjacent atelectasis showing no change from  yesterday's exam.  The lungs are moderately expanded with diffuse interstitial and alveolar  haziness that is unchanged and likely represents combination of chronic  change and superimposed areas of pneumonia and groundglass opacity as  noted on the CT scan performed 2 days ago. The heart remains enlarged.     This report was finalized on 4/26/2025 8:10 AM by Dr. Sushil Javier M.D  on Workstation: [id number]       XR Chest 1 View [[id number]] Collected: 04/25/25 0646     Updated: 04/25/25 0651    Narrative:      XR CHEST 1 VW-     HISTORY: Male who is 74 years-old, recurrent right pleural effusion     TECHNIQUE: Frontal views of the chest     COMPARISON: 4/24/2025     FINDINGS: Right chest tube appears stable. The heart is enlarged.  Pulmonary vasculature is congested. Diffuse bilateral alveolar  interstitial opacities show interval worsening. No large pleural  effusion, or pneumothorax. No acute osseous process.       Impression:      Interval worsening of extensive bilateral pulmonary  opacities.     This report was finalized on 4/25/2025 6:48 AM by Dr. Arsh Jorgensen M.D on Workstation: [id number]       CT Chest Without Contrast Diagnostic [[id number]] Collected: 04/24/25 1328     Updated: 04/24/25 1338    Narrative:      CT CHEST WITHOUT CONTRAST     HISTORY: Follow-up pleural effusion, history of lung cancer and history  of Keytruda pneumonitis     TECHNIQUE: Radiation dose reduction techniques were utilized, including  automated exposure control and exposure modulation based on body size.  CT scan of the chest without the administration of IV contrast was  performed. Coronal and sagittal reformatted images obtained.     COMPARISON: 4/22/2025, 4/14/2025     FINDINGS: There remains a right-sided chest tube in place. The  right-sided pleural effusion is significantly decreased in size now only  very small. There is no pleural effusion on the left. Stable mediastinal  and right hilar lymph nodes. Coronary  artery calcifications are present.  There is been a previous right middle lobectomy. There is significantly  improved aeration of the right lower lobe with significantly decreased  atelectasis compared with the most recent study. There is a area of  groundglass opacity present in the right lower lobe on image 60 that is  new. There is some new groundglass opacity and some nodularity present  in the right upper lobe and also some new groundglass opacity and  nodularity present throughout the left upper lobe. Limited imaging of  the upper abdomen demonstrates a previous cholecystectomy. Multiple  partially imaged renal cysts. Thoracic spine degenerative changes       Impression:      1. Significant decrease in size of right pleural effusion, now only very  small. Right-sided chest tube remains in place  2. Improved aeration of the right lower lobe with decreased atelectasis  3. New areas of groundglass and nodular infiltrate in the lungs as  described most likely infectious/inflammatory. Follow-up chest CT  recommended in 1 month to ensure clearing           Radiation dose reduction techniques were utilized, including automated  exposure control and exposure modulation based on body size.        This report was finalized on 4/24/2025 1:35 PM by Dr. Yoandy Clemente M.D on Workstation: [id number]       XR Chest 1 View [[id number]] Collected: 04/24/25 0907     Updated: 04/24/25 0912    Narrative:      XR CHEST 1 VW-4/24/2025     HISTORY: Pleural effusion.     Heart size is mild to moderately enlarged. There is moderately severe  bilateral interstitial and alveolar disease appears to have progressed  slightly since 4/23/2025. This could be related to some pulmonary edema  superimposed on some chronic changes. There may be some fluid in the  minor fissure on the right. Haziness in the right base is seen which may  be related to small amount of pleural fluid with some atelectasis as  well. Pigtail catheter terminates  over the right base.       Impression:      1. Bilateral interstitial and alveolar disease appears to have  progressed slightly since yesterday's study and this could be related to  pulmonary edema superimposed on interstitial fibrosis.  2. Slight interval increase in density of the right base since  yesterday's study as well.  3. No pneumothorax is seen.  4. Additional follow-up recommended.        This report was finalized on 4/24/2025 9:09 AM by Dr. David Ruiz M.D on Workstation: [id number]       XR Chest 1 View [[id number]] Collected: 04/23/25 0625     Updated: 04/23/25 0747    Narrative:      XR CHEST 1 VW-4/23/2025     HISTORY: Possible pleural effusion.     2 images are submitted. Heart size is at the upper limits of normal.  Pigtail catheter terminates over the right lower hemithorax. There is  bilateral interstitial and alveolar fluid which may represent pulmonary  edema. The right base has cleared somewhat since the 4/22/2025 study and  this may represent improving pleural effusion.     No pneumothorax is seen. Mediport catheter is seen in good position.       Impression:      1. There has been slight clearing of the right base presumably from  improving pleural effusion since yesterday's study.  2. The chest otherwise appears largely unchanged.        This report was finalized on 4/23/2025 7:43 AM by Dr. David Ruiz M.D on Workstation: [id number]       CT Chest Without Contrast Diagnostic [[id number]] Collected: 04/22/25 1529     Updated: 04/22/25 1536    Narrative:      CT CHEST WITHOUT IV CONTRAST     HISTORY: Follow-up effusion status post chest tube placement     TECHNIQUE: Radiation dose reduction techniques were utilized, including  automated exposure control and exposure modulation based on body size.   3 mm images were obtained through the chest without IV contrast.     COMPARISON: CT chest 4/14/2025          Impression:      FINDINGS AND IMPRESSION:  Please note evaluation is  suboptimal intravenous contrast. Right  Port-A-Cath tip terminates in the superior vena cava. Gallbladder  surgically absent. Visualized liver has a somewhat lobulated appearance.  Hypodense renal lesions only partially seen and cannot be evaluated.     Mediastinal and hilar adenopathy is present. Index right hilar node  measures 1.8 cm in short axis dimension, grossly unchanged since  2/5/2025. Index precarinal adenopathy measures 1.6 cm in short axis  dimension. (Previously 1.3 cm on 2/5/2025). At least mild to moderate  coronary artery calcification and/or coronary stents.     Percutaneous right thoracostomy tube terminates within a moderate right  pleural effusion. Overlying collapse of the right lower lobe is present.  Intralobular septal thickening is present with intervening areas of  groundglass and pulmonary opacification which to have worsened since  4/14/2025, most notably in the lung apices in the lingula. Small left  pleural effusion with overlying pulmonary opacification is also  increased. FINDINGS ARE CONCERNING FOR WORSENING PULMONARY EDEMA AND/OR  MULTIFOCAL PNEUMONIA IN THE APPROPRIATE CONTEXT CORRELATION WITH PATIENT  HISTORY IS RECOMMENDED. THE NODULAR APPEARANCE OF THE ABOVE-MENTIONED  OPACIFICATION AND ABOVE-MENTIONED ADENOPATHY, CONTINUED CLOSE INTERVAL  FOLLOW-UP WITH CHEST CT IN 8 WEEKS IS RECOMMENDED TO ENSURE STABILITY  AND RESOLUTION EXCLUDE THE POSSIBILITY OF MALIGNANCY OR METASTATIC  DISEASE.     Postsurgical changes from prior wedge resection on the right. No  suspicious lytic or blastic osseous lesion. Anterolisthesis of C7 on  T1.. MIP        This report was finalized on 4/22/2025 3:33 PM by Dr. Adam Mohan M.D  on Workstation: [id number]       CT Guided Chest Tube [[id number]] Collected: 04/22/25 1457     Updated: 04/22/25 1500    Narrative:      PROCEDURE: CT guided right chest tube placement     HISTORY: Recurrent right effusion; R09.02-Hypoxemia;  R06.00-Dyspnea,  unspecified; E01-Bfmqnqd effusion, not elsewhere classified;  Z85.118-Personal history of other malignant neoplasm of bronchus and  lung     TECHNIQUE:  Radiation dose reduction techniques were utilized, including automated  exposure control and exposure modulation based on body size.     The risks, benefits and alternatives of the procedure were discussed  with the patient, and informed consent was obtained. In the procedure  room a timeout was performed confirming correct patient and procedure.      The patient was placed in the supine position on the CT scanner.  Noncontrast CT scan was performed to localize the right pleural. The  overlying skin was prepped and draped in the usual sterile fashion with  2% chlorhexidine. 1% lidocaine was used for local anesthesia.     Next, a 5 Nigerian Yueh catheter was advanced into the effusion under CT  guidance. . A superstiff guidewire was advanced through the needle. The  tract was serially dilated. Next a 12 Nigerian pigtail catheter was  advanced over the wire into the effusion. The catheter was sutured in  place to the skin and connected to an Atrium. Sterile dressing was  applied. No immediate complications.       Impression:      Successful CT guided right chest tube placement           Radiation dose reduction techniques were utilized, including automated  exposure control and exposure modulation based on body size.        This report was finalized on 4/22/2025 2:57 PM by Dr. Yoandy Clemente M.D on Workstation: [id number]       XR Chest 1 View [[id number]] Collected: 04/22/25 1006     Updated: 04/22/25 1015    Narrative:      XR CHEST 1 VW-     HISTORY: Male who is 74 years-old, hypoxia     TECHNIQUE: Frontal view of the chest     COMPARISON: 4/21/2025     FINDINGS: Right chest port appears stable. The heart size is borderline.  Pulmonary vasculature appears congested. Bilateral alveolar interstitial  opacities appear similar to prior exam. Persistent  right pleural  effusion. No pneumothorax. Otherwise stable.       Impression:      No significant change.           This report was finalized on 4/22/2025 10:12 AM by Dr. Arsh Jorgensen M.D on Workstation: [id number]       XR Chest 1 View [[id number]] Collected: 04/21/25 1543     Updated: 04/21/25 1547    Narrative:      XR CHEST 1 VW-4/21/2025     HISTORY: Shortness of breath.     Heart size is within normal limits. Lungs are underinflated with some  vascular crowding. There is some bilateral vascular congestion with more  patchy areas of increased density in the right upper lung and bilateral  lower lungs which may represent bilateral asymmetric edema atelectasis  and/or pneumonia. These findings may be partially chronic in nature as  well as some of these were present on the 2/27/2025 study.     Mediport catheter is seen in good position.       Impression:      1. Underinflation of the lungs with some vascular crowding and there may  be mild vascular congestion.  2. Patchy areas of increased density in the right upper lobe and  bilateral lower lungs as described above. Please correlate. Follow-up  films also recommended        This report was finalized on 4/21/2025 3:44 PM by Dr. David Ruiz M.D on Workstation: [id number]               Results for orders placed during the hospital encounter of 04/21/25    Adult Transthoracic Echo Complete W/ Cont if Necessary Per Protocol    Interpretation Summary    Poor image quality limits interpretation.    Left ventricular systolic function is normal. Calculated left ventricular EF = 65%    Left ventricular diastolic function was indeterminate setting of A-fib.    The right ventricular cavity is mildly dilated with low normal function.    Questionable small echodensity on aortic valve; favor asymmetric sclerosis/calcification, correlate clinically for endocarditis.    There is mild, bileaflet mitral valve thickening as well as mitral annular calcification (MAC)  "present.    Severe biatrial enlargement.    Pertinent Labs     Results from last 7 days   Lab Units 04/29/25  0457 04/28/25  0533 04/25/25  0742 04/24/25  0537   WBC 10*3/mm3 8.88 8.65 7.64 6.22   HEMOGLOBIN g/dL 9.6* 10.0* 10.5* 10.1*   PLATELETS 10*3/mm3 347 327 194 150     Results from last 7 days   Lab Units 04/30/25  0345 04/29/25  0457 04/28/25  0533 04/27/25  1602 04/27/25  0542   SODIUM mmol/L 139 139 140  --  140   POTASSIUM mmol/L 3.8 3.8 3.7 3.5 3.4*   CHLORIDE mmol/L 97* 95* 96*  --  96*   CO2 mmol/L 35.2* 34.3* 34.8*  --  34.0*   BUN mg/dL 13 15 16  --  20   CREATININE mg/dL 0.63* 0.69* 0.62*  --  0.64*   GLUCOSE mg/dL 165* 156* 142*  --  147*   EGFR mL/min/1.73 98.6 95.9 99.1  --  98.1       Results from last 7 days   Lab Units 04/30/25  0345 04/29/25  0457 04/28/25  0533 04/27/25  0542   CALCIUM mg/dL 9.3 9.3 9.1 8.9               Invalid input(s): \"LDLCALC\"  Results from last 7 days   Lab Units 04/28/25  0533   BLOODCX  No growth at 2 days  No growth at 2 days         Test Results Pending at Discharge     Pending Results       Procedure [Order ID] Specimen - Date/Time    XR Chest 1 View [[id number]]     XR Chest 1 View [[id number]]               Discharge Details        Discharge Medications        New Medications        Instructions Start Date   arformoterol 15 MCG/2ML nebulizer solution  Commonly known as: BROVANA   15 mcg, Nebulization, 2 Times Daily - RT      budesonide 0.5 MG/2ML nebulizer solution  Commonly known as: PULMICORT   0.5 mg, Nebulization, 2 Times Daily - RT      ceFAZolin 2000 mg IVPB in 100 mL NS (MBP)   2,000 mg, Intravenous, Every 8 Hours      guaiFENesin 600 MG 12 hr tablet  Commonly known as: MUCINEX   1,200 mg, Oral, Every 12 Hours Scheduled      ipratropium-albuterol 0.5-2.5 mg/3 ml nebulizer  Commonly known as: DUO-NEB   3 mL, Nebulization, 4 Times Daily - RT             Changes to Medications        Instructions Start Date   Xarelto 20 MG tablet  Generic drug: " rivaroxaban  What changed:   how much to take  when to take this   TAKE ONE TABLET BY MOUTH DAILY             Continue These Medications        Instructions Start Date   albuterol sulfate  (90 Base) MCG/ACT inhaler  Commonly known as: PROVENTIL HFA;VENTOLIN HFA;PROAIR HFA   2 puffs, Every 4 Hours PRN      famotidine 20 MG tablet  Commonly known as: PEPCID   20 mg, 2 Times Daily PRN      ferrous sulfate 325 (65 FE) MG tablet   0.5 tablets, 2 Times Daily      fexofenadine 180 MG tablet  Commonly known as: ALLEGRA   180 mg, Daily      folic acid 1 MG tablet  Commonly known as: FOLVITE   1 mg, Oral, Daily      gabapentin 300 MG capsule  Commonly known as: NEURONTIN   600 mg, Oral, Every 8 Hours      hydrOXYzine pamoate 50 MG capsule  Commonly known as: VISTARIL   50 mg, 3 Times Daily PRN      Magnesium Oxide -Mg Supplement 400 (240 Mg) MG tablet   400 mg, Oral, 2 Times Daily      Mounjaro 2.5 MG/0.5ML solution auto-injector  Generic drug: Tirzepatide   2.5 mg, Weekly      MULTIVITAMINS PO   1 tablet, Daily      NovoLOG FlexPen 100 UNIT/ML solution pen-injector sc pen  Generic drug: insulin aspart   8 Units, Subcutaneous, 3 Times Daily With Meals      potassium chloride 10 MEQ CR capsule  Commonly known as: MICRO-K   10 mEq, Oral, 2 Times Daily      rosuvastatin 40 MG tablet  Commonly known as: CRESTOR   TAKE ONE TABLET BY MOUTH DAILY      sertraline 50 MG tablet  Commonly known as: ZOLOFT   TAKE ONE TABLET BY MOUTH DAILY      torsemide 20 MG tablet  Commonly known as: DEMADEX   40 mg, Oral, Daily      Toujeo SoloStar 300 UNIT/ML solution pen-injector injection  Generic drug: Insulin Glargine (1 Unit Dial)   30 Units, Subcutaneous, Daily      vitamin B-12 1000 MCG tablet  Commonly known as: CYANOCOBALAMIN   1,000 mcg, Oral, Daily               No Known Allergies    Discharge Disposition:  [place] [facility] Diet:  Diet Order   Procedures    Diet: Cardiac; Healthy Heart (2-3 Na+); Fluid  Consistency: Thin (IDDSI 0)       Discharge Activity:       CODE STATUS:    Code Status and Medical Interventions: CPR (Attempt to Resuscitate); Full Support   Ordered at: 04/21/25 1711     Code Status (Patient has no pulse and is not breathing):    CPR (Attempt to Resuscitate)     Medical Interventions (Patient has pulse or is breathing):    Full Support       Future Appointments   Date Time Provider Department Center   5/30/2025  8:40 AM INFUSION ACCESS CHAIR- [facility] INFUS [facility]   5/30/2025  9:00 AM [facility] PET CT BH [facility] PET [facility]   6/3/2025  1:50 PM Hermilo Monroe MD [facility] ID [facility]   6/4/2025  8:40 AM VITALS ONLY CBC KRE BH LAB [facility]   6/4/2025  9:00 AM Duy Villasenor MD PhD [facility] CBC [facility]   6/12/2025 10:30 AM David Figueroa MD [facility] TS [facility]     Additional Instructions for the Follow-ups that You Need to Schedule       Ambulatory Referral to Home Health   As directed      Face to Face Visit Date: 4/30/2025   Follow-up provider for Plan of Care?: I treated the patient in an acute care facility and will not continue treatment after discharge.   Follow-up provider: CYNTHIA LOPEZ [[id number]]   Reason/Clinical Findings: New IV antibiotics, weakness   Describe mobility limitations that make leaving home difficult: New IV antibiotics, weakness   Nursing/Therapeutic Services Requested: Skilled Nursing Physical Therapy   Skilled nursing orders: Medication education Infusion therapy   PT orders: Strengthening   Frequency: 1 Week 1               Contact information for follow-up providers       Cynthia Lopez MD .    Specialty: Internal Medicine  Contact information:  [address], [address]  [facility] [address] [phone]                       Contact information for after-discharge care       Durable Medical Equipment       [facility] .    Service: Durable Medical Equipment  Contact information:  [address]  [facility] [id number] [phone]                      Dialysis/Infusion       [facility] HOME INFUSION .    Service: Infusion and IV Therapy  Contact information:  [address]  [facility] [address]  [phone]                     Home Medical Care       [facility] - [facility] .    Service: Home Health Services  Contact information:  [address]  [facility] [address]  [phone]                                   Additional Instructions for the Follow-ups that You Need to Schedule       Ambulatory Referral to Home Health   As directed      Face to Face Visit Date: 4/30/2025   Follow-up provider for Plan of Care?: I treated the patient in an acute care facility and will not continue treatment after discharge.   Follow-up provider: CYNTHIA LEARY [[id number]]   Reason/Clinical Findings: New IV antibiotics, weakness   Describe mobility limitations that make leaving home difficult: New IV antibiotics, weakness   Nursing/Therapeutic Services Requested: Skilled Nursing Physical Therapy   Skilled nursing orders: Medication education Infusion therapy   PT orders: Strengthening   Frequency: 1 Week 1            Time Spent on Discharge:  Greater than 30 minutes      Kartik Ritchie MD  [facility]  04/30/25  12:06 EDT

## 2025-04-30 NOTE — PROGRESS NOTES
"    Chief Complaint: Recurrent right pleural effusion    S/P: Port removal and Doxy pleurodesis  POD #: 6    Subjective:  Symptoms:  Improved.  No shortness of breath (IMPROVED).    Diet:  Adequate intake.  No nausea or vomiting.    Activity level: Returning to normal.    Pain:  He complains of pain that is mild.  Pain is well controlled and requiring pain medication.    No acute complaints.  Pain well-controlled.  Denies any shortness of breath. On 3-4L at rest, baseline. Dressing in place over chest tube removal site.    Vital Signs:  Temp:  [97.1 °F (36.2 °C)-98.7 °F (37.1 °C)] 97.9 °F (36.6 °C)  Heart Rate:  [64-91] 65  Resp:  [18] 18  BP: (102-122)/(58-67) 106/67    Intake & Output (last day)         04/29 0701  04/30 0700 04/30 0701  05/01 0700    P.O. 360     Total Intake(mL/kg) 360 (2.8)     Urine (mL/kg/hr) 900 (0.3) 500 (0.5)    Stool  0    Chest Tube      Total Output 900 500    Net -540 -500          Urine Unmeasured Occurrence 1 x 1 x    Stool Unmeasured Occurrence  1 x            Objective:  General Appearance:  Comfortable, in no acute distress and well-appearing.    Vital signs: (most recent): Blood pressure 106/67, pulse 65, temperature 97.9 °F (36.6 °C), temperature source Oral, resp. rate 18, height 180.3 cm (70.98\"), weight 130 kg (287 lb), SpO2 99%.  No fever.    Lungs:  Normal effort and normal respiratory rate.  He is not in respiratory distress.    Heart: Normal rate.  Regular rhythm.    Chest: Symmetric chest wall expansion. Chest wall tenderness (Around chest tube) present.    Abdomen: Abdomen is soft and non-distended.    Neurological: Patient is alert and oriented to person, place and time.    Skin:  Warm and dry.                Results Review:     I reviewed the patient's new clinical results.  I reviewed the patient's new imaging results and agree with the interpretation.  Discussed with patient, nurse, surgeon    Imaging Results (Last 24 Hours)       Procedure Component Value Units " Date/Time    XR Chest 1 View [[id number]] Collected: 04/30/25 0703     Updated: 04/30/25 0903    Narrative:         XR CHEST 1 VW-     HISTORY: Recurrent right effusion; R09.02-Hypoxemia; R06.00-Dyspnea,  unspecified; U98-Whvjxia effusion, not elsewhere classified;  Z85.118-Personal history of other malignant neoplasm of bronchus and  lung; R78.81-Bacteremia; B95.61-Methicillin susceptible Staphylococcus  aureus infection as the cause of diseases classified elsewhere     COMPARISON: AP chest 04/29/25, 04/280/2025, 04/27/2025, CT chest  04/24/2025.     FINDINGS: Right basilar pigtail drainage catheter has been withdrawn.  There is no other change in appearance of the chest. No pneumothorax.  Small right effusion with fluid or atelectasis along the minor fissure  and right perihilar and basilar atelectasis and potential infiltrate.  Diffuse interstitial disease associated with pulmonary emphysema with  superimposed hazy ground-glass infiltrates without interval change.       Impression:      Interval withdrawal of a right pleural drain. No  pneumothorax or other evidence for change.        This report was finalized on 4/30/2025 8:59 AM by Chaz Desai M.D  on Workstation: [id number]               Lab Results:     Lab Results (last 24 hours)       Procedure Component Value Units Date/Time    POC Glucose Once [[id number]]  (Abnormal) Collected: 04/30/25 1042    Specimen: Blood Updated: 04/30/25 1044     Glucose 311 mg/dL     Blood Culture - Blood, Hand, Left [[id number]]  (Normal) Collected: 04/28/25 0533    Specimen: Blood from Hand, Left Updated: 04/30/25 0645     Blood Culture No growth at 2 days    Blood Culture - Blood, Hand, Right [[id number]]  (Normal) Collected: 04/28/25 0533    Specimen: Blood from Hand, Right Updated: 04/30/25 0645     Blood Culture No growth at 2 days    POC Glucose Once [[id number]]  (Abnormal) Collected: 04/30/25 0545    Specimen: Blood Updated: 04/30/25 0547     Glucose 189 mg/dL      Basic Metabolic Panel [[id number]]  (Abnormal) Collected: 04/30/25 0345    Specimen: Blood Updated: 04/30/25 0424     Glucose 165 mg/dL      BUN 13 mg/dL      Creatinine 0.63 mg/dL      Sodium 139 mmol/L      Potassium 3.8 mmol/L      Chloride 97 mmol/L      CO2 35.2 mmol/L      Calcium 9.3 mg/dL      BUN/Creatinine Ratio 20.6     Anion Gap 6.8 mmol/L      eGFR 98.6 mL/min/1.73     Narrative:      GFR Categories in Chronic Kidney Disease (CKD)      GFR Category          GFR (mL/min/1.73)    Interpretation  G1                     90 or greater         Normal or high (1)  G2                      60-89                Mild decrease (1)  G3a                   45-59                Mild to moderate decrease  G3b                   30-44                Moderate to severe decrease  G4                    15-29                Severe decrease  G5                    14 or less           Kidney failure          (1)In the absence of evidence of kidney disease, neither GFR category G1 or G2 fulfill the criteria for CKD.    eGFR calculation 2021 CKD-EPI creatinine equation, which does not include race as a factor    POC Glucose Once [[id number]]  (Abnormal) Collected: 04/29/25 2018    Specimen: Blood Updated: 04/29/25 2019     Glucose 261 mg/dL     POC Glucose Once [[id number]]  (Abnormal) Collected: 04/29/25 1931    Specimen: Blood Updated: 04/29/25 1932     Glucose 395 mg/dL     POC Glucose Once [[id number]]  (Abnormal) Collected: 04/29/25 1629    Specimen: Blood Updated: 04/29/25 1631     Glucose 137 mg/dL              Assessment & Plan       Sepsis due to methicillin susceptible Staphylococcus aureus (MSSA) without acute organ dysfunction    Type 2 diabetes mellitus with hyperglycemia    Dyslipidemia    Mood disorder    COPD (chronic obstructive pulmonary disease)    Atrial fibrillation    Cholelithiasis    Fatty liver    Essential hypertension    Acute on chronic respiratory failure with hypoxia    History of lung cancer    Anemia,  chronic disease    JUAN (obstructive sleep apnea)       Assessment & Plan    POD 6 status post port removal with doxycycline pleurodesis via right-sided chest tube for recurrent pleural effusion of uncertain etiology.      Continues to improve.  No acute complaints.  Denies any shortness of breath or controlled pain.At baseline of 3 L via nasal cannula. Chest tube removed yesterday.  Follow-up chest x-ray reviewed demonstrating stability.     Anticoagulation may be resumed now with chest tube removed. Please continue to hold any steroid treatments until after his outpatient thoracic surgery follow-up.    Patient to follow-up in office 5/15/2025 with chest x-ray prior to appointment. This was discussed with patient and his wife at bedside.     Plan for home with [facility] and IV antibiotics for 6 weeks. Encourage patient to continue to increase his activity levels and continue using his incentive spirometer at home.    No objections to discharge from a thoracic surgery standpoint, will sign off.    Vaishali Lacey PA-C  [facility]  04/30/25  14:16 EDT    I have spent greater than 30 minutes reviewing the patient's chart including medical history, notes, radiographic images, labs, and performing assessment and development of a plan and discussion with the patient/family at bedside.

## 2025-05-01 NOTE — OUTREACH NOTE
Prep Survey      Flowsheet Row Responses   [facility] facility patient discharged from? [place]   Is LACE score < 7 ? No   Eligibility Readm Mgmt   Discharge diagnosis Sepsis-Removal venous access device   Does the patient have one of the following disease processes/diagnoses(primary or secondary)? Sepsis   Does the patient have Home health ordered? Yes   What is the Home health agency?  [facility]   Is there a DME ordered? Yes   What DME was ordered? [facility] for DME   Prep survey completed? Yes            DELGADO MARTINEZ - Registered Nurse

## 2025-05-01 NOTE — OP NOTE
OPERATIVE NOTE     Date of procedure: 4/24/2025     Patient name: Branden Diop  MRN: [id number]    Pre OP diagnosis:    Sepsis due to methicillin susceptible Staphylococcus aureus (MSSA) without acute organ dysfunction    Type 2 diabetes mellitus with hyperglycemia    Dyslipidemia    Mood disorder    COPD (chronic obstructive pulmonary disease)    Atrial fibrillation    Cholelithiasis    Fatty liver    Essential hypertension    Acute on chronic respiratory failure with hypoxia    History of lung cancer    Anemia, chronic disease    JUAN (obstructive sleep apnea)      Post OP diagnosis:  Same as above    Procedure performed:   Removal of right implantable vascular access device.  Chemical pleurodesis with doxycycline via right chest tube.    Indications:   Branden Diop is a 74-year-old male who presented to hospital with respiratory failure and sepsis.  He had MSSA bacteremia and infectious disease was consulted.  It was believed that the likely source of the infection is his Mediport and thoracic surgery was involved for Mediport removal.  He also had recurrent pleural effusion for which chest tube was placed.  I also recommended doxycycline to achieve chemical pleurodesis.    I explained to the patient the risks involved with the proposed procedure.  The patient understood the risk and signed the consent for the above procedure.     Surgeon: David Figueroa MD     Assistants: No qualified assistant available for this procedure.    Anesthesia: Monitored anesthesia care with sedation    ASA Class: 4    Procedure Details   The patient was brought to the operating room and placed in the supine position on the operating room table.  The procedure was performed under monitored anesthesia care with sedation managed by anesthesiologist. Pneumatic compression devices were placed on the lower extremities. The patient received intravenous antibiotic prophylaxis prior to incision. Prior to beginning the operation, a time-out was  conducted with all members of surgical team present. The patient was identified as Branden Diop, the procedure and the correct site were verified.      I instilled doxycycline twice daily already placed chest tube.  The chest tube was then clamped.   The anterior chest and neck were prepped and draped in the usual sterile fashion. The skin was injected with 0.25% Marcaine.  The prior Mediport site incision was opened with a knife.  The dissection was carried down to the capsule.  The capsule was circumferentially dissected.  The capsule along with the Mediport attached to the catheter was removed.  Hemostasis was secured.  The incision was closed in layers.  Exofin was applied. The sponge, needle, and instrument counts were correct at the end of the operation.  The patient was awakened from anesthesia and was transported to the Post Anesthesia Care Unit in stable condition.    Findings:  The Mediport with the catheter attached was removed.  Chemical pleurodesis with doxycycline.    Estimated Blood Loss:  minimal           Drains: None                 Specimens: None           Implants: None           Complications: None           Disposition: PACU - hemodynamically stable.           Condition: Stable     David Figueroa MD   Thoracic Surgeon  [facility]

## 2025-05-01 NOTE — CASE MANAGEMENT/SOCIAL WORK
Case Management Discharge Note      Final Note: Home with Maurizio MILES with wife via private vehicle    Provided Post Acute Provider List?: Yes  Post Acute Provider List: Home Health  Provided Post Acute Provider Quality & Resource List?: Yes  Post Acute Provider Quality and Resource List: Home Health  Delivered To: Patient, Support Person  Support Person: wife  Method of Delivery: In person, Telephone    Selected Continued Care - Discharged on 4/30/2025 Admission date: 4/21/2025 - Discharge disposition: Home-Health Care Svc      Destination    No services have been selected for the patient.                Durable Medical Equipment Coordination complete.      Service Provider Services Address Phone Fax Patient Preferred    [facility] Durable Medical Equipment [address], [facility] [address] [phone] --              Dialysis/Infusion Coordination complete.      Service Provider Services Address Phone Fax Patient Preferred    [facility] HOME INFUSION Infusion and IV Therapy [address], [facility] [address] [phone] --              Home Medical Care Coordination complete.      Service Provider Services Address Phone Fax Patient Preferred    [facility] - [facility] [address], [facility] [id number] [phone] --              Therapy    No services have been selected for the patient.                Community Resources    No services have been selected for the patient.                Community & DME    No services have been selected for the patient.                    Selected Continued Care - Episodes Includes continued care and service providers with selected services from the active episodes listed below               Final Discharge Disposition Code: 06 - home with home health care

## 2025-05-03 LAB
BACTERIA SPEC AEROBE CULT: NORMAL
BACTERIA SPEC AEROBE CULT: NORMAL

## 2025-05-07 ENCOUNTER — TELEPHONE (OUTPATIENT)
Dept: ONCOLOGY | Facility: CLINIC | Age: 75
End: 2025-05-07

## 2025-05-07 NOTE — TELEPHONE ENCOUNTER
Caller: Luba Diop    Relationship to patient: Emergency Contact    Best call back number: [phone]    Chief complaint: SCHEDULING     Type of visit: PORT FLUSH    Requested date: PT'S PORT WAS REMOVED WHILE IN THE HOSPITAL, REQUESTING TO CANCEL PORT FLUSH ON 5-30

## 2025-05-12 ENCOUNTER — PATIENT OUTREACH (OUTPATIENT)
Dept: OTHER | Facility: HOSPITAL | Age: 75
End: 2025-05-12
Payer: MEDICARE

## 2025-05-12 NOTE — PROGRESS NOTES
Reviewed chart. 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  Status post right middle lobectomy on 6/12/2024. Patient was started on adjuvant chemotherapy carboplatin AUC 5, and Taxol 200 mg/m² on 7/31/2024, rec'd 4th/final cycle 10/2. Rec'd cycle #1 adjuvant immunotherapy with Pembrolizumab 11/6.  Recent COVID/Flu, seen in ER at [place] 2/22.  Admitted 2/27-3/3/25 for shortness of breath, Keytruda pneumonitis and a large right pleural effusion. Required thoracentesis 3/2, path negative for malignancy.   No longer on immunotherapy due to intolerance.  Patient admitted 4/21-4/30/25 for recurrent pleural effusion. Status post port removal with doxycycline pleurodesis via right-sided chest tube 4/24/25 for recurrent pleural effusion of uncertain etiology. Patient being treated for MSSA bacteremia. Dc'd home 4/30 with C with IV antibx. Sees Cynthia thoracic APRN 5/15.  CT scan scheduled 5/30, Sees Dr. Villasenor 6/4, Dr. Figueroa 6/12     Called patient, spoke with his wife (preferred contact on chart). He is on IV antibiotics via PICC line; this is his 2nd week. They have home health care; his wife is administering the IV antibiotics and flushing the site. The patient's port was removed while IP.  The patient saw his PCP last week.     We discussed his upcoming appts.    The patient's wife denies any questions/concerns or ongoing resource needs. Navigation will continue to follow; encouraged patient/wife to call as needed.

## 2025-05-13 ENCOUNTER — TELEPHONE (OUTPATIENT)
Dept: SURGERY | Facility: CLINIC | Age: 75
End: 2025-05-13
Payer: MEDICARE

## 2025-05-13 NOTE — TELEPHONE ENCOUNTER
Called and spoke with patient to remind him to get his CXR prior to his follow up appt on 5/15/2025. Patient voiced agreement and understanding. Advised patient to call our office if he needs anything else.

## 2025-05-15 ENCOUNTER — OFFICE VISIT (OUTPATIENT)
Dept: SURGERY | Facility: CLINIC | Age: 75
End: 2025-05-15
Payer: MEDICARE

## 2025-05-15 ENCOUNTER — HOSPITAL ENCOUNTER (OUTPATIENT)
Dept: GENERAL RADIOLOGY | Facility: HOSPITAL | Age: 75
Discharge: HOME | End: 2025-05-15
Admitting: NURSE PRACTITIONER
Payer: MEDICARE

## 2025-05-15 VITALS
BODY MASS INDEX: 37.37 KG/M2 | OXYGEN SATURATION: 93 % | RESPIRATION RATE: 16 BRPM | WEIGHT: 267.8 LBS | HEART RATE: 72 BPM | SYSTOLIC BLOOD PRESSURE: 116 MMHG | DIASTOLIC BLOOD PRESSURE: 62 MMHG

## 2025-05-15 DIAGNOSIS — Z85.118 HISTORY OF LUNG CANCER: Primary | ICD-10-CM

## 2025-05-15 DIAGNOSIS — Z85.118 HISTORY OF LUNG CANCER: ICD-10-CM

## 2025-05-15 DIAGNOSIS — J90 PLEURAL EFFUSION: ICD-10-CM

## 2025-05-15 PROCEDURE — 71046 X-RAY EXAM CHEST 2 VIEWS: CPT

## 2025-05-16 DIAGNOSIS — J90 PLEURAL EFFUSION: ICD-10-CM

## 2025-05-16 DIAGNOSIS — Z85.118 HISTORY OF LUNG CANCER: Primary | ICD-10-CM

## 2025-05-20 ENCOUNTER — READMISSION MANAGEMENT (OUTPATIENT)
Dept: CALL CENTER | Facility: HOSPITAL | Age: 75
End: 2025-05-20
Payer: MEDICARE

## 2025-05-20 NOTE — OUTREACH NOTE
Sepsis Week 3 Survey      Flowsheet Row Responses   [facility] facility patient discharged from? [place]   Does the patient have one of the following disease processes/diagnoses(primary or secondary)? Sepsis   Week 3 attempt successful? No   Unsuccessful attempts Attempt 1            HEATH QUINTANA - Registered Nurse

## 2025-05-27 ENCOUNTER — TELEPHONE (OUTPATIENT)
Dept: INFECTIOUS DISEASES | Facility: CLINIC | Age: 75
End: 2025-05-27
Payer: MEDICARE

## 2025-05-27 ENCOUNTER — READMISSION MANAGEMENT (OUTPATIENT)
Dept: CALL CENTER | Facility: HOSPITAL | Age: 75
End: 2025-05-27
Payer: MEDICARE

## 2025-05-27 NOTE — TELEPHONE ENCOUNTER
----- Message from Laura TINEO sent at 5/27/2025  7:59 AM EDT -----  Regarding: HANNA MESSAGE LEFT WITH JIL 5/24  Patient wife called and left following message. We had a delivery today. They said it was on 5/23. There was nothing on the porch. It was on the porch today. I don't know if they put it on the longhouse or what they did. But today is the 24th. I don't know how long it's been out, that antibiotic that is in there. It was a little cold, but those frozen things had thawed out completely. And I don't think they're safe. Every time they've come, they'd call me before they got here. And nobody called today because they said that it would probably be Monday. They didn't know when it would be delivered. I need somebody to call me. I'm not going to put that in if it's not safe. My name is Luba Diop. I'm his wife. I'd appreciate a call. It's [phone]. Thank you.

## 2025-05-27 NOTE — OUTREACH NOTE
Sepsis Week 3 Survey      Flowsheet Row Responses   [facility] patient discharged from? [place]   Does the patient have one of the following disease processes/diagnoses(primary or secondary)? Sepsis   Week 3 attempt successful? No   Unsuccessful attempts Attempt 2   Revoke Uma ADAMS - Registered Nurse

## 2025-05-27 NOTE — TELEPHONE ENCOUNTER
I called wife back and left message on her voicemail. I advised her to call [facility], who supplies the medications for him, and ask if they feel like the medications were still viable. If not, they can send out a new delivery. Phone number provided. Advised she may call our office back if she has further questions. TAVO, RN

## 2025-05-30 ENCOUNTER — HOSPITAL ENCOUNTER (OUTPATIENT)
Dept: PET IMAGING | Facility: HOSPITAL | Age: 75
Discharge: HOME | End: 2025-05-30
Payer: MEDICARE

## 2025-05-30 DIAGNOSIS — C34.2 MALIGNANT NEOPLASM OF MIDDLE LOBE OF RIGHT LUNG: ICD-10-CM

## 2025-05-30 DIAGNOSIS — C34.2 SQUAMOUS CELL CARCINOMA OF BRONCHUS IN RIGHT MIDDLE LOBE: ICD-10-CM

## 2025-05-30 PROCEDURE — 25510000001 IOPAMIDOL 61 % SOLUTION: Performed by: INTERNAL MEDICINE

## 2025-05-30 PROCEDURE — 71260 CT THORAX DX C+: CPT

## 2025-05-30 RX ORDER — IOPAMIDOL 612 MG/ML
85 INJECTION, SOLUTION INTRAVASCULAR
Status: COMPLETED | OUTPATIENT
Start: 2025-05-30 | End: 2025-05-30

## 2025-05-30 RX ADMIN — IOPAMIDOL 75 ML: 612 INJECTION, SOLUTION INTRAVENOUS at 08:41

## 2025-06-03 ENCOUNTER — OFFICE VISIT (OUTPATIENT)
Dept: INFECTIOUS DISEASES | Facility: CLINIC | Age: 75
End: 2025-06-03
Payer: MEDICARE

## 2025-06-03 VITALS
BODY MASS INDEX: 37.41 KG/M2 | HEART RATE: 60 BPM | DIASTOLIC BLOOD PRESSURE: 71 MMHG | SYSTOLIC BLOOD PRESSURE: 109 MMHG | WEIGHT: 267.2 LBS | RESPIRATION RATE: 20 BRPM | TEMPERATURE: 97.3 F | HEIGHT: 71 IN

## 2025-06-03 DIAGNOSIS — A41.01 MSSA (METHICILLIN SUSCEPTIBLE STAPHYLOCOCCUS AUREUS) SEPTICEMIA: Primary | ICD-10-CM

## 2025-06-03 DIAGNOSIS — Z98.890 HISTORY OF REMOVAL OF PORT-A-CATH: ICD-10-CM

## 2025-06-03 DIAGNOSIS — R93.1 ABNORMAL ECHOCARDIOGRAM: ICD-10-CM

## 2025-06-03 DIAGNOSIS — Z79.2 LONG TERM (CURRENT) USE OF ANTIBIOTICS: ICD-10-CM

## 2025-06-03 PROCEDURE — 99214 OFFICE O/P EST MOD 30 MIN: CPT | Performed by: INTERNAL MEDICINE

## 2025-06-03 PROCEDURE — 3074F SYST BP LT 130 MM HG: CPT | Performed by: INTERNAL MEDICINE

## 2025-06-03 PROCEDURE — 3078F DIAST BP <80 MM HG: CPT | Performed by: INTERNAL MEDICINE

## 2025-06-03 RX ORDER — DIPHENHYDRAMINE HCL 25 MG
25 TABLET ORAL EVERY 6 HOURS PRN
COMMUNITY

## 2025-06-03 NOTE — PROGRESS NOTES
ID CLINIC NOTE    CC: f/u MSSA septicemia, port in place now removed, and right pleural effusion status post right chest tube all in the context of lung cancer; possible aortic valve endocarditis.    HPI: Branden Diop is a 74 y.o. male here for f/u MSSA septicemia, possible aortic valve endocarditis, port in place now removed, and right pleural effusion status post right chest tube all in the context of lung cancer.  I saw him in the hospital for this problem from 4/22 - 4/29/2025.  During that admission, he underwent port removal.  Repeat blood cultures were drawn and finalized as negative.  He had a TTE done that was a poor image quality but mentions a questionable small echodensity on the aortic valve that could be endocarditis but favored sclerosis/calcification.  I discussed this result with the patient and his wife and we ultimately and mutually decided to treat him with a 6 weeks course of cefazolin 2 g IV every 8 hours with a stop date of today.    He says overall he is doing much better. No side effects from cefazolin. NO issues w/ PICC line. He is at home. He is wearing 3L O2 NC daily. His chest tube site and port removal site are healed.     He  says his blood sugars are better now that he is off of steroids.      Past Medical History:   Diagnosis Date    Anxiety     Arthritis     Atrial fibrillation     CURRENTLY    Bunion of right foot     Colon polyps     COPD (chronic obstructive pulmonary disease)     MILD. NO MEDS FOR    Depression     History of atrial fibrillation     WITH CARDIOVERSION. NO PROBLEMS    History of skin cancer     BCC REMOVED FROM BACK    [place] (hard of hearing)     Hyperlipidemia     Inguinal hernia, recurrent     RIGHT    Left foot pain     Lung nodules     Rash     IN GROIN TREATING FOR YEAST INFECTION BY PCP    Redness of skin     LOWER LEGS BILATERAL    Scab     BILATERAL LEGS HEALING    Sleep apnea with use of continuous positive airway pressure (CPAP)     CPAP    Streptococcus  pneumoniae     ABOUT 6-7 YR AGO.     Type 2 diabetes mellitus        Past Surgical History:   Procedure Laterality Date    BASAL CELL CARCINOMA EXCISION      BRONCHOSCOPY WITH ION ROBOTIC ASSIST N/A 5/6/2024    Procedure: BRONCHOSCOPY WITH ION ROBOT WITH FNA, BIOPSIES, BAL AND ENDOBRONCHIAL ULTRASOUND WITH FNA;  Surgeon: Yony Coles MD;  Location: [facility] ENDOSCOPY;  Service: Robotics - Pulmonary;  Laterality: N/A;  PRE: PULMONARY NODULES  POST: SAME    CARDIAC CATHETERIZATION N/A 11/15/2021    Procedure: Coronary angiography;  Surgeon: Alfredo Jennings MD;  Location: [facility] CATH INVASIVE LOCATION;  Service: Cardiovascular;  Laterality: N/A;    CARDIAC CATHETERIZATION N/A 11/15/2021    Procedure: Left heart cath;  Surgeon: Alfredo Jennings MD;  Location: [facility] CATH INVASIVE LOCATION;  Service: Cardiovascular;  Laterality: N/A;    CARDIAC CATHETERIZATION N/A 11/15/2021    Procedure: Left ventriculography;  Surgeon: Alfredo Jennings MD;  Location: [facility] CATH INVASIVE LOCATION;  Service: Cardiovascular;  Laterality: N/A;    CARDIAC CATHETERIZATION N/A 11/15/2021    Procedure: Aortic root aortogram;  Surgeon: Alfredo Jennings MD;  Location: [facility] CATH INVASIVE LOCATION;  Service: Cardiovascular;  Laterality: N/A;    CARDIOVERSION      CHOLECYSTECTOMY N/A 10/07/2017    Procedure: CHOLECYSTECTOMY LAPAROSCOPIC;  Surgeon: Martir Trevino MD;  Location: [facility] MAIN OR;  Service:     COLONOSCOPY  2007    COLONOSCOPY N/A 8/30/2022    Procedure: COLONOSCOPY TO CECUM AND TI AND COLD SNARE POLYPECTOMY;  Surgeon: Cj Lopez MD;  Location: [facility] ENDOSCOPY;  Service: Gastroenterology;  Laterality: N/A;  Pre: History of colon polyps  Post: POLYP, PREVIOUS TATTOO    FOOT SURGERY Left     TOES ARE PINNED. ALL BUT GREAT TOE    HAND SURGERY      THUMB    HERNIA REPAIR      INGUINAL HERNIA REPAIR Right 06/27/2018    Procedure: INGUINAL HERNIA REPAIR, OPEN, RECURRENT;  Surgeon: Martir Trevino MD;  Location:   [facility];  Service: General    LASIK Bilateral     LOBECTOMY Right 6/12/2024    Procedure: BRONCHOSCOPY, RIGHT VIDEO ASSISTED THORACOSCOPY WITH RIGHT MIDDLE LOBECTOMY WITH efectivoxINCI ROBOT, MEDIASTINAL LYMPH NODE DISSECTION, INTERCOSTAL NERVE BLOCK;  Surgeon: David Figueroa MD;  Location: [facility] MAIN OR;  Service: Robotics - DaVinci;  Laterality: Right;    NECK SURGERY      growth on salivary gland    REPLACEMENT TOTAL KNEE Right 2007    (he is going to have a LTKR next month)    REPLACEMENT TOTAL KNEE Left     VENOUS ACCESS DEVICE (PORT) INSERTION Right 7/5/2024    Procedure: INSERTION VENOUS ACCESS DEVICE;  Surgeon: David Figueroa MD;  Location: [facility] MAIN OR;  Service: Thoracic;  Laterality: Right;    VENOUS ACCESS DEVICE (PORT) REMOVAL N/A 4/24/2025    Procedure: REMOVAL VENOUS ACCESS DEVICE, DOXYCYCLINE PLEURODESIS;  Surgeon: David Figueroa MD;  Location: [facility] MAIN OR;  Service: Thoracic;  Laterality: N/A;     Social History:  [place]ives in [place] [employer] [place], [place]    Retired      Antibiotic allergies and intolerances:  None    Medications:     Current Outpatient Medications:     albuterol sulfate  (90 Base) MCG/ACT inhaler, Inhale 2 puffs Every 4 (Four) Hours As Needed for Wheezing., Disp: , Rfl:     budesonide (PULMICORT) 0.5 MG/2ML nebulizer solution, Take 2 mL by nebulization 2 (Two) Times a Day., Disp: 120 mL, Rfl: 0    ceFAZolin 2000 mg IVPB in 100 mL NS (MBP), Infuse 2,000 mg into a venous catheter Every 8 (Eight) Hours for 104 doses. Indications: Bacteria in the Blood, Disp: , Rfl:     diphenhydrAMINE (Benadryl Allergy) 25 MG tablet, Take 1 tablet by mouth Every 6 (Six) Hours As Needed for Itching., Disp: , Rfl:     famotidine (PEPCID) 20 MG tablet, Take 1 tablet by mouth 2 (Two) Times a Day As Needed for Heartburn., Disp: , Rfl:     ferrous sulfate 325 (65 FE) MG tablet, Take 0.5 tablets by mouth 2 (Two) Times a Day., Disp: , Rfl:     folic acid (FOLVITE) 1 MG tablet, Take 1 tablet by mouth  Daily., Disp: 90 tablet, Rfl: 3    gabapentin (NEURONTIN) 300 MG capsule, TAKE 2 CAPSULES BY MOUTH EVERY 8 HOURS, Disp: 180 capsule, Rfl: 2    guaiFENesin (MUCINEX) 600 MG 12 hr tablet, Take 2 tablets by mouth Every 12 (Twelve) Hours., Disp: 120 tablet, Rfl: 0    hydrOXYzine pamoate (VISTARIL) 50 MG capsule, Take 1 capsule by mouth 3 (Three) Times a Day As Needed for Itching., Disp: , Rfl:     Insulin Glargine, 1 Unit Dial, (Toujeo SoloStar) 300 UNIT/ML solution pen-injector injection, Inject 30 Units under the skin into the appropriate area as directed Daily., Disp: , Rfl:     ipratropium-albuterol (DUO-NEB) 0.5-2.5 mg/3 ml nebulizer, Take 3 mL by nebulization 4 (Four) Times a Day., Disp: 360 mL, Rfl: 0    Magnesium Oxide -Mg Supplement 400 (240 Mg) MG tablet, TAKE 1 TABLET BY MOUTH 2 TIMES A DAY, Disp: 60 tablet, Rfl: 1    Multiple Vitamin (MULTIVITAMINS PO), Take 1 tablet by mouth Daily., Disp: , Rfl:     O2 (OXYGEN), Inhale 3 L/min Continuous., Disp: , Rfl:     potassium chloride (MICRO-K) 10 MEQ CR capsule, TAKE 1 CAPSULE BY MOUTH 2 TIMES A DAY, Disp: 40 capsule, Rfl: 2    rosuvastatin (CRESTOR) 40 MG tablet, TAKE ONE TABLET BY MOUTH DAILY (Patient taking differently: Take 1 tablet by mouth Daily.), Disp: 90 tablet, Rfl: 1    sertraline (ZOLOFT) 50 MG tablet, TAKE ONE TABLET BY MOUTH DAILY (Patient taking differently: Take 1 tablet by mouth Daily.), Disp: 90 tablet, Rfl: 1    Tirzepatide (Mounjaro) 2.5 MG/0.5ML solution pen-injector pen, Inject 2.5 mg under the skin into the appropriate area as directed 1 (One) Time Per Week. Mondays, Disp: , Rfl:     vitamin B-12 (CYANOCOBALAMIN) 1000 MCG tablet, Take 1 tablet by mouth Daily., Disp: 90 tablet, Rfl: 3    Xarelto 20 MG tablet, TAKE ONE TABLET BY MOUTH DAILY (Patient taking differently: Take 1 tablet by mouth Daily With Dinner.), Disp: 30 tablet, Rfl: 9    arformoterol (BROVANA) 15 MCG/2ML nebulizer solution, Take 2 mL by nebulization 2 (Two) Times a Day.  "(Patient not taking: Reported on 6/3/2025), Disp: 120 mL, Rfl: 0    fexofenadine (ALLEGRA) 180 MG tablet, Take 1 tablet by mouth Daily., Disp: , Rfl:     insulin aspart (NovoLOG FlexPen) 100 UNIT/ML solution pen-injector sc pen, Inject 8 Units under the skin into the appropriate area as directed 3 (Three) Times a Day With Meals for 30 days., Disp: , Rfl:     torsemide (DEMADEX) 20 MG tablet, Take 2 tablets by mouth Daily for 30 days., Disp: 60 tablet, Rfl: 0      OBJECTIVE:  Blood pressure 109/71, pulse 60, temperature 97.3 °F (36.3 °C), resp. rate 20, height 180.3 cm (71\"), weight 121 kg (267 lb 3.2 oz).    General: awake, alert, sitting up in chair  Eyes: no scleral icterus  Cardiovascular: NR, no murmur  Respiratory: normal work of breathing on 3 L nasal cannula  GI: Abdomen is obese, not tender or distended  Neurological: Alert and oriented x 3  Psychiatric: Calm and pleasant  Vasc: Right chest port site healing without any erythema or drainage; LUE PICC w/o erythema    DIAGNOSTICS:  6/2/2025 Home health labs  WBC 7.6  Hematocrit 30  Platelets 320  Creatinine 0.8    Microbiology:  4/21 RPP: Negative  4/21 BCx: MSSA in 2/2 sets  4/21 urine strep pneumo and Legionella antigens: Negative  4/21 respiratory Cx: \"Rejected\"  4/22 MRSA nares PCR: Negative  4/22 pleural fluid Cx: Negative, final  4/28 BCx: negative      ASSESSMENT/PLAN:  MSSA septicemia  Port catheter present now removed  Pulmonary emphysema  History of right middle lobectomy  Obesity BMI 37  Right pleural effusion status post chest tube placement  Abnormal transthoracic echocardiogram  Long-term use of antibiotic    He's had a good response to his 6 weeks course of cefazolin.  He is afebrile and has a normal WBC.  I will DC his PICC line and stop his IV cefazolin today.  No ID follow-up needed unless new concerns for infection arise.  "

## 2025-06-04 ENCOUNTER — LAB (OUTPATIENT)
Dept: LAB | Facility: HOSPITAL | Age: 75
End: 2025-06-04
Payer: MEDICARE

## 2025-06-04 ENCOUNTER — OFFICE VISIT (OUTPATIENT)
Dept: ONCOLOGY | Facility: CLINIC | Age: 75
End: 2025-06-04
Payer: MEDICARE

## 2025-06-04 VITALS
OXYGEN SATURATION: 95 % | HEIGHT: 71 IN | BODY MASS INDEX: 37.49 KG/M2 | SYSTOLIC BLOOD PRESSURE: 126 MMHG | HEART RATE: 60 BPM | DIASTOLIC BLOOD PRESSURE: 66 MMHG | WEIGHT: 267.8 LBS | TEMPERATURE: 97.8 F

## 2025-06-04 DIAGNOSIS — C34.2 SQUAMOUS CELL CARCINOMA OF BRONCHUS IN RIGHT MIDDLE LOBE: ICD-10-CM

## 2025-06-04 DIAGNOSIS — C34.2 SQUAMOUS CELL CARCINOMA OF BRONCHUS IN RIGHT MIDDLE LOBE: Primary | ICD-10-CM

## 2025-06-04 DIAGNOSIS — E83.42 HYPOMAGNESEMIA: ICD-10-CM

## 2025-06-04 DIAGNOSIS — C34.2 MALIGNANT NEOPLASM OF MIDDLE LOBE OF RIGHT LUNG: ICD-10-CM

## 2025-06-04 DIAGNOSIS — D64.81 ANEMIA ASSOCIATED WITH CHEMOTHERAPY: ICD-10-CM

## 2025-06-04 DIAGNOSIS — E87.1 HYPONATREMIA: ICD-10-CM

## 2025-06-04 DIAGNOSIS — T45.1X5A ANEMIA ASSOCIATED WITH CHEMOTHERAPY: ICD-10-CM

## 2025-06-04 LAB
ALBUMIN SERPL-MCNC: 3.9 G/DL (ref 3.5–5.2)
ALBUMIN/GLOB SERPL: 1.1 G/DL
ALP SERPL-CCNC: 120 U/L (ref 39–117)
ALT SERPL W P-5'-P-CCNC: <5 U/L (ref 1–41)
ANION GAP SERPL CALCULATED.3IONS-SCNC: 11.2 MMOL/L (ref 5–15)
AST SERPL-CCNC: 24 U/L (ref 1–40)
BASOPHILS # BLD AUTO: 0.06 10*3/MM3 (ref 0–0.2)
BASOPHILS NFR BLD AUTO: 0.8 % (ref 0–1.5)
BILIRUB SERPL-MCNC: 0.7 MG/DL (ref 0–1.2)
BUN SERPL-MCNC: 12.2 MG/DL (ref 8–23)
BUN/CREAT SERPL: 16.5 (ref 7–25)
CALCIUM SPEC-SCNC: 9.7 MG/DL (ref 8.6–10.5)
CHLORIDE SERPL-SCNC: 100 MMOL/L (ref 98–107)
CO2 SERPL-SCNC: 27.8 MMOL/L (ref 22–29)
CREAT SERPL-MCNC: 0.74 MG/DL (ref 0.76–1.27)
DEPRECATED RDW RBC AUTO: 54.6 FL (ref 37–54)
EGFRCR SERPLBLD CKD-EPI 2021: 93.9 ML/MIN/1.73
EOSINOPHIL # BLD AUTO: 0.8 10*3/MM3 (ref 0–0.4)
EOSINOPHIL NFR BLD AUTO: 11 % (ref 0.3–6.2)
ERYTHROCYTE [DISTWIDTH] IN BLOOD BY AUTOMATED COUNT: 16.7 % (ref 12.3–15.4)
FERRITIN SERPL-MCNC: 142 NG/ML (ref 30–400)
GLOBULIN UR ELPH-MCNC: 3.7 GM/DL
GLUCOSE SERPL-MCNC: 124 MG/DL (ref 65–99)
HCT VFR BLD AUTO: 34.4 % (ref 37.5–51)
HGB BLD-MCNC: 10.7 G/DL (ref 13–17.7)
IMM GRANULOCYTES # BLD AUTO: 0.04 10*3/MM3 (ref 0–0.05)
IMM GRANULOCYTES NFR BLD AUTO: 0.5 % (ref 0–0.5)
IRON 24H UR-MRATE: 49 MCG/DL (ref 59–158)
IRON SATN MFR SERPL: 13 % (ref 20–50)
LYMPHOCYTES # BLD AUTO: 1.36 10*3/MM3 (ref 0.7–3.1)
LYMPHOCYTES NFR BLD AUTO: 18.7 % (ref 19.6–45.3)
MAGNESIUM SERPL-MCNC: 1.8 MG/DL (ref 1.6–2.4)
MCH RBC QN AUTO: 27.9 PG (ref 26.6–33)
MCHC RBC AUTO-ENTMCNC: 31.1 G/DL (ref 31.5–35.7)
MCV RBC AUTO: 89.8 FL (ref 79–97)
MONOCYTES # BLD AUTO: 0.42 10*3/MM3 (ref 0.1–0.9)
MONOCYTES NFR BLD AUTO: 5.8 % (ref 5–12)
NEUTROPHILS NFR BLD AUTO: 4.61 10*3/MM3 (ref 1.7–7)
NEUTROPHILS NFR BLD AUTO: 63.2 % (ref 42.7–76)
NRBC BLD AUTO-RTO: 0 /100 WBC (ref 0–0.2)
PLATELET # BLD AUTO: 267 10*3/MM3 (ref 140–450)
PMV BLD AUTO: 8.4 FL (ref 6–12)
POTASSIUM SERPL-SCNC: 4.1 MMOL/L (ref 3.5–5.2)
PROT SERPL-MCNC: 7.6 G/DL (ref 6–8.5)
RBC # BLD AUTO: 3.83 10*6/MM3 (ref 4.14–5.8)
SODIUM SERPL-SCNC: 139 MMOL/L (ref 136–145)
TIBC SERPL-MCNC: 389 MCG/DL (ref 298–536)
TRANSFERRIN SERPL-MCNC: 261 MG/DL (ref 200–360)
WBC NRBC COR # BLD AUTO: 7.29 10*3/MM3 (ref 3.4–10.8)

## 2025-06-04 PROCEDURE — 83540 ASSAY OF IRON: CPT

## 2025-06-04 PROCEDURE — 83735 ASSAY OF MAGNESIUM: CPT

## 2025-06-04 PROCEDURE — 80053 COMPREHEN METABOLIC PANEL: CPT

## 2025-06-04 PROCEDURE — 85025 COMPLETE CBC W/AUTO DIFF WBC: CPT

## 2025-06-04 PROCEDURE — 36415 COLL VENOUS BLD VENIPUNCTURE: CPT

## 2025-06-04 PROCEDURE — 82728 ASSAY OF FERRITIN: CPT

## 2025-06-04 PROCEDURE — 84466 ASSAY OF TRANSFERRIN: CPT

## 2025-06-04 NOTE — PROGRESS NOTES
REASON FOR FOLLOW-UP:     *. Poorly differentiated squamous cell lung cancer with intralobular metastatic disease, stage IIIa ([id number]).  Status post right middle lobectomy on 6/12/2024.  Patient was started on adjuvant chemotherapy carboplatin AUC 5, and Taxol 200 mg/m² on 7/31/2024.    HISTORY OF PRESENT ILLNESS:  The patient is a 74 y.o. year old male who is here for evaluation with the above-mentioned history.    History of Present Illness  The patient presents today on 6/4/2025 for evaluation of lung cancer, anemia after he is recently chest CT scan examination.  Patient is accompanied by his wife who helped with history.    The patient had a CT scan examination for the chest with i.v. contrast on 05/30/2025. Official report is pending.     Laboratory studies today on 6/4/2025 showed a persistent anemia with hemoglobin 10.7, MCV 89.8, MCHC 31.1, normal platelets 267,000 and WBC 7290 neutrophils 4610. Iron studies showed ferritin 142, free iron 49, iron saturation 13%. Chemistry lab today had magnesium 1.8, alk phos 120, and glucose 124.    She is currently on oxygen by nasal cannula at 3 liters/min, which she intermittently discontinues for approximately 30 minutes during outings. Oxygen use is resumed when shortness of breath occurs or when needed. Her nightly routine includes the use of a CPAP machine.     She is also on a daily dose of folic acid and B12 supplements. No bleeding complications are reported with Xarelto use. Magnesium is taken at 400 mg twice daily, and potassium is taken as 1 tablet twice daily. She is not using any Lasix and occasionally consumes bananas.    Blood glucose levels have been well-controlled since discontinuing steroids, with a reading of 126 this morning and 106 the previous day. Levels typically remain below 200. NovoLog insulin is administered 3 times daily, and glargine is taken once daily at 7:00 PM.    A consultation with hand surgeon Dr. Kleiner occurred yesterday,  during which x-rays of the hands were performed, leading to a diagnosis of severe arthritis. She has previously undergone surgery for this condition.        Past Medical History:   Diagnosis Date    Anxiety     Arthritis     Atrial fibrillation     CURRENTLY    Bunion of right foot     Colon polyps     COPD (chronic obstructive pulmonary disease)     MILD. NO MEDS FOR    Depression     History of atrial fibrillation     WITH CARDIOVERSION. NO PROBLEMS    History of skin cancer     BCC REMOVED FROM BACK    [place] (hard of hearing)     Hyperlipidemia     Inguinal hernia, recurrent     RIGHT    Left foot pain     Lung nodules     Rash     IN GROIN TREATING FOR YEAST INFECTION BY PCP    Redness of skin     LOWER LEGS BILATERAL    Scab     BILATERAL LEGS HEALING    Sleep apnea with use of continuous positive airway pressure (CPAP)     CPAP    Streptococcus pneumoniae     ABOUT 6-7 YR AGO.     Type 2 diabetes mellitus      Past Surgical History:   Procedure Laterality Date    BASAL CELL CARCINOMA EXCISION      BRONCHOSCOPY WITH ION ROBOTIC ASSIST N/A 5/6/2024    Procedure: BRONCHOSCOPY WITH ION ROBOT WITH FNA, BIOPSIES, BAL AND ENDOBRONCHIAL ULTRASOUND WITH FNA;  Surgeon: Yony Coles MD;  Location: [facility] ENDOSCOPY;  Service: Robotics - Pulmonary;  Laterality: N/A;  PRE: PULMONARY NODULES  POST: SAME    CARDIAC CATHETERIZATION N/A 11/15/2021    Procedure: Coronary angiography;  Surgeon: Alfredo Jennings MD;  Location: [facility] CATH INVASIVE LOCATION;  Service: Cardiovascular;  Laterality: N/A;    CARDIAC CATHETERIZATION N/A 11/15/2021    Procedure: Left heart cath;  Surgeon: Alfredo Jennings MD;  Location: [facility] CATH INVASIVE LOCATION;  Service: Cardiovascular;  Laterality: N/A;    CARDIAC CATHETERIZATION N/A 11/15/2021    Procedure: Left ventriculography;  Surgeon: Alfredo Jennings MD;  Location: [facility] CATH INVASIVE LOCATION;  Service: Cardiovascular;  Laterality: N/A;    CARDIAC CATHETERIZATION N/A  11/15/2021    Procedure: Aortic root aortogram;  Surgeon: Alfredo Jennings MD;  Location: [facility] CATH INVASIVE LOCATION;  Service: Cardiovascular;  Laterality: N/A;    CARDIOVERSION      CHOLECYSTECTOMY N/A 10/07/2017    Procedure: CHOLECYSTECTOMY LAPAROSCOPIC;  Surgeon: Martir Trevino MD;  Location: [facility] MAIN OR;  Service:     COLONOSCOPY  2007    COLONOSCOPY N/A 8/30/2022    Procedure: COLONOSCOPY TO CECUM AND TI AND COLD SNARE POLYPECTOMY;  Surgeon: Cj Lopez MD;  Location: [facility] ENDOSCOPY;  Service: Gastroenterology;  Laterality: N/A;  Pre: History of colon polyps  Post: POLYP, PREVIOUS TATTOO    FOOT SURGERY Left     TOES ARE PINNED. ALL BUT GREAT TOE    HAND SURGERY      THUMB    HERNIA REPAIR      INGUINAL HERNIA REPAIR Right 06/27/2018    Procedure: INGUINAL HERNIA REPAIR, OPEN, RECURRENT;  Surgeon: Martir Trevino MD;  Location: [facility] OR OSC;  Service: General    LASIK Bilateral     LOBECTOMY Right 6/12/2024    Procedure: BRONCHOSCOPY, RIGHT VIDEO ASSISTED THORACOSCOPY WITH RIGHT MIDDLE LOBECTOMY WITH DAVINCI ROBOT, MEDIASTINAL LYMPH NODE DISSECTION, INTERCOSTAL NERVE BLOCK;  Surgeon: David Figueroa MD;  Location: [facility] MAIN OR;  Service: Robotics - DaVinci;  Laterality: Right;    NECK SURGERY      growth on salivary gland    REPLACEMENT TOTAL KNEE Right 2007    (he is going to have a LTKR next month)    REPLACEMENT TOTAL KNEE Left     VENOUS ACCESS DEVICE (PORT) INSERTION Right 7/5/2024    Procedure: INSERTION VENOUS ACCESS DEVICE;  Surgeon: David Figueroa MD;  Location: [facility] MAIN OR;  Service: Thoracic;  Laterality: Right;    VENOUS ACCESS DEVICE (PORT) REMOVAL N/A 4/24/2025    Procedure: REMOVAL VENOUS ACCESS DEVICE, DOXYCYCLINE PLEURODESIS;  Surgeon: David Figueroa MD;  Location: [facility] MAIN OR;  Service: Thoracic;  Laterality: N/A;       ONCOLOGY HISTORY:  The patient is a 73 years old male, who presents for oncology evaluation 7/12/2024. He is accompanied by his wife.    He underwent  right middle lobe lobectomy for squamous cell lung cancer by Dr. Figueroa on 2024 and is recovering well from the surgery. However, there is an open wound at the site of chest tube on the right side of the chest which now has a few stitches, which is still leaking. He was advised to apply Band-Aids.  He reports no fever sweating or chills.      Patient has been on oxygen supplementation since surgery, currently 2 L/min.  Patient says occasionally at home he does not need oxygen.  Dr. Figueroa has suggested gradually reducing his oxygen use.     His appetite remains normal.    Patient reports some family health issues.  His son-in-law recently  of HLH just last week.  His brother-in-law has stage IV liver cancer.     This patient has a history of emphysema, followed by pulmonologist Dr. Camp.  His high-resolution chest CT scan on 2021 reported 7 mm nodule in the right middle lobe.  There is also index subcarinal lymph node 1.1 cm.     Patient subsequently had serial CT scan examination.    On 4/10/2023 chest high-resolution CT scan reported stable examination with the pulmonary nodules measuring up to 9-10 mm in the right middle lobe and a sub-6 mm in the right upper lobe.  The largest subcarinal lymph node measures  2.1 x  1.2 cm.     However chest high resolution CT scan 2024 reported disease progression, with right middle lobe pulm nodule 1.8 cm from margin at 9 mm.  There is also a new right middle lobe 1.1 cm nodule.  Stable right upper lobe 7 mm nodule.  Also a new right hilar lymphadenopathy up to 2 cm.  The 1.5 cm subcarinal lymph node is stable.  No pleural effusion.    Patient subsequently had PET scan examination on 2024 requested by Dr. Camp.  This reported SUV 3.1 corresponding to the 19 mm right middle lobe nodule.  The 1.1 cm right middle nodule was photopenic.  The right hilar lymph node with SUV 5.6.  The 1.5 cm subcarinal lymph node with maximum SUV 7.    Patient subsequently seen by  Dr. Figueroa who performed ION bronchoscopic biopsy on 5/7/2024 with pathology evaluation reporting squamous cell carcinoma involving one of the right middle lobe lesion, and the other pulmonary nodule is at least squamous cell carcinoma in situ.  PD-L1 study TPS was 0%.       Dr. Figueroa performed robot-assisted thoracoscopic right middle lobe lobectomy and mediastinal lymph node dissection on 6/12/2024.  Pathology evaluation reported poorly differentiated squamous cell carcinoma, clear margin, however with lymph nodes involved 3 lymph nodes in the right 10 R, 11 R and 12 R lymph node station.  There was also frequent lymphovascular invasion and focal perineural invasion.    Patient subsequently had a portacatheter placement on 7/5/2024.      The patient presents today on 7/31/2024 for re-evaluation to start chemotherapy.    His case was presented at the multimodality conference 7/18/2024.  Because of negative margin and N1 disease, as well as marginal pulmonary function, the consensus was for patient to have adjuvant chemotherapy alone without radiation.    On 7/18/2024 peripheral blood was sent for Tempus xF liquid biopsy with inconclusive results, no reportable treatment options found.    His lung cancer sample from 6/12/2024 was sent for Tempus xT DNA and RNA study.  It reported stable MSI.  Tumor mutation burden 6.3 m/MB.  Negative for EGFR, KRAS, BRAF, ALK, ROS1, RET, MET, HER2.    The patient recently consulted her nephrologist, during which blood work was conducted. The results indicated a slight presence of protein in urine. He denies experiencing any dyspnea.      He recently had an abnormal x-ray examination of his left hand at the [facility] which is suspicious for a tumor.  Patient informed us about the abnormalities.  So we arranged an an MRI of his left hand to be conducted on 11/18/2024, which revealed a large expansile bone lesion involving the entirety of the thumb proximal phalanx, measuring  2.5 x 2.0 cm and 3.5 cm in length. There was no associated pathologic fracture. He was referred to hand surgeon at the [facility]. He has an appointment with a hand surgeon in 01/2025.     The patient reports that he has had issues with his left thumb since an accident in the 1960s where he cut off his finger with chainsaw while cutting down tree.  This was attached back by surgeon from the [facility].  About 10 years ago, he injured his thumb while cutting plywood. He has noticed a growth on his thumb since then, which has remained the same size. He does not experience any pain in his thumb. Dr. Espinal informed him that he had a bone tumor and recommended surgery, but he declined.    On 11/24/2024, he visited the ER due to diffuse pruritic skin rashes. He developed a skin rash on his arms 5 days ago after using a new bottle of lotion. The rash also appeared 5 days after his first dose of immunotherapy. He also has rashes on his legs and back, but not on his face as he did not apply the lotion there. He has been using hydrocortisone cream 2.5 percent twice daily, prescribed by Dr. Reid, but it has not been effective. He experiences itching only on his arms. He has been feeling slightly short of breath. He was given a 6-day course of steroids in the ER, which helped with his wrist, knee, and shoulder pain. However, the rash returned after he finished the steroids.    He has diabetes and his blood sugar levels have been elevated since starting the steroids, ranging from 195 to 240. His usual blood sugar level is around 130 to 140. He uses two types of insulin at home, one of which is short-acting.    Since increasing his gabapentin dosage to 600 mg every 8 hours, his peripheral neuropathy has improved significantly. However, his wife reports that there have been issues with the pharmacy refilling his medication on time.    MRI of the left hand 11/18/2024 reported a large expansile  bone lesion involving the entirety of the thumb proximal phalanx measuring 2.5 x 2.0 cm and 3.5 cm in length. No associated pathologic fracture.    The patient is here today on 12/11/2024 for a 2-week follow-up to assess the effectiveness of the steroid treatment for pruritic skin rashes that developed after the first cycle of immunotherapy with pembrolizumab. The immunotherapy was paused, and he was started on prednisone 20 mg daily on 11/27/2024.     However he actually presented to [facility] emergency room on 11/29/2024 because of worsening dyspnea and mild hemoptysis.    He had a thoracentesis on 11/30/2024 during hospitalization, with 800 mL pleural effusion removed from the right side. The cytology study reported negative for cancer cells. There were mixed mesothelial cells, histiocytes, chronic inflammation, fibrin in the blood. This sample was also sent to flow cytometry study and reported no evidence for hematologic malignancy.  Patient reports has improved breathing after thoracentesis.  His oxygen saturation is 99% today on oxygen 2 L/min NC.    He was discharged from the hospital on 12/01/2024, and Dr. Art prescribed prednisone starting at 60 mg for 5 days, then taper down by 10 mg every 7 days and to finish actually the last will be taking 5 mg for 7 days to finish.     He reports significant improvement in his pruritic rash, which is primarily located on his legs. He no longer experiences itching or discomfort associated with the rash. He recalls severe itching on his back during the last visit but notes that this symptom has resolved. He did not have the rash prior to the initiation of immunotherapy. He has been maintaining his hygiene routine, including regular showers, without any issues. He reports has been causing fluctuations in his blood sugar levels. He also notes an increase in appetite, which he attributes to the steroid treatment.      Patient is seen back on 2/11/2025 in  short-term interval follow-up.  Last week on 2/4/2025 he received his third dose of Keytruda.  Just prior to that he had been hospitalized 1/22 to 1/24/2025 with acute COPD exacerbation and infiltrates consistent with pneumonia.  Was concerned that this might be related to pneumonitis however and he was placed on placed on prednisone 40 mg x 5 days.  Patient did also undergo right-sided thoracentesis 1/23/2025 with 1 L of fluid removed. Patient has also had immune-mediated rash though at his visit last week this was noted to be improved.    Patient unfortunately called over this past weekend reporting worsening rash on his arms and legs.  He was started back on prednisone 40 mg daily is now reviewed back today accompanied by his wife.    Patient states that while the rash is unsightly it is not overly pruritic.  He does have some topical cream that he is utilizing that has been helpful.  He is very concerned about his blood sugars being elevated on the higher dose prednisone.      MEDICATIONS    Current Outpatient Medications:     albuterol sulfate  (90 Base) MCG/ACT inhaler, Inhale 2 puffs Every 4 (Four) Hours As Needed for Wheezing., Disp: , Rfl:     budesonide (PULMICORT) 0.5 MG/2ML nebulizer solution, Take 2 mL by nebulization 2 (Two) Times a Day., Disp: 120 mL, Rfl: 0    diphenhydrAMINE (Benadryl Allergy) 25 MG tablet, Take 1 tablet by mouth Every 6 (Six) Hours As Needed for Itching., Disp: , Rfl:     famotidine (PEPCID) 20 MG tablet, Take 1 tablet by mouth 2 (Two) Times a Day As Needed for Heartburn., Disp: , Rfl:     ferrous sulfate 325 (65 FE) MG tablet, Take 0.5 tablets by mouth 2 (Two) Times a Day., Disp: , Rfl:     folic acid (FOLVITE) 1 MG tablet, Take 1 tablet by mouth Daily., Disp: 90 tablet, Rfl: 3    gabapentin (NEURONTIN) 300 MG capsule, TAKE 2 CAPSULES BY MOUTH EVERY 8 HOURS, Disp: 180 capsule, Rfl: 2    guaiFENesin (MUCINEX) 600 MG 12 hr tablet, Take 2 tablets by mouth Every 12 (Twelve)  Hours., Disp: 120 tablet, Rfl: 0    hydrOXYzine pamoate (VISTARIL) 50 MG capsule, Take 1 capsule by mouth 3 (Three) Times a Day As Needed for Itching., Disp: , Rfl:     Insulin Glargine, 1 Unit Dial, (Toujeo SoloStar) 300 UNIT/ML solution pen-injector injection, Inject 30 Units under the skin into the appropriate area as directed Daily., Disp: , Rfl:     ipratropium-albuterol (DUO-NEB) 0.5-2.5 mg/3 ml nebulizer, Take 3 mL by nebulization 4 (Four) Times a Day., Disp: 360 mL, Rfl: 0    Magnesium Oxide -Mg Supplement 400 (240 Mg) MG tablet, TAKE 1 TABLET BY MOUTH 2 TIMES A DAY, Disp: 60 tablet, Rfl: 1    Multiple Vitamin (MULTIVITAMINS PO), Take 1 tablet by mouth Daily., Disp: , Rfl:     O2 (OXYGEN), Inhale 3 L/min Continuous., Disp: , Rfl:     potassium chloride (MICRO-K) 10 MEQ CR capsule, TAKE 1 CAPSULE BY MOUTH 2 TIMES A DAY, Disp: 40 capsule, Rfl: 2    rosuvastatin (CRESTOR) 40 MG tablet, TAKE ONE TABLET BY MOUTH DAILY (Patient taking differently: Take 1 tablet by mouth Daily.), Disp: 90 tablet, Rfl: 1    sertraline (ZOLOFT) 50 MG tablet, TAKE ONE TABLET BY MOUTH DAILY (Patient taking differently: Take 1 tablet by mouth Daily.), Disp: 90 tablet, Rfl: 1    Tirzepatide (Mounjaro) 2.5 MG/0.5ML solution pen-injector pen, Inject 2.5 mg under the skin into the appropriate area as directed 1 (One) Time Per Week. Mondays, Disp: , Rfl:     vitamin B-12 (CYANOCOBALAMIN) 1000 MCG tablet, Take 1 tablet by mouth Daily., Disp: 90 tablet, Rfl: 3    Xarelto 20 MG tablet, TAKE ONE TABLET BY MOUTH DAILY (Patient taking differently: Take 1 tablet by mouth Daily With Dinner.), Disp: 30 tablet, Rfl: 9    insulin aspart (NovoLOG FlexPen) 100 UNIT/ML solution pen-injector sc pen, Inject 8 Units under the skin into the appropriate area as directed 3 (Three) Times a Day With Meals for 30 days., Disp: , Rfl:     torsemide (DEMADEX) 20 MG tablet, Take 2 tablets by mouth Daily for 30 days., Disp: 60 tablet, Rfl: 0      ALLERGIES:   No Known  "Allergies      SOCIAL HISTORY:       Social History     Socioeconomic History    Marital status:      Spouse name: Luba    Number of children: 2   Tobacco Use    Smoking status: Former     Current packs/day: 0.00     Average packs/day: 1 pack/day for 30.0 years (30.0 ttl pk-yrs)     Types: Cigars, Cigarettes     Start date: 1984     Quit date: 2014     Years since quittin.4     Passive exposure: Never    Smokeless tobacco: Never   Vaping Use    Vaping status: Never Used   Substance and Sexual Activity    Alcohol use: Never     Comment: caffeine use- decaf coffee    Drug use: Never    Sexual activity: Defer         FAMILY HISTORY:  Family History   Problem Relation Age of Onset    Cancer Other     Stroke Mother     Alcohol abuse Father     Malnathan Hyperthermia Neg Hx         Vitals:    25 0858   BP: 126/66   Pulse: 60   Temp: 97.8 °F (36.6 °C)   TempSrc: Oral   SpO2: 95%  Comment: on 3 litersof o2   Weight: 121 kg (267 lb 12.8 oz)   Height: 180.3 cm (70.98\")   PainSc: 3    PainLoc: Ear  Comment: left ear         2025     9:03 AM   Current Status   ECOG score 0     Physical Exam  Constitutional: No acute distress  Musculoskeletal: Severe arthritis in distal radius  Integument/Skin: No skin rash observed    GENERAL:  Well-developed, well-nourished in no acute distress.    SKIN:  Warm, dry without rashes, purpura or petechiae.  HEENT:  Normocephalic.   LYMPHATICS:  No cervical, supraclavicular or axillary adenopathy.  CHEST: Normal respiratory effort.  Lungs clear to auscultation. Good airflow.  CARDIAC:  Regular rate and rhythm. Normal S1,S2.  ABDOMEN:  Soft, no tender.  Bowel sounds normal.  EXTREMITIES:  No lower extremity edema.    RECENT LABS:  Results from last 7 days   Lab Units 25  0832 25  1230   WBC 10*3/mm3 7.29  --    NEUTROS ABS 10*3/mm3 4.61 4.9   LYMPHS ABS x10(3)/ul  --  1.2   HEMOGLOBIN g/dL 10.7*  --    HEMATOCRIT % 34.4*  --    PLATELETS 10*3/mm3 267  --  "     Results from last 7 days   Lab Units 06/04/25  0832   MAGNESIUM mg/dL 1.8   FERRITIN ng/mL 142.00   IRON mcg/dL 49*   TIBC mcg/dL 389     Lab Results   Component Value Date    [id number] 525 11/06/2024     Lab Results   Component Value Date    FOLATE 13.90 11/06/2024     Lab Results   Component Value Date    IRON 49 (L) 06/04/2025    LABIRON 13 (L) 06/04/2025    TRANSFERRIN 261 06/04/2025    TIBC 389 06/04/2025    FERRITIN 142.00 06/04/2025               Assessment & Plan     Assessment & Plan      1. Poorly differentiated squamous cell lung cancer with intralobular metastatic disease, stage IIIa ([id number]).  Tumor was poorly differentiated. This patient has stage 3a disease.   Patient had Ion bronchoscopic biopsy 5/6/2024.  Right middle lobe reported fragments of squamous cell carcinoma.  PD-L1 study reported TPS 0%.  I discussed with the patient on 7/12/2024 that he will need adjuvant chemotherapy and concurrent adjuvant radiation therapy, and likely will need consolidative immunotherapy. I recommended using weekly carboplatin plus Taxol, in conjunction with radiotherapy for 6.5 weeks treatment. In reality, he probably will only receive 5 or 6 weekly chemotherapy instead of the 7 doses due to tolerance issues.   We will present his case at the chest conference on 7/18/2024, due to poor pulmonary function, negative surgical margins and N1 disease, the consensus is for patient to have adjuvant chemotherapy without concurrent radiation therapy.  Also recommended genetic study.   On 7/18/2024 peripheral blood was sent for Outskipus xF liquid biopsy with inconclusive results, no reportable treatment options found.   His lung cancer sample from 6/12/2024 was sent for Tempus xT DNA and RNA study.  It reported stable MSI.  Tumor mutation burden 6.3 m/MB.  Negative for EGFR, KRAS, BRAF, ALK, ROS1, RET, MET, HER2.  Tumor sample was positive for BRIP1 mutation, TP53 mutation and also ARID1B mutation, all of those LOF.   On  7/31/2024 will start the patient on adjuvant chemotherapy.  Because his overall health condition, performance status ECOG 2, his age, he would not tolerate cisplatin based chemotherapy.  So we recommended using carboplatin in conjunction with Taxol every 3 weeks chemotherapy for 4 cycles.  Unfortunately patient would not be a candidate for immunotherapy as part of adjuvant therapy.  8/21/2024 patient presents for evaluation prior to cycle #2 adjuvant chemotherapy.  He is currently undergoing chemotherapy with a total of four cycles planned; this is his second cycle. The biopsy from his lung revealed a PD-L1 negative result. Given his overall health condition, he is not physically capable of tolerating the most toxic chemotherapy, cisplatin, and is therefore being treated with carboplatin. A request has been made for a larger tissue sample from his surgery to be retested for PD-L1. If the result is positive, immunotherapy could be considered for prolonged treatment, which has shown better results in preventing cancer recurrence. This decision will be made after the completion of his chemotherapy, to determin whether maintenance or consolidative immunotherapy is necessary. He continues on oxygen supplementation at 3 L/min.  PD-L1 study from the lobectomy sample reported positive for PD-L1 with a TPS tumor proportion score 5%.  9/11/2024 due today for cycle 3 carboplatin/Taxol.  He is overall tolerating this well with stable neuropathy.  We will reduce his steroids as further detailed below due to exacerbation of his underlying DM type II.  Today 10/2/2024 he presented for evaluation prior to his cycle #4 adjuvant chemotherapy with carboplatin plus Taxol. Laboratory studies today reported normal WBC 5700, neutrophils 3010, hemoglobin 11.7, and platelets 240,000. Chemistry lab reported baseline creatinine 0.69, normal electrolytes except magnesium 1.5, and marginally elevated alkaline phosphatase 133. Given the presence  of neuropathy, there is a concern that this could develop into a chronic issue. A reduction in the dosage of Taxol by 25% is recommended.   Today on 11/6/2024 Cycle 1 adjuvant immunotherapy with pembrolizumab will be initiated and repeated every 3 weeks.  This will be total for 12 months.  Today on 11/27/2024 patient developed diffuse pruritic skin rashes on his trunk, upper extremities, and lower extremities about 5 days after starting immunotherapy. He was given a Medrol dose pack in the ER, which improved his symptoms, but the pruritic skin rashes recurred when the steroids were tapered off. Dr. Reid prescribed 2.5% hydrocortisone cream, but due to the large body area involved, he uses the cream quickly, and the pharmacy will not refill it.  I recommended to start oral prednisone 20 mg daily for 7 days, then tapering to 10 mg daily, has been recommended. A larger quantity of hydrocortisone cream has been prescribed.  Hydroxyzine 25 mg every 8 hours as needed for pruritus has also been prescribed. Immunotherapy with pembrolizumab is canceled today.   The patient underwent a thoracentesis on 11/30/2024, during which 800 mL of pleural effusion was removed from the right side.  Cytology study negative for malignancy.  12/11/2024 due to large dose prednisone, will continue to hold pembrolizumab.  11/14/2025 patient currently is on tapering dose prednisone 5 mg daily.  We discussed with the patient today, he will proceed with cycle 2 of adjuvant pembrolizumab today, to be repeated every 3 weeks. He will see the APRN in 3 weeks for laboratory studies and prior to cycle 3 of pembrolizumab. He will keep his appointment with the CT scan of the chest and the thoracic surgery team. He will be seen in 6 weeks with laboratory studies (CBC, CMP) prior to cycle 4 of pembrolizumab.  2/4/2025: Cycle 3 Keytruda.   Patient was hospitalized on 2/27/2025 due to acute on chronic respiratory failure.  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  Patient was started on steroids.  on 3/10/2025 patient reports that his significant proved symptoms.  Discussed with the patient will taper off his prednisone.  We will cancel future immunotherapy completely.  5/30/2025 patient had chest CT scan examination.  They reported no evidence for cancer recurrence.  Previous.  Consolidation  the lobular groundglass opacities have improved at least.      *2. Acute on chronic hypoxic respiratory failure.  This condition is largely attributed to Keytruda-induced pneumonitis, as assessed by Dr. Sixto Dodd. The patient was hospitalized for about a week in late February and early March 2025.  He was started on prednisone.    on 3/10/2025 his oxygen saturation is 95% on room air, and he is not currently using supplemental oxygen in the clinic. He will continue on prednisone 20 mg daily for 10 days, then decrease to 10 mg daily until completion, approximately 4 weeks from now.    6/4/2025 laboratory studies showed persistent anemia with hemoglobin of 10.7, MCV 89.8, and MCHC 31.1. Iron studies revealed a ferritin level of 142, free iron at 49, and iron saturation at 13%. The dosage of iron will be increased to one full tablet twice daily, provided it can be tolerated. If intolerance occurs, the dosage will be reduced to half a tablet twice daily. The current regimen of folic acid and B12 supplements will be continued.      3.  Emphysema.    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  He was instructed by his surgeon Dr. Figueroa to taper off gradually.  on 11/6/2024 patient reports that he is currently on oxygen supplementation at 3 L/min and reports no cough or hemoptysis.  12/11/2024 continues on oxygen 2 L/min.  1/14/2025 O2 saturation 92% on 2 L/min oxygen supplement. He uses 3 L/min of oxygen at home, which improves his level.  our clinic on 3/10/2025 patient has O2 saturation 95% room air.  Stable  condition 6/4/2025.      4.  Immune mediated skin rash   The skin rashes are attributed to the immunotherapy with pembrolizumab.  Patient currently is on moderately high-dose prednisone since admission in the hospital 11/29/2024.  He reports that the rashes have significantly improved and are no longer itchy. He has been on a tapering dose of prednisone, currently at 50 mg daily. Labs today on 12/11/2024 reported mild leukocytosis, WBC 11,600 including neutrophils 9910, glucose elevated at 262, all fits with steroids use.    On 1/14/2025 he reports slight improvement in the rash after using the prescribed cream three times a day. He has been tapering off prednisone and has two days left for the low-dose 5 mg daily. He will continue to finish the last couple of days.  2/4/2025: Much improved. Continue over the counter creams.   2/8/2025 worsening over the weekend, speaking with Dr. Villasenor on-call, started back on prednisone 40 mg daily  2/1/2025 per review today patient has taken 4 doses of prednisone 40 mg daily.  Rash is comparatively improved (images were taken today, under media).  At this point it is unsightly but not pruritic.  Patient with significant exacerbation of blood sugar on steroids.  Will have him drop back to prednisone 20 mg daily for the rest of this week while monitoring rash and breathing.  He will thereafter discontinue unless symptoms worsen.    3/10/2025 patient has much improved the skin rashes with some remnant rashes.  Continue tapering oral prednisone.      5.  Monoclonal gammopathy of unknown significance.  IgA kappa.  This was discovered at the his nephrologist office.  Laboratory study on 4/13/2022 reported serum monoclonal IgA kappa with elevated IgA 604 mg/dL, normal IgG 861 and IgM 84.  Free kappa chain was 38.4, lambda 21.3, kappa/lambda ratio 1.8.  Lab study on 7/19/2024 at her nephrologist office positive serum monoclonal IgA kappa. IgA was slightly elevated at 597 with normal  serum IgG 830 and IgM 95. Kappa chain is 50.4 and lambda 23.3 with a kappa lambda ratio of 2.16.  Continue to observe.    6.  DM type II  Patient is on sliding scale insulin prior to meals and bedtime along with weekly Mounjaro  Blood sugars are being exacerbated by oral dexamethasone.  He is also receiving IV dexamethasone.  At this point he is doing well with Taxol we will discontinue oral dexamethasone and adjust IV premedication.  11/6/2024 laboratory study conducted today reported a glucose level of 181. Diabetes management will be continued.  Today on 12/11/2024 glucose 262.  This is exacerbated by oral prednisone.  Continue management of diabetes.  1/14/2025 glucose 326.  Patient is tapering off prednisone.  Patient will be given insulin today in the clinic.   2/11/2025: Glucose today exacerbated by prednisone, glucose 373.  Decreasing prednisone as outlined.   on 3/10/2025 his glucose level is elevated at 240, due to steroid use. He will continue diabetes management under the care of his primary care physician.  6/4/2025 glucose 124.      6.  Peripheral neuropathy due to chemotherapy.   He reports tingling in the hands and feet, which interferes with functionality, such as writing checks. This neuropathy started since beginning chemotherapy. He is currently taking gabapentin, which he reports is helping. The neuropathy is likely caused by Taxol.  On 11/6/2024 patient reports that he continues to experience peripheral neuropathy, particularly in his feet, characterized by numbness and tingling. Despite being on gabapentin 300 mg three times a day, he reports no apparent effect. Oral vitamin B12 at 1000 mcg daily, folic acid 1 mg daily, and vitamin B6 at 50 mg daily have been recommended to alleviate peripheral neuropathy symptoms.    1/14/2025 He reports significant improvement in his peripheral neuropathy symptoms with gabapentin 300 mg taken three times a day. He will continue this medication.  2/11/2025:  continue gabapentin.   3/10/2025 continue gabapentin, B12, folate, and vitamin B6.  6/4/2025 no worsening problem.    7. 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  Last chemotherapy finished on 10/2/2024.  Hemoglobin was 11.7.  Laboratory studies conducted today on 11/6/2024 reported persistent anemia with hemoglobin level of 11.0 and MCV of 94.8. An iron study with ferritin, iron profile, along with B12 and folate, has been recommended for further assessment.  11/27/2024 hemoglobin 10.8 MCV 91.8.  Labs on 12/11/2024 reported hemoglobin 12.3.   Today 1/14/2025 hemoglobin 11.5.  Continue to monitor.  2/11/2025: Hemoglobin 11.3. Continue oral iron.   3/10/2025 his hemoglobin has improved from 9.9 during hospitalization in early March 2025 to 12.0 today. Iron studies, including ferritin and iron profile, will be checked in 3 months to reevaluate anemia.  6/4/2025 persistent anemia with hemoglobin 10.7.  Iron study showed low free iron 49 iron saturation 13%, however appropriate ferritin 142 ng/mL.    8. Leukocytosis.  On 12/11/2024 mild leukocytosis with WBC count of 11,600 is noted, likely due to steroid use. No immediate intervention is required.  2/11/2025 normalized WBC 8240 neutrophils 7140.   Today 3/10/2025 his WBC count is elevated at 14,230, with neutrophils at 12,760, likely due to steroid use.  6/4/2025 WBC 7290 including neutrophils 4610 lymphocytes 1360 and eosinophil 800.    9. Low sodium and chloride levels.  12/11/2024 chloride level is marginally low at 93. He is advised to consume slightly more salty foods to help normalize chloride and sodium levels.  2/11/2025 chloride 94 with a sodium 137.  Continue to monitor.    3/10/2025 sodium 135 chloride 94.  likely due to diuretic use and a low-salt diet. He will increase salt intake slightly to maintain sodium balance.  6/4/2025 sodium 139    10. Hypokalemia   2/4/2025: Potassium today is 3.0. He does not wish to restart an oral potassium supplement, we reviewed  risks with not starting this. He previously ate a banana daily, but recently stopped doing that. He will restart this. We will give 1 potassium chloride 20mEq tablet in infusion center today and then continue potassium chloride 10mEq tablet twice a day.   3/10/2025 normal potassium 4.2.  6/4/2025 potassium 4.1.    11. Hypomagnesemia   2/4/2025: Magnesium 1.5. Proceed with IV magnesium per protocol.   3/10/2025 normal magnesium 2.0.  Continue oral magnesium.  6/4/2025 magnesium 1.8.    12.  Pleural effusion.  The patient underwent an ultrasound-guided thoracentesis on the right side during hospitalization, with 1.2 L of pleural effusion removed on 3/2/2025. Cytology study reported negative for malignant cells, reactive mesothelial cells, and macrophages with a background of mixed chronic inflammation.      PLAN:   Keytruda has been permanently discontinued due to intolerance and side effects.   Port-A-Cath flushes will be maintained every 6 weeks.   A chest CT with IV contrast will be arranged in about 3 months.    Continue to follow with encrinology for management of diabetes.   Continue gabapentin 300mg every 8 hours for peripheral neuropathy   Continue vitamin B12 oral 1000mcg daily, folic acid 1mg daily, vitamin B6 at 50mg daily   Continue mounjaro weekly.   We will see patient in 2 weeks later after repeated blood test.    I discussed with the patient about laboratory results and further management plan.  Patient voiced understanding and agreeable.         Duy Villasenor MD PhD  06/04/25

## 2025-06-05 ENCOUNTER — TELEPHONE (OUTPATIENT)
Dept: GASTROENTEROLOGY | Facility: CLINIC | Age: 75
End: 2025-06-05
Payer: MEDICARE

## 2025-06-05 ENCOUNTER — PREP FOR SURGERY (OUTPATIENT)
Dept: OTHER | Facility: HOSPITAL | Age: 75
End: 2025-06-05
Payer: MEDICARE

## 2025-06-05 DIAGNOSIS — Z12.11 ENCOUNTER FOR SCREENING FOR MALIGNANT NEOPLASM OF COLON: Primary | ICD-10-CM

## 2025-06-05 NOTE — TELEPHONE ENCOUNTER
Last colonoscopy 8/30/22    Personal hx polyps  No family hx polyps or cx    Patient uses oxygen    Asa or blood thinners:  Xarelto    List medications:  Hydroxyzine  Gabapentin  Torsemide  Folic acid  Ferrous sulfate  Multi vitamin  Rosuvastatin  B12  Sertraline  Zyrtec  Magnesium  Benadryl  Diphenhydramine    OA form scanned in media

## 2025-06-05 NOTE — TELEPHONE ENCOUNTER
"I think he is followed at [facility] clinic. Please contact patient to confirm.  However I saw through care everywhere that patient was seen and have a heart clinic in March 2025 and copied this notation below.    \"9. Patient is to have colonoscopy in June 2025. No further cardiac workup or treatment would be needed preoperatively. He may hold the rivaroxaban for 24 hours per guidelines. \"  "

## 2025-06-05 NOTE — TELEPHONE ENCOUNTER
Called and spoke to patient's wife. He is seeing [facility]. Faxing request to their office.

## 2025-06-06 ENCOUNTER — TELEPHONE (OUTPATIENT)
Dept: GASTROENTEROLOGY | Facility: CLINIC | Age: 75
End: 2025-06-06
Payer: MEDICARE

## 2025-06-09 ENCOUNTER — TELEPHONE (OUTPATIENT)
Dept: GASTROENTEROLOGY | Facility: CLINIC | Age: 75
End: 2025-06-09
Payer: MEDICARE

## 2025-06-09 RX ORDER — POTASSIUM CHLORIDE 750 MG/1
10 CAPSULE, EXTENDED RELEASE ORAL 2 TIMES DAILY
Qty: 60 CAPSULE | Refills: 2 | Status: SHIPPED | OUTPATIENT
Start: 2025-06-09

## 2025-06-09 NOTE — TELEPHONE ENCOUNTER
ROSA pelaez   for COLONOSCOPY on  08/28 arrive at  10AM  . mailed prep instructions to verified address on file....miralax OK FOR THE HUB TO RELAY

## 2025-06-09 NOTE — TELEPHONE ENCOUNTER
ROSA RIVAS  for COLONOSCOPY on  08/28 arrive at  10AM  . mailed prep instructions to verified address on file....miralax OK FOR THE HUB TO RELAY

## 2025-06-10 ENCOUNTER — PATIENT OUTREACH (OUTPATIENT)
Dept: OTHER | Facility: HOSPITAL | Age: 75
End: 2025-06-10
Payer: MEDICARE

## 2025-06-10 NOTE — PROGRESS NOTES
Reviewed chart    Called patient's wife, primary contact on chart. The patient completed his IV antibiotics for bacteremia.  He reports improved shortness of breath since his pleurodesis (still has O2 @ 3L).  The patient's appetite has improved.     We discussed his upcoming CT scan and appts with Dr. Figueroa and Dr. Villasenor.    The patient/wife denies any questions/concerns or ongoing resource needs. Navigation will continue to follow; encouraged patient/wife to call as needed.

## 2025-06-13 ENCOUNTER — TELEPHONE (OUTPATIENT)
Dept: GASTROENTEROLOGY | Facility: CLINIC | Age: 75
End: 2025-06-13
Payer: MEDICARE

## 2025-06-13 NOTE — TELEPHONE ENCOUNTER
Pt's wife called. Pt has a procedure scheduled for 8/28 and she wants to ask some questions concerning his medication.  She would like a call back.

## 2025-06-13 NOTE — TELEPHONE ENCOUNTER
Rec'd call from Valarie at Dr Ureña's office and state they would like pt to hold Xarelto only only one day.  They will fax a letter with this information to us at [phone].

## 2025-06-13 NOTE — TELEPHONE ENCOUNTER
Called pt and spoke w/wife advised how to dose insulins.  Advised we will contact prescribing MD for approval to hold Xarelto.

## 2025-06-16 NOTE — TELEPHONE ENCOUNTER
Med recs scanned under media from Dr Ureña office for approval to hold Xarelto for one day prior to colonoscopy.  (Please review note on pt 5 of med recs).    Called pt's wife (ok per PRICILA)  and left detailed msg on identified vm advising of Dr Ureña's note.  Advised to call back if any questions.     Msg sent to Dr Lopez.

## 2025-06-21 ENCOUNTER — HOSPITAL ENCOUNTER (EMERGENCY)
Facility: HOSPITAL | Age: 75
Discharge: HOME | End: 2025-06-21
Attending: EMERGENCY MEDICINE
Payer: MEDICARE

## 2025-06-21 ENCOUNTER — APPOINTMENT (OUTPATIENT)
Dept: GENERAL RADIOLOGY | Facility: HOSPITAL | Age: 75
End: 2025-06-21
Payer: MEDICARE

## 2025-06-21 ENCOUNTER — APPOINTMENT (OUTPATIENT)
Dept: CARDIOLOGY | Facility: HOSPITAL | Age: 75
End: 2025-06-21
Payer: MEDICARE

## 2025-06-21 VITALS
DIASTOLIC BLOOD PRESSURE: 66 MMHG | RESPIRATION RATE: 18 BRPM | TEMPERATURE: 97.8 F | SYSTOLIC BLOOD PRESSURE: 119 MMHG | BODY MASS INDEX: 37.72 KG/M2 | HEART RATE: 70 BPM | HEIGHT: 71 IN | OXYGEN SATURATION: 98 % | WEIGHT: 269.4 LBS

## 2025-06-21 DIAGNOSIS — M25.512 ACUTE PAIN OF LEFT SHOULDER: Primary | ICD-10-CM

## 2025-06-21 LAB
BACTERIA UR QL AUTO: NORMAL /HPF
BH CV UPPER VENOUS LEFT AXILLARY AUGMENT: NORMAL
BH CV UPPER VENOUS LEFT AXILLARY COMPRESS: NORMAL
BH CV UPPER VENOUS LEFT AXILLARY PHASIC: NORMAL
BH CV UPPER VENOUS LEFT AXILLARY SPONT: NORMAL
BH CV UPPER VENOUS LEFT BASILIC FOREARM COMPRESS: NORMAL
BH CV UPPER VENOUS LEFT BASILIC UPPER COMPRESS: NORMAL
BH CV UPPER VENOUS LEFT BRACHIAL COMPRESS: NORMAL
BH CV UPPER VENOUS LEFT CEPHALIC FOREARM COMPRESS: NORMAL
BH CV UPPER VENOUS LEFT CEPHALIC UPPER COMPRESS: NORMAL
BH CV UPPER VENOUS LEFT INTERNAL JUGULAR AUGMENT: NORMAL
BH CV UPPER VENOUS LEFT INTERNAL JUGULAR COMPRESS: NORMAL
BH CV UPPER VENOUS LEFT INTERNAL JUGULAR PHASIC: NORMAL
BH CV UPPER VENOUS LEFT INTERNAL JUGULAR SPONT: NORMAL
BH CV UPPER VENOUS LEFT RADIAL COMPRESS: NORMAL
BH CV UPPER VENOUS LEFT SUBCLAVIAN AUGMENT: NORMAL
BH CV UPPER VENOUS LEFT SUBCLAVIAN COMPRESS: NORMAL
BH CV UPPER VENOUS LEFT SUBCLAVIAN PHASIC: NORMAL
BH CV UPPER VENOUS LEFT SUBCLAVIAN SPONT: NORMAL
BH CV UPPER VENOUS LEFT ULNAR COMPRESS: NORMAL
BH CV UPPER VENOUS RIGHT INTERNAL JUGULAR AUGMENT: NORMAL
BH CV UPPER VENOUS RIGHT INTERNAL JUGULAR COMPRESS: NORMAL
BH CV UPPER VENOUS RIGHT INTERNAL JUGULAR PHASIC: NORMAL
BH CV UPPER VENOUS RIGHT INTERNAL JUGULAR SPONT: NORMAL
BH CV UPPER VENOUS RIGHT SUBCLAVIAN AUGMENT: NORMAL
BH CV UPPER VENOUS RIGHT SUBCLAVIAN COMPRESS: NORMAL
BH CV UPPER VENOUS RIGHT SUBCLAVIAN PHASIC: NORMAL
BH CV UPPER VENOUS RIGHT SUBCLAVIAN SPONT: NORMAL
BH CV VAS PRELIMINARY FINDINGS SCRIPTING: 1
BILIRUB UR QL STRIP: NEGATIVE
CLARITY UR: CLEAR
COLOR UR: YELLOW
GLUCOSE UR STRIP-MCNC: NEGATIVE MG/DL
HGB UR QL STRIP.AUTO: ABNORMAL
HYALINE CASTS UR QL AUTO: NORMAL /LPF
KETONES UR QL STRIP: NEGATIVE
LEUKOCYTE ESTERASE UR QL STRIP.AUTO: NEGATIVE
NITRITE UR QL STRIP: NEGATIVE
PH UR STRIP.AUTO: 7 [PH] (ref 5–8)
PROT UR QL STRIP: NEGATIVE
RBC # UR STRIP: NORMAL /HPF
REF LAB TEST METHOD: NORMAL
SP GR UR STRIP: 1.01 (ref 1–1.03)
SQUAMOUS #/AREA URNS HPF: NORMAL /HPF
UROBILINOGEN UR QL STRIP: ABNORMAL
WBC # UR STRIP: NORMAL /HPF

## 2025-06-21 PROCEDURE — 99284 EMERGENCY DEPT VISIT MOD MDM: CPT

## 2025-06-21 PROCEDURE — 81001 URINALYSIS AUTO W/SCOPE: CPT | Performed by: EMERGENCY MEDICINE

## 2025-06-21 PROCEDURE — 93971 EXTREMITY STUDY: CPT | Performed by: SURGERY

## 2025-06-21 PROCEDURE — 93971 EXTREMITY STUDY: CPT

## 2025-06-21 PROCEDURE — 73030 X-RAY EXAM OF SHOULDER: CPT

## 2025-06-21 NOTE — ED NOTES
Patient to ER via car from home for L shoulder and arm pain x yesterday worse with movement  Patient had picc line removed recently     Patient wears 3L nc at home

## 2025-06-21 NOTE — ED PROVIDER NOTES
EMERGENCY DEPARTMENT ENCOUNTER    Room Number:  19/19  PCP: Tino Lopez MD    HPI:  Chief Complaint: Left shoulder pain  A complete HPI/ROS/PMH/PSH/SH/FH are unobtainable due to: None  Context: Branden Diop is a 74 y.o. male who presents to the ED c/o acute left shoulder pain.  Onset of symptoms about 2 days ago.  No preceding trauma.  No fever.  Pain worse with movement.  He had left arm PICC line that was removed about 2 weeks ago.  He is concerned that he could have a blood clot.  His wife is also concerned that he could have UTI because he has a known yeast infection for which she is receiving treatment for in his groin region and she is afraid that the yeast infection could be moving up into his bladder.  Patient himself denies having any dysuria.  He reports frequency due to his diuretic use.        PAST MEDICAL HISTORY  Active Ambulatory Problems     Diagnosis Date Noted    A-fib 01/29/2016    Atrioventricular block, Mobitz type 1, Wenckebach 01/29/2016    Apnea, sleep 01/29/2016    Obesity 01/29/2016    Streptococcus pneumoniae     Sleep apnea with use of continuous positive airway pressure (CPAP)     Sinusitis     Right wrist pain 11/11/2015    Type 2 diabetes mellitus with hyperglycemia     Dyslipidemia     Hammertoe     Mood disorder     COPD (chronic obstructive pulmonary disease)     Colon polyps     Atrial fibrillation     Anxiety     Pruritus 04/05/2016    Cholelithiasis 10/06/2017    Fatty liver 10/09/2017    Essential hypertension 05/24/2019    Vitamin D deficiency 08/10/2020    Emphysema, unspecified 10/14/2021    Bronchiectasis with acute exacerbation 10/14/2021    FHx: colonic polyps 08/30/2022    Diverticulosis 08/30/2022    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 05/30/2024    Malignant neoplasm of middle lobe of right lung 05/30/2024    Fluid collection at surgical site, initial encounter 06/20/2024    Encounter for long-term (current) use of high-risk medication  07/12/2024    Fitting and adjustment of vascular catheter 07/12/2024    Anemia associated with chemotherapy 10/10/2024    Peripheral neuropathy due to chemotherapy 11/06/2024    Acute respiratory failure 11/29/2024    Rash in adult 11/30/2024    Bone mass 11/30/2024    Drug-induced skin rash 12/25/2024    Adverse effect of antineoplastic drug 12/25/2024    Acute on chronic hypoxic respiratory failure 01/22/2025    Hypoxemia 01/23/2025    Drug-induced pneumonitis 01/23/2025    Acute on chronic respiratory failure with hypoxia 02/27/2025    Dyspnea on exertion 02/27/2025    Hyponatremia 03/23/2025    Hypoxia 04/21/2025    History of lung cancer 04/21/2025    Anemia, chronic disease 04/21/2025    MSSA bacteremia 04/21/2025    Sepsis due to methicillin susceptible Staphylococcus aureus (MSSA) without acute organ dysfunction 04/30/2025    JUAN (obstructive sleep apnea) 04/30/2025    Encounter for screening for malignant neoplasm of colon 06/05/2025     Resolved Ambulatory Problems     Diagnosis Date Noted    BP (high blood pressure) 01/29/2016    Lobar pneumonia 12/01/2013    Atrial flutter 08/25/2016    Acute cholecystitis 10/06/2017    Right upper quadrant pain 10/06/2017    Recurrent inguinal hernia of right side without obstruction or gangrene 06/13/2018    Uncontrolled type 2 diabetes mellitus with hyperglycemia 04/25/2019    Chest pain, atypical 11/11/2021    Pleural effusion on right 12/25/2024     Past Medical History:   Diagnosis Date    Arthritis     Bunion of right foot     Depression     History of atrial fibrillation     History of skin cancer     [place] (hard of hearing)     Hyperlipidemia     Inguinal hernia, recurrent     Left foot pain     Lung nodules     Rash     Redness of skin     Scab     Type 2 diabetes mellitus          PAST SURGICAL HISTORY  Past Surgical History:   Procedure Laterality Date    BASAL CELL CARCINOMA EXCISION      BRONCHOSCOPY WITH ION ROBOTIC ASSIST N/A 5/6/2024    Procedure:  BRONCHOSCOPY WITH ION ROBOT WITH FNA, BIOPSIES, BAL AND ENDOBRONCHIAL ULTRASOUND WITH FNA;  Surgeon: Yony Coles MD;  Location: [facility] ENDOSCOPY;  Service: Robotics - Pulmonary;  Laterality: N/A;  PRE: PULMONARY NODULES  POST: SAME    CARDIAC CATHETERIZATION N/A 11/15/2021    Procedure: Coronary angiography;  Surgeon: Alfredo Jennings MD;  Location: [facility] CATH INVASIVE LOCATION;  Service: Cardiovascular;  Laterality: N/A;    CARDIAC CATHETERIZATION N/A 11/15/2021    Procedure: Left heart cath;  Surgeon: Alfredo Jennings MD;  Location: [facility] CATH INVASIVE LOCATION;  Service: Cardiovascular;  Laterality: N/A;    CARDIAC CATHETERIZATION N/A 11/15/2021    Procedure: Left ventriculography;  Surgeon: Alfredo Jennings MD;  Location: [facility] CATH INVASIVE LOCATION;  Service: Cardiovascular;  Laterality: N/A;    CARDIAC CATHETERIZATION N/A 11/15/2021    Procedure: Aortic root aortogram;  Surgeon: Alfredo Jennings MD;  Location: [facility] CATH INVASIVE LOCATION;  Service: Cardiovascular;  Laterality: N/A;    CARDIOVERSION      CHOLECYSTECTOMY N/A 10/07/2017    Procedure: CHOLECYSTECTOMY LAPAROSCOPIC;  Surgeon: Martir Trevino MD;  Location: [facility] MAIN OR;  Service:     COLONOSCOPY  2007    COLONOSCOPY N/A 8/30/2022    Procedure: COLONOSCOPY TO CECUM AND TI AND COLD SNARE POLYPECTOMY;  Surgeon: Cj Lopez MD;  Location: [facility] ENDOSCOPY;  Service: Gastroenterology;  Laterality: N/A;  Pre: History of colon polyps  Post: POLYP, PREVIOUS TATTOO    FOOT SURGERY Left     TOES ARE PINNED. ALL BUT GREAT TOE    HAND SURGERY      THUMB    HERNIA REPAIR      INGUINAL HERNIA REPAIR Right 06/27/2018    Procedure: INGUINAL HERNIA REPAIR, OPEN, RECURRENT;  Surgeon: Martir Trevino MD;  Location: [facility] OR OSC;  Service: General    LASIK Bilateral     LOBECTOMY Right 6/12/2024    Procedure: BRONCHOSCOPY, RIGHT VIDEO ASSISTED THORACOSCOPY WITH RIGHT MIDDLE LOBECTOMY WITH DAVINCI ROBOT, MEDIASTINAL LYMPH NODE  DISSECTION, INTERCOSTAL NERVE BLOCK;  Surgeon: David Figueroa MD;  Location: [facility] MAIN OR;  Service: Robotics - DaVinci;  Laterality: Right;    NECK SURGERY      growth on salivary gland    REPLACEMENT TOTAL KNEE Right     (he is going to have a LTKR next month)    REPLACEMENT TOTAL KNEE Left     VENOUS ACCESS DEVICE (PORT) INSERTION Right 2024    Procedure: INSERTION VENOUS ACCESS DEVICE;  Surgeon: David Figueroa MD;  Location: [facility] MAIN OR;  Service: Thoracic;  Laterality: Right;    VENOUS ACCESS DEVICE (PORT) REMOVAL N/A 2025    Procedure: REMOVAL VENOUS ACCESS DEVICE, DOXYCYCLINE PLEURODESIS;  Surgeon: David Figueroa MD;  Location: [facility] MAIN OR;  Service: Thoracic;  Laterality: N/A;         FAMILY HISTORY  Family History   Problem Relation Age of Onset    Cancer Other     Stroke Mother     Alcohol abuse Father     Malig Hyperthermia Neg Hx          SOCIAL HISTORY  Social History     Socioeconomic History    Marital status:      Spouse name: Luba    Number of children: 2   Tobacco Use    Smoking status: Former     Current packs/day: 0.00     Average packs/day: 1 pack/day for 30.0 years (30.0 ttl pk-yrs)     Types: Cigars, Cigarettes     Start date: 1984     Quit date: 2014     Years since quittin.4     Passive exposure: Never    Smokeless tobacco: Never   Vaping Use    Vaping status: Never Used   Substance and Sexual Activity    Alcohol use: Never     Comment: caffeine use- decaf coffee    Drug use: Never    Sexual activity: Defer         ALLERGIES  Patient has no known allergies.        REVIEW OF SYSTEMS  Review of Systems     Included in HPI  All systems reviewed and negative except for those discussed in HPI.       PHYSICAL EXAM  ED Triage Vitals   Temp Heart Rate Resp BP SpO2   25 1054 25 1054 25 1054 25 1137 25 1054   97.8 °F (36.6 °C) 69 18 119/66 93 %      Temp src Heart Rate Source Patient Position BP Location FiO2 (%)   -- -- -- -- --               Physical Exam      GENERAL: no acute distress  HENT: nares patent  EYES: no scleral icterus  CV: regular rhythm, normal rate, 2+ left radial pulse  RESPIRATORY: normal effort  ABDOMEN: soft, obese abdomen  MUSCULOSKELETAL: no deformity, no swelling of the left arm  NEURO: alert, moves all extremities, follows commands  PSYCH:  calm, cooperative  SKIN: warm, dry, yeast infection noted in the  region without signs of superimposed infection, no crepitus to suggest Tory's gangrene, no overlying redness or warmth of the left shoulder/arm    Vital signs and nursing notes reviewed.          LAB RESULTS  Recent Results (from the past 24 hours)   Urinalysis With Microscopic If Indicated (No Culture) - Urine, Clean Catch    Collection Time: 06/21/25 12:00 PM    Specimen: Urine, Clean Catch   Result Value Ref Range    Color, UA Yellow Yellow, Straw    Appearance, UA Clear Clear    pH, UA 7.0 5.0 - 8.0    Specific Gravity, UA 1.007 1.005 - 1.030    Glucose, UA Negative Negative    Ketones, UA Negative Negative    Bilirubin, UA Negative Negative    Blood, UA Trace (A) Negative    Protein, UA Negative Negative    Leuk Esterase, UA Negative Negative    Nitrite, UA Negative Negative    Urobilinogen, UA 0.2 E.U./dL 0.2 - 1.0 E.U./dL   Urinalysis, Microscopic Only - Urine, Clean Catch    Collection Time: 06/21/25 12:00 PM    Specimen: Urine, Clean Catch   Result Value Ref Range    RBC, UA 0-2 None Seen, 0-2 /HPF    WBC, UA None Seen None Seen, 0-2 /HPF    Bacteria, UA None Seen None Seen /HPF    Squamous Epithelial Cells, UA None Seen None Seen, 0-2 /HPF    Hyaline Casts, UA None Seen None Seen /LPF    Methodology Automated Microscopy        Ordered the above labs and reviewed the results.        RADIOLOGY  XR Shoulder 2+ View Left  Result Date: 6/21/2025  PROCEDURE:  XR SHOULDER 2+ VW LEFT-  HISTORY: Left shoulder pain.  COMPARISON: Chest radiograph 5/15/2025  FINDINGS:   2 views of the left shoulder were obtained.  No  acute fracture or malalignment is identified. Mild glenohumeral and acromioclavicular osteoarthritis.     This report was finalized on 6/21/2025 12:21 PM by Dr. Catherine Harris M.D on Workstation: [id number]        Ordered the above noted radiological studies. Reviewed by me in PACS.        MEDICATIONS GIVEN IN ER  Medications - No data to display      ORDERS PLACED DURING THIS VISIT:  Orders Placed This Encounter   Procedures    XR Shoulder 2+ View Left    Urinalysis With Microscopic If Indicated (No Culture) - Urine, Clean Catch    Urinalysis, Microscopic Only - Urine, Clean Catch         OUTPATIENT MEDICATION MANAGEMENT:  No current Epic-ordered facility-administered medications on file.     Current Outpatient Medications Ordered in Epic   Medication Sig Dispense Refill    albuterol sulfate  (90 Base) MCG/ACT inhaler Inhale 2 puffs Every 4 (Four) Hours As Needed for Wheezing.      budesonide (PULMICORT) 0.5 MG/2ML nebulizer solution Take 2 mL by nebulization 2 (Two) Times a Day. 120 mL 0    diphenhydrAMINE (Benadryl Allergy) 25 MG tablet Take 1 tablet by mouth Every 6 (Six) Hours As Needed for Itching.      famotidine (PEPCID) 20 MG tablet Take 1 tablet by mouth 2 (Two) Times a Day As Needed for Heartburn.      ferrous sulfate 325 (65 FE) MG tablet Take 0.5 tablets by mouth 2 (Two) Times a Day.      folic acid (FOLVITE) 1 MG tablet Take 1 tablet by mouth Daily. 90 tablet 3    gabapentin (NEURONTIN) 300 MG capsule TAKE 2 CAPSULES BY MOUTH EVERY 8 HOURS 180 capsule 2    guaiFENesin (MUCINEX) 600 MG 12 hr tablet Take 2 tablets by mouth Every 12 (Twelve) Hours. 120 tablet 0    hydrOXYzine pamoate (VISTARIL) 50 MG capsule Take 1 capsule by mouth 3 (Three) Times a Day As Needed for Itching.      insulin aspart (NovoLOG FlexPen) 100 UNIT/ML solution pen-injector sc pen Inject 8 Units under the skin into the appropriate area as directed 3 (Three) Times a Day With Meals for 30 days.      Insulin Glargine, 1 Unit  Dial, (Toujeo SoloStar) 300 UNIT/ML solution pen-injector injection Inject 30 Units under the skin into the appropriate area as directed Daily.      ipratropium-albuterol (DUO-NEB) 0.5-2.5 mg/3 ml nebulizer Take 3 mL by nebulization 4 (Four) Times a Day. 360 mL 0    Magnesium Oxide -Mg Supplement 400 (240 Mg) MG tablet TAKE 1 TABLET BY MOUTH 2 TIMES A DAY 60 tablet 1    Multiple Vitamin (MULTIVITAMINS PO) Take 1 tablet by mouth Daily.      O2 (OXYGEN) Inhale 3 L/min Continuous.      potassium chloride (MICRO-K) 10 MEQ CR capsule Take 1 capsule by mouth 2 (Two) Times a Day. 60 capsule 2    rosuvastatin (CRESTOR) 40 MG tablet TAKE ONE TABLET BY MOUTH DAILY (Patient taking differently: Take 1 tablet by mouth Daily.) 90 tablet 1    sertraline (ZOLOFT) 50 MG tablet TAKE ONE TABLET BY MOUTH DAILY (Patient taking differently: Take 1 tablet by mouth Daily.) 90 tablet 1    Tirzepatide (Mounjaro) 2.5 MG/0.5ML solution pen-injector pen Inject 2.5 mg under the skin into the appropriate area as directed 1 (One) Time Per Week. Mondays      torsemide (DEMADEX) 20 MG tablet Take 2 tablets by mouth Daily for 30 days. 60 tablet 0    vitamin B-12 (CYANOCOBALAMIN) 1000 MCG tablet Take 1 tablet by mouth Daily. 90 tablet 3    Xarelto 20 MG tablet TAKE ONE TABLET BY MOUTH DAILY (Patient taking differently: Take 1 tablet by mouth Daily With Dinner.) 30 tablet 9       PROCEDURES  Procedures          MEDICAL DECISION MAKING, PROGRESS, and CONSULTS    Discussion below represents my analysis of pertinent findings related to patient's condition, differential diagnosis, treatment plan and final disposition.            Differential diagnosis:    DVT, cellulitis, septic joint, arthritic changes pain                 Independent interpretation of labs, radiology studies, and discussions with consultants:  ED Course as of 06/21/25 1246   Sat Jun 21, 2025   1226 X-ray of the left shoulder independently interpreted by myself.  Arthritic changes noted.   No fracture. [TD]   1241 Ultrasound of the left upper extremity is negative for DVT. [TD]   1241 Leukocytes, UA: Negative [TD]   1241 Nitrite, UA: Negative [TD]      ED Course User Index  [TD] Fletcher Covarrubias II, MD         Clinical Scores:                                   DIAGNOSIS  Final diagnoses:   Acute pain of left shoulder         DISPOSITION  DISCHARGE    FOLLOW-UP  Tino Lopez MD  [address]  [place] [address] [phone]    Schedule an appointment as soon as possible for a visit in 3 days      [facility]  [address]  [place] [address] [phone]  Schedule an appointment as soon as possible for a visit in 3 days  As needed         Medication List        Changed      Xarelto 20 MG tablet  Generic drug: rivaroxaban  TAKE ONE TABLET BY MOUTH DAILY  What changed:   how much to take  when to take this                  Latest Documented Vital Signs:  As of 12:46 EDT  BP- 119/66 HR- 72 Temp- 97.8 °F (36.6 °C) O2 sat- 98%      --    Please note that portions of this were completed with a voice recognition program.       Note Disclaimer: At [facility], we believe that sharing information builds trust and better relationships. You are receiving this note because you are receiving care at [facility] or recently visited. It is possible you will see health information before a provider has talked with you about it. This kind of information can be easy to misunderstand. To help you fully understand what it means for your health, we urge you to discuss this note with your provider.         Fletcher Covarrubias II, MD  06/21/25 2358

## 2025-07-13 DIAGNOSIS — C34.2 SQUAMOUS CELL CARCINOMA OF BRONCHUS IN RIGHT MIDDLE LOBE: ICD-10-CM

## 2025-07-14 DIAGNOSIS — C34.2 SQUAMOUS CELL CARCINOMA OF BRONCHUS IN RIGHT MIDDLE LOBE: ICD-10-CM

## 2025-07-14 RX ORDER — GABAPENTIN 300 MG/1
600 CAPSULE ORAL EVERY 8 HOURS
Qty: 180 CAPSULE | OUTPATIENT
Start: 2025-07-14

## 2025-07-14 RX ORDER — GABAPENTIN 300 MG/1
600 CAPSULE ORAL EVERY 8 HOURS
Qty: 180 CAPSULE | Refills: 2 | Status: SHIPPED | OUTPATIENT
Start: 2025-07-14

## 2025-08-15 ENCOUNTER — HOSPITAL ENCOUNTER (OUTPATIENT)
Dept: CT IMAGING | Facility: HOSPITAL | Age: 75
Discharge: HOME | End: 2025-08-15
Payer: MEDICARE

## 2025-08-15 DIAGNOSIS — Z85.118 HISTORY OF LUNG CANCER: ICD-10-CM

## 2025-08-15 DIAGNOSIS — J90 PLEURAL EFFUSION: ICD-10-CM

## 2025-08-15 PROCEDURE — 71250 CT THORAX DX C-: CPT

## 2025-08-22 ENCOUNTER — LAB (OUTPATIENT)
Dept: LAB | Facility: HOSPITAL | Age: 75
End: 2025-08-22
Payer: MEDICARE

## 2025-08-28 ENCOUNTER — ANESTHESIA (OUTPATIENT)
Dept: GASTROENTEROLOGY | Facility: HOSPITAL | Age: 75
End: 2025-08-28
Payer: MEDICARE

## 2025-08-28 ENCOUNTER — ANESTHESIA EVENT (OUTPATIENT)
Dept: GASTROENTEROLOGY | Facility: HOSPITAL | Age: 75
End: 2025-08-28
Payer: MEDICARE

## 2025-08-28 ENCOUNTER — HOSPITAL ENCOUNTER (OUTPATIENT)
Facility: HOSPITAL | Age: 75
Setting detail: HOSPITAL OUTPATIENT SURGERY
Discharge: HOME | End: 2025-08-28
Attending: INTERNAL MEDICINE | Admitting: INTERNAL MEDICINE
Payer: MEDICARE

## 2025-08-28 VITALS
SYSTOLIC BLOOD PRESSURE: 116 MMHG | DIASTOLIC BLOOD PRESSURE: 68 MMHG | OXYGEN SATURATION: 97 % | RESPIRATION RATE: 18 BRPM | HEART RATE: 57 BPM

## 2025-08-28 DIAGNOSIS — Z12.11 ENCOUNTER FOR SCREENING FOR MALIGNANT NEOPLASM OF COLON: ICD-10-CM

## 2025-08-28 LAB
GLUCOSE BLDC GLUCOMTR-MCNC: 76 MG/DL (ref 65–99)
GLUCOSE BLDC GLUCOMTR-MCNC: 79 MG/DL (ref 65–99)

## 2025-08-28 PROCEDURE — 25810000003 LACTATED RINGERS PER 1000 ML: Performed by: INTERNAL MEDICINE

## 2025-08-28 PROCEDURE — 25010000002 PROPOFOL 10 MG/ML EMULSION: Performed by: NURSE ANESTHETIST, CERTIFIED REGISTERED

## 2025-08-28 PROCEDURE — 82948 REAGENT STRIP/BLOOD GLUCOSE: CPT

## 2025-08-28 PROCEDURE — 88305 TISSUE EXAM BY PATHOLOGIST: CPT | Performed by: INTERNAL MEDICINE

## 2025-08-28 PROCEDURE — 82948 REAGENT STRIP/BLOOD GLUCOSE: CPT | Performed by: INTERNAL MEDICINE

## 2025-08-28 PROCEDURE — 25010000002 LIDOCAINE 2% SOLUTION: Performed by: NURSE ANESTHETIST, CERTIFIED REGISTERED

## 2025-08-28 RX ORDER — PROPOFOL 10 MG/ML
VIAL (ML) INTRAVENOUS AS NEEDED
Status: DISCONTINUED | OUTPATIENT
Start: 2025-08-28 | End: 2025-08-28 | Stop reason: SURG

## 2025-08-28 RX ORDER — LIDOCAINE HYDROCHLORIDE 10 MG/ML
0.5 INJECTION, SOLUTION INFILTRATION; PERINEURAL ONCE AS NEEDED
Status: DISCONTINUED | OUTPATIENT
Start: 2025-08-28 | End: 2025-08-28 | Stop reason: HOSPADM

## 2025-08-28 RX ORDER — SODIUM CHLORIDE 0.9 % (FLUSH) 0.9 %
10 SYRINGE (ML) INJECTION AS NEEDED
Status: DISCONTINUED | OUTPATIENT
Start: 2025-08-28 | End: 2025-08-28 | Stop reason: HOSPADM

## 2025-08-28 RX ORDER — SODIUM CHLORIDE, SODIUM LACTATE, POTASSIUM CHLORIDE, CALCIUM CHLORIDE 600; 310; 30; 20 MG/100ML; MG/100ML; MG/100ML; MG/100ML
1000 INJECTION, SOLUTION INTRAVENOUS CONTINUOUS
Status: DISCONTINUED | OUTPATIENT
Start: 2025-08-28 | End: 2025-08-28 | Stop reason: HOSPADM

## 2025-08-28 RX ORDER — LIDOCAINE HYDROCHLORIDE 20 MG/ML
INJECTION, SOLUTION INFILTRATION; PERINEURAL AS NEEDED
Status: DISCONTINUED | OUTPATIENT
Start: 2025-08-28 | End: 2025-08-28 | Stop reason: SURG

## 2025-08-28 RX ADMIN — PROPOFOL 125 MCG/KG/MIN: 10 INJECTION, EMULSION INTRAVENOUS at 11:49

## 2025-08-28 RX ADMIN — LIDOCAINE HYDROCHLORIDE 60 MG: 20 INJECTION, SOLUTION INFILTRATION; PERINEURAL at 11:48

## 2025-08-28 RX ADMIN — PROPOFOL 50 MG: 10 INJECTION, EMULSION INTRAVENOUS at 11:52

## 2025-08-28 RX ADMIN — PROPOFOL 50 MG: 10 INJECTION, EMULSION INTRAVENOUS at 11:48

## 2025-08-28 RX ADMIN — SODIUM CHLORIDE, POTASSIUM CHLORIDE, SODIUM LACTATE AND CALCIUM CHLORIDE 1000 ML: 600; 310; 30; 20 INJECTION, SOLUTION INTRAVENOUS at 11:07

## 2025-08-29 LAB
CYTO UR: NORMAL
LAB AP CASE REPORT: NORMAL
LAB AP CLINICAL INFORMATION: NORMAL
PATH REPORT.FINAL DX SPEC: NORMAL
PATH REPORT.GROSS SPEC: NORMAL

## (undated) DEVICE — CANN O2 ETCO2 FITS ALL CONN CO2 SMPL A/ 7IN DISP LF

## (undated) DEVICE — VISION PROBE ADAPTER AND SUCTION ADAPTER

## (undated) DEVICE — CATH DIAG IMPULSE AL1 5F 100CM

## (undated) DEVICE — APPL CHLORAPREP HI/LITE 26ML ORNG

## (undated) DEVICE — 3M™ STERI-DRAPE™ INSTRUMENT POUCH 1018L: Brand: STERI-DRAPE™

## (undated) DEVICE — SENSR O2 OXIMAX FNGR A/ 18IN NONSTR

## (undated) DEVICE — LN SMPL CO2 SHTRM SD STREAM W/M LUER

## (undated) DEVICE — DECANTER BAG 9": Brand: MEDLINE INDUSTRIES, INC.

## (undated) DEVICE — ENDOPATH XCEL BLADELESS TROCARS WITH STABILITY SLEEVES: Brand: ENDOPATH XCEL

## (undated) DEVICE — SWIVEL CONNECTOR

## (undated) DEVICE — GOWN,NON-REINFORCED,SIRUS,SET IN SLV,XXL: Brand: MEDLINE

## (undated) DEVICE — VITAL SIGNS™ JACKSON-REES CIRCUITS: Brand: VITAL SIGNS™

## (undated) DEVICE — PATIENT RETURN ELECTRODE, SINGLE-USE, CONTACT QUALITY MONITORING, ADULT, WITH 9FT CORD, FOR PATIENTS WEIGING OVER 33LBS. (15KG): Brand: MEGADYNE

## (undated) DEVICE — VLV SXN ENDO DISP STRL

## (undated) DEVICE — ELECTRD BLD EZ CLN MOD XLNG 2.75IN

## (undated) DEVICE — LP VESL MAXI 2.5X1MM RED 2PK

## (undated) DEVICE — APPL CHLORAPREP W/TINT 26ML ORNG

## (undated) DEVICE — 3M™ IOBAN™ 2 ANTIMICROBIAL INCISE DRAPE 6640EZ: Brand: IOBAN™ 2

## (undated) DEVICE — Device

## (undated) DEVICE — SEAL

## (undated) DEVICE — ANTIBACTERIAL UNDYED BRAIDED (POLYGLACTIN 910), SYNTHETIC ABSORBABLE SUTURE: Brand: COATED VICRYL

## (undated) DEVICE — LOU THORACIC: Brand: MEDLINE INDUSTRIES, INC.

## (undated) DEVICE — ENDOPATH XCEL UNIVERSAL TROCAR STABLILITY SLEEVES: Brand: ENDOPATH XCEL

## (undated) DEVICE — GLV SURG BIOGEL LTX PF 8

## (undated) DEVICE — SYR LL TP 10ML STRL

## (undated) DEVICE — 3M™ STERI-STRIP™ COMPOUND BENZOIN TINCTURE 40 BAGS/CARTON 4 CARTONS/CASE C1544: Brand: 3M™ STERI-STRIP™

## (undated) DEVICE — LOU LAP CHOLE: Brand: MEDLINE INDUSTRIES, INC.

## (undated) DEVICE — SUT MNCRYL PLS ANTIB UD 4/0 PS2 18IN

## (undated) DEVICE — SUREFORM 45 CURVED-TIP: Brand: SUREFORM

## (undated) DEVICE — ADHS SKIN SURG TISS VISC PREMIERPRO EXOFIN HI/VISC FAST/DRY

## (undated) DEVICE — SUT VIC 2/0 CT1 36IN

## (undated) DEVICE — HYPODERMIC SAFETY NEEDLE: Brand: MONOJECT

## (undated) DEVICE — DBD-DRAPE,LAP,CHOLE,W/TROUGHS,STERILE: Brand: MEDLINE

## (undated) DEVICE — SYR LUERLOK 5CC

## (undated) DEVICE — SINGLE USE SUCTION VALVE MAJ-209: Brand: SINGLE USE SUCTION VALVE (STERILE)

## (undated) DEVICE — SPNG GZ WOVN 4X4IN 12PLY 10/BX STRL

## (undated) DEVICE — LARGE BORE STOPCOCK WITH EXTENSION SET, MALE LUER LOCK ADAPTER WITH RETRACTABLE COLLAR

## (undated) DEVICE — TRAP,MUCUS SPECIMEN, 80CC: Brand: MEDLINE

## (undated) DEVICE — SUT MONOCRYL 4/0 PS2 27IN Y426H ETY426H

## (undated) DEVICE — GLV SURG BIOGEL LTX PF 7

## (undated) DEVICE — FRCP BIOP SUPER TRAX 1.7MM 110CM

## (undated) DEVICE — KT ORCA ORCAPOD DISP STRL

## (undated) DEVICE — Device: Brand: ION

## (undated) DEVICE — TUBING, SUCTION, 1/4" X 20', STRAIGHT: Brand: MEDLINE INDUSTRIES, INC.

## (undated) DEVICE — TUBING, SUCTION, 1/4" X 10', STRAIGHT: Brand: MEDLINE

## (undated) DEVICE — ADAPT SWVL FIBROPTIC BRONCH

## (undated) DEVICE — INTENDED FOR TISSUE SEPARATION, AND OTHER PROCEDURES THAT REQUIRE A SHARP SURGICAL BLADE TO PUNCTURE OR CUT.: Brand: BARD-PARKER ® CARBON RIB-BACK BLADES

## (undated) DEVICE — CATH DIAG IMPULSE FR4 6F 100CM

## (undated) DEVICE — EXOFIN PRECISION PEN HIGH VISCOSITY TOPICAL SKIN ADHESIVE: Brand: EXOFIN PRECISION PEN, 1G

## (undated) DEVICE — THE STERILE LIGHT HANDLE COVER IS USED WITH STERIS SURGICAL LIGHTING AND VISUALIZATION SYSTEMS.

## (undated) DEVICE — PK CATH CARD 40

## (undated) DEVICE — SINGLE USE ASPIRATION NEEDLE: Brand: SINGLE USE ASPIRATION NEEDLE

## (undated) DEVICE — MEDI-VAC YANKAUER SUCTION HANDLE W/BULBOUS TIP: Brand: CARDINAL HEALTH

## (undated) DEVICE — SYR LUERLOK 20CC BX/50

## (undated) DEVICE — SINGLE USE BIOPSY VALVE MAJ-210: Brand: SINGLE USE BIOPSY VALVE (STERILE)

## (undated) DEVICE — ADAPT CLN BIOGUARD AIR/H2O DISP

## (undated) DEVICE — CATHETER,URETHRAL,REDRUBBER,STRL,12FR: Brand: MEDLINE INDUSTRIES, INC.

## (undated) DEVICE — CVR PROB 96IN LF STRL

## (undated) DEVICE — SNAR POLYP CAPTIVATOR RND STFF 2.4 240CM 10MM 1P/U

## (undated) DEVICE — DRSNG SURESITE WNDW 4X4.5

## (undated) DEVICE — GLIDESHEATH BASIC HYDROPHILIC COATED INTRODUCER SHEATH: Brand: GLIDESHEATH

## (undated) DEVICE — SOL NACL 0.9PCT 1000ML

## (undated) DEVICE — BLADELESS OBTURATOR: Brand: WECK VISTA

## (undated) DEVICE — GW EMR FIX EXCHG J STD .035 3MM 260CM

## (undated) DEVICE — Device: Brand: TISSUE RETRIEVAL SYSTEM

## (undated) DEVICE — 24 FR STRAIGHT – SOFT PVC CATHETER: Brand: PVC THORACIC CATHETERS

## (undated) DEVICE — SUT SILK 2/0 SH CR5 18IN C0125

## (undated) DEVICE — EXTENSION SET, MALE LUER LOCK ADAPTER WITH RETRACTABLE COLLAR

## (undated) DEVICE — LABEL SHEET CUSTOM 2X2 YELLOW: Brand: MEDLINE INDUSTRIES, INC.

## (undated) DEVICE — BIOPSY NEEDLE, 21G: Brand: FLEXISION

## (undated) DEVICE — MARKR SKIN W/RULR 2TP

## (undated) DEVICE — CATH DIAG IMPULSE FL3.5 6F 100CM

## (undated) DEVICE — SOL ANTISTICK CAUTRY ELECTROLUBE LF

## (undated) DEVICE — 2, DISPOSABLE SUCTION/IRRIGATOR WITH DISPOSABLE TIP: Brand: STRYKEFLOW

## (undated) DEVICE — GLV SURG SIGNATURE ESSENTIAL PF LTX SZ8

## (undated) DEVICE — SUT PROLN 0 CT2 MONO 30IN 8412H

## (undated) DEVICE — DRN PENRS 5/8X18IN LTX

## (undated) DEVICE — UNDYED BRAIDED (POLYGLACTIN 910), SYNTHETIC ABSORBABLE SUTURE: Brand: COATED VICRYL

## (undated) DEVICE — LOU MINOR PROCEDURE: Brand: MEDLINE INDUSTRIES, INC.

## (undated) DEVICE — NDL HYPO PRECISIONGLIDE REG 25G 1 1/2

## (undated) DEVICE — TOTAL TRAY, 16FR 10ML SIL FOLEY, URN: Brand: MEDLINE

## (undated) DEVICE — TB PROSHIELD PROTECT PLSTC CLR

## (undated) DEVICE — CATH DIAG IMPULSE FL4 6F 100CM

## (undated) DEVICE — TROC BLADLES ANCHORPORT/OPTI LP 12X120MM 1P/U

## (undated) DEVICE — VLV PRT BRONCH LIP LTX DISP

## (undated) DEVICE — ARM DRAPE

## (undated) DEVICE — OASIS DRAIN, SINGLE, INLINE & ATS COMPATIBLE: Brand: OASIS

## (undated) DEVICE — 3M™ IOBAN™ 2 ANTIMICROBIAL INCISE DRAPE 6650EZ: Brand: IOBAN™ 2

## (undated) DEVICE — THE SINGLE USE ETRAP – POLYP TRAP IS USED FOR SUCTION RETRIEVAL OF ENDOSCOPICALLY REMOVED POLYPS.: Brand: ETRAP

## (undated) DEVICE — DRAPE,UTILITY,TAPE,15X26,STERILE: Brand: MEDLINE

## (undated) DEVICE — SUT VIC 3/0 SH 27IN J416H

## (undated) DEVICE — PENCL ES MEGADINE EZ/CLEAN BUTN W/HOLSTR 10FT

## (undated) DEVICE — 3M™ STERI-STRIP™ REINFORCED ADHESIVE SKIN CLOSURES, R1547, 1/2 IN X 4 IN (12 MM X 100 MM), 6 STRIPS/ENVELOPE: Brand: 3M™ STERI-STRIP™

## (undated) DEVICE — PK PROC MINOR TOWER 40

## (undated) DEVICE — TR BAND RADIAL ARTERY COMPRESSION DEVICE: Brand: TR BAND

## (undated) DEVICE — KT CLN CLEANOR SCPE

## (undated) DEVICE — KT MANIFLD CARDIAC

## (undated) DEVICE — NDL INJ UROL WILLIAMS CYSTO 3.7F 23G 8MM

## (undated) DEVICE — YANKAUER,BULB TIP,W/O VENT,RIGID,STERILE: Brand: MEDLINE

## (undated) DEVICE — COLUMN DRAPE

## (undated) DEVICE — ENDOPOUCH RETRIEVER SPECIMEN RETRIEVAL BAGS: Brand: ENDOPOUCH RETRIEVER

## (undated) DEVICE — KIT,ANTI FOG,W/SPONGE & FLUID,SOFT PACK: Brand: MEDLINE

## (undated) DEVICE — CATH DIAG IMPULSE PIG145 6F 110CM

## (undated) DEVICE — COVER,C-ARM,41X74: Brand: MEDLINE

## (undated) DEVICE — DRSNG WND BORDR/ADHS NONADHR/GZ LF 2X2IN STRL

## (undated) DEVICE — SYR LUERLOK 30CC

## (undated) DEVICE — SPNG LAP CIGARETTE KITTNER 5MM STRL PK/5

## (undated) DEVICE — ST TBG AIRSEAL FLTR TRI LUM

## (undated) DEVICE — SUT ETHIB 0/0 CT1 30IN X424H